# Patient Record
Sex: MALE | Race: WHITE | NOT HISPANIC OR LATINO | Employment: OTHER | URBAN - METROPOLITAN AREA
[De-identification: names, ages, dates, MRNs, and addresses within clinical notes are randomized per-mention and may not be internally consistent; named-entity substitution may affect disease eponyms.]

---

## 2017-01-12 ENCOUNTER — ALLSCRIPTS OFFICE VISIT (OUTPATIENT)
Dept: OTHER | Facility: OTHER | Age: 82
End: 2017-01-12

## 2017-01-12 DIAGNOSIS — R01.1 CARDIAC MURMUR: ICD-10-CM

## 2017-01-12 DIAGNOSIS — N28.1 ACQUIRED CYST OF KIDNEY: ICD-10-CM

## 2017-03-20 ENCOUNTER — ALLSCRIPTS OFFICE VISIT (OUTPATIENT)
Dept: OTHER | Facility: OTHER | Age: 82
End: 2017-03-20

## 2017-03-20 ENCOUNTER — HOSPITAL ENCOUNTER (OUTPATIENT)
Dept: RADIOLOGY | Facility: CLINIC | Age: 82
Discharge: HOME | End: 2017-03-20
Payer: COMMERCIAL

## 2017-03-20 DIAGNOSIS — R22.30 LOCALIZED SWELLING, MASS, OR LUMP OF UPPER EXTREMITY: ICD-10-CM

## 2017-03-20 PROCEDURE — 73140 X-RAY EXAM OF FINGER(S): CPT

## 2017-04-18 ENCOUNTER — GENERIC CONVERSION - ENCOUNTER (OUTPATIENT)
Dept: OTHER | Facility: OTHER | Age: 82
End: 2017-04-18

## 2017-04-18 ENCOUNTER — LAB CONVERSION - ENCOUNTER (OUTPATIENT)
Dept: OTHER | Facility: OTHER | Age: 82
End: 2017-04-18

## 2017-04-18 LAB
A/G RATIO (HISTORICAL): 1.3 (CALC) (ref 1–2.5)
ALBUMIN SERPL BCP-MCNC: 3.9 G/DL (ref 3.6–5.1)
ALP SERPL-CCNC: 70 U/L (ref 40–115)
ALT SERPL W P-5'-P-CCNC: 16 U/L (ref 9–46)
AST SERPL W P-5'-P-CCNC: 17 U/L (ref 10–35)
BASOPHILS # BLD AUTO: 0.8 %
BASOPHILS # BLD AUTO: 61 CELLS/UL (ref 0–200)
BILIRUB SERPL-MCNC: 1 MG/DL (ref 0.2–1.2)
BUN SERPL-MCNC: 25 MG/DL (ref 7–25)
BUN/CREA RATIO (HISTORICAL): 16 (CALC) (ref 6–22)
CALCIUM SERPL-MCNC: 9.4 MG/DL (ref 8.6–10.3)
CHLORIDE SERPL-SCNC: 102 MMOL/L (ref 98–110)
CHOLEST SERPL-MCNC: 197 MG/DL (ref 125–200)
CHOLEST/HDLC SERPL: 6.6 (CALC)
CO2 SERPL-SCNC: 32 MMOL/L (ref 20–31)
CREAT SERPL-MCNC: 1.55 MG/DL (ref 0.7–1.11)
DEPRECATED RDW RBC AUTO: 13.7 % (ref 11–15)
EGFR AFRICAN AMERICAN (HISTORICAL): 48 ML/MIN/1.73M2
EGFR-AMERICAN CALC (HISTORICAL): 41 ML/MIN/1.73M2
EOSINOPHIL # BLD AUTO: 570 CELLS/UL (ref 15–500)
EOSINOPHIL # BLD AUTO: 7.5 %
GAMMA GLOBULIN (HISTORICAL): 3 G/DL (CALC) (ref 1.9–3.7)
GLUCOSE (HISTORICAL): 176 MG/DL (ref 65–99)
HBA1C MFR BLD HPLC: 8.5 % OF TOTAL HGB
HCT VFR BLD AUTO: 43.3 % (ref 38.5–50)
HDLC SERPL-MCNC: 30 MG/DL
HGB BLD-MCNC: 14 G/DL (ref 13.2–17.1)
LDL CHOLESTEROL (HISTORICAL): 122 MG/DL (CALC)
LYMPHOCYTES # BLD AUTO: 1908 CELLS/UL (ref 850–3900)
LYMPHOCYTES # BLD AUTO: 25.1 %
MCH RBC QN AUTO: 31.4 PG (ref 27–33)
MCHC RBC AUTO-ENTMCNC: 32.3 G/DL (ref 32–36)
MCV RBC AUTO: 97.2 FL (ref 80–100)
MONOCYTES # BLD AUTO: 547 CELLS/UL (ref 200–950)
MONOCYTES (HISTORICAL): 7.2 %
NEUTROPHILS # BLD AUTO: 4514 CELLS/UL (ref 1500–7800)
NEUTROPHILS # BLD AUTO: 59.4 %
NON-HDL-CHOL (CHOL-HDL) (HISTORICAL): 167 MG/DL (CALC)
PLATELET # BLD AUTO: 301 THOUSAND/UL (ref 140–400)
PMV BLD AUTO: 8.6 FL (ref 7.5–12.5)
POTASSIUM SERPL-SCNC: 4.2 MMOL/L (ref 3.5–5.3)
PROSTATE SPECIFIC ANTIGEN TOTAL (HISTORICAL): 3.3 NG/ML
RBC # BLD AUTO: 4.45 MILLION/UL (ref 4.2–5.8)
SODIUM SERPL-SCNC: 141 MMOL/L (ref 135–146)
T4 FREE SERPL-MCNC: 1.2 NG/DL (ref 0.8–1.8)
TOTAL PROTEIN (HISTORICAL): 6.9 G/DL (ref 6.1–8.1)
TRIGL SERPL-MCNC: 225 MG/DL
TSH SERPL DL<=0.05 MIU/L-ACNC: 11.75 MIU/L (ref 0.4–4.5)
WBC # BLD AUTO: 7.6 THOUSAND/UL (ref 3.8–10.8)

## 2017-04-26 ENCOUNTER — GENERIC CONVERSION - ENCOUNTER (OUTPATIENT)
Dept: OTHER | Facility: OTHER | Age: 82
End: 2017-04-26

## 2017-05-22 ENCOUNTER — ALLSCRIPTS OFFICE VISIT (OUTPATIENT)
Dept: OTHER | Facility: OTHER | Age: 82
End: 2017-05-22

## 2017-05-22 DIAGNOSIS — R10.12 LEFT UPPER QUADRANT PAIN: ICD-10-CM

## 2017-05-30 ENCOUNTER — GENERIC CONVERSION - ENCOUNTER (OUTPATIENT)
Dept: OTHER | Facility: OTHER | Age: 82
End: 2017-05-30

## 2017-06-06 ENCOUNTER — HOSPITAL ENCOUNTER (OUTPATIENT)
Dept: CT IMAGING | Facility: HOSPITAL | Age: 82
Discharge: HOME | End: 2017-06-06
Attending: INTERNAL MEDICINE
Payer: COMMERCIAL

## 2017-06-06 DIAGNOSIS — R10.12 LEFT UPPER QUADRANT PAIN: ICD-10-CM

## 2017-06-06 PROCEDURE — 74177 CT ABD & PELVIS W/CONTRAST: CPT

## 2017-06-06 RX ADMIN — IODIXANOL 85 ML: 320 INJECTION, SOLUTION INTRAVASCULAR at 14:55

## 2017-06-07 ENCOUNTER — GENERIC CONVERSION - ENCOUNTER (OUTPATIENT)
Dept: OTHER | Facility: OTHER | Age: 82
End: 2017-06-07

## 2017-08-21 ENCOUNTER — ALLSCRIPTS OFFICE VISIT (OUTPATIENT)
Dept: OTHER | Facility: OTHER | Age: 82
End: 2017-08-21

## 2017-09-01 ENCOUNTER — GENERIC CONVERSION - ENCOUNTER (OUTPATIENT)
Dept: OTHER | Facility: OTHER | Age: 82
End: 2017-09-01

## 2017-09-06 ENCOUNTER — LAB CONVERSION - ENCOUNTER (OUTPATIENT)
Dept: OTHER | Facility: OTHER | Age: 82
End: 2017-09-06

## 2017-09-06 LAB
BUN SERPL-MCNC: 28 MG/DL (ref 7–25)
BUN/CREA RATIO (HISTORICAL): 18 (CALC) (ref 6–22)
CALCIUM SERPL-MCNC: 9.4 MG/DL (ref 8.6–10.3)
CHLORIDE SERPL-SCNC: 104 MMOL/L (ref 98–110)
CO2 SERPL-SCNC: 30 MMOL/L (ref 20–31)
CREAT SERPL-MCNC: 1.56 MG/DL (ref 0.7–1.11)
EGFR AFRICAN AMERICAN (HISTORICAL): 47 ML/MIN/1.73M2
EGFR-AMERICAN CALC (HISTORICAL): 41 ML/MIN/1.73M2
GLUCOSE (HISTORICAL): 105 MG/DL (ref 65–99)
HBA1C MFR BLD HPLC: 8 % OF TOTAL HGB
POTASSIUM SERPL-SCNC: 4.2 MMOL/L (ref 3.5–5.3)
SODIUM SERPL-SCNC: 142 MMOL/L (ref 135–146)
T4 FREE SERPL-MCNC: 1.4 NG/DL (ref 0.8–1.8)
TSH SERPL DL<=0.05 MIU/L-ACNC: 6.51 MIU/L (ref 0.4–4.5)

## 2017-09-13 ENCOUNTER — LAB CONVERSION - ENCOUNTER (OUTPATIENT)
Dept: OTHER | Facility: OTHER | Age: 82
End: 2017-09-13

## 2017-10-03 ENCOUNTER — ALLSCRIPTS OFFICE VISIT (OUTPATIENT)
Dept: OTHER | Facility: OTHER | Age: 82
End: 2017-10-03

## 2017-10-03 ENCOUNTER — TRANSCRIBE ORDERS (OUTPATIENT)
Dept: ADMINISTRATIVE | Facility: HOSPITAL | Age: 82
End: 2017-10-03

## 2017-10-03 ENCOUNTER — HOSPITAL ENCOUNTER (OUTPATIENT)
Dept: RADIOLOGY | Facility: HOSPITAL | Age: 82
Discharge: HOME | End: 2017-10-03
Attending: INTERNAL MEDICINE
Payer: COMMERCIAL

## 2017-10-03 DIAGNOSIS — M25.569 PAIN IN KNEE: ICD-10-CM

## 2017-10-03 PROCEDURE — 73562 X-RAY EXAM OF KNEE 3: CPT

## 2017-10-04 NOTE — PROGRESS NOTES
Assessment  1  Knee pain (719 46) (M25 569)   2  Benign essential hypertension (401 1) (I10)    Plan  Knee pain    · Meloxicam 15 MG Oral Tablet; take 1 tablet daily with food   · * XR KNEE 3 VW RIGHT NON INJURY; Status:Active; Requested XEV:70WGL7388;     Discussion/Summary    Wrap knee in ACE bandage d uring the day  sent to pharmacy  knee  The patient was counseled regarding instructions for management  Possible side effects of new medications were reviewed with the patient/guardian today  The treatment plan was reviewed with the patient/guardian  The patient/guardian understands and agrees with the treatment plan      Chief Complaint  Pt is having trouble with his right knee being painful and swollen  Pt has been using ty ware and getting no relief  Medications are current  DD      History of Present Illness  HPI: right knee as per CC, hurts to bear weight and some swelling noted  Review of Systems    Constitutional: feeling poorly  Cardiovascular: no complaints of slow or fast heart rate, no chest pain, no palpitations, no leg claudication or lower extremity edema  Respiratory: no complaints of shortness of breath, no wheezing or cough, no dyspnea on exertion, no orthopnea or PND  Gastrointestinal: no complaints of abdominal pain, no constipation, no nausea or vomiting, no diarrhea or bloody stools  Musculoskeletal: arthralgias,-joint swelling,-myalgias-and-joint stiffness  Integumentary: no complaints of skin rash or lesion, no itching or dry skin, no skin wounds  Active Problems  1  Asthma with status asthmaticus (493 91) (J45 902)   2  Benign essential hypertension (401 1) (I10)   3  Bilateral impacted cerumen (380 4) (H61 23)   4  Bronchitis (490) (J40)   5  Cardiac murmur (785 2) (R01 1)   6  Cervical lymphadenopathy (785 6) (R59 0)   7  Colon cancer screening (V76 51) (Z12 11)   8  Depression screening (V79 0) (Z13 89)   9  Dyslipidemia, goal LDL below 100 (272 4) (E78 5)   10  Encounter for prostate cancer screening (V76 44) (Z12 5)   11  Ganglion cyst (727 43) (M67 40)   12  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)   13  Hypothyroidism (244 9) (E03 9)   14  LUQ discomfort (789 02) (R10 12)   15  Denied: History of Mental health disorder   16  Need for immunization against influenza (V04 81) (Z23)   17  Need for influenza vaccination (V04 81) (Z23)   18  Need for prophylactic vaccination and inoculation against influenza (V04 81) (Z23)   19  No advance directives (V49 89) (Z78 9)   20  History of No advance directives (V49 89) (Z78 9)   21  Nocturia (788 43) (R35 1)   22  Obesity (278 00) (E66 9)   23  Pure hypercholesterolemia (272 0) (E78 00)   24  Renal cyst (753 10) (N28 1)   25  Screening for depression (V79 0) (Z13 89)   26  Screening for genitourinary condition (V81 6) (Z13 89)   27  Screening for neurological condition (V80 09) (Z13 89)   28  Sinusitis (473 9) (J32 9)   29  Special screening for other neurological conditions (V80 09) (Z13 89)   30  Denied: History of Substance abuse   31  Type 2 diabetes mellitus (250 00) (E11 9)    Social History   · Caffeine use (V49 89) (F15 90)   · COFFEE AND DIET COLA AND TEA   · Cultural background   · NON-   ·    · Lives with family   · Living Situation: Supportive and safe   · Never a smoker   · No advance directives (V49 89) (Z78 9)   · History of No advance directives (V49 89) (Z78 9)   · No drug use   · Non drinker / no alcohol use   · Primary spoken language English   · Racial background   · WHITE    Current Meds   1  Allopurinol 300 MG Oral Tablet; TAKE ONE TABLET BY MOUTH ONCE DAILY; Therapy: 41UEA8009 to (RVTUAEMV:83DOV4595)  Requested for: 05AZW6540; Last   Rx:07Jun2017 Ordered   2  Aspirin EC 81 MG Oral Tablet Delayed Release; TAKE 1 TABLET DAILY; Therapy: (Recorded:11Nov2014) to Recorded   3  BD Pen Needle Mini U/F 31G X 5 MM Miscellaneous; USE ONE DAILY AS DIRECTED;    Therapy: 62HTI1442 to (Evaluate:01Apr2018) Requested for: 07UQY6633; Last   Rx:78Rgc4482 Ordered   4  DilTIAZem HCl  MG Oral Capsule Extended Release 24 Hour; TAKE 1 CAPSULE   DAILY  Requested for: 63Vvj5557; Last Rx:21Aug2017 Ordered   5  Gabapentin 300 MG Oral Capsule; TAKE ONE CAPSULE BY MOUTH AT BEDTIME; Therapy: 51CNJ7177 to (Evaluate:94Axz8350)  Requested for: 31Ujc4476; Last   Rx:26Fyx1680 Ordered   6  Lantus SoloStar 100 UNIT/ML Subcutaneous Solution Pen-injector; INJECT 54 UNIT   Bedtime; Therapy: 44MSQ6453 to (Merlauren Noun)  Requested for: 69UTL0913; Last   Rx:88Xng2792 Ordered   7  Levothyroxine Sodium 175 MCG Oral Tablet; TAKE 1 TABLET DAILY; Therapy: 18AKT3771 to (Evaluate:69Afn9636)  Requested for: 01Npo9762; Last   Rx:18Gjt6408 Ordered   8  Losartan Potassium-HCTZ 100-25 MG Oral Tablet; take 1 tablet by mouth every day; Therapy: 41HYQ5845 to (Evaluate:97Bpi4756)  Requested for: 22Vmo2201; Last   Rx:21Aug2017 Ordered   9  MetFORMIN HCl - 500 MG Oral Tablet; TAKE TWO TABLETS BY MOUTH IN THE AM AND 2   IN THE PM;   Therapy: 02PSW9504 to (Evaluate:58Pwk0346)  Requested for: 85UHZ5245; Last   Rx:96Mop9768 Ordered   10  Niacin 500 MG Oral Tablet; TAKE 1 TABLET DAILY AS DIRECTED; Therapy: (Larissaen Age) to Recorded   11  Proventil  (90 Base) MCG/ACT Inhalation Aerosol Solution; INHALE 2 PUFFS 4    times daily PRN; Therapy: (Recorded:59Oab9597) to Recorded    The medication list was reviewed and updated today  Allergies  1  Lipitor TABS   2  Norvasc TABS  3  Grass    Vitals   Recorded: 48DVY1684 11:45AM   Heart Rate 78, L Radial   Pulse Quality Normal, L Radial   Systolic 893, LUE, Sitting   Diastolic 88, LUE, Sitting   Height 5 ft 6 5 in   Weight 220 lb    BMI Calculated 34 98   BSA Calculated 2 09     Physical Exam    Constitutional   General appearance: No acute distress, well appearing and well nourished  Eyes   Conjunctiva and lids: No swelling, erythema, or discharge      Pupils and irises: Equal, round and reactive to light  Ears, Nose, Mouth, and Throat   External inspection of ears and nose: Normal     Otoscopic examination: Tympanic membrance translucent with normal light reflex  Canals patent without erythema  Nasal mucosa, septum, and turbinates: Normal without edema or erythema  Oropharynx: Normal with no erythema, edema, exudate or lesions  Pulmonary   Respiratory effort: No increased work of breathing or signs of respiratory distress  Auscultation of lungs: Clear to auscultation, equal breath sounds bilaterally, no wheezes, no rales, no rhonci  Cardiovascular   Palpation of heart: Normal PMI, no thrills  Auscultation of heart: Normal rate and rhythm, normal S1 and S2, without murmurs  Examination of extremities for edema and/or varicosities: Normal     Carotid pulses: Normal     Abdomen   Abdomen: Non-tender, no masses  Liver and spleen: No hepatomegaly or splenomegaly  Lymphatic   Palpation of lymph nodes in neck: No lymphadenopathy  Musculoskeletal   Gait and station: Normal     Digits and nails: Normal without clubbing or cyanosis  Inspection/palpation of joints, bones, and muscles: Normal     Skin   Skin and subcutaneous tissue: Normal without rashes or lesions  Neurologic   Cranial nerves: Cranial nerves 2-12 intact  Reflexes: 2+ and symmetric  Sensation: No sensory loss  Psychiatric   Orientation to person, place and time: Normal     Mood and affect: Normal          Future Appointments    Date/Time Provider Specialty Site   11/21/2017 10:45 AM Zee Mosquera DO Internal Medicine [facility] INTERNAL MED   10/09/2017 10:45 AM KRISTA Guevara   Urology [address] [place] [address]     Signatures   Electronically signed by : KRISTA Pennington ; Oct  3 2017 12:04PM EST                       (Author)

## 2017-10-08 ENCOUNTER — GENERIC CONVERSION - ENCOUNTER (OUTPATIENT)
Dept: OTHER | Facility: OTHER | Age: 82
End: 2017-10-08

## 2017-10-09 ENCOUNTER — ALLSCRIPTS OFFICE VISIT (OUTPATIENT)
Dept: OTHER | Facility: OTHER | Age: 82
End: 2017-10-09

## 2017-10-28 NOTE — CONSULTS
Assessment  1  Nocturia (788 43) (R35 1)   2  Sexual dysfunction (302 70) (R37)   3  Urge incontinence of urine (788 31) (N39 41)    Plan  Nocturia    · Tamsulosin HCl - 0 4 MG Oral Capsule; TAKE 1 CAPSULE Bedtime   Rx By: Antonia Garcia; Dispense: 30 Days ; #:30 Capsule; Refill: 1;Nocturia; BILLY = N; Verified Transmission to [facility]; Last Updated By: System, [employer]; 10/9/2017 10:59:36 AM    Discussion/Summary  Discussion Summary:   80-year-old male with BPH , urinary urgency, urge incontinence, and nocturia  had a lengthy discussion with the patient regarding BPH and lower urinary tract symptoms  I will trial him on tamsulosin  Side effects of the medicine were discussed  We discussed that this medication does not help we may need to try an anticholinergic  He will follow up in 6 weeks to re-evaluate his symptoms and check a postvoid residual       Chief Complaint  Chief Complaint Free Text Note Form: Nocturia;ED;Urge Incontinence= 3 3 (4/17/2017)      History of Present Illness  HPI: 80-year-old male presents for evaluation of lower urinary tract symptoms  The patient complains of urinary urgency, daytime frequency, urge incontinence, and nocturia  He states that his symptoms have been going on for years but he mentioned him to his primary doctor who referred him to us  He denies any dysuria or gross hematuria  He denies any family history of prostate problems  He has no other complaints  Review of Systems  Complete-Male Urology:  Constitutional: No fever or chills, feels well, no tiredness, no recent weight gain or weight loss  Respiratory: No complaints of shortness of breath, no wheezing, no cough, no SOB on exertion, no orthopnea or PND  Cardiovascular: No complaints of slow heart rate, no fast heart rate, no chest pain, no palpitations, no leg claudication, no lower extremity    Gastrointestinal: No complaints of abdominal pain, no constipation, no nausea or vomiting, no diarrhea or bloody stools  Genitourinary: Empty sensation,-- incontinence-- (urge/no pads),-- feelings of urinary urgency-- and-- stream quality good, but-- no dysuria,-- no urinary hesitancy-- and-- no hematuria--   The patient presents with complaints of nocturia (1/2x)  Musculoskeletal: No complaints of arthralgia, no myalgias, no joint swelling or stiffness, no limb pain or swelling  Integumentary: No complaints of skin rash or skin lesions, no itching, no skin wound, no dry skin  Hematologic/Lymphatic: No complaints of swollen glands, no swollen glands in the neck, does not bleed easily, no easy bruising  Neurological: No compliants of headache, no confusion, no convulsions, no numbness or tingling, no dizziness or fainting, no limb weakness, no difficulty walking  ROS Reviewed:   ROS reviewed  Active Problems  1  Asthma with status asthmaticus (493 91) (J45 902)   2  Benign essential hypertension (401 1) (I10)   3  Bilateral impacted cerumen (380 4) (H61 23)   4  Bronchitis (490) (J40)   5  Cardiac murmur (785 2) (R01 1)   6  Cervical lymphadenopathy (785 6) (R59 0)   7  Colon cancer screening (V76 51) (Z12 11)   8  Depression screening (V79 0) (Z13 89)   9  Dyslipidemia, goal LDL below 100 (272 4) (E78 5)   10  Encounter for prostate cancer screening (V76 44) (Z12 5)   11  Ganglion cyst (727 43) (M67 40)   12  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)   13  Hypothyroidism (244 9) (E03 9)   14  Knee pain (719 46) (M25 569)   15  LUQ discomfort (789 02) (R10 12)   16  Denied: History of Mental health disorder   16  Need for immunization against influenza (V04 81) (Z23)   18  Need for influenza vaccination (V04 81) (Z23)   19  Need for prophylactic vaccination and inoculation against influenza (V04 81) (Z23)   20  No advance directives (V49 89) (Z78 9)   21  History of No advance directives (V49 89) (Z78 9)   22  Nocturia (788 43) (R35 1)   23  Obesity (278 00) (E66 9)   24   Pure hypercholesterolemia (272 0) (E78 00)   25  Renal cyst (753 10) (N28 1)   26  Screening for depression (V79 0) (Z13 89)   27  Screening for genitourinary condition (V81 6) (Z13 89)   28  Screening for neurological condition (V80 09) (Z13 89)   29  Sexual dysfunction (302 70) (R37)   30  Sinusitis (473 9) (J32 9)   31  Special screening for other neurological conditions (V80 09) (Z13 89)   32  Denied: History of Substance abuse   35  Type 2 diabetes mellitus (250 00) (E11 9)   34  Urge incontinence of urine (788 31) (N39 41)    Past Medical History  1  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)   2  History of GERD (gastroesophageal reflux disease) (530 81) (K21 9)   3  History of Glaucoma (365 9) (H40 9)   4  History of constipation (V12 79) (Z87 19)   5  History of Left ankle pain (719 47) (M25 572)   6  Denied: History of Mental health disorder   7  History of Mild aortic stenosis (424 1) (I35 0)   8  History of Numbness (782 0) (R20 0)   9  History of Overweight (278 02) (E66 3)   10  History of Peripheral neuropathy, idiopathic (356 9) (G60 9)   11  History of Prostate cancer (185) (C61)   12  History of Pure hypercholesterolemia (272 0) (E78 00)   13  History of Screening for genitourinary condition (V81 6) (Z13 89)   14  Denied: History of Substance abuse   15  History of Tracheobronchitis (490) (J40)  Active Problems And Past Medical History Reviewed: The active problems and past medical history were reviewed and updated today  Surgical History  1  History of Thyroid Surgery  Surgical History Reviewed: The surgical history was reviewed and updated today  Family History  Mother    1  Family history of diabetes mellitus (V18 0) (Z83 3)   2  No family history of mental disorder   3  Denied: Family history of Substance abuse in family  Father    4  No family history of mental disorder   5  Denied: Family history of Substance abuse in family  Son    6  Family history of colon cancer (V16 0) (Z80 0)  Brother    7   Family history of pancreatic cancer (V16 0) (Z80 0)  Maternal Grandmother    8  Family history of diabetes mellitus (V18 0) (Z83 3)  Family History    9  Family history of Diabetes mellitus  Family History Reviewed: The family history was reviewed and updated today  Social History     · Caffeine use (V49 89) (F15 90)   · Cultural background   ·    · Lives with family   · Living Situation: Supportive and safe   · Never a smoker   · No advance directives (V49 89) (Z78 9)   · History of No advance directives (V49 89) (Z78 9)   · No drug use   · Non drinker / no alcohol use   · Primary spoken language English   · Racial background  Social History Reviewed: The social history was reviewed and updated today  Current Meds   1  Allopurinol 300 MG Oral Tablet; TAKE ONE TABLET BY MOUTH ONCE DAILY; Therapy: 86BLZ0750 to (SRZZLKXH:67DQR3924)  Requested for: 35KRE6658; Last Rx:07Jun2017 Ordered   2  Aspirin EC 81 MG Oral Tablet Delayed Release; TAKE 1 TABLET DAILY; Therapy: (Recorded:11Nov2014) to Recorded   3  BD Pen Needle Mini U/F 31G X 5 MM Miscellaneous; USE ONE DAILY AS DIRECTED; Therapy: 62HRP2574 to (Evaluate:01Apr2018)  Requested for: 45Sur4745; Last Rx:06Apr2017 Ordered   4  DilTIAZem HCl  MG Oral Capsule Extended Release 24 Hour; TAKE 1 CAPSULE DAILY  Requested for: 20Lfw5796; Last Rx:25Qpl9855 Ordered   5  Gabapentin 300 MG Oral Capsule; TAKE ONE CAPSULE BY MOUTH AT BEDTIME; Therapy: 04TPO0029 to (Evaluate:06Mer8506)  Requested for: 21Zwb3998; Last Rx:90Mnb7264 Ordered   6  Lantus SoloStar 100 UNIT/ML Subcutaneous Solution Pen-injector; INJECT 54 UNIT Bedtime; Therapy: 65HEQ2185 to (87 89 79)  Requested for: 34SEM5573; Last Rx:32Syb5767 Ordered   7  Levothyroxine Sodium 175 MCG Oral Tablet; TAKE 1 TABLET DAILY; Therapy: 80KVP7918 to (Evaluate:88Ucq9248)  Requested for: 66Lwi2866; Last Rx:36Rbc1388 Ordered   8   Losartan Potassium-HCTZ 100-25 MG Oral Tablet; take 1 tablet by mouth every day; Therapy: 83ZYG0467 to (Evaluate:64Wlg2426)  Requested for: 28Nas7936; Last Rx:21Aug2017 Ordered   9  Meloxicam 15 MG Oral Tablet; take 1 tablet daily with food; Therapy: 90LLW7116 to (Evaluate:13Oct2017)  Requested for: 24GPY1890; Last Rx:03Oct2017 Ordered   10  MetFORMIN HCl - 500 MG Oral Tablet; TAKE TWO TABLETS BY MOUTH IN THE AM AND 2  IN THE PM;  Therapy: 35YSJ8282 to (Evaluate:98Zki5702)  Requested for: 23VAU8047; Last  Rx:13Mar2017 Ordered   11  Niacin 500 MG Oral Tablet; TAKE 1 TABLET DAILY AS DIRECTED; Therapy: (Christin Mcmahon) to Recorded   12  Proventil  (90 Base) MCG/ACT Inhalation Aerosol Solution; INHALE 2 PUFFS 4  times daily PRN; Therapy: (Recorded:87Dhn6022) to Recorded  Medication List Reviewed: The medication list was reviewed and updated today  Allergies  1  Lipitor TABS   2  Norvasc TABS  3  Grass    Vitals  Vital Signs    Recorded: 84VTN3049 10:44AM   Heart Rate 72   Systolic 067   Diastolic 80   Height 5 ft 6 5 in   Weight 228 lb 2 oz   BMI Calculated 36 27   BSA Calculated 2 13       Physical Exam   Constitutional  General appearance: No acute distress, well appearing and well nourished  Pulmonary  Respiratory effort: No increased work of breathing or signs of respiratory distress  Cardiovascular  Examination of extremities for edema and/or varicosities: Normal    Abdomen  Abdomen: Non-tender, no masses  Liver and spleen: No hepatomegaly or splenomegaly  Genitourinary  Anus and perineum: Normal    Scrotum contents: Normal size, no masses  Epididymis: Normal, no masses  Testes: Normal testes, no masses  Urethral meatus: Normal, no lesions  Penis: Normal, no lesions  Digital rectal exam of prostate: Abnormal  -- 40 g, smooth, no nodules, nontender  Digital rectal exam of seminal vesicles: Normal size, no masses     Anus, perineum, and rectum: Normal        Results/Data  (1) PSA (SCREEN) (Dx V76 44 Screen for Prostate Cancer) [id number] 10:30AM Shelly Cid Test Name Result Flag Reference   PSA, TOTAL 3 3 ng/mL  < OR = 4 0     This test was performed using the Siemens chemiluminescent method  Values obtained from different assay methods cannot be used interchangeably  PSA levels, regardless of value, should not be interpreted as absolute evidence of the presence or absence of disease       Future Appointments    Date/Time Provider Specialty Site   11/21/2017 10:45 AM Darnell Bloch, DO Internal Medicine  [address]       Signatures   Electronically signed by : KRISTA Bose ; Oct  9 2017 11:06AM EST                       (Author)

## 2017-11-09 LAB
T3 UPTAKE RATIO (HISTORICAL): 32 % (ref 22–35)
T4 FREE SERPL-MCNC: 2.6 NG/DL (ref 1.4–3.8)
T4 TOTAL (HISTORICAL): 8.1 MCG/DL (ref 4.5–12)
TSH SERPL DL<=0.05 MIU/L-ACNC: 11.56 MIU/L (ref 0.4–4.5)

## 2017-11-10 ENCOUNTER — APPOINTMENT (OUTPATIENT)
Dept: RADIOLOGY | Facility: CLINIC | Age: 82
End: 2017-11-10
Payer: COMMERCIAL

## 2017-11-10 ENCOUNTER — OFFICE VISIT (OUTPATIENT)
Dept: URGENT CARE | Facility: CLINIC | Age: 82
End: 2017-11-10
Payer: COMMERCIAL

## 2017-11-10 DIAGNOSIS — E03.9 HYPOTHYROIDISM: ICD-10-CM

## 2017-11-10 DIAGNOSIS — E11.9 TYPE 2 DIABETES MELLITUS WITHOUT COMPLICATIONS (HCC): ICD-10-CM

## 2017-11-10 DIAGNOSIS — J40 BRONCHITIS: ICD-10-CM

## 2017-11-10 DIAGNOSIS — R01.1 CARDIAC MURMUR: ICD-10-CM

## 2017-11-10 DIAGNOSIS — I10 ESSENTIAL (PRIMARY) HYPERTENSION: ICD-10-CM

## 2017-11-10 PROCEDURE — 71020 HB CHEST X-RAY 2VW FRONTAL&LATL: CPT

## 2017-11-10 PROCEDURE — 99203 OFFICE O/P NEW LOW 30 MIN: CPT

## 2017-11-14 ENCOUNTER — GENERIC CONVERSION - ENCOUNTER (OUTPATIENT)
Dept: OTHER | Facility: OTHER | Age: 82
End: 2017-11-14

## 2017-12-12 ENCOUNTER — HOSPITAL ENCOUNTER (OUTPATIENT)
Dept: NON INVASIVE DIAGNOSTICS | Facility: CLINIC | Age: 82
Discharge: HOME | End: 2017-12-12
Payer: COMMERCIAL

## 2017-12-12 ENCOUNTER — GENERIC CONVERSION - ENCOUNTER (OUTPATIENT)
Dept: FAMILY MEDICINE CLINIC | Facility: HOSPITAL | Age: 82
End: 2017-12-12

## 2017-12-12 DIAGNOSIS — R01.1 CARDIAC MURMUR: ICD-10-CM

## 2017-12-12 PROCEDURE — 93306 TTE W/DOPPLER COMPLETE: CPT

## 2017-12-13 ENCOUNTER — GENERIC CONVERSION - ENCOUNTER (OUTPATIENT)
Dept: OTHER | Facility: OTHER | Age: 82
End: 2017-12-13

## 2017-12-19 ENCOUNTER — ALLSCRIPTS OFFICE VISIT (OUTPATIENT)
Dept: OTHER | Facility: OTHER | Age: 82
End: 2017-12-19

## 2017-12-20 NOTE — PROGRESS NOTES
Assessment  1  Nocturia (568 43) (R35 1)   2  Urge incontinence of urine (788 31) (N39 41)    Plan  Nocturia    · Tamsulosin HCl - 0 4 MG Oral Capsule; TAKE 1 CAPSULE Bedtime   Rx By: Hallie Nath; Dispense: 90 Days ; #:90 Capsule; Refill: 3;For: Nocturia; BILLY = N; Verified Transmission to [facility]82; Last Updated By: System, [employer]; 12/19/2017 2:26:21 PM    Discussion/Summary  Discussion Summary:   80-year-old male managed by Dr Andre Records  BPH with lower urinary tract symptomsPVR was 68mL  The patient will continue on his tamsulosin as he has noticed some improvement on this  We will also start Myrbetriq 25mg  Discussed the side effect of possibly an elevated blood pressure and stressed to the patient that he needs to check his blood pressure in the next 1-2 weeks and call us with the results  If elevated with discontinue  He will also be seeing his PCP 1/9/2018 and will have his blood pressure checked at this time as well  Again, if elevated will stop the medication  Discussed that we could trial anticholinergics but due to the side effects of possible confusion we will only trial these of Myrbetriq does not work or elevated the blood pressure  Stressed increased hydration as well  We will follow up with the patient in 8 weeks with a PVR at his visit  He understands agrees to this treatment plan  All questions and concerns have been addressed and answered  Chief Complaint  Chief Complaint Free Text Note Form: Nocturia, urge incontinence      History of Present Illness  HPI: Felisha Gonzalez is an 80-year-old male here for FU evaluation of his lower urinary tract symptoms  The patient complains of urinary urgency, daytime frequency, urge incontinence, and nocturia 1-2x/night  He was started on tamsulosin at his last visit and states that he is doing somewhat better  However, he still has complaints as mentioned above  His PVR in the office today was 68 02mL   He has 1 cup coffee in the morning and minimal amounts of water throughout the day  Review of Systems  Complete-Male Urology:  Constitutional: No fever or chills, feels well, no tiredness, no recent weight gain or weight loss  Respiratory: No complaints of shortness of breath, no wheezing, no cough, no SOB on exertion, no orthopnea or PND  Cardiovascular: No complaints of slow heart rate, no fast heart rate, no chest pain, no palpitations, no leg claudication, no lower extremity  Gastrointestinal: No complaints of abdominal pain, no constipation, no nausea or vomiting, no diarrhea or bloody stools  Genitourinary: Empty sensation,-- incontinence-- (urge/ no pads),-- feelings of urinary urgency-- and-- stream quality good, but-- no dysuria,-- no urinary hesitancy-- and-- no hematuria--   The patient presents with complaints of nocturia (1/2)  Musculoskeletal: No complaints of arthralgia, no myalgias, no joint swelling or stiffness, no limb pain or swelling  Integumentary: No complaints of skin rash or skin lesions, no itching, no skin wound, no dry skin  Hematologic/Lymphatic: No complaints of swollen glands, no swollen glands in the neck, does not bleed easily, no easy bruising  Neurological: No compliants of headache, no confusion, no convulsions, no numbness or tingling, no dizziness or fainting, no limb weakness, no difficulty walking  ROS Reviewed:   ROS reviewed  Active Problems  1  Asthma (493 90) (J45 909)   2  Asthma with status asthmaticus (493 91) (J45 902)   3  Benign essential hypertension (401 1) (I10)   4  Cardiac murmur (785 2) (R01 1)   5  Dyslipidemia, goal LDL below 100 (272 4) (E78 5)   6  Encounter for prostate cancer screening (V76 44) (Z12 5)   7  Ganglion cyst (727 43) (M67 40)   8  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)   9  Hypothyroidism (244 9) (E03 9)   10  Knee pain (719 46) (M25 569)   11  Denied: History of Mental health disorder   12  Need for influenza vaccination (V04 81) (Z23)   13   Need for prophylactic vaccination and inoculation against influenza (V04 81) (Z23)   14  No advance directives (V49 89) (Z78 9)   15  History of No advance directives (V49 89) (Z78 9)   16  Nocturia (788 43) (R35 1)   17  Obesity (278 00) (E66 9)   18  Pure hypercholesterolemia (272 0) (E78 00)   19  Renal cyst (753 10) (N28 1)   20  Screening for depression (V79 0) (Z13 89)   21  Screening for genitourinary condition (V81 6) (Z13 89)   22  Sexual dysfunction (302 70) (R37)   23  Denied: History of Substance abuse   24  Tracheobronchitis (490) (J40)   25  Type 2 diabetes mellitus (250 00) (E11 9)   26  Urge incontinence of urine (788 31) (N39 41)    Past Medical History  1  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)   2  History of GERD (gastroesophageal reflux disease) (530 81) (K21 9)   3  History of Glaucoma (365 9) (H40 9)   4  History of constipation (V12 79) (Z87 19)   5  History of Left ankle pain (719 47) (M25 572)   6  Denied: History of Mental health disorder   7  History of Mild aortic stenosis (424 1) (I35 0)   8  History of Numbness (782 0) (R20 0)   9  History of Overweight (278 02) (E66 3)   10  History of Peripheral neuropathy, idiopathic (356 9) (G60 9)   11  History of Prostate cancer (185) (C61)   12  History of Pure hypercholesterolemia (272 0) (E78 00)   13  History of Screening for genitourinary condition (V81 6) (Z13 89)   14  Denied: History of Substance abuse  Active Problems And Past Medical History Reviewed: The active problems and past medical history were reviewed and updated today  Surgical History  1  History of Thyroid Surgery  Surgical History Reviewed: The surgical history was reviewed and updated today  Family History  Mother    1  Family history of diabetes mellitus (V18 0) (Z83 3)   2  No family history of mental disorder   3  Denied: Family history of Substance abuse in family  Father    4  No family history of mental disorder   5   Denied: Family history of Substance abuse in family  Son    6  Family history of colon cancer (V16 0) (Z80 0)  Brother    7  Family history of pancreatic cancer (V16 0) (Z80 0)  Maternal Grandmother    8  Family history of diabetes mellitus (V18 0) (Z83 3)  Family History    9  Family history of Diabetes mellitus  Family History Reviewed: The family history was reviewed and updated today  Social History   · Caffeine use (V49 89) (F15 90)   · Cultural background   ·    · Lives with family   · Living Situation: Supportive and safe   · Never a smoker   · History of No advance directives (V49 89) (Z78 9)   · No advance directives (V49 89) (Z78 9)   · No drug use   · Non drinker / no alcohol use   · Primary spoken language English   · Racial background  Social History Reviewed: The social history was reviewed and updated today  The social history was reviewed and is unchanged  Current Meds   1  Allopurinol 300 MG Oral Tablet; TAKE ONE TABLET BY MOUTH ONCE DAILY; Therapy: 68XMW9155 to (DQYESXVO:18HZX6460)  Requested for: 74GGS2034; Last Rx:07Jun2017 Ordered   2  Aspirin EC 81 MG Oral Tablet Delayed Release; TAKE 1 TABLET DAILY; Therapy: (Recorded:11Nov2014) to Recorded   3  BD Pen Needle Mini U/F 31G X 5 MM Miscellaneous; USE ONE DAILY AS DIRECTED; Therapy: 66SSY5154 to (Evaluate:01Apr2018)  Requested for: 06Apr2017; Last Rx:06Apr2017 Ordered   4  Cefuroxime Axetil 500 MG Oral Tablet; TAKE 1 TABLET EVERY 12 HOURS DAILY; Therapy: 23MGE7424 to (Evaluate:21Nov2017)  Requested for: 94MQP8928; Last Rx:14Nov2017 Ordered   5  DilTIAZem HCl ER Coated Beads 180 MG Oral Capsule Extended Release 24 Hour; TAKE ONE CAPSULE BY MOUTH EVERY DAY; Therapy: 52DQP0237 to ()  Requested for: 83DCK2254; Last Rx:12Oct2017 Ordered   6  Gabapentin 300 MG Oral Capsule; TAKE ONE CAPSULE BY MOUTH AT BEDTIME; Therapy: 69ACA3647 to (Evaluate:76Uau4334)  Requested for: 74Zao2778; Last Rx:90Hjq6991 Ordered   7   Lantus SoloStar 100 UNIT/ML Subcutaneous Solution Pen-injector; INJECT 54 UNIT Bedtime; Therapy: 70HZK8690 to (Elisa Lowe)  Requested for: 36HFO6504; Last Rx:68Jzq2515 Ordered   8  Levothyroxine Sodium 200 MCG Oral Tablet; TAKE 1 TABLET DAILY; Therapy: 17JIK3396 to (Jaleel Mckinnon)  Requested for: 15WMS6407; Last Rx:27Nov2017 Ordered   9  Losartan Potassium-HCTZ 100-25 MG Oral Tablet; take 1 tablet by mouth every day; Therapy: 21MOE4165 to (Evaluate:74Xyb9897)  Requested for: 06Tre2252; Last Rx:14Zip3100 Ordered   10  Meloxicam 15 MG Oral Tablet; take 1 tablet daily with food; Therapy: 77XZJ8739 to (Evaluate:13Oct2017)  Requested for: 14DQE9178; Last  Rx:03Oct2017 Ordered   11  MetFORMIN HCl - 500 MG Oral Tablet; TAKE TWO TABLETS BY MOUTH IN THE AM AND 2  IN THE PM;  Therapy: 77GVF5978 to (Evaluate:53Uhi5538)  Requested for: 67Zuo1221; Last  Rx:68Lxy1983 Ordered   12  Mucinex 600 MG Oral Tablet Extended Release 12 Hour; TAKE 1 TABLET EVERY 12  HOURS AS NEEDED FOR CONGESTION; Therapy: 38GEW5053 to (Evaluate:82Csm3502)  Requested for: 83TDS3090; Last  Rx:10Nov2017 Ordered   13  Proventil  (90 Base) MCG/ACT Inhalation Aerosol Solution; INHALE 2 PUFFS 4  times daily PRN  Requested for: 82EQM8311; Last Rx:14Nov2017 Ordered   14  Symbicort 160-4 5 MCG/ACT Inhalation Aerosol; USE 2 PUFFS BY MOUTH TWICE DAILY; Therapy: 10KOC5809 to (Evaluate:13Jan2018); Last Rx:14Nov2017 Ordered   15  Tamsulosin HCl - 0 4 MG Oral Capsule; TAKE 1 CAPSULE Bedtime; Therapy: 57AYO6063 to (Evaluate:13Djt2687)  Requested for: 30UBV3869; Last  Rx:09Oct2017 Ordered  Medication List Reviewed: The medication list was reviewed and updated today  Allergies  1  Lipitor TABS   2  Norvasc TABS  3   Grass    Vitals  Vital Signs    Recorded: 56GMY6139 02:18PM   Heart Rate 80   Systolic 667   Diastolic 80   Height 5 ft 6 in   Weight 220 lb    BMI Calculated 35 51   BSA Calculated 2 08     Physical Exam   Constitutional  General appearance: No acute distress, well appearing and well nourished  Eyes  Conjunctiva and lids: No erythema, swelling or discharge  Ears, Nose, Mouth, and Throat  Hearing: Normal    Pulmonary  Respiratory effort: No increased work of breathing or signs of respiratory distress  Abdomen  Abdomen: Non-tender, no masses  Musculoskeletal  Gait and station: Normal    Psychiatric  Mood and affect: Normal        Procedure   Procedure: Bladder Ultrasound Post Void Residual   Test indication: Urge Incontinence  The procedure's were discussed with the patient  Equipment And Procedure:  U/S Findings: bladder volume 68ml        Future Appointments    Date/Time Provider Specialty Site   01/09/2018 11:15 AM Sandie Sellers DO Internal Medicine [facility] INTERNAL MED     Signatures   Electronically signed by : Miya Ferreira, ; Dec 19 2017  2:33PM EST                       (Author)    Electronically signed by : KRISTA Raman ; Dec 19 2017 11:50PM EST

## 2018-01-09 ENCOUNTER — ALLSCRIPTS OFFICE VISIT (OUTPATIENT)
Dept: OTHER | Facility: OTHER | Age: 83
End: 2018-01-09

## 2018-01-10 NOTE — PROGRESS NOTES
Assessment   1  Type 2 diabetes mellitus (250 00) (E11 9)   2  History of status asthmaticus (V12 69) (Z87 09)   3  Mild intermittent asthma without complication (767 36) (P64 85)   4  Benign essential hypertension (401 1) (I10)   5  Hypothyroidism (244 9) (E03 9)   6  Pure hypercholesterolemia (272 0) (E78 00)   7  Acute pain of right knee (719 46) (M25 561)   8  Exercise counseling (V65 41) (Z71 82)    Plan   Benign essential hypertension    · (1) CBC/PLT/DIFF; Status:Active; Requested BKW:71GJO1414; Exercise counseling    · There are many exercise options for seniors ; Status:Complete;   Done: 69SIX8031   · (1) COMPREHENSIVE METABOLIC PANEL; Status:Active; Requested LRD:05AWQ2472;   Knee pain    · Diclofenac Sodium 1 % Transdermal Gel (Voltaren); apply to affected area 2 times a day  Mild intermittent asthma without complication    · SPIROMETRY W/O BRONCHO- POC; Status:Active - Perform Order,Retrospective Authorization; Requested LXK:[id number];   Type 2 diabetes mellitus    · Lantus SoloStar 100 UNIT/ML Subcutaneous Solution Pen-injector   · Levemir FlexTouch 100 UNIT/ML Subcutaneous Solution Pen-injector; INJECT 50 UNIT Bedtime   · (1) HEMOGLOBIN A1C; Status:Active; Requested EPX:89IVD0207; Discussion/Summary   Discussion Summary:    Do labs after feb 15      Counseling Documentation With Imm: The patient was counseled regarding diagnostic results,-- prognosis,-- risks and benefits of treatment options  Medication SE Review and Pt Understands Tx: Possible side effects of new medications were reviewed with the patient/guardian today  Self Referrals:    Self Referrals: Yes      Chief Complaint   Chief Complaint Free Text Note Form: Pt here for f/u today  I did and ordered PFT  dk   saw sl neuro since last here---sumi cole        History of Present Illness   HPI: 1  dmtype 2- got notice that [employer] preferred insulin is now levemir not lantus - will print up for levemir at same dose asthma- no recent flare of symptoms knee pain- still some right medial pain- using otc cream-will try voltaren cream- had xray in october with some arthritis but not real severe  Reports he slipped and fell in driveway last month and he had trouble getting up  Eventually with help of son  fell onto back side and had some bruising  Discussed carrying cell phone with him  Review of Systems   Complete-Male:      Constitutional: not feeling poorly-- and-- not feeling tired  Eyes: no eyesight problems-- and-- no purulent discharge from the eyes  ENT: no nasal discharge  Cardiovascular: no chest pain-- and-- no palpitations  Respiratory: no cough-- and-- no shortness of breath during exertion  Gastrointestinal: no nausea-- and-- no diarrhea  Active Problems   1  Benign essential hypertension (401 1) (I10)   2  Cardiac murmur (785 2) (R01 1)   3  Dyslipidemia, goal LDL below 100 (272 4) (E78 5)   4  Encounter for prostate cancer screening (V76 44) (Z12 5)   5  Ganglion cyst (727 43) (M67 40)   6  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)   7  Hypothyroidism (244 9) (E03 9)   8  Knee pain (719 46) (M25 569)   9  Denied: History of Mental health disorder   10  Mild intermittent asthma without complication (387 17) (U53 46)   11  Need for influenza vaccination (V04 81) (Z23)   12  Need for prophylactic vaccination and inoculation against influenza (V04 81) (Z23)   13  No advance directives (V49 89) (Z78 9)   14  History of No advance directives (V49 89) (Z78 9)   15  Nocturia (788 43) (R35 1)   16  Obesity (278 00) (E66 9)   17  Pure hypercholesterolemia (272 0) (E78 00)   18  Renal cyst (753 10) (N28 1)   19  Screening for depression (V79 0) (Z13 89)   20  Screening for genitourinary condition (V81 6) (Z13 89)   21  Sexual dysfunction (302 70) (R37)   22  Denied: History of Substance abuse   23  Type 2 diabetes mellitus (250 00) (E11 9)   24   Urge incontinence of urine (788 31) (N39 41)    Past Medical History   1  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)   2  History of GERD (gastroesophageal reflux disease) (530 81) (K21 9)   3  History of Glaucoma (365 9) (H40 9)   4  History of constipation (V12 79) (Z87 19)   5  History of Left ankle pain (719 47) (M25 572)   6  Denied: History of Mental health disorder   7  History of Mild aortic stenosis (424 1) (I35 0)   8  History of Numbness (782 0) (R20 0)   9  History of Overweight (278 02) (E66 3)   10  History of Peripheral neuropathy, idiopathic (356 9) (G60 9)   11  History of Prostate cancer (185) (C61)   12  History of Pure hypercholesterolemia (272 0) (E78 00)   13  History of Screening for genitourinary condition (V81 6) (Z13 89)   14  Denied: History of Substance abuse    Surgical History   1  History of Thyroid Surgery    Family History   Mother    1  Family history of diabetes mellitus (V18 0) (Z83 3)   2  No family history of mental disorder   3  Denied: Family history of Substance abuse in family  Father    4  No family history of mental disorder   5  Denied: Family history of Substance abuse in family  Son    6  Family history of colon cancer (V16 0) (Z80 0)  Brother    7  Family history of pancreatic cancer (V16 0) (Z80 0)  Maternal Grandmother    8  Family history of diabetes mellitus (V18 0) (Z83 3)  Family History    9  Family history of Diabetes mellitus    Social History    · Caffeine use (V49 89) (F15 90)   · Cultural background   ·    · Lives with family   · Living Situation: Supportive and safe   · Never a smoker   · No advance directives (V49 89) (Z78 9)   · History of No advance directives (V49 89) (Z78 9)   · No drug use   · Non drinker / no alcohol use   · Primary spoken language English   · Racial background  Social History Reviewed: The social history was reviewed and updated today  Current Meds    1  Allopurinol 300 MG Oral Tablet; TAKE ONE TABLET BY MOUTH ONCE DAILY;      Therapy: 92WUA6358 to (Evaluate:02Jun2018) Requested for: 45WHY9945; Last Rx:07Jun2017     Ordered   2  Aspirin EC 81 MG Oral Tablet Delayed Release; TAKE 1 TABLET DAILY; Therapy: (Recorded:11Nov2014) to Recorded   3  BD Pen Needle Mini U/F 31G X 5 MM Miscellaneous; USE ONE DAILY AS DIRECTED; Therapy: 21VFH4858 to (Evaluate:01Apr2018)  Requested for: 26Ktx8206; Last Rx:06Apr2017     Ordered   4  Cefuroxime Axetil 500 MG Oral Tablet; TAKE 1 TABLET EVERY 12 HOURS DAILY; Therapy: 69QQN2976 to (Evaluate:21Nov2017)  Requested for: 99CXP2983; Last Rx:14Nov2017     Ordered   5  DilTIAZem HCl ER Coated Beads 180 MG Oral Capsule Extended Release 24 Hour; TAKE ONE     CAPSULE BY MOUTH EVERY DAY; Therapy: 00KDU7590 to (Lin Skaggs)  Requested for: 09HQI9203; Last Rx:12Oct2017     Ordered   6  Gabapentin 300 MG Oral Capsule; TAKE ONE CAPSULE BY MOUTH AT BEDTIME; Therapy: 98BCJ5287 to (Evaluate:45Hzg0865)  Requested for: 21Aug2017; Last Rx:21Aug2017     Ordered   7  Lantus SoloStar 100 UNIT/ML Subcutaneous Solution Pen-injector; INJECT 54 UNIT Bedtime; Therapy: 30ANU6195 to (Cong Rose)  Requested for: 62LVG9455; Last Rx:13Uaw6718     Ordered   8  Levothyroxine Sodium 200 MCG Oral Tablet; TAKE 1 TABLET DAILY; Therapy: 65KDP2080 to (Vika Cardenas)  Requested for: 03AYY2041; Last Rx:27Nov2017     Ordered   9  Losartan Potassium-HCTZ 100-25 MG Oral Tablet; take 1 tablet by mouth every day; Therapy: 88RGV6722 to (Evaluate:85Oue5168)  Requested for: 04Xyf4862; Last Rx:21Aug2017     Ordered   10  Meloxicam 15 MG Oral Tablet; take 1 tablet daily with food; Therapy: 98SYA1868 to (Evaluate:13Oct2017)  Requested for: 64YMJ6259; Last Rx:03Oct2017      Ordered   11  MetFORMIN HCl - 500 MG Oral Tablet; TAKE TWO TABLETS BY MOUTH IN THE AM AND 2 IN THE PM;      Therapy: 54FKZ3295 to (Evaluate:11Cce3812)  Requested for: 14Xqf1251; Last Rx:34Mxs0374      Ordered   12   Mucinex 600 MG Oral Tablet Extended Release 12 Hour; TAKE 1 TABLET EVERY 12 HOURS AS      NEEDED FOR CONGESTION; Therapy: 58EZD0754 to (Evaluate:02Sri2999)  Requested for: 05NNB3693; Last Rx:10Nov2017      Ordered   13  Proventil  (90 Base) MCG/ACT Inhalation Aerosol Solution; INHALE 2 PUFFS 4 times daily      PRN  Requested for: 67GHW7839; Last Rx:14Nov2017 Ordered   14  Symbicort 160-4 5 MCG/ACT Inhalation Aerosol; USE 2 PUFFS BY MOUTH TWICE DAILY; Therapy: 06WAP7005 to (Evaluate:13Jan2018); Last Rx:14Nov2017 Ordered   15  Tamsulosin HCl - 0 4 MG Oral Capsule; TAKE 1 CAPSULE Bedtime; Therapy: 93WUM1063 to (Evaluate:72Khj8146)  Requested for: 56UNO1394; Last Rx:17Ulw4033      Ordered  Medication List Reviewed: The medication list was reviewed and updated today  Allergies   1  Lipitor TABS   2  Norvasc TABS  3  Grass    Vitals   Vital Signs    Recorded: 71NZO4966 11:12AM   Heart Rate 71   Respiration 16   Systolic 959   Diastolic 72   Height 5 ft 6 in   Weight 228 lb    BMI Calculated 36 8   BSA Calculated 2 11     Physical Exam        Constitutional      General appearance: No acute distress, well appearing and well nourished  Eyes      Pupils and irises: Equal, round and reactive to light  Ears, Nose, Mouth, and Throat      Otoscopic examination: Tympanic membrance translucent with normal light reflex  Canals patent without erythema  Nasal mucosa, septum, and turbinates: Normal without edema or erythema  Pulmonary      Auscultation of lungs: Clear to auscultation, equal breath sounds bilaterally, no wheezes, no rales, no rhonci  Cardiovascular      Auscultation of heart: Normal rate and rhythm, normal S1 and S2, without murmurs  Examination of extremities for edema and/or varicosities: Normal        Abdomen      Abdomen: Non-tender, no masses  Musculoskeletal      Digits and nails: Abnormal  -- some medial tenderness on right  Neurologic      Reflexes: 2+ and symmetric         Sensation: No sensory loss           Health Management   Type 2 diabetes mellitus   (1) HEMOGLOBIN A1C; every 3 months; Last 18BJF9136; Next Due: 64LIE7656; Overdue  (1) MICROALBUMIN CREATININE RATIO, RANDOM URINE; every 1 year; Next Due: 05BVE9537; Overdue  *VB - Eye Exam; every 1 year; Last 98DUV0137; Next Due: 97YDB0996; Active  Health Maintenance   Medicare Annual Wellness Visit; every 1 year; Next Due: 67XMT6789; Overdue    Future Appointments      Date/Time Provider Specialty Site   02/13/2018 09:30 AM KRISTA Davidson   Urology [address]     Signatures    Electronically signed by : Marcello Florentino DO; Jan 9 2018 12:02PM EST                       (Author)

## 2018-01-10 NOTE — PROGRESS NOTES
Assessment    1  Encounter for preventive health examination (V70 0) (Z00 00)   2  Asthma with status asthmaticus (493 91) (J45 902)   3  Type 2 diabetes mellitus (250 00) (E11 9)   4  Hypothyroidism (244 9) (E03 9)   5  Benign essential hypertension (401 1) (I10)    Plan  Benign essential hypertension    · (1) CBC/PLT/DIFF; Status:Hold For - Exact Date; Requested for:After 08Apr2017;    · (1) COMPREHENSIVE METABOLIC PANEL; Status:Hold For - Exact Date; Requested  for:After 01LSW1472;    · (1) TSH WITH FT4 REFLEX; Status:Hold For - Exact Date; Requested for:After  08Apr2017;   Benign essential hypertension, Encounter for prostate cancer screening    · (1) LIPID PANEL FASTING W DIRECT LDL REFLEX; Status:Hold For - Exact Date; Requested for:After 15ZHX7884;    · (1) PSA (SCREEN) (Dx V76 44 Screen for Prostate Cancer); Status:Hold For - Exact  Date; Requested for:After 08Apr2017;   Benign essential hypertension, Type 2 diabetes mellitus    · (1) HEMOGLOBIN A1C; Status:Hold For - Exact Date; Requested for:After 26RDJ0872;   Screening for genitourinary condition    · *VB - Urinary Incontinence Screen (Dx Z13 89 Screen for UI); Status:Complete -  Retrospective By Protocol Authorization;   Done: 86LMM4694 10:26AM    Discussion/Summary    Dr Lucillie Cabot- [facility] specialist  encouraged to do the colonscopy given family history and will get records from fathers death  form for 5 wishes  Impression: Initial Annual Wellness Visit  Cardiovascular screening and counseling: the risks and benefits of screening were discussed, counseling was given on maintaining a healthy diet and counseling was given on maintaining a healthy weight  Diabetes screening and counseling: screening is current  Colorectal cancer screening and counseling: the risks and benefits of screening were discussed and the patient declines screening  Prostate cancer screening and counseling: due for PSA     Advance Directive Planning: paperwork and instructions were given to the patient, he was encouraged to follow-up with me to discuss his questions and/or decisions  The patient has the current Goals: The patent has the current Barriers: Avoid sugars  Patient is able to Self-Care  Possible side effects of new medications were reviewed with the patient/guardian today  The treatment plan was reviewed with the patient/guardian  The patient/guardian understands and agrees with the treatment plan      Chief Complaint  Pt has a lump on right pointer finger he would like Dr to look at DD      History of Present Illness  HPI: Discussed family hx and he reports that his cousin thought his father had cancer( she is a physican in [place])- he had a laproscopic cholecystecomy here at [facility]- and had severe infection and then he    [de-identified] to Richelle Freitas and Wellness Visits: The patient is being seen for the initial annual wellness visit  Medicare Screening and Risk Factors   Hospitalizations: no previous hospitalizations  Once per lifetime medicare screening tests: AAA screening US has not yet been done  Medicare Screening Tests Risk Questions   Drug and Alcohol Use: The patient has never smoked cigarettes  The patient reports never drinking alcohol  He has never used illicit drugs  Diet and Physical Activity: Current diet includes well balanced meals, 1 servings of vegetables per day, servings of meat per day, servings of simple carbohydrates per day, 2 servings of dairy products per day, 2 cups of coffee per day and 1 cans of diet soda per day  He exercises times per week and walked the farm show yesterday-3x week  Exercise: walking, stretching 10 minutes per week  Functional Ability/Level of Safety: Hearing is normal bilaterally, normal in the right ear and normal in the left ear   The patient is currently able to do activities of daily living with limitations, but able to participate in social activities without limitations and able to drive without limitations  Fall risk factors:  polypharmacy  Advance Directives: Advance directives: no living will and no durable power of  for health care directives  end of life decisions were reviewed with the patient and I agree with the patient's decisions  Concerns with the patient's end of life decisions: would allow cpr and intubation but if no hope for recovery osuld not want to be mainatined on support  Co-Managers and Medical Equipment/Suppliers: See Patient Care Team   Additional Information: sees Dr Adan Luz podiatry and Dr Branden Villa for diabetic eye care( every 6 months- glaucoma0  Patient Care Team    Care Team Member Role Specialty Office Number   Abdiaziz Javed DO Attending Internal Medicine [phone]     Review of Systems    Constitutional: negative and no fever  Eyes: glaucoma is under good control  ENT: no sore throat and no nasal discharge  Cardiovascular: no chest pain and the heart is not racing  Respiratory: no shortness of breath and no dry cough  Gastrointestinal: no abdominal pain and no nausea  Genitourinary: nocturia and once at ngiht, but no dysuria  Musculoskeletal: no diffuse joint pain  Active Problems    1  Asthma with status asthmaticus (493 91) (J45 902)   2  Benign essential hypertension (401 1) (I10)   3  Bilateral impacted cerumen (380 4) (H61 23)   4  Bronchitis (490) (J40)   5  Cardiac murmur (785 2) (R01 1)   6  Cervical lymphadenopathy (785 6) (R59 0)   7  Colon cancer screening (V76 51) (Z12 11)   8  Depression screening (V79 0) (Z13 89)   9  Dyslipidemia, goal LDL below 100 (272 4) (E78 5)   10  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)   11  Hypothyroidism (244 9) (E03 9)   12  Denied: History of Mental health disorder   13  Need for immunization against influenza (V04 81) (Z23)   14  Need for prophylactic vaccination and inoculation against influenza (V04 81) (Z23)   15   History of No advance directives (V49 89) (Z78 9)   16  Obesity (278 00) (E66 9) 17  Pure hypercholesterolemia (272 0) (E78 00)   18  Screening for genitourinary condition (V81 6) (Z13 89)   19  Sinusitis (473 9) (J32 9)   20  Special screening for other neurological conditions (V80 09) (Z13 89)   21  Denied: History of Substance abuse   22  Type 2 diabetes mellitus (250 00) (E11 9)    Past Medical History    · 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)   · History of GERD (gastroesophageal reflux disease) (530 81) (K21 9)   · History of Glaucoma (365 9) (H40 9)   · History of constipation (V12 79) (Z87 19)   · History of Left ankle pain (719 47) (M25 572)   · Denied: History of Mental health disorder   · History of Mild aortic stenosis (424 1) (I35 0)   · History of Numbness (782 0) (R20 0)   · History of Overweight (278 02) (E66 3)   · History of Peripheral neuropathy, idiopathic (356 9) (G60 9)   · History of Prostate cancer (185) (C61)   · History of Pure hypercholesterolemia (272 0) (E78 00)   · History of Screening for genitourinary condition (V81 6) (Z13 89)   · Denied: History of Substance abuse   · History of Tracheobronchitis (490) (J40)    Surgical History    · History of Thyroid Surgery    Family History  Mother    · Family history of diabetes mellitus (V18 0) (Z83 3)   · No family history of mental disorder   · Denied: Family history of Substance abuse in family  Father    · No family history of mental disorder   · Denied: Family history of Substance abuse in family  Son    · Family history of colon cancer (V16 0) (Z80 0)  Maternal Grandmother    · Family history of diabetes mellitus (V18 0) (Z83 3)  Family History    · Family history of Diabetes mellitus    The family history was reviewed and updated today         Social History    · Caffeine use (H32 89) (F15 90)   · COFFEE AND DIET COLA AND TEA   · Cultural background   · NON-   ·    · Lives with family   · Living Situation: Supportive and safe   · Never a smoker   · History of No advance directives (V49 89) (Z78 9)   · No drug use   · Non drinker / no alcohol use   · Primary spoken language English   · Racial background   · WHITE  The social history was reviewed and updated today  Current Meds   1  Allopurinol 300 MG Oral Tablet; TAKE ONE TABLET BY MOUTH ONCE DAILY; Therapy: 89HPR6969 to (Evaluate:01Jun2017)  Requested for: 22MAP3150; Last   Rx:06Jun2016 Ordered   2  Aspirin EC 81 MG Oral Tablet Delayed Release; TAKE 1 TABLET DAILY; Therapy: (Recorded:11Nov2014) to Recorded   3  BD Pen Needle Mini U/F 31G X 5 MM Miscellaneous; USE ONE DAILY AS DIRECTED; Therapy: 02SDR8455 to (Evaluate:03Feb2017)  Requested for: 23MGG1023; Last   Rx:09Feb2016 Ordered   4  DiltiaZEM HCl  MG Oral Capsule Extended Release 24 Hour; TAKE 1 CAPSULE   DAILY  Requested for: 54HPY5901; Last Rx:30Sep2015 Ordered   5  DiltiaZEM HCl ER Coated Beads 180 MG Oral Capsule Extended Release 24 Hour;   TAKE ONE CAPSULE BY MOUTH EVERY DAY; Therapy: 91WNC2203 to (Mariann Leslya)  Requested for: 66UZA4335; Last   Rx:08Nov2016 Ordered   6  Gabapentin 300 MG Oral Capsule; TAKE ONE CAPSULE BY MOUTH AT BEDTIME; Therapy: 13VVS5445 to (Evaluate:31Aug2017)  Requested for: 76BWW9812; Last   Rx:87Gfy6495 Ordered   7  Lantus SoloStar 100 UNIT/ML Subcutaneous Solution Pen-injector; INJECT 44 UNITS   SUBCUTANEOUSLY AT BEDTIME** DOSE INCREASE**;   Therapy: 54GXR7510 to (Evaluate:28Jan2017)  Requested for: 57Dmq6424; Last   Rx:01Aug2016 Ordered   8  Levothyroxine Sodium 150 MCG Oral Tablet; TAKE ONE TABLET BY MOUTH ONCE   DAILY; Therapy: 59IHM3190 to (Evaluate:03Jul2017)  Requested for: 93VRM4819; Last   Rx:08Jul2016 Ordered   9  Losartan Potassium-HCTZ 100-25 MG Oral Tablet; take 1 tablet by mouth every day; Therapy: 45PSL2125 to (Mariann Trerence)  Requested for: 04VGS3955; Last   Rx:08Nov2016 Ordered   10  MetFORMIN HCl - 500 MG Oral Tablet; Take 1 tablet twice daily;     Therapy: 85GLJ0510 to (Evaluate:17Oct2016)  Requested for: 44KIE2468; Last    Rx:23Oct2015 Ordered   11  Niacin 500 MG Oral Tablet; Therapy: (Recorded:08Apr2016) to Recorded   12  Proventil  (90 Base) MCG/ACT Inhalation Aerosol Solution; INHALE 2 PUFFS 4    times daily PRN; Therapy: (Recorded:11Nov2014) to Recorded    The medication list was reviewed and updated today  Allergies    1  Lipitor TABS   2  Norvasc TABS    3  Grass    Immunizations   ** Printed in Appendix #1 below  Vitals  Signs    Heart Rate: 68, L PT  Pulse Quality: Normal, L PT  Systolic: 983, LUE, Sitting  Diastolic: 64, LUE, Sitting  Height: 5 ft 6 5 in  Weight: 225 lb   BMI Calculated: 35 77  BSA Calculated: 2 11    Physical Exam    Constitutional   General appearance: No acute distress, well appearing and well nourished  Ears, Nose, Mouth, and Throat   External inspection of ears and nose: Normal     Otoscopic examination: Tympanic membranes translucent with normal light reflex  Canals patent without erythema  Nasal mucosa, septum, and turbinates: Normal without edema or erythema  Lips, teeth, and gums: Normal, good dentition  Neck   Neck: Supple, symmetric, trachea midline, no masses  Pulmonary   Auscultation of lungs: Clear to auscultation  Cardiovascular   Auscultation of heart: Abnormal   systolic murmur  Examination of extremities for edema and/or varicosities: Normal     Abdomen   Abdomen: Non-tender, no masses  overweight  Musculoskeletal   Inspection/palpation of joints, bones, and muscles: Abnormal   left index with medial tendon seath cuyst- freely movable  Results/Data  *VB - Urinary Incontinence Screen (Dx Z13 89 Screen for UI) 58GPT7616 10:26AM Angelo Nageotte     Test Name Result Flag Reference   Urinary Incontinence Assessment 12Jan2017         Health Management  Type 2 diabetes mellitus   (1) HEMOGLOBIN A1C; every 3 months; Last 04NCV3993; Next Due: 73UMM8871; Overdue  (1) MICROALBUMIN CREATININE RATIO, RANDOM URINE; every 1 year;  Next Due:  96EBP2857; Overdue  *VB - Eye Exam; every 1 year; Last 25RFU4252; Next Due: 75EOZ4749; Active  Health Maintenance   Medicare Annual Wellness Visit; every 1 year; Next Due: 34BVR2017;  Overdue    Signatures   Electronically signed by : Mateo Ravi DO; 2017 10:56AM EST                       (Author)    Appendix #1     Patient: Aaron Duvall ; : 1935; MRN: [id number]      1 2 3 4 5    Influenza  Temporarily Deferred: Patient reports item recently done 15-Oct-2013 01-Nov-2014 15-Sep-2015 30-Sep-2015    Pneumo Other          Td/DT          Tdap  Temporarily Deferred: Pt refuses        Tetanus  23-May-2013        Zoster  2010

## 2018-01-11 NOTE — RESULT NOTES
Verified Results  * CT ABDOMEN PELVIS W CONTRAST 83XBL4018 02:13PM Barbie Yeager    Order Number: [id number]    - Patient Instructions: To schedule this appointment, please contact Central Scheduling at [phone]  Test Name Result Flag Reference   CT ABDOMEN PELVIS W CONTRAST (Report)     CT ABDOMEN AND PELVIS WITH IV CONTRAST     INDICATION: Left upper quadrant abdominal pain  Constipation  COMPARISON: None  TECHNIQUE: CT examination of the abdomen and pelvis was performed  In addition to portal venous phase postcontrast scanning through the abdomen and pelvis, delayed phase postcontrast scanning was performed through the upper abdominal viscera  Reformatted images were created in axial, sagittal, and coronal planes  Radiation dose length product (DLP) for this visit: 2874 36 mGy-cm   This examination, like all CT scans performed in the [facility], was performed utilizing techniques to minimize radiation dose exposure, including the use of    iterative reconstruction and automated exposure control  IV Contrast: 85 mL of iodixanol (VISIPAQUE) Because of borderline renal function, standard department hydration protocol was recommended to minimize risk of contrast induced nephropathy  Enteric Contrast: Enteric contrast was administered  FINDINGS:     ABDOMEN     LOWER CHEST: Small hiatal hernia is noted  LIVER/BILIARY TREE: Unremarkable  No intrahepatic biliary dilatation  GALLBLADDER: Calcified stone noted in the gallbladder neck measures up to 12 mm  No gallbladder wall thickening or pericholecystic fluid  SPLEEN: Unremarkable  PANCREAS: Intrinsically unremarkable  There is a borderline periceliac/peripancreatic node measuring 10 mm in greatest short axis  ADRENAL GLANDS: Unremarkable  KIDNEYS/URETERS: There are bilateral renal cysts, largest at the lower pole the right kidney measuring up to 3 3 cm   Additional subcentimeter hypodensities are too small accurately characterize but are statistically most likely to represent    subcentimeter cortical cysts  No suspicious solid renal mass  No urinary tract calculus or obstructive uropathy  STOMACH AND BOWEL: There are few sigmoid colonic diverticula present without evidence of acute diverticulitis  APPENDIX: No findings to suggest appendicitis  ABDOMINOPELVIC CAVITY: No ascites or free intraperitoneal air  No lymphadenopathy  VESSELS: Unremarkable for patient's age  PELVIS     REPRODUCTIVE ORGANS: Mild heterogeneous prominence of the prostate with slight invagination into the urinary bladder base  URINARY BLADDER: Unremarkable  ABDOMINAL WALL/INGUINAL REGIONS: There is a small fat-containing umbilical hernia  OSSEOUS STRUCTURES: No acute fracture or destructive osseous lesion  Lumbar degenerative changes are noted  IMPRESSION:     No acute CT abnormality to definitively P patient's symptoms  Cholelithiasis  Sigmoid diverticulosis without acute diverticulitis  Mild prostatomegaly  Small hiatal hernia         Workstation performed: [id number]     Signed by:   Seymour Baxter MD   6/7/17

## 2018-01-12 VITALS
WEIGHT: 220 LBS | SYSTOLIC BLOOD PRESSURE: 130 MMHG | DIASTOLIC BLOOD PRESSURE: 88 MMHG | HEIGHT: 67 IN | HEART RATE: 78 BPM | BODY MASS INDEX: 34.53 KG/M2

## 2018-01-12 NOTE — RESULT NOTES
Verified Results  (Q) LIPID PANEL WITH REFLEX TO DIRECT LDL 07Apr2016 10:40AM Juliette Cid     Test Name Result Flag Reference   CHOLESTEROL, TOTAL 195 mg/dL  125-200   HDL CHOLESTEROL 27 mg/dL L > OR = 40   TRIGLICERIDES 362 mg/dL H <150   LDL-CHOLESTEROL 137 mg/dL (calc) H <130   Desirable range <100 mg/dL for patients with CHD or  diabetes and <70 mg/dL for diabetic patients with  known heart disease  CHOL/HDLC RATIO 7 2 (calc) H < OR = 5 0   NON HDL CHOLESTEROL 168 mg/dL (calc) H    Target for non-HDL cholesterol is 30 mg/dL higher than   LDL cholesterol target  (1) COMPREHENSIVE METABOLIC PANEL 42QIZ1825 56:21TA [place]     Test Name Result Flag Reference   GLUCOSE 87 mg/dL  65-99   Fasting reference interval   UREA NITROGEN (BUN) 25 mg/dL  7-25   CREATININE 1 40 mg/dL H 0 70-1 11   For patients >52years of age, the reference limit  for Creatinine is approximately 13% higher for people  identified as -American  eGFR NON-AFR  AMERICAN 47 mL/min/1 73m2 L > OR = 60   eGFR AFRICAN AMERICAN 55 mL/min/1 73m2 L > OR = 60   BUN/CREATININE RATIO 18 (calc)  6-22   SODIUM 141 mmol/L  135-146   POTASSIUM 4 4 mmol/L  3 5-5 3   CHLORIDE 105 mmol/L     CARBON DIOXIDE 29 mmol/L  19-30   CALCIUM 9 2 mg/dL  8 6-10 3   PROTEIN, TOTAL 6 5 g/dL  6 1-8 1   ALBUMIN 3 8 g/dL  3 6-5 1   GLOBULIN 2 7 g/dL (calc)  1 9-3 7   ALBUMIN/GLOBULIN RATIO 1 4 (calc)  1 0-2 5   BILIRUBIN, TOTAL 0 8 mg/dL  0 2-1 2   ALKALINE PHOSPHATASE 65 U/L     AST 20 U/L  10-35   ALT 24 U/L  9-46     (1) PSA (SCREEN) (Dx V76 44 Screen for Prostate Cancer) 07Apr2016 10:40AM [place]     Test Name Result Flag Reference   PSA, TOTAL 2 2 ng/mL  < OR = 4 0   This test was performed using the Siemens  chemiluminescent method  Values obtained from  different assay methods cannot be used  interchangeably  PSA levels, regardless of  value, should not be interpreted as absolute  evidence of the presence or absence of disease       (Q) TSH, 3RD GENERATION W/REFLEX TO FT4 07Apr2016 10:40AM Juliette Cid     Test Name Result Flag Reference   TSH W/REFLEX TO FT4 2 40 mIU/L  0 40-4 50     (Q) HEMOGLOBIN A1c 07Apr2016 10:40AM Juliette Cid   REPORT COMMENT:  FASTING:YES     Test Name Result Flag Reference   HEMOGLOBIN A1c 7 2 % of total Hgb H <5 7   According to ADA guidelines, hemoglobin A1c <7 0%  represents optimal control in non-pregnant diabetic  patients  Different metrics may apply to specific  patient populations  Standards of Medical Care in    Diabetes Care  2013;36:s11-s66     For the purpose of screening for the presence of  diabetes  <5 7%       Consistent with the absence of diabetes  5 7-6 4%    Consistent with increased risk for diabetes              (prediabetes)  >or=6 5%    Consistent with diabetes     This assay result is consistent with diabetes  mellitus  Currently, no consensus exists for use of hemoglobin  A1c for diagnosis of diabetes for children

## 2018-01-12 NOTE — RESULT NOTES
Verified Results  (Q) HEMOGLOBIN A1c 15Okd1602 10:58AM Juliette Cid     Test Name Result Flag Reference   HEMOGLOBIN A1c 8 1 % of total Hgb H <5 7   According to ADA guidelines, hemoglobin A1c <7 0%  represents optimal control in non-pregnant diabetic  patients  Different metrics may apply to specific  patient populations  Standards of Medical Care in    Diabetes Care  2013;36:s11-s66     For the purpose of screening for the presence of  diabetes  <5 7%       Consistent with the absence of diabetes  5 7-6 4%    Consistent with increased risk for diabetes              (prediabetes)  >or=6 5%    Consistent with diabetes     This assay result is consistent with diabetes  mellitus  Currently, no consensus exists for use of hemoglobin  A1c for diagnosis of diabetes for children         Signatures   Electronically signed by : Snadra Alarcon DO; Jul 14 2016 12:11PM EST                       (Author)

## 2018-01-13 VITALS
WEIGHT: 225 LBS | BODY MASS INDEX: 35.31 KG/M2 | SYSTOLIC BLOOD PRESSURE: 110 MMHG | DIASTOLIC BLOOD PRESSURE: 64 MMHG | HEART RATE: 68 BPM | HEIGHT: 67 IN

## 2018-01-13 VITALS
HEART RATE: 72 BPM | SYSTOLIC BLOOD PRESSURE: 120 MMHG | DIASTOLIC BLOOD PRESSURE: 70 MMHG | BODY MASS INDEX: 35.31 KG/M2 | HEIGHT: 67 IN | WEIGHT: 225 LBS

## 2018-01-14 VITALS
WEIGHT: 229 LBS | SYSTOLIC BLOOD PRESSURE: 126 MMHG | BODY MASS INDEX: 35.94 KG/M2 | DIASTOLIC BLOOD PRESSURE: 76 MMHG | HEIGHT: 67 IN | HEART RATE: 60 BPM

## 2018-01-14 VITALS
HEART RATE: 72 BPM | SYSTOLIC BLOOD PRESSURE: 142 MMHG | DIASTOLIC BLOOD PRESSURE: 80 MMHG | WEIGHT: 228.13 LBS | HEIGHT: 67 IN | BODY MASS INDEX: 35.81 KG/M2

## 2018-01-14 VITALS
BODY MASS INDEX: 34.53 KG/M2 | HEART RATE: 72 BPM | WEIGHT: 220 LBS | RESPIRATION RATE: 16 BRPM | SYSTOLIC BLOOD PRESSURE: 110 MMHG | DIASTOLIC BLOOD PRESSURE: 68 MMHG | HEIGHT: 67 IN

## 2018-01-17 NOTE — RESULT NOTES
Verified Results  * XR KNEE 3 VW RIGHT NON INJURY [id number] 12:09PM Boris Pozo Order Number: [id number]     Test Name Result Flag Reference   XR KNEE 3 VW RIGHT (Report)     RIGHT KNEE     INDICATION: 26-year-old male, right knee pain   COMPARISON: None     VIEWS: 3     IMAGES: 3     FINDINGS:   Mild medial and patellofemoral degenerative arthritis     There is no acute fracture or dislocation  There is no joint effusion  No lytic or blastic lesions are seen  Scattered vascular calcifications       IMPRESSION:   Mild medial and patellofemoral degenerative arthritis   No acute osseous abnormality         Workstation performed: [id number]     Signed by:   Alex Weir MD   10/7/17

## 2018-01-17 NOTE — RESULT NOTES
Verified Results  (1) COMPREHENSIVE METABOLIC PANEL 16IAY9129 76:81PC Adrián Segura     Test Name Result Flag Reference   GLUCOSE 176 mg/dL H 65-99   Fasting reference interval     For someone without known diabetes, a glucose  value >125 mg/dL indicates that they may have  diabetes and this should be confirmed with a  follow-up test    UREA NITROGEN (BUN) 25 mg/dL  7-25   CREATININE 1 55 mg/dL H 0 70-1 11   For patients >52years of age, the reference limit  for Creatinine is approximately 13% higher for people  identified as -American  eGFR NON-AFR   AMERICAN 41 mL/min/1 73m2 L > OR = 60   eGFR AFRICAN AMERICAN 48 mL/min/1 73m2 L > OR = 60   BUN/CREATININE RATIO 16 (calc)  6-22   SODIUM 141 mmol/L  135-146   POTASSIUM 4 2 mmol/L  3 5-5 3   CHLORIDE 102 mmol/L     CARBON DIOXIDE 32 mmol/L H 20-31   CALCIUM 9 4 mg/dL  8 6-10 3   PROTEIN, TOTAL 6 9 g/dL  6 1-8 1   ALBUMIN 3 9 g/dL  3 6-5 1   GLOBULIN 3 0 g/dL (calc)  1 9-3 7   ALBUMIN/GLOBULIN RATIO 1 3 (calc)  1 0-2 5   BILIRUBIN, TOTAL 1 0 mg/dL  0 2-1 2   ALKALINE PHOSPHATASE 70 U/L     AST 17 U/L  10-35   ALT 16 U/L  9-46     (1) CBC/PLT/DIFF 27Dgw1131 10:30AM Juliette Cid     Test Name Result Flag Reference   WHITE BLOOD CELL COUNT 7 6 Thousand/uL  3 8-10 8   RED BLOOD CELL COUNT 4 45 Million/uL  4 20-5 80   HEMOGLOBIN 14 0 g/dL  13 2-17 1   HEMATOCRIT 43 3 %  38 5-50 0   MCV 97 2 fL  80 0-100 0   MCH 31 4 pg  27 0-33 0   MCHC 32 3 g/dL  32 0-36 0   RDW 13 7 %  11 0-15 0   PLATELET COUNT 132 Thousand/uL  140-400   MPV 8 6 fL  7 5-12 5   ABSOLUTE NEUTROPHILS 4514 cells/uL  3825-6222   ABSOLUTE LYMPHOCYTES 1908 cells/uL  850-3900   ABSOLUTE MONOCYTES 547 cells/uL  200-950   ABSOLUTE EOSINOPHILS 570 cells/uL H    ABSOLUTE BASOPHILS 61 cells/uL  0-200   NEUTROPHILS 59 4 %     LYMPHOCYTES 25 1 %     MONOCYTES 7 2 %     EOSINOPHILS 7 5 %     BASOPHILS 0 8 %       (1) PSA (SCREEN) (Dx V76 44 Screen for Prostate Cancer) [id number] 10:30AM Emma Cid Test Name Result Flag Reference   PSA, TOTAL 3 3 ng/mL  < OR = 4 0   This test was performed using the Siemens  chemiluminescent method  Values obtained from  different assay methods cannot be used  interchangeably  PSA levels, regardless of  value, should not be interpreted as absolute  evidence of the presence or absence of disease  (Q) LIPID PANEL WITH REFLEX TO DIRECT LDL [id number] 10:30AM Juliette Cid     Test Name Result Flag Reference   CHOLESTEROL, TOTAL 197 mg/dL  125-200   HDL CHOLESTEROL 30 mg/dL L > OR = 40   TRIGLICERIDES 084 mg/dL H <150   LDL-CHOLESTEROL 122 mg/dL (calc)  <130   Desirable range <100 mg/dL for patients with CHD or  diabetes and <70 mg/dL for diabetic patients with  known heart disease  CHOL/HDLC RATIO 6 6 (calc) H < OR = 5 0   NON HDL CHOLESTEROL 167 mg/dL (calc) H    Target for non-HDL cholesterol is 30 mg/dL higher than   LDL cholesterol target  (Q) TSH, 3RD GENERATION W/REFLEX TO FT4 [id number] 10:30AM Juliette Cid     Test Name Result Flag Reference   T4, FREE 1 2 ng/dL  0 8-1 8   TSH W/REFLEX TO FT4 11 75 mIU/L H 0 40-4 50     (Q) HEMOGLOBIN A1c 80Blu7259 10:30AM Juliette Cid   REPORT COMMENT:  FASTING:YES  AN UPDATE OR CORRECTION HAS BEEN MADE TO NAME     Test Name Result Flag Reference   HEMOGLOBIN A1c 8 5 % of total Hgb H <5 7   For someone without known diabetes, a hemoglobin A1c  value of 6 5% or greater indicates that they may have   diabetes and this should be confirmed with a follow-up   test      For someone with known diabetes, a value <7% indicates   that their diabetes is well controlled and a value   greater than or equal to 7% indicates suboptimal   control  A1c targets should be individualized based on   duration of diabetes, age, comorbid conditions, and   other considerations  Currently, no consensus exists regarding use of  hemoglobin A1c for diagnosis of diabetes for children

## 2018-01-22 VITALS
WEIGHT: 224 LBS | DIASTOLIC BLOOD PRESSURE: 78 MMHG | TEMPERATURE: 98.3 F | BODY MASS INDEX: 36 KG/M2 | SYSTOLIC BLOOD PRESSURE: 128 MMHG | RESPIRATION RATE: 16 BRPM | HEART RATE: 72 BPM | HEIGHT: 66 IN

## 2018-01-22 VITALS
HEART RATE: 71 BPM | RESPIRATION RATE: 16 BRPM | HEIGHT: 66 IN | SYSTOLIC BLOOD PRESSURE: 110 MMHG | BODY MASS INDEX: 36.64 KG/M2 | WEIGHT: 228 LBS | DIASTOLIC BLOOD PRESSURE: 72 MMHG

## 2018-01-23 VITALS
BODY MASS INDEX: 35.36 KG/M2 | DIASTOLIC BLOOD PRESSURE: 80 MMHG | SYSTOLIC BLOOD PRESSURE: 136 MMHG | HEIGHT: 66 IN | WEIGHT: 220 LBS | HEART RATE: 80 BPM

## 2018-01-23 NOTE — RESULT NOTES
Verified Results  ECHO COMPLETE WITH CONTRAST IF INDICATED [id number] 10:50AM Gisela Fried Order Number: [id number]    - Patient Instructions: To schedule this appointment, please contact Central Scheduling at [phone]  Test Name Result Flag Reference   ECHO COMPLETE WITH CONTRAST IF INDICATED (Report)     [address]   [place]   [facility], [address]   [phone]     Transthoracic Echocardiogram   2D, M-mode, Doppler, and Color Doppler     Study date: 12-Dec-2017     Patient: Michelle Watts   MR number: [id number]   Account number: [de-identified]   : 1935   Age: 80 years   Gender: Male   Status: Outpatient   Location: [address] [place] [address] [facility]   Height: 66 in   Weight: 223 5 lb   BP: 66/ 224 mmHg     Indications: Cardiac murmur  Diagnoses: R01 1 - Cardiac murmur, unspecified     Sonographer: WILI Boone RDCS RVT   Primary Physician: Yang Ramírez DO   Referring Physician: Yang Ramírez DO   [facility]   Interpreting Physician: Evelia Shirley MD     SUMMARY     LEFT VENTRICLE:   Systolic function was normal  Ejection fraction was estimated to be 65 %  There were no regional wall motion abnormalities  Wall thickness was mildly increased  LEFT ATRIUM:   The atrium was mildly dilated  MITRAL VALVE:   There was mild annular calcification  There was trace regurgitation  AORTIC VALVE:   The valve was trileaflet  Leaflets exhibited moderate calcification and moderately reduced cuspal separation  There was moderate stenosis  There was trace regurgitation  Valve mean gradient was 22 5 mmHg  Estimated aortic valve area (by VTI) was 1 42 cm squared  Estimated aortic valve area (by Vmax) was 1 49 cm squared  TRICUSPID VALVE:   There was mild regurgitation  Pulmonary artery systolic pressure was at the upper limits of normal      AORTA:   There was mild dilatation of the ascending aorta  HISTORY: PRIOR HISTORY: Hypertension, diabetes  PROCEDURE: The study was performed in the [facility]  This was a routine study  The transthoracic approach was used  The study included complete 2D imaging, M-mode, complete spectral Doppler, and color Doppler  Images were obtained from the parasternal, apical, subcostal, and suprasternal notch acoustic windows  Echocardiographic views were limited due to decreased penetration and lung interference  This was a technically difficult study  LEFT VENTRICLE: Size was normal  Systolic function was normal  Ejection fraction was estimated to be 65 %  There were no regional wall motion abnormalities  Wall thickness was mildly increased  DOPPLER: Left ventricular diastolic function   parameters were normal for the patient's age  There was no evidence of elevated ventricular filling pressure by Doppler parameters  RIGHT VENTRICLE: The size was normal  Systolic function was normal  Wall thickness was normal      LEFT ATRIUM: The atrium was mildly dilated  RIGHT ATRIUM: Size was normal      MITRAL VALVE: There was mild annular calcification  Valve structure was normal  There was normal leaflet separation  DOPPLER: The transmitral velocity was within the normal range  There was no evidence for stenosis  There was trace   regurgitation  AORTIC VALVE: The valve was trileaflet  Leaflets exhibited moderate calcification and moderately reduced cuspal separation  DOPPLER: Transaortic velocity was increased due to valvular stenosis  There was moderate stenosis  There was trace   regurgitation  TRICUSPID VALVE: The valve structure was normal  There was normal leaflet separation  DOPPLER: The transtricuspid velocity was within the normal range  There was no evidence for stenosis  There was mild regurgitation   Pulmonary artery   systolic pressure was at the upper limits of normal      PULMONIC VALVE: Leaflets exhibited normal thickness, no calcification, and normal cuspal separation  DOPPLER: The transpulmonic velocity was within the normal range  There was no significant regurgitation  PERICARDIUM: There was no pericardial effusion  The pericardium was normal in appearance  AORTA: The root exhibited normal size  There was mild dilatation of the ascending aorta  SYSTEMIC VEINS: IVC: The inferior vena cava was normal in size  PULMONARY VEINS: DOPPLER: Doppler flow pattern was normal in the pulmonary vein(s)       MEASUREMENT TABLES     2D MEASUREMENTS   LVOT  (Reference normals)   Diam  21 mm  (--)     DOPPLER MEASUREMENTS   LVOT  (Reference normals)   Peak juan  130 cm/s  (--)   VTI  28 cm  (--)   Stroke vol  96 98 ml  (--)   Aortic valve  (Reference normals)   Peak juan  300 cm/s  (--)   VTI  68 5 cm  (--)   Mean gradient  22 5 mmHg  (--)   Obstr index, VTI  0 41  (--)   Valve area, VTI  1 42 cm squared  (--)   Obstr index, Vmax  0 43  (--)   Valve area, Vmax  1 49 cm squared  (--)     SYSTEM MEASUREMENT TABLES     2D   %FS: 30 12 %   Ao Diam: 2 93 cm   EF(Teich): 57 3 %   IVSd: 1 18 cm   LA Area: 21 24 cm2   LA Diam: 4 35 cm   LVEDV MOD A4C: 115 27 ml   LVEF MOD A4C: 65 96 %   LVESV MOD A4C: 39 23 ml   LVIDd: 4 81 cm   LVIDs: 3 36 cm   LVLd A4C: 8 3 cm   LVLs A4C: 6 59 cm   LVOT Diam: 2 12 cm   LVPWd: 1 09 cm   RA Area: 12 78 cm2   RVIDd: 3 78 cm   SV MOD A4C: 76 04 ml   SV(Teich): 61 83 ml     CW   AV VTI: 68 02 cm   AV Vmax: 3 01 m/s   AV Vmean: 2 27 m/s   AV maxP 43 mmHg   AV meanP 5 mmHg   TR Vmax: 2 65 m/s   TR maxP 1 mmHg     MM   TAPSE: 1 84 cm     PW   KRIS (VTI): 1 46 cm2   LVOT VTI: 28 03 cm   LVOT Vmax: 1 33 m/s   LVOT Vmean: 0 92 m/s   LVOT maxP 05 mmHg   LVOT meanPG: 3 86 mmHg   LVSV Dopp: 99 36 ml   MV A Juan: 1 12 m/s   MV Dec [place]: 310 26 cm/s2   MV DecT: 0 3 s   MV E Juan: 0 93 m/s   MV E/A Ratio: 0 83   MV PHT: 86 75 ms   MVA By PHT: 2 54 cm2     [employer] Accredited Echocardiography Laboratory     Prepared and electronically signed by     Michelle Rodriguez MD   Signed 12-Dec-2017 15:21:16

## 2018-02-12 NOTE — PROGRESS NOTES
Assessment/Plan:    Benign prostatic hyperplasia with nocturia  The patient is happy with his urination and does not work try any other medications  He is to continue tamsulosin  He will follow up in 1 year for recheck  Diagnoses and all orders for this visit:    Benign prostatic hyperplasia with nocturia  -     tamsulosin (FLOMAX) 0 4 mg; Take 1 capsule (0 4 mg total) by mouth daily with dinner    Other orders  -     allopurinol (ZYLOPRIM) 300 mg tablet; Take 1 tablet by mouth daily  -     aspirin (ECOTRIN LOW STRENGTH) 81 mg EC tablet; Take 1 tablet by mouth daily  -     diclofenac sodium (VOLTAREN) 1 %; Place on the skin  -     diltiazem (CARDIZEM CD) 180 mg 24 hr capsule; Take 1 capsule by mouth daily  -     gabapentin (NEURONTIN) 300 mg capsule; Take 1 capsule by mouth  -     LANTUS SOLOSTAR injection pen 100 units/mL; INJECT 54 UNIT BEDTIME  -     Insulin Pen Needle 31G X 5 MM MISC; by Does not apply route  -     B-D UF III MINI PEN NEEDLES 31G X 5 MM MISC; USE ONE DAILY AS DIRECTED  -     levothyroxine 200 mcg tablet; Take 1 tablet by mouth daily  -     losartan-hydrochlorothiazide (HYZAAR) 100-25 MG per tablet; Take 1 tablet by mouth daily  -     metFORMIN (GLUCOPHAGE) 500 mg tablet; Take by mouth  -     albuterol (PROVENTIL HFA) 90 mcg/act inhaler; Inhale 2 puffs 4 (four) times a day as needed  -     budesonide-formoterol (SYMBICORT) 160-4 5 mcg/act inhaler; Inhale  -     Discontinue: tamsulosin (FLOMAX) 0 4 mg; Take 1 capsule by mouth          Total visit time was 15 minutes of which over 50% was spent on counseling  Subjective:     Patient ID: Anton Palomino  is a 80 y o  male    66-year-old male presents for follow-up of BPH  The patient had been started on tamsulosin and then had Myrbetriq added  The patient discontinued the Myrbetriq as it elevated his blood pressure and was expensive  He states however his urinary symptoms have improved    He is having much less nocturia and decreased urgency  He is happy with his urination and does not want to try any other medications  The following portions of the patient's history were reviewed and updated as appropriate: allergies, current medications, past family history, past medical history, past social history, past surgical history and problem list     Review of Systems   Constitutional: Negative  HENT: Negative  Eyes: Negative  Respiratory: Negative  Cardiovascular: Negative  Gastrointestinal: Negative  Endocrine: Negative  Genitourinary:        As noted per HPI   Musculoskeletal: Negative  Skin: Negative  Allergic/Immunologic: Negative  Neurological: Negative  Hematological: Negative  Psychiatric/Behavioral: Negative  Objective:    Physical Exam   Constitutional: He is oriented to person, place, and time  He appears well-developed and well-nourished  Neck: Normal range of motion  Cardiovascular: Intact distal pulses  Pulmonary/Chest: Effort normal    Abdominal: Soft  Bowel sounds are normal  He exhibits no distension and no mass  There is no tenderness  There is no rebound and no guarding  Musculoskeletal: Normal range of motion  Neurological: He is alert and oriented to person, place, and time  Skin: Skin is warm and dry  Psychiatric: He has a normal mood and affect  Vitals reviewed          Results  No results found for: PSA  Lab Results   Component Value Date    CALCIUM 9 4 09/05/2017     09/05/2017    K 4 2 09/05/2017    CO2 30 09/05/2017     09/05/2017    BUN 28 (H) 09/05/2017    CREATININE 1 56 (H) 09/05/2017     Lab Results   Component Value Date    WBC 7 6 04/17/2017    HGB 14 0 04/17/2017    HCT 43 3 04/17/2017    MCV 97 2 04/17/2017     04/17/2017     In office bladder scan postvoid residual was 26 mL    No results found for this or any previous visit (from the past 1 hour(s)) ]

## 2018-02-13 ENCOUNTER — OFFICE VISIT (OUTPATIENT)
Dept: UROLOGY | Facility: HOSPITAL | Age: 83
End: 2018-02-13
Payer: COMMERCIAL

## 2018-02-13 VITALS
SYSTOLIC BLOOD PRESSURE: 120 MMHG | HEART RATE: 68 BPM | HEIGHT: 66 IN | BODY MASS INDEX: 36.13 KG/M2 | WEIGHT: 224.8 LBS | DIASTOLIC BLOOD PRESSURE: 74 MMHG

## 2018-02-13 DIAGNOSIS — R35.1 BENIGN PROSTATIC HYPERPLASIA WITH NOCTURIA: Primary | ICD-10-CM

## 2018-02-13 DIAGNOSIS — N40.1 BENIGN PROSTATIC HYPERPLASIA WITH NOCTURIA: Primary | ICD-10-CM

## 2018-02-13 PROCEDURE — 99213 OFFICE O/P EST LOW 20 MIN: CPT | Performed by: UROLOGY

## 2018-02-13 PROCEDURE — 51798 US URINE CAPACITY MEASURE: CPT | Performed by: UROLOGY

## 2018-02-13 RX ORDER — TAMSULOSIN HYDROCHLORIDE 0.4 MG/1
1 CAPSULE ORAL
COMMUNITY
Start: 2017-10-09 | End: 2018-02-13 | Stop reason: SDUPTHER

## 2018-02-13 RX ORDER — GABAPENTIN 300 MG/1
1 CAPSULE ORAL DAILY
COMMUNITY
Start: 2015-09-24 | End: 2018-09-06 | Stop reason: SDUPTHER

## 2018-02-13 RX ORDER — DILTIAZEM HYDROCHLORIDE 180 MG/1
1 CAPSULE, COATED, EXTENDED RELEASE ORAL DAILY
COMMUNITY
Start: 2015-02-11 | End: 2018-09-21 | Stop reason: HOSPADM

## 2018-02-13 RX ORDER — ALBUTEROL SULFATE 90 UG/1
2 AEROSOL, METERED RESPIRATORY (INHALATION) 4 TIMES DAILY PRN
COMMUNITY

## 2018-02-13 RX ORDER — INSULIN GLARGINE 100 [IU]/ML
INJECTION, SOLUTION SUBCUTANEOUS
Refills: 10 | COMMUNITY
Start: 2018-02-08 | End: 2018-04-06 | Stop reason: SDUPTHER

## 2018-02-13 RX ORDER — LOSARTAN POTASSIUM AND HYDROCHLOROTHIAZIDE 25; 100 MG/1; MG/1
1 TABLET ORAL DAILY
COMMUNITY
Start: 2016-11-08 | End: 2018-09-21 | Stop reason: HOSPADM

## 2018-02-13 RX ORDER — TAMSULOSIN HYDROCHLORIDE 0.4 MG/1
0.4 CAPSULE ORAL
Qty: 90 CAPSULE | Refills: 3 | Status: SHIPPED | OUTPATIENT
Start: 2018-02-13 | End: 2018-10-09 | Stop reason: SDUPTHER

## 2018-02-13 RX ORDER — BUDESONIDE AND FORMOTEROL FUMARATE DIHYDRATE 160; 4.5 UG/1; UG/1
AEROSOL RESPIRATORY (INHALATION)
COMMUNITY
Start: 2017-11-14 | End: 2018-09-04

## 2018-02-13 RX ORDER — ALLOPURINOL 300 MG/1
1 TABLET ORAL DAILY
COMMUNITY
Start: 2016-06-06 | End: 2018-04-06 | Stop reason: SDUPTHER

## 2018-02-13 RX ORDER — PEN NEEDLE, DIABETIC 31 GX5/16"
NEEDLE, DISPOSABLE MISCELLANEOUS
Refills: 3 | COMMUNITY
Start: 2018-02-02 | End: 2018-05-07 | Stop reason: SDUPTHER

## 2018-02-13 RX ORDER — ASPIRIN 81 MG/1
1 TABLET ORAL DAILY
COMMUNITY
End: 2018-09-21 | Stop reason: HOSPADM

## 2018-02-13 RX ORDER — LEVOTHYROXINE SODIUM 0.2 MG/1
150 TABLET ORAL DAILY
COMMUNITY
Start: 2015-07-07 | End: 2018-09-04

## 2018-02-13 NOTE — LETTER
February 13, 2018     Evan Gomez, [address] [address]    Patient: Anmol Bryant  YOB: 1935   Date of Visit: 2/13/2018       Dear Dr Ren Díaz: Thank you for referring Becca Felix to me for evaluation  Below are the relevant portions of my assessment and plan of care  If you have questions, please do not hesitate to call me  I look forward to following Stephanie Deluca along with you           Sincerely,        Jaswant Martinez MD        CC: No Recipients

## 2018-02-13 NOTE — PATIENT INSTRUCTIONS
Benign Prostatic Hypertrophy   WHAT YOU NEED TO KNOW:   What is benign prostatic hypertrophy? Benign prostatic hypertrophy (BPH) is a condition that causes your prostate gland to grow larger than normal  The prostate gland is the male sex gland that produces a fluid that is part of semen  It is about the size of a walnut and it is located under the bladder  As the prostate grows, it can squeeze the urethra  This can block urine flow and cause urinary problems  What causes BPH? It is not known what causes BPH  It may be just part of getting older  BPH is very common in men over 39years of age, but rarely causes problems before age 61  Some healthcare providers believe it is caused by a change in hormone levels as men get older  What are the signs and symptoms of BPH? · Feeling like you have not emptied your bladder    · Feeling the need to urinate right away     · Urine does not flow right away when you start to urinate or stops and starts again    · Frequent urination, especially at night    · Weak urine stream    · Blood in your urine  How is BPH diagnosed? · Blood tests:  Blood tests such as prostate specific antigen (PSA) measurement may be done  The amount of PSA in your blood may go up if you have BPH  · Cystoscopy: A cystoscopy allows caregivers to look for problems inside your bladder  A cystoscope is put into your bladder through your urethra  The urethra is the tube that urine flows through when you urinate  The cystoscope is a long tube with a lens and a light on the end  The scope may be hooked to a camera or monitor, and pictures may be taken  A tissue sample may also be taken during your cystoscopy  During this test, small tumors may be removed or bleeding may be stopped       · Digital rectal examination:  Your healthcare provider will use his finger to feel if your prostate is larger than normal      · Prostate biopsy:  A small piece of the prostate is removed and sent to a lab for tests      · Transrectal ultrasound:  Sound waves are used to show pictures of your prostate on a monitor  · Urine tests:     ¨ Urine sample: For this test you need to urinate into a small container  You will be given instructions on how to clean your genital area before you urinate  Do not touch the inside of the cup  Follow instructions on where to place the cup of urine when you are done  ¨ Residual urine measurement:  Healthcare providers may use a catheter to measure how much urine is left in your bladder after you urinate  ¨ Flow rate recording: This is a test that measures the speed of your urine stream   How is BPH treated? · Medicines:      ¨ Alpha blockers: This medicine relaxes the muscles in your prostate and bladder  It may help you urinate more easily  ¨ 5 alpha reductase inhibitors: These medicines block the production of a hormone that causes the prostate to get larger  It may help to slow the growth of the prostate or shrink the prostate  · Stent:  A stent is a short, tiny mesh tube that is put into the urethra to hold it open  · Surgery: You may need surgery to remove your prostate  · Other procedures: The prostate may be made smaller by a procedure such as a transurethral needle ablation (TUNA) or microwave heat treatment  You may also have a laser procedure called interstitial laser coagulation (ILC) to remove the prostate  What are the risks of BPH?   · Surgery to remove your prostate may cause you to bleed more than expected or get an infection  You may also have trouble controlling your bladder  This may cause you to leak urine  You may also have sudden strong urges to urinate that make it hard for you to hold your urine  · Without treatment, you may have other health problems  If urine stays in your bladder too long, you may develop a urinary infection or bladder stones   Small blood vessels in the bladder and prostate may start to bleed because you strain more when you urinate  The urethra may suddenly become blocked off, making it even harder to urinate  Over time, your kidneys can be damaged as urine backs up from the bladder and into the kidneys  How can I manage BPH?   · Do not let your bladder get too full before you empty it  Urinate when you feel the urge  · Limit alcohol  Do not drink large amounts of any liquid at one time  · Decrease the amount of salt you eat  Examples of salty foods are chips, cured meats, and canned soups  Do not use table salt  · Healthcare providers may tell you not to eat spicy foods such as chilli peppers  This may help you find out if spicy food makes your BPH symptoms worse  · You may have sex if you feel well  When should I contact my healthcare provider? · There is a large amount of blood in your urine  · Your signs and symptoms get worse  · You have a fever  · You have questions or concerns about your condition or care  When should I seek immediate care? · You are unable to urinate  · Your bladder feels very full and painful  CARE AGREEMENT:   You have the right to help plan your care  Learn about your health condition and how it may be treated  Discuss treatment options with your caregivers to decide what care you want to receive  You always have the right to refuse treatment  The above information is an  only  It is not intended as medical advice for individual conditions or treatments  Talk to your doctor, nurse or pharmacist before following any medical regimen to see if it is safe and effective for you  © 2017 [employer] Information is for End User's use only and may not be sold, redistributed or otherwise used for commercial purposes  All illustrations and images included in CareNotes® are the copyrighted property of A D A M , [employer]  or [employer] Mat[employer]

## 2018-02-13 NOTE — ASSESSMENT & PLAN NOTE
The patient is happy with his urination and does not work try any other medications  He is to continue tamsulosin  He will follow up in 1 year for recheck

## 2018-02-16 LAB
ALBUMIN SERPL-MCNC: 3.8 G/DL (ref 3.6–5.1)
ALBUMIN/GLOB SERPL: 1.4 (CALC) (ref 1–2.5)
ALP SERPL-CCNC: 62 U/L (ref 40–115)
ALT SERPL-CCNC: 11 U/L (ref 9–46)
AST SERPL-CCNC: 14 U/L (ref 10–35)
BASOPHILS # BLD AUTO: 68 CELLS/UL (ref 0–200)
BASOPHILS NFR BLD AUTO: 1 %
BILIRUB SERPL-MCNC: 1.2 MG/DL (ref 0.2–1.2)
BUN SERPL-MCNC: 26 MG/DL (ref 7–25)
BUN/CREAT SERPL: 18 (CALC) (ref 6–22)
CALCIUM SERPL-MCNC: 9.1 MG/DL (ref 8.6–10.3)
CHLORIDE SERPL-SCNC: 103 MMOL/L (ref 98–110)
CO2 SERPL-SCNC: 31 MMOL/L (ref 20–31)
CREAT SERPL-MCNC: 1.46 MG/DL (ref 0.7–1.11)
EOSINOPHIL # BLD AUTO: 401 CELLS/UL (ref 15–500)
EOSINOPHIL NFR BLD AUTO: 5.9 %
ERYTHROCYTE [DISTWIDTH] IN BLOOD BY AUTOMATED COUNT: 12.7 % (ref 11–15)
GLOBULIN SER CALC-MCNC: 2.7 G/DL (CALC) (ref 1.9–3.7)
GLUCOSE SERPL-MCNC: 83 MG/DL (ref 65–99)
HBA1C MFR BLD: 7.5 % OF TOTAL HGB
HCT VFR BLD AUTO: 39.2 % (ref 38.5–50)
HGB BLD-MCNC: 13.2 G/DL (ref 13.2–17.1)
LYMPHOCYTES # BLD AUTO: 2047 CELLS/UL (ref 850–3900)
LYMPHOCYTES NFR BLD AUTO: 30.1 %
MCH RBC QN AUTO: 32 PG (ref 27–33)
MCHC RBC AUTO-ENTMCNC: 33.7 G/DL (ref 32–36)
MCV RBC AUTO: 94.9 FL (ref 80–100)
MONOCYTES # BLD AUTO: 564 CELLS/UL (ref 200–950)
MONOCYTES NFR BLD AUTO: 8.3 %
NEUTROPHILS # BLD AUTO: 3720 CELLS/UL (ref 1500–7800)
NEUTROPHILS NFR BLD AUTO: 54.7 %
PLATELET # BLD AUTO: 283 THOUSAND/UL (ref 140–400)
PMV BLD REES-ECKER: 10.3 FL (ref 7.5–12.5)
POTASSIUM SERPL-SCNC: 4.1 MMOL/L (ref 3.5–5.3)
PROT SERPL-MCNC: 6.5 G/DL (ref 6.1–8.1)
RBC # BLD AUTO: 4.13 MILLION/UL (ref 4.2–5.8)
SL AMB EGFR AFRICAN AMERICAN: 51 ML/MIN/1.73M2
SL AMB EGFR NON AFRICAN AMERICAN: 44 ML/MIN/1.73M2
SODIUM SERPL-SCNC: 140 MMOL/L (ref 135–146)
WBC # BLD AUTO: 6.8 THOUSAND/UL (ref 3.8–10.8)

## 2018-03-08 DIAGNOSIS — E11.9 TYPE 2 DIABETES MELLITUS WITHOUT COMPLICATION, WITHOUT LONG-TERM CURRENT USE OF INSULIN (HCC): Primary | ICD-10-CM

## 2018-03-19 DIAGNOSIS — E11.9 TYPE 2 DIABETES MELLITUS WITHOUT COMPLICATION, WITH LONG-TERM CURRENT USE OF INSULIN (HCC): Primary | ICD-10-CM

## 2018-03-19 DIAGNOSIS — Z79.4 TYPE 2 DIABETES MELLITUS WITHOUT COMPLICATION, WITH LONG-TERM CURRENT USE OF INSULIN (HCC): Primary | ICD-10-CM

## 2018-03-19 RX ORDER — INSULIN GLARGINE 100 [IU]/ML
54 INJECTION, SOLUTION SUBCUTANEOUS
Qty: 10 ML | Refills: 0 | Status: SHIPPED | COMMUNITY
Start: 2018-03-19 | End: 2018-05-15 | Stop reason: ALTCHOICE

## 2018-03-28 ENCOUNTER — DOCUMENTATION (OUTPATIENT)
Dept: FAMILY MEDICINE CLINIC | Facility: HOSPITAL | Age: 83
End: 2018-03-28

## 2018-04-04 DIAGNOSIS — E11.9 TYPE 2 DIABETES MELLITUS WITHOUT COMPLICATION, UNSPECIFIED LONG TERM INSULIN USE STATUS: Primary | ICD-10-CM

## 2018-04-04 RX ORDER — INSULIN GLARGINE 100 [IU]/ML
54 INJECTION, SOLUTION SUBCUTANEOUS
Qty: 10 ML | Refills: 5 | Status: SHIPPED | COMMUNITY
Start: 2018-04-04 | End: 2018-04-06 | Stop reason: SDUPTHER

## 2018-04-06 ENCOUNTER — OFFICE VISIT (OUTPATIENT)
Dept: FAMILY MEDICINE CLINIC | Facility: HOSPITAL | Age: 83
End: 2018-04-06
Payer: COMMERCIAL

## 2018-04-06 VITALS
BODY MASS INDEX: 35.68 KG/M2 | HEART RATE: 70 BPM | OXYGEN SATURATION: 97 % | HEIGHT: 66 IN | WEIGHT: 222 LBS | SYSTOLIC BLOOD PRESSURE: 116 MMHG | DIASTOLIC BLOOD PRESSURE: 62 MMHG

## 2018-04-06 DIAGNOSIS — Z79.4 TYPE 2 DIABETES MELLITUS WITHOUT COMPLICATION, WITH LONG-TERM CURRENT USE OF INSULIN (HCC): ICD-10-CM

## 2018-04-06 DIAGNOSIS — M1A.9XX1 CHRONIC GOUT WITH TOPHUS, UNSPECIFIED CAUSE, UNSPECIFIED SITE: ICD-10-CM

## 2018-04-06 DIAGNOSIS — E11.9 TYPE 2 DIABETES MELLITUS WITHOUT COMPLICATION, WITH LONG-TERM CURRENT USE OF INSULIN (HCC): ICD-10-CM

## 2018-04-06 DIAGNOSIS — M25.561 ACUTE PAIN OF RIGHT KNEE: Primary | ICD-10-CM

## 2018-04-06 DIAGNOSIS — I10 BENIGN ESSENTIAL HYPERTENSION: ICD-10-CM

## 2018-04-06 DIAGNOSIS — R35.1 BENIGN PROSTATIC HYPERPLASIA WITH NOCTURIA: ICD-10-CM

## 2018-04-06 DIAGNOSIS — R07.9 CHEST PAIN, EXERTIONAL: ICD-10-CM

## 2018-04-06 DIAGNOSIS — N40.1 BENIGN PROSTATIC HYPERPLASIA WITH NOCTURIA: ICD-10-CM

## 2018-04-06 DIAGNOSIS — J45.20 MILD INTERMITTENT ASTHMA WITHOUT COMPLICATION: ICD-10-CM

## 2018-04-06 DIAGNOSIS — E03.9 ACQUIRED HYPOTHYROIDISM: ICD-10-CM

## 2018-04-06 DIAGNOSIS — M1A.9XX1 GOUT WITH TOPHUS: ICD-10-CM

## 2018-04-06 PROBLEM — N28.1 RENAL CYST: Status: ACTIVE | Noted: 2017-01-12

## 2018-04-06 PROBLEM — R37 SEXUAL DYSFUNCTION: Status: ACTIVE | Noted: 2017-10-09

## 2018-04-06 PROBLEM — I35.0 AORTIC STENOSIS, MODERATE: Status: ACTIVE | Noted: 2018-04-06

## 2018-04-06 PROCEDURE — 99214 OFFICE O/P EST MOD 30 MIN: CPT | Performed by: INTERNAL MEDICINE

## 2018-04-06 RX ORDER — INSULIN GLARGINE 100 [IU]/ML
INJECTION, SOLUTION SUBCUTANEOUS
Qty: 5 PEN | Refills: 5 | Status: SHIPPED | OUTPATIENT
Start: 2018-04-06 | End: 2018-04-10

## 2018-04-06 RX ORDER — MELOXICAM 15 MG/1
1 TABLET ORAL
COMMUNITY
Start: 2017-10-03 | End: 2018-09-04

## 2018-04-06 RX ORDER — ALLOPURINOL 300 MG/1
300 TABLET ORAL DAILY
Qty: 90 TABLET | Refills: 3 | Status: SHIPPED | OUTPATIENT
Start: 2018-04-06 | End: 2018-05-31 | Stop reason: SDUPTHER

## 2018-04-06 NOTE — ASSESSMENT & PLAN NOTE
Improving readings- hba1c was decreased form 8 5 last year now at 7 5%  Had an episode of low sugar  And fell in bathroom- he cut back on lantus to 42- readings are now stable   fbs today 114

## 2018-04-06 NOTE — ASSESSMENT & PLAN NOTE
Worsening pain of right knee- seen by care now and had xray- no give way weakness  Using topical otc ointment- has some rash now in region     discussed seeing ortho -will give referral if he changes his mind

## 2018-04-06 NOTE — PROGRESS NOTES
Assessment/Plan:             Problem List Items Addressed This Visit        Endocrine    Hypothyroidism    Type 2 diabetes mellitus ([address] )     Improving readings- hba1c was decreased form 8 5 last year now at 7 5%  Had an episode of low sugar  And fell in bathroom- he cut back on lantus to 42- readings are now stable  fbs today 114         Relevant Medications    LANTUS SOLOSTAR injection pen 100 units/mL       Respiratory    Mild intermittent asthma without complication     No recent flare- has symbicort and proventil prn- worse in summer with grass mowing  Cardiovascular and Mediastinum    Benign essential hypertension     Readings are good today and he checks at drug store occasionally            Genitourinary    Benign prostatic hyperplasia with nocturia     Improving - up once at night- on tamsolosin            Other    Acute pain of right knee - Primary     Worsening pain of right knee- seen by care now and had xray- no give way weakness  Using topical otc ointment- has some rash now in region  discussed seeing ortho -will give referral if he changes his mind         Gout with tophus     No recent flare of symptoms           Other Visit Diagnoses     Chronic gout with tophus, unspecified cause, unspecified site        Relevant Medications    allopurinol (ZYLOPRIM) 300 mg tablet            Subjective:      Patient ID: Karen Del Cid  is a 80 y o  male    Some pain lower chest- upper abdomen with exertion- will order stress test- if not revealing will have gi see pt- seen recently by cardiology        The following portions of the patient's history were reviewed and updated as appropriate: allergies, current medications and problem list      Review of Systems   Respiratory: Positive for shortness of breath  Has had some residual cough and sob since episode of bronchitis since this winter   Had cxr  from care now which showed some enlargement- had echo and seen by cardiology  Gastrointestinal: Positive for constipation  All other systems reviewed and are negative  Objective:      Current Outpatient Prescriptions:     albuterol (PROVENTIL HFA) 90 mcg/act inhaler, Inhale 2 puffs 4 (four) times a day as needed, Disp: , Rfl:     allopurinol (ZYLOPRIM) 300 mg tablet, Take 1 tablet (300 mg total) by mouth daily, Disp: 90 tablet, Rfl: 3    aspirin (ECOTRIN LOW STRENGTH) 81 mg EC tablet, Take 1 tablet by mouth daily, Disp: , Rfl:     B-D UF III MINI PEN NEEDLES 31G X 5 MM MISC, USE ONE DAILY AS DIRECTED, Disp: , Rfl: 3    budesonide-formoterol (SYMBICORT) 160-4 5 mcg/act inhaler, Inhale, Disp: , Rfl:     diltiazem (CARDIZEM CD) 180 mg 24 hr capsule, Take 1 capsule by mouth daily, Disp: , Rfl:     gabapentin (NEURONTIN) 300 mg capsule, Take 1 capsule by mouth, Disp: , Rfl:     insulin glargine (LANTUS) 100 units/mL subcutaneous injection, Inject 54 Units under the skin daily at bedtime, Disp: 10 mL, Rfl: 0    Insulin Pen Needle 31G X 5 MM MISC, by Does not apply route, Disp: , Rfl:     levothyroxine 200 mcg tablet, Take 1 tablet by mouth daily, Disp: , Rfl:     losartan-hydrochlorothiazide (HYZAAR) 100-25 MG per tablet, Take 1 tablet by mouth daily, Disp: , Rfl:     meloxicam (MOBIC) 15 mg tablet, Take 1 tablet by mouth, Disp: , Rfl:     metFORMIN (GLUCOPHAGE) 500 mg tablet, TAKE 2 TABS IN AM AND 2 TABS AT PM (Patient taking differently: TAKE 1 TABS IN AM), Disp: 360 tablet, Rfl: 0    tamsulosin (FLOMAX) 0 4 mg, Take 1 capsule (0 4 mg total) by mouth daily with dinner, Disp: 90 capsule, Rfl: 3    LANTUS SOLOSTAR injection pen 100 units/mL, 42 units at hs, Disp: 5 pen, Rfl: 5    /72   Pulse 90   Resp 16   Ht 5' 3" (1 6 m)   Wt 65 3 kg (144 lb)   BMI 25 51 kg/m²          Physical Exam   Constitutional: He is oriented to person, place, and time  He appears well-developed and well-nourished  No distress  HENT:   Head: Normocephalic and atraumatic     Right Ear: External ear normal    Left Ear: External ear normal    Nose: Nose normal    Some scaling in canals   Eyes: Right eye exhibits no discharge  Left eye exhibits no discharge  Cardiovascular: Normal rate and regular rhythm  Murmur heard  Aortic region grad 2/ 6 systolic murmur   Pulmonary/Chest: Effort normal  He has no wheezes  He has no rales  He exhibits no tenderness  Abdominal: Soft  Bowel sounds are normal  He exhibits no distension  There is no tenderness  overweight   Musculoskeletal:   Tenderness medial right knees   Neurological: He is alert and oriented to person, place, and time  Nursing note and vitals reviewed

## 2018-04-09 ENCOUNTER — TELEPHONE (OUTPATIENT)
Dept: FAMILY MEDICINE CLINIC | Facility: HOSPITAL | Age: 83
End: 2018-04-09

## 2018-04-10 DIAGNOSIS — E11.8 TYPE 2 DIABETES MELLITUS WITH COMPLICATION, UNSPECIFIED WHETHER LONG TERM INSULIN USE: Primary | ICD-10-CM

## 2018-04-10 LAB
LEFT EYE DIABETIC RETINOPATHY: NORMAL
RIGHT EYE DIABETIC RETINOPATHY: NORMAL

## 2018-04-10 NOTE — TELEPHONE ENCOUNTER
I spoke to Jay at the pharmacy, he will change to Basaglar 42 units HS , I updated pt med list and sent rx to be signed off by Dr NIEVES

## 2018-04-19 DIAGNOSIS — E11.8 TYPE 2 DIABETES MELLITUS WITH COMPLICATION, UNSPECIFIED WHETHER LONG TERM INSULIN USE: ICD-10-CM

## 2018-04-27 ENCOUNTER — DOCUMENTATION (OUTPATIENT)
Dept: FAMILY MEDICINE CLINIC | Facility: HOSPITAL | Age: 83
End: 2018-04-27

## 2018-05-04 ENCOUNTER — HOSPITAL ENCOUNTER (OUTPATIENT)
Dept: NON INVASIVE DIAGNOSTICS | Facility: CLINIC | Age: 83
Discharge: HOME | End: 2018-05-04
Payer: COMMERCIAL

## 2018-05-04 DIAGNOSIS — Z79.4 TYPE 2 DIABETES MELLITUS WITHOUT COMPLICATION, WITH LONG-TERM CURRENT USE OF INSULIN (HCC): Primary | ICD-10-CM

## 2018-05-04 DIAGNOSIS — R07.9 CHEST PAIN, EXERTIONAL: ICD-10-CM

## 2018-05-04 DIAGNOSIS — E11.9 TYPE 2 DIABETES MELLITUS WITHOUT COMPLICATION, WITH LONG-TERM CURRENT USE OF INSULIN (HCC): Primary | ICD-10-CM

## 2018-05-04 PROCEDURE — 78452 HT MUSCLE IMAGE SPECT MULT: CPT

## 2018-05-04 PROCEDURE — A9502 TC99M TETROFOSMIN: HCPCS

## 2018-05-04 PROCEDURE — 93017 CV STRESS TEST TRACING ONLY: CPT

## 2018-05-04 RX ORDER — INSULIN GLARGINE 100 [IU]/ML
10 INJECTION, SOLUTION SUBCUTANEOUS
Qty: 10 ML | Refills: 0 | Status: SHIPPED | COMMUNITY
Start: 2018-05-04 | End: 2018-05-15 | Stop reason: ALTCHOICE

## 2018-05-04 RX ADMIN — REGADENOSON 0.4 MG: 0.08 INJECTION, SOLUTION INTRAVENOUS at 11:43

## 2018-05-07 DIAGNOSIS — Z79.4 TYPE 2 DIABETES MELLITUS WITHOUT COMPLICATION, WITH LONG-TERM CURRENT USE OF INSULIN (HCC): Primary | ICD-10-CM

## 2018-05-07 DIAGNOSIS — E11.9 TYPE 2 DIABETES MELLITUS WITHOUT COMPLICATION, WITH LONG-TERM CURRENT USE OF INSULIN (HCC): Primary | ICD-10-CM

## 2018-05-07 LAB
CHEST PAIN STATEMENT: NORMAL
MAX DIASTOLIC BP: 65 MMHG
MAX HEART RATE: 87 BPM
MAX PREDICTED HEART RATE: 137 BPM
MAX. SYSTOLIC BP: 132 MMHG
PROTOCOL NAME: NORMAL
REASON FOR TERMINATION: NORMAL
TARGET HR FORMULA: NORMAL
TEST INDICATION: NORMAL
TIME IN EXERCISE PHASE: NORMAL

## 2018-05-07 RX ORDER — PEN NEEDLE, DIABETIC 31 GX5/16"
NEEDLE, DISPOSABLE MISCELLANEOUS
Qty: 100 EACH | Refills: 5 | Status: SHIPPED | OUTPATIENT
Start: 2018-05-07 | End: 2018-11-19

## 2018-05-15 ENCOUNTER — OFFICE VISIT (OUTPATIENT)
Dept: CARDIOLOGY CLINIC | Facility: CLINIC | Age: 83
End: 2018-05-15
Payer: COMMERCIAL

## 2018-05-15 VITALS
SYSTOLIC BLOOD PRESSURE: 120 MMHG | DIASTOLIC BLOOD PRESSURE: 64 MMHG | HEIGHT: 66 IN | BODY MASS INDEX: 36.16 KG/M2 | HEART RATE: 60 BPM | WEIGHT: 225 LBS

## 2018-05-15 DIAGNOSIS — I35.0 AORTIC STENOSIS, MODERATE: ICD-10-CM

## 2018-05-15 DIAGNOSIS — I25.10 CORONARY ARTERY DISEASE INVOLVING NATIVE CORONARY ARTERY OF NATIVE HEART WITHOUT ANGINA PECTORIS: ICD-10-CM

## 2018-05-15 DIAGNOSIS — I10 BENIGN ESSENTIAL HYPERTENSION: ICD-10-CM

## 2018-05-15 DIAGNOSIS — I10 ESSENTIAL HYPERTENSION: Primary | ICD-10-CM

## 2018-05-15 DIAGNOSIS — E78.00 PURE HYPERCHOLESTEROLEMIA: ICD-10-CM

## 2018-05-15 DIAGNOSIS — R94.39 ABNORMAL CARDIOVASCULAR STRESS TEST: ICD-10-CM

## 2018-05-15 PROCEDURE — 93000 ELECTROCARDIOGRAM COMPLETE: CPT | Performed by: INTERNAL MEDICINE

## 2018-05-15 PROCEDURE — 99204 OFFICE O/P NEW MOD 45 MIN: CPT | Performed by: INTERNAL MEDICINE

## 2018-05-15 NOTE — PROGRESS NOTES
Cardiology Consultation    Baldemar Leventhal  1935  [phone]  HEART & VASCULAR  [facility]   [address]  [address] [address] [address]    1  Essential hypertension  POCT ECG   2  Coronary artery disease involving native coronary artery of native heart without angina pectoris     3  Aortic stenosis, moderate     4  Abnormal cardiovascular stress test     5  Benign essential hypertension     6  Pure hypercholesterolemia         HPI:    Parris Fuentesca in for consultation regarding an abnormal stress nuclear study the had performed recently  I met him at the time of his stress testing  This was ordered by his internist, Dr Zac Gonzalez, secondary to abdominal pain and multiple risk factors for CAD  This demonstrated as stated below, a partially reversible defect and ischemia of the anterior to apical wall  His ejection fraction was normal   He also has moderate aortic stenosis by echocardiogram from December  He is treated for hypertension and diabetes mellitus  He was once treated for dyslipidemia but did not tolerate Lipitor  Ellischristy Vega has been having on and off abdominal pain over the last few months  Was peculiar is that it is diffuse across most of his abdomen, but exertional   He tells me that if he exerts himself at a higher level than usual, he will start to sent abdominal pain  This will start his upper abdominal pain but then spread diffusely  He denies any true chest pain  No other radiation of this pain  He has been having some exertional shortness of breath and fatigue  He denies palpitations, lightheadedness or any syncope    No shortness of breath at rest       Patient Active Problem List   Diagnosis    Benign prostatic hyperplasia with nocturia    Acute pain of right knee    Benign essential hypertension    Dyslipidemia, goal LDL below 100    Gout with tophus    Hypothyroidism    Mild intermittent asthma without complication    Obesity    Pure hypercholesterolemia    Renal cyst    Sexual dysfunction    Type 2 diabetes mellitus ([facility] )    Aortic stenosis, moderate    CAD (coronary artery disease)    Abnormal cardiovascular stress test     Past Medical History:   Diagnosis Date    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[facility] )     GERD (gastroesophageal reflux disease)     Glaucoma     Mild aortic stenosis     Overweight     Peripheral neuropathy, idiopathic     Prostate cancer (HCC)     Pure hypercholesterolemia     LA   11/12/14   R   11/12/14      Social History     Social History    Marital status:      Spouse name: N/A    Number of children: N/A    Years of education: N/A     Occupational History    Not on file       Social History Main Topics    Smoking status: Never Smoker    Smokeless tobacco: Never Used    Alcohol use No    Drug use: No    Sexual activity: Not Currently     Other Topics Concern    Not on file     Social History Narrative    Caffeine use / coffee diet cola and tea    Lives with family     Living situation supportive and safe    No advance directives  -denied          Family History   Problem Relation Age of Onset    Diabetes Mother     Pancreatic cancer Brother     Diabetes Maternal Grandmother     Colon cancer Son     Diabetes Family      Past Surgical History:   Procedure Laterality Date    THYROID SURGERY         Current Outpatient Prescriptions:     allopurinol (ZYLOPRIM) 300 mg tablet, Take 1 tablet (300 mg total) by mouth daily, Disp: 90 tablet, Rfl: 3    aspirin (ECOTRIN LOW STRENGTH) 81 mg EC tablet, Take 1 tablet by mouth daily, Disp: , Rfl:     B-D UF III MINI PEN NEEDLES 31G X 5 MM MISC, USE ONE DAILY AS DIRECTED, Disp: 100 each, Rfl: 5    budesonide-formoterol (SYMBICORT) 160-4 5 mcg/act inhaler, Inhale, Disp: , Rfl:     diltiazem (CARDIZEM CD) 180 mg 24 hr capsule, Take 1 capsule by mouth daily, Disp: , Rfl:     gabapentin (NEURONTIN) 300 mg capsule, Take 1 capsule by mouth, Disp: , Rfl:     insulin glargine (LANTUS SOLOSTAR) injection pen 100 units/mL, Inject 0 42 mL (42 Units total) under the skin daily at bedtime, Disp: 2 pen, Rfl: 0    Insulin Pen Needle 31G X 5 MM MISC, by Does not apply route, Disp: , Rfl:     levothyroxine 200 mcg tablet, Take 1 tablet by mouth daily, Disp: , Rfl:     losartan-hydrochlorothiazide (HYZAAR) 100-25 MG per tablet, Take 1 tablet by mouth daily, Disp: , Rfl:     meloxicam (MOBIC) 15 mg tablet, Take 1 tablet by mouth, Disp: , Rfl:     metFORMIN (GLUCOPHAGE) 500 mg tablet, TAKE 2 TABS IN AM AND 2 TABS AT PM (Patient taking differently: TAKE 1 TABS IN AM), Disp: 360 tablet, Rfl: 0    tamsulosin (FLOMAX) 0 4 mg, Take 1 capsule (0 4 mg total) by mouth daily with dinner, Disp: 90 capsule, Rfl: 3    albuterol (PROVENTIL HFA) 90 mcg/act inhaler, Inhale 2 puffs 4 (four) times a day as needed, Disp: , Rfl:   Allergies   Allergen Reactions    Amlodipine Nausea Only    Atorvastatin Myalgia       Labs:  Lab Results   Component Value Date     09/05/2017    K 4 2 09/05/2017     09/05/2017    CO2 30 09/05/2017    BUN 26 (H) 02/15/2018    CREATININE 1 46 (H) 02/15/2018    CREATININE 1 56 (H) 09/05/2017    CALCIUM 9 1 02/15/2018     Lab Results   Component Value Date    WBC 7 6 04/17/2017    HGB 13 2 02/15/2018    HGB 14 0 04/17/2017    HCT 39 2 02/15/2018    HCT 43 3 04/17/2017    MCV 94 9 02/15/2018    MCV 97 2 04/17/2017     02/15/2018     04/17/2017     Lab Results   Component Value Date    CHOL 197 04/17/2017    TRIG 225 (H) 04/17/2017    HDL 30 (L) 04/17/2017     Imaging:   ECG obtained today demonstrates sinus rhythm, left axis deviation and nonspecific IVCD  Nonspecific ST and T-wave changes  STRESS NUCLEAR (5/2017):  SUMMARY:  -  Stress results: There was no chest pain during stress  -  ECG conclusions: The stress ECG was non-diagnostic due to resting ST/T wave abnormality   -  Perfusion imaging:  There was a moderate-sized, severe, partially reversible myocardial perfusion defect of the anterior and apical wall representing mixed scar and ischemia   -  Gated SPECT: The calculated left ventricular ejection fraction was 50 %  Left ventricular ejection fraction was low normal by visual estimate  There was moderately reduced myocardial thickening and motion of the anterior wall and apical  wall of the left ventricle      IMPRESSIONS: Abnormal study after pharmacologic stress  Overall left ventricular systolic function was preserved, but there were regional wall motion abnormalities  ECHO (12/2017):  LEFT VENTRICLE:  Systolic function was normal  Ejection fraction was estimated to be 65 %  There were no regional wall motion abnormalities  Wall thickness was mildly increased      LEFT ATRIUM:  The atrium was mildly dilated      MITRAL VALVE:  There was mild annular calcification  There was trace regurgitation      AORTIC VALVE:  The valve was trileaflet  Leaflets exhibited moderate calcification and moderately reduced cuspal separation  There was moderate stenosis  There was trace regurgitation  Valve mean gradient was 22 5 mmHg  Estimated aortic valve area (by VTI) was 1 42 cm squared  Estimated aortic valve area (by Vmax) was 1 49 cm squared      TRICUSPID VALVE:  There was mild regurgitation  Pulmonary artery systolic pressure was at the upper limits of normal         Review of Systems:  Review of Systems   Constitutional: Positive for fatigue  HENT: Negative  Eyes: Negative  Respiratory: Positive for shortness of breath  Cardiovascular: Negative  Gastrointestinal: Positive for abdominal pain  Musculoskeletal: Negative  Skin: Negative  Allergic/Immunologic: Negative  Neurological: Negative  Hematological: Negative  Psychiatric/Behavioral: Negative  All other systems reviewed and are negative        Physical Exam:  Physical Exam   Constitutional: He is oriented to person, place, and time  He appears well-developed and well-nourished  HENT:   Head: Normocephalic and atraumatic  Eyes: EOM are normal  Pupils are equal, round, and reactive to light  Right eye exhibits no discharge  Left eye exhibits no discharge  No scleral icterus  Neck: Normal range of motion  Neck supple  No JVD present  No thyromegaly present  Cardiovascular: Normal rate, regular rhythm, S1 normal, S2 normal, intact distal pulses and normal pulses  No extrasystoles are present  Exam reveals no gallop and no friction rub  Murmur heard  Systolic murmur is present with a grade of 3/6   Pulmonary/Chest: Effort normal and breath sounds normal  No respiratory distress  He has no wheezes  He has no rales  Abdominal: Soft  Bowel sounds are normal  He exhibits no distension  There is no tenderness  Musculoskeletal: Normal range of motion  He exhibits no edema, tenderness or deformity  Neurological: He is alert and oriented to person, place, and time  No cranial nerve deficit  Skin: Skin is warm and dry  No rash noted  Psychiatric: He has a normal mood and affect  Judgment and thought content normal    Nursing note and vitals reviewed  Discussion/Summary:    1  CAD - Tyrel Thompson had an abnormal stress nuclear study earlier this month showing a reversible defect in the anterior apical wall  He is also having exertional abdominal pain  I recommended a cardiac catheterization  I discussed with him the procedure, including possible outcomes and findings  We will be scheduling him to have this done in the next week  He will remain on the same medical therapy  2   Dyslipidemia -  I did recommend for him to go back on a statin  He was reluctant to do this, and wanted to wait until after the cardiac catheterization  3   HTN -   Blood pressure is under good control  No changes were made today  4   Moderate aortic stenosis -  This was detected by echocardiogram in December    His LV function is normal   We will continue to follow this with periodic echocardiograms  If bypass surgery is needed after his cardiac catheterization, he would likely also have an aortic valve replacement  Counseling / Coordination of Care  Total floor / unit time spent today 40 minutes  Greater than 50% of total time was spent with the patient and / or family counseling and / or coordination of care

## 2018-05-17 DIAGNOSIS — R94.39 ABNORMAL STRESS TEST: Primary | ICD-10-CM

## 2018-05-17 LAB
BUN SERPL-MCNC: 23 MG/DL (ref 7–25)
BUN/CREAT SERPL: 15 (CALC) (ref 6–22)
CALCIUM SERPL-MCNC: 9.2 MG/DL (ref 8.6–10.3)
CHLORIDE SERPL-SCNC: 104 MMOL/L (ref 98–110)
CO2 SERPL-SCNC: 28 MMOL/L (ref 20–31)
CREAT SERPL-MCNC: 1.55 MG/DL (ref 0.7–1.11)
GLUCOSE SERPL-MCNC: 128 MG/DL (ref 65–139)
POTASSIUM SERPL-SCNC: 4.7 MMOL/L (ref 3.5–5.3)
SL AMB EGFR AFRICAN AMERICAN: 47 ML/MIN/1.73M2
SL AMB EGFR NON AFRICAN AMERICAN: 41 ML/MIN/1.73M2
SODIUM SERPL-SCNC: 140 MMOL/L (ref 135–146)

## 2018-05-18 RX ORDER — SODIUM CHLORIDE 9 MG/ML
125 INJECTION, SOLUTION INTRAVENOUS CONTINUOUS
Status: CANCELLED | OUTPATIENT
Start: 2018-05-18

## 2018-05-18 RX ORDER — ASPIRIN 81 MG/1
324 TABLET, CHEWABLE ORAL ONCE
Status: CANCELLED | OUTPATIENT
Start: 2018-05-18 | End: 2018-05-18

## 2018-05-21 ENCOUNTER — HOSPITAL ENCOUNTER (OUTPATIENT)
Dept: NON INVASIVE DIAGNOSTICS | Facility: HOSPITAL | Age: 83
Discharge: HOME | End: 2018-05-21
Attending: INTERNAL MEDICINE | Admitting: INTERNAL MEDICINE
Payer: COMMERCIAL

## 2018-05-21 VITALS
SYSTOLIC BLOOD PRESSURE: 122 MMHG | TEMPERATURE: 97.7 F | WEIGHT: 222 LBS | BODY MASS INDEX: 35.68 KG/M2 | OXYGEN SATURATION: 97 % | RESPIRATION RATE: 16 BRPM | HEIGHT: 66 IN | HEART RATE: 55 BPM | DIASTOLIC BLOOD PRESSURE: 57 MMHG

## 2018-05-21 DIAGNOSIS — I35.0 AORTIC STENOSIS, MODERATE: ICD-10-CM

## 2018-05-21 DIAGNOSIS — R94.39 ABNORMAL CARDIOVASCULAR STRESS TEST: ICD-10-CM

## 2018-05-21 DIAGNOSIS — I25.10 CORONARY ARTERY DISEASE INVOLVING NATIVE CORONARY ARTERY OF NATIVE HEART WITHOUT ANGINA PECTORIS: Primary | ICD-10-CM

## 2018-05-21 LAB
ANION GAP SERPL CALCULATED.3IONS-SCNC: 6 MMOL/L (ref 4–13)
ATRIAL RATE: 61 BPM
BUN SERPL-MCNC: 32 MG/DL (ref 5–25)
CALCIUM SERPL-MCNC: 9 MG/DL (ref 8.3–10.1)
CHLORIDE SERPL-SCNC: 106 MMOL/L (ref 100–108)
CHOLEST SERPL-MCNC: 184 MG/DL (ref 50–200)
CO2 SERPL-SCNC: 27 MMOL/L (ref 21–32)
CREAT SERPL-MCNC: 1.58 MG/DL (ref 0.6–1.3)
ERYTHROCYTE [DISTWIDTH] IN BLOOD BY AUTOMATED COUNT: 13.3 % (ref 11.6–15.1)
GFR SERPL CREATININE-BSD FRML MDRD: 40 ML/MIN/1.73SQ M
GLUCOSE P FAST SERPL-MCNC: 156 MG/DL (ref 65–99)
GLUCOSE SERPL-MCNC: 156 MG/DL (ref 65–140)
GLUCOSE SERPL-MCNC: 162 MG/DL (ref 65–140)
HCT VFR BLD AUTO: 44.7 % (ref 36.5–49.3)
HDLC SERPL-MCNC: 31 MG/DL (ref 40–60)
HGB BLD-MCNC: 14.7 G/DL (ref 12–17)
INR PPP: 0.97 (ref 0.86–1.17)
LDLC SERPL CALC-MCNC: 118 MG/DL (ref 0–100)
MAGNESIUM SERPL-MCNC: 2.5 MG/DL (ref 1.6–2.6)
MCH RBC QN AUTO: 31.4 PG (ref 26.8–34.3)
MCHC RBC AUTO-ENTMCNC: 32.9 G/DL (ref 31.4–37.4)
MCV RBC AUTO: 96 FL (ref 82–98)
NONHDLC SERPL-MCNC: 153 MG/DL
P AXIS: 54 DEGREES
PLATELET # BLD AUTO: 288 THOUSANDS/UL (ref 149–390)
PMV BLD AUTO: 9.5 FL (ref 8.9–12.7)
POTASSIUM SERPL-SCNC: 3.8 MMOL/L (ref 3.5–5.3)
PR INTERVAL: 192 MS
PROTHROMBIN TIME: 13 SECONDS (ref 11.8–14.2)
QRS AXIS: -40 DEGREES
QRSD INTERVAL: 122 MS
QT INTERVAL: 394 MS
QTC INTERVAL: 396 MS
RBC # BLD AUTO: 4.68 MILLION/UL (ref 3.88–5.62)
SODIUM SERPL-SCNC: 139 MMOL/L (ref 136–145)
T WAVE AXIS: 106 DEGREES
TRIGL SERPL-MCNC: 176 MG/DL
VENTRICULAR RATE: 61 BPM
WBC # BLD AUTO: 7.4 THOUSAND/UL (ref 4.31–10.16)

## 2018-05-21 PROCEDURE — 93458 L HRT ARTERY/VENTRICLE ANGIO: CPT | Performed by: INTERNAL MEDICINE

## 2018-05-21 PROCEDURE — 80061 LIPID PANEL: CPT | Performed by: INTERNAL MEDICINE

## 2018-05-21 PROCEDURE — 85027 COMPLETE CBC AUTOMATED: CPT | Performed by: INTERNAL MEDICINE

## 2018-05-21 PROCEDURE — 93010 ELECTROCARDIOGRAM REPORT: CPT | Performed by: INTERNAL MEDICINE

## 2018-05-21 PROCEDURE — 85610 PROTHROMBIN TIME: CPT | Performed by: INTERNAL MEDICINE

## 2018-05-21 PROCEDURE — 99152 MOD SED SAME PHYS/QHP 5/>YRS: CPT | Performed by: INTERNAL MEDICINE

## 2018-05-21 PROCEDURE — 99153 MOD SED SAME PHYS/QHP EA: CPT | Performed by: INTERNAL MEDICINE

## 2018-05-21 PROCEDURE — C1769 GUIDE WIRE: HCPCS | Performed by: INTERNAL MEDICINE

## 2018-05-21 PROCEDURE — 93005 ELECTROCARDIOGRAM TRACING: CPT

## 2018-05-21 PROCEDURE — 83735 ASSAY OF MAGNESIUM: CPT | Performed by: INTERNAL MEDICINE

## 2018-05-21 PROCEDURE — 82948 REAGENT STRIP/BLOOD GLUCOSE: CPT

## 2018-05-21 PROCEDURE — C1894 INTRO/SHEATH, NON-LASER: HCPCS | Performed by: INTERNAL MEDICINE

## 2018-05-21 PROCEDURE — 80048 BASIC METABOLIC PNL TOTAL CA: CPT | Performed by: INTERNAL MEDICINE

## 2018-05-21 PROCEDURE — C1887 CATHETER, GUIDING: HCPCS | Performed by: INTERNAL MEDICINE

## 2018-05-21 RX ORDER — MIDAZOLAM HYDROCHLORIDE 1 MG/ML
INJECTION INTRAMUSCULAR; INTRAVENOUS CODE/TRAUMA/SEDATION MEDICATION
Status: COMPLETED | OUTPATIENT
Start: 2018-05-21 | End: 2018-05-21

## 2018-05-21 RX ORDER — NITROGLYCERIN 0.4 MG/1
0.4 TABLET SUBLINGUAL
Status: DISCONTINUED | OUTPATIENT
Start: 2018-05-21 | End: 2018-05-21 | Stop reason: HOSPADM

## 2018-05-21 RX ORDER — LIDOCAINE HYDROCHLORIDE 10 MG/ML
INJECTION, SOLUTION INFILTRATION; PERINEURAL CODE/TRAUMA/SEDATION MEDICATION
Status: COMPLETED | OUTPATIENT
Start: 2018-05-21 | End: 2018-05-21

## 2018-05-21 RX ORDER — SODIUM CHLORIDE 9 MG/ML
125 INJECTION, SOLUTION INTRAVENOUS CONTINUOUS
Status: DISPENSED | OUTPATIENT
Start: 2018-05-21 | End: 2018-05-21

## 2018-05-21 RX ORDER — NITROGLYCERIN 20 MG/100ML
INJECTION INTRAVENOUS CODE/TRAUMA/SEDATION MEDICATION
Status: COMPLETED | OUTPATIENT
Start: 2018-05-21 | End: 2018-05-21

## 2018-05-21 RX ORDER — FENTANYL CITRATE 50 UG/ML
INJECTION, SOLUTION INTRAMUSCULAR; INTRAVENOUS CODE/TRAUMA/SEDATION MEDICATION
Status: COMPLETED | OUTPATIENT
Start: 2018-05-21 | End: 2018-05-21

## 2018-05-21 RX ORDER — NITROGLYCERIN 0.4 MG/1
0.4 TABLET SUBLINGUAL
Qty: 90 TABLET | Refills: 0 | Status: SHIPPED | OUTPATIENT
Start: 2018-05-21 | End: 2018-09-21 | Stop reason: HOSPADM

## 2018-05-21 RX ORDER — ASPIRIN 81 MG/1
324 TABLET, CHEWABLE ORAL ONCE
Status: COMPLETED | OUTPATIENT
Start: 2018-05-21 | End: 2018-05-21

## 2018-05-21 RX ORDER — VERAPAMIL HYDROCHLORIDE 2.5 MG/ML
INJECTION, SOLUTION INTRAVENOUS CODE/TRAUMA/SEDATION MEDICATION
Status: COMPLETED | OUTPATIENT
Start: 2018-05-21 | End: 2018-05-21

## 2018-05-21 RX ORDER — SODIUM CHLORIDE 9 MG/ML
125 INJECTION, SOLUTION INTRAVENOUS CONTINUOUS
Status: DISCONTINUED | OUTPATIENT
Start: 2018-05-21 | End: 2018-05-21

## 2018-05-21 RX ORDER — HEPARIN SODIUM 1000 [USP'U]/ML
INJECTION, SOLUTION INTRAVENOUS; SUBCUTANEOUS CODE/TRAUMA/SEDATION MEDICATION
Status: COMPLETED | OUTPATIENT
Start: 2018-05-21 | End: 2018-05-21

## 2018-05-21 RX ADMIN — SODIUM CHLORIDE 125 ML/HR: 0.9 INJECTION, SOLUTION INTRAVENOUS at 10:39

## 2018-05-21 RX ADMIN — FENTANYL CITRATE 50 MCG: 50 INJECTION, SOLUTION INTRAMUSCULAR; INTRAVENOUS at 09:26

## 2018-05-21 RX ADMIN — IOHEXOL 100 ML: 350 INJECTION, SOLUTION INTRAVENOUS at 10:23

## 2018-05-21 RX ADMIN — NITROGLYCERIN 200 MCG: 20 INJECTION INTRAVENOUS at 09:52

## 2018-05-21 RX ADMIN — ASPIRIN 81 MG 324 MG: 81 TABLET ORAL at 08:01

## 2018-05-21 RX ADMIN — SODIUM CHLORIDE 125 ML/HR: 0.9 INJECTION, SOLUTION INTRAVENOUS at 08:00

## 2018-05-21 RX ADMIN — VERAPAMIL HYDROCHLORIDE 2.5 MG: 2.5 INJECTION INTRAVENOUS at 09:53

## 2018-05-21 RX ADMIN — LIDOCAINE HYDROCHLORIDE 1 ML: 10 INJECTION, SOLUTION INFILTRATION; PERINEURAL at 09:50

## 2018-05-21 RX ADMIN — HEPARIN SODIUM 4000 UNITS: 1000 INJECTION INTRAVENOUS; SUBCUTANEOUS at 09:52

## 2018-05-21 RX ADMIN — MIDAZOLAM 2 MG: 1 INJECTION INTRAMUSCULAR; INTRAVENOUS at 09:26

## 2018-05-21 NOTE — INTERVAL H&P NOTE
Update: (This section must be completed if the H&P was completed greater than 24 hrs to procedure or admission)    H&P reviewed  After examining the patient, I find no changed to the H&P since it had been written  Patient re-evaluated   Accept as history and physical     Marilyn Krishnan, DO/May 21, 2018/9:52 AM

## 2018-05-21 NOTE — INTERVAL H&P NOTE
Update: (This section must be completed if the H&P was completed greater than 24 hrs to procedure or admission)    H&P reviewed  After examining the patient, I find no changed to the H&P since it had been written  Patient re-evaluated   Accept as history and physical     VALENCIA Suarez/May 21, 2018/8:51 AM

## 2018-05-21 NOTE — H&P (VIEW-ONLY)
Cardiology Consultation    Mone Arias  1935  [id number]  HEART & VASCULAR  [facility] Cirilo Briggs  [address]  [address] [address]    1  Essential hypertension  POCT ECG   2  Coronary artery disease involving native coronary artery of native heart without angina pectoris     3  Aortic stenosis, moderate     4  Abnormal cardiovascular stress test     5  Benign essential hypertension     6  Pure hypercholesterolemia         HPI:    Angelika Cover in for consultation regarding an abnormal stress nuclear study the had performed recently  I met him at the time of his stress testing  This was ordered by his internist, Dr Anais Ogden, secondary to abdominal pain and multiple risk factors for CAD  This demonstrated as stated below, a partially reversible defect and ischemia of the anterior to apical wall  His ejection fraction was normal   He also has moderate aortic stenosis by echocardiogram from December  He is treated for hypertension and diabetes mellitus  He was once treated for dyslipidemia but did not tolerate Lipitor  Maryanne Exon has been having on and off abdominal pain over the last few months  Was peculiar is that it is diffuse across most of his abdomen, but exertional   He tells me that if he exerts himself at a higher level than usual, he will start to sent abdominal pain  This will start his upper abdominal pain but then spread diffusely  He denies any true chest pain  No other radiation of this pain  He has been having some exertional shortness of breath and fatigue  He denies palpitations, lightheadedness or any syncope    No shortness of breath at rest       Patient Active Problem List   Diagnosis    Benign prostatic hyperplasia with nocturia    Acute pain of right knee    Benign essential hypertension    Dyslipidemia, goal LDL below 100    Gout with tophus    Hypothyroidism    Mild intermittent asthma without complication    Obesity    Pure hypercholesterolemia    Renal cyst    Sexual dysfunction    Type 2 diabetes mellitus ([facility] )    Aortic stenosis, moderate    CAD (coronary artery disease)    Abnormal cardiovascular stress test     Past Medical History:   Diagnosis Date    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[facility] )     GERD (gastroesophageal reflux disease)     Glaucoma     Mild aortic stenosis     Overweight     Peripheral neuropathy, idiopathic     Prostate cancer (HCC)     Pure hypercholesterolemia     LA   11/12/14   R   11/12/14      Social History     Social History    Marital status:      Spouse name: N/A    Number of children: N/A    Years of education: N/A     Occupational History    Not on file       Social History Main Topics    Smoking status: Never Smoker    Smokeless tobacco: Never Used    Alcohol use No    Drug use: No    Sexual activity: Not Currently     Other Topics Concern    Not on file     Social History Narrative    Caffeine use / coffee diet cola and tea    Lives with family     Living situation supportive and safe    No advance directives  -denied          Family History   Problem Relation Age of Onset    Diabetes Mother     Pancreatic cancer Brother     Diabetes Maternal Grandmother     Colon cancer Son     Diabetes Family      Past Surgical History:   Procedure Laterality Date    THYROID SURGERY         Current Outpatient Prescriptions:     allopurinol (ZYLOPRIM) 300 mg tablet, Take 1 tablet (300 mg total) by mouth daily, Disp: 90 tablet, Rfl: 3    aspirin (ECOTRIN LOW STRENGTH) 81 mg EC tablet, Take 1 tablet by mouth daily, Disp: , Rfl:     B-D UF III MINI PEN NEEDLES 31G X 5 MM MISC, USE ONE DAILY AS DIRECTED, Disp: 100 each, Rfl: 5    budesonide-formoterol (SYMBICORT) 160-4 5 mcg/act inhaler, Inhale, Disp: , Rfl:     diltiazem (CARDIZEM CD) 180 mg 24 hr capsule, Take 1 capsule by mouth daily, Disp: , Rfl:     gabapentin (NEURONTIN) 300 mg capsule, Take 1 capsule by mouth, Disp: , Rfl:     insulin glargine (LANTUS SOLOSTAR) injection pen 100 units/mL, Inject 0 42 mL (42 Units total) under the skin daily at bedtime, Disp: 2 pen, Rfl: 0    Insulin Pen Needle 31G X 5 MM MISC, by Does not apply route, Disp: , Rfl:     levothyroxine 200 mcg tablet, Take 1 tablet by mouth daily, Disp: , Rfl:     losartan-hydrochlorothiazide (HYZAAR) 100-25 MG per tablet, Take 1 tablet by mouth daily, Disp: , Rfl:     meloxicam (MOBIC) 15 mg tablet, Take 1 tablet by mouth, Disp: , Rfl:     metFORMIN (GLUCOPHAGE) 500 mg tablet, TAKE 2 TABS IN AM AND 2 TABS AT PM (Patient taking differently: TAKE 1 TABS IN AM), Disp: 360 tablet, Rfl: 0    tamsulosin (FLOMAX) 0 4 mg, Take 1 capsule (0 4 mg total) by mouth daily with dinner, Disp: 90 capsule, Rfl: 3    albuterol (PROVENTIL HFA) 90 mcg/act inhaler, Inhale 2 puffs 4 (four) times a day as needed, Disp: , Rfl:   Allergies   Allergen Reactions    Amlodipine Nausea Only    Atorvastatin Myalgia       Labs:  Lab Results   Component Value Date     09/05/2017    K 4 2 09/05/2017     09/05/2017    CO2 30 09/05/2017    BUN 26 (H) 02/15/2018    CREATININE 1 46 (H) 02/15/2018    CREATININE 1 56 (H) 09/05/2017    CALCIUM 9 1 02/15/2018     Lab Results   Component Value Date    WBC 7 6 04/17/2017    HGB 13 2 02/15/2018    HGB 14 0 04/17/2017    HCT 39 2 02/15/2018    HCT 43 3 04/17/2017    MCV 94 9 02/15/2018    MCV 97 2 04/17/2017     02/15/2018     04/17/2017     Lab Results   Component Value Date    CHOL 197 04/17/2017    TRIG 225 (H) 04/17/2017    HDL 30 (L) 04/17/2017     Imaging:   ECG obtained today demonstrates sinus rhythm, left axis deviation and nonspecific IVCD  Nonspecific ST and T-wave changes  STRESS NUCLEAR (5/2017):  SUMMARY:  -  Stress results: There was no chest pain during stress  -  ECG conclusions: The stress ECG was non-diagnostic due to resting ST/T wave abnormality   -  Perfusion imaging:  There was a moderate-sized, severe, partially reversible myocardial perfusion defect of the anterior and apical wall representing mixed scar and ischemia   -  Gated SPECT: The calculated left ventricular ejection fraction was 50 %  Left ventricular ejection fraction was low normal by visual estimate  There was moderately reduced myocardial thickening and motion of the anterior wall and apical  wall of the left ventricle      IMPRESSIONS: Abnormal study after pharmacologic stress  Overall left ventricular systolic function was preserved, but there were regional wall motion abnormalities  ECHO (12/2017):  LEFT VENTRICLE:  Systolic function was normal  Ejection fraction was estimated to be 65 %  There were no regional wall motion abnormalities  Wall thickness was mildly increased      LEFT ATRIUM:  The atrium was mildly dilated      MITRAL VALVE:  There was mild annular calcification  There was trace regurgitation      AORTIC VALVE:  The valve was trileaflet  Leaflets exhibited moderate calcification and moderately reduced cuspal separation  There was moderate stenosis  There was trace regurgitation  Valve mean gradient was 22 5 mmHg  Estimated aortic valve area (by VTI) was 1 42 cm squared  Estimated aortic valve area (by Vmax) was 1 49 cm squared      TRICUSPID VALVE:  There was mild regurgitation  Pulmonary artery systolic pressure was at the upper limits of normal         Review of Systems:  Review of Systems   Constitutional: Positive for fatigue  HENT: Negative  Eyes: Negative  Respiratory: Positive for shortness of breath  Cardiovascular: Negative  Gastrointestinal: Positive for abdominal pain  Musculoskeletal: Negative  Skin: Negative  Allergic/Immunologic: Negative  Neurological: Negative  Hematological: Negative  Psychiatric/Behavioral: Negative  All other systems reviewed and are negative        Physical Exam:  Physical Exam   Constitutional: He is oriented to person, place, and time  He appears well-developed and well-nourished  HENT:   Head: Normocephalic and atraumatic  Eyes: EOM are normal  Pupils are equal, round, and reactive to light  Right eye exhibits no discharge  Left eye exhibits no discharge  No scleral icterus  Neck: Normal range of motion  Neck supple  No JVD present  No thyromegaly present  Cardiovascular: Normal rate, regular rhythm, S1 normal, S2 normal, intact distal pulses and normal pulses  No extrasystoles are present  Exam reveals no gallop and no friction rub  Murmur heard  Systolic murmur is present with a grade of 3/6   Pulmonary/Chest: Effort normal and breath sounds normal  No respiratory distress  He has no wheezes  He has no rales  Abdominal: Soft  Bowel sounds are normal  He exhibits no distension  There is no tenderness  Musculoskeletal: Normal range of motion  He exhibits no edema, tenderness or deformity  Neurological: He is alert and oriented to person, place, and time  No cranial nerve deficit  Skin: Skin is warm and dry  No rash noted  Psychiatric: He has a normal mood and affect  Judgment and thought content normal    Nursing note and vitals reviewed  Discussion/Summary:    1  CAD - Juan Manuel Pfeiffer had an abnormal stress nuclear study earlier this month showing a reversible defect in the anterior apical wall  He is also having exertional abdominal pain  I recommended a cardiac catheterization  I discussed with him the procedure, including possible outcomes and findings  We will be scheduling him to have this done in the next week  He will remain on the same medical therapy  2   Dyslipidemia -  I did recommend for him to go back on a statin  He was reluctant to do this, and wanted to wait until after the cardiac catheterization  3   HTN -   Blood pressure is under good control  No changes were made today  4   Moderate aortic stenosis -  This was detected by echocardiogram in December    His LV function is normal   We will continue to follow this with periodic echocardiograms  If bypass surgery is needed after his cardiac catheterization, he would likely also have an aortic valve replacement  Counseling / Coordination of Care  Total floor / unit time spent today 40 minutes  Greater than 50% of total time was spent with the patient and / or family counseling and / or coordination of care

## 2018-05-21 NOTE — BRIEF OP NOTE (RAD/CATH)
3V CAD,  of LAD collateralized from conus branch RCA,  Moderate AS    Suggest cardiac surgery eval for CABG/AVR candidacy

## 2018-05-21 NOTE — DISCHARGE INSTRUCTIONS
1  Please see the post cardiac catheterization dishcarge instructions  No heavy lifting, greater than 10 lbs  or strenuous  activity for 48 hrs  2 Remove band aid tomorrow  Shower and wash area- wrist and groin gently with soap and water- beginning tomorrow  Rinse and pat dry  Apply new water seal band aid  Repeat this process for 5 days  No powders, creams lotions or antibiotic ointments  for 5 days  No tub baths, hot tubs or swimming for 5 days  3  Please call our office ([phone]) if you have any fever, redness, swelling, discharge from your wrist and groin access site  4 No driving for 1 day         After Angiography   AMBULATORY CARE:   Seek care immediately if:   · The wound starts bleeding and does not stop after you apply firm pressure for 10 minutes  · The bruise where the catheter went in suddenly gets bigger  · You have numbness or tingling in an arm or leg  · You have any of the following signs of an allergic reaction to the contrast liquid:     ¨ Chest pain or trouble breathing    ¨ Dizziness or fainting    ¨ Swelling of your mouth or face    ¨ Nausea or vomiting    ¨ Sudden decrease in urination    ¨ A rash, itching, or swollen skin  Contact your healthcare provider if:   · You have pain or bleeding where the catheter was inserted  · You have signs of an infection, such as redness and swelling at the injection site  · You have questions or concerns about your condition or care  What to expect after angiography: You may have a bruise or swelling where the catheter went into your skin  The area may be sore or tender  These symptoms are normal and should go away in a few days  Wound care:   · Keep the bandage on the catheter site for 1 day  Then you can remove the bandage  If the wound starts bleeding, apply firm pressure for 10 minutes  Use a gauze or a clean towel to apply pressure       · Check the catheter site for signs of infection,  such as redness, swelling, or pus  · Ask when you can bathe after your procedure  Your healthcare provider may tell you to take a shower instead of a bath if the bandage is still in place  Cover the bandage and keep it dry during the shower  Pat your skin dry  Do not rub over the catheter site to dry your skin  · Ice the area  to reduce swelling, tenderness, and pain  Use an ice pack, or put crushed ice in a plastic bag  Wrap a towel around the ice pack or plastic bag  Apply it to the catheter site for 20 minutes every hour, or as directed  Drink liquids as directed:  Liquids will help flush the contrast liquid out of your body  Ask how much liquid to drink after your procedure, and which liquids to drink  Your healthcare provider may tell you to drink extra liquids for 1 or 2 days after your procedure  Activity:  For the first 12 hours, go slowly and be careful  Rest as needed  Do not climb stairs, drive, bend, or lift heavy objects  These activities may put too much pressure on the catheter site and increase your risk for bleeding  Ask your healthcare provider when you can return to your normal activities  Follow up with your healthcare provider as directed:  Write down your questions so you remember to ask them during your visits  © 2017 [facility] Information is for End User's use only and may not be sold, redistributed or otherwise used for commercial purposes  All illustrations and images included in CareNotes® are the copyrighted property of [facility] , [employer]  or [employer] Mat[employer]  The above information is an  only  It is not intended as medical advice for individual conditions or treatments  Talk to your doctor, nurse or pharmacist before following any medical regimen to see if it is safe and effective for you

## 2018-05-23 DIAGNOSIS — E11.8 TYPE 2 DIABETES MELLITUS WITH COMPLICATION, UNSPECIFIED WHETHER LONG TERM INSULIN USE: ICD-10-CM

## 2018-05-23 DIAGNOSIS — Z79.4 TYPE 2 DIABETES MELLITUS WITHOUT COMPLICATION, WITH LONG-TERM CURRENT USE OF INSULIN (HCC): Primary | ICD-10-CM

## 2018-05-23 DIAGNOSIS — E11.9 TYPE 2 DIABETES MELLITUS WITHOUT COMPLICATION, WITH LONG-TERM CURRENT USE OF INSULIN (HCC): Primary | ICD-10-CM

## 2018-05-23 LAB
BUN SERPL-MCNC: 24 MG/DL (ref 7–25)
BUN/CREAT SERPL: 15 (CALC) (ref 6–22)
CALCIUM SERPL-MCNC: 9.3 MG/DL (ref 8.6–10.3)
CHLORIDE SERPL-SCNC: 103 MMOL/L (ref 98–110)
CO2 SERPL-SCNC: 28 MMOL/L (ref 20–31)
CREAT SERPL-MCNC: 1.56 MG/DL (ref 0.7–1.11)
GLUCOSE SERPL-MCNC: 163 MG/DL (ref 65–99)
POTASSIUM SERPL-SCNC: 4.3 MMOL/L (ref 3.5–5.3)
SL AMB EGFR AFRICAN AMERICAN: 47 ML/MIN/1.73M2
SL AMB EGFR NON AFRICAN AMERICAN: 40 ML/MIN/1.73M2
SODIUM SERPL-SCNC: 139 MMOL/L (ref 135–146)

## 2018-05-23 RX ORDER — INSULIN GLARGINE 100 [IU]/ML
10 INJECTION, SOLUTION SUBCUTANEOUS
Qty: 10 ML | Refills: 0 | Status: SHIPPED | COMMUNITY
Start: 2018-05-23 | End: 2018-05-23 | Stop reason: SDUPTHER

## 2018-05-24 ENCOUNTER — OFFICE VISIT (OUTPATIENT)
Dept: CARDIAC SURGERY | Facility: CLINIC | Age: 83
End: 2018-05-24
Payer: COMMERCIAL

## 2018-05-24 VITALS
HEIGHT: 66 IN | OXYGEN SATURATION: 96 % | WEIGHT: 219 LBS | DIASTOLIC BLOOD PRESSURE: 72 MMHG | RESPIRATION RATE: 14 BRPM | TEMPERATURE: 98.4 F | HEART RATE: 70 BPM | SYSTOLIC BLOOD PRESSURE: 142 MMHG | BODY MASS INDEX: 35.2 KG/M2

## 2018-05-24 DIAGNOSIS — I25.10 CORONARY ARTERY DISEASE INVOLVING NATIVE CORONARY ARTERY OF NATIVE HEART WITHOUT ANGINA PECTORIS: Primary | ICD-10-CM

## 2018-05-24 DIAGNOSIS — I35.0 AORTIC STENOSIS, MODERATE: ICD-10-CM

## 2018-05-24 DIAGNOSIS — E11.9 TYPE 2 DIABETES MELLITUS WITHOUT COMPLICATION, WITH LONG-TERM CURRENT USE OF INSULIN (HCC): ICD-10-CM

## 2018-05-24 DIAGNOSIS — Z79.4 TYPE 2 DIABETES MELLITUS WITHOUT COMPLICATION, WITH LONG-TERM CURRENT USE OF INSULIN (HCC): ICD-10-CM

## 2018-05-24 PROCEDURE — 99205 OFFICE O/P NEW HI 60 MIN: CPT | Performed by: THORACIC SURGERY (CARDIOTHORACIC VASCULAR SURGERY)

## 2018-05-24 RX ORDER — CHLORHEXIDINE GLUCONATE 0.12 MG/ML
15 RINSE ORAL ONCE
Status: CANCELLED | OUTPATIENT
Start: 2018-05-24 | End: 2018-05-24

## 2018-05-24 NOTE — LETTER
May 24, 2018     Kanika Cain MD  [address] [facility]  [address]  [address] [address] [address]    Patient: Darby Howe  YOB: 1935   Date of Visit: 5/24/2018       Dear Dr Mel Sr:    Thank you for referring Santi Pineda to me for evaluation  Below are my notes for this consultation  If you have questions, please do not hesitate to call me  I look forward to following your patient along with you  Sincerely,        Zahira Roy MD        CC: DO Nelli Michelle PA-C  5/24/2018  3:27 PM  Attested  Consultation - Cardiothoracic Surgery   Darby Howe  80 y o  male MRN: [id number]    Physician Requesting Consult: No att  providers found    Reason for Consult / Principal Problem: Coronary artery disease    History of Present Illness: Darby Howe  is an 80y o  year old male without a longstanding cardiovascular past medical history  His current clinical course began when he was evaluated by his primary care physician with complaint exertional abdominal pain  On further questioning he was also found to have exertional dyspnea  His primary care physician ordered a nuclear stress test  This study was positive for ischemic changes  He subsequently underwent cardiac catheterization on May 21st   Severe 3 vessel disease was identified  Additionally, during his workup echocardiogram demonstrated aortic stenosis with trace aortic insufficiency  Following catheterization, patient was referred to cardiac surgery or further workup  During interview today the patient denies substernal angina  He also denies lightheadedness, dizziness, or syncope  Past Medical History:  Past Medical History:   Diagnosis Date    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[address] )     GERD (gastroesophageal reflux disease)     Glaucoma     Mild aortic stenosis     Overweight     Peripheral neuropathy, idiopathic     Prostate cancer (HCC)     Pure hypercholesterolemia     LA   11/12/14   R   11/12/14        Past Surgical History:   Past Surgical History:   Procedure Laterality Date    THYROID SURGERY         Family History:  Family History   Problem Relation Age of Onset    Diabetes Mother     Pancreatic cancer Brother     Diabetes Maternal Grandmother     Colon cancer Son     Diabetes Family        Social History:    History   Alcohol Use No     History   Drug Use No     History   Smoking Status    Never Smoker   Smokeless Tobacco    Never Used       Home Medications:   Prior to Admission medications    Medication Sig Start Date End Date Taking? Authorizing Provider   albuterol (PROVENTIL HFA) 90 mcg/act inhaler Inhale 2 puffs 4 (four) times a day as needed   Yes Historical Provider, MD   allopurinol (ZYLOPRIM) 300 mg tablet Take 1 tablet (300 mg total) by mouth daily 4/6/18  Yes Juliette Cid DO   aspirin (ECOTRIN LOW STRENGTH) 81 mg EC tablet Take 1 tablet by mouth daily   Yes Historical Provider, MD   budesonide-formoterol (SYMBICORT) 160-4 5 mcg/act inhaler Inhale 11/14/17  Yes Historical Provider, MD   diltiazem (CARDIZEM CD) 180 mg 24 hr capsule Take 1 capsule by mouth daily 2/11/15  Yes Historical Provider, MD   gabapentin (NEURONTIN) 300 mg capsule Take 1 capsule by mouth daily   9/24/15  Yes Historical Provider, MD   insulin glargine (LANTUS SOLOSTAR) injection pen 100 units/mL Inject 0 42 mL (42 Units total) under the skin daily at bedtime Disregard refill for 10 units   5/23/18  Yes Deandra Valencia PA-C   levothyroxine 200 mcg tablet Take 1 tablet by mouth daily 7/7/15  Yes Historical Provider, MD   losartan-hydrochlorothiazide (HYZAAR) 100-25 MG per tablet Take 1 tablet by mouth daily 11/8/16  Yes Historical Provider, MD   metFORMIN (GLUCOPHAGE) 500 mg tablet TAKE 2 TABS IN AM AND 2 TABS AT PM  Patient taking differently: TAKE 1 TABS IN AM 3/8/18  Yes Julitete Cid DO   nitroglycerin (NITROSTAT) 0 4 mg SL tablet Place 1 tablet (0 4 mg total) under the tongue every 5 (five) minutes as needed for chest pain 5/21/18  Yes Oracio Narvaez MD   tamsulosin [facility]) 0 4 mg Take 1 capsule (0 4 mg total) by mouth daily with dinner 2/13/18  Yes Yaron Taylor MD   B-D UF III MINI PEN NEEDLES 31G X 5 MM MISC USE ONE DAILY AS DIRECTED 5/7/18   Elmira Escobedo,    Insulin Pen Needle 31G X 5 MM MISC by Does not apply route 2/9/16   Historical Provider, MD   meloxicam (MOBIC) 15 mg tablet Take 1 tablet by mouth 10/3/17   Historical Provider, MD       Allergies: Allergies   Allergen Reactions    Amlodipine Nausea Only    Atorvastatin Myalgia       Review of Systems:  Review of Systems - History obtained from the patient  General ROS: positive for  - fatigue  Psychological ROS: negative  Ophthalmic ROS: positive for - dry eyes and uses glasses  ENT ROS: negative  Allergy and Immunology ROS: negative  Hematological and Lymphatic ROS: negative  Endocrine ROS: positive for - DM  Breast ROS: negative for breast lumps  Respiratory ROS: positive for - shortness of breath  Cardiovascular ROS: positive for - dyspnea on exertion  Gastrointestinal ROS: positive for - abdominal pain  Genito-Urinary ROS: no dysuria, trouble voiding, or hematuria  Musculoskeletal ROS: negative  Neurological ROS: no TIA or stroke symptoms  Dermatological ROS: negative    Vital Signs:     Vitals:    05/24/18 1400 05/24/18 1451   BP: 140/72 142/72   BP Location: Left arm Right arm   Cuff Size: Adult    Pulse: 70    Resp: 14    Temp: 98 4 °F (36 9 °C)    TempSrc: Oral    SpO2: 96%    Weight: 99 3 kg (219 lb)    Height: 5' 6" (1 676 m)        Physical Exam:    HEENT/NECK:  PERRLA  No jugular venous distention  Cardiac:Regular rate and rhythm  3/6 systolic murmur  Pulmonary:  Breath sounds clear bilaterally  Abdomen:  Non-tender, Non-distended  Positive bowel sounds  Lower extremities: Extremities warm/dry  Radial/PT/DP pulses 2+ bilaterally  2+ edema B/L  Neuro: Alert and oriented X 3  Sensation is grossly intact  No focal deficits    Skin: Warm/Dry, without rashes or lesions  Lab Results:       Results from last 7 days  Lab Units 05/21/18  0816   WBC Thousand/uL 7 40   HEMOGLOBIN g/dL 14 7   HEMATOCRIT % 44 7   PLATELETS Thousands/uL 288       Results from last 7 days  Lab Units 05/23/18  1050 05/21/18  0816   SODIUM mmol/L  --  139   POTASSIUM mmol/L  --  3 8   CHLORIDE mmol/L  --  106   CO2 mmol/L  --  27   SL AMB UREA NITOGEN mg/dL 24  --    BUN mg/dL  --  32*   CREATININE mg/dL 1 56* 1 58*   GLUCOSE RANDOM mg/dL  --  156*   SL AMB CALCIUM mg/dL 9 3  --    CALCIUM mg/dL  --  9 0       Results from last 7 days  Lab Units 05/21/18  0816   INR  0 97     Lab Results   Component Value Date    HGBA1C 7 5 (H) 02/15/2018     No results found for: CKTOTAL, CKMB, CKMBINDEX, TROPONINI    Imaging Studies:     Cardiac Catheterization:    Proximal LAD: There was a 100 % stenosis  This lesion is a chronic total occlusion  1st obtuse marginal: There was a diffuse 90 % stenosis  2nd obtuse marginal: There was a diffuse 90 % stenosis  3rd obtuse marginal: There was a 80 % stenosis  1st left posterolateral: There was a 90 % stenosis  Mid RCA: There was a 95 % stenosis  Echocardiogram:     LEFT VENTRICLE:  Systolic function was normal  Ejection fraction was estimated to be 65 %  There were no regional wall motion abnormalities  Wall thickness was mildly increased      LEFT ATRIUM:  The atrium was mildly dilated      MITRAL VALVE:  There was mild annular calcification  There was trace regurgitation      AORTIC VALVE:  The valve was trileaflet  Leaflets exhibited moderate calcification and moderately reduced cuspal separation  There was moderate stenosis  There was trace regurgitation  Valve mean gradient was 22 5 mmHg  Estimated aortic valve area (by VTI) was 1 42 cm squared  Estimated aortic valve area (by Vmax) was 1 49 cm squared      TRICUSPID VALVE:  There was mild regurgitation    Pulmonary artery systolic pressure was at the upper limits of normal     I have personally reviewed pertinent reports  and I have personally reviewed pertinent films in PACS    Assessment:  Patient Active Problem List    Diagnosis Date Noted    CAD (coronary artery disease) 05/15/2018    Abnormal cardiovascular stress test 05/15/2018    Aortic stenosis, moderate 04/06/2018    Benign prostatic hyperplasia with nocturia 02/13/2018    Acute pain of right knee 01/09/2018    Mild intermittent asthma without complication 60/94/2020    Sexual dysfunction 10/09/2017    Renal cyst 01/12/2017    Dyslipidemia, goal LDL below 100 06/28/2016    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    Pure hypercholesterolemia 11/12/2014    Benign essential hypertension 11/05/2014    Hypothyroidism 11/05/2014    Obesity 11/05/2014    Type 2 diabetes mellitus ([facility] [address] ) 11/05/2014     Severe coronary artery disease; Ongoing CABG workup  Severe aortic stenosis; Ongoing AVR workup    Plan:  Risks and benefits of aortic valve replacement and coronary artery bypass grafting were discussed in detail today with the patient  They understand and wish to proceed with further workup and ultimately surgical intervention  We have ordered routine preoperative laboratory and vascular studies  Pending the results of these tests, they will be scheduled for surgery with KRISTA Damon Power  was comfortable with our recommendations, and their questions were answered to their satisfaction  Thank you for allowing us to participate in the care of this patient  SIGNATURE: Helen Aguirre PA-C  DATE: May 24, 2018  TIME: 2:57 PM  Attestation signed by Crystal White MD at 5/24/2018  3:47 PM:  Patient seen and evaluated with Haleigh Ferrara PA-C  I agree with the above assessment and plan with the following additions  The patient is a 24-year-old man with symptomatic severe multivessel coronary artery disease, and also moderate aortic stenosis    I explained the diagnosis to the patient and treatment options, I recommend aortic valve replacement and coronary artery bypass surgery  At this point we are going to order preoperative testing, he needs dental evaluation  He will return to my office in a couple of weeks once the preoperative testing is completed, a that point we will review everything again head if he agrees will schedule an elective operation

## 2018-05-24 NOTE — H&P
History and Physical- Cardiothoracic Surgery   Lai Hemphill  80 y o  male MRN: [id number]    Physician Requesting Consult: No att  providers found    Reason for Consult / Principal Problem: Coronary artery disease    History of Present Illness: Lai Hemphill  is an 80y o  year old male without a longstanding cardiovascular past medical history  His current clinical course began when he was evaluated by his primary care physician with complaint exertional abdominal pain  On further questioning he was also found to have exertional dyspnea  His primary care physician ordered a nuclear stress test  This study was positive for ischemic changes  He subsequently underwent cardiac catheterization on May 21st   Severe 3 vessel disease was identified  Additionally, during his workup echocardiogram demonstrated aortic stenosis with trace aortic insufficiency  Following catheterization, patient was referred to cardiac surgery or further workup  During interview today the patient denies substernal angina  He also denies lightheadedness, dizziness, or syncope  Past Medical History:  Past Medical History:   Diagnosis Date    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[facility] [address] )     GERD (gastroesophageal reflux disease)     Glaucoma     Mild aortic stenosis     Overweight     Peripheral neuropathy, idiopathic     Prostate cancer (HCC)     Pure hypercholesterolemia     LA   11/12/14   R   11/12/14        Past Surgical History:   Past Surgical History:   Procedure Laterality Date    THYROID SURGERY         Family History:  Family History   Problem Relation Age of Onset    Diabetes Mother     Pancreatic cancer Brother     Diabetes Maternal Grandmother     Colon cancer Son     Diabetes Family        Social History:    History   Alcohol Use No     History   Drug Use No     History   Smoking Status    Never Smoker   Smokeless Tobacco    Never Used       Home Medications:   Prior to Admission medications    Medication Sig Start Date End Date Taking? Authorizing Provider   albuterol (PROVENTIL HFA) 90 mcg/act inhaler Inhale 2 puffs 4 (four) times a day as needed   Yes Historical Provider, MD   allopurinol (ZYLOPRIM) 300 mg tablet Take 1 tablet (300 mg total) by mouth daily 4/6/18  Yes Juliette Cid, DO   aspirin (ECOTRIN LOW STRENGTH) 81 mg EC tablet Take 1 tablet by mouth daily   Yes Historical Provider, MD   budesonide-formoterol (SYMBICORT) 160-4 5 mcg/act inhaler Inhale 11/14/17  Yes Historical Provider, MD   diltiazem (CARDIZEM CD) 180 mg 24 hr capsule Take 1 capsule by mouth daily 2/11/15  Yes Historical Provider, MD   gabapentin (NEURONTIN) 300 mg capsule Take 1 capsule by mouth daily   9/24/15  Yes Historical Provider, MD   insulin glargine (LANTUS SOLOSTAR) injection pen 100 units/mL Inject 0 42 mL (42 Units total) under the skin daily at bedtime Disregard refill for 10 units  5/23/18  Yes Marzena Almaraz PA-C   levothyroxine 200 mcg tablet Take 1 tablet by mouth daily 7/7/15  Yes Historical Provider, MD   losartan-hydrochlorothiazide (HYZAAR) 100-25 MG per tablet Take 1 tablet by mouth daily 11/8/16  Yes Historical Provider, MD   metFORMIN (GLUCOPHAGE) 500 mg tablet TAKE 2 TABS IN AM AND 2 TABS AT PM  Patient taking differently: TAKE 1 TABS IN AM 3/8/18  Yes Juliette Cid, DO   nitroglycerin (NITROSTAT) 0 4 mg SL tablet Place 1 tablet (0 4 mg total) under the tongue every 5 (five) minutes as needed for chest pain 5/21/18  Yes Caprice Leavitt MD   tamsulosin (FLOMAX) 0 4 mg Take 1 capsule (0 4 mg total) by mouth daily with dinner 2/13/18  Yes Werner Reyna MD   B-D UF III MINI PEN NEEDLES 31G X 5 MM MISC USE ONE DAILY AS DIRECTED 5/7/18   Laila Maya DO   Insulin Pen Needle 31G X 5 MM MISC by Does not apply route 2/9/16   Historical Provider, MD   meloxicam (MOBIC) 15 mg tablet Take 1 tablet by mouth 10/3/17   Historical Provider, MD       Allergies:   Allergies   Allergen Reactions    Amlodipine Nausea Only    Atorvastatin Myalgia Review of Systems:  Review of Systems - History obtained from the patient  General ROS: positive for  - fatigue  Psychological ROS: negative  Ophthalmic ROS: positive for - dry eyes and uses glasses  ENT ROS: negative  Allergy and Immunology ROS: negative  Hematological and Lymphatic ROS: negative  Endocrine ROS: positive for - DM  Breast ROS: negative for breast lumps  Respiratory ROS: positive for - shortness of breath  Cardiovascular ROS: positive for - dyspnea on exertion  Gastrointestinal ROS: positive for - abdominal pain  Genito-Urinary ROS: no dysuria, trouble voiding, or hematuria  Musculoskeletal ROS: negative  Neurological ROS: no TIA or stroke symptoms  Dermatological ROS: negative    Vital Signs:     Vitals:    05/24/18 1400 05/24/18 1451   BP: 140/72 142/72   BP Location: Left arm Right arm   Cuff Size: Adult    Pulse: 70    Resp: 14    Temp: 98 4 °F (36 9 °C)    TempSrc: Oral    SpO2: 96%    Weight: 99 3 kg (219 lb)    Height: 5' 6" (1 676 m)        Physical Exam:    HEENT/NECK:  PERRLA  No jugular venous distention  Cardiac:Regular rate and rhythm  3/6 systolic murmur  Pulmonary:  Breath sounds clear bilaterally  Abdomen:  Non-tender, Non-distended  Positive bowel sounds  Lower extremities: Extremities warm/dry  Radial/PT/DP pulses 2+ bilaterally  2+ edema B/L  Neuro: Alert and oriented X 3  Sensation is grossly intact  No focal deficits  Skin: Warm/Dry, without rashes or lesions      Lab Results:       Results from last 7 days  Lab Units 05/21/18  0816   WBC Thousand/uL 7 40   HEMOGLOBIN g/dL 14 7   HEMATOCRIT % 44 7   PLATELETS Thousands/uL 288       Results from last 7 days  Lab Units 05/23/18  1050 05/21/18  0816   SODIUM mmol/L  --  139   POTASSIUM mmol/L  --  3 8   CHLORIDE mmol/L  --  106   CO2 mmol/L  --  27   SL AMB UREA NITOGEN mg/dL 24  --    BUN mg/dL  --  32*   CREATININE mg/dL 1 56* 1 58*   GLUCOSE RANDOM mg/dL  --  156*   SL AMB CALCIUM mg/dL 9 3  --    CALCIUM mg/dL --  9 0       Results from last 7 days  Lab Units 05/21/18  0816   INR  0 97     Lab Results   Component Value Date    HGBA1C 7 5 (H) 02/15/2018     No results found for: CKTOTAL, CKMB, CKMBINDEX, TROPONINI    Imaging Studies:     Cardiac Catheterization:    Proximal LAD: There was a 100 % stenosis  This lesion is a chronic total occlusion  1st obtuse marginal: There was a diffuse 90 % stenosis  2nd obtuse marginal: There was a diffuse 90 % stenosis  3rd obtuse marginal: There was a 80 % stenosis  1st left posterolateral: There was a 90 % stenosis  Mid RCA: There was a 95 % stenosis  Echocardiogram:     LEFT VENTRICLE:  Systolic function was normal  Ejection fraction was estimated to be 65 %  There were no regional wall motion abnormalities  Wall thickness was mildly increased      LEFT ATRIUM:  The atrium was mildly dilated      MITRAL VALVE:  There was mild annular calcification  There was trace regurgitation      AORTIC VALVE:  The valve was trileaflet  Leaflets exhibited moderate calcification and moderately reduced cuspal separation  There was moderate stenosis  There was trace regurgitation  Valve mean gradient was 22 5 mmHg  Estimated aortic valve area (by VTI) was 1 42 cm squared  Estimated aortic valve area (by Vmax) was 1 49 cm squared      TRICUSPID VALVE:  There was mild regurgitation  Pulmonary artery systolic pressure was at the upper limits of normal     I have personally reviewed pertinent reports     and I have personally reviewed pertinent films in PACS    Assessment:  Patient Active Problem List    Diagnosis Date Noted    CAD (coronary artery disease) 05/15/2018    Abnormal cardiovascular stress test 05/15/2018    Aortic stenosis, moderate 04/06/2018    Benign prostatic hyperplasia with nocturia 02/13/2018    Acute pain of right knee 01/09/2018    Mild intermittent asthma without complication 82/94/7003    Sexual dysfunction 10/09/2017    Renal cyst 01/12/2017    Dyslipidemia, goal LDL below 100 06/28/2016    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    Pure hypercholesterolemia 11/12/2014    Benign essential hypertension 11/05/2014    Hypothyroidism 11/05/2014    Obesity 11/05/2014    Type 2 diabetes mellitus ([facility] [address] ) 11/05/2014     Severe coronary artery disease; Ongoing CABG workup  Severe aortic stenosis; Ongoing AVR workup    Plan:  Risks and benefits of aortic valve replacement and coronary artery bypass grafting were discussed in detail today with the patient  They understand and wish to proceed with further workup and ultimately surgical intervention  We have ordered routine preoperative laboratory and vascular studies  Pending the results of these tests, they will be scheduled for surgery with KRISTA Muñiz  was comfortable with our recommendations, and their questions were answered to their satisfaction  Thank you for allowing us to participate in the care of this patient       SIGNATURE: Connie Rosario PA-C  DATE: May 24, 2018  TIME: 2:57 PM

## 2018-05-24 NOTE — PROGRESS NOTES
Consultation - Cardiothoracic Surgery   Josh Espinosa  80 y o  male MRN: [id number]    Physician Requesting Consult: No att  providers found    Reason for Consult / Principal Problem: Coronary artery disease    History of Present Illness: Josh Espinosa  is an 80y o  year old male without a longstanding cardiovascular past medical history  His current clinical course began when he was evaluated by his primary care physician with complaint exertional abdominal pain  On further questioning he was also found to have exertional dyspnea  His primary care physician ordered a nuclear stress test  This study was positive for ischemic changes  He subsequently underwent cardiac catheterization on May 21st   Severe 3 vessel disease was identified  Additionally, during his workup echocardiogram demonstrated aortic stenosis with trace aortic insufficiency  Following catheterization, patient was referred to cardiac surgery or further workup  During interview today the patient denies substernal angina  He also denies lightheadedness, dizziness, or syncope  Past Medical History:  Past Medical History:   Diagnosis Date    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[facility] [address] )     GERD (gastroesophageal reflux disease)     Glaucoma     Mild aortic stenosis     Overweight     Peripheral neuropathy, idiopathic     Prostate cancer (HCC)     Pure hypercholesterolemia     LA   11/12/14   R   11/12/14        Past Surgical History:   Past Surgical History:   Procedure Laterality Date    THYROID SURGERY         Family History:  Family History   Problem Relation Age of Onset    Diabetes Mother     Pancreatic cancer Brother     Diabetes Maternal Grandmother     Colon cancer Son     Diabetes Family        Social History:    History   Alcohol Use No     History   Drug Use No     History   Smoking Status    Never Smoker   Smokeless Tobacco    Never Used       Home Medications:   Prior to Admission medications    Medication Sig Start Date End Date Taking? Authorizing Provider   albuterol (PROVENTIL HFA) 90 mcg/act inhaler Inhale 2 puffs 4 (four) times a day as needed   Yes Historical Provider, MD   allopurinol (ZYLOPRIM) 300 mg tablet Take 1 tablet (300 mg total) by mouth daily 4/6/18  Yes Juliette Cid, DO   aspirin (ECOTRIN LOW STRENGTH) 81 mg EC tablet Take 1 tablet by mouth daily   Yes Historical Provider, MD   budesonide-formoterol (SYMBICORT) 160-4 5 mcg/act inhaler Inhale 11/14/17  Yes Historical Provider, MD   diltiazem (CARDIZEM CD) 180 mg 24 hr capsule Take 1 capsule by mouth daily 2/11/15  Yes Historical Provider, MD   gabapentin (NEURONTIN) 300 mg capsule Take 1 capsule by mouth daily   9/24/15  Yes Historical Provider, MD   insulin glargine (LANTUS SOLOSTAR) injection pen 100 units/mL Inject 0 42 mL (42 Units total) under the skin daily at bedtime Disregard refill for 10 units  5/23/18  Yes Jessica Goff PA-C   levothyroxine 200 mcg tablet Take 1 tablet by mouth daily 7/7/15  Yes Historical Provider, MD   losartan-hydrochlorothiazide (HYZAAR) 100-25 MG per tablet Take 1 tablet by mouth daily 11/8/16  Yes Historical Provider, MD   metFORMIN (GLUCOPHAGE) 500 mg tablet TAKE 2 TABS IN AM AND 2 TABS AT PM  Patient taking differently: TAKE 1 TABS IN AM 3/8/18  Yes Juliette Cid, DO   nitroglycerin (NITROSTAT) 0 4 mg SL tablet Place 1 tablet (0 4 mg total) under the tongue every 5 (five) minutes as needed for chest pain 5/21/18  Yes Miguel Nieto MD   tamsulosin (FLOMAX) 0 4 mg Take 1 capsule (0 4 mg total) by mouth daily with dinner 2/13/18  Yes Fany Hightower MD   B-D UF III MINI PEN NEEDLES 31G X 5 MM MISC USE ONE DAILY AS DIRECTED 5/7/18   Damaris Thompson, DO   Insulin Pen Needle 31G X 5 MM MISC by Does not apply route 2/9/16   Historical Provider, MD   meloxicam (MOBIC) 15 mg tablet Take 1 tablet by mouth 10/3/17   Historical Provider, MD       Allergies:   Allergies   Allergen Reactions    Amlodipine Nausea Only    Atorvastatin Myalgia       Review of Systems:  Review of Systems - History obtained from the patient  General ROS: positive for  - fatigue  Psychological ROS: negative  Ophthalmic ROS: positive for - dry eyes and uses glasses  ENT ROS: negative  Allergy and Immunology ROS: negative  Hematological and Lymphatic ROS: negative  Endocrine ROS: positive for - DM  Breast ROS: negative for breast lumps  Respiratory ROS: positive for - shortness of breath  Cardiovascular ROS: positive for - dyspnea on exertion  Gastrointestinal ROS: positive for - abdominal pain  Genito-Urinary ROS: no dysuria, trouble voiding, or hematuria  Musculoskeletal ROS: negative  Neurological ROS: no TIA or stroke symptoms  Dermatological ROS: negative    Vital Signs:     Vitals:    05/24/18 1400 05/24/18 1451   BP: 140/72 142/72   BP Location: Left arm Right arm   Cuff Size: Adult    Pulse: 70    Resp: 14    Temp: 98 4 °F (36 9 °C)    TempSrc: Oral    SpO2: 96%    Weight: 99 3 kg (219 lb)    Height: 5' 6" (1 676 m)        Physical Exam:    HEENT/NECK:  PERRLA  No jugular venous distention  Cardiac:Regular rate and rhythm  3/6 systolic murmur  Pulmonary:  Breath sounds clear bilaterally  Abdomen:  Non-tender, Non-distended  Positive bowel sounds  Lower extremities: Extremities warm/dry  Radial/PT/DP pulses 2+ bilaterally  2+ edema B/L  Neuro: Alert and oriented X 3  Sensation is grossly intact  No focal deficits  Skin: Warm/Dry, without rashes or lesions      Lab Results:       Results from last 7 days  Lab Units 05/21/18  0816   WBC Thousand/uL 7 40   HEMOGLOBIN g/dL 14 7   HEMATOCRIT % 44 7   PLATELETS Thousands/uL 288       Results from last 7 days  Lab Units 05/23/18  1050 05/21/18  0816   SODIUM mmol/L  --  139   POTASSIUM mmol/L  --  3 8   CHLORIDE mmol/L  --  106   CO2 mmol/L  --  27   SL AMB UREA NITOGEN mg/dL 24  --    BUN mg/dL  --  32*   CREATININE mg/dL 1 56* 1 58*   GLUCOSE RANDOM mg/dL  --  156*   SL AMB CALCIUM mg/dL 9 3  --    CALCIUM mg/dL  --  9 0 Results from last 7 days  Lab Units 05/21/18  0816   INR  0 97     Lab Results   Component Value Date    HGBA1C 7 5 (H) 02/15/2018     No results found for: CKTOTAL, CKMB, CKMBINDEX, TROPONINI    Imaging Studies:     Cardiac Catheterization:    Proximal LAD: There was a 100 % stenosis  This lesion is a chronic total occlusion  1st obtuse marginal: There was a diffuse 90 % stenosis  2nd obtuse marginal: There was a diffuse 90 % stenosis  3rd obtuse marginal: There was a 80 % stenosis  1st left posterolateral: There was a 90 % stenosis  Mid RCA: There was a 95 % stenosis  Echocardiogram:     LEFT VENTRICLE:  Systolic function was normal  Ejection fraction was estimated to be 65 %  There were no regional wall motion abnormalities  Wall thickness was mildly increased      LEFT ATRIUM:  The atrium was mildly dilated      MITRAL VALVE:  There was mild annular calcification  There was trace regurgitation      AORTIC VALVE:  The valve was trileaflet  Leaflets exhibited moderate calcification and moderately reduced cuspal separation  There was moderate stenosis  There was trace regurgitation  Valve mean gradient was 22 5 mmHg  Estimated aortic valve area (by VTI) was 1 42 cm squared  Estimated aortic valve area (by Vmax) was 1 49 cm squared      TRICUSPID VALVE:  There was mild regurgitation  Pulmonary artery systolic pressure was at the upper limits of normal     I have personally reviewed pertinent reports     and I have personally reviewed pertinent films in PACS    Assessment:  Patient Active Problem List    Diagnosis Date Noted    CAD (coronary artery disease) 05/15/2018    Abnormal cardiovascular stress test 05/15/2018    Aortic stenosis, moderate 04/06/2018    Benign prostatic hyperplasia with nocturia 02/13/2018    Acute pain of right knee 01/09/2018    Mild intermittent asthma without complication 26/52/6381    Sexual dysfunction 10/09/2017    Renal cyst 01/12/2017    Dyslipidemia, goal LDL below 100 06/28/2016    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    Pure hypercholesterolemia 11/12/2014    Benign essential hypertension 11/05/2014    Hypothyroidism 11/05/2014    Obesity 11/05/2014    Type 2 diabetes mellitus ([facility] [address] ) 11/05/2014     Severe coronary artery disease; Ongoing CABG workup  Severe aortic stenosis; Ongoing AVR workup    Plan:  Risks and benefits of aortic valve replacement and coronary artery bypass grafting were discussed in detail today with the patient  They understand and wish to proceed with further workup and ultimately surgical intervention  We have ordered routine preoperative laboratory and vascular studies  Pending the results of these tests, they will be scheduled for surgery with [facility]  was comfortable with our recommendations, and their questions were answered to their satisfaction  Thank you for allowing us to participate in the care of this patient       SIGNATURE: Caesar Wright PA-C  DATE: May 24, 2018  TIME: 2:57 PM

## 2018-05-24 NOTE — PATIENT INSTRUCTIONS
Preoperative instructions:  1  You will receive a phone call from the hospital between 2:00 PM and 8:00 PM the day prior to surgery to confirm arrival time and location  For surgery on Mondays, you will receive a call on Friday  2  Do not drink or eat anything after midnight the night before surgery  That includes no water, candy, gum, lozenges, Lifesavers, etc  We recommend you not eat any "junk" food, consume alcohol or smoke the night before surgery  3  Continue taking aspirin but only 81 mg daily  4  If you take Coumadin and/or Plavix, discontinue it 5 days before surgery  5  If you are diabetic, do not take any of your diabetic pills the morning of surgery  If you take Lantus insulin, you may take it at your regularly scheduled time the day before surgery  Do not take any other insulins the morning of surgery  6  The 2 nights before surgery, take a shower each night using the special antiseptic soap or soap sponges you received from the office or \[facility] [place] [facility]\"" Hatter your hair with regular shampoo and rinse completely before using the antiseptic sponges  Use the sponge to wash from your neck down, with special attention to the armpits and groin area  Do not use any other soap or cleanser on your skin  Do not use lotions, powder, deodorant or perfume of any kind on your skin after you shower  Use clean bed linens and wear clean pajamas after your shower  7  You will be prescribed Mupirocin nasal ointment  Apply to both nostrils twice a day for 5 days prior to surgery  8  Do not take a shower the morning of her surgery; you'll be given a special" bath" at the hospital   9  Notify the CT surgery office if you develop a cold, sore throat, cough, fever or other health issues before your surgery  10  Other medication changes included the following:  Metformin and Hyzaar

## 2018-05-31 DIAGNOSIS — M1A.9XX1 CHRONIC GOUT WITH TOPHUS, UNSPECIFIED CAUSE, UNSPECIFIED SITE: ICD-10-CM

## 2018-05-31 RX ORDER — ALLOPURINOL 300 MG/1
TABLET ORAL
Qty: 90 TABLET | Refills: 3 | Status: SHIPPED | OUTPATIENT
Start: 2018-05-31 | End: 2019-03-27 | Stop reason: SDUPTHER

## 2018-06-01 ENCOUNTER — HOSPITAL ENCOUNTER (OUTPATIENT)
Dept: NON INVASIVE DIAGNOSTICS | Facility: HOSPITAL | Age: 83
Discharge: HOME | End: 2018-06-01
Payer: COMMERCIAL

## 2018-06-01 ENCOUNTER — HOSPITAL ENCOUNTER (OUTPATIENT)
Dept: CT IMAGING | Facility: HOSPITAL | Age: 83
Discharge: HOME | End: 2018-06-01
Payer: COMMERCIAL

## 2018-06-01 DIAGNOSIS — Z79.4 TYPE 2 DIABETES MELLITUS WITHOUT COMPLICATION, WITH LONG-TERM CURRENT USE OF INSULIN (HCC): Primary | ICD-10-CM

## 2018-06-01 DIAGNOSIS — I25.10 CORONARY ARTERY DISEASE INVOLVING NATIVE CORONARY ARTERY OF NATIVE HEART WITHOUT ANGINA PECTORIS: ICD-10-CM

## 2018-06-01 DIAGNOSIS — E11.9 TYPE 2 DIABETES MELLITUS WITHOUT COMPLICATION, WITH LONG-TERM CURRENT USE OF INSULIN (HCC): Primary | ICD-10-CM

## 2018-06-01 PROCEDURE — 93971 EXTREMITY STUDY: CPT

## 2018-06-01 PROCEDURE — 93971 EXTREMITY STUDY: CPT | Performed by: INTERNAL MEDICINE

## 2018-06-01 PROCEDURE — 93880 EXTRACRANIAL BILAT STUDY: CPT | Performed by: INTERNAL MEDICINE

## 2018-06-01 PROCEDURE — 71250 CT THORAX DX C-: CPT

## 2018-06-01 PROCEDURE — 93880 EXTRACRANIAL BILAT STUDY: CPT

## 2018-06-04 RX ORDER — INSULIN GLARGINE 100 [IU]/ML
10 INJECTION, SOLUTION SUBCUTANEOUS
Qty: 10 ML | Refills: 0 | Status: SHIPPED | COMMUNITY
Start: 2018-06-04 | End: 2018-08-10 | Stop reason: ALTCHOICE

## 2018-06-08 DIAGNOSIS — E11.9 TYPE 2 DIABETES MELLITUS WITHOUT COMPLICATION, WITHOUT LONG-TERM CURRENT USE OF INSULIN (HCC): ICD-10-CM

## 2018-08-10 ENCOUNTER — OFFICE VISIT (OUTPATIENT)
Dept: FAMILY MEDICINE CLINIC | Facility: HOSPITAL | Age: 83
End: 2018-08-10
Payer: COMMERCIAL

## 2018-08-10 VITALS
HEART RATE: 68 BPM | HEIGHT: 66 IN | OXYGEN SATURATION: 95 % | SYSTOLIC BLOOD PRESSURE: 110 MMHG | BODY MASS INDEX: 34.79 KG/M2 | DIASTOLIC BLOOD PRESSURE: 64 MMHG | WEIGHT: 216.5 LBS

## 2018-08-10 DIAGNOSIS — R94.39 ABNORMAL CARDIOVASCULAR STRESS TEST: ICD-10-CM

## 2018-08-10 DIAGNOSIS — I10 BENIGN ESSENTIAL HYPERTENSION: ICD-10-CM

## 2018-08-10 DIAGNOSIS — J45.20 MILD INTERMITTENT ASTHMA WITHOUT COMPLICATION: ICD-10-CM

## 2018-08-10 DIAGNOSIS — Z79.4 TYPE 2 DIABETES MELLITUS WITHOUT COMPLICATION, WITH LONG-TERM CURRENT USE OF INSULIN (HCC): ICD-10-CM

## 2018-08-10 DIAGNOSIS — I35.0 AORTIC STENOSIS, MODERATE: Primary | ICD-10-CM

## 2018-08-10 DIAGNOSIS — I25.10 CORONARY ARTERY DISEASE INVOLVING NATIVE CORONARY ARTERY OF NATIVE HEART WITHOUT ANGINA PECTORIS: ICD-10-CM

## 2018-08-10 DIAGNOSIS — E11.9 TYPE 2 DIABETES MELLITUS WITHOUT COMPLICATION, WITH LONG-TERM CURRENT USE OF INSULIN (HCC): ICD-10-CM

## 2018-08-10 DIAGNOSIS — E78.00 PURE HYPERCHOLESTEROLEMIA: ICD-10-CM

## 2018-08-10 DIAGNOSIS — E03.9 ACQUIRED HYPOTHYROIDISM: ICD-10-CM

## 2018-08-10 PROCEDURE — 3725F SCREEN DEPRESSION PERFORMED: CPT | Performed by: INTERNAL MEDICINE

## 2018-08-10 PROCEDURE — 3074F SYST BP LT 130 MM HG: CPT | Performed by: INTERNAL MEDICINE

## 2018-08-10 PROCEDURE — 1101F PT FALLS ASSESS-DOCD LE1/YR: CPT | Performed by: INTERNAL MEDICINE

## 2018-08-10 PROCEDURE — 3078F DIAST BP <80 MM HG: CPT | Performed by: INTERNAL MEDICINE

## 2018-08-10 PROCEDURE — 99213 OFFICE O/P EST LOW 20 MIN: CPT | Performed by: INTERNAL MEDICINE

## 2018-08-10 RX ORDER — INSULIN GLARGINE 100 [IU]/ML
42 INJECTION, SOLUTION SUBCUTANEOUS
Qty: 10 ML | Refills: 0 | Status: SHIPPED | OUTPATIENT
Start: 2018-08-10 | End: 2018-09-04

## 2018-08-10 NOTE — PROGRESS NOTES
Assessment/Plan:             Problem List Items Addressed This Visit        Respiratory    Mild intermittent asthma without complication       Cardiovascular and Mediastinum    Aortic stenosis, moderate - Primary    CAD (coronary artery disease)            Subjective:      Patient ID: Fer Hamm  is a 80 y o  male    1  Cad- no angina recently but had severe  Blockages on 3 vessels- has tiredness and feels winded- will have cabg and also avr    2  Dental extractions- had all top teeth extracted - has several on bottom removed- will get plate in future- eating soft foods now until healed        The following portions of the patient's history were reviewed and updated as appropriate: allergies, current medications and problem list      Review of Systems   Constitutional: Negative for chills and fever  HENT: Negative for congestion  Respiratory: Negative for cough  Cardiovascular: Negative for chest pain and palpitations  Gastrointestinal: Negative for abdominal distention  All other systems reviewed and are negative  Objective:      Current Outpatient Prescriptions:     albuterol (PROVENTIL HFA) 90 mcg/act inhaler, Inhale 2 puffs 4 (four) times a day as needed, Disp: , Rfl:     allopurinol (ZYLOPRIM) 300 mg tablet, TAKE ONE TABLET BY MOUTH ONCE DAILY, Disp: 90 tablet, Rfl: 3    aspirin (ECOTRIN LOW STRENGTH) 81 mg EC tablet, Take 1 tablet by mouth daily, Disp: , Rfl:     B-D UF III MINI PEN NEEDLES 31G X 5 MM MISC, USE ONE DAILY AS DIRECTED, Disp: 100 each, Rfl: 5    budesonide-formoterol (SYMBICORT) 160-4 5 mcg/act inhaler, Inhale, Disp: , Rfl:     diltiazem (CARDIZEM CD) 180 mg 24 hr capsule, Take 1 capsule by mouth daily, Disp: , Rfl:     gabapentin (NEURONTIN) 300 mg capsule, Take 1 capsule by mouth daily  , Disp: , Rfl:     insulin glargine (LANTUS SOLOSTAR) injection pen 100 units/mL, Inject 0 42 mL (42 Units total) under the skin daily at bedtime Disregard refill for 10 units  , Disp: 2 pen, Rfl: 0    insulin glargine (LANTUS) 100 units/mL subcutaneous injection, Inject 10 Units under the skin daily at bedtime, Disp: 10 mL, Rfl: 0    Insulin Pen Needle 31G X 5 MM MISC, by Does not apply route, Disp: , Rfl:     levothyroxine 200 mcg tablet, Take 1 tablet by mouth daily, Disp: , Rfl:     losartan-hydrochlorothiazide (HYZAAR) 100-25 MG per tablet, Take 1 tablet by mouth daily, Disp: , Rfl:     meloxicam (MOBIC) 15 mg tablet, Take 1 tablet by mouth, Disp: , Rfl:     metFORMIN (GLUCOPHAGE) 500 mg tablet, TAKE 2 TABS IN AM AND 2 TABS AT PM, Disp: 360 tablet, Rfl: 0    nitroglycerin (NITROSTAT) 0 4 mg SL tablet, Place 1 tablet (0 4 mg total) under the tongue every 5 (five) minutes as needed for chest pain, Disp: 90 tablet, Rfl: 0    tamsulosin (FLOMAX) 0 4 mg, Take 1 capsule (0 4 mg total) by mouth daily with dinner, Disp: 90 capsule, Rfl: 3    mupirocin (BACTROBAN) 2 % ointment, Apply 1 application topically 2 (two) times a day for 5 days Apply to each nostril twice daily for five days before your operation  , Disp: 22 g, Rfl: 0    Blood pressure 110/64, pulse 68, height 5' 6" (1 676 m), weight 98 2 kg (216 lb 8 oz), SpO2 95 %       Physical Exam

## 2018-08-10 NOTE — ASSESSMENT & PLAN NOTE
Lab Results   Component Value Date    HGBA1C 7 5 (H) 02/15/2018       No results for input(s): POCGLU in the last 72 hours      Blood Sugar Average: Last 72 hrs:  is now on basiglar- for cost issues with galrgine- readings are fairly tight control

## 2018-08-17 ENCOUNTER — DOCUMENTATION (OUTPATIENT)
Dept: FAMILY MEDICINE CLINIC | Facility: HOSPITAL | Age: 83
End: 2018-08-17

## 2018-08-23 ENCOUNTER — OFFICE VISIT (OUTPATIENT)
Dept: CARDIAC SURGERY | Facility: CLINIC | Age: 83
End: 2018-08-23
Payer: COMMERCIAL

## 2018-08-23 VITALS
DIASTOLIC BLOOD PRESSURE: 84 MMHG | WEIGHT: 221 LBS | BODY MASS INDEX: 35.52 KG/M2 | OXYGEN SATURATION: 99 % | HEIGHT: 66 IN | RESPIRATION RATE: 14 BRPM | SYSTOLIC BLOOD PRESSURE: 136 MMHG | HEART RATE: 72 BPM | TEMPERATURE: 97.6 F

## 2018-08-23 DIAGNOSIS — I35.0 AORTIC STENOSIS, MODERATE: ICD-10-CM

## 2018-08-23 DIAGNOSIS — I25.118 CORONARY ARTERY DISEASE INVOLVING NATIVE CORONARY ARTERY OF NATIVE HEART WITH OTHER FORM OF ANGINA PECTORIS (HCC): Primary | ICD-10-CM

## 2018-08-23 PROBLEM — I25.10 DISEASE OF CARDIOVASCULAR SYSTEM: Status: ACTIVE | Noted: 2018-08-23

## 2018-08-23 PROCEDURE — 99215 OFFICE O/P EST HI 40 MIN: CPT | Performed by: NURSE PRACTITIONER

## 2018-08-23 NOTE — PROGRESS NOTES
History & Physical - Cardiovascular Surgery   Maldonado Ferreira  80 y o  male MRN: [id number]    Physician Requesting Consult: Dr Luna Rodriguez    Reason for Consult / Principal Problem: Aortic stenosis, Non-Rheumatic & Coronary Artery Disease    History of Present Illness: Maldonado Ferreira  is a 80y o  year old male who returns to our office today for follow up after pre op testing completed to discuss surgery  His current clinical course began when he was evaluated by his primary care physician with complaints of exertional abdominal pain  On further questioning he was also found to have exertional dyspnea  His primary care physician ordered a nuclear stress test  This study was positive for ischemic changes  He subsequently underwent cardiac catheterization on May 21st   Severe 3 vessel disease was identified  Additionally, during his workup echocardiogram demonstrated aortic stenosis with trace aortic insufficiency  Following catheterization, he was referred to our practice and seen for initial surgical consultation       During interview today Hussein Tapia reports Kirk Shorten if he "walks to much " He reports a decrease in activity tolerance and admits to slowing his pace to avoid the onset of symptoms  He also reports an increase in abdominal girth  He experiences chronic LE edema, R>L  He denies chest pain, lightheadedness, dizziness, syncope, PND or orthopnea  He is a non-smoker and denies alcohol consumption  He has recently been seen and cleared by his dentist         Past Medical History:  Past Medical History:   Diagnosis Date    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[facility] [facility]     GERD (gastroesophageal reflux disease)     Glaucoma     Mild aortic stenosis     Overweight     Peripheral neuropathy, idiopathic     Pro[facility] cancer ([facility]     Pure hypercholesterolemia     LA   11/12/14   R   11/12/14          Past Surgical History:   Past Surgical History:   Procedure Laterality Date    THYROID SURGERY           Family History:  Family History   Problem Relation Age of Onset    Diabetes Mother     Pancreatic cancer Brother     Diabetes Maternal Grandmother     Colon cancer Son     Diabetes Family          Social History:    History   Alcohol Use No     History   Drug Use No     History   Smoking Status    Never Smoker   Smokeless Tobacco    Never Used     Comment: Smoked in the service about 50 years ago       Home Medications:   Prior to Admission medications    Medication Sig Start Date End Date Taking?  Authorizing Provider   albuterol (PROVENTIL HFA) 90 mcg/act inhaler Inhale 2 puffs 4 (four) times a day as needed   Yes Historical Provider, MD   allopurinol (ZYLOPRIM) 300 mg tablet TAKE ONE TABLET BY MOUTH ONCE DAILY 5/31/18  Yes Pearl Mcdonald PA-C   aspirin (ECOTRIN LOW STRENGTH) 81 mg EC tablet Take 1 tablet by mouth daily   Yes Historical Provider, MD CANTU UF III MINI PEN NEEDLES 31G X 5 MM MISC USE ONE DAILY AS DIRECTED 5/7/18  Yes Sandra Wells,    budesonide-formoterol (SYMBICORT) 160-4 5 mcg/act inhaler Inhale 11/14/17  Yes Historical Provider, MD   diltiazem (CARDIZEM CD) 180 mg 24 hr capsule Take 1 capsule by mouth daily 2/11/15  Yes Historical Provider, MD   gabapentin (NEURONTIN) 300 mg capsule Take 1 capsule by mouth daily   9/24/15  Yes Historical Provider, MD   insulin glargine (LANTUS) 100 units/mL subcutaneous injection Inject 42 Units under the skin daily at bedtime 8/10/18  Yes Juliette Cid DO   Insulin Pen Needle 31G X 5 MM MISC by Does not apply route 2/9/16  Yes Historical Provider, MD   levothyroxine 200 mcg tablet Take 1 tablet by mouth daily 7/7/15  Yes Historical Provider, MD   losartan-hydrochlorothiazide (HYZAAR) 100-25 MG per tablet Take 1 tablet by mouth daily 11/8/16  Yes Historical Provider, MD   metFORMIN (GLUCOPHAGE) 500 mg tablet TAKE 2 TABS IN AM AND 2 TABS AT PM  Patient taking differently: TAKE 1 TABLET ONCE DAILY 6/8/18  Yes Juliette Cid,    nitroglycerin (NITROSTAT) 0 4 mg SL tablet Place 1 tablet (0 4 mg total) under the tongue every 5 (five) minutes as needed for chest pain 5/21/18  Yes Orlando Daugherty MD   tamsulosin [facility]) 0 4 mg Take 1 capsule (0 4 mg total) by mouth daily with dinner 2/13/18  Yes Ani Jenkins MD   meloxicam (MOBIC) 15 mg tablet Take 1 tablet by mouth 10/3/17   Historical Provider, MD   mupirocin (BACTROBAN) 2 % ointment Apply 1 application topically 2 (two) times a day for 5 days Apply to each nostril twice daily for five days before your operation  5/24/18 5/29/18  Helen Aguirre PA-C   mupirocin (BACTROBAN) 2 % ointment Apply 1 application topically 2 (two) times a day for 5 days Apply to each nostril twice daily for five days before your operation  8/23/18 8/28/18  VALENCIA Clinton       Allergies:   Allergies   Allergen Reactions    Amlodipine Nausea Only    Atorvastatin Myalgia       Review of Systems:  Review of Systems - History obtained from chart review and the patient  General ROS: positive for  - fatigue  negative for - chills or fever  Psychological ROS: negative  Ophthalmic ROS: negative  Hematological and Lymphatic ROS: positive for - bruising  negative for - bleeding problems or blood clots  Respiratory ROS: no cough, shortness of breath, or wheezing  Cardiovascular ROS: positive for - dyspnea on exertion and murmur  negative for - chest pain, orthopnea or paroxysmal nocturnal dyspnea  Gastrointestinal ROS: positive for - abdominal fullness/bloating  negative for - abdominal pain, blood in stools, change in bowel habits, hematemesis, melena or nausea/vomiting  Genito-Urinary ROS: positive for nocturia but no dysuria, trouble voiding, or hematuria  Musculoskeletal ROS: negative  Neurological ROS: no TIA or stroke symptoms    Vital Signs:     Vitals:    08/23/18 1000 08/23/18 1019   BP: 144/86 136/84   BP Location: Left arm Right arm   Cuff Size: Adult    Pulse: 72    Resp: 14    Temp: 97 6 °F (36 4 °C)    TempSrc: Oral    SpO2: 99%    Weight: 100 kg (221 lb)    Height: 5' 6" (1 676 m)        Physical Exam:    General:  Obese, pale, Appears stated age; No acute distress  HEENT/NECK:  PERRLA  No jugular venous distention  Cardiac:Regular rate and rhythm, III/VI harsh systolic murmur RUSB  Carotid arteries: 1+ pulses, no bruits  Pulmonary:  Breath sounds clear bilaterally  Abdomen:  Non-tender, Non-distended  Positive bowel sounds  Upper extremities: 2+ radial pulses; brisk capillary refill; right hand dominant  Lower extremities: Extremities warm/dry  1+ bilateral femoral pulses; PT/DP pulses 1+ bilaterally  1+ edema B/L  Neuro: Alert and oriented X 3  Sensation is grossly intact  No focal deficits  Skin: Warm/Dry, without rashes or lesions  Lab Results:   Lab Results   Component Value Date    WBC 7 40 05/21/2018    HGB 14 7 05/21/2018    HCT 44 7 05/21/2018    MCV 96 05/21/2018     05/21/2018     Lab Results   Component Value Date    GLUCOSE 156 (H) 05/21/2018    CALCIUM 9 3 05/23/2018     05/21/2018    K 3 8 05/21/2018    CO2 27 05/21/2018     05/21/2018    BUN 24 05/23/2018    CREATININE 1 56 (H) 05/23/2018     Lab Results   Component Value Date    CHOL 197 04/17/2017    CHOL 195 04/07/2016    CHOL 198 03/24/2015     Lab Results   Component Value Date    HDL 31 (L) 05/21/2018    HDL 30 (L) 04/17/2017    HDL 27 (L) 04/07/2016     Lab Results   Component Value Date    LDLCALC 118 (H) 05/21/2018     Lab Results   Component Value Date    TRIG 176 (H) 05/21/2018    TRIG 225 (H) 04/17/2017    TRIG 154 (H) 04/07/2016     Lab Results   Component Value Date    INR 0 97 05/21/2018    PROTIME 13 0 05/21/2018     Lab Results   Component Value Date    HGBA1C 7 5 (H) 02/15/2018     Imaging Studies:     Cardiac Catheterization:   CORONARY CIRCULATION:  Proximal LAD: There was a 100 % stenosis  This lesion is a chronic total occlusion  1st obtuse marginal: There was a diffuse 90 % stenosis    2nd obtuse marginal: There was a diffuse 90 % stenosis  3rd obtuse marginal: There was a 80 % stenosis  1st left posterolateral: There was a 90 % stenosis  Mid RCA: There was a 95 % stenosis      CARDIAC STRUCTURES:  There was moderate aortic stenosis  Echocardiogram:   SUMMARY     LEFT VENTRICLE:  Systolic function was normal  Ejection fraction was estimated to be 65 %  There were no regional wall motion abnormalities  Wall thickness was mildly increased      LEFT ATRIUM:  The atrium was mildly dilated      MITRAL VALVE:  There was mild annular calcification  There was trace regurgitation      AORTIC VALVE:  The valve was trileaflet  Leaflets exhibited moderate calcification and moderately reduced cuspal separation  There was moderate stenosis  There was trace regurgitation  Valve mean gradient was 22 5 mmHg  Estimated aortic valve area (by VTI) was 1 42 cm squared  Estimated aortic valve area (by Vmax) was 1 49 cm squared      TRICUSPID VALVE:  There was mild regurgitation  Pulmonary artery systolic pressure was at the upper limits of normal      AORTA:  There was mild dilatation of the ascending aorta      CT Scan:   IMPRESSION:  1  Coronary atherosclerotic disease noted  2   Right renal cyst   3   No acute findings  Carotid Duplex:   RIGHT:  There is <50% stenosis noted in the internal carotid artery  Plaque is  heterogenous and irregular  Vertebral artery flow is antegrade  There is no significant subclavian artery  disease  LEFT:  There is <50% stenosis noted in the internal carotid artery  Plaque is  heterogenous and irregular  Vertebral artery flow is antegrade  There is no significant subclavian artery  disease  Vein Mapping:   RIGHT LOWER LIMB:  The great saphenous vein is patent  from the groin to the ankle  The vein is of adequate caliber and quality for graft conduit with intraluminal  diameters ranging from 10 0mm to 2 7mm throughout the leg       LEFT LOWER LIMB:  The great saphenous vein is patent  from the groin to the ankle  The vein is of adequate caliber and quality for graft conduit with intraluminal  diameters ranging from 7 1mm to 2 0mm throughout the leg  I have personally reviewed pertinent reports  Assessment:  Patient Active Problem List    Diagnosis Date Noted    CAD (coronary artery disease) 05/15/2018    Abnormal cardiovascular stress test 05/15/2018    Aortic stenosis, moderate 04/06/2018    Benign prostatic hyperplasia with nocturia 02/13/2018    Acute pain of right knee 01/09/2018    Mild intermittent asthma without complication 16/34/6919    Sexual dysfunction 10/09/2017    Renal cyst 01/12/2017    Dyslipidemia, goal LDL below 100 06/28/2016    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    Pure hypercholesterolemia 11/12/2014    Benign essential hypertension 11/05/2014    Hypothyroidism 11/05/2014    Obesity 11/05/2014    Type 2 diabetes mellitus ([facility] [address] ) 11/05/2014     Aortic stenosis & Coronary Artery disease    Plan:  Risks and benefits of aortic valve replacement and coronary artery bypass grafting were discussed in detail today with the patient  We have reviewed results all preoperative radiographic,  laboratory and vascular studies  He understands and wishes to proceed with surgical intervention with KRISTA Garibay  [facility]  was comfortable with our recommendations, and his questions were answered to his satisfaction  Thank you for allowing us to participate in the care of this patient       SIGNATURE: VALENCIA Richard  DATE: August 23, 2018  TIME: 10:50 AM

## 2018-08-23 NOTE — LETTER
August 23, 2018     [facility], [address] [place] [address]  [facility]  [address] [address]    Patient: Maldonado Ferreira  YOB: 1935   Date of Visit: 8/23/2018       Dear Dr Reji Borjas:    Thank you for referring Latricia Alcala to me for evaluation  Below are my notes for this consultation  If you have questions, please do not hesitate to call me  I look forward to following your patient along with you  Sincerely,        Carissa Bojorquez MD        CC: DO Lalo Cervantes, [place]  8/23/2018 11:12 AM  Attested  History & Physical - Cardiovascular Surgery   Maldonado Ferreira  80 y o  male MRN: [id number]    Physician Requesting Consult: Dr Luna Rodriguez    Reason for Consult / Principal Problem: Aortic stenosis, Non-Rheumatic & Coronary Artery Disease    History of Present Illness: Maldonado Ferreira  is a 80y o  year old male who returns to our office today for follow up after pre op testing completed to discuss surgery  His current clinical course began when he was evaluated by his primary care physician with complaints of exertional abdominal pain  On further questioning he was also found to have exertional dyspnea  His primary care physician ordered a nuclear stress test  This study was positive for ischemic changes  He subsequently underwent cardiac catheterization on May 21st   Severe 3 vessel disease was identified  Additionally, during his workup echocardiogram demonstrated aortic stenosis with trace aortic insufficiency  Following catheterization, he was referred to our practice and seen for initial surgical consultation       During interview today Hussein Tapia reports Kirk Shorten if he "walks to much " He reports a decrease in activity tolerance and admits to slowing his pace to avoid the onset of symptoms  He also reports an increase in abdominal girth  He experiences chronic LE edema, R>L  He denies chest pain, lightheadedness, dizziness, syncope, PND or orthopnea       He is a non-smoker and denies alcohol consumption  He has recently been seen and cleared by his dentist         Past Medical History:  Past Medical History:   Diagnosis Date    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[facility] )     GERD (gastroesophageal reflux disease)     Glaucoma     Mild aortic stenosis     Overweight     Peripheral neuropathy, idiopathic     Prostate cancer ([facility] )     Pure hypercholesterolemia     LA   11/12/14   R   11/12/14          Past Surgical History:   Past Surgical History:   Procedure Laterality Date    THYROID SURGERY           Family History:  Family History   Problem Relation Age of Onset    Diabetes Mother     Pancreatic cancer Brother     Diabetes Maternal Grandmother     Colon cancer Son     Diabetes Family          Social History:    History   Alcohol Use No     History   Drug Use No     History   Smoking Status    Never Smoker   Smokeless Tobacco    Never Used     Comment: Smoked in the service about 50 years ago       Home Medications:   Prior to Admission medications    Medication Sig Start Date End Date Taking?  Authorizing Provider   albuterol (PROVENTIL HFA) 90 mcg/act inhaler Inhale 2 puffs 4 (four) times a day as needed   Yes Historical Provider, MD   allopurinol (ZYLOPRIM) 300 mg tablet TAKE ONE TABLET BY MOUTH ONCE DAILY 5/31/18  Yes Hudson Given, PA-JITENDRA   aspirin (ECOTRIN LOW STRENGTH) 81 mg EC tablet Take 1 tablet by mouth daily   Yes Historical Provider, MD CANTU UF III MINI PEN NEEDLES 31G X 5 MM MISC USE ONE DAILY AS DIRECTED 5/7/18  Yes Juliette Fly, DO   budesonide-formoterol (SYMBICORT) 160-4 5 mcg/act inhaler Inhale 11/14/17  Yes Historical Provider, MD   diltiazem (CARDIZEM CD) 180 mg 24 hr capsule Take 1 capsule by mouth daily 2/11/15  Yes Historical Provider, MD   gabapentin (NEURONTIN) 300 mg capsule Take 1 capsule by mouth daily   9/24/15  Yes Historical Provider, MD   insulin glargine (LANTUS) 100 units/mL subcutaneous injection Inject 42 Units under the skin daily at bedtime 8/10/18  Yes Levi Reyna DO   Insulin Pen Needle 31G X 5 MM MISC by Does not apply route 2/9/16  Yes Historical Provider, MD   levothyroxine 200 mcg tablet Take 1 tablet by mouth daily 7/7/15  Yes Historical Provider, MD   losartan-hydrochlorothiazide (HYZAAR) 100-25 MG per tablet Take 1 tablet by mouth daily 11/8/16  Yes Historical Provider, MD   metFORMIN (GLUCOPHAGE) 500 mg tablet TAKE 2 TABS IN AM AND 2 TABS AT PM  Patient taking differently: TAKE 1 TABLET ONCE DAILY 6/8/18  Yes Juliette Fly, DO   nitroglycerin (NITROSTAT) 0 4 mg SL tablet Place 1 tablet (0 4 mg total) under the tongue every 5 (five) minutes as needed for chest pain 5/21/18  Yes Radha Lopez MD   tamsulosin (FLOMAX) 0 4 mg Take 1 capsule (0 4 mg total) by mouth daily with dinner 2/13/18  Yes Phani Fowler MD   meloxicam (MOBIC) 15 mg tablet Take 1 tablet by mouth 10/3/17   Historical Provider, MD   mupirocin (BACTROBAN) 2 % ointment Apply 1 application topically 2 (two) times a day for 5 days Apply to each nostril twice daily for five days before your operation  5/24/18 5/29/18  Nelli Banegas PA-C   mupirocin (BACTROBAN) 2 % ointment Apply 1 application topically 2 (two) times a day for 5 days Apply to each nostril twice daily for five days before your operation  8/23/18 8/28/18  VALENCIA Pisano       Allergies:   Allergies   Allergen Reactions    Amlodipine Nausea Only    Atorvastatin Myalgia       Review of Systems:  Review of Systems - History obtained from chart review and the patient  General ROS: positive for  - fatigue  negative for - chills or fever  Psychological ROS: negative  Ophthalmic ROS: negative  Hematological and Lymphatic ROS: positive for - bruising  negative for - bleeding problems or blood clots  Respiratory ROS: no cough, shortness of breath, or wheezing  Cardiovascular ROS: positive for - dyspnea on exertion and murmur  negative for - chest pain, orthopnea or paroxysmal nocturnal dyspnea  Gastrointestinal ROS: positive for - abdominal fullness/bloating  negative for - abdominal pain, blood in stools, change in bowel habits, hematemesis, melena or nausea/vomiting  Genito-Urinary ROS: positive for nocturia but no dysuria, trouble voiding, or hematuria  Musculoskeletal ROS: negative  Neurological ROS: no TIA or stroke symptoms    Vital Signs:     Vitals:    08/23/18 1000 08/23/18 1019   BP: 144/86 136/84   BP Location: Left arm Right arm   Cuff Size: Adult    Pulse: 72    Resp: 14    Temp: 97 6 °F (36 4 °C)    TempSrc: Oral    SpO2: 99%    Weight: 100 kg (221 lb)    Height: 5' 6" (1 676 m)        Physical Exam:    General:  Obese, pale, Appears stated age; No acute distress  HEENT/NECK:  PERRLA  No jugular venous distention  Cardiac:Regular rate and rhythm, III/VI harsh systolic murmur RUSB  Carotid arteries: 1+ pulses, no bruits  Pulmonary:  Breath sounds clear bilaterally  Abdomen:  Non-tender, Non-distended  Positive bowel sounds  Upper extremities: 2+ radial pulses; brisk capillary refill; right hand dominant  Lower extremities: Extremities warm/dry  1+ bilateral femoral pulses; PT/DP pulses 1+ bilaterally  1+ edema B/L  Neuro: Alert and oriented X 3  Sensation is grossly intact  No focal deficits  Skin: Warm/Dry, without rashes or lesions      Lab Results:   Lab Results   Component Value Date    WBC 7 40 05/21/2018    HGB 14 7 05/21/2018    HCT 44 7 05/21/2018    MCV 96 05/21/2018     05/21/2018     Lab Results   Component Value Date    GLUCOSE 156 (H) 05/21/2018    CALCIUM 9 3 05/23/2018     05/21/2018    K 3 8 05/21/2018    CO2 27 05/21/2018     05/21/2018    BUN 24 05/23/2018    CREATININE 1 56 (H) 05/23/2018     Lab Results   Component Value Date    CHOL 197 04/17/2017    CHOL 195 04/07/2016    CHOL 198 03/24/2015     Lab Results   Component Value Date    HDL 31 (L) 05/21/2018    HDL 30 (L) 04/17/2017    HDL 27 (L) 04/07/2016     Lab Results   Component Value Date    LDLCALC 118 (H) 05/21/2018     Lab Results Component Value Date    TRIG 176 (H) 05/21/2018    TRIG 225 (H) 04/17/2017    TRIG 154 (H) 04/07/2016     Lab Results   Component Value Date    INR 0 97 05/21/2018    PROTIME 13 0 05/21/2018     Lab Results   Component Value Date    HGBA1C 7 5 (H) 02/15/2018     Imaging Studies:     Cardiac Catheterization:   CORONARY CIRCULATION:  Proximal LAD: There was a 100 % stenosis  This lesion is a chronic total occlusion  1st obtuse marginal: There was a diffuse 90 % stenosis  2nd obtuse marginal: There was a diffuse 90 % stenosis  3rd obtuse marginal: There was a 80 % stenosis  1st left posterolateral: There was a 90 % stenosis  Mid RCA: There was a 95 % stenosis      CARDIAC STRUCTURES:  There was moderate aortic stenosis  Echocardiogram:   SUMMARY     LEFT VENTRICLE:  Systolic function was normal  Ejection fraction was estimated to be 65 %  There were no regional wall motion abnormalities  Wall thickness was mildly increased      LEFT ATRIUM:  The atrium was mildly dilated      MITRAL VALVE:  There was mild annular calcification  There was trace regurgitation      AORTIC VALVE:  The valve was trileaflet  Leaflets exhibited moderate calcification and moderately reduced cuspal separation  There was moderate stenosis  There was trace regurgitation  Valve mean gradient was 22 5 mmHg  Estimated aortic valve area (by VTI) was 1 42 cm squared  Estimated aortic valve area (by Vmax) was 1 49 cm squared      TRICUSPID VALVE:  There was mild regurgitation  Pulmonary artery systolic pressure was at the upper limits of normal      AORTA:  There was mild dilatation of the ascending aorta      CT Scan:   IMPRESSION:  1  Coronary atherosclerotic disease noted  2   Right renal cyst   3   No acute findings  Carotid Duplex:    RIGHT:  There is <50% stenosis noted in the internal carotid artery  Plaque is  heterogenous and irregular  Vertebral artery flow is antegrade   There is no significant subclavian artery  disease  LEFT:  There is <50% stenosis noted in the internal carotid artery  Plaque is  heterogenous and irregular  Vertebral artery flow is antegrade  There is no significant subclavian artery  disease  Vein Mapping:   RIGHT LOWER LIMB:  The great saphenous vein is patent  from the groin to the ankle  The vein is of adequate caliber and quality for graft conduit with intraluminal  diameters ranging from 10 0mm to 2 7mm throughout the leg  LEFT LOWER LIMB:  The great saphenous vein is patent  from the groin to the ankle  The vein is of adequate caliber and quality for graft conduit with intraluminal  diameters ranging from 7 1mm to 2 0mm throughout the leg  I have personally reviewed pertinent reports  Assessment:  Patient Active Problem List    Diagnosis Date Noted    CAD (coronary artery disease) 05/15/2018    Abnormal cardiovascular stress test 05/15/2018    Aortic stenosis, moderate 04/06/2018    Benign prostatic hyperplasia with nocturia 02/13/2018    Acute pain of right knee 01/09/2018    Mild intermittent asthma without complication 44/30/1971    Sexual dysfunction 10/09/2017    Renal cyst 01/12/2017    Dyslipidemia, goal LDL below 100 06/28/2016    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    Pure hypercholesterolemia 11/12/2014    Benign essential hypertension 11/05/2014    Hypothyroidism 11/05/2014    Obesity 11/05/2014    Type 2 diabetes mellitus ([facility] [address] ) 11/05/2014     Aortic stenosis & Coronary Artery disease    Plan:  Risks and benefits of aortic valve replacement and coronary artery bypass grafting were discussed in detail today with the patient  We have reviewed results all preoperative radiographic,  laboratory and vascular studies  He understands and wishes to proceed with surgical intervention with KRISTA Venegasing  was comfortable with our recommendations, and his questions were answered to his satisfaction    Thank you for allowing us to participate in the care of this patient  SIGNATURE: VALENCIA Clinton  DATE: August 23, 2018  TIME: 10:50 AM  Attestation signed by Crystal White MD at 8/23/2018 12:44 PM:  Patient seen and evaluated with 10 Coleman Zarate / CHON  I agree with the above assessment and plan with the following additions  The patient is an 80-year-old man with symptomatic severe multivessel coronary artery disease and moderate aortic stenosis, he returns to my office after preop testing  I have reviewed diagnosis once again with him and his son, I explained the recommendation for surgery, the benefits, risk and possible complications  They understand and agree to proceed with surgery  We will schedule him for an elective CABG/AVR

## 2018-08-23 NOTE — H&P
History & Physical - Cardiovascular Surgery   Jojo Agrawal  80 y o  male MRN: [id number]    Physician Requesting Consult: Dr Mary Lopez    Reason for Consult / Principal Problem: Aortic stenosis, Non-Rheumatic & Coronary Artery Disease    History of Present Illness: Jojo Agrawal  is a 80y o  year old male who returns to our office today for follow up after pre op testing completed to discuss surgery  His current clinical course began when he was evaluated by his primary care physician with complaints of exertional abdominal pain  On further questioning he was also found to have exertional dyspnea  His primary care physician ordered a nuclear stress test  This study was positive for ischemic changes  He subsequently underwent cardiac catheterization on May 21st   Severe 3 vessel disease was identified  Additionally, during his workup echocardiogram demonstrated aortic stenosis with trace aortic insufficiency  Following catheterization, he was referred to our practice and seen for initial surgical consultation       During interview today Maya Roy reports Rosemary Embs if he "walks to much " He reports a decrease in activity tolerance and admits to slowing his pace to avoid the onset of symptoms  He also reports an increase in abdominal girth  He experiences chronic LE edema, R>L  He denies chest pain, lightheadedness, dizziness, syncope, PND or orthopnea  He is a non-smoker and denies alcohol consumption  He has recently been seen and cleared by his dentist         Past Medical History:  Past Medical History:   Diagnosis Date    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     GERD (gastroesophageal reflux disease)     Glaucoma     Mild aortic stenosis     Overweight     Peripheral neuropathy, idiopathic     [facility]     Pure hypercholesterolemia     LA   11/12/14   R   11/12/14          Past Surgical History:   Past Surgical History:   Procedure Laterality Date    THYROID SURGERY           Family History:  Family History   Problem Relation Age of Onset    Diabetes Mother     Pancreatic cancer Brother     Diabetes Maternal Grandmother     Colon cancer Son     Diabetes Family          Social History:    History   Alcohol Use No     History   Drug Use No     History   Smoking Status    Never Smoker   Smokeless Tobacco    Never Used     Comment: Smoked in the service about 50 years ago       Home Medications:   Prior to Admission medications    Medication Sig Start Date End Date Taking?  Authorizing Provider   albuterol (PROVENTIL HFA) 90 mcg/act inhaler Inhale 2 puffs 4 (four) times a day as needed   Yes Historical Provider, MD   allopurinol (ZYLOPRIM) 300 mg tablet TAKE ONE TABLET BY MOUTH ONCE DAILY 5/31/18  Yes Jessica Goff PA-C   aspirin (ECOTRIN LOW STRENGTH) 81 mg EC tablet Take 1 tablet by mouth daily   Yes Historical Provider, MD CANTU UF III MINI PEN NEEDLES 31G X 5 MM MISC USE ONE DAILY AS DIRECTED 5/7/18  Yes Damaris Thompson,    budesonide-formoterol (SYMBICORT) 160-4 5 mcg/act inhaler Inhale 11/14/17  Yes Historical Provider, MD   diltiazem (CARDIZEM CD) 180 mg 24 hr capsule Take 1 capsule by mouth daily 2/11/15  Yes Historical Provider, MD   gabapentin (NEURONTIN) 300 mg capsule Take 1 capsule by mouth daily   9/24/15  Yes Historical Provider, MD   insulin glargine (LANTUS) 100 units/mL subcutaneous injection Inject 42 Units under the skin daily at bedtime 8/10/18  Yes Juliette Cid DO   Insulin Pen Needle 31G X 5 MM MISC by Does not apply route 2/9/16  Yes Historical Provider, MD   levothyroxine 200 mcg tablet Take 1 tablet by mouth daily 7/7/15  Yes Historical Provider, MD   losartan-hydrochlorothiazide (HYZAAR) 100-25 MG per tablet Take 1 tablet by mouth daily 11/8/16  Yes Historical Provider, MD   metFORMIN (GLUCOPHAGE) 500 mg tablet TAKE 2 TABS IN AM AND 2 TABS AT PM  Patient taking differently: TAKE 1 TABLET ONCE DAILY 6/8/18  Yes Juliette Cid,    nitroglycerin (NITROSTAT) 0 4 mg SL tablet Place 1 tablet (0 4 mg total) under the tongue every 5 (five) minutes as needed for chest pain 5/21/18  Yes Rachel Maria MD   tamsulosin [facility]) 0 4 mg Take 1 capsule (0 4 mg total) by mouth daily with dinner 2/13/18  Yes Candance Netter, MD   meloxicam (MOBIC) 15 mg tablet Take 1 tablet by mouth 10/3/17   Historical Provider, MD   mupirocin (BACTROBAN) 2 % ointment Apply 1 application topically 2 (two) times a day for 5 days Apply to each nostril twice daily for five days before your operation  5/24/18 5/29/18  Viola Lozano PA-C   mupirocin (BACTROBAN) 2 % ointment Apply 1 application topically 2 (two) times a day for 5 days Apply to each nostril twice daily for five days before your operation  8/23/18 8/28/18  VALENCIA Barboza       Allergies:   Allergies   Allergen Reactions    Amlodipine Nausea Only    Atorvastatin Myalgia       Review of Systems:  Review of Systems - History obtained from chart review and the patient  General ROS: positive for  - fatigue  negative for - chills or fever  Psychological ROS: negative  Ophthalmic ROS: negative  Hematological and Lymphatic ROS: positive for - bruising  negative for - bleeding problems or blood clots  Respiratory ROS: no cough, shortness of breath, or wheezing  Cardiovascular ROS: positive for - dyspnea on exertion and murmur  negative for - chest pain, orthopnea or paroxysmal nocturnal dyspnea  Gastrointestinal ROS: positive for - abdominal fullness/bloating  negative for - abdominal pain, blood in stools, change in bowel habits, hematemesis, melena or nausea/vomiting  Genito-Urinary ROS: positive for nocturia but no dysuria, trouble voiding, or hematuria  Musculoskeletal ROS: negative  Neurological ROS: no TIA or stroke symptoms    Vital Signs:     Vitals:    08/23/18 1000 08/23/18 1019   BP: 144/86 136/84   BP Location: Left arm Right arm   Cuff Size: Adult    Pulse: 72    Resp: 14    Temp: 97 6 °F (36 4 °C)    TempSrc: Oral    SpO2: 99%    Weight: 100 kg (221 lb)    Height: 5' 6" (1 676 m)        Physical Exam:    General:  Obese, pale, Appears stated age; No acute distress  HEENT/NECK:  PERRLA  No jugular venous distention  Cardiac:Regular rate and rhythm, III/VI harsh systolic murmur RUSB  Carotid arteries: 1+ pulses, no bruits  Pulmonary:  Breath sounds clear bilaterally  Abdomen:  Non-tender, Non-distended  Positive bowel sounds  Upper extremities: 2+ radial pulses; brisk capillary refill; right hand dominant  Lower extremities: Extremities warm/dry  1+ bilateral femoral pulses; PT/DP pulses 1+ bilaterally  1+ edema B/L  Neuro: Alert and oriented X 3  Sensation is grossly intact  No focal deficits  Skin: Warm/Dry, without rashes or lesions  Lab Results:   Lab Results   Component Value Date    WBC 7 40 05/21/2018    HGB 14 7 05/21/2018    HCT 44 7 05/21/2018    MCV 96 05/21/2018     05/21/2018     Lab Results   Component Value Date    GLUCOSE 156 (H) 05/21/2018    CALCIUM 9 3 05/23/2018     05/21/2018    K 3 8 05/21/2018    CO2 27 05/21/2018     05/21/2018    BUN 24 05/23/2018    CREATININE 1 56 (H) 05/23/2018     Lab Results   Component Value Date    CHOL 197 04/17/2017    CHOL 195 04/07/2016    CHOL 198 03/24/2015     Lab Results   Component Value Date    HDL 31 (L) 05/21/2018    HDL 30 (L) 04/17/2017    HDL 27 (L) 04/07/2016     Lab Results   Component Value Date    LDLCALC 118 (H) 05/21/2018     Lab Results   Component Value Date    TRIG 176 (H) 05/21/2018    TRIG 225 (H) 04/17/2017    TRIG 154 (H) 04/07/2016     Lab Results   Component Value Date    INR 0 97 05/21/2018    PROTIME 13 0 05/21/2018     Lab Results   Component Value Date    HGBA1C 7 5 (H) 02/15/2018     Imaging Studies:     Cardiac Catheterization:   CORONARY CIRCULATION:  Proximal LAD: There was a 100 % stenosis  This lesion is a chronic total occlusion  1st obtuse marginal: There was a diffuse 90 % stenosis    2nd obtuse marginal: There was a diffuse 90 % stenosis  3rd obtuse marginal: There was a 80 % stenosis  1st left posterolateral: There was a 90 % stenosis  Mid RCA: There was a 95 % stenosis      CARDIAC STRUCTURES:  There was moderate aortic stenosis  Echocardiogram:   SUMMARY     LEFT VENTRICLE:  Systolic function was normal  Ejection fraction was estimated to be 65 %  There were no regional wall motion abnormalities  Wall thickness was mildly increased      LEFT ATRIUM:  The atrium was mildly dilated      MITRAL VALVE:  There was mild annular calcification  There was trace regurgitation      AORTIC VALVE:  The valve was trileaflet  Leaflets exhibited moderate calcification and moderately reduced cuspal separation  There was moderate stenosis  There was trace regurgitation  Valve mean gradient was 22 5 mmHg  Estimated aortic valve area (by VTI) was 1 42 cm squared  Estimated aortic valve area (by Vmax) was 1 49 cm squared      TRICUSPID VALVE:  There was mild regurgitation  Pulmonary artery systolic pressure was at the upper limits of normal      AORTA:  There was mild dilatation of the ascending aorta      CT Scan:   IMPRESSION:  1  Coronary atherosclerotic disease noted  2   Right renal cyst   3   No acute findings  Carotid Duplex:    RIGHT:  There is <50% stenosis noted in the internal carotid artery  Plaque is  heterogenous and irregular  Vertebral artery flow is antegrade  There is no significant subclavian artery  disease  LEFT:  There is <50% stenosis noted in the internal carotid artery  Plaque is  heterogenous and irregular  Vertebral artery flow is antegrade  There is no significant subclavian artery  disease  Vein Mapping:   RIGHT LOWER LIMB:  The great saphenous vein is patent  from the groin to the ankle  The vein is of adequate caliber and quality for graft conduit with intraluminal  diameters ranging from 10 0mm to 2 7mm throughout the leg       LEFT LOWER LIMB:  The great saphenous vein is patent  from the groin to the ankle  The vein is of adequate caliber and quality for graft conduit with intraluminal  diameters ranging from 7 1mm to 2 0mm throughout the leg  I have personally reviewed pertinent reports  Assessment:  Patient Active Problem List    Diagnosis Date Noted    CAD (coronary artery disease) 05/15/2018    Abnormal cardiovascular stress test 05/15/2018    Aortic stenosis, moderate 04/06/2018    Benign prostatic hyperplasia with nocturia 02/13/2018    Acute pain of right knee 01/09/2018    Mild intermittent asthma without complication 09/18/5143    Sexual dysfunction 10/09/2017    Renal cyst 01/12/2017    Dyslipidemia, goal LDL below 100 06/28/2016    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    Pure hypercholesterolemia 11/12/2014    Benign essential hypertension 11/05/2014    Hypothyroidism 11/05/2014    Obesity 11/05/2014    Type 2 diabetes mellitus ([facility] [address] ) 11/05/2014     Aortic stenosis & Coronary Artery disease    Plan:  Risks and benefits of aortic valve replacement and coronary artery bypass grafting were discussed in detail today with the patient  We have reviewed results all preoperative radiographic,  laboratory and vascular studies  He understands and wishes to proceed with surgical intervention with KRISTA Valdes  was comfortable with our recommendations, and his questions were answered to his satisfaction  Thank you for allowing us to participate in the care of this patient       SIGNATURE: VALENCIA Lau  DATE: August 23, 2018  TIME: 10:50 AM

## 2018-09-04 RX ORDER — LEVOTHYROXINE SODIUM 0.15 MG/1
150 TABLET ORAL DAILY
COMMUNITY
End: 2018-10-09 | Stop reason: SDUPTHER

## 2018-09-04 NOTE — PRE-PROCEDURE INSTRUCTIONS
Pre-Surgery Instructions:   Medication Instructions    albuterol (PROVENTIL HFA) 90 mcg/act inhaler Patient was instructed by Physician and understands   allopurinol (ZYLOPRIM) 300 mg tablet Patient was instructed by Physician and understands   aspirin (ECOTRIN LOW STRENGTH) 81 mg EC tablet Patient was instructed by Physician and understands   diltiazem (CARDIZEM CD) 180 mg 24 hr capsule Patient was instructed by Physician and understands   gabapentin (NEURONTIN) 300 mg capsule Patient was instructed by Physician and understands   Insulin Glargine (Felecia Meigs [place]) Patient was instructed by Physician and understands   levothyroxine 150 mcg tablet Patient was instructed by Physician and understands   losartan-hydrochlorothiazide (HYZAAR) 100-25 MG per tablet Patient was instructed by Physician and understands   metFORMIN (GLUCOPHAGE) 500 mg tablet Patient was instructed by Physician and understands   mupirocin (BACTROBAN) 2 % ointment Patient was instructed by Physician and understands   nitroglycerin (NITROSTAT) 0 4 mg SL tablet Patient was instructed by Physician and understands   tamsulosin (FLOMAX) 0 4 mg Patient was instructed by Physician and understands  REVIEWED  PRINTED SURGICAL INSTRUCTIONS WITH PATIENT , PATIENT VERBALIZED UNDERSTANDING   MEDICATIONS REVIEWED  INSTRUCTIONS GIVEN BY SURGEONS OFFICE

## 2018-09-06 DIAGNOSIS — G62.9 NEUROPATHY: Primary | ICD-10-CM

## 2018-09-07 ENCOUNTER — LAB (OUTPATIENT)
Dept: LAB | Facility: HOSPITAL | Age: 83
End: 2018-09-07
Payer: COMMERCIAL

## 2018-09-07 ENCOUNTER — HOSPITAL ENCOUNTER (OUTPATIENT)
Dept: RADIOLOGY | Facility: HOSPITAL | Age: 83
Discharge: HOME | End: 2018-09-07
Payer: COMMERCIAL

## 2018-09-07 ENCOUNTER — TRANSCRIBE ORDERS (OUTPATIENT)
Dept: ADMINISTRATIVE | Facility: HOSPITAL | Age: 83
End: 2018-09-07

## 2018-09-07 ENCOUNTER — HOSPITAL ENCOUNTER (OUTPATIENT)
Dept: NON INVASIVE DIAGNOSTICS | Facility: HOSPITAL | Age: 83
Discharge: HOME | End: 2018-09-07
Payer: COMMERCIAL

## 2018-09-07 DIAGNOSIS — I25.118 CORONARY ARTERY DISEASE INVOLVING NATIVE CORONARY ARTERY OF NATIVE HEART WITH OTHER FORM OF ANGINA PECTORIS (HCC): ICD-10-CM

## 2018-09-07 DIAGNOSIS — Z01.818 ENCOUNTER FOR PREADMISSION TESTING: ICD-10-CM

## 2018-09-07 DIAGNOSIS — Z01.818 ENCOUNTER FOR PREADMISSION TESTING: Primary | ICD-10-CM

## 2018-09-07 DIAGNOSIS — I35.0 AORTIC STENOSIS, MODERATE: ICD-10-CM

## 2018-09-07 DIAGNOSIS — I25.10 CORONARY ARTERY DISEASE INVOLVING NATIVE CORONARY ARTERY OF NATIVE HEART WITHOUT ANGINA PECTORIS: ICD-10-CM

## 2018-09-07 LAB
ABO GROUP BLD: NORMAL
ANION GAP SERPL CALCULATED.3IONS-SCNC: 6 MMOL/L (ref 4–13)
BILIRUB UR QL STRIP: NEGATIVE
BLD GP AB SCN SERPL QL: NEGATIVE
BUN SERPL-MCNC: 26 MG/DL (ref 5–25)
CALCIUM SERPL-MCNC: 9 MG/DL (ref 8.3–10.1)
CHLORIDE SERPL-SCNC: 105 MMOL/L (ref 100–108)
CHOLEST SERPL-MCNC: 200 MG/DL (ref 50–200)
CLARITY UR: CLEAR
CO2 SERPL-SCNC: 30 MMOL/L (ref 21–32)
COLOR UR: YELLOW
CREAT SERPL-MCNC: 1.51 MG/DL (ref 0.6–1.3)
ERYTHROCYTE [DISTWIDTH] IN BLOOD BY AUTOMATED COUNT: 13.3 % (ref 11.6–15.1)
EST. AVERAGE GLUCOSE BLD GHB EST-MCNC: 180 MG/DL
GFR SERPL CREATININE-BSD FRML MDRD: 42 ML/MIN/1.73SQ M
GLUCOSE P FAST SERPL-MCNC: 100 MG/DL (ref 65–99)
GLUCOSE UR STRIP-MCNC: NEGATIVE MG/DL
HBA1C MFR BLD: 7.9 % (ref 4.2–6.3)
HCT VFR BLD AUTO: 42.2 % (ref 36.5–49.3)
HDLC SERPL-MCNC: 33 MG/DL (ref 40–60)
HGB BLD-MCNC: 13.8 G/DL (ref 12–17)
HGB UR QL STRIP.AUTO: NEGATIVE
INR PPP: 1.02 (ref 0.86–1.17)
KETONES UR STRIP-MCNC: NEGATIVE MG/DL
LDLC SERPL CALC-MCNC: 137 MG/DL (ref 0–100)
LEUKOCYTE ESTERASE UR QL STRIP: NEGATIVE
MCH RBC QN AUTO: 31.4 PG (ref 26.8–34.3)
MCHC RBC AUTO-ENTMCNC: 32.7 G/DL (ref 31.4–37.4)
MCV RBC AUTO: 96 FL (ref 82–98)
NITRITE UR QL STRIP: NEGATIVE
NONHDLC SERPL-MCNC: 167 MG/DL
PH UR STRIP.AUTO: 5.5 [PH] (ref 4.5–8)
PLATELET # BLD AUTO: 301 THOUSANDS/UL (ref 149–390)
PMV BLD AUTO: 9.9 FL (ref 8.9–12.7)
POTASSIUM SERPL-SCNC: 3.6 MMOL/L (ref 3.5–5.3)
PROT UR STRIP-MCNC: NEGATIVE MG/DL
PROTHROMBIN TIME: 12.8 SECONDS (ref 11.8–14.2)
RBC # BLD AUTO: 4.39 MILLION/UL (ref 3.88–5.62)
RH BLD: POSITIVE
SODIUM SERPL-SCNC: 141 MMOL/L (ref 136–145)
SP GR UR STRIP.AUTO: >=1.03 (ref 1–1.03)
SPECIMEN EXPIRATION DATE: NORMAL
TRIGL SERPL-MCNC: 148 MG/DL
UROBILINOGEN UR QL STRIP.AUTO: 0.2 E.U./DL
WBC # BLD AUTO: 7.84 THOUSAND/UL (ref 4.31–10.16)

## 2018-09-07 PROCEDURE — 93005 ELECTROCARDIOGRAM TRACING: CPT

## 2018-09-07 PROCEDURE — 87081 CULTURE SCREEN ONLY: CPT

## 2018-09-07 PROCEDURE — 85027 COMPLETE CBC AUTOMATED: CPT

## 2018-09-07 PROCEDURE — 80048 BASIC METABOLIC PNL TOTAL CA: CPT

## 2018-09-07 PROCEDURE — 36415 COLL VENOUS BLD VENIPUNCTURE: CPT

## 2018-09-07 PROCEDURE — 80061 LIPID PANEL: CPT

## 2018-09-07 PROCEDURE — 71046 X-RAY EXAM CHEST 2 VIEWS: CPT

## 2018-09-07 PROCEDURE — 85610 PROTHROMBIN TIME: CPT

## 2018-09-07 PROCEDURE — 83036 HEMOGLOBIN GLYCOSYLATED A1C: CPT

## 2018-09-07 PROCEDURE — 81003 URINALYSIS AUTO W/O SCOPE: CPT | Performed by: PHYSICIAN ASSISTANT

## 2018-09-07 RX ORDER — GABAPENTIN 300 MG/1
CAPSULE ORAL
Qty: 90 CAPSULE | Refills: 3 | Status: SHIPPED | OUTPATIENT
Start: 2018-09-07 | End: 2019-06-18 | Stop reason: SDUPTHER

## 2018-09-08 LAB — MRSA NOSE QL CULT: NORMAL

## 2018-09-10 LAB
ATRIAL RATE: 67 BPM
P AXIS: 62 DEGREES
PR INTERVAL: 182 MS
QRS AXIS: -45 DEGREES
QRSD INTERVAL: 118 MS
QT INTERVAL: 398 MS
QTC INTERVAL: 420 MS
T WAVE AXIS: 102 DEGREES
VENTRICULAR RATE: 67 BPM

## 2018-09-10 PROCEDURE — 93010 ELECTROCARDIOGRAM REPORT: CPT | Performed by: INTERNAL MEDICINE

## 2018-09-16 ENCOUNTER — ANESTHESIA EVENT (INPATIENT)
Dept: PERIOP | Facility: HOSPITAL | Age: 83
DRG: 219 | End: 2018-09-16
Payer: COMMERCIAL

## 2018-09-17 ENCOUNTER — APPOINTMENT (OUTPATIENT)
Dept: NON INVASIVE DIAGNOSTICS | Facility: HOSPITAL | Age: 83
DRG: 219 | End: 2018-09-17
Attending: THORACIC SURGERY (CARDIOTHORACIC VASCULAR SURGERY)
Payer: COMMERCIAL

## 2018-09-17 ENCOUNTER — APPOINTMENT (INPATIENT)
Dept: RADIOLOGY | Facility: HOSPITAL | Age: 83
DRG: 219 | End: 2018-09-17
Payer: COMMERCIAL

## 2018-09-17 ENCOUNTER — ANESTHESIA (INPATIENT)
Dept: PERIOP | Facility: HOSPITAL | Age: 83
DRG: 219 | End: 2018-09-17
Payer: COMMERCIAL

## 2018-09-17 ENCOUNTER — HOSPITAL ENCOUNTER (INPATIENT)
Facility: HOSPITAL | Age: 83
LOS: 4 days | Discharge: NURSING, REHAB OR LONG-TERM CARE | DRG: 219 | End: 2018-09-21
Attending: THORACIC SURGERY (CARDIOTHORACIC VASCULAR SURGERY) | Admitting: THORACIC SURGERY (CARDIOTHORACIC VASCULAR SURGERY)
Payer: COMMERCIAL

## 2018-09-17 DIAGNOSIS — I25.10 CORONARY ARTERY DISEASE INVOLVING NATIVE CORONARY ARTERY OF NATIVE HEART WITHOUT ANGINA PECTORIS: ICD-10-CM

## 2018-09-17 DIAGNOSIS — Z79.4 TYPE 2 DIABETES MELLITUS WITHOUT COMPLICATION, WITH LONG-TERM CURRENT USE OF INSULIN (HCC): Primary | ICD-10-CM

## 2018-09-17 DIAGNOSIS — I35.0 NONRHEUMATIC AORTIC VALVE STENOSIS: ICD-10-CM

## 2018-09-17 DIAGNOSIS — I25.10 DISEASE OF CARDIOVASCULAR SYSTEM: ICD-10-CM

## 2018-09-17 DIAGNOSIS — E11.9 TYPE 2 DIABETES MELLITUS WITHOUT COMPLICATION, WITH LONG-TERM CURRENT USE OF INSULIN (HCC): Primary | ICD-10-CM

## 2018-09-17 DIAGNOSIS — Z95.1 S/P CABG X 4: ICD-10-CM

## 2018-09-17 DIAGNOSIS — Z95.2 S/P AVR (AORTIC VALVE REPLACEMENT): ICD-10-CM

## 2018-09-17 PROBLEM — J95.2 ACUTE POSTOPERATIVE PULMONARY INSUFFICIENCY (HCC): Chronic | Status: ACTIVE | Noted: 2018-09-17

## 2018-09-17 PROBLEM — M25.561 ACUTE PAIN OF RIGHT KNEE: Status: RESOLVED | Noted: 2018-01-09 | Resolved: 2018-09-17

## 2018-09-17 PROBLEM — R94.39 ABNORMAL CARDIOVASCULAR STRESS TEST: Status: RESOLVED | Noted: 2018-05-15 | Resolved: 2018-09-17

## 2018-09-17 PROBLEM — J45.20 MILD INTERMITTENT ASTHMA WITHOUT COMPLICATION: Chronic | Status: ACTIVE | Noted: 2017-11-10

## 2018-09-17 LAB
ANION GAP SERPL CALCULATED.3IONS-SCNC: 10 MMOL/L (ref 4–13)
APTT PPP: 61 SECONDS (ref 24–36)
APTT PPP: 69 SECONDS (ref 24–36)
BASE EXCESS BLDA CALC-SCNC: -2 MMOL/L (ref -2–3)
BASE EXCESS BLDA CALC-SCNC: -3 MMOL/L (ref -2–3)
BASE EXCESS BLDA CALC-SCNC: 0 MMOL/L (ref -2–3)
BASE EXCESS BLDA CALC-SCNC: 1 MMOL/L (ref -2–3)
BASE EXCESS BLDA CALC-SCNC: 2 MMOL/L (ref -2–3)
BASE EXCESS BLDA CALC-SCNC: 3 MMOL/L (ref -2–3)
BASE EXCESS BLDA CALC-SCNC: 4 MMOL/L (ref -2–3)
BASE EXCESS BLDA CALC-SCNC: 4 MMOL/L (ref -2–3)
BASE EXCESS BLDA CALC-SCNC: 5 MMOL/L (ref -2–3)
BUN SERPL-MCNC: 23 MG/DL (ref 5–25)
CA-I BLD-SCNC: 0.93 MMOL/L (ref 1.12–1.32)
CA-I BLD-SCNC: 0.93 MMOL/L (ref 1.12–1.32)
CA-I BLD-SCNC: 0.94 MMOL/L (ref 1.12–1.32)
CA-I BLD-SCNC: 0.95 MMOL/L (ref 1.12–1.32)
CA-I BLD-SCNC: 0.97 MMOL/L (ref 1.12–1.32)
CA-I BLD-SCNC: 1.03 MMOL/L (ref 1.12–1.32)
CA-I BLD-SCNC: 1.15 MMOL/L (ref 1.12–1.32)
CA-I BLD-SCNC: 1.17 MMOL/L (ref 1.12–1.32)
CA-I BLD-SCNC: 1.18 MMOL/L (ref 1.12–1.32)
CA-I BLD-SCNC: 1.29 MMOL/L (ref 1.12–1.32)
CA-I BLD-SCNC: 1.31 MMOL/L (ref 1.12–1.32)
CA-I BLD-SCNC: 1.4 MMOL/L (ref 1.12–1.32)
CA-I BLD-SCNC: 1.53 MMOL/L (ref 1.12–1.32)
CALCIUM SERPL-MCNC: 8.7 MG/DL (ref 8.3–10.1)
CHLORIDE SERPL-SCNC: 113 MMOL/L (ref 100–108)
CO2 SERPL-SCNC: 22 MMOL/L (ref 21–32)
CREAT SERPL-MCNC: 1.34 MG/DL (ref 0.6–1.3)
FIBRINOGEN PPP-MCNC: 160 MG/DL (ref 227–495)
FIBRINOGEN PPP-MCNC: 259 MG/DL (ref 227–495)
FIO2 GAS DIL.REBREATH: 50 L
GFR SERPL CREATININE-BSD FRML MDRD: 49 ML/MIN/1.73SQ M
GLUCOSE SERPL-MCNC: 121 MG/DL (ref 65–140)
GLUCOSE SERPL-MCNC: 122 MG/DL (ref 65–140)
GLUCOSE SERPL-MCNC: 124 MG/DL (ref 65–140)
GLUCOSE SERPL-MCNC: 126 MG/DL (ref 65–140)
GLUCOSE SERPL-MCNC: 132 MG/DL (ref 65–140)
GLUCOSE SERPL-MCNC: 138 MG/DL (ref 65–140)
GLUCOSE SERPL-MCNC: 147 MG/DL (ref 65–140)
GLUCOSE SERPL-MCNC: 153 MG/DL (ref 65–140)
GLUCOSE SERPL-MCNC: 154 MG/DL (ref 65–140)
GLUCOSE SERPL-MCNC: 160 MG/DL (ref 65–140)
GLUCOSE SERPL-MCNC: 160 MG/DL (ref 65–140)
GLUCOSE SERPL-MCNC: 162 MG/DL (ref 65–140)
GLUCOSE SERPL-MCNC: 162 MG/DL (ref 65–140)
GLUCOSE SERPL-MCNC: 163 MG/DL (ref 65–140)
GLUCOSE SERPL-MCNC: 166 MG/DL (ref 65–140)
GLUCOSE SERPL-MCNC: 166 MG/DL (ref 65–140)
GLUCOSE SERPL-MCNC: 168 MG/DL (ref 65–140)
GLUCOSE SERPL-MCNC: 174 MG/DL (ref 65–140)
GLUCOSE SERPL-MCNC: 176 MG/DL (ref 65–140)
GLUCOSE SERPL-MCNC: 177 MG/DL (ref 65–140)
GLUCOSE SERPL-MCNC: 198 MG/DL (ref 65–140)
HCO3 BLDA-SCNC: 22.7 MMOL/L (ref 22–28)
HCO3 BLDA-SCNC: 23.5 MMOL/L (ref 22–28)
HCO3 BLDA-SCNC: 24.8 MMOL/L (ref 22–28)
HCO3 BLDA-SCNC: 25.7 MMOL/L (ref 22–28)
HCO3 BLDA-SCNC: 26.1 MMOL/L (ref 22–28)
HCO3 BLDA-SCNC: 27.1 MMOL/L (ref 22–28)
HCO3 BLDA-SCNC: 27.5 MMOL/L (ref 24–30)
HCO3 BLDA-SCNC: 28.2 MMOL/L (ref 22–28)
HCO3 BLDA-SCNC: 28.5 MMOL/L (ref 22–28)
HCO3 BLDA-SCNC: 29.3 MMOL/L (ref 22–28)
HCO3 BLDA-SCNC: 29.5 MMOL/L (ref 22–28)
HCO3 BLDA-SCNC: 29.5 MMOL/L (ref 22–28)
HCO3 BLDA-SCNC: 29.8 MMOL/L (ref 22–28)
HCO3 BLDA-SCNC: 29.9 MMOL/L (ref 22–28)
HCO3 BLDA-SCNC: 30.4 MMOL/L (ref 22–28)
HCT VFR BLD AUTO: 27 % (ref 36.5–49.3)
HCT VFR BLD AUTO: 27.2 % (ref 36.5–49.3)
HCT VFR BLD AUTO: 31.4 % (ref 36.5–49.3)
HCT VFR BLD CALC: 20 % (ref 36.5–49.3)
HCT VFR BLD CALC: 21 % (ref 36.5–49.3)
HCT VFR BLD CALC: 21 % (ref 36.5–49.3)
HCT VFR BLD CALC: 22 % (ref 36.5–49.3)
HCT VFR BLD CALC: 22 % (ref 36.5–49.3)
HCT VFR BLD CALC: 23 % (ref 36.5–49.3)
HCT VFR BLD CALC: 24 % (ref 36.5–49.3)
HCT VFR BLD CALC: 25 % (ref 36.5–49.3)
HCT VFR BLD CALC: 26 % (ref 36.5–49.3)
HCT VFR BLD CALC: 29 % (ref 36.5–49.3)
HCT VFR BLD CALC: 33 % (ref 36.5–49.3)
HGB BLD-MCNC: 10.3 G/DL (ref 12–17)
HGB BLD-MCNC: 8.7 G/DL (ref 12–17)
HGB BLD-MCNC: 8.8 G/DL (ref 12–17)
HGB BLDA-MCNC: 11.2 G/DL (ref 12–17)
HGB BLDA-MCNC: 6.8 G/DL (ref 12–17)
HGB BLDA-MCNC: 7.1 G/DL (ref 12–17)
HGB BLDA-MCNC: 7.1 G/DL (ref 12–17)
HGB BLDA-MCNC: 7.5 G/DL (ref 12–17)
HGB BLDA-MCNC: 7.5 G/DL (ref 12–17)
HGB BLDA-MCNC: 7.8 G/DL (ref 12–17)
HGB BLDA-MCNC: 8.2 G/DL (ref 12–17)
HGB BLDA-MCNC: 8.5 G/DL (ref 12–17)
HGB BLDA-MCNC: 8.8 G/DL (ref 12–17)
HGB BLDA-MCNC: 9.9 G/DL (ref 12–17)
INR PPP: 1.47 (ref 0.86–1.17)
INR PPP: 1.79 (ref 0.86–1.17)
KCT BLD-ACNC: 126 SEC (ref 89–137)
KCT BLD-ACNC: 126 SEC (ref 89–137)
KCT BLD-ACNC: 138 SEC (ref 89–137)
KCT BLD-ACNC: 520 SEC (ref 89–137)
KCT BLD-ACNC: 525 SEC (ref 89–137)
KCT BLD-ACNC: 526 SEC (ref 89–137)
KCT BLD-ACNC: 526 SEC (ref 89–137)
KCT BLD-ACNC: 532 SEC (ref 89–137)
KCT BLD-ACNC: 548 SEC (ref 89–137)
KCT BLD-ACNC: 565 SEC (ref 89–137)
KCT BLD-ACNC: 588 SEC (ref 89–137)
KCT BLD-ACNC: 605 SEC (ref 89–137)
KCT BLD-ACNC: 610 SEC (ref 89–137)
KCT BLD-ACNC: 693 SEC (ref 89–137)
KCT BLD-ACNC: 754 SEC (ref 89–137)
KCT BLD-ACNC: 99 SEC (ref 89–137)
PCO2 BLD: 24 MMOL/L (ref 21–32)
PCO2 BLD: 25 MMOL/L (ref 21–32)
PCO2 BLD: 26 MMOL/L (ref 21–32)
PCO2 BLD: 27 MMOL/L (ref 21–32)
PCO2 BLD: 27 MMOL/L (ref 21–32)
PCO2 BLD: 29 MMOL/L (ref 21–32)
PCO2 BLD: 29 MMOL/L (ref 21–32)
PCO2 BLD: 30 MMOL/L (ref 21–32)
PCO2 BLD: 30 MMOL/L (ref 21–32)
PCO2 BLD: 31 MMOL/L (ref 21–32)
PCO2 BLD: 32 MMOL/L (ref 21–32)
PCO2 BLD: 40.9 MM HG (ref 36–44)
PCO2 BLD: 42.7 MM HG (ref 36–44)
PCO2 BLD: 42.9 MM HG (ref 36–44)
PCO2 BLD: 43.2 MM HG (ref 36–44)
PCO2 BLD: 43.3 MM HG (ref 36–44)
PCO2 BLD: 43.9 MM HG (ref 36–44)
PCO2 BLD: 44.5 MM HG (ref 36–44)
PCO2 BLD: 44.6 MM HG (ref 36–44)
PCO2 BLD: 45.6 MM HG (ref 36–44)
PCO2 BLD: 45.6 MM HG (ref 36–44)
PCO2 BLD: 45.7 MM HG (ref 36–44)
PCO2 BLD: 45.9 MM HG (ref 36–44)
PCO2 BLD: 45.9 MM HG (ref 42–50)
PCO2 BLD: 48.1 MM HG (ref 36–44)
PCO2 BLD: 49.8 MM HG (ref 36–44)
PH BLD: 7.3 [PH] (ref 7.35–7.45)
PH BLD: 7.34 [PH] (ref 7.35–7.45)
PH BLD: 7.35 [PH] (ref 7.35–7.45)
PH BLD: 7.36 [PH] (ref 7.35–7.45)
PH BLD: 7.37 [PH] (ref 7.35–7.45)
PH BLD: 7.39 [PH] (ref 7.35–7.45)
PH BLD: 7.39 [PH] (ref 7.3–7.4)
PH BLD: 7.4 [PH] (ref 7.35–7.45)
PH BLD: 7.41 [PH] (ref 7.35–7.45)
PH BLD: 7.43 [PH] (ref 7.35–7.45)
PH BLD: 7.44 [PH] (ref 7.35–7.45)
PH BLD: 7.44 [PH] (ref 7.35–7.45)
PH BLD: 7.45 [PH] (ref 7.35–7.45)
PLATELET # BLD AUTO: 102 THOUSANDS/UL (ref 149–390)
PLATELET # BLD AUTO: 134 THOUSANDS/UL (ref 149–390)
PLATELET # BLD AUTO: 146 THOUSANDS/UL (ref 149–390)
PLATELET # BLD AUTO: 97 THOUSANDS/UL (ref 149–390)
PMV BLD AUTO: 9.4 FL (ref 8.9–12.7)
PMV BLD AUTO: 9.5 FL (ref 8.9–12.7)
PMV BLD AUTO: 9.6 FL (ref 8.9–12.7)
PMV BLD AUTO: 9.8 FL (ref 8.9–12.7)
PO2 BLD: 118 MM HG (ref 75–129)
PO2 BLD: 134 MM HG (ref 75–129)
PO2 BLD: 146 MM HG (ref 75–129)
PO2 BLD: 176 MM HG (ref 75–129)
PO2 BLD: 265 MM HG (ref 75–129)
PO2 BLD: 290 MM HG (ref 75–129)
PO2 BLD: 290 MM HG (ref 75–129)
PO2 BLD: 321 MM HG (ref 75–129)
PO2 BLD: 324 MM HG (ref 75–129)
PO2 BLD: 328 MM HG (ref 75–129)
PO2 BLD: 332 MM HG (ref 75–129)
PO2 BLD: 355 MM HG (ref 75–129)
PO2 BLD: 37 MM HG (ref 35–45)
PO2 BLD: 376 MM HG (ref 75–129)
PO2 BLD: 379 MM HG (ref 75–129)
POTASSIUM BLD-SCNC: 3.5 MMOL/L (ref 3.5–5.3)
POTASSIUM BLD-SCNC: 3.8 MMOL/L (ref 3.5–5.3)
POTASSIUM BLD-SCNC: 3.9 MMOL/L (ref 3.5–5.3)
POTASSIUM BLD-SCNC: 4 MMOL/L (ref 3.5–5.3)
POTASSIUM BLD-SCNC: 4 MMOL/L (ref 3.5–5.3)
POTASSIUM BLD-SCNC: 4.2 MMOL/L (ref 3.5–5.3)
POTASSIUM BLD-SCNC: 4.3 MMOL/L (ref 3.5–5.3)
POTASSIUM BLD-SCNC: 4.5 MMOL/L (ref 3.5–5.3)
POTASSIUM SERPL-SCNC: 3.6 MMOL/L (ref 3.5–5.3)
POTASSIUM SERPL-SCNC: 3.6 MMOL/L (ref 3.5–5.3)
POTASSIUM SERPL-SCNC: 4.2 MMOL/L (ref 3.5–5.3)
PROTHROMBIN TIME: 17.9 SECONDS (ref 11.8–14.2)
PROTHROMBIN TIME: 20.9 SECONDS (ref 11.8–14.2)
SAO2 % BLD FROM PO2: 100 % (ref 95–98)
SAO2 % BLD FROM PO2: 70 % (ref 95–98)
SAO2 % BLD FROM PO2: 98 % (ref 95–98)
SAO2 % BLD FROM PO2: 99 % (ref 95–98)
SAO2 % BLD FROM PO2: 99 % (ref 95–98)
SODIUM BLD-SCNC: 141 MMOL/L (ref 136–145)
SODIUM BLD-SCNC: 142 MMOL/L (ref 136–145)
SODIUM BLD-SCNC: 143 MMOL/L (ref 136–145)
SODIUM BLD-SCNC: 144 MMOL/L (ref 136–145)
SODIUM BLD-SCNC: 144 MMOL/L (ref 136–145)
SODIUM SERPL-SCNC: 145 MMOL/L (ref 136–145)
SPECIMEN SOURCE: ABNORMAL
SPECIMEN SOURCE: NORMAL

## 2018-09-17 PROCEDURE — 33533 CABG ARTERIAL SINGLE: CPT | Performed by: THORACIC SURGERY (CARDIOTHORACIC VASCULAR SURGERY)

## 2018-09-17 PROCEDURE — 82803 BLOOD GASES ANY COMBINATION: CPT

## 2018-09-17 PROCEDURE — 85014 HEMATOCRIT: CPT | Performed by: PHYSICIAN ASSISTANT

## 2018-09-17 PROCEDURE — 85018 HEMOGLOBIN: CPT | Performed by: PHYSICIAN ASSISTANT

## 2018-09-17 PROCEDURE — P9016 RBC LEUKOCYTES REDUCED: HCPCS

## 2018-09-17 PROCEDURE — 85384 FIBRINOGEN ACTIVITY: CPT | Performed by: THORACIC SURGERY (CARDIOTHORACIC VASCULAR SURGERY)

## 2018-09-17 PROCEDURE — 85730 THROMBOPLASTIN TIME PARTIAL: CPT | Performed by: THORACIC SURGERY (CARDIOTHORACIC VASCULAR SURGERY)

## 2018-09-17 PROCEDURE — 86850 RBC ANTIBODY SCREEN: CPT

## 2018-09-17 PROCEDURE — 85049 AUTOMATED PLATELET COUNT: CPT | Performed by: PHYSICIAN ASSISTANT

## 2018-09-17 PROCEDURE — P9012 CRYOPRECIPITATE EACH UNIT: HCPCS

## 2018-09-17 PROCEDURE — 30233N1 TRANSFUSION OF NONAUTOLOGOUS RED BLOOD CELLS INTO PERIPHERAL VEIN, PERCUTANEOUS APPROACH: ICD-10-PCS | Performed by: THORACIC SURGERY (CARDIOTHORACIC VASCULAR SURGERY)

## 2018-09-17 PROCEDURE — 33508 ENDOSCOPIC VEIN HARVEST: CPT | Performed by: THORACIC SURGERY (CARDIOTHORACIC VASCULAR SURGERY)

## 2018-09-17 PROCEDURE — P9017 PLASMA 1 DONOR FRZ W/IN 8 HR: HCPCS

## 2018-09-17 PROCEDURE — 82948 REAGENT STRIP/BLOOD GLUCOSE: CPT

## 2018-09-17 PROCEDURE — 93005 ELECTROCARDIOGRAM TRACING: CPT

## 2018-09-17 PROCEDURE — 88305 TISSUE EXAM BY PATHOLOGIST: CPT | Performed by: PATHOLOGY

## 2018-09-17 PROCEDURE — 94002 VENT MGMT INPAT INIT DAY: CPT

## 2018-09-17 PROCEDURE — 85730 THROMBOPLASTIN TIME PARTIAL: CPT | Performed by: NURSE PRACTITIONER

## 2018-09-17 PROCEDURE — P9037 PLATE PHERES LEUKOREDU IRRAD: HCPCS

## 2018-09-17 PROCEDURE — P9035 PLATELET PHERES LEUKOREDUCED: HCPCS

## 2018-09-17 PROCEDURE — 5A1223Z PERFORMANCE OF CARDIAC PACING, CONTINUOUS: ICD-10-PCS | Performed by: THORACIC SURGERY (CARDIOTHORACIC VASCULAR SURGERY)

## 2018-09-17 PROCEDURE — 33519 CABG ARTERY-VEIN THREE: CPT | Performed by: PHYSICIAN ASSISTANT

## 2018-09-17 PROCEDURE — 82330 ASSAY OF CALCIUM: CPT

## 2018-09-17 PROCEDURE — 33508 ENDOSCOPIC VEIN HARVEST: CPT | Performed by: PHYSICIAN ASSISTANT

## 2018-09-17 PROCEDURE — 84295 ASSAY OF SERUM SODIUM: CPT

## 2018-09-17 PROCEDURE — 33533 CABG ARTERIAL SINGLE: CPT | Performed by: PHYSICIAN ASSISTANT

## 2018-09-17 PROCEDURE — 85014 HEMATOCRIT: CPT

## 2018-09-17 PROCEDURE — 86901 BLOOD TYPING SEROLOGIC RH(D): CPT

## 2018-09-17 PROCEDURE — 85610 PROTHROMBIN TIME: CPT | Performed by: THORACIC SURGERY (CARDIOTHORACIC VASCULAR SURGERY)

## 2018-09-17 PROCEDURE — 02HV33Z INSERTION OF INFUSION DEVICE INTO SUPERIOR VENA CAVA, PERCUTANEOUS APPROACH: ICD-10-PCS | Performed by: THORACIC SURGERY (CARDIOTHORACIC VASCULAR SURGERY)

## 2018-09-17 PROCEDURE — 021209W BYPASS CORONARY ARTERY, THREE ARTERIES FROM AORTA WITH AUTOLOGOUS VENOUS TISSUE, OPEN APPROACH: ICD-10-PCS | Performed by: THORACIC SURGERY (CARDIOTHORACIC VASCULAR SURGERY)

## 2018-09-17 PROCEDURE — 99291 CRITICAL CARE FIRST HOUR: CPT | Performed by: ANESTHESIOLOGY

## 2018-09-17 PROCEDURE — 94760 N-INVAS EAR/PLS OXIMETRY 1: CPT

## 2018-09-17 PROCEDURE — 33405 REPLACEMENT AORTIC VALVE OPN: CPT | Performed by: THORACIC SURGERY (CARDIOTHORACIC VASCULAR SURGERY)

## 2018-09-17 PROCEDURE — 85347 COAGULATION TIME ACTIVATED: CPT

## 2018-09-17 PROCEDURE — 93312 ECHO TRANSESOPHAGEAL: CPT

## 2018-09-17 PROCEDURE — 88311 DECALCIFY TISSUE: CPT | Performed by: PATHOLOGY

## 2018-09-17 PROCEDURE — 86900 BLOOD TYPING SEROLOGIC ABO: CPT

## 2018-09-17 PROCEDURE — 85049 AUTOMATED PLATELET COUNT: CPT | Performed by: THORACIC SURGERY (CARDIOTHORACIC VASCULAR SURGERY)

## 2018-09-17 PROCEDURE — 30233K1 TRANSFUSION OF NONAUTOLOGOUS FROZEN PLASMA INTO PERIPHERAL VEIN, PERCUTANEOUS APPROACH: ICD-10-PCS | Performed by: THORACIC SURGERY (CARDIOTHORACIC VASCULAR SURGERY)

## 2018-09-17 PROCEDURE — 80048 BASIC METABOLIC PNL TOTAL CA: CPT | Performed by: PHYSICIAN ASSISTANT

## 2018-09-17 PROCEDURE — 84132 ASSAY OF SERUM POTASSIUM: CPT | Performed by: PHYSICIAN ASSISTANT

## 2018-09-17 PROCEDURE — 76377 3D RENDER W/INTRP POSTPROCES: CPT

## 2018-09-17 PROCEDURE — 85049 AUTOMATED PLATELET COUNT: CPT | Performed by: NURSE PRACTITIONER

## 2018-09-17 PROCEDURE — 71045 X-RAY EXAM CHEST 1 VIEW: CPT

## 2018-09-17 PROCEDURE — 86920 COMPATIBILITY TEST SPIN: CPT

## 2018-09-17 PROCEDURE — 06BQ4ZZ EXCISION OF LEFT SAPHENOUS VEIN, PERCUTANEOUS ENDOSCOPIC APPROACH: ICD-10-PCS | Performed by: THORACIC SURGERY (CARDIOTHORACIC VASCULAR SURGERY)

## 2018-09-17 PROCEDURE — 02100Z9 BYPASS CORONARY ARTERY, ONE ARTERY FROM LEFT INTERNAL MAMMARY, OPEN APPROACH: ICD-10-PCS | Performed by: THORACIC SURGERY (CARDIOTHORACIC VASCULAR SURGERY)

## 2018-09-17 PROCEDURE — 85384 FIBRINOGEN ACTIVITY: CPT | Performed by: NURSE PRACTITIONER

## 2018-09-17 PROCEDURE — 30233M1 TRANSFUSION OF NONAUTOLOGOUS PLASMA CRYOPRECIPITATE INTO PERIPHERAL VEIN, PERCUTANEOUS APPROACH: ICD-10-PCS | Performed by: THORACIC SURGERY (CARDIOTHORACIC VASCULAR SURGERY)

## 2018-09-17 PROCEDURE — 88313 SPECIAL STAINS GROUP 2: CPT | Performed by: PATHOLOGY

## 2018-09-17 PROCEDURE — 85018 HEMOGLOBIN: CPT | Performed by: NURSE PRACTITIONER

## 2018-09-17 PROCEDURE — P9100 PATHOGEN TEST FOR PLATELETS: HCPCS

## 2018-09-17 PROCEDURE — 33519 CABG ARTERY-VEIN THREE: CPT | Performed by: THORACIC SURGERY (CARDIOTHORACIC VASCULAR SURGERY)

## 2018-09-17 PROCEDURE — 33405 REPLACEMENT AORTIC VALVE OPN: CPT | Performed by: PHYSICIAN ASSISTANT

## 2018-09-17 PROCEDURE — 30233N0 TRANSFUSION OF AUTOLOGOUS RED BLOOD CELLS INTO PERIPHERAL VEIN, PERCUTANEOUS APPROACH: ICD-10-PCS | Performed by: THORACIC SURGERY (CARDIOTHORACIC VASCULAR SURGERY)

## 2018-09-17 PROCEDURE — 02RF08Z REPLACEMENT OF AORTIC VALVE WITH ZOOPLASTIC TISSUE, OPEN APPROACH: ICD-10-PCS | Performed by: THORACIC SURGERY (CARDIOTHORACIC VASCULAR SURGERY)

## 2018-09-17 PROCEDURE — 5A1221Z PERFORMANCE OF CARDIAC OUTPUT, CONTINUOUS: ICD-10-PCS | Performed by: THORACIC SURGERY (CARDIOTHORACIC VASCULAR SURGERY)

## 2018-09-17 PROCEDURE — 84132 ASSAY OF SERUM POTASSIUM: CPT

## 2018-09-17 PROCEDURE — 85610 PROTHROMBIN TIME: CPT | Performed by: NURSE PRACTITIONER

## 2018-09-17 PROCEDURE — 82947 ASSAY GLUCOSE BLOOD QUANT: CPT

## 2018-09-17 PROCEDURE — 85014 HEMATOCRIT: CPT | Performed by: NURSE PRACTITIONER

## 2018-09-17 PROCEDURE — 30233R1 TRANSFUSION OF NONAUTOLOGOUS PLATELETS INTO PERIPHERAL VEIN, PERCUTANEOUS APPROACH: ICD-10-PCS | Performed by: THORACIC SURGERY (CARDIOTHORACIC VASCULAR SURGERY)

## 2018-09-17 DEVICE — MARKER CORONARY BYPASS VOSS GRAFT: Type: IMPLANTABLE DEVICE | Site: AORTA | Status: FUNCTIONAL

## 2018-09-17 DEVICE — IMPLANTABLE DEVICE: Type: IMPLANTABLE DEVICE | Site: AORTA | Status: FUNCTIONAL

## 2018-09-17 RX ORDER — OXYCODONE HYDROCHLORIDE AND ACETAMINOPHEN 5; 325 MG/1; MG/1
1 TABLET ORAL EVERY 4 HOURS PRN
Status: DISCONTINUED | OUTPATIENT
Start: 2018-09-17 | End: 2018-09-21 | Stop reason: HOSPADM

## 2018-09-17 RX ORDER — HEPARIN SODIUM 1000 [USP'U]/ML
INJECTION, SOLUTION INTRAVENOUS; SUBCUTANEOUS AS NEEDED
Status: DISCONTINUED | OUTPATIENT
Start: 2018-09-17 | End: 2018-09-17 | Stop reason: SURG

## 2018-09-17 RX ORDER — GABAPENTIN 300 MG/1
300 CAPSULE ORAL
Status: DISCONTINUED | OUTPATIENT
Start: 2018-09-17 | End: 2018-09-21 | Stop reason: HOSPADM

## 2018-09-17 RX ORDER — POLYETHYLENE GLYCOL 3350 17 G/17G
17 POWDER, FOR SOLUTION ORAL DAILY
Status: DISCONTINUED | OUTPATIENT
Start: 2018-09-17 | End: 2018-09-21 | Stop reason: HOSPADM

## 2018-09-17 RX ORDER — CHLORHEXIDINE GLUCONATE 0.12 MG/ML
15 RINSE ORAL ONCE
Status: COMPLETED | OUTPATIENT
Start: 2018-09-17 | End: 2018-09-17

## 2018-09-17 RX ORDER — FONDAPARINUX SODIUM 2.5 MG/.5ML
2.5 INJECTION SUBCUTANEOUS DAILY
Status: DISCONTINUED | OUTPATIENT
Start: 2018-09-18 | End: 2018-09-17

## 2018-09-17 RX ORDER — SODIUM CHLORIDE 9 MG/ML
INJECTION, SOLUTION INTRAVENOUS CONTINUOUS PRN
Status: DISCONTINUED | OUTPATIENT
Start: 2018-09-17 | End: 2018-09-17 | Stop reason: SURG

## 2018-09-17 RX ORDER — MIDAZOLAM HYDROCHLORIDE 1 MG/ML
INJECTION INTRAMUSCULAR; INTRAVENOUS AS NEEDED
Status: DISCONTINUED | OUTPATIENT
Start: 2018-09-17 | End: 2018-09-17 | Stop reason: SURG

## 2018-09-17 RX ORDER — ACETAMINOPHEN 325 MG/1
650 TABLET ORAL EVERY 4 HOURS PRN
Status: DISCONTINUED | OUTPATIENT
Start: 2018-09-17 | End: 2018-09-21 | Stop reason: HOSPADM

## 2018-09-17 RX ORDER — MILRINONE LACTATE 0.2 MG/ML
INJECTION, SOLUTION INTRAVENOUS CONTINUOUS PRN
Status: DISCONTINUED | OUTPATIENT
Start: 2018-09-17 | End: 2018-09-17 | Stop reason: SURG

## 2018-09-17 RX ORDER — CALCIUM CHLORIDE 100 MG/ML
1 INJECTION INTRAVENOUS; INTRAVENTRICULAR ONCE
Status: DISCONTINUED | OUTPATIENT
Start: 2018-09-17 | End: 2018-09-18

## 2018-09-17 RX ORDER — DILTIAZEM HYDROCHLORIDE 180 MG/1
180 CAPSULE, COATED, EXTENDED RELEASE ORAL DAILY
Status: DISCONTINUED | OUTPATIENT
Start: 2018-09-17 | End: 2018-09-17

## 2018-09-17 RX ORDER — SODIUM CHLORIDE, SODIUM LACTATE, POTASSIUM CHLORIDE, CALCIUM CHLORIDE 600; 310; 30; 20 MG/100ML; MG/100ML; MG/100ML; MG/100ML
INJECTION, SOLUTION INTRAVENOUS CONTINUOUS PRN
Status: DISCONTINUED | OUTPATIENT
Start: 2018-09-17 | End: 2018-09-17 | Stop reason: SURG

## 2018-09-17 RX ORDER — FENTANYL CITRATE 50 UG/ML
INJECTION, SOLUTION INTRAMUSCULAR; INTRAVENOUS AS NEEDED
Status: DISCONTINUED | OUTPATIENT
Start: 2018-09-17 | End: 2018-09-17 | Stop reason: SURG

## 2018-09-17 RX ORDER — ALBUMIN, HUMAN INJ 5% 5 %
SOLUTION INTRAVENOUS CONTINUOUS PRN
Status: DISCONTINUED | OUTPATIENT
Start: 2018-09-17 | End: 2018-09-17 | Stop reason: SURG

## 2018-09-17 RX ORDER — ASPIRIN 325 MG
325 TABLET ORAL DAILY
Status: DISCONTINUED | OUTPATIENT
Start: 2018-09-17 | End: 2018-09-21 | Stop reason: HOSPADM

## 2018-09-17 RX ORDER — EPHEDRINE SULFATE 50 MG/ML
INJECTION, SOLUTION INTRAVENOUS AS NEEDED
Status: DISCONTINUED | OUTPATIENT
Start: 2018-09-17 | End: 2018-09-17 | Stop reason: SURG

## 2018-09-17 RX ORDER — LEVOTHYROXINE SODIUM 0.07 MG/1
150 TABLET ORAL
Status: DISCONTINUED | OUTPATIENT
Start: 2018-09-18 | End: 2018-09-21 | Stop reason: HOSPADM

## 2018-09-17 RX ORDER — HEPARIN SODIUM 5000 [USP'U]/ML
5000 INJECTION, SOLUTION INTRAVENOUS; SUBCUTANEOUS EVERY 8 HOURS SCHEDULED
Status: DISCONTINUED | OUTPATIENT
Start: 2018-09-17 | End: 2018-09-21 | Stop reason: HOSPADM

## 2018-09-17 RX ORDER — ROCURONIUM BROMIDE 10 MG/ML
INJECTION, SOLUTION INTRAVENOUS AS NEEDED
Status: DISCONTINUED | OUTPATIENT
Start: 2018-09-17 | End: 2018-09-17 | Stop reason: SURG

## 2018-09-17 RX ORDER — MAGNESIUM SULFATE HEPTAHYDRATE 40 MG/ML
2 INJECTION, SOLUTION INTRAVENOUS ONCE
Status: COMPLETED | OUTPATIENT
Start: 2018-09-17 | End: 2018-09-17

## 2018-09-17 RX ORDER — PROPOFOL 10 MG/ML
INJECTION, EMULSION INTRAVENOUS CONTINUOUS PRN
Status: DISCONTINUED | OUTPATIENT
Start: 2018-09-17 | End: 2018-09-17 | Stop reason: SURG

## 2018-09-17 RX ORDER — ONDANSETRON 2 MG/ML
4 INJECTION INTRAMUSCULAR; INTRAVENOUS EVERY 6 HOURS PRN
Status: DISCONTINUED | OUTPATIENT
Start: 2018-09-17 | End: 2018-09-21 | Stop reason: HOSPADM

## 2018-09-17 RX ORDER — CEFAZOLIN SODIUM 1 G/3ML
INJECTION, POWDER, FOR SOLUTION INTRAMUSCULAR; INTRAVENOUS AS NEEDED
Status: DISCONTINUED | OUTPATIENT
Start: 2018-09-17 | End: 2018-09-17 | Stop reason: SURG

## 2018-09-17 RX ORDER — AMIODARONE HYDROCHLORIDE 200 MG/1
200 TABLET ORAL EVERY 8 HOURS SCHEDULED
Status: DISCONTINUED | OUTPATIENT
Start: 2018-09-17 | End: 2018-09-18

## 2018-09-17 RX ORDER — MAGNESIUM HYDROXIDE 1200 MG/15ML
LIQUID ORAL AS NEEDED
Status: DISCONTINUED | OUTPATIENT
Start: 2018-09-17 | End: 2018-09-17 | Stop reason: HOSPADM

## 2018-09-17 RX ORDER — ONDANSETRON 2 MG/ML
INJECTION INTRAMUSCULAR; INTRAVENOUS AS NEEDED
Status: DISCONTINUED | OUTPATIENT
Start: 2018-09-17 | End: 2018-09-17 | Stop reason: SURG

## 2018-09-17 RX ORDER — MILRINONE LACTATE 0.2 MG/ML
0.13 INJECTION, SOLUTION INTRAVENOUS CONTINUOUS
Status: DISCONTINUED | OUTPATIENT
Start: 2018-09-17 | End: 2018-09-18

## 2018-09-17 RX ORDER — CHLORHEXIDINE GLUCONATE 0.12 MG/ML
15 RINSE ORAL 2 TIMES DAILY
Status: DISCONTINUED | OUTPATIENT
Start: 2018-09-17 | End: 2018-09-18

## 2018-09-17 RX ORDER — BISACODYL 10 MG
10 SUPPOSITORY, RECTAL RECTAL DAILY PRN
Status: DISCONTINUED | OUTPATIENT
Start: 2018-09-17 | End: 2018-09-21 | Stop reason: HOSPADM

## 2018-09-17 RX ORDER — AMINOCAPROIC ACID 250 MG/ML
INJECTION, SOLUTION INTRAVENOUS AS NEEDED
Status: DISCONTINUED | OUTPATIENT
Start: 2018-09-17 | End: 2018-09-17 | Stop reason: SURG

## 2018-09-17 RX ORDER — CALCIUM CHLORIDE 100 MG/ML
INJECTION INTRAVENOUS; INTRAVENTRICULAR AS NEEDED
Status: DISCONTINUED | OUTPATIENT
Start: 2018-09-17 | End: 2018-09-17 | Stop reason: SURG

## 2018-09-17 RX ORDER — ALLOPURINOL 300 MG/1
300 TABLET ORAL DAILY
Status: DISCONTINUED | OUTPATIENT
Start: 2018-09-17 | End: 2018-09-21 | Stop reason: HOSPADM

## 2018-09-17 RX ORDER — PANTOPRAZOLE SODIUM 40 MG/1
40 TABLET, DELAYED RELEASE ORAL DAILY
Status: DISCONTINUED | OUTPATIENT
Start: 2018-09-17 | End: 2018-09-21 | Stop reason: HOSPADM

## 2018-09-17 RX ORDER — TAMSULOSIN HYDROCHLORIDE 0.4 MG/1
0.4 CAPSULE ORAL
Status: DISCONTINUED | OUTPATIENT
Start: 2018-09-17 | End: 2018-09-21 | Stop reason: HOSPADM

## 2018-09-17 RX ORDER — SODIUM CHLORIDE 450 MG/100ML
20 INJECTION, SOLUTION INTRAVENOUS CONTINUOUS
Status: DISCONTINUED | OUTPATIENT
Start: 2018-09-17 | End: 2018-09-20

## 2018-09-17 RX ORDER — GLYCOPYRROLATE 0.2 MG/ML
INJECTION INTRAMUSCULAR; INTRAVENOUS AS NEEDED
Status: DISCONTINUED | OUTPATIENT
Start: 2018-09-17 | End: 2018-09-17 | Stop reason: SURG

## 2018-09-17 RX ORDER — FUROSEMIDE 10 MG/ML
40 INJECTION INTRAMUSCULAR; INTRAVENOUS EVERY 6 HOURS PRN
Status: DISCONTINUED | OUTPATIENT
Start: 2018-09-17 | End: 2018-09-18

## 2018-09-17 RX ORDER — OXYCODONE HYDROCHLORIDE AND ACETAMINOPHEN 5; 325 MG/1; MG/1
2 TABLET ORAL EVERY 6 HOURS PRN
Status: DISCONTINUED | OUTPATIENT
Start: 2018-09-17 | End: 2018-09-20

## 2018-09-17 RX ADMIN — CALCIUM CHLORIDE 0.5 G: 100 INJECTION PARENTERAL at 13:29

## 2018-09-17 RX ADMIN — VASOPRESSIN 2 UNITS: 20 INJECTION, SOLUTION INTRAMUSCULAR; SUBCUTANEOUS at 15:09

## 2018-09-17 RX ADMIN — ALBUMIN (HUMAN): 12.5 SOLUTION INTRAVENOUS at 13:30

## 2018-09-17 RX ADMIN — CHLORHEXIDINE GLUCONATE 15 ML: 1.2 RINSE ORAL at 06:33

## 2018-09-17 RX ADMIN — AMINOCAPROIC ACID 5 G: 250 INJECTION, SOLUTION INTRAVENOUS at 08:30

## 2018-09-17 RX ADMIN — MILRINONE LACTATE IN DEXTROSE 0.13 MCG/KG/MIN: 200 INJECTION, SOLUTION INTRAVENOUS at 15:09

## 2018-09-17 RX ADMIN — SODIUM CHLORIDE: 0.9 INJECTION, SOLUTION INTRAVENOUS at 08:20

## 2018-09-17 RX ADMIN — CEFAZOLIN 2000 MG: 1 INJECTION, POWDER, FOR SOLUTION INTRAVENOUS at 11:55

## 2018-09-17 RX ADMIN — MIDAZOLAM 3 MG: 1 INJECTION INTRAMUSCULAR; INTRAVENOUS at 07:43

## 2018-09-17 RX ADMIN — FENTANYL CITRATE 100 MCG: 50 INJECTION, SOLUTION INTRAMUSCULAR; INTRAVENOUS at 13:10

## 2018-09-17 RX ADMIN — AMINOCAPROIC ACID 1 G/HR: 250 INJECTION, SOLUTION INTRAVENOUS at 17:57

## 2018-09-17 RX ADMIN — Medication 2 UNITS/HR: at 10:08

## 2018-09-17 RX ADMIN — CHLORHEXIDINE GLUCONATE 15 ML: 1.2 RINSE ORAL at 18:30

## 2018-09-17 RX ADMIN — CEFAZOLIN SODIUM 2000 MG: 2 SOLUTION INTRAVENOUS at 08:25

## 2018-09-17 RX ADMIN — POTASSIUM CHLORIDE 20 MEQ: 149 INJECTION, SOLUTION, CONCENTRATE INTRAVENOUS at 19:53

## 2018-09-17 RX ADMIN — MIDAZOLAM 7 MG: 1 INJECTION INTRAMUSCULAR; INTRAVENOUS at 07:48

## 2018-09-17 RX ADMIN — ROCURONIUM BROMIDE 50 MG: 10 INJECTION INTRAVENOUS at 10:17

## 2018-09-17 RX ADMIN — ALBUMIN (HUMAN): 12.5 SOLUTION INTRAVENOUS at 13:42

## 2018-09-17 RX ADMIN — HEPARIN SODIUM 5000 UNITS: 1000 INJECTION INTRAVENOUS; SUBCUTANEOUS at 09:31

## 2018-09-17 RX ADMIN — EPHEDRINE SULFATE 10 MG: 50 INJECTION, SOLUTION INTRAMUSCULAR; INTRAVENOUS; SUBCUTANEOUS at 08:22

## 2018-09-17 RX ADMIN — CALCIUM CHLORIDE 0.5 G: 100 INJECTION PARENTERAL at 14:30

## 2018-09-17 RX ADMIN — PROPOFOL 30 MCG/KG/MIN: 10 INJECTION, EMULSION INTRAVENOUS at 14:50

## 2018-09-17 RX ADMIN — CEFAZOLIN 2000 MG: 1 INJECTION, POWDER, FOR SOLUTION INTRAVENOUS at 14:30

## 2018-09-17 RX ADMIN — MILRINONE LACTATE IN DEXTROSE 0.13 MCG/KG/MIN: 200 INJECTION, SOLUTION INTRAVENOUS at 15:55

## 2018-09-17 RX ADMIN — EPHEDRINE SULFATE 5 MG: 50 INJECTION, SOLUTION INTRAMUSCULAR; INTRAVENOUS; SUBCUTANEOUS at 13:19

## 2018-09-17 RX ADMIN — PHENYLEPHRINE HYDROCHLORIDE 30 MCG/MIN: 10 INJECTION INTRAVENOUS at 09:57

## 2018-09-17 RX ADMIN — SODIUM CHLORIDE, SODIUM LACTATE, POTASSIUM CHLORIDE, AND CALCIUM CHLORIDE: .6; .31; .03; .02 INJECTION, SOLUTION INTRAVENOUS at 14:45

## 2018-09-17 RX ADMIN — EPHEDRINE SULFATE 10 MG: 50 INJECTION, SOLUTION INTRAMUSCULAR; INTRAVENOUS; SUBCUTANEOUS at 14:35

## 2018-09-17 RX ADMIN — ONDANSETRON 4 MG: 2 INJECTION INTRAMUSCULAR; INTRAVENOUS at 14:58

## 2018-09-17 RX ADMIN — CALCIUM CHLORIDE 0.5 G: 100 INJECTION PARENTERAL at 14:07

## 2018-09-17 RX ADMIN — ALBUMIN (HUMAN): 12.5 SOLUTION INTRAVENOUS at 13:52

## 2018-09-17 RX ADMIN — GLYCOPYRROLATE 0.8 MG: 0.2 INJECTION, SOLUTION INTRAMUSCULAR; INTRAVENOUS at 14:58

## 2018-09-17 RX ADMIN — SODIUM CHLORIDE: 0.9 INJECTION, SOLUTION INTRAVENOUS at 07:40

## 2018-09-17 RX ADMIN — MUPIROCIN 1 APPLICATION: 20 OINTMENT TOPICAL at 06:34

## 2018-09-17 RX ADMIN — MUPIROCIN 1 APPLICATION: 20 OINTMENT TOPICAL at 21:45

## 2018-09-17 RX ADMIN — AMINOCAPROIC ACID 2 G/HR: 250 INJECTION, SOLUTION INTRAVENOUS at 09:00

## 2018-09-17 RX ADMIN — EPHEDRINE SULFATE 10 MG: 50 INJECTION, SOLUTION INTRAMUSCULAR; INTRAVENOUS; SUBCUTANEOUS at 08:35

## 2018-09-17 RX ADMIN — FENTANYL CITRATE 150 MCG: 50 INJECTION, SOLUTION INTRAMUSCULAR; INTRAVENOUS at 11:55

## 2018-09-17 RX ADMIN — SODIUM CHLORIDE 20 ML/HR: 0.45 INJECTION, SOLUTION INTRAVENOUS at 15:40

## 2018-09-17 RX ADMIN — AMIODARONE HYDROCHLORIDE 200 MG: 200 TABLET ORAL at 21:45

## 2018-09-17 RX ADMIN — CALCIUM CHLORIDE 0.5 G: 100 INJECTION PARENTERAL at 14:35

## 2018-09-17 RX ADMIN — SODIUM CHLORIDE 4 UNITS/HR: 9 INJECTION, SOLUTION INTRAVENOUS at 15:40

## 2018-09-17 RX ADMIN — EPINEPHRINE 2 MCG/MIN: 1 INJECTION, SOLUTION INTRAMUSCULAR; SUBCUTANEOUS at 13:25

## 2018-09-17 RX ADMIN — VASOPRESSIN 2 UNITS: 20 INJECTION, SOLUTION INTRAMUSCULAR; SUBCUTANEOUS at 15:23

## 2018-09-17 RX ADMIN — NEOSTIGMINE METHYLSULFATE 5 MG: 1 INJECTION, SOLUTION INTRAMUSCULAR; INTRAVENOUS; SUBCUTANEOUS at 14:58

## 2018-09-17 RX ADMIN — EPINEPHRINE 4 MCG/MIN: 1 INJECTION, SOLUTION, CONCENTRATE INTRAVENOUS at 18:35

## 2018-09-17 RX ADMIN — SODIUM CHLORIDE, SODIUM LACTATE, POTASSIUM CHLORIDE, AND CALCIUM CHLORIDE: .6; .31; .03; .02 INJECTION, SOLUTION INTRAVENOUS at 09:56

## 2018-09-17 RX ADMIN — MAGNESIUM SULFATE IN WATER 2 G: 40 INJECTION, SOLUTION INTRAVENOUS at 15:49

## 2018-09-17 RX ADMIN — ROCURONIUM BROMIDE 100 MG: 10 INJECTION INTRAVENOUS at 07:48

## 2018-09-17 RX ADMIN — EPHEDRINE SULFATE 5 MG: 50 INJECTION, SOLUTION INTRAMUSCULAR; INTRAVENOUS; SUBCUTANEOUS at 09:35

## 2018-09-17 RX ADMIN — GABAPENTIN 300 MG: 300 CAPSULE ORAL at 21:45

## 2018-09-17 RX ADMIN — CEFAZOLIN SODIUM 2000 MG: 2 SOLUTION INTRAVENOUS at 21:44

## 2018-09-17 RX ADMIN — HEPARIN SODIUM 35000 UNITS: 1000 INJECTION INTRAVENOUS; SUBCUTANEOUS at 09:54

## 2018-09-17 RX ADMIN — ALBUMIN (HUMAN): 12.5 SOLUTION INTRAVENOUS at 13:34

## 2018-09-17 RX ADMIN — VASOPRESSIN 1 UNITS: 20 INJECTION, SOLUTION INTRAMUSCULAR; SUBCUTANEOUS at 13:43

## 2018-09-17 RX ADMIN — FENTANYL CITRATE 1000 MCG: 50 INJECTION, SOLUTION INTRAMUSCULAR; INTRAVENOUS at 07:48

## 2018-09-17 RX ADMIN — METOPROLOL TARTRATE 12.5 MG: 25 TABLET ORAL at 06:33

## 2018-09-17 RX ADMIN — ROCURONIUM BROMIDE 30 MG: 10 INJECTION INTRAVENOUS at 13:11

## 2018-09-17 RX ADMIN — SODIUM CHLORIDE, SODIUM LACTATE, POTASSIUM CHLORIDE, AND CALCIUM CHLORIDE: .6; .31; .03; .02 INJECTION, SOLUTION INTRAVENOUS at 08:20

## 2018-09-17 RX ADMIN — EPHEDRINE SULFATE 10 MG: 50 INJECTION, SOLUTION INTRAMUSCULAR; INTRAVENOUS; SUBCUTANEOUS at 10:13

## 2018-09-17 RX ADMIN — POTASSIUM CHLORIDE 20 MEQ: 149 INJECTION, SOLUTION, CONCENTRATE INTRAVENOUS at 16:45

## 2018-09-17 RX ADMIN — VASOPRESSIN 1 UNITS: 20 INJECTION, SOLUTION INTRAMUSCULAR; SUBCUTANEOUS at 13:40

## 2018-09-17 NOTE — ANESTHESIA PROCEDURE NOTES
Procedure Performed: VERONICA Anesthesia  Start Time:  9/17/2018 8:15 AM  End Time:   9/17/2018 2:44 PM      Preanesthesia Checklist    Patient identified, IV assessed, risks and benefits discussed, monitors and equipment assessed, procedure being performed at surgeon's request and anesthesia consent obtained  Procedure    Diagnostic Indications for VERONICA:  assessment of ascending aorta, assessment of surgical repair and hemodynamic monitoring  Type of VERONICA: complete VERONICA with interpretation  Images Saved: ultrasound permanent image saved  Physician Requesting Echo: Allyn Quinones  Location performed: OR  Intubated  Bite block not placed  Heart visualized  Insertion of VERONICA Probe:  Atraumatic  Probe Type:  Epiaortic and multiplane  Modalities:  3D, pulse wave Doppler, color flow mapping and continuous wave Doppler  Echocardiographic and Doppler Measurements    PREPROCEDURE    LEFT VENTRICLE:  Systolic Function: normal  Ejection Fraction: 60%  RIGHT VENTRICLE:  Systolic Function: normal   Cavity size normal              AORTIC VALVE:  Leaflets: severely calcified and trileaflet  Leaflet motions restricted  Stenosis: moderate  Mean Gradient: 24 mmHg  Peak Gradient: 35 mmHg  Maximum velocity (cm/s): 3 04 m/sec  Regurgitation: trace  MITRAL VALVE:  Leaflets: calcified and calcified submitral appartus, pap muscle/chord into anteroseptal wall  Leaflet Motions: normal  Regurgitation: trace  Stenosis: none and by cfd      TRICUSPID VALVE:  Leaflets: normal  Leaflet Motions: normal  Regurgitation: trace  PULMONIC VALVE:  Regurgitation: trace  ASCENDING AORTA:  Diameter: 3 9 cm  Dissection not present  AORTIC ARCH:  dissection not present  Grade 3: atheroma protruding < 0 5 cm into lumen  DESCENDING AORTA:  Dissection not present  Grade 3: atheroma protruding < 0 5 cm into lumen  LEFT ATRIAL APPENDAGE:  No spontaneous echo contrast Emptying Velocity: 61 cm/sec      OTHER ATRIAL FINDINGS:  No JEREMIAH clot  ATRIAL SEPTUM:  Intra-atrial septal morphology: no PFO by CFD  EPIAORTIC:  Plaque Thickness: 0-5 mm  POSTPROCEDURE    LEFT VENTRICLE: Unchanged   RIGHT VENTRICLE: Unchanged   Systolic Function: mildly depressed  Cavity Size: mildly dilated  AORTIC VALVE:   Leaflets: 23 INTUITY valve in the aortic position, well-seated, does not rock, difficult to see leaflets due to shdowing and bioprosthetic  Stenosis: none  Mean Gradient: 8 mmHg  Regurgitation: no PVL and none  MITRAL VALVE: Unchanged   TRICUSPID VALVE:   Regurgitation: mild  AORTA: Unchanged   REMOVAL:  Probe Removal: atraumatic

## 2018-09-17 NOTE — CONSULTS
[address] Sully Deutsch  80 y o  male MRN: [id number]  Unit/Bed#: [facility] 230-03 Encounter: [id number]      Physician Requesting Consult: Dre Cedeno MD    Reason for Consult / Principal Problem: Coronary artery disease involving native coronary artery of native heart    HPI: Jeison Jenkins  is a 80year old who went to his PCP for evaluation of exertional dyspnea, abdominal pain, and LE edema  A nuclear stress test was ordered which was significant for ischemic changes  He underwent cardiac cath 5/21 and was found to have severe 3 vessel CAD  During further workup with Echo, he was also found to have moderate AS with trace AI  He was therefore referred to CT surgery for consultation  He presents to the ICU s/p CABG x 4 LIMA to LAD, SVG to PDA, SVG to OM2, and SVG to OM3 and AVR with 23mm tissue valve  Post procedure VERONICA with EF 60%  He received 2 units of PRBC intra-operatively  He arrived to the unit on Epi 4mcg, Armando 40mcg, and Milrinone 0 13mcg  He received 2 units of FFP, 2 units of Cryo, and 1 unit of Platelets on arrival for high mediastinal CT output and coagulopathy  He also received Amicar infusion  Cardiopulmonary bypass time: 183 min  Crossclamp time: 80    PMH: DMII, GERD, glaucoma, AS, neuropathy, prostate CA, HLD, hypothyrodism s/p thyroidectomy, HTN, mild intermittent asthma     History obtained from chart review due to patient being intubated and sedated  PMH:   Past Medical History:   Diagnosis Date    Aortic stenosis     Coronary artery disease     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HCC)     type 2, insulin dependent    GERD (gastroesophageal reflux disease)     Glaucoma     Gout     History of prostate cancer     Hypothyroidism     Overweight     Peripheral neuropathy, idiopathic     Pure hypercholesterolemia     LA   11/12/14   R   11/12/14        PSH:   Past Surgical History:   Procedure Laterality Date    CARDIAC CATHETERIZATION      THYROID SURGERY         Family History:   Family History   Problem Relation Age of Onset    Diabetes Mother     Pancreatic cancer Brother     Diabetes Maternal Grandmother     Colon cancer Son     Diabetes Family        Social History:   History   Smoking Status    Never Smoker   Smokeless Tobacco    Never Used     Comment: Smoked in the service about 50 years ago      History   Alcohol Use No      Marital Status:     ROS: ROS unable to be obtained secondary to intubation and sedation  Allergies: Allergies   Allergen Reactions    Amlodipine Nausea Only    Atorvastatin Myalgia       Home Medications:   Prior to Admission medications    Medication Sig Start Date End Date Taking? Authorizing Provider   allopurinol (ZYLOPRIM) 300 mg tablet TAKE ONE TABLET BY MOUTH ONCE DAILY 5/31/18  Yes Arcadio Polanco PA-C   aspirin (ECOTRIN LOW STRENGTH) 81 mg EC tablet Take 1 tablet by mouth daily   Yes Historical Provider, MD CANTU UF III MINI PEN NEEDLES 31G X 5 MM MISC USE ONE DAILY AS DIRECTED 5/7/18  Yes Juliette Cid DO   diltiazem (CARDIZEM CD) 180 mg 24 hr capsule Take 1 capsule by mouth daily 2/11/15  Yes Historical Provider, MD   Insulin Glargine (BASAGLAR KWIKPEN SC) Inject 42 Units under the skin daily at bedtime   Yes Historical Provider, MD   levothyroxine 150 mcg tablet Take 150 mcg by mouth daily   Yes Historical Provider, MD   losartan-hydrochlorothiazide (HYZAAR) 100-25 MG per tablet Take 1 tablet by mouth daily 11/8/16  Yes Historical Provider, MD   metFORMIN (GLUCOPHAGE) 500 mg tablet TAKE 2 TABS IN AM AND 2 TABS AT PM  Patient taking differently: TAKE 1 TABLET ONCE DAILY 6/8/18  Yes Juliette Cid DO   mupirocin (BACTROBAN) 2 % ointment Apply 1 application topically 2 (two) times a day for 5 days Apply to each nostril twice daily for five days before your operation   8/23/18 9/4/18 Yes VALENCIA Mondragon   tamsulosin (FLOMAX) 0 4 mg Take 1 capsule (0 4 mg total) by mouth daily with dinner 2/13/18  Yes Chaitanya Wheeler MD albuterol (PROVENTIL HFA) 90 mcg/act inhaler Inhale 2 puffs 4 (four) times a day as needed    Historical Provider, MD   gabapentin (NEURONTIN) 300 mg capsule TAKE ONE CAPSULE BY MOUTH ONCE DAILY AT BEDTIME 9/7/18   Pearl Mcdonald PA-C   Insulin Pen Needle 31G X 5 MM MISC by Does not apply route 2/9/16   Historical Provider, MD   mupirocin (BACTROBAN) 2 % ointment Apply 1 application topically 2 (two) times a day for 5 days Apply to each nostril twice daily for five days before your operation   5/24/18 5/29/18  Danica Resendez PA-C   nitroglycerin (NITROSTAT) 0 4 mg SL tablet Place 1 tablet (0 4 mg total) under the tongue every 5 (five) minutes as needed for chest pain 5/21/18   Lauryn Pérez MD       Inpatient Medications:  Scheduled Meds:    Current Facility-Administered Medications:  [facility]    [facility] 650 [facility] Oral [facility]4H [facility]    allopurinol 300 mg Oral Daily Teri Parrish PA-C    aminocaproic acid (AMICAR) IV 1 g/hr Intravenous Continuous VALENCIA Garrett    amiodarone 200 mg Oral [facility] Teri Johnson PA-C    aspirin 325 mg Oral Daily [place] CHON Somers    bisacodyl 10 mg Rectal Daily PRN [place] CHON Somers    calcium chloride 1 g Intravenous Once [facility]    cefazolin 2,000 mg Intravenous [address] [place], [place] [address]CHON    chlorhexidine 15 mL Mouth/Throat BID Anastasiahever Somers PA-C    epinephrine 1-20 mcg/min Intravenous Titrated Lin Montanez PA-C    [START ON 9/18/2018] fondaparinux 2 5 mg Subcutaneous Daily Anastasiahever Somers PA-C    furosemide 40 mg Intravenous Q6H PRN [place] CHON Somers    gabapentin 300 mg Oral HS Teri Parrish PA-C    HYDROmorphone 0 5 mg Intravenous Q1H PRN [place] CHON Somers    HYDROmorphone 1 mg Intravenous Q1H PRN [place] CHON Somers    insulin regular (HumuLIN R,NovoLIN R) infusion 0 3-21 Units/hr Intravenous Titrated [place] She[place], PA-C    [START ON 9/18/2018] levothyroxine 150 mcg Oral Early Morning [facility], PA-C    lidocaine (cardiac) 100 mg Intravenous Q30 Min PRN [facility], PA-C    magnesium sulfate 2 g Intravenous Once [facility], PA-C    milrinone [facility]) infusion 0 13 mcg/kg/min Intravenous Continuous Gemma Pile, PA-C Last Rate: 0 13 mcg/kg/min (09/17/18 1555)   mupirocin 1 application Nasal [address] Teri Parrish, PA-C    niCARdipine 2 5-15 mg/hr Intravenous Titrated Gemma Pile, PA-C    ondansetron 4 mg Intravenous Q6H PRN [facility], PA-C    oxyCODONE-acetaminophen 1 tablet Oral Q4H PRN [facility], PA-C    oxyCODONE-acetaminophen 2 tablet Oral Q6H PRN [facility], PA-C    pantoprazole 40 mg Oral Daily [facility], PA-C    polyethylene glycol 17 g Oral Daily [facility], PA-C    potassium chloride 20 mEq Intravenous Once PRN [facility], PA-C Last Rate: 20 mEq (09/17/18 1645)   potassium chloride 20 mEq Intravenous Q1H PRN [facility], PA-C    potassium chloride 20 mEq Intravenous Q30 Min PRN [facility], PA-C    sodium chloride 20 mL/hr Intravenous Continuous [facility], PA-C    tamsulosin 0 4 mg Oral Daily With [employer], PA-C      Continuous Infusions:    aminocaproic acid (AMICAR) IV 1 g/hr    epinephrine 1-20 mcg/min    insulin regular (HumuLIN R,NovoLIN R) infusion 0 3-21 Units/hr    milrinone (PRIMACOR) infusion 0 13 mcg/kg/min Last Rate: 0 13 mcg/kg/min (09/17/18 1555)   niCARdipine 2 5-15 mg/hr    sodium chloride 20 mL/hr      PRN Meds:    acetaminophen 650 mg Q4H PRN   acetaminophen 650 mg Q4H PRN   bisacodyl 10 mg Daily PRN   furosemide 40 mg Q6H PRN   HYDROmorphone 0 5 mg Q1H PRN   HYDROmorphone 1 mg Q1H PRN   lidocaine (cardiac) 100 mg Q30 Min PRN   ondansetron 4 mg Q6H PRN   oxyCODONE-acetaminophen 1 tablet Q4H PRN   oxyCODONE-acetaminophen 2 tablet Q6H PRN   potassium chloride 20 mEq Once PRN   potassium chloride 20 mEq Q1H PRN   potassium chloride 20 mEq Q30 Min PRN       VTE Pharmacologic Prophylaxis: Fondaparinux (Arixtra)  VTE Mechanical Prophylaxis: sequential compression device    Invasive lines and devices: Invasive Devices     Central Venous Catheter Line            CVC Central Lines 18 Triple less than 1 day    Introducer 18 less than 1 day          Peripheral Intravenous Line            Peripheral IV 18 Right Antecubital less than 1 day          Arterial Line            Arterial Line 18 Right Radial less than 1 day          Line            Pacer Wires less than 1 day    Pacer Wires less than 1 day          Drain            Chest Tube 1 Anterior Mediastinal 32 Fr  less than 1 day    Chest Tube 2 Left Pleural 32 Fr  less than 1 day    Chest Tube 3 Posterior Mediastinal 32 Fr  less than 1 day    Urethral Catheter Latex 12 Fr  less than 1 day          Airway            ETT  Cuffed;Oral 8 5 mm less than 1 day                Vitals:   Vitals:    18 1606 18 1618 18 1625 18 1640   BP: (!) 89/40 (!) 94/46 (!) 91/44 (!) 101/45   Pulse: 69 70 69 70   Resp:    Temp: (!) 97 3 °F (36 3 °C) (!) 97 2 °F (36 2 °C) (!) 97 2 °F (36 2 °C) (!) 97 °F (36 1 °C)   TempSrc:       SpO2:       Weight:       Height:         Arterial Line BP: 94/46  Arterial Line MAP (mmHg): 58 mmHg    Temperature: Temp (24hrs), Av 7 °F (36 5 °C), Min:97 °F (36 1 °C), Max:99 6 °F (37 6 °C)  Current: Temperature: (!) 97 °F (36 1 °C)    Weights: IBW: 68 4 kg  Body mass index is 32 23 kg/m²  Hemodynamic Monitoring:  PAP: PAP: 36/15, CVP: CVP (mean): 10 mmHg, CO: CO (L/min): 3 3 L/min, CI: CI (L/min/m2): 1 6 L/min/m2, SVR: SVR (dyne*sec)/cm5: 1114 (dyne*sec)/cm5    Ventilator Settings:   Vent Mode: AC/VC  Resp Rate (BPM): 18 BPM  Vt (mL): 450 mL  FIO2 (%): 50 %  PEEP (cmH2O): 5 cmH2O  SpO2: 100 %    Physical Exam:    Constitutional: well developed  HENT: normocephalic, atraumatic  Intubated  Neck: supple   +CVC  Cardiovascular: regular rate and rhythm  no murmur heard  Exam reveals no rub  Pulmonary/Chest: Breath sounds clears to auscultation bilaterally  Abdominal: bowel sounds absent  no distension  Musculoskeletal: Trace edema  Neurological: Unable to assess due to sedation  Skin: Skin is warm, dry, intact  Psychiatric: Unable to assess due to sedation  Labs/Imaging:     Results from last 7 days  Lab Units 18  1628 18  1546 18  1454 18  1409  18  1039   HEMOGLOBIN g/dL  --  10 3*  --   --   --   --    I STAT HEMOGLOBIN g/dl 8 2*  --  8 5*  --   < >  --    HEMATOCRIT % 24* 31 4* 25*  --   < >  --    PLATELETS Thousands/uL  --  97*  --  102*  --  146*   < > = values in this interval not displayed  Results from last 7 days  Lab Units 18  1628 18  1546 18  1454 18  1356   SODIUM mmol/L  --  145  --   --    POTASSIUM mmol/L  --  3 6  --   --    CHLORIDE mmol/L  --  113*  --   --    CO2 mmol/L  --  22  --   --    BUN mg/dL  --  23  --   --    CREATININE mg/dL  --  1 34*  --   --    CALCIUM mg/dL  --  8 7  --   --    GLUCOSE, ISTAT mg/dl 126  --  124 160*               Post-op AB 30/45 6/118/22 7/-3/98%  Post-op CXR: lines and tubes stable post-op  No pneumo  Widening of mediastinum  I have personally reviewed pertinent films in PACS  Post-op EKG: NSR  This was personally reviewed by myself  Impression:  Principal Problem:    Coronary artery disease involving native coronary artery of native heart  Active Problems:    S/P CABG x 4    Aortic stenosis, moderate    S/P AVR (aortic valve replacement)    Acute postoperative pulmonary insufficiency (HCC)    Nonrheumatic aortic valve stenosis    Type 2 diabetes mellitus (HCC)    Dyslipidemia, goal LDL below 100    Benign essential hypertension    GERD (gastroesophageal reflux disease)    Hypothyroidism    Mild intermittent asthma without complication    Obesity      Plan:     Neuro: D/C continuous sedation   PRN dilaudid and percocet for pain  Trend neuro exam  Regulate sleep/wake cycle  Delirium precautions  CV: MAP goal >120  SBP goal <65  CI>2 2  Post-op medications: Epinephrine, 4 mcg/min, Primacor, 0 13 mcg/kg/min, Neosynephrine, 20 mcg/min  Wean Epi and Armando as able  Volume resuscitation as needed  Monitor rhythm on telemetry  Epicardial pacing wires in place  Will pace as needed for bradycardia or heart block  Intra-op VERONICA LVEF 60%  RV mildly depressed post-op  Lung: Check STAT post-op ABG and CXR  Wean vent with spontaneous breathing trial with goal to extubate today  GI: GI prophylaxis with PPI  Bowel regimen  Zofran PRN for nausea  FEN: NPO  Replete K >4 0, Mag >2 0 and calcium >7 0  : Check STAT post-op BMP  Gee in place  Monitor UOP with goal >0 5cc/kg/hour  Lasix prn  Volume resuscitate as needed  ID: Prophylactic post-op abx  Maintain normothermia  Tylenol PRN for fevers  Heme: Check STAT post-op H/H and platelets  Monitor incision site, invasive lines, and chest tube outputs for bleeding  Send coag panel if needed  Endo: Insulin gtt for blood sugar <180  Disposition: ICU Care    Counseling / Coordination of Care  Total Critical Care time spent 0 minutes excluding procedures, teaching and family updates        SIGNATURE: VALENCIA Davila  DATE: September 17, 2018  TIME: 4:53 PM

## 2018-09-17 NOTE — CASE MANAGEMENT
Thank you,  [address]  Utilization Review Department  Phone: [phone]; Fax [phone]  ATTENTION: Please call with any questions or concerns to [phone]  and carefully follow the prompts so that you are directed to the right person  Send all requests for admission clinical reviews, approved or denied determinations and any other requests to fax [phone]  All voicemails are confidential      ==============================================================================    Initial Clinical Review    Age/Sex: 80 y o  male    Surgery Date: 09/17/2018     Procedure: 1  Aortic valve replacement with a 23mm Jenkins Intuity pericardial tissue valve  2  Coronary artery bypass grafting x 4 with left internal mammary artery to left anterior descending artery, saphenous vein graft to obtuse marginal 2 and saphenous vein graft - Y -  to obtuse marginal 3 and left posterior descending artery     Anesthesia: General endotracheal anesthesia with transesophageal echocardiogram guidance, Dr Neal Rodríguez     Admission Orders: Date/Time/Statement: 9/17/18 @ 0664 350 [id number]   Orders Placed This Encounter   Procedures    Inpatient Admission     Standing Status:   Standing     Number of Occurrences:   1     Order Specific Question:   Admitting Physician     Answer:   Sabas Corral     Order Specific Question:   Level of Care     Answer:   Critical Care [15]     Order Specific Question:   Estimated length of stay     Answer:   More than 2 Midnights     Order Specific Question:   Certification     Answer:   I certify that inpatient services are medically necessary for this patient for a duration of greater than two midnights  See H&P and MD Progress Notes for additional information about the patient's course of treatment         Vital Signs: /76   Pulse 77   Temp 99 6 °F (37 6 °C)   Resp 16   Ht 5' 8" (1 727 m)   Wt 96 2 kg (212 lb)   SpO2 100%   BMI 32 23 kg/m²     Diet:        Diet Orders Start     Ordered    09/18/18 0000  Diet Cardiac; Cardiac TLC 2 3 GM NA; Clear Liquid, Fluid Restriction 1800 ML, Consistent Carbohydrate Diet Level 2 (5 carb servings/75 grams CHO/meal)  Diet effective 0500     Question Answer Comment   Diet Type Cardiac    Cardiac Cardiac TLC 2 3 GM NA    Other Restriction(s): Clear Liquid    Other Restriction(s): Fluid Restriction 1800 ML    Other Restriction(s): Consistent Carbohydrate Diet Level 2 (5 carb servings/75 grams CHO/meal)    RD to adjust diet per protocol? No        09/17/18 1517    09/17/18 1518  Diet NPO; Sips with meds  Diet effective now     Question Answer Comment   Diet Type NPO    NPO Except: Sips with meds    RD to adjust diet per protocol? No        09/17/18 1517      A  Line right radial / Triple CVC Line / introducer  Chest Tubes #1,2,3 (-20cm)  Intubated / vented / weaning  Epicardial pacing wires A/V    Mobility: Up with assistance / Up as tolerated / POD#1 OOB to chair and for all meals    DVT Prophylaxis: right leg SCD    Pain Control:   Pain Medications             aspirin (ECOTRIN LOW STRENGTH) 81 mg EC tablet Take 1 tablet by mouth daily          Scheduled Meds:  Current Facility-Administered Medications:  acetaminophen 650 mg Rectal Q4H PRN    acetaminophen 650 mg Oral Q4H PRN    allopurinol 300 mg Oral Daily    amiodarone 200 mg Oral Q8[address]    aspirin 325 mg Oral Daily    bisacodyl 10 mg Rectal Daily PRN    calcium chloride 1 g Intravenous Once    cefazolin 2,000 mg Intravenous Q8H    chlorhexidine 15 mL Mouth/Throat BID    diltiazem 180 mg Oral Daily    epinephrine 1-20 mcg/min Intravenous Titrated    [START ON 9/18/2018] fondaparinux 2 5 mg Subcutaneous Daily    furosemide 40 mg Intravenous Q6H PRN    gabapentin 300 mg Oral HS    HYDROmorphone 0 5 mg Intravenous Q1H PRN    HYDROmorphone 1 mg Intravenous Q1H PRN    insulin regular (HumuLIN R,NovoLIN R) infusion 0 3-21 Units/hr Intravenous Titrated    lactated ringers 500 mL Intravenous Q30 Min PRN    [START ON 9/18/2018] levothyroxine 150 mcg Oral Early Morning    lidocaine (cardiac) 100 mg Intravenous Q30 Min PRN    magnesium sulfate 2 g Intravenous Once    milrinone (PRIMACOR) infusion 0 13 mcg/kg/min Intravenous Continuous Last Rate: 0 13 mcg/kg/min (09/17/18 1555)   mupirocin 1 application Nasal [address]    niCARdipine 2 5-15 mg/hr Intravenous Titrated    ondansetron 4 mg Intravenous Q6H PRN    oxyCODONE-acetaminophen 1 tablet Oral Q4H PRN    oxyCODONE-acetaminophen 2 tablet Oral Q6H PRN    pantoprazole 40 mg Oral Daily    polyethylene glycol 17 g Oral Daily    potassium chloride 20 mEq Intravenous Once PRN    potassium chloride 20 mEq Intravenous Q1H PRN    potassium chloride 20 mEq Intravenous Q30 Min PRN    sodium chloride 20 mL/hr Intravenous Continuous    tamsulosin 0 4 mg Oral Daily With Dinner      Continuous Infusions:  epinephrine 1-20 mcg/min    insulin regular (HumuLIN R,NovoLIN R) infusion 0 3-21 Units/hr    milrinone (PRIMACOR) infusion 0 13 mcg/kg/min Last Rate: 0 13 mcg/kg/min (09/17/18 1555)   niCARdipine 2 5-15 mg/hr    sodium chloride 20 mL/hr

## 2018-09-17 NOTE — ANESTHESIA PROCEDURE NOTES
Central Line Insertion  Performed by: Dustin Hollingsworth  Authorized by: Dustin Hollingsworth   Date/Time: 9/17/2018 7:55 AM  Catheter Type:  triple lumen  Consent: Verbal consent obtained  Written consent obtained  Risks and benefits: risks, benefits and alternatives were discussed  Consent given by: patient  Patient understanding: patient states understanding of the procedure being performed  Patient consent: the patient's understanding of the procedure matches consent given  Required items: required blood products, implants, devices, and special equipment available  Patient identity confirmed: verbally with patient and arm band  Time out: Immediately prior to procedure a "time out" was called to verify the correct patient, procedure, equipment, support staff and site/side marked as required  Indications: vascular access and central pressure monitoring  Location details: right internal jugular  Catheter size: 7 Fr  Patient position: Trendelenburg  Anesthesia method: ga    Assessment: blood return through all ports and free fluid flow  Preparation: skin prepped with ChloraPrep  Skin prep agent dried: skin prep agent completely dried prior to procedure  Sterile barriers: all five maximum sterile barriers used - cap, mask, sterile gown, sterile gloves, and large sterile sheet  Hand hygiene: hand hygiene performed prior to central venous catheter insertion  Ultrasound guidance: no  sterile gel and probe cover not used in ultrasound-guided central venous catheter insertionSite selection rationale: cabg/avr  Pre-procedure: landmarks identified  Vessel of Catheter Tip End: central venous  Number of attempts: 1  Successful placement: yes  Post-procedure: chlorhexidine patch applied,  dressing applied and line sutured  Patient tolerance: Patient tolerated the procedure well with no immediate complications

## 2018-09-17 NOTE — H&P (VIEW-ONLY)
History & Physical - Cardiovascular Surgery   Jeison Jenkins  80 y o  male MRN: [id number]    Physician Requesting Consult: Dr Sreedhar Mcelroy    Reason for Consult / Principal Problem: Aortic stenosis, Non-Rheumatic & Coronary Artery Disease    History of Present Illness: Jeison Jenkins  is a 80y o  year old male who returns to our office today for follow up after pre op testing completed to discuss surgery  His current clinical course began when he was evaluated by his primary care physician with complaints of exertional abdominal pain  On further questioning he was also found to have exertional dyspnea  His primary care physician ordered a nuclear stress test  This study was positive for ischemic changes  He subsequently underwent cardiac catheterization on May 21st   Severe 3 vessel disease was identified  Additionally, during his workup echocardiogram demonstrated aortic stenosis with trace aortic insufficiency  Following catheterization, he was referred to our practice and seen for initial surgical consultation       During interview today Sindy Millan reports Willistine Commiskey if he "walks to much " He reports a decrease in activity tolerance and admits to slowing his pace to avoid the onset of symptoms  He also reports an increase in abdominal girth  He experiences chronic LE edema, R>L  He denies chest pain, lightheadedness, dizziness, syncope, PND or orthopnea  He is a non-smoker and denies alcohol consumption  He has recently been seen and cleared by his dentist         Past Medical History:  Past Medical History:   Diagnosis Date    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[facility] [address] )     GERD (gastroesophageal reflux disease)     Glaucoma     Mild aortic stenosis     Overweight     Peripheral neuropathy, idiopathic     Prostate cancer ([facility] [address] )     Pure hypercholesterolemia     LA   11/12/14   R   11/12/14          Past Surgical History:   Past Surgical History:   Procedure Laterality Date    THYROID SURGERY           Family History:  Family History   Problem Relation Age of Onset    Diabetes Mother     Pancreatic cancer Brother     Diabetes Maternal Grandmother     Colon cancer Son     Diabetes Family          Social History:    History   Alcohol Use No     History   Drug Use No     History   Smoking Status    Never Smoker   Smokeless Tobacco    Never Used     Comment: Smoked in the service about 50 years ago       Home Medications:   Prior to Admission medications    Medication Sig Start Date End Date Taking?  Authorizing Provider   albuterol (PROVENTIL HFA) 90 mcg/act inhaler Inhale 2 puffs 4 (four) times a day as needed   Yes Historical Provider, MD   allopurinol (ZYLOPRIM) 300 mg tablet TAKE ONE TABLET BY MOUTH ONCE DAILY 5/31/18  Yes Shweta Montano PA-C   aspirin (ECOTRIN LOW STRENGTH) 81 mg EC tablet Take 1 tablet by mouth daily   Yes Historical Provider, MD CANTU UF III MINI PEN NEEDLES 31G X 5 MM MISC USE ONE DAILY AS DIRECTED 5/7/18  Yes Yuki Ralph DO   budesonide-formoterol (SYMBICORT) 160-4 5 mcg/act inhaler Inhale 11/14/17  Yes Historical Provider, MD   diltiazem (CARDIZEM CD) 180 mg 24 hr capsule Take 1 capsule by mouth daily 2/11/15  Yes Historical Provider, MD   gabapentin (NEURONTIN) 300 mg capsule Take 1 capsule by mouth daily   9/24/15  Yes Historical Provider, MD   insulin glargine (LANTUS) 100 units/mL subcutaneous injection Inject 42 Units under the skin daily at bedtime 8/10/18  Yes Juliette Cid DO   Insulin Pen Needle 31G X 5 MM MISC by Does not apply route 2/9/16  Yes Historical Provider, MD   levothyroxine 200 mcg tablet Take 1 tablet by mouth daily 7/7/15  Yes Historical Provider, MD   losartan-hydrochlorothiazide (HYZAAR) 100-25 MG per tablet Take 1 tablet by mouth daily 11/8/16  Yes Historical Provider, MD   metFORMIN (GLUCOPHAGE) 500 mg tablet TAKE 2 TABS IN AM AND 2 TABS AT PM  Patient taking differently: TAKE 1 TABLET ONCE DAILY 6/8/18  Yes Juliette Cid DO   nitroglycerin (NITROSTAT) 0 4 mg SL tablet Place 1 tablet (0 4 mg total) under the tongue every 5 (five) minutes as needed for chest pain 5/21/18  Yes Kathie Williamson MD   tamsulosin [facility]) 0 4 mg Take 1 capsule (0 4 mg total) by mouth daily with dinner 2/13/18  Yes Carol Reyna MD   meloxicam (MOBIC) 15 mg tablet Take 1 tablet by mouth 10/3/17   Historical Provider, MD   mupirocin (BACTROBAN) 2 % ointment Apply 1 application topically 2 (two) times a day for 5 days Apply to each nostril twice daily for five days before your operation  5/24/18 5/29/18  Marline Najjar, PA-C   mupirocin (BACTROBAN) 2 % ointment Apply 1 application topically 2 (two) times a day for 5 days Apply to each nostril twice daily for five days before your operation  8/23/18 8/28/18  VALENCIA Stanley       Allergies:   Allergies   Allergen Reactions    Amlodipine Nausea Only    Atorvastatin Myalgia       Review of Systems:  Review of Systems - History obtained from chart review and the patient  General ROS: positive for  - fatigue  negative for - chills or fever  Psychological ROS: negative  Ophthalmic ROS: negative  Hematological and Lymphatic ROS: positive for - bruising  negative for - bleeding problems or blood clots  Respiratory ROS: no cough, shortness of breath, or wheezing  Cardiovascular ROS: positive for - dyspnea on exertion and murmur  negative for - chest pain, orthopnea or paroxysmal nocturnal dyspnea  Gastrointestinal ROS: positive for - abdominal fullness/bloating  negative for - abdominal pain, blood in stools, change in bowel habits, hematemesis, melena or nausea/vomiting  Genito-Urinary ROS: positive for nocturia but no dysuria, trouble voiding, or hematuria  Musculoskeletal ROS: negative  Neurological ROS: no TIA or stroke symptoms    Vital Signs:     Vitals:    08/23/18 1000 08/23/18 1019   BP: 144/86 136/84   BP Location: Left arm Right arm   Cuff Size: Adult    Pulse: 72    Resp: 14    Temp: 97 6 °F (36 4 °C)    TempSrc: Oral    SpO2: 99%    Weight: 100 kg (221 lb)    Height: 5' 6" (1 676 m)        Physical Exam:    General:  Obese, pale, Appears stated age; No acute distress  HEENT/NECK:  PERRLA  No jugular venous distention  Cardiac:Regular rate and rhythm, III/VI harsh systolic murmur RUSB  Carotid arteries: 1+ pulses, no bruits  Pulmonary:  Breath sounds clear bilaterally  Abdomen:  Non-tender, Non-distended  Positive bowel sounds  Upper extremities: 2+ radial pulses; brisk capillary refill; right hand dominant  Lower extremities: Extremities warm/dry  1+ bilateral femoral pulses; PT/DP pulses 1+ bilaterally  1+ edema B/L  Neuro: Alert and oriented X 3  Sensation is grossly intact  No focal deficits  Skin: Warm/Dry, without rashes or lesions  Lab Results:   Lab Results   Component Value Date    WBC 7 40 05/21/2018    HGB 14 7 05/21/2018    HCT 44 7 05/21/2018    MCV 96 05/21/2018     05/21/2018     Lab Results   Component Value Date    GLUCOSE 156 (H) 05/21/2018    CALCIUM 9 3 05/23/2018     05/21/2018    K 3 8 05/21/2018    CO2 27 05/21/2018     05/21/2018    BUN 24 05/23/2018    CREATININE 1 56 (H) 05/23/2018     Lab Results   Component Value Date    CHOL 197 04/17/2017    CHOL 195 04/07/2016    CHOL 198 03/24/2015     Lab Results   Component Value Date    HDL 31 (L) 05/21/2018    HDL 30 (L) 04/17/2017    HDL 27 (L) 04/07/2016     Lab Results   Component Value Date    LDLCALC 118 (H) 05/21/2018     Lab Results   Component Value Date    TRIG 176 (H) 05/21/2018    TRIG 225 (H) 04/17/2017    TRIG 154 (H) 04/07/2016     Lab Results   Component Value Date    INR 0 97 05/21/2018    PROTIME 13 0 05/21/2018     Lab Results   Component Value Date    HGBA1C 7 5 (H) 02/15/2018     Imaging Studies:     Cardiac Catheterization:   CORONARY CIRCULATION:  Proximal LAD: There was a 100 % stenosis  This lesion is a chronic total occlusion  1st obtuse marginal: There was a diffuse 90 % stenosis    2nd obtuse marginal: There was a diffuse 90 % stenosis  3rd obtuse marginal: There was a 80 % stenosis  1st left posterolateral: There was a 90 % stenosis  Mid RCA: There was a 95 % stenosis      CARDIAC STRUCTURES:  There was moderate aortic stenosis  Echocardiogram:   SUMMARY     LEFT VENTRICLE:  Systolic function was normal  Ejection fraction was estimated to be 65 %  There were no regional wall motion abnormalities  Wall thickness was mildly increased      LEFT ATRIUM:  The atrium was mildly dilated      MITRAL VALVE:  There was mild annular calcification  There was trace regurgitation      AORTIC VALVE:  The valve was trileaflet  Leaflets exhibited moderate calcification and moderately reduced cuspal separation  There was moderate stenosis  There was trace regurgitation  Valve mean gradient was 22 5 mmHg  Estimated aortic valve area (by VTI) was 1 42 cm squared  Estimated aortic valve area (by Vmax) was 1 49 cm squared      TRICUSPID VALVE:  There was mild regurgitation  Pulmonary artery systolic pressure was at the upper limits of normal      AORTA:  There was mild dilatation of the ascending aorta      CT Scan:   IMPRESSION:  1  Coronary atherosclerotic disease noted  2   Right renal cyst   3   No acute findings  Carotid Duplex:   RIGHT:  There is <50% stenosis noted in the internal carotid artery  Plaque is  heterogenous and irregular  Vertebral artery flow is antegrade  There is no significant subclavian artery  disease  LEFT:  There is <50% stenosis noted in the internal carotid artery  Plaque is  heterogenous and irregular  Vertebral artery flow is antegrade  There is no significant subclavian artery  disease  Vein Mapping:   RIGHT LOWER LIMB:  The great saphenous vein is patent  from the groin to the ankle  The vein is of adequate caliber and quality for graft conduit with intraluminal  diameters ranging from 10 0mm to 2 7mm throughout the leg       LEFT LOWER LIMB:  The great saphenous vein is patent  from the groin to the ankle  The vein is of adequate caliber and quality for graft conduit with intraluminal  diameters ranging from 7 1mm to 2 0mm throughout the leg  I have personally reviewed pertinent reports  Assessment:  Patient Active Problem List    Diagnosis Date Noted    CAD (coronary artery disease) 05/15/2018    Abnormal cardiovascular stress test 05/15/2018    Aortic stenosis, moderate 04/06/2018    Benign prostatic hyperplasia with nocturia 02/13/2018    Acute pain of right knee 01/09/2018    Mild intermittent asthma without complication 57/77/0486    Sexual dysfunction 10/09/2017    Renal cyst 01/12/2017    Dyslipidemia, goal LDL below 100 06/28/2016    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    Pure hypercholesterolemia 11/12/2014    Benign essential hypertension 11/05/2014    Hypothyroidism 11/05/2014    Obesity 11/05/2014    Type 2 diabetes mellitus ([facility] [address] ) 11/05/2014     Aortic stenosis & Coronary Artery disease    Plan:  Risks and benefits of aortic valve replacement and coronary artery bypass grafting were discussed in detail today with the patient  We have reviewed results all preoperative radiographic,  laboratory and vascular studies  He understands and wishes to proceed with surgical intervention with KRISTA Garcia  was comfortable with our recommendations, and his questions were answered to his satisfaction  Thank you for allowing us to participate in the care of this patient       SIGNATURE: VALENCIA Mondragon  DATE: August 23, 2018  TIME: 10:50 AM

## 2018-09-17 NOTE — ANESTHESIA PREPROCEDURE EVALUATION
Review of Systems/Medical History      No history of anesthetic complications     Cardiovascular  Hyperlipidemia, Hypertension , Valvular heart disease , aortic valve stenosis, CAD , CAD status: 3VD,    Pulmonary  Negative pulmonary ROS        GI/Hepatic    GERD well controlled,        Negative  ROS        Endo/Other  Diabetes , History of thyroid disease , hypothyroidism,      GYN  Negative gynecology ROS          Hematology  Negative hematology ROS      Musculoskeletal  Negative musculoskeletal ROS        Neurology  Negative neurology ROS      Psychology   Negative psychology ROS              Physical Exam    Airway    Mallampati score: II  TM Distance: >3 FB  Neck ROM: full     Dental   upper dentures,     Cardiovascular      Pulmonary      Other Findings  Missing many, none loose; poor dentition      Anesthesia Plan  ASA Score- 4     Anesthesia Type- general with ASA Monitors  Additional Monitors: arterial line, central venous line and pulmonary artery catheter  Airway Plan: ETT  Comment:   General anesthesia, endotracheal Intubation  Standard ASA monitors  Large bore peripheral IV  Pre-induction arterial line  CVP (Triple Lumen) and Cordis with PAC  VERONICA for perioperative monitoring and diagnosis  Post-op ICU/vent  Risks and benefits discussed with patient; patient consented and agrees to proceed        Plan Factors-    Induction- intravenous  Postoperative Plan- Plan for postoperative opioid use  Informed Consent- Anesthetic plan and risks discussed with patient

## 2018-09-17 NOTE — OP NOTE
OPERATIVE REPORT  PATIENT NAME: Philip Johnson  :  1935  MRN: [id number]  Pt Location: BE OR ROOM 10    SURGERY DATE: 2018    SURGEON: Jose Luis Ash MD    ASSISTANT: Cesar Sands PA-C    ADDITIONAL ASSISTANT: n/a    PREOPERATIVE DIAGNOSES:   1  Moderate aortic stenosis  2  Multivessel coronary artery disease    POSTOPERATIVE DIAGNOSES:  1  Moderate aortic stenosis  2  Multivessel coronary artery disease    NYHA: 2  CCS: 3    PROCEDURE:  1  Aortic valve replacement with a 23mm Jenkins Intuity pericardial tissue valve  2  Coronary artery bypass grafting x 4 with left internal mammary artery to left anterior descending artery, saphenous vein graft to obtuse marginal 2 and saphenous vein graft - Y -  to obtuse marginal 3 and left posterior descending artery    CARDIOPULMONARY BYPASS TIME 183 minutes  CROSSCLAMP TIME 143 minutes  ANESTHESIA General endotracheal anesthesia with transesophageal echocardiogram guidance, Dr Opal Wright     INDICATIONS:  The patient is a 80y o  year-old male diagnosed with symptomatic multivessel coronary artery disease, preop TTE revealed moderate aortic stenosis  Coronary angiography showed % with collateral flow, OM1 small, OM2 90%, OM3 80%, L PDA 90%  I saw the patient in consultation and recommended the procedure described previously  FINDINGS:  1  Intraoperative transesophageal echocardiogram revealed normal ventricular function, moderate AS, no other significant valvular disease  2  Epiaortic ultrasound showed less than 5 mm non-mobile plaque  3  Aortic valve was trileaflet  4  Coronary anatomy as described in coronary angiography, OM2 was completely intramyocardial  5  Final transesophageal echocardiogram demonstrated prosthetic valve with normal function without evidence of perivalvular leak, no other significant changes  OPERATIVE TECHNIQUE:    The patient was taken to the operating room and placed supine on the operating table  Following the satisfactory induction of general anesthesia and placement of monitoring lines, the patient was prepped and draped in the usual sterile fashion  A time-out procedure was performed  The patient underwent median sternotomy, pericardiotomy, left internal mammary artery harvest with the standard technique and endoscopic left greater saphenous vein harvest, systemic heparinization, and conduit preparation  Epiaortic ultrasound showed less than 5 mm non-mobile plaque  Cannulation for cardiopulmonary bypass was performed with an arterial dispersion cannula, double stage venous cannula and a vented antegrade cardioplegia cannula  Once the ACT was greater than 480 seconds we placed the patient on cardiopulmonary bypass, the ascending aorta was crossclamped and cardiac arrest was obtained without complications with [place] cardioplegia  A left ventricular vent was placed through the right superior pulmonary vein while giving cardioplegia  A CO2 flooded field was used during the entire case  The following grafts were completed, left internal mamamry artery to left anterior descending artery with excellent flow, saphenous vein graft to obtuse marginal 2 with flow of 90 ml/min and saphenous vein graft - Y -  to obtuse marginal 3 and left posterior descending artery with flow of 90 ml/min and 100 ml/min respectively  All the distal anastomoses were performed in end-to-side fashion with 7-0 Prolene  Aortotomy was performed and the aortic valve was exposed  The aortic valve was found to be trileaflet  The leaflets were excised and the annulus was debrided of calcium  The annulus was sized, I choose a 23mm Jenkins Intuity pericardial tissue valve  A single non-pledgeted 2-0 Ethibond suture was placed at the roxana of each cusp around the aortic valve annulus   The prosthetic valve was brought to the field, the sutures were passed through the sewing ring, the prosthetic valve was seated in a supra annular fashion, the valve was expanded with a pressure balloon and the guiding sutures were tied down  Clearance of the coronary ostia was confirmed  Extensive irrigation of the aortic root and LVOT was performed to remove any debris  While de-airing the heart, the aortotomy was closed with 2 layers of running 4-0 Prolene suture  A total of 2 proximal anastomoses were completed on the ascending aorta in an end-to-side fashion using 6-0 Prolene suture  The heart was extensively de-aired and the crossclamp was removed  Atrial and ventricular pacing wires were placed  The SVG to OM3 was anastomosed to the SVG to LPDA in an end-to-side fashion with 7-0 Prolene suture  Following a period of reperfusion, the patient was weaned from cardiopulmonary bypass and decannulated  Protamine was administered with normalization of the ACT  Hemostasis was confirmed in all fields  Thoracostomy tubes were placed  The sternum was closed with stainless steel wires  The fascia, subdermis and skin were closed as multiple layers of running absorbable suture  COMPLICATIONS: none  PACKS/TUBES/DRAINS: Chest tubes x 3  SPECIMENS: Aortic valve  EBL: 200 cc  TRANSFUSION: 2u PRBC  As the attending surgeon, I was present and scrubbed for all critical portions of this procedure  Sponge, needle and instrument counts were reported as correct by the nursing staff  There was no qualified surgical resident available  The assistance of a PA was required to complete this case, specifically for assistance with cannulation, decannulation, construction of the bypass grafts, exposure during aortic valve replacement, and endoscopic saphenous vein harvesting         SIGNATURE: Delmi Rankin MD  DATE: September 17, 2018  TIME: 3:41 PM

## 2018-09-17 NOTE — ANESTHESIA POSTPROCEDURE EVALUATION
Post-Op Assessment Note    Airway: intubated    Post Op Vitals Reviewed: Yes          Staff: Anesthesiologist       Comments: see intra op quick note for details of ICU hand off          BP      Temp      Pulse     Resp      SpO2

## 2018-09-17 NOTE — ANESTHESIA PROCEDURE NOTES
Clint/Amina  Performed by: Deuce Keating  Authorized by: Deuce Keating   Date/Time: 9/17/2018 7:56 AM  Consent: Verbal consent obtained  Written consent obtained  Risks and benefits: risks, benefits and alternatives were discussed  Consent given by: patient  Patient understanding: patient states understanding of the procedure being performed  Patient consent: the patient's understanding of the procedure matches consent given  Required items: required blood products, implants, devices, and special equipment available  Patient identity confirmed: verbally with patient and arm band  Time out: Immediately prior to procedure a "time out" was called to verify the correct patient, procedure, equipment, support staff and site/side marked as required  Indications: vascular access and central pressure monitoring  Location details: right internal jugular  Catheter size: 9 Fr  Patient position: Trendelenburg    Anesthesia:  Local anesthetic: ga    Assessment: blood return through all ports and free fluid flow  Preparation: skin prepped with ChloraPrep  Skin prep agent dried: skin prep agent completely dried prior to procedure  Sterile barriers: all five maximum sterile barriers used - cap, mask, sterile gown, sterile gloves, and large sterile sheet  Hand hygiene: hand hygiene performed prior to central venous catheter insertion  Site selection rationale: cabg/avr  Pre-procedure: landmarks identified  Number of attempts: 1  Successful placement: yes  Post-procedure: line sutured and dressing applied  Patient tolerance: Patient tolerated the procedure well with no immediate complications

## 2018-09-17 NOTE — ANESTHESIA PROCEDURE NOTES
Arterial Line Insertion  Date/Time: 9/17/2018 7:46 AM  Performed by: Massiel Brewer  Authorized by: Massiel Brewer   Consent: Verbal consent obtained  Written consent obtained  Consent given by: patient  Patient understanding: patient states understanding of the procedure being performed  Patient consent: the patient's understanding of the procedure matches consent given  Required items: required blood products, implants, devices, and special equipment available  Patient identity confirmed: verbally with patient and arm band  Time out: Immediately prior to procedure a "time out" was called to verify the correct patient, procedure, equipment, support staff and site/side marked as required  Preparation: Patient was prepped and draped in the usual sterile fashion  Indications: multiple ABGs and hemodynamic monitoring  Orientation:  Right  Location: radial artery  Anesthesia: local infiltration  Local Anesthetic: lidocaine 1% without epinephrine    Sedation:  Patient sedated: yes  Analgesia: see MAR for details  Vitals: Vital signs were monitored during sedation      Procedure Details:  Needle gauge: 20  Seldinger technique: Seldinger technique used  Number of attempts: 1    Post-procedure:  Post-procedure: dressing applied  Waveform: good waveform and waveform confirmed  Post-procedure CNS: normal and unchanged  Patient tolerance: Patient tolerated the procedure well with no immediate complications  Comments: Pre-induction [phone]

## 2018-09-18 ENCOUNTER — APPOINTMENT (INPATIENT)
Dept: RADIOLOGY | Facility: HOSPITAL | Age: 83
DRG: 219 | End: 2018-09-18
Payer: COMMERCIAL

## 2018-09-18 PROBLEM — J95.2 ACUTE POSTOPERATIVE PULMONARY INSUFFICIENCY (HCC): Chronic | Status: RESOLVED | Noted: 2018-09-17 | Resolved: 2018-09-18

## 2018-09-18 LAB
ABO GROUP BLD BPU: NORMAL
ANION GAP SERPL CALCULATED.3IONS-SCNC: 8 MMOL/L (ref 4–13)
ATRIAL RATE: 68 BPM
ATRIAL RATE: 74 BPM
ATRIAL RATE: 87 BPM
BPU ID: NORMAL
BUN SERPL-MCNC: 26 MG/DL (ref 5–25)
CALCIUM SERPL-MCNC: 8.2 MG/DL (ref 8.3–10.1)
CHLORIDE SERPL-SCNC: 111 MMOL/L (ref 100–108)
CO2 SERPL-SCNC: 25 MMOL/L (ref 21–32)
CREAT SERPL-MCNC: 1.4 MG/DL (ref 0.6–1.3)
ERYTHROCYTE [DISTWIDTH] IN BLOOD BY AUTOMATED COUNT: 14.5 % (ref 11.6–15.1)
GFR SERPL CREATININE-BSD FRML MDRD: 46 ML/MIN/1.73SQ M
GLUCOSE SERPL-MCNC: 105 MG/DL (ref 65–140)
GLUCOSE SERPL-MCNC: 115 MG/DL (ref 65–140)
GLUCOSE SERPL-MCNC: 118 MG/DL (ref 65–140)
GLUCOSE SERPL-MCNC: 119 MG/DL (ref 65–140)
GLUCOSE SERPL-MCNC: 119 MG/DL (ref 65–140)
GLUCOSE SERPL-MCNC: 120 MG/DL (ref 65–140)
GLUCOSE SERPL-MCNC: 124 MG/DL (ref 65–140)
GLUCOSE SERPL-MCNC: 126 MG/DL (ref 65–140)
GLUCOSE SERPL-MCNC: 130 MG/DL (ref 65–140)
GLUCOSE SERPL-MCNC: 145 MG/DL (ref 65–140)
GLUCOSE SERPL-MCNC: 165 MG/DL (ref 65–140)
GLUCOSE SERPL-MCNC: 170 MG/DL (ref 65–140)
GLUCOSE SERPL-MCNC: 303 MG/DL (ref 65–140)
HCT VFR BLD AUTO: 23.9 % (ref 36.5–49.3)
HGB BLD-MCNC: 7.9 G/DL (ref 12–17)
MAGNESIUM SERPL-MCNC: 2.9 MG/DL (ref 1.6–2.6)
MCH RBC QN AUTO: 31.5 PG (ref 26.8–34.3)
MCHC RBC AUTO-ENTMCNC: 33.1 G/DL (ref 31.4–37.4)
MCV RBC AUTO: 95 FL (ref 82–98)
P AXIS: 42 DEGREES
P AXIS: 43 DEGREES
PLATELET # BLD AUTO: 117 THOUSANDS/UL (ref 149–390)
PMV BLD AUTO: 10.2 FL (ref 8.9–12.7)
POTASSIUM SERPL-SCNC: 4.6 MMOL/L (ref 3.5–5.3)
PR INTERVAL: 150 MS
PR INTERVAL: 158 MS
QRS AXIS: -11 DEGREES
QRS AXIS: -41 DEGREES
QRS AXIS: -9 DEGREES
QRSD INTERVAL: 113 MS
QRSD INTERVAL: 113 MS
QRSD INTERVAL: 117 MS
QT INTERVAL: 433 MS
QT INTERVAL: 442 MS
QT INTERVAL: 496 MS
QTC INTERVAL: 471 MS
QTC INTERVAL: 481 MS
QTC INTERVAL: 597 MS
RBC # BLD AUTO: 2.51 MILLION/UL (ref 3.88–5.62)
SODIUM SERPL-SCNC: 144 MMOL/L (ref 136–145)
T WAVE AXIS: 26 DEGREES
T WAVE AXIS: 28 DEGREES
T WAVE AXIS: 49 DEGREES
UNIT DISPENSE STATUS: NORMAL
UNIT PRODUCT CODE: NORMAL
UNIT RH: NORMAL
VENTRICULAR RATE: 68 BPM
VENTRICULAR RATE: 74 BPM
VENTRICULAR RATE: 87 BPM
WBC # BLD AUTO: 8.84 THOUSAND/UL (ref 4.31–10.16)

## 2018-09-18 PROCEDURE — G8978 MOBILITY CURRENT STATUS: HCPCS

## 2018-09-18 PROCEDURE — G8979 MOBILITY GOAL STATUS: HCPCS

## 2018-09-18 PROCEDURE — 85027 COMPLETE CBC AUTOMATED: CPT | Performed by: PHYSICIAN ASSISTANT

## 2018-09-18 PROCEDURE — 80048 BASIC METABOLIC PNL TOTAL CA: CPT | Performed by: PHYSICIAN ASSISTANT

## 2018-09-18 PROCEDURE — 93010 ELECTROCARDIOGRAM REPORT: CPT | Performed by: INTERNAL MEDICINE

## 2018-09-18 PROCEDURE — 97163 PT EVAL HIGH COMPLEX 45 MIN: CPT

## 2018-09-18 PROCEDURE — G8987 SELF CARE CURRENT STATUS: HCPCS

## 2018-09-18 PROCEDURE — 94760 N-INVAS EAR/PLS OXIMETRY 1: CPT

## 2018-09-18 PROCEDURE — 97167 OT EVAL HIGH COMPLEX 60 MIN: CPT

## 2018-09-18 PROCEDURE — 82948 REAGENT STRIP/BLOOD GLUCOSE: CPT

## 2018-09-18 PROCEDURE — 83735 ASSAY OF MAGNESIUM: CPT | Performed by: PHYSICIAN ASSISTANT

## 2018-09-18 PROCEDURE — 71045 X-RAY EXAM CHEST 1 VIEW: CPT

## 2018-09-18 PROCEDURE — 99223 1ST HOSP IP/OBS HIGH 75: CPT | Performed by: INTERNAL MEDICINE

## 2018-09-18 PROCEDURE — P9100 PATHOGEN TEST FOR PLATELETS: HCPCS

## 2018-09-18 PROCEDURE — 97535 SELF CARE MNGMENT TRAINING: CPT

## 2018-09-18 PROCEDURE — 99222 1ST HOSP IP/OBS MODERATE 55: CPT | Performed by: INTERNAL MEDICINE

## 2018-09-18 PROCEDURE — P9037 PLATE PHERES LEUKOREDU IRRAD: HCPCS

## 2018-09-18 PROCEDURE — G8988 SELF CARE GOAL STATUS: HCPCS

## 2018-09-18 PROCEDURE — 93005 ELECTROCARDIOGRAM TRACING: CPT

## 2018-09-18 PROCEDURE — 99291 CRITICAL CARE FIRST HOUR: CPT | Performed by: ANESTHESIOLOGY

## 2018-09-18 PROCEDURE — 99024 POSTOP FOLLOW-UP VISIT: CPT | Performed by: THORACIC SURGERY (CARDIOTHORACIC VASCULAR SURGERY)

## 2018-09-18 RX ORDER — TEMAZEPAM 15 MG/1
15 CAPSULE ORAL
Status: DISCONTINUED | OUTPATIENT
Start: 2018-09-18 | End: 2018-09-21 | Stop reason: HOSPADM

## 2018-09-18 RX ORDER — DOCUSATE SODIUM 100 MG/1
100 CAPSULE, LIQUID FILLED ORAL 2 TIMES DAILY
Status: DISCONTINUED | OUTPATIENT
Start: 2018-09-18 | End: 2018-09-19

## 2018-09-18 RX ORDER — ALBUMIN, HUMAN INJ 5% 5 %
SOLUTION INTRAVENOUS
Status: COMPLETED
Start: 2018-09-18 | End: 2018-09-18

## 2018-09-18 RX ORDER — PRAVASTATIN SODIUM 80 MG/1
80 TABLET ORAL
Status: DISCONTINUED | OUTPATIENT
Start: 2018-09-18 | End: 2018-09-21 | Stop reason: HOSPADM

## 2018-09-18 RX ORDER — ALBUMIN, HUMAN INJ 5% 5 %
12.5 SOLUTION INTRAVENOUS ONCE
Status: COMPLETED | OUTPATIENT
Start: 2018-09-18 | End: 2018-09-18

## 2018-09-18 RX ADMIN — CEFAZOLIN SODIUM 2000 MG: 2 SOLUTION INTRAVENOUS at 06:12

## 2018-09-18 RX ADMIN — CEFAZOLIN SODIUM 2000 MG: 2 SOLUTION INTRAVENOUS at 13:41

## 2018-09-18 RX ADMIN — SODIUM CHLORIDE 8 UNITS/HR: 9 INJECTION, SOLUTION INTRAVENOUS at 20:24

## 2018-09-18 RX ADMIN — PANTOPRAZOLE SODIUM 40 MG: 40 TABLET, DELAYED RELEASE ORAL at 06:12

## 2018-09-18 RX ADMIN — OXYCODONE HYDROCHLORIDE AND ACETAMINOPHEN 1 TABLET: 5; 325 TABLET ORAL at 15:46

## 2018-09-18 RX ADMIN — DOCUSATE SODIUM 100 MG: 100 CAPSULE, LIQUID FILLED ORAL at 09:27

## 2018-09-18 RX ADMIN — MUPIROCIN 1 APPLICATION: 20 OINTMENT TOPICAL at 20:19

## 2018-09-18 RX ADMIN — DOCUSATE SODIUM 100 MG: 100 CAPSULE, LIQUID FILLED ORAL at 17:53

## 2018-09-18 RX ADMIN — OXYCODONE HYDROCHLORIDE AND ACETAMINOPHEN 1 TABLET: 5; 325 TABLET ORAL at 20:19

## 2018-09-18 RX ADMIN — HEPARIN SODIUM 5000 UNITS: 5000 INJECTION, SOLUTION INTRAVENOUS; SUBCUTANEOUS at 22:02

## 2018-09-18 RX ADMIN — PRAVASTATIN SODIUM 80 MG: 80 TABLET ORAL at 17:53

## 2018-09-18 RX ADMIN — OXYCODONE HYDROCHLORIDE AND ACETAMINOPHEN 1 TABLET: 5; 325 TABLET ORAL at 09:34

## 2018-09-18 RX ADMIN — AMIODARONE HYDROCHLORIDE 200 MG: 200 TABLET ORAL at 06:12

## 2018-09-18 RX ADMIN — ALLOPURINOL 300 MG: 300 TABLET ORAL at 09:27

## 2018-09-18 RX ADMIN — ASPIRIN 325 MG: 325 TABLET ORAL at 09:27

## 2018-09-18 RX ADMIN — POLYETHYLENE GLYCOL 3350 17 G: 17 POWDER, FOR SOLUTION ORAL at 09:27

## 2018-09-18 RX ADMIN — LEVOTHYROXINE SODIUM 150 MCG: 75 TABLET ORAL at 06:12

## 2018-09-18 RX ADMIN — HEPARIN SODIUM 5000 UNITS: 5000 INJECTION, SOLUTION INTRAVENOUS; SUBCUTANEOUS at 13:40

## 2018-09-18 RX ADMIN — ALBUMIN HUMAN 12.5 G: 0.05 INJECTION, SOLUTION INTRAVENOUS at 00:15

## 2018-09-18 RX ADMIN — GABAPENTIN 300 MG: 300 CAPSULE ORAL at 22:02

## 2018-09-18 RX ADMIN — MUPIROCIN 1 APPLICATION: 20 OINTMENT TOPICAL at 09:27

## 2018-09-18 RX ADMIN — TAMSULOSIN HYDROCHLORIDE 0.4 MG: 0.4 CAPSULE ORAL at 17:53

## 2018-09-18 RX ADMIN — HEPARIN SODIUM 5000 UNITS: 5000 INJECTION, SOLUTION INTRAVENOUS; SUBCUTANEOUS at 06:12

## 2018-09-18 RX ADMIN — METOPROLOL TARTRATE 12.5 MG: 25 TABLET ORAL at 20:18

## 2018-09-18 RX ADMIN — METOPROLOL TARTRATE 12.5 MG: 25 TABLET ORAL at 09:27

## 2018-09-18 RX ADMIN — ALBUMIN, HUMAN INJ 5% 12.5 G: 5 SOLUTION at 00:15

## 2018-09-18 NOTE — RESPIRATORY THERAPY NOTE
RT Ventilator Management Note  Karin Tena  80 y o  male MRN: [id number]  Unit/Bed#: [facility] 414-01 Encounter: [id number]      Daily Screen       9/17/2018 2007             Patient safety screen outcome[de-identified] Passed    Spont breathing trial % for 30 min: Yes    Spont breathing trial outcome[de-identified] Passed    RSBI: 45            Physical Exam:   Assessment Type: (P) Assess only  General Appearance: (P) Awake  Respiratory Pattern: (P) Assisted  Chest Assessment: Chest expansion symmetrical  Bilateral Breath Sounds: (P) Clear  Cough: (P) Strong  Suction: ET Tube      Resp Comments: (P) Pt extubated from mechanical ventilator    Pt met all criteria for extubation   (+) cuff leak test   Pt able to speak his name after ETT removal   Placed on 4 L NC

## 2018-09-18 NOTE — PLAN OF CARE
Problem: OCCUPATIONAL THERAPY ADULT  Goal: Performs self-care activities at highest level of function for planned discharge setting  See evaluation for individualized goals  Treatment Interventions: ADL retraining, Functional transfer training, Endurance training, Equipment evaluation/education, Patient/family training, Compensatory technique education, Energy conservation, Activityengagement, Cardiac education          See flowsheet documentation for full assessment, interventions and recommendations  Limitation: Decreased ADL status, Decreased endurance, Decreased self-care trans, Decreased high-level ADLs  Prognosis: Good  Assessment: Pt is a 80 y o  male who was admitted to [facility] on 9/17/2018 with Coronary artery disease involving native coronary artery of native heart  After further cardiac w/u, pt is now s/p CABG X4 and AVR w/ active cardiac/sternal precautions  Pt's comorbidities include DM2, HTN, obesity, aortic valve stenosis, GERD, and asthma  At baseline pt was I w/ ADLs and IADLs  He uses a [address] for mobility as needed  Pt lives w/ his son in a one level home and son is able to assist  Currently pt requires S for UB ADLs, max A for LB ADLs, and min Ax1 for functional mobility/transfers w/ RW  Pt currently presents with impairments in the following categories -difficulty performing ADLS, difficulty performing IADLS,  activity tolerance, endurance, standing balance/tolerance and sitting balance/tolerance  These impairments, as well as pt's fatigue, pain, cardiac/sternal precautions, decreased caregiver support and risk for falls  limit pt's ability to safely engage in all baseline areas of occupation, including grooming, bathing, dressing, toileting, functional mobility/transfers and leisure activities  From OT standpoint, recommend inpatient rehab vs home pending progress upon D/C  OT will continue to follow to address the below stated goals        OT Discharge Recommendation: Other (Comment) (STR vs home pending)  OT - OK to Discharge: Yes (when medically stable)

## 2018-09-18 NOTE — RESPIRATORY THERAPY NOTE
RT Protocol Note  Jeison Jenkins  80 y o  male MRN: [id number]  Unit/Bed#: [facility] 414-01 Encounter: [id number]    Assessment    Principal Problem:    Coronary artery disease involving native coronary artery of native heart  Active Problems:    Benign essential hypertension    Dyslipidemia, goal LDL below 100    Hypothyroidism    Mild intermittent asthma without complication    Obesity    Type 2 diabetes mellitus (HCC)    Aortic stenosis, moderate    Nonrheumatic aortic valve stenosis    GERD (gastroesophageal reflux disease)    S/P CABG x 4    S/P AVR (aortic valve replacement)    Acute postoperative pulmonary insufficiency (HCC)      Home Pulmonary Medications:         Past Medical History:   Diagnosis Date    Aortic stenosis     CKD (chronic kidney disease)     baseline Cr 1 3-1 5    Coronary artery disease     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HCC)     type 2, insulin dependent    GERD (gastroesophageal reflux disease)     Glaucoma     Gout     History of prostate cancer     Hypothyroidism     Overweight     Peripheral neuropathy, idiopathic     Pure hypercholesterolemia     LA   11/12/14   R   11/12/14      Social History     Social History    Marital status:      Spouse name: N/A    Number of children: N/A    Years of education: N/A     Social History Main Topics    Smoking status: Never Smoker    Smokeless tobacco: Never Used      Comment: Smoked in the service about 50 years ago    Alcohol use No    Drug use: No    Sexual activity: Not Currently     Other Topics Concern    None     Social History Narrative    Caffeine use / coffee diet cola and tea    Lives with family     Living situation supportive and safe    No advance directives  -denied           Subjective         Objective    Physical Exam:   Assessment Type: Pre-treatment  General Appearance: Awake  Respiratory Pattern: Normal  Chest Assessment: Chest expansion symmetrical  Bilateral Breath Sounds: Clear  Cough: Strong, Moist  Suction: ET Tube    Vitals:  Blood pressure 118/51, pulse 74, temperature (!) 96 6 °F (35 9 °C), temperature source Probe, resp  rate 16, height 5' 8" (1 727 m), weight 96 2 kg (212 lb), SpO2 100 %  Imaging and other studies: I have personally reviewed pertinent reports  Plan    Respiratory Plan: No distress/Pulmonary history  Airway Clearance Plan: Incentive Spirometer     Resp Comments: Pt w/no know hx COPD/asthma   neg tob hx  However, pt has been prescribed albuterol MDI prn  Pt states he has never used it     ? indications for prescription  At this time no indications for bronchodilator Rx  Plan continue IS post extubation

## 2018-09-18 NOTE — PLAN OF CARE
Problem: PHYSICAL THERAPY ADULT  Goal: Performs mobility at highest level of function for planned discharge setting  See evaluation for individualized goals  Treatment/Interventions: Functional transfer training, Endurance training, Patient/family training, Bed mobility, Gait training, Spoke to nursing, Spoke to case management, OT  Equipment Recommended: Nathalie Phipps       See flowsheet documentation for full assessment, interventions and recommendations  Prognosis: Fair  Problem List: Decreased mobility, Impaired balance, Decreased endurance, Decreased strength, Impaired judgement, Decreased safety awareness, Pain  Assessment: Pt is 80 y o  male seen for PT evaluation s/p admit to [facility] on 9/17/2018 w/ Coronary artery disease involving native coronary artery of native heart  PT consulted to assess pt's functional mobility and d/c needs  Order placed for PT eval and tx, w/ ambulate patient order  Comorbidities affecting pt's physical performance at time of assessment include: fatigue, chest tube  PTA, pt was ambulates community distances and elevations and has 1 RENE  Personal factors affecting pt at time of IE include: ambulating w/ assistive device, stairs to enter home, inability to ambulate household distances, limited home support, decreased initiation and engagement, limited insight into impairments, inability to perform IADLs and inability to perform ADLs  Please find objective findings from PT assessment regarding body systems outlined above with impairments and limitations including weakness, impaired balance, decreased endurance, impaired coordination, gait deviations, pain, decreased activity tolerance, decreased functional mobility tolerance, decreased safety awareness, fall risk and SOB upon exertion  Pt required single step instruction for sternal precautions and technique during transfers  Ambulated with severely slow shuffling gait   Poor activity tolerance despite encouragement to continue  Anticipate at this time pt may require STR as he has limited support living with a son who works and inability to tolerate household distance at this time  The following objective measures performed on IE also reveal limitations: Barthel Index: 55/100  Pt's clinical presentation is currently unstable/unpredictable seen in pt's presentation of chest tube, pain  Pt to benefit from continued PT tx to address deficits as defined above and maximize level of functional independent mobility and consistency  From PT/mobility standpoint, recommendation at time of d/c would be IP rehab pending progress in order to facilitate return to PLOF  Barriers to Discharge: Decreased caregiver support, Inaccessible home environment     Recommendation:  (rehab)     PT - OK to Discharge:  (to rehab when medically stable )    See flowsheet documentation for full assessment

## 2018-09-18 NOTE — PROGRESS NOTES
Progress Note - Critical Care   Katlyn Escobar  80 y o  male MRN: [id number]  Unit/Bed#: [facility] 414-01 Encounter: [id number]      Attending Physician: Valerie Terrazas MD    24 Hour Events: POD # 1 s/p CABG x 4 LIMA to LAD, SVG to PDA, SVG to OM2, and SVG to OM3 and AVR with 23mm tissue valve  He arrived to the unit on Epi 4mcg, Armando 40mcg, and Milrinone 0 13mcg  He received 2 units of FFP, 2 units of Cryo, and 1 unit of Platelets on arrival for high mediastinal CT output and coagulopathy  He also received Amicar infusion  CT output slowed and coagulopathy reversed  Overnight received 500LR and 250 Albumin for hypotension/low filling pressures  Epi and Armando weaned off by midnight  Milrinone was turned off at 0430  Allergies:    Allergies   Allergen Reactions    Amlodipine Nausea Only    Atorvastatin Myalgia       Medications:   Scheduled Meds:  Current Facility-Administered Medications:  acetaminophen 650 mg Rectal Q4H PRN Marcia Looney PA-C    acetaminophen 650 mg Oral Q4H PRN Marcia Looney PA-C    allopurinol 300 mg Oral Daily Marcia Looney PA-C    amiodarone 200 mg Oral [facility] Marcia Looney PA-C    aspirin 325 mg Oral Daily Marcia Looney PA-C    bisacodyl 10 mg Rectal Daily PRN Marcia Looney PA-C    calcium chloride 1 g Intravenous Once Marcia Looney PA-C    cefazolin 2,000 mg Intravenous [address], PAKURT Last Rate: 2,000 mg (09/17/18 2144)   chlorhexidine 15 mL Mouth/Throat BID Marcia Looney PA-C    epinephrine 1-20 mcg/min Intravenous Titrated Zakiya Graves PA-C Last Rate: Stopped (09/18/18 0030)   furosemide 40 mg Intravenous Q6H PRN Marcia Looney PA-C    gabapentin 300 mg Oral HS Teri Parrish PA-C    heparin (porcine) 5,000 Units Subcutaneous Q8H [facility] Zakiya Graves PA-C    HYDROmorphone 0 5 mg Intravenous Q1H PRN Marcia Looney PA-C    HYDROmorphone 1 mg Intravenous Q1H PRN Teri Parrish PA-C    insulin regular (HumuLIN R,NovoLIN R) infusion 0 3-21 Units/hr Intravenous Titrated Lavaughn Call, PA-JITENDRA Last Rate: 1 5 Units/hr (09/18/18 0400)   levothyroxine 150 mcg Oral Early Morning Lavaughn Call, PA-JITENDRA    lidocaine (cardiac) 100 mg Intravenous Q30 Min PRN Teri Parrish PA-C    milrinone [facility]) infusion 0 13 mcg/kg/min Intravenous Continuous [place] Violeta, PA-JITENDRA Last Rate: Stopped (09/18/18 0424)   mupirocin 1 application Nasal [id number] [facility] Teri Parrish PA-C    niCARdipine 2 5-15 mg/hr Intravenous Titrated [place] GoliadCHON martin Last Rate: Stopped (09/17/18 1928)   ondansetron 4 mg Intravenous Q6H PRN Lavaughn Call, PA-C    oxyCODONE-acetaminophen 1 tablet Oral Q4H PRN Lavaughn Call, PA-C    oxyCODONE-acetaminophen 2 tablet Oral Q6H PRN Lavaughn Call, PA-C    pantoprazole 40 mg Oral Daily Lavaughn Call, PA-C    phenylephine  mcg/min Intravenous Titrated Rufina Johann, PA-JITENDRA Last Rate: Stopped (09/18/18 0030)   polyethylene glycol 17 g Oral Daily Lavaughn Call, PA-C    potassium chloride 20 mEq Intravenous Q1H PRN Lavaughn Call, PA-C    sodium chloride 20 mL/hr Intravenous Continuous Lavaughn Call, PA-C Last Rate: 20 mL/hr (09/18/18 0400)   tamsulosin 0 4 mg Oral Daily With [employer], PA-C        VTE Pharmacologic Prophylaxis: Heparin  VTE Mechanical Prophylaxis: sequential compression device    Continuous Infusions:  epinephrine 1-20 mcg/min Last Rate: Stopped (09/18/18 0030)   insulin regular (HumuLIN R,NovoLIN R) infusion 0 3-21 Units/hr Last Rate: 1 5 Units/hr (09/18/18 0400)   milrinone (PRIMACOR) infusion 0 13 mcg/kg/min Last Rate: Stopped (09/18/18 0424)   niCARdipine 2 5-15 mg/hr Last Rate: Stopped (09/17/18 1928)   phenylephine  mcg/min Last Rate: Stopped (09/18/18 0030)   sodium chloride 20 mL/hr Last Rate: 20 mL/hr (09/18/18 0400)     PRN Meds:    acetaminophen 650 mg Q4H PRN   acetaminophen 650 mg Q4H PRN   bisacodyl 10 mg Daily PRN   furosemide 40 mg Q6H PRN   HYDROmorphone 0 5 mg Q1H PRN   HYDROmorphone 1 mg Q1H PRN   lidocaine (cardiac) 100 mg Q30 Min PRN   ondansetron 4 mg Q6H PRN   oxyCODONE-acetaminophen 1 tablet Q4H PRN   oxyCODONE-acetaminophen 2 tablet Q6H PRN   potassium chloride 20 mEq Q1H PRN       Home Medications:   Prior to Admission medications    Medication Sig Start Date End Date Taking? Authorizing Provider   allopurinol (ZYLOPRIM) 300 mg tablet TAKE ONE TABLET BY MOUTH ONCE DAILY 5/31/18  Yes Pcao Harding PA-C   aspirin (ECOTRIN LOW STRENGTH) 81 mg EC tablet Take 1 tablet by mouth daily   Yes Historical Provider, MD CANTU UF III MINI PEN NEEDLES 31G X 5 MM MISC USE ONE DAILY AS DIRECTED 5/7/18  Yes Juliette Cid DO   diltiazem (CARDIZEM CD) 180 mg 24 hr capsule Take 1 capsule by mouth daily 2/11/15  Yes Historical Provider, MD   Insulin Glargine (BASAGLAR KWIKPEN SC) Inject 42 Units under the skin daily at bedtime   Yes Historical Provider, MD   levothyroxine 150 mcg tablet Take 150 mcg by mouth daily   Yes Historical Provider, MD   losartan-hydrochlorothiazide (HYZAAR) 100-25 MG per tablet Take 1 tablet by mouth daily 11/8/16  Yes Historical Provider, MD   metFORMIN (GLUCOPHAGE) 500 mg tablet TAKE 2 TABS IN AM AND 2 TABS AT PM  Patient taking differently: TAKE 1 TABLET ONCE DAILY 6/8/18  Yes Juliette Cid DO   mupirocin (BACTROBAN) 2 % ointment Apply 1 application topically 2 (two) times a day for 5 days Apply to each nostril twice daily for five days before your operation   8/23/18 9/4/18 Yes VALENCIA Lopez   tamsulosin (FLOMAX) 0 4 mg Take 1 capsule (0 4 mg total) by mouth daily with dinner 2/13/18  Yes Suly Prado MD   albuterol (PROVENTIL HFA) 90 mcg/act inhaler Inhale 2 puffs 4 (four) times a day as needed    Historical Provider, MD   gabapentin (NEURONTIN) 300 mg capsule TAKE ONE CAPSULE BY MOUTH ONCE DAILY AT BEDTIME 9/7/18   Paco Harding PA-C   Insulin Pen Needle 31G X 5 MM MISC by Does not apply route 2/9/16   Historical Provider, MD   mupirocin (BACTROBAN) 2 % ointment Apply 1 application topically 2 (two) times a day for 5 days Apply to each nostril twice daily for five days before your operation  18  Lisa Pringle PA-C   nitroglycerin (NITROSTAT) 0 4 mg SL tablet Place 1 tablet (0 4 mg total) under the tongue every 5 (five) minutes as needed for chest pain 18   Luana Calabrese MD       Vitals:   Vitals:    18 2300 18 0000 18 0200 18 0400   BP:       Pulse: 70 70 70 76   Resp:    Temp: (!) 96 8 °F (36 °C) (!) 97 °F (36 1 °C) 97 5 °F (36 4 °C) 98 1 °F (36 7 °C)   TempSrc: Probe Probe Probe Probe   SpO2: 100% 100% 100% 98%   Weight:       Height:         Arterial Line BP: 120/40  Arterial Line MAP (mmHg): 58 mmHg    Tele Rhythm: NSR This was personally reviewed by myself  Respiratory:  SpO2: SpO2: 98 %, SpO2 Activity: SpO2 Activity: At Rest, SpO2 Device: O2 Device: None (Room air)  O2 Flow Rate (L/min): 1 L/min    Temperature: Temp (24hrs), Av 2 °F (36 2 °C), Min:96 6 °F (35 9 °C), Max:99 6 °F (37 6 °C)  Current: Temperature: 98 1 °F (36 7 °C)    Weights:   Weight (last 2 days)     Date/Time   Weight    18 0650  96 2 (212)            IBW: 68 4 kg  Body mass index is 32 23 kg/m²  Hemodynamic Monitoring:  PAP: PAP: 31/7, CVP: CVP (mean): 4 mmHg, CO: CO (L/min): 6 3 L/min, CI: CI (L/min/m2): 3 L/min/m2, SVR: SVR (dyne*sec)/cm5: 698 (dyne*sec)/cm5  PAP: (26-47)/(7-21) 31/7  CO:  [3 3 L/min-7 2 L/min] 6 3 L/min  CI:  [1 6 L/min/m2-3 4 L/min/m2] 3 L/min/m2    Intake and Outputs:  Intake/Output Summary (Last 24 hours) at 18 0554  Last data filed at 18 0400   Gross per 24 hour   Intake          9478 74 ml   Output             3345 ml   Net          6133 74 ml     I/O last 24 hours: In: 9478 7 [I V :5015 7; Blood:1613; IV Piggyback:1850]  Out: 2450 [Urine:1365; Blood:1000;  Chest Tube:980]    UOP: 50mL/hour     Chest tube Output:  Mediastinal tubes: 50 mL/8 hours  730 mL/24 hours   Pleural tubes: 20 mL/8 hours  250 mL/24 hours     Labs:    Results from last 7 days  Lab Units 09/18/18  0401 09/17/18  2304 09/17/18  1725  09/17/18  1546   WBC Thousand/uL 8 84  --   --   --   --    HEMOGLOBIN g/dL 7 9* 8 8* 8 7*  --  10 3*   I STAT HEMOGLOBIN   --   --   --   < >  --    HEMATOCRIT % 23 9* 27 0* 27 2*  < > 31 4*   PLATELETS Thousands/uL 117*  --  134*  --  97*   < > = values in this interval not displayed  Results from last 7 days  Lab Units 09/18/18  0401 09/17/18  2304 09/17/18  1909 09/17/18  1628 09/17/18  1546  09/17/18  1454 09/17/18  1356   SODIUM mmol/L 144  --   --   --  145  --   --   --    POTASSIUM mmol/L 4 6 4 2 3 6  --  3 6  < >  --   --    CHLORIDE mmol/L 111*  --   --   --  113*  --   --   --    CO2 mmol/L 25  --   --   --  22  --   --   --    BUN mg/dL 26*  --   --   --  23  --   --   --    CREATININE mg/dL 1 40*  --   --   --  1 34*  --   --   --    CALCIUM mg/dL 8 2*  --   --   --  8 7  --   --   --    GLUCOSE, ISTAT mg/dl  --   --   --  126  --   --  124 160*   < > = values in this interval not displayed  Baseline creat 1 5      Results from last 7 days  Lab Units 09/18/18  0401   MAGNESIUM mg/dL 2 9*       Results from last 7 days  Lab Units 09/17/18  1725 09/17/18  1409   INR  1 47* 1 79*   PTT seconds 69* 61*           Imaging:  AM CXR: lines and tubes stable post-op  Mediastinum widened  No pneumo  Left basilar atelectasis  Small left pleural effusion  This was personally reviewed by myself in PACS  AM EKG: NSR  Prolonged QTc 597  This was personally reviewed by myself  Physical Exam:    General Appearance:  Well developed  HENT: normocephalic, atraumatic  Neck: supple  RIJ CVC in place  Eyes: no icterus  Cardiac: regular rate and rhythm, no murmur, no rub  Pulmonary: lungs diminisihed to auscultation bilaterally  Gastrointestinal: bowel sounds hypoactive, no distention, non-tender  : Gee present: yes   Musculoskeletal: Trace edema bilaterally       Neuro: Screening neurologic exam reveals no deficits  follows commands  Psych: Mood and affect normal     Skin: warm, dry, intact  Incisions: Sternum is stable  Incision dressed with Acticoat  No erythema or drainage      Invasive lines and devices:   Invasive Devices     Central Venous Catheter Line            CVC Central Lines 09/17/18 Triple less than 1 day    Introducer 09/17/18 less than 1 day          Peripheral Intravenous Line            Peripheral IV 09/17/18 Right Antecubital less than 1 day          Arterial Line            Arterial Line 09/17/18 Right Radial less than 1 day          Line            Pacer Wires less than 1 day    Pacer Wires less than 1 day          Drain            Chest Tube 1 Anterior Mediastinal 32 Fr  less than 1 day    Chest Tube 2 Left Pleural 32 Fr  less than 1 day    Chest Tube 3 Posterior Mediastinal 32 Fr  less than 1 day    Urethral Catheter Latex 12 Fr  less than 1 day                Assessment:  Principal Problem:    Coronary artery disease involving native coronary artery of native heart  Active Problems:    S/P CABG x 4    Aortic stenosis, moderate    S/P AVR (aortic valve replacement)    Acute postoperative pulmonary insufficiency (HCC)    Nonrheumatic aortic valve stenosis    Type 2 diabetes mellitus (HCC)    Dyslipidemia, goal LDL below 100    Benign essential hypertension    GERD (gastroesophageal reflux disease)    Hypothyroidism    Mild intermittent asthma without complication    Obesity      Plan:    Neuro:   · Pain controlled with: percocet and tylenol PRN  · Regulate sleep/wake cycle  · Restoril QHS PRN  · Trend neuro exam  CV:   · Cardiac infusions: None  · MAP goal > 65, SBP goal < 120  · D/C [place] catheter and cordis  · D/C Arterial line  · Rhythm: NSR  · Follow rhythm on telemetry  · Hold beta blocker today secondary to soft BP  · Maintain epicardial pacing wires for post-op day #1  · Continue PO amio, statin, and ASA therapy  · Consult cardiology for post-op management  · Continue DVT prophylaxis    Lung:   · Good Room air oxygen saturation; Continue incentive spirometry/Coughing/Deep breathing exercises  · Chest tube output remains persistently high; Continue chest tubes to suction today    GI:   · Continue PPI for stress ulcer prophylaxis  · Continue bowel regimen  · zofran PRN for nausea    FEN:   · Diuretic plan: Hold lasix secondary to low filling pressures  · Goal 24 hour fluid balance: even  · Nutrition/diet plan: Initiate TLC 2 3 gm sodium diet with 1800 mL fluid restriction   · Replete electrolytes with goals: K >4 0, Mag >2 0, and Phos >3 0    :   · Indwelling Gee present: yes   · D/C Gee  · Trend UOP and BUN/creat  · Strict I and O    ID:   · Trend temps and WBC count  · Maintain normothermia  · Tylenol PRN for fevers  Heme:   · Trend hgb and plts  · Transfuse as needed for goal hgb >8  · Transfuse 1 unit PRBC today  Endo:   · Glycemic control plan: History of diabetes: Continue insulin infusion today  Consult Endocrinology for management    MSK/Skin:  · Mobility goal: OOB and ambulating as able  · PT consult: yes  · OT consult: yes  · Frequent turning and pressure off-loading  · Local wound care as needed    Disposition:  Transfer from ICU to telemetry today    Collaborative bedside rounds performed with cardiac surgery attending and bedside RN      SIGNATURE: VALENCIA Ballard  DATE: September 18, 2018  TIME: 5:54 AM

## 2018-09-18 NOTE — OCCUPATIONAL THERAPY NOTE
OccupationalTherapy Evaluation & Treatment     Patient Name: Karen Del Cid  Today's Date: 9/18/2018  Problem List  Patient Active Problem List   Diagnosis    Benign prostatic hyperplasia with nocturia    Benign essential hypertension    Dyslipidemia, goal LDL below 100    Gout with tophus    Hypothyroidism    Mild intermittent asthma without complication    Obesity    Pure hypercholesterolemia    Renal cyst    Sexual dysfunction    Type 2 diabetes mellitus ([address] )    Aortic stenosis, moderate    Coronary artery disease involving native coronary artery of native heart    Nonrheumatic aortic valve stenosis    GERD (gastroesophageal reflux disease)    S/P CABG x 4    S/P AVR (aortic valve replacement)     Past Medical History  Past Medical History:   Diagnosis Date    Aortic stenosis     CKD (chronic kidney disease)     baseline Cr 1 3-1 5    Coronary artery disease     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HCC)     type 2, insulin dependent    GERD (gastroesophageal reflux disease)     Glaucoma     Gout     History of prostate cancer     Hypothyroidism     Overweight     Peripheral neuropathy, idiopathic     Pure hypercholesterolemia     LA   11/12/14   R   11/12/14      Past Surgical History  Past Surgical History:   Procedure Laterality Date    CARDIAC CATHETERIZATION      [place] CABG, ARTERY-VEIN, THREE N/A 9/17/2018    Procedure: CORONARY ARTERY BYPASS GRAFT (CABG) x 4 VESSELS with LIMA - LAD, SVG/LEFT LEG EVH - LEFT PDA, OM3, & OM2;  Surgeon: Jocelynn Meyers MD;  [facility];  Service: Cardiac Surgery    [place] ECHO TRANSESOPHAG MONTR CARDIAC PUMP FUNCTJ N/A 9/17/2018    Procedure: TRANSESOPHAGEAL ECHOCARDIOGRAM (VERONICA);   Surgeon: Jocelynn Meyers MD;  [facility];  Service: Cardiac Surgery    [place] RPLCMT AORTIC VALVE OPN W/STENTLESS TISSUE VALVE N/A 9/17/2018    Procedure: REPLACEMENT VALVE AORTIC (AVR)- 23mm tissue Intuity Valve;  Surgeon: Jocelynn Meyers MD;  [facility]; Service: Cardiac Surgery    THYROID SURGERY           09/18/18 1455   Note Type   Note type Eval/Treat   Restrictions/Precautions   Weight Bearing Precautions Per Order No   Other Precautions Cardiac/sternal;Fall Risk;Pain;Telemetry;Multiple lines   Pain Assessment   Pain Assessment 0-10   Pain Score 5   Pain Type Acute pain   Pain Location Chest   Hospital Pain Intervention(s) Ambulation/increased activity   Response to Interventions tolerated   Home Living   Type of [address] One level   Bathroom Shower/Tub Tub/shower unit   Bathroom Toilet Raised   Bathroom Equipment Grab bars in [address]   Prior Function   Level of [place] Independent with ADLs and functional mobility   Lives With Son   Receives Help From Family   ADL Assistance Independent   IADLs Independent   Falls in the last 6 months 0   Vocational Retired   Lifestyle   Autonomy Pt I w/ ADLs, IADLs, and uses a SPC for mobility as needed   Reciprocal Relationships Pt lives w/ son who can assist   Service to Others retired   Intrinsic Gratification Enjoys mowing the lawn   Psychosocial   Psychosocial ([address]) WDL   ADL   Where Assessed Chair   Eating Assistance 5  Supervision/Setup   Eating Deficit Setup   Grooming Assistance 5  [address] [facility] 5  Supervision/Setup   LB [address] 2  Maximal Assistance   [address] 5  Supervision/Setup    [facility] [address] 2  Maximal [address] [facility] 3  Moderate Assistance   Bed Mobility   Additional Comments Pt seated OOB in [address] to Stand 4  Minimal assistance   Additional items Assist x 1   Stand to Sit 4  Minimal assistance   Additional items Assist x 1   Functional Mobility   Additional items Rolling walker   Balance   Static Sitting Fair +   Dynamic Sitting Fair   Static Standing Fair   Dynamic Standing Fair - Activity Tolerance   Activity Tolerance Patient limited by fatigue;Patient limited by pain   Nurse Made Aware okay to see per RNLizette Assessment   RUE Assessment WFL   LUE Assessment   LUE Assessment WFL   Hand Function   Gross Motor Coordination Functional   Fine Motor Coordination Functional   Cognition   Overall Cognitive Status WFL   Arousal/Participation Alert; Responsive; Cooperative   Attention Within functional limits   Orientation Level Oriented X4   Memory Within functional limits   Following Commands Follows one step commands without difficulty   Comments Pt pleasant and cooperative  He is limited by fatigue   Assessment   Limitation Decreased ADL status; Decreased endurance;Decreased self-care trans;Decreased high-level ADLs   Prognosis Good   Assessment Pt is a 80 y o  male who was [facility] on 9/17/2018 with Coronary artery disease involving native coronary artery of native heart  After further cardiac w/u, pt is now s/p CABG X4 and AVR w/ active cardiac/sternal precautions  Pt's comorbidities include DM2, HTN, obesity, aortic valve stenosis, GERD, and asthma  At baseline pt was I w/ ADLs and IADLs  He uses a [facility] for mobility as needed  Pt lives w/ his son in a one level home and son is able to assist  Currently pt requires S for UB ADLs, max A for LB ADLs, and min Ax1 for functional mobility/transfers w/ RW  Pt currently presents with impairments in the following categories -difficulty performing ADLS, difficulty performing IADLS,  activity tolerance, endurance, standing balance/tolerance and sitting balance/tolerance  These impairments, as well as pt's fatigue, pain, cardiac/sternal precautions, decreased caregiver support and risk for falls  limit pt's ability to safely engage in all baseline areas of occupation, including grooming, bathing, dressing, toileting, functional mobility/transfers and leisure activities   From OT standpoint, recommend inpatient rehab vs home pending progress upon D/C  OT will continue to follow to address the below stated goals  Goals   Patient Goals to get better   LTG Time Frame 7-10   Long Term Goal see below goals    Plan   Treatment Interventions ADL retraining;Functional transfer training; Endurance training;Equipment evaluation/education;Patient/family training; Compensatory technique education; Energy conservation; Activityengagement;Cardiac education   Goal Expiration Date 09/28/18   OT Frequency 3-5x/wk   Additional Treatment Session   Start Time 1440   End Time 1455   Treatment Assessment Pt participated in additional OT session focusing on cardiac education  Provided pt with Recovering After Cardiac Surgery packet and educated pt regarding; sternal precautions, cardiac precautions, lifiting restrictions, safe activity engagement, energy conservation, lifestyle modifications, stress management and cardiac rehabilitation programs  Pt's questions were addressed after discussion of the packet  Provided Pt with contact information for OT department if questions arrise  Pt continues to benefit from inpatient skilled OT services  Will continue to follow and address previously stated goals     Additional Treatment Day 1   Recommendation   OT Discharge Recommendation Other (Comment)  (STR vs home pending)   OT - OK to Discharge Yes  (when medically stable)   Barthel Index   Feeding 10   Bathing 0   Grooming Score 5   Dressing Score 5   Bladder Score 10   Bowels Score 10   Toilet Use Score 5   Transfers (Bed/Chair) Score 10   Mobility (Level Surface) Score 0   Stairs Score 0   Barthel Index Score 55   Modified Coyote Scale   Modified Coyote Scale 4     LTG (7-10 DAYS)  Pt will improve activity tolerance to G for min 30 min txment sessions to increase participation in ADLs    Pt will complete ADLs/self care w/ S using adaptive device and DME as needed    Pt will complete toileting w/ S w/ G hygiene/thoroughness using DME as needed    Pt will improve functional transfers on/off all surfaces using DME as needed w/ G balance/safety including w/ S    Pt will improve functional mobility during ADL/IADL/leisure tasks using DME as needed w/ G balance/safety w/ S    Pt will demonstrate G carryover of pt/caregiver education and training as appropriate w/ mod I w/o cues w/ good tolerance    Pt will demonstrate 100% carryover of energy conservation techniques w/ mod I t/o functional I/ADL/leisure tasks w/o cues s/p skilled education    Pt will engage in cardiac education using the Recovering After Cardiac Rehabilitation packet w/ G participation and G carryover      Pt will demonstrate 100% carryover of precautions s/p review w/o cues w/ mod I w/ G tolerance/participation t/o functional ADL/IADL/leisure tasks        Rod Mari, OTR/L

## 2018-09-18 NOTE — CONSULTS
Cardiology   Jeison Jenkins  80 y o  male MRN: [id number]  Unit/Bed#: [facility] 414-01 Encounter: [id number]      Reason for Consult / Principal Problem:   Postoperative management    Physician Requesting Consult: Dre Cedeno MD    Cardiologist: Anurag Cantrell    PCP: Dr Aki Curiel:  Multi-vessel CAD/moderate AS; s/p CABG x4 LIMA to LAD, SVG to PDA, OM2, OM3, and AVR with 23 mm tissue valve  Currently on aspirin 325 mg, metoprolol 12 5 mg q 12 hours, and pravastatin 80 mg daily  Continue to monitor volume status closely; strict I&O; daily standing weights  Continue monitor closely on telemetry  Replete electrolytes as needed, keep K +greater than 4, Mag grade 2, calcium greater than 7 0  Aggressive pulmonary hygiene; use of IS, coughing and deep breathing, out of bed to chair, encourage ambulation  Hypertension  BP well controlled over the past 24 hour; last recorded at 130/48  Currently on metoprolol 12 5 mg q 12 hours  Hypercholesterolemia  Lipid profile from 09/07/2018 (cholesterol 200, triglycerides 148, HDL 33, non , LDL calculated 137)  Patient previously reported myalgias to atorvastatin; will initiate pravastatin 80 mg daily and discharge on Crestor 10 mg  Recommend outpatient lipid profile follow-up  Currently on  Obesity   Encourage weight loss; lifestyle modifications  Hypothyroidism status post thyroidectomy  Currently on levothyroxine 150 mg daily  Type 2 diabetes mellitus  Hemoglobin A1c from 9/0718; 7 9  Currently on IV insulin infusion postoperatively  Endocrine consult ordered/pending further evaluation    The patient will follow with Dr Anurag Cantrell on Discharge      HPI: Jeison Jenkins  80y o  year old male with a past medical history of CAD, moderate aortic stenosis, hypertension, hypercholesterolemia, obesity, hypothyroidism status post thyroidectomy, and type 2 diabetes mellitus    Patient was evaluated by his PCP in April 2018 with complaints of exertional abdominal pain and dyspnea on exertion  His PCP recommended he undergo a nuclear stress test     Patient underwent a nuclear stress test on 05/2017; revealed no chest pain during stress, EKG was nondiagnostic due to resting ST T wave abnormalities, there was a moderate-sized severe, partially reversible myocardial perfusion defect of the anterior and apical wall representing neck scar and ischemia    Patient was referred to [facility]; patient was seen by Dr Chinedu Alegria in the outpatient office on 05/15/2018 for evaluation of his abnormal stress test and treatment plan  He underwent a cardiac catheterization procedure on 05/21/2018 which revealed multivessel 3VCAD  Further workup with a 2D echocardiogram also revealed moderate AS with trace AI  Patient was referred to CT surgery consultation for surgical intervention  Patient presented to the [address] [place] [address] Ave on 09/17/2018 status post CABG x4 LIMA to LAD, SVG to PDA, SVG to OM2, SVG to OM3, and AVR with 23 mm tissue valve  Postprocedure VERONICA revealed LVEF 60%  He arrived to the ICU on epinephrine, Armando-Synephrine, and Milrinone which have been weaned off  He did receive blood products/coag factors for high chest tube outputs and volume resuscitation for hypotension/low filling pressures postop  His hemodynamics improved  He was [place] [facility]telemetry on 9/18/18       Family History:   Family History   Problem Relation Age of Onset    Diabetes Mother     Pancreatic cancer Brother     Diabetes Maternal Grandmother     Colon cancer Son     Diabetes Family      Historical Information   Past Medical History:   Diagnosis Date    Aortic stenosis     CKD (chronic kidney disease)     baseline Cr 1 3-1 5    Coronary artery disease     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HCC)     type 2, insulin dependent    GERD (gastroesophageal reflux disease)     Glaucoma     Gout     History of prostate cancer     Hypothyroidism     Overweight     Peripheral neuropathy, idiopathic     Pure hypercholesterolemia     LA   11/12/14   R   11/12/14      Past Surgical History:   Procedure Laterality Date    CARDIAC CATHETERIZATION      THYROID SURGERY       Social History   History   Alcohol Use No     History   Drug Use No     History   Smoking Status    Never Smoker   Smokeless Tobacco    Never Used     Comment: Smoked in the service about 50 years ago     Family History:   Family History   Problem Relation Age of Onset    Diabetes Mother     Pancreatic cancer Brother     Diabetes Maternal Grandmother     Colon cancer Son     Diabetes Family        Review of Systems:  Review of Systems   Constitutional: Positive for fatigue  Negative for chills and fever  Incisional pain improved with pain medication   Eyes: Negative for visual disturbance  Respiratory: Negative for chest tightness and shortness of breath  Cardiovascular: Negative for chest pain, palpitations and leg swelling  Gastrointestinal: Negative for abdominal pain, diarrhea, nausea and vomiting  Genitourinary: Negative for dysuria  Musculoskeletal: Negative for arthralgias and myalgias  Neurological: Negative for dizziness, light-headedness and headaches  All other systems reviewed and are negative  Except as noted in the HPI and above, a comprehensive 12 point review of systems was negative      Scheduled Meds:  Current Facility-Administered Medications:  acetaminophen 650 mg Oral Q4H PRN Parker Dakins, PA-C    allopurinol 300 mg Oral Daily Parker Dakins, PA-C    aspirin 325 mg Oral Daily Parker Dakins, PA-C    bisacodyl 10 mg Rectal Daily PRN Parker Dakins, PA-C    cefazolin 2,000 mg Intravenous [address] [place], [place] [address]CHON Last Rate: 2,000 mg (09/18/18 0612)   docusate sodium 100 mg Oral BID Corina SpiroVALENCIA Cardoza    gabapentin 300 mg Oral HS Teri Parrish PA-C    heparin (porcine) 5,000 Units Subcutaneous Q8H [facility] Edita Pérez PA-C    insulin regular (HumuLIN R,NovoLIN R) infusion 0 3-21 Units/hr Intravenous Titrated Kenton Keri, PA-JITENDRA Last Rate: 1 5 Units/hr (09/18/18 0400)   levothyroxine 150 mcg Oral Early Morning Teri Parrish, CHON    metoprolol tartrate 12 5 mg Oral Q12H [address] Corina Spironello V, CRNP    mupirocin 1 application Nasal A00T [address] Teri Parrish, CHON    ondansetron 4 mg Intravenous Q6H PRN Kenton Fleeting, PA-JITENDRA    oxyCODONE-acetaminophen 1 tablet Oral Q4H PRN Kenton Fleeting, PA-JITENDRA    oxyCODONE-acetaminophen 2 tablet Oral Q6H PRN Kenton Fleeting, PA-JITENDRA    pantoprazole 40 mg Oral Daily Kenton Fleeting, PA-C    polyethylene glycol 17 g Oral Daily Kenton Fleeting, PA-C    sodium chloride 20 mL/hr Intravenous Continuous Kenton Fleeting, PA-C Last Rate: 20 mL/hr (09/18/18 0400)   tamsulosin 0 4 mg Oral Daily With [facility], CHON    temazepam 15 mg Oral HS PRN Corina Delucao V, CRNP      Continuous Infusions:  insulin regular (HumuLIN R,NovoLIN R) infusion 0 3-21 Units/hr Last Rate: 1 5 Units/hr (09/18/18 0400)   sodium chloride 20 mL/hr Last Rate: 20 mL/hr (09/18/18 0400)     PRN Meds:   acetaminophen    bisacodyl    ondansetron    oxyCODONE-acetaminophen    oxyCODONE-acetaminophen    temazepam  all current active meds have been reviewed, current meds:   Current Facility-Administered Medications   Medication Dose Route Frequency    acetaminophen (TYLENOL) tablet 650 mg  650 mg Oral Q4H PRN    allopurinol (ZYLOPRIM) tablet 300 mg  300 mg Oral Daily    aspirin tablet 325 mg  325 mg Oral Daily    bisacodyl (DULCOLAX) rectal suppository 10 mg  10 mg Rectal Daily PRN    ceFAZolin (ANCEF) IVPB (premix) 2,000 mg  2,000 mg Intravenous Q8H    docusate sodium (COLACE) capsule 100 mg  100 mg Oral BID    gabapentin (NEURONTIN) capsule 300 mg  300 mg Oral HS    heparin (porcine) subcutaneous injection 5,000 Units  5,000 Units Subcutaneous Q8H [address]    insulin regular (HumuLIN R,NovoLIN R) 1 Units/mL in sodium chloride 0 9 % 100 mL infusion  0 3-21 Units/hr Intravenous Titrated    levothyroxine tablet 150 mcg  150 mcg Oral Early Morning    metoprolol tartrate (LOPRESSOR) partial tablet 12 5 mg  12 5 mg Oral Q12H [facility]    mupirocin (BACTROBAN) 2 % nasal ointment 1 application  1 application Nasal V65F [facility]    ondansetron (ZOFRAN) injection 4 mg  4 mg Intravenous Q6H PRN    oxyCODONE-acetaminophen (PERCOCET) 5-325 mg per tablet 1 tablet  1 tablet Oral Q4H PRN    oxyCODONE-acetaminophen (PERCOCET) 5-325 mg per tablet 2 tablet  2 tablet Oral Q6H PRN    pantoprazole (PROTONIX) EC tablet 40 mg  40 mg Oral Daily    polyethylene glycol (MIRALAX) packet 17 g  17 g Oral Daily    sodium chloride infusion 0 45 %  20 mL/hr Intravenous Continuous    tamsulosin (FLOMAX) capsule 0 4 mg  0 4 mg Oral Daily With Dinner    temazepam (RESTORIL) capsule 15 mg  15 mg Oral HS PRN    and PTA meds:   Prior to Admission Medications   Prescriptions Last Dose Informant Patient Reported? Taking?    B-D UF III MINI PEN NEEDLES 31G X 5 MM MISC 9/16/2018 at Unknown time Self No Yes   Sig: USE ONE DAILY AS DIRECTED   Insulin Glargine (BASAGLAR KWIKPEN SC) 9/15/2018  Yes Yes   Sig: Inject 42 Units under the skin daily at bedtime   Insulin Pen Needle 31G X 5 MM MISC  Self Yes No   Sig: by Does not apply route   albuterol (PROVENTIL HFA) 90 mcg/act inhaler More than a month at Unknown time Self Yes No   Sig: Inhale 2 puffs 4 (four) times a day as needed   allopurinol (ZYLOPRIM) 300 mg tablet 9/14/2018 Self No Yes   Sig: TAKE ONE TABLET BY MOUTH ONCE DAILY   aspirin (ECOTRIN LOW STRENGTH) 81 mg EC tablet 9/16/2018 at Unknown time Self Yes Yes   Sig: Take 1 tablet by mouth daily   diltiazem (CARDIZEM CD) 180 mg 24 hr capsule 9/14/2018 Self Yes Yes   Sig: Take 1 capsule by mouth daily   gabapentin (NEURONTIN) 300 mg capsule 9/14/2018  No No   Sig: TAKE ONE CAPSULE BY MOUTH ONCE DAILY AT BEDTIME   levothyroxine 150 mcg tablet 9/16/2018 at Unknown time  Yes Yes   Sig: Take 150 mcg by mouth daily   losartan-hydrochlorothiazide (HYZAAR) 100-25 MG per tablet 9/14/2018 Self Yes Yes   Sig: Take 1 tablet by mouth daily   metFORMIN (GLUCOPHAGE) 500 mg tablet 9/14/2018 Self No Yes   Sig: TAKE 2 TABS IN AM AND 2 TABS AT PM   Patient taking differently: TAKE 1 TABLET ONCE DAILY   mupirocin (BACTROBAN) 2 % ointment   No No   Sig: Apply 1 application topically 2 (two) times a day for 5 days Apply to each nostril twice daily for five days before your operation  mupirocin (BACTROBAN) 2 % ointment   No Yes   Sig: Apply 1 application topically 2 (two) times a day for 5 days Apply to each nostril twice daily for five days before your operation  nitroglycerin (NITROSTAT) 0 4 mg SL tablet Unknown at Unknown time Self No No   Sig: Place 1 tablet (0 4 mg total) under the tongue every 5 (five) minutes as needed for chest pain   tamsulosin (FLOMAX) 0 4 mg Past Week at Unknown time Self No Yes   Sig: Take 1 capsule (0 4 mg total) by mouth daily with dinner      Facility-Administered Medications: None       Allergies   Allergen Reactions    Amlodipine Nausea Only    Atorvastatin Myalgia       Objective   Vitals: Blood pressure (!) 137/47, pulse 78, temperature 99 3 °F (37 4 °C), temperature source Probe, resp  rate (!) 25, height 5' 8" (1 727 m), weight 104 kg (228 lb 9 9 oz), SpO2 98 %  , Body mass index is 34 76 kg/m² , Orthostatic Blood Pressures      Most Recent Value   Blood Pressure   137/47 filed at 09/18/2018 2969            Intake/Output Summary (Last 24 hours) at 09/18/18 0945  Last data filed at 09/18/18 0811   Gross per 24 hour   Intake          8943 26 ml   Output             3790 ml   Net          5153 26 ml       Invasive Devices     Central Venous Catheter Line            CVC Central Lines 09/17/18 Triple 1 day    Introducer 09/17/18 1 day          Peripheral Intravenous Line            Peripheral IV 09/17/18 Right Antecubital 1 day          Arterial Line            Arterial Line 09/17/18 Right Radial 1 day          Line            Pacer Wires less than 1 day    Pacer Wires less than 1 day          Drain            Urethral Catheter Latex 12 Fr  1 day    Chest Tube 1 Anterior Mediastinal 32 Fr  less than 1 day    Chest Tube 2 Left Pleural 32 Fr  less than 1 day    Chest Tube 3 Posterior Mediastinal 32 Fr  less than 1 day                Physical Exam:  Physical Exam   Constitutional: He is oriented to person, place, and time  He appears well-developed  No distress  HENT:   Head: Normocephalic and atraumatic  Mouth/Throat: Oropharynx is clear and moist  No oropharyngeal exudate  Eyes: Conjunctivae are normal  Pupils are equal, round, and reactive to light  No scleral icterus  Neck: Normal range of motion  Neck supple  No JVD present  Right IJ TLC catheter intact/patent covered by CHG dressing   Cardiovascular: Normal rate, regular rhythm and intact distal pulses  Exam reveals friction rub  Exam reveals no gallop  No murmur heard  Chest tubes//epicardial wires intact secured to dressing  Moderate generalized nonpitting edema   Pulmonary/Chest: Effort normal and breath sounds normal    Abdominal: Soft  Bowel sounds are normal    Obese     Genitourinary:   Genitourinary Comments: Gee catheter in place draining clear yellow urine   Musculoskeletal: Normal range of motion  He exhibits edema  Left leg covered by Ace wrap dressing   Lymphadenopathy:     He has no cervical adenopathy  Neurological: He is alert and oriented to person, place, and time  Skin: Skin is warm and dry  He is not diaphoretic  Mid sternal incision covered by Acticoat/Tegaderm dressing  Chest tube sites bilaterally covered by gauze/ABD dressing     Psychiatric: He has a normal mood and affect         Lab Results:   Recent Results (from the past 24 hour(s))   POCT activated clotting time    Collection Time: 09/17/18 10:00 AM   Result Value Ref Range    Activated Clotting Time, i-STAT 525 (H) 89 - 137 sec Specimen Type ARTERIAL    POCT activated clotting time    Collection Time: 09/17/18 10:00 AM   Result Value Ref Range    Activated Clotting Time, i-STAT 526 (H) 89 - 137 sec    Specimen Type ARTERIAL    POCT Blood Gas (CG8+)    Collection Time: 09/17/18 10:01 AM   Result Value Ref Range    pH, Art i-STAT 7 359 7 350 - 7 450    pCO2, Art i-STAT 45 6 (H) 36 0 - 44 0 mm HG    pO2, ART i-STAT 134 0 (H) 75 0 - 129 0 mm HG    BE, i-STAT 0 -2 - 3 mmol/L    HCO3, Art i-STAT 25 7 22 0 - 28 0 mmol/L    CO2, i-STAT 27 21 - 32 mmol/L    O2 Sat, i-STAT 99 (H) 95 - 98 %    SODIUM, I-STAT 142 136 - 145 mmol/l    Potassium, i-STAT 3 9 3 5 - 5 3 mmol/L    Calcium, Ionized i-STAT 1 15 1 12 - 1 32 mmol/L    Hct, i-STAT 29 (L) 36 5 - 49 3 %    Hgb, i-STAT 9 9 (L) 12 0 - 17 0 g/dl    Glucose, i-STAT 177 (H) 65 - 140 mg/dl    Specimen Type ARTERIAL    POCT activated clotting time    Collection Time: 09/17/18 10:26 AM   Result Value Ref Range    Activated Clotting Time, i-STAT 754 (H) 89 - 137 sec    Specimen Type ARTERIAL    POCT Blood Gas (CG8+)    Collection Time: 09/17/18 10:27 AM   Result Value Ref Range    pH, Art i-STAT 7 431 7 350 - 7 450    pCO2, Art i-STAT 42 9 36 0 - 44 0 mm HG    pO2, ART i-STAT 290 0 (H) 75 0 - 129 0 mm HG    BE, i-STAT 4 (H) -2 - 3 mmol/L    HCO3, Art i-STAT 28 5 (H) 22 0 - 28 0 mmol/L    CO2, i-STAT 30 21 - 32 mmol/L    O2 Sat, i-STAT 100 (H) 95 - 98 %    SODIUM, I-STAT 141 136 - 145 mmol/l    Potassium, i-STAT 3 8 3 5 - 5 3 mmol/L    Calcium, Ionized i-STAT 0 95 (L) 1 12 - 1 32 mmol/L    Hct, i-STAT 23 (L) 36 5 - 49 3 %    Hgb, i-STAT 7 8 (L) 12 0 - 17 0 g/dl    Glucose, i-STAT 160 (H) 65 - 140 mg/dl    Specimen Type ARTERIAL    Platelet count    Collection Time: 09/17/18 10:39 AM   Result Value Ref Range    Platelets 833 (L) 243 - 390 Thousands/uL    MPV 9 5 8 9 - 12 7 fL   POCT Blood Gas (CG8+)    Collection Time: 09/17/18 10:40 AM   Result Value Ref Range    ph, Rafat ISTAT 7 386 7 300 - 7 400    pCO2, Rafat i-STAT 45 9 42 0 - 50 0 mm HG    pO2, Rafat i-STAT 37 0 35 0 - 45 0 mm HG    BE, i-STAT 2 -2 - 3 mmol/L    HCO3, Rafat i-STAT 27 5 24 0 - 30 0 mmol/L    CO2, i-STAT 29 21 - 32 mmol/L    O2 Sat, i-STAT 70 (L) 95 - 98 %    SODIUM, I-STAT 144 136 - 145 mmol/l    Potassium, i-STAT 4 2 3 5 - 5 3 mmol/L    Calcium, Ionized i-STAT 1 03 (L) 1 12 - 1 32 mmol/L    Hct, i-STAT 26 (L) 36 5 - 49 3 %    Hgb, i-STAT 8 8 (L) 12 0 - 17 0 g/dl    Glucose, i-STAT 162 (H) 65 - 140 mg/dl    Specimen Type VENOUS    POCT activated clotting time    Collection Time: 09/17/18 10:40 AM   Result Value Ref Range    Activated Clotting Time, i-STAT 693 (H) 89 - 137 sec    Specimen Type VENOUS    POCT activated clotting time    Collection Time: 09/17/18 10:59 AM   Result Value Ref Range    Activated Clotting Time, i-STAT 588 (H) 89 - 137 sec    Specimen Type ARTERIAL    POCT Blood Gas (CG8+)    Collection Time: 09/17/18 11:00 AM   Result Value Ref Range    pH, Art i-STAT 7 433 7 350 - 7 450    pCO2, Art i-STAT 44 6 (H) 36 0 - 44 0 mm HG    pO2, ART i-STAT 355 0 (H) 75 0 - 129 0 mm HG    BE, i-STAT 5 (H) -2 - 3 mmol/L    HCO3, Art i-STAT 29 8 (H) 22 0 - 28 0 mmol/L    CO2, i-STAT 31 21 - 32 mmol/L    O2 Sat, i-STAT 100 (H) 95 - 98 %    SODIUM, I-STAT 142 136 - 145 mmol/l    Potassium, i-STAT 4 3 3 5 - 5 3 mmol/L    Calcium, Ionized i-STAT 0 93 (L) 1 12 - 1 32 mmol/L    Hct, i-STAT 22 (L) 36 5 - 49 3 %    Hgb, i-STAT 7 5 (L) 12 0 - 17 0 g/dl    Glucose, i-STAT 166 (H) 65 - 140 mg/dl    Specimen Type ARTERIAL    POCT activated clotting time    Collection Time: 09/17/18 11:23 AM   Result Value Ref Range    Activated Clotting Time, i-STAT 548 (H) 89 - 137 sec    Specimen Type ARTERIAL    POCT Blood Gas (CG8+)    Collection Time: 09/17/18 11:24 AM   Result Value Ref Range    pH, Art i-STAT 7 436 7 350 - 7 450    pCO2, Art i-STAT 43 9 36 0 - 44 0 mm HG    pO2, ART i-STAT 332 0 (H) 75 0 - 129 0 mm HG    BE, i-STAT 5 (H) -2 - 3 mmol/L    HCO3, Art i-STAT 29 5 (H) 22 0 - 28 0 mmol/L    CO2, i-STAT 31 21 - 32 mmol/L    O2 Sat, i-STAT 100 (H) 95 - 98 %    SODIUM, I-STAT 142 136 - 145 mmol/l    Potassium, i-STAT 4 0 3 5 - 5 3 mmol/L    Calcium, Ionized i-STAT 0 97 (L) 1 12 - 1 32 mmol/L    Hct, i-STAT 24 (L) 36 5 - 49 3 %    Hgb, i-STAT 8 2 (L) 12 0 - 17 0 g/dl    Glucose, i-STAT 162 (H) 65 - 140 mg/dl    Specimen Type ARTERIAL    POCT activated clotting time    Collection Time: 09/17/18 11:44 AM   Result Value Ref Range    Activated Clotting Time, i-STAT 565 (H) 89 - 137 sec    Specimen Type ARTERIAL    POCT Blood Gas (CG8+)    Collection Time: 09/17/18 11:45 AM   Result Value Ref Range    pH, Art i-STAT 7 429 7 350 - 7 450    pCO2, Art i-STAT 45 9 (H) 36 0 - 44 0 mm HG    pO2, ART i-STAT 376 0 (H) 75 0 - 129 0 mm HG    BE, i-STAT 5 (H) -2 - 3 mmol/L    HCO3, Art i-STAT 30 4 (H) 22 0 - 28 0 mmol/L    CO2, i-STAT 32 21 - 32 mmol/L    O2 Sat, i-STAT 100 (H) 95 - 98 %    SODIUM, I-STAT 142 136 - 145 mmol/l    Potassium, i-STAT 4 2 3 5 - 5 3 mmol/L    Calcium, Ionized i-STAT 0 94 (L) 1 12 - 1 32 mmol/L    Hct, i-STAT 24 (L) 36 5 - 49 3 %    Hgb, i-STAT 8 2 (L) 12 0 - 17 0 g/dl    Glucose, i-STAT 153 (H) 65 - 140 mg/dl    Specimen Type ARTERIAL    POCT Blood Gas (CG8+)    Collection Time: 09/17/18 12:04 PM   Result Value Ref Range    pH, Art i-STAT 7 396 7 350 - 7 450    pCO2, Art i-STAT 48 1 (H) 36 0 - 44 0 mm HG    pO2, ART i-STAT 328 0 (H) 75 0 - 129 0 mm HG    BE, i-STAT 4 (H) -2 - 3 mmol/L    HCO3, Art i-STAT 29 5 (H) 22 0 - 28 0 mmol/L    CO2, i-STAT 31 21 - 32 mmol/L    O2 Sat, i-STAT 100 (H) 95 - 98 %    SODIUM, I-STAT 141 136 - 145 mmol/l    Potassium, i-STAT 4 2 3 5 - 5 3 mmol/L    Calcium, Ionized i-STAT 0 93 (L) 1 12 - 1 32 mmol/L    Hct, i-STAT 23 (L) 36 5 - 49 3 %    Hgb, i-STAT 7 8 (L) 12 0 - 17 0 g/dl    Glucose, i-STAT 147 (H) 65 - 140 mg/dl    Specimen Type ARTERIAL    POCT activated clotting time    Collection Time: 09/17/18 12:04 PM   Result Value Ref Range    Activated Clotting Time, i-STAT 532 (H) 89 - 137 sec    Specimen Type ARTERIAL    POCT Blood Gas (CG8+)    Collection Time: 09/17/18 12:20 PM   Result Value Ref Range    pH, Art i-STAT 7 439 7 350 - 7 450    pCO2, Art i-STAT 43 3 36 0 - 44 0 mm HG    pO2, ART i-STAT 324 0 (H) 75 0 - 129 0 mm HG    BE, i-STAT 5 (H) -2 - 3 mmol/L    HCO3, Art i-STAT 29 3 (H) 22 0 - 28 0 mmol/L    CO2, i-STAT 31 21 - 32 mmol/L    O2 Sat, i-STAT 100 (H) 95 - 98 %    SODIUM, I-STAT 141 136 - 145 mmol/l    Potassium, i-STAT 4 3 3 5 - 5 3 mmol/L    Calcium, Ionized i-STAT 0 94 (L) 1 12 - 1 32 mmol/L    Hct, i-STAT 23 (L) 36 5 - 49 3 %    Hgb, i-STAT 7 8 (L) 12 0 - 17 0 g/dl    Glucose, i-STAT 154 (H) 65 - 140 mg/dl    Specimen Type ARTERIAL    POCT activated clotting time    Collection Time: 09/17/18 12:20 PM   Result Value Ref Range    Activated Clotting Time, i-STAT 520 (H) 89 - 137 sec    Specimen Type ARTERIAL    POCT activated clotting time    Collection Time: 09/17/18 12:41 PM   Result Value Ref Range    Activated Clotting Time, i-STAT 526 (H) 89 - 137 sec    Specimen Type ARTERIAL    POCT Blood Gas (CG8+)    Collection Time: 09/17/18 12:42 PM   Result Value Ref Range    pH, Art i-STAT 7 447 7 350 - 7 450    pCO2, Art i-STAT 43 2 36 0 - 44 0 mm HG    pO2, ART i-STAT 290 0 (H) 75 0 - 129 0 mm HG    BE, i-STAT 5 (H) -2 - 3 mmol/L    HCO3, Art i-STAT 29 9 (H) 22 0 - 28 0 mmol/L    CO2, i-STAT 31 21 - 32 mmol/L    O2 Sat, i-STAT 100 (H) 95 - 98 %    SODIUM, I-STAT 141 136 - 145 mmol/l    Potassium, i-STAT 4 3 3 5 - 5 3 mmol/L    Calcium, Ionized i-STAT 0 94 (L) 1 12 - 1 32 mmol/L    Hct, i-STAT 22 (L) 36 5 - 49 3 %    Hgb, i-STAT 7 5 (L) 12 0 - 17 0 g/dl    Glucose, i-STAT 168 (H) 65 - 140 mg/dl    Specimen Type ARTERIAL    POCT activated clotting time    Collection Time: 09/17/18  1:00 PM   Result Value Ref Range    Activated Clotting Time, i-STAT 605 (H) 89 - 137 sec    Specimen Type ARTERIAL    POCT Blood Gas (CG8+) Collection Time: 09/17/18  1:01 PM   Result Value Ref Range    pH, Art i-STAT 7 410 7 350 - 7 450    pCO2, Art i-STAT 44 5 (H) 36 0 - 44 0 mm HG    pO2, ART i-STAT 379 0 (H) 75 0 - 129 0 mm HG    BE, i-STAT 3 -2 - 3 mmol/L    HCO3, Art i-STAT 28 2 (H) 22 0 - 28 0 mmol/L    CO2, i-STAT 30 21 - 32 mmol/L    O2 Sat, i-STAT 100 (H) 95 - 98 %    SODIUM, I-STAT 141 136 - 145 mmol/l    Potassium, i-STAT 4 5 3 5 - 5 3 mmol/L    Calcium, Ionized i-STAT 1 31 1 12 - 1 32 mmol/L    Hct, i-STAT 21 (L) 36 5 - 49 3 %    Hgb, i-STAT 7 1 (L) 12 0 - 17 0 g/dl    Glucose, i-STAT 198 (H) 65 - 140 mg/dl    Specimen Type ARTERIAL    POCT activated clotting time    Collection Time: 09/17/18  1:11 PM   Result Value Ref Range    Activated Clotting Time, i-STAT 610 (H) 89 - 137 sec    Specimen Type ARTERIAL    POCT activated clotting time    Collection Time: 09/17/18  1:34 PM   Result Value Ref Range    Activated Clotting Time, i-STAT 99 89 - 137 sec    Specimen Type ARTERIAL    POCT Blood Gas (CG8+)    Collection Time: 09/17/18  1:35 PM   Result Value Ref Range    pH, Art i-STAT 7 390 7 350 - 7 450    pCO2, Art i-STAT 40 9 36 0 - 44 0 mm HG    pO2, ART i-STAT 265 0 (H) 75 0 - 129 0 mm HG    BE, i-STAT 0 -2 - 3 mmol/L    HCO3, Art i-STAT 24 8 22 0 - 28 0 mmol/L    CO2, i-STAT 26 21 - 32 mmol/L    O2 Sat, i-STAT 100 (H) 95 - 98 %    SODIUM, I-STAT 142 136 - 145 mmol/l    Potassium, i-STAT 4 0 3 5 - 5 3 mmol/L    Calcium, Ionized i-STAT 1 53 (H) 1 12 - 1 32 mmol/L    Hct, i-STAT 20 (L) 36 5 - 49 3 %    Hgb, i-STAT 6 8 (LL) 12 0 - 17 0 g/dl    Glucose, i-STAT 174 (H) 65 - 140 mg/dl    Specimen Type ARTERIAL    POCT Blood Gas (CG8+)    Collection Time: 09/17/18  1:56 PM   Result Value Ref Range    pH, Art i-STAT 7 343 (L) 7 350 - 7 450    pCO2, Art i-STAT 49 8 (H) 36 0 - 44 0 mm HG    pO2, ART i-STAT 321 0 (H) 75 0 - 129 0 mm HG    BE, i-STAT 1 -2 - 3 mmol/L    HCO3, Art i-STAT 27 1 22 0 - 28 0 mmol/L    CO2, i-STAT 29 21 - 32 mmol/L    O2 Sat, i-STAT 100 (H) 95 - 98 %    SODIUM, I-STAT 143 136 - 145 mmol/l    Potassium, i-STAT 3 8 3 5 - 5 3 mmol/L    Calcium, Ionized i-STAT 1 29 1 12 - 1 32 mmol/L    Hct, i-STAT 21 (L) 36 5 - 49 3 %    Hgb, i-STAT 7 1 (L) 12 0 - 17 0 g/dl    Glucose, i-STAT 160 (H) 65 - 140 mg/dl    Specimen Type ARTERIAL    POCT activated clotting time    Collection Time: 09/17/18  1:56 PM   Result Value Ref Range    Activated Clotting Time, i-STAT 126 89 - 137 sec    Specimen Type ARTERIAL    APTT    Collection Time: 09/17/18  2:09 PM   Result Value Ref Range    PTT 61 (H) 24 - 36 seconds   Fibrinogen    Collection Time: 09/17/18  2:09 PM   Result Value Ref Range    Fibrinogen 160 (L) 227 - 495 mg/dL   Platelet count    Collection Time: 09/17/18  2:09 PM   Result Value Ref Range    Platelets 259 (L) 238 - 390 Thousands/uL    MPV 9 8 8 9 - 12 7 fL   Protime-INR    Collection Time: 09/17/18  2:09 PM   Result Value Ref Range    Protime 20 9 (H) 11 8 - 14 2 seconds    INR 1 79 (H) 0 86 - 1 17   POCT Blood Gas (CG8+)    Collection Time: 09/17/18  2:54 PM   Result Value Ref Range    pH, Art i-STAT 7 349 (L) 7 350 - 7 450    pCO2, Art i-STAT 42 7 36 0 - 44 0 mm HG    pO2, ART i-STAT 146 0 (H) 75 0 - 129 0 mm HG    BE, i-STAT -2 -2 - 3 mmol/L    HCO3, Art i-STAT 23 5 22 0 - 28 0 mmol/L    CO2, i-STAT 25 21 - 32 mmol/L    O2 Sat, i-STAT 99 (H) 95 - 98 %    SODIUM, I-STAT 143 136 - 145 mmol/l    Potassium, i-STAT 4 2 3 5 - 5 3 mmol/L    Calcium, Ionized i-STAT 1 40 (H) 1 12 - 1 32 mmol/L    Hct, i-STAT 25 (L) 36 5 - 49 3 %    Hgb, i-STAT 8 5 (L) 12 0 - 17 0 g/dl    Glucose, i-STAT 124 65 - 140 mg/dl    Specimen Type ARTERIAL    POCT activated clotting time    Collection Time: 09/17/18  2:54 PM   Result Value Ref Range    Activated Clotting Time, i-STAT 138 (H) 89 - 137 sec    Specimen Type ARTERIAL    ECG 12 lead    Collection Time: 09/17/18  3:38 PM   Result Value Ref Range    Ventricular Rate 68 BPM    Atrial Rate 68 BPM    FL Interval 158 ms QRSD Interval 117 ms    QT Interval 442 ms    QTC Interval 471 ms    P Axis 42 degrees    QRS Axis -41 degrees    T Wave Axis 26 degrees   Platelets Count - If Chest Tube Losses > 200 mL/hr in First Hour Postop    Collection Time: 09/17/18  3:46 PM   Result Value Ref Range    Platelets 97 (L) 085 - 390 Thousands/uL    MPV 9 6 8 9 - 12 7 fL   Basic metabolic panel    Collection Time: 09/17/18  3:46 PM   Result Value Ref Range    Sodium 145 136 - 145 mmol/L    Potassium 3 6 3 5 - 5 3 mmol/L    Chloride 113 (H) 100 - 108 mmol/L    CO2 22 21 - 32 mmol/L    ANION GAP 10 4 - 13 mmol/L    BUN 23 5 - 25 mg/dL    Creatinine 1 34 (H) 0 60 - 1 30 mg/dL    Glucose 121 65 - 140 mg/dL    Calcium 8 7 8 3 - 10 1 mg/dL    eGFR 49 ml/min/1 73sq m   Hemoglobin and hematocrit, blood    Collection Time: 09/17/18  3:46 PM   Result Value Ref Range    Hemoglobin 10 3 (L) 12 0 - 17 0 g/dL    Hematocrit 31 4 (L) 36 5 - 49 3 %   Fingerstick Glucose (POCT)    Collection Time: 09/17/18  3:46 PM   Result Value Ref Range    POC Glucose 122 65 - 140 mg/dl   POCT Blood Gas (CG8+)    Collection Time: 09/17/18  4:28 PM   Result Value Ref Range    pH, Art i-STAT 7 305 (L) 7 350 - 7 450    pCO2, Art i-STAT 45 6 (H) 36 0 - 44 0 mm HG    pO2, ART i-STAT 118 0 75 0 - 129 0 mm HG    BE, i-STAT -3 (L) -2 - 3 mmol/L    HCO3, Art i-STAT 22 7 22 0 - 28 0 mmol/L    CO2, i-STAT 24 21 - 32 mmol/L    O2 Sat, i-STAT 98 95 - 98 %    SODIUM, I-STAT 144 136 - 145 mmol/l    Potassium, i-STAT 3 5 3 5 - 5 3 mmol/L    Calcium, Ionized i-STAT 1 17 1 12 - 1 32 mmol/L    Hct, i-STAT 24 (L) 36 5 - 49 3 %    Hgb, i-STAT 8 2 (L) 12 0 - 17 0 g/dl    Glucose, i-STAT 126 65 - 140 mg/dl    POC FIO2 50 L    Specimen Type ARTERIAL    Platelet count    Collection Time: 09/17/18  5:25 PM   Result Value Ref Range    Platelets 470 (L) 048 - 390 Thousands/uL    MPV 9 4 8 9 - 12 7 fL   Protime-INR    Collection Time: 09/17/18  5:25 PM   Result Value Ref Range    Protime 17 9 (H) 11 8 - 14 2 seconds    INR 1 47 (H) 0 86 - 1 17   Fibrinogen    Collection Time: 09/17/18  5:25 PM   Result Value Ref Range    Fibrinogen 259 227 - 495 mg/dL   APTT    Collection Time: 09/17/18  5:25 PM   Result Value Ref Range    PTT 69 (H) 24 - 36 seconds   Hemoglobin and hematocrit, blood    Collection Time: 09/17/18  5:25 PM   Result Value Ref Range    Hemoglobin 8 7 (L) 12 0 - 17 0 g/dL    Hematocrit 27 2 (L) 36 5 - 49 3 %   Fingerstick Glucose (POCT)    Collection Time: 09/17/18  6:02 PM   Result Value Ref Range    POC Glucose 132 65 - 140 mg/dl   Potassium    Collection Time: 09/17/18  7:09 PM   Result Value Ref Range    Potassium 3 6 3 5 - 5 3 mmol/L   Fingerstick Glucose (POCT)    Collection Time: 09/17/18  8:07 PM   Result Value Ref Range    POC Glucose 166 (H) 65 - 140 mg/dl   Fingerstick Glucose (POCT)    Collection Time: 09/17/18  9:50 PM   Result Value Ref Range    POC Glucose 176 (H) 65 - 140 mg/dl   Hemoglobin and hematocrit, blood    Collection Time: 09/17/18 11:04 PM   Result Value Ref Range    Hemoglobin 8 8 (L) 12 0 - 17 0 g/dL    Hematocrit 27 0 (L) 36 5 - 49 3 %   Potassium    Collection Time: 09/17/18 11:04 PM   Result Value Ref Range    Potassium 4 2 3 5 - 5 3 mmol/L   Fingerstick Glucose (POCT)    Collection Time: 09/18/18 12:09 AM   Result Value Ref Range    POC Glucose 165 (H) 65 - 140 mg/dl   Fingerstick Glucose (POCT)    Collection Time: 09/18/18  1:56 AM   Result Value Ref Range    POC Glucose 124 65 - 140 mg/dl   ECG 12 lead    Collection Time: 09/18/18  3:56 AM   Result Value Ref Range    Ventricular Rate 74 BPM    Atrial Rate 74 BPM    IN Interval 150 ms    QRSD Interval 113 ms    QT Interval 433 ms    QTC Interval 481 ms    P Axis 43 degrees    QRS Axis -9 degrees    T Wave Axis 28 degrees   Basic Metabolic Panel -AM POD #1    Collection Time: 09/18/18  4:01 AM   Result Value Ref Range    Sodium 144 136 - 145 mmol/L    Potassium 4 6 3 5 - 5 3 mmol/L    Chloride 111 (H) 100 - 108 mmol/L CO2 25 21 - 32 mmol/L    ANION GAP 8 4 - 13 mmol/L    BUN 26 (H) 5 - 25 mg/dL    Creatinine 1 40 (H) 0 60 - 1 30 mg/dL    Glucose 118 65 - 140 mg/dL    Calcium 8 2 (L) 8 3 - 10 1 mg/dL    eGFR 46 ml/min/1 73sq m   Magnesium -AM POD #1    Collection Time: 09/18/18  4:01 AM   Result Value Ref Range    Magnesium 2 9 (H) 1 6 - 2 6 mg/dL   CBC-AM POD #1    Collection Time: 09/18/18  4:01 AM   Result Value Ref Range    WBC 8 84 4 31 - 10 16 Thousand/uL    RBC 2 51 (L) 3 88 - 5 62 Million/uL    Hemoglobin 7 9 (L) 12 0 - 17 0 g/dL    Hematocrit 23 9 (L) 36 5 - 49 3 %    MCV 95 82 - 98 fL    MCH 31 5 26 8 - 34 3 pg    MCHC 33 1 31 4 - 37 4 g/dL    RDW 14 5 11 6 - 15 1 %    Platelets 764 (L) 272 - 390 Thousands/uL    MPV 10 2 8 9 - 12 7 fL   Fingerstick Glucose (POCT)    Collection Time: 09/18/18  4:03 AM   Result Value Ref Range    POC Glucose 119 65 - 140 mg/dl   ECG 12 lead    Collection Time: 09/18/18  5:33 AM   Result Value Ref Range    Ventricular Rate 87 BPM    Atrial Rate 87 BPM    AL Interval  ms    QRSD Interval 113 ms    QT Interval 496 ms    QTC Interval 597 ms    P Axis  degrees    QRS Axis -11 degrees    T Wave Axis 49 degrees   Prepare RBC:Special Requirements: Leukoreduced; Has consent been obtained? Yes; Where is the Surgery Scheduled?  [facility], 3 Units    Collection Time: 09/18/18  5:56 AM   Result Value Ref Range    Unit Product Code A5635T84     Unit Number [id number]     Unit ABO AB     Unit [facility] POS     Crossmatch Compatible     Unit Dispense Status Presumed Trans     Unit Product Code W5956F30     Unit Number [id number]     Unit ABO AB     Unit [facility] POS     Crossmatch Compatible     Unit Dispense Status Crossmatched     Unit Product Code W9302F16     Unit Number [id number]     Unit ABO AB     Unit [facility] POS     Crossmatch Compatible     Unit Dispense Status Presumed Trans     Unit Product Code I7927A20     Unit Number [id number]     Unit ABO AB     Unit [facility] POS     Crossmatch Compatible     Unit Dispense Status Crossmatched    Prepare fresh frozen plasma:Transfusion Indications: Other (specify); Other Indication: or; Has consent been obtained? Yes, 2 Units    Collection Time: 09/18/18  5:56 AM   Result Value Ref Range    Unit Product Code J4327B77     Unit Number [id number]     Unit ABO AB     Unit [facility] NEG     Unit Dispense Status Presumed Trans     Unit Product Code V0132S91     Unit Number [id number]     Unit ABO AB     Unit [facility] POS     Unit Dispense Status Presumed Trans    Prepare cryoprecipitate:Transfusion Indications: Fibrinogen < 100 mg/dL with microvascular bleeding; Has consent been obtained? Yes, 2 Units    Collection Time: 09/18/18  5:56 AM   Result Value Ref Range    Unit Product Code A4441F21     Unit Number [id number]     Unit ABO AB     Unit [facility] POS     Unit Dispense Status Presumed Trans     Unit Product Code W5793A25     Unit Number [id number]     Unit ABO AB     Unit [facility] POS     Unit Dispense Status Presumed Trans    Fingerstick Glucose (POCT)    Collection Time: 09/18/18  6:07 AM   Result Value Ref Range    POC Glucose 115 65 - 140 mg/dl   Prepare platelet pheresis:Transfusion Indications: Other (specify); Transfusion Indications: or; Special Requirements: None; Has consent been obtained?  Yes, 2 Units    Collection Time: 09/18/18  7:43 AM   Result Value Ref Range    Unit Product Code M3725G42     Unit Number [id number]     Unit ABO A     Unit [facility] POS     Unit Dispense Status Presumed Trans     Unit Product Code W5502B90     Unit Number [id number]     Unit ABO O     Unit [facility] POS     Unit Dispense Status Return to Inv    Fingerstick Glucose (POCT)    Collection Time: 09/18/18  8:09 AM   Result Value Ref Range    POC Glucose 120 65 - 140 mg/dl     Imaging: I have personally reviewed pertinent films in PACS  Code Status: LVL 1 Full Code

## 2018-09-18 NOTE — PHYSICAL THERAPY NOTE
Physical Therapy Evaluation     Patient's Name: Neftali Fisher  Admitting Diagnosis  Disease of cardiovascular system [I25 10]  Nonrheumatic aortic valve stenosis [I35 0]    Problem List  Patient Active Problem List   Diagnosis    Benign prostatic hyperplasia with nocturia    Benign essential hypertension    Dyslipidemia, goal LDL below 100    Gout with tophus    Hypothyroidism    Mild intermittent asthma without complication    Obesity    Pure hypercholesterolemia    Renal cyst    Sexual dysfunction    Type 2 diabetes mellitus ([address] )    Aortic stenosis, moderate    Coronary artery disease involving native coronary artery of native heart    Nonrheumatic aortic valve stenosis    GERD (gastroesophageal reflux disease)    S/P CABG x 4    S/P AVR (aortic valve replacement)       Past Medical History  Past Medical History:   Diagnosis Date    Aortic stenosis     CKD (chronic kidney disease)     baseline Cr 1 3-1 5    Coronary artery disease     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HCC)     type 2, insulin dependent    GERD (gastroesophageal reflux disease)     Glaucoma     Gout     History of prostate cancer     Hypothyroidism     Overweight     Peripheral neuropathy, idiopathic     Pure hypercholesterolemia     LA   11/12/14   R   11/12/14        Past Surgical History  Past Surgical History:   Procedure Laterality Date    CARDIAC CATHETERIZATION      MT CABG, ARTERY-VEIN, THREE N/A 9/17/2018    Procedure: CORONARY ARTERY BYPASS GRAFT (CABG) x 4 VESSELS with LIMA - LAD, SVG/LEFT LEG EVH - LEFT PDA, OM3, & OM2;  Surgeon: Martín Muñoz MD;  [facility];  Service: Cardiac Surgery    MT ECHO TRANSESOPHAG MONTR CARDIAC PUMP FUNCTJ N/A 9/17/2018    Procedure: TRANSESOPHAGEAL ECHOCARDIOGRAM (VERONICA);   Surgeon: Martín Muñoz MD;  [facility];  Service: Cardiac Surgery    MT RPLCMT AORTIC VALVE OPN W/STENTLESS TISSUE VALVE N/A 9/17/2018    Procedure: REPLACEMENT VALVE AORTIC (AVR)- 23mm tissue Intuity Valve;  Surgeon: Madonna Beal MD;  [facility];  Service: Cardiac Surgery    THYROID SURGERY        09/18/18 9624   Note Type   Note type Eval/Treat   Pain Assessment   Pain Assessment 0-10   Pain Score 7   Pain Type Acute pain   Pain Location Incision   Pain Orientation Mid   Hospital Pain Intervention(s) Ambulation/increased activity   Response to Interventions tolerated   Home Living   Type of [address] One level   Bathroom Shower/Tub Tub/shower unit   Bathroom Toilet Raised   Home Equipment Nataly Carrillo   Prior Function   Level of [place] Independent with ADLs and functional mobility   Lives With Son   Receives Help From Family   ADL Assistance Independent   IADLs Independent   Falls in the last 6 months 0   Vocational Retired   Restrictions/Precautions   [facility] Bearing Precautions Per Order No   Other Precautions Cardiac/sternal;Multiple lines; Fall Risk;Pain;Telemetry; Fluid restriction   General   Family/Caregiver Present No   Cognition   Orientation Level Oriented X4   RLE Assessment   RLE Assessment WFL   LLE Assessment   LLE Assessment WFL   Coordination   Movements are Fluid and Coordinated 0   Coordination and Movement Description slow, shuffling   Bed Mobility   Additional Comments pt found resting in recliner   Transfers   Sit to Stand 4  Minimal assistance   Additional items Assist x 1; Increased time required   Stand to Sit 4  Minimal assistance   Additional items Assist x 1   Ambulation/Elevation   Gait pattern Shuffling;Decreased foot clearance; Excessively slow; Short stride   Gait Assistance 4  Minimal assist   Additional items Assist x 1   Assistive Device Rolling walker   Distance 30   Balance   Static Sitting Fair +   Dynamic Sitting Fair   Static Standing Fair   Dynamic Standing Fair -   Endurance Deficit   Endurance Deficit Yes   Endurance Deficit Description limited by fatigue   Activity Tolerance   Activity Tolerance Patient limited by fatigue   Nurse Made Aware yes, nsg gave clearance to work with pt   Assessment   Prognosis Fair   Problem List Decreased mobility; Impaired balance;Decreased endurance;Decreased strength; Impaired judgement;Decreased safety awareness;Pain   Assessment Pt is 80 y o  male seen for PT evaluation s/p admit to [facility] on 9/17/2018 w/ Coronary artery disease involving native coronary artery of native heart  PT consulted to assess pt's functional mobility and d/c needs  Order placed for PT eval and tx, w/ ambulate patient order  Comorbidities affecting pt's physical performance at time of assessment include: fatigue, chest tube  PTA, pt was ambulates community distances and elevations and has 1 RENE  Personal factors affecting pt at time of IE include: ambulating w/ assistive device, stairs to enter home, inability to ambulate household distances, limited home support, decreased initiation and engagement, limited insight into impairments, inability to perform IADLs and inability to perform ADLs  Please find objective findings from PT assessment regarding body systems outlined above with impairments and limitations including weakness, impaired balance, decreased endurance, impaired coordination, gait deviations, pain, decreased activity tolerance, decreased functional mobility tolerance, decreased safety awareness, fall risk and SOB upon exertion  Pt required single step instruction for sternal precautions and technique during transfers  Ambulated with severely slow shuffling gait  Poor activity tolerance despite encouragement to continue  Anticipate at this time pt may require STR as he has limited support living with a son who works and inability to tolerate household distance at this time  The following objective measures performed on IE also reveal limitations: Barthel Index: 55/100  Pt's clinical presentation is currently unstable/unpredictable seen in pt's presentation of chest tube, pain   Pt to benefit from continued PT tx to address deficits as defined above and maximize level of functional independent mobility and consistency  From PT/mobility standpoint, recommendation at time of d/c would be IP rehab pending progress in order to facilitate return to PLOF  Barriers to Discharge Decreased caregiver support; Inaccessible home environment   Goals   Patient Goals To nap   STG Expiration Date 09/30/18   Short Term Goal #1 1  Complete bed mobility and transfers I to decrease need for caregiver in home  2  Ambulate 200' I to complete household and community mobility without A  3  Improve dynamic balance to good to decrease need for UE support during ambulation  4  Be educated & demonstate 2 steps to be able to enter home without A  5  I with pacing to ambulate community distance with minimal rest breaks   Plan   Treatment/Interventions Functional transfer training; Endurance training;Patient/family training;Bed mobility;Gait training;Spoke to nursing;Spoke to case management;OT   PT Frequency (4-6x/wk)   Recommendation   Recommendation (rehab)   Equipment Recommended Blanca Isaacs   PT - OK to Discharge (to rehab when medically stable )   Barthel Index   Feeding 10   Bathing 0   Grooming Score 5   Dressing Score 5   Bladder Score 10   Bowels Score 10   Toilet Use Score 5   Transfers (Bed/Chair) Score 10   Mobility (Level Surface) Score 0   Stairs Score 0   Barthel Index Score 55           Judith Tejada, PT

## 2018-09-18 NOTE — CONSULTS
Consultation - Pallavi Pérez  80 y o  male MRN: [id number]    Unit/Bed#: [facility] 421-01 Encounter: [id number]      Assessment/Plan     Assessment: This is a 80y o -year-old male with type 2 diabetes with hyperglycemia on long-term insulin therapy, hypothyroidism, aortic stenosis status post aortic valve replacement and coronary artery bypass grafting  Plan:  Given her poor oral intake will continue IV insulin infusion for another 24 hours  Fingersticks to be monitored q 2 hours on IV insulin infusion  As oral intake improves will switch him to basal bolus insulin therapy  Hypothyroidism-continue levothyroxine-will check thyroid function test    Aortic stenosis status post aortic valve replacement/coronary artery bypass grafting-management as per primary team    CC: Diabetes Consult    History of Present Illness     HPI: Pallavi Pérez  is a 80y o  year old male with type 2 diabetes, aortic stenosis underwent aortic valve replacement and coronary artery bypass grafting-since his surgery he has been on IV insulin infusion and endocrine consult is requested for management of diabetes  Patient states that he has had type 2 diabetes for the past 10 years-he is on basal insulin as well as metformin at home  He states that he checks his blood sugars in the morning and usually fasting blood sugar 100-1 30s  He denies any hypoglycemic episodes  Denies any polyuria, polydipsia, blurry vision  Does complain of some numbness and tingling in his feet  Currently on IV insulin infusion his sugars since morning have ranged anywhere between 118-145  He complains of poor appetite and has not had anything to eat as yet  Consults    Review of Systems   Constitutional: Positive for appetite change and fatigue  Negative for unexpected weight change  Eyes: Negative for visual disturbance  Respiratory: Positive for shortness of breath  Negative for cough  Cardiovascular: Positive for leg swelling   Negative for palpitations  Gastrointestinal: Positive for constipation  Negative for diarrhea, nausea and vomiting  Endocrine: Positive for polyuria  Negative for polydipsia  Musculoskeletal: Negative for gait problem  Skin: Negative for color change, pallor and rash  Psychiatric/Behavioral: Negative for sleep disturbance  All other systems reviewed and are negative  Historical Information   Past Medical History:   Diagnosis Date    Aortic stenosis     CKD (chronic kidney disease)     baseline Cr 1 3-1 5    Coronary artery disease     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HCC)     type 2, insulin dependent    GERD (gastroesophageal reflux disease)     Glaucoma     Gout     History of prostate cancer     Hypothyroidism     Overweight     Peripheral neuropathy, idiopathic     Pure hypercholesterolemia     LA   11/12/14   R   11/12/14      Past Surgical History:   Procedure Laterality Date    CARDIAC CATHETERIZATION      IN CABG, ARTERY-VEIN, THREE N/A 9/17/2018    Procedure: CORONARY ARTERY BYPASS GRAFT (CABG) x 4 VESSELS with LIMA - LAD, SVG/LEFT LEG EVH - LEFT PDA, OM3, & OM2;  Surgeon: Mahamed Urrutia MD;  [facility]: [facility];  Service: Cardiac Surgery    IN ECHO TRANSESOPHAG MONTR CARDIAC PUMP FUNCTJ N/A 9/17/2018    Procedure: TRANSESOPHAGEAL ECHOCARDIOGRAM (VERONICA);   Surgeon: Mahamed Urrutia MD;  [facility];  Service: Cardiac Surgery    IN RPLCMT AORTIC VALVE OPN W/STENTLESS TISSUE VALVE N/A 9/17/2018    Procedure: REPLACEMENT VALVE AORTIC (AVR)- 23mm tissue Intuity Valve;  Surgeon: Mahamed Urrutia MD;  [facility];  Service: Cardiac Surgery    THYROID SURGERY       Social History   History   Alcohol Use No     History   Drug Use No     History   Smoking Status    Never Smoker   Smokeless Tobacco    Never Used     Comment: Smoked in the service about 50 years ago     Family History:   Family History   Problem Relation Age of Onset    Diabetes Mother     Pancreatic cancer Brother    [place] Diabetes Maternal Grandmother     Colon cancer Son     Diabetes Family        Meds/Allergies   Current Facility-Administered Medications   Medication Dose Route Frequency Provider Last Rate Last Dose    acetaminophen (TYLENOL) tablet 650 mg  650 mg Oral Q4H PRN Kenton Saavedra PA-C        allopurinol (ZYLOPRIM) tablet 300 mg  300 mg Oral Daily Kenton Saavedra PA-C   300 mg at 09/18/18 7778    aspirin tablet 325 mg  325 mg Oral Daily Kenton Saavedra, CHON   325 mg at 09/18/18 0540    bisacodyl (DULCOLAX) rectal suppository 10 mg  10 mg Rectal Daily PRN Kenton Saavedra PA-C        docusate sodium (COLACE) capsule 100 mg  100 mg Oral BID Corina Godinez V CRALMAS   100 mg at 09/18/18 5278    gabapentin (NEURONTIN) capsule 300 mg  300 mg Oral HS Kentonsusanna Saavedra PA-C   300 mg at 09/17/18 2145    heparin (porcine) subcutaneous injection 5,000 Units  5,000 Units Subcutaneous [facility] Yanira Zuleta PA-C   5,000 Units at 09/18/18 1340    insulin regular (HumuLIN R,NovoLIN R) 1 Units/mL in sodium chloride 0 9 % 100 mL infusion  0 3-21 Units/hr Intravenous Titrated Kenton Saavedra PA-C 1 5 mL/hr at 09/18/18 1549 1 5 Units/hr at 09/18/18 1549    levothyroxine tablet 150 mcg  150 mcg Oral Early Morning Kenton Saavedra PA-C   150 mcg at 09/18/18 0612    metoprolol tartrate (LOPRESSOR) partial tablet 12 5 mg  12 5 mg Oral Q12H [address] Corina Godinez V, CRNP   12 5 mg at 09/18/18 4218    mupirocin (BACTROBAN) 2 % nasal ointment 1 application  1 application Nasal H11N [address]   1 application at 87/67/96 0927    ondansetron (ZOFRAN) injection 4 mg  4 mg Intravenous Q6H PRN Kenton Saavedra PA-C        oxyCODONE-acetaminophen (PERCOCET) 5-325 mg per tablet 1 tablet  1 tablet Oral Q4H PRN Kenton Saavedra PA-C   1 tablet at 09/18/18 1546    oxyCODONE-acetaminophen (PERCOCET) 5-325 mg per tablet 2 tablet  2 tablet Oral Q6H PRN Kenton Fleeting, PA-C        pantoprazole (PROTONIX) EC tablet 40 mg  40 mg Oral Daily Sarah Robles PA-C   40 mg at 09/18/18 0612    polyethylene glycol (MIRALAX) packet 17 g  17 g Oral Daily Sarah Robles PA-C   17 g at 09/18/18 5753    pravastatin (PRAVACHOL) tablet 80 mg  80 mg Oral Daily With Dinner VALENCIA Wahl        sodium chloride infusion 0 45 %  20 mL/hr Intravenous Continuous Norajay Robles PA-C 20 mL/hr at 09/18/18 0400 20 mL/hr at 09/18/18 0400    tamsulosin (FLOMAX) capsule 0 4 mg  0 4 mg Oral Daily With [employer], PA-C        temazepam (RESTORIL) capsule 15 mg  15 mg Oral HS PRN Corina Godinez VRICHYNP         Allergies   Allergen Reactions    Amlodipine Nausea Only    Atorvastatin Myalgia       Objective   Vitals: Blood pressure 137/70, pulse 71, temperature 98 7 °F (37 1 °C), temperature source Oral, resp  rate 18, height 5' 8" (1 727 m), weight 104 kg (228 lb 9 9 oz), SpO2 96 %  Intake/Output Summary (Last 24 hours) at 09/18/18 1608  Last data filed at 09/18/18 1549   Gross per 24 hour   Intake          3542 68 ml   Output             2135 ml   Net          1407 68 ml     Invasive Devices     Central Venous Catheter Line            CVC Central Lines 09/17/18 Triple 1 day          Peripheral Intravenous Line            Peripheral IV 09/17/18 Right Antecubital 1 day          Line            Pacer Wires 1 day    Pacer Wires 1 day          Drain            Chest Tube 1 Anterior Mediastinal 32 Fr  1 day    Chest Tube 2 Left Pleural 32 Fr  1 day    Chest Tube 3 Posterior Mediastinal 32 Fr  1 day                Physical Exam   Constitutional: He is oriented to person, place, and time  He appears well-developed and well-nourished  No distress  HENT:   Head: Normocephalic and atraumatic  Mouth/Throat: No oropharyngeal exudate  Eyes: Conjunctivae and EOM are normal  No scleral icterus  Neck: Normal range of motion  Neck supple  Cardiovascular: Normal rate, regular rhythm and normal heart sounds      No murmur heard   Pulmonary/Chest: Effort normal and breath sounds normal  No respiratory distress  He has no wheezes  He has no rales  Abdominal: Soft  Bowel sounds are normal  He exhibits no distension  There is no tenderness  There is no rebound  Musculoskeletal: Normal range of motion  He exhibits no edema or deformity  Lymphadenopathy:     He has no cervical adenopathy  Neurological: He is alert and oriented to person, place, and time  Skin: Skin is warm and dry  No rash noted  No erythema  No pallor  Psychiatric: He has a normal mood and affect  His behavior is normal  Thought content normal        The history was obtained from the review of the chart, patient  Lab Results:        September 7, 2018- hemoglobin A1c 7 9    Lab Results   Component Value Date    WBC 8 84 09/18/2018    HGB 7 9 (L) 09/18/2018    HCT 23 9 (L) 09/18/2018    MCV 95 09/18/2018     (L) 09/18/2018     Lab Results   Component Value Date/Time    BUN 26 (H) 09/18/2018 04:01 AM    BUN 24 05/23/2018 10:50 AM     09/18/2018 04:01 AM     09/05/2017 11:34 AM    K 4 6 09/18/2018 04:01 AM    K 4 2 09/05/2017 11:34 AM     (H) 09/18/2018 04:01 AM     05/23/2018 10:50 AM    CO2 25 09/18/2018 04:01 AM    CO2 28 05/23/2018 10:50 AM    CREATININE 1 40 (H) 09/18/2018 04:01 AM    CREATININE 1 56 (H) 09/05/2017 11:34 AM    AST 17 04/17/2017 10:30 AM    ALT 16 04/17/2017 10:30 AM    ALB 3 8 02/15/2018 10:22 AM    ALB 3 9 04/17/2017 10:30 AM    GLOB 2 7 02/15/2018 10:22 AM     No results for input(s): CHOL, HDL, LDL, TRIG, VLDL in the last 72 hours  No results found for: Karen Harrison  POC Glucose (mg/dl)   Date Value   09/18/2018 145 (H)   09/18/2018 119   09/18/2018 130   09/18/2018 120   09/18/2018 115   09/18/2018 119   09/18/2018 124   09/18/2018 165 (H)   09/17/2018 176 (H)   09/17/2018 166 (H)       Imaging Studies: I have personally reviewed pertinent reports      XR chest portable [[id number]] Collected: 09/18/18 0067   Order Status: Completed Updated: 09/18/18 0815   Narrative:     CHEST     INDICATION:   Post Open Heart Surgey  COMPARISON:  9/17/2018  EXAM PERFORMED/VIEWS:  RY CHEST PORTABLE      FINDINGS:  Median sternotomy wires are present   Cardiac valve prosthesis is noted   [place] catheter tip overlies the pulmonary artery   Right jugular central venous catheter tip overlies the cavoatrial junction   Mediastinal drain and chest tubes   present  Stable cardiomegaly is noted  Bibasilar subsegmental atelectasis is present  Osseous structures appear within normal limits for patient age  Impression:       Bibasilar subsegmental atelectasis  Portions of the record may have been created with voice recognition software

## 2018-09-18 NOTE — PROGRESS NOTES
Progress Note - Cardiothoracic Surgery   Janay Smith  80 y o  male MRN: [id number]  Unit/Bed#: [facility] 414-01 Encounter: [id number]      POD # 1 s/p CABG/AVR    Pt seen/examined  Interval history and data reviewed with critical care team   Pt doing well  No specific complaints          Medications:   Scheduled Meds:  Current Facility-Administered Medications:  acetaminophen 650 mg Rectal Q4H PRN EDWIN Phillips-JITENDRA    acetaminophen 650 mg Oral Q4H PRN CHON Phillips    allopurinol 300 mg Oral Daily CHON Phillips    amiodarone 200 mg Oral [facility], PA-C    aspirin 325 mg Oral Daily COHN Phillips    bisacodyl 10 mg Rectal Daily PRN CHON Phillips    calcium chloride 1 g Intravenous Once CHON Phillips    cefazolin 2,000 mg Intravenous 5 [place] Last Rate: 2,000 mg (09/18/18 0612)   chlorhexidine 15 mL Mouth/Throat BID CHON Phillips    epinephrine 1-20 mcg/min Intravenous Titrated Brianda Ortiz PA-C Last Rate: Stopped (09/18/18 0030)   furosemide 40 mg Intravenous Q6H PRN CHON Phillips    gabapentin 300 mg Oral HS Teri Parrish PA-C    heparin (porcine) 5,000 Units Subcutaneous Q8H [address] Brianda Ortiz PA-C    HYDROmorphone 0 5 mg Intravenous Q1H PRN CHON Phillips    HYDROmorphone 1 mg Intravenous Q1H PRN CHON Phillips    insulin regular (HumuLIN R,NovoLIN R) infusion 0 3-21 Units/hr Intravenous Titrated CHON Phillips Last Rate: 1 5 Units/hr (09/18/18 0400)   levothyroxine 150 mcg Oral Early Morning Teri Parrish PA-C    lidocaine (cardiac) 100 mg Intravenous Q30 Min PRN Teri Parrish PA-C    milrinone [facility]) infusion 0 13 mcg/kg/min Intravenous Continuous Brianda Ortiz PA-C Last Rate: Stopped (09/18/18 0424)   mupirocin 1 application Nasal D31X [address] Teri Kerecman, PA-C    niCARdipine 2 5-15 mg/hr Intravenous Titrated Brianda Ortiz PA-C Last Rate: Stopped (09/17/18 1928)   ondansetron 4 mg Intravenous Q6H PRN Candido EDWIN Victoria-JITENDRA    oxyCODONE-acetaminophen 1 tablet Oral Q4H PRN Candidoaudrey Victoria, PA-JITENDRA    oxyCODONE-acetaminophen 2 tablet Oral Q6H PRN Candido Julien, PA-C    pantoprazole 40 mg Oral Daily Jak Palacios    phenylephine  mcg/min Intravenous Titrated Vita Rodriguez PA-C Last Rate: Stopped (09/18/18 0030)   polyethylene glycol 17 g Oral Daily Candido Victoria PA-C    potassium chloride 20 mEq Intravenous Q1H PRN Candido Victoria, PA-C    sodium chloride 20 mL/hr Intravenous Continuous EDWIN Palacios-JITENDRA Last Rate: 20 mL/hr (09/18/18 0400)   tamsulosin 0 4 mg Oral Daily With CHON ESCOBAR      Continuous Infusions:  epinephrine 1-20 mcg/min Last Rate: Stopped (09/18/18 0030)   insulin regular (HumuLIN R,NovoLIN R) infusion 0 3-21 Units/hr Last Rate: 1 5 Units/hr (09/18/18 0400)   milrinone (PRIMACOR) infusion 0 13 mcg/kg/min Last Rate: Stopped (09/18/18 0424)   niCARdipine 2 5-15 mg/hr Last Rate: Stopped (09/17/18 1928)   phenylephine  mcg/min Last Rate: Stopped (09/18/18 0030)   sodium chloride 20 mL/hr Last Rate: 20 mL/hr (09/18/18 0400)     PRN Meds:   acetaminophen    acetaminophen    bisacodyl    furosemide    HYDROmorphone    HYDROmorphone    lidocaine (cardiac)    ondansetron    oxyCODONE-acetaminophen    oxyCODONE-acetaminophen    potassium chloride    Vitals: Blood pressure 118/51, pulse 80, temperature 98 8 °F (37 1 °C), temperature source Probe, resp  rate 20, height 5' 8" (1 727 m), weight 104 kg (228 lb 9 9 oz), SpO2 94 %  ,Body mass index is 34 76 kg/m²  I/O last 24 hours: In: 9543 3 [I V :5080 3; Blood:1613; IV Piggyback:1850]  Out: 4693 [KIAOB:1327; Blood:1000;  Chest Tube:1190]  Invasive Devices     Central Venous Catheter Line            CVC Central Lines 09/17/18 Triple less than 1 day    Introducer 09/17/18 less than 1 day          Peripheral Intravenous Line            Peripheral IV 09/17/18 Right Antecubital 1 day Arterial Line            Arterial Line 09/17/18 Right Radial less than 1 day          Line            Pacer Wires less than 1 day    Pacer Wires less than 1 day          Drain            Chest Tube 1 Anterior Mediastinal 32 Fr  less than 1 day    Chest Tube 2 Left Pleural 32 Fr  less than 1 day    Chest Tube 3 Posterior Mediastinal 32 Fr  less than 1 day    Urethral Catheter Latex 12 Fr  less than 1 day                  Lab, Imaging and other studies:     Results from last 7 days  Lab Units 09/18/18  0401 09/17/18  2304 09/17/18  1725  09/17/18  1546   WBC Thousand/uL 8 84  --   --   --   --    HEMOGLOBIN g/dL 7 9* 8 8* 8 7*  --  10 3*   I STAT HEMOGLOBIN   --   --   --   < >  --    HEMATOCRIT % 23 9* 27 0* 27 2*  < > 31 4*   PLATELETS Thousands/uL 117*  --  134*  --  97*   < > = values in this interval not displayed  Results from last 7 days  Lab Units 09/18/18  0401 09/17/18  2304 09/17/18  1909 09/17/18  1628 09/17/18  1546   SODIUM mmol/L 144  --   --   --  145   POTASSIUM mmol/L 4 6 4 2 3 6  --  3 6   CHLORIDE mmol/L 111*  --   --   --  113*   CO2 mmol/L 25  --   --   --  22   BUN mg/dL 26*  --   --   --  23   CREATININE mg/dL 1 40*  --   --   --  1 34*   GLUCOSE, ISTAT mg/dl  --   --   --  126  --    CALCIUM mg/dL 8 2*  --   --   --  8 7       Results from last 7 days  Lab Units 09/17/18  1725 09/17/18  1409   INR  1 47* 1 79*   PTT seconds 69* 61*     No results for input(s): PHART, LII8MCC, PO2ART, LQH0TUY, J1LQZYWU, BEART in the last 72 hours  Plan:    DC [place]  Continue chest tubes, pacing wires  Transfer to floor  Ambulate  Incentive spirometry  Diuresis  PO ASA/Statin/B blocker          SIGNATURE: Martín Muñoz MD  DATE: September 18, 2018  TIME: 7:41 AM

## 2018-09-18 NOTE — SOCIAL WORK
Met with pt at bedside to explain CM role  Pt resides in a ranch home with 1 RENE with his son  Son works middle shift  Pt states he occasionally uses a cane when his knee bothers him  Pt states he is indep with all ADLs  Pt never had home care but is aware it will be recommended this admission  Offered pt choice and he is agreeable to [facility]  Pt states he has never been to rehab  Pts PCP is Dr George Hernandez and his RX is [facility] in [place]  Pt states he does not have a POA or living will  Pt denies mental illness or substance abuse  Patient/caregiver received discharge checklist  Content reviewed  Patient/caregiver encouraged to participate in discharge plan of care prior to discharge home  CM reviewed d/c planning process including the following: identifying help at home, patient preference for d/c planning needs, Discharge Lounge, [facility] Meds to Bed program, availability of treatment team to discuss questions or concerns patient and/or family may have regarding understanding medications and recognizing signs and symptoms once discharged  CM also encouraged patient to follow up with all recommended appointments after discharge  Patient advised of importance for patient and family to participate in managing patients medical well being  Patient/caregiver received discharge checklist  Content reviewed  Patient/caregiver encouraged to participate in discharge plan of care prior to discharge home

## 2018-09-19 LAB
ANION GAP SERPL CALCULATED.3IONS-SCNC: 7 MMOL/L (ref 4–13)
BUN SERPL-MCNC: 39 MG/DL (ref 5–25)
CALCIUM SERPL-MCNC: 7.6 MG/DL (ref 8.3–10.1)
CHLORIDE SERPL-SCNC: 107 MMOL/L (ref 100–108)
CO2 SERPL-SCNC: 25 MMOL/L (ref 21–32)
CREAT SERPL-MCNC: 1.95 MG/DL (ref 0.6–1.3)
ERYTHROCYTE [DISTWIDTH] IN BLOOD BY AUTOMATED COUNT: 14.7 % (ref 11.6–15.1)
GFR SERPL CREATININE-BSD FRML MDRD: 31 ML/MIN/1.73SQ M
GLUCOSE SERPL-MCNC: 121 MG/DL (ref 65–140)
GLUCOSE SERPL-MCNC: 138 MG/DL (ref 65–140)
GLUCOSE SERPL-MCNC: 158 MG/DL (ref 65–140)
GLUCOSE SERPL-MCNC: 164 MG/DL (ref 65–140)
GLUCOSE SERPL-MCNC: 169 MG/DL (ref 65–140)
GLUCOSE SERPL-MCNC: 170 MG/DL (ref 65–140)
GLUCOSE SERPL-MCNC: 177 MG/DL (ref 65–140)
GLUCOSE SERPL-MCNC: 177 MG/DL (ref 65–140)
GLUCOSE SERPL-MCNC: 180 MG/DL (ref 65–140)
GLUCOSE SERPL-MCNC: 197 MG/DL (ref 65–140)
GLUCOSE SERPL-MCNC: 227 MG/DL (ref 65–140)
GLUCOSE SERPL-MCNC: 244 MG/DL (ref 65–140)
GLUCOSE SERPL-MCNC: 357 MG/DL (ref 65–140)
HCT VFR BLD AUTO: 23.5 % (ref 36.5–49.3)
HGB BLD-MCNC: 7.8 G/DL (ref 12–17)
MAGNESIUM SERPL-MCNC: 2.7 MG/DL (ref 1.6–2.6)
MCH RBC QN AUTO: 32.1 PG (ref 26.8–34.3)
MCHC RBC AUTO-ENTMCNC: 33.2 G/DL (ref 31.4–37.4)
MCV RBC AUTO: 97 FL (ref 82–98)
PLATELET # BLD AUTO: 98 THOUSANDS/UL (ref 149–390)
PMV BLD AUTO: 10.7 FL (ref 8.9–12.7)
POTASSIUM SERPL-SCNC: 4.3 MMOL/L (ref 3.5–5.3)
RBC # BLD AUTO: 2.43 MILLION/UL (ref 3.88–5.62)
SODIUM SERPL-SCNC: 139 MMOL/L (ref 136–145)
T4 FREE SERPL-MCNC: 1.34 NG/DL (ref 0.76–1.46)
TSH SERPL DL<=0.05 MIU/L-ACNC: 0.1 UIU/ML (ref 0.36–3.74)
WBC # BLD AUTO: 12.85 THOUSAND/UL (ref 4.31–10.16)

## 2018-09-19 PROCEDURE — 99024 POSTOP FOLLOW-UP VISIT: CPT | Performed by: THORACIC SURGERY (CARDIOTHORACIC VASCULAR SURGERY)

## 2018-09-19 PROCEDURE — 85027 COMPLETE CBC AUTOMATED: CPT | Performed by: NURSE PRACTITIONER

## 2018-09-19 PROCEDURE — 99232 SBSQ HOSP IP/OBS MODERATE 35: CPT | Performed by: INTERNAL MEDICINE

## 2018-09-19 PROCEDURE — 82948 REAGENT STRIP/BLOOD GLUCOSE: CPT

## 2018-09-19 PROCEDURE — 84439 ASSAY OF FREE THYROXINE: CPT | Performed by: INTERNAL MEDICINE

## 2018-09-19 PROCEDURE — 80048 BASIC METABOLIC PNL TOTAL CA: CPT | Performed by: NURSE PRACTITIONER

## 2018-09-19 PROCEDURE — 83735 ASSAY OF MAGNESIUM: CPT | Performed by: NURSE PRACTITIONER

## 2018-09-19 PROCEDURE — 84443 ASSAY THYROID STIM HORMONE: CPT | Performed by: INTERNAL MEDICINE

## 2018-09-19 RX ORDER — FERROUS SULFATE 325(65) MG
325 TABLET ORAL
Status: DISCONTINUED | OUTPATIENT
Start: 2018-09-20 | End: 2018-09-21 | Stop reason: HOSPADM

## 2018-09-19 RX ORDER — ASCORBIC ACID 500 MG
500 TABLET ORAL DAILY
Status: DISCONTINUED | OUTPATIENT
Start: 2018-09-19 | End: 2018-09-21 | Stop reason: HOSPADM

## 2018-09-19 RX ORDER — DOCUSATE SODIUM 100 MG/1
100 CAPSULE, LIQUID FILLED ORAL DAILY
Status: DISCONTINUED | OUTPATIENT
Start: 2018-09-20 | End: 2018-09-21 | Stop reason: HOSPADM

## 2018-09-19 RX ORDER — FUROSEMIDE 10 MG/ML
40 INJECTION INTRAMUSCULAR; INTRAVENOUS
Status: DISCONTINUED | OUTPATIENT
Start: 2018-09-19 | End: 2018-09-21 | Stop reason: HOSPADM

## 2018-09-19 RX ORDER — AMOXICILLIN 250 MG
1 CAPSULE ORAL
Status: DISCONTINUED | OUTPATIENT
Start: 2018-09-19 | End: 2018-09-21 | Stop reason: HOSPADM

## 2018-09-19 RX ORDER — INSULIN GLARGINE 100 [IU]/ML
25 INJECTION, SOLUTION SUBCUTANEOUS
Status: DISCONTINUED | OUTPATIENT
Start: 2018-09-19 | End: 2018-09-20

## 2018-09-19 RX ORDER — POTASSIUM CHLORIDE 20 MEQ/1
20 TABLET, EXTENDED RELEASE ORAL DAILY
Status: DISCONTINUED | OUTPATIENT
Start: 2018-09-19 | End: 2018-09-21 | Stop reason: HOSPADM

## 2018-09-19 RX ADMIN — FUROSEMIDE 40 MG: 10 INJECTION, SOLUTION INTRAMUSCULAR; INTRAVENOUS at 10:27

## 2018-09-19 RX ADMIN — LEVOTHYROXINE SODIUM 150 MCG: 75 TABLET ORAL at 05:01

## 2018-09-19 RX ADMIN — INSULIN GLARGINE 25 UNITS: 100 INJECTION, SOLUTION SUBCUTANEOUS at 22:52

## 2018-09-19 RX ADMIN — HEPARIN SODIUM 5000 UNITS: 5000 INJECTION, SOLUTION INTRAVENOUS; SUBCUTANEOUS at 15:28

## 2018-09-19 RX ADMIN — FUROSEMIDE 40 MG: 10 INJECTION, SOLUTION INTRAMUSCULAR; INTRAVENOUS at 15:30

## 2018-09-19 RX ADMIN — POTASSIUM CHLORIDE 20 MEQ: 1500 TABLET, EXTENDED RELEASE ORAL at 10:27

## 2018-09-19 RX ADMIN — METOPROLOL TARTRATE 12.5 MG: 25 TABLET ORAL at 22:49

## 2018-09-19 RX ADMIN — PANTOPRAZOLE SODIUM 40 MG: 40 TABLET, DELAYED RELEASE ORAL at 05:01

## 2018-09-19 RX ADMIN — OXYCODONE HYDROCHLORIDE AND ACETAMINOPHEN 1 TABLET: 5; 325 TABLET ORAL at 00:19

## 2018-09-19 RX ADMIN — OXYCODONE HYDROCHLORIDE AND ACETAMINOPHEN 500 MG: 500 TABLET ORAL at 10:27

## 2018-09-19 RX ADMIN — MUPIROCIN 1 APPLICATION: 20 OINTMENT TOPICAL at 22:49

## 2018-09-19 RX ADMIN — POLYETHYLENE GLYCOL 3350 17 G: 17 POWDER, FOR SOLUTION ORAL at 10:23

## 2018-09-19 RX ADMIN — PRAVASTATIN SODIUM 80 MG: 80 TABLET ORAL at 17:29

## 2018-09-19 RX ADMIN — METOPROLOL TARTRATE 12.5 MG: 25 TABLET ORAL at 10:24

## 2018-09-19 RX ADMIN — DOCUSATE SODIUM 100 MG: 100 CAPSULE, LIQUID FILLED ORAL at 10:25

## 2018-09-19 RX ADMIN — Medication 1 TABLET: at 22:49

## 2018-09-19 RX ADMIN — TAMSULOSIN HYDROCHLORIDE 0.4 MG: 0.4 CAPSULE ORAL at 17:29

## 2018-09-19 RX ADMIN — ASPIRIN 325 MG: 325 TABLET ORAL at 10:24

## 2018-09-19 RX ADMIN — GABAPENTIN 300 MG: 300 CAPSULE ORAL at 22:49

## 2018-09-19 RX ADMIN — MUPIROCIN 1 APPLICATION: 20 OINTMENT TOPICAL at 10:24

## 2018-09-19 RX ADMIN — HEPARIN SODIUM 5000 UNITS: 5000 INJECTION, SOLUTION INTRAVENOUS; SUBCUTANEOUS at 22:52

## 2018-09-19 RX ADMIN — ALLOPURINOL 300 MG: 300 TABLET ORAL at 10:24

## 2018-09-19 RX ADMIN — HEPARIN SODIUM 5000 UNITS: 5000 INJECTION, SOLUTION INTRAVENOUS; SUBCUTANEOUS at 05:01

## 2018-09-19 NOTE — PROGRESS NOTES
Progress Note - Duke Hampton  80 y o  male MRN: [id number]    [facility]: [id number]      CC: diabetes f/u    Subjective:   Duke Hampton  is a 80y o  year old male with type 2 diabetes  Feels well  No complaints  No hypoglycemia  Appetite improved somewhat    Objective:     Vitals: Blood pressure 119/56, pulse 80, temperature 98 2 °F (36 8 °C), temperature source Oral, resp  rate 18, height 5' 8" (1 727 m), weight 104 kg (228 lb 9 9 oz), SpO2 97 %  ,Body mass index is 34 76 kg/m²  Intake/Output Summary (Last 24 hours) at 09/19/18 1448  Last data filed at 09/19/18 1419   Gross per 24 hour   Intake          1084 46 ml   Output             1405 ml   Net          -320 54 ml       Physical Exam:  General Appearance: awake, appears stated age and cooperative  Head: Normocephalic, without obvious abnormality, atraumatic  Extremities: moves all extremities  Skin: Skin color and temperature normal    Pulm: no labored breathing    Lab, Imaging and other studies: I have personally reviewed pertinent reports  Results from last 7 days  Lab Units 09/19/18  0453  09/17/18  1628   SODIUM mmol/L 139  < >  --    POTASSIUM mmol/L 4 3  < >  --    CHLORIDE mmol/L 107  < >  --    CO2 mmol/L 25  < >  --    BUN mg/dL 39*  < >  --    CREATININE mg/dL 1 95*  < >  --    GLUCOSE, ISTAT mg/dl  --   --  126   CALCIUM mg/dL 7 6*  < >  --    < > = values in this interval not displayed  POC Glucose (mg/dl)   Date Value   09/19/2018 164 (H)   09/19/2018 197 (H)   09/19/2018 227 (H)   09/19/2018 357 (H)   09/19/2018 177 (H)   09/19/2018 170 (H)   09/19/2018 169 (H)   09/19/2018 138   09/18/2018 105   09/18/2018 303 (H)       Assessment:  Type 2 diabetes with hyperglycemia on long-term insulin therapy  Hypothyroidism  Aortic stenosis status post aortic valve replacement  Coronary artery disease status post CABG    Plan: Will switch to basal bolus insulin therapy starting at bedtime tonight    Lantus 25 units at bedtime and discontinue IV insulin infusion 1 hour later  Start Humalog 10 units before meals tomorrow  Monitor blood sugars before meals and bedtime adjust accordingly    Hypothyroidism-TSH slightly low but free T4 is normal   Continue levothyroxine at current dose and repeat TSH and free T4 in 6 weeks as outpatient  Aortic stenosis-status post aortic valve replacements, coronary artery disease-status post CABG-management as per primary team      Portions of the record may have been created with voice recognition software

## 2018-09-19 NOTE — RESTORATIVE TECHNICIAN NOTE
Restorative Specialist Mobility Note       Activity: Ambulate in rivas, Chair     Assistive Device: Front wheel walker

## 2018-09-19 NOTE — PROGRESS NOTES
Progress Note - Cardiothoracic Surgery   Phillip Kaufman  80 y o  male MRN: [id number]  Unit/Bed#: [facility] 428-01 Encounter: [id number]  Aortic stenosis, Non-Rheumatic, Coronary artery disease  S/P aortic valve replacement and coronary artery bypass grafting; POD # 2    24 Hour Events: No events overnight other than forgetfullness without significant disorientation, easily reorients and moved room closer to nursing station       Medications:   Scheduled Meds:  Current Facility-Administered Medications:  acetaminophen 650 mg Oral Q4H PRN Kenton Fleeting, PA-C    allopurinol 300 mg Oral Daily Kenton Fleeting, PA-C    aspirin 325 mg Oral Daily Kenton Fleeting, PA-C    bisacodyl 10 mg Rectal Daily PRN Kenton Fleeting, PA-C    docusate sodium 100 mg Oral BID Corina Spirogabbio V, RICHYNP    gabapentin 300 mg Oral HS Kenton Fleeting, PA-C    heparin (porcine) 5,000 Units Subcutaneous Q8H [facility] Yanira Zuleta PA-C    insulin regular (HumuLIN R,NovoLIN R) infusion 0 3-21 Units/hr Intravenous Titrated Kenton Fletrang, PA-C Last Rate: 1 Units/hr (09/19/18 4617)   levothyroxine 150 mcg Oral Early Morning Teri Parrish PA-C    metoprolol tartrate 12 5 mg Oral Q12H [facility] RICHY GarrettNP    mupirocin 1 application Nasal W89B [facility] Teri Parrish PA-C    ondansetron 4 mg Intravenous Q6H PRN Kenton Fleeting, PA-JITENDRA    oxyCODONE-acetaminophen 1 tablet Oral Q4H PRN Kenton Fleeting, PA-C    oxyCODONE-acetaminophen 2 tablet Oral Q6H PRN Kenton Fleeting, PA-C    pantoprazole 40 mg Oral Daily Kenton Fleeting, PA-C    polyethylene glycol 17 g Oral Daily Kenton Fleeting, PA-C    pravastatin 80 mg Oral Daily With [employer] VALENCIA Godinez    sodium chloride 20 mL/hr Intravenous Continuous Kenton Fletrang, PA-C Last Rate: 20 mL/hr (09/18/18 0400)   tamsulosin 0 4 mg Oral Daily With [facility], PA-C    temazepam 15 mg Oral HS PRN Corina Godinez V, VALENCIA      Continuous Infusions:  insulin regular (HumuLIN R,NovoLIN R) infusion 0 3-21 Units/hr Last Rate: 1 Units/hr (09/19/18 2275)   sodium chloride 20 mL/hr Last Rate: 20 mL/hr (09/18/18 0400)     PRN Meds:   acetaminophen    bisacodyl    ondansetron    oxyCODONE-acetaminophen    oxyCODONE-acetaminophen    temazepam    Vitals:   Vitals:    09/19/18 0205 09/19/18 0207 09/19/18 0245 09/19/18 0700   BP:   102/54 97/52   BP Location:   Left arm Left arm   Pulse:   79 76   Resp:   18 18   Temp:   98 1 °F (36 7 °C) 98 2 °F (36 8 °C)   TempSrc:   Oral Oral   SpO2: (!) 85% 92% 97% 94%   Weight:       Height:           Telemetry: NSR; Heart Rate: 76    Respiratory:   SpO2: SpO2: 94 %; Room Air    Intake/Output:   I/O       09/17 0701 - 09/18 0700 09/18 0701 - 09/19 0700 09/19 0701 - 09/20 0700    P  O   870     I V  (mL/kg) 5080 3 (48 8) 76 7 (0 7)     Blood 1613      NG/GT 0      IV Piggyback 1850      Cell Saver 1000      Total Intake(mL/kg) 9543 3 (91 8) 946 7 (9 1)     Urine (mL/kg/hr) 1465 (0 6) 770 (0 3)     Emesis/NG output 0      Blood 1000      Chest Tube 1190 640     Total Output 3655 1410      Net +5888 3 -463 3                 Chest tube Output:    Mediastinal tubes: 250 mL/8 hours  370 mL/24 hours   Pleural tubes: 130 mL/8 hours  270 mL/24 hours     Weights:   Weight (last 2 days)     Date/Time   Weight    09/18/18 0600  104 (228 62)    09/17/18 0650  96 2 (212)            Admit weight: 96 2    Results:     Results from last 7 days  Lab Units 09/19/18  0453 09/18/18  0401 09/17/18  2304 09/17/18  1725   WBC Thousand/uL 12 85* 8 84  --   --    HEMOGLOBIN g/dL 7 8* 7 9* 8 8* 8 7*   HEMATOCRIT % 23 5* 23 9* 27 0* 27 2*   PLATELETS Thousands/uL 98* 117*  --  134*       Results from last 7 days  Lab Units 09/19/18  0453 09/18/18  0401 09/17/18  2304  09/17/18  1628 09/17/18  1546   SODIUM mmol/L 139 144  --   --   --  145   POTASSIUM mmol/L 4 3 4 6 4 2  < >  --  3 6   CHLORIDE mmol/L 107 111*  --   --   --  113*   CO2 mmol/L 25 25  --   --   --  22   BUN mg/dL 39* 26*  -- --   --  23   CREATININE mg/dL 1 95* 1 40*  --   --   --  1 34*   GLUCOSE, ISTAT mg/dl  --   --   --   --  126  --    CALCIUM mg/dL 7 6* 8 2*  --   --   --  8 7   < > = values in this interval not displayed  Results from last 7 days  Lab Units 09/17/18  1725 09/17/18  1409   INR  1 47* 1 79*   PTT seconds 69* 61*     Point of care glucose: 105-357    Studies:  No new    Invasive Lines/Tubes:  Invasive Devices     Central Venous Catheter Line            CVC Central Lines 09/17/18 Triple 2 days          Peripheral Intravenous Line            Peripheral IV 09/17/18 Right Antecubital 2 days          Line            Pacer Wires 1 day    Pacer Wires 1 day          Drain            Chest Tube 1 Anterior Mediastinal 32 Fr  1 day    Chest Tube 2 Left Pleural 32 Fr  1 day    Chest Tube 3 Posterior Mediastinal 32 Fr  1 day                Physical Exam:    HEENT/NECK:  PERRLA  No jugular venous distention  Cardiac: Regular rate and rhythm  No rubs/murmurs/gallops  Pulmonary:  Breath sounds slightly diminished at the bases bilaterally  Abdomen:  Non-tender, Non-distended  Positive bowel sounds  Incisions: Sternum is stable  Incision dressed with Acticoat  No erythema or drainage  Saphenectomy incision dressed with Acticoat  No erythema or drainage  Lower extremities: Extremities warm/dry  Radial/PT/DP pulses 2+ bilaterally  2+ edema B/L  Neuro: Alert and oriented X 3  Sensation is grossly intact  No focal deficits  Skin: Warm/Dry, without rashes or lesions      Assessment:  Patient Active Problem List   Diagnosis    Benign prostatic hyperplasia with nocturia    Benign essential hypertension    Dyslipidemia, goal LDL below 100    Gout with tophus    Hypothyroidism    Mild intermittent asthma without complication    Obesity    Pure hypercholesterolemia    Renal cyst    Sexual dysfunction    Type 2 diabetes mellitus ([address] )    Aortic stenosis, moderate    Coronary artery disease involving native coronary artery of native heart    Nonrheumatic aortic valve stenosis    GERD (gastroesophageal reflux disease)    S/P CABG x 4    S/P AVR (aortic valve replacement)       Aortic stenosis, Non-Rheumatic, Coronary artery disease  S/P aortic valve replacement and coronary artery bypass grafting; POD # 2    Plan:    1  Cardiac:   NSR; HR/BP well-controlled  Lopressor 12 5 mg PO BID  Continue ASA and Statin therapy  Epicardial pacing wires no longer required  Remove today  Maintain central IV access today for IV access  Continue DVT prophylaxis    2  Pulmonary:   Good Room air oxygen saturation; Continue incentive spirometry/Coughing/Deep breathing exercises  Chest tube output remains persistently high; Continue chest tubes to suction today    3  Renal:   Intake/Output net: -463 mL/24 hours  Lasix held yesterday due to low filling pressures, resume lasix today  Post op Creatinine stable; Follow up labs prn    4  Neuro:  Neurologically intact; No active issues  Incisional pain well-controlled; Continue prn Percocet    5  GI:  Tolerating TLC 2 3 gm sodium diet  Maintain 1800 mL daily fluid restriction   Continue stool softeners and prn suppository  Continue GI prophylaxis    6  Endo:    History of diabetes; Continue SQ insulin therapy as directed by endocrinology physician  Insulin administration teaching for home therapy ordered    7  Hematology:   Post-operative acute blood loss anemia; Hemoglobin and hematocrit stable; trend prn    8   Disposition:  Awaiting rehab placement    VTE Pharmacologic Prophylaxis: Fondaparinux (Arixtra)  VTE Mechanical Prophylaxis: sequential compression device    Collaborative rounds completed with KRISTA Rodney , and Gloria Butler RN    SIGNATURE: Jak Feliz  DATE: September 19, 2018  TIME: 7:55 AM

## 2018-09-19 NOTE — PROGRESS NOTES
09/19/18    Procedure: Epicardial Pacing Wire removal    Fer Hamm  was returned to bed and informed of mandatory one hour post-procedure bed rest   The assigned nurse was notified  Epicardial pacing wires removed in routine fashion, without incident  The patient tolerated the procedure well  Vital signs ordered  q 15 minutes for one hour, as per protocol      SIGNATURE: Shaw Grissom PA-C  DATE: September 19, 2018  TIME: 10:41 AM

## 2018-09-19 NOTE — PROGRESS NOTES
Patient trying to get oob x 2 now without calling for nurse in past hour  Pt is very uncomfortable in the bed, is forgetful and was only 85% on RA  Patient put on 2L via NC and is now 92%  Patient was an assist x 2 oob and transferred to recliner at this time  Chair alarm on  Will continue to monitor very closely

## 2018-09-19 NOTE — SOCIAL WORK
Pt is recommended for short-term skilled rehab placement for his aftercare  CM met w/ pt to discuss recommendation  Pt is agreeable  CM provided pt w/ freedom of choice via list of local SNF providers that are in network w/ his [employer]  Pt requested referral to [facility] and also reported he would review list w/ his family when they visit to provide additional choices if needed  CM cara Ecin referral to [facility]  CM to follow

## 2018-09-19 NOTE — PROGRESS NOTES
Patient is a high fall risk and is still forgetting to use call bell for all needs  Pt trying multiple times overnight to get up and almost slid out of recliner  Patient room moved from 421 to 428 so he is closer to the nurses station  Chair alarm is on and pt resting comfortably in chair

## 2018-09-20 LAB
ABO GROUP BLD BPU: NORMAL
ANION GAP SERPL CALCULATED.3IONS-SCNC: 9 MMOL/L (ref 4–13)
BPU ID: NORMAL
BUN SERPL-MCNC: 44 MG/DL (ref 5–25)
CALCIUM SERPL-MCNC: 7.9 MG/DL (ref 8.3–10.1)
CHLORIDE SERPL-SCNC: 103 MMOL/L (ref 100–108)
CO2 SERPL-SCNC: 25 MMOL/L (ref 21–32)
CREAT SERPL-MCNC: 1.91 MG/DL (ref 0.6–1.3)
CROSSMATCH: NORMAL
ERYTHROCYTE [DISTWIDTH] IN BLOOD BY AUTOMATED COUNT: 14.1 % (ref 11.6–15.1)
GFR SERPL CREATININE-BSD FRML MDRD: 32 ML/MIN/1.73SQ M
GLUCOSE SERPL-MCNC: 110 MG/DL (ref 65–140)
GLUCOSE SERPL-MCNC: 156 MG/DL (ref 65–140)
GLUCOSE SERPL-MCNC: 166 MG/DL (ref 65–140)
GLUCOSE SERPL-MCNC: 195 MG/DL (ref 65–140)
GLUCOSE SERPL-MCNC: 208 MG/DL (ref 65–140)
GLUCOSE SERPL-MCNC: 353 MG/DL (ref 65–140)
HCT VFR BLD AUTO: 22.6 % (ref 36.5–49.3)
HGB BLD-MCNC: 7.3 G/DL (ref 12–17)
MCH RBC QN AUTO: 31.3 PG (ref 26.8–34.3)
MCHC RBC AUTO-ENTMCNC: 32.3 G/DL (ref 31.4–37.4)
MCV RBC AUTO: 97 FL (ref 82–98)
PLATELET # BLD AUTO: 123 THOUSANDS/UL (ref 149–390)
PMV BLD AUTO: 10.9 FL (ref 8.9–12.7)
POTASSIUM SERPL-SCNC: 3.7 MMOL/L (ref 3.5–5.3)
RBC # BLD AUTO: 2.33 MILLION/UL (ref 3.88–5.62)
SODIUM SERPL-SCNC: 137 MMOL/L (ref 136–145)
UNIT DISPENSE STATUS: NORMAL
UNIT PRODUCT CODE: NORMAL
UNIT RH: NORMAL
WBC # BLD AUTO: 11.27 THOUSAND/UL (ref 4.31–10.16)

## 2018-09-20 PROCEDURE — 82948 REAGENT STRIP/BLOOD GLUCOSE: CPT

## 2018-09-20 PROCEDURE — 85027 COMPLETE CBC AUTOMATED: CPT | Performed by: PHYSICIAN ASSISTANT

## 2018-09-20 PROCEDURE — 97535 SELF CARE MNGMENT TRAINING: CPT

## 2018-09-20 PROCEDURE — 97116 GAIT TRAINING THERAPY: CPT

## 2018-09-20 PROCEDURE — 80048 BASIC METABOLIC PNL TOTAL CA: CPT | Performed by: PHYSICIAN ASSISTANT

## 2018-09-20 PROCEDURE — 97110 THERAPEUTIC EXERCISES: CPT

## 2018-09-20 PROCEDURE — 99232 SBSQ HOSP IP/OBS MODERATE 35: CPT | Performed by: INTERNAL MEDICINE

## 2018-09-20 PROCEDURE — 99024 POSTOP FOLLOW-UP VISIT: CPT | Performed by: THORACIC SURGERY (CARDIOTHORACIC VASCULAR SURGERY)

## 2018-09-20 RX ORDER — POTASSIUM CHLORIDE 20 MEQ/1
20 TABLET, EXTENDED RELEASE ORAL ONCE
Status: COMPLETED | OUTPATIENT
Start: 2018-09-20 | End: 2018-09-20

## 2018-09-20 RX ORDER — INSULIN GLARGINE 100 [IU]/ML
32 INJECTION, SOLUTION SUBCUTANEOUS
Status: DISCONTINUED | OUTPATIENT
Start: 2018-09-20 | End: 2018-09-21 | Stop reason: HOSPADM

## 2018-09-20 RX ADMIN — ALLOPURINOL 300 MG: 300 TABLET ORAL at 09:22

## 2018-09-20 RX ADMIN — INSULIN LISPRO 2 UNITS: 100 INJECTION, SOLUTION INTRAVENOUS; SUBCUTANEOUS at 17:45

## 2018-09-20 RX ADMIN — MUPIROCIN 1 APPLICATION: 20 OINTMENT TOPICAL at 20:20

## 2018-09-20 RX ADMIN — GABAPENTIN 300 MG: 300 CAPSULE ORAL at 22:11

## 2018-09-20 RX ADMIN — POTASSIUM CHLORIDE 20 MEQ: 1500 TABLET, EXTENDED RELEASE ORAL at 11:34

## 2018-09-20 RX ADMIN — OXYCODONE HYDROCHLORIDE AND ACETAMINOPHEN 500 MG: 500 TABLET ORAL at 09:22

## 2018-09-20 RX ADMIN — INSULIN LISPRO 10 UNITS: 100 INJECTION, SOLUTION INTRAVENOUS; SUBCUTANEOUS at 09:23

## 2018-09-20 RX ADMIN — PANTOPRAZOLE SODIUM 40 MG: 40 TABLET, DELAYED RELEASE ORAL at 05:49

## 2018-09-20 RX ADMIN — HEPARIN SODIUM 5000 UNITS: 5000 INJECTION, SOLUTION INTRAVENOUS; SUBCUTANEOUS at 22:12

## 2018-09-20 RX ADMIN — Medication 1 TABLET: at 22:12

## 2018-09-20 RX ADMIN — FUROSEMIDE 40 MG: 10 INJECTION, SOLUTION INTRAMUSCULAR; INTRAVENOUS at 09:21

## 2018-09-20 RX ADMIN — FERROUS SULFATE TAB 325 MG (65 MG ELEMENTAL FE) 325 MG: 325 (65 FE) TAB at 09:22

## 2018-09-20 RX ADMIN — INSULIN GLARGINE 32 UNITS: 100 INJECTION, SOLUTION SUBCUTANEOUS at 22:17

## 2018-09-20 RX ADMIN — INSULIN LISPRO 6 UNITS: 100 INJECTION, SOLUTION INTRAVENOUS; SUBCUTANEOUS at 11:36

## 2018-09-20 RX ADMIN — PRAVASTATIN SODIUM 80 MG: 80 TABLET ORAL at 17:44

## 2018-09-20 RX ADMIN — DOCUSATE SODIUM 100 MG: 100 CAPSULE, LIQUID FILLED ORAL at 09:22

## 2018-09-20 RX ADMIN — MUPIROCIN 1 APPLICATION: 20 OINTMENT TOPICAL at 09:22

## 2018-09-20 RX ADMIN — POLYETHYLENE GLYCOL 3350 17 G: 17 POWDER, FOR SOLUTION ORAL at 09:21

## 2018-09-20 RX ADMIN — METOPROLOL TARTRATE 12.5 MG: 25 TABLET ORAL at 20:20

## 2018-09-20 RX ADMIN — TAMSULOSIN HYDROCHLORIDE 0.4 MG: 0.4 CAPSULE ORAL at 17:44

## 2018-09-20 RX ADMIN — FUROSEMIDE 40 MG: 10 INJECTION, SOLUTION INTRAMUSCULAR; INTRAVENOUS at 17:44

## 2018-09-20 RX ADMIN — HEPARIN SODIUM 5000 UNITS: 5000 INJECTION, SOLUTION INTRAVENOUS; SUBCUTANEOUS at 15:10

## 2018-09-20 RX ADMIN — ASPIRIN 325 MG: 325 TABLET ORAL at 09:22

## 2018-09-20 RX ADMIN — HEPARIN SODIUM 5000 UNITS: 5000 INJECTION, SOLUTION INTRAVENOUS; SUBCUTANEOUS at 05:49

## 2018-09-20 RX ADMIN — POTASSIUM CHLORIDE 20 MEQ: 1500 TABLET, EXTENDED RELEASE ORAL at 09:22

## 2018-09-20 RX ADMIN — INSULIN LISPRO 10 UNITS: 100 INJECTION, SOLUTION INTRAVENOUS; SUBCUTANEOUS at 11:35

## 2018-09-20 RX ADMIN — INSULIN LISPRO 2 UNITS: 100 INJECTION, SOLUTION INTRAVENOUS; SUBCUTANEOUS at 09:23

## 2018-09-20 RX ADMIN — METOPROLOL TARTRATE 12.5 MG: 25 TABLET ORAL at 09:22

## 2018-09-20 RX ADMIN — LEVOTHYROXINE SODIUM 150 MCG: 75 TABLET ORAL at 05:48

## 2018-09-20 RX ADMIN — INSULIN LISPRO 1 UNITS: 100 INJECTION, SOLUTION INTRAVENOUS; SUBCUTANEOUS at 22:12

## 2018-09-20 NOTE — PLAN OF CARE
Problem: PHYSICAL THERAPY ADULT  Goal: Performs mobility at highest level of function for planned discharge setting  See evaluation for individualized goals  Treatment/Interventions: Functional transfer training, Endurance training, Patient/family training, Bed mobility, Gait training, Spoke to nursing, Spoke to case management, OT  Equipment Recommended: Herminia Hu       See flowsheet documentation for full assessment, interventions and recommendations  Outcome: Progressing  Prognosis: Fair  Problem List: Decreased strength, Decreased endurance, Impaired balance, Decreased mobility, Decreased coordination, Decreased cognition, Decreased safety awareness, Impaired hearing, Obesity, Decreased skin integrity, Pain  Assessment: pt able to ambulate 60 feet x2 with use of RW on various surfaces modAx1 and chair follow  Pt fatigues quickly,reports SOB and "being tired" throughout  Pt has dec cognition during tx session, with reports of being at the dentist and unaware of recent surgical procedure,needs constant reminders and cueing  Pt able to perform transfers to and from neutral surface modAx1  Pt able to perform and complete BLE ther ex HEP in sitting position AROM following mobility, rest breaks inbetween each exercise 2* fatigue and weakness  Pt would cont to benefit from skilled inpt PT services to maximize functional independence  Barriers to Discharge: Decreased caregiver support, Inaccessible home environment     Recommendation: Other (Comment) (inpt rhb recommended upon D/C)     PT - OK to Discharge:  (to rehab when medically stable )    See flowsheet documentation for full assessment

## 2018-09-20 NOTE — PROGRESS NOTES
Progress Note - Cardiology   Odilon Gee  80 y o  male MRN: [id number]  Unit/Bed#: [facility] 428-01 Encounter: [id number]        Principal Problem:    Coronary artery disease involving native coronary artery of native heart  Active Problems:    Benign essential hypertension    Dyslipidemia, goal LDL below 100    Hypothyroidism    Mild intermittent asthma without complication    Obesity    Type 2 diabetes mellitus (HCC)    Aortic stenosis, moderate    Nonrheumatic aortic valve stenosis    GERD (gastroesophageal reflux disease)    S/P CABG x 4    S/P AVR (aortic valve replacement)      Assessment/Plan    1  CAD s/p CABG x4/ bioprosthetic AVR  POD 3  IV lasix for volume overload- weight up 12 lbs from preop  I/0-1159 last 24 hours  Creat pre op 1 3  Last 2 days 1 9  Asa, lopressor 12 5mg bid, statin  Post op VERONICA EF normal    2  HTN-stable  Home meds- hyzaar 100/25, cardizem 180 ( on hold)    3  HLD- pravastatin 80    F/U with Dr Jamie Watson post op         Subjective/Objective   Chief Complaint/Subjective  No particular compliants        Vitals: /57 (BP Location: Left arm) Comment: Map 79  Pulse 76   Temp 98 3 °F (36 8 °C) (Oral)   Resp 18   Ht 5' 8" (1 727 m)   Wt 102 kg (224 lb 13 9 oz)   SpO2 94%   BMI 34 19 kg/m²     Vitals:    09/20/18 0433 09/20/18 0600   Weight: 102 kg (224 lb 3 3 oz) 102 kg (224 lb 13 9 oz)     Orthostatic Blood Pressures      Most Recent Value   Blood Pressure  116/57 [Map 79] filed at 09/20/2018 1117   Patient Position - Orthostatic VS  Sitting filed at 09/20/2018 1117            Intake/Output Summary (Last 24 hours) at 09/20/18 1407  Last data filed at 09/20/18 1232   Gross per 24 hour   Intake              870 ml   Output             2321 ml   Net            -1451 ml       Invasive Devices     Central Venous Catheter Line            CVC Central Lines 09/17/18 Triple 3 days          Peripheral Intravenous Line            Peripheral IV 09/17/18 Right Antecubital 3 days Drain            Chest Tube 1 Anterior Mediastinal 32 Fr  2 days    Chest Tube 2 Left Pleural 32 Fr  2 days    Chest Tube 3 Posterior Mediastinal 32 Fr  2 days                Current Facility-Administered Medications   Medication Dose Route Frequency    acetaminophen (TYLENOL) tablet 650 mg  650 mg Oral Q4H PRN    allopurinol (ZYLOPRIM) tablet 300 mg  300 mg Oral Daily    ascorbic acid (VITAMIN C) tablet 500 mg  500 mg Oral Daily    aspirin tablet 325 mg  325 mg Oral Daily    bisacodyl (DULCOLAX) rectal suppository 10 mg  10 mg Rectal Daily PRN    docusate sodium (COLACE) capsule 100 mg  100 mg Oral Daily    ferrous sulfate tablet 325 mg  325 mg Oral Daily With Breakfast    furosemide (LASIX) injection 40 mg  40 mg Intravenous BID (diuretic)    gabapentin (NEURONTIN) capsule 300 mg  300 mg Oral HS    heparin (porcine) subcutaneous injection 5,000 Units  5,000 Units Subcutaneous Q8H [address]    insulin glargine (LANTUS) subcutaneous injection 25 Units 0 25 mL  25 Units Subcutaneous HS    insulin lispro (HumaLOG) 100 units/mL subcutaneous injection 1-5 Units  1-5 Units Subcutaneous HS    insulin lispro (HumaLOG) 100 units/mL subcutaneous injection 1-6 Units  1-6 Units Subcutaneous TID With Meals    insulin lispro (HumaLOG) 100 units/mL subcutaneous injection 10 Units  10 Units Subcutaneous TID With Meals    levothyroxine tablet 150 mcg  150 mcg Oral Early Morning    metoprolol tartrate (LOPRESSOR) partial tablet 12 5 mg  12 5 mg Oral Q12H JAVAD    mupirocin (BACTROBAN) 2 % nasal ointment 1 application  1 application Nasal R76Y [address]    ondansetron (ZOFRAN) injection 4 mg  4 mg Intravenous Q6H PRN    oxyCODONE-acetaminophen (PERCOCET) 5-325 mg per tablet 1 tablet  1 tablet Oral Q4H PRN    pantoprazole (PROTONIX) EC tablet 40 mg  40 mg Oral Daily    polyethylene glycol (MIRALAX) packet 17 g  17 g Oral Daily    potassium chloride (K-DUR,KLOR-CON) CR tablet 20 mEq  20 mEq Oral Daily    pravastatin (PRAVACHOL) tablet 80 mg  80 mg Oral Daily With Dinner    senna-docusate sodium (SENOKOT S) 8 6-50 mg per tablet 1 tablet  1 tablet Oral HS    tamsulosin (FLOMAX) capsule 0 4 mg  0 4 mg Oral Daily With Dinner    temazepam (RESTORIL) capsule 15 mg  15 mg Oral HS PRN         Physical Exam: /57 (BP Location: Left arm) Comment: Map 79  Pulse 76   Temp 98 3 °F (36 8 °C) (Oral)   Resp 18   Ht 5' 8" (1 727 m)   Wt 102 kg (224 lb 13 9 oz)   SpO2 94%   BMI 34 19 kg/m²     General Appearance:    Alert, cooperative, no distress, appears stated age   Head:    Normocephalic, no scleral icterus   Eyes:    PERRL   Nose:   Nares normal, septum midline, no drainage    Throat:   Lips, mucosa, and tongue normal   Neck:   Supple, symmetrical, trachea midline,       no carotid           Lungs:     Decreased to auscultation bilaterally, respirations unlabored   Chest Wall:    Covered with dsd       Heart:    Regular rate and rhythm, S1 and S2 normal, no murmur, rub   or gallop   Abdomen:     Soft, non-tender, bowel sounds active all four quadrants,     no masses, no organomegaly   Extremities:   Extremities normal, atraumatic, no cyanosis , generalized edema   Pulses:   2+ and symmetric all extremities       Neurologic:   Alert and oriented to person place and time                 Lab Results:   Recent Results (from the past 72 hour(s))   APTT    Collection Time: 09/17/18  2:09 PM   Result Value Ref Range    PTT 61 (H) 24 - 36 seconds   Fibrinogen    Collection Time: 09/17/18  2:09 PM   Result Value Ref Range    Fibrinogen 160 (L) 227 - 495 mg/dL   Platelet count    Collection Time: 09/17/18  2:09 PM   Result Value Ref Range    Platelets 125 (L) 303 - 390 Thousands/uL    MPV 9 8 8 9 - 12 7 fL   Protime-INR    Collection Time: 09/17/18  2:09 PM   Result Value Ref Range    Protime 20 9 (H) 11 8 - 14 2 seconds    INR 1 79 (H) 0 86 - 1 17   POCT Blood Gas (CG8+)    Collection Time: 09/17/18  2:54 PM   Result Value Ref Range pH, Art i-STAT 7 349 (L) 7 350 - 7 450    pCO2, Art i-STAT 42 7 36 0 - 44 0 mm HG    pO2, ART i-STAT 146 0 (H) 75 0 - 129 0 mm HG    BE, i-STAT -2 -2 - 3 mmol/L    HCO3, Art i-STAT 23 5 22 0 - 28 0 mmol/L    CO2, i-STAT 25 21 - 32 mmol/L    O2 Sat, i-STAT 99 (H) 95 - 98 %    SODIUM, I-STAT 143 136 - 145 mmol/l    Potassium, i-STAT 4 2 3 5 - 5 3 mmol/L    Calcium, Ionized i-STAT 1 40 (H) 1 12 - 1 32 mmol/L    Hct, i-STAT 25 (L) 36 5 - 49 3 %    Hgb, i-STAT 8 5 (L) 12 0 - 17 0 g/dl    Glucose, i-STAT 124 65 - 140 mg/dl    Specimen Type ARTERIAL    POCT activated clotting time    Collection Time: 09/17/18  2:54 PM   Result Value Ref Range    Activated Clotting Time, i-STAT 138 (H) 89 - 137 sec    Specimen Type ARTERIAL    ECG 12 lead    Collection Time: 09/17/18  3:38 PM   Result Value Ref Range    Ventricular Rate 68 BPM    Atrial Rate 68 BPM    NJ Interval 158 ms    QRSD Interval 117 ms    QT Interval 442 ms    QTC Interval 471 ms    P Axis 42 degrees    QRS Axis -41 degrees    T Wave Axis 26 degrees   Platelets Count - If Chest Tube Losses > 200 mL/hr in First Hour Postop    Collection Time: 09/17/18  3:46 PM   Result Value Ref Range    Platelets 97 (L) 801 - 390 Thousands/uL    MPV 9 6 8 9 - 12 7 fL   Basic metabolic panel    Collection Time: 09/17/18  3:46 PM   Result Value Ref Range    Sodium 145 136 - 145 mmol/L    Potassium 3 6 3 5 - 5 3 mmol/L    Chloride 113 (H) 100 - 108 mmol/L    CO2 22 21 - 32 mmol/L    ANION GAP 10 4 - 13 mmol/L    BUN 23 5 - 25 mg/dL    Creatinine 1 34 (H) 0 60 - 1 30 mg/dL    Glucose 121 65 - 140 mg/dL    Calcium 8 7 8 3 - 10 1 mg/dL    eGFR 49 ml/min/1 73sq m   Hemoglobin and hematocrit, blood    Collection Time: 09/17/18  3:46 PM   Result Value Ref Range    Hemoglobin 10 3 (L) 12 0 - 17 0 g/dL    Hematocrit 31 4 (L) 36 5 - 49 3 %   Fingerstick Glucose (POCT)    Collection Time: 09/17/18  3:46 PM   Result Value Ref Range    POC Glucose 122 65 - 140 mg/dl   POCT Blood Gas (CG8+) Collection Time: 09/17/18  4:28 PM   Result Value Ref Range    pH, Art i-STAT 7 305 (L) 7 350 - 7 450    pCO2, Art i-STAT 45 6 (H) 36 0 - 44 0 mm HG    pO2, ART i-STAT 118 0 75 0 - 129 0 mm HG    BE, i-STAT -3 (L) -2 - 3 mmol/L    HCO3, Art i-STAT 22 7 22 0 - 28 0 mmol/L    CO2, i-STAT 24 21 - 32 mmol/L    O2 Sat, i-STAT 98 95 - 98 %    SODIUM, I-STAT 144 136 - 145 mmol/l    Potassium, i-STAT 3 5 3 5 - 5 3 mmol/L    Calcium, Ionized i-STAT 1 17 1 12 - 1 32 mmol/L    Hct, i-STAT 24 (L) 36 5 - 49 3 %    Hgb, i-STAT 8 2 (L) 12 0 - 17 0 g/dl    Glucose, i-STAT 126 65 - 140 mg/dl    POC FIO2 50 L    Specimen Type ARTERIAL    Platelet count    Collection Time: 09/17/18  5:25 PM   Result Value Ref Range    Platelets 023 (L) 005 - 390 Thousands/uL    MPV 9 4 8 9 - 12 7 fL   Protime-INR    Collection Time: 09/17/18  5:25 PM   Result Value Ref Range    Protime 17 9 (H) 11 8 - 14 2 seconds    INR 1 47 (H) 0 86 - 1 17   Fibrinogen    Collection Time: 09/17/18  5:25 PM   Result Value Ref Range    Fibrinogen 259 227 - 495 mg/dL   APTT    Collection Time: 09/17/18  5:25 PM   Result Value Ref Range    PTT 69 (H) 24 - 36 seconds   Hemoglobin and hematocrit, blood    Collection Time: 09/17/18  5:25 PM   Result Value Ref Range    Hemoglobin 8 7 (L) 12 0 - 17 0 g/dL    Hematocrit 27 2 (L) 36 5 - 49 3 %   Fingerstick Glucose (POCT)    Collection Time: 09/17/18  6:02 PM   Result Value Ref Range    POC Glucose 132 65 - 140 mg/dl   Potassium    Collection Time: 09/17/18  7:09 PM   Result Value Ref Range    Potassium 3 6 3 5 - 5 3 mmol/L   Fingerstick Glucose (POCT)    Collection Time: 09/17/18  8:07 PM   Result Value Ref Range    POC Glucose 166 (H) 65 - 140 mg/dl   Fingerstick Glucose (POCT)    Collection Time: 09/17/18  9:50 PM   Result Value Ref Range    POC Glucose 176 (H) 65 - 140 mg/dl   Hemoglobin and hematocrit, blood    Collection Time: 09/17/18 11:04 PM   Result Value Ref Range    Hemoglobin 8 8 (L) 12 0 - 17 0 g/dL    Hematocrit 27 0 (L) 36 5 - 49 3 %   Potassium    Collection Time: 09/17/18 11:04 PM   Result Value Ref Range    Potassium 4 2 3 5 - 5 3 mmol/L   Fingerstick Glucose (POCT)    Collection Time: 09/18/18 12:09 AM   Result Value Ref Range    POC Glucose 165 (H) 65 - 140 mg/dl   Fingerstick Glucose (POCT)    Collection Time: 09/18/18  1:56 AM   Result Value Ref Range    POC Glucose 124 65 - 140 mg/dl   ECG 12 lead    Collection Time: 09/18/18  3:56 AM   Result Value Ref Range    Ventricular Rate 74 BPM    Atrial Rate 74 BPM    ND Interval 150 ms    QRSD Interval 113 ms    QT Interval 433 ms    QTC Interval 481 ms    P Axis 43 degrees    QRS Axis -9 degrees    T Wave Axis 28 degrees   Basic Metabolic Panel -AM POD #1    Collection Time: 09/18/18  4:01 AM   Result Value Ref Range    Sodium 144 136 - 145 mmol/L    Potassium 4 6 3 5 - 5 3 mmol/L    Chloride 111 (H) 100 - 108 mmol/L    CO2 25 21 - 32 mmol/L    ANION GAP 8 4 - 13 mmol/L    BUN 26 (H) 5 - 25 mg/dL    Creatinine 1 40 (H) 0 60 - 1 30 mg/dL    Glucose 118 65 - 140 mg/dL    Calcium 8 2 (L) 8 3 - 10 1 mg/dL    eGFR 46 ml/min/1 73sq m   Magnesium -AM POD #1    Collection Time: 09/18/18  4:01 AM   Result Value Ref Range    Magnesium 2 9 (H) 1 6 - 2 6 mg/dL   CBC-AM POD #1    Collection Time: 09/18/18  4:01 AM   Result Value Ref Range    WBC 8 84 4 31 - 10 16 Thousand/uL    RBC 2 51 (L) 3 88 - 5 62 Million/uL    Hemoglobin 7 9 (L) 12 0 - 17 0 g/dL    Hematocrit 23 9 (L) 36 5 - 49 3 %    MCV 95 82 - 98 fL    MCH 31 5 26 8 - 34 3 pg    MCHC 33 1 31 4 - 37 4 g/dL    RDW 14 5 11 6 - 15 1 %    Platelets 414 (L) 820 - 390 Thousands/uL    MPV 10 2 8 9 - 12 7 fL   Fingerstick Glucose (POCT)    Collection Time: 09/18/18  4:03 AM   Result Value Ref Range    POC Glucose 119 65 - 140 mg/dl   ECG 12 lead    Collection Time: 09/18/18  5:33 AM   Result Value Ref Range    Ventricular Rate 87 BPM    Atrial Rate 87 BPM    ND Interval  ms    QRSD Interval 113 ms    QT Interval 496 ms    QTC Interval 597 ms    P Axis  degrees    QRS Axis -11 degrees    T Wave Axis 49 degrees   Prepare fresh frozen plasma:Transfusion Indications: Other (specify); Other Indication: or; Has consent been obtained? Yes, 2 Units    Collection Time: 09/18/18  5:56 AM   Result Value Ref Range    Unit Product Code O5969F75     Unit Number [id number]     Unit ABO AB     Unit [facility] NEG     Unit Dispense Status Presumed Trans     Unit Product Code R5637X40     Unit Number [id number]     Unit ABO AB     Unit [facility] POS     Unit Dispense Status Presumed Trans    Prepare cryoprecipitate:Transfusion Indications: Fibrinogen < 100 mg/dL with microvascular bleeding; Has consent been obtained? Yes, 2 Units    Collection Time: 09/18/18  5:56 AM   Result Value Ref Range    Unit Product Code J9759O64     Unit Number [id number]     Unit ABO AB     Unit [facility] POS     Unit Dispense Status Presumed Trans     Unit Product Code I1302B53     Unit Number [id number]     Unit ABO AB     Unit [facility] POS     Unit Dispense Status Presumed Trans    Fingerstick Glucose (POCT)    Collection Time: 09/18/18  6:07 AM   Result Value Ref Range    POC Glucose 115 65 - 140 mg/dl   Prepare platelet pheresis:Transfusion Indications: Other (specify); Transfusion Indications: or; Special Requirements: None; Has consent been obtained?  Yes, 2 Units    Collection Time: 09/18/18  7:43 AM   Result Value Ref Range    Unit Product Code G7554H52     Unit Number [id number]     Unit ABO A     Unit [facility] POS     Unit Dispense Status Presumed Trans     Unit Product Code C2878E91     Unit Number [id number]     Unit ABO O     Unit [facility] POS     Unit Dispense Status Return to Inv    Fingerstick Glucose (POCT)    Collection Time: 09/18/18  8:09 AM   Result Value Ref Range    POC Glucose 120 65 - 140 mg/dl   Fingerstick Glucose (POCT)    Collection Time: 09/18/18  9:55 AM   Result Value Ref Range    POC Glucose 130 65 - 140 mg/dl   Fingerstick Glucose (POCT)    Collection Time: 09/18/18 11:55 AM   Result Value Ref Range    POC Glucose 119 65 - 140 mg/dl   Fingerstick Glucose (POCT)    Collection Time: 09/18/18  2:08 PM   Result Value Ref Range    POC Glucose 145 (H) 65 - 140 mg/dl   Fingerstick Glucose (POCT)    Collection Time: 09/18/18  3:46 PM   Result Value Ref Range    POC Glucose 126 65 - 140 mg/dl   Fingerstick Glucose (POCT)    Collection Time: 09/18/18  6:53 PM   Result Value Ref Range    POC Glucose 170 (H) 65 - 140 mg/dl   Fingerstick Glucose (POCT)    Collection Time: 09/18/18  8:04 PM   Result Value Ref Range    POC Glucose 303 (H) 65 - 140 mg/dl   Fingerstick Glucose (POCT)    Collection Time: 09/18/18 10:09 PM   Result Value Ref Range    POC Glucose 105 65 - 140 mg/dl   Fingerstick Glucose (POCT)    Collection Time: 09/19/18 12:04 AM   Result Value Ref Range    POC Glucose 138 65 - 140 mg/dl   Fingerstick Glucose (POCT)    Collection Time: 09/19/18  1:54 AM   Result Value Ref Range    POC Glucose 169 (H) 65 - 140 mg/dl   Fingerstick Glucose (POCT)    Collection Time: 09/19/18  4:14 AM   Result Value Ref Range    POC Glucose 170 (H) 65 - 140 mg/dl   Basic metabolic panel    Collection Time: 09/19/18  4:53 AM   Result Value Ref Range    Sodium 139 136 - 145 mmol/L    Potassium 4 3 3 5 - 5 3 mmol/L    Chloride 107 100 - 108 mmol/L    CO2 25 21 - 32 mmol/L    ANION GAP 7 4 - 13 mmol/L    BUN 39 (H) 5 - 25 mg/dL    Creatinine 1 95 (H) 0 60 - 1 30 mg/dL    Glucose 158 (H) 65 - 140 mg/dL    Calcium 7 6 (L) 8 3 - 10 1 mg/dL    eGFR 31 ml/min/1 73sq m   Magnesium    Collection Time: 09/19/18  4:53 AM   Result Value Ref Range    Magnesium 2 7 (H) 1 6 - 2 6 mg/dL   CBC (With Platelets)    Collection Time: 09/19/18  4:53 AM   Result Value Ref Range    WBC 12 85 (H) 4 31 - 10 16 Thousand/uL    RBC 2 43 (L) 3 88 - 5 62 Million/uL    Hemoglobin 7 8 (L) 12 0 - 17 0 g/dL    Hematocrit 23 5 (L) 36 5 - 49 3 %    MCV 97 82 - 98 fL    MCH 32 1 26 8 - 34 3 pg    MCHC 33 2 31 4 - 37 4 g/dL RDW 14 7 11 6 - 15 1 %    Platelets 98 (L) 592 - 390 Thousands/uL    MPV 10 7 8 9 - 12 7 fL   TSH, 3rd generation    Collection Time: 09/19/18  4:53 AM   Result Value Ref Range    TSH 3RD GENERATON 0 100 (L) 0 358 - 3 740 uIU/mL   T4, free    Collection Time: 09/19/18  4:53 AM   Result Value Ref Range    Free T4 1 34 0 76 - 1 46 ng/dL   Fingerstick Glucose (POCT)    Collection Time: 09/19/18  6:13 AM   Result Value Ref Range    POC Glucose 177 (H) 65 - 140 mg/dl   Fingerstick Glucose (POCT)    Collection Time: 09/19/18  7:54 AM   Result Value Ref Range    POC Glucose 357 (H) 65 - 140 mg/dl   Fingerstick Glucose (POCT)    Collection Time: 09/19/18 10:00 AM   Result Value Ref Range    POC Glucose 227 (H) 65 - 140 mg/dl   Fingerstick Glucose (POCT)    Collection Time: 09/19/18 12:07 PM   Result Value Ref Range    POC Glucose 197 (H) 65 - 140 mg/dl   Fingerstick Glucose (POCT)    Collection Time: 09/19/18  2:04 PM   Result Value Ref Range    POC Glucose 164 (H) 65 - 140 mg/dl   Fingerstick Glucose (POCT)    Collection Time: 09/19/18  3:58 PM   Result Value Ref Range    POC Glucose 180 (H) 65 - 140 mg/dl   Fingerstick Glucose (POCT)    Collection Time: 09/19/18  6:04 PM   Result Value Ref Range    POC Glucose 177 (H) 65 - 140 mg/dl   Fingerstick Glucose (POCT)    Collection Time: 09/19/18  7:59 PM   Result Value Ref Range    POC Glucose 244 (H) 65 - 140 mg/dl   Fingerstick Glucose (POCT)    Collection Time: 09/19/18 10:40 PM   Result Value Ref Range    POC Glucose 121 65 - 140 mg/dl   Fingerstick Glucose (POCT)    Collection Time: 09/20/18 12:05 AM   Result Value Ref Range    POC Glucose 110 65 - 140 mg/dl   Basic metabolic panel    Collection Time: 09/20/18  4:49 AM   Result Value Ref Range    Sodium 137 136 - 145 mmol/L    Potassium 3 7 3 5 - 5 3 mmol/L    Chloride 103 100 - 108 mmol/L    CO2 25 21 - 32 mmol/L    ANION GAP 9 4 - 13 mmol/L    BUN 44 (H) 5 - 25 mg/dL    Creatinine 1 91 (H) 0 60 - 1 30 mg/dL    Glucose 156 (H) 65 - 140 mg/dL    Calcium 7 9 (L) 8 3 - 10 1 mg/dL    eGFR 32 ml/min/1 73sq m   CBC and Platelet    Collection Time: 09/20/18  4:49 AM   Result Value Ref Range    WBC 11 27 (H) 4 31 - 10 16 Thousand/uL    RBC 2 33 (L) 3 88 - 5 62 Million/uL    Hemoglobin 7 3 (L) 12 0 - 17 0 g/dL    Hematocrit 22 6 (L) 36 5 - 49 3 %    MCV 97 82 - 98 fL    MCH 31 3 26 8 - 34 3 pg    MCHC 32 3 31 4 - 37 4 g/dL    RDW 14 1 11 6 - 15 1 %    Platelets 335 (L) 596 - 390 Thousands/uL    MPV 10 9 8 9 - 12 7 fL   Fingerstick Glucose (POCT)    Collection Time: 09/20/18  6:42 AM   Result Value Ref Range    POC Glucose 208 (H) 65 - 140 mg/dl   Prepare RBC:Special Requirements: Leukoreduced; Has consent been obtained? Yes; Where is the Surgery Scheduled? [place], 3 Units    Collection Time: 09/20/18  6:53 AM   Result Value Ref Range    Unit Product Code Y1063E73     Unit Number [id number]     Unit ABO AB     Unit [facility] POS     Crossmatch Compatible     Unit Dispense Status Presumed Trans     Unit Product Code G4468J69     Unit Number [id number]     Unit ABO AB     Unit [facility] POS     Crossmatch Compatible     Unit Dispense Status Return to [facility]     Unit Product Code G5639B32     Unit Number [id number]     Unit ABO AB     Unit [facility] POS     Crossmatch Compatible     Unit Dispense Status Presumed Trans     Unit Product Code C2997A25     Unit Number [id number]     Unit ABO AB     Unit RH POS     Crossmatch Compatible     [facility] Dispense [facility]    Fingerstick Glucose (POCT)    Collection Time: 09/20/18 11:10 AM   Result Value Ref Range    POC Glucose 353 (H) 65 - 140 mg/dl     Imaging: I have personally reviewed pertinent reports  Tele- NSR    Counseling / Coordination of Care  Total time spent today 30 minutes  Greater than 50% of total time was spent with the patient and / or family counseling and / or coordination of care

## 2018-09-20 NOTE — PROGRESS NOTES
Progress Note - Trish Perez  80 y o  male MRN: [id number]    Unit/Bed#: [facility] 428-01 Encounter: [id number]      CC: diabetes f/u    Subjective:   Trish Perez  is a 80y o  year old male with type 2  diabetes  Feels well  No complaints  No hypoglycemia  Good appetite, no nausea or vomiting    Objective:     Vitals: Blood pressure 116/57, pulse 76, temperature 98 3 °F (36 8 °C), temperature source Oral, resp  rate 18, height 5' 8" (1 727 m), weight 102 kg (224 lb 13 9 oz), SpO2 94 %  ,Body mass index is 34 19 kg/m²  Intake/Output Summary (Last 24 hours) at 09/20/18 1442  Last data filed at 09/20/18 1232   Gross per 24 hour   Intake              620 ml   Output             2211 ml   Net            -1591 ml       Physical Exam:  General Appearance: awake, appears stated age and cooperative  Head: Normocephalic, without obvious abnormality, atraumatic  Extremities: moves all extremities  Skin: Skin color and temperature normal    Pulm: no labored breathing    Lab, Imaging and other studies: I have personally reviewed pertinent reports  Results from last 7 days  Lab Units 09/20/18  0449  09/17/18  1628   SODIUM mmol/L 137  < >  --    POTASSIUM mmol/L 3 7  < >  --    CHLORIDE mmol/L 103  < >  --    CO2 mmol/L 25  < >  --    BUN mg/dL 44*  < >  --    CREATININE mg/dL 1 91*  < >  --    GLUCOSE, ISTAT mg/dl  --   --  126   CALCIUM mg/dL 7 9*  < >  --    < > = values in this interval not displayed  POC Glucose (mg/dl)   Date Value   09/20/2018 353 (H)   09/20/2018 208 (H)   09/20/2018 110   09/19/2018 121   09/19/2018 244 (H)   09/19/2018 177 (H)   09/19/2018 180 (H)   09/19/2018 164 (H)   09/19/2018 197 (H)   09/19/2018 227 (H)       Assessment:  Type 2 diabetes with hyperglycemia on long-term insulin therapy  Hypothyroidism  Aortic stenosis status post aortic valve replacement  Coronary artery disease status post CABG    Plan:  Sugars above goal-for now increase Lantus to 32 units at bedtime  Increase Humalog to 14 units before meals  Monitor blood sugars before meals and bedtime adjust accordingly  Change diet to consistent carb level 1    Hypothyroidism-continue levothyroxine at current dose         Portions of the record may have been created with voice recognition software

## 2018-09-20 NOTE — PLAN OF CARE
Problem: OCCUPATIONAL THERAPY ADULT  Goal: Performs self-care activities at highest level of function for planned discharge setting  See evaluation for individualized goals  Treatment Interventions: ADL retraining, Functional transfer training, Endurance training, Equipment evaluation/education, Patient/family training, Compensatory technique education, Energy conservation, Activityengagement, Cardiac education          See flowsheet documentation for full assessment, interventions and recommendations  Outcome: Progressing  Limitation: Decreased ADL status, Decreased endurance, Decreased self-care trans, Decreased high-level ADLs  Prognosis: Good  Assessment: Patient participated in Skilled OT session this date with interventions consisting of ADL re training with the use of correct body mechanics, energy conservation techniques, and review of cardiac precautions  Patient agreeable to OT treatment session, upon arrival patient was found seated OOB to Recliner  In comparison to previous session, patient with improvements in activity tolerance and transfer status  Educated pt on sternal precautions during activity and tranfers 2* decreased recall  Also educated pt on energy conservation strategies and pt verbalized understanding  Pt continues to be limited by fatigue and is below baseline for ability to complete ADLs and transfers  Patient continues to be functioning below baseline level, occupational performance remains limited secondary to factors listed above and increased risk for falls and injury  From OT standpoint, recommendation at time of d/c would be Short Term Rehab  Patient to benefit from continued Occupational Therapy treatment while in the hospital to address deficits as defined above and maximize level of functional independence with ADLs and functional mobility        OT Discharge Recommendation: Short Term Rehab  OT - OK to Discharge: Yes (when medically stable)

## 2018-09-20 NOTE — PROGRESS NOTES
Progress Note - Cardiothoracic Surgery   Roxy Munson  80 y o  male MRN: [id number]  Unit/Bed#: [facility] 428-01 Encounter: [id number]  Aortic stenosis, Non-Rheumatic, Coronary artery disease  S/P aortic valve replacement and coronary artery bypass grafting; POD # 3    24 Hour Events: No complaints  No events overnight  Pain incisional this morning  Appetite is good  Ambulating with assistance       Medications:   Scheduled Meds:  Current Facility-Administered Medications:  acetaminophen 650 mg Oral Q4H PRN Cee Zaragoza PA-C    allopurinol 300 mg Oral Daily Cee Zaragoza PA-C    ascorbic acid 500 mg Oral Daily Ed Chinchilla PA-C    aspirin 325 mg Oral Daily Cee Zaragoza PA-C    bisacodyl 10 mg Rectal Daily PRN Cee Zaragoza PA-C    docusate sodium 100 mg Oral Daily Ed Chinchilla PA-C    ferrous sulfate 325 mg Oral Daily With Breakfast Nisa Mcrae PA-C    furosemide 40 mg Intravenous BID (diuretic) Ed Main PA-C    gabapentin 300 mg Oral HS Cee Zaragoza PA-C    heparin (porcine) 5,000 Units Subcutaneous Q8H [facility] Anabella Deal PA-C    insulin glargine 25 Units Subcutaneous HS Tran Mcgraw MD    insulin lispro 1-5 Units Subcutaneous HS Tran Mcgraw MD    insulin lispro 1-6 Units Subcutaneous TID With Meals Tran Mcgraw MD    insulin lispro 10 Units Subcutaneous TID With Meals Tran Mcgraw MD    levothyroxine 150 mcg Oral Early Morning Cee Zaragoza PA-C    metoprolol tartrate 12 5 mg Oral Q12H [facility] VALENCIA Garrett    mupirocin 1 application Nasal Q96G [facility] Teri Parrish PA-C    ondansetron 4 mg Intravenous Q6H PRN Cee Zaragoza PA-C    oxyCODONE-acetaminophen 1 tablet Oral Q4H PRN Cee Zaragoza PA-C    oxyCODONE-acetaminophen 2 tablet Oral Q6H PRN Cee Zaragoza PA-C    pantoprazole 40 mg Oral Daily Cee Zaragoza PA-C    polyethylene glycol 17 g Oral Daily Cee Zaragoza PA-C    potassium chloride 20 mEq Oral Daily Ed Ybarra CHON Chinchilla    pravastatin 80 mg Oral Daily With [facility] [employer]    senna-docusate sodium 1 tablet Oral HS Keely Cathleenliliana CHON Chinchilla    sodium chloride 20 mL/hr Intravenous Continuous Jocelyn De Jesus PA-C Last Rate: 20 mL/hr (09/18/18 0400)   tamsulosin 0 4 mg Oral Daily With [employer]    temazepam 15 mg Oral HS PRN VALENCIA Garrett      Continuous Infusions:  sodium chloride 20 mL/hr Last Rate: 20 mL/hr (09/18/18 0400)     PRN Meds:   acetaminophen    bisacodyl    ondansetron    oxyCODONE-acetaminophen    oxyCODONE-acetaminophen    temazepam    Vitals:   Vitals:    09/20/18 0303 09/20/18 0433 09/20/18 0600 09/20/18 0724   BP: 112/56   114/56   BP Location: Right arm   Right arm   Pulse: 83   84   Resp: 18   18   Temp: 100 1 °F (37 8 °C)   98 7 °F (37 1 °C)   TempSrc: Oral   Oral   SpO2: 91%   95%   Weight:  102 kg (224 lb 3 3 oz) 102 kg (224 lb 13 9 oz)    Height:           Telemetry: NSR; Heart Rate: 80    Respiratory:   SpO2: SpO2: 95 %; Room Air    Intake/Output:   I/O       09/18 0701 - 09/19 0700 09/19 0701 - 09/20 0700 09/20 0701 - 09/21 0700    P  O  870 950     I V  (mL/kg) 76 7 (0 7)      Total Intake(mL/kg) 946 7 (9 1) 950 (9 3)     Urine (mL/kg/hr) 770 (0 3) 1829 (0 7)     Chest Tube 640 280     Total Output 1410 2109      Net -463 3 -1159                   Chest tube Output:    Mediastinal tubes: 80 mL/8 hours  140 mL/24 hours   Pleural tubes: 90 mL/8 hours  140 mL/24 hours     Weights:   Weight (last 2 days)     Date/Time   Weight    09/20/18 0600  102 (224 87)    09/20/18 0433  102 (224 21)    09/18/18 0600  104 (228 62)            Admit weight: 96 2    Results:     Results from last 7 days  Lab Units 09/20/18  0449 09/19/18  0453 09/18/18  0401   WBC Thousand/uL 11 27* 12 85* 8 84   HEMOGLOBIN g/dL 7 3* 7 8* 7 9*   HEMATOCRIT % 22 6* 23 5* 23 9*   PLATELETS Thousands/uL 123* 98* 117*       Results from last 7 days  Lab Units 09/20/18  0449 09/19/18  0453 09/18/18  0401  09/17/18  1628   SODIUM mmol/L 137 139 144  --   --    POTASSIUM mmol/L 3 7 4 3 4 6  < >  --    CHLORIDE mmol/L 103 107 111*  --   --    CO2 mmol/L 25 25 25  --   --    BUN mg/dL 44* 39* 26*  --   --    CREATININE mg/dL 1 91* 1 95* 1 40*  --   --    GLUCOSE, ISTAT mg/dl  --   --   --   --  126   CALCIUM mg/dL 7 9* 7 6* 8 2*  --   --    < > = values in this interval not displayed  Results from last 7 days  Lab Units 09/17/18  1725 09/17/18  1409   INR  1 47* 1 79*   PTT seconds 69* 61*     Point of care glucose: 110-244    Studies:  No new studies     Invasive Lines/Tubes:  Invasive Devices     Central Venous Catheter Line            CVC Central Lines 09/17/18 Triple 3 days          Peripheral Intravenous Line            Peripheral IV 09/17/18 Right Antecubital 3 days          Drain            Chest Tube 1 Anterior Mediastinal 32 Fr  2 days    Chest Tube 2 Left Pleural 32 Fr  2 days    Chest Tube 3 Posterior Mediastinal 32 Fr  2 days                Physical Exam:    HEENT/NECK:  PERRLA  No jugular venous distention  Cardiac: Regular rate and rhythm  No rubs/murmurs/gallops  Pulmonary:  Breath sounds slightly diminished at the bases bilaterally  Abdomen:  Non-tender, Non-distended  Positive bowel sounds  Incisions: Sternum is stable  Incision dressed with Acticoat  No erythema or drainage  Saphenectomy incision dressed with Acticoat  No erythema or drainage  Lower extremities: Extremities warm/dry  Radial/PT/DP pulses 2+ bilaterally  1+ edema B/L  Neuro: Alert and oriented X 3  Sensation is grossly intact  No focal deficits  Skin: Warm/Dry, without rashes or lesions      Assessment:  Patient Active Problem List   Diagnosis    Benign prostatic hyperplasia with nocturia    Benign essential hypertension    Dyslipidemia, goal LDL below 100    Gout with tophus    Hypothyroidism    Mild intermittent asthma without complication    Obesity    Pure hypercholesterolemia  Renal cyst    Sexual dysfunction    Type 2 diabetes mellitus ([facility] [address] )    Aortic stenosis, moderate    Coronary artery disease involving native coronary artery of native heart    Nonrheumatic aortic valve stenosis    GERD (gastroesophageal reflux disease)    S/P CABG x 4    S/P AVR (aortic valve replacement)       Aortic stenosis, Non-Rheumatic, Coronary artery disease  S/P aortic valve replacement and coronary artery bypass grafting; POD # 3    Plan:    1  Cardiac:   NSR; HR/BP well-controlled  Lopressor 12 5 mg PO BID  Continue ASA and Statin therapy  Epicardial pacing wires out  Maintain central IV access today for IV meds  Continue DVT prophylaxis    2  Pulmonary:   Good Room air oxygen saturation; Continue incentive spirometry/Coughing/Deep breathing exercises  Chest tube drainage diminished; D/C today    3  Renal:   Intake/Output net: -1159 mL/24 hours  Continue diuresis   Lasix 40 mg IV BID  Potassium Chloride 20 mEq PO QD  Post op Creatinine stable; Follow up labs prn    4  Neuro:  Neurologically intact; No active issues  Incisional pain well-controlled; Continue prn Percocet    5  GI:  Tolerating TLC 2 3 gm sodium diet  Maintain 1800 mL daily fluid restriction   Continue stool softeners and prn suppository  Continue GI prophylaxis    6  Endo:    History of diabetes; Continue SQ insulin therapy as directed by endocrinology physician  Insulin administration teaching for home therapy ordered    7  Hematology:   Post-operative acute blood loss anemia; Hemoglobin and hematocrit stable; trend prn    8   Disposition:  Awaiting rehab placement    VTE Pharmacologic Prophylaxis: Heparin  VTE Mechanical Prophylaxis: sequential compression device    Collaborative rounds completed with KRISTA Romero , and Sj Alexander RN    SIGNATURE: Jak Peters  DATE: September 20, 2018  TIME: 7:59 AM

## 2018-09-20 NOTE — PHYSICAL THERAPY NOTE
Physical Therapy Tx Session:       09/20/18 4018   Pain Assessment   Pain Assessment 0-10   Pain Score 6   Pain Type Acute pain;Surgical pain   Pain Location Chest;Incision   Pain Orientation Shanna Alcantar Pain Intervention(s) Repositioned; Ambulation/increased activity; Elevated; Emotional support; Environmental changes; Rest   Restrictions/Precautions   Other Precautions Cardiac/sternal;Fall Risk;Pain;Multiple lines;Telemetry; Chair Alarm;Cognitive   General   Chart Reviewed Yes   Family/Caregiver Present No   Cognition   Overall Cognitive Status Impaired   Arousal/Participation Responsive   Attention Difficulty attending to directions   Orientation Level Oriented to person;Disoriented to situation;Disoriented to time;Disoriented to place  (pt reports being at the dentist,unaware of surgical procedur)   Following Commands Follows one step commands with increased time or repetition  (2* dec cognition,slow mobility and inc pain)   Subjective   Subjective pt sitting upright in chair resting comfortably;pt willing and agreeable to work with PT and to participate in therapy intervention with inc motivation and encouragement  "Where am I? What can I do?"   Bed Mobility   Supine to Sit Unable to assess  (pt sitting in chair pre and post mobility)   Transfers   Sit to Stand 3  Moderate assistance   Additional items Assist x 1; Armrests; Increased time required;Verbal cues   Stand to Sit 3  Moderate assistance   Additional items Assist x 1; Armrests; Increased time required;Verbal cues  (for safety,education and control descent)   Ambulation/Elevation   Gait pattern Poor UE support; Antalgic; Forward Flexion; Wide ZULEIMA;Shuffling; Inconsistent david; Foward flexed; Short stride; Ataxia; Excessively slow  (chair follow)   Gait Assistance 3  Moderate assist   Additional items Assist x 1;Verbal cues; Tactile cues   Assistive Device Rolling walker  (could benefit from wide edilson RW as able)   Distance 60 feet x2 with use of RW on tile and hardwood meir;pt needed one seated rest break during upright mobility 2* weakness,fatigue and reports of "being tired";shuffling gait pattern   Balance   Static Sitting Good  (in chair postmobility with chair alarm intact)   Dynamic Sitting Poor   Static Standing Poor +   Dynamic Standing Poor   Ambulatory Poor   Endurance Deficit   Endurance Deficit Yes   Endurance Deficit Description inc fatigue and "being tired",weakness,SOB throughout activity   Activity Tolerance   Activity Tolerance Patient limited by fatigue;Patient limited by pain  (dec cognition;fair)   Nurse Made Aware yes   Exercises   Hip Flexion Sitting;15 reps;AROM; Bilateral   Hip Abduction Sitting;15 reps;AROM; Bilateral   Hip Adduction Sitting;15 reps;AROM; Bilateral   Knee AROM Long Arc Quad Sitting;15 reps;AROM; Bilateral   Ankle Pumps Sitting;20 reps;AROM; Bilateral   Assessment   Prognosis Fair   Problem List Decreased strength;Decreased endurance; Impaired balance;Decreased mobility; Decreased coordination;Decreased cognition;Decreased safety awareness; Impaired hearing;Obesity; Decreased skin integrity;Pain   Assessment pt able to ambulate 60 feet x2 with use of RW on various surfaces modAx1 and chair follow  Pt fatigues quickly,reports SOB and "being tired" throughout  Pt has dec cognition during tx session, with reports of being at the dentist and unaware of recent surgical procedure,needs constant reminders and cueing  Pt able to perform transfers to and from neutral surface modAx1  Pt able to perform and complete BLE ther ex HEP in sitting position AROM following mobility, rest breaks inbetween each exercise 2* fatigue and weakness  Pt would cont to benefit from skilled inpt PT services to maximize functional independence  Goals   Patient Goals to feel better and to get better   STG Expiration Date 09/30/18   Treatment Day 1   Plan   Treatment/Interventions Functional transfer training;LE strengthening/ROM; Therapeutic exercise; Endurance training;Patient/family training;Bed mobility;Gait training;Equipment eval/education;Spoke to nursing;Spoke to case management   Progress Slow progress, decreased activity tolerance   PT Frequency Other (Comment)  (4-6x/week)   Recommendation   Recommendation Other (Comment)  (inpt rhb recommended upon D/C)   Equipment Recommended Walker  (RW for mobility,trial edilson wide RW for future tx sessions)   Skilled PT recommended while in hospital and upon DC to progress pt toward treatment goals

## 2018-09-20 NOTE — DISCHARGE INSTRUCTIONS
Sternal Precautions   WHAT YOU NEED TO KNOW:   What are sternal precautions? Sternal precautions are used to help protect your sternum (breastbone) after open chest surgery  Wires are placed during surgery to hold the sternum together as it heals  Sternal precautions help prevent the wires from cutting through the sternum  The precautions also help prevent the sternum from coming apart from an injury, and prevent pain and bleeding  You may need to use the precautions for up to 12 weeks after surgery  Your surgeon will give you specific instructions based on the type of surgery you had  It is important to follow the instructions carefully  An injury to the healing sternum can be life-threatening  What are some general sternal precautions? Start slowly and do more as you get stronger  Pain medicine might make it harder for you to know when to slow down or be careful  Stop immediately if you hear a crunch or pop in your sternum  · Protect your sternum  Hug a pillow to your chest or cross your arms over your chest when you laugh, sneeze, or cough  · Be careful when you get into or out of a chair or bed  Hug a pillow or cross your arms when you stand or sit  Do not twist as you move  Use only your legs to sit and stand  You may need to use a raised toilet seat if you have trouble standing up without using your arms  Your healthcare provider may teach you to use your elbow for support as you move from lying to sitting  · Ask when you may take a bath or shower  You may need to use a bath chair if you have trouble getting into or out of the tub  Do not use a grab bar  · Do not lift or carry anything heavier than 5 pounds  For example, a gallon of milk weighs 8 pounds  · Keep your arms down as much as possible  Do not put your arms out to the side, behind you, or over your head  Do not let anyone pull your arms to help you move or dress  Do not reach for items  · Do not push or pull anything  Examples include a car door or a vacuum   · Do not drive while you are healing  Your surgeon will tell you when it is safe for you to start driving again  How do I care for my surgery wound? Always wash your hands before you care for your wound  Wash your wound as directed  Do not rub the wound as you wash or dry the area  Pat the area dry with a clean towel  Change the bandages as directed and when they get wet or dirty  Do not smoke:  Nicotine can damage blood vessels and make it more difficult to heal  Do not use e-cigarettes or smokeless tobacco in place of cigarettes or to help you quit  They still contain nicotine  Ask your healthcare provider for information if you currently smoke and need help quitting  Call 911 for any of the following:   · You have sudden pain in your sternum and hear a crunch or pop  · You have bleeding that does not stop even after you apply pressure for 5 minutes  When should I seek immediate care? · You hear crunching or grinding in your sternum  · You have signs of an infection, such as a fever, red or warm skin, or pus in the surgery wound  When should I contact my healthcare provider? · You continue to feel pain, even after you take your pain medicine  · You have new or worsening pain, or any pain with movement  · You have questions or concerns about your condition or care  CARE AGREEMENT:   You have the right to help plan your care  Learn about your health condition and how it may be treated  Discuss treatment options with your caregivers to decide what care you want to receive  You always have the right to refuse treatment  The above information is an  only  It is not intended as medical advice for individual conditions or treatments  Talk to your doctor, nurse or pharmacist before following any medical regimen to see if it is safe and effective for you    © 2017 [employer] Information is for End User's use only and may not be sold, redistributed or otherwise used for commercial purposes  All illustrations and images included in CareNotes® are the copyrighted property of [employer]  or [employer] Mat[employer]  After Coronary Artery Bypass Graft   AMBULATORY CARE:   After coronary artery bypass graft (CABG) surgery,  you may not be able to do your normal activities for a few months  Your sternum (breastbone) will need time to heal  For 6 to 8 weeks after surgery, you will need to go slowly and follow your healthcare provider's activity instructions  Call 911 for any of the following:   · You have any of the following signs of a heart attack:      ¨ Squeezing, pressure, or pain in your chest that lasts longer than 5 minutes or returns    ¨ Discomfort or pain in your back, neck, jaw, stomach, or arm     ¨ Trouble breathing    ¨ Nausea or vomiting    ¨ Lightheadedness or a sudden cold sweat, especially with chest pain or trouble breathing    · You feel lightheaded, short of breath, and have chest pain  · You cough up blood  · You have a fast heartbeat that flutters  · You feel like you are going to faint  Seek care immediately if:   · Your arm or leg feels warm, tender, and painful  It may look swollen and red  · You have numbness or tingling in your arms or legs  · You have a severe headache  Contact your healthcare provider if:   · You have a fever higher than 101°F (38 4°C)  · You have gained 2 pounds in 24 hours  · Your wound is red, swollen, or draining pus  · Your signs and symptoms that you had before surgery return  · You feel depressed  · You have questions or concerns about your condition or care  Medicines: You may need any of the following:  · Prescription pain medicine  may be given  Ask how to take this medicine safely  · Antiplatelets , such as aspirin, help prevent blood clots  Take your antiplatelet medicine exactly as directed   These medicines make it more likely for you to bleed or bruise  If you are told to take aspirin, do not take acetaminophen or ibuprofen instead  · Cholesterol medicine  helps decrease the amount of cholesterol in your blood  Cholesterol can cause plaque buildup that blocks your arteries  · Antibiotics  help prevent a bacterial infection  · Heart medicine  helps strengthen and regulate your heartbeat  · Blood pressure medicine  helps lower or control your blood pressure  · Take your medicine as directed  Contact your healthcare provider if you think your medicine is not helping or if you have side effects  Tell him of her if you are allergic to any medicine  Keep a list of the medicines, vitamins, and herbs you take  Include the amounts, and when and why you take them  Bring the list or the pill bottles to follow-up visits  Carry your medicine list with you in case of an emergency  Activity:  Your healthcare provider will give you specific activity instructions  The following are general guidelines to follow for up to 8 weeks after surgery:  · Protect your sternum  Hug a pillow to your chest or cross your arms over your chest when you laugh, sneeze, or cough  · Be careful when you get into or out of a chair or bed  Hug a pillow or cross your arms when you stand or sit  Do not twist as you move  Use only your legs to sit and stand  You may need to use a raised toilet seat if you have trouble standing up without using your arms  Your healthcare provider may teach you to use your elbow for support as you move from lying to sitting  · Do not lift anything heavier than 5 pounds  until your healthcare provider says it is okay  For example, a gallon of milk weighs 8 pounds  · Do not play sports that use your shoulder  Examples include tennis and golf  · Do not drive  until your healthcare provider says it is okay  · Keep your arms down as much as possible    Do not put your arms out to the side, behind you, or over your head  Do not let anyone pull your arms to help you move or dress  Do not reach for items  · Do not push or pull anything  Examples include a car door or a vacuum   Care for your wound as directed:  Carefully wash around the wound with soap and water  Let the soap and water run over your wound  Do not scrub the wound  If you do not have a bandage, gently pat the incision dry with a clean towel  If you have a bandage, dry the area and put on a new, clean bandage  Change your bandage if it gets wet or dirty  Go to cardiac rehabilitation (rehab) as directed:  Cardiac rehab is a program run by specialists who help you safely strengthen your heart and prevent more heart disease  The plan includes exercise, relaxation, stress management, and heart-healthy nutrition  Healthcare providers will also check to make sure any medicines you take are working  The plan may also include instructions for when you can drive, return to work, and do other normal daily activities  Prevent another blocked artery:   · Do not smoke  Nicotine and other chemicals in cigarettes and cigars can cause heart and lung damage  Ask your healthcare provider for information if you currently smoke and need help to quit  E-cigarettes or smokeless tobacco still contain nicotine  Talk to your healthcare provider before you use these products  · Limit or do not drink alcohol as directed  Alcohol can raise your blood pressure and make your heart work harder  Alcohol can also increase your risk for heart disease or a stroke  Ask your healthcare provider if alcohol is safe for you  · Eat heart healthy foods  You may need to eat foods that are low in salt, fat, or cholesterol  Healthy foods include fruits, vegetables, whole-grain breads, low-fat dairy products, beans, lean meats, and fish  Ask your healthcare provider for more information about a heart healthy diet  · Maintain a healthy weight    Ask your healthcare provider how much you should weigh  Extra weight can increase the stress on your heart  Ask him to help you create a weight loss plan if you are overweight  Get a flu shot: The flu can be dangerous for a person who has heart disease  To prevent influenza (flu), get the influenza vaccine every year as soon as it becomes available  Follow up with your healthcare provider as directed:  Write down your questions so you remember to ask them during your visits  © 2017 [address] [employer] Information is for End User's use only and may not be sold, redistributed or otherwise used for commercial purposes  All illustrations and images included in CareNotes® are the copyrighted property of [employer] M [employer] Inc  or [employer]  The above information is an  only  It is not intended as medical advice for individual conditions or treatments  Talk to your doctor, nurse or pharmacist before following any medical regimen to see if it is safe and effective for you  Aortic Valve Replacement   WHAT YOU NEED TO KNOW:   Aortic valve replacement is surgery to put a new aortic valve in your heart  Your aortic valve separates the lower section of your heart from your aorta  The aorta is the large blood vessel that carries blood from your heart to your body  Your aortic valve opens and closes to let blood flow from your heart  When your aortic valve does not open or close as it should, the amount of blood that your heart can pump to your body decreases  DISCHARGE INSTRUCTIONS:   Medicines:   · Antiplatelets  help prevent blood clots  This medicine makes it more likely for you to bleed or bruise  · Blood thinners    help prevent blood clots  Examples of blood thinners include heparin and warfarin  Clots can cause strokes, heart attacks, and death  The following are general safety guidelines to follow while you are taking a blood thinner:    ¨ Watch for bleeding and bruising while you take blood thinners   Watch for bleeding from your gums or nose  Watch for blood in your urine and bowel movements  Use a soft washcloth on your skin, and a soft toothbrush to brush your teeth  This can keep your skin and gums from bleeding  If you shave, use an electric shaver  Do not play contact sports  ¨ Tell your dentist and other healthcare providers that you take anticoagulants  Wear a bracelet or necklace that says you take this medicine  ¨ Do not start or stop any medicines unless your healthcare provider tells you to  Many medicines cannot be used with blood thinners  ¨ Tell your healthcare provider right away if you forget to take the medicine, or if you take too much  ¨ Warfarin  is a blood thinner that you may need to take  The following are things you should be aware of if you take warfarin  § Foods and medicines can affect the amount of warfarin in your blood  Do not make major changes to your diet while you take warfarin  Warfarin works best when you eat about the same amount of vitamin K every day  Vitamin K is found in green leafy vegetables and certain other foods  Ask for more information about what to eat when you are taking warfarin  § You will need to see your healthcare provider for follow-up visits when you are on warfarin  You will need regular blood tests  These tests are used to decide how much medicine you need  · Heart medicine: This medicine is given to strengthen or regulate your heartbeat  It also may help your heart in other ways  Talk with your caregiver to find out what your heart medicine is and why you are taking it  · Blood pressure medicine: This is given to lower your blood pressure  A controlled blood pressure helps protect your organs, such as your heart, lungs, brain, and kidneys  Take your blood pressure medicine exactly as directed  · Antibiotics: This medicine will help kill germs that may get into your blood and cause an infection in your heart   Healthcare providers may tell you to take antibiotics before and after dental work, surgery, and some procedures  This is important after heart valve disease  · Take your medicine as directed  Contact your healthcare provider if you think your medicine is not helping or if you have side effects  Tell him or her if you are allergic to any medicine  Keep a list of the medicines, vitamins, and herbs you take  Include the amounts, and when and why you take them  Bring the list or the pill bottles to follow-up visits  Carry your medicine list with you in case of an emergency  Follow up with your healthcare provider as directed:  Write down your questions so you remember to ask them during your visits  Care for your wound:  Ask healthcare providers how to take care of your incision wound  Check your wound, clean it, and change the bandages each day  If the bandage gets dirty or falls off, put on a new one  Mouth care: Take care of your teeth and gums  Brush and floss your teeth, and see your dentist regularly  You may help prevent an infection in your heart if you do this  Tell your dentist that you have had heart valve surgery  Cardiac rehabilitation:  Your cardiologist or heart surgeon may recommend that you attend cardiac rehabilitation (rehab)  This is a program run by specialists who will help you safely strengthen your heart and prevent more heart disease  The plan includes exercise, relaxation, stress management, and heart-healthy nutrition  Healthcare providers will also check to make sure any medicines you are taking are working  The plan may also include instructions for when you can drive, return to work, and do other normal daily activities  Good nutrition for your heart:  Get enough calories, protein, vitamins, and minerals to help prevent poor nutrition and muscle wasting  You may be told to eat foods low in cholesterol or sodium (salt)  You also may be told to limit saturated and trans fats   Do eat foods that contain healthy fats, such as walnuts, salmon, and canola and soybean oils  Eat foods that help protect the heart, including plenty of fruits and vegetables, nuts, and sources of fiber  Ask what a healthy weight is for you  Set goals to reach and stay at that weight  Contact your healthcare provider if:   · You have a fever  · Your wound area is painful, red, or oozing fluid  · You feel too tired for normal activities weeks after your surgery  · Your hands, ankles, or feet are swollen  · You urinate less, or not at all  · You feel tired or weak and are short of breath  · Your arm or leg feels warm, tender, and painful  It may look swollen and red  · You have questions or concerns about your condition or care  Seek care immediately or call 911 if:   · You have blood in your bowel movements or urine  You are bleeding from your nose, mouth, or incision wound  · You have a severe headache  This may feel like the worst headache of your life  · You have weakness or numbness in your arm, leg, or face  This may happen on only 1 side of your body  · You feel lightheaded, dizzy, or sick to your stomach  You may have cold sweats and bluish skin, lips, or nail beds  · You have trouble breathing or are coughing up blood  You may have more pain when you take deep breaths or cough  · You have chest pain, or discomfort that spreads to your arms, jaw, or back, or sudden back pain  · You are confused or have problems speaking or understanding speech  You have vision changes or loss of vision  © 2017 [address] [facility] Information is for End User's use only and may not be sold, redistributed or otherwise used for commercial purposes  All illustrations and images included in CareNotes® are the copyrighted property of [employer]  or Chalo Matos  The above information is an  only   It is not intended as medical advice for individual conditions or treatments  Talk to your doctor, nurse or pharmacist before following any medical regimen to see if it is safe and effective for you

## 2018-09-20 NOTE — PROGRESS NOTES
09/20/18    Procedure: Chest tube removal    Chest tubes removed in routine fashion without incident  Insertion site dressed with Acticoat  Phillip Mandeep  tolerated the procedure well  Nurse notified      SIGNATURE: Jak Riley  DATE: September 20, 2018  TIME: 10:44 AM

## 2018-09-20 NOTE — OCCUPATIONAL THERAPY NOTE
OccupationalTherapy Progress Note     Patient Name: Ted Nunez  Today's Date: 9/20/2018  Problem List  Patient Active Problem List   Diagnosis    Benign prostatic hyperplasia with nocturia    Benign essential hypertension    Dyslipidemia, goal LDL below 100    Gout with tophus    Hypothyroidism    Mild intermittent asthma without complication    Obesity    Pure hypercholesterolemia    Renal cyst    Sexual dysfunction    Type 2 diabetes mellitus ([address] )    Aortic stenosis, moderate    Coronary artery disease involving native coronary artery of native heart    Nonrheumatic aortic valve stenosis    GERD (gastroesophageal reflux disease)    S/P CABG x 4    S/P AVR (aortic valve replacement)           09/20/18 1417   Restrictions/Precautions   Weight Bearing Precautions Per Order No   Other Precautions Cardiac/sternal;Fall Risk;Pain   Lifestyle   Autonomy Pt I w/ ADLs, IADLs, and uses a SPC for mobility as needed   Reciprocal Relationships Pt lives w/ son who can assist   Pain Assessment   Pain Assessment 0-10   Pain Location Arm   Pain Orientation Right   ADL   Where Assessed Chair   LB Dressing Assistance 2  Maximal Assistance   LB Dressing Deficit Don/doff R sock; Don/doff L sock; Thread RLE into pants; Thread LLE into pants   LB Dressing Comments Pt able to partially cross foot to the opposite knee to doff/don socks, however required assist for support of LE  Pt able to thread R & L LEs into pants w/ assist to thread over socks  Pt stood to pull up pants w/ CGA for steadying   Toileting Assistance  5  Supervision/Setup   Toileting Deficit Setup;Use of bedpan/urinal setup   Toileting Comments Pt used the urinal while seated in recliner chair      Functional Standing Tolerance   Time <1 minute   Activity LB dressing   Comments CGA   Bed Mobility   Additional Comments Pt seated OOB in recliner   Transfers   Sit to Stand 4  Minimal assistance   Additional items Assist x 1   Stand to Sit 4  Minimal assistance   Additional items Assist x 1   Toilet transfer 4  Minimal assistance   Additional items Assist x 1   Toilet Transfers   Toilet Transfer From Rolling walker   Toilet Transfer Type To and from   Toilet Transfer to Standard bedside commode   Toilet Transfer Technique Ambulating   Toilet Transfers Minimal assistance   Toilet Transfers Comments Pt performed SPT from recliner to [facility] w/ min A  Cognition   Arousal/Participation Responsive; Cooperative   Attention Within functional limits   Orientation Level Oriented to person   Memory Decreased recall of precautions   Following Commands Follows one step commands with increased time or repetition   Comments Pt pleasant and agreeable to session  He requires vc to maintain sternal precautions w/ transfers   Activity Tolerance   Activity Tolerance Patient limited by fatigue   Medical Staff Made Aware okay to see per Myra VALENZUELA   Assessment   Assessment Patient participated in Skilled OT session this date with interventions consisting of ADL re training with the use of correct body mechanics, energy conservation techniques, and review of cardiac precautions  Patient agreeable to OT treatment session, upon arrival patient was found seated OOB to Recliner  In comparison to previous session, patient with improvements in activity tolerance and transfer status  Educated pt on sternal precautions during activity and tranfers 2* decreased recall  Also educated pt on energy conservation strategies and pt verbalized understanding  Pt continues to be limited by fatigue and is below baseline for ability to complete ADLs and transfers  Patient continues to be functioning below baseline level, occupational performance remains limited secondary to factors listed above and increased risk for falls and injury  From OT standpoint, recommendation at time of d/c would be Short Term Rehab    Patient to benefit from continued Occupational Therapy treatment while in the hospital to address deficits as defined above and maximize level of functional independence with ADLs and functional mobility  Plan   Treatment Interventions ADL retraining;Functional transfer training; Endurance training;Patient/family training;Equipment evaluation/education; Compensatory technique education; Energy conservation; Activityengagement;Cardiac education   Goal Expiration Date 09/28/18   Treatment Day 2   OT Frequency 3-5x/wk   Recommendation   OT Discharge Recommendation Short Term Rehab   OT - OK to Discharge Yes  (when medically stable)   Barthel Index   Feeding 10   Bathing 0   Grooming Score 5   Dressing Score 5   Bladder Score 10   Bowels Score 10   Toilet Use Score 5   Transfers (Bed/Chair) Score 10   Mobility (Level Surface) Score 0   Stairs Score 0   Barthel Index Score 55   Modified [place] Scale   Modified Kenneth Scale 4       Phyllis Coyle, OTR/L

## 2018-09-21 VITALS
WEIGHT: 225.53 LBS | BODY MASS INDEX: 34.18 KG/M2 | DIASTOLIC BLOOD PRESSURE: 62 MMHG | OXYGEN SATURATION: 95 % | HEART RATE: 94 BPM | RESPIRATION RATE: 18 BRPM | SYSTOLIC BLOOD PRESSURE: 107 MMHG | HEIGHT: 68 IN | TEMPERATURE: 98 F

## 2018-09-21 LAB
ANION GAP SERPL CALCULATED.3IONS-SCNC: 7 MMOL/L (ref 4–13)
BUN SERPL-MCNC: 52 MG/DL (ref 5–25)
CALCIUM SERPL-MCNC: 7.8 MG/DL (ref 8.3–10.1)
CHLORIDE SERPL-SCNC: 104 MMOL/L (ref 100–108)
CO2 SERPL-SCNC: 28 MMOL/L (ref 21–32)
CREAT SERPL-MCNC: 1.84 MG/DL (ref 0.6–1.3)
GFR SERPL CREATININE-BSD FRML MDRD: 33 ML/MIN/1.73SQ M
GLUCOSE SERPL-MCNC: 104 MG/DL (ref 65–140)
GLUCOSE SERPL-MCNC: 285 MG/DL (ref 65–140)
GLUCOSE SERPL-MCNC: 97 MG/DL (ref 65–140)
POTASSIUM SERPL-SCNC: 3.1 MMOL/L (ref 3.5–5.3)
SODIUM SERPL-SCNC: 139 MMOL/L (ref 136–145)

## 2018-09-21 PROCEDURE — 82948 REAGENT STRIP/BLOOD GLUCOSE: CPT

## 2018-09-21 PROCEDURE — 99024 POSTOP FOLLOW-UP VISIT: CPT | Performed by: THORACIC SURGERY (CARDIOTHORACIC VASCULAR SURGERY)

## 2018-09-21 PROCEDURE — 80048 BASIC METABOLIC PNL TOTAL CA: CPT | Performed by: PHYSICIAN ASSISTANT

## 2018-09-21 RX ORDER — POTASSIUM CHLORIDE 14.9 MG/ML
60 INJECTION INTRAVENOUS ONCE
Status: COMPLETED | OUTPATIENT
Start: 2018-09-21 | End: 2018-09-21

## 2018-09-21 RX ORDER — OXYCODONE HYDROCHLORIDE AND ACETAMINOPHEN 5; 325 MG/1; MG/1
1 TABLET ORAL EVERY 6 HOURS PRN
Qty: 40 TABLET | Refills: 0 | Status: SHIPPED | OUTPATIENT
Start: 2018-09-21 | End: 2018-10-01

## 2018-09-21 RX ORDER — ASCORBIC ACID 500 MG
500 TABLET ORAL DAILY
Refills: 0
Start: 2018-09-22 | End: 2019-01-22 | Stop reason: ALTCHOICE

## 2018-09-21 RX ORDER — DOCUSATE SODIUM 100 MG/1
100 CAPSULE, LIQUID FILLED ORAL DAILY
Qty: 10 CAPSULE | Refills: 0
Start: 2018-09-21 | End: 2019-01-18 | Stop reason: ALTCHOICE

## 2018-09-21 RX ORDER — TORSEMIDE 20 MG/1
20 TABLET ORAL DAILY
Qty: 30 TABLET | Refills: 0
Start: 2018-09-21 | End: 2018-10-09 | Stop reason: SDUPTHER

## 2018-09-21 RX ORDER — ASPIRIN 325 MG
325 TABLET ORAL DAILY
Refills: 0
Start: 2018-09-22 | End: 2019-11-08 | Stop reason: SDUPTHER

## 2018-09-21 RX ORDER — ROSUVASTATIN CALCIUM 40 MG/1
40 TABLET, COATED ORAL DAILY
Refills: 0
Start: 2018-09-21 | End: 2018-10-09 | Stop reason: SDUPTHER

## 2018-09-21 RX ORDER — INSULIN GLARGINE 100 [IU]/ML
30 INJECTION, SOLUTION SUBCUTANEOUS
Qty: 10 ML | Refills: 0
Start: 2018-09-21 | End: 2018-10-04 | Stop reason: ALTCHOICE

## 2018-09-21 RX ORDER — POLYETHYLENE GLYCOL 3350 17 G/17G
17 POWDER, FOR SOLUTION ORAL DAILY
Qty: 14 EACH | Refills: 0
Start: 2018-09-22 | End: 2019-08-07

## 2018-09-21 RX ORDER — INSULIN GLARGINE 100 [IU]/ML
32 INJECTION, SOLUTION SUBCUTANEOUS
Qty: 10 ML | Refills: 0
Start: 2018-09-21 | End: 2018-09-21

## 2018-09-21 RX ORDER — PANTOPRAZOLE SODIUM 40 MG/1
40 TABLET, DELAYED RELEASE ORAL DAILY
Refills: 0
Start: 2018-09-22 | End: 2018-10-09 | Stop reason: SDUPTHER

## 2018-09-21 RX ORDER — AMOXICILLIN 250 MG
1 CAPSULE ORAL
Refills: 0
Start: 2018-09-21 | End: 2019-01-18 | Stop reason: ALTCHOICE

## 2018-09-21 RX ORDER — ACETAMINOPHEN 325 MG/1
TABLET ORAL
Qty: 30 TABLET | Refills: 0
Start: 2018-09-21

## 2018-09-21 RX ORDER — BISACODYL 10 MG
10 SUPPOSITORY, RECTAL RECTAL ONCE
Status: COMPLETED | OUTPATIENT
Start: 2018-09-21 | End: 2018-09-21

## 2018-09-21 RX ORDER — PRAVASTATIN SODIUM 80 MG/1
80 TABLET ORAL
Refills: 0
Start: 2018-09-21 | End: 2018-09-21 | Stop reason: HOSPADM

## 2018-09-21 RX ORDER — POTASSIUM CHLORIDE 20 MEQ/1
20 TABLET, EXTENDED RELEASE ORAL DAILY
Refills: 0
Start: 2018-09-22 | End: 2018-10-09 | Stop reason: SDUPTHER

## 2018-09-21 RX ORDER — FERROUS SULFATE 325(65) MG
325 TABLET ORAL
Refills: 0
Start: 2018-09-22 | End: 2019-08-07 | Stop reason: ALTCHOICE

## 2018-09-21 RX ADMIN — BISACODYL 10 MG: 10 SUPPOSITORY RECTAL at 11:34

## 2018-09-21 RX ADMIN — POTASSIUM CHLORIDE 60 MEQ: 200 INJECTION, SOLUTION INTRAVENOUS at 11:32

## 2018-09-21 RX ADMIN — OXYCODONE HYDROCHLORIDE AND ACETAMINOPHEN 1 TABLET: 5; 325 TABLET ORAL at 00:20

## 2018-09-21 RX ADMIN — HEPARIN SODIUM 5000 UNITS: 5000 INJECTION, SOLUTION INTRAVENOUS; SUBCUTANEOUS at 05:23

## 2018-09-21 RX ADMIN — BISACODYL 10 MG: 5 TABLET, COATED ORAL at 11:34

## 2018-09-21 RX ADMIN — PANTOPRAZOLE SODIUM 40 MG: 40 TABLET, DELAYED RELEASE ORAL at 05:23

## 2018-09-21 RX ADMIN — INSULIN LISPRO 4 UNITS: 100 INJECTION, SOLUTION INTRAVENOUS; SUBCUTANEOUS at 12:25

## 2018-09-21 RX ADMIN — FERROUS SULFATE TAB 325 MG (65 MG ELEMENTAL FE) 325 MG: 325 (65 FE) TAB at 09:27

## 2018-09-21 RX ADMIN — ASPIRIN 325 MG: 325 TABLET ORAL at 09:27

## 2018-09-21 RX ADMIN — DOCUSATE SODIUM 100 MG: 100 CAPSULE, LIQUID FILLED ORAL at 09:28

## 2018-09-21 RX ADMIN — POTASSIUM CHLORIDE 20 MEQ: 1500 TABLET, EXTENDED RELEASE ORAL at 09:27

## 2018-09-21 RX ADMIN — ALLOPURINOL 300 MG: 300 TABLET ORAL at 09:27

## 2018-09-21 RX ADMIN — MUPIROCIN 1 APPLICATION: 20 OINTMENT TOPICAL at 09:31

## 2018-09-21 RX ADMIN — POLYETHYLENE GLYCOL 3350 17 G: 17 POWDER, FOR SOLUTION ORAL at 09:40

## 2018-09-21 RX ADMIN — LEVOTHYROXINE SODIUM 150 MCG: 75 TABLET ORAL at 05:23

## 2018-09-21 RX ADMIN — OXYCODONE HYDROCHLORIDE AND ACETAMINOPHEN 500 MG: 500 TABLET ORAL at 09:28

## 2018-09-21 NOTE — PROGRESS NOTES
Progress Note - Cardiology   Roxy Munson  80 y o  male MRN: [id number]  Unit/Bed#: [facility] 428-01 Encounter: [id number]        Principal Problem:    Coronary artery disease involving native coronary artery of native heart  Active Problems:    Benign essential hypertension    Dyslipidemia, goal LDL below 100    Hypothyroidism    Mild intermittent asthma without complication    Obesity    Type 2 diabetes mellitus (HCC)    Aortic stenosis, moderate    Nonrheumatic aortic valve stenosis    GERD (gastroesophageal reflux disease)    S/P CABG x 4    S/P AVR (aortic valve replacement)         Assessment/Plan     1  CAD s/p CABG x4/ bioprosthetic AVR  LIMA to the LAD, SVG to OM 2, SVG-Y-to OM3 and left PDA  POD 4  IV lasix for volume overload 40mg BID- weight up 12 lbs from preop  I/0-1375 last 24 hours  Creat pre op 1 3  Last 2 days 1 8-1 9  Hypokalemic- repleted  Asa, lopressor 12 5mg bid, statin  Post op VERONICA EF normal  CT surgery sending to rehab on demadex 20mg for 10 days   Has appt with Isidoro Wheeler in 1 month  Will arrange for him to see CRNP in meantime for re eval of volume status to assure discontinuation of diuretics is reasonable       2  HTN-stable  Home meds- hyzaar 100/25, cardizem 180 ( on hold)  On BB     3  HLD- pravastatin 80     F/U with Dr Isidoro Wheeler post op  Subjective/Objective   Chief Complaint/Subjective  No complaints   Going to rehab today           Vitals: /62 (BP Location: Right arm)   Pulse 94   Temp 98 °F (36 7 °C) (Oral)   Resp 18   Ht 5' 8" (1 727 m)   Wt 102 kg (225 lb 8 5 oz)   SpO2 95%   BMI 34 29 kg/m²     Vitals:    09/20/18 0600 09/21/18 0600   Weight: 102 kg (224 lb 13 9 oz) 102 kg (225 lb 8 5 oz)     Orthostatic Blood Pressures      Most Recent Value   Blood Pressure  107/62 filed at 09/21/2018 1122   Patient Position - Orthostatic VS  Sitting filed at 09/21/2018 1122            Intake/Output Summary (Last 24 hours) at 09/21/18 1135  Last data filed at 09/21/18 0017 Gross per 24 hour   Intake              800 ml   Output             1813 ml   Net            -1013 ml       Invasive Devices     Central Venous Catheter Line            CVC Central Lines 09/17/18 Triple 4 days                Current Facility-Administered Medications   Medication Dose Route Frequency    acetaminophen (TYLENOL) tablet 650 mg  650 mg Oral Q4H PRN    allopurinol (ZYLOPRIM) tablet 300 mg  300 mg Oral Daily    ascorbic acid (VITAMIN C) tablet 500 mg  500 mg Oral Daily    aspirin tablet 325 mg  325 mg Oral Daily    bisacodyl (DULCOLAX) rectal suppository 10 mg  10 mg Rectal Daily PRN    docusate sodium (COLACE) capsule 100 mg  100 mg Oral Daily    ferrous sulfate tablet 325 mg  325 mg Oral Daily With Breakfast    furosemide (LASIX) injection 40 mg  40 mg Intravenous BID (diuretic)    gabapentin (NEURONTIN) capsule 300 mg  300 mg Oral HS    heparin (porcine) subcutaneous injection 5,000 Units  5,000 Units Subcutaneous Q8H [facility]    insulin glargine (LANTUS) subcutaneous injection 32 Units 0 32 mL  32 Units Subcutaneous HS    insulin lispro (HumaLOG) 100 units/mL subcutaneous injection 1-5 Units  1-5 Units Subcutaneous HS    insulin lispro (HumaLOG) 100 units/mL subcutaneous injection 1-6 Units  1-6 Units Subcutaneous TID With Meals    insulin lispro (HumaLOG) 100 units/mL subcutaneous injection 14 Units  14 Units Subcutaneous TID With Meals    levothyroxine tablet 150 mcg  150 mcg Oral Early Morning    metoprolol tartrate (LOPRESSOR) partial tablet 12 5 mg  12 5 mg Oral Q12H JAVAD    mupirocin (BACTROBAN) 2 % nasal ointment 1 application  1 application Nasal X06A [facility]    ondansetron (ZOFRAN) injection 4 mg  4 mg Intravenous Q6H PRN    oxyCODONE-acetaminophen (PERCOCET) 5-325 mg per tablet 1 tablet  1 tablet Oral Q4H PRN    pantoprazole (PROTONIX) EC tablet 40 mg  40 mg Oral Daily    polyethylene glycol (MIRALAX) packet 17 g  17 g Oral Daily    potassium chloride (K-DUR,KLOR-CON) CR tablet 20 mEq  20 mEq Oral Daily    potassium chloride 20 mEq IVPB (premix)  60 mEq Intravenous Once    pravastatin (PRAVACHOL) tablet 80 mg  80 mg Oral Daily With Dinner    senna-docusate sodium (SENOKOT S) 8 6-50 mg per tablet 1 tablet  1 tablet Oral HS    tamsulosin (FLOMAX) capsule 0 4 mg  0 4 mg Oral Daily With Dinner    temazepam (RESTORIL) capsule 15 mg  15 mg Oral HS PRN         Physical Exam: /62 (BP Location: Right arm)   Pulse 94   Temp 98 °F (36 7 °C) (Oral)   Resp 18   Ht 5' 8" (1 727 m)   Wt 102 kg (225 lb 8 5 oz)   SpO2 95%   BMI 34 29 kg/m²     General Appearance:    Alert, cooperative, no distress, appears stated age   Head:    Normocephalic, no scleral icterus   Eyes:    PERRL   Nose:   Nares normal, septum midline, no drainage    Throat:   Lips, mucosa, and tongue normal   Neck:   Supple, symmetrical, trachea midline,              Lungs:     Clear / decreased bases to auscultation bilaterally, respirations unlabored   Chest Wall:    Covered with dsd    Heart:    Regular rate and rhythm, S1 and S2 normal, no murmur       Extremities:   Extremities normal, atraumatic, no cyanosis, generalized  edema       Skin:   Skin warm   Neurologic:   Alert and oriented to person place and time, no focal deficits                 Lab Results:   Recent Results (from the past 72 hour(s))   Fingerstick Glucose (POCT)    Collection Time: 09/18/18 11:55 AM   Result Value Ref Range    POC Glucose 119 65 - 140 mg/dl   Fingerstick Glucose (POCT)    Collection Time: 09/18/18  2:08 PM   Result Value Ref Range    POC Glucose 145 (H) 65 - 140 mg/dl   Fingerstick Glucose (POCT)    Collection Time: 09/18/18  3:46 PM   Result Value Ref Range    POC Glucose 126 65 - 140 mg/dl   Fingerstick Glucose (POCT)    Collection Time: 09/18/18  6:53 PM   Result Value Ref Range    POC Glucose 170 (H) 65 - 140 mg/dl   Fingerstick Glucose (POCT)    Collection Time: 09/18/18  8:04 PM   Result Value Ref Range    POC Glucose 303 (H) 65 - 140 mg/dl   Fingerstick Glucose (POCT)    Collection Time: 09/18/18 10:09 PM   Result Value Ref Range    POC Glucose 105 65 - 140 mg/dl   Fingerstick Glucose (POCT)    Collection Time: 09/19/18 12:04 AM   Result Value Ref Range    POC Glucose 138 65 - 140 mg/dl   Fingerstick Glucose (POCT)    Collection Time: 09/19/18  1:54 AM   Result Value Ref Range    POC Glucose 169 (H) 65 - 140 mg/dl   Fingerstick Glucose (POCT)    Collection Time: 09/19/18  4:14 AM   Result Value Ref Range    POC Glucose 170 (H) 65 - 140 mg/dl   Basic metabolic panel    Collection Time: 09/19/18  4:53 AM   Result Value Ref Range    Sodium 139 136 - 145 mmol/L    Potassium 4 3 3 5 - 5 3 mmol/L    Chloride 107 100 - 108 mmol/L    CO2 25 21 - 32 mmol/L    ANION GAP 7 4 - 13 mmol/L    BUN 39 (H) 5 - 25 mg/dL    Creatinine 1 95 (H) 0 60 - 1 30 mg/dL    Glucose 158 (H) 65 - 140 mg/dL    Calcium 7 6 (L) 8 3 - 10 1 mg/dL    eGFR 31 ml/min/1 73sq m   Magnesium    Collection Time: 09/19/18  4:53 AM   Result Value Ref Range    Magnesium 2 7 (H) 1 6 - 2 6 mg/dL   CBC (With Platelets)    Collection Time: 09/19/18  4:53 AM   Result Value Ref Range    WBC 12 85 (H) 4 31 - 10 16 Thousand/uL    RBC 2 43 (L) 3 88 - 5 62 Million/uL    Hemoglobin 7 8 (L) 12 0 - 17 0 g/dL    Hematocrit 23 5 (L) 36 5 - 49 3 %    MCV 97 82 - 98 fL    MCH 32 1 26 8 - 34 3 pg    MCHC 33 2 31 4 - 37 4 g/dL    RDW 14 7 11 6 - 15 1 %    Platelets 98 (L) 219 - 390 Thousands/uL    MPV 10 7 8 9 - 12 7 fL   TSH, 3rd generation    Collection Time: 09/19/18  4:53 AM   Result Value Ref Range    TSH 3RD GENERATON 0 100 (L) 0 358 - 3 740 uIU/mL   T4, free    Collection Time: 09/19/18  4:53 AM   Result Value Ref Range    Free T4 1 34 0 76 - 1 46 ng/dL   Fingerstick Glucose (POCT)    Collection Time: 09/19/18  6:13 AM   Result Value Ref Range    POC Glucose 177 (H) 65 - 140 mg/dl   Fingerstick Glucose (POCT)    Collection Time: 09/19/18  7:54 AM   Result Value Ref Range    POC Glucose 357 (H) 65 - 140 mg/dl   Fingerstick Glucose (POCT)    Collection Time: 09/19/18 10:00 AM   Result Value Ref Range    POC Glucose 227 (H) 65 - 140 mg/dl   Fingerstick Glucose (POCT)    Collection Time: 09/19/18 12:07 PM   Result Value Ref Range    POC Glucose 197 (H) 65 - 140 mg/dl   Fingerstick Glucose (POCT)    Collection Time: 09/19/18  2:04 PM   Result Value Ref Range    POC Glucose 164 (H) 65 - 140 mg/dl   Fingerstick Glucose (POCT)    Collection Time: 09/19/18  3:58 PM   Result Value Ref Range    POC Glucose 180 (H) 65 - 140 mg/dl   Fingerstick Glucose (POCT)    Collection Time: 09/19/18  6:04 PM   Result Value Ref Range    POC Glucose 177 (H) 65 - 140 mg/dl   Fingerstick Glucose (POCT)    Collection Time: 09/19/18  7:59 PM   Result Value Ref Range    POC Glucose 244 (H) 65 - 140 mg/dl   Fingerstick Glucose (POCT)    Collection Time: 09/19/18 10:40 PM   Result Value Ref Range    POC Glucose 121 65 - 140 mg/dl   Fingerstick Glucose (POCT)    Collection Time: 09/20/18 12:05 AM   Result Value Ref Range    POC Glucose 110 65 - 140 mg/dl   Basic metabolic panel    Collection Time: 09/20/18  4:49 AM   Result Value Ref Range    Sodium 137 136 - 145 mmol/L    Potassium 3 7 3 5 - 5 3 mmol/L    Chloride 103 100 - 108 mmol/L    CO2 25 21 - 32 mmol/L    ANION GAP 9 4 - 13 mmol/L    BUN 44 (H) 5 - 25 mg/dL    Creatinine 1 91 (H) 0 60 - 1 30 mg/dL    Glucose 156 (H) 65 - 140 mg/dL    Calcium 7 9 (L) 8 3 - 10 1 mg/dL    eGFR 32 ml/min/1 73sq m   CBC and Platelet    Collection Time: 09/20/18  4:49 AM   Result Value Ref Range    WBC 11 27 (H) 4 31 - 10 16 Thousand/uL    RBC 2 33 (L) 3 88 - 5 62 Million/uL    Hemoglobin 7 3 (L) 12 0 - 17 0 g/dL    Hematocrit 22 6 (L) 36 5 - 49 3 %    MCV 97 82 - 98 fL    MCH 31 3 26 8 - 34 3 pg    MCHC 32 3 31 4 - 37 4 g/dL    RDW 14 1 11 6 - 15 1 %    Platelets 610 (L) 687 - 390 Thousands/uL    MPV 10 9 8 9 - 12 7 fL   Fingerstick Glucose (POCT)    Collection Time: 09/20/18  6:42 AM   Result Value Ref Range    POC Glucose 208 (H) 65 - 140 mg/dl   Prepare RBC:Special Requirements: Leukoreduced; Has consent been obtained? Yes; Where is the Surgery Scheduled?  [facility] [place]    Collection Time: 09/20/18  6:53 AM   Result Value Ref Range    Unit Product Code W7423R53     Unit Number [id number]     Unit ABO AB     Unit [facility] POS     Crossmatch Compatible     Unit Dispense Status Presumed Trans     Unit Product Code O9082G94     Unit Number [id number]     Unit ABO AB     Unit RH POS     Crossmatch Compatible     Unit Dispense Status Return to [facility]     Unit Product Code L7442C39     Unit Number [id number]     Unit ABO AB     Unit [facility] POS     Crossmatch Compatible     Unit Dispense Status Presumed Trans     Unit Product Code K0753W57     Unit Number [id number]     Unit ABO AB     Unit RH POS     Crossmatch Compatible     Unit Dispense Status Return to [facility]    Fingerstick Glucose (POCT)    Collection Time: 09/20/18 11:10 AM   Result Value Ref Range    POC Glucose 353 (H) 65 - 140 mg/dl   Fingerstick Glucose (POCT)    Collection Time: 09/20/18  4:12 PM   Result Value Ref Range    POC Glucose 195 (H) 65 - 140 mg/dl   Fingerstick Glucose (POCT)    Collection Time: 09/20/18  9:13 PM   Result Value Ref Range    POC Glucose 166 (H) 65 - 140 mg/dl   Basic metabolic panel    Collection Time: 09/21/18  5:22 AM   Result Value Ref Range    Sodium 139 136 - 145 mmol/L    Potassium 3 1 (L) 3 5 - 5 3 mmol/L    Chloride 104 100 - 108 mmol/L    CO2 28 21 - 32 mmol/L    ANION GAP 7 4 - 13 mmol/L    BUN 52 (H) 5 - 25 mg/dL    Creatinine 1 84 (H) 0 60 - 1 30 mg/dL    Glucose 97 65 - 140 mg/dL    Calcium 7 8 (L) 8 3 - 10 1 mg/dL    eGFR 33 ml/min/1 73sq m   Fingerstick Glucose (POCT)    Collection Time: 09/21/18  5:41 AM   Result Value Ref Range    POC Glucose 104 65 - 140 mg/dl   Fingerstick Glucose (POCT)    Collection Time: 09/21/18 10:43 AM   Result Value Ref Range    POC Glucose 285 (H) 65 - 140 mg/dl Imaging: I have personally reviewed pertinent reports  Tele- nsr    Counseling / Coordination of Care  Total time spent today 20 minutes  Greater than 50% of total time was spent with the patient and / or family counseling and / or coordination of care

## 2018-09-21 NOTE — SOCIAL WORK
Pt is cleared for d/c by Cardiothoracic Surgery  RN Sebastian Piper was notified of d/c  Pt is accepted for short-term skilled rehab placement at [facility] located in [facility]  CM obtained auth# [id number] for SNF placement from pt's [facility]  CM arranged BLS ambulance via [facility] for 1:00PM pickup this day to transport pt to SNF  No auth required for transport from [facility]  The pt and his son Alexandra Burrell were both informed of d/c  IMM signed  Chart copy requested for SNF  CM to follow

## 2018-09-21 NOTE — CASE MANAGEMENT
Thank you,  [address]  Utilization Review Department  Phone: [phone]; Fax [phone]  ATTENTION: Please call with any questions or concerns to [phone]  and carefully follow the prompts so that you are directed to the right person  Send all requests for admission clinical reviews, approved or denied determinations and any other requests to fax [phone]   All voicemails are confidential      ===============================================================    Continued Stay Review    Date: 09/21/2018  Age/Sex: 80 y o  male     Assessment/Plan:     Discharge Plan:   09/21/18 1132  Discharge patient Once     [facility]    Expected Discharge Date: 09/21/18              Vital Signs: /62 (BP Location: Right arm)   Pulse 94   Temp 98 °F (36 7 °C) (Oral)   Resp 18   Ht 5' 8" (1 727 m)   Wt 102 kg (225 lb 8 5 oz)   SpO2 95%   BMI 34 29 kg/m²     Medications:   Scheduled Meds:   Current Facility-Administered Medications:  acetaminophen 650 mg Oral Q4H PRN Madelaine Ma PA-C    allopurinol 300 mg Oral Daily Teri Johnson PA-C    ascorbic acid 500 mg Oral Daily Marylene Lobe Lugiano, PA-C    aspirin 325 mg Oral Daily Madelaine Ma PA-C    bisacodyl 10 mg Rectal Daily PRN Madelaine Ma PA-C    docusate sodium 100 mg Oral Daily Marylene Lobe Lugiano, PA-C    ferrous sulfate 325 mg Oral Daily With Breakfast Nisa Perry PA-C    furosemide 40 mg Intravenous BID (diuretic) Wilian Lopez PA-C    gabapentin 300 mg Oral HS Maedlaine Ma PA-C    heparin (porcine) 5,000 Units Subcutaneous Q8H [address] Sean Chew PA-C    insulin glargine 32 Units Subcutaneous HS Tita Mayer MD    insulin lispro 1-5 Units Subcutaneous HS Tita Mayer MD    insulin lispro 1-6 Units Subcutaneous TID With Meals Tita Mayer MD    insulin lispro 14 Units Subcutaneous TID With Meals Tita Mayer MD    levothyroxine 150 mcg Oral Early Morning Marylen Gentile, PA-C    metoprolol tartrate 12 5 mg Oral Q12H [address] VALENCIA Garrett    mupirocin 1 application Nasal E56Z [address] Teri Parrish PA-C    ondansetron 4 mg Intravenous Q6H PRN Marylen Gentile, PA-C    oxyCODONE-acetaminophen 1 tablet Oral Q4H PRN Marylen Gentile, PA-C    pantoprazole 40 mg Oral Daily Marylen Gentile, PA-C    polyethylene glycol 17 g Oral Daily Marylen Gentile, PA-C    potassium chloride 20 mEq Oral Daily Cat Amanda Chinchilla PA-C    potassium chloride 60 mEq Intravenous Once Cat Numbers CHON Sevilla Last Rate: 60 mEq (09/21/18 1132)   pravastatin 80 mg Oral Daily With Dinner VALENCIA Johnson    senna-docusate sodium 1 tablet Oral HS Viral Arnett PA-C    tamsulosin 0 4 mg Oral Daily With [employer], CHON    temazepam 15 mg Oral HS PRN VALENCIA Borges      Abnormal Labs/Diagnostic Results:

## 2018-09-21 NOTE — DISCHARGE SUMMARY
Discharge Summary - Cardiothoracic Surgery   Jovi Wolff  80 y o  male MRN: [id number]  [facility] Encounter: [id number]    Admission Date: 9/17/2018     Discharge Date: 09/21/18    Admitting Diagnosis: Disease of cardiovascular system [I25 10]  Nonrheumatic aortic valve stenosis [I35 0]    Primary Discharge Diagnosis:   1  Moderate aortic stenosis  2  Multivessel coronary artery disease S/P 1  Aortic valve replacement with a 23mm Jenkins Intuity pericardial tissue valve  2  Coronary artery bypass grafting x 4 with left internal mammary artery to left anterior descending artery, saphenous vein graft to obtuse marginal 2 and saphenous vein graft - Y -  to obtuse marginal 3 and left posterior descending artery    Secondary Discharge Diagnosis:   IDDM2, GERD, glaucoma, AS, overweight, peripheral neuropathy, h/o prostate ca, HL, right renal cyst, hypothyroidism, CKD (baseline Cr 1 3-1 5)    Attending: KRISTA Ayers  Consulting Physician(s):   Cardiology  Medical/Critical Care  Endocrinology    Procedures Performed:   9/17/2018: 1  Aortic valve replacement with a 23mm Jenkins Intuity pericardial tissue valve  2  Coronary artery bypass grafting x 4 with left internal mammary artery to left anterior descending artery, saphenous vein graft to obtuse marginal 2 and saphenous vein graft - Y -  to obtuse marginal 3 and left posterior descending artery    Hospital Course:   9/17: AVR (#23mm Jenkins Intuity), CABGx4  Patient tolerated procedure well & was transferred to the ICU hemodynamically stable on milrinone 0 13 & epi 4  A wire malfunctioning  Amicar ordered, coags sent, given 2 FFP/2 cryo/1 plt in ICU w/ improvement coagulopathy  Wean  to exaubte  Started on anna for hypotension, now at 60  Last CI 2 5   9/18: Extubated to [facility] 57  Weaned off epi, milrinone & anna  Given 500LR & 150cc albumin for low filling pressures/hypotension  Discontinue swan, cordis, a line this AM  Start Lopressor 12 5mg BID  Hb 7 9 from 8 8, continue to monitor  Cr 1 40 from 1 34  Start Lasix 40mg IV BID, discontinue aj  QTc 597, hold amio  Maintain insulin gtt, consult endocrinology  Transfer to telem  Started on Pravastatin transition to Crestor at discharge (Lipitor allergy)  9/19: Forgetful overnight but easily reorients  Keep CT, D/C EPW  Cr 1 95 - start Lasix bid  Hgb 7 9 stable - start Fe/Vit C  Remains on Insulin drip  Weaned to RA in NSR    9/20: D/C CT  Keep bid Lasix  On bolus dosing insulin  Cr 1 91, Hgb stable 7 3  Rehab tomorrow  9/21: No events overnight  No complaints  Stable for discharge to rehab  Cr 1 8  Fe/VitC x 90 days  Condition at Discharge:   good     Discharge Physical Exam:  HEENT/NECK:  PERRLA  No jugular venous distention  Cardiac: Regular rate and rhythm  No rubs/murmurs/gallops  Pulmonary:  Breath sounds slightly diminished at the bases bilaterally  Abdomen:  Non-tender, Non-distended  Positive bowel sounds  Incisions: Sternum is stable  Incision is clean, dry, and intact     Saphenectomy incision is clean, dry, and intact  Lower extremities: Extremities warm/dry  Radial/PT/DP pulses 2+ bilaterally  Trace edema B/L  Neuro: Alert and oriented X 3  Sensation is grossly intact  No focal deficits  Skin: Warm/Dry, without rashes or lesions      Discharge Data:    Results from last 7 days  Lab Units 09/20/18  0449 09/19/18  0453 09/18/18  0401   WBC Thousand/uL 11 27* 12 85* 8 84   HEMOGLOBIN g/dL 7 3* 7 8* 7 9*   HEMATOCRIT % 22 6* 23 5* 23 9*   PLATELETS Thousands/uL 123* 98* 117*       Results from last 7 days  Lab Units 09/21/18  0522 09/20/18  0449 09/19/18  0453  09/17/18  1628   SODIUM mmol/L 139 137 139  < >  --    POTASSIUM mmol/L 3 1* 3 7 4 3  < >  --    CHLORIDE mmol/L 104 103 107  < >  --    CO2 mmol/L 28 25 25  < >  --    BUN mg/dL 52* 44* 39*  < >  --    CREATININE mg/dL 1 84* 1 91* 1 95*  < >  --    GLUCOSE, ISTAT mg/dl  --   --   --   --  126   CALCIUM mg/dL 7 8* 7 9* 7 6*  < >  --    < > = values in this interval not displayed  Results from last 7 days  Lab Units 09/17/18  1725 09/17/18  1409   INR  1 47* 1 79*   PTT seconds 69* 61*       Discharge instructions/Information to patient and family:   See after visit summary for information provided to patient and family  Katlyn Escobar  was educated on restrictions regarding driving and lifting, and techniques of proper incisional care  They were specifically counselled on signs and symptoms of a sternal wound infection, and advised to contact our service immediately should they develop fevers, sweats, chill, redness or drainage at the site of any incisions  Provisions for Follow-Up Care:  See after visit summary for information related to follow-up care and any pertinent home health orders  Disposition:  Home    Planned Readmission:   No    Discharge Medications:  See after visit summary for reconciled discharge medications provided to patient and family  Katlyn Escobar  was provided contact information and scheduled a follow up appointment with KRISTA Rodney  Additionally, they were advised to schedule follow up appointments to be seen by their primary care physician within 7-10 days, and their cardiologist within 2-3 weeks  Contact information was provided  The patient was discharged on ongoing diuretic therapy with Torsemide 20 mg, PO QD and Potassium Chloride 20 mEq, PO QD  They were advised to continue these medications for 10 days, unless otherwise directed  Narcotic pain medication was prescribed in the form of percocet  Prior to prescribing, their prescription profile was reviewed on the [facility] of health prescription drug monitoring program     The patient was informed that following their postoperative surgical evaluation, they will be referred to outpatient cardiac rehabilitation    They were counseled that this program is run by specialists who will help them safely strengthen their heart and prevent more heart disease  Cardiac rehabilitation will include exercise, relaxation, stress management, and heart-healthy nutrition  Caregivers will also check to make sure their medication regimen is working  I spent 30 minutes discharging the patient  This time was spent on the day of discharge  I had direct contact with the patient on the day of discharge  Additional documentation is required if more than 30 minutes were spent on discharge       SIGNATURE: Jak Lee  DATE: September 21, 2018  TIME: 11:58 AM

## 2018-09-21 NOTE — PROGRESS NOTES
Progress Note - Cardiothoracic Surgery   Lenice Power  80 y o  male MRN: [id number]  Unit/Bed#: [facility] 428-01 Encounter: [id number]  Aortic stenosis, Non-Rheumatic, Coronary artery disease  S/P aortic valve replacement and coronary artery bypass grafting; POD # 4    24 Hour Events: No events overnight  No complaints  Pain controlled  Tolerating diet  Ambulating with assistance      Medications:   Scheduled Meds:  Current Facility-Administered Medications:  acetaminophen 650 mg Oral Q4H PRN Lino Cassidy PA-C   allopurinol 300 mg Oral Daily Lino Cassidy PA-C   ascorbic acid 500 mg Oral Daily Oneta Bernardino Chinchilla PA-C   aspirin 325 mg Oral Daily Lino Cassidy PA-C   bisacodyl 10 mg Rectal Daily PRN Lino Cassidy PA-C   docusate sodium 100 mg Oral Daily [facility] Bernardino Chinchilla PA-C   ferrous sulfate 325 mg Oral Daily With Breakfast Nisa Goldberg PA-C   furosemide 40 mg Intravenous BID (diuretic) [facility] Bernardino AkCHON baltazar   gabapentin 300 mg Oral HS Lino Cassidy PA-C   heparin (porcine) 5,000 Units Subcutaneous Q8H [address] Ryan Gonzalez PA-C   insulin glargine 32 Units Subcutaneous HS Nelly White MD   insulin lispro 1-5 Units Subcutaneous HS Nelly White MD   insulin lispro 1-6 Units Subcutaneous TID With Meals Nelly White MD   insulin lispro 14 Units Subcutaneous TID With Meals Nelly White MD   levothyroxine 150 mcg Oral Early Morning Lino Cassidy PA-C   metoprolol tartrate 12 5 mg Oral Q12H [address] VALENCIA Garrett   mupirocin 1 application Nasal F41F [address] Teri Parrish PA-C   ondansetron 4 mg Intravenous Q6H PRN Lino Cassidy PA-C   oxyCODONE-acetaminophen 1 tablet Oral Q4H PRN Lino Cassidy PA-C   pantoprazole 40 mg Oral Daily Lino Cassidy PA-C   polyethylene glycol 17 g Oral Daily Lino Cassidy PA-C   potassium chloride 20 mEq Oral Daily Oneta Bernardino Chinchilla PA-C   pravastatin 80 mg Oral Daily With Dinner VALENCIA Sykes   senna-docusate sodium 1 tablet Oral HS Monica Estrada PA-C   tamsulosin 0 4 mg Oral Daily With CHON ESCOBAR   temazepam 15 mg Oral HS PRN VALENCIA Bateman     Continuous Infusions:   PRN Meds:   acetaminophen    bisacodyl    ondansetron    oxyCODONE-acetaminophen    temazepam    Vitals:   Vitals:    09/20/18 2335 09/21/18 0322 09/21/18 0600 09/21/18 0808   BP: 110/55 93/55  105/65   BP Location: Right arm Right arm  Right arm   Pulse: 82 83  84   Resp: 18 18 18   Temp: 98 7 °F (37 1 °C) 98 5 °F (36 9 °C)  98 °F (36 7 °C)   TempSrc: Oral Oral  Oral   SpO2: 92% 96%  94%   Weight:   102 kg (225 lb 8 5 oz)    Height:           Telemetry: NSR; Heart Rate: 82    Respiratory:   SpO2: SpO2: 94 %; Room Air    Intake/Output:   I/O       09/19 0701 - 09/20 0700 09/20 0701 - 09/21 0700 09/21 0701 - 09/22 0700    P  O  950 900     Total Intake(mL/kg) 950 (9 3) 900 (8 8)     Urine (mL/kg/hr) 1829 (0 7) 2275 (0 9)     Chest Tube 280      Total Output 2109 2275      Net -1159 -1375                 Weights:   Weight (last 2 days)     Date/Time   Weight    09/21/18 0600  102 (225 53)    09/20/18 0600  102 (224 87)    09/20/18 0433  102 (224 21)            Admit weight: 96 2    Results:     Results from last 7 days  Lab Units 09/20/18  0449 09/19/18  0453 09/18/18  0401   WBC Thousand/uL 11 27* 12 85* 8 84   HEMOGLOBIN g/dL 7 3* 7 8* 7 9*   HEMATOCRIT % 22 6* 23 5* 23 9*   PLATELETS Thousands/uL 123* 98* 117*       Results from last 7 days  Lab Units 09/21/18  0522 09/20/18  0449 09/19/18  0453  09/17/18  1628   SODIUM mmol/L 139 137 139  < >  --    POTASSIUM mmol/L 3 1* 3 7 4 3  < >  --    CHLORIDE mmol/L 104 103 107  < >  --    CO2 mmol/L 28 25 25  < >  --    BUN mg/dL 52* 44* 39*  < >  --    CREATININE mg/dL 1 84* 1 91* 1 95*  < >  --    GLUCOSE, ISTAT mg/dl  --   --   --   --  126   CALCIUM mg/dL 7 8* 7 9* 7 6*  < >  --    < > = values in this interval not displayed      Results from last 7 days  Lab Units 09/17/18  1725 09/17/18  1409   INR  1 47* 1 79*   PTT seconds 69* 61*     Point of care glucose: 110-353    Studies:  No new    Invasive Lines/Tubes:  Invasive Devices     Central Venous Catheter Line            CVC Central Lines 09/17/18 Triple 4 days                Physical Exam:    HEENT/NECK:  PERRLA  No jugular venous distention  Cardiac: Regular rate and rhythm  No rubs/murmurs/gallops  Pulmonary:  Breath sounds slightly diminished at the bases bilaterally  Abdomen:  Non-tender, Non-distended  Positive bowel sounds  Incisions: Sternum is stable  Incision is clean, dry, and intact  Saphenectomy incision is clean, dry, and intact  Lower extremities: Extremities warm/dry  Radial/PT/DP pulses 2+ bilaterally  Trace edema B/L  Neuro: Alert and oriented X 3  Sensation is grossly intact  No focal deficits  Skin: Warm/Dry, without rashes or lesions  Assessment:  Patient Active Problem List   Diagnosis    Benign prostatic hyperplasia with nocturia    Benign essential hypertension    Dyslipidemia, goal LDL below 100    Gout with tophus    Hypothyroidism    Mild intermittent asthma without complication    Obesity    Pure hypercholesterolemia    Renal cyst    Sexual dysfunction    Type 2 diabetes mellitus ([facility] [address] )    Aortic stenosis, moderate    Coronary artery disease involving native coronary artery of native heart    Nonrheumatic aortic valve stenosis    GERD (gastroesophageal reflux disease)    S/P CABG x 4    S/P AVR (aortic valve replacement)       Aortic stenosis, Non-Rheumatic, Coronary artery disease  S/P aortic valve replacement and coronary artery bypass grafting; POD # 4    Plan:    1  Cardiac:   NSR; HR/BP well-controlled  Lopressor 12 5 mg PO BID  Continue ASA and Statin therapy  Epicardial pacing wires out  Central IV access no longer required; Remove central venous catheter today  Continue DVT prophylaxis    2   Pulmonary:   Good Room air oxygen saturation; Continue incentive spirometry/Coughing/Deep breathing exercises  Chest tubes have been discontinued    3  Renal:   Intake/Output net: -1375 mL/24 hours  Continue diuresis   Lasix 40 mg IV BID  Potassium Chloride 20 mEq PO BID  Post op Creatinine stable; Follow up labs prn    4  Neuro:  Neurologically intact; No active issues  Incisional pain well-controlled; Continue prn Percocet    5  GI:  Tolerating TLC 2 3 gm sodium diet  Maintain 1800 mL daily fluid restriction   Continue stool softeners and prn suppository  Continue GI prophylaxis    6  Endo:    History of diabetes; Continue SQ insulin therapy as directed by endocrinology physician  Insulin administration teaching for home therapy ordered    7  Hematology:   Post-operative acute blood loss anemia; Hemoglobin and hematocrit stable; trend prn    8   Disposition:  Awaiting rehab placement    VTE Pharmacologic Prophylaxis: Heparin  VTE Mechanical Prophylaxis: sequential compression device    Collaborative rounds completed with KRISTA Ngo , and Zunilda Isabel RN    SIGNATURE: Jak Carranza  DATE: September 21, 2018  TIME: 8:10 AM

## 2018-09-26 RX ORDER — INSULIN GLARGINE 100 [IU]/ML
INJECTION, SOLUTION SUBCUTANEOUS
Refills: 5 | COMMUNITY
Start: 2018-09-09 | End: 2018-10-05 | Stop reason: SDUPTHER

## 2018-10-04 ENCOUNTER — TELEPHONE (OUTPATIENT)
Dept: FAMILY MEDICINE CLINIC | Facility: HOSPITAL | Age: 83
End: 2018-10-04

## 2018-10-05 ENCOUNTER — OFFICE VISIT (OUTPATIENT)
Dept: FAMILY MEDICINE CLINIC | Facility: HOSPITAL | Age: 83
End: 2018-10-05
Payer: COMMERCIAL

## 2018-10-05 ENCOUNTER — TELEPHONE (OUTPATIENT)
Dept: FAMILY MEDICINE CLINIC | Facility: HOSPITAL | Age: 83
End: 2018-10-05

## 2018-10-05 ENCOUNTER — TRANSITIONAL CARE MANAGEMENT (OUTPATIENT)
Dept: FAMILY MEDICINE CLINIC | Facility: HOSPITAL | Age: 83
End: 2018-10-05

## 2018-10-05 VITALS
DIASTOLIC BLOOD PRESSURE: 90 MMHG | OXYGEN SATURATION: 96 % | WEIGHT: 223.5 LBS | TEMPERATURE: 98.9 F | HEART RATE: 88 BPM | SYSTOLIC BLOOD PRESSURE: 106 MMHG | BODY MASS INDEX: 33.98 KG/M2

## 2018-10-05 DIAGNOSIS — L03.116 CELLULITIS OF LEFT LOWER EXTREMITY: Primary | ICD-10-CM

## 2018-10-05 DIAGNOSIS — Z95.1 S/P CABG X 4: ICD-10-CM

## 2018-10-05 DIAGNOSIS — I35.0 NONRHEUMATIC AORTIC VALVE STENOSIS: ICD-10-CM

## 2018-10-05 DIAGNOSIS — Z95.2 S/P AVR (AORTIC VALVE REPLACEMENT): ICD-10-CM

## 2018-10-05 DIAGNOSIS — I25.118 CORONARY ARTERY DISEASE INVOLVING NATIVE CORONARY ARTERY OF NATIVE HEART WITH OTHER FORM OF ANGINA PECTORIS (HCC): Chronic | ICD-10-CM

## 2018-10-05 PROCEDURE — 99214 OFFICE O/P EST MOD 30 MIN: CPT | Performed by: INTERNAL MEDICINE

## 2018-10-05 RX ORDER — INSULIN GLARGINE 100 [IU]/ML
30 INJECTION, SOLUTION SUBCUTANEOUS
Qty: 5 PEN | Refills: 0 | Status: ON HOLD | OUTPATIENT
Start: 2018-10-05 | End: 2018-12-04

## 2018-10-05 RX ORDER — CEPHALEXIN 500 MG/1
500 CAPSULE ORAL 4 TIMES DAILY
Qty: 28 CAPSULE | Refills: 0 | Status: SHIPPED | OUTPATIENT
Start: 2018-10-05 | End: 2018-10-12

## 2018-10-05 NOTE — ASSESSMENT & PLAN NOTE
Lab Results   Component Value Date    HGBA1C 7 9 (H) 09/07/2018       No results for input(s): POCGLU in the last 72 hours  Blood Sugar Average: Last 72 hrs:   Had elevated wom593 trhis am took 16 units before breakfast today- did not take his pm lantus last night

## 2018-10-05 NOTE — PROGRESS NOTES
Assessment/Plan:             Problem List Items Addressed This Visit        Cardiovascular and Mediastinum    Coronary artery disease involving native coronary artery of native heart (Chronic)    Nonrheumatic aortic valve stenosis       Other    S/P CABG x 4    S/P AVR (aortic valve replacement)      Other Visit Diagnoses     Cellulitis of left lower extremity    -  Primary            Subjective:      Patient ID: Saad Clement  is a 80 y o  male    1  Had avr and  CABG then at [place] for rehab- had vn seeing him today and she was concerned about left ankle redness and swelling  Pt reports he noted tenderness at phoebe in area of vein harvest about 2 days ago  Had fever with VN- they do not know how high        The following portions of the patient's history were reviewed and updated as appropriate: allergies, current medications and problem list      Review of Systems   Respiratory: Positive for cough           Dry cough no sputum production         Objective:      Current Outpatient Prescriptions:     acetaminophen (TYLENOL) 325 mg tablet, 650 mg every 4 to 6 hours as needed for pain (do not take with percocet), Disp: 30 tablet, Rfl: 0    albuterol (PROVENTIL HFA) 90 mcg/act inhaler, Inhale 2 puffs 4 (four) times a day as needed, Disp: , Rfl:     allopurinol (ZYLOPRIM) 300 mg tablet, TAKE ONE TABLET BY MOUTH ONCE DAILY, Disp: 90 tablet, Rfl: 3    ascorbic acid (VITAMIN C) 500 MG tablet, Take 1 tablet (500 mg total) by mouth daily for 90 days, Disp: , Rfl: 0    aspirin 325 mg tablet, Take 1 tablet (325 mg total) by mouth daily, Disp: , Rfl: 0    B-D UF III MINI PEN NEEDLES 31G X 5 MM MISC, USE ONE DAILY AS DIRECTED, Disp: 100 each, Rfl: 5    BASAGLAR KWIKPEN 100 units/mL injection pen, 42 UNITS AT BEDTIME, Disp: , Rfl: 5    docusate sodium (COLACE) 100 mg capsule, Take 1 capsule (100 mg total) by mouth daily, Disp: 10 capsule, Rfl: 0    ferrous sulfate 325 (65 Fe) mg tablet, Take 1 tablet (325 mg total) by mouth daily with breakfast for 90 days, Disp: , Rfl: 0    gabapentin (NEURONTIN) 300 mg capsule, TAKE ONE CAPSULE BY MOUTH ONCE DAILY AT BEDTIME, Disp: 90 capsule, Rfl: 3    insulin aspart (NovoLOG) 100 units/mL injection, Inject under the skin 3 (three) times a day before meals, Disp: , Rfl: 0    Insulin Pen Needle 31G X 5 MM MISC, by Does not apply route, Disp: , Rfl:     levothyroxine 150 mcg tablet, Take 150 mcg by mouth daily, Disp: , Rfl:     pantoprazole (PROTONIX) 40 mg tablet, Take 1 tablet (40 mg total) by mouth daily, Disp: , Rfl: 0    polyethylene glycol (MIRALAX) 17 g packet, Take 17 g by mouth daily, Disp: 14 each, Rfl: 0    rosuvastatin (CRESTOR) 40 MG tablet, Take 1 tablet (40 mg total) by mouth daily, Disp: , Rfl: 0    senna-docusate sodium (SENOKOT S) 8 6-50 mg per tablet, Take 1 tablet by mouth daily at bedtime as needed for constipation, Disp: , Rfl: 0    tamsulosin (FLOMAX) 0 4 mg, Take 1 capsule (0 4 mg total) by mouth daily with dinner, Disp: 90 capsule, Rfl: 3    torsemide (DEMADEX) 20 mg tablet, Take 1 tablet (20 mg total) by mouth daily for 10 days, Disp: 30 tablet, Rfl: 0    metoprolol tartrate (LOPRESSOR) 25 mg tablet, Take 0 5 tablets (12 5 mg total) by mouth every 12 (twelve) hours, Disp: , Rfl: 0    potassium chloride (K-DUR,KLOR-CON) 20 mEq tablet, Take 1 tablet (20 mEq total) by mouth daily for 10 days, Disp: , Rfl: 0    Blood pressure 106/90, pulse 88, temperature 98 9 °F (37 2 °C), temperature source Tympanic, weight 101 kg (223 lb 8 oz), SpO2 96 %  Physical Exam   Constitutional: He appears well-developed  fatigued   HENT:   Head: Normocephalic  Mouth/Throat: Oropharynx is clear and moist    Raspy voice   Eyes: Pupils are equal, round, and reactive to light  EOM are normal  Right eye exhibits no discharge  Left eye exhibits no discharge  Neck: No JVD present  Cardiovascular: Normal rate and regular rhythm  No murmur heard    Crisp valve sound-incision is clean and dry- no purulent drainage   Pulmonary/Chest: Effort normal and breath sounds normal  No respiratory distress  Abdominal: Soft  Bowel sounds are normal    Skin: There is erythema  Left lower leg redness with trace edema- no open draiange- site for vein harvest in middle of region 10 cm x 6 cm   Nursing note and vitals reviewed

## 2018-10-07 DIAGNOSIS — I10 ESSENTIAL HYPERTENSION: Primary | ICD-10-CM

## 2018-10-08 DIAGNOSIS — E11.8 TYPE 2 DIABETES MELLITUS WITH COMPLICATION, WITH LONG-TERM CURRENT USE OF INSULIN (HCC): Primary | ICD-10-CM

## 2018-10-08 DIAGNOSIS — Z79.4 TYPE 2 DIABETES MELLITUS WITH COMPLICATION, WITH LONG-TERM CURRENT USE OF INSULIN (HCC): Primary | ICD-10-CM

## 2018-10-08 RX ORDER — DILTIAZEM HYDROCHLORIDE 180 MG/1
CAPSULE, COATED, EXTENDED RELEASE ORAL
Qty: 90 CAPSULE | Refills: 3 | Status: SHIPPED | OUTPATIENT
Start: 2018-10-08 | End: 2018-10-19

## 2018-10-09 DIAGNOSIS — Z95.1 S/P CABG X 4: ICD-10-CM

## 2018-10-09 DIAGNOSIS — R35.1 BENIGN PROSTATIC HYPERPLASIA WITH NOCTURIA: ICD-10-CM

## 2018-10-09 DIAGNOSIS — Z95.2 S/P AVR (AORTIC VALVE REPLACEMENT): ICD-10-CM

## 2018-10-09 DIAGNOSIS — F41.9 ANXIETY: ICD-10-CM

## 2018-10-09 DIAGNOSIS — E03.9 HYPOTHYROIDISM, UNSPECIFIED TYPE: Primary | ICD-10-CM

## 2018-10-09 DIAGNOSIS — N40.1 BENIGN PROSTATIC HYPERPLASIA WITH NOCTURIA: ICD-10-CM

## 2018-10-09 RX ORDER — TORSEMIDE 20 MG/1
20 TABLET ORAL DAILY
Qty: 30 TABLET | Refills: 6 | Status: SHIPPED | OUTPATIENT
Start: 2018-10-09 | End: 2018-10-19

## 2018-10-09 RX ORDER — POTASSIUM CHLORIDE 20 MEQ/1
TABLET, EXTENDED RELEASE ORAL
Qty: 30 TABLET | Refills: 6 | Status: SHIPPED | OUTPATIENT
Start: 2018-10-09 | End: 2018-10-19

## 2018-10-09 RX ORDER — TAMSULOSIN HYDROCHLORIDE 0.4 MG/1
0.4 CAPSULE ORAL
Qty: 30 CAPSULE | Refills: 6 | Status: SHIPPED | OUTPATIENT
Start: 2018-10-09 | End: 2019-05-21 | Stop reason: SDUPTHER

## 2018-10-09 RX ORDER — ROSUVASTATIN CALCIUM 40 MG/1
40 TABLET, COATED ORAL DAILY
Qty: 30 TABLET | Refills: 6 | Status: SHIPPED | OUTPATIENT
Start: 2018-10-09 | End: 2019-05-21 | Stop reason: SDUPTHER

## 2018-10-09 RX ORDER — LEVOTHYROXINE SODIUM 0.15 MG/1
150 TABLET ORAL DAILY
Qty: 30 TABLET | Refills: 6 | Status: SHIPPED | OUTPATIENT
Start: 2018-10-09 | End: 2019-06-18 | Stop reason: SDUPTHER

## 2018-10-09 RX ORDER — LORAZEPAM 0.5 MG/1
0.5 TABLET ORAL EVERY 8 HOURS PRN
Qty: 30 TABLET | Refills: 0 | Status: SHIPPED | OUTPATIENT
Start: 2018-10-09 | End: 2019-04-22 | Stop reason: SDUPTHER

## 2018-10-09 RX ORDER — PANTOPRAZOLE SODIUM 40 MG/1
40 TABLET, DELAYED RELEASE ORAL DAILY
Qty: 30 TABLET | Refills: 6 | Status: SHIPPED | OUTPATIENT
Start: 2018-10-09 | End: 2019-04-23 | Stop reason: SDUPTHER

## 2018-10-10 ENCOUNTER — TELEPHONE (OUTPATIENT)
Dept: FAMILY MEDICINE CLINIC | Facility: HOSPITAL | Age: 83
End: 2018-10-10

## 2018-10-18 ENCOUNTER — TRANSCRIBE ORDERS (OUTPATIENT)
Dept: RADIOLOGY | Facility: HOSPITAL | Age: 83
End: 2018-10-18

## 2018-10-18 ENCOUNTER — HOSPITAL ENCOUNTER (OUTPATIENT)
Dept: RADIOLOGY | Facility: HOSPITAL | Age: 83
Discharge: HOME | End: 2018-10-18
Payer: COMMERCIAL

## 2018-10-18 ENCOUNTER — OFFICE VISIT (OUTPATIENT)
Dept: CARDIAC SURGERY | Facility: CLINIC | Age: 83
End: 2018-10-18

## 2018-10-18 VITALS
TEMPERATURE: 98 F | HEIGHT: 68 IN | OXYGEN SATURATION: 96 % | BODY MASS INDEX: 32.13 KG/M2 | DIASTOLIC BLOOD PRESSURE: 62 MMHG | RESPIRATION RATE: 12 BRPM | WEIGHT: 212 LBS | SYSTOLIC BLOOD PRESSURE: 116 MMHG | HEART RATE: 88 BPM

## 2018-10-18 DIAGNOSIS — R05.9 COUGH: ICD-10-CM

## 2018-10-18 DIAGNOSIS — R06.89 DECREASED BREATH SOUNDS IN LEFT MID-LUNG FIELD: ICD-10-CM

## 2018-10-18 DIAGNOSIS — Z95.2 S/P AVR (AORTIC VALVE REPLACEMENT): ICD-10-CM

## 2018-10-18 DIAGNOSIS — Z48.89 ENCOUNTER FOR POSTOPERATIVE CARE: ICD-10-CM

## 2018-10-18 DIAGNOSIS — I25.118 CORONARY ARTERY DISEASE INVOLVING NATIVE CORONARY ARTERY OF NATIVE HEART WITH OTHER FORM OF ANGINA PECTORIS (HCC): Chronic | ICD-10-CM

## 2018-10-18 DIAGNOSIS — I35.0 NONRHEUMATIC AORTIC VALVE STENOSIS: Primary | ICD-10-CM

## 2018-10-18 DIAGNOSIS — Z95.1 S/P CABG X 4: ICD-10-CM

## 2018-10-18 PROCEDURE — 71046 X-RAY EXAM CHEST 2 VIEWS: CPT

## 2018-10-18 PROCEDURE — 99024 POSTOP FOLLOW-UP VISIT: CPT | Performed by: NURSE PRACTITIONER

## 2018-10-18 NOTE — PROGRESS NOTES
POST OP FOLLOW UP VISIT    Procedure: 9/17/18  1  Aortic valve replacement with a 23mm Jenkins Intuity pericardial tissue valve  2  Coronary artery bypass grafting x 4 with left internal mammary artery to left anterior descending artery, saphenous vein graft to obtuse marginal 2 and saphenous vein graft - Y -  to obtuse marginal 3 and left posterior descending artery       History: Daphne Mcbride  is a 80y o  year old male who presents to our office today for routine follow up care from aortic valve replacement and coronary artery bypass grafting  His procedure and post op course was uneventful  He was discharged to [facility] facility for acute rehab  He spent 10 days there and was discharged home  He states he is regaining his strength  He was seen y his PCP who treated him with Cephalexin for left lower extremity cellulitis  He completed a 7 days course of antibiotics yesterday  He reports continued, mild LE edema and a persistent dry cough  He denies PND or orthopnea  He has not been weighing himself daily  He denies SOB, angina or lightheadedness  He denies fever or incisional issues  Vital Signs:   Vitals:    10/18/18 1500 10/18/18 1514   BP: 112/64 116/62   BP Location: Left arm Right arm   Cuff Size: Adult    Pulse: 88    Resp: 12    Temp: 98 °F (36 7 °C)    TempSrc: Oral    SpO2: 96%    Weight: 96 2 kg (212 lb)    Height: 5' 8" (1 727 m)        Home Medications:   Prior to Admission medications    Medication Sig Start Date End Date Taking?  Authorizing Provider   acetaminophen (TYLENOL) 325 mg tablet 650 mg every 4 to 6 hours as needed for pain (do not take with percocet) 9/21/18   Alyce Bentley PA-C   albuterol (PROVENTIL HFA) 90 mcg/act inhaler Inhale 2 puffs 4 (four) times a day as needed    Historical Provider, MD   allopurinol (ZYLOPRIM) 300 mg tablet TAKE ONE TABLET BY MOUTH ONCE DAILY 5/31/18   Darcy Shelton PA-C   ascorbic acid (VITAMIN C) 500 MG tablet Take 1 tablet (500 mg total) by mouth daily for 90 days 9/22/18 12/21/18  Alecia Patel PA-C   aspirin 325 mg tablet Take 1 tablet (325 mg total) by mouth daily 9/22/18   Alecia Patel PA-C   B-D UF III MINI PEN NEEDLES 31G X 5 MM MISC USE ONE DAILY AS DIRECTED 5/7/18   Williams Thurman,    BASAGLAR KWIKPEN 100 units/mL injection pen Inject 30 Units under the skin daily at bedtime 10/5/18   Juliette Fly, DO   diltiazem (CARDIZEM CD) 180 mg 24 hr capsule TAKE ONE CAPSULE BY MOUTH EVERY DAY 10/8/18   Williams Thurman DO   docusate sodium (COLACE) 100 mg capsule Take 1 capsule (100 mg total) by mouth daily 9/21/18   Alecia Patel PA-C   ferrous sulfate 325 (65 Fe) mg tablet Take 1 tablet (325 mg total) by mouth daily with breakfast for 90 days 9/22/18 12/21/18  Alecia Patel PA-C   gabapentin (NEURONTIN) 300 mg capsule TAKE ONE CAPSULE BY MOUTH ONCE DAILY AT BEDTIME 9/7/18   Lalitha Sheffield PA-C   insulin aspart (NovoLOG) 100 units/mL injection Inject under the skin 3 (three) times a day before meals 10/4/18   Lalitha Sheffield PA-C   insulin lispro (HUMALOG KWIKPEN) 100 units/mL injection pen Inject 16 units at breakfast 10/8/18   Williams Thurman,    Insulin Pen Needle 31G X 5 MM MISC by Does not apply route 2/9/16   Historical Provider, MD   levothyroxine 150 mcg tablet Take 1 tablet (150 mcg total) by mouth daily 10/9/18   Williams Thurman,    LORazepam (ATIVAN) 0 5 mg tablet Take 1 tablet (0 5 mg total) by mouth every 8 (eight) hours as needed for anxiety 10/9/18   Williams Thurman, DO   metoprolol tartrate (LOPRESSOR) 25 mg tablet Take 0 5 tablets (12 5 mg total) by mouth every 12 (twelve) hours 10/9/18   Williams Thurman, DO   pantoprazole (PROTONIX) 40 mg tablet Take 1 tablet (40 mg total) by mouth daily 10/9/18   Williams Thurman, DO   polyethylene glycol (MIRALAX) 17 g packet Take 17 g by mouth daily 9/22/18   Alecia Patel PA-C   potassium chloride (K-DUR,KLOR-CON) 20 mEq tablet Take 1 tab daily 10/9/18   Juliette Cid DO   rosuvastatin (CRESTOR) 40 MG tablet Take 1 tablet (40 mg total) by mouth daily 10/9/18   Jack Escobar DO   senna-docusate sodium (SENOKOT S) 8 6-50 mg per tablet Take 1 tablet by mouth daily at bedtime as needed for constipation 9/21/18   Shanda Garay PA-C   tamsulosin [facility]) 0 4 mg Take 1 capsule (0 4 mg total) by mouth daily with dinner 10/9/18   Jack Escobar DO   torsemide (DEMADEX) 20 mg tablet Take 1 tablet (20 mg total) by mouth daily for 10 days 10/9/18 10/19/18  Jack Escobar DO       Physical Exam:  General: well developed, no acute distress   HEENT/NECK:  PERRLA  No jugular venous distention  Cardiac:Regular rate and rhythm, No murmurs, rubs or gallops  Pulmonary:Clear breath sounds on the left, decreased breath sounds on right from mid lung to base  Abdomen:  Non-tender, Non-distended  Positive bowel sounds  Upper extremities: 2+ radial pulses; brisk capillary refill  Lower extremities: Extremities warm/dry  PT/DP pules 2+ bilaterally  1+ edema B/L  Incisions: Sternum is stable  Incision is clean, dry, and intact  Saphenectomy incision is clean, dry, and intact  Neuro: Alert and oriented X 3  Sensation is grossly intact  No focal deficits  Skin: Warm/Dry, without rashes or lesions  Lab Results:       Lab Results   Component Value Date    HGBA1C 7 9 (H) 09/07/2018     Assessment: Aortic stenosis, Non-Rheumatic, Coronary artery disease  S/P aortic valve replacement and coronary artery bypass grafting;    Plan:     Caitlin Schulte  continues to make good progress in his recovery following aortic valve replacement and coronary artery bypass grafting  He is now 4 weeks post op  Incisions are well-healed and his sternum is stable  VS are stable  Breath sounds are decreased on the left and with the report of persistent cdry cough, we will have him get a PA/LAT CXR to evaluate for pleural effusion  If he does have a significant collection, we will refer him for IR Thoracentesis     I reviewed his medications and made no changes  I discussed the benefits of participating in cardiac rehab and have cleared him to begin the outpatient  program  He may resume driving and I reviewed the lifting restriction of no more than 25 lbs for an additional 2 months  Swapnil Jalloh  has already been evaluated by his primary care physician and has an appointment tomorrow with his cardiologist for ongoing medical care  At this point I have not scheduled him for routine followup care with our office  I have advised him to call with any new concerns that may arise  Swapnil Jalloh  was comfortable with our recommendations and his questions were answered to his satisfaction      VALENCIA Adams  10/18/18

## 2018-10-19 ENCOUNTER — OFFICE VISIT (OUTPATIENT)
Dept: CARDIOLOGY CLINIC | Facility: CLINIC | Age: 83
End: 2018-10-19
Payer: COMMERCIAL

## 2018-10-19 VITALS
HEIGHT: 68 IN | SYSTOLIC BLOOD PRESSURE: 90 MMHG | DIASTOLIC BLOOD PRESSURE: 50 MMHG | BODY MASS INDEX: 32.13 KG/M2 | WEIGHT: 212 LBS | HEART RATE: 82 BPM

## 2018-10-19 DIAGNOSIS — J90 PLEURAL EFFUSION, LEFT: Primary | ICD-10-CM

## 2018-10-19 DIAGNOSIS — I25.10 CORONARY ARTERY DISEASE INVOLVING NATIVE CORONARY ARTERY OF NATIVE HEART WITHOUT ANGINA PECTORIS: Primary | ICD-10-CM

## 2018-10-19 DIAGNOSIS — Z95.1 S/P CABG X 4: ICD-10-CM

## 2018-10-19 DIAGNOSIS — I35.0 NONRHEUMATIC AORTIC VALVE STENOSIS: ICD-10-CM

## 2018-10-19 DIAGNOSIS — Z95.2 S/P AVR (AORTIC VALVE REPLACEMENT): ICD-10-CM

## 2018-10-19 DIAGNOSIS — E78.00 PURE HYPERCHOLESTEROLEMIA: ICD-10-CM

## 2018-10-19 DIAGNOSIS — J90 PLEURAL EFFUSION, LEFT: ICD-10-CM

## 2018-10-19 PROCEDURE — 93000 ELECTROCARDIOGRAM COMPLETE: CPT | Performed by: INTERNAL MEDICINE

## 2018-10-19 PROCEDURE — 99214 OFFICE O/P EST MOD 30 MIN: CPT | Performed by: INTERNAL MEDICINE

## 2018-10-19 NOTE — PROGRESS NOTES
Cardiology Follow-up    Yon Argueta  1935  [id number]  HEART & VASCULAR  [facility] [place]  [address]  [address] [address] [address]    1  Coronary artery disease involving native coronary artery of native heart without angina pectoris  POCT ECG    Comprehensive metabolic panel    CBC and differential    Lipid Panel with Direct LDL reflex    Ambulatory  Referral to Cardiac Rehabilitation   2  Nonrheumatic aortic valve stenosis     3  S/P AVR (aortic valve replacement)  Ambulatory  Referral to Cardiac Rehabilitation   4  S/P CABG x 4  Ambulatory  Referral to Cardiac Rehabilitation   5  Pure hypercholesterolemia         HPI:    Jean Carlos Stevens comes in for follow-up given his history multivessel coronary artery disease and aortic stenosis  I met him at the time of a stress nuclear study back in May of this year  This was ordered by his internist, Dr Kristina Rodriguez, secondary to abdominal pain, exertional at times, and multiple risk factors for CAD  This demonstrated a reversible defect and ischemia of the anterior to apical wall  His ejection fraction was normal   He also has moderate aortic stenosis by echocardiogram from December  He also has hypertension, dyslipidemia and diabetes mellitus  Cardiac catheterization showed multivessel CAD  At that point he went and met cardiothoracic surgery, who recommended both core artery bypass grafting and a bioprosthetic aortic valve replacement  He went through the preoperative testing  And followed back up in the office in August   He had electively had this surgery performed about a month ago  It went well without complications  He had CABG x4, with a LIMA to the LAD, SVG to an OM 2 and SVG Y graft"to an OM 3 and left posterior descending artery  He also had a bioprosthetic aortic valve replacement      In follow-up he did see his PCP due to some lower extremity cellulitis, which he completed a course of antibiotics  He did go home on diuretic therapy but due to some edema associated with cellulitis torsemide was restarted he tells me by his PCP  His blood pressure has been running low, and he complains of fatigue generalized malaise  Denies lightheadedness or any syncope  His activity has been limited, but he did see the surgeon yesterday who cleared him for increased activity in cardiac rehab  He was complaining of a cough, therefore did go for a chest x-ray that showed a moderate left-sided pleural effusion  However, he denies any shortness of breath  He denies any worsening signs of CHF  No chest pain or any other symptoms of angina  He has minimal lower extremity edema  Patient Active Problem List   Diagnosis    Benign prostatic hyperplasia with nocturia    Benign essential hypertension    Dyslipidemia, goal LDL below 100    Gout with tophus    Hypothyroidism    Mild intermittent asthma without complication    Obesity    Pure hypercholesterolemia    Renal cyst    Sexual dysfunction    Type 2 diabetes mellitus ([address] )    Aortic stenosis, moderate    Coronary artery disease involving native coronary artery of native heart    Nonrheumatic aortic valve stenosis    GERD (gastroesophageal reflux disease)    S/P CABG x 4    S/P AVR (aortic valve replacement)    Encounter for postoperative care    Cough    Decreased breath sounds in left mid-lung field    Pleural effusion, left     Past Medical History:   Diagnosis Date    Aortic stenosis     CKD (chronic kidney disease)     baseline Cr 1 3-1 5    Coronary artery disease     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HCC)     type 2, insulin dependent    GERD (gastroesophageal reflux disease)     Glaucoma     Gout     History of prostate cancer     Hypothyroidism     Overweight     Peripheral neuropathy, idiopathic     Pure hypercholesterolemia     LA   11/12/14   R   11/12/14      Social History     Social History    Marital status:  Spouse name: N/A    Number of children: N/A    Years of education: N/A     Occupational History    Not on file  Social History Main Topics    Smoking status: Never Smoker    Smokeless tobacco: Never Used      Comment: Smoked in the service about 50 years ago    Alcohol use No    Drug use: No    Sexual activity: Not Currently     Other Topics Concern    Not on file     Social History Narrative    Caffeine use / coffee diet cola and tea    Lives with family     Living situation supportive and safe    No advance directives  -denied          Family History   Problem Relation Age of Onset    Diabetes Mother     Pancreatic cancer Brother     Diabetes Maternal Grandmother     Colon cancer Son     Diabetes Family      Past Surgical History:   Procedure Laterality Date    CARDIAC CATHETERIZATION      [place] CABG, ARTERY-VEIN, THREE N/A 9/17/2018    Procedure: CORONARY ARTERY BYPASS GRAFT (CABG) x 4 VESSELS with LIMA - LAD, SVG/LEFT LEG EVH - LEFT PDA, OM3, & OM2;  Surgeon: Nataly Graves MD;  [facility];  Service: Cardiac Surgery    [place] ECHO TRANSESOPHAG [address] N/A 9/17/2018    Procedure: TRANSESOPHAGEAL ECHOCARDIOGRAM (VERONICA);   Surgeon: Nataly Graves MD;  [facility];  Service: Cardiac Surgery    [place] RPLCMT AORTIC VALVE OPN W/STENTLESS TISSUE VALVE N/A 9/17/2018    Procedure: REPLACEMENT VALVE AORTIC (AVR)- 23mm tissue Intuity Valve;  Surgeon: Nataly Graves MD;  [facility];  Service: Cardiac Surgery    THYROID SURGERY         Current Outpatient Prescriptions:     acetaminophen (TYLENOL) 325 mg tablet, 650 mg every 4 to 6 hours as needed for pain (do not take with percocet), Disp: 30 tablet, Rfl: 0    albuterol (PROVENTIL HFA) 90 mcg/act inhaler, Inhale 2 puffs 4 (four) times a day as needed, Disp: , Rfl:     allopurinol (ZYLOPRIM) 300 mg tablet, TAKE ONE TABLET BY MOUTH ONCE DAILY, Disp: 90 tablet, Rfl: 3    ascorbic acid (VITAMIN C) 500 MG tablet, Take 1 tablet (500 mg total) by mouth daily for 90 days, Disp: , Rfl: 0    aspirin 325 mg tablet, Take 1 tablet (325 mg total) by mouth daily, Disp: , Rfl: 0    B-D UF III MINI PEN NEEDLES 31G X 5 MM MISC, USE ONE DAILY AS DIRECTED, Disp: 100 each, Rfl: 5    BASAGLAR KWIKPEN 100 units/mL injection pen, Inject 30 Units under the skin daily at bedtime, Disp: 5 pen, Rfl: 0    docusate sodium (COLACE) 100 mg capsule, Take 1 capsule (100 mg total) by mouth daily, Disp: 10 capsule, Rfl: 0    ferrous sulfate 325 (65 Fe) mg tablet, Take 1 tablet (325 mg total) by mouth daily with breakfast for 90 days, Disp: , Rfl: 0    gabapentin (NEURONTIN) 300 mg capsule, TAKE ONE CAPSULE BY MOUTH ONCE DAILY AT BEDTIME, Disp: 90 capsule, Rfl: 3    insulin lispro (HUMALOG KWIKPEN) 100 units/mL injection pen, Inject 16 units at breakfast, Disp: 5 pen, Rfl: 1    Insulin Pen Needle 31G X 5 MM MISC, by Does not apply route, Disp: , Rfl:     levothyroxine 150 mcg tablet, Take 1 tablet (150 mcg total) by mouth daily, Disp: 30 tablet, Rfl: 6    LORazepam (ATIVAN) 0 5 mg tablet, Take 1 tablet (0 5 mg total) by mouth every 8 (eight) hours as needed for anxiety, Disp: 30 tablet, Rfl: 0    metoprolol tartrate (LOPRESSOR) 25 mg tablet, Take 0 5 tablets (12 5 mg total) by mouth every 12 (twelve) hours, Disp: 30 tablet, Rfl: 6    pantoprazole (PROTONIX) 40 mg tablet, Take 1 tablet (40 mg total) by mouth daily, Disp: 30 tablet, Rfl: 6    polyethylene glycol (MIRALAX) 17 g packet, Take 17 g by mouth daily, Disp: 14 each, Rfl: 0    rosuvastatin (CRESTOR) 40 MG tablet, Take 1 tablet (40 mg total) by mouth daily, Disp: 30 tablet, Rfl: 6    senna-docusate sodium (SENOKOT S) 8 6-50 mg per tablet, Take 1 tablet by mouth daily at bedtime as needed for constipation, Disp: , Rfl: 0    tamsulosin (FLOMAX) 0 4 mg, Take 1 capsule (0 4 mg total) by mouth daily with dinner, Disp: 30 capsule, Rfl: 6    insulin aspart (NovoLOG) 100 units/mL injection, Inject under the skin 3 (three) times a day before meals, Disp: , Rfl: 0  Allergies   Allergen Reactions    Amlodipine Nausea Only    Atorvastatin Myalgia       Labs:  Lab Results   Component Value Date     09/21/2018     09/05/2017    K 3 1 (L) 09/21/2018    K 4 3 05/23/2018     09/21/2018     05/23/2018    CO2 28 09/21/2018    CO2 28 05/23/2018    BUN 52 (H) 09/21/2018    BUN 24 05/23/2018    CREATININE 1 84 (H) 09/21/2018    CREATININE 1 56 (H) 09/05/2017    GLUCOSE 126 09/17/2018    CALCIUM 7 8 (L) 09/21/2018    CALCIUM 9 3 05/23/2018     Lab Results   Component Value Date    WBC 11 27 (H) 09/20/2018    WBC 7 6 04/17/2017    HGB 7 3 (L) 09/20/2018    HGB 14 0 04/17/2017    HCT 22 6 (L) 09/20/2018    HCT 43 3 04/17/2017    MCV 97 09/20/2018    MCV 97 2 04/17/2017     (L) 09/20/2018     04/17/2017     Lab Results   Component Value Date    CHOL 197 04/17/2017    TRIG 148 09/07/2018    TRIG 225 (H) 04/17/2017    HDL 33 (L) 09/07/2018    HDL 30 (L) 04/17/2017     Imaging:   ECG obtained today demonstrates sinus rhythm, left axis deviation and nonspecific IVCD  Nonspecific ST and T-wave changes  CARDIAC CATH(5/2018):  CORONARY CIRCULATION:  Proximal LAD: There was a 100 % stenosis  This lesion is a chronic total occlusion  1st obtuse marginal: There was a diffuse 90 % stenosis  2nd obtuse marginal: There was a diffuse 90 % stenosis  3rd obtuse marginal: There was a 80 % stenosis  1st left posterolateral: There was a 90 % stenosis  Mid RCA: There was a 95 % stenosis      CARDIAC STRUCTURES:  There was moderate aortic stenosis  ECHO (12/2017):  LEFT VENTRICLE:  Systolic function was normal  Ejection fraction was estimated to be 65 %  There were no regional wall motion abnormalities  Wall thickness was mildly increased      LEFT ATRIUM:  The atrium was mildly dilated      MITRAL VALVE:  There was mild annular calcification    There was trace regurgitation      AORTIC VALVE:  The valve was trileaflet  Leaflets exhibited moderate calcification and moderately reduced cuspal separation  There was moderate stenosis  There was trace regurgitation  Valve mean gradient was 22 5 mmHg  Estimated aortic valve area (by VTI) was 1 42 cm squared  Estimated aortic valve area (by Vmax) was 1 49 cm squared      TRICUSPID VALVE:  There was mild regurgitation  Pulmonary artery systolic pressure was at the upper limits of normal         Review of Systems:  Review of Systems   Constitutional: Positive for fatigue  HENT: Negative  Eyes: Negative  Respiratory: Positive for shortness of breath  Cardiovascular: Negative  Gastrointestinal: Positive for abdominal pain  Musculoskeletal: Negative  Skin: Negative  Allergic/Immunologic: Negative  Neurological: Negative  Hematological: Negative  Psychiatric/Behavioral: Negative  All other systems reviewed and are negative  Physical Exam:  Physical Exam   Constitutional: He is oriented to person, place, and time  He appears well-developed and well-nourished  HENT:   Head: Normocephalic and atraumatic  Eyes: Pupils are equal, round, and reactive to light  EOM are normal  Right eye exhibits no discharge  Left eye exhibits no discharge  No scleral icterus  Neck: Normal range of motion  Neck supple  No JVD present  No thyromegaly present  Cardiovascular: Normal rate, regular rhythm, S1 normal, S2 normal, intact distal pulses and normal pulses  No extrasystoles are present  Exam reveals no gallop and no friction rub  Murmur heard  Systolic murmur is present with a grade of 3/6   Pulmonary/Chest: Effort normal and breath sounds normal  No respiratory distress  He has no wheezes  He has no rales  Abdominal: Soft  Bowel sounds are normal  He exhibits no distension  There is no tenderness  Musculoskeletal: Normal range of motion  He exhibits no edema, tenderness or deformity  Neurological: He is alert and oriented to person, place, and time  No cranial nerve deficit  Skin: Skin is warm and dry  No rash noted  Psychiatric: He has a normal mood and affect  Judgment and thought content normal    Nursing note and vitals reviewed  Discussion/Summary:    1  CAD - Spencer Dai is status post CABG x4  He tolerated the surgery well  He is having postoperative fatigue and has a moderate left pleural effusion which we will continue to follow  He denies any symptoms of angina  We referred him to cardiac rehabilitation  I have instructed him to stop diuretic therapy as he has been hypotensive  The only medication lb on from a blood pressure standpoint is low-dose metoprolol  We ordered updated blood work we will see him back in 3 months  2   Aortic stenosis - He is also status post bioprosthetic aortic valve replacement at the time of his CABG  As stated we referred him to cardiac rehabilitation and he is going to stop diuretic therapy  We ordered updated blood work  He should take any antibiotic prophylaxis for dental procedures  We will see him back in 3 months  At that time we will do a baseline echocardiogram to evaluate his aortic valve replacement  3  Left-sided pleural effusion - This is moderate sized, larger than at his discharge  He is without shortness of breath  At this time we will continue to follow this with periodic chest x-rays  If he became symptomatic or this were to worsen, we will set him up for thoracentesis  4  Hyperlipidemia - He has been on Crestor  We ordered updated blood work today  Counseling / Coordination of Care  Total floor / unit time spent today 30 minutes  Greater than 50% of total time was spent with the patient and / or family counseling and / or coordination of care

## 2018-10-23 ENCOUNTER — HOSPITAL ENCOUNTER (OUTPATIENT)
Dept: RADIOLOGY | Facility: HOSPITAL | Age: 83
Discharge: HOME | End: 2018-10-23
Admitting: RADIOLOGY
Payer: COMMERCIAL

## 2018-10-23 VITALS
OXYGEN SATURATION: 96 % | HEART RATE: 74 BPM | DIASTOLIC BLOOD PRESSURE: 51 MMHG | RESPIRATION RATE: 18 BRPM | SYSTOLIC BLOOD PRESSURE: 85 MMHG

## 2018-10-23 DIAGNOSIS — J90 PLEURAL EFFUSION, LEFT: ICD-10-CM

## 2018-10-23 PROCEDURE — 32555 ASPIRATE PLEURA W/ IMAGING: CPT

## 2018-10-23 PROCEDURE — 32555 ASPIRATE PLEURA W/ IMAGING: CPT | Performed by: RADIOLOGY

## 2018-10-23 NOTE — BRIEF OP NOTE (RAD/CATH)
IR THORACENTESIS  Procedure Note    PATIENT NAME: Daphne Mcbride  : 1935  MRN: [id number]     Pre-op Diagnosis:   1  Pleural effusion, left      Post-op Diagnosis:   1   Pleural effusion, left        Surgeon:   Pacheco Tatum MD  Assistants:     No qualified resident was available, Resident is only observing    Estimated Blood Loss: none  Findings: 1350 ml serosanguinous fluid aspirated from the left chest    Specimens: none    Complications:  none    Anesthesia: Local    Pacheco Tatum MD     Date: 10/23/2018  Time: 3:21 PM

## 2018-10-23 NOTE — DISCHARGE INSTRUCTIONS
Thoracentesis   WHAT YOU NEED TO KNOW:   A thoracentesis is a procedure to remove extra fluid or air from between your lungs and your inner chest wall  Air or fluid buildup may make it hard for you to breathe  A thoracentesis allows your lungs to expand fully so you can breathe more easily  DISCHARGE INSTRUCTIONS:   Medicines:   · Pain medicine: You may be given a prescription medicine to decrease pain  Do not wait until the pain is severe before you take your medicine  · Antibiotics: This medicine helps fight or prevent an infection  · Take your medicine as directed  Call your healthcare provider if you think your medicine is not helping or if you have side effects  Tell him if you are allergic to any medicine  Keep a list of the medicines, vitamins, and herbs you take  Include the amounts, and when and why you take them  Bring the list or the pill bottles to follow-up visits  Carry your medicine list with you in case of an emergency  Follow up with your healthcare provider as directed:  Write down your questions so you remember to ask them during your visits  Rest:  Rest when you feel it is needed  Slowly start to do more each day  Return to your daily activities as directed  Do not smoke: If you smoke, it is never too late to quit  Ask for information about how to stop smoking if you need help  Contact your healthcare provider if:   · You have a fever  · Your puncture site is red, warm, swollen, or draining pus  · You have questions or concerns about your procedure, medicine, or care  · If you have any questions regarding, call the IR department @ [phone]  Seek care immediately or call 911 if:   · Blood soaks through your bandage  · There is blood in your spit

## 2018-10-28 DIAGNOSIS — I10 ESSENTIAL HYPERTENSION: Primary | ICD-10-CM

## 2018-10-28 RX ORDER — LOSARTAN POTASSIUM AND HYDROCHLOROTHIAZIDE 25; 100 MG/1; MG/1
TABLET ORAL
Qty: 90 TABLET | Refills: 3 | Status: SHIPPED | OUTPATIENT
Start: 2018-10-28 | End: 2018-12-04 | Stop reason: HOSPADM

## 2018-10-30 ENCOUNTER — HOSPITAL ENCOUNTER (OUTPATIENT)
Dept: RADIOLOGY | Facility: HOSPITAL | Age: 83
Discharge: HOME | End: 2018-10-30
Payer: COMMERCIAL

## 2018-10-30 ENCOUNTER — TELEPHONE (OUTPATIENT)
Dept: CARDIAC SURGERY | Facility: CLINIC | Age: 83
End: 2018-10-30

## 2018-10-30 ENCOUNTER — TELEPHONE (OUTPATIENT)
Dept: CARDIOLOGY CLINIC | Facility: CLINIC | Age: 83
End: 2018-10-30

## 2018-10-30 DIAGNOSIS — R05.9 COUGH: ICD-10-CM

## 2018-10-30 DIAGNOSIS — J90 PLEURAL EFFUSION, LEFT: Primary | ICD-10-CM

## 2018-10-30 DIAGNOSIS — R06.89 DECREASED BREATH SOUNDS IN LEFT MID-LUNG FIELD: ICD-10-CM

## 2018-10-30 DIAGNOSIS — J90 PLEURAL EFFUSION, LEFT: ICD-10-CM

## 2018-10-30 PROCEDURE — 71046 X-RAY EXAM CHEST 2 VIEWS: CPT

## 2018-10-30 NOTE — TELEPHONE ENCOUNTER
[facility] call from [facility] and make sure they know to remove the potassium supplement from his blister-packed medication delivery as it appears this medication was stopped  Verified on last OV 10/19/2018 potassium was d/c'ed  [address] [facility] and verified the same

## 2018-10-30 NOTE — TELEPHONE ENCOUNTER
Patient underwent left thoracentesis last week for moderately large left pleural effusion  1350 cc serosanguinous fluid successfully aspirated  Attempted to call patient to follow up and to instruct him to have a repeat CXR done 1 week after procedure  Voicemails left, no return calls  [facility] called today to report patient has become symptomatic again with cough and SOB  Lung exam reveals diminished breath sounds on the left  Requested patient report to Radiology dept at [address] today or tomorrow for CXR  Will contact patient and [facility] with results and pending these results he may need to be scheduled for another thoracentesis   Instructed to use IS frequently

## 2018-10-31 ENCOUNTER — TELEPHONE (OUTPATIENT)
Dept: RADIOLOGY | Facility: HOSPITAL | Age: 83
End: 2018-10-31

## 2018-10-31 DIAGNOSIS — J90 PLEURAL EFFUSION, LEFT: Primary | ICD-10-CM

## 2018-11-02 ENCOUNTER — HOSPITAL ENCOUNTER (OUTPATIENT)
Dept: RADIOLOGY | Facility: HOSPITAL | Age: 83
Discharge: HOME | End: 2018-11-02
Admitting: RADIOLOGY
Payer: COMMERCIAL

## 2018-11-02 ENCOUNTER — HOSPITAL ENCOUNTER (OUTPATIENT)
Dept: RADIOLOGY | Facility: HOSPITAL | Age: 83
Discharge: HOME | End: 2018-11-02
Payer: COMMERCIAL

## 2018-11-02 ENCOUNTER — TELEPHONE (OUTPATIENT)
Dept: CARDIAC SURGERY | Facility: CLINIC | Age: 83
End: 2018-11-02

## 2018-11-02 VITALS
DIASTOLIC BLOOD PRESSURE: 60 MMHG | SYSTOLIC BLOOD PRESSURE: 111 MMHG | OXYGEN SATURATION: 97 % | HEART RATE: 74 BPM | RESPIRATION RATE: 18 BRPM

## 2018-11-02 DIAGNOSIS — F41.9 ANXIETY: ICD-10-CM

## 2018-11-02 DIAGNOSIS — J90 PLEURAL EFFUSION, LEFT: ICD-10-CM

## 2018-11-02 PROCEDURE — 32555 ASPIRATE PLEURA W/ IMAGING: CPT | Performed by: RADIOLOGY

## 2018-11-02 PROCEDURE — 32555 ASPIRATE PLEURA W/ IMAGING: CPT

## 2018-11-02 PROCEDURE — 71046 X-RAY EXAM CHEST 2 VIEWS: CPT

## 2018-11-02 RX ORDER — LORAZEPAM 0.5 MG/1
TABLET ORAL
Qty: 30 TABLET | OUTPATIENT
Start: 2018-11-02

## 2018-11-02 NOTE — TELEPHONE ENCOUNTER
Patient had successful repeat Left Thoracentesis this AM for 1100 yvonne pleural fluid  Spoke with [facility], patient needs to continue to use IS frequently and have repeat CXR done in 1 week, order in EPIC

## 2018-11-02 NOTE — SEDATION DOCUMENTATION
Left thoracentesis completed for 1100ml clear yvonne fluid in IR by Dr Janette Lindsey without complication  Tolerated well  Patient to go directly to radiology for post  CXR

## 2018-11-06 ENCOUNTER — CLINICAL SUPPORT (OUTPATIENT)
Dept: CARDIAC REHAB | Facility: HOSPITAL | Age: 83
End: 2018-11-06
Payer: COMMERCIAL

## 2018-11-06 VITALS — HEIGHT: 68 IN | BODY MASS INDEX: 31.07 KG/M2 | WEIGHT: 205 LBS

## 2018-11-06 DIAGNOSIS — Z95.2 S/P AVR: ICD-10-CM

## 2018-11-06 DIAGNOSIS — Z95.1 HX OF CABG: ICD-10-CM

## 2018-11-06 PROCEDURE — 93797 PHYS/QHP OP CAR RHAB WO ECG: CPT

## 2018-11-06 NOTE — PROGRESS NOTES
OUTCOMES- PRE    Name: Joycelyn Garnica : 1935 DX: CABG/AVR     Risk:      LOW/ MOD/ HIGH MOD       Pre Post % change  Goal   Date: 2018      Physical           Sub Max ETT (mets) n/a  #VALUE! 10% increase   6MWT (feet) 912  -100 0% 10% increase   UCSD Dyspnea Score n/a  #VALUE! 5 pt decrease   Supplemental O2 use (L) n/a      [facility] AI (est  peak O2) 15 45  -100 0%    COPD assessment Test (CAT) n/a   2 pt decrease   Peak exercise CR/ MA (mets)   #DIV/0! 40% increase   Emotional           PHQ 9  (> 10 refer to MD) 6  -100 0% 4 pt decrease   [facility] lower score = improvement     Total  22  -100 0% < 27   Feelings 2  -100 0% < 3   Physical fitness 3  -100 0% < 3   Social Support 2  0 0% < 3   Daily activities 2  -100 0% < 3   Social Activities 2  -100 0% < 3   Pain 3  -100 0% < 3   Overall Health 3  -100 0% < 3   Quality of Life 2  -100 0% < 3   Change in health 3  -100 0% < 3   Dietary           Rate your plate n/a  #VALUE! > 58   Measurements           Weight 205  -100 0% 2 5-5%    BMI 31 2  -100 0% 19-25   Waist Circ  41  -100 0% < 40 M / < 35 F   % Body fat 36 4  -100 0% < 25 M / < 33 F    BP left arm     (systolic) 720  -806 4% < 756                              (diastolic) 72  -260 2% < 90   Smoking #/day (if applicable) n/a  #VALUE! 0   Lipids/ Glucose (Date) 2018         Total cholesterol 197  -100 0%    Triglycerides 148  -100 0% < 150   HDL 33  -100 0% 40-60     -100 0% < 100   A1C % n/a  #VALUE! 4 0 - 5 6%   Fasting BG 97  -100 0%

## 2018-11-06 NOTE — PROGRESS NOTES
CARDIAC REHAB ASSESSMENT    Today's date: 2018  Patient name: Luz Dominguez  : 1935       MRN: [id number]  PCP: Kacey Dhillon DO  Referring Physician: Cecilia Durand MD  Cardiologist: Dr Caitlyn Cross  Surgeon: Dr Hermila Roberson  Dx: CABG/AVR    Date of onset: 2018  Cultural needs: n/a    Weight:    Wt Readings from Last 1 Encounters:   10/19/18 96 2 kg (212 lb)      Height:   Ht Readings from Last 1 Encounters:   10/19/18 5' 8" (1 727 m)     Medical History:   Past Medical History:   Diagnosis Date    Aortic stenosis     CKD (chronic kidney disease)     baseline Cr 1 3-1 5    Coronary artery disease     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[facility] [address] )     type 2, insulin dependent    GERD (gastroesophageal reflux disease)     Glaucoma     Gout     History of prostate cancer     Hypothyroidism     Overweight     Peripheral neuropathy, idiopathic     Pure hypercholesterolemia     LA   14   R   14          Physical Limitations: R knee pain-arthritis    Risk Factors   Cholesterol: Yes  Smoking: Former user  HTN: Yes  DM: No  Obesity: Yes   Inactivity: Yes  Family History:  Family History   Problem Relation Age of Onset    Diabetes Mother     Pancreatic cancer Brother     Diabetes Maternal Grandmother     Colon cancer Son     Diabetes Family        Allergies: Amlodipine and Atorvastatin  ETOH:   History   Alcohol Use No         Current Medications:   Current Outpatient Prescriptions   Medication Sig Dispense Refill    acetaminophen (TYLENOL) 325 mg tablet 650 mg every 4 to 6 hours as needed for pain (do not take with percocet) 30 tablet 0    albuterol (PROVENTIL HFA) 90 mcg/act inhaler Inhale 2 puffs 4 (four) times a day as needed      allopurinol (ZYLOPRIM) 300 mg tablet TAKE ONE TABLET BY MOUTH ONCE DAILY 90 tablet 3    ascorbic acid (VITAMIN C) 500 MG tablet Take 1 tablet (500 mg total) by mouth daily for 90 days  0    aspirin 325 mg tablet Take 1 tablet (325 mg total) by mouth daily  0  B-D UF III MINI PEN NEEDLES 31G X 5 MM MISC USE ONE DAILY AS DIRECTED 100 each 5    BASAGLAR KWIKPEN 100 units/mL injection pen Inject 30 Units under the skin daily at bedtime 5 pen 0    docusate sodium (COLACE) 100 mg capsule Take 1 capsule (100 mg total) by mouth daily 10 capsule 0    ferrous sulfate 325 (65 Fe) mg tablet Take 1 tablet (325 mg total) by mouth daily with breakfast for 90 days  0    gabapentin (NEURONTIN) 300 mg capsule TAKE ONE CAPSULE BY MOUTH ONCE DAILY AT BEDTIME 90 capsule 3    insulin aspart (NovoLOG) 100 units/mL injection Inject under the skin 3 (three) times a day before meals  0    insulin lispro (HUMALOG KWIKPEN) 100 units/mL injection pen Inject 16 units at breakfast 5 pen 1    Insulin Pen Needle 31G X 5 MM MISC by Does not apply route      levothyroxine 150 mcg tablet Take 1 tablet (150 mcg total) by mouth daily 30 tablet 6    LORazepam (ATIVAN) 0 5 mg tablet Take 1 tablet (0 5 mg total) by mouth every 8 (eight) hours as needed for anxiety 30 tablet 0    losartan-hydrochlorothiazide (HYZAAR) 100-25 MG per tablet TAKE 1 TABLET BY MOUTH EVERY DAY 90 tablet 3    metoprolol tartrate (LOPRESSOR) 25 mg tablet Take 0 5 tablets (12 5 mg total) by mouth every 12 (twelve) hours 30 tablet 6    pantoprazole (PROTONIX) 40 mg tablet Take 1 tablet (40 mg total) by mouth daily 30 tablet 6    polyethylene glycol (MIRALAX) 17 g packet Take 17 g by mouth daily 14 each 0    rosuvastatin (CRESTOR) 40 MG tablet Take 1 tablet (40 mg total) by mouth daily 30 tablet 6    senna-docusate sodium (SENOKOT S) 8 6-50 mg per tablet Take 1 tablet by mouth daily at bedtime as needed for constipation  0    tamsulosin (FLOMAX) 0 4 mg Take 1 capsule (0 4 mg total) by mouth daily with dinner 30 capsule 6     No current facility-administered medications for this visit              Current Functional Status    Occupation: retired  Recreation: likes watching baseball and collecting baseball cards  ADLs: Capable of performing light ADLs only lives alone  Brooke: Capable of performing light ADLs only limited by Weakness  Exercise: no regular exercise  Other: n/a      Short Term Program Goals: dietary modifications increased strength improved energy/stamina with ADLs exercise 120-150 mins/wk    Long Term Goals: increased maximal walking duration  increased intial training workload  Improved Duke Activity Status score  Improved functional capacity  Reduced waist circumference    Ability to reach goals/rehabilitation potential:  Good    Projected return to function: 8-12 weeks- due to high copay  Objective tests: 6 MWT      Nutritional   Reviewed details of Rate your Plate  Discussed key elements of heart healthy eating  Reviewed patient goals for dietary modifications and their clinical implications  Reviewed most recent lipid profile  Goals for dietary modification: choose lean cuts of meat  poultry without the skin  low fat ground meat and poultry  eliminate processed meats  increase fish intake  more meatless meals  low fat dairy   reduced fat cheese  increase whole grains  increase fruits and vegetables  eliminate butter  low sodium      Emotional/Social  Patient does not have a lot of support   His son lives with him, but reports is not around much and does not help around the house    [address]    Marital status:     Rate 1-5:    Marriage: n/a   Family: 4   Financial: 4   Relationships: 4   Spirituality: 5   Intellectual: 4    Perceived Stress: 3/10   Stressors:minimal stress   Goals for Stress Management:n/a    Domestic Violence Screening: No    Comments: n/a

## 2018-11-06 NOTE — PROGRESS NOTES
Exercise Prescription       Exercise Modality  Initial Workload MET Level    Nu-Step (NU) Level: 2 Steps/Minute: 60 1 5 METs         Airdyne Bike (AD-Bike) Level: 0 5 1 5 METs   Arm Ergometer (AE) Level: 2 0 RPM: 40-50 1 8 METs   Treadmill 1 0 mph 0 % grade 1 8 METs   Recumbent Bike (RB) Level: 1 RPM: 50-6- 3 2 METs   Resistance (RES) 5 lbs Weights 30 lbs Pulleys Level red Resistance Bands        Comments: Patient completed 6 min walk test today  He walked a total of 912 feet at 2 3 METs  Will start exercise at 1 5-2 0 METS and increase time and intensity as tolerated  Will plan to start weights at 3-4 weeks of rehab

## 2018-11-06 NOTE — PROGRESS NOTES
Cardiac Rehabilitation Plan of Care   Care Plan       Today's date: 2018   Visits: 1-intake  Patient name: Lois Valle  : 1935  Age: 80 y o  MRN: [id number]  Referring Physician: Aileen Rausch MD  Provider: Mansi Reyes  Clinician: Dariela Hubbard MS, CEP      Dx: CABG/AVR  Date of onset: 2018      SUMMARY OF PROGRESS:  Mr Yo Welch is ready to start his cardiac rehab program  He will be attending 2x/week for 8-10 weeks due to high copay  His main goal is to increase energy and decrease tiredness he has had since surgery  He reports he has trouble walking long distances and hopes to increase leg strength to help with this  Medication compliance: Yes   Comments: Reports taking medications as prescribed    Fall Risk: Moderate   Comments: walking with cane for balance    EKG changes: none      EXERCISE ASSESSMENT and PLAN    Current Exercise Program in Rehab:       Frequency: 2-3x/week        Minutes: 20         METS: 1 5-2 0            HR:    RPE: 4-6         Modalities: room walking, airdyne bike, UBE, NuStep      Exercise Progression 30 Day Goals :    Frequency: 3-5/week   Minutes: 20-30   METS: 2 0-3 0   HR:     RPE: 4-6   Modalities: Airdyne bike, UBE, NuStep and Room walking    Strength trainin-3 days / week, 12-15 repitations , 1-2 sets per modality  and Will be added following 2-3 weeks of monitored exercise sessions   Modalities: Lateral Raise, Arm Extension, Arm Curl and Sit to Stands    Progressing: In Progress    Home Exercise: no home exercise currently   Encouraged walking at store 1-2x/week initially    Goals: 10% improvement in functional capacity, Increase in peak CR METs by 40%, Improved 6MWT distance by 10%, Resume ADLs with increased strength and Exercise 5 days/wk, >150mins/wk  Education: Benefit of exercise for CAD risk factors, home exercise guidelines, signs and sxs and RPE scale   Plan:education on home exercise guidelines and home exercise 30+ mins 2 days opposite CR  Readiness to change: Preparation      NUTRITION ASSESSMENT AND PLAN    Weight control:    Starting weight: 205 lb   Current weight:     Waist circumference:    Startin"   Current:    Diabetes: N/A  Lipid management: Discussed diet and lipid management and Last lipid profile 2017  Chol 197    HDL 33    Goals:LDL <100, HDL >40, TRG <150, CHOL <200, decreased body fat % <33% and reduced waist circumference <40 inches  Education: heart healthy eating  low sodium diet  diet and lipid management  Progressing: In Progress  Plan: Increase PUFA and MUFA, Decrease SFA, Increase whole grains and increase fruits/vegs  Readiness to change: Preparation      PSYCHOSOCIAL ASSESSMENT AND PLAN    Emotional:              1-4 = Minimal Depression  Self-reported stress level: 3   Social support: Good   Goals:  increased appetite and increased energy  Education: signs/sxs of depression  Progressing: In Progress  Plan: Practice relaxation techniques  Readiness to change: Preparation      OTHER CORE COMPONENTS     Tobacco:   History   Smoking Status    Never Smoker   Smokeless Tobacco    Never Used     Comment: Smoked in the service about 50 years ago       Tobacco Use Intervention: Referral to tobacco expert:   N/A    Blood pressure:    Restin/72   Exercise: 138/78      Goals: consistent BP < 130/80, reduced dietary sodium <2300mg and consistent exercise >150 mins/wk  Education:  understanding HTN and CAD and low sodium diet and HTN  Progressing: In Progress  Plan: Follow low fat, heart healthy diet, DASH diet, increase exercise time to help manage BP    Readiness to change: Preparation

## 2018-11-07 ENCOUNTER — HOSPITAL ENCOUNTER (OUTPATIENT)
Dept: RADIOLOGY | Facility: HOSPITAL | Age: 83
Discharge: HOME | End: 2018-11-07
Payer: COMMERCIAL

## 2018-11-07 DIAGNOSIS — J90 PLEURAL EFFUSION, LEFT: ICD-10-CM

## 2018-11-07 PROCEDURE — 71046 X-RAY EXAM CHEST 2 VIEWS: CPT

## 2018-11-08 ENCOUNTER — TELEPHONE (OUTPATIENT)
Dept: CARDIAC SURGERY | Facility: CLINIC | Age: 83
End: 2018-11-08

## 2018-11-08 DIAGNOSIS — J90 PLEURAL EFFUSION, LEFT: Primary | ICD-10-CM

## 2018-11-08 NOTE — TELEPHONE ENCOUNTER
Called patient to review chest xray results showing moderate recurrent left sided pleural effusion  Stable cardiac & respiratory symptoms  For thoracentesis 11/9/18 at 0900  Will refer to thoracic surgery as well

## 2018-11-09 ENCOUNTER — CLINICAL SUPPORT (OUTPATIENT)
Dept: CARDIAC REHAB | Facility: HOSPITAL | Age: 83
End: 2018-11-09
Payer: COMMERCIAL

## 2018-11-09 ENCOUNTER — HOSPITAL ENCOUNTER (OUTPATIENT)
Dept: RADIOLOGY | Facility: HOSPITAL | Age: 83
Discharge: HOME | End: 2018-11-09
Admitting: RADIOLOGY
Payer: COMMERCIAL

## 2018-11-09 VITALS
RESPIRATION RATE: 18 BRPM | SYSTOLIC BLOOD PRESSURE: 112 MMHG | HEART RATE: 76 BPM | OXYGEN SATURATION: 98 % | DIASTOLIC BLOOD PRESSURE: 59 MMHG

## 2018-11-09 DIAGNOSIS — Z95.2 S/P AVR (AORTIC VALVE REPLACEMENT): ICD-10-CM

## 2018-11-09 DIAGNOSIS — Z95.1 S/P CABG X 4: ICD-10-CM

## 2018-11-09 DIAGNOSIS — J90 PLEURAL EFFUSION, LEFT: ICD-10-CM

## 2018-11-09 PROCEDURE — 93798 PHYS/QHP OP CAR RHAB W/ECG: CPT

## 2018-11-09 PROCEDURE — 32555 ASPIRATE PLEURA W/ IMAGING: CPT | Performed by: RADIOLOGY

## 2018-11-09 PROCEDURE — 32555 ASPIRATE PLEURA W/ IMAGING: CPT

## 2018-11-12 ENCOUNTER — TELEPHONE (OUTPATIENT)
Dept: CARDIAC SURGERY | Facility: CLINIC | Age: 83
End: 2018-11-12

## 2018-11-12 ENCOUNTER — HOSPITAL ENCOUNTER (OUTPATIENT)
Dept: RADIOLOGY | Facility: HOSPITAL | Age: 83
Discharge: HOME | End: 2018-11-12
Payer: COMMERCIAL

## 2018-11-12 ENCOUNTER — CLINICAL SUPPORT (OUTPATIENT)
Dept: CARDIAC REHAB | Facility: HOSPITAL | Age: 83
End: 2018-11-12
Payer: COMMERCIAL

## 2018-11-12 DIAGNOSIS — J90 PLEURAL EFFUSION: Primary | ICD-10-CM

## 2018-11-12 DIAGNOSIS — Z95.2 S/P AVR (AORTIC VALVE REPLACEMENT): ICD-10-CM

## 2018-11-12 DIAGNOSIS — J90 PLEURAL EFFUSION, LEFT: ICD-10-CM

## 2018-11-12 DIAGNOSIS — Z95.1 S/P CABG X 4: ICD-10-CM

## 2018-11-12 PROCEDURE — 71046 X-RAY EXAM CHEST 2 VIEWS: CPT

## 2018-11-12 PROCEDURE — 93798 PHYS/QHP OP CAR RHAB W/ECG: CPT

## 2018-11-12 NOTE — TELEPHONE ENCOUNTER
I called pt to see if he was on his way or would like to reschedule  Per the son, Pt is at rehab right now and wont be making this appt  Son would give message to Pt when pt gets home to call us back to reschedule

## 2018-11-13 ENCOUNTER — TELEPHONE (OUTPATIENT)
Dept: FAMILY MEDICINE CLINIC | Facility: HOSPITAL | Age: 83
End: 2018-11-13

## 2018-11-13 NOTE — TELEPHONE ENCOUNTER
Reena Loomis [facility] v[facility] calling with blood sugar readings  58-11/5  89-11/6  63-11/7  73-11/8  65-11/9  117-11/10  164-11/11  137-11/12  84-11/13  Reena Loomis said pt is not eating much- has cough from plueral effusion  They keep having to tap him every week he keeps filling up with fluid  Pt is very weak and unmotivated and tired    Reena Loomis can reached at [phone]

## 2018-11-14 ENCOUNTER — HOSPITAL ENCOUNTER (OUTPATIENT)
Dept: CT IMAGING | Facility: HOSPITAL | Age: 83
Discharge: HOME | End: 2018-11-14
Attending: THORACIC SURGERY (CARDIOTHORACIC VASCULAR SURGERY)
Payer: COMMERCIAL

## 2018-11-14 DIAGNOSIS — J90 PLEURAL EFFUSION: ICD-10-CM

## 2018-11-14 PROCEDURE — 71250 CT THORAX DX C-: CPT

## 2018-11-15 ENCOUNTER — HOSPITAL ENCOUNTER (OUTPATIENT)
Facility: HOSPITAL | Age: 83
Setting detail: SURGERY ADMIT
End: 2018-11-15
Attending: THORACIC SURGERY (CARDIOTHORACIC VASCULAR SURGERY) | Admitting: THORACIC SURGERY (CARDIOTHORACIC VASCULAR SURGERY)
Payer: COMMERCIAL

## 2018-11-15 ENCOUNTER — OFFICE VISIT (OUTPATIENT)
Dept: CARDIAC SURGERY | Facility: CLINIC | Age: 83
End: 2018-11-15
Payer: COMMERCIAL

## 2018-11-15 VITALS
OXYGEN SATURATION: 94 % | HEIGHT: 68 IN | DIASTOLIC BLOOD PRESSURE: 54 MMHG | HEART RATE: 82 BPM | WEIGHT: 203.6 LBS | TEMPERATURE: 97.9 F | BODY MASS INDEX: 30.86 KG/M2 | SYSTOLIC BLOOD PRESSURE: 97 MMHG

## 2018-11-15 DIAGNOSIS — J90 RECURRENT LEFT PLEURAL EFFUSION: Primary | ICD-10-CM

## 2018-11-15 PROCEDURE — 99205 OFFICE O/P NEW HI 60 MIN: CPT | Performed by: THORACIC SURGERY (CARDIOTHORACIC VASCULAR SURGERY)

## 2018-11-15 NOTE — ASSESSMENT & PLAN NOTE
Mr Radha March is an 81yo male who presents to my office for evaluation of his recurrent left sided pleural effusion  He is a good candidate for L VATS mechanical pleurodesis  I explained that he could continue with thoracentesis and that this would likely stop eventually, but that an operation would be the fastest way to prevent recurrence in the future  I also explained the difference between a Pleur-X catheter and a pleurodesis and he elected to proceed with the pleurodesis, which I agree, is the better option  The risks and benefits were explained and we will proceed on 11/23/2018      Trang Fuentes MD  Thoracic Surgery  (Available on [facility] Text)

## 2018-11-15 NOTE — LETTER
November 15, 2018     [facility]  [address] [address]    Patient: Keny Castillo  YOB: 1935   Date of Visit: 11/15/2018     Dear Dr Tunde Lamas      Thank you for referring Suzette Razo to me for evaluation  Below are the relevant portions of my assessment and plan of care  Mr Gavin Goldberg is an 79yo male who presents to my office for evaluation of his recurrent left sided pleural effusion  He is a good candidate for L VATS mechanical pleurodesis  I explained that he could continue with thoracentesis and that this would likely stop eventually, but that an operation would be the fastest way to prevent recurrence in the future  I also explained the difference between a Pleur-X catheter and a pleurodesis and he elected to proceed with the pleurodesis, which I agree, is the better option  The risks and benefits were explained and we will proceed on 11/23/2018  Terrence Conklin MD  Thoracic Surgery  (Available on [facility] Text)        If you have questions, please do not hesitate to call me  I look forward to following Annetta Gonzalez along with you           Sincerely,        Miguel Alicea MD        CC: No Recipients    Progress Notes:

## 2018-11-15 NOTE — PROGRESS NOTES
Thoracic Consult  Assessment/Plan:    Recurrent left pleural effusion  Mr Yaakov Trevino is an 79yo male who presents to my office for evaluation of his recurrent left sided pleural effusion  He is a good candidate for L VATS mechanical pleurodesis  I explained that he could continue with thoracentesis and that this would likely stop eventually, but that an operation would be the fastest way to prevent recurrence in the future  I also explained the difference between a Pleur-X catheter and a pleurodesis and he elected to proceed with the pleurodesis, which I agree, is the better option  The risks and benefits were explained and we will proceed on 11/23/2018  Cecile Toney MD  Thoracic Surgery  (Available on [facility] Text)         Diagnoses and all orders for this visit:    Recurrent left pleural effusion  -     Type and screen; Future  -     Basic metabolic panel; Future  -     APTT; Future  -     CBC and Platelet; Future  -     Protime-INR; Future  -     EKG 12 lead; Future  -     Case request operating room: THORACOSCOPY VIDEO ASSISTED SURGERY (VATS), mechanical pleurodesis; Standing  -     Case request operating room: THORACOSCOPY VIDEO ASSISTED SURGERY (VATS), mechanical pleurodesis    Other orders  -     Diet NPO; Sips with meds; Standing  -     Void on call to OR; Standing  -     Insert peripheral IV; Standing  -     Place sequential compression device; Standing  -     ceFAZolin (ANCEF) IVPB (premix) 2,000 mg; Infuse 2,000 mg into a venous catheter once           Thoracic History   Diagnosis:    Procedures/Surgeries:    Pathology:    Adjuvant Therapy:       Subjective:    Patient ID: Gamaliel Betancourt  is a 80 y o  male  HPI  Mr Yaakov Trevino is an 79yo male who presents to my office for evaluation of his recurrent left sided pleural effusion  He is s/p CABG/AVR on 9/17  He is recovering well from his surgery but continues to present with a recurrent left sided effusion   He has required thoracentesis x3 since his surgery  I sent him for a CT scan after his last thoracentesis and the fluid has already started to re-accumulate  He reports that he is not SOB but he has an overwhelming feeling of fatigue  He wants to nap all of the time  He also states that he has lost his appetite ever since his surgery  He reports a dry cough that is his tell for when the fluid is building back up  The dry cough becomes constant and unbearable  He denies any associated hemoptysis or sputum  He denies any chest pain  The following portions of the patient's history were reviewed and updated as appropriate: allergies, current medications, past family history, past medical history, past social history, past surgical history and problem list     Past Medical History:   Diagnosis Date    Aortic stenosis     CKD (chronic kidney disease)     baseline Cr 1 3-1 5    Coronary artery disease     Cough     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[facility] [address] )     type 2, insulin dependent    GERD (gastroesophageal reflux disease)     Glaucoma     Gout     History of prostate cancer     Hypothyroidism     Overweight     Peripheral neuropathy, idiopathic     Pleural effusion, left     Pure hypercholesterolemia     LA   11/12/14   R   11/12/14       Past Surgical History:   Procedure Laterality Date    CARDIAC CATHETERIZATION      IR THORACENTESIS  10/23/2018    IR THORACENTESIS  11/2/2018    IR THORACENTESIS  11/9/2018    TN CABG, ARTERY-VEIN, THREE N/A 9/17/2018    Procedure: CORONARY ARTERY BYPASS GRAFT (CABG) x 4 VESSELS with LIMA - LAD, SVG/LEFT LEG EVH - LEFT PDA, OM3, & OM2;  Surgeon: Leodan Lofton MD;  [facility];  Service: Cardiac Surgery    TN ECHO TRANSESOPHAG MONTR CARDIAC PUMP FUNCTJ N/A 9/17/2018    Procedure: TRANSESOPHAGEAL ECHOCARDIOGRAM (VERONICA);   Surgeon: Leodan Lofton MD;  [facility];  Service: Cardiac Surgery    TN RPLCMT AORTIC VALVE OPN W/STENTLESS TISSUE VALVE N/A 9/17/2018    Procedure: REPLACEMENT VALVE AORTIC (AVR)- 23mm tissue Intuity Valve;  Surgeon: Tejas King MD;  [facility];  Service: Cardiac Surgery    THYROID SURGERY        Family History   Problem Relation Age of Onset    Diabetes Mother     Pancreatic cancer Brother     Diabetes Maternal Grandmother     Colon cancer Son     Diabetes Family       Social History     Social History    Marital status:      Spouse name: N/A    Number of children: N/A    Years of education: N/A     Occupational History    Not on file  Social History Main Topics    Smoking status: Former Smoker     Packs/day: 0 25     Years: 1 00     Types: Cigarettes     Quit date: 1970    Smokeless tobacco: Never Used      Comment: Only in the service     Alcohol use Yes      Comment: Occasionally     Drug use: No    Sexual activity: Not Currently     Other Topics Concern    Not on file     Social History Narrative    Caffeine use / coffee diet cola and tea    Lives with family     Living situation supportive and safe    No advance directives  -denied          Review of Systems   Constitutional: Positive for fatigue  Negative for chills, fever and unexpected weight change  HENT: Negative  Eyes: Negative  Respiratory: Positive for cough  Negative for chest tightness, shortness of breath, wheezing and stridor  Cardiovascular: Negative for chest pain and leg swelling  Gastrointestinal: Negative  Endocrine: Negative  Genitourinary: Negative  Musculoskeletal: Negative  Skin: Negative  Allergic/Immunologic: Negative  Neurological: Negative for speech difficulty, weakness, light-headedness and headaches  Hematological: Negative for adenopathy  Psychiatric/Behavioral: Negative  Objective:   Physical Exam   Constitutional: He is oriented to person, place, and time  He appears well-developed and well-nourished  HENT:   Head: Normocephalic and atraumatic  Neck: Normal range of motion  No tracheal deviation present  Cardiovascular: Normal rate, regular rhythm and normal heart sounds  No murmur heard  Pulmonary/Chest: Effort normal  No stridor  No respiratory distress  He has no wheezes  He has no rales  He exhibits no tenderness  Decreased breath sounds on the left  Abdominal: Soft  Bowel sounds are normal  He exhibits no distension  There is no tenderness  Musculoskeletal: Normal range of motion  He exhibits no edema  Lymphadenopathy:     He has no cervical adenopathy  Neurological: He is alert and oriented to person, place, and time  Skin: Skin is warm and dry  No rash noted  He is not diaphoretic  No erythema  No pallor  Psychiatric: He has a normal mood and affect  His behavior is normal  Judgment and thought content normal    Nursing note and vitals reviewed  BP 97/54 (BP Location: Left arm, Patient Position: Sitting, Cuff Size: Adult)   Pulse 82   Temp 97 9 °F (36 6 °C)   Ht 5' 8" (1 727 m)   Wt 92 4 kg (203 lb 9 6 oz)   SpO2 94%   BMI 30 96 kg/m²     Xr Chest Pa & Lateral    Result Date: 11/15/2018  Impression Stable left pleural effusion  Increased small right pleural effusion  Workstation performed: [id number]     Xr Chest Pa & Lateral    Result Date: 11/8/2018  Impression Reaccumulation of moderate size left pleural effusion with left basilar compressive atelectasis  The study was marked in [facility] for immediate notification  Workstation performed: [id number]     Xr Chest Pa & Lateral    Result Date: 11/2/2018  Impression Near complete evacuation of left pleural effusion status post thoracentesis  No pneumothorax  Subsegmental atelectasis left lung base   Workstation performed: [id number]     Xr Chest Pa & Lateral    Result Date: 10/31/2018  Impression Moderate sized residual left pleural effusion, possibly slightly diminished No evidence of pneumothorax post thoracentesis Mild cardiomegaly status post CABG and TAVR Workstation performed: [id number]     Xr Chest Pa & Lateral    Result Date: 10/19/2018  Impression Moderate size left pleural effusion with adjacent compressive atelectasis or infection  Workstation performed: [id number]     Xr Chest Pa & Lateral    Result Date: 9/8/2018  Impression No acute cardiopulmonary disease  Workstation performed: [id number]      Ct Chest Wo Contrast    Result Date: 11/15/2018  Narrative CT CHEST WITHOUT IV CONTRAST INDICATION:   J90: Pleural effusion, not elsewhere classified  COMPARISON:  6/1/2018  TECHNIQUE: CT examination of the chest was performed without intravenous contrast   Axial, sagittal, and coronal 2D reformatted images were created from the source data and submitted for interpretation  Radiation dose length product (DLP) for this visit:  698 mGy-cm   This examination, like all CT scans performed in the [facility], was performed utilizing techniques to minimize radiation dose exposure, including the use of iterative reconstruction and automated exposure control  FINDINGS: LUNGS:  There is left basilar compressive atelectasis  There is no tracheal or endobronchial lesion  PLEURA:  There is a small to moderate free flowing left pleural effusion, and a minimal right pleural effusion  HEART/GREAT VESSELS:  Normal heart size  Status post aortic valve replacement and CABG  No pericardial effusion  Normal caliber thoracic aorta with mild atherosclerotic calcifications  MEDIASTINUM AND ANDREINA:  Unremarkable  CHEST WALL AND LOWER NECK:   Status post median sternotomy  VISUALIZED STRUCTURES IN THE UPPER ABDOMEN:  Right renal simple cyst again identified  OSSEOUS STRUCTURES:  No acute fracture or destructive osseous lesion  Impression Small to moderate left pleural effusion, with associated left basilar compressive atelectasis  Minimal right pleural effusion  Workstation performed: [id number]     Ct Chest Wo Contrast    Result Date: 6/5/2018  Narrative CT CHEST WITHOUT IV CONTRAST INDICATION:   I25 10:  Atherosclerotic heart disease of native coronary artery without angina pectoris  COMPARISON: None  TECHNIQUE: CT examination of the chest was performed without intravenous contrast   Axial, sagittal, and coronal 2D reformatted images were created from the source data and submitted for interpretation  Radiation dose length product (DLP) for this visit:  715 58 mGy-cm   This examination, like all CT scans performed in the [facility], was performed utilizing techniques to minimize radiation dose exposure, including the use of iterative  reconstruction and automated exposure control  FINDINGS: LUNGS:  Lungs are clear  There is no tracheal or endobronchial lesion  PLEURA:  Unremarkable  HEART/GREAT VESSELS:  Unremarkable for patient's age  Coronary atherosclerotic change noted diffusely  MEDIASTINUM AND ANDREINA:  Unremarkable  CHEST WALL AND LOWER NECK:   Unremarkable  VISUALIZED STRUCTURES IN THE UPPER ABDOMEN:  Right renal cyst noted  OSSEOUS STRUCTURES:  No acute fracture or destructive osseous lesion  Impression 1  Coronary atherosclerotic disease noted  2   Right renal cyst  3   No acute findings  Workstation performed: [id number]     No CT Chest,Abdomen,Pelvis results available for this patient  No NM PET CT results available for this patient  No Barium Swallow results available for this patient

## 2018-11-16 ENCOUNTER — OFFICE VISIT (OUTPATIENT)
Dept: FAMILY MEDICINE CLINIC | Facility: HOSPITAL | Age: 83
End: 2018-11-16
Payer: COMMERCIAL

## 2018-11-16 VITALS
HEIGHT: 68 IN | HEART RATE: 84 BPM | OXYGEN SATURATION: 98 % | WEIGHT: 203.5 LBS | BODY MASS INDEX: 30.84 KG/M2 | SYSTOLIC BLOOD PRESSURE: 100 MMHG | DIASTOLIC BLOOD PRESSURE: 64 MMHG

## 2018-11-16 DIAGNOSIS — I50.9 CONGESTIVE HEART FAILURE, UNSPECIFIED HF CHRONICITY, UNSPECIFIED HEART FAILURE TYPE (HCC): Primary | ICD-10-CM

## 2018-11-16 DIAGNOSIS — Z23 NEED FOR INFLUENZA VACCINATION: Primary | ICD-10-CM

## 2018-11-16 DIAGNOSIS — Z79.4 TYPE 2 DIABETES MELLITUS WITHOUT COMPLICATION, WITH LONG-TERM CURRENT USE OF INSULIN (HCC): ICD-10-CM

## 2018-11-16 DIAGNOSIS — I10 BENIGN ESSENTIAL HYPERTENSION: ICD-10-CM

## 2018-11-16 DIAGNOSIS — Z23 NEED FOR SHINGLES VACCINE: ICD-10-CM

## 2018-11-16 DIAGNOSIS — E78.5 DYSLIPIDEMIA, GOAL LDL BELOW 100: ICD-10-CM

## 2018-11-16 DIAGNOSIS — K21.9 GASTROESOPHAGEAL REFLUX DISEASE WITHOUT ESOPHAGITIS: ICD-10-CM

## 2018-11-16 DIAGNOSIS — E11.9 TYPE 2 DIABETES MELLITUS WITHOUT COMPLICATION, WITH LONG-TERM CURRENT USE OF INSULIN (HCC): ICD-10-CM

## 2018-11-16 DIAGNOSIS — J90 PLEURAL EFFUSION, LEFT: ICD-10-CM

## 2018-11-16 DIAGNOSIS — I25.10 CORONARY ARTERY DISEASE INVOLVING NATIVE CORONARY ARTERY OF NATIVE HEART WITHOUT ANGINA PECTORIS: Chronic | ICD-10-CM

## 2018-11-16 DIAGNOSIS — J90 RECURRENT LEFT PLEURAL EFFUSION: ICD-10-CM

## 2018-11-16 DIAGNOSIS — J45.20 MILD INTERMITTENT ASTHMA WITHOUT COMPLICATION: Chronic | ICD-10-CM

## 2018-11-16 PROCEDURE — 99214 OFFICE O/P EST MOD 30 MIN: CPT | Performed by: INTERNAL MEDICINE

## 2018-11-16 PROCEDURE — 3074F SYST BP LT 130 MM HG: CPT | Performed by: INTERNAL MEDICINE

## 2018-11-16 PROCEDURE — 3078F DIAST BP <80 MM HG: CPT | Performed by: INTERNAL MEDICINE

## 2018-11-16 PROCEDURE — 90662 IIV NO PRSV INCREASED AG IM: CPT

## 2018-11-16 PROCEDURE — G0008 ADMIN INFLUENZA VIRUS VAC: HCPCS

## 2018-11-16 RX ORDER — BUDESONIDE AND FORMOTEROL FUMARATE DIHYDRATE 160; 4.5 UG/1; UG/1
2 AEROSOL RESPIRATORY (INHALATION) 2 TIMES DAILY
Qty: 1 INHALER | Refills: 3 | Status: SHIPPED | OUTPATIENT
Start: 2018-11-16 | End: 2019-01-08 | Stop reason: ALTCHOICE

## 2018-11-16 NOTE — PROGRESS NOTES
Assessment/Plan:             Problem List Items Addressed This Visit        Digestive    GERD (gastroesophageal reflux disease)       Respiratory    Mild intermittent asthma without complication (Chronic)     Has not been using inhalers- has ongoing cough her at appt-          Recurrent left pleural effusion     Had last thoracentesis at [place] on 11/9  Due to have surgery on 11/23 for pleuridesis- likely will have 3 day stay in hospital as per patient  Cardiovascular and Mediastinum    Coronary artery disease involving native coronary artery of native heart - Primary (Chronic)    Benign essential hypertension       Other    Dyslipidemia, goal LDL below 100      Other Visit Diagnoses     Pleural effusion, left                Subjective:      Patient ID: Isreal Gómez  is a 80 y o  male    1 cough- likely due to effusion but also could be in part aggravated by asthma with the cold weather- will have him restart symbicort  2  Dm- had low readings- now on decreased basaglar at 20 - on humalog 16 with am meal- fbs was 134        The following portions of the patient's history were reviewed and updated as appropriate: allergies, current medications and problem list      Review of Systems   Constitutional: Positive for appetite change and fatigue  Decreased appetite since surgery   HENT: Negative for congestion  Respiratory: Positive for cough and wheezing  Negative for stridor  All other systems reviewed and are negative          Objective:      Current Outpatient Prescriptions:     acetaminophen (TYLENOL) 325 mg tablet, 650 mg every 4 to 6 hours as needed for pain (do not take with percocet), Disp: 30 tablet, Rfl: 0    albuterol (PROVENTIL HFA) 90 mcg/act inhaler, Inhale 2 puffs 4 (four) times a day as needed, Disp: , Rfl:     allopurinol (ZYLOPRIM) 300 mg tablet, TAKE ONE TABLET BY MOUTH ONCE DAILY, Disp: 90 tablet, Rfl: 3    ascorbic acid (VITAMIN C) 500 MG tablet, Take 1 tablet (500 mg total) by mouth daily for 90 days, Disp: , Rfl: 0    aspirin 325 mg tablet, Take 1 tablet (325 mg total) by mouth daily, Disp: , Rfl: 0    B-D UF III MINI PEN NEEDLES 31G X 5 MM MISC, USE ONE DAILY AS DIRECTED, Disp: 100 each, Rfl: 5    BASAGLAR KWIKPEN 100 units/mL injection pen, Inject 30 Units under the skin daily at bedtime (Patient taking differently: Inject 20 Units under the skin daily at bedtime  ), Disp: 5 pen, Rfl: 0    docusate sodium (COLACE) 100 mg capsule, Take 1 capsule (100 mg total) by mouth daily, Disp: 10 capsule, Rfl: 0    ferrous sulfate 325 (65 Fe) mg tablet, Take 1 tablet (325 mg total) by mouth daily with breakfast for 90 days, Disp: , Rfl: 0    gabapentin (NEURONTIN) 300 mg capsule, TAKE ONE CAPSULE BY MOUTH ONCE DAILY AT BEDTIME, Disp: 90 capsule, Rfl: 3    insulin aspart (NovoLOG) 100 units/mL injection, Inject under the skin 3 (three) times a day before meals, Disp: , Rfl: 0    insulin lispro (HUMALOG KWIKPEN) 100 units/mL injection pen, Inject 16 units at breakfast, Disp: 5 pen, Rfl: 1    Insulin Pen Needle 31G X 5 MM MISC, by Does not apply route, Disp: , Rfl:     levothyroxine 150 mcg tablet, Take 1 tablet (150 mcg total) by mouth daily, Disp: 30 tablet, Rfl: 6    LORazepam (ATIVAN) 0 5 mg tablet, Take 1 tablet (0 5 mg total) by mouth every 8 (eight) hours as needed for anxiety, Disp: 30 tablet, Rfl: 0    losartan-hydrochlorothiazide (HYZAAR) 100-25 MG per tablet, TAKE 1 TABLET BY MOUTH EVERY DAY, Disp: 90 tablet, Rfl: 3    metoprolol tartrate (LOPRESSOR) 25 mg tablet, Take 0 5 tablets (12 5 mg total) by mouth every 12 (twelve) hours, Disp: 30 tablet, Rfl: 6    polyethylene glycol (MIRALAX) 17 g packet, Take 17 g by mouth daily, Disp: 14 each, Rfl: 0    rosuvastatin (CRESTOR) 40 MG tablet, Take 1 tablet (40 mg total) by mouth daily, Disp: 30 tablet, Rfl: 6    senna-docusate sodium (SENOKOT S) 8 6-50 mg per tablet, Take 1 tablet by mouth daily at bedtime as needed for constipation, Disp: , Rfl: 0    tamsulosin (FLOMAX) 0 4 mg, Take 1 capsule (0 4 mg total) by mouth daily with dinner, Disp: 30 capsule, Rfl: 6    pantoprazole (PROTONIX) 40 mg tablet, Take 1 tablet (40 mg total) by mouth daily, Disp: 30 tablet, Rfl: 6    Blood pressure 100/64, pulse 84, height 5' 8" (1 727 m), weight 92 3 kg (203 lb 8 oz), SpO2 98 %  Physical Exam   Constitutional: He appears well-developed  fatigued   HENT:   Head: Normocephalic  Mouth/Throat: Oropharynx is clear and moist    Raspy voice   Eyes: Pupils are equal, round, and reactive to light  EOM are normal  Right eye exhibits no discharge  Left eye exhibits no discharge  Neck: No JVD present  Cardiovascular: Normal rate and regular rhythm  No murmur heard  Jackson Salines valve sound-incision is clean and dry- no purulent drainage   Pulmonary/Chest: Effort normal and breath sounds normal  No respiratory distress  Abdominal: Soft  Bowel sounds are normal    Skin: There is erythema  Healing incision on left inner lower leg  Less leg edema   Nursing note and vitals reviewed

## 2018-11-16 NOTE — ASSESSMENT & PLAN NOTE
Had last thoracentesis at [place] on 11/9  Due to have surgery on 11/23 for pleuridesis- likely will have 3 day stay in hospital as per patient

## 2018-11-21 ENCOUNTER — ANESTHESIA EVENT (INPATIENT)
Dept: PERIOP | Facility: HOSPITAL | Age: 83
DRG: 186 | End: 2018-11-21
Payer: COMMERCIAL

## 2018-11-21 ENCOUNTER — TELEPHONE (OUTPATIENT)
Dept: CARDIAC SURGERY | Facility: CLINIC | Age: 83
End: 2018-11-21

## 2018-11-21 DIAGNOSIS — J90 PLEURAL EFFUSION: Primary | ICD-10-CM

## 2018-11-21 LAB
ALBUMIN SERPL-MCNC: 2.9 G/DL (ref 3.6–5.1)
ALBUMIN/GLOB SERPL: 0.8 (CALC) (ref 1–2.5)
ALP SERPL-CCNC: 108 U/L (ref 40–115)
ALT SERPL-CCNC: 20 U/L (ref 9–46)
AST SERPL-CCNC: 31 U/L (ref 10–35)
BASOPHILS # BLD AUTO: 93 CELLS/UL (ref 0–200)
BASOPHILS NFR BLD AUTO: 0.9 %
BILIRUB SERPL-MCNC: 0.4 MG/DL (ref 0.2–1.2)
BUN SERPL-MCNC: 30 MG/DL (ref 7–25)
BUN/CREAT SERPL: 10 (CALC) (ref 6–22)
CALCIUM SERPL-MCNC: 8.8 MG/DL (ref 8.6–10.3)
CHLORIDE SERPL-SCNC: 102 MMOL/L (ref 98–110)
CO2 SERPL-SCNC: 26 MMOL/L (ref 20–32)
CREAT SERPL-MCNC: 3.14 MG/DL (ref 0.7–1.11)
EOSINOPHIL # BLD AUTO: 350 CELLS/UL (ref 15–500)
EOSINOPHIL NFR BLD AUTO: 3.4 %
ERYTHROCYTE [DISTWIDTH] IN BLOOD BY AUTOMATED COUNT: 14.6 % (ref 11–15)
GLOBULIN SER CALC-MCNC: 3.8 G/DL (CALC) (ref 1.9–3.7)
GLUCOSE SERPL-MCNC: 120 MG/DL (ref 65–99)
HBA1C MFR BLD: 6.5 % OF TOTAL HGB
HCT VFR BLD AUTO: 35.3 % (ref 38.5–50)
HGB BLD-MCNC: 10.9 G/DL (ref 13.2–17.1)
LYMPHOCYTES # BLD AUTO: 1669 CELLS/UL (ref 850–3900)
LYMPHOCYTES NFR BLD AUTO: 16.2 %
MCH RBC QN AUTO: 26.8 PG (ref 27–33)
MCHC RBC AUTO-ENTMCNC: 30.9 G/DL (ref 32–36)
MCV RBC AUTO: 86.7 FL (ref 80–100)
MONOCYTES # BLD AUTO: 979 CELLS/UL (ref 200–950)
MONOCYTES NFR BLD AUTO: 9.5 %
NEUTROPHILS # BLD AUTO: 7210 CELLS/UL (ref 1500–7800)
NEUTROPHILS NFR BLD AUTO: 70 %
PLATELET # BLD AUTO: 537 THOUSAND/UL (ref 140–400)
PMV BLD REES-ECKER: 9.7 FL (ref 7.5–12.5)
POTASSIUM SERPL-SCNC: 3.7 MMOL/L (ref 3.5–5.3)
PROT SERPL-MCNC: 6.7 G/DL (ref 6.1–8.1)
RBC # BLD AUTO: 4.07 MILLION/UL (ref 4.2–5.8)
SL AMB EGFR AFRICAN AMERICAN: 20 ML/MIN/1.73M2
SL AMB EGFR NON AFRICAN AMERICAN: 17 ML/MIN/1.73M2
SODIUM SERPL-SCNC: 140 MMOL/L (ref 135–146)
WBC # BLD AUTO: 10.3 THOUSAND/UL (ref 3.8–10.8)

## 2018-11-21 NOTE — TELEPHONE ENCOUNTER
Per anesthesia patient needs to see nephrology prior to surgery  Left message to cancel surgery and to find out if patient has ever seen a nephrologist  Will need to schedule patient for thoracentesis ASAP until we can reschedule surgery

## 2018-11-22 DIAGNOSIS — I50.9 CONGESTIVE HEART FAILURE, UNSPECIFIED HF CHRONICITY, UNSPECIFIED HEART FAILURE TYPE (HCC): ICD-10-CM

## 2018-11-23 ENCOUNTER — APPOINTMENT (EMERGENCY)
Dept: RADIOLOGY | Facility: HOSPITAL | Age: 83
End: 2018-11-23
Payer: COMMERCIAL

## 2018-11-23 ENCOUNTER — ANESTHESIA (INPATIENT)
Dept: PERIOP | Facility: HOSPITAL | Age: 83
DRG: 186 | End: 2018-11-23
Payer: COMMERCIAL

## 2018-11-23 ENCOUNTER — TELEPHONE (OUTPATIENT)
Dept: RADIOLOGY | Facility: HOSPITAL | Age: 83
End: 2018-11-23

## 2018-11-23 ENCOUNTER — TELEPHONE (OUTPATIENT)
Dept: OTHER | Facility: OTHER | Age: 83
End: 2018-11-23

## 2018-11-23 ENCOUNTER — HOSPITAL ENCOUNTER (EMERGENCY)
Facility: HOSPITAL | Age: 83
End: 2018-11-24
Attending: EMERGENCY MEDICINE
Payer: COMMERCIAL

## 2018-11-23 ENCOUNTER — HOSPITAL ENCOUNTER (OUTPATIENT)
Dept: RADIOLOGY | Facility: HOSPITAL | Age: 83
Discharge: HOME | End: 2018-11-23
Attending: THORACIC SURGERY (CARDIOTHORACIC VASCULAR SURGERY) | Admitting: RADIOLOGY
Payer: COMMERCIAL

## 2018-11-23 ENCOUNTER — TELEPHONE (OUTPATIENT)
Dept: FAMILY MEDICINE CLINIC | Facility: HOSPITAL | Age: 83
End: 2018-11-23

## 2018-11-23 ENCOUNTER — APPOINTMENT (OUTPATIENT)
Dept: LAB | Facility: HOSPITAL | Age: 83
End: 2018-11-23
Attending: INTERNAL MEDICINE
Payer: COMMERCIAL

## 2018-11-23 VITALS
OXYGEN SATURATION: 98 % | HEART RATE: 85 BPM | DIASTOLIC BLOOD PRESSURE: 82 MMHG | RESPIRATION RATE: 15 BRPM | SYSTOLIC BLOOD PRESSURE: 112 MMHG

## 2018-11-23 DIAGNOSIS — I21.4 NSTEMI (NON-ST ELEVATED MYOCARDIAL INFARCTION) (HCC): Primary | ICD-10-CM

## 2018-11-23 DIAGNOSIS — R79.89 ELEVATED SERUM CREATININE: Primary | ICD-10-CM

## 2018-11-23 DIAGNOSIS — J90 PLEURAL EFFUSION: ICD-10-CM

## 2018-11-23 DIAGNOSIS — R79.89 ELEVATED SERUM CREATININE: ICD-10-CM

## 2018-11-23 LAB
ALBUMIN SERPL BCP-MCNC: 2.3 G/DL (ref 3.5–5)
ALP SERPL-CCNC: 123 U/L (ref 46–116)
ALT SERPL W P-5'-P-CCNC: 44 U/L (ref 12–78)
ANION GAP SERPL CALCULATED.3IONS-SCNC: 7 MMOL/L (ref 4–13)
ANION GAP SERPL CALCULATED.3IONS-SCNC: 9 MMOL/L (ref 4–13)
APTT PPP: 37 SECONDS (ref 26–38)
AST SERPL W P-5'-P-CCNC: 60 U/L (ref 5–45)
BASOPHILS # BLD AUTO: 0.06 THOUSANDS/ΜL (ref 0–0.1)
BASOPHILS NFR BLD AUTO: 1 % (ref 0–1)
BILIRUB SERPL-MCNC: 0.5 MG/DL (ref 0.2–1)
BUN SERPL-MCNC: 34 MG/DL (ref 5–25)
BUN SERPL-MCNC: 36 MG/DL (ref 5–25)
CALCIUM SERPL-MCNC: 9.1 MG/DL (ref 8.3–10.1)
CALCIUM SERPL-MCNC: 9.1 MG/DL (ref 8.3–10.1)
CHLORIDE SERPL-SCNC: 102 MMOL/L (ref 100–108)
CHLORIDE SERPL-SCNC: 104 MMOL/L (ref 100–108)
CO2 SERPL-SCNC: 27 MMOL/L (ref 21–32)
CO2 SERPL-SCNC: 30 MMOL/L (ref 21–32)
CREAT SERPL-MCNC: 3.52 MG/DL (ref 0.6–1.3)
CREAT SERPL-MCNC: 3.57 MG/DL (ref 0.6–1.3)
EOSINOPHIL # BLD AUTO: 0.22 THOUSAND/ΜL (ref 0–0.61)
EOSINOPHIL NFR BLD AUTO: 2 % (ref 0–6)
ERYTHROCYTE [DISTWIDTH] IN BLOOD BY AUTOMATED COUNT: 15.9 % (ref 11.6–15.1)
GFR SERPL CREATININE-BSD FRML MDRD: 15 ML/MIN/1.73SQ M
GFR SERPL CREATININE-BSD FRML MDRD: 15 ML/MIN/1.73SQ M
GLUCOSE SERPL-MCNC: 119 MG/DL (ref 65–140)
GLUCOSE SERPL-MCNC: 131 MG/DL (ref 65–140)
GLUCOSE SERPL-MCNC: 29 MG/DL (ref 65–140)
HCT VFR BLD AUTO: 32.2 % (ref 36.5–49.3)
HGB BLD-MCNC: 9.7 G/DL (ref 12–17)
IMM GRANULOCYTES # BLD AUTO: 0.07 THOUSAND/UL (ref 0–0.2)
IMM GRANULOCYTES NFR BLD AUTO: 1 % (ref 0–2)
INR PPP: 1.31 (ref 0.86–1.17)
LIPASE SERPL-CCNC: 74 U/L (ref 73–393)
LYMPHOCYTES # BLD AUTO: 1.21 THOUSANDS/ΜL (ref 0.6–4.47)
LYMPHOCYTES NFR BLD AUTO: 13 % (ref 14–44)
MAGNESIUM SERPL-MCNC: 2.5 MG/DL (ref 1.6–2.6)
MCH RBC QN AUTO: 26.9 PG (ref 26.8–34.3)
MCHC RBC AUTO-ENTMCNC: 30.1 G/DL (ref 31.4–37.4)
MCV RBC AUTO: 89 FL (ref 82–98)
MONOCYTES # BLD AUTO: 0.62 THOUSAND/ΜL (ref 0.17–1.22)
MONOCYTES NFR BLD AUTO: 7 % (ref 4–12)
NEUTROPHILS # BLD AUTO: 7.24 THOUSANDS/ΜL (ref 1.85–7.62)
NEUTS SEG NFR BLD AUTO: 76 % (ref 43–75)
NRBC BLD AUTO-RTO: 0 /100 WBCS
PLATELET # BLD AUTO: 275 THOUSANDS/UL (ref 149–390)
PMV BLD AUTO: 10.7 FL (ref 8.9–12.7)
POTASSIUM SERPL-SCNC: 3.7 MMOL/L (ref 3.5–5.3)
POTASSIUM SERPL-SCNC: 4.3 MMOL/L (ref 3.5–5.3)
PROT SERPL-MCNC: 7.8 G/DL (ref 6.4–8.2)
PROTHROMBIN TIME: 15.5 SECONDS (ref 11.8–14.2)
RBC # BLD AUTO: 3.6 MILLION/UL (ref 3.88–5.62)
SODIUM SERPL-SCNC: 138 MMOL/L (ref 136–145)
SODIUM SERPL-SCNC: 141 MMOL/L (ref 136–145)
TROPONIN I SERPL-MCNC: 0.26 NG/ML
WBC # BLD AUTO: 9.42 THOUSAND/UL (ref 4.31–10.16)

## 2018-11-23 PROCEDURE — 82948 REAGENT STRIP/BLOOD GLUCOSE: CPT

## 2018-11-23 PROCEDURE — 85025 COMPLETE CBC W/AUTO DIFF WBC: CPT | Performed by: EMERGENCY MEDICINE

## 2018-11-23 PROCEDURE — 85730 THROMBOPLASTIN TIME PARTIAL: CPT | Performed by: EMERGENCY MEDICINE

## 2018-11-23 PROCEDURE — 71046 X-RAY EXAM CHEST 2 VIEWS: CPT

## 2018-11-23 PROCEDURE — 80053 COMPREHEN METABOLIC PANEL: CPT | Performed by: EMERGENCY MEDICINE

## 2018-11-23 PROCEDURE — 96375 TX/PRO/DX INJ NEW DRUG ADDON: CPT

## 2018-11-23 PROCEDURE — 83690 ASSAY OF LIPASE: CPT | Performed by: EMERGENCY MEDICINE

## 2018-11-23 PROCEDURE — 83735 ASSAY OF MAGNESIUM: CPT | Performed by: EMERGENCY MEDICINE

## 2018-11-23 PROCEDURE — 36415 COLL VENOUS BLD VENIPUNCTURE: CPT

## 2018-11-23 PROCEDURE — 85610 PROTHROMBIN TIME: CPT | Performed by: EMERGENCY MEDICINE

## 2018-11-23 PROCEDURE — 84484 ASSAY OF TROPONIN QUANT: CPT | Performed by: EMERGENCY MEDICINE

## 2018-11-23 PROCEDURE — 96365 THER/PROPH/DIAG IV INF INIT: CPT

## 2018-11-23 PROCEDURE — 93005 ELECTROCARDIOGRAM TRACING: CPT

## 2018-11-23 PROCEDURE — 99284 EMERGENCY DEPT VISIT MOD MDM: CPT

## 2018-11-23 PROCEDURE — 32555 ASPIRATE PLEURA W/ IMAGING: CPT | Performed by: RADIOLOGY

## 2018-11-23 PROCEDURE — 32555 ASPIRATE PLEURA W/ IMAGING: CPT

## 2018-11-23 PROCEDURE — 80048 BASIC METABOLIC PNL TOTAL CA: CPT

## 2018-11-23 RX ORDER — HEPARIN SODIUM 1000 [USP'U]/ML
4000 INJECTION, SOLUTION INTRAVENOUS; SUBCUTANEOUS AS NEEDED
Status: DISCONTINUED | OUTPATIENT
Start: 2018-11-23 | End: 2018-11-24 | Stop reason: HOSPADM

## 2018-11-23 RX ORDER — HEPARIN SODIUM 1000 [USP'U]/ML
3300 INJECTION, SOLUTION INTRAVENOUS; SUBCUTANEOUS ONCE
Status: DISCONTINUED | OUTPATIENT
Start: 2018-11-23 | End: 2018-11-24 | Stop reason: HOSPADM

## 2018-11-23 RX ORDER — HEPARIN SODIUM 1000 [USP'U]/ML
2000 INJECTION, SOLUTION INTRAVENOUS; SUBCUTANEOUS AS NEEDED
Status: DISCONTINUED | OUTPATIENT
Start: 2018-11-23 | End: 2018-11-24 | Stop reason: HOSPADM

## 2018-11-23 RX ORDER — ASPIRIN 81 MG/1
324 TABLET, CHEWABLE ORAL ONCE
Status: COMPLETED | OUTPATIENT
Start: 2018-11-23 | End: 2018-11-23

## 2018-11-23 RX ORDER — HEPARIN SODIUM 10000 [USP'U]/100ML
3-20 INJECTION, SOLUTION INTRAVENOUS
Status: DISCONTINUED | OUTPATIENT
Start: 2018-11-23 | End: 2018-11-23

## 2018-11-23 RX ORDER — HEPARIN SODIUM 1000 [USP'U]/ML
4000 INJECTION, SOLUTION INTRAVENOUS; SUBCUTANEOUS ONCE
Status: COMPLETED | OUTPATIENT
Start: 2018-11-23 | End: 2018-11-23

## 2018-11-23 RX ORDER — HEPARIN SODIUM 1000 [USP'U]/ML
3300 INJECTION, SOLUTION INTRAVENOUS; SUBCUTANEOUS AS NEEDED
Status: DISCONTINUED | OUTPATIENT
Start: 2018-11-23 | End: 2018-11-23

## 2018-11-23 RX ORDER — HEPARIN SODIUM 10000 [USP'U]/100ML
3-20 INJECTION, SOLUTION INTRAVENOUS
Status: DISCONTINUED | OUTPATIENT
Start: 2018-11-23 | End: 2018-11-24 | Stop reason: HOSPADM

## 2018-11-23 RX ORDER — HEPARIN SODIUM 1000 [USP'U]/ML
1650 INJECTION, SOLUTION INTRAVENOUS; SUBCUTANEOUS AS NEEDED
Status: DISCONTINUED | OUTPATIENT
Start: 2018-11-23 | End: 2018-11-23

## 2018-11-23 RX ADMIN — ASPIRIN 81 MG 324 MG: 81 TABLET ORAL at 21:45

## 2018-11-23 RX ADMIN — HEPARIN SODIUM AND DEXTROSE 11.1 UNITS/KG/HR: 10000; 5 INJECTION INTRAVENOUS at 23:06

## 2018-11-23 RX ADMIN — HEPARIN SODIUM 4000 UNITS: 1000 INJECTION, SOLUTION INTRAVENOUS; SUBCUTANEOUS at 23:04

## 2018-11-23 NOTE — TELEPHONE ENCOUNTER
I HAD TALKED TO PT THIS MORNING AND GAVE PT DR KOHLI MESSAGE ABOUT POSSIBLY GOING TO HOSP FOR IV HYDRATION  SEE OTHER MESSAGE IN CHART UNDER RECENT LABS  PT WAS ON HIS WAY OUT THE DOOR TO GO TO [facility] FOR LUNG TAP AT NOON  I TOLD HIM TO TELL  REGINALDO ABOUT THE MESSAGE  PT CALLED BACK AROUND 345 AND SAID THAT HE WENT TO [facility] FOR LUNG TAP AT NOON TODAY AND MENTIONED THE NOTE THAT DR CLEMENTS HAD IN PTS RECENT LABS ---THAT HE MIGHT HAVE TO GO TO HOSP FOR IV HYDRATION, BUT THEY TOLD PT THAT THEY DONT DO THAT THERE  PT WANTED TO KNOW WHAT TO DO     I TALKED TO DR CLEMENTS AND SHE SAID HAVE THE PT GET A STAT BMP TODAY AND SHE WILL TAKE IT FROM THERE  I GOT ORDER READY AND MARYCARMEN S HERE DID REF AS PT USUALLY HAS TO GO TO [facility] WITH HIS INS  ORDER IN SYSTEM, I CALLED PT AND TOLD HIM TO GET IT DONE AND HE SAID OK    DK

## 2018-11-24 ENCOUNTER — HOSPITAL ENCOUNTER (INPATIENT)
Facility: HOSPITAL | Age: 83
LOS: 10 days | Discharge: HOME HEALTH | DRG: 186 | End: 2018-12-04
Attending: INTERNAL MEDICINE | Admitting: HOSPITALIST
Payer: COMMERCIAL

## 2018-11-24 VITALS
SYSTOLIC BLOOD PRESSURE: 101 MMHG | RESPIRATION RATE: 17 BRPM | BODY MASS INDEX: 33.34 KG/M2 | TEMPERATURE: 97.9 F | OXYGEN SATURATION: 100 % | HEART RATE: 75 BPM | DIASTOLIC BLOOD PRESSURE: 59 MMHG | WEIGHT: 220 LBS | HEIGHT: 68 IN

## 2018-11-24 DIAGNOSIS — I21.4 NSTEMI (NON-ST ELEVATED MYOCARDIAL INFARCTION) (HCC): Primary | ICD-10-CM

## 2018-11-24 DIAGNOSIS — R33.8 ACUTE URINARY RETENTION: ICD-10-CM

## 2018-11-24 DIAGNOSIS — N17.9 AKI (ACUTE KIDNEY INJURY) (HCC): ICD-10-CM

## 2018-11-24 DIAGNOSIS — J90 RECURRENT LEFT PLEURAL EFFUSION: ICD-10-CM

## 2018-11-24 DIAGNOSIS — L03.116 CELLULITIS OF LEFT LOWER EXTREMITY: ICD-10-CM

## 2018-11-24 DIAGNOSIS — J93.9 PNEUMOTHORAX: ICD-10-CM

## 2018-11-24 PROBLEM — N18.9 ACUTE-ON-CHRONIC KIDNEY INJURY (HCC): Status: ACTIVE | Noted: 2018-11-24

## 2018-11-24 PROBLEM — I25.10 CAD (CORONARY ARTERY DISEASE): Status: ACTIVE | Noted: 2018-11-24

## 2018-11-24 PROBLEM — N18.30 ACUTE RENAL FAILURE SUPERIMPOSED ON STAGE 3 CHRONIC KIDNEY DISEASE (HCC): Status: ACTIVE | Noted: 2018-11-24

## 2018-11-24 PROBLEM — I35.0 AORTIC STENOSIS: Status: ACTIVE | Noted: 2018-11-24

## 2018-11-24 LAB
AMORPH URATE CRY URNS QL MICRO: ABNORMAL /HPF
ANION GAP SERPL CALCULATED.3IONS-SCNC: 7 MMOL/L (ref 4–13)
APTT PPP: 36 SECONDS (ref 26–38)
APTT PPP: 96 SECONDS (ref 26–38)
ATRIAL RATE: 63 BPM
BACTERIA UR QL AUTO: ABNORMAL /HPF
BILIRUB UR QL STRIP: NEGATIVE
BUN SERPL-MCNC: 32 MG/DL (ref 5–25)
CALCIUM SERPL-MCNC: 7.9 MG/DL (ref 8.3–10.1)
CHLORIDE SERPL-SCNC: 108 MMOL/L (ref 100–108)
CHOLEST SERPL-MCNC: 61 MG/DL (ref 50–200)
CLARITY UR: ABNORMAL
CO2 SERPL-SCNC: 23 MMOL/L (ref 21–32)
COLOR UR: YELLOW
CREAT SERPL-MCNC: 3.15 MG/DL (ref 0.6–1.3)
ERYTHROCYTE [DISTWIDTH] IN BLOOD BY AUTOMATED COUNT: 15.9 % (ref 11.6–15.1)
GFR SERPL CREATININE-BSD FRML MDRD: 17 ML/MIN/1.73SQ M
GLUCOSE SERPL-MCNC: 182 MG/DL (ref 65–140)
GLUCOSE SERPL-MCNC: 225 MG/DL (ref 65–140)
GLUCOSE SERPL-MCNC: 282 MG/DL (ref 65–140)
GLUCOSE SERPL-MCNC: 79 MG/DL (ref 65–140)
GLUCOSE SERPL-MCNC: 83 MG/DL (ref 65–140)
GLUCOSE SERPL-MCNC: 92 MG/DL (ref 65–140)
GLUCOSE UR STRIP-MCNC: ABNORMAL MG/DL
HCT VFR BLD AUTO: 30.7 % (ref 36.5–49.3)
HDLC SERPL-MCNC: 25 MG/DL (ref 40–60)
HGB BLD-MCNC: 9.3 G/DL (ref 12–17)
HGB UR QL STRIP.AUTO: ABNORMAL
INR PPP: 1.36 (ref 0.86–1.17)
KETONES UR STRIP-MCNC: NEGATIVE MG/DL
LDLC SERPL CALC-MCNC: 18 MG/DL (ref 0–100)
LEUKOCYTE ESTERASE UR QL STRIP: NEGATIVE
MCH RBC QN AUTO: 26.6 PG (ref 26.8–34.3)
MCHC RBC AUTO-ENTMCNC: 30.3 G/DL (ref 31.4–37.4)
MCV RBC AUTO: 88 FL (ref 82–98)
NITRITE UR QL STRIP: NEGATIVE
NON-SQ EPI CELLS URNS QL MICRO: ABNORMAL /HPF
P AXIS: 53 DEGREES
PH UR STRIP.AUTO: 5 [PH] (ref 4.5–8)
PLATELET # BLD AUTO: 390 THOUSANDS/UL (ref 149–390)
PMV BLD AUTO: 9.2 FL (ref 8.9–12.7)
POTASSIUM SERPL-SCNC: 3.8 MMOL/L (ref 3.5–5.3)
PR INTERVAL: 178 MS
PROT UR STRIP-MCNC: ABNORMAL MG/DL
PROTHROMBIN TIME: 16.9 SECONDS (ref 11.8–14.2)
QRS AXIS: -13 DEGREES
QRSD INTERVAL: 120 MS
QT INTERVAL: 424 MS
QTC INTERVAL: 433 MS
RBC # BLD AUTO: 3.49 MILLION/UL (ref 3.88–5.62)
RBC #/AREA URNS AUTO: ABNORMAL /HPF
SODIUM SERPL-SCNC: 138 MMOL/L (ref 136–145)
SP GR UR STRIP.AUTO: 1.02 (ref 1–1.03)
T WAVE AXIS: 181 DEGREES
TRIGL SERPL-MCNC: 92 MG/DL
TROPONIN I SERPL-MCNC: 0.21 NG/ML
TROPONIN I SERPL-MCNC: 0.24 NG/ML
UROBILINOGEN UR QL STRIP.AUTO: 0.2 E.U./DL
VENTRICULAR RATE: 63 BPM
WBC # BLD AUTO: 9.92 THOUSAND/UL (ref 4.31–10.16)
WBC #/AREA URNS AUTO: ABNORMAL /HPF

## 2018-11-24 PROCEDURE — 93005 ELECTROCARDIOGRAM TRACING: CPT

## 2018-11-24 PROCEDURE — 82948 REAGENT STRIP/BLOOD GLUCOSE: CPT

## 2018-11-24 PROCEDURE — 99223 1ST HOSP IP/OBS HIGH 75: CPT | Performed by: INTERNAL MEDICINE

## 2018-11-24 PROCEDURE — 85730 THROMBOPLASTIN TIME PARTIAL: CPT | Performed by: INTERNAL MEDICINE

## 2018-11-24 PROCEDURE — 99221 1ST HOSP IP/OBS SF/LOW 40: CPT | Performed by: INTERNAL MEDICINE

## 2018-11-24 PROCEDURE — 93010 ELECTROCARDIOGRAM REPORT: CPT | Performed by: INTERNAL MEDICINE

## 2018-11-24 PROCEDURE — 81001 URINALYSIS AUTO W/SCOPE: CPT | Performed by: INTERNAL MEDICINE

## 2018-11-24 PROCEDURE — 85610 PROTHROMBIN TIME: CPT | Performed by: INTERNAL MEDICINE

## 2018-11-24 PROCEDURE — 99222 1ST HOSP IP/OBS MODERATE 55: CPT | Performed by: THORACIC SURGERY (CARDIOTHORACIC VASCULAR SURGERY)

## 2018-11-24 PROCEDURE — 80061 LIPID PANEL: CPT | Performed by: INTERNAL MEDICINE

## 2018-11-24 PROCEDURE — 84484 ASSAY OF TROPONIN QUANT: CPT | Performed by: INTERNAL MEDICINE

## 2018-11-24 PROCEDURE — 99222 1ST HOSP IP/OBS MODERATE 55: CPT | Performed by: INTERNAL MEDICINE

## 2018-11-24 PROCEDURE — 85027 COMPLETE CBC AUTOMATED: CPT | Performed by: INTERNAL MEDICINE

## 2018-11-24 PROCEDURE — 99232 SBSQ HOSP IP/OBS MODERATE 35: CPT | Performed by: HOSPITALIST

## 2018-11-24 PROCEDURE — 80048 BASIC METABOLIC PNL TOTAL CA: CPT | Performed by: INTERNAL MEDICINE

## 2018-11-24 RX ORDER — ALBUTEROL SULFATE 90 UG/1
2 AEROSOL, METERED RESPIRATORY (INHALATION) 4 TIMES DAILY PRN
Status: DISCONTINUED | OUTPATIENT
Start: 2018-11-24 | End: 2018-12-04 | Stop reason: HOSPADM

## 2018-11-24 RX ORDER — HEPARIN SODIUM 10000 [USP'U]/100ML
3-20 INJECTION, SOLUTION INTRAVENOUS
Status: DISCONTINUED | OUTPATIENT
Start: 2018-11-24 | End: 2018-11-24

## 2018-11-24 RX ORDER — ROSUVASTATIN CALCIUM 10 MG/1
40 TABLET, COATED ORAL DAILY
Status: DISCONTINUED | OUTPATIENT
Start: 2018-11-24 | End: 2018-11-26

## 2018-11-24 RX ORDER — BUDESONIDE AND FORMOTEROL FUMARATE DIHYDRATE 160; 4.5 UG/1; UG/1
2 AEROSOL RESPIRATORY (INHALATION) 2 TIMES DAILY
Status: DISCONTINUED | OUTPATIENT
Start: 2018-11-24 | End: 2018-12-04 | Stop reason: HOSPADM

## 2018-11-24 RX ORDER — LEVOTHYROXINE SODIUM 0.07 MG/1
150 TABLET ORAL
Status: DISCONTINUED | OUTPATIENT
Start: 2018-11-24 | End: 2018-12-04 | Stop reason: HOSPADM

## 2018-11-24 RX ORDER — PANTOPRAZOLE SODIUM 40 MG/1
40 TABLET, DELAYED RELEASE ORAL
Status: DISCONTINUED | OUTPATIENT
Start: 2018-11-24 | End: 2018-12-04 | Stop reason: HOSPADM

## 2018-11-24 RX ORDER — HEPARIN SODIUM 5000 [USP'U]/ML
5000 INJECTION, SOLUTION INTRAVENOUS; SUBCUTANEOUS EVERY 8 HOURS SCHEDULED
Status: DISCONTINUED | OUTPATIENT
Start: 2018-11-24 | End: 2018-12-04 | Stop reason: HOSPADM

## 2018-11-24 RX ORDER — ASPIRIN 81 MG/1
325 TABLET, CHEWABLE ORAL DAILY
Status: DISCONTINUED | OUTPATIENT
Start: 2018-11-25 | End: 2018-12-04 | Stop reason: HOSPADM

## 2018-11-24 RX ORDER — LORAZEPAM 0.5 MG/1
0.5 TABLET ORAL EVERY 8 HOURS PRN
Status: DISCONTINUED | OUTPATIENT
Start: 2018-11-24 | End: 2018-12-04 | Stop reason: HOSPADM

## 2018-11-24 RX ORDER — ACETAMINOPHEN 325 MG/1
650 TABLET ORAL EVERY 6 HOURS PRN
Status: DISCONTINUED | OUTPATIENT
Start: 2018-11-24 | End: 2018-12-04 | Stop reason: HOSPADM

## 2018-11-24 RX ORDER — HEPARIN SODIUM 1000 [USP'U]/ML
4000 INJECTION, SOLUTION INTRAVENOUS; SUBCUTANEOUS AS NEEDED
Status: DISCONTINUED | OUTPATIENT
Start: 2018-11-24 | End: 2018-11-24

## 2018-11-24 RX ORDER — TAMSULOSIN HYDROCHLORIDE 0.4 MG/1
0.4 CAPSULE ORAL
Status: DISCONTINUED | OUTPATIENT
Start: 2018-11-24 | End: 2018-12-04 | Stop reason: HOSPADM

## 2018-11-24 RX ORDER — HEPARIN SODIUM 1000 [USP'U]/ML
2000 INJECTION, SOLUTION INTRAVENOUS; SUBCUTANEOUS AS NEEDED
Status: DISCONTINUED | OUTPATIENT
Start: 2018-11-24 | End: 2018-11-24

## 2018-11-24 RX ORDER — ASPIRIN 325 MG
325 TABLET ORAL DAILY
Status: DISCONTINUED | OUTPATIENT
Start: 2018-11-24 | End: 2018-11-24 | Stop reason: SDUPTHER

## 2018-11-24 RX ORDER — NITROGLYCERIN 0.4 MG/1
0.4 TABLET SUBLINGUAL
Status: DISCONTINUED | OUTPATIENT
Start: 2018-11-24 | End: 2018-12-04 | Stop reason: HOSPADM

## 2018-11-24 RX ADMIN — METOPROLOL TARTRATE 12.5 MG: 25 TABLET ORAL at 21:45

## 2018-11-24 RX ADMIN — INSULIN LISPRO 2 UNITS: 100 INJECTION, SOLUTION INTRAVENOUS; SUBCUTANEOUS at 21:55

## 2018-11-24 RX ADMIN — INSULIN LISPRO 1 UNITS: 100 INJECTION, SOLUTION INTRAVENOUS; SUBCUTANEOUS at 12:01

## 2018-11-24 RX ADMIN — HEPARIN SODIUM AND DEXTROSE 11.1 UNITS/KG/HR: 10000; 5 INJECTION INTRAVENOUS at 02:04

## 2018-11-24 RX ADMIN — HEPARIN SODIUM 5000 UNITS: 5000 INJECTION INTRAVENOUS; SUBCUTANEOUS at 21:45

## 2018-11-24 RX ADMIN — BUDESONIDE AND FORMOTEROL FUMARATE DIHYDRATE 2 PUFF: 160; 4.5 AEROSOL RESPIRATORY (INHALATION) at 08:23

## 2018-11-24 RX ADMIN — BUDESONIDE AND FORMOTEROL FUMARATE DIHYDRATE 2 PUFF: 160; 4.5 AEROSOL RESPIRATORY (INHALATION) at 21:46

## 2018-11-24 RX ADMIN — HEPARIN SODIUM 4000 UNITS: 1000 INJECTION INTRAVENOUS; SUBCUTANEOUS at 12:00

## 2018-11-24 RX ADMIN — PANTOPRAZOLE SODIUM 40 MG: 40 TABLET, DELAYED RELEASE ORAL at 05:16

## 2018-11-24 RX ADMIN — LEVOTHYROXINE SODIUM 150 MCG: 75 TABLET ORAL at 05:16

## 2018-11-24 RX ADMIN — TAMSULOSIN HYDROCHLORIDE 0.4 MG: 0.4 CAPSULE ORAL at 16:18

## 2018-11-24 RX ADMIN — METOPROLOL TARTRATE 12.5 MG: 25 TABLET ORAL at 02:03

## 2018-11-24 RX ADMIN — METOPROLOL TARTRATE 12.5 MG: 25 TABLET ORAL at 08:23

## 2018-11-24 RX ADMIN — INSULIN LISPRO 4 UNITS: 100 INJECTION, SOLUTION INTRAVENOUS; SUBCUTANEOUS at 16:18

## 2018-11-24 NOTE — EMTALA/ACUTE CARE TRANSFER
[address] [facility] [id number]  Dept: [address] [place] [address] CONSENT    NAME Katrina Escobar   1935                              MRN [id number]    I have been informed of my rights regarding examination, treatment, and transfer   by Dr Coleen Beach MD    Benefits: Specialized equipment and/or services available at the receiving facility (Include comment)________________________    Risks: Potential for delay in receiving treatment, Potential deterioration of medical condition, Loss of IV, Increased discomfort during transfer, Possible worsening of condition or death during transfer      Consent for Transfer:  I acknowledge that my medical condition has been evaluated and explained to me by the emergency department physician or other qualified medical person and/or my attending physician, who has recommended that I be transferred to the service of  Accepting Physician: Dr Chad Quinn at [address] Name, [address] : [email or web address]  The above potential benefits of such transfer, the potential risks associated with such transfer, and the probable risks of not being transferred have been explained to me, and I fully understand them  The doctor has explained that, in my case, the benefits of transfer outweigh the risks  I agree to be transferred  I authorize the performance of emergency medical procedures and treatments upon me in both transit and upon arrival at the receiving facility  Additionally, I authorize the release of any and all medical records to the receiving facility and request they be transported with me, if possible  I understand that the safest mode of transportation during a medical emergency is an ambulance and that the Hospital advocates the use of this mode of transport   Risks of traveling to the receiving facility by car, including absence of medical control, life sustaining equipment, such as oxygen, and medical personnel has been explained to me and I fully understand them  (MARY CORRECT BOX BELOW)  [  ]  I consent to the stated transfer and to be transported by ambulance/helicopter  [  ]  I consent to the stated transfer, but refuse transportation by ambulance and accept full responsibility for my transportation by car  I understand the risks of non-ambulance transfers and I exonerate the Hospital and its staff from any deterioration in my condition that results from this refusal     X___________________________________________    DATE  18  TIME________  Signature of patient or legally responsible individual signing on patient behalf           RELATIONSHIP TO PATIENT_________________________          Provider Certification    NAME Caitlin Schulte  [facility] 1935                              MRN [id number]    A medical screening exam was performed on the above named patient  Based on the examination:    Condition Necessitating Transfer The encounter diagnosis was NSTEMI (non-ST elevated myocardial infarction) ([facility] [address] )      Patient Condition: The patient has been stabilized such that within reasonable medical probability, no material deterioration of the patient condition or the condition of the unborn child(modesta) is likely to result from the transfer    Reason for Transfer: Level of Care needed not available at this facility (catherization lab)    Transfer Requirements: Facility [place]   · Space available and qualified personnel available for treatment as acknowledged by    · Agreed to accept transfer and to provide appropriate medical treatment as acknowledged by       Dr Lory Britton  · Appropriate medical records of the examination and treatment of the patient are provided at the time of transfer   [address] _______  · Transfer will be performed by qualified personnel from    and appropriate transfer equipment as required, including the use of necessary and appropriate life support measures  Provider Certification: I have examined the patient and explained the following risks and benefits of being transferred/refusing transfer to the patient/family:  General risk, such as traffic hazards, adverse weather conditions, rough terrain or turbulence, possible failure of equipment (including vehicle or aircraft), or consequences of actions of persons outside the control of the transport personnel      Based on these reasonable risks and benefits to the patient and/or the unborn child(modesta), and based upon the information available at the time of the patients examination, I certify that the medical benefits reasonably to be expected from the provision of appropriate medical treatments at another medical facility outweigh the increasing risks, if any, to the individuals medical condition, and in the case of labor to the unborn child, from effecting the transfer      X____________________________________________ DATE 11/23/18        TIME_______      ORIGINAL - SEND TO MEDICAL RECORDS   COPY - SEND WITH PATIENT DURING TRANSFER

## 2018-11-24 NOTE — ASSESSMENT & PLAN NOTE
· With Lipitor allergy, continue home Crestor which is 40 mg daily - family may bring in from home as this is non-formulary

## 2018-11-24 NOTE — ED NOTES
PACS:    SLETS  12am  SLB P5 room 519  Accepting: Dr Alvena Meckel  [address] [facility], [address] [place] [address]  11/23/18 2571

## 2018-11-24 NOTE — ED PROVIDER NOTES
History  Chief Complaint   Patient presents with    Hypoglycemia - no symptoms     pt presents to ER stating he was at his doctors office earlier today, was told his sugar was at 5pm 29, pt states at the office he was light headed and dizzy  pt took two glucose tablets, and went up to 110  pt is currently asymptomatic        History provided by:  Patient   used: No    Hypoglycemia - no symptoms   Associated symptoms: weakness    Associated symptoms: no dizziness, no shortness of breath, no sweats and no vomiting      Pt is a 81 y/o male presnign to ED after outpatient labs shows hypoglycemia and elevated creatining  On arrival to ER pt complains of weakness today  Had intermittent left sided chest pain that has resolved  Denies f/c  Denies sob  Had thoracentesis done earlier today on the left posteriorly for recurrent pleural effusions  Did not take aspirin today  Recent bypass surgery 2 months ago     mdm cardiac eval, cxr to evaluate for pneumothorax    Small pneumothorax, too small for chest tube placement, will place pt on oxygen    Elevated trop, given aspirin and heparing gtt started,descussed with cardiology, Dr Aries Gilmore, to transfer pt to [place]      Prior to Admission Medications   Prescriptions Last Dose Informant Patient Reported? Taking?    BASAGLAR KWIKPEN 100 units/mL injection pen  Self No No   Sig: Inject 30 Units under the skin daily at bedtime   Patient taking differently: Inject 20 Units under the skin daily at bedtime     LORazepam (ATIVAN) 0 5 mg tablet  Self No No   Sig: Take 1 tablet (0 5 mg total) by mouth every 8 (eight) hours as needed for anxiety   acetaminophen (TYLENOL) 325 mg tablet  Self No No   Si mg every 4 to 6 hours as needed for pain (do not take with percocet)   albuterol (PROVENTIL HFA) 90 mcg/act inhaler  Self Yes No   Sig: Inhale 2 puffs 4 (four) times a day as needed   allopurinol (ZYLOPRIM) 300 mg tablet  Self No No   Sig: TAKE ONE TABLET BY MOUTH ONCE DAILY   ascorbic acid (VITAMIN C) 500 MG tablet  Self No No   Sig: Take 1 tablet (500 mg total) by mouth daily for 90 days   aspirin 325 mg tablet  Self No No   Sig: Take 1 tablet (325 mg total) by mouth daily   budesonide-formoterol (SYMBICORT) 160-4 5 mcg/act inhaler   No No   Sig: Inhale 2 puffs 2 (two) times a day Rinse mouth after use     docusate sodium (COLACE) 100 mg capsule  Self No No   Sig: Take 1 capsule (100 mg total) by mouth daily   Patient taking differently: Take 100 mg by mouth as needed     ferrous sulfate 325 (65 Fe) mg tablet  Self No No   Sig: Take 1 tablet (325 mg total) by mouth daily with breakfast for 90 days   gabapentin (NEURONTIN) 300 mg capsule  Self No No   Sig: TAKE ONE CAPSULE BY MOUTH ONCE DAILY AT BEDTIME   insulin aspart (NovoLOG) 100 units/mL injection  Self Yes No   Sig: Inject under the skin 3 (three) times a day before meals   insulin lispro (HUMALOG KWIKPEN) 100 units/mL injection pen  Self No No   Sig: Inject 16 units at breakfast   levothyroxine 150 mcg tablet  Self No No   Sig: Take 1 tablet (150 mcg total) by mouth daily   losartan-hydrochlorothiazide (HYZAAR) 100-25 MG per tablet   No No   Sig: TAKE 1 TABLET BY MOUTH EVERY DAY   metoprolol tartrate (LOPRESSOR) 25 mg tablet  Self No No   Sig: Take 0 5 tablets (12 5 mg total) by mouth every 12 (twelve) hours   pantoprazole (PROTONIX) 40 mg tablet  Self No No   Sig: Take 1 tablet (40 mg total) by mouth daily   polyethylene glycol (MIRALAX) 17 g packet  Self No No   Sig: Take 17 g by mouth daily   Patient taking differently: Take 17 g by mouth as needed     rosuvastatin (CRESTOR) 40 MG tablet  Self No No   Sig: Take 1 tablet (40 mg total) by mouth daily   senna-docusate sodium (SENOKOT S) 8 6-50 mg per tablet  Self No No   Sig: Take 1 tablet by mouth daily at bedtime as needed for constipation   tamsulosin (FLOMAX) 0 4 mg  Self No No   Sig: Take 1 capsule (0 4 mg total) by mouth daily with dinner Facility-Administered Medications: None       Past Medical History:   Diagnosis Date    Aortic stenosis     CKD (chronic kidney disease)     baseline Cr 1 3-1 5    Coronary artery disease     Cough     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HCC)     type 2, insulin dependent    GERD (gastroesophageal reflux disease)     Glaucoma     Gout     History of prostate cancer     Hypertension     Hypothyroidism     Overweight     Peripheral neuropathy, idiopathic     Pleural effusion, left     Pure hypercholesterolemia     LA   11/12/14   R   11/12/14        Past Surgical History:   Procedure Laterality Date    CARDIAC CATHETERIZATION      IR THORACENTESIS  10/23/2018    IR THORACENTESIS  11/2/2018    IR THORACENTESIS  11/9/2018    IR THORACENTESIS  11/23/2018    OR CABG, ARTERY-VEIN, THREE N/A 9/17/2018    Procedure: CORONARY ARTERY BYPASS GRAFT (CABG) x 4 VESSELS with LIMA - LAD, SVG/LEFT LEG EVH - LEFT PDA, OM3, & OM2;  Surgeon: Mango Richmond MD;  [facility];  Service: Cardiac Surgery    OR ECHO TRANSESOPHAG MONTR CARDIAC PUMP FUNCTJ N/A 9/17/2018    Procedure: TRANSESOPHAGEAL ECHOCARDIOGRAM (VERONICA); Surgeon: Mango Richmond MD;  [facility];  Service: Cardiac Surgery    OR RPLCMT AORTIC VALVE OPN W/STENTLESS TISSUE VALVE N/A 9/17/2018    Procedure: REPLACEMENT VALVE AORTIC (AVR)- 23mm tissue Intuity Valve;  Surgeon: Mango Richmond MD;  [facility];  Service: Cardiac Surgery    THYROID SURGERY         Family History   Problem Relation Age of Onset    Diabetes Mother     Pancreatic cancer Brother     Diabetes Maternal Grandmother     Colon cancer Son     Diabetes Family      I have reviewed and agree with the history as documented      Social History   Substance Use Topics    Smoking status: Former Smoker     Packs/day: 0 25     Years: 1 00     Types: Cigarettes     Quit date: 1970    Smokeless tobacco: Never Used      Comment: Only in the service     Alcohol use No Review of Systems   Constitutional: Negative for chills, diaphoresis and fever  HENT: Negative for congestion and sore throat  Respiratory: Negative for cough, shortness of breath, wheezing and stridor  Cardiovascular: Positive for chest pain  Negative for palpitations and leg swelling  Gastrointestinal: Negative for abdominal pain, blood in stool, diarrhea, nausea and vomiting  Genitourinary: Negative for dysuria, frequency and urgency  Musculoskeletal: Negative for neck pain and neck stiffness  Skin: Negative for pallor and rash  Neurological: Positive for weakness  Negative for dizziness, syncope, light-headedness and headaches  All other systems reviewed and are negative  Physical Exam   Physical Exam   Constitutional: He is oriented to person, place, and time  He appears well-developed and well-nourished  HENT:   Head: Normocephalic and atraumatic  Eyes: Pupils are equal, round, and reactive to light  Neck: Neck supple  Cardiovascular: Normal rate, regular rhythm, normal heart sounds and intact distal pulses  Pulmonary/Chest: Effort normal and breath sounds normal  No respiratory distress  Abdominal: Soft  Bowel sounds are normal  There is no tenderness  Musculoskeletal: Normal range of motion  He exhibits no edema, tenderness or deformity  Neurological: He is alert and oriented to person, place, and time  Skin: Skin is warm and dry  Capillary refill takes less than 2 seconds  No rash noted  No erythema  No pallor  Vitals reviewed        Vital Signs  ED Triage Vitals   Temperature Pulse Respirations Blood Pressure SpO2   11/23/18 2019 11/23/18 2019 11/23/18 2019 11/23/18 2019 11/23/18 2019   97 9 °F (36 6 °C) 88 18 143/82 98 %      Temp Source Heart Rate Source Patient Position - Orthostatic VS BP Location FiO2 (%)   11/23/18 2019 11/23/18 2019 11/23/18 2019 11/23/18 2019 --   Temporal Monitor Lying Right arm       Pain Score       11/23/18 2000       5 Vitals:    11/23/18 2200 11/23/18 2315 11/23/18 2330 11/23/18 2345   BP: 120/70 97/54 101/56 101/59   Pulse: 81 75 75    Patient Position - Orthostatic VS:           Visual Acuity  Visual Acuity      Most Recent Value   L Pupil Size (mm)  3   R Pupil Size (mm)  3          ED Medications  Medications   aspirin chewable tablet 324 mg (324 mg Oral Given 11/23/18 2145)   heparin (porcine) injection 4,000 Units (4,000 Units Intravenous Given 11/23/18 2304)       Diagnostic Studies  Results Reviewed     Procedure Component Value Units Date/Time    Magnesium [[id number]]  (Normal) Collected:  11/23/18 2051    Lab Status:  Final result Specimen:  Blood from Line, Venous Updated:  11/23/18 2330     Magnesium 2 5 mg/dL     Protime-INR [[id number]]  (Abnormal) Collected:  11/23/18 1030    Lab Status:  Final result Specimen:  Blood from Arm, Left Updated:  11/23/18 2259     Protime 15 5 (H) seconds      INR 1 31 (H)    APTT [[id number]]  (Normal) Collected:  11/23/18 1030    Lab Status:  Final result Specimen:  Blood from Arm, Left Updated:  11/23/18 2259     PTT 37 seconds     Troponin I [[id number]]  (Abnormal) Collected:  11/23/18 2051    Lab Status:  Final result Specimen:  Blood from Line, Venous Updated:  11/23/18 2125     Troponin I 0 26 (H) ng/mL     Comprehensive metabolic panel [[id number]]  (Abnormal) Collected:  11/23/18 2051    Lab Status:  Final result Specimen:  Blood from Line, Venous Updated:  11/23/18 2123     Sodium 138 mmol/L      Potassium 4 3 mmol/L      Chloride 102 mmol/L      CO2 27 mmol/L      ANION GAP 9 mmol/L      BUN 36 (H) mg/dL      Creatinine 3 52 (H) mg/dL      Glucose 131 mg/dL      Calcium 9 1 mg/dL      AST 60 (H) U/L      ALT 44 U/L      Alkaline Phosphatase 123 (H) U/L      Total Protein 7 8 g/dL      Albumin 2 3 (L) g/dL      Total Bilirubin 0 50 mg/dL      eGFR 15 ml/min/1 73sq m     Narrative:         National Kidney Disease Education Program recommendations are as follows:  GFR calculation is accurate only with a steady state creatinine  Chronic Kidney disease less than 60 ml/min/1 73 sq  meters  Kidney failure less than 15 ml/min/1 73 sq  meters  Lipase [[id number]]  (Normal) Collected:  11/23/18 2051    Lab Status:  Final result Specimen:  Blood from Line, Venous Updated:  11/23/18 2123     Lipase 74 u/L     CBC and differential [[id number]]  (Abnormal) Collected:  11/23/18 2051    Lab Status:  Final result Specimen:  Blood from Line, Venous Updated:  11/23/18 2104     WBC 9 42 Thousand/uL      RBC 3 60 (L) Million/uL      Hemoglobin 9 7 (L) g/dL      Hematocrit 32 2 (L) %      MCV 89 fL      MCH 26 9 pg      MCHC 30 1 (L) g/dL      RDW 15 9 (H) %      MPV 10 7 fL      Platelets 796 Thousands/uL      nRBC 0 /100 WBCs      Neutrophils Relative 76 (H) %      Immat GRANS % 1 %      Lymphocytes Relative 13 (L) %      Monocytes Relative 7 %      Eosinophils Relative 2 %      Basophils Relative 1 %      Neutrophils Absolute 7 24 Thousands/µL      Immature Grans Absolute 0 07 Thousand/uL      Lymphocytes Absolute 1 21 Thousands/µL      Monocytes Absolute 0 62 Thousand/µL      Eosinophils Absolute 0 22 Thousand/µL      Basophils Absolute 0 06 Thousands/µL     Fingerstick Glucose (POCT) [[id number]]  (Normal) Collected:  11/23/18 2018    Lab Status:  Final result Updated:  11/23/18 2019     POC Glucose 119 mg/dl                  XR chest 2 views   Final Result by Singh Noguera DO (11/23 2133)      Small left-sided pleural effusion with trace left-sided pneumothorax  I personally discussed this study with NANDO MUNOZ on 11/23/2018 at 9:26 PM                      Workstation performed: [id number]                    Procedures  Procedures       Phone Contacts  ED Phone Contact    ED Course  ED Course as of Nov 24 0312 Fri Nov 23, 2018   2136 Cardiology paged    2142 Patient without pain at this time    2318 Patient with an episode of nonsustained V-tach  2327 ECG at 9:08 p m   Shows rate of 81, left bundle branch block, nonspecific ST and T-waves, no scar was criteria, had white QRS on EKG in September of this year, nonspecific EKG, independently interpreted by me                          Initial Sepsis Screening     Row Name 11/24/18 0129                Is the patient's history suggestive of a new or worsening infection? No  -CE        Suspected source of infection  --        Are two or more of the following signs & symptoms of infection both present and new to the patient? No  -CE        Indicate SIRS criteria  --        If the answer is yes to both questions, suspicion of sepsis is present  --        If severe sepsis is present AND tissue hypoperfusion perists in the hour after fluid resuscitation or lactate > 4, the patient meets criteria for SEPTIC SHOCK  --        Are any of the following organ dysfunction criteria present within 6 hours of suspected infection and SIRS criteria that are NOT considered to be chronic conditions?   --        Organ dysfunction  --        Date of presentation of severe sepsis  --        Time of presentation of severe sepsis  --        Tissue hypoperfusion persists in the hour after crystalloid fluid administration, evidenced, by either:  --        Was hypotension present within one hour of the conclusion of crystalloid fluid administration?  --        Date of presentation of septic shock  --        Time of presentation of septic shock  --          User Key  (r) = Recorded By, (t) = Taken By, (c) = Cosigned By    [address] Name Provider Type    Jackie Lopez MD Physician                  MDM  CritCare Time    50 min off cc time excluding procedures    Disposition  Final diagnoses:   NSTEMI (non-ST elevated myocardial infarction) [facility])     Time reflects when diagnosis was documented in both MDM as applicable and the Disposition within this note     Time User Action Codes Description Comment    11/23/2018 10:43 PM Shelly Grimm Add [I21 4] NSTEMI (non-ST elevated myocardial infarction) [facility])       ED Disposition     ED Disposition Condition Comment    Transfer to Another [address] [place] [address] Street  should be transferred out to Brando DAVIS Documentation      Most Recent Value   Patient Condition  The patient has been stabilized such that within reasonable medical probability, no material deterioration of the patient condition or the condition of the unborn child(modesta) is likely to result from the transfer   Reason for Transfer  Level of Care needed not available at this facility [catherization lab]   Benefits of Transfer  Specialized equipment and/or services available at the receiving facility (Include comment)________________________   Risks of Transfer  Potential for delay in receiving treatment, Potential deterioration of medical condition, Loss of IV, Increased discomfort during transfer, Possible worsening of condition or death during transfer   Accepting Physician  [address] Name, Daija Majano MD  [facility]   Provider Certification  General risk, such as traffic hazards, adverse weather conditions, rough terrain or turbulence, possible failure of equipment (including vehicle or aircraft), or consequences of actions of persons outside the control of the transport personnel      RN Documentation      Most [address] Name, [address] [facility] Assignment  P5   Report Given to  Sin Melissa 69      Follow-up Information    None         Discharge Medication List as of 11/24/2018 12:14 AM      CONTINUE these medications which have NOT CHANGED    Details   acetaminophen (TYLENOL) 325 mg tablet 650 mg every 4 to 6 hours as needed for pain (do not take with percocet), No Print      albuterol (PROVENTIL HFA) 90 mcg/act inhaler Inhale 2 puffs 4 (four) times a day as needed, Historical Med      allopurinol (ZYLOPRIM) 300 mg tablet TAKE ONE TABLET BY MOUTH ONCE DAILY, Normal      ascorbic acid (VITAMIN C) 500 MG tablet Take 1 tablet (500 mg total) by mouth daily for 90 days, Starting Sat 9/22/2018, Until Fri 12/21/2018, No Print      aspirin 325 mg tablet Take 1 tablet (325 mg total) by mouth daily, Starting Sat 9/22/2018, No Print      BASAGLAR KWIKPEN 100 units/mL injection pen Inject 30 Units under the skin daily at bedtime, Starting Fri 10/5/2018, Normal      budesonide-formoterol (SYMBICORT) 160-4 5 mcg/act inhaler Inhale 2 puffs 2 (two) times a day Rinse mouth after use , Starting Fri 11/16/2018, Print      docusate sodium (COLACE) 100 mg capsule Take 1 capsule (100 mg total) by mouth daily, Starting Fri 9/21/2018, No Print      ferrous sulfate 325 (65 Fe) mg tablet Take 1 tablet (325 mg total) by mouth daily with breakfast for 90 days, Starting Sat 9/22/2018, Until Fri 12/21/2018, No Print      gabapentin (NEURONTIN) 300 mg capsule TAKE ONE CAPSULE BY MOUTH ONCE DAILY AT BEDTIME, Normal      insulin aspart (NovoLOG) 100 units/mL injection Inject under the skin 3 (three) times a day before meals, Starting Thu 10/4/2018, Historical Med      insulin lispro (HUMALOG KWIKPEN) 100 units/mL injection pen Inject 16 units at breakfast, No Print      levothyroxine 150 mcg tablet Take 1 tablet (150 mcg total) by mouth daily, Starting Tue 10/9/2018, Normal      LORazepam (ATIVAN) 0 5 mg tablet Take 1 tablet (0 5 mg total) by mouth every 8 (eight) hours as needed for anxiety, Starting Tue 10/9/2018, Normal      losartan-hydrochlorothiazide (HYZAAR) 100-25 MG per tablet TAKE 1 TABLET BY MOUTH EVERY DAY, Normal      metoprolol tartrate (LOPRESSOR) 25 mg tablet Take 0 5 tablets (12 5 mg total) by mouth every 12 (twelve) hours, Starting Tue 10/9/2018, Normal      pantoprazole (PROTONIX) 40 mg tablet Take 1 tablet (40 mg total) by mouth daily, Starting Tue 10/9/2018, Normal      polyethylene glycol (MIRALAX) 17 g packet Take 17 g by mouth daily, Starting Sat 9/22/2018, No Print      rosuvastatin (CRESTOR) 40 MG tablet Take 1 tablet (40 mg total) by mouth daily, Starting Tue 10/9/2018, Normal      senna-docusate sodium (SENOKOT S) 8 6-50 mg per tablet Take 1 tablet by mouth daily at bedtime as needed for constipation, Starting Fri 9/21/2018, No Print      tamsulosin (FLOMAX) 0 4 mg Take 1 capsule (0 4 mg total) by mouth daily with dinner, Starting Tue 10/9/2018, Normal           No discharge procedures on file      ED Provider  Electronically Signed by           Terence Shepherd MD  11/24/18 9116

## 2018-11-24 NOTE — PROGRESS NOTES
POST-ADMIT CHECK    Progress Note - Zia Sanchez 1935, 80 y o  male MRN: [id number]    Unit/Bed#: [facility] 860-86 Encounter: [id number]    Primary Care Provider: Marcello Florentino DO   Date and time admitted to hospital: 11/24/2018 12:39 AM    * NSTEMI (non-ST elevated myocardial infarction) [facility])   Assessment & Plan    · Cardiology following  I discussed with them today and appreciate their recommendations  · Per Cards this represents a Type 2 NSTEMI in the setting of ADAM and hypoglycemia  · Heparin GTT was discontinued per Cards recs  · Troponins were trended as follows: 0 26, 0 24, 0 21         Acute renal failure superimposed on stage 3 chronic kidney disease (HCC)   Assessment & Plan    · Baseline creatinine 1 3-1 5  · Creatinine on admission 3 5, improved to 3 1 today  · Will appreciate Nephrology consultation  · Losartan-HCTZ on hold     Type 2 diabetes mellitus with hypoglycemia without coma, with long-term current use of insulin ([facility] [address] )   Assessment & Plan    · With recurrent hypotension as outpatient for which is home insulin regimen was being decreased  · Patient with reported poor PO intake but admits to continuing to take his home insulin regimen  · Glucose noted to be 29 on yesterday's BMP  · Patient is no longer NPO - placed on cardiac and carb-controlled diet  Continue weight-based SSI coverage with meals and lower dose at bedtime for now  · Hypoglycemia protocol ordered  · Continue to monitor sugars and adjust insulin as needed       Recurrent left pleural effusion   Assessment & Plan    · This has been since his recent CABG/AVR  · He follows with Dr Akshat Michael of Thoracic Surgery as outpatient  · He has had multiple thoracentesis   Per Thoracic surgery notes, patient had elected to proceed with pleurodesis (scheduled for yesterday, 11/23/18) over Pleur-X cath, however this was not performed - rather, the patient had an IR thoracentesis with 1,300mL out  · Appreciate Thoracic Surgery recommendations Pneumothorax   Assessment & Plan    · CXR s/p yesterday thoracentesis showed trace left-sided pneumothorax which was too small for chest tube placement  · Setting well on 2L NC O2  · Appreciate Thoracic Surgery consult     CAD (coronary artery disease)   Assessment & Plan    · S/p CABG x4 in September of this year  · On daily aspirin, statin, and BB  · Patient on Crestor at home (due to reported Lipitor allergy)  Family may bring in from home as this is non-formulary here     Aortic stenosis   Assessment & Plan    · S/p AVR ([facility]) in September of this year     Hypothyroidism   Assessment & Plan    · TSH was low in September with normal fT4  · Continue levothyroxine  · Check TFT     Benign prostatic hyperplasia with nocturia   Assessment & Plan    · Continue Flomax     Pure hypercholesterolemia   Assessment & Plan    · With Lipitor allergy, continue home Crestor which is 40 mg daily - family may bring in from home as this is non-formulary     Benign essential hypertension   Assessment & Plan    · Losartan-HCTZ on hold given ADAM  · Continue BB       VTE Pharmacologic Prophylaxis:   Pharmacologic: Heparin - heparin GTT was discontinued per Cardiologist's recommendations  SC heparin ordered to begin in 8 hours   Mechanical VTE Prophylaxis in Place: Yes    Patient Centered Rounds: I have performed bedside rounds with nursing staff today  Sue    Discussions with Specialists or Other Care Team Provider: Dr Jordy Ricci, Cardiology CRNP    Education and Discussions with Family / Patient: Patient    Time Spent for Care: 30 minutes  More than 50% of total time spent on counseling and coordination of care as described above  Current Length of Stay: 0 day(s)    Current Patient Status: Inpatient   Certification Statement: The patient will continue to require additional inpatient hospital stay due to plan as per above    Discharge Plan: not medically stable for d/c at this time   Anticipate will need a few more days     Code Status: Level 1 - Full Code      Subjective:   Mr Juan Carlos Mojica reports some discomfort around his thoracentesis site but otherwise denies complaint    Objective:     Vitals:   Temp (24hrs), Av 2 °F (36 8 °C), Min:97 9 °F (36 6 °C), Max:98 5 °F (36 9 °C)    Temp:  [97 9 °F (36 6 °C)-98 5 °F (36 9 °C)] 98 5 °F (36 9 °C)  HR:  [65-88] 65  Resp:  [16-20] 16  BP: ()/(54-82) 142/72  SpO2:  [97 %-100 %] 97 %  There is no height or weight on file to calculate BMI  Input and Output Summary (last 24 hours): Intake/Output Summary (Last 24 hours) at 18 1347  Last data filed at 18 1204   Gross per 24 hour   Intake            86 02 ml   Output              450 ml   Net          -363 98 ml       Physical Exam:     Physical Exam   Constitutional: He is oriented to person, place, and time  No distress  Patient seen sitting upright in bed with his nurse and Dr Mariama Duran  He is pleasant and cooperative   Eyes: Pupils are equal, round, and reactive to light  EOM are normal    Cardiovascular: Normal rate and regular rhythm  Pulmonary/Chest: Effort normal  No respiratory distress  He has no wheezes  He exhibits no tenderness  Decreased lung sounds at left base  satting well on 2L NC O2   Abdominal: Soft  Bowel sounds are normal  There is no tenderness  Musculoskeletal: He exhibits no edema  Neurological: He is alert and oriented to person, place, and time  Without his upper teeth plate in, patient talks out of the right side of his mouth more than the left, however when compared to the patient's picture in his chart this is his baseline resting face  Tongue is midline  His eyebrows raise equally b/l and his speech is clear  His speech is at his baseline per both the patient and his son whom I spoke with over the phone  He reports equal and intact sensation to light touch on face x3, UE and LE b/l   Shoulder shrug,  strength elbow flexion and extension 5/5 bl  B/l hip flexion 4+/5 b/l  Foot dorsiflexion and extension 5/5 b/l  Skin: Skin is warm  Psychiatric: He has a normal mood and affect  His behavior is normal    Vitals reviewed  Additional Data:     Labs:      Results from last 7 days  Lab Units 11/24/18  0159 11/23/18  2051   WBC Thousand/uL 9 92 9 42   HEMOGLOBIN g/dL 9 3* 9 7*   HEMATOCRIT % 30 7* 32 2*   PLATELETS Thousands/uL 390 275   NEUTROS PCT %  --  76*   LYMPHS PCT %  --  13*   MONOS PCT %  --  7   EOS PCT %  --  2       Results from last 7 days  Lab Units 11/24/18  0505 11/23/18 2051   SODIUM mmol/L 138 138   POTASSIUM mmol/L 3 8 4 3   CHLORIDE mmol/L 108 102   CO2 mmol/L 23 27   BUN mg/dL 32* 36*   CREATININE mg/dL 3 15* 3 52*   ANION GAP mmol/L 7 9   CALCIUM mg/dL 7 9* 9 1   ALBUMIN g/dL  --  2 3*   TOTAL BILIRUBIN mg/dL  --  0 50   ALK PHOS U/L  --  123*   ALT U/L  --  44   AST U/L  --  60*   GLUCOSE RANDOM mg/dL 79 131       Results from last 7 days  Lab Units 11/24/18  0159   INR  1 36*       Results from last 7 days  Lab Units 11/24/18  1140 11/24/18  0656 11/24/18  0154 11/23/18  2018   POC GLUCOSE mg/dl 182* 92 83 119       Results from last 7 days  Lab Units 11/20/18  0959   HEMOGLOBIN A1C % of total Hgb 6 5*               * I Have Reviewed All Lab Data Listed Above  * Additional Pertinent Lab Tests Reviewed:  All Labs Within Last 24 Hours Reviewed    Imaging:    Imaging Reports Reviewed Today Include: CXR  Imaging Personally Reviewed by Myself Includes:  None    Recent Cultures (last 7 days):           Last 24 Hours Medication List:     Current Facility-Administered Medications:  acetaminophen 650 mg Oral Q6H PRN J Carlos Ware MD   albuterol 2 puff Inhalation 4x Daily PRN J Carlos Ware MD   [START ON 11/25/2018] aspirin 325 mg Oral Daily J Carlos Ware MD   budesonide-formoterol 2 puff Inhalation BID J Carlos Ware MD   insulin lispro 1-5 Units Subcutaneous HS Ricky Munroe PA-C   insulin lispro 1-6 Units Subcutaneous TID AC EDWIN Reynolds-C levothyroxine 150 mcg Oral Early Morning Aye Tijerina MD   LORazepam 0 5 mg Oral Q8H PRN Aye Tijerina MD   metoprolol tartrate 12 5 mg Oral Q12H Bam Smallwood MD   morphine injection 2 mg Intravenous Q3H PRN Aye Tijerina MD   nitroglycerin 0 4 mg Sublingual Q5 Min PRN Aye Tijerina MD   pantoprazole 40 mg Oral Early Morning Aye Tijerina MD   rosuvastatin 40 mg Oral Daily Aye Tijerina MD   tamsulosin 0 4 mg Oral Daily With Margie Andersen MD        Today, Patient Was Seen By: Sravani Edouard PA-C    ** Please Note: Dictation voice to text software may have been used in the creation of this document   **

## 2018-11-24 NOTE — ASSESSMENT & PLAN NOTE
· With recurrent hypotension as outpatient for which is home insulin regimen was being decreased  · Patient with reported poor PO intake but admits to continuing to take his home insulin regimen  · Glucose noted to be 29 on yesterday's BMP  · Patient is no longer NPO - placed on cardiac and carb-controlled diet   Continue weight-based SSI coverage with meals and lower dose at bedtime for now  · Hypoglycemia protocol ordered  · Continue to monitor sugars and adjust insulin as needed

## 2018-11-24 NOTE — UTILIZATION REVIEW
Initial Clinical Review    TRANSFER  FROM [facility]    Admission: Date/Time/Statement: 11/24/18 @ 0039     Orders Placed This Encounter   Procedures    Inpatient Admission     Standing Status:   Standing     Number of Occurrences:   1     Order Specific Question:   Admitting Physician     Answer:   Alanis Santos [[id number]]     Order Specific Question:   Level of Care     Answer:   Med Surg [[id number]]     Order Specific Question:   Estimated length of stay     Answer:   More than 2 Midnights     Order Specific Question:   Certification     Answer:   I certify that inpatient services are medically necessary for this patient for a duration of greater than two midnights  See H&P and MD Progress Notes for additional information about the patient's course of treatment  Chief Complaint: No chief complaint on file  History of Illness: Julio Linton Jr  is a 80 y  o  male who presents with a complaint of symptomatic hypoglycemia   Patient has had poor p o   Intake for the past week or so, but continues to take his home insulin at its normal regimen to dose   Today, he suffered an episode of symptomatic hypoglycemia with lightheadedness without loss of consciousness which was treated in the emergency department   Patient no longer has symptoms of hypoglycemia feels well and his BGM responded appropriately to treatment   However, during the workup the patient complaint of chest pain in the left chest wall   EKG was unchanged, however cardiac enzymes came back positive and the patient will be admitted for non ST elevation MI   Patient has a history of coronary artery bypass grafting, as well as recurrent pleural effusion   Patient has been tapped 4 times for this pleural effusion he reports that needs to be once weekly, last tap was today   His chest pain was in the left chest wall without radiation without associated symptoms of nausea vomiting diaphoresis or dyspnea   Patient currently denies chest pain shortness of breath   Also denies fevers chills, however when the pleural effusion gather she does developed a nonproductive cough that he is currently not complaining of       ED Vital Signs:   ED Triage Vitals [11/24/18 0048]   Temperature Pulse Respirations Blood Pressure SpO2   98 3 °F (36 8 °C) 77 18 135/75 97 %      Temp Source Heart Rate Source Patient Position - Orthostatic VS BP Location FiO2 (%)   Oral -- Lying Right arm --      Pain Score       No Pain        Wt Readings from Last 1 Encounters:   11/23/18 99 8 kg (220 lb)       Vital Signs (abnormal):    above    Abnormal Labs/Diagnostic Test Results:   H/H   9 3/30 7  PT    16 9      INR   1 36  BUN/Creat    36/3 52  Albumin  2 3  Troponin   0 26    0 24      Past Medical/Surgical History:    Active Ambulatory Problems     Diagnosis Date Noted    Benign prostatic hyperplasia with nocturia 02/13/2018    Benign essential hypertension 11/05/2014    Dyslipidemia, goal LDL below 100 06/28/2016    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    Hypothyroidism 11/05/2014    Mild intermittent asthma without complication 69/50/2313    Obesity 11/05/2014    Pure hypercholesterolemia 11/12/2014    Renal cyst 01/12/2017    Sexual dysfunction 10/09/2017    Type 2 diabetes mellitus with hypoglycemia without coma, with long-term current use of insulin ([address] ) 11/05/2014    Aortic stenosis, moderate 04/06/2018    Coronary artery disease involving native coronary artery of native heart 05/15/2018    Nonrheumatic aortic valve stenosis 08/23/2018    GERD (gastroesophageal reflux disease)     S/P CABG x 4 09/17/2018    S/P AVR (aortic valve replacement) 09/17/2018    Encounter for postoperative care 10/18/2018    Cough 10/18/2018    Recurrent left pleural effusion 11/15/2018     Resolved Ambulatory Problems     Diagnosis Date Noted    Acute pain of right knee 01/09/2018    Abnormal cardiovascular stress test 05/15/2018    Acute postoperative pulmonary insufficiency ([address] ) 09/17/2018     Past Medical History:   Diagnosis Date    Aortic stenosis     CKD (chronic kidney disease)     Coronary artery disease     Cough     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HCC)     GERD (gastroesophageal reflux disease)     Glaucoma     Gout     History of prostate cancer     Hypertension     Hypothyroidism     Overweight     Peripheral neuropathy, idiopathic     Pleural effusion, left     Pure hypercholesterolemia        Admitting Diagnosis: NSTEMI (non-ST elevated myocardial infarction) ([place] [address] ) [I21 4]    Age/Sex: 80 y o  male    Assessment/Plan:   NSTEMI (non-ST elevated myocardial infarction) ([place] [address] )   Assessment & Plan     · NPO  · Cycle cardiac enzymes  · Telemetry monitoring  · Cardiology consult  · Heparin drip  · Patient already received 325 aspirin prior to arrival  · Nitroglycerin and morphine p r n  Chest pain     Recurrent left pleural effusion   Assessment & Plan     · Patient was tapped today with a thoracentesis that was therapeutic  · Monitor O2 sat O2 by nasal cannula p r n  To maintain sat 92% or greater by pulse oximetry  · Patient reports he needs to be tapped Q weekly         Type 2 diabetes mellitus ([place] [address] )   Assessment & Plan     · With an episode of hypoglycemia in the setting of continuing home insulin dosing despite poor p o  Intake  · Hold home insulin dosing  · Sliding scale coverage q 6 hours BGM while NPO            Pure hypercholesterolemia   Assessment & Plan     · With Lipitor allergy, continue home Crestor which is 40 mg daily  · Check fasting lipid profile in morning     Hypothyroidism   Assessment & Plan     · Clinically euthyroid  · Continue Synthroid at current home dose      Benign prostatic hyperplasia with nocturia   Assessment & Plan     · Continue Flomax  · Monitor I/O  · Bladder scan p r n  For possible retention        Anticipated Length of Stay: Lisa Cohn will be admitted on an Inpatient basis with an anticipated length of stay of  greater than 2 midnights    Justification for Hospital Stay:  Non ST elevation MI      Admission Orders:   IP  11/24  @   0136  Scheduled Meds:   Current Facility-Administered Medications:  acetaminophen 650 mg Oral Q6H PRN Bryan Gamez, MD   albuterol 2 puff Inhalation 4x Daily PRN Bryan aGmez MD   [START ON 11/25/2018] aspirin 325 mg Oral Daily Bryan Gamez, MD   budesonide-formoterol 2 puff Inhalation BID Bryan Gamez MD   heparin (porcine) 5,000 Units Subcutaneous [facility] Danika Ponce PA-C   insulin lispro 1-5 Units Subcutaneous HS Danika Ponce PA-C   insulin lispro 1-6 Units Subcutaneous TID AC Danika Ponce PA-C   levothyroxine 150 mcg Oral Early Morning Bryan Gamez, MD   LORazepam 0 5 mg Oral Q8H PRN Bryan Gamez, MD   metoprolol tartrate 12 5 mg Oral Q12H Juan Francisco Marie MD   morphine injection 2 mg Intravenous Q3H PRN Bryan Gamez, MD   nitroglycerin 0 4 mg Sublingual Q5 Min PRN Bryan Gamez, MD   pantoprazole 40 mg Oral Early Morning Bryan Gamez, MD   rosuvastatin 40 mg Oral Daily Bryansummer Gamez MD   tamsulosin 0 4 mg Oral Daily With Prema Eden MD     Continuous Infusions:    PRN Meds:   acetaminophen    albuterol    LORazepam    morphine injection    nitroglycerin     Tele  Cons cardiology  Cons  C/T  Surgery  Cons nephrology  CCD diet    Per  Cardiology  Consult:  NSTEMI likely type II in the setting of ADAM and hypoglycemia   Troponin 0 23/0 24/0 21  Reviewed ECG   Currently on heparin gtt  Will likely discontinue  Will d/w attending       2  CAD s/p CABG x 4 LIMA to LAD, SVG to OM2, SVG to OM 3 and LPDA 9/17/18  Continue aspirin, statin and BB      3  ADAM- creatinine 3 15 today  3 5 on admission   D/w primary team  Nephrology consult pending   Baseline appears 1 4-1  5     4  Recurrent L pleural effusion-   Followed by thoracic surgery as outpatient   Consult pending   S/p thoracentesis yesterday removing 1300 ml  Chest xray reviewed from 11/23/18 showing small L sided pleural effusion with trace L sided pneumothorax    [address] Utilization Review Department  Phone: [phone]; Fax [phone]  [email or web address]  ATTENTION: Please call with any questions or concerns to [phone]  and carefully listen to the prompts so that you are directed to the right person  Send all requests for admission clinical reviews, approved or denied determinations and any other requests to fax [phone]   All voicemails are confidential

## 2018-11-24 NOTE — ASSESSMENT & PLAN NOTE
· This has been since his recent CABG/AVR  · He follows with Dr Valerie Acevedo of Thoracic Surgery as outpatient  · He has had multiple thoracentesis   Per Thoracic surgery notes, patient had elected to proceed with pleurodesis (scheduled for yesterday, 11/23/18) over Pleur-X cath, however this was not performed - rather, the patient had an IR thoracentesis with 1,300mL out  · Appreciate Thoracic Surgery recommendations

## 2018-11-24 NOTE — ASSESSMENT & PLAN NOTE
· Baseline creatinine 1 3-1 5  · Creatinine on admission 3 5, improved to 3 1 today  · Will appreciate Nephrology consultation  · Losartan-HCTZ on hold

## 2018-11-24 NOTE — ASSESSMENT & PLAN NOTE
· S/p CABG x4 in September of this year  · On daily aspirin, statin, and BB  · Patient on Crestor at home (due to reported Lipitor allergy)   Family may bring in from home as this is non-formulary here

## 2018-11-24 NOTE — CONSULTS
Consultation - Nephrology   Teodoro Merino  80 y o  male MRN: [id number]  Unit/Bed#: [facility] 978-64 Encounter: [id number]      Assessment/Plan:  1  Acute kidney injury, suspecting some degree of intravascular volume depletion, would hold diuretics  Also could be component of impaired renal autoregulation given angiotensin receptor blocker  2  Chronic kidney disease, baseline creatinine 1 3-1 6 although last discharge creatinine was noted be 1 8  3  Recurrent left effusion, status post thoracentesis, anticipated pleurodesis  4  Aortic stenosis with recent AVR  5  Hypoglycemia, improved  6  Multivessel coronary disease, status post CABG    Plan:  · Would continue to hold angiotensin receptor blocker as well as hydrochlorothiazide  · Volume status appears stable  · Renal function seems to be improving  · Check urinalysis with microscopy  · Bladder scan    History of Present Illness   Physician Requesting Consult: Villa Whitehead MD  Reason for Consult / Principal Problem:  Acute kidney injury    HPI: Teodoro Merino  is a 80y o  year old male who presents with hypoglycemia  Patient is an 79-year-old male who presents to [facility] with complaints of symptomatic hypoglycemia  According to the patient over last week or so his appetite has been poor  Some episodes of nausea although no vomiting or diarrhea  Has been taking all of his medications as prescribed  Recently had a left thoracentesis and was to be scheduled for possible pleurodesis  Currently patient feels fairly comfortable, denies any current chest pain or shortness of breath  Denies any abdominal pain  No current nausea vomiting diarrhea  States that his appetite is improving  Denies any significant or worsening lower extremity swelling  He does have occasional dizziness and lightheadedness    History obtained from chart review and the patient    Review of Systems    Review of Systems: pertinent findings as noted above otherwise negative    Historical Information   Patient Active Problem List   Diagnosis    Benign prostatic hyperplasia with nocturia    Benign essential hypertension    Dyslipidemia, goal LDL below 100    Gout with tophus    Hypothyroidism    Mild intermittent asthma without complication    Obesity    Pure hypercholesterolemia    Renal cyst    Sexual dysfunction    Type 2 diabetes mellitus with hypoglycemia without coma, with long-term current use of insulin (HCC)    Aortic stenosis, moderate    Coronary artery disease involving native coronary artery of native heart    Nonrheumatic aortic valve stenosis    GERD (gastroesophageal reflux disease)    S/P CABG x 4    S/P AVR (aortic valve replacement)    Encounter for postoperative care    Cough    Recurrent left pleural effusion    NSTEMI (non-ST elevated myocardial infarction) ([facility] [address] )    CAD (coronary artery disease)    Aortic stenosis    Acute renal failure superimposed on stage 3 chronic kidney disease (HCC)    Pneumothorax     Past Medical History:   Diagnosis Date    Aortic stenosis     CKD (chronic kidney disease)     baseline Cr 1 3-1 5    Coronary artery disease     Cough     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HCC)     type 2, insulin dependent    GERD (gastroesophageal reflux disease)     Glaucoma     Gout     History of prostate cancer     Hypertension     Hypothyroidism     Overweight     Peripheral neuropathy, idiopathic     Pleural effusion, left     Pure hypercholesterolemia     LA   11/12/14   R   11/12/14      Past Surgical History:   Procedure Laterality Date    CARDIAC CATHETERIZATION      IR THORACENTESIS  10/23/2018    IR THORACENTESIS  11/2/2018    IR THORACENTESIS  11/9/2018    IR THORACENTESIS  11/23/2018    SC CABG, ARTERY-VEIN, THREE N/A 9/17/2018    Procedure: CORONARY ARTERY BYPASS GRAFT (CABG) x 4 VESSELS with LIMA - LAD, SVG/LEFT LEG EVH - LEFT PDA, OM3, & OM2;  Surgeon: Denis Vences MD;  [facility]: [facility];  Service: Cardiac Surgery    SD ECHO TRANSESOPHAG MONTR CARDIAC PUMP FUNCTJ N/A 9/17/2018    Procedure: TRANSESOPHAGEAL ECHOCARDIOGRAM (VERONICA);   Surgeon: Beni Eng MD;  [facility]: [facility];  Service: Cardiac Surgery    SD RPLCMT AORTIC VALVE OPN W/STENTLESS TISSUE VALVE N/A 9/17/2018    Procedure: REPLACEMENT VALVE AORTIC (AVR)- 23mm tissue Intuity Valve;  Surgeon: Beni Eng MD;  [facility];  Service: Cardiac Surgery    THYROID SURGERY       Social History   History   Alcohol Use No     History   Drug Use No     History   Smoking Status    Former Smoker    Packs/day: 0 25    Years: 1 00    Types: Cigarettes    Quit date: 1970   Smokeless Tobacco    Never Used     Comment: Only in the service      Family History   Problem Relation Age of Onset    Diabetes Mother     Pancreatic cancer Brother     Diabetes Maternal Grandmother     Colon cancer Son     Diabetes Family        Meds/Allergies   current meds:   Current Facility-Administered Medications   Medication Dose Route Frequency    acetaminophen (TYLENOL) tablet 650 mg  650 mg Oral Q6H PRN    albuterol (PROVENTIL HFA,VENTOLIN HFA) inhaler 2 puff  2 puff Inhalation 4x Daily PRN    [START ON 11/25/2018] aspirin chewable tablet 325 mg  325 mg Oral Daily    budesonide-formoterol (SYMBICORT) 160-4 5 mcg/act inhaler 2 puff  2 puff Inhalation BID    heparin (porcine) subcutaneous injection 5,000 Units  5,000 Units Subcutaneous Q8H [facility]    insulin lispro (HumaLOG) 100 units/mL subcutaneous injection 1-5 Units  1-5 Units Subcutaneous HS    insulin lispro (HumaLOG) 100 units/mL subcutaneous injection 1-6 Units  1-6 Units Subcutaneous TID AC    levothyroxine tablet 150 mcg  150 mcg Oral Early Morning    LORazepam (ATIVAN) tablet 0 5 mg  0 5 mg Oral Q8H PRN    metoprolol tartrate (LOPRESSOR) partial tablet 12 5 mg  12 5 mg Oral Q12H [facility]    morphine injection 2 mg  2 mg Intravenous Q3H PRN    nitroglycerin (NITROSTAT) SL tablet 0 4 mg  0 4 mg Sublingual Q5 Min PRN    pantoprazole (PROTONIX) EC tablet 40 mg  40 mg Oral Early Morning    rosuvastatin (CRESTOR) tablet 40 mg  40 mg Oral Daily    tamsulosin (FLOMAX) capsule 0 4 mg  0 4 mg Oral Daily With Dinner       Allergies   Allergen Reactions    Amlodipine Nausea Only    Atorvastatin Myalgia         Objective   /72   Pulse 65   Temp 98 5 °F (36 9 °C)   Resp 16   SpO2 97%     Intake/Output Summary (Last 24 hours) at 11/24/18 1501  Last data filed at 11/24/18 1414   Gross per 24 hour   Intake           104 31 ml   Output              650 ml   Net          -545 69 ml       Current Weight:      Physical Exam   Constitutional: No distress  HENT:   Head: Normocephalic  Eyes: No scleral icterus  Neck: Neck supple  Cardiovascular: Normal rate and regular rhythm  Pulmonary/Chest: Effort normal and breath sounds normal    Abdominal: Soft  He exhibits no distension  Musculoskeletal: He exhibits no edema  Neurological: He is alert  Skin: Skin is warm and dry  Psychiatric: He has a normal mood and affect           Lab Results:      Results from last 7 days  Lab Units 11/24/18  0159   WBC Thousand/uL 9 92   HEMOGLOBIN g/dL 9 3*   HEMATOCRIT % 30 7*   PLATELETS Thousands/uL 390       Results from last 7 days  Lab Units 11/24/18  0505   POTASSIUM mmol/L 3 8   CHLORIDE mmol/L 108   CO2 mmol/L 23   BUN mg/dL 32*   CREATININE mg/dL 3 15*   CALCIUM mg/dL 7 9*

## 2018-11-24 NOTE — CONSULTS
Consultation - Cardiology Team One  Keny Castillo  80 y o  male MRN: [id number]  Unit/Bed#: [facility] 806-03 Encounter: [id number]    Inpatient consult to Cardiology  Consult performed by: Cholo Hercules ordered by: Alanis Santos      Physician Requesting Consult: Aurora Hein MD  Reason for Consult / Principal Problem: NSTEMI     Assessment/ Plan    1  NSTEMI likely type II in the setting of ADAM and hypoglycemia   Troponin 0 23/0 24/0 21  Reviewed ECG   Currently on heparin gtt  Will likely discontinue  Will d/w attending      2  CAD s/p CABG x 4 LIMA to LAD, SVG to OM2, SVG to OM 3 and LPDA 9/17/18  Continue aspirin, statin and BB     3  ADAM- creatinine 3 15 today  3 5 on admission   D/w primary team  Nephrology consult pending   Baseline appears 1 4-1 5    4  Recurrent L pleural effusion-   Followed by thoracic surgery as outpatient  Consult pending   S/p thoracentesis yesterday removing 1300 ml  Chest xray reviewed from 11/23/18 showing small L sided pleural effusion with trace L sided pneumothorax  5  Hypoglycemia- blood glucose 29 yesterday  Followed by primary team      6  Aortic stenosis s/p AVR 9/17/18    7  Hypertension- BP currently stable: 117/60  Patient on losartan/HCTz at home  Currently on hold due to ADAM  Continue metoprolol     History of Present Illness   HPI: Keny Castillo  is a 80y o  year old male who has a history of multivessel CAD status post CABG x4 lima to LAD, SVG to OM2, SVG to OM 3 and LPDA, aortic stenosis status post bioprosthetic aortic valve, type II diabetes and recurrent L pleural effusion s/p multiple thoracentesis followed by thoracic surgery  He  follows with cardiologist Dr Leonid Bartholomew  He presented to [facility] for hypoglycermia and ADAM  He was at a doctor's appointment today for follow up and had follow up blood work  Blood glucose was 29 and creatinine 3 57  He was told to come into ER   Patient also had thoracentesis yesterday for recurrent pleural effusions with removal of 1 3 L  He was scheduled to have pleurodesis but cancelled due to ADAM  Cardiology consulted for elevated troponin 0 23/0 24/0  21  ECG showed NSR with incomplete LBBB and non specific ST and T wave abnormality  Patient is s/p CABG and AVR 9/17/18  He had no complications and discharged home 9/21/18  He has been following up in cardiac rehab  He reports feeling very tired and poor appetite for the past week  He denies chest pain at rest or with exertion  He does report L sided pleuritic pain at site of thoracentesis  VERONICA 9/17/18 showed EF 60% and severely calcified aortic valve with moderate AS  EKG reviewed personally:  Normal sinus rhythm with incomplete LBBB and non specific ST and T wave abnormality  Ventricular rate 63 bpm  AR interval 178 ms  QRSD interval 120 ms  QT interval 424 ms  QTc interval 433 ms     Telemetry reviewed personally:   Normal sinus rhythm HR 60  No events on current telemetry     Review of Systems   Constitution: Positive for decreased appetite, weakness and malaise/fatigue  Negative for chills and fever  Cardiovascular: Positive for leg swelling  Negative for chest pain, dyspnea on exertion and orthopnea  Respiratory: Negative for shortness of breath  Gastrointestinal: Negative for nausea and vomiting  Neurological: Negative for dizziness  Psychiatric/Behavioral: Negative for altered mental status  Historical Information   Past Medical History:   Diagnosis Date    Aortic stenosis     CKD (chronic kidney disease)     baseline Cr 1 3-1 5    Coronary artery disease     Cough     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HCC)     type 2, insulin dependent    GERD (gastroesophageal reflux disease)     Glaucoma     Gout     History of prostate cancer     Hypertension     Hypothyroidism     Overweight     Peripheral neuropathy, idiopathic     Pleural effusion, left     Pure hypercholesterolemia     LA   11/12/14   R   11/12/14 Past Surgical History:   Procedure Laterality Date    CARDIAC CATHETERIZATION      IR THORACENTESIS  10/23/2018    IR THORACENTESIS  11/2/2018    IR THORACENTESIS  11/9/2018    IR THORACENTESIS  11/23/2018    HI CABG, ARTERY-VEIN, THREE N/A 9/17/2018    Procedure: CORONARY ARTERY BYPASS GRAFT (CABG) x 4 VESSELS with LIMA - LAD, SVG/LEFT LEG EVH - LEFT PDA, OM3, & OM2;  Surgeon: Kalli Strickland MD;  [facility];  Service: Cardiac Surgery    HI ECHO TRANSESOPHAG MONTR CARDIAC PUMP FUNCTJ N/A 9/17/2018    Procedure: TRANSESOPHAGEAL ECHOCARDIOGRAM (VERONICA);   Surgeon: Kalli Strickland MD;  [facility];  Service: Cardiac Surgery    HI RPLCMT AORTIC VALVE OPN W/STENTLESS TISSUE VALVE N/A 9/17/2018    Procedure: REPLACEMENT VALVE AORTIC (AVR)- 23mm tissue Intuity Valve;  Surgeon: Kalli Strickland MD;  [facility];  Service: Cardiac Surgery    THYROID SURGERY       History   Alcohol Use No     History   Drug Use No     History   Smoking Status    Former Smoker    Packs/day: 0 25    Years: 1 00    Types: Cigarettes    Quit date: 1970   Smokeless Tobacco    Never Used     Comment: Only in the service      Family History:   Family History   Problem Relation Age of Onset    Diabetes Mother     Pancreatic cancer Brother     Diabetes Maternal Grandmother     Colon cancer Son     Diabetes Family        Meds/Allergies   all current active meds have been reviewed and current meds:   Current Facility-Administered Medications   Medication Dose Route Frequency    acetaminophen (TYLENOL) tablet 650 mg  650 mg Oral Q6H PRN    albuterol (PROVENTIL HFA,VENTOLIN HFA) inhaler 2 puff  2 puff Inhalation 4x Daily PRN    [START ON 11/25/2018] aspirin chewable tablet 325 mg  325 mg Oral Daily    budesonide-formoterol (SYMBICORT) 160-4 5 mcg/act inhaler 2 puff  2 puff Inhalation BID    heparin (porcine) 25,000 units in 250 mL infusion (premix)  3-20 Units/kg/hr (Order-Specific) Intravenous Titrated  heparin (porcine) injection 2,000 Units  2,000 Units Intravenous PRN    heparin (porcine) injection 4,000 Units  4,000 Units Intravenous PRN    insulin lispro (HumaLOG) 100 units/mL subcutaneous injection 1-6 Units  1-6 Units Subcutaneous Q6H [facility]    levothyroxine tablet 150 mcg  150 mcg Oral Early Morning    LORazepam (ATIVAN) tablet 0 5 mg  0 5 mg Oral Q8H PRN    metoprolol tartrate (LOPRESSOR) partial tablet 12 5 mg  12 5 mg Oral Q12H [facility]    morphine injection 2 mg  2 mg Intravenous Q3H PRN    nitroglycerin (NITROSTAT) SL tablet 0 4 mg  0 4 mg Sublingual Q5 Min PRN    pantoprazole (PROTONIX) EC tablet 40 mg  40 mg Oral Early Morning    rosuvastatin (CRESTOR) tablet 40 mg  40 mg Oral Daily    tamsulosin (FLOMAX) capsule 0 4 mg  0 4 mg Oral Daily With Dinner       heparin (porcine) 3-20 Units/kg/hr (Order-Specific) Last Rate: 9 1 Units/kg/hr (11/24/18 0402)       Allergies   Allergen Reactions    Amlodipine Nausea Only    Atorvastatin Myalgia       Objective   Vitals: Blood pressure 117/60, pulse 67, temperature 98 °F (36 7 °C), resp  rate 18, SpO2 98 %  ,     There is no height or weight on file to calculate BMI ,     Systolic (87OSJ), URC:304 , Min:97 , XQT:472     Diastolic (07ONR), OMS:78, Min:0, Max:82      Intake/Output Summary (Last 24 hours) at 11/24/18 1036  Last data filed at 11/24/18 0827   Gross per 24 hour   Intake            55 88 ml   Output              350 ml   Net          -294 12 ml     Weight (last 2 days)     None        Invasive Devices     Peripheral Intravenous Line            Peripheral IV 11/23/18 Left Antecubital 1 day              Physical Exam   Constitutional: He is oriented to person, place, and time  No distress  Neck: Neck supple  Cardiovascular: Normal rate, regular rhythm, normal heart sounds and intact distal pulses  Pulmonary/Chest: Effort normal  No respiratory distress  He has no wheezes  Decreased   Poor effort  RA    Abdominal: Soft   Bowel sounds are normal    Obese    Musculoskeletal: He exhibits edema  + 1 edema bilateral LE    Neurological: He is alert and oriented to person, place, and time  Skin: Skin is warm and dry  He is not diaphoretic  Psychiatric: He has a normal mood and affect  His behavior is normal      LABORATORY RESULTS:    Results from last 7 days  Lab Units 11/24/18  0505 11/24/18 0159 11/23/18 2051   TROPONIN I ng/mL 0 21* 0 24* 0 26*     CBC with diff:   Results from last 7 days  Lab Units 11/24/18  0159 11/23/18 2051 11/20/18  0959   WBC Thousand/uL 9 92 9 42  --    WHITE BLOOD CELL COUNT  Thousand/uL  --   --  10 3   HEMOGLOBIN g/dL 9 3* 9 7*  --    HEMOGLOBIN  g/dL  --   --  10 9*   HEMATOCRIT % 30 7* 32 2*  --    HEMATOCRIT  %  --   --  35 3*   MCV fL 88 89  --    MCV  fL  --   --  86 7   PLATELETS Thousands/uL 390 275  --    PLATELETS   Thousand/uL  --   --  537*   MCH pg 26 6* 26 9  --    MCH  pg  --   --  26 8*   MCHC g/dL 30 3* 30 1*  --    MCHC  g/dL  --   --  30 9*   RDW % 15 9* 15 9*  --    RDW  %  --   --  14 6   MPV fL 9 2 10 7  --    NRBC AUTO /100 WBCs  --  0  --        CMP:  Results from last 7 days  Lab Units 11/24/18  0505 11/23/18 2051 11/23/18  1619 11/20/18  0959   POTASSIUM mmol/L 3 8 4 3 3 7 3 7   CHLORIDE mmol/L 108 102 104 102   CO2 mmol/L 23 27 30 26   BUN mg/dL 32* 36* 34* 30*   CREATININE mg/dL 3 15* 3 52* 3 57*  --    SL AMB CALCIUM mg/dL  --   --   --  8 8   CALCIUM mg/dL 7 9* 9 1 9 1  --    AST U/L  --  60*  --   --    ALT U/L  --  44  --   --    ALK PHOS U/L  --  123*  --  108   EGFR ml/min/1 73sq m 17 15 15  --        BMP:  Results from last 7 days  Lab Units 11/24/18  0505 11/23/18 2051 11/23/18  1619 11/20/18  0959   POTASSIUM mmol/L 3 8 4 3 3 7 3 7   CHLORIDE mmol/L 108 102 104 102   CO2 mmol/L 23 27 30 26   BUN mg/dL 32* 36* 34* 30*   CREATININE mg/dL 3 15* 3 52* 3 57*  --    SL AMB CALCIUM mg/dL  --   --   --  8 8   CALCIUM mg/dL 7 9* 9 1 9 1  --           No results found for: NTBNP Results from last 7 days  Lab Units 18  2051   MAGNESIUM mg/dL 2 5          Results from last 7 days  Lab Units 18  0959   HEMOGLOBIN A1C % of total Hgb 6 5*                Results from last 7 days  Lab Units 18  0159 18  1030   INR  1 36* 1 31*     Lipid Profile:   Lab Results   Component Value Date    CHOL 197 2017    CHOL 195 2016    CHOL 198 2015     Lab Results   Component Value Date    HDL 25 (L) 2018    HDL 33 (L) 2018    HDL 31 (L) 2018     Lab Results   Component Value Date    LDLCALC 18 2018    LDLCALC 137 (H) 2018    LDLCALC 118 (H) 2018     Lab Results   Component Value Date    TRIG 92 2018    TRIG 148 2018    TRIG 176 (H) 2018         Cardiac testing:   Results for orders placed in visit on 17   Echo complete with contrast if indicated    Narrative [facility], [address] [facility]   [phone]     Transthoracic Echocardiogram   2D, M-mode, Doppler, and Color Doppler     Study date:  12-Dec-2017     Patient: Glenis Simeon   MR number: [id number]   Account number: [de-identified]   : 1935   Age: 80 years   Gender: Male   Status: Outpatient   Location: [address] [place] [address]   Height: 66 in   Weight: 223 5 lb   BP: 66/ 224 mmHg     Indications: Cardiac murmur  Diagnoses: R01 1 - Cardiac murmur, unspecified     Sonographer:  Shellye Sicard, BS RDCS RVT   Primary Physician:  DO Dina   Referring Physician:  DO Dina   Group:  [id number]   Interpreting Physician:  Grey Du MD     SUMMARY     LEFT VENTRICLE:   Systolic function was normal  Ejection fraction was estimated to be 65 %  There were no regional wall motion abnormalities  Wall thickness was mildly increased  LEFT ATRIUM:   The atrium was mildly dilated  MITRAL VALVE:   There was mild annular calcification     There was trace regurgitation  AORTIC VALVE:   The valve was trileaflet  Leaflets exhibited moderate calcification and moderately reduced cuspal separation  There was moderate stenosis  There was trace regurgitation  Valve mean gradient was 22 5 mmHg  Estimated aortic valve area (by VTI) was 1 42 cm squared  Estimated aortic valve area (by Vmax) was 1 49 cm squared  TRICUSPID VALVE:   There was mild regurgitation  Pulmonary artery systolic pressure was at the upper limits of normal      AORTA:   There was mild dilatation of the ascending aorta  HISTORY: PRIOR HISTORY: Hypertension, diabetes  PROCEDURE: The study was performed in the [facility]  This was a routine study  The transthoracic approach was used  The study included complete 2D imaging, M-mode, complete spectral Doppler, and color Doppler  Images were obtained from the parasternal, apical, subcostal, and suprasternal notch acoustic windows  Echocardiographic views were limited due to decreased penetration and lung interference  This was a technically difficult study  LEFT VENTRICLE: Size was normal  Systolic function was normal  Ejection fraction was estimated to be 65 %  There were no regional wall motion abnormalities  Wall thickness was mildly increased  DOPPLER: Left ventricular diastolic function   parameters were normal for the patient's age  There was no evidence of elevated ventricular filling pressure by Doppler parameters  RIGHT VENTRICLE: The size was normal  Systolic function was normal  Wall thickness was normal      LEFT ATRIUM: The atrium was mildly dilated  RIGHT ATRIUM: Size was normal      MITRAL VALVE: There was mild annular calcification  Valve structure was normal  There was normal leaflet separation  DOPPLER: The transmitral velocity was within the normal range  There was no evidence for stenosis  There was trace   regurgitation  AORTIC VALVE: The valve was trileaflet   Leaflets exhibited moderate calcification and moderately reduced cuspal separation  DOPPLER: Transaortic velocity was increased due to valvular stenosis  There was moderate stenosis  There was trace   regurgitation  TRICUSPID VALVE: The valve structure was normal  There was normal leaflet separation  DOPPLER: The transtricuspid velocity was within the normal range  There was no evidence for stenosis  There was mild regurgitation  Pulmonary artery   systolic pressure was at the upper limits of normal      PULMONIC VALVE: Leaflets exhibited normal thickness, no calcification, and normal cuspal separation  DOPPLER: The transpulmonic velocity was within the normal range  There was no significant regurgitation  PERICARDIUM: There was no pericardial effusion  The pericardium was normal in appearance  AORTA: The root exhibited normal size  There was mild dilatation of the ascending aorta  SYSTEMIC VEINS: IVC: The inferior vena cava was normal in size  PULMONARY VEINS: DOPPLER: Doppler flow pattern was normal in the pulmonary vein(s)       MEASUREMENT TABLES     2D MEASUREMENTS   LVOT   (Reference normals)   Diam   21 mm   (--)     DOPPLER MEASUREMENTS   LVOT   (Reference normals)   Peak valerio   130 cm/s   (--)   VTI   28 cm   (--)   Stroke vol   96 98 ml   (--)   Aortic valve   (Reference normals)   Peak valerio   300 cm/s   (--)   VTI   68 5 cm   (--)   Mean gradient   22 5 mmHg   (--)   Obstr index, VTI   0 41    (--)   Valve area, VTI   1 42 cm squared   (--)   Obstr index, Vmax   0 43    (--)   Valve area, Vmax   1 49 cm squared   (--)     SYSTEM MEASUREMENT TABLES     2D   %FS: 30 12 %   Ao Diam: 2 93 cm   EF(Teich): 57 3 %   IVSd: 1 18 cm   LA Area: 21 24 cm2   LA Diam: 4 35 cm   LVEDV MOD A4C: 115 27 ml   LVEF MOD A4C: 65 96 %   LVESV MOD A4C: 39 23 ml   LVIDd: 4 81 cm   LVIDs: 3 36 cm   LVLd A4C: 8 3 cm   LVLs A4C: 6 59 cm   LVOT Diam: 2 12 cm   LVPWd: 1 09 cm   RA Area: 12 78 cm2   RVIDd: 3 78 cm   SV MOD A4C: 76 04 ml   SV(Teich): 61 83 ml     CW   AV VTI: 68 02 cm   AV Vmax: 3 01 m/s   AV Vmean: 2 27 m/s   AV maxP 43 mmHg   AV meanP 5 mmHg   TR Vmax: 2 65 m/s   TR maxP 1 mmHg     MM   TAPSE: 1 84 cm     PW   KRIS (VTI): 1 46 cm2   LVOT VTI: 28 03 cm   LVOT Vmax: 1 33 m/s   LVOT Vmean: 0 92 m/s   LVOT maxP 05 mmHg   LVOT meanPG: 3 86 mmHg   LVSV Dopp: 99 36 ml   MV A Juan: 1 12 m/s   MV Dec Dent: 310 26 cm/s2   MV DecT: 0 3 s   MV E Juan: 0 93 m/s   MV E/A Ratio: 0 83   MV PHT: 86 75 ms   MVA By PHT: 2 54 cm2     [employer] Accredited Echocardiography Laboratory     Prepared and electronically signed by     Grey Du MD   Signed 12-Dec-2017 15:21:16     No results found for this or any previous visit  No procedure found  Results for orders placed during the hospital encounter of 18   NM myocardial perfusion spect (stress and/or rest)    Narrative [facility], [address]  [phone]    Rest/Stress Gated SPECT Myocardial Perfusion Imaging After Regadenoson    Patient: Glenis Simeon  MR number: [id number]  Account number: [de-identified]  : 1935  Age: 80 years  Gender: Male  Status: Outpatient  Location: [address] [place] [address] [facility]  Height: 63 in  Weight: 144 lb  BP: 114/ 66 mmHg    Allergies: AMLODIPINE, ATORVASTATIN    Diagnosis: R07 9 - Chest pain, unspecified    Primary Physician:  Guzman Jeff DO  RN:  Abdiaziz Park RN  Referring Physician:  Guzman Jeff DO  Technician:  Zhanna Gauthier:  [facility]  Interpreting Physician:  Grey Du MD    INDICATIONS: Coronary artery disease  HISTORY: The patient is a 80year old  male  Chest pain status: recent onset chest pain  Coronary artery disease risk factors: dyslipidemia, hypertension, and diabetes mellitus  Cardiovascular history: none significant      PHYSICAL EXAM: Baseline physical exam screening: no wheezes audible  REST ECG: Normal sinus rhythm  There was evidence of an old, anteroseptal myocardial infarction  Nonspecific ST and T wave abnormalities were present  PROCEDURE: The study was performed at the [facility]  A regadenoson infusion pharmacologic stress test was performed  Gated SPECT myocardial perfusion imaging was performed after stress and at rest  Systolic blood  pressure was 114 mmHg, at the start of the study  Diastolic blood pressure was 66 mmHg, at the start of the study  The heart rate was 60 bpm, at the start of the study  IV double checked  Regadenoson protocol:  HR bpm SBP mmHg DBP mmHg Symptoms  Baseline 60 114 66 none  1 min 80 -- -- none  2 min 85 82 51 none  3 min 83 110 53 none  4 min 77 157 77 none  5 min 64 132 65 none  6 min 62 128 66 none  No medications or fluids given  The patient also performed low level exercise  STRESS SUMMARY: Duration of pharmacologic stress was 3 min and 0 sec  Maximal heart rate during stress was 87 bpm  There was normal resting blood pressure with an appropriate response to stress  There was no chest pain during stress  The  stress test was terminated due to protocol completion  Pre oxygen saturation: 97 %  Peak oxygen saturation: 97 %  Arrhythmia during stress: isolated premature ventricular beats  The stress ECG was non-diagnostic due to resting ST/T wave  abnormality  ISOTOPE ADMINISTRATION:  Resting isotope administration Stress isotope administration  Agent Tetrofosmin Tetrofosmin  Dose 10 55 mCi 32 2 mCi  Date 05/04/2018 05/04/2018  Injection-image interval 59 min 68 min    The radiopharmaceutical was injected at the peak effect of pharmacologic stress  MYOCARDIAL PERFUSION IMAGING:  The image quality was good  PERFUSION DEFECTS:  -  There was a moderate-sized, severe, partially reversible myocardial perfusion defect of the anterior and apical wall representing mixed scar and ischemia      PERFUSION QUANTITATION:  The summed perfusion score measured 20 during stress, 9 at rest, with a difference of 10  GATED SPECT:  The calculated left ventricular ejection fraction was 50 %  Left ventricular ejection fraction was low normal by visual estimate  There was moderately reduced myocardial thickening and motion of the anterior wall and apical wall of the  left ventricle  SUMMARY:  -  Stress results: There was no chest pain during stress  -  ECG conclusions: The stress ECG was non-diagnostic due to resting ST/T wave abnormality   -  Perfusion imaging: There was a moderate-sized, severe, partially reversible myocardial perfusion defect of the anterior and apical wall representing mixed scar and ischemia   -  Gated SPECT: The calculated left ventricular ejection fraction was 50 %  Left ventricular ejection fraction was low normal by visual estimate  There was moderately reduced myocardial thickening and motion of the anterior wall and apical  wall of the left ventricle  IMPRESSIONS: Abnormal study after pharmacologic stress  Overall left ventricular systolic function was preserved, but there were regional wall motion abnormalities  Prepared and signed by    Lexus Ayon MD  Signed 05/04/2018 15:03:28           Imaging: I have personally reviewed pertinent reports  Xr Chest 2 Views    Result Date: 11/23/2018  Narrative: CHEST INDICATION:   cp post thoracentesis  COMPARISON:  November 14, 2018 EXAM PERFORMED/VIEWS:  XR CHEST PA & LATERAL Images: 2 FINDINGS:  There are median sternotomy wires indicating prior cardiac surgery  The cardiac mediastinal silhouette is stable  Uncoiled calcified thoracic aorta is seen  There is a small left-sided pleural effusion  There is a trace left-sided pneumothorax  Osseous structures appear within normal limits for patient age  Impression: Small left-sided pleural effusion with trace left-sided pneumothorax    I personally discussed this study with NANDO MUNOZ on 11/23/2018 at 9:26 PM  Workstation performed: [id number]     Ct Chest Wo Contrast    Result Date: 11/15/2018  Narrative: CT CHEST WITHOUT IV CONTRAST INDICATION:   J90: Pleural effusion, not elsewhere classified  COMPARISON:  6/1/2018  TECHNIQUE: CT examination of the chest was performed without intravenous contrast   Axial, sagittal, and coronal 2D reformatted images were created from the source data and submitted for interpretation  Radiation dose length product (DLP) for this visit:  698 mGy-cm   This examination, like all CT scans performed in the [facility], was performed utilizing techniques to minimize radiation dose exposure, including the use of iterative reconstruction and automated exposure control  FINDINGS: LUNGS:  There is left basilar compressive atelectasis  There is no tracheal or endobronchial lesion  PLEURA:  There is a small to moderate free flowing left pleural effusion, and a minimal right pleural effusion  HEART/GREAT VESSELS:  Normal heart size  Status post aortic valve replacement and CABG  No pericardial effusion  Normal caliber thoracic aorta with mild atherosclerotic calcifications  MEDIASTINUM AND ANDREINA:  Unremarkable  CHEST WALL AND LOWER NECK:   Status post median sternotomy  VISUALIZED STRUCTURES IN THE UPPER ABDOMEN:  Right renal simple cyst again identified  OSSEOUS STRUCTURES:  No acute fracture or destructive osseous lesion  Impression: Small to moderate left pleural effusion, with associated left basilar compressive atelectasis  Minimal right pleural effusion  Workstation performed: [id number]     Ir Thoracentesis    Result Date: 11/23/2018  Narrative: Ultrasound-guided thoracentesis Clinical History: Recurrent left pleural effusion   Technique: The patient was brought to the interventional radiology area and placed in the sitting position on the side of a stretcher  The left chest was then examined with ultrasound and the skin was marked   The skin was then prepped, and draped in usual sterile fashion  Lidocaine was administered to the skin and a small skin incision was made  Under direct ultrasound guidance, a 5 [employer] Rhina Yueh multisidehole catheter was advanced into the pleural space  1300 mL yellow pleural fluid was aspirated  The patient tolerated the procedure well and suffered no complications  Impression: Impression: 1  Successful ultrasound-guided thoracentesis yielding 1300 mL yellow pleural fluid  Workstation performed: [id number]     Ir Thoracentesis    Result Date: 11/9/2018  Narrative: Therapeutic/Diagnostic Ultrasound-Guided Thoracentesis Clinical History: Left pleural effusion  Procedure: After explaining the risks and benefits of the procedure to the patient, informed consent was obtained  Ultrasound was used to localize the left pleural effusion  A static sonographic image was saved  A timeout was verbalized  The overlying skin was prepped and draped in usual sterile fashion and local anesthesia was obtained with the 1% lidocaine solution  A 5-Luxembourgish Yueh needle was advanced until fluid was aspirated  Fluid was drained by vacuum bottle  Following drainage a static sonographic image was saved demonstrating lung expansion  The Carvel Holcomb was removed, the puncture site was cleansed, and a dressing was applied  The patient tolerated the procedure well and left the department in stable condition  Findings: Focused ultrasound of the left thoracic back confirms the presence of pleural fluid  Approximately 1000 cc of clear, yellow fluid was aspirated  Impression: Impression: Successful therapeutic/diagnostic ultrasound-guided thoracentesis  Workstation performed: [id number]     Ir Thoracentesis    Result Date: 11/2/2018  Narrative: THORACENTESIS Indication: Recurrent left symptomatic pleural effusion  Post open heart surgery  Procedure: Following a detailed explanation of the risks and benefits of the procedure, informed consent was obtained  A suitable location for puncture was identified using ultrasound guidance over the left low posterior chest and the site was cleansed and draped in sterile sterile fashion  1% lidocaine was used local anesthetic  A 5 Swedish multisidehole catheter was advanced into the pleural space with return of clear pleural fluid  A static sonographic image was saved  Fluid was drained by vacuum bottle  Following drainage, the puncture site was cleansed and a dressing was applied  The patient tolerated this procedure well without immediate complication  Findings: Focused ultrasound of the chest confirms to presence of a pleural effusion  1100 mL of fluid was removed  Impression: Impression: Successful ultrasound-guided left thoracentesis  Workstation performed: [id number]     Thank you for allowing us to participate in this patient's care  This pt will follow up with Dr Leonid Bartholomew once discharged  Counseling / Coordination of Care  Total floor / unit time spent today 45 minutes  Greater than 50% of total time was spent with the patient and / or family counseling and / or coordination of care  A description of the counseling / coordination of care: Review of history, current assessment, development of a plan  Code Status: Level 1 - Full Code    ** Please Note: Dragon 360 Dictation voice to text software may have been used in the creation of this document   **

## 2018-11-24 NOTE — ED NOTES
[facility] NICOLAS  11/23/18 8264

## 2018-11-24 NOTE — PROGRESS NOTES
Progress Note - Anita Patiño 1935, 80 y o  male MRN: [id number]    Unit/Bed#: [facility] 331-12 Encounter: [id number]    Primary Care Provider: Guzman Jeff DO   Date and time admitted to hospital: 11/24/2018 12:39 AM        * NSTEMI (non-ST elevated myocardial infarction) [facility])   Assessment & Plan    · NPO  · Cycle cardiac enzymes  · Telemetry monitoring  · Cardiology consult  · Heparin drip  · Patient already received 325 aspirin prior to arrival  · Nitroglycerin and morphine p r n  Chest pain         Recurrent left pleural effusion   Assessment & Plan    · Patient was tapped today with a thoracentesis that was therapeutic  · Monitor O2 sat O2 by nasal cannula p r n  To maintain sat 92% or greater by pulse oximetry  · Patient reports he needs to be tapped Q weekly       Type 2 diabetes mellitus ([facility] [address] )   Assessment & Plan    · With an episode of hypoglycemia in the setting of continuing home insulin dosing despite poor p o  Intake  · Hold home insulin dosing  · Sliding scale coverage q 6 hours BGM while NPO         Pure hypercholesterolemia   Assessment & Plan    · With Lipitor allergy, continue home Crestor which is 40 mg daily  · Check fasting lipid profile in morning     Hypothyroidism   Assessment & Plan    · Clinically euthyroid  · Continue Synthroid at current home dose     Benign prostatic hyperplasia with nocturia   Assessment & Plan    · Continue Flomax  · Monitor I/O  · Bladder scan p r n  For possible retention       VTE Prophylaxis: Heparin Drip  / sequential compression device   Code Status:  Full code  POLST: There is no POLST form on file for this patient (pre-hospital)  Discussion with family:  Declined    Anticipated Length of Stay:  Patient will be admitted on an Inpatient basis with an anticipated length of stay of  greater than 2 midnights  Justification for Hospital Stay:  Non ST elevation MI    Total Time for Visit, including Counseling / Coordination of Care: 45 minutes    Greater than 50% of this total time spent on direct patient counseling and coordination of care  Chief Complaint:   Symptomatic hypoglycemia, chest pain    History of Present Illness:    Vasquez Otoole  is a 80 y o  male who presents with a complaint of symptomatic hypoglycemia  Patient has had poor p o  Intake for the past week or so, but continues to take his home insulin at its normal regimen to dose  Today, he suffered an episode of symptomatic hypoglycemia with lightheadedness without loss of consciousness which was treated in the emergency department  Patient no longer has symptoms of hypoglycemia feels well and his BGM responded appropriately to treatment  However, during the workup the patient complaint of chest pain in the left chest wall  EKG was unchanged, however cardiac enzymes came back positive and the patient will be admitted for non ST elevation MI  Patient has a history of coronary artery bypass grafting, as well as recurrent pleural effusion  Patient has been tapped 4 times for this pleural effusion he reports that needs to be once weekly, last tap was today  His chest pain was in the left chest wall without radiation without associated symptoms of nausea vomiting diaphoresis or dyspnea  Patient currently denies chest pain shortness of breath  Also denies fevers chills, however when the pleural effusion gather she does developed a nonproductive cough that he is currently not complaining of       Review of Systems:    Review of Systems   Constitutional: Positive for appetite change  Negative for activity change, chills, diaphoresis, fatigue, fever and unexpected weight change  HENT: Negative  Negative for congestion, dental problem, facial swelling, hearing loss, rhinorrhea, sinus pain, sinus pressure, sore throat and tinnitus  Eyes: Negative  Negative for photophobia, pain, discharge and visual disturbance     Respiratory: Positive for cough (Nonproductive, resolved after thoracentesis today )  Negative for chest tightness, shortness of breath, wheezing and stridor  Cardiovascular: Positive for chest pain and leg swelling  Negative for palpitations  Gastrointestinal: Negative  Negative for abdominal distention, abdominal pain, blood in stool, constipation, diarrhea, nausea and vomiting  Endocrine: Negative  Negative for cold intolerance, heat intolerance and polyuria  Genitourinary: Negative  Negative for difficulty urinating, dysuria, flank pain, frequency, hematuria and urgency  Musculoskeletal: Negative  Negative for arthralgias, gait problem, joint swelling, myalgias and neck stiffness  Skin: Negative  Negative for color change, pallor, rash and wound  Allergic/Immunologic: Negative  Negative for immunocompromised state  Neurological: Negative  Negative for dizziness, seizures, syncope, weakness, light-headedness, numbness and headaches  Hematological: Negative  Negative for adenopathy  Does not bruise/bleed easily  Psychiatric/Behavioral: Negative  Negative for confusion and hallucinations  The patient is not nervous/anxious  Past Medical and Surgical History:     Past Medical History:   Diagnosis Date    Aortic stenosis     CKD (chronic kidney disease)     baseline Cr 1 3-1 5    Coronary artery disease     Cough     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HCC)     type 2, insulin dependent    GERD (gastroesophageal reflux disease)     Glaucoma     Gout     History of prostate cancer     Hypertension     Hypothyroidism     Overweight     Peripheral neuropathy, idiopathic     Pleural effusion, left     Pure hypercholesterolemia     LA   11/12/14   R   11/12/14        Past Surgical History:   Procedure Laterality Date    CARDIAC CATHETERIZATION      IR THORACENTESIS  10/23/2018    IR THORACENTESIS  11/2/2018    IR THORACENTESIS  11/9/2018    IR THORACENTESIS  11/23/2018    ME CABG, ARTERY-VEIN, THREE N/A 9/17/2018    Procedure: CORONARY ARTERY BYPASS GRAFT (CABG) x 4 VESSELS with LIMA - LAD, SVG/LEFT LEG EVH - LEFT PDA, OM3, & OM2;  Surgeon: Jon Hein MD;  [facility];  Service: Cardiac Surgery    ME ECHO TRANSESOPHAG MONTR CARDIAC PUMP FUNCTJ N/A 9/17/2018    Procedure: TRANSESOPHAGEAL ECHOCARDIOGRAM (VERONICA); Surgeon: Jon Hein MD;  [facility];  Service: Cardiac Surgery    ME RPLCMT AORTIC VALVE OPN W/STENTLESS TISSUE VALVE N/A 9/17/2018    Procedure: REPLACEMENT VALVE AORTIC (AVR)- 23mm tissue Intuity Valve;  Surgeon: Jon Hein MD;  [facility];  Service: Cardiac Surgery    THYROID SURGERY         Meds/Allergies:    Prior to Admission medications    Medication Sig Start Date End Date Taking? Authorizing Provider   acetaminophen (TYLENOL) 325 mg tablet 650 mg every 4 to 6 hours as needed for pain (do not take with percocet) 9/21/18   Alyce Bentley PA-C   albuterol (PROVENTIL HFA) 90 mcg/act inhaler Inhale 2 puffs 4 (four) times a day as needed    Historical Provider, MD   allopurinol (ZYLOPRIM) 300 mg tablet TAKE ONE TABLET BY MOUTH ONCE DAILY 5/31/18   Darcy Shelton PA-C   ascorbic acid (VITAMIN C) 500 MG tablet Take 1 tablet (500 mg total) by mouth daily for 90 days 9/22/18 12/21/18  Alyce Bentley PA-C   aspirin 325 mg tablet Take 1 tablet (325 mg total) by mouth daily 9/22/18   [facility] KWIKPEN 100 units/mL injection pen Inject 30 Units under the skin daily at bedtime  Patient taking differently: Inject 20 Units under the skin daily at bedtime   10/5/18   Juliette Cid,    budesonide-formoterol (SYMBICORT) 160-4 5 mcg/act inhaler Inhale 2 puffs 2 (two) times a day Rinse mouth after use   11/16/18   Moiz Gutierrez DO   docusate sodium (COLACE) 100 mg capsule Take 1 capsule (100 mg total) by mouth daily  Patient taking differently: Take 100 mg by mouth as needed   9/21/18   Alyce Bentley PA-C   ferrous sulfate 325 (65 Fe) mg tablet Take 1 tablet (325 mg total) by mouth daily with breakfast for 90 days 9/22/18 12/21/18  Prieto Lopez PA-C   gabapentin (NEURONTIN) 300 mg capsule TAKE ONE CAPSULE BY MOUTH ONCE DAILY AT BEDTIME 9/7/18   Susanne Godoy PA-C   insulin aspart (NovoLOG) 100 units/mL injection Inject under the skin 3 (three) times a day before meals 10/4/18   Susanne Godoy PA-C   insulin lispro (HUMALOG KWIKPEN) 100 units/mL injection pen Inject 16 units at breakfast 10/8/18   [facility], DO   levothyroxine 150 mcg tablet Take 1 tablet (150 mcg total) by mouth daily 10/9/18   [facility], DO   LORazepam (ATIVAN) 0 5 mg tablet Take 1 tablet (0 5 mg total) by mouth every 8 (eight) hours as needed for anxiety 10/9/18   [facility], DO   losartan-hydrochlorothiazide (HYZAAR) 100-25 MG per tablet TAKE 1 TABLET BY MOUTH EVERY DAY 10/28/18   Juliette Cid, DO   metoprolol tartrate (LOPRESSOR) 25 mg tablet Take 0 5 tablets (12 5 mg total) by mouth every 12 (twelve) hours 10/9/18   [facility], DO   pantoprazole (PROTONIX) 40 mg tablet Take 1 tablet (40 mg total) by mouth daily 10/9/18   [facility], DO   polyethylene glycol (MIRALAX) 17 g packet Take 17 g by mouth daily  Patient taking differently: Take 17 g by mouth as needed   9/22/18   Prieto Lopez PA-C   rosuvastatin (CRESTOR) 40 MG tablet Take 1 tablet (40 mg total) by mouth daily 10/9/18   [facility], DO   senna-docusate sodium (SENOKOT S) 8 6-50 mg per tablet Take 1 tablet by mouth daily at bedtime as needed for constipation 9/21/18   Prieto Lopez PA-C   tamsulosin [facility]) 0 4 mg Take 1 capsule (0 4 mg total) by mouth daily with dinner 10/9/18   [facility], DO     I have reviewed home medications using allscripts  Allergies:    Allergies   Allergen Reactions    Amlodipine Nausea Only    Atorvastatin Myalgia       Social History:     Marital Status:    Occupation:  Retired  Patient Pre-hospital Living Situation:  Independent with his adult son  Patient Pre-hospital Level of Mobility:  Ambulatory with a cane  Patient Pre-hospital Diet Restrictions:    Substance Use History:   History   Alcohol Use No     History   Smoking Status    Former Smoker    Packs/day: 0 25    Years: 1 00    Types: Cigarettes    Quit date: 1970   Smokeless Tobacco    Never Used     Comment: Only in the service      History   Drug Use No       Family History:    non-contributory    Physical Exam:     Vitals:   Blood Pressure: 135/75 (11/24/18 0048)  Pulse: 77 (11/24/18 0048)  Temperature: 98 3 °F (36 8 °C) (11/24/18 0048)  Temp Source: Oral (11/24/18 0048)  Respirations: 18 (11/24/18 0048)  SpO2: 97 % (11/24/18 0048)    Physical Exam   Constitutional: He is oriented to person, place, and time  He appears well-developed and well-nourished  No distress  HENT:   Head: Normocephalic and atraumatic  Right Ear: External ear normal    Left Ear: External ear normal    Nose: Nose normal    Mouth/Throat: Oropharynx is clear and moist  No oropharyngeal exudate  Eyes: Pupils are equal, round, and reactive to light  Conjunctivae and EOM are normal  Right eye exhibits no discharge  Left eye exhibits no discharge  No scleral icterus  Neck: Normal range of motion  Neck supple  No JVD present  No tracheal deviation present  No thyromegaly present  Cardiovascular: Normal rate, regular rhythm and intact distal pulses  Exam reveals no gallop and no friction rub  Murmur (gilmer 3/6) heard  Pulmonary/Chest: Breath sounds normal  No stridor  No respiratory distress  He has no wheezes  He has no rales  He exhibits no tenderness  Abdominal: Soft  Bowel sounds are normal  He exhibits no distension  There is no tenderness  There is no rebound and no guarding  Musculoskeletal: Normal range of motion  He exhibits no edema, tenderness or deformity  Lymphadenopathy:     He has no cervical adenopathy  Neurological: He is alert and oriented to person, place, and time  He has normal reflexes  No cranial nerve deficit  He exhibits normal muscle tone   Coordination normal    Skin: Skin is warm and dry  No rash noted  He is not diaphoretic  No erythema  No pallor  Psychiatric: He has a normal mood and affect  His behavior is normal  Judgment and thought content normal    Nursing note and vitals reviewed  Additional Data:     Lab Results: I have personally reviewed pertinent reports  Results from last 7 days  Lab Units 11/23/18 2051   WBC Thousand/uL 9 42   HEMOGLOBIN g/dL 9 7*   HEMATOCRIT % 32 2*   PLATELETS Thousands/uL 275   NEUTROS PCT % 76*   LYMPHS PCT % 13*   MONOS PCT % 7   EOS PCT % 2       Results from last 7 days  Lab Units 11/23/18 2051   SODIUM mmol/L 138   POTASSIUM mmol/L 4 3   CHLORIDE mmol/L 102   CO2 mmol/L 27   BUN mg/dL 36*   CREATININE mg/dL 3 52*   ANION GAP mmol/L 9   CALCIUM mg/dL 9 1   ALBUMIN g/dL 2 3*   TOTAL BILIRUBIN mg/dL 0 50   ALK PHOS U/L 123*   ALT U/L 44   AST U/L 60*   GLUCOSE RANDOM mg/dL 131       Results from last 7 days  Lab Units 11/23/18  1030   INR  1 31*       Results from last 7 days  Lab Units 11/23/18  2018   POC GLUCOSE mg/dl 119       Results from last 7 days  Lab Units 11/20/18  0959   HEMOGLOBIN A1C % of total Hgb 6 5*           Imaging: I have personally reviewed pertinent reports  No orders to display       EKG, Pathology, and Other Studies Reviewed on Admission:   · EKG:  Sinus bradycardia    Allscripts / Epic Records Reviewed: Yes     ** Please Note: This note has been constructed using a voice recognition system   **

## 2018-11-24 NOTE — PROGRESS NOTES
Telephone Call Documentation    Shannan Angulo        1935            Insurance:    Pharmacy on File:    Reason for Call:  Labs showing increasing creatinine and low blood sugar at 29    Symptoms:  Patient reports not being symptomatic    Onset:      ED:  Patient referred to the ER but 1st was told to eat some protein such as peanut butter and he had just completed eating some Jell-O  Will have him drink fluids and recheck his blood sugar he is to call if this is not over 70  His son will be returning home from work shortly and I have recommend to go to the ER  He will need to have IV fluids and admitted because of worsening kidney function  Of note he did have thoracentesis done at [facility] earlier today   When:    Med Change/ Regimen Change:   We will decrease his basal glargine insulin which is currently at 20 units at [facility]

## 2018-11-24 NOTE — ASSESSMENT & PLAN NOTE
· CXR s/p yesterday thoracentesis showed trace left-sided pneumothorax which was too small for chest tube placement  · Setting well on 2L NC O2  · Appreciate Thoracic Surgery consult

## 2018-11-24 NOTE — H&P
Lois Valle  1935, 80 y o  male MRN: [id number]     Unit/Bed#: [facility] 336-57 Encounter: [id number]     Primary Care Provider: Sharon Ram DO   Date and time admitted to hospital: 11/24/2018 12:39 AM               * NSTEMI (non-ST elevated myocardial infarction) [facility])   Assessment & Plan     · NPO  · Cycle cardiac enzymes  · Telemetry monitoring  · Cardiology consult  · Heparin drip  · Patient already received 325 aspirin prior to arrival  · Nitroglycerin and morphine p r n  Chest pain            Recurrent left pleural effusion   Assessment & Plan     · Patient was tapped today with a thoracentesis that was therapeutic  · Monitor O2 sat O2 by nasal cannula p r n  To maintain sat 92% or greater by pulse oximetry  · Patient reports he needs to be tapped Q weekly         Type 2 diabetes mellitus ([facility] [address] )   Assessment & Plan     · With an episode of hypoglycemia in the setting of continuing home insulin dosing despite poor p o  Intake  · Hold home insulin dosing  · Sliding scale coverage q 6 hours BGM while NPO            Pure hypercholesterolemia   Assessment & Plan     · With Lipitor allergy, continue home Crestor which is 40 mg daily  · Check fasting lipid profile in morning      Hypothyroidism   Assessment & Plan     · Clinically euthyroid  · Continue Synthroid at current home dose      Benign prostatic hyperplasia with nocturia   Assessment & Plan     · Continue Flomax  · Monitor I/O  · Bladder scan p r n  For possible retention         VTE Prophylaxis: Heparin Drip  / sequential compression device   Code Status:  Full code  POLST: There is no POLST form on file for this patient (pre-hospital)  Discussion with family:  Declined     Anticipated Length of Stay:  Patient will be admitted on an Inpatient basis with an anticipated length of stay of  greater than 2 midnights  Justification for Hospital Stay:  Non ST elevation MI     Total Time for Visit, including Counseling / Coordination of Care: 45 minutes  Greater than 50% of this total time spent on direct patient counseling and coordination of care      Chief Complaint:   Symptomatic hypoglycemia, chest pain     History of Present Illness:     Saad Clement  is a 80 y o  male who presents with a complaint of symptomatic hypoglycemia  Patient has had poor p o  Intake for the past week or so, but continues to take his home insulin at its normal regimen to dose  Today, he suffered an episode of symptomatic hypoglycemia with lightheadedness without loss of consciousness which was treated in the emergency department  Patient no longer has symptoms of hypoglycemia feels well and his BGM responded appropriately to treatment  However, during the workup the patient complaint of chest pain in the left chest wall  EKG was unchanged, however cardiac enzymes came back positive and the patient will be admitted for non ST elevation MI  Patient has a history of coronary artery bypass grafting, as well as recurrent pleural effusion  Patient has been tapped 4 times for this pleural effusion he reports that needs to be once weekly, last tap was today  His chest pain was in the left chest wall without radiation without associated symptoms of nausea vomiting diaphoresis or dyspnea  Patient currently denies chest pain shortness of breath  Also denies fevers chills, however when the pleural effusion gather she does developed a nonproductive cough that he is currently not complaining of        Review of Systems:     Review of Systems   Constitutional: Positive for appetite change  Negative for activity change, chills, diaphoresis, fatigue, fever and unexpected weight change  HENT: Negative  Negative for congestion, dental problem, facial swelling, hearing loss, rhinorrhea, sinus pain, sinus pressure, sore throat and tinnitus  Eyes: Negative  Negative for photophobia, pain, discharge and visual disturbance     Respiratory: Positive for cough (Nonproductive, resolved after thoracentesis today  )  Negative for chest tightness, shortness of breath, wheezing and stridor  Cardiovascular: Positive for chest pain and leg swelling  Negative for palpitations  Gastrointestinal: Negative  Negative for abdominal distention, abdominal pain, blood in stool, constipation, diarrhea, nausea and vomiting  Endocrine: Negative  Negative for cold intolerance, heat intolerance and polyuria  Genitourinary: Negative  Negative for difficulty urinating, dysuria, flank pain, frequency, hematuria and urgency  Musculoskeletal: Negative  Negative for arthralgias, gait problem, joint swelling, myalgias and neck stiffness  Skin: Negative  Negative for color change, pallor, rash and wound  Allergic/Immunologic: Negative  Negative for immunocompromised state  Neurological: Negative  Negative for dizziness, seizures, syncope, weakness, light-headedness, numbness and headaches  Hematological: Negative  Negative for adenopathy  Does not bruise/bleed easily  Psychiatric/Behavioral: Negative  Negative for confusion and hallucinations  The patient is not nervous/anxious           Past Medical and Surgical History:      Medical History        Past Medical History:   Diagnosis Date    Aortic stenosis      CKD (chronic kidney disease)       baseline Cr 1 3-1 5    Coronary artery disease      Cough      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HCC)       type 2, insulin dependent    GERD (gastroesophageal reflux disease)      Glaucoma      Gout      History of prostate cancer      Hypertension      Hypothyroidism      Overweight      Peripheral neuropathy, idiopathic      Pleural effusion, left      Pure hypercholesterolemia       LA   11/12/14   R   11/12/14             Surgical History         Past Surgical History:   Procedure Laterality Date    CARDIAC CATHETERIZATION        IR THORACENTESIS   10/23/2018    IR THORACENTESIS   11/2/2018    IR THORACENTESIS   11/9/2018    IR THORACENTESIS   11/23/2018    DC CABG, ARTERY-VEIN, THREE N/A 9/17/2018     Procedure: CORONARY ARTERY BYPASS GRAFT (CABG) x 4 VESSELS with LIMA - LAD, SVG/LEFT LEG EVH - LEFT PDA, OM3, & OM2;  Surgeon: Samanta Ching MD;  [facility];  Service: Cardiac Surgery    DC ECHO TRANSESOPHAG MONTR CARDIAC PUMP FUNCTJ N/A 9/17/2018     Procedure: TRANSESOPHAGEAL ECHOCARDIOGRAM (VERONICA); Surgeon: Samanta Ching MD;  [facility];  Service: Cardiac Surgery    DC RPLCMT AORTIC VALVE OPN W/STENTLESS TISSUE VALVE N/A 9/17/2018     Procedure: REPLACEMENT VALVE AORTIC (AVR)- 23mm tissue Intuity Valve;  Surgeon: Samanta Ching MD;  [facility];  Service: Cardiac Surgery    THYROID SURGERY                Meds/Allergies:             Prior to Admission medications    Medication Sig Start Date End Date Taking? Authorizing Provider   acetaminophen (TYLENOL) 325 mg tablet 650 mg every 4 to 6 hours as needed for pain (do not take with percocet) 9/21/18     Jerrica Rogers PA-C   albuterol (PROVENTIL HFA) 90 mcg/act inhaler Inhale 2 puffs 4 (four) times a day as needed       Historical Provider, MD   allopurinol (ZYLOPRIM) 300 mg tablet TAKE ONE TABLET BY MOUTH ONCE DAILY 5/31/18     Karely Gomez PA-C   ascorbic acid (VITAMIN C) 500 MG tablet Take 1 tablet (500 mg total) by mouth daily for 90 days 9/22/18 12/21/18   Jerrica Rogers PA-C   aspirin 325 mg tablet Take 1 tablet (325 mg total) by mouth daily 9/22/18     Darryn Quarles [facility] KWIKPEN 100 units/mL injection pen Inject 30 Units under the skin daily at bedtime  Patient taking differently: Inject 20 Units under the skin daily at bedtime   10/5/18     Juliette Fly, DO   budesonide-formoterol (SYMBICORT) 160-4 5 mcg/act inhaler Inhale 2 puffs 2 (two) times a day Rinse mouth after use   11/16/18     Juliette Fly, DO   docusate sodium (COLACE) 100 mg capsule Take 1 capsule (100 mg total) by mouth daily  Patient taking differently: Take 100 mg by mouth as needed   9/21/18     Shanda Garay PA-C   ferrous sulfate 325 (65 Fe) mg tablet Take 1 tablet (325 mg total) by mouth daily with breakfast for 90 days 9/22/18 12/21/18   Shanda Garay PA-C   gabapentin (NEURONTIN) 300 mg capsule TAKE ONE CAPSULE BY MOUTH ONCE DAILY AT BEDTIME 9/7/18     Yanely Alexander PA-C   insulin aspart (NovoLOG) 100 units/mL injection Inject under the skin 3 (three) times a day before meals 10/4/18     Yanely Alexander PA-C   insulin lispro (HUMALOG KWIKPEN) 100 units/mL injection pen Inject 16 units at breakfast 10/8/18     Juliette Fly, DO   levothyroxine 150 mcg tablet Take 1 tablet (150 mcg total) by mouth daily 10/9/18     Juliette Fly, DO   LORazepam (ATIVAN) 0 5 mg tablet Take 1 tablet (0 5 mg total) by mouth every 8 (eight) hours as needed for anxiety 10/9/18     Juliette Fly, DO   losartan-hydrochlorothiazide (HYZAAR) 100-25 MG per tablet TAKE 1 TABLET BY MOUTH EVERY DAY 10/28/18     Juliette Fly, DO   metoprolol tartrate (LOPRESSOR) 25 mg tablet Take 0 5 tablets (12 5 mg total) by mouth every 12 (twelve) hours 10/9/18     Juliette Fly, DO   pantoprazole (PROTONIX) 40 mg tablet Take 1 tablet (40 mg total) by mouth daily 10/9/18     Julietet Fly, DO   polyethylene glycol (MIRALAX) 17 g packet Take 17 g by mouth daily  Patient taking differently: Take 17 g by mouth as needed   9/22/18     Shanda Garay PA-C   rosuvastatin (CRESTOR) 40 MG tablet Take 1 tablet (40 mg total) by mouth daily 10/9/18     Juliette Fly, DO   senna-docusate sodium (SENOKOT S) 8 6-50 mg per tablet Take 1 tablet by mouth daily at bedtime as needed for constipation 9/21/18     Shanda Garay PA-C   tamsulosin [facility]) 0 4 mg Take 1 capsule (0 4 mg total) by mouth daily with dinner 10/9/18     Jack Escobar, DO      I have reviewed home medications using allscripts      Allergies:         Allergies   Allergen Reactions    Amlodipine Nausea Only    Atorvastatin Myalgia         Social History:     Marital Status:  Occupation:  Retired  Patient Pre-hospital Living Situation:  Independent with his adult son  Patient Pre-hospital Level of Mobility:  Ambulatory with a cane  Patient Pre-hospital Diet Restrictions:    Substance Use History:       History   Alcohol Use No            History   Smoking Status    Former Smoker    Packs/day: 0 25    Years: 1 00    Types: Cigarettes    Quit date: 1970   Smokeless Tobacco    Never Used       Comment: Only in the service           History   Drug Use No         Family History:     non-contributory     Physical Exam:      Vitals:   Blood Pressure: 135/75 (11/24/18 0048)  Pulse: 77 (11/24/18 0048)  Temperature: 98 3 °F (36 8 °C) (11/24/18 0048)  Temp Source: Oral (11/24/18 0048)  Respirations: 18 (11/24/18 0048)  SpO2: 97 % (11/24/18 0048)     Physical Exam   Constitutional: He is oriented to person, place, and time  He appears well-developed and well-nourished  No distress  HENT:   Head: Normocephalic and atraumatic  Right Ear: External ear normal    Left Ear: External ear normal    Nose: Nose normal    Mouth/Throat: Oropharynx is clear and moist  No oropharyngeal exudate  Eyes: Pupils are equal, round, and reactive to light  Conjunctivae and EOM are normal  Right eye exhibits no discharge  Left eye exhibits no discharge  No scleral icterus  Neck: Normal range of motion  Neck supple  No JVD present  No tracheal deviation present  No thyromegaly present  Cardiovascular: Normal rate, regular rhythm and intact distal pulses  Exam reveals no gallop and no friction rub  Murmur (gilmer 3/6) heard  Pulmonary/Chest: Breath sounds normal  No stridor  No respiratory distress  He has no wheezes  He has no rales  He exhibits no tenderness  Abdominal: Soft  Bowel sounds are normal  He exhibits no distension  There is no tenderness  There is no rebound and no guarding  Musculoskeletal: Normal range of motion  He exhibits no edema, tenderness or deformity     Lymphadenopathy:     He has no cervical adenopathy  Neurological: He is alert and oriented to person, place, and time  He has normal reflexes  No cranial nerve deficit  He exhibits normal muscle tone  Coordination normal    Skin: Skin is warm and dry  No rash noted  He is not diaphoretic  No erythema  No pallor  Psychiatric: He has a normal mood and affect  His behavior is normal  Judgment and thought content normal    Nursing note and vitals reviewed               Additional Data:      Lab Results: I have personally reviewed pertinent reports           Results from last 7 days  Lab Units 11/23/18  2051   WBC Thousand/uL 9 42   HEMOGLOBIN g/dL 9 7*   HEMATOCRIT % 32 2*   PLATELETS Thousands/uL 275   NEUTROS PCT % 76*   LYMPHS PCT % 13*   MONOS PCT % 7   EOS PCT % 2         Results from last 7 days  Lab Units 11/23/18 2051   SODIUM mmol/L 138   POTASSIUM mmol/L 4 3   CHLORIDE mmol/L 102   CO2 mmol/L 27   BUN mg/dL 36*   CREATININE mg/dL 3 52*   ANION GAP mmol/L 9   CALCIUM mg/dL 9 1   ALBUMIN g/dL 2 3*   TOTAL BILIRUBIN mg/dL 0 50   ALK PHOS U/L 123*   ALT U/L 44   AST U/L 60*   GLUCOSE RANDOM mg/dL 131         Results from last 7 days  Lab Units 11/23/18  1030   INR   1 31*         Results from last 7 days  Lab Units 11/23/18  2018   POC GLUCOSE mg/dl 119         Results from last 7 days  Lab Units 11/20/18  0959   HEMOGLOBIN A1C % of total Hgb 6 5*             Imaging: I have personally reviewed pertinent reports        No orders to display         EKG, Pathology, and Other Studies Reviewed on Admission:   EKG:  Sinus rhythm, nonspecific interventricular conduction block and nonspecific ST and T-wave changes   Allscripts / Epic Records Reviewed: Yes      ** Please Note: This note has been constructed using a voice recognition system   **

## 2018-11-24 NOTE — ASSESSMENT & PLAN NOTE
· Cardiology following   I discussed with them today and appreciate their recommendations  · Per Cards this represents a Type 2 NSTEMI in the setting of ADAM and hypoglycemia  · Heparin GTT was discontinued per Cards recs  · Troponins were trended as follows: 0 26, 0 24, 0 21

## 2018-11-24 NOTE — CONSULTS
Consultation - Thoracic Surgery   Leigh Avelar  80 y o  male MRN: [id number]  Unit/Bed#: [facility] 292-63 Encounter: [id number]      Assessment/Plan     Assessment:   59-year-old male with recurrent left pleural effusion, previously scheduled for left VATS, talc pleurodesis who presents with a KI of unclear etiology  Patient is status post left thoracentesis yesterday, with symptomatic improvement and improvement on chest x-ray  At this time, he appears relatively stable and has been evaluated by renal and cardiology  Pending appropriate clearances, we will tentatively plan to perform his procedure this week, possibly 11/27/2018  Plan:  Cont pulm toilet  Appreciate further renal recs  Cardiac clearance per cardiology  Monitor creatinine  Repeat CXR tomorrow     Will tentatively plan for L VATS, talc pleurodesis on 11/27/18    History of Present Illness   Reason for Consult / Principal Problem: Left pleural effusion    HPI: Leigh Avelar  is a 80y o  year old male who presents with ADAM (Cr 3 5 on admission) on preoperative work up for left VATS, talc pleurodesis last week  Patient w/ recurrent L pleural effusion following CABG/AVR  He has undergone multiple thoracenteses  He most recently underwent left thoracentesis yesterday with 1300cc of drainage  He does not complain of any shortness of breath at this time  Additionally, on admission he was found to have mild troponin elevation and hypoglycemia  He was seen by cardiology who recommended no further work up  He is undergoing continued evaluation by TONG for his hypoglycemia       Consults    Review of Systems   A complete 12 point review of systems was performed, all findings are negative unless otherwise noted by the HPI    Historical Information   Past Medical History:   Diagnosis Date    Aortic stenosis     CKD (chronic kidney disease)     baseline Cr 1 3-1 5    Coronary artery disease     Cough     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HCC)     type 2, insulin dependent    GERD (gastroesophageal reflux disease)     Glaucoma     Gout     History of prostate cancer     Hypertension     Hypothyroidism     Overweight     Peripheral neuropathy, idiopathic     Pleural effusion, left     Pure hypercholesterolemia     LA   11/12/14   R   11/12/14      Past Surgical History:   Procedure Laterality Date    CARDIAC CATHETERIZATION      IR THORACENTESIS  10/23/2018    IR THORACENTESIS  11/2/2018    IR THORACENTESIS  11/9/2018    IR THORACENTESIS  11/23/2018    MS CABG, ARTERY-VEIN, THREE N/A 9/17/2018    Procedure: CORONARY ARTERY BYPASS GRAFT (CABG) x 4 VESSELS with LIMA - LAD, SVG/LEFT LEG EVH - LEFT PDA, OM3, & OM2;  Surgeon: Leodan Lofton MD;  [facility];  Service: Cardiac Surgery    MS ECHO TRANSESOPHAG MONTR CARDIAC PUMP FUNCTJ N/A 9/17/2018    Procedure: TRANSESOPHAGEAL ECHOCARDIOGRAM (VERONICA);   Surgeon: Leodan Lofton MD;  [facility];  Service: Cardiac Surgery    MS RPLCMT AORTIC VALVE OPN W/STENTLESS TISSUE VALVE N/A 9/17/2018    Procedure: REPLACEMENT VALVE AORTIC (AVR)- 23mm tissue Intuity Valve;  Surgeon: Leodan Lofton MD;  [facility];  Service: Cardiac Surgery    THYROID SURGERY       History   Alcohol Use No     History   Drug Use No     History   Smoking Status    Former Smoker    Packs/day: 0 25    Years: 1 00    Types: Cigarettes    Quit date: 1970   Smokeless Tobacco    Never Used     Comment: Only in the service      Family History: non-contributory    Meds/Allergies   all current active meds have been reviewed, current meds:   Current Facility-Administered Medications   Medication Dose Route Frequency    acetaminophen (TYLENOL) tablet 650 mg  650 mg Oral Q6H PRN    albuterol (PROVENTIL HFA,VENTOLIN HFA) inhaler 2 puff  2 puff Inhalation 4x Daily PRN    [START ON 11/25/2018] aspirin chewable tablet 325 mg  325 mg Oral Daily    budesonide-formoterol (SYMBICORT) 160-4 5 mcg/act inhaler 2 puff  2 puff Inhalation BID    heparin (porcine) subcutaneous injection 5,000 Units  5,000 Units Subcutaneous Q8H [address]    insulin lispro (HumaLOG) 100 units/mL subcutaneous injection 1-5 Units  1-5 Units Subcutaneous HS    insulin lispro (HumaLOG) 100 units/mL subcutaneous injection 1-6 Units  1-6 Units Subcutaneous TID AC    levothyroxine tablet 150 mcg  150 mcg Oral Early Morning    LORazepam (ATIVAN) tablet 0 5 mg  0 5 mg Oral Q8H PRN    metoprolol tartrate (LOPRESSOR) partial tablet 12 5 mg  12 5 mg Oral Q12H JAVAD    morphine injection 2 mg  2 mg Intravenous Q3H PRN    nitroglycerin (NITROSTAT) SL tablet 0 4 mg  0 4 mg Sublingual Q5 Min PRN    pantoprazole (PROTONIX) EC tablet 40 mg  40 mg Oral Early Morning    rosuvastatin (CRESTOR) tablet 40 mg  40 mg Oral Daily    tamsulosin (FLOMAX) capsule 0 4 mg  0 4 mg Oral Daily With Dinner    and PTA meds:   Prior to Admission Medications   Prescriptions Last Dose Informant Patient Reported? Taking?    BASAGLBRYN CHILDSPEN 100 units/mL injection pen  Self No No   Sig: Inject 30 Units under the skin daily at bedtime   Patient taking differently: Inject 20 Units under the skin daily at bedtime     LORazepam (ATIVAN) 0 5 mg tablet  Self No No   Sig: Take 1 tablet (0 5 mg total) by mouth every 8 (eight) hours as needed for anxiety   acetaminophen (TYLENOL) 325 mg tablet More than a month at Unknown time Self No No   Si mg every 4 to 6 hours as needed for pain (do not take with percocet)   albuterol (PROVENTIL HFA) 90 mcg/act inhaler Unknown at Unknown time Self Yes No   Sig: Inhale 2 puffs 4 (four) times a day as needed   allopurinol (ZYLOPRIM) 300 mg tablet  Self No No   Sig: TAKE ONE TABLET BY MOUTH ONCE DAILY   ascorbic acid (VITAMIN C) 500 MG tablet  Self No No   Sig: Take 1 tablet (500 mg total) by mouth daily for 90 days   aspirin 325 mg tablet  Self No No   Sig: Take 1 tablet (325 mg total) by mouth daily   budesonide-formoterol (SYMBICORT) 160-4 5 mcg/act inhaler More than a month at Unknown time  No No   Sig: Inhale 2 puffs 2 (two) times a day Rinse mouth after use  docusate sodium (COLACE) 100 mg capsule  Self No No   Sig: Take 1 capsule (100 mg total) by mouth daily   Patient taking differently: Take 100 mg by mouth as needed     ferrous sulfate 325 (65 Fe) mg tablet  Self No No   Sig: Take 1 tablet (325 mg total) by mouth daily with breakfast for 90 days   gabapentin (NEURONTIN) 300 mg capsule  Self No No   Sig: TAKE ONE CAPSULE BY MOUTH ONCE DAILY AT BEDTIME   insulin aspart (NovoLOG) 100 units/mL injection  Self Yes No   Sig: Inject under the skin 3 (three) times a day before meals   insulin lispro (HUMALOG KWIKPEN) 100 units/mL injection pen  Self No No   Sig: Inject 16 units at breakfast   levothyroxine 150 mcg tablet  Self No No   Sig: Take 1 tablet (150 mcg total) by mouth daily   losartan-hydrochlorothiazide (HYZAAR) 100-25 MG per tablet Unknown at Unknown time  No No   Sig: TAKE 1 TABLET BY MOUTH EVERY DAY   metoprolol tartrate (LOPRESSOR) 25 mg tablet  Self No No   Sig: Take 0 5 tablets (12 5 mg total) by mouth every 12 (twelve) hours   pantoprazole (PROTONIX) 40 mg tablet  Self No No   Sig: Take 1 tablet (40 mg total) by mouth daily   polyethylene glycol (MIRALAX) 17 g packet  Self No No   Sig: Take 17 g by mouth daily   Patient taking differently: Take 17 g by mouth as needed     rosuvastatin (CRESTOR) 40 MG tablet  Self No No   Sig: Take 1 tablet (40 mg total) by mouth daily   senna-docusate sodium (SENOKOT S) 8 6-50 mg per tablet  Self No No   Sig: Take 1 tablet by mouth daily at bedtime as needed for constipation   tamsulosin (FLOMAX) 0 4 mg  Self No No   Sig: Take 1 capsule (0 4 mg total) by mouth daily with dinner      Facility-Administered Medications: None     Allergies   Allergen Reactions    Amlodipine Nausea Only    Atorvastatin Myalgia       Objective   Vitals: Blood pressure 111/56, pulse 70, temperature 98 1 °F (36 7 °C), resp   rate 16, SpO2 97 %     Invasive Devices     Peripheral Intravenous Line            Peripheral IV 11/24/18 Left Forearm less than 1 day                Physical Exam   Constitutional: He is oriented to person, place, and time  He appears well-developed  HENT:   Head: Normocephalic and atraumatic  Eyes: Pupils are equal, round, and reactive to light  Neck: Normal range of motion  Cardiovascular: Normal rate and regular rhythm  Pulmonary/Chest: Effort normal and breath sounds normal  No respiratory distress  Abdominal: Soft  He exhibits no distension  There is no tenderness  There is no rebound  Musculoskeletal: Normal range of motion  Neurological: He is alert and oriented to person, place, and time  Skin: Skin is warm and dry  Lab Results:   I have personally reviewed pertinent reports  , CBC:   Lab Results   Component Value Date    WBC 9 92 11/24/2018    HGB 9 3 (L) 11/24/2018    HCT 30 7 (L) 11/24/2018    MCV 88 11/24/2018     11/24/2018    MCH 26 6 (L) 11/24/2018    MCHC 30 3 (L) 11/24/2018    RDW 15 9 (H) 11/24/2018    MPV 9 2 11/24/2018    NRBC 0 11/23/2018   , CMP:   Lab Results   Component Value Date    SODIUM 138 11/24/2018     11/24/2018    CO2 23 11/24/2018    BUN 32 (H) 11/24/2018    CREATININE 3 15 (H) 11/24/2018    CALCIUM 7 9 (L) 11/24/2018    AST 60 (H) 11/23/2018    ALT 44 11/23/2018    ALKPHOS 123 (H) 11/23/2018    EGFR 17 11/24/2018   , Magnesium: No components found for: MAG  Imaging Studies: I have personally reviewed pertinent reports  and I have personally reviewed pertinent films in PACS  EKG, Pathology, and Other Studies: I have personally reviewed pertinent reports      VTE Prophylaxis: Heparin    Code Status: Level 1 - Full Code  Advance Directive and Living Will:      Power of :    POLST:

## 2018-11-25 LAB
ANION GAP SERPL CALCULATED.3IONS-SCNC: 6 MMOL/L (ref 4–13)
BUN SERPL-MCNC: 34 MG/DL (ref 5–25)
CALCIUM SERPL-MCNC: 8.9 MG/DL (ref 8.3–10.1)
CHLORIDE SERPL-SCNC: 104 MMOL/L (ref 100–108)
CO2 SERPL-SCNC: 25 MMOL/L (ref 21–32)
CREAT SERPL-MCNC: 3.06 MG/DL (ref 0.6–1.3)
GFR SERPL CREATININE-BSD FRML MDRD: 18 ML/MIN/1.73SQ M
GLUCOSE SERPL-MCNC: 129 MG/DL (ref 65–140)
GLUCOSE SERPL-MCNC: 153 MG/DL (ref 65–140)
GLUCOSE SERPL-MCNC: 202 MG/DL (ref 65–140)
GLUCOSE SERPL-MCNC: 202 MG/DL (ref 65–140)
POTASSIUM SERPL-SCNC: 3.6 MMOL/L (ref 3.5–5.3)
SODIUM SERPL-SCNC: 135 MMOL/L (ref 136–145)
T4 FREE SERPL-MCNC: 1.32 NG/DL (ref 0.76–1.46)
TSH SERPL DL<=0.05 MIU/L-ACNC: 0.4 UIU/ML (ref 0.36–3.74)

## 2018-11-25 PROCEDURE — 99232 SBSQ HOSP IP/OBS MODERATE 35: CPT | Performed by: HOSPITALIST

## 2018-11-25 PROCEDURE — 82948 REAGENT STRIP/BLOOD GLUCOSE: CPT

## 2018-11-25 PROCEDURE — 84439 ASSAY OF FREE THYROXINE: CPT | Performed by: PHYSICIAN ASSISTANT

## 2018-11-25 PROCEDURE — 84443 ASSAY THYROID STIM HORMONE: CPT | Performed by: PHYSICIAN ASSISTANT

## 2018-11-25 PROCEDURE — 99232 SBSQ HOSP IP/OBS MODERATE 35: CPT | Performed by: INTERNAL MEDICINE

## 2018-11-25 PROCEDURE — 80048 BASIC METABOLIC PNL TOTAL CA: CPT | Performed by: INTERNAL MEDICINE

## 2018-11-25 RX ORDER — POLYETHYLENE GLYCOL 3350 17 G/17G
17 POWDER, FOR SOLUTION ORAL DAILY PRN
Status: DISCONTINUED | OUTPATIENT
Start: 2018-11-25 | End: 2018-12-04 | Stop reason: HOSPADM

## 2018-11-25 RX ADMIN — POLYETHYLENE GLYCOL 3350 17 G: 17 POWDER, FOR SOLUTION ORAL at 09:09

## 2018-11-25 RX ADMIN — PANTOPRAZOLE SODIUM 40 MG: 40 TABLET, DELAYED RELEASE ORAL at 05:07

## 2018-11-25 RX ADMIN — BUDESONIDE AND FORMOTEROL FUMARATE DIHYDRATE 2 PUFF: 160; 4.5 AEROSOL RESPIRATORY (INHALATION) at 21:07

## 2018-11-25 RX ADMIN — INSULIN LISPRO 1 UNITS: 100 INJECTION, SOLUTION INTRAVENOUS; SUBCUTANEOUS at 08:04

## 2018-11-25 RX ADMIN — ASPIRIN 81 MG 325 MG: 81 TABLET ORAL at 08:04

## 2018-11-25 RX ADMIN — INSULIN LISPRO 1 UNITS: 100 INJECTION, SOLUTION INTRAVENOUS; SUBCUTANEOUS at 21:08

## 2018-11-25 RX ADMIN — METOPROLOL TARTRATE 12.5 MG: 25 TABLET ORAL at 08:04

## 2018-11-25 RX ADMIN — BUDESONIDE AND FORMOTEROL FUMARATE DIHYDRATE 2 PUFF: 160; 4.5 AEROSOL RESPIRATORY (INHALATION) at 08:04

## 2018-11-25 RX ADMIN — HEPARIN SODIUM 5000 UNITS: 5000 INJECTION INTRAVENOUS; SUBCUTANEOUS at 05:07

## 2018-11-25 RX ADMIN — LEVOTHYROXINE SODIUM 150 MCG: 75 TABLET ORAL at 05:07

## 2018-11-25 RX ADMIN — INSULIN LISPRO 2 UNITS: 100 INJECTION, SOLUTION INTRAVENOUS; SUBCUTANEOUS at 17:33

## 2018-11-25 RX ADMIN — HEPARIN SODIUM 5000 UNITS: 5000 INJECTION INTRAVENOUS; SUBCUTANEOUS at 21:07

## 2018-11-25 RX ADMIN — HEPARIN SODIUM 5000 UNITS: 5000 INJECTION INTRAVENOUS; SUBCUTANEOUS at 13:36

## 2018-11-25 RX ADMIN — INSULIN LISPRO 2 UNITS: 100 INJECTION, SOLUTION INTRAVENOUS; SUBCUTANEOUS at 11:40

## 2018-11-25 RX ADMIN — METOPROLOL TARTRATE 12.5 MG: 25 TABLET ORAL at 21:07

## 2018-11-25 RX ADMIN — TAMSULOSIN HYDROCHLORIDE 0.4 MG: 0.4 CAPSULE ORAL at 15:27

## 2018-11-25 NOTE — ASSESSMENT & PLAN NOTE
· Cardiology following, appreciate their recommendations   · Per Cards this represents a Type 2 NSTEMI in the setting of ADAM and hypoglycemia  · Heparin GTT was discontinued per Cards recs  · Troponins were trended as follows: 0 26, 0 24, 0 21

## 2018-11-25 NOTE — ASSESSMENT & PLAN NOTE
· Baseline creatinine 1 3-1 5  · Creatinine on admission 3 5, continued to improve to 3 0 today  · Appreciate Nephrology consultation - I discussed with Dr Elana Ferguson today  · Losartan-HCTZ on hold - see further plan regarding this below

## 2018-11-25 NOTE — ASSESSMENT & PLAN NOTE
· Losartan-HCTZ on hold given ADAM  · Per Nephrology, ARB can likely be held upon discharge as BP has been stable, and consider switching thiazide to a loop diuretic in 24-48hrs  · Continue BB

## 2018-11-25 NOTE — ASSESSMENT & PLAN NOTE
· This has been since his recent CABG/AVR  · He follows with Dr Donell Osuna of Thoracic Surgery as outpatient, appreciate their recommendations now as well  · He has had multiple thoracentesis  Per Thoracic surgery notes, patient had elected to proceed with pleurodesis (was initially scheduled for 11/23/18) over Pleur-X cath, however this was not performed - rather, the patient had an IR thoracentesis with 1,300mL out  · Vascular planning for likely L VATS talc pleurodesis Tuesday 11/27/18   Appreciate Nephrology's ongoing recommendations regarding renal function optimization pre-procedurally as well as Cardiology recs given NSTEMI

## 2018-11-25 NOTE — ASSESSMENT & PLAN NOTE
· With recurrent hypoglycemia as outpatient for which is home insulin regimen was being decreased  · Patient with reported poor PO intake but admits to continuing to take his home insulin regimen  · Glucose noted to be 29 on day-of-admission BMP  · Continue cardiac and carb-controlled diet   Continue weight-based SSI coverage with meals and lower dose at bedtime for now - AM sugar was controlled at 129  · Hypoglycemia protocol on board  · Continue to monitor sugars and adjust insulin as needed    Average sugar:  (P) 918 [id number]

## 2018-11-25 NOTE — ASSESSMENT & PLAN NOTE
· CXR s/p yesterday thoracentesis showed trace left-sided pneumothorax which was too small for chest tube placement  · Setting well on room air today  · Appreciate Thoracic Surgery recs

## 2018-11-25 NOTE — PROGRESS NOTES
NEPHROLOGY PROGRESS NOTE   Edgar Foley  80 y o  male MRN: [id number]  Unit/Bed#: [facility] 151-87 Encounter: [id number]  Reason for Consult: ADAM    Assessment/Plan:  1  Acute kidney injury, suspecting some degree of intravascular volume depletion, would hold thiazide  Also could be component of impaired renal autoregulation given angiotensin receptor blocker  2  Chronic kidney disease, baseline creatinine 1 3-1 6 although last discharge creatinine was noted be 1 8  3  Recurrent left effusion, status post thoracentesis, anticipated pleurodesis  4  Aortic stenosis with recent AVR  5  Hypoglycemia, improved  6  Multivessel coronary disease, status post CABG     PLAN:  · Overall renal function continues to improve  · Continue to hold angiotensin receptor blocker, blood pressure appears stable would likely not resume as an outpatient  · Given patient's volume status, continue to hold hydrochlorothiazide although I suspect he would be better served with the loop diuretic in next 24-48 hours  · Eventual pleurodesis as per thoracic surgery next week    SUBJECTIVE:  Seen examined  Patient awake alert  Offers no new complaints  Denies any chest pain shortness of breath  Appetite seems to be stable  Review of Systems    OBJECTIVE:  Current Weight:    Vitals:    11/24/18 2326 11/25/18 0238 11/25/18 0705 11/25/18 1016   BP: 120/63 121/64 125/70 93/52   BP Location: Left arm Left arm     Pulse: 80 80 78 66   Resp: 20 20 18 20   Temp: 98 °F (36 7 °C) 98 1 °F (36 7 °C) 98 °F (36 7 °C) 98 2 °F (36 8 °C)   TempSrc: Oral Oral     SpO2: 95% 96% 98% 93%       Intake/Output Summary (Last 24 hours) at 11/25/18 1051  Last data filed at 11/25/18 0901   Gross per 24 hour   Intake           368 43 ml   Output             1411 ml   Net         -1042 57 ml       Physical Exam   Constitutional: No distress  HENT:   Head: Normocephalic  Eyes: No scleral icterus  Neck: Neck supple  Cardiovascular: Normal rate and regular rhythm  Pulmonary/Chest: Effort normal  He has rales (Few rales at the bases bilaterally)  Abdominal: Soft  He exhibits no distension  Musculoskeletal: He exhibits edema (Trace edema)  Neurological: He is alert  Skin: Skin is warm and dry         Medications:    Current Facility-Administered Medications:     acetaminophen (TYLENOL) tablet 650 mg, 650 mg, Oral, Q6H PRN, Wan Arredondo MD    albuterol (PROVENTIL HFA,VENTOLIN HFA) inhaler 2 puff, 2 puff, Inhalation, 4x Daily PRN, Wan Arredondo MD    aspirin chewable tablet 325 mg, 325 mg, Oral, Daily, Wan Arredondo MD, 325 mg at 11/25/18 0804    budesonide-formoterol (SYMBICORT) 160-4 5 mcg/act inhaler 2 puff, 2 puff, Inhalation, BID, Wan Arredondo MD, 2 puff at 11/25/18 0804    heparin (porcine) subcutaneous injection 5,000 Units, 5,000 Units, Subcutaneous, Q8H [facility], Ezekiel Houston PA-C, 5,000 Units at 11/25/18 0507    insulin lispro (HumaLOG) 100 units/mL subcutaneous injection 1-5 Units, 1-5 Units, Subcutaneous, HS, Ezekiel Houston PA-C, 2 Units at 11/24/18 2155    insulin lispro (HumaLOG) 100 units/mL subcutaneous injection 1-6 Units, 1-6 Units, Subcutaneous, TID AC, 1 Units at 11/25/18 0804 **AND** Fingerstick Glucose (POCT), , , TID AC, Ezekiel Houston PA-C    levothyroxine tablet 150 mcg, 150 mcg, Oral, Early Morning, Wan Arredondo MD, 150 mcg at 11/25/18 0507    LORazepam (ATIVAN) tablet 0 5 mg, 0 5 mg, Oral, Q8H PRN, Wan Arredondo MD    metoprolol tartrate (LOPRESSOR) partial tablet 12 5 mg, 12 5 mg, Oral, Q12H [facility], Wan Arredondo MD, 12 5 mg at 11/25/18 0804    morphine injection 2 mg, 2 mg, Intravenous, Q3H PRN, Wan Arredondo MD    nitroglycerin (NITROSTAT) SL tablet 0 4 mg, 0 4 mg, Sublingual, Q5 Min PRN, Wan Arredondo MD    pantoprazole (PROTONIX) EC tablet 40 mg, 40 mg, Oral, Early Morning, Wan Arredondo MD, 40 mg at 11/25/18 0507    polyethylene glycol (MIRALAX) packet 17 g, 17 g, Oral, Daily PRN, Fadumo Deshawn Ortiz MD, 17 g at 11/25/18 0909    rosuvastatin (CRESTOR) tablet 40 mg, 40 mg, Oral, Daily, Deysi aGrcia MD, Stopped at 11/24/18 1056    tamsulosin (FLOMAX) capsule 0 4 mg, 0 4 mg, Oral, Daily With Daljit Slater MD, 0 4 mg at 11/24/18 1618    Laboratory Results:    Results from last 7 days  Lab Units 11/25/18  0444 11/24/18  0505 11/24/18  0159 11/23/18 2051 11/23/18  1619 11/20/18  0959   WBC Thousand/uL  --   --  9 92 9 42  --   --    WHITE BLOOD CELL COUNT  Thousand/uL  --   --   --   --   --  10 3   HEMOGLOBIN g/dL  --   --  9 3* 9 7*  --   --    HEMOGLOBIN  g/dL  --   --   --   --   --  10 9*   HEMATOCRIT %  --   --  30 7* 32 2*  --   --    HEMATOCRIT  %  --   --   --   --   --  35 3*   PLATELETS Thousands/uL  --   --  390 275  --   --    PLATELETS   Thousand/uL  --   --   --   --   --  537*   POTASSIUM mmol/L 3 6 3 8  --  4 3 3 7 3 7   CHLORIDE mmol/L 104 108  --  102 104 102   CO2 mmol/L 25 23  --  27 30 26   BUN mg/dL 34* 32*  --  36* 34* 30*   CREATININE mg/dL 3 06* 3 15*  --  3 52* 3 57*  --    SL AMB CALCIUM mg/dL  --   --   --   --   --  8 8   CALCIUM mg/dL 8 9 7 9*  --  9 1 9 1  --    MAGNESIUM mg/dL  --   --   --  2 5  --   --

## 2018-11-25 NOTE — PROGRESS NOTES
Progress Note - Darcus Reason 1935, 80 y o  male MRN: [id number]    Unit/Bed#: [facility] 126-33 Encounter: [id number]    Primary Care Provider: Zaira Scott DO   Date and time admitted to hospital: 11/24/2018 12:39 AM        * NSTEMI (non-ST elevated myocardial infarction) [facility])   Assessment & Plan    · Cardiology following, appreciate their recommendations   · Per Cards this represents a Type 2 NSTEMI in the setting of ADAM and hypoglycemia  · Heparin GTT was discontinued per Cards recs  · Troponins were trended as follows: 0 26, 0 24, 0 21         Acute renal failure superimposed on stage 3 chronic kidney disease (HCC)   Assessment & Plan    · Baseline creatinine 1 3-1 5  · Creatinine on admission 3 5, continued to improve to 3 0 today  · Appreciate Nephrology consultation - I discussed with Dr Lilliam Mackenzie today  · Losartan-HCTZ on hold - see further plan regarding this below     Type 2 diabetes mellitus with hypoglycemia without coma, with long-term [facility]   Assessment & Plan    · With recurrent hypoglycemia as outpatient for which is home insulin regimen was being decreased  · Patient with reported poor PO intake but admits to continuing to take his home insulin regimen  · Glucose noted to be 29 on day-of-admission BMP  · Continue cardiac and carb-controlled diet  Continue weight-based SSI coverage with meals and lower dose at bedtime for now - AM sugar was controlled at 129  · Hypoglycemia protocol on board  · Continue to monitor sugars and adjust insulin as needed    Average sugar:  (P) 174 [id number]     Recurrent left pleural effusion   Assessment & Plan    · This has been since his recent CABG/AVR  · He follows with Dr Pritesh Smith of Thoracic Surgery as outpatient, appreciate their recommendations now as well  · He has had multiple thoracentesis   Per Thoracic surgery notes, patient had elected to proceed with pleurodesis (was initially scheduled for 11/23/18) over Pleur-X cath, however this was not performed - rather, the patient had an IR thoracentesis with 1,300mL out  · Vascular planning for likely L VATS talc pleurodesis Tuesday 11/27/18  Appreciate Nephrology's ongoing recommendations regarding renal function optimization pre-procedurally as well as Cardiology recs given NSTEMI     Pneumothorax   Assessment & Plan    · CXR s/p yesterday thoracentesis showed trace left-sided pneumothorax which was too small for chest tube placement  · Setting well on room air today  · Appreciate Thoracic Surgery recs     CAD (coronary artery disease)   Assessment & Plan    · S/p CABG x4 in September of this year  · On daily aspirin, statin, and BB  · Patient on Crestor at home (due to reported Lipitor allergy)  Family may bring in from home as this is non-formulary here     Aortic stenosis   Assessment & Plan    · S/p AVR (Mariella [facility]) in September of this year     Hypothyroidism   Assessment & Plan    · TSH and fT4 WNL  · Continue levothyroxine     Benign prostatic hyperplasia with nocturia   Assessment & Plan    · Continue Flomax     Pure hypercholesterolemia   Assessment & Plan    · With Lipitor allergy, continue home Crestor which is 40 mg daily - family may bring in from home as this is non-formulary     Benign essential hypertension   Assessment & Plan    · Losartan-HCTZ on hold given ADAM  · Per Nephrology, ARB can likely be held upon discharge as BP has been stable, and consider switching thiazide to a loop diuretic in 24-48hrs  · Continue BB       VTE Pharmacologic Prophylaxis:   Pharmacologic: Heparin  Mechanical VTE Prophylaxis in Place: Yes    Patient Centered Rounds: I have performed bedside rounds with nursing staff today  Sue     Discussions with Specialists or Other Care Team Provider: Dr Ree Ganser    Education and Discussions with Family / Patient: Patient; also discussed with his son, Ki Carter, over the phone     Time Spent for Care: 30 minutes    More than 50% of total time spent on counseling and coordination of care as described above  Current Length of Stay: 1 day(s)    Current Patient Status: Inpatient   Certification Statement: The patient will continue to require additional inpatient hospital stay due to ADAM, pleural effusion, NSAID    Discharge Plan: not medically stable for d/c    Code Status: Level 1 - Full Code      Subjective:   Mr Thais Salgado reports that he is feeling well today  Had a BM this AM  Denies SOB or CP  He denies numbness, tingling, weakness, changes in his speech, or difficulty swallowing  Objective:     Vitals:   Temp (24hrs), Av 1 °F (36 7 °C), Min:98 °F (36 7 °C), Max:98 2 °F (36 8 °C)    Temp:  [98 °F (36 7 °C)-98 2 °F (36 8 °C)] 98 2 °F (36 8 °C)  HR:  [66-80] 66  Resp:  [16-20] 20  BP: ()/(52-70) 93/52  SpO2:  [93 %-98 %] 93 %  There is no height or weight on file to calculate BMI  Input and Output Summary (last 24 hours): Intake/Output Summary (Last 24 hours) at 18 1316  Last data filed at 18 1148   Gross per 24 hour   Intake           578 29 ml   Output             1311 ml   Net          -732 71 ml       Physical Exam:     Physical Exam   Constitutional: He is oriented to person, place, and time  No distress  Patient seen sitting upright in bedside chair  Pleasant and cooperative   Cardiovascular: Normal rate and regular rhythm  Pulmonary/Chest: Effort normal    Rales left base  On room air   Abdominal: Soft  Bowel sounds are normal    Musculoskeletal: He exhibits no edema  Neurological: He is alert and oriented to person, place, and time  Patient's speech is clear, unchanged per patient  When patient opens his mouth, right-sided open lower the left, however this is stable and unchanged from yesterday as well as stable when compared to patient's photographs in the chart  He reports equal and intact sensation to light touch on face x3, upper and lower extremities bilaterally    Shoulder shrug,  strength, elbow flexion and extension, hip flexion, knee extension 5/5 bilaterally  No pronator drift bilateral   Skin: Skin is warm  Psychiatric: He has a normal mood and affect  Vitals reviewed  Additional Data:     Labs:      Results from last 7 days  Lab Units 11/24/18  0159 11/23/18 2051   WBC Thousand/uL 9 92 9 42   HEMOGLOBIN g/dL 9 3* 9 7*   HEMATOCRIT % 30 7* 32 2*   PLATELETS Thousands/uL 390 275   NEUTROS PCT %  --  76*   LYMPHS PCT %  --  13*   MONOS PCT %  --  7   EOS PCT %  --  2       Results from last 7 days  Lab Units 11/25/18  0444  11/23/18 2051   SODIUM mmol/L 135*  < > 138   POTASSIUM mmol/L 3 6  < > 4 3   CHLORIDE mmol/L 104  < > 102   CO2 mmol/L 25  < > 27   BUN mg/dL 34*  < > 36*   CREATININE mg/dL 3 06*  < > 3 52*   ANION GAP mmol/L 6  < > 9   CALCIUM mg/dL 8 9  < > 9 1   ALBUMIN g/dL  --   --  2 3*   TOTAL BILIRUBIN mg/dL  --   --  0 50   ALK PHOS U/L  --   --  123*   ALT U/L  --   --  44   AST U/L  --   --  60*   GLUCOSE RANDOM mg/dL 129  < > 131   < > = values in this interval not displayed  Results from last 7 days  Lab Units 11/24/18  0159   INR  1 36*       Results from last 7 days  Lab Units 11/25/18  1038 11/25/18  0704 11/24/18  2023 11/24/18  1615 11/24/18  1140 11/24/18  0656 11/24/18  0154 11/23/18  2018   POC GLUCOSE mg/dl 202* 153* 225* 282* 182* 92 83 119       Results from last 7 days  Lab Units 11/20/18  0959   HEMOGLOBIN A1C % of total Hgb 6 5*               * I Have Reviewed All Lab Data Listed Above  * Additional Pertinent Lab Tests Reviewed:  All Labs Within Last 24 Hours Reviewed    Imaging:    Imaging Reports Reviewed Today Include: None new  Imaging Personally Reviewed by Myself Includes:  None    Recent Cultures (last 7 days):           Last 24 Hours Medication List:     Current Facility-Administered Medications:  acetaminophen 650 mg Oral Q6H PRN Monica Navarro MD   albuterol 2 puff Inhalation 4x Daily PRN Monica Navarro MD   aspirin 325 mg Oral Daily Larry Wells Sonny Gillis MD   budesonide-formoterol 2 puff Inhalation BID Alissa Roy MD   heparin (porcine) 5,000 Units Subcutaneous [facility] Kavitha Tate PA-C   insulin lispro 1-5 Units Subcutaneous HS Kavitha Tate PA-C   insulin lispro 1-6 Units Subcutaneous TID AC Kavitha Tate PA-C   levothyroxine 150 mcg Oral Early Morning Alissa Roy MD   LORazepam 0 5 mg Oral Q8H PRN Alissa Roy MD   metoprolol tartrate 12 5 mg Oral Q12H Maxi Moise MD   morphine injection 2 mg Intravenous Q3H PRN Alissa Roy MD   nitroglycerin 0 4 mg Sublingual Q5 Min PRN Alissa Roy MD   pantoprazole 40 mg Oral Early Morning Alissa Roy MD   polyethylene glycol 17 g Oral Daily PRN Madhav Hart MD   rosuvastatin 40 mg Oral Daily Alissa Roy MD   tamsulosin 0 4 mg Oral Daily With Kristi Polo MD        Today, Patient Was Seen By: Kavitha Tate PA-C    ** Please Note: Dictation voice to text software may have been used in the creation of this document   **

## 2018-11-25 NOTE — SOCIAL WORK
CM met with patient at bedside to explain CM role and discuss d/c plan  Pt resides with his son Jessy Sharma [phone] in a 1-story home with 1 RENE  Pt is independent with ADLs  Pt uses a cane to ambulate  Pt is currently open with [facility] for skilled nursing  No longer receives PT  Pt agreeable to a referral to [facility] for resumption of care  Pt drives himself to appointments or his son will transport for greater distances  Pt's son will provide transport at d/c  Preferred Rx:  Avenstella Myers  PCP:  Dr Anderson Middleton  No POA  Previous STR with [facility] in September 2018  No history of MH or D/A treatment reported  CM reviewed d/c planning process including the following: identifying help at home, patient preference for d/c planning needs, Discharge Lounge, [facility] Meds to Bed program, availability of treatment team to discuss questions or concerns patient and/or family may have regarding understanding medications and recognizing signs and symptoms once discharged  CM also encouraged patient to follow up with all recommended appointments after discharge  Patient advised of importance for patient and family to participate in managing patients medical well being

## 2018-11-26 ENCOUNTER — APPOINTMENT (OUTPATIENT)
Dept: CARDIAC REHAB | Facility: HOSPITAL | Age: 83
End: 2018-11-26
Payer: COMMERCIAL

## 2018-11-26 LAB
ABO GROUP BLD: NORMAL
ANION GAP SERPL CALCULATED.3IONS-SCNC: 7 MMOL/L (ref 4–13)
ATRIAL RATE: 81 BPM
BLD GP AB SCN SERPL QL: NEGATIVE
BUN SERPL-MCNC: 30 MG/DL (ref 5–25)
CALCIUM SERPL-MCNC: 8.1 MG/DL (ref 8.3–10.1)
CHLORIDE SERPL-SCNC: 106 MMOL/L (ref 100–108)
CO2 SERPL-SCNC: 26 MMOL/L (ref 21–32)
CREAT SERPL-MCNC: 2.59 MG/DL (ref 0.6–1.3)
ERYTHROCYTE [DISTWIDTH] IN BLOOD BY AUTOMATED COUNT: 15.9 % (ref 11.6–15.1)
GFR SERPL CREATININE-BSD FRML MDRD: 22 ML/MIN/1.73SQ M
GLUCOSE SERPL-MCNC: 137 MG/DL (ref 65–140)
GLUCOSE SERPL-MCNC: 156 MG/DL (ref 65–140)
GLUCOSE SERPL-MCNC: 191 MG/DL (ref 65–140)
GLUCOSE SERPL-MCNC: 208 MG/DL (ref 65–140)
GLUCOSE SERPL-MCNC: 211 MG/DL (ref 65–140)
GLUCOSE SERPL-MCNC: 240 MG/DL (ref 65–140)
HCT VFR BLD AUTO: 32.6 % (ref 36.5–49.3)
HGB BLD-MCNC: 9.9 G/DL (ref 12–17)
MCH RBC QN AUTO: 26.5 PG (ref 26.8–34.3)
MCHC RBC AUTO-ENTMCNC: 30.4 G/DL (ref 31.4–37.4)
MCV RBC AUTO: 87 FL (ref 82–98)
P AXIS: 59 DEGREES
PLATELET # BLD AUTO: 392 THOUSANDS/UL (ref 149–390)
PMV BLD AUTO: 10.1 FL (ref 8.9–12.7)
POTASSIUM SERPL-SCNC: 3.5 MMOL/L (ref 3.5–5.3)
PR INTERVAL: 172 MS
QRS AXIS: 7 DEGREES
QRSD INTERVAL: 138 MS
QT INTERVAL: 406 MS
QTC INTERVAL: 471 MS
RBC # BLD AUTO: 3.74 MILLION/UL (ref 3.88–5.62)
RH BLD: POSITIVE
SODIUM SERPL-SCNC: 139 MMOL/L (ref 136–145)
SPECIMEN EXPIRATION DATE: NORMAL
T WAVE AXIS: 153 DEGREES
VENTRICULAR RATE: 81 BPM
WBC # BLD AUTO: 8.56 THOUSAND/UL (ref 4.31–10.16)

## 2018-11-26 PROCEDURE — 99232 SBSQ HOSP IP/OBS MODERATE 35: CPT | Performed by: PHYSICIAN ASSISTANT

## 2018-11-26 PROCEDURE — 93010 ELECTROCARDIOGRAM REPORT: CPT | Performed by: INTERNAL MEDICINE

## 2018-11-26 PROCEDURE — 85027 COMPLETE CBC AUTOMATED: CPT | Performed by: PHYSICIAN ASSISTANT

## 2018-11-26 PROCEDURE — 99232 SBSQ HOSP IP/OBS MODERATE 35: CPT | Performed by: INTERNAL MEDICINE

## 2018-11-26 PROCEDURE — 86901 BLOOD TYPING SEROLOGIC RH(D): CPT | Performed by: SURGERY

## 2018-11-26 PROCEDURE — 82948 REAGENT STRIP/BLOOD GLUCOSE: CPT

## 2018-11-26 PROCEDURE — 80048 BASIC METABOLIC PNL TOTAL CA: CPT | Performed by: PHYSICIAN ASSISTANT

## 2018-11-26 PROCEDURE — 86850 RBC ANTIBODY SCREEN: CPT | Performed by: SURGERY

## 2018-11-26 PROCEDURE — 86900 BLOOD TYPING SEROLOGIC ABO: CPT | Performed by: SURGERY

## 2018-11-26 RX ORDER — ONDANSETRON 2 MG/ML
4 INJECTION INTRAMUSCULAR; INTRAVENOUS EVERY 4 HOURS PRN
Status: DISCONTINUED | OUTPATIENT
Start: 2018-11-26 | End: 2018-11-27 | Stop reason: SDUPTHER

## 2018-11-26 RX ORDER — SODIUM CHLORIDE 9 MG/ML
100 INJECTION, SOLUTION INTRAVENOUS CONTINUOUS
Status: DISCONTINUED | OUTPATIENT
Start: 2018-11-27 | End: 2018-11-27

## 2018-11-26 RX ORDER — ROSUVASTATIN CALCIUM 10 MG/1
40 TABLET, COATED ORAL
Status: DISCONTINUED | OUTPATIENT
Start: 2018-11-26 | End: 2018-11-30

## 2018-11-26 RX ORDER — INSULIN GLARGINE 100 [IU]/ML
5 INJECTION, SOLUTION SUBCUTANEOUS
Status: DISCONTINUED | OUTPATIENT
Start: 2018-11-26 | End: 2018-11-28

## 2018-11-26 RX ADMIN — METOPROLOL TARTRATE 12.5 MG: 25 TABLET ORAL at 21:11

## 2018-11-26 RX ADMIN — INSULIN LISPRO 1 UNITS: 100 INJECTION, SOLUTION INTRAVENOUS; SUBCUTANEOUS at 21:11

## 2018-11-26 RX ADMIN — TAMSULOSIN HYDROCHLORIDE 0.4 MG: 0.4 CAPSULE ORAL at 17:13

## 2018-11-26 RX ADMIN — INSULIN LISPRO 1 UNITS: 100 INJECTION, SOLUTION INTRAVENOUS; SUBCUTANEOUS at 08:20

## 2018-11-26 RX ADMIN — BUDESONIDE AND FORMOTEROL FUMARATE DIHYDRATE 2 PUFF: 160; 4.5 AEROSOL RESPIRATORY (INHALATION) at 08:20

## 2018-11-26 RX ADMIN — HEPARIN SODIUM 5000 UNITS: 5000 INJECTION INTRAVENOUS; SUBCUTANEOUS at 21:10

## 2018-11-26 RX ADMIN — BUDESONIDE AND FORMOTEROL FUMARATE DIHYDRATE 2 PUFF: 160; 4.5 AEROSOL RESPIRATORY (INHALATION) at 21:12

## 2018-11-26 RX ADMIN — HEPARIN SODIUM 5000 UNITS: 5000 INJECTION INTRAVENOUS; SUBCUTANEOUS at 14:55

## 2018-11-26 RX ADMIN — LEVOTHYROXINE SODIUM 150 MCG: 75 TABLET ORAL at 05:08

## 2018-11-26 RX ADMIN — PANTOPRAZOLE SODIUM 40 MG: 40 TABLET, DELAYED RELEASE ORAL at 05:08

## 2018-11-26 RX ADMIN — ASPIRIN 81 MG 325 MG: 81 TABLET ORAL at 08:21

## 2018-11-26 RX ADMIN — INSULIN LISPRO 2 UNITS: 100 INJECTION, SOLUTION INTRAVENOUS; SUBCUTANEOUS at 12:33

## 2018-11-26 RX ADMIN — INSULIN GLARGINE 5 UNITS: 100 INJECTION, SOLUTION SUBCUTANEOUS at 21:10

## 2018-11-26 RX ADMIN — ROSUVASTATIN CALCIUM 40 MG: 10 TABLET, COATED ORAL at 17:13

## 2018-11-26 RX ADMIN — HEPARIN SODIUM 5000 UNITS: 5000 INJECTION INTRAVENOUS; SUBCUTANEOUS at 05:09

## 2018-11-26 RX ADMIN — INSULIN LISPRO 2 UNITS: 100 INJECTION, SOLUTION INTRAVENOUS; SUBCUTANEOUS at 17:14

## 2018-11-26 NOTE — ASSESSMENT & PLAN NOTE
· S/p CABG x4 in September of this year  · On daily aspirin, statin, and BB  · Patient on Crestor at home (due to reported Lipitor allergy)   Son brought in [facility] from home yesterday - patient may take this while in the hospital  I spoke with inpatient Pharmacy who is looking into this and will call me back (I provided my direct extension)

## 2018-11-26 NOTE — PROGRESS NOTES
Progress Note - Cardiology   Aris Marie  80 y o  male MRN: [id number]  Unit/Bed#: [facility] 519-01 Encounter: [id number]        Principal Problem:    NSTEMI (non-ST elevated myocardial infarction) [facility])  Active Problems:    Benign prostatic hyperplasia with nocturia    Benign essential hypertension    Hypothyroidism    Pure hypercholesterolemia    Type 2 diabetes mellitus with hypoglycemia without coma, with long-term current use of insulin (HCC)    Recurrent left pleural effusion    CAD (coronary artery disease)    Aortic stenosis    Acute renal failure superimposed on stage 3 chronic kidney disease (HCC)    Pneumothorax          Assessment/ Plan     1  NSTEMI  type II in the setting of ADAM and hypoglycemia   Troponin 0 23/0 24/0 21  EKG without ischemic changes       2  CAD s/p CABG x 4 LIMA to LAD, SVG to OM2, SVG to OM 3 and LPDA 9/17/18  Continue aspirin, statin and BB      3  ADAM- creatinine continues to improve  Intravascular volume depletion, ARB  Baseline appears 1 4-1 5  Diuretics on hold     4  Recurrent L pleural effusion  S/p Thoracentesis X4  L VATS talc pleurodesis 11/27 previously planned , Thoracic surgery would like to proceed as long as ADAM continues to improve     5  Hypoglycemia- blood glucose 29 yesterday  Followed by primary team       6  Aortic stenosis s/p AVR 9/17/18     7  Hypertension- BP currently stable  Patient on losartan/HCTZ at home  Currently on hold due to ADAM  I see no cardiac contraindications to planned surgery  Pt recently fully revascularized  No necessary cardiac testing needed  Subjective/Objective   Chief Complaint/Subjective  Feels tired   No CP, no sob        Vitals: /81   Pulse 83   Temp 97 8 °F (36 6 °C)   Resp 18   Ht 5' 8" (1 727 m)   Wt 87 9 kg (193 lb 12 6 oz)   SpO2 96%   BMI 29 46 kg/m²     Vitals:    11/26/18 0516   Weight: 87 9 kg (193 lb 12 6 oz)     Orthostatic Blood Pressures      Most Recent Value   Blood Pressure  138/81 filed at 11/26/2018 1044   Patient Position - Orthostatic VS  Sitting filed at 11/26/2018 0235            Intake/Output Summary (Last 24 hours) at 11/26/18 1212  Last data filed at 11/26/18 1004   Gross per 24 hour   Intake              540 ml   Output             1525 ml   Net             -985 ml       Invasive Devices     Peripheral Intravenous Line            Peripheral IV 11/24/18 Left Forearm 2 days                Current Facility-Administered Medications   Medication Dose Route Frequency    acetaminophen (TYLENOL) tablet 650 mg  650 mg Oral Q6H PRN    albuterol (PROVENTIL HFA,VENTOLIN HFA) inhaler 2 puff  2 puff Inhalation 4x Daily PRN    aspirin chewable tablet 325 mg  325 mg Oral Daily    budesonide-formoterol (SYMBICORT) 160-4 5 mcg/act inhaler 2 puff  2 puff Inhalation BID    heparin (porcine) subcutaneous injection 5,000 Units  5,000 Units Subcutaneous Q8H [facility]    insulin lispro (HumaLOG) 100 units/mL subcutaneous injection 1-5 Units  1-5 Units Subcutaneous HS    insulin lispro (HumaLOG) 100 units/mL subcutaneous injection 1-6 Units  1-6 Units Subcutaneous TID AC    levothyroxine tablet 150 mcg  150 mcg Oral Early Morning    LORazepam (ATIVAN) tablet 0 5 mg  0 5 mg Oral Q8H PRN    metoprolol tartrate (LOPRESSOR) partial tablet 12 5 mg  12 5 mg Oral Q12H [facility]    morphine injection 2 mg  2 mg Intravenous Q3H PRN    nitroglycerin (NITROSTAT) SL tablet 0 4 mg  0 4 mg Sublingual Q5 Min PRN    ondansetron (ZOFRAN) injection 4 mg  4 mg Intravenous Q4H PRN    pantoprazole (PROTONIX) EC tablet 40 mg  40 mg Oral Early Morning    polyethylene glycol (MIRALAX) packet 17 g  17 g Oral Daily PRN    rosuvastatin (CRESTOR) tablet 40 mg  40 mg Oral Daily    [START ON 11/27/2018] sodium chloride 0 9 % infusion  100 mL/hr Intravenous Continuous    tamsulosin (FLOMAX) capsule 0 4 mg  0 4 mg Oral Daily With Dinner         Physical Exam: /81   Pulse 83   Temp 97 8 °F (36 6 °C)   Resp 18   Ht 5' 8" (1 727 m) Wt 87 9 kg (193 lb 12 6 oz)   SpO2 96%   BMI 29 46 kg/m²     General Appearance:    Alert, cooperative, no distress, appears stated age   Head:    Normocephalic, no scleral icterus   Eyes:    PERRL   Nose:   Nares normal, septum midline, no drainage    Throat:   Lips, mucosa, and tongue normal   Neck:   Supple, symmetrical, trachea midline,          Back:     Symmetric, no CVA tenderness   Lungs:     Decreased bases auscultation bilaterally, respirations unlabored   Chest Wall:    No tenderness or deformity    Heart:    Regular rate and rhythm, S1 and S2 normal, no murmur, rub   or gallop   Abdomen:     Soft, non-tender, bowel sounds active all four quadrants,     no masses, no organomegaly   Extremities:   Extremities normal, atraumatic, no cyanosis , 1+edema   Pulses:   2+ and symmetric all extremities   Skin:   Skin color, texture, turgor normal, no rashes or lesions   Neurologic:   Alert and oriented to person place and time, no focal deficits                 Lab Results:   Recent Results (from the past 72 hour(s))   Basic metabolic panel    Collection Time: 11/23/18  4:19 PM   Result Value Ref Range    Sodium 141 136 - 145 mmol/L    Potassium 3 7 3 5 - 5 3 mmol/L    Chloride 104 100 - 108 mmol/L    CO2 30 21 - 32 mmol/L    ANION GAP 7 4 - 13 mmol/L    BUN 34 (H) 5 - 25 mg/dL    Creatinine 3 57 (H) 0 60 - 1 30 mg/dL    Glucose 29 (LL) 65 - 140 mg/dL    Calcium 9 1 8 3 - 10 1 mg/dL    eGFR 15 ml/min/1 73sq m   Fingerstick Glucose (POCT)    Collection Time: 11/23/18  8:18 PM   Result Value Ref Range    POC Glucose 119 65 - 140 mg/dl   CBC and differential    Collection Time: 11/23/18  8:51 PM   Result Value Ref Range    WBC 9 42 4 31 - 10 16 Thousand/uL    RBC 3 60 (L) 3 88 - 5 62 Million/uL    Hemoglobin 9 7 (L) 12 0 - 17 0 g/dL    Hematocrit 32 2 (L) 36 5 - 49 3 %    MCV 89 82 - 98 fL    MCH 26 9 26 8 - 34 3 pg    MCHC 30 1 (L) 31 4 - 37 4 g/dL    RDW 15 9 (H) 11 6 - 15 1 %    MPV 10 7 8 9 - 12 7 fL Platelets 221 497 - 115 Thousands/uL    nRBC 0 /100 WBCs    Neutrophils Relative 76 (H) 43 - 75 %    Immat GRANS % 1 0 - 2 %    Lymphocytes Relative 13 (L) 14 - 44 %    Monocytes Relative 7 4 - 12 %    Eosinophils Relative 2 0 - 6 %    Basophils Relative 1 0 - 1 %    Neutrophils Absolute 7 24 1 85 - 7 62 Thousands/µL    Immature Grans Absolute 0 07 0 00 - 0 20 Thousand/uL    Lymphocytes Absolute 1 21 0 60 - 4 47 Thousands/µL    Monocytes Absolute 0 62 0 17 - 1 22 Thousand/µL    Eosinophils Absolute 0 22 0 00 - 0 61 Thousand/µL    Basophils Absolute 0 06 0 00 - 0 10 Thousands/µL   Comprehensive metabolic panel    Collection Time: 11/23/18  8:51 PM   Result Value Ref Range    Sodium 138 136 - 145 mmol/L    Potassium 4 3 3 5 - 5 3 mmol/L    Chloride 102 100 - 108 mmol/L    CO2 27 21 - 32 mmol/L    ANION GAP 9 4 - 13 mmol/L    BUN 36 (H) 5 - 25 mg/dL    Creatinine 3 52 (H) 0 60 - 1 30 mg/dL    Glucose 131 65 - 140 mg/dL    Calcium 9 1 8 3 - 10 1 mg/dL    AST 60 (H) 5 - 45 U/L    ALT 44 12 - 78 U/L    Alkaline Phosphatase 123 (H) 46 - 116 U/L    Total Protein 7 8 6 4 - 8 2 g/dL    Albumin 2 3 (L) 3 5 - 5 0 g/dL    Total Bilirubin 0 50 0 20 - 1 00 mg/dL    eGFR 15 ml/min/1 73sq m   Troponin I    Collection Time: 11/23/18  8:51 PM   Result Value Ref Range    Troponin I 0 26 (H) <=0 04 ng/mL   Lipase    Collection Time: 11/23/18  8:51 PM   Result Value Ref Range    Lipase 74 73 - 393 u/L   Magnesium    Collection Time: 11/23/18  8:51 PM   Result Value Ref Range    Magnesium 2 5 1 6 - 2 6 mg/dL   Fingerstick Glucose (POCT)    Collection Time: 11/24/18  1:54 AM   Result Value Ref Range    POC Glucose 83 65 - 140 mg/dl   Troponin I    Collection Time: 11/24/18  1:59 AM   Result Value Ref Range    Troponin I 0 24 (H) <=0 04 ng/mL   APTT six (6) hours after Heparin bolus/drip initiation or dosing change    Collection Time: 11/24/18  1:59 AM   Result Value Ref Range    PTT 96 (H) 26 - 38 seconds   CBC    Collection Time: 11/24/18 1:59 AM   Result Value Ref Range    WBC 9 92 4 31 - 10 16 Thousand/uL    RBC 3 49 (L) 3 88 - 5 62 Million/uL    Hemoglobin 9 3 (L) 12 0 - 17 0 g/dL    Hematocrit 30 7 (L) 36 5 - 49 3 %    MCV 88 82 - 98 fL    MCH 26 6 (L) 26 8 - 34 3 pg    MCHC 30 3 (L) 31 4 - 37 4 g/dL    RDW 15 9 (H) 11 6 - 15 1 %    Platelets 674 940 - 180 Thousands/uL    MPV 9 2 8 9 - 12 7 fL   Protime-INR    Collection Time: 11/24/18  1:59 AM   Result Value Ref Range    Protime 16 9 (H) 11 8 - 14 2 seconds    INR 1 36 (H) 0 86 - 1 17   Troponin I    Collection Time: 11/24/18  5:05 AM   Result Value Ref Range    Troponin I 0 21 (H) <=0 04 ng/mL   Basic metabolic panel    Collection Time: 11/24/18  5:05 AM   Result Value Ref Range    Sodium 138 136 - 145 mmol/L    Potassium 3 8 3 5 - 5 3 mmol/L    Chloride 108 100 - 108 mmol/L    CO2 23 21 - 32 mmol/L    ANION GAP 7 4 - 13 mmol/L    BUN 32 (H) 5 - 25 mg/dL    Creatinine 3 15 (H) 0 60 - 1 30 mg/dL    Glucose 79 65 - 140 mg/dL    Calcium 7 9 (L) 8 3 - 10 1 mg/dL    eGFR 17 ml/min/1 73sq m   ECG 12 lead    Collection Time: 11/24/18  5:27 AM   Result Value Ref Range    Ventricular Rate 63 BPM    Atrial Rate 63 BPM    DC Interval 178 ms    QRSD Interval 120 ms    QT Interval 424 ms    QTC Interval 433 ms    P Axis 53 degrees    QRS Axis -13 degrees    T Wave Axis 181 degrees   Fingerstick Glucose (POCT)    Collection Time: 11/24/18  6:56 AM   Result Value Ref Range    POC Glucose 92 65 - 140 mg/dl   Lipid Panel with Direct LDL reflex    Collection Time: 11/24/18  6:57 AM   Result Value Ref Range    Cholesterol 61 50 - 200 mg/dL    Triglycerides 92 <=150 mg/dL    HDL, Direct 25 (L) 40 - 60 mg/dL    LDL Calculated 18 0 - 100 mg/dL   APTT    Collection Time: 11/24/18 10:12 AM   Result Value Ref Range    PTT 36 26 - 38 seconds   Fingerstick Glucose (POCT)    Collection Time: 11/24/18 11:40 AM   Result Value Ref Range    POC Glucose 182 (H) 65 - 140 mg/dl   Fingerstick Glucose (POCT)    Collection Time: 11/24/18  4:15 PM   Result Value Ref Range    POC Glucose 282 (H) 65 - 140 mg/dl   Urinalysis with microscopic    Collection Time: 11/24/18  4:17 PM   Result Value Ref Range    Clarity, UA Cloudy     Color, UA Yellow     Specific [place], UA 1 018 1 003 - 1 030    pH, UA 5 0 4 5 - 8 0    Glucose,  (1/4%) (A) Negative mg/dl    Ketones, UA Negative Negative mg/dl    Blood, UA Moderate (A) Negative    Protein,  (2+) (A) Negative mg/dl    Nitrite, UA Negative Negative    Bilirubin, UA Negative Negative    Urobilinogen, UA 0 2 0 2, 1 0 E U /dl E U /dl    Leukocytes, UA Negative Negative    WBC, UA None Seen None Seen, 0-5, 5-55, 5-65 /hpf    RBC, UA None Seen None Seen, 0-5 /hpf    Bacteria, UA None Seen None Seen, Occasional /hpf    AMORPH URATES Occasional /hpf    Epithelial Cells None Seen None Seen, Occasional /hpf   Fingerstick Glucose (POCT)    Collection Time: 11/24/18  8:23 PM   Result Value Ref Range    POC Glucose 225 (H) 65 - 140 mg/dl   TSH, 3rd generation    Collection Time: 11/25/18  4:44 AM   Result Value Ref Range    TSH 3RD GENERATON 0 404 0 358 - 3 740 uIU/mL   T4, free    Collection Time: 11/25/18  4:44 AM   Result Value Ref Range    Free T4 1 32 0 76 - 1 46 ng/dL   Basic metabolic panel    Collection Time: 11/25/18  4:44 AM   Result Value Ref Range    Sodium 135 (L) 136 - 145 mmol/L    Potassium 3 6 3 5 - 5 3 mmol/L    Chloride 104 100 - 108 mmol/L    CO2 25 21 - 32 mmol/L    ANION GAP 6 4 - 13 mmol/L    BUN 34 (H) 5 - 25 mg/dL    Creatinine 3 06 (H) 0 60 - 1 30 mg/dL    Glucose 129 65 - 140 mg/dL    Calcium 8 9 8 3 - 10 1 mg/dL    eGFR 18 ml/min/1 73sq m   Fingerstick Glucose (POCT)    Collection Time: 11/25/18  7:04 AM   Result Value Ref Range    POC Glucose 153 (H) 65 - 140 mg/dl   Fingerstick Glucose (POCT)    Collection Time: 11/25/18 10:38 AM   Result Value Ref Range    POC Glucose 202 (H) 65 - 140 mg/dl   Fingerstick Glucose (POCT)    Collection Time: 11/25/18  4:45 PM   Result Value Ref Range    POC Glucose 240 (H) 65 - 140 mg/dl   Fingerstick Glucose (POCT)    Collection Time: 11/25/18  8:49 PM   Result Value Ref Range    POC Glucose 202 (H) 65 - 140 mg/dl   CBC    Collection Time: 11/26/18  4:33 AM   Result Value Ref Range    WBC 8 56 4 31 - 10 16 Thousand/uL    RBC 3 74 (L) 3 88 - 5 62 Million/uL    Hemoglobin 9 9 (L) 12 0 - 17 0 g/dL    Hematocrit 32 6 (L) 36 5 - 49 3 %    MCV 87 82 - 98 fL    MCH 26 5 (L) 26 8 - 34 3 pg    MCHC 30 4 (L) 31 4 - 37 4 g/dL    RDW 15 9 (H) 11 6 - 15 1 %    Platelets 613 (H) 036 - 390 Thousands/uL    MPV 10 1 8 9 - 12 7 fL   Basic metabolic panel    Collection Time: 11/26/18  4:33 AM   Result Value Ref Range    Sodium 139 136 - 145 mmol/L    Potassium 3 5 3 5 - 5 3 mmol/L    Chloride 106 100 - 108 mmol/L    CO2 26 21 - 32 mmol/L    ANION GAP 7 4 - 13 mmol/L    BUN 30 (H) 5 - 25 mg/dL    Creatinine 2 59 (H) 0 60 - 1 30 mg/dL    Glucose 137 65 - 140 mg/dL    Calcium 8 1 (L) 8 3 - 10 1 mg/dL    eGFR 22 ml/min/1 73sq m   Type and screen    Collection Time: 11/26/18  4:57 AM   Result Value Ref Range    ABO Grouping AB     Rh Factor Positive     Antibody Screen Negative     Specimen Expiration Date [id number]    Fingerstick Glucose (POCT)    Collection Time: 11/26/18  7:07 AM   Result Value Ref Range    POC Glucose 156 (H) 65 - 140 mg/dl   Fingerstick Glucose (POCT)    Collection Time: 11/26/18 10:49 AM   Result Value Ref Range    POC Glucose 208 (H) 65 - 140 mg/dl     Imaging: I have personally reviewed pertinent reports  Tele- NSR    Counseling / Coordination of Care  Total time spent today 25 minutes  Greater than 50% of total time was spent with the patient and / or family counseling and / or coordination of care

## 2018-11-26 NOTE — OCCUPATIONAL THERAPY NOTE
OT CANCELLATION NOTE    OT orders received  Chart reviewed  Pt w/ planned L VATS, talc pleurodesis on 11/27/18  Will initiate OT eval post procedure             Justin Maier OTR/KOBE

## 2018-11-26 NOTE — PLAN OF CARE
CARDIOVASCULAR - ADULT     Maintains optimal cardiac output and hemodynamic stability Completed        INFECTION - ADULT     Absence of fever/infection during neutropenic period Completed          CARDIOVASCULAR - ADULT     Absence of cardiac dysrhythmias or at baseline rhythm Progressing        DISCHARGE PLANNING     Discharge to home or other facility with appropriate resources Progressing        DISCHARGE PLANNING - CARE MANAGEMENT     Discharge to post-acute care or home with appropriate resources Progressing        INFECTION - ADULT     Absence or prevention of progression during hospitalization Progressing        Knowledge Deficit     Patient/family/caregiver demonstrates understanding of disease process, treatment plan, medications, and discharge instructions Progressing        Nutrition/Hydration-ADULT     Nutrient/Hydration intake appropriate for improving, restoring or maintaining nutritional needs Progressing        PAIN - ADULT     Verbalizes/displays adequate comfort level or baseline comfort level Progressing        Potential for Falls     Patient will remain free of falls Progressing        Prexisting or High Potential for Compromised Skin Integrity     Skin integrity is maintained or improved Progressing        SAFETY ADULT     Maintain or return to baseline ADL function Progressing     Maintain or return mobility status to optimal level Progressing     Patient will remain free of falls Progressing

## 2018-11-26 NOTE — ASSESSMENT & PLAN NOTE
· Losartan-HCTZ on hold given ADAM  · Per Nephrology, ARB can likely be held upon discharge as BP has been stable, and consider switching thiazide to a loop diuretic - defer to Nephrology  · Continue BB

## 2018-11-26 NOTE — PHYSICAL THERAPY NOTE
PT Cancellation    PT orders received and chart reviewed  Pt with planned L VATS, talc pleurodesis on 11/27/2018  Will evaluate post-operatively      Jayden Crenshaw PT, DPT

## 2018-11-26 NOTE — ASSESSMENT & PLAN NOTE
· With Lipitor allergy, continue home Crestor which is 40 mg daily - family has brought this medication from home which was in a blister pack  I called down to the pharmacy myself regarding this medication and RN did the same  This was brought in by family yesterday but was not yet profiled?

## 2018-11-26 NOTE — ASSESSMENT & PLAN NOTE
· This has been since his recent CABG/AVR  · He follows with Dr Jennifer Brian of Thoracic Surgery as outpatient, appreciate their recommendations   · He has had multiple thoracentesis  Per Thoracic surgery notes, patient had elected to proceed with pleurodesis (was initially scheduled for 11/23/18) over Pleur-X cath, however this was not performed - rather, the patient had an IR thoracentesis with 1,300mL out  · Thoracic planning for likely L VATS talc pleurodesis Tuesday 11/27/18   Appreciate Nephrology's ongoing recommendations regarding renal function optimization pre-procedurally as well as Cardiology recs given NSTEMI

## 2018-11-26 NOTE — PROGRESS NOTES
Progress Note - Thoracic  Thermon Nery  80 y o  male MRN: [id number]  Unit/Bed#: [facility] 948-16 Encounter: [id number]    Assessment:   15-year-old male with recurrent left pleural effusion, previously scheduled for left VATS, talc pleurodesis who presents with a KI of unclear etiology  Patient is status post left thoracentesis yesterday, with symptomatic improvement and improvement on chest x-ray  At this time, he appears relatively stable and has been evaluated by renal and cardiology  Pending appropriate clearances, we will tentatively plan to perform his procedure this week, possibly 11/27/2018      Plan:  Cont pulm toilet  Creatinine improving but still elevated, Nephrology following   Monitor creatinine   Will tentatively plan for L VATS, talc pleurodesis on 11/27/18  DVT ppx    Subjective/Objective     Subjective: No acute events overnight  No new complaints  Objective:     Vitals: Blood pressure 128/71, pulse 86, temperature 98 4 °F (36 9 °C), temperature source Oral, resp  rate 20, height 5' 8" (1 727 m), weight 87 9 kg (193 lb 12 6 oz), SpO2 98 %  ,Body mass index is 29 46 kg/m²  I/O       11/24 0701 - 11/25 0700 11/25 0701 - 11/26 0700    P  O   800    I V  (mL/kg) 104 3     Total Intake(mL/kg) 104 3 800 (9 1)    Urine (mL/kg/hr) 1236 1575 (0 7)    Total Output 1236 1575    Net -1131 7 -775          Unmeasured Urine Occurrence  1 x    Unmeasured Stool Occurrence  2 x          Physical Exam:  GEN: NAD  HEENT: MMM  CV: RRR  Lung: Normal effort  Ab: Soft, NT/ND  Extrem: No CCE  Neuro:  A+Ox3    Lab, Imaging and other studies:   CBC with diff:   Lab Results   Component Value Date    WBC 8 56 11/26/2018    HGB 9 9 (L) 11/26/2018    HCT 32 6 (L) 11/26/2018    MCV 87 11/26/2018     (H) 11/26/2018    MCH 26 5 (L) 11/26/2018    MCHC 30 4 (L) 11/26/2018    RDW 15 9 (H) 11/26/2018    MPV 10 1 11/26/2018   , BMP/CMP:   Lab Results   Component Value Date    SODIUM 139 11/26/2018    K 3 5 11/26/2018    CL 106 11/26/2018    CO2 26 11/26/2018    BUN 30 (H) 11/26/2018    CREATININE 2 59 (H) 11/26/2018    CALCIUM 8 1 (L) 11/26/2018    EGFR 22 11/26/2018     VTE Pharmacologic Prophylaxis: Heparin  VTE Mechanical Prophylaxis: sequential compression device

## 2018-11-26 NOTE — MALNUTRITION/BMI
This medical record reflects one or more clinical indicators suggestive of malnutrition and/or morbid obesity  Malnutrition Findings:   Malnutrition type: Chronic illness (malnutrition related to disease/condition as evidenced by mild depletion of orbital body fat and 12% weight loss in 3 months to be treated with oral nutrition supplements )  Degree of Malnutrition: Malnutrition of moderate degree  Malnutrition Characteristics: Weight loss, Fat loss        See Nutrition note dated 11/26/18 for additional details  Completed nutrition assessment is viewable in the nutrition documentation

## 2018-11-26 NOTE — PROGRESS NOTES
NEPHROLOGY PROGRESS NOTE   Aris Marie  80 y o  male MRN: [id number]  Unit/Bed#: [facility] 712-69 Encounter: [id number]  Reason for Consult:  ADAM/CKD    ASSESSMENT/PLAN:  1  Acute kidney injury:  Likely prerenal with intravascular volume depletion with diuretic use along with a component of impaired renal autoregulation with ARB  - Hyzaar ( losartan- HCTZ) now on hold- continue to hold  - creatinine improving and now 2 59 ( 3 06, 3 15, 3 52, 3 57)  - UA with micro:  2+ protein  With occasional Amorph Urates  - patient examining fairly euvolemic  - avoid NSAIDs/ nephrotoxins  - avoid hypotension to prevent decreased renal perfusion ( Increased hold parameters on Lopressor to SBP less than 120)  -  Will  Discuss IV fluids with Dr Fer Merlos  - continue to follow I/ O, lab values in volume status    2  Chronic kidney disease III:  Likely secondary to hypertension,  Diabetes, and Age component  - previous baseline creatinine 1 3-1 6,  However recently discharged with a creatinine 1 8  - patient does not follow with Nephrology as outpatient    3  Recurrent left effusion:  Thoracic following  - status post thoracentesis now anticipating VATS procedure with pleurodesis  - surgery tentative tomorrow    4  Aortic stenosis and CAD with MVD:  Status post recent AVR and CABG    5  Volume: Examines fairly euvolemic     6  Hypertension:  Losartan/ HCTZ on hold  - continues on metoprolol now with increased hold parameters to SBP less than 120 to prevent decreased renal perfusion  - consider adding a loop diuretic when diuretics needed    7  Anemia:  Likely of CD  - will check iron stores  - hemoglobin stable and continue to trend    SUBJECTIVE:   patient seen and examined  Denies chest pain or shortness of Breath    Overall feeling better    OBJECTIVE:  Current Weight: Weight - Scale: 87 9 kg (193 lb 12 6 oz)  Vitals:    11/26/18 0516 11/26/18 0704 11/26/18 0743 11/26/18 1044   BP:  134/63 100/62 138/81   BP Location: Pulse:  78 61 83   Resp:  18 18 18   Temp:  98 5 °F (36 9 °C) 97 6 °F (36 4 °C) 97 8 °F (36 6 °C)   TempSrc:       SpO2:  95% 91% 96%   Weight: 87 9 kg (193 lb 12 6 oz)      Height:           Intake/Output Summary (Last 24 hours) at 11/26/18 1311  Last data filed at 11/26/18 1239   Gross per 24 hour   Intake              840 ml   Output             1745 ml   Net             -905 ml     General:  No acute distress,  Cooperative,  Resting in bed  Skin:  Warm and dry without rash  HEENT:  Mucous membranes moist,  Sclera anicteric  Neck:  Supple without JVD  Chest:  Essentially clear on auscultation,  No noted crackles or wheezes,  Decreased bases  Heart:  Regular rate and rhythm without rub  Abdomen:  Soft, nontender, no distension, positive bowel sounds  Extremities:  Trace lower extremity edema  Neuro:  Alert awake and oriented  Psych:  Appropriate affect    Medications:    Current Facility-Administered Medications:     acetaminophen (TYLENOL) tablet 650 mg, 650 mg, Oral, Q6H PRN, Holli Quezada MD    albuterol (PROVENTIL HFA,VENTOLIN HFA) inhaler 2 puff, 2 puff, Inhalation, 4x Daily PRN, Holli Quezada MD    aspirin chewable tablet 325 mg, 325 mg, Oral, Daily, Holli Quezada MD, 325 mg at 11/26/18 4306    budesonide-formoterol (SYMBICORT) 160-4 5 mcg/act inhaler 2 puff, 2 puff, Inhalation, BID, Holli Quezada MD, 2 puff at 11/26/18 0820    heparin (porcine) subcutaneous injection 5,000 Units, 5,000 Units, Subcutaneous, Q8H [address], Nba Nichole PA-C, 5,000 Units at 11/26/18 0509    insulin glargine (LANTUS) subcutaneous injection 5 Units 0 05 mL, 5 Units, Subcutaneous, HSNba PA-C    insulin lispro (HumaLOG) 100 units/mL subcutaneous injection 1-5 Units, 1-5 Units, Subcutaneous, HS, Nba Nichole PA-C, 1 Units at 11/25/18 2108    insulin lispro (HumaLOG) 100 units/mL subcutaneous injection 1-6 Units, 1-6 Units, Subcutaneous, TID AC, 2 Units at 11/26/18 1233 **AND** Fingerstick Glucose (POCT), , , TID AC, Angely Agustin PA-C    levothyroxine tablet 150 mcg, 150 mcg, Oral, Early Morning, Kassidy Jsaon MD, 150 mcg at 11/26/18 0508    LORazepam (ATIVAN) tablet 0 5 mg, 0 5 mg, Oral, Q8H PRN, Kassidy Jason MD    metoprolol tartrate (LOPRESSOR) partial tablet 12 5 mg, 12 5 mg, Oral, Q12H [address], Kassidy Jason MD, 12 5 mg at 11/25/18 2107    morphine injection 2 mg, 2 mg, Intravenous, Q3H PRN, Kassidy Jason MD    nitroglycerin (NITROSTAT) SL tablet 0 4 mg, 0 4 mg, Sublingual, Q5 Min PRN, Kassidy Jason MD    ondansetron [facility]) injection 4 mg, 4 mg, Intravenous, Q4H PRN, Angely Agustin PA-C    pantoprazole (PROTONIX) EC tablet 40 mg, 40 mg, Oral, Early Morning, Kassidy Jason MD, 40 mg at 11/26/18 0508    polyethylene glycol (MIRALAX) packet 17 g, 17 g, Oral, Daily PRN, Edwige Lunsford MD, 17 g at 11/25/18 0909    rosuvastatin (CRESTOR) tablet 40 mg, 40 mg, Oral, After Dinner, Kassidy Jason MD    [START ON 11/27/2018] sodium chloride 0 9 % infusion, 100 mL/hr, Intravenous, Continuous, Lora Mo MD    [facility] capsule 0 4 mg, 0 4 mg, Oral, Daily With Dinner, Kassidy Jason MD, 0 4 mg at 11/25/18 1527    Laboratory Results:    Results from last 7 days  Lab Units 11/26/18  0433 11/25/18  0444 11/24/18  0505 11/24/18  0159 11/23/18  2051 11/23/18  1619 11/20/18  0959   WBC Thousand/uL 8 56  --   --  9 92 9 42  --   --    WHITE BLOOD CELL COUNT  Thousand/uL  --   --   --   --   --   --  10 3   HEMOGLOBIN g/dL 9 9*  --   --  9 3* 9 7*  --   --    HEMOGLOBIN  g/dL  --   --   --   --   --   --  10 9*   HEMATOCRIT % 32 6*  --   --  30 7* 32 2*  --   --    HEMATOCRIT  %  --   --   --   --   --   --  35 3*   PLATELETS Thousands/uL 392*  --   --  390 275  --   --    PLATELETS   Thousand/uL  --   --   --   --   --   --  537*   POTASSIUM mmol/L 3 5 3 6 3 8  --  4 3 3 7 3 7   CHLORIDE mmol/L 106 104 108  --  102 104 102   CO2 mmol/L 26 25 23  --  27 30 26   BUN mg/dL 30* 34* 32*  --  36* 34* 30*   CREATININE mg/dL 2 59* 3 06* 3 15*  --  3 52* 3 57*  --    SL AMB CALCIUM mg/dL  --   --   --   --   --   --  8 8   CALCIUM mg/dL 8 1* 8 9 7 9*  --  9 1 9 1  --    MAGNESIUM mg/dL  --   --   --   --  2 5  --   --

## 2018-11-26 NOTE — ASSESSMENT & PLAN NOTE
· CXR s/p thoracentesis showed trace left-sided pneumothorax which was too small for chest tube placement  · Setting well on room air today  · Thoracic Surgery following

## 2018-11-26 NOTE — PROGRESS NOTES
Progress Note - José Luis Mancini 1935, 80 y o  male MRN: [id number]    [facility] Encounter: [id number]    Primary Care Provider: Elina Borjas DO   Date and time admitted to hospital: 11/24/2018 12:39 AM    ADDENDUM: Will add low-dose Lantus, 5 units, at bedtime  Patient had hypoglycemia both at home and the day of admission (as low as 29 on outpatient labs) but had been taking his usual home-dose insulin (20 units of Lantus and 16 units and HumaLog per PCP records) despite lesser PO intake - sugars are now elevated in the low 200s during the daytime  Continue SSI coverage  Hypoglycemia protocol on board  Check overnight sugar with re-initiation of Lantus  * NSTEMI (non-ST elevated myocardial infarction) [facility])   Assessment & Plan    · Cardiology following, appreciate their recommendations   · Per Cards this represents a Type 2 NSTEMI in the setting of ADAM and hypoglycemia  · Heparin GTT was discontinued per Cards recs  · Troponins were trended as follows: 0 26, 0 24, 0 21         Acute renal failure superimposed on stage 3 chronic kidney disease (HCC)   Assessment & Plan    · Baseline creatinine 1 3-1 5  · Creatinine on admission 3 5, continued to improve to 2 59 today  · Appreciate Nephrology's ongoing recommendations  · Losartan-HCTZ on hold - see further plan regarding this below     Type 2 diabetes mellitus with hypoglycemia without coma, with long-term current use of insulin ([facility] [address] )   Assessment & Plan    · With recurrent hypoglycemia as outpatient for which is home insulin regimen was being decreased  · Patient with reported poor PO intake but admits to continuing to take his home insulin regimen  He still has poor PO intake today with breakfast he admits  · Glucose noted to be 29 on day-of-admission BMP  · Continue cardiac and carb-controlled diet   Continue weight-based SSI coverage with meals and lower dose at bedtime for now - AM sugar controlled at 137 on BMP  · Hypoglycemia protocol on board  · Continue to monitor sugars and adjust insulin as needed    Average sugar:  (P) 175 [id number]     Recurrent left pleural effusion   Assessment & Plan    · This has been since his recent CABG/AVR  · He follows with Dr Jyoti Jay of Thoracic Surgery as outpatient, appreciate their recommendations   · He has had multiple thoracentesis  Per Thoracic surgery notes, patient had elected to proceed with pleurodesis (was initially scheduled for 11/23/18) over Pleur-X cath, however this was not performed - rather, the patient had an IR thoracentesis with 1,300mL out  · Thoracic planning for likely L VATS talc pleurodesis Tuesday 11/27/18  Appreciate Nephrology's ongoing recommendations regarding renal function optimization pre-procedurally as well as Cardiology recs given NSTEMI     Pneumothorax   Assessment & Plan    · CXR s/p thoracentesis showed trace left-sided pneumothorax which was too small for chest tube placement  · Setting well on room air today  · Thoracic Surgery following     CAD (coronary artery disease)   Assessment & Plan    · S/p CABG x4 in September of this year  · On daily aspirin, statin, and BB  · Patient on Crestor at home (due to reported Lipitor allergy)  Son brought in Crestor from home yesterday - patient may take this while in the hospital  I spoke with inpatient Pharmacy who is looking into this and will call me back (I provided my direct extension)     Aortic stenosis   Assessment & Plan    · S/p AVR ([facility]) in September of this year     Hypothyroidism   Assessment & Plan    · TSH and fT4 WNL  · Continue levothyroxine     Benign prostatic hyperplasia with nocturia   Assessment & Plan    · Continue Flomax     Pure hypercholesterolemia   Assessment & Plan    · With Lipitor allergy, continue home Crestor which is 40 mg daily - family has brought this medication from home which was in a blister pack   I called down to the pharmacy myself regarding this medication and RN did the same  This was brought in by family yesterday but was not yet profiled? Benign essential hypertension   Assessment & Plan    · Losartan-HCTZ on hold given ADAM  · Per Nephrology, ARB can likely be held upon discharge as BP has been stable, and consider switching thiazide to a loop diuretic - defer to Nephrology  · Continue BB       ADDENDUM: I received call back from Pharmacy who made the Crestor available - nurse made aware  I also spoke with Cardiology and requested that they see the patient today pre-procedurally  Discussed with Nephrology CRNP as well who agreed with continuing to hold ARB/HCTZ  VTE Pharmacologic Prophylaxis:   Pharmacologic: Heparin  Mechanical VTE Prophylaxis in Place: Yes    Patient Centered Rounds: I have performed bedside rounds with nursing staff today  Patricia     Discussions with Specialists or Other Care Team Provider: Nursing, CM    Education and Discussions with Family / Patient: Patient; when asked if he would like me to call his son today with an update the patient kindly declined    Time Spent for Care: 30 minutes  More than 50% of total time spent on counseling and coordination of care as described above  Current Length of Stay: 2 day(s)    Current Patient Status: Inpatient   Certification Statement: The patient will continue to require additional inpatient hospital stay due to ADAM, possibly pleurodesis    Discharge Plan: Not medically stable for d/c at this time     Code Status: Level 1 - Full Code      Subjective:   Mr Cecelia Blizzard reports that he feels nauseous but has not thrown up  Poor appetite with breakfast  Did not sleep well last night  Denies CP, SOB, dizziness or lightheadedness, pain whatsoever, abdominal pain, weakness, speech changes   He reports 1 non-bloody BM this AM       Objective:     Vitals:   Temp (24hrs), Av °F (36 7 °C), Min:97 6 °F (36 4 °C), Max:98 5 °F (36 9 °C)    Temp:  [97 6 °F (36 4 °C)-98 5 °F (36 9 °C)] 97 8 °F (36 6 °C)  HR:  [61-86] 83  Resp: [18-20] 18  BP: (100-138)/(62-81) 138/81  SpO2:  [91 %-98 %] 96 %  Body mass index is 29 46 kg/m²  Input and Output Summary (last 24 hours): Intake/Output Summary (Last 24 hours) at 11/26/18 1115  Last data filed at 11/26/18 1004   Gross per 24 hour   Intake              780 ml   Output             1525 ml   Net             -745 ml       Physical Exam:     Physical Exam   Constitutional: He is oriented to person, place, and time  Patient seen sitting upright in bedside chair  Non-toxic appearing  Pleasant and cooperative   Cardiovascular: Normal rate and regular rhythm  Pulmonary/Chest: Effort normal    On room air  Decreased at b/l bases   Abdominal: Soft  Bowel sounds are normal  There is no tenderness  Musculoskeletal: He exhibits edema (trace, non-pitting b/l LE)  Neurological: He is alert and oriented to person, place, and time  Resting face with flat mouth line  Patient talks out of the right side of his mouth more than left which is stable and unchanged for the last days I have been seeing him and is stable when compared to the picture in his chart  His speech is clear  Shoulder shrug,  strength, elbow flexion and extension, hip flexion, knee extension 5/5 bilaterally  Patient reports equal and intact sensation to light touch   Skin: Skin is warm  Psychiatric: He has a normal mood and affect  His behavior is normal    Vitals reviewed  Additional Data:     Labs:      Results from last 7 days  Lab Units 11/26/18 0433 11/23/18 2051   WBC Thousand/uL 8 56  < > 9 42   HEMOGLOBIN g/dL 9 9*  < > 9 7*   HEMATOCRIT % 32 6*  < > 32 2*   PLATELETS Thousands/uL 392*  < > 275   NEUTROS PCT %  --   --  76*   LYMPHS PCT %  --   --  13*   MONOS PCT %  --   --  7   EOS PCT %  --   --  2   < > = values in this interval not displayed      Results from last 7 days  Lab Units 11/26/18 0433 11/23/18 2051   SODIUM mmol/L 139  < > 138   POTASSIUM mmol/L 3 5  < > 4 3   CHLORIDE mmol/L 106  < > 102   CO2 mmol/L 26  < > 27   BUN mg/dL 30*  < > 36*   CREATININE mg/dL 2 59*  < > 3 52*   ANION GAP mmol/L 7  < > 9   CALCIUM mg/dL 8 1*  < > 9 1   ALBUMIN g/dL  --   --  2 3*   TOTAL BILIRUBIN mg/dL  --   --  0 50   ALK PHOS U/L  --   --  123*   ALT U/L  --   --  44   AST U/L  --   --  60*   GLUCOSE RANDOM mg/dL 137  < > 131   < > = values in this interval not displayed  Results from last 7 days  Lab Units 11/24/18  0159   INR  1 36*       Results from last 7 days  Lab Units 11/26/18  0707 11/25/18  2049 11/25/18  1645 11/25/18  1038 11/25/18  0704 11/24/18  2023 11/24/18  1615 11/24/18  1140 11/24/18  0656 11/24/18  0154 11/23/18 2018   POC GLUCOSE mg/dl 156* 202* 240* 202* 153* 225* 282* 182* 92 83 119       Results from last 7 days  Lab Units 11/20/18  0959   HEMOGLOBIN A1C % of total Hgb 6 5*               * I Have Reviewed All Lab Data Listed Above  * Additional Pertinent Lab Tests Reviewed:  All Labs Within Last 24 Hours Reviewed    Imaging:    Imaging Reports Reviewed Today Include: None new  Imaging Personally Reviewed by Myself Includes:  None    Recent Cultures (last 7 days):           Last 24 Hours Medication List:     Current Facility-Administered Medications:  acetaminophen 650 mg Oral Q6H PRN George Neely MD   albuterol 2 puff Inhalation 4x Daily PRN George Neely MD   aspirin 325 mg Oral Daily George Neely MD   budesonide-formoterol 2 puff Inhalation BID George Neely MD   heparin (porcine) 5,000 Units Subcutaneous [facility] Otis Young PA-C   insulin lispro 1-5 Units Subcutaneous HS Otis Young PA-C   insulin lispro 1-6 Units Subcutaneous TID AC Otis Young PA-C   levothyroxine 150 mcg Oral Early Morning George Neely MD   LORazepam 0 5 mg Oral Q8H PRN George Neely MD   metoprolol tartrate 12 5 mg Oral Q12H Ashley Ellis MD   morphine injection 2 mg Intravenous Q3H PRN George Neely MD   nitroglycerin 0 4 mg Sublingual Q5 Min PRN George Neely MD ondansetron 4 mg Intravenous Q4H PRN Leila Ramirez PA-C   pantoprazole 40 mg Oral Early Morning Vangie Gordon MD   polyethylene glycol 17 g Oral Daily PRN Lizeth Alfredo MD   rosuvastatin 40 mg Oral Daily Vangie Gordon MD   [START ON 11/27/2018] sodium chloride 100 mL/hr Intravenous Continuous Dior Moe MD   tamsulosin 0 4 mg Oral Daily With Aubrie Pierson MD        Today, Patient Was Seen By: Leila Ramirez PA-C    ** Please Note: Dictation voice to text software may have been used in the creation of this document   **

## 2018-11-26 NOTE — ASSESSMENT & PLAN NOTE
· With recurrent hypoglycemia as outpatient for which is home insulin regimen was being decreased  · Patient with reported poor PO intake but admits to continuing to take his home insulin regimen  He still has poor PO intake today with breakfast he admits  · Glucose noted to be 29 on day-of-admission BMP  · Continue cardiac and carb-controlled diet   Continue weight-based SSI coverage with meals and lower dose at bedtime for now - AM sugar controlled at 137 on BMP  · Hypoglycemia protocol on board  · Continue to monitor sugars and adjust insulin as needed    Average sugar:  (P) 175 [id number]

## 2018-11-26 NOTE — ASSESSMENT & PLAN NOTE
· Baseline creatinine 1 3-1 5  · Creatinine on admission 3 5, continued to improve to 2 59 today  · Appreciate Nephrology's ongoing recommendations  · Losartan-HCTZ on hold - see further plan regarding this below

## 2018-11-26 NOTE — RESTORATIVE TECHNICIAN NOTE
Restorative Specialist Mobility Note         Pt refused ambulation/ OOB at this time  Pt stated he is not feeling well  Will continue to follow up daily  RN danny Lobo Restorative Technician, BS

## 2018-11-26 NOTE — MEDICAL STUDENT
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, s/p 11/23 IR thoracentesis, who presents with hypoglycemia, recurrent left sided pleural effusion, NSTEMI, and ADAM  Plan:  - 11/27 L VATS talc pleurodesis  - Cardiac diet switched to NPO at midnight  - IVF   9%  mL/hr  - Monitor Cr for improvement of renal function  - Repeat CXR  - DVT ppx- SQ heparin, SCD  - Weight-based sliding scale insulin  Monitor glucose  - Holding ARB and HCTZ per nephrology    Subjective/Objective     Subjective:  No acute overnight events  This morning patient denies pain, SOB, increased WOB, or chest pain  Patient has no difficulty urinating and denies lightheadedness  Patient has been tolerating regular diet and ambulating to bathroom  Last BM yesterday  Objective:    Blood pressure 128/71, pulse 86, temperature 98 4 °F (36 9 °C), temperature source Oral, resp  rate 20, height 5' 8" (1 727 m), weight 87 9 kg (193 lb 12 6 oz), SpO2 98 %  ,Body mass index is 29 46 kg/m²  Intake/Output Summary (Last 24 hours) at 11/26/18 0551  Last data filed at 11/26/18 3316   Gross per 24 hour   Intake              800 ml   Output             1575 ml   Net             -775 ml       Invasive Devices     Peripheral Intravenous Line            Peripheral IV 11/24/18 Left Forearm 1 day                Physical Exam:  General: no acute distress  HEENT: MMM, normocephalic  Cardiovascular: Normal rate, rhythm  Chest: No wheezes, rales  No increased WOB, no SOB  Extremities: Mild bilateral LE edema  Neurologic: alert and oriented to person, place, time  Abdomen: Soft, nontender  Bowel sounds present      Lab, Imaging and other studies:CBC: No results found for: WBC, HGB, HCT, MCV, PLT, ADJUSTEDWBC, MCH, MCHC, RDW, MPV, NRBC, CMP: No results found for: SODIUM, K, CL, CO2, ANIONGAP, BUN, CREATININE, GLUCOSE, CALCIUM, AST, ALT, ALKPHOS, PROT, BILITOT, EGFR  VTE Pharmacologic Prophylaxis: Heparin  VTE Mechanical Prophylaxis: sequential compression device

## 2018-11-27 PROBLEM — D63.1 ANEMIA DUE TO STAGE 3 CHRONIC KIDNEY DISEASE (HCC): Chronic | Status: ACTIVE | Noted: 2018-11-27

## 2018-11-27 PROBLEM — N18.30 STAGE 3 CHRONIC KIDNEY DISEASE (HCC): Chronic | Status: ACTIVE | Noted: 2018-11-27

## 2018-11-27 LAB
ANION GAP SERPL CALCULATED.3IONS-SCNC: 4 MMOL/L (ref 4–13)
BUN SERPL-MCNC: 25 MG/DL (ref 5–25)
CALCIUM SERPL-MCNC: 8.2 MG/DL (ref 8.3–10.1)
CHLORIDE SERPL-SCNC: 106 MMOL/L (ref 100–108)
CO2 SERPL-SCNC: 28 MMOL/L (ref 21–32)
CREAT SERPL-MCNC: 2.17 MG/DL (ref 0.6–1.3)
ERYTHROCYTE [DISTWIDTH] IN BLOOD BY AUTOMATED COUNT: 16.1 % (ref 11.6–15.1)
GFR SERPL CREATININE-BSD FRML MDRD: 27 ML/MIN/1.73SQ M
GLUCOSE SERPL-MCNC: 144 MG/DL (ref 65–140)
GLUCOSE SERPL-MCNC: 150 MG/DL (ref 65–140)
GLUCOSE SERPL-MCNC: 167 MG/DL (ref 65–140)
GLUCOSE SERPL-MCNC: 175 MG/DL (ref 65–140)
GLUCOSE SERPL-MCNC: 186 MG/DL (ref 65–140)
GLUCOSE SERPL-MCNC: 247 MG/DL (ref 65–140)
HCT VFR BLD AUTO: 33.9 % (ref 36.5–49.3)
HGB BLD-MCNC: 10.3 G/DL (ref 12–17)
MCH RBC QN AUTO: 26.2 PG (ref 26.8–34.3)
MCHC RBC AUTO-ENTMCNC: 30.4 G/DL (ref 31.4–37.4)
MCV RBC AUTO: 86 FL (ref 82–98)
PLATELET # BLD AUTO: 414 THOUSANDS/UL (ref 149–390)
PMV BLD AUTO: 9.9 FL (ref 8.9–12.7)
POTASSIUM SERPL-SCNC: 3.5 MMOL/L (ref 3.5–5.3)
RBC # BLD AUTO: 3.93 MILLION/UL (ref 3.88–5.62)
SODIUM SERPL-SCNC: 138 MMOL/L (ref 136–145)
WBC # BLD AUTO: 8.17 THOUSAND/UL (ref 4.31–10.16)

## 2018-11-27 PROCEDURE — 32650 THORACOSCOPY W/PLEURODESIS: CPT | Performed by: THORACIC SURGERY (CARDIOTHORACIC VASCULAR SURGERY)

## 2018-11-27 PROCEDURE — 80048 BASIC METABOLIC PNL TOTAL CA: CPT | Performed by: HOSPITALIST

## 2018-11-27 PROCEDURE — 99232 SBSQ HOSP IP/OBS MODERATE 35: CPT | Performed by: NURSE PRACTITIONER

## 2018-11-27 PROCEDURE — 3E0L4GC INTRODUCTION OF OTHER THERAPEUTIC SUBSTANCE INTO PLEURAL CAVITY, PERCUTANEOUS ENDOSCOPIC APPROACH: ICD-10-PCS | Performed by: THORACIC SURGERY (CARDIOTHORACIC VASCULAR SURGERY)

## 2018-11-27 PROCEDURE — C9290 INJ, BUPIVACAINE LIPOSOME: HCPCS | Performed by: THORACIC SURGERY (CARDIOTHORACIC VASCULAR SURGERY)

## 2018-11-27 PROCEDURE — 99232 SBSQ HOSP IP/OBS MODERATE 35: CPT | Performed by: INTERNAL MEDICINE

## 2018-11-27 PROCEDURE — 85027 COMPLETE CBC AUTOMATED: CPT | Performed by: HOSPITALIST

## 2018-11-27 PROCEDURE — 82948 REAGENT STRIP/BLOOD GLUCOSE: CPT

## 2018-11-27 RX ORDER — GLYCOPYRROLATE 0.2 MG/ML
INJECTION INTRAMUSCULAR; INTRAVENOUS AS NEEDED
Status: DISCONTINUED | OUTPATIENT
Start: 2018-11-27 | End: 2018-11-27 | Stop reason: HOSPADM

## 2018-11-27 RX ORDER — CEFAZOLIN SODIUM 2 G/50ML
2000 SOLUTION INTRAVENOUS ONCE
Status: DISCONTINUED | OUTPATIENT
Start: 2018-11-27 | End: 2018-12-04 | Stop reason: HOSPADM

## 2018-11-27 RX ORDER — ALBUMIN, HUMAN INJ 5% 5 %
SOLUTION INTRAVENOUS CONTINUOUS PRN
Status: DISCONTINUED | OUTPATIENT
Start: 2018-11-27 | End: 2018-11-27 | Stop reason: HOSPADM

## 2018-11-27 RX ORDER — METOCLOPRAMIDE HYDROCHLORIDE 5 MG/ML
10 INJECTION INTRAMUSCULAR; INTRAVENOUS ONCE AS NEEDED
Status: DISCONTINUED | OUTPATIENT
Start: 2018-11-27 | End: 2018-11-27 | Stop reason: HOSPADM

## 2018-11-27 RX ORDER — ROCURONIUM BROMIDE 10 MG/ML
INJECTION, SOLUTION INTRAVENOUS AS NEEDED
Status: DISCONTINUED | OUTPATIENT
Start: 2018-11-27 | End: 2018-11-27 | Stop reason: HOSPADM

## 2018-11-27 RX ORDER — FENTANYL CITRATE/PF 50 MCG/ML
25 SYRINGE (ML) INJECTION
Status: DISCONTINUED | OUTPATIENT
Start: 2018-11-27 | End: 2018-11-27 | Stop reason: HOSPADM

## 2018-11-27 RX ORDER — EPHEDRINE SULFATE 50 MG/ML
INJECTION, SOLUTION INTRAVENOUS AS NEEDED
Status: DISCONTINUED | OUTPATIENT
Start: 2018-11-27 | End: 2018-11-27 | Stop reason: HOSPADM

## 2018-11-27 RX ORDER — ONDANSETRON 2 MG/ML
4 INJECTION INTRAMUSCULAR; INTRAVENOUS EVERY 4 HOURS PRN
Status: DISCONTINUED | OUTPATIENT
Start: 2018-11-27 | End: 2018-12-04 | Stop reason: HOSPADM

## 2018-11-27 RX ORDER — CEFAZOLIN SODIUM 1 G/3ML
INJECTION, POWDER, FOR SOLUTION INTRAMUSCULAR; INTRAVENOUS AS NEEDED
Status: DISCONTINUED | OUTPATIENT
Start: 2018-11-27 | End: 2018-11-27 | Stop reason: HOSPADM

## 2018-11-27 RX ORDER — 0.9 % SODIUM CHLORIDE 0.9 %
VIAL (ML) INJECTION AS NEEDED
Status: DISCONTINUED | OUTPATIENT
Start: 2018-11-27 | End: 2018-11-27 | Stop reason: HOSPADM

## 2018-11-27 RX ORDER — FENTANYL CITRATE 50 UG/ML
INJECTION, SOLUTION INTRAMUSCULAR; INTRAVENOUS AS NEEDED
Status: DISCONTINUED | OUTPATIENT
Start: 2018-11-27 | End: 2018-11-27 | Stop reason: HOSPADM

## 2018-11-27 RX ORDER — MEPERIDINE HYDROCHLORIDE 25 MG/ML
12.5 INJECTION INTRAMUSCULAR; INTRAVENOUS; SUBCUTANEOUS ONCE AS NEEDED
Status: DISCONTINUED | OUTPATIENT
Start: 2018-11-27 | End: 2018-11-27 | Stop reason: HOSPADM

## 2018-11-27 RX ORDER — OXYCODONE HYDROCHLORIDE 10 MG/1
10 TABLET ORAL EVERY 4 HOURS PRN
Status: DISCONTINUED | OUTPATIENT
Start: 2018-11-27 | End: 2018-12-04 | Stop reason: HOSPADM

## 2018-11-27 RX ORDER — SODIUM CHLORIDE 9 MG/ML
50 INJECTION, SOLUTION INTRAVENOUS CONTINUOUS
Status: DISCONTINUED | OUTPATIENT
Start: 2018-11-27 | End: 2018-11-29

## 2018-11-27 RX ORDER — SODIUM CHLORIDE, SODIUM LACTATE, POTASSIUM CHLORIDE, CALCIUM CHLORIDE 600; 310; 30; 20 MG/100ML; MG/100ML; MG/100ML; MG/100ML
50 INJECTION, SOLUTION INTRAVENOUS CONTINUOUS
Status: DISCONTINUED | OUTPATIENT
Start: 2018-11-27 | End: 2018-11-27

## 2018-11-27 RX ORDER — OXYCODONE HYDROCHLORIDE 5 MG/1
5 TABLET ORAL EVERY 4 HOURS PRN
Status: DISCONTINUED | OUTPATIENT
Start: 2018-11-27 | End: 2018-12-04 | Stop reason: HOSPADM

## 2018-11-27 RX ORDER — SODIUM CHLORIDE, SODIUM LACTATE, POTASSIUM CHLORIDE, CALCIUM CHLORIDE 600; 310; 30; 20 MG/100ML; MG/100ML; MG/100ML; MG/100ML
INJECTION, SOLUTION INTRAVENOUS CONTINUOUS PRN
Status: DISCONTINUED | OUTPATIENT
Start: 2018-11-27 | End: 2018-11-27 | Stop reason: HOSPADM

## 2018-11-27 RX ORDER — PROPOFOL 10 MG/ML
INJECTION, EMULSION INTRAVENOUS AS NEEDED
Status: DISCONTINUED | OUTPATIENT
Start: 2018-11-27 | End: 2018-11-27 | Stop reason: HOSPADM

## 2018-11-27 RX ORDER — SODIUM CHLORIDE 9 MG/ML
INJECTION, SOLUTION INTRAVENOUS CONTINUOUS PRN
Status: DISCONTINUED | OUTPATIENT
Start: 2018-11-27 | End: 2018-11-27 | Stop reason: HOSPADM

## 2018-11-27 RX ORDER — ONDANSETRON 2 MG/ML
4 INJECTION INTRAMUSCULAR; INTRAVENOUS ONCE AS NEEDED
Status: DISCONTINUED | OUTPATIENT
Start: 2018-11-27 | End: 2018-11-27 | Stop reason: HOSPADM

## 2018-11-27 RX ORDER — ONDANSETRON 2 MG/ML
INJECTION INTRAMUSCULAR; INTRAVENOUS AS NEEDED
Status: DISCONTINUED | OUTPATIENT
Start: 2018-11-27 | End: 2018-11-27 | Stop reason: HOSPADM

## 2018-11-27 RX ORDER — ACETAMINOPHEN 325 MG/1
650 TABLET ORAL EVERY 6 HOURS SCHEDULED
Status: DISCONTINUED | OUTPATIENT
Start: 2018-11-28 | End: 2018-12-04 | Stop reason: HOSPADM

## 2018-11-27 RX ORDER — BUPIVACAINE HYDROCHLORIDE 2.5 MG/ML
INJECTION, SOLUTION INFILTRATION; PERINEURAL AS NEEDED
Status: DISCONTINUED | OUTPATIENT
Start: 2018-11-27 | End: 2018-11-27 | Stop reason: HOSPADM

## 2018-11-27 RX ORDER — SUCCINYLCHOLINE/SOD CL,ISO/PF 100 MG/5ML
SYRINGE (ML) INTRAVENOUS AS NEEDED
Status: DISCONTINUED | OUTPATIENT
Start: 2018-11-27 | End: 2018-11-27 | Stop reason: HOSPADM

## 2018-11-27 RX ADMIN — GLYCOPYRROLATE 0.6 MG: 0.2 INJECTION, SOLUTION INTRAMUSCULAR; INTRAVENOUS at 15:35

## 2018-11-27 RX ADMIN — METOPROLOL TARTRATE 12.5 MG: 25 TABLET ORAL at 21:01

## 2018-11-27 RX ADMIN — METOPROLOL TARTRATE 12.5 MG: 25 TABLET ORAL at 08:09

## 2018-11-27 RX ADMIN — SODIUM CHLORIDE 250 ML: 0.9 INJECTION, SOLUTION INTRAVENOUS at 21:40

## 2018-11-27 RX ADMIN — PROPOFOL 200 MG: 10 INJECTION, EMULSION INTRAVENOUS at 14:22

## 2018-11-27 RX ADMIN — EPHEDRINE SULFATE 10 MG: 50 INJECTION, SOLUTION INTRAMUSCULAR; INTRAVENOUS; SUBCUTANEOUS at 14:46

## 2018-11-27 RX ADMIN — BUDESONIDE AND FORMOTEROL FUMARATE DIHYDRATE 2 PUFF: 160; 4.5 AEROSOL RESPIRATORY (INHALATION) at 08:10

## 2018-11-27 RX ADMIN — SODIUM CHLORIDE 100 ML/HR: 0.9 INJECTION, SOLUTION INTRAVENOUS at 00:19

## 2018-11-27 RX ADMIN — NEOSTIGMINE METHYLSULFATE 3 MG: 1 INJECTION, SOLUTION INTRAMUSCULAR; INTRAVENOUS; SUBCUTANEOUS at 15:35

## 2018-11-27 RX ADMIN — PHENYLEPHRINE HYDROCHLORIDE 50 MCG/MIN: 10 INJECTION INTRAVENOUS at 14:50

## 2018-11-27 RX ADMIN — INSULIN GLARGINE 5 UNITS: 100 INJECTION, SOLUTION SUBCUTANEOUS at 21:01

## 2018-11-27 RX ADMIN — INSULIN LISPRO 2 UNITS: 100 INJECTION, SOLUTION INTRAVENOUS; SUBCUTANEOUS at 21:02

## 2018-11-27 RX ADMIN — CEFAZOLIN 2000 MG: 1 INJECTION, POWDER, FOR SOLUTION INTRAVENOUS at 14:40

## 2018-11-27 RX ADMIN — BUDESONIDE AND FORMOTEROL FUMARATE DIHYDRATE 2 PUFF: 160; 4.5 AEROSOL RESPIRATORY (INHALATION) at 21:01

## 2018-11-27 RX ADMIN — SODIUM CHLORIDE, SODIUM LACTATE, POTASSIUM CHLORIDE, AND CALCIUM CHLORIDE: .6; .31; .03; .02 INJECTION, SOLUTION INTRAVENOUS at 14:10

## 2018-11-27 RX ADMIN — FENTANYL CITRATE 50 MCG: 50 INJECTION, SOLUTION INTRAMUSCULAR; INTRAVENOUS at 14:35

## 2018-11-27 RX ADMIN — ACETAMINOPHEN 650 MG: 325 TABLET, FILM COATED ORAL at 21:00

## 2018-11-27 RX ADMIN — SODIUM CHLORIDE 50 ML/HR: 0.9 INJECTION, SOLUTION INTRAVENOUS at 16:24

## 2018-11-27 RX ADMIN — Medication 100 MG: at 14:22

## 2018-11-27 RX ADMIN — SODIUM CHLORIDE 100 ML/HR: 0.9 INJECTION, SOLUTION INTRAVENOUS at 12:55

## 2018-11-27 RX ADMIN — ALBUMIN (HUMAN): 12.5 SOLUTION INTRAVENOUS at 14:50

## 2018-11-27 RX ADMIN — SODIUM CHLORIDE: 9 INJECTION, SOLUTION INTRAVENOUS at 14:32

## 2018-11-27 RX ADMIN — LEVOTHYROXINE SODIUM 150 MCG: 75 TABLET ORAL at 05:19

## 2018-11-27 RX ADMIN — EPHEDRINE SULFATE 5 MG: 50 INJECTION, SOLUTION INTRAMUSCULAR; INTRAVENOUS; SUBCUTANEOUS at 14:26

## 2018-11-27 RX ADMIN — TAMSULOSIN HYDROCHLORIDE 0.4 MG: 0.4 CAPSULE ORAL at 17:47

## 2018-11-27 RX ADMIN — FENTANYL CITRATE 25 MCG: 50 INJECTION, SOLUTION INTRAMUSCULAR; INTRAVENOUS at 14:22

## 2018-11-27 RX ADMIN — ALBUMIN (HUMAN): 12.5 SOLUTION INTRAVENOUS at 15:10

## 2018-11-27 RX ADMIN — BUPIVACAINE 20 ML: 13.3 INJECTION, SUSPENSION, LIPOSOMAL INFILTRATION at 17:16

## 2018-11-27 RX ADMIN — ROSUVASTATIN CALCIUM 40 MG: 10 TABLET, COATED ORAL at 17:47

## 2018-11-27 RX ADMIN — PANTOPRAZOLE SODIUM 40 MG: 40 TABLET, DELAYED RELEASE ORAL at 05:19

## 2018-11-27 RX ADMIN — EPHEDRINE SULFATE 10 MG: 50 INJECTION, SOLUTION INTRAMUSCULAR; INTRAVENOUS; SUBCUTANEOUS at 14:44

## 2018-11-27 RX ADMIN — ROCURONIUM BROMIDE 10 MG: 10 INJECTION INTRAVENOUS at 15:00

## 2018-11-27 RX ADMIN — DEXAMETHASONE SODIUM PHOSPHATE 8 MG: 10 INJECTION INTRAMUSCULAR; INTRAVENOUS at 14:35

## 2018-11-27 RX ADMIN — FENTANYL CITRATE 25 MCG: 50 INJECTION, SOLUTION INTRAMUSCULAR; INTRAVENOUS at 15:34

## 2018-11-27 RX ADMIN — HEPARIN SODIUM 5000 UNITS: 5000 INJECTION INTRAVENOUS; SUBCUTANEOUS at 05:19

## 2018-11-27 RX ADMIN — EPHEDRINE SULFATE 10 MG: 50 INJECTION, SOLUTION INTRAMUSCULAR; INTRAVENOUS; SUBCUTANEOUS at 14:49

## 2018-11-27 RX ADMIN — INSULIN LISPRO 1 UNITS: 100 INJECTION, SOLUTION INTRAVENOUS; SUBCUTANEOUS at 17:48

## 2018-11-27 RX ADMIN — HEPARIN SODIUM 5000 UNITS: 5000 INJECTION INTRAVENOUS; SUBCUTANEOUS at 21:01

## 2018-11-27 RX ADMIN — ROCURONIUM BROMIDE 20 MG: 10 INJECTION INTRAVENOUS at 14:35

## 2018-11-27 RX ADMIN — ONDANSETRON 4 MG: 2 INJECTION INTRAMUSCULAR; INTRAVENOUS at 15:28

## 2018-11-27 NOTE — PLAN OF CARE
CARDIOVASCULAR - ADULT     Absence of cardiac dysrhythmias or at baseline rhythm Progressing        DISCHARGE PLANNING     Discharge to home or other facility with appropriate resources Progressing        DISCHARGE PLANNING - CARE MANAGEMENT     Discharge to post-acute care or home with appropriate resources Progressing        INFECTION - ADULT     Absence or prevention of progression during hospitalization Progressing        Knowledge Deficit     Patient/family/caregiver demonstrates understanding of disease process, treatment plan, medications, and discharge instructions Progressing        Nutrition/Hydration-ADULT     Nutrient/Hydration intake appropriate for improving, restoring or maintaining nutritional needs Progressing        PAIN - ADULT     Verbalizes/displays adequate comfort level or baseline comfort level Progressing        Potential for Falls     Patient will remain free of falls Progressing        Prexisting or High Potential for Compromised Skin Integrity     Skin integrity is maintained or improved Progressing        SAFETY ADULT     Maintain or return to baseline ADL function Progressing     Maintain or return mobility status to optimal level Progressing     Patient will remain free of falls Progressing

## 2018-11-27 NOTE — PROGRESS NOTES
NEPHROLOGY PROGRESS NOTE   Eugenia Sy  80 y o  male MRN: [id number]  Unit/Bed#: [facility] 900-87 Encounter: [id number]  Reason for Consult: ADAM/CKD    ASSESSMENT/PLAN:  1  Acute kidney injury:  Likely prerenal with intravascular volume depletion with diuretic use along with a component of impaired renal autoregulation with ARB  - Hyzaar ( losartan- HCTZ) now on hold- please continue to hold  - creatinine continues to improve and now 2 17 (2 59, 3 06, 3 15, 3 52, 3 57)  - UA with micro:  2+ protein  With occasional Amorph Urates  - patient continues to examine fairly euvolemic  - avoid NSAIDs/ nephrotoxins  - avoid hypotension to prevent decreased renal perfusion ( Increased hold parameters on Lopressor to SBP less than 120)  - continue to follow I/ O, lab values in volume status  -with improving creatinine okay for patient to proceed with surgery today with thoracic surgery for renal standpoint     2  Chronic kidney disease III:  Likely secondary to hypertension,  Diabetes, and age component  - previous baseline creatinine 1 3-1 6,  However recently discharged with a creatinine 1 8  - patient does not follow with Nephrology as outpatient  -recommend follow-up on discharge     3  Recurrent left effusion:  Thoracic following  - status post thoracentesis now anticipating VATS procedure with pleurodesis today  -okay for surgery today     4  Aortic stenosis and CAD with MVD:  Status post recent AVR and CABG     5  Volume: Examines fairly euvolemic      6  Hypertension:  Losartan/ HCTZ on hold  - continues on metoprolol now with increased hold parameters to SBP less than 120 to prevent decreased renal perfusion  - consider adding a loop diuretic when diuretics needed     7  Anemia:  Likely of CD  -consider checking  - hemoglobin stable and continue to trend      SUBJECTIVE:  Patient seen and examined  Denies chest pain shortness of breath  Frustrated for waiting for his surgery and just wants to get it done  Appears inpatient    OBJECTIVE:  Current Weight: Weight - Scale: 87 7 kg (193 lb 5 5 oz)  Vitals:    11/27/18 0300 11/27/18 0527 11/27/18 0725 11/27/18 1033   BP: 152/80  122/76 165/80   BP Location: Left arm  Left arm Right arm   Pulse: 77  87 80   Resp: 16  16 16   Temp: 97 9 °F (36 6 °C)  97 9 °F (36 6 °C) 98 °F (36 7 °C)   TempSrc: Oral  Oral Oral   SpO2: 93%  93% 95%   Weight:  87 7 kg (193 lb 5 5 oz)     Height:           Intake/Output Summary (Last 24 hours) at 11/27/18 1128  Last data filed at 11/27/18 1007   Gross per 24 hour   Intake              685 ml   Output             1220 ml   Net             -535 ml     General:  No acute distress, out of bed  Skin:  Warm and dry  HEENT:  Mucous membranes moist  Neck:  Supple without JVD noted  Chest:  Essentially clear without crackles or wheezes, slightly decreased in the bases  Heart:  Regular rate and rhythm without rub noted  Abdomen:  Soft, nontender, no distention, positive bowel sounds  Extremities:  Trace lower extremity edema noted bilaterally  Neuro:  Alert and awake  Psych:  Appropriate affect    Medications:    Current Facility-Administered Medications:     acetaminophen (TYLENOL) tablet 650 mg, 650 mg, Oral, Q6H PRN, Alvy Peabody, MD    albuterol (PROVENTIL HFA,VENTOLIN HFA) inhaler 2 puff, 2 puff, Inhalation, 4x Daily PRN, Alvy Peabody, MD    aspirin chewable tablet 325 mg, 325 mg, Oral, Daily, Alvy Peabody, MD, 325 mg at 11/26/18 1534    budesonide-formoterol (SYMBICORT) 160-4 5 mcg/act inhaler 2 puff, 2 puff, Inhalation, BID, Alvy Peabody, MD, 2 puff at 11/27/18 0810    bupivacaine liposomal (EXPAREL) 1 3 % injection 20 mL, 20 mL, Infiltration, Once, Maria Luisa Forerst MD    ceFAZolin (ANCEF) IVPB (premix) 2,000 mg, 2,000 mg, Intravenous, Once, Maria Luisa Forrest MD    heparin (porcine) subcutaneous injection 5,000 Units, 5,000 Units, Subcutaneous, Q8H [facility], Akash Johns PA-C, 5,000 Units at 11/27/18 0519    insulin glargine (LANTUS) subcutaneous injection 5 Units 0 05 mL, 5 Units, Subcutaneous, HS, Raul Carbajal PA-C, 5 Units at 11/26/18 2110    insulin lispro (HumaLOG) 100 units/mL subcutaneous injection 1-5 Units, 1-5 Units, Subcutaneous, HS, Raul Carbajal PA-C, 1 Units at 11/26/18 2111    insulin lispro (HumaLOG) 100 units/mL subcutaneous injection 1-6 Units, 1-6 Units, Subcutaneous, TID AC, 2 Units at 11/26/18 1714 **AND** Fingerstick Glucose (POCT), , , TID AC, Raul Carbajal PA-C    levothyroxine tablet 150 mcg, 150 mcg, Oral, Early Morning, Macy Benites MD, 150 mcg at 11/27/18 0519    LORazepam (ATIVAN) tablet 0 5 mg, 0 5 mg, Oral, Q8H PRN, Macy Benites MD    metoprolol tartrate (LOPRESSOR) partial tablet 12 5 mg, 12 5 mg, Oral, Q12H [address], VALENCIA [place], 12 5 mg at 11/27/18 0809    morphine injection 2 mg, 2 mg, Intravenous, Q3H PRN, Macy Benites MD    nitroglycerin (NITROSTAT) SL tablet 0 4 mg, 0 4 mg, Sublingual, Q5 Min PRN, Macy Benites MD    ondansetron [facility]) injection 4 mg, 4 mg, Intravenous, Q4H PRN, Raul Carbajal PA-C    pantoprazole (PROTONIX) EC tablet 40 mg, 40 mg, Oral, Early Morning, Macy Benites MD, 40 mg at 11/27/18 0519    polyethylene glycol (MIRALAX) packet 17 g, 17 g, Oral, Daily PRN, Jong Doe MD, 17 g at 11/25/18 0909    rosuvastatin (CRESTOR) tablet 40 mg, 40 mg, Oral, After Pat Bullard MD, 40 mg at 11/26/18 1713    sodium chloride 0 9 % infusion, 100 mL/hr, Intravenous, Continuous, Harish Gutierrez MD, Last Rate: 100 mL/hr at 11/27/18 0019, 100 mL/hr at 11/27/18 0019    tamsulosin (FLOMAX) capsule 0 4 mg, 0 4 mg, Oral, Daily With Dinner, Macy Benites MD, 0 4 mg at 11/26/18 5555    Laboratory Results:    Results from last 7 days  Lab Units 11/27/18  0423 11/27/18 11/26/18  0433 11/25/18  0444 11/24/18  0505 11/24/18  0159 11/23/18  2051 11/23/18  1619   WBC Thousand/uL 8 17  --  8 56  --   --  9 92 9 42  --    HEMOGLOBIN g/dL 10 3*  --  9 9*  --   --  9 3* 9 7*  --    HEMATOCRIT % 33 9*  --  32 6*  --   --  30 7* 32 2*  --    PLATELETS Thousands/uL 414*  --  392*  --   --  390 275  --    POTASSIUM mmol/L  --  3 5 3 5 3 6 3 8  --  4 3 3 7   CHLORIDE mmol/L  --  106 106 104 108  --  102 104   CO2 mmol/L  --  28 26 25 23  --  27 30   BUN mg/dL  --  25 30* 34* 32*  --  36* 34*   CREATININE mg/dL  --  2 17* 2 59* 3 06* 3 15*  --  3 52* 3 57*   CALCIUM mg/dL  --  8 2* 8 1* 8 9 7 9*  --  9 1 9 1   MAGNESIUM mg/dL  --   --   --   --   --   --  2 5  --

## 2018-11-27 NOTE — ADDENDUM NOTE
Addendum  created 11/27/18 9733 by Chapo Thorne MD    Anesthesia Staff edited, Order list changed, Order sets accessed

## 2018-11-27 NOTE — ANESTHESIA PREPROCEDURE EVALUATION
Review of Systems/Medical History  Patient summary reviewed  Chart reviewed  No history of anesthetic complications     Cardiovascular  Hyperlipidemia, Hypertension , No valvular heart disease , Valve replacement (9/17/2018) aortic valve  replacement, Past MI (NSTEMI) , CAD , History of CABG (x 4, 9/17/2018),   Comment: 11/23/20[employer]  LBBB, NS ST,T's,  Pulmonary  Smoker ex-smoker  , Asthma (Mild intermittent) ,   Comment: Recurrent left pleural effusion--s/p multiple thoracentesis     GI/Hepatic    GERD ,        Chronic kidney disease stage 3,   Comment: Cr 2 17, GFR 27     Endo/Other  Diabetes (with neuropathy) Insulin, History of thyroid disease , hypothyroidism,      GYN  Negative gynecology ROS          Hematology  Anemia anemia of chronic disease,    Comment: H/H 10/33 Musculoskeletal  Gout,        Neurology  Negative neurology ROS      Psychology           Physical Exam    Airway  Comment: Increased soft tissue neck  Mallampati score: II  TM Distance: >3 FB       Dental   Comment: No teeth upper, few remaining teeth lower,     Cardiovascular  Rhythm: regular,     Pulmonary  Breath sounds clear to auscultation,     Other Findings        Anesthesia Plan  ASA Score- 3     Anesthesia Type- general with ASA Monitors  Additional Monitors:   Airway Plan: ETT  Plan Factors-    Induction- intravenous  Postoperative Plan- Plan for postoperative opioid use  Planned trial extubation    Informed Consent- Anesthetic plan and risks discussed with patient  I personally reviewed this patient with the CRNA  Discussed and agreed on the Anesthesia Plan with the ANGELITA Capellan

## 2018-11-27 NOTE — ANESTHESIA POSTPROCEDURE EVALUATION
Post-Op Assessment Note      CV Status:  Stable    Mental Status:  Alert and awake    Hydration Status:  Euvolemic    PONV Controlled:  Controlled    Airway Patency:  Patent    Post Op Vitals Reviewed: Yes          Staff: CRNA           /75 (11/27/18 1546)    Temp (!) 97 3 °F (36 3 °C) (11/27/18 1546)    Pulse 91 (11/27/18 1546)   Resp      SpO2 97 % (11/27/18 1546)

## 2018-11-27 NOTE — OP NOTE
OPERATIVE REPORT  PATIENT NAME: Pranav Le  :  1935  MRN: [id number]  Pt Location:  OR ROOM 07    SURGERY DATE: 2018    Surgeon(s) and Role:     * Ally Meadows MD - Primary     * Garth Adam MD - Assisting    Preop Diagnosis:  Recurrent left pleural effusion [J90]    Post-Op Diagnosis Codes:     * Recurrent left pleural effusion [J90]    Procedure(s) (LRB):  THORACOSCOPY VIDEO ASSISTED SURGERY (VATS), talc pleurodesis, (Left)      Specimen(s):  * No specimens in log *    Estimated Blood Loss:   Minimal    Drains:  Chest Tube 1 Left  28 Fr  (Active)   Number of days: 0       Anesthesia Type:   General    Operative Indications:  Recurrent left pleural effusion [J90]    Operative Findings:  Straw colored effusion  Minimal adhesions  Easily fully inflatable lung  No decortication required  Complications:   None    Procedure and Technique:  OPERATION IN DETAIL:   The patient was correctly identified by name and medical record number in the holding area and brought to the operative suite, where he was placed supine on the operative table  After satisfactory induction of general endotracheal anesthesia, a timeout was performed confirming procedure and patient  The patient was positioned in the right lateral decubitus position  A 1 5cm skin incision was then made in the 7th intercostal space in the posterior axillary line  An additional port was placed posteriorly in the same rib space  Dissection was carried down through the subcutaneous tissue and the thoracic cavity was entered  A port was placed and the thoracoscope was introduced  Upon initial evaluation, there were minimal adhesions and straw colored fluid  The dissection of the lung was started bluntly to release the lung from the chest wall  The fluid was then suctioned out  The lung was test-reinflated and it demonstrated full re-expansion   At this time, an intercostal nerve block of Exparel was performed in rib spaces 3-10  Attention was then turned to the pleurodesis  Talc was sprayed into the lung, specifically at the diaphragmatic sulcus laterally  A 28F chest tube was then introduced and advanced to the apex  The lung was then re-expanded with positive pressure and under direct visualization  The tube was secured with #0 Prolene  The other incision was closed with serial vicryl suture and subcuticular #4-0 Monocryl  The wound was dressed with Dermabond  The instrument and sponge count was correct throughout the case  The patient was delivered to PACU without complication       I was present for the entire procedure    Patient Disposition:  PACU     SIGNATURE: Josué Chew MD  DATE: November 27, 2018  TIME: 3:45 PM

## 2018-11-27 NOTE — ADDENDUM NOTE
Addendum  created 11/27/18 1556 by Zulma Juarez, ANGELITA    Anesthesia Event edited, Anesthesia Intra Meds edited, Anesthesia Staff edited, Orders acknowledged in Narrator, Sign clinical note

## 2018-11-27 NOTE — PROGRESS NOTES
Pt  More confused at this time  Attempted to get oob without calling for nurse , states he has to get to  home  Also talked about finding his cat  assesment this morning, he was alert and oriented, appropriate, using good judgement    Slim notified of change in status

## 2018-11-27 NOTE — ADDENDUM NOTE
Addendum  created 11/27/18 4490 by Cyndra Phalen, CRNA    Anesthesia Attestations filed, Anesthesia Event edited, Anesthesia Intra Flowsheets edited, Anesthesia Intra LDAs edited, Anesthesia Intra Meds edited, LDA properties accepted, Order list changed, Orders acknowledged in Narrator, Patient device added, Patient device removed

## 2018-11-27 NOTE — ADDENDUM NOTE
Addendum  created 11/27/18 1344 by Mercedez Shaffer MD    Anesthesia Review and Sign - Ready for Procedure, Sign clinical note

## 2018-11-27 NOTE — PROGRESS NOTES
Progress Note - Thoracic Surgery   Saad Clement  80 y o  male MRN: [id number]  Unit/Bed#: [facility] 911-34 Encounter: [id number]    Assessment:  40-year-old male with recurrent left pleural effusion, previously scheduled for left VATS, talc pleurodesis who presents with a KI of unclear etiology   Patient is status post left thoracentesis yesterday, with symptomatic improvement and improvement on chest x-ray   At this time, he appears relatively stable and has been evaluated by renal and cardiology   Pending appropriate clearances, we will tentatively plan to perform his procedure this week, possibly 11/27/2018  Plan:  Continue NPO to Surgery  VATS talc pleurodesis 11/27  Continue monitor creatinine   - Post operative follow up  DVT ppx    Subjective/Objective   Chief Complaint:     Subjective: No acute events  Patient resting comfortably in bed with no issues  He denies any nausea or vomiting, shortness of breath or chest pain  Objective:     Blood pressure 152/80, pulse 77, temperature 97 9 °F (36 6 °C), temperature source Oral, resp  rate 16, height 5' 8" (1 727 m), weight 87 7 kg (193 lb 5 5 oz), SpO2 93 %  ,Body mass index is 29 4 kg/m²  Intake/Output Summary (Last 24 hours) at 11/27/18 0558  Last data filed at 11/27/18 6361   Gross per 24 hour   Intake              985 ml   Output             1320 ml   Net             -335 ml       Invasive Devices     Peripheral Intravenous Line            Peripheral IV 11/27/18 Right Wrist less than 1 day                Physical Exam: General: AAOx3  Respiratory: BS b/l, breathing comfortably on room air  Abdomen: Soft, NT, no rebound/guarding  Heart: RRR, S1s2  Ext: Warm no cyanosis       Lab, Imaging and other studies:  I have personally reviewed pertinent lab results    , CBC:   Lab Results   Component Value Date    WBC 8 17 11/27/2018    HGB 10 3 (L) 11/27/2018    HCT 33 9 (L) 11/27/2018    MCV 86 11/27/2018     (H) 11/27/2018    MCH 26 2 (L) 11/27/2018 MCHC 30 4 (L) 11/27/2018    RDW 16 1 (H) 11/27/2018    MPV 9 9 11/27/2018   , CMP:   Lab Results   Component Value Date    SODIUM 138 11/27/2018    K 3 5 11/27/2018     11/27/2018    CO2 28 11/27/2018    BUN 25 11/27/2018    CREATININE 2 17 (H) 11/27/2018    CALCIUM 8 2 (L) 11/27/2018    EGFR 27 11/27/2018     VTE Pharmacologic Prophylaxis: Heparin  VTE Mechanical Prophylaxis: sequential compression device

## 2018-11-27 NOTE — MEDICAL STUDENT
Progress Note - General Surgery   Daphne Mcbride  80 y o  male MRN: [id number]  Unit/Bed#: [facility] 519-01 Encounter: [id number]    Assessment:  79 y/o M w/hx T2DM, HTN, BPH, hypothyroidism, s/p CABG 9/2018, s/p 11/23 IR thoracentesis, p/w hypoglycemia, recurrent left sided pleural effusion, NSTEMI, ADAM  Plan:  - Plan for L VATS talc pleurodesis  Nephrology cleared for surgery in setting of decreasing Cr  Cardiology cleared for surgery   - NPO since midnight  -  mL/hr   9% NS  - DVT ppx- heparin, SCD  - Weight-based sliding scale insulin  - Hold ARB, HCTZ per nephrology      Subjective/Objective   Subjective:  No acute overnight events  This morning patient reports feeling like his heart is "jittery", but patient denies chest pain  Patient reports no SOB or increased WOB  Patient's last BM yesterday  Denies N/V/D  Objective:    Blood pressure 152/80, pulse 77, temperature 97 9 °F (36 6 °C), temperature source Oral, resp  rate 16, height 5' 8" (1 727 m), weight 87 7 kg (193 lb 5 5 oz), SpO2 93 %  ,Body mass index is 29 4 kg/m²  Intake/Output Summary (Last 24 hours) at 11/27/18 0545  Last data filed at 11/27/18 0412   Gross per 24 hour   Intake              985 ml   Output             1320 ml   Net             -335 ml       Invasive Devices     Peripheral Intravenous Line            Peripheral IV 11/27/18 Right Wrist less than 1 day                Physical Exam:  General- No acute distress  HEENT- MMM, normocephalic  Chest- No increased WOB, no SOB  Cardiovascular- Soft heart sounds  Regular rate, rhythm  Extremities- non-edematous  Neurologic- alert and oriented to person, place, time        Lab, Imaging and other studies:  CBC:   Lab Results   Component Value Date    WBC 8 17 11/27/2018    HGB 10 3 (L) 11/27/2018    HCT 33 9 (L) 11/27/2018    MCV 86 11/27/2018     (H) 11/27/2018    MCH 26 2 (L) 11/27/2018    MCHC 30 4 (L) 11/27/2018    RDW 16 1 (H) 11/27/2018    MPV 9 9 11/27/2018   , CMP:   Lab Results   Component Value Date    SODIUM 138 11/27/2018    K 3 5 11/27/2018     11/27/2018    CO2 28 11/27/2018    BUN 25 11/27/2018    CREATININE 2 17 (H) 11/27/2018    CALCIUM 8 2 (L) 11/27/2018    EGFR 27 11/27/2018     VTE Pharmacologic Prophylaxis: Heparin  VTE Mechanical Prophylaxis: sequential compression device

## 2018-11-28 ENCOUNTER — APPOINTMENT (INPATIENT)
Dept: RADIOLOGY | Facility: HOSPITAL | Age: 83
DRG: 186 | End: 2018-11-28
Payer: COMMERCIAL

## 2018-11-28 ENCOUNTER — DOCUMENTATION (OUTPATIENT)
Dept: FAMILY MEDICINE CLINIC | Facility: HOSPITAL | Age: 83
End: 2018-11-28

## 2018-11-28 PROBLEM — R41.0 DELIRIUM: Status: ACTIVE | Noted: 2018-11-28

## 2018-11-28 PROBLEM — D72.829 LEUKOCYTOSIS: Status: ACTIVE | Noted: 2018-11-28

## 2018-11-28 LAB
ANION GAP SERPL CALCULATED.3IONS-SCNC: 7 MMOL/L (ref 4–13)
BUN SERPL-MCNC: 26 MG/DL (ref 5–25)
CALCIUM SERPL-MCNC: 8.9 MG/DL (ref 8.3–10.1)
CHLORIDE SERPL-SCNC: 105 MMOL/L (ref 100–108)
CO2 SERPL-SCNC: 24 MMOL/L (ref 21–32)
CREAT SERPL-MCNC: 1.99 MG/DL (ref 0.6–1.3)
ERYTHROCYTE [DISTWIDTH] IN BLOOD BY AUTOMATED COUNT: 16.4 % (ref 11.6–15.1)
GFR SERPL CREATININE-BSD FRML MDRD: 30 ML/MIN/1.73SQ M
GLUCOSE SERPL-MCNC: 162 MG/DL (ref 65–140)
GLUCOSE SERPL-MCNC: 183 MG/DL (ref 65–140)
GLUCOSE SERPL-MCNC: 185 MG/DL (ref 65–140)
GLUCOSE SERPL-MCNC: 192 MG/DL (ref 65–140)
GLUCOSE SERPL-MCNC: 197 MG/DL (ref 65–140)
HCT VFR BLD AUTO: 30.5 % (ref 36.5–49.3)
HGB BLD-MCNC: 9.3 G/DL (ref 12–17)
MCH RBC QN AUTO: 26.4 PG (ref 26.8–34.3)
MCHC RBC AUTO-ENTMCNC: 30.5 G/DL (ref 31.4–37.4)
MCV RBC AUTO: 87 FL (ref 82–98)
PLATELET # BLD AUTO: 342 THOUSANDS/UL (ref 149–390)
PMV BLD AUTO: 9.4 FL (ref 8.9–12.7)
POTASSIUM SERPL-SCNC: 3.9 MMOL/L (ref 3.5–5.3)
RBC # BLD AUTO: 3.52 MILLION/UL (ref 3.88–5.62)
SODIUM SERPL-SCNC: 136 MMOL/L (ref 136–145)
WBC # BLD AUTO: 12.41 THOUSAND/UL (ref 4.31–10.16)

## 2018-11-28 PROCEDURE — 85027 COMPLETE CBC AUTOMATED: CPT | Performed by: SURGERY

## 2018-11-28 PROCEDURE — 99232 SBSQ HOSP IP/OBS MODERATE 35: CPT | Performed by: INTERNAL MEDICINE

## 2018-11-28 PROCEDURE — 99024 POSTOP FOLLOW-UP VISIT: CPT | Performed by: THORACIC SURGERY (CARDIOTHORACIC VASCULAR SURGERY)

## 2018-11-28 PROCEDURE — 82948 REAGENT STRIP/BLOOD GLUCOSE: CPT

## 2018-11-28 PROCEDURE — 0T9B70Z DRAINAGE OF BLADDER WITH DRAINAGE DEVICE, VIA NATURAL OR ARTIFICIAL OPENING: ICD-10-PCS | Performed by: UROLOGY

## 2018-11-28 PROCEDURE — 71046 X-RAY EXAM CHEST 2 VIEWS: CPT

## 2018-11-28 PROCEDURE — 80048 BASIC METABOLIC PNL TOTAL CA: CPT | Performed by: SURGERY

## 2018-11-28 RX ORDER — INSULIN GLARGINE 100 [IU]/ML
8 INJECTION, SOLUTION SUBCUTANEOUS
Status: DISCONTINUED | OUTPATIENT
Start: 2018-11-28 | End: 2018-11-30

## 2018-11-28 RX ADMIN — LEVOTHYROXINE SODIUM 150 MCG: 75 TABLET ORAL at 05:25

## 2018-11-28 RX ADMIN — HEPARIN SODIUM 5000 UNITS: 5000 INJECTION INTRAVENOUS; SUBCUTANEOUS at 05:25

## 2018-11-28 RX ADMIN — INSULIN GLARGINE 8 UNITS: 100 INJECTION, SOLUTION SUBCUTANEOUS at 23:33

## 2018-11-28 RX ADMIN — OXYCODONE HYDROCHLORIDE 10 MG: 10 TABLET ORAL at 03:17

## 2018-11-28 RX ADMIN — TAMSULOSIN HYDROCHLORIDE 0.4 MG: 0.4 CAPSULE ORAL at 16:05

## 2018-11-28 RX ADMIN — ACETAMINOPHEN 650 MG: 325 TABLET, FILM COATED ORAL at 05:25

## 2018-11-28 RX ADMIN — HEPARIN SODIUM 5000 UNITS: 5000 INJECTION INTRAVENOUS; SUBCUTANEOUS at 14:23

## 2018-11-28 RX ADMIN — INSULIN LISPRO 1 UNITS: 100 INJECTION, SOLUTION INTRAVENOUS; SUBCUTANEOUS at 11:41

## 2018-11-28 RX ADMIN — INSULIN LISPRO 1 UNITS: 100 INJECTION, SOLUTION INTRAVENOUS; SUBCUTANEOUS at 07:45

## 2018-11-28 RX ADMIN — PANTOPRAZOLE SODIUM 40 MG: 40 TABLET, DELAYED RELEASE ORAL at 05:25

## 2018-11-28 RX ADMIN — ASPIRIN 81 MG 325 MG: 81 TABLET ORAL at 10:17

## 2018-11-28 RX ADMIN — ACETAMINOPHEN 650 MG: 325 TABLET, FILM COATED ORAL at 17:03

## 2018-11-28 RX ADMIN — INSULIN LISPRO 1 UNITS: 100 INJECTION, SOLUTION INTRAVENOUS; SUBCUTANEOUS at 17:00

## 2018-11-28 RX ADMIN — METOPROLOL TARTRATE 12.5 MG: 25 TABLET ORAL at 10:18

## 2018-11-28 RX ADMIN — ROSUVASTATIN CALCIUM 40 MG: 10 TABLET, COATED ORAL at 17:04

## 2018-11-28 RX ADMIN — BUDESONIDE AND FORMOTEROL FUMARATE DIHYDRATE 2 PUFF: 160; 4.5 AEROSOL RESPIRATORY (INHALATION) at 10:18

## 2018-11-28 NOTE — ASSESSMENT & PLAN NOTE
· Baseline creatinine 1 3-1 5  · Improving,  · Appreciate Nephrology's ongoing recommendations  · Losartan-HCTZ on hold - see further plan regarding this below

## 2018-11-28 NOTE — PROGRESS NOTES
Spoke to Radha PINEDA regarding patient's urine output  Bladder scanned for 264, no urine documented since prior to surgery at 10AM  Bolus ordered, and urinary retention protocol  Will administer and rescan patient if unable to void by midnight

## 2018-11-28 NOTE — PROGRESS NOTES
We were unable to strait cath d/t anatomy of pt  Delmy Rivera made aware and will place urology consult

## 2018-11-28 NOTE — RESTORATIVE TECHNICIAN NOTE
Restorative Specialist Mobility Note       Pt refused ambulation at this time  Will continue to follow daily  RN danny Chambers Restorative Technician, BS

## 2018-11-28 NOTE — PROGRESS NOTES
Spoke to surgical resident Korey regarding patient's bed status after VATS procedure, as they are typically Level 1 SD  Patient has been completely stable, and is ok to remain med surg telemetry

## 2018-11-28 NOTE — ASSESSMENT & PLAN NOTE
· Type 2 NSTEMI in the setting of ADAM and hypoglycemia  · Off heparin drip  · Appreciate cardiology evaluation

## 2018-11-28 NOTE — ASSESSMENT & PLAN NOTE
· This has been since his recent CABG/AVR  · He follows with Dr Valerie Acevedo of Thoracic Surgery as outpatient, appreciate their recommendations   · He has had multiple thoracentesis   Per Thoracic surgery notes, patient had elected to proceed with pleurodesis (was initially scheduled for 11/23/18) over Pleur-X cath, however this was not performed - rather, the patient had an IR thoracentesis with 1,300mL out  · Scheduled for L VATS and pleurodesis today,

## 2018-11-28 NOTE — PROGRESS NOTES
Rounding done w/ Dr Candido Pitts  Aware of low urine outputs, high PVR's     Will attempt to straight cath pt, per urine retention protocol  Discussed pt's continued confusion and paranoia and elevated WBC's  Pt is confused and not always agreeable to plan of care d/t paranoia however is cooperative at this time and pleasant enough to persuade for some nursing interventions

## 2018-11-28 NOTE — PROGRESS NOTES
S/W Urology PA, Diya Leigh, earlier  Instructed to call Surgical PA to see pt re: difficult insertion of aj catheter  EDWIN Cagle, here to see pt and inserted aj catheter  Yellow cloudy urine returned

## 2018-11-28 NOTE — MEDICAL STUDENT
Progress Note - General Surgery   Irseal Gómez  80 y o  male MRN: [id number]  Unit/Bed#: [facility] 530-01 Encounter: [id number]    Assessment:  81 y/o M w/hx T2DM, HTN, CABG (9/2018), who is s/p 11/23 IR thoracentesis and POD1 from left VATS talc pleurodesis  Plan:  - Continue CT to suction  - Diet as tolerated  - IVF 50 mL/hr   9% NS  Increase if persistent urinary output difficulty  - CXR this morning  - F/U am labs  Monitor electrolytes and Cr   - Encourage incentive spirometry  - DVT ppx- SQ heparin, SCD  - Weight-based sliding scale insulin    Subjective/Objective   Subjective:  No acute overnight events  Patient denies pain, denies SOB or increased WOB  Denies N/V/D  No BM or flatus since procedure  Patient reports he ate some food last night without issue  L CT to suction draining serosanguinous fluid with no air leak  Objective:    Blood pressure 129/73, pulse 85, temperature (!) 97 3 °F (36 3 °C), temperature source Oral, resp  rate 18, height 5' 8" (1 727 m), weight 87 7 kg (193 lb 5 5 oz), SpO2 93 %  ,Body mass index is 29 4 kg/m²  Intake/Output Summary (Last 24 hours) at 11/28/18 0602  Last data filed at 11/28/18 0536   Gross per 24 hour   Intake          1823 83 ml   Output              810 ml   Net          1013 83 ml       Invasive Devices     Peripheral Intravenous Line            Peripheral IV 11/27/18 Right Wrist 1 day          Drain            Chest Tube 1 Left  28 Fr  1 day                Physical Exam:  General- No acute distress  HEENT- MMM, normocephalic  CV- Normal rate, rhythm  Soft heart sounds  Lung- Clear to auscultation bilateral  L chest tube in place  Slight serosanguinous drainage on CT wound dressings  Abdomen- Slightly firm and distended  Non-tender  Extremities- Non-edematous  Neurologic- alert and oriented to person, place, time      Lab, Imaging and other studies:  CBC:   Lab Results   Component Value Date    WBC 12 41 (H) 11/28/2018    HGB 9 3 (L) 11/28/2018    HCT 30 5 (L) 11/28/2018    MCV 87 11/28/2018     11/28/2018    MCH 26 4 (L) 11/28/2018    MCHC 30 5 (L) 11/28/2018    RDW 16 4 (H) 11/28/2018    MPV 9 4 11/28/2018   , CMP:   Lab Results   Component Value Date    SODIUM 136 11/28/2018    K 3 9 11/28/2018     11/28/2018    CO2 24 11/28/2018    BUN 26 (H) 11/28/2018    CREATININE 1 99 (H) 11/28/2018    CALCIUM 8 9 11/28/2018    EGFR 30 11/28/2018     VTE Pharmacologic Prophylaxis: Heparin  VTE Mechanical Prophylaxis: sequential compression device

## 2018-11-28 NOTE — ASSESSMENT & PLAN NOTE
· This has been since his recent CABG/AVR  · He follows with Dr Reji Aldrich of Thoracic Surgery as outpatient, appreciate their recommendations   · He has had multiple thoracentesis  Per Thoracic surgery notes, patient had elected to proceed with pleurodesis (was initially scheduled for 11/23/18) over Pleur-X cath, however this was not performed - rather, the patient had an IR thoracentesis with 1,300mL out  · s/p L VATS & pleurodesis on 11/27 ans s/p CT placement

## 2018-11-28 NOTE — PROGRESS NOTES
Patient voided about 150 ml in urinal  Bladder scanned for 187 ml  Will continue to monitor patient's urine output

## 2018-11-28 NOTE — PROGRESS NOTES
Called by RN who was unable to insert Gee 2/2 inability to visualize meatus 2/2 phimosis  After sterile prep, phimotic foreskin was manipulated until meatus was visible  Then, difficulty was encountered 2/2 meatal constriction  Eventually, a 16Fr Coude catheter was passed with some difficulty  Clear yellow urine return  Patient tolerated procedure well

## 2018-11-28 NOTE — PROGRESS NOTES
Progress Note - Hitesh Osborne 1935, 80 y o  male MRN: [id number]    Unit/Bed#: [facility] 530-01 Encounter: [id number]    Primary Care Provider: Williams Thurman DO   Date and time admitted to hospital: 11/24/2018 12:39 AM        Recurrent left pleural effusion   Assessment & Plan    · This has been since his recent CABG/AVR  · He follows with Dr Johnson of Thoracic Surgery as outpatient, appreciate their recommendations   · He has had multiple thoracentesis  Per Thoracic surgery notes, patient had elected to proceed with pleurodesis (was initially scheduled for 11/23/18) over Pleur-X cath, however this was not performed - rather, the patient had an IR thoracentesis with 1,300mL out  · s/p L VATS & pleurodesis on 11/27 ans s/p CT placement  Delirium   Assessment & Plan    On and off intermittent episodes of confusion without agitation  Delirium precautions  Leukocytosis   Assessment & Plan    No active signs of infection  Likely stress related  Trend CBC for now  Anemia due to stage 3 chronic kidney disease ([address] )   Assessment & Plan    Stable for now  Continue to trend,     Pneumothorax   Assessment & Plan    · CXR s/p thoracentesis showed trace left-sided pneumothorax which was too small for chest tube placement  · Setting well on room air today  · Thoracic Surgery following     Acute renal failure superimposed on stage 3 chronic kidney disease ([address] )   Assessment & Plan    · Baseline creatinine 1 3-1 5  · Improving,  · Appreciate Nephrology's ongoing recommendations  · Urology consult for acute urinary retention and inability to straight cath per nursing staff    · Losartan-HCTZ on hold - see further plan regarding this below     Aortic stenosis   Assessment & Plan    · S/p AVR ([facility]) in September of this year     CAD (coronary artery disease)   Assessment & Plan    · S/p CABG x4 in September of this year  · On daily aspirin, statin, BB and crestor,     Type 2 diabetes mellitus with hypoglycemia without coma, with long-term current use of insulin (HCC)   Assessment & Plan     ISS and accucheks for mealtime coverage,  Increased lantus to 8 units HS given uncontrolled hyperglycemia  Pure hypercholesterolemia   Assessment & Plan    · With Lipitor allergy, continue home Crestor which is 40 mg daily     Hypothyroidism   Assessment & Plan    · TSH and fT4 WNL  · Continue levothyroxine     Benign essential hypertension   Assessment & Plan    · Losartan-HCTZ on hold given ADAM  · Continue BB     Benign prostatic hyperplasia with nocturia   Assessment & Plan    · Continue Flomax     * NSTEMI (non-ST elevated myocardial infarction) ([facility] [address] )   Assessment & Plan    · Type 2 NSTEMI in the setting of ADAM and hypoglycemia  · Appreciate cardiology evaluation  VTE Pharmacologic Prophylaxis:   Pharmacologic: Enoxaparin (Lovenox)  Mechanical VTE Prophylaxis in Place: Yes    Patient Centered Rounds: I have performed bedside rounds with nursing staff today  Discussions with Specialists or Other Care Team Provider: ERASTO    Education and Discussions with Family / Patient: plan of care, patient    Time Spent for Care: 30 minutes  More than 50% of total time spent on counseling and coordination of care as described above  Current Length of Stay: 4 day(s)    Current Patient Status: Inpatient   Certification Statement: The patient will continue to require additional inpatient hospital stay due to not medically stable    Discharge Plan: when medically stable    Code Status: Level 1 - Full Code      Subjective:   Denies any active complaints or discomfort  Per RN, has intermittent episodes of confusion without agitation      Objective:     Vitals:   Temp (24hrs), Av 5 °F (36 4 °C), Min:97 2 °F (36 2 °C), Max:98 1 °F (36 7 °C)    Temp:  [97 2 °F (36 2 °C)-98 1 °F (36 7 °C)] 97 6 °F (36 4 °C)  HR:  [81-91] 86  Resp:  [16-24] 18  BP: (112-137)/(65-77) 125/65  SpO2:  [93 %-97 %] 95 %  Body mass index is 29 4 kg/m²  Input and Output Summary (last 24 hours): Intake/Output Summary (Last 24 hours) at 11/28/18 1425  Last data filed at 11/28/18 1401   Gross per 24 hour   Intake          2123 83 ml   Output             1652 ml   Net           471 83 ml       Physical Exam:     Physical Exam   Constitutional: He is oriented to person, place, and time  No distress  Eyes: Pupils are equal, round, and reactive to light  Neck: No JVD present  Cardiovascular: Normal rate, regular rhythm, normal heart sounds and intact distal pulses  No murmur heard  Pulmonary/Chest: Breath sounds normal  No respiratory distress  He has no wheezes  He has no rales  B/l breath sounds decreased,  Left sided chest tube noted with dressing, no tenderness around dressing site or no active discharge noted   Abdominal: Soft  Bowel sounds are normal  He exhibits no distension  There is no tenderness  Musculoskeletal: He exhibits no edema  Neurological: He is alert and oriented to person, place, and time  No focal motor deficits   Skin: Skin is warm  Additional Data:     Labs:      Results from last 7 days  Lab Units 11/28/18  0513 11/23/18 2051   WBC Thousand/uL 12 41*  < > 9 42   HEMOGLOBIN g/dL 9 3*  < > 9 7*   HEMATOCRIT % 30 5*  < > 32 2*   PLATELETS Thousands/uL 342  < > 275   NEUTROS PCT %  --   --  76*   LYMPHS PCT %  --   --  13*   MONOS PCT %  --   --  7   EOS PCT %  --   --  2   < > = values in this interval not displayed      Results from last 7 days  Lab Units 11/28/18  0513  11/23/18 2051   SODIUM mmol/L 136  < > 138   POTASSIUM mmol/L 3 9  < > 4 3   CHLORIDE mmol/L 105  < > 102   CO2 mmol/L 24  < > 27   BUN mg/dL 26*  < > 36*   CREATININE mg/dL 1 99*  < > 3 52*   ANION GAP mmol/L 7  < > 9   CALCIUM mg/dL 8 9  < > 9 1   ALBUMIN g/dL  --   --  2 3*   TOTAL BILIRUBIN mg/dL  --   --  0 50   ALK PHOS U/L  --   --  123*   ALT U/L  --   --  44   AST U/L  --   --  60*   GLUCOSE RANDOM mg/dL 192*  < > 131   < > = values in this interval not displayed  Results from last 7 days  Lab Units 11/24/18  0159   INR  1 36*       Results from last 7 days  Lab Units 11/28/18  1101 11/28/18  0638 11/27/18  2042 11/27/18  1720 11/27/18  1610 11/27/18  1037 11/27/18  0639 11/26/18  2055 11/26/18  1635 11/26/18  1049 11/26/18  0707 11/25/18  2049   POC GLUCOSE mg/dl 185* 183* 247* 175* 167* 186* 150* 211* 191* 208* 156* 202*                   * I Have Reviewed All Lab Data Listed Above  * Additional Pertinent Lab Tests Reviewed:  All Labs Within Last 24 Hours Reviewed    Imaging:    XR chest pa & lateral    (Results Pending)       Recent Cultures (last 7 days):           Last 24 Hours Medication List:     Current Facility-Administered Medications:  acetaminophen 650 mg Oral Q6H PRN Wan Arredondo MD    acetaminophen 650 mg Oral Q6H [address] Ford Lynn MD    albuterol 2 puff Inhalation 4x Daily PRN Wan Arredondo MD    aspirin 325 mg Oral Daily Wan Arredondo MD    budesonide-formoterol 2 puff Inhalation BID Wan Arredondo MD    cefazolin 2,000 mg Intravenous Once Josué Chew MD    heparin (porcine) 5,000 Units Subcutaneous [facility] Ezekile Houston PA-C    insulin glargine 8 Units Subcutaneous HS Devonte Salamanca MD    insulin lispro 1-5 Units Subcutaneous HS Ezekiel Houston PA-C    insulin lispro 1-6 Units Subcutaneous TID AC Ezekiel Houston PA-C    levothyroxine 150 mcg Oral Early Morning Wan Arredondo MD    LORazepam 0 5 mg Oral Q8H PRN Wan Arredondo MD    metoprolol tartrate 12 5 mg Oral Q12H [address] VALENCIA Brantley    morphine injection 2 mg Intravenous Q3H PRN Wan Arredondo MD    nitroglycerin 0 4 mg Sublingual Q5 Min PRN Wan Arredondo MD    ondansetron 4 mg Intravenous Q4H PRN Ford Lynn MD    oxyCODONE 10 mg Oral Q4H PRN Ford Lynn MD    oxyCODONE 5 mg Oral Q4H PRN Ford Lynn MD    pantoprazole 40 mg Oral Early Morning Wan Arredondo MD    polyethylene glycol 17 g Oral Daily PRN Fadumo Ghislaine Sullivan MD    rosuvastatin 40 mg Oral After Georgette Nguyen MD    sodium chloride 50 mL/hr Intravenous Continuous Randal Jones MD Last Rate: 50 mL/hr (11/27/18 1624)   tamsulosin 0 4 mg Oral Daily With Georgette Nguyen MD         Today, Patient Was Seen By: Kota March MD    ** Please Note: Dictation voice to text software may have been used in the creation of this document   **

## 2018-11-28 NOTE — ASSESSMENT & PLAN NOTE
ISS and accucheks for mealtime coverage,  Increased lantus to 8 units HS given uncontrolled hyperglycemia

## 2018-11-28 NOTE — ASSESSMENT & PLAN NOTE
· Baseline creatinine 1 3-1 5  · Improving,  · Appreciate Nephrology's ongoing recommendations  · Urology consult for acute urinary retention and inability to straight cath per nursing staff    · Losartan-HCTZ on hold - see further plan regarding this below

## 2018-11-28 NOTE — PROGRESS NOTES
Progress Note - Kirsten Story 1935, 80 y o  male MRN: [id number]    Unit/Bed#: [facility] 530-01 Encounter: [id number]    Primary Care Provider: Mei Donovan DO   Date and time admitted to hospital: 11/24/2018 12:39 AM        Recurrent left pleural effusion   Assessment & Plan    · This has been since his recent CABG/AVR  · He follows with Dr Blue Mcintosh of Thoracic Surgery as outpatient, appreciate their recommendations   · He has had multiple thoracentesis   Per Thoracic surgery notes, patient had elected to proceed with pleurodesis (was initially scheduled for 11/23/18) over Pleur-X cath, however this was not performed - rather, the patient had an IR thoracentesis with 1,300mL out  · Scheduled for L VATS and pleurodesis today,     Pneumothorax   Assessment & Plan    · CXR s/p thoracentesis showed trace left-sided pneumothorax which was too small for chest tube placement  · Setting well on room air today  · Thoracic Surgery following     Acute renal failure superimposed on stage 3 chronic kidney disease ([facility] [address] )   Assessment & Plan    · Baseline creatinine 1 3-1 5  · Improving,  · Appreciate Nephrology's ongoing recommendations  · Losartan-HCTZ on hold - see further plan regarding this below     Aortic stenosis   Assessment & Plan    · S/p AVR ([place]) in September of this year     CAD (coronary artery disease)   Assessment & Plan    · S/p CABG x4 in September of this year  · On daily aspirin, statin, BB and crestor,     Type 2 diabetes mellitus with hypoglycemia without coma, with long-term current use of insulin (HCC)   Assessment & Plan    Continue lantus 5 nunits at bedtime with ISS and accucheks for mealtime coverage,     Pure hypercholesterolemia   Assessment & Plan    · With Lipitor allergy, continue home Crestor which is 40 mg daily     Hypothyroidism   Assessment & Plan    · TSH and fT4 WNL  · Continue levothyroxine     Benign essential hypertension   Assessment & Plan    · Losartan-HCTZ on hold given ADAM  · Per Nephrology, ARB can likely be held upon discharge as BP has been stable, and consider switching thiazide to a loop diuretic - defer to Nephrology  · Continue BB     Benign prostatic hyperplasia with nocturia   Assessment & Plan    · Continue Flomax     * NSTEMI (non-ST elevated myocardial infarction) ([facility] [address] )   Assessment & Plan    · Type 2 NSTEMI in the setting of ADAM and hypoglycemia  · Off heparin drip  · Appreciate cardiology evaluation  VTE Pharmacologic Prophylaxis:   Pharmacologic: Enoxaparin (Lovenox)  Mechanical VTE Prophylaxis in Place: Yes    Patient Centered Rounds: I have performed bedside rounds with nursing staff today  Discussions with Specialists or Other Care Team Provider: ERASTO    Education and Discussions with Family / Patient: plan of care, patient    Time Spent for Care: 30 minutes  More than 50% of total time spent on counseling and coordination of care as described above  Current Length of Stay: 3 day(s)    Current Patient Status: Inpatient   Certification Statement: The patient will continue to require additional inpatient hospital stay due to for surgery today    Discharge Plan: not medically stable    Code Status: Level 1 - Full Code      Subjective:   Pt reports feeling tired  He tells me "I just want to have this surgery, go home and sleep"  Denies any acute discomort  Objective:     Vitals:   Temp (24hrs), Av 8 °F (36 6 °C), Min:97 2 °F (36 2 °C), Max:98 2 °F (36 8 °C)    Temp:  [97 2 °F (36 2 °C)-98 2 °F (36 8 °C)] 98 1 °F (36 7 °C)  HR:  [77-91] 90  Resp:  [16-24] 18  BP: (112-165)/(68-83) 121/76  SpO2:  [93 %-98 %] 96 %  Body mass index is 29 4 kg/m²  Input and Output Summary (last 24 hours):        Intake/Output Summary (Last 24 hours) at 18 1944  Last data filed at 18 1900   Gross per 24 hour   Intake             1025 ml   Output              700 ml   Net              325 ml       Physical Exam:     Physical Exam   Constitutional: He is oriented to person, place, and time  No distress  Eyes: Pupils are equal, round, and reactive to light  Cardiovascular: Normal rate, regular rhythm, normal heart sounds and intact distal pulses  No murmur heard  Pulmonary/Chest: Breath sounds normal  No respiratory distress  He has no wheezes  He has no rales  Abdominal: Soft  Bowel sounds are normal  He exhibits no distension  There is no tenderness  Musculoskeletal: He exhibits no edema  Neurological: He is alert and oriented to person, place, and time  No focal motor deficits   Skin: Skin is warm  Additional Data:     Labs:      Results from last 7 days  Lab Units 11/27/18  0423  11/23/18 2051   WBC Thousand/uL 8 17  < > 9 42   HEMOGLOBIN g/dL 10 3*  < > 9 7*   HEMATOCRIT % 33 9*  < > 32 2*   PLATELETS Thousands/uL 414*  < > 275   NEUTROS PCT %  --   --  76*   LYMPHS PCT %  --   --  13*   MONOS PCT %  --   --  7   EOS PCT %  --   --  2   < > = values in this interval not displayed  Results from last 7 days  Lab Units 11/27/18 11/23/18 2051   SODIUM mmol/L 138  < > 138   POTASSIUM mmol/L 3 5  < > 4 3   CHLORIDE mmol/L 106  < > 102   CO2 mmol/L 28  < > 27   BUN mg/dL 25  < > 36*   CREATININE mg/dL 2 17*  < > 3 52*   ANION GAP mmol/L 4  < > 9   CALCIUM mg/dL 8 2*  < > 9 1   ALBUMIN g/dL  --   --  2 3*   TOTAL BILIRUBIN mg/dL  --   --  0 50   ALK PHOS U/L  --   --  123*   ALT U/L  --   --  44   AST U/L  --   --  60*   GLUCOSE RANDOM mg/dL 144*  < > 131   < > = values in this interval not displayed  Results from last 7 days  Lab Units 11/24/18  0159   INR  1 36*       Results from last 7 days  Lab Units 11/27/18  1720 11/27/18  1610 11/27/18  1037 11/27/18  0639 11/26/18  2055 11/26/18  1635 11/26/18  1049 11/26/18  0707 11/25/18  2049 11/25/18  1645 11/25/18  1038 11/25/18  0704   POC GLUCOSE mg/dl 175* 167* 186* 150* 211* 191* 208* 156* 202* 240* 202* 153*                   * I Have Reviewed All Lab Data Listed Above    * Additional Pertinent Lab Tests Reviewed: All Labs Within Last 24 Hours Reviewed    Imaging:    XR chest pa & lateral    (Results Pending)       Recent Cultures (last 7 days):           Last 24 Hours Medication List:     Current Facility-Administered Medications:  acetaminophen 650 mg Oral Q6H PRN Michael Zimmer MD    albuterol 2 puff Inhalation 4x Daily PRN Michael Zimmer MD    aspirin 325 mg Oral Daily Michael Zimmer MD    budesonide-formoterol 2 puff Inhalation BID Michael Zimmer MD    cefazolin 2,000 mg Intravenous Once Nicolas Perrin MD    heparin (porcine) 5,000 Units Subcutaneous [facility] Puneet Coppertam, PA-C    insulin glargine 5 Units Subcutaneous HS Puneet Coppertam, PA-C    insulin lispro 1-5 Units Subcutaneous HS Puneet Coppertam, PA-C    insulin lispro 1-6 Units Subcutaneous TID AC EDWIN Perez-C    levothyroxine 150 mcg Oral Early Morning Michael Zimmer MD    LORazepam 0 5 mg Oral Q8H PRN Michael Zimmer MD    metoprolol tartrate 12 5 mg Oral Q12H [address] VALENCIA Paula    morphine injection 2 mg Intravenous Q3H PRN Michael Zimmer MD    nitroglycerin 0 4 mg Sublingual Q5 Min PRN Michael Zimmer MD    ondansetron 4 mg Intravenous Q4H PRN Puneet Lawton PA-C    pantoprazole 40 mg Oral Early Morning Michael Zimmer MD    polyethylene glycol 17 g Oral Daily PRN Madonna Walsh MD    rosuvastatin 40 mg Oral After Desi Kolb MD    sodium chloride 50 mL/hr Intravenous Continuous Lisa Hong MD Last Rate: 50 mL/hr (11/27/18 1624)   tamsulosin 0 4 mg Oral Daily With Desi Kolb MD         Today, Patient Was Seen By: Beck Barraza MD    ** Please Note: Dictation voice to text software may have been used in the creation of this document   **

## 2018-11-28 NOTE — PROGRESS NOTES
NEPHROLOGY PROGRESS NOTE   Leigha Juárez  80 y o  male MRN: [id number]  Unit/Bed#: [facility] 530-01 Encounter: [id number]      ASSESSMENT:    59-year-old with a history of aortic stenosis with coronary artery disease status post VATS with pleurodesis    1  Acute kidney injury secondary to pre renal azotemia and volume depletion likely contributing to ATN  2  Stage 3 chronic kidney disease  3  Recurrent left pleural effusion  4  Aortic stenosis with coronary artery disease  5   Hypertension    PLAN:    -his acute kidney injury is improving although now has had some urinary retention and has been unable to be straight cathed urology consult  -baseline creatinine 1 3-1 6  -blood pressures are currently acceptable continue to hold blood pressure medications   -continue ongoing postoperative care    SUBJECTIVE:    Patient seen today denies any chest pain or shortness of Breath no fevers or chills    OBJECTIVE:  Current Weight: Weight - Scale: 87 7 kg (193 lb 5 5 oz)  Vitals:    11/28/18 1039   BP: 125/65   Pulse: 86   Resp: 18   Temp: 97 6 °F (36 4 °C)   SpO2: 95%       Intake/Output Summary (Last 24 hours) at 11/28/18 1353  Last data filed at 11/28/18 1101   Gross per 24 hour   Intake          2123 83 ml   Output             1602 ml   Net           521 83 ml       General: conscious, cooperative, in not acute distress  Eyes: conjunctivae pink, anicteric sclerae  ENT: lips and mucous membranes moist  Neck: supple, no JVD  Chest: clear breath sounds bilateral, no crackles, ronchus or wheezings  CVS: distinct S1 & S2, normal rate, regular rhythm  Abdomen: soft, non-tender, non-distended, normoactive bowel sounds  Extremities: no edema of both legs  Skin: no rash  Neuro: awake, alert, oriented    Medications:    Current Facility-Administered Medications:     acetaminophen (TYLENOL) tablet 650 mg, 650 mg, Oral, Q6H PRN, Ibrahima Esquivel MD, 650 mg at 11/27/18 2100    acetaminophen (TYLENOL) tablet 650 mg, 650 mg, Oral, Q6H [address], Adonay Nichols MD, 650 mg at 11/28/18 0525    albuterol (PROVENTIL HFA,VENTOLIN HFA) inhaler 2 puff, 2 puff, Inhalation, 4x Daily PRN, George Neely MD    aspirin chewable tablet 325 mg, 325 mg, Oral, Daily, George Neely MD, 325 mg at 11/28/18 1017    budesonide-formoterol (SYMBICORT) 160-4 5 mcg/act inhaler 2 puff, 2 puff, Inhalation, BID, George Neely MD, 2 puff at 11/28/18 1018    ceFAZolin (ANCEF) IVPB (premix) 2,000 mg, 2,000 mg, Intravenous, Once, Marko Homans, MD    heparin (porcine) subcutaneous injection 5,000 Units, 5,000 Units, Subcutaneous, Q8H [address], Otis Young PA-C, 5,000 Units at 11/28/18 0525    insulin glargine (LANTUS) subcutaneous injection 8 Units 0 08 mL, 8 Units, Subcutaneous, HS, Akash Bauman MD    insulin lispro (HumaLOG) 100 units/mL subcutaneous injection 1-5 Units, 1-5 Units, Subcutaneous, HS, Otis Young PA-C, 2 Units at 11/27/18 2102    insulin lispro (HumaLOG) 100 units/mL subcutaneous injection 1-6 Units, 1-6 Units, Subcutaneous, TID AC, 1 Units at 11/28/18 1141 **AND** Fingerstick Glucose (POCT), , , TID AC, Otis Young PA-C    levothyroxine tablet 150 mcg, 150 mcg, Oral, Early Morning, George Neely MD, 150 mcg at 11/28/18 0525    LORazepam (ATIVAN) tablet 0 5 mg, 0 5 mg, Oral, Q8H PRN, George Neely MD    metoprolol tartrate (LOPRESSOR) partial tablet 12 5 mg, 12 5 mg, Oral, Q12H [address], Jesse Foley, VALENCIA, 12 5 mg at 11/28/18 1018    morphine injection 2 mg, 2 mg, Intravenous, Q3H PRN, George Neely MD    nitroglycerin (NITROSTAT) SL tablet 0 4 mg, 0 4 mg, Sublingual, Q5 Min PRN, George Neely MD    ondansetron [facility]) injection 4 mg, 4 mg, Intravenous, Q4H PRN, Adonay Nichols MD    oxyCODONE (ROXICODONE) immediate release tablet 10 mg, 10 mg, Oral, Q4H PRN, Adonay Nichols MD, 10 mg at 11/28/18 0317    oxyCODONE (ROXICODONE) IR tablet 5 mg, 5 mg, Oral, Q4H PRN, Adonay Nichols MD    pantoprazole (PROTONIX) EC tablet 40 mg, 40 mg, Oral, Early Morning, Melody Shoemaker MD, 40 mg at 11/28/18 0525    polyethylene glycol (MIRALAX) packet 17 g, 17 g, Oral, Daily PRN, Aayush Givens MD, 17 g at 11/25/18 0909    rosuvastatin (CRESTOR) tablet 40 mg, 40 mg, Oral, After Suma Cardenas MD, 40 mg at 11/27/18 1747    sodium chloride 0 9 % infusion, 50 mL/hr, Intravenous, Continuous, Giuliana Munoz MD, Last Rate: 50 mL/hr at 11/27/18 1624, 50 mL/hr at 11/27/18 1624    tamsulosin (FLOMAX) capsule 0 4 mg, 0 4 mg, Oral, Daily With Suma Cardenas MD, 0 4 mg at 11/27/18 1747     Lab Results:     Results from last 7 days  Lab Units 11/28/18  0513 11/27/18  0423 11/27/18 11/26/18  0433 11/25/18  0444 11/24/18  1617 11/24/18  0505 11/24/18  0159 11/23/18  2051   WBC Thousand/uL 12 41* 8 17  --  8 56  --   --   --  9 92 9 42   HEMOGLOBIN g/dL 9 3* 10 3*  --  9 9*  --   --   --  9 3* 9 7*   HEMATOCRIT % 30 5* 33 9*  --  32 6*  --   --   --  30 7* 32 2*   PLATELETS Thousands/uL 342 414*  --  392*  --   --   --  390 275   POTASSIUM mmol/L 3 9  --  3 5 3 5 3 6  --  3 8  --  4 3   CHLORIDE mmol/L 105  --  106 106 104  --  108  --  102   CO2 mmol/L 24  --  28 26 25  --  23  --  27   BUN mg/dL 26*  --  25 30* 34*  --  32*  --  36*   CREATININE mg/dL 1 99*  --  2 17* 2 59* 3 06*  --  3 15*  --  3 52*   CALCIUM mg/dL 8 9  --  8 2* 8 1* 8 9  --  7 9*  --  9 1   MAGNESIUM mg/dL  --   --   --   --   --   --   --   --  2 5   ALK PHOS U/L  --   --   --   --   --   --   --   --  123*   ALT U/L  --   --   --   --   --   --   --   --  44   AST U/L  --   --   --   --   --   --   --   --  60*   LEUKOCYTES UA   --   --   --   --   --  Negative  --   --   --    BLOOD UA   --   --   --   --   --  Moderate*  --   --   --        Previous work up:  Please see previous notes

## 2018-11-28 NOTE — PROGRESS NOTES
Postop check    Procedure:  Left VATS with talc pleurodesis    Subjective:  No acute distress  Resting comfortably in bed  Denies pain    Objective  Vitals:    11/27/18 1600 11/27/18 1615 11/27/18 1630 11/27/18 1645   BP: 112/74 118/69 118/69 116/68   BP Location:       Pulse: 90 84 82 82   Resp: (!) 23 22 16 21   Temp:   (!) 97 2 °F (36 2 °C)    TempSrc:       SpO2: 95% 97% 97% 96%   Weight:       Height:             No acute distress  Regular rate and rhythm  Nonlabored respirations on 2 L nasal cannula  Left chest tube to wall suction without air leak  Serous sanguinous drainage  Chest tube dressing with scant serosanguineous soilage    Assessment and plan:  Status post left VATS with talc pleurodesis    Diet as tolerated  Aggressive pulmonary toilet/Incentive spirometry  Encourage ambulation  Keep chest tube to wall suction and follow output  Check a m   Chest x-ray  DVT prophylaxis  Analgesia  Monitor and replete electrolytes  Care per primary service    Frank Caba MD

## 2018-11-28 NOTE — PROGRESS NOTES
Progress Note - Thoracic Surgery   Herminio Decent  80 y o  male MRN: [id number]  Unit/Bed#: [facility] 530-01 Encounter: [id number]    Assessment:  79 y/o M w recurrent left pleural effusion now s/p inna pleurodesis 11/27    - , no AL  - Cr 1 99 this AM (from 2 17)    Plan:  Continue CT to suction   F/U AM CXR  PRN pain control  OOB, IS, pulm toilet  WIll continue NS @ 50 for now pending nephrology input- Cr decreased but did have lower UOP / past 24 hrs  Continue to hold diuretics, ACE    Subjective/Objective   Chief Complaint:     Subjective: No complaints this AM- states he has a bit of left sided chest soreness but overall pain is quite well controlled  Currently on RA  CT w/ serosang drainage, no AL    Objective:     Blood pressure 129/73, pulse 85, temperature (!) 97 3 °F (36 3 °C), temperature source Oral, resp  rate 18, height 5' 8" (1 727 m), weight 87 7 kg (193 lb 5 5 oz), SpO2 93 %  ,Body mass index is 29 4 kg/m²  Intake/Output Summary (Last 24 hours) at 11/28/18 0617  Last data filed at 11/28/18 0536   Gross per 24 hour   Intake          1823 83 ml   Output              810 ml   Net          1013 83 ml       Invasive Devices     Peripheral Intravenous Line            Peripheral IV 11/27/18 Right Wrist 1 day          Drain            Chest Tube 1 Left  28 Fr  1 day                Physical Exam:   Gen: A&O, NAD  Cardio: RRR  Lungs: CTAB  Chest: L CT x 1, no AL, serosang   Dressing w/ some serosang saturation  Abd: Soft, non distended, non tender      Lab, Imaging and other studies:  CBC:   Lab Results   Component Value Date    WBC 12 41 (H) 11/28/2018    HGB 9 3 (L) 11/28/2018    HCT 30 5 (L) 11/28/2018    MCV 87 11/28/2018     11/28/2018    MCH 26 4 (L) 11/28/2018    MCHC 30 5 (L) 11/28/2018    RDW 16 4 (H) 11/28/2018    MPV 9 4 11/28/2018   , CMP:   Lab Results   Component Value Date    SODIUM 136 11/28/2018    K 3 9 11/28/2018     11/28/2018    CO2 24 11/28/2018    BUN 26 (H) 11/28/2018 CREATININE 1 99 (H) 11/28/2018    CALCIUM 8 9 11/28/2018    EGFR 30 11/28/2018   , Coagulation: No results found for: PT, INR, APTT  VTE Pharmacologic Prophylaxis: Heparin  VTE Mechanical Prophylaxis: sequential compression device

## 2018-11-29 LAB
ANION GAP SERPL CALCULATED.3IONS-SCNC: 6 MMOL/L (ref 4–13)
BASOPHILS # BLD AUTO: 0.05 THOUSANDS/ΜL (ref 0–0.1)
BASOPHILS NFR BLD AUTO: 0 % (ref 0–1)
BUN SERPL-MCNC: 25 MG/DL (ref 5–25)
CALCIUM SERPL-MCNC: 9.2 MG/DL (ref 8.3–10.1)
CHLORIDE SERPL-SCNC: 103 MMOL/L (ref 100–108)
CO2 SERPL-SCNC: 24 MMOL/L (ref 21–32)
CREAT SERPL-MCNC: 1.72 MG/DL (ref 0.6–1.3)
EOSINOPHIL # BLD AUTO: 0.07 THOUSAND/ΜL (ref 0–0.61)
EOSINOPHIL NFR BLD AUTO: 1 % (ref 0–6)
ERYTHROCYTE [DISTWIDTH] IN BLOOD BY AUTOMATED COUNT: 16.6 % (ref 11.6–15.1)
GFR SERPL CREATININE-BSD FRML MDRD: 36 ML/MIN/1.73SQ M
GLUCOSE SERPL-MCNC: 147 MG/DL (ref 65–140)
GLUCOSE SERPL-MCNC: 148 MG/DL (ref 65–140)
GLUCOSE SERPL-MCNC: 172 MG/DL (ref 65–140)
GLUCOSE SERPL-MCNC: 186 MG/DL (ref 65–140)
GLUCOSE SERPL-MCNC: 221 MG/DL (ref 65–140)
HCT VFR BLD AUTO: 34.4 % (ref 36.5–49.3)
HGB BLD-MCNC: 10.7 G/DL (ref 12–17)
IMM GRANULOCYTES # BLD AUTO: 0.08 THOUSAND/UL (ref 0–0.2)
IMM GRANULOCYTES NFR BLD AUTO: 1 % (ref 0–2)
LYMPHOCYTES # BLD AUTO: 1.46 THOUSANDS/ΜL (ref 0.6–4.47)
LYMPHOCYTES NFR BLD AUTO: 12 % (ref 14–44)
MAGNESIUM SERPL-MCNC: 2.1 MG/DL (ref 1.6–2.6)
MCH RBC QN AUTO: 27 PG (ref 26.8–34.3)
MCHC RBC AUTO-ENTMCNC: 31.1 G/DL (ref 31.4–37.4)
MCV RBC AUTO: 87 FL (ref 82–98)
MONOCYTES # BLD AUTO: 1.17 THOUSAND/ΜL (ref 0.17–1.22)
MONOCYTES NFR BLD AUTO: 10 % (ref 4–12)
NEUTROPHILS # BLD AUTO: 9.21 THOUSANDS/ΜL (ref 1.85–7.62)
NEUTS SEG NFR BLD AUTO: 76 % (ref 43–75)
NRBC BLD AUTO-RTO: 0 /100 WBCS
PLATELET # BLD AUTO: 361 THOUSANDS/UL (ref 149–390)
PMV BLD AUTO: 10 FL (ref 8.9–12.7)
POTASSIUM SERPL-SCNC: 3.9 MMOL/L (ref 3.5–5.3)
RBC # BLD AUTO: 3.96 MILLION/UL (ref 3.88–5.62)
SODIUM SERPL-SCNC: 133 MMOL/L (ref 136–145)
WBC # BLD AUTO: 12.04 THOUSAND/UL (ref 4.31–10.16)

## 2018-11-29 PROCEDURE — 97163 PT EVAL HIGH COMPLEX 45 MIN: CPT

## 2018-11-29 PROCEDURE — 99222 1ST HOSP IP/OBS MODERATE 55: CPT | Performed by: PHYSICIAN ASSISTANT

## 2018-11-29 PROCEDURE — 99232 SBSQ HOSP IP/OBS MODERATE 35: CPT | Performed by: INTERNAL MEDICINE

## 2018-11-29 PROCEDURE — 99024 POSTOP FOLLOW-UP VISIT: CPT | Performed by: THORACIC SURGERY (CARDIOTHORACIC VASCULAR SURGERY)

## 2018-11-29 PROCEDURE — 83735 ASSAY OF MAGNESIUM: CPT | Performed by: INTERNAL MEDICINE

## 2018-11-29 PROCEDURE — 85025 COMPLETE CBC W/AUTO DIFF WBC: CPT | Performed by: INTERNAL MEDICINE

## 2018-11-29 PROCEDURE — 82948 REAGENT STRIP/BLOOD GLUCOSE: CPT

## 2018-11-29 PROCEDURE — G8978 MOBILITY CURRENT STATUS: HCPCS

## 2018-11-29 PROCEDURE — 80048 BASIC METABOLIC PNL TOTAL CA: CPT | Performed by: INTERNAL MEDICINE

## 2018-11-29 RX ADMIN — ACETAMINOPHEN 650 MG: 325 TABLET, FILM COATED ORAL at 05:40

## 2018-11-29 RX ADMIN — ACETAMINOPHEN 650 MG: 325 TABLET, FILM COATED ORAL at 12:31

## 2018-11-29 RX ADMIN — ACETAMINOPHEN 650 MG: 325 TABLET, FILM COATED ORAL at 17:18

## 2018-11-29 RX ADMIN — ASPIRIN 81 MG 325 MG: 81 TABLET ORAL at 10:06

## 2018-11-29 RX ADMIN — INSULIN GLARGINE 8 UNITS: 100 INJECTION, SOLUTION SUBCUTANEOUS at 22:20

## 2018-11-29 RX ADMIN — HEPARIN SODIUM 5000 UNITS: 5000 INJECTION INTRAVENOUS; SUBCUTANEOUS at 05:39

## 2018-11-29 RX ADMIN — METOPROLOL TARTRATE 12.5 MG: 25 TABLET ORAL at 22:20

## 2018-11-29 RX ADMIN — METOPROLOL TARTRATE 12.5 MG: 25 TABLET ORAL at 10:07

## 2018-11-29 RX ADMIN — LEVOTHYROXINE SODIUM 150 MCG: 75 TABLET ORAL at 05:39

## 2018-11-29 RX ADMIN — OXYCODONE HYDROCHLORIDE 5 MG: 5 TABLET ORAL at 17:18

## 2018-11-29 RX ADMIN — PANTOPRAZOLE SODIUM 40 MG: 40 TABLET, DELAYED RELEASE ORAL at 05:39

## 2018-11-29 RX ADMIN — OXYCODONE HYDROCHLORIDE 5 MG: 5 TABLET ORAL at 12:42

## 2018-11-29 RX ADMIN — INSULIN LISPRO 1 UNITS: 100 INJECTION, SOLUTION INTRAVENOUS; SUBCUTANEOUS at 17:14

## 2018-11-29 RX ADMIN — TAMSULOSIN HYDROCHLORIDE 0.4 MG: 0.4 CAPSULE ORAL at 16:10

## 2018-11-29 RX ADMIN — HEPARIN SODIUM 5000 UNITS: 5000 INJECTION INTRAVENOUS; SUBCUTANEOUS at 13:38

## 2018-11-29 RX ADMIN — HEPARIN SODIUM 5000 UNITS: 5000 INJECTION INTRAVENOUS; SUBCUTANEOUS at 22:23

## 2018-11-29 RX ADMIN — BUDESONIDE AND FORMOTEROL FUMARATE DIHYDRATE 2 PUFF: 160; 4.5 AEROSOL RESPIRATORY (INHALATION) at 10:07

## 2018-11-29 NOTE — ASSESSMENT & PLAN NOTE
· This has been since his recent CABG/AVR  · He follows with Dr Joseph Lee of Thoracic Surgery as outpatient, appreciate their recommendations   · He has had multiple thoracentesis  Per Thoracic surgery notes, patient had elected to proceed with pleurodesis (was initially scheduled for 11/23/18) over Pleur-X cath, however this was not performed - rather, the patient had an IR thoracentesis with 1,300mL out  · s/p L VATS & pleurodesis on 11/27 ans s/p CT placement  CT is still to suction draining bloody output

## 2018-11-29 NOTE — RESTORATIVE TECHNICIAN NOTE
Restorative Specialist Mobility Note       Activity: Bedrest, Dangle, Stand at bedside, Turn, Chair     Assistive Device: Other (Comment) (A )     Ambulation Response:  Tolerated fairly well  Repositioned: Sitting, Up in chair, Other (Comment) (Chair alarm on)

## 2018-11-29 NOTE — CONSULTS
[address] NOTE   Admission Date: 11/24/2018    Patient Identifiers: Mark Raya (MRN: [id number])  Service Requesting Consultation: Braxton Mckee  Service Providing Consultation:  Urology, Fran Dandy, PA-C  Consults  Date of Service: 11/29/2018    Reason for Consultation: Urology evaluation    History of Present Illness:     Mark Raya is a 80 y o  Male with a history of multivessel coronary artery disease and coronary bypass surgery with aortic valve surgery and diabetes  He had recurrent left pleural effusion with multiple thoracentesis  He presented to [facility] with hypo glycine me a and acute kidney injury  He had another thoracentesis for 1 3 L of fluid  He had been scheduled for a talc pleurodesis but was canceled due to his acute kidney injury  He reported being very tired with poor appetite and when admitted was found to have a non ST elevation MI  Once he was cleared thoracic surgery proceeded with the talc pleurodesis and he had postoperative urinary retention  A Gee catheter was placed by the surgical PA last evening  He is uncircumcised and had a fairly tight phimosis  Urine is clear  He is unable to provide any accurate urologic history  Past Medical, Past Surgical History:     Past Medical History:   Diagnosis Date    Aortic stenosis     CKD (chronic kidney disease)     baseline Cr 1 3-1 5    Coronary artery disease     Cough     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HCC)     type 2, insulin dependent    GERD (gastroesophageal reflux disease)     Glaucoma     Gout     History of prostate cancer     Hypertension     Hypothyroidism     Overweight     Peripheral neuropathy, idiopathic     Pleural effusion, left     Pure hypercholesterolemia     LA   11/12/14   R   11/12/14    :    Past Surgical History:   Procedure Laterality Date    CARDIAC CATHETERIZATION      IR THORACENTESIS  10/23/2018    IR THORACENTESIS  11/2/2018    IR THORACENTESIS  11/9/2018    IR THORACENTESIS  11/23/2018    DC CABG, ARTERY-VEIN, THREE N/A 9/17/2018    Procedure: CORONARY ARTERY BYPASS GRAFT (CABG) x 4 VESSELS with LIMA - LAD, SVG/LEFT LEG EVH - LEFT PDA, OM3, & OM2;  Surgeon: Tom Real MD;  [facility];  Service: Cardiac Surgery    DC ECHO TRANSESOPHAG MONTR CARDIAC PUMP FUNCTJ N/A 9/17/2018    Procedure: TRANSESOPHAGEAL ECHOCARDIOGRAM (VERONICA);   Surgeon: Tom Real MD;  [facility];  Service: Cardiac Surgery    DC RPLCMT AORTIC VALVE OPN W/STENTLESS TISSUE VALVE N/A 9/17/2018    Procedure: REPLACEMENT VALVE AORTIC (AVR)- 23mm tissue Intuity Valve;  Surgeon: Tom Real MD;  [facility];  Service: Cardiac Surgery    THORACOSCOPY VIDEO ASSISTED SURGERY (VATS) Left 11/27/2018    Procedure: THORACOSCOPY VIDEO ASSISTED SURGERY (VATS), talc pleurodesis,;  Surgeon: Genesis Ortiz MD;  [facility];  Service: Thoracic    THYROID SURGERY     :    Medications, Allergies:     Current Facility-Administered Medications:     acetaminophen (TYLENOL) tablet 650 mg, 650 mg, Oral, Q6H PRN, Karyle Ogren, MD, 650 mg at 11/27/18 2100    acetaminophen (TYLENOL) tablet 650 mg, 650 mg, Oral, Q6H [address], Dilan Gilbert MD, 650 mg at 11/29/18 0540    albuterol (PROVENTIL HFA,VENTOLIN HFA) inhaler 2 puff, 2 puff, Inhalation, 4x Daily PRN, Karyle Ogren, MD    aspirin chewable tablet 325 mg, 325 mg, Oral, Daily, Karyle Ogren, MD, 325 mg at 11/28/18 1017    budesonide-formoterol (SYMBICORT) 160-4 5 mcg/act inhaler 2 puff, 2 puff, Inhalation, BID, Karyle Ogren, MD, 2 puff at 11/28/18 1018    ceFAZolin (ANCEF) IVPB (premix) 2,000 mg, 2,000 mg, Intravenous, Once, Genesis Ortiz MD    heparin (porcine) subcutaneous injection 5,000 Units, 5,000 Units, Subcutaneous, Q8H [address],  RolesCHON, 5,000 Units at 11/29/18 0539    insulin glargine (LANTUS) subcutaneous injection 8 Units 0 08 mL, 8 Units, Subcutaneous, HS, Patric Wright MD, 8 Units at 11/28/18 2333    insulin lispro (HumaLOG) 100 units/mL subcutaneous injection 1-6 Units, 1-6 Units, Subcutaneous, TID AC, 1 Units at 11/28/18 1700 **AND** Fingerstick Glucose (POCT), , , TID AC, Marianne Qureshi, CHON    levothyroxine tablet 150 mcg, 150 mcg, Oral, Early Morning, Audrey Mas MD, 150 mcg at 11/29/18 0539    LORazepam (ATIVAN) tablet 0 5 mg, 0 5 mg, Oral, Q8H PRN, Audrey Mas MD    metoprolol tartrate (LOPRESSOR) partial tablet 12 5 mg, 12 5 mg, Oral, Q12H [facility], Roselee Goodell, VALENCIA, 12 5 mg at 11/28/18 1018    morphine injection 2 mg, 2 mg, Intravenous, Q3H PRN, Audrey Mas MD    nitroglycerin (NITROSTAT) SL tablet 0 4 mg, 0 4 mg, Sublingual, Q5 Min PRN, Audrey Mas MD    ondansetron [facility]) injection 4 mg, 4 mg, Intravenous, Q4H PRN, Debara Apley, MD    oxyCODONE (ROXICODONE) immediate release tablet 10 mg, 10 mg, Oral, Q4H PRN, Debara Apley, MD, 10 mg at 11/28/18 0317    oxyCODONE (ROXICODONE) IR tablet 5 mg, 5 mg, Oral, Q4H PRN, Debara Apley, MD    pantoprazole (PROTONIX) EC tablet 40 mg, 40 mg, Oral, Early Morning, Audrey Mas MD, 40 mg at 11/29/18 0539    polyethylene glycol (MIRALAX) packet 17 g, 17 g, Oral, Daily PRN, Sami Suárez MD, 17 g at 11/25/18 0909    rosuvastatin (CRESTOR) tablet 40 mg, 40 mg, Oral, After Miky Avitia MD, 40 mg at 11/28/18 1704    sodium chloride 0 9 % infusion, 50 mL/hr, Intravenous, Continuous, Debara Apley, MD, Last Rate: 50 mL/hr at 11/27/18 1624, 50 mL/hr at 11/27/18 1624    tamsulosin (FLOMAX) capsule 0 4 mg, 0 4 mg, Oral, Daily With Miky Avitia MD, 0 4 mg at 11/28/18 1605    Allergies:   Allergies   Allergen Reactions    Amlodipine Nausea Only    Atorvastatin Myalgia   :    Social and Family History:   Social History:   Social History   Substance Use Topics    Smoking status: Former Smoker     Packs/day: 0 25 Years: 1 00     Types: Cigarettes     Quit date: 1970    Smokeless tobacco: Never Used      Comment: Only in the service     Alcohol use No        History   Smoking Status    Former Smoker    Packs/day: 0 25    Years: 1 00    Types: Cigarettes    Quit date: 1970   Smokeless Tobacco    Never Used     Comment: Only in the service        Family History:  Family History   Problem Relation Age of Onset    Diabetes Mother     Pancreatic cancer Brother     Diabetes Maternal Grandmother     Colon cancer Son     Diabetes Family    :     Review of Systems:     General: Fever, chills, or night sweats: negative  Cardiac: Negative for chest pain  Pulmonary: Negative for shortness of breath  Gastrointestinal: Abdominal pain negative  Nausea, vomiting, or diarrhea negative,  Genitourinary: See HPI above  Patient does not have hematuria  All other systems queried were negative  Physical Exam:   General: Patient is pleasant and in NAD  Awake and alert  /75   Pulse 95   Temp 98 5 °F (36 9 °C)   Resp 17   Ht 5' 8" (1 727 m)   Wt 87 7 kg (193 lb 5 5 oz)   SpO2 96%   BMI 29 40 kg/m²   Cardiac: Peripheral edema: negative  Pulmonary: Non-labored breathing  Abdomen: Soft, non-tender, non-distended  No surgical scars  No masses, tenderness, hernias noted  Genitourinary: Negative CVA tenderness, negative suprapubic tenderness  (Male): Penis uncircumcised,   The foreskin is fairly tight but I can see the meatus easily  Testicles descended into scrotum bilaterally without masses nor tenderness        RAMON: in place draining clear yellow urine  no clots and       Labs:     Lab Results   Component Value Date    HGB 10 7 (L) 11/29/2018    HCT 34 4 (L) 11/29/2018    WBC 12 04 (H) 11/29/2018     11/29/2018   ]    Lab Results   Component Value Date     09/05/2017    K 3 9 11/29/2018     11/29/2018    CO2 24 11/29/2018    BUN 25 11/29/2018    CREATININE 1 72 (H) 11/29/2018    CALCIUM 9 2 11/29/2018    GLUCOSE 126 09/17/2018   ]    Imaging:   I personally reviewed the images and report of the following studies, and reviewed them with the patient:    None      ASSESSMENT:      1  Postop  Urinary retention   2  Phimosis   3  Diabetes   4  Acute kidney injury   5  Postop talc pleurodesis    PLAN:      maintain Gee catheter until more ambulatory   continue tamsulosin   consider dorsal slit in the outpatient setting   no other intervention anticipated    Thank you for allowing me to participate in this patients care  Please do not hesitate to call with any additional questions    Kapil Latif PA-C

## 2018-11-29 NOTE — PLAN OF CARE
Problem: PHYSICAL THERAPY ADULT  Goal: Performs mobility at highest level of function for planned discharge setting  See evaluation for individualized goals  Treatment/Interventions: Functional transfer training, LE strengthening/ROM, Patient/family training, Equipment eval/education, Bed mobility, Gait training, Spoke to nursing  Equipment Recommended: Jt       See flowsheet documentation for full assessment, interventions and recommendations  Prognosis: Good  Problem List: Decreased strength, Decreased endurance, Impaired balance, Decreased mobility, Decreased cognition, Impaired judgement, Decreased safety awareness, Decreased skin integrity, Pain  Assessment: Pt is 80 y o  male seen for PT evaluation s/p admit to [facility] on 11/24/2018 w/ NSTEMI (non-ST elevated myocardial infarction) ([facility] [address] )  Pt has had L pleural effusion with multiple thoracentesis procedures ( 1/wk) PTA and VAT's procedure done 2 days ago  PT consulted to assess pt's functional mobility and d/c needs  Order placed for PT eval and tx, w/ bedrest orders discontinued  RN gave verbal ok to see pt for mobility activities  Comorbidities affecting pt's physical performance at time of assessment include: BPH, CAD with CABGx4 and AVR in Sept 2018, hypothyroidism, renal cyst, ARF, DM-2, moderate aortic stenosis, CKD, GERD  PTA, pt was independent w/ all functional mobility w/ SPC, ambulates household distances, has 1 RENE, lives w/ his son in 1 level home, retired and is home alone from 4-10 pm nights when his son is working outside the home    Personal factors affecting pt at time of IE include: ambulating w/ assistive device, stairs to enter home, inability to navigate community distances, limited home support, limited insight into impairments, inability to perform IADLs, inability to perform ADLs, inability to live alone and is functioning at a below baseline level of mobility at this time     Please find objective findings from PT assessment regarding body systems outlined above with impairments and limitations including weakness, impaired balance, decreased endurance, gait deviations, pain, decreased activity tolerance, decreased functional mobility tolerance, decreased safety awareness, impaired judgement, fall risk, SOB upon exertion, decreased skin integrity and decreased cognition  The following objective measures performed on IE also reveal limitations: Barthel Index: 45/100  Pt's clinical presentation is currently unstable/unpredictable seen in pt's presentation of pt having chest tube, pt with abnormal lab values, pt with slight confusion, pt with recent VAT's procedure for PE, pt home alone nights while his son works outside the home  Pt to benefit from continued PT tx to address deficits as defined above and maximize level of functional independent mobility and consistency  From PT/mobility standpoint, recommendation at time of d/c would be IP rehab pending progress in order to facilitate return to PLOF  Barriers to Discharge: Decreased caregiver support     Recommendation: Other (Comment) (inpatient rehab  )     PT - OK to Discharge: No    See flowsheet documentation for full assessment

## 2018-11-29 NOTE — PROGRESS NOTES
Progress Note - Bernard Osuna 1935, 80 y o  male MRN: [id number]    Unit/Bed#: [facility] 530-01 Encounter: [id number]    Primary Care Provider: Sharon Ram DO   Date and time admitted to hospital: 11/24/2018 12:39 AM        Recurrent left pleural effusion   Assessment & Plan    · This has been since his recent CABG/AVR  · He follows with Dr Nano Napoles of Thoracic Surgery as outpatient, appreciate their recommendations   · He has had multiple thoracentesis  Per Thoracic surgery notes, patient had elected to proceed with pleurodesis (was initially scheduled for 11/23/18) over Pleur-X cath, however this was not performed - rather, the patient had an IR thoracentesis with 1,300mL out  · s/p L VATS & pleurodesis on 11/27 ans s/p CT placement  CT is still to suction draining bloody output  Delirium   Assessment & Plan    On and off intermittent episodes of confusion without agitation  Delirium precautions  Leukocytosis   Assessment & Plan    Improving  No active signs of infection  Likely stress related  Trend CBC for now  Anemia due to stage 3 chronic kidney disease ([address] )   Assessment & Plan    Stable for now    Continue to trend,     Pneumothorax   Assessment & Plan    · CXR s/p thoracentesis showed trace left-sided pneumothorax which was too small for chest tube placement  · Thoracic Surgery following     Acute renal failure superimposed on stage 3 chronic kidney disease (HCC)   Assessment & Plan    · Baseline creatinine 1 3-1 5  · Improving,  · Appreciate Nephrology's ongoing recommendations  · Maintain Gee for acute urinary retention  · Strict I&Os  · Losartan-HCTZ on hold - see further plan regarding this below     Aortic stenosis   Assessment & Plan    · S/p AVR (Erlin Mort) in September of this year     CAD (coronary artery disease)   Assessment & Plan    · S/p CABG x4 in September of this year  · On daily aspirin, statin, BB and crestor,     Type 2 diabetes mellitus with hypoglycemia without coma, with long-term current use of insulin ([facility] [address] )   Assessment & Plan     ISS and accucheks for mealtime coverage, increased ISS intensity to moderate intensity given uncontrolled hyperglycemia  Continue lantus 8 units HS given uncontrolled hyperglycemia  Pure hypercholesterolemia   Assessment & Plan    · With Lipitor allergy, continue home Crestor which is 40 mg daily     Hypothyroidism   Assessment & Plan    · TSH and fT4 WNL  · Continue levothyroxine     Benign essential hypertension   Assessment & Plan    · Losartan-HCTZ on hold given ADAM  · Continue BB     Benign prostatic hyperplasia with nocturia   Assessment & Plan    · Continue Flomax     * NSTEMI (non-ST elevated myocardial infarction) ([facility] [address] )   Assessment & Plan    · Type 2 NSTEMI in the setting of ADAM and hypoglycemia  · Appreciate cardiology evaluation  VTE Pharmacologic Prophylaxis:   Pharmacologic: Enoxaparin (Lovenox)  Mechanical VTE Prophylaxis in Place: Yes    Patient Centered Rounds: I have performed bedside rounds with nursing staff today  Discussions with Specialists or Other Care Team Provider: CM    Education and Discussions with Family / Patient: plan of care, patient    Time Spent for Care: 30 minutes  More than 50% of total time spent on counseling and coordination of care as described above  Current Length of Stay: 5 day(s)    Current Patient Status: Inpatient   Certification Statement: The patient will continue to require additional inpatient hospital stay due to not medically stable    Discharge Plan: not ready, rehab on  discharge    Code Status: Level 1 - Full Code      Subjective:   Pt reports mild pain around chest tube site on coughing    Is tolerating well  Nursing staff noted some episodes of confusion overnight  He is AO * 3 for me  No fever, chills, dyspnea, sore throat, palpitations      Objective:     Vitals:   Temp (24hrs), Av 1 °F (36 7 °C), Min:97 5 °F (36 4 °C), Max:98 5 °F (36 9 °C)    Temp:  [97 5 °F (36 4 °C)-98 5 °F (36 9 °C)] 98 5 °F (36 9 °C)  HR:  [87-99] 92  Resp:  [17-20] 20  BP: (122-162)/(66-79) 138/79  SpO2:  [93 %-98 %] 98 %  Body mass index is 29 4 kg/m²  Input and Output Summary (last 24 hours): Intake/Output Summary (Last 24 hours) at 11/29/18 1209  Last data filed at 11/29/18 1000   Gross per 24 hour   Intake              540 ml   Output             1080 ml   Net             -540 ml       Physical Exam:     Physical Exam   Constitutional: He is oriented to person, place, and time  No distress  Eyes: Pupils are equal, round, and reactive to light  Cardiovascular: Normal rate, regular rhythm, normal heart sounds and intact distal pulses  No murmur heard  Pulmonary/Chest: Breath sounds normal  No respiratory distress  He has no wheezes  He has no rales  Left sided chest tube to suction noted   Abdominal: Soft  Bowel sounds are normal  He exhibits no distension  There is no tenderness  Musculoskeletal: He exhibits no edema  Neurological: He is alert and oriented to person, place, and time  No focal mtoor deficits   Skin: Skin is warm  Additional Data:     Labs:      Results from last 7 days  Lab Units 11/29/18  0544   WBC Thousand/uL 12 04*   HEMOGLOBIN g/dL 10 7*   HEMATOCRIT % 34 4*   PLATELETS Thousands/uL 361   NEUTROS PCT % 76*   LYMPHS PCT % 12*   MONOS PCT % 10   EOS PCT % 1       Results from last 7 days  Lab Units 11/29/18  0544  11/23/18  2051   SODIUM mmol/L 133*  < > 138   POTASSIUM mmol/L 3 9  < > 4 3   CHLORIDE mmol/L 103  < > 102   CO2 mmol/L 24  < > 27   BUN mg/dL 25  < > 36*   CREATININE mg/dL 1 72*  < > 3 52*   ANION GAP mmol/L 6  < > 9   CALCIUM mg/dL 9 2  < > 9 1   ALBUMIN g/dL  --   --  2 3*   TOTAL BILIRUBIN mg/dL  --   --  0 50   ALK PHOS U/L  --   --  123*   ALT U/L  --   --  44   AST U/L  --   --  60*   GLUCOSE RANDOM mg/dL 147*  < > 131   < > = values in this interval not displayed      Results from last 7 days  Lab Units 11/24/18  0159   INR  1 36*       Results from last 7 days  Lab Units 11/29/18  1009 11/29/18  0656 11/28/18  2115 11/28/18  1643 11/28/18  1101 11/28/18  0638 11/27/18  2042 11/27/18  1720 11/27/18  1610 11/27/18  1037 11/27/18  0639 11/26/18  2055   POC GLUCOSE mg/dl 148* 186* 197* 162* 185* 183* 247* 175* 167* 186* 150* 211*                   * I Have Reviewed All Lab Data Listed Above  * Additional Pertinent Lab Tests Reviewed: All Labs Within Last 24 Hours Reviewed    Imaging:    XR chest pa & lateral   Final Result by Orion Gallardo MD (11/28 1443)      Minimal left pleural effusion and atelectasis  No pneumothorax  Stable cardiomegaly           Workstation performed: [id number]           Recent Cultures (last 7 days):           Last 24 Hours Medication List:     Current Facility-Administered Medications:  acetaminophen 650 mg Oral Q6H PRN Audrey Mas MD   acetaminophen 650 mg Oral Q6H [facility] Debara Apley, MD   albuterol 2 puff Inhalation 4x Daily PRN Audrey Mas MD   aspirin 325 mg Oral Daily Audrey Mas MD   budesonide-formoterol 2 puff Inhalation BID Audrey Mas MD   cefazolin 2,000 mg Intravenous Once Miko Tinoco MD   heparin (porcine) 5,000 Units Subcutaneous [facility], PA-JITENDRA   insulin glargine 8 Units Subcutaneous HS Patric Wright MD   insulin lispro 1-6 Units Subcutaneous TID [facility] PA-C   levothyroxine 150 mcg Oral Early Morning Audrey Mas MD   LORazepam 0 5 mg Oral Q8H PRN Audrey Mas MD   metoprolol tartrate 12 5 mg Oral Q12H [facility] Roselee Goodell, CRNP   morphine injection 2 mg Intravenous Q3H PRN Audrey Mas MD   nitroglycerin 0 4 mg Sublingual Q5 Min PRN Audrey Mas MD   ondansetron 4 mg Intravenous Q4H PRN Debara Apley, MD   oxyCODONE 10 mg Oral Q4H PRN Debara Apley, MD   oxyCODONE 5 mg Oral Q4H PRN Debara Apley, MD   pantoprazole 40 mg Oral Early Morning Audrey Mas MD   polyethylene glycol 17 g Oral Daily PRN Hang Alfred MD   rosuvastatin 40 mg Oral After Georgette Nguyen MD   tamsulosin 0 4 mg Oral Daily With Georgette Nguyen MD        Today, Patient Was Seen By: Kota March MD    ** Please Note: Dictation voice to text software may have been used in the creation of this document   **

## 2018-11-29 NOTE — ASSESSMENT & PLAN NOTE
ISS and accucheks for mealtime coverage, increased ISS intensity to moderate intensity given uncontrolled hyperglycemia  Continue lantus 8 units HS given uncontrolled hyperglycemia

## 2018-11-29 NOTE — PROGRESS NOTES
Progress Note - Thoracic Surgery   Anita Blades  80 y o  male MRN: [id number]  Unit/Bed#: [facility] 530-01 Encounter: [id number]    Assessment:  79yo male with recurrent L pleural effusion POD#2 s/p L VATS talc pleurodesis     Plan:  Diet as tolerated  CT to suction  Pulm toilet/IS  Analgesia  Gee per nephrology  Care per primary team and other consultants    Subjective/Objective     Chief Complaint:     Subjective: No events overnight  Gee placed yesterday for urinary retention      Objective:     Vitals: Blood pressure 135/75, pulse 95, temperature 98 5 °F (36 9 °C), resp  rate 17, height 5' 8" (1 727 m), weight 87 7 kg (193 lb 5 5 oz), SpO2 96 %  ,Body mass index is 29 4 kg/m²  I/O       11/27 0701 - 11/28 0700 11/28 0701 - 11/29 0700 11/29 0701 - 11/30 0700    P  O  168 540     I V  (mL/kg) 1155 8 (13 2)      IV Piggyback 500      Total Intake(mL/kg) 1823 8 (20 8) 540 (6 2)     Urine (mL/kg/hr) 635 (0 3) 1947 (0 9)     Chest Tube 175 75     Total Output 810 2022      Net +1013 8 -1482             Unmeasured Urine Occurrence 1 x            Physical Exam:   NAD  RRR  nonlabored respirations on RA  L CT to suction with no airleak  Serous drainage    Lab, Imaging and other studies:   CBC with diff:   Lab Results   Component Value Date    WBC 12 04 (H) 11/29/2018    HGB 10 7 (L) 11/29/2018    HCT 34 4 (L) 11/29/2018    MCV 87 11/29/2018     11/29/2018    MCH 27 0 11/29/2018    MCHC 31 1 (L) 11/29/2018    RDW 16 6 (H) 11/29/2018    MPV 10 0 11/29/2018    NRBC 0 11/29/2018   , BMP/CMP:   Lab Results   Component Value Date    SODIUM 133 (L) 11/29/2018    K 3 9 11/29/2018     11/29/2018    CO2 24 11/29/2018    BUN 25 11/29/2018    CREATININE 1 72 (H) 11/29/2018    CALCIUM 9 2 11/29/2018    EGFR 36 11/29/2018   , Magnesium: No components found for: MAG, Coags: No results found for: PT, PTT, INR, Blood Culture: No results found for: BLOODCX, Urine Culture: No results found for: URINECX, Wound Culure:  No results found for: WOUNDCULT  VTE Pharmacologic Prophylaxis: Heparin  VTE Mechanical Prophylaxis: sequential compression device

## 2018-11-29 NOTE — PROGRESS NOTES
NEPHROLOGY PROGRESS NOTE   Sandie Byrd  80 y o  male MRN: [id number]  Unit/Bed#: [facility] 530-01 Encounter: [id number]      ASSESSMENT:    27-year-old with a history of aortic stenosis with coronary artery disease status post VATS with pleurodesis    1  Acute kidney injury secondary to pre renal azotemia and volume depletion likely contributing to ATN  2  Stage 3 chronic kidney disease  3  Recurrent left pleural effusion  4  Aortic stenosis with coronary artery disease  5   Hypertension    PLAN:    -his creatinine is almost at his baseline  -now with urinary retention and Urology follow-up void trial once a ambulating more  -blood pressures are currently acceptable continue to hold blood pressure medications-would hold Ace inhibitors and arbs until patient discharged and restart as outpatient if bp remains stable  -continue ongoing postoperative care  -no active issues being managed by Nephrology at this time-creatinine continues to improve and blood pressure is acceptable-had urinary retention now with a Gee catheter-  -mildly hyponatremic but blood glucose in the 200 can monitor  -will set up with outpatient follow-up  -will see as needed please call with any questions or concerns    SUBJECTIVE:    Patient seen today denies any chest pain or shortness of Breath no fevers or chills    OBJECTIVE:  Current Weight: Weight - Scale: 87 7 kg (193 lb 5 5 oz)  Vitals:    11/29/18 1033   BP: 138/79   Pulse: 92   Resp: 20   Temp: 98 5 °F (36 9 °C)   SpO2: 98%       Intake/Output Summary (Last 24 hours) at 11/29/18 1351  Last data filed at 11/29/18 1341   Gross per 24 hour   Intake              780 ml   Output             1700 ml   Net             -920 ml       General: conscious, cooperative, in not acute distress  Eyes: conjunctivae pink, anicteric sclerae  ENT: lips and mucous membranes moist  Neck: supple, no JVD  Chest: clear breath sounds bilateral, no crackles, ronchus or wheezings  CVS: distinct S1 & S2, normal rate, regular rhythm  Abdomen: soft, non-tender, non-distended, normoactive bowel sounds  Extremities: no edema of both legs  Skin: no rash  Neuro: awake, alert, oriented  Positive Gee catheter    Medications:    Current Facility-Administered Medications:     acetaminophen (TYLENOL) tablet 650 mg, 650 mg, Oral, Q6H PRN, Henry Daniels MD, 650 mg at 11/27/18 2100    acetaminophen (TYLENOL) tablet 650 mg, 650 mg, Oral, Q6H [facility], Len Rose MD, 650 mg at 11/29/18 1231    albuterol (PROVENTIL HFA,VENTOLIN HFA) inhaler 2 puff, 2 puff, Inhalation, 4x Daily PRN, Henry Daniels MD    aspirin chewable tablet 325 mg, 325 mg, Oral, Daily, Henry Daniels MD, 325 mg at 11/29/18 1006    budesonide-formoterol (SYMBICORT) 160-4 5 mcg/act inhaler 2 puff, 2 puff, Inhalation, BID, Henry Daniels MD, 2 puff at 11/29/18 1007    ceFAZolin (ANCEF) IVPB (premix) 2,000 mg, 2,000 mg, Intravenous, Once, Christie Messer MD    heparin (porcine) subcutaneous injection 5,000 Units, 5,000 Units, Subcutaneous, Q8H [facility], Jeannette Jules PA-C, 5,000 Units at 11/29/18 1338    insulin glargine (LANTUS) subcutaneous injection 8 Units 0 08 mL, 8 Units, Subcutaneous, HS, Ava Castañeda MD, 8 Units at 11/28/18 2333    insulin lispro (HumaLOG) 100 units/mL subcutaneous injection 1-6 Units, 1-6 Units, Subcutaneous, TID AC, 1 Units at 11/28/18 1700 **AND** Fingerstick Glucose (POCT), , , TID AC, Jeannette Jules PA-C    levothyroxine tablet 150 mcg, 150 mcg, Oral, Early Morning, Henry Daniels MD, 150 mcg at 11/29/18 0539    LORazepam (ATIVAN) tablet 0 5 mg, 0 5 mg, Oral, Q8H PRN, Henry Daniels MD    metoprolol tartrate (LOPRESSOR) partial tablet 12 5 mg, 12 5 mg, Oral, Q12H [facility], VALENCIA Hines, 12 5 mg at 11/29/18 1007    morphine injection 2 mg, 2 mg, Intravenous, Q3H PRN, Henry Daniels MD    nitroglycerin (NITROSTAT) SL tablet 0 4 mg, 0 4 mg, Sublingual, Q5 Min PRN, Henry Daniels MD    ondansetron [facility] injection 4 mg, 4 mg, Intravenous, Q4H PRN, Shania Hill MD    oxyCODONE (ROXICODONE) immediate release tablet 10 mg, 10 mg, Oral, Q4H PRN, Shania Hill MD, 10 mg at 11/28/18 0317    oxyCODONE (ROXICODONE) IR tablet 5 mg, 5 mg, Oral, Q4H PRN, Shania Hill MD, 5 mg at 11/29/18 1242    pantoprazole (PROTONIX) EC tablet 40 mg, 40 mg, Oral, Early Morning, Bryan Gamez MD, 40 mg at 11/29/18 0539    polyethylene glycol (MIRALAX) packet 17 g, 17 g, Oral, Daily PRN, Josiane Garza MD, 17 g at 11/25/18 0909    rosuvastatin (CRESTOR) tablet 40 mg, 40 mg, Oral, After Taylor Patel MD, 40 mg at 11/28/18 1704    tamsulosin [facility]) capsule 0 4 mg, 0 4 mg, Oral, Daily With Taylor Patel MD, 0 4 mg at 11/28/18 1605     Lab Results:     Results from last 7 days  Lab Units 11/29/18  0544 11/28/18  0513 11/27/18  0423 11/27/18 11/26/18  0433 11/25/18  0444 11/24/18  1617  11/24/18  0159 11/23/18  2051   WBC Thousand/uL 12 04* 12 41* 8 17  --  8 56  --   --   --  9 92 9 42   HEMOGLOBIN g/dL 10 7* 9 3* 10 3*  --  9 9*  --   --   --  9 3* 9 7*   HEMATOCRIT % 34 4* 30 5* 33 9*  --  32 6*  --   --   --  30 7* 32 2*   PLATELETS Thousands/uL 361 342 414*  --  392*  --   --   --  390 275   POTASSIUM mmol/L 3 9 3 9  --  3 5 3 5 3 6  --   < >  --  4 3   CHLORIDE mmol/L 103 105  --  106 106 104  --   < >  --  102   CO2 mmol/L 24 24  --  28 26 25  --   < >  --  27   BUN mg/dL 25 26*  --  25 30* 34*  --   < >  --  36*   CREATININE mg/dL 1 72* 1 99*  --  2 17* 2 59* 3 06*  --   < >  --  3 52*   CALCIUM mg/dL 9 2 8 9  --  8 2* 8 1* 8 9  --   < >  --  9 1   MAGNESIUM mg/dL 2 1  --   --   --   --   --   --   --   --  2 5   ALK PHOS U/L  --   --   --   --   --   --   --   --   --  123*   ALT U/L  --   --   --   --   --   --   --   --   --  44   AST U/L  --   --   --   --   --   --   --   --   --  60*   LEUKOCYTES UA   --   --   --   --   --   --  Negative  --   --   --    BLOOD UA   --   --   --   --   -- --  Moderate*  --   --   --    < > = values in this interval not displayed      Previous work up:  Please see previous notes

## 2018-11-29 NOTE — ASSESSMENT & PLAN NOTE
· Baseline creatinine 1 3-1 5  · Improving,  · Appreciate Nephrology's ongoing recommendations  · Maintain Gee for acute urinary retention  · Strict I&Os  · Losartan-HCTZ on hold - see further plan regarding this below

## 2018-11-29 NOTE — PHYSICAL THERAPY NOTE
Physical Therapy Initial Evaluation  Elina Fuentes, PT   11/29/18 1111   Note Type   Note type Eval only   Pain Assessment   Pain Assessment 0-10   Pain Score 6   Pain Type Acute pain   Pain Location (L flank)   Hospital Pain Intervention(s) Repositioned; Ambulation/increased activity; Emotional support   Response to Interventions tolerated  Home Living   Type of [address] One level;Performs ADLs on one level; Able to live on main level with bedroom/bathroom;Stairs to enter without rails  (1 RENE)   Home Equipment Cane   Additional Comments Used SPC PTA  Prior Function   Level of [place] Independent with ADLs and functional mobility; Needs assistance with IADLs   Lives With Son  (Son works from 4pm-10 pm daily   Pt home alone  )   Receives Help From Family   ADL Assistance Independent   IADLs Needs assistance  (Pt stated he sometimes assists with chores  )   Vocational Retired   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  Restrictions/Precautions   Weight Bearing Precautions Per Order No   Braces or Orthoses Other (Comment)  (none)   Other Precautions Chair Alarm; Bed Alarm;Cognitive; Fall Risk;Pain;Multiple lines   General   Additional Pertinent History dx: adm with NSTEMUI recurrent L pleural effusuion, ARF, anemia and delerium  Pt had VAT's procedure 2 days ago and multiple thoracentesis procedures weekly for about 1 month PTA  PMH also includes BPH, HTN, hypothyroidism, renal cyst, DM-2, moderate aortic stenosis, GERD, CABGx4  9/'18, AVR 9/'18, CAD, CKD,       Family/Caregiver Present No   Cognition   Overall Cognitive Status Impaired  (Slowed responses to all questions asked/requests made   )   Arousal/Participation Alert   Orientation Level Oriented to person;Oriented to place;Oriented to situation   Memory Decreased recall of precautions;Decreased recall of recent events   Following Commands Follows one step commands with increased time or repetition   Comments Cooperative, slightly flat affect  RLE Assessment   RLE Assessment WFL  (4-/5)   LLE Assessment   LLE Assessment WFL  (4-/5 )   Coordination   Movements are Fluid and Coordinated 1   Sensation WFL   Bed Mobility   Rolling R (Pt was seated in recliner upon arrival of PT in pt's room   )   Additional Comments Pt returned to sitting in recliner upon completion of PT eval   Chair alarm engaged  Pt aware of how to use call bell for nursing assist for all mobility  Transfers   Sit to Stand 4  Minimal assistance   Additional items Assist x 2  (Min assist of 1 on 2nd trial  )   Stand to Sit 4  Minimal assistance   Additional items Assist x 1; Increased time required;Verbal cues  (VC's for correct techique with re: to RW use for all mobilit)   Additional Comments RW used  Ambulation/Elevation   Gait pattern Excessively slow; Short stride; Forward Flexion   Gait Assistance 4  Minimal assist   Additional items Assist x 1;Verbal cues  (2nd person followed closely with chair for pt safety  )   Assistive Device Rolling walker   Distance 50'x2  (SOB noted  Seated rest break between walks  )   Stair Management Assistance Not tested  (Insufficient endurance  )   Balance   Static Sitting Fair +   Dynamic Sitting Fair -   Static Standing Fair -   Dynamic Standing Poor +   Ambulatory Poor +   Endurance Deficit   Endurance Deficit Yes   Activity Tolerance   Activity Tolerance Patient limited by fatigue;Patient tolerated treatment well;Patient limited by pain   Medical Staff Made Aware RN consented to allow pt to participate in PT eval      Nurse Made Aware yes   Assessment   Prognosis Good   Problem List Decreased strength;Decreased endurance; Impaired balance;Decreased mobility; Decreased cognition; Impaired judgement;Decreased safety awareness;Decreased skin integrity;Pain   Assessment Pt is 80 y o  male seen for PT evaluation s/p [facility] on [facility]/24/[facility] w/ NSTEMI (non-ST elevated myocardial infarction) ([facility] [address] )    Pt has had L pleural effusion with multiple thoracentesis procedures ( 1/wk) PTA and VAT's procedure done 2 days ago  PT consulted to assess pt's functional mobility and d/c needs  Order placed for PT eval and tx, w/ bedrest orders discontinued  RN gave verbal ok to see pt for mobility activities  Comorbidities affecting pt's physical performance at time of assessment include: BPH, CAD with CABGx4 and AVR in Sept 2018, hypothyroidism, renal cyst, ARF, DM-2, moderate aortic stenosis, CKD, GERD  PTA, pt was independent w/ all functional mobility w/ SPC, ambulates household distances, has 1 RENE, lives w/ his son in 1 level home, retired and is home alone from 4-10 pm nights when his son is working outside the home    Personal factors affecting pt at time of IE include: ambulating w/ assistive device, stairs to enter home, inability to navigate community distances, limited home support, limited insight into impairments, inability to perform IADLs, inability to perform ADLs, inability to live alone and is functioning at a below baseline level of mobility at this time    Please find objective findings from PT assessment regarding body systems outlined above with impairments and limitations including weakness, impaired balance, decreased endurance, gait deviations, pain, decreased activity tolerance, decreased functional mobility tolerance, decreased safety awareness, impaired judgement, fall risk, SOB upon exertion, decreased skin integrity and decreased cognition  The following objective measures performed on IE also reveal limitations: Barthel Index: 45/100  Pt's clinical presentation is currently unstable/unpredictable seen in pt's presentation of pt having chest tube, pt with abnormal lab values, pt with slight confusion, pt with recent VAT's procedure for PE, pt home alone nights while his son works outside the home   Pt to benefit from continued PT tx to address deficits as defined above and maximize level of functional independent mobility and consistency  From PT/mobility standpoint, recommendation at time of d/c would be IP rehab pending progress in order to facilitate return to PLOF  Barriers to Discharge Decreased caregiver support   Goals   Patient Goals Pt with flat affect and what seemed to be slight cog deficit  Pt did not express goals for PT rx's  STG Expiration Date 12/09/18   Short Term Goal #1 Pt completes bed mobility activities with CG assist of 1  Pt completes transfers with RW or SPC, good safety and CG assist   Pt ambulates 200' with CG assist, RW, and good safety   Pt ambulates up/down 1 steps with RW or SPC with CG assist and good safety  Treatment Day 0   Plan   Treatment/Interventions Functional transfer training;LE strengthening/ROM; Patient/family training;Equipment eval/education; Bed mobility;Gait training;Spoke to nursing   PT Frequency Other (Comment)  (3-5x/wk)   Recommendation   Recommendation Other (Comment)  (inpatient rehab  )   Equipment Recommended Destiny Suárez   PT - OK to Discharge No   Barthel Index   Feeding 10   Bathing 0   Grooming Score 5   Dressing Score 5   Bladder Score 0   Bowels Score 10   Toilet Use Score 5   Transfers (Bed/Chair) Score 10   Mobility (Level Surface) Score 0   Stairs Score 0   Barthel Index Score 45

## 2018-11-29 NOTE — UTILIZATION REVIEW
[address]  Utilization Review Department  Phone: [phone]; Fax [phone]  [email or web address]  ATTENTION: Please call with any questions or concerns to [phone]  and carefully listen to the prompts so that you are directed to the right person  Send all requests for admission clinical reviews, approved or denied determinations and any other requests to fax [phone]  All voicemails are confidential       Continued Stay Review    Date:   11/29/18 Thursday ACUTE MED SURG LEVEL OF CARE      Vital Signs: /79   Pulse 92   Temp 98 5 °F (36 9 °C)   Resp 20   Ht 5' 8" (1 727 m)   Wt 87 7 kg (193 lb 5 5 oz)   SpO2 98%   BMI 29 40 kg/m²      11/28 0701 11/29 0700 11/29 0701 11/29 1414  Most Recent    Temperature (°F) 97 5-98 2 98 5  98 5 (36 9)    Pulse 81-99 92-95  92    Respirations 18-20 17-20  20    Blood Pressure 120//79 135//79  138/79    SpO2 (%) 93-96 96-98  98       11/27 0701  11/28 0700 11/28 0701  11/29 0700 11/29 0701  11/30 0700   P  O  168 540 540   I V  (mL/kg) 1155 8 (13 2)     IV Piggyback 500     Total Intake(mL/kg) 1823 8 (20 8) 540 (6 2) 540 (6 2)   Urine (mL/kg/hr) 635 (0 3) 1947 (0 9) 550 (0 9)   Chest Tube 175 75 70   Total Output 810 2022 620   Net +1013 8 -1482 -80         Unmeasured Urine Occurrence 1 x             Diet Cardiac    Dietary nutrition supplements Glucerna with Dinner      IV ACCESS      Medications:   Scheduled Meds:   Current Facility-Administered Medications:  acetaminophen 650 mg Oral Q6H PRN    acetaminophen 650 mg Oral Q6H JAVAD    albuterol 2 puff Inhalation 4x Daily PRN    aspirin 325 mg Oral Daily    budesonide-formoterol 2 puff Inhalation BID    cefazolin 2,000 mg Intravenous Once    heparin (porcine) 5,000 Units Subcutaneous Q8H [address]    insulin glargine 8 Units Subcutaneous HS    insulin lispro 1-6 Units Subcutaneous TID AC    levothyroxine 150 mcg Oral Early Morning    LORazepam 0 5 mg Oral Q8H PRN    metoprolol tartrate 12 5 mg Oral Q12H [facility]    morphine injection 2 mg Intravenous Q3H PRN    nitroglycerin 0 4 mg Sublingual Q5 Min PRN    ondansetron 4 mg Intravenous Q4H PRN    oxyCODONE 10 mg Oral Q4H PRN    oxyCODONE 5 mg Oral Q4H PRN    pantoprazole 40 mg Oral Early Morning    polyethylene glycol 17 g Oral Daily PRN    rosuvastatin 40 mg Oral After Dinner    tamsulosin 0 4 mg Oral Daily With Dinner        PRN Meds:     acetaminophen    albuterol    LORazepam    morphine injection    nitroglycerin    ondansetron    oxyCODONE 5 mg q4hrs prn given x 1/ 24 hrs    oxyCODONE 10 mg q4hrs prn given x 1/24hrs    polyethylene glycol      LABS/Diagnostic Results:   Results from last 7 days  Lab Units 11/29/18  0544   WBC Thousand/uL 12 04*   HEMOGLOBIN g/dL 10 7*   HEMATOCRIT % 34 4*   PLATELETS Thousands/uL 361   NEUTROS PCT % 76*   LYMPHS PCT % 12*   MONOS PCT % 10   EOS PCT % 1      Results from last 7 days  Lab Units 11/29/18  0544   11/23/18  2051   SODIUM mmol/L 133*  < > 138   POTASSIUM mmol/L 3 9  < > 4 3   CHLORIDE mmol/L 103  < > 102   CO2 mmol/L 24  < > 27   BUN mg/dL 25  < > 36*   CREATININE mg/dL 1 72*  < > 3 52*   ANION GAP mmol/L 6  < > 9   CALCIUM mg/dL 9 2  < > 9 1   ALBUMIN g/dL  --   --  2 3*   TOTAL BILIRUBIN mg/dL  --   --  0 50   ALK PHOS U/L  --   --  123*   ALT U/L  --   --  44   AST U/L  --   --  60*   GLUCOSE RANDOM mg/dL 147*  < > 131      Results from last 7 days  Lab Units 11/24/18  0159   INR   1 36*       Results from last 7 days  Lab Units 11/29/18  1009 11/29/18  0656 11/28/18  2115 11/28/18  1643 11/28/18  1101 11/28/18  0638 11/27/18  2042 11/27/18  1720 11/27/18  1610 11/27/18  1037 11/27/18  0639 11/26/18  2055   POC GLUCOSE mg/dl 148* 186* 197* 162* 185* 183* 247* 175* 167* 186* 150* 211*       Age/Sex: 80 y o  male       Assessment/Plan:   Recurrent left pleural effusion   Assessment & Plan     · This has been since his recent CABG/AVR  · He follows with   Katie Carrera of Thoracic Surgery as outpatient, appreciate their recommendations   · He has had multiple thoracentesis  Per Thoracic surgery notes, patient had elected to proceed with pleurodesis (was initially scheduled for 11/23/18) over Pleur-X cath, however this was not performed - rather, the patient had an IR thoracentesis with 1,300mL out  · s/p L VATS & pleurodesis on 11/27 ans s/p CT placement  CT is still to suction draining bloody output       Delirium   Assessment & Plan     On and off intermittent episodes of confusion without agitation  Delirium precautions        Leukocytosis   Assessment & Plan     Improving  No active signs of infection  Likely stress related  Trend CBC for now       Anemia due to stage 3 chronic kidney disease ([facility] [address] )   Assessment & Plan     Stable for now  Continue to trend,      Pneumothorax   Assessment & Plan     · CXR s/p thoracentesis showed trace left-sided pneumothorax which was too small for chest tube placement  · Thoracic Surgery following      Acute renal failure superimposed on stage 3 chronic kidney disease (HCC)   Assessment & Plan     · Baseline creatinine 1 3-1 5  · Improving,  · Appreciate Nephrology's ongoing recommendations  · Maintain Gee for acute urinary retention  · Strict I&Os  · Losartan-HCTZ on hold - see further plan regarding this below      Aortic stenosis   Assessment & Plan     · S/p AVR (Maryann Saini) in September of this year      CAD (coronary artery disease)   Assessment & Plan     · S/p CABG x4 in September of this year  · On daily aspirin, statin, BB and crestor,      Type 2 diabetes mellitus with hypoglycemia without coma, with long-term current use of insulin (HCC)   Assessment & Plan      ISS and accucheks for mealtime coverage, increased ISS intensity to moderate intensity given uncontrolled hyperglycemia    Continue lantus 8 units HS given uncontrolled hyperglycemia       Pure hypercholesterolemia   Assessment & Plan     · With Lipitor allergy, continue home Crestor which is 40 mg daily      Hypothyroidism   Assessment & Plan     · TSH and fT4 WNL  · Continue levothyroxine      Benign essential hypertension   Assessment & Plan     · Losartan-HCTZ on hold given ADAM  · Continue BB      Benign prostatic hyperplasia with nocturia   Assessment & Plan     · Continue Flomax      * NSTEMI (non-ST elevated myocardial infarction) ([facility] [address] )   Assessment & Plan     · Type 2 NSTEMI in the setting of ADAM and hypoglycemia  · Appreciate cardiology evaluation                 VTE Pharmacologic Prophylaxis:   Pharmacologic: Enoxaparin (Lovenox)  Mechanical VTE Prophylaxis in Place: Yes     Current Length of Stay: 5 day(s)     Current Patient Status: Inpatient     Certification Statement: The patient will continue to require additional inpatient hospital stay due to not medically stable          Discharge Plan:   [place] 11/29/18  Plan   Treatment/Interventions Functional transfer training;LE strengthening/ROM; Patient/family training;Equipment eval/education; Bed mobility;Gait training;Spoke to nursing   PT Frequency Other (Comment)  (3-5x/wk)   Recommendation   Recommendation Other (Comment)  (inpatient rehab  )     CASE MANAGEMENT FOLLOWING CLOSELY FOR ALL DISCHARGE NEEDS

## 2018-11-29 NOTE — PLAN OF CARE
Problem: Nutrition/Hydration-ADULT  Goal: Nutrient/Hydration intake appropriate for improving, restoring or maintaining nutritional needs  Monitor and assess patient's nutrition/hydration status for malnutrition (ex- brittle hair, bruises, dry skin, pale skin and conjunctiva, muscle wasting, smooth red tongue, and disorientation)  Collaborate with interdisciplinary team and initiate plan and interventions as ordered  Monitor patient's weight and dietary intake as ordered or per policy  Utilize nutrition screening tool and intervene per policy  Determine patient's food preferences and provide high-protein, high-caloric foods as appropriate       INTERVENTIONS:  - Monitor oral intake, urinary output, labs, and treatment plans  - Assess nutrition and hydration status and recommend course of action  - Evaluate amount of meals eaten  - Assist patient with eating if necessary   - Allow adequate time for meals  - Recommend/ encourage appropriate diets, oral nutritional supplements, and vitamin/mineral supplements  - Order, calculate, and assess calorie counts as needed  - Recommend, monitor, and adjust tube feedings and TPN/PPN based on assessed needs  - Assess need for intravenous fluids  - Provide specific nutrition/hydration education as appropriate  - Include patient/family/caregiver in decisions related to nutrition   Outcome: Progressing      Problem: Prexisting or High Potential for Compromised Skin Integrity  Goal: Skin integrity is maintained or improved  INTERVENTIONS:  - Identify patients at risk for skin breakdown  - Assess and monitor skin integrity  - Assess and monitor nutrition and hydration status  - Monitor labs (i e  albumin)  - Assess for incontinence   - Turn and reposition patient  - Assist with mobility/ambulation  - Relieve pressure over bony prominences  - Avoid friction and shearing  - Provide appropriate hygiene as needed including keeping skin clean and dry  - Evaluate need for skin moisturizer/barrier cream  - Collaborate with interdisciplinary team (i e  Nutrition, Rehabilitation, etc )   - Patient/family teaching   Outcome: Progressing      Problem: Potential for Falls  Goal: Patient will remain free of falls  INTERVENTIONS:  - Assess patient frequently for physical needs  -  Identify cognitive and physical deficits and behaviors that affect risk of falls    -  [place] fall precautions as indicated by assessment   - Educate patient/family on patient safety including physical limitations  - Instruct patient to call for assistance with activity based on assessment  - Modify environment to reduce risk of injury  - Consider OT/PT consult to assist with strengthening/mobility    Outcome: Progressing      Problem: PAIN - ADULT  Goal: Verbalizes/displays adequate comfort level or baseline comfort level  Interventions:  - Encourage patient to monitor pain and request assistance  - Assess pain using appropriate pain scale  - Administer analgesics based on type and severity of pain and evaluate response  - Implement non-pharmacological measures as appropriate and evaluate response  - Consider cultural and social influences on pain and pain management  - Notify physician/advanced practitioner if interventions unsuccessful or patient reports new pain   Outcome: Progressing      Problem: INFECTION - ADULT  Goal: Absence or prevention of progression during hospitalization  INTERVENTIONS:  - Assess and monitor for signs and symptoms of infection  - Monitor lab/diagnostic results  - Monitor all insertion sites, i e  indwelling lines, tubes, and drains  - Monitor endotracheal (as able) and nasal secretions for changes in amount and color  - [place] appropriate cooling/warming therapies per order  - Administer medications as ordered  - Instruct and encourage patient and family to use good hand hygiene technique  - Identify and instruct in appropriate isolation precautions for identified infection/condition Outcome: Progressing      Problem: SAFETY ADULT  Goal: Maintain or return to baseline ADL function  INTERVENTIONS:  -  Assess patient's ability to carry out ADLs; assess patient's baseline for ADL function and identify physical deficits which impact ability to perform ADLs (bathing, care of mouth/teeth, toileting, grooming, dressing, etc )  - Assess/evaluate cause of self-care deficits   - Assess range of motion  - Assess patient's mobility; develop plan if impaired  - Assess patient's need for assistive devices and provide as appropriate  - Encourage maximum independence but intervene and supervise when necessary  ¯ Involve family in performance of ADLs  ¯ Assess for home care needs following discharge   ¯ Request OT consult to assist with ADL evaluation and planning for discharge  ¯ Provide patient education as appropriate   Outcome: Progressing    Goal: Maintain or return mobility status to optimal level  INTERVENTIONS:  - Assess patient's baseline mobility status (ambulation, transfers, stairs, etc )    - Identify cognitive and physical deficits and behaviors that affect mobility  - Identify mobility aids required to assist with transfers and/or ambulation (gait belt, sit-to-stand, lift, walker, cane, etc )  - [place] fall precautions as indicated by assessment  - Record patient progress and toleration of activity level on Mobility SBAR; progress patient to next Phase/Stage  - Instruct patient to call for assistance with activity based on assessment  - Request Rehabilitation consult to assist with strengthening/weightbearing, etc    Outcome: Progressing    Goal: Patient will remain free of falls  INTERVENTIONS:  - Assess patient frequently for physical needs  -  Identify cognitive and physical deficits and behaviors that affect risk of falls    -  [place] fall precautions as indicated by assessment   - Educate patient/family on patient safety including physical limitations  - Instruct patient to call for assistance with activity based on assessment  - Modify environment to reduce risk of injury  - Consider OT/PT consult to assist with strengthening/mobility    Outcome: Progressing      Problem: DISCHARGE PLANNING  Goal: Discharge to home or other facility with appropriate resources  INTERVENTIONS:  - Identify barriers to discharge w/patient and caregiver  - Arrange for needed discharge resources and transportation as appropriate  - Identify discharge learning needs (meds, wound care, etc )  - Arrange for interpretive services to assist at discharge as needed  - Refer to Case Management Department for coordinating discharge planning if the patient needs post-hospital services based on physician/advanced practitioner order or complex needs related to functional status, cognitive ability, or social support system   Outcome: Progressing      Problem: Knowledge Deficit  Goal: Patient/family/caregiver demonstrates understanding of disease process, treatment plan, medications, and discharge instructions  Complete learning assessment and assess knowledge base    Interventions:  - Provide teaching at level of understanding  - Provide teaching via preferred learning methods   Outcome: Progressing      Problem: CARDIOVASCULAR - ADULT  Goal: Absence of cardiac dysrhythmias or at baseline rhythm  INTERVENTIONS:  - Continuous cardiac monitoring, monitor vital signs, obtain 12 lead EKG if indicated  - Administer antiarrhythmic and heart rate control medications as ordered  - Monitor electrolytes and administer replacement therapy as ordered   Outcome: Progressing      Problem: DISCHARGE PLANNING - CARE MANAGEMENT  Goal: Discharge to post-acute care or home with appropriate resources  INTERVENTIONS:  - Conduct assessment to determine patient/family and health care team treatment goals, and need for post-acute services based on payer coverage, community resources, and patient preferences, and barriers to discharge  - Address psychosocial, clinical, and financial barriers to discharge as identified in assessment in conjunction with the patient/family and health care team  - Arrange appropriate level of post-acute services according to patient's   needs and preference and payer coverage in collaboration with the physician and health care team  - Communicate with and update the patient/family, physician, and health care team regarding progress on the discharge plan  - Arrange appropriate transportation to post-acute venues   Outcome: Progressing

## 2018-11-29 NOTE — ASSESSMENT & PLAN NOTE
· CXR s/p thoracentesis showed trace left-sided pneumothorax which was too small for chest tube placement  · Thoracic Surgery following

## 2018-11-30 ENCOUNTER — APPOINTMENT (INPATIENT)
Dept: RADIOLOGY | Facility: HOSPITAL | Age: 83
DRG: 186 | End: 2018-11-30
Payer: COMMERCIAL

## 2018-11-30 ENCOUNTER — APPOINTMENT (OUTPATIENT)
Dept: CARDIAC REHAB | Facility: HOSPITAL | Age: 83
End: 2018-11-30
Payer: COMMERCIAL

## 2018-11-30 LAB
GLUCOSE SERPL-MCNC: 164 MG/DL (ref 65–140)
GLUCOSE SERPL-MCNC: 179 MG/DL (ref 65–140)
GLUCOSE SERPL-MCNC: 185 MG/DL (ref 65–140)
GLUCOSE SERPL-MCNC: 199 MG/DL (ref 65–140)

## 2018-11-30 PROCEDURE — 97110 THERAPEUTIC EXERCISES: CPT

## 2018-11-30 PROCEDURE — G8987 SELF CARE CURRENT STATUS: HCPCS

## 2018-11-30 PROCEDURE — 0WP8X0Z REMOVAL OF DRAINAGE DEVICE FROM CHEST WALL, EXTERNAL APPROACH: ICD-10-PCS | Performed by: THORACIC SURGERY (CARDIOTHORACIC VASCULAR SURGERY)

## 2018-11-30 PROCEDURE — 99232 SBSQ HOSP IP/OBS MODERATE 35: CPT | Performed by: INTERNAL MEDICINE

## 2018-11-30 PROCEDURE — G8988 SELF CARE GOAL STATUS: HCPCS

## 2018-11-30 PROCEDURE — 71046 X-RAY EXAM CHEST 2 VIEWS: CPT

## 2018-11-30 PROCEDURE — 97116 GAIT TRAINING THERAPY: CPT

## 2018-11-30 PROCEDURE — 82948 REAGENT STRIP/BLOOD GLUCOSE: CPT

## 2018-11-30 PROCEDURE — 99024 POSTOP FOLLOW-UP VISIT: CPT | Performed by: THORACIC SURGERY (CARDIOTHORACIC VASCULAR SURGERY)

## 2018-11-30 PROCEDURE — 97167 OT EVAL HIGH COMPLEX 60 MIN: CPT

## 2018-11-30 RX ORDER — SENNOSIDES 8.6 MG
2 TABLET ORAL DAILY
Status: DISCONTINUED | OUTPATIENT
Start: 2018-11-30 | End: 2018-12-04 | Stop reason: HOSPADM

## 2018-11-30 RX ORDER — INSULIN GLARGINE 100 [IU]/ML
15 INJECTION, SOLUTION SUBCUTANEOUS
Status: DISCONTINUED | OUTPATIENT
Start: 2018-11-30 | End: 2018-12-04 | Stop reason: HOSPADM

## 2018-11-30 RX ORDER — DOCUSATE SODIUM 100 MG/1
100 CAPSULE, LIQUID FILLED ORAL DAILY
Status: DISCONTINUED | OUTPATIENT
Start: 2018-11-30 | End: 2018-12-04 | Stop reason: HOSPADM

## 2018-11-30 RX ORDER — ROSUVASTATIN CALCIUM 40 MG/1
40 TABLET, COATED ORAL
Status: DISCONTINUED | OUTPATIENT
Start: 2018-11-30 | End: 2018-12-04 | Stop reason: HOSPADM

## 2018-11-30 RX ADMIN — SENNOSIDES 17.2 MG: 8.6 TABLET, FILM COATED ORAL at 12:01

## 2018-11-30 RX ADMIN — DOCUSATE SODIUM 100 MG: 100 CAPSULE, LIQUID FILLED ORAL at 12:01

## 2018-11-30 RX ADMIN — ACETAMINOPHEN 650 MG: 325 TABLET, FILM COATED ORAL at 16:57

## 2018-11-30 RX ADMIN — INSULIN LISPRO 1 UNITS: 100 INJECTION, SOLUTION INTRAVENOUS; SUBCUTANEOUS at 12:04

## 2018-11-30 RX ADMIN — INSULIN LISPRO 2 UNITS: 100 INJECTION, SOLUTION INTRAVENOUS; SUBCUTANEOUS at 16:49

## 2018-11-30 RX ADMIN — INSULIN LISPRO 1 UNITS: 100 INJECTION, SOLUTION INTRAVENOUS; SUBCUTANEOUS at 08:27

## 2018-11-30 RX ADMIN — ACETAMINOPHEN 650 MG: 325 TABLET, FILM COATED ORAL at 23:06

## 2018-11-30 RX ADMIN — METOPROLOL TARTRATE 12.5 MG: 25 TABLET ORAL at 08:22

## 2018-11-30 RX ADMIN — ACETAMINOPHEN 650 MG: 325 TABLET, FILM COATED ORAL at 12:03

## 2018-11-30 RX ADMIN — LEVOTHYROXINE SODIUM 150 MCG: 75 TABLET ORAL at 05:28

## 2018-11-30 RX ADMIN — ACETAMINOPHEN 650 MG: 325 TABLET, FILM COATED ORAL at 05:28

## 2018-11-30 RX ADMIN — METOPROLOL TARTRATE 12.5 MG: 25 TABLET ORAL at 21:51

## 2018-11-30 RX ADMIN — INSULIN GLARGINE 15 UNITS: 100 INJECTION, SOLUTION SUBCUTANEOUS at 21:50

## 2018-11-30 RX ADMIN — HEPARIN SODIUM 5000 UNITS: 5000 INJECTION INTRAVENOUS; SUBCUTANEOUS at 05:28

## 2018-11-30 RX ADMIN — PANTOPRAZOLE SODIUM 40 MG: 40 TABLET, DELAYED RELEASE ORAL at 05:28

## 2018-11-30 RX ADMIN — HEPARIN SODIUM 5000 UNITS: 5000 INJECTION INTRAVENOUS; SUBCUTANEOUS at 21:50

## 2018-11-30 RX ADMIN — BUDESONIDE AND FORMOTEROL FUMARATE DIHYDRATE 2 PUFF: 160; 4.5 AEROSOL RESPIRATORY (INHALATION) at 21:50

## 2018-11-30 RX ADMIN — ASPIRIN 81 MG 325 MG: 81 TABLET ORAL at 08:22

## 2018-11-30 RX ADMIN — TAMSULOSIN HYDROCHLORIDE 0.4 MG: 0.4 CAPSULE ORAL at 16:49

## 2018-11-30 RX ADMIN — BUDESONIDE AND FORMOTEROL FUMARATE DIHYDRATE 2 PUFF: 160; 4.5 AEROSOL RESPIRATORY (INHALATION) at 08:28

## 2018-11-30 RX ADMIN — ROSUVASTATIN CALCIUM 40 MG: 40 TABLET, COATED ORAL at 16:56

## 2018-11-30 RX ADMIN — HEPARIN SODIUM 5000 UNITS: 5000 INJECTION INTRAVENOUS; SUBCUTANEOUS at 13:03

## 2018-11-30 NOTE — SOCIAL WORK
Spoke to therapy who recommends snf rehab at discharge  Call placed to son, Andrés Sauer [phone] and left message for him to call CM

## 2018-11-30 NOTE — PHYSICAL THERAPY NOTE
Physical Therapy Progress Note        11/30/18 1241   Pain Assessment   Pain Assessment No/denies pain   Pain Score No Pain   Pain Type Acute pain   Pain Location Abdomen   Pain Orientation Left   Hospital Pain Intervention(s) Repositioned; Ambulation/increased activity; Distraction   Response to Interventions Tolerated   Restrictions/Precautions   Other Precautions Chair Alarm;Cognitive; Fall Risk;Pain  (catheter)   General   Chart Reviewed Yes   Family/Caregiver Present No   Cognition   Overall Cognitive Status Impaired  (slow responses and requires multiple repetitions)   Comments Patient is cooperative to participate   Transfers   Sit to Stand 4  Minimal assistance   Additional items Assist x 1; Armrests; Increased time required;Verbal cues   Stand to Sit 4  Minimal assistance   Additional items Assist x 1; Armrests; Increased time required;Verbal cues   Ambulation/Elevation   Gait pattern Excessively slow; Step to;Short stride; Forward Flexion   Gait Assistance 4  Minimal assist   Additional items Assist x 1;Verbal cues   Assistive Device Rolling walker   Distance 70 feet x 2   Stair Management Assistance Not tested  (insufficient endurance)   Balance   Static Sitting Fair +   Dynamic Sitting Fair -   Static Standing Fair -   Dynamic Standing Poor +   Ambulatory Poor +   Endurance Deficit   Endurance Deficit Yes   Activity Tolerance   Activity Tolerance Patient tolerated treatment well;Patient limited by fatigue   Nurse Made Aware Appropriate to see per RN   Exercises   Hip Flexion Sitting;10 reps;AROM; Bilateral  (x 2 sets)   Hip Abduction Sitting;10 reps;AROM; Bilateral  (x 2 sets)   Knee AROM Long Arc Quad Sitting;10 reps;AROM; Bilateral  (x 2sets)   Ankle Pumps Sitting;25 reps;AROM; Bilateral   Marching Standing;10 reps;AROM; Bilateral   Assessment   Prognosis Good   Problem List Decreased strength;Decreased endurance; Impaired balance;Decreased mobility; Decreased coordination;Decreased cognition;Decreased safety awareness;Pain   Assessment Patient sitting in bed side recliner, agreeable to participate in therapy  Patient requires redirection to task and frequent cues for what is expected  He was able to sit EOC with min UE support, with noted endurance deficits  Trialed standing with SPC, patient advanced LEs 2 feet, however noticeably unstable requiring rolling walker for improved stability and mobility  Patient ambulated 70 feet x 2 with min A and use of RW  He was able to perform TE as noted  Educated patient on safety awareness to prevent falls  Chair alarm activated post session  He would continue to benefit from skilled PT to maximize functional independence  Barriers to Discharge Decreased caregiver support   Goals   Patient Goals To rest   STG Expiration Date 12/09/18   Treatment Day 1   Plan   Treatment/Interventions Functional transfer training;LE strengthening/ROM; Therapeutic exercise; Endurance training;Gait training;Spoke to nursing   PT Frequency (3-5x a week)   Recommendation   Recommendation (inpatient rehab)   Equipment Recommended Walker  (RW)   PT - OK to Discharge Yes  (to rehab when medically stable)     Yulisa Kamara, PTA

## 2018-11-30 NOTE — PROGRESS NOTES
Progress Note - Mone Macie 1935, 80 y o  male MRN: [id number]    Unit/Bed#: [facility] 530-01 Encounter: [id number]    Primary Care Provider: Rufus Feldman DO   Date and time admitted to hospital: 11/24/2018 12:39 AM        Recurrent left pleural effusion   Assessment & Plan    · This has been since his recent CABG/AVR  · He follows with Dr Lisa Allen of Thoracic Surgery as outpatient, appreciate their recommendations   · He has had multiple thoracentesis  Per Thoracic surgery notes, patient had elected to proceed with pleurodesis (was initially scheduled for 11/23/18) over Pleur-X cath, however this was not performed - rather, the patient had an IR thoracentesis with 1,300mL out  · s/p L VATS & pleurodesis on 11/27 ans s/p CT placement  Chest tube removed by thoracic surgery this morning  · Follow-up x-ray shows no evidence of pneumothorax  Delirium   Assessment & Plan    On and off intermittent episodes of confusion without agitation  Delirium precautions  Leukocytosis   Assessment & Plan    Improving  No active signs of infection  Likely stress related  Trend CBC for now  Acute renal failure superimposed on stage 3 chronic kidney disease (HCC)   Assessment & Plan    · Baseline creatinine 1 3-1 5  · Improving,  · Appreciate Nephrology's ongoing recommendations  · Maintain Gee for acute urinary retention  · Strict I&Os  · Losartan-HCTZ on hold - see further plan regarding this below     Aortic stenosis   Assessment & Plan    · S/p AVR ([facility] [place]) in September of this year     CAD (coronary artery disease)   Assessment & Plan    · S/p CABG x4 in September of this year  · On daily aspirin, statin, BB and crestor,     Type 2 diabetes mellitus with hypoglycemia without coma, with long-term current use of insulin (HCC)   Assessment & Plan     ISS and accucheks for mealtime coverage, increased ISS intensity to moderate intensity given uncontrolled hyperglycemia    Increase Lantus to 15 units at bedtime  Patient takes 20 units at home  Pure hypercholesterolemia   Assessment & Plan    · With Lipitor allergy, continue home Crestor which is 40 mg daily     Benign essential hypertension   Assessment & Plan    · Losartan-HCTZ on hold given ADAM  · Continue BB     Benign prostatic hyperplasia with nocturia   Assessment & Plan    · Continue Flomax  · Now with acute urinary retention status post Gee placement  · Continue with Gee for now  * NSTEMI (non-ST elevated myocardial infarction) [facility])   Assessment & Plan    · Type 2 NSTEMI in the setting of ADAM and hypoglycemia  · Appreciate cardiology evaluation  VTE Pharmacologic Prophylaxis:   Pharmacologic: Enoxaparin (Lovenox)  Mechanical VTE Prophylaxis in Place: Yes    Patient Centered Rounds: I have performed bedside rounds with nursing staff today  Discussions with Specialists or Other Care Team Provider:      Education and Discussions with Family / Patient:  Discussed plan of care with the patient and he did not want me to call his son    Time Spent for Care: 30 minutes  More than 50% of total time spent on counseling and coordination of care as described above  Current Length of Stay: 6 day(s)    Current Patient Status: Inpatient   Certification Statement: The patient will continue to require additional inpatient hospital stay due to Not medically stable today    Discharge Plan:  Likely rehab when medically stable    Code Status: Level 1 - Full Code      Subjective:   Patient reports some pain in his buttock area but denies any other acute discomfort  He says that he did not have bowel movement in last 2-3 days  Objective:     Vitals:   Temp (24hrs), Av 3 °F (36 8 °C), Min:97 9 °F (36 6 °C), Max:98 7 °F (37 1 °C)    Temp:  [97 9 °F (36 6 °C)-98 7 °F (37 1 °C)] 98 4 °F (36 9 °C)  HR:  [69-92] 79  Resp:  [18-20] 18  BP: (119-154)/(69-78) 121/69  SpO2:  [95 %-99 %] 95 %  Body mass index is 29 4 kg/m²  Input and Output Summary (last 24 hours): Intake/Output Summary (Last 24 hours) at 11/30/18 1526  Last data filed at 11/30/18 1206   Gross per 24 hour   Intake             1260 ml   Output             1739 ml   Net             -479 ml       Physical Exam:     Physical Exam  Constitutional: He is oriented to person, place, and time  No distress  Eyes: Pupils are equal, round, and reactive to light  Cardiovascular: Normal rate, regular rhythm, normal heart sounds and intact distal pulses  No murmur heard  Pulmonary/Chest: Breath sounds normal  No respiratory distress  He has no wheezes  He has no rales  Abdominal: Soft  Bowel sounds are normal  He exhibits no distension  There is no tenderness  Musculoskeletal: He exhibits no edema  Neurological: He is alert and oriented to person, place, and time  No focal mtoor deficits   Skin: Skin is warm  Additional Data:     Labs:      Results from last 7 days  Lab Units 11/29/18  0544   WBC Thousand/uL 12 04*   HEMOGLOBIN g/dL 10 7*   HEMATOCRIT % 34 4*   PLATELETS Thousands/uL 361   NEUTROS PCT % 76*   LYMPHS PCT % 12*   MONOS PCT % 10   EOS PCT % 1       Results from last 7 days  Lab Units 11/29/18  0544  11/23/18  2051   SODIUM mmol/L 133*  < > 138   POTASSIUM mmol/L 3 9  < > 4 3   CHLORIDE mmol/L 103  < > 102   CO2 mmol/L 24  < > 27   BUN mg/dL 25  < > 36*   CREATININE mg/dL 1 72*  < > 3 52*   ANION GAP mmol/L 6  < > 9   CALCIUM mg/dL 9 2  < > 9 1   ALBUMIN g/dL  --   --  2 3*   TOTAL BILIRUBIN mg/dL  --   --  0 50   ALK PHOS U/L  --   --  123*   ALT U/L  --   --  44   AST U/L  --   --  60*   GLUCOSE RANDOM mg/dL 147*  < > 131   < > = values in this interval not displayed      Results from last 7 days  Lab Units 11/24/18  0159   INR  1 36*       Results from last 7 days  Lab Units 11/30/18  1139 11/30/18  0655 11/29/18  2036 11/29/18  1657 11/29/18  1009 11/29/18  7965 11/28/18  2115 11/28/18  1643 11/28/18  1101 11/28/18  7394 11/27/18 2042 11/27/18  1720   POC GLUCOSE mg/dl 185* 164* 221* 172* 148* 186* 197* 162* 185* 183* 247* 175*                   * I Have Reviewed All Lab Data Listed Above  * Additional Pertinent Lab Tests Reviewed: All Labs Within Last 24 Hours Reviewed    Imaging:    XR chest pa & lateral   Final Result by Anton Gupta DO (11/30 3524)   1  No evidence of pneumothorax, status post left-sided chest tube removal    2   Stable pleural-parenchymal disease left lower lobe  Workstation performed: [id number]         XR chest pa & lateral   Final Result by Kalli Strickland MD (11/28 1443)      Minimal left pleural effusion and atelectasis  No pneumothorax  Stable cardiomegaly           Workstation performed: [id number]             Recent Cultures (last 7 days):           Last 24 Hours Medication List:     Current Facility-Administered Medications:  acetaminophen 650 mg Oral Q6H PRN Maxi Juarez MD   acetaminophen 650 mg Oral Q6H [address] So Chandler MD   albuterol 2 puff Inhalation 4x Daily PRN Maxi Juarez MD   aspirin 325 mg Oral Daily Maxi Juarez MD   budesonide-formoterol 2 puff Inhalation BID Maxi Juarez MD   cefazolin 2,000 mg Intravenous Once Isai Flores MD   docusate sodium 100 mg Oral Daily Juanita Luna MD   heparin (porcine) 5,000 Units Subcutaneous [facility] Marilou Cheatham PA-C   insulin glargine 8 Units Subcutaneous HS Juanita Luna MD   insulin lispro 1-6 Units Subcutaneous TID AC Marilou Cheatham PA-C   levothyroxine 150 mcg Oral Early Morning Maxi Juarez MD   LORazepam 0 5 mg Oral Q8H PRN Maxi Juarez MD   metoprolol tartrate 12 5 mg Oral Q12H [address] Isidore VALENCIA Isabel   morphine injection 2 mg Intravenous Q3H PRN Maxi Juarez MD   nitroglycerin 0 4 mg Sublingual Q5 Min PRN Maxi Juarez MD   ondansetron 4 mg Intravenous Q4H PRN So Chandler MD   oxyCODONE 10 mg Oral Q4H PRN So Chandler MD   oxyCODONE 5 mg Oral Q4H PRN So Chandler MD   pantoprazole 40 mg Oral Early Morning Van Rebolledo MD   polyethylene glycol 17 g Oral Daily PRN Yudelka Quinn MD   rosuvastatin 40 mg Oral After Paige Raza MD   senna 2 tablet Oral Daily Versa Crigler, MD   tamsulosin 0 4 mg Oral Daily With Paige Raza MD        Today, Patient Was Seen By: Versa Crigler, MD    ** Please Note: Dictation voice to text software may have been used in the creation of this document   **

## 2018-11-30 NOTE — ASSESSMENT & PLAN NOTE
· Continue Flomax  · Now with acute urinary retention status post Gee placement  · Continue with Gee for now
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
## 2018-11-30 NOTE — SOCIAL WORK
Cm met with pt to discuss therapy recommendation of rehab  Pt is refusing rehab states he is unable to afford it  Cm advised pt we would submit to [facility] for approval for him to go to a SNF  Pt states he received a bill from his last rehab stay because [employer] stopped paying when the doctors were still recommending he needed to stay  Pt would prefer to have home PT/OT and SN  Pt is open to [facility] update sent via [address] [place] [address] Drive

## 2018-11-30 NOTE — OCCUPATIONAL THERAPY NOTE
OT Cancel Note  Orders received, pt's chart reviewed  Attempted to see pt 10:45AM, however pt is off the floor  Will re-attempt as time permits    Laura Franz, KIM, OTR/L

## 2018-11-30 NOTE — PLAN OF CARE
Problem: PHYSICAL THERAPY ADULT  Goal: Performs mobility at highest level of function for planned discharge setting  See evaluation for individualized goals  Treatment/Interventions: Functional transfer training, LE strengthening/ROM, Patient/family training, Equipment eval/education, Bed mobility, Gait training, Spoke to nursing  Equipment Recommended: Moise Leon       See flowsheet documentation for full assessment, interventions and recommendations  Outcome: Progressing  Prognosis: Good  Problem List: Decreased strength, Decreased endurance, Impaired balance, Decreased mobility, Decreased coordination, Decreased cognition, Decreased safety awareness, Pain  Assessment: Patient sitting in bed side recliner, agreeable to participate in therapy  Patient requires redirection to task and frequent cues for what is expected  He was able to sit EOC with min UE support, with noted endurance deficits  Trialed standing with SPC, patient advanced LEs 2 feet, however noticeably unstable requiring rolling walker for improved stability and mobility  Patient ambulated 70 feet x 2 with min A and use of RW  He was able to perform TE as noted  Educated patient on safety awareness to prevent falls  Chair alarm activated post session  He would continue to benefit from skilled PT to maximize functional independence  Barriers to Discharge: Decreased caregiver support     Recommendation:  (inpatient rehab)     PT - OK to Discharge: Yes (to rehab when medically stable)    See flowsheet documentation for full assessment

## 2018-11-30 NOTE — PROGRESS NOTES
Progress Note - Thoracic Surgery   Caitlin Schulte  80 y o  male MRN: [id number]  Unit/Bed#: [facility] 530-01 Encounter: [id number]    Assessment:  81yo male with recurrent L pleural effusion POD#3 s/p L VATS talc pleurodesis     Plan:  Diet as tolerated  Discontinue chest tube today  Will Check post pull chest x-ray  Pulm toilet/IS  Analgesia  Gee per nephrology  Rest of care per primary team and other consultants    Subjective/Objective     Chief Complaint:     Subjective: No events overnight  Pain controlled    Objective:     Vitals: Blood pressure 141/76, pulse 79, temperature 97 9 °F (36 6 °C), temperature source Oral, resp  rate 18, height 5' 8" (1 727 m), weight 87 7 kg (193 lb 5 5 oz), SpO2 97 %  ,Body mass index is 29 4 kg/m²  I/O       11/27 0701 - 11/28 0700 11/28 0701 - 11/29 0700 11/29 0701 - 11/30 0700    P  O  168 540     I V  (mL/kg) 1155 8 (13 2)      IV Piggyback 500      Total Intake(mL/kg) 1823 8 (20 8) 540 (6 2)     Urine (mL/kg/hr) 635 (0 3) 1947 (0 9)     Chest Tube 175 75     Total Output 810 2022      Net +1013 8 -1482             Unmeasured Urine Occurrence 1 x            Physical Exam:   NAD  Regular rate and rhythm  nonlabored respirations on RA  L CT to suction without airleak   Serous drainage    Lab, Imaging and other studies:   CBC with diff:   No results found for: WBC, HGB, HCT, MCV, PLT, ADJUSTEDWBC, MCH, MCHC, RDW, MPV, NRBC, BMP/CMP:   No results found for: SODIUM, K, CL, CO2, ANIONGAP, BUN, CREATININE, GLUCOSE, CALCIUM, AST, ALT, ALKPHOS, PROT, BILITOT, EGFR, Magnesium: No components found for: MAG, Coags: No results found for: PT, PTT, INR, Blood Culture: No results found for: BLOODCX, Urine Culture: No results found for: URINECX, Wound Culure: No results found for: WOUNDCULT  VTE Pharmacologic Prophylaxis: Heparin  VTE Mechanical Prophylaxis: sequential compression device

## 2018-11-30 NOTE — PROGRESS NOTES
11/30/18    Procedure: Chest tube removal    Left chest tube removed in routine fashion without incident  The patient tolerated the procedure well  A dry, sterile dressing was placed  Will check a pa/lat chest x-ray       Kj Hall PA-C

## 2018-11-30 NOTE — ASSESSMENT & PLAN NOTE
ISS and accucheks for mealtime coverage, increased ISS intensity to moderate intensity given uncontrolled hyperglycemia  Increase Lantus to 15 units at bedtime  Patient takes 20 units at home

## 2018-11-30 NOTE — PLAN OF CARE
Problem: OCCUPATIONAL THERAPY ADULT  Goal: Performs self-care activities at highest level of function for planned discharge setting  See evaluation for individualized goals  Treatment Interventions: ADL retraining, Functional transfer training, UE strengthening/ROM, Endurance training, Cognitive reorientation, Patient/family training, Equipment evaluation/education, Compensatory technique education, Continued evaluation, Energy conservation, Activityengagement  Equipment Recommended: Tub seat with back       See flowsheet documentation for full assessment, interventions and recommendations  Limitation: Decreased ADL status, Decreased Safe judgement during ADL, Decreased endurance, Decreased cognition, Decreased self-care trans, Decreased high-level ADLs  Prognosis: Fair  Assessment: Pt is an 80year old male seen for OT eval s/p admission to [facility]"" with complain of symptomatic hypoglycemia with an episode of lightheadedness without loss of consciousness, as well as complaining of chest pain in the left chest wall  Pt dx'd with NSTEMI  Comorbidities include a h/o aortic stenosis, CKD, DM, GERD, glaucoma, Gout, prostate cancer, HTN, hypothyroidism, peripheral neuropathy, pleural effusion and pure hypercholesterolemia  Pt [facility] his adult son [facility] a [facility]  Pt's son works in the evenings and pt is alone for periods of time  Pt was I with ADLs and functional mobility with a cane/rw PTA  Pt required assistance with IADLs PTA  Pt is currently demonstrating the following occupational deficits: eating with setup, grooming with setup, UB bathing with Jazmin, LB bathing with modA, UB dressing with Jazmin, LB dressing with maxA, toileting with Jazmin and functional transfers with maxA  Impairments that are contributing in patient's decline in functional performance include: cognitive deficits, endurance, activity tolerance, unsupportive home environment/supervision and functional mobility    Based on pt's current cognitive deficits and functional performance, it is not safe for him to be discharged home to an environment where he will be left alone for periods of time  Recommend STR upon d/c  Pt to continue to benefit from skilled occupational therapy while in the hospital to maximize functioning and independence in daily tasks  Overall, pt scored 40/100 on the Barthel Index  See below for OT goals       OT Discharge Recommendation: Short Term Rehab  OT - OK to Discharge:  (when medically stable)      Comments: KIM Lal, OTR/L

## 2018-11-30 NOTE — ASSESSMENT & PLAN NOTE
· This has been since his recent CABG/AVR  · He follows with Dr Harshal Lee of Thoracic Surgery as outpatient, appreciate their recommendations   · He has had multiple thoracentesis  Per Thoracic surgery notes, patient had elected to proceed with pleurodesis (was initially scheduled for 11/23/18) over Pleur-X cath, however this was not performed - rather, the patient had an IR thoracentesis with 1,300mL out  · s/p L VATS & pleurodesis on 11/27 ans s/p CT placement  Chest tube removed by thoracic surgery this morning  · Follow-up x-ray shows no evidence of pneumothorax

## 2018-12-01 PROBLEM — R26.2 AMBULATORY DYSFUNCTION: Status: ACTIVE | Noted: 2018-12-01

## 2018-12-01 LAB
ANION GAP SERPL CALCULATED.3IONS-SCNC: 6 MMOL/L (ref 4–13)
BASOPHILS # BLD AUTO: 0.07 THOUSANDS/ΜL (ref 0–0.1)
BASOPHILS NFR BLD AUTO: 1 % (ref 0–1)
BUN SERPL-MCNC: 26 MG/DL (ref 5–25)
CALCIUM SERPL-MCNC: 7.8 MG/DL (ref 8.3–10.1)
CHLORIDE SERPL-SCNC: 105 MMOL/L (ref 100–108)
CO2 SERPL-SCNC: 26 MMOL/L (ref 21–32)
CREAT SERPL-MCNC: 1.55 MG/DL (ref 0.6–1.3)
EOSINOPHIL # BLD AUTO: 0.34 THOUSAND/ΜL (ref 0–0.61)
EOSINOPHIL NFR BLD AUTO: 4 % (ref 0–6)
ERYTHROCYTE [DISTWIDTH] IN BLOOD BY AUTOMATED COUNT: 17.1 % (ref 11.6–15.1)
GFR SERPL CREATININE-BSD FRML MDRD: 41 ML/MIN/1.73SQ M
GLUCOSE SERPL-MCNC: 117 MG/DL (ref 65–140)
GLUCOSE SERPL-MCNC: 118 MG/DL (ref 65–140)
GLUCOSE SERPL-MCNC: 126 MG/DL (ref 65–140)
GLUCOSE SERPL-MCNC: 190 MG/DL (ref 65–140)
GLUCOSE SERPL-MCNC: 251 MG/DL (ref 65–140)
HCT VFR BLD AUTO: 29.4 % (ref 36.5–49.3)
HGB BLD-MCNC: 9 G/DL (ref 12–17)
IMM GRANULOCYTES # BLD AUTO: 0.03 THOUSAND/UL (ref 0–0.2)
IMM GRANULOCYTES NFR BLD AUTO: 0 % (ref 0–2)
LYMPHOCYTES # BLD AUTO: 1.49 THOUSANDS/ΜL (ref 0.6–4.47)
LYMPHOCYTES NFR BLD AUTO: 17 % (ref 14–44)
MCH RBC QN AUTO: 26.5 PG (ref 26.8–34.3)
MCHC RBC AUTO-ENTMCNC: 30.6 G/DL (ref 31.4–37.4)
MCV RBC AUTO: 87 FL (ref 82–98)
MONOCYTES # BLD AUTO: 0.67 THOUSAND/ΜL (ref 0.17–1.22)
MONOCYTES NFR BLD AUTO: 8 % (ref 4–12)
NEUTROPHILS # BLD AUTO: 6.31 THOUSANDS/ΜL (ref 1.85–7.62)
NEUTS SEG NFR BLD AUTO: 70 % (ref 43–75)
NRBC BLD AUTO-RTO: 0 /100 WBCS
PLATELET # BLD AUTO: 307 THOUSANDS/UL (ref 149–390)
PMV BLD AUTO: 10.3 FL (ref 8.9–12.7)
POTASSIUM SERPL-SCNC: 4.1 MMOL/L (ref 3.5–5.3)
RBC # BLD AUTO: 3.4 MILLION/UL (ref 3.88–5.62)
SODIUM SERPL-SCNC: 137 MMOL/L (ref 136–145)
WBC # BLD AUTO: 8.91 THOUSAND/UL (ref 4.31–10.16)

## 2018-12-01 PROCEDURE — 85025 COMPLETE CBC W/AUTO DIFF WBC: CPT | Performed by: INTERNAL MEDICINE

## 2018-12-01 PROCEDURE — 82948 REAGENT STRIP/BLOOD GLUCOSE: CPT

## 2018-12-01 PROCEDURE — 99232 SBSQ HOSP IP/OBS MODERATE 35: CPT | Performed by: INTERNAL MEDICINE

## 2018-12-01 PROCEDURE — 80048 BASIC METABOLIC PNL TOTAL CA: CPT | Performed by: INTERNAL MEDICINE

## 2018-12-01 RX ORDER — POLYETHYLENE GLYCOL 3350 17 G/17G
17 POWDER, FOR SOLUTION ORAL ONCE
Status: COMPLETED | OUTPATIENT
Start: 2018-12-01 | End: 2018-12-01

## 2018-12-01 RX ADMIN — BUDESONIDE AND FORMOTEROL FUMARATE DIHYDRATE 2 PUFF: 160; 4.5 AEROSOL RESPIRATORY (INHALATION) at 08:45

## 2018-12-01 RX ADMIN — HEPARIN SODIUM 5000 UNITS: 5000 INJECTION INTRAVENOUS; SUBCUTANEOUS at 13:39

## 2018-12-01 RX ADMIN — INSULIN GLARGINE 15 UNITS: 100 INJECTION, SOLUTION SUBCUTANEOUS at 22:00

## 2018-12-01 RX ADMIN — PANTOPRAZOLE SODIUM 40 MG: 40 TABLET, DELAYED RELEASE ORAL at 05:16

## 2018-12-01 RX ADMIN — LORAZEPAM 0.5 MG: 0.5 TABLET ORAL at 00:31

## 2018-12-01 RX ADMIN — ASPIRIN 81 MG 325 MG: 81 TABLET ORAL at 08:48

## 2018-12-01 RX ADMIN — SENNOSIDES 17.2 MG: 8.6 TABLET, FILM COATED ORAL at 08:48

## 2018-12-01 RX ADMIN — METOPROLOL TARTRATE 12.5 MG: 25 TABLET ORAL at 22:00

## 2018-12-01 RX ADMIN — LEVOTHYROXINE SODIUM 150 MCG: 75 TABLET ORAL at 05:16

## 2018-12-01 RX ADMIN — BUDESONIDE AND FORMOTEROL FUMARATE DIHYDRATE 2 PUFF: 160; 4.5 AEROSOL RESPIRATORY (INHALATION) at 22:00

## 2018-12-01 RX ADMIN — INSULIN LISPRO 2 UNITS: 100 INJECTION, SOLUTION INTRAVENOUS; SUBCUTANEOUS at 11:38

## 2018-12-01 RX ADMIN — ROSUVASTATIN CALCIUM 40 MG: 40 TABLET, COATED ORAL at 17:01

## 2018-12-01 RX ADMIN — DOCUSATE SODIUM 100 MG: 100 CAPSULE, LIQUID FILLED ORAL at 08:49

## 2018-12-01 RX ADMIN — ACETAMINOPHEN 650 MG: 325 TABLET, FILM COATED ORAL at 11:37

## 2018-12-01 RX ADMIN — TAMSULOSIN HYDROCHLORIDE 0.4 MG: 0.4 CAPSULE ORAL at 16:56

## 2018-12-01 RX ADMIN — ACETAMINOPHEN 650 MG: 325 TABLET, FILM COATED ORAL at 17:00

## 2018-12-01 RX ADMIN — HEPARIN SODIUM 5000 UNITS: 5000 INJECTION INTRAVENOUS; SUBCUTANEOUS at 22:00

## 2018-12-01 RX ADMIN — HEPARIN SODIUM 5000 UNITS: 5000 INJECTION INTRAVENOUS; SUBCUTANEOUS at 05:18

## 2018-12-01 RX ADMIN — POLYETHYLENE GLYCOL 3350 17 G: 17 POWDER, FOR SOLUTION ORAL at 16:55

## 2018-12-01 RX ADMIN — ACETAMINOPHEN 650 MG: 325 TABLET, FILM COATED ORAL at 05:16

## 2018-12-01 NOTE — ASSESSMENT & PLAN NOTE
Physical therapy recommended SNF level of rehab which patient refused  Patient would like to go home with home physical therapy which is arranged by

## 2018-12-01 NOTE — PROGRESS NOTES
Progress Note - Zia Sanchez 1935, 80 y o  male MRN: [id number]    Unit/Bed#: [facility] 530-01 Encounter: [id number]    Primary Care Provider: Marcello Florentino DO   Date and time admitted to hospital: 11/24/2018 12:39 AM        Recurrent left pleural effusion   Assessment & Plan    · This has been since his recent CABG/AVR  · He follows with Dr Akshat Michael of Thoracic Surgery as outpatient, appreciate their recommendations   · He has had multiple thoracentesis  Per Thoracic surgery notes, patient had elected to proceed with pleurodesis (was initially scheduled for 11/23/18) over Pleur-X cath, however this was not performed - rather, the patient had an IR thoracentesis with 1,300mL out  · s/p L VATS & pleurodesis on 11/27 ans s/p CT placement  Chest tube removed by thoracic surgery on 11/30  · Follow-up x-ray shows no evidence of pneumothorax  Ambulatory dysfunction   Assessment & Plan    Physical therapy recommended SNF level of rehab which patient refused  Patient would like to go home with home physical therapy which is arranged by   Delirium   Assessment & Plan    Resolved  On and off intermittent episodes of confusion without agitation  Delirium precautions  Leukocytosis   Assessment & Plan    Resolved  No active signs of infection  Likely stress related  Trend CBC for now  Acute renal failure superimposed on stage 3 chronic kidney disease (HCC)   Assessment & Plan    · Baseline creatinine 1 3-1 5  · Close to baseline now,  · Appreciate Nephrology's recommendations  · Will give him trial of void    · Strict I&Os  · Losartan-HCTZ on hold - see further plan regarding this below     Aortic stenosis   Assessment & Plan    · S/p AVR (Edda V[facility]quez) in September of this year     CAD (coronary artery disease)   Assessment & Plan    · S/p CABG x4 in September of this year  · On daily aspirin, statin, BB and crestor,     Type 2 diabetes mellitus with hypoglycemia without coma, with long-term current use of insulin (HCC)   Assessment & Plan     ISS and accucheks for mealtime coverage, increased ISS intensity to moderate intensity given uncontrolled hyperglycemia  continue Lantus to 15 units at bedtime  Patient takes 20 units at home  Pure hypercholesterolemia   Assessment & Plan    · With Lipitor allergy, continue home Crestor which is 40 mg daily     Benign essential hypertension   Assessment & Plan    · Losartan-HCTZ on hold given ADAM  · Continue BB     Benign prostatic hyperplasia with nocturia   Assessment & Plan    · Continue Flomax  · Now with acute urinary retention status post Gee placement  · Will give him trial of void  * NSTEMI (non-ST elevated myocardial infarction) [facility])   Assessment & Plan    · Type 2 NSTEMI in the setting of ADAM and hypoglycemia  · Appreciate cardiology evaluation  VTE Pharmacologic Prophylaxis:   Pharmacologic: Enoxaparin (Lovenox)  Mechanical VTE Prophylaxis in Place: Yes    Patient Centered Rounds: I have performed bedside rounds with nursing staff today  Discussions with Specialists or Other Care Team Provider:      Education and Discussions with Family / Patient:  Discussed plan of care with the patient at bedside    Time Spent for Care: 30 minutes  More than 50% of total time spent on counseling and coordination of care as described above  Current Length of Stay: 7 day(s)    Current Patient Status: Inpatient   Certification Statement: The patient will continue to require additional inpatient hospital stay due to Most likely discharge home tomorrow    Discharge Plan:  Most likely discharge home tomorrow    Code Status: Level 1 - Full Code      Subjective:   Patient denies any acute discomfort  Looks comfortable  He is tolerating diet well  Last bowel movement was 2 days before      Objective:     Vitals:   Temp (24hrs), Av 3 °F (36 8 °C), Min:98 °F (36 7 °C), Max:98 5 °F (36 9 °C)    Temp:  [98 °F (36 7 °C)-98 5 °F (36 9 °C)] 98 5 °F (36 9 °C)  HR:  [69-84] 75  Resp:  [16-19] 19  BP: ()/(52-74) 110/64  SpO2:  [95 %-97 %] 95 %  Body mass index is 29 4 kg/m²  Input and Output Summary (last 24 hours): Intake/Output Summary (Last 24 hours) at 12/01/18 1540  Last data filed at 12/01/18 1433   Gross per 24 hour   Intake              180 ml   Output             1325 ml   Net            -1145 ml       Physical Exam:     Physical Exam  Constitutional: He is oriented to person, place, and time  No distress  Eyes: Pupils are equal, round, and reactive to light  Cardiovascular: Normal rate, regular rhythm, normal heart sounds and intact distal pulses     No murmur heard  Pulmonary/Chest: Breath sounds normal  No respiratory distress  He has no wheezes  He has no rales  Abdominal: Soft  Bowel sounds are normal  He exhibits no distension  There is no tenderness  Musculoskeletal: He exhibits no edema  Neurological: He is alert and oriented to person, place, and time  No focal mtoor deficits   Skin: Skin is warm  Additional Data:     Labs:      Results from last 7 days  Lab Units 12/01/18  0515   WBC Thousand/uL 8 91   HEMOGLOBIN g/dL 9 0*   HEMATOCRIT % 29 4*   PLATELETS Thousands/uL 307   NEUTROS PCT % 70   LYMPHS PCT % 17   MONOS PCT % 8   EOS PCT % 4       Results from last 7 days  Lab Units 12/01/18  0515   SODIUM mmol/L 137   POTASSIUM mmol/L 4 1   CHLORIDE mmol/L 105   CO2 mmol/L 26   BUN mg/dL 26*   CREATININE mg/dL 1 55*   ANION GAP mmol/L 6   CALCIUM mg/dL 7 8*   GLUCOSE RANDOM mg/dL 117           Results from last 7 days  Lab Units 12/01/18  1127 12/01/18  0740 11/30/18  2044 11/30/18  1609 11/30/18  1139 11/30/18  0655 11/29/18  2036 11/29/18  1657 11/29/18  1009 11/29/18  0656 11/28/18  2115 11/28/18  1643   POC GLUCOSE mg/dl 190* 118 179* 199* 185* 164* 221* 172* 148* 186* 197* 162*                   * I Have Reviewed All Lab Data Listed Above    * Additional Pertinent Lab Tests Reviewed: All Labs Within Last 24 Hours Reviewed    Imaging:    XR chest pa & lateral   Final Result by Chely Navarro DO (11/30 1139)   1  No evidence of pneumothorax, status post left-sided chest tube removal    2   Stable pleural-parenchymal disease left lower lobe  Workstation performed: [id number]         XR chest pa & lateral   Final Result by William Belle MD (11/28 4503)      Minimal left pleural effusion and atelectasis  No pneumothorax  Stable cardiomegaly           Workstation performed: [id number]             Recent Cultures (last 7 days):           Last 24 Hours Medication List:     Current Facility-Administered Medications:  acetaminophen 650 mg Oral Q6H PRN Alissa Roy MD   acetaminophen 650 mg Oral Q6H [address] Francisco Banuelos MD   albuterol 2 puff Inhalation 4x Daily PRN Alissa Roy MD   aspirin 325 mg Oral Daily Alissa Roy MD   budesonide-formoterol 2 puff Inhalation BID Alissa Roy MD   cefazolin 2,000 mg Intravenous Once Stacie Cartagena MD   docusate sodium 100 mg Oral Daily Tiffany Nelson MD   heparin (porcine) 5,000 Units Subcutaneous [facility] Kavitha Tate PA-C   insulin glargine 15 Units Subcutaneous HS Tiffany Nelson MD   insulin lispro 1-6 Units Subcutaneous TID AC Kavitha Tate PA-C   levothyroxine 150 mcg Oral Early Morning Alissa Roy MD   LORazepam 0 5 mg Oral Q8H PRN Alissa Roy MD   metoprolol tartrate 12 5 mg Oral Q12H [address] VALENCIA Knapp   morphine injection 2 mg Intravenous Q3H PRN Alissa Roy MD   nitroglycerin 0 4 mg Sublingual Q5 Min PRN Alissa Roy MD   ondansetron 4 mg Intravenous Q4H PRN Francisco Banuelos MD   oxyCODONE 10 mg Oral Q4H PRN Francisco Banuelos MD   oxyCODONE 5 mg Oral Q4H PRN Francisco Banuelos MD   pantoprazole 40 mg Oral Early Morning Alissa Roy MD   polyethylene glycol 17 g Oral Daily PRN Madhav Hart MD   rosuvastatin 40 mg Oral After Kristi Polo MD   senna 2 tablet Oral Daily Lulu Rivera MD   tamsulosin 0 4 mg Oral Daily With Sanjeev Hickman MD        Today, Patient Was Seen By: Lulu Rivera MD    ** Please Note: Dictation voice to text software may have been used in the creation of this document   **

## 2018-12-01 NOTE — ASSESSMENT & PLAN NOTE
ISS and accucheks for mealtime coverage, increased ISS intensity to moderate intensity given uncontrolled hyperglycemia  continue Lantus to 15 units at bedtime  Patient takes 20 units at home

## 2018-12-01 NOTE — PLAN OF CARE
Problem: Nutrition/Hydration-ADULT  Goal: Nutrient/Hydration intake appropriate for improving, restoring or maintaining nutritional needs  Monitor and assess patient's nutrition/hydration status for malnutrition (ex- brittle hair, bruises, dry skin, pale skin and conjunctiva, muscle wasting, smooth red tongue, and disorientation)  Collaborate with interdisciplinary team and initiate plan and interventions as ordered  Monitor patient's weight and dietary intake as ordered or per policy  Utilize nutrition screening tool and intervene per policy  Determine patient's food preferences and provide high-protein, high-caloric foods as appropriate       INTERVENTIONS:  - Monitor oral intake, urinary output, labs, and treatment plans  - Assess nutrition and hydration status and recommend course of action  - Evaluate amount of meals eaten  - Assist patient with eating if necessary   - Allow adequate time for meals  - Recommend/ encourage appropriate diets, oral nutritional supplements, and vitamin/mineral supplements  - Order, calculate, and assess calorie counts as needed  - Recommend, monitor, and adjust tube feedings and TPN/PPN based on assessed needs  - Assess need for intravenous fluids  - Provide specific nutrition/hydration education as appropriate  - Include patient/family/caregiver in decisions related to nutrition   Outcome: Progressing      Problem: Prexisting or High Potential for Compromised Skin Integrity  Goal: Skin integrity is maintained or improved  INTERVENTIONS:  - Identify patients at risk for skin breakdown  - Assess and monitor skin integrity  - Assess and monitor nutrition and hydration status  - Monitor labs (i e  albumin)  - Assess for incontinence   - Turn and reposition patient  - Assist with mobility/ambulation  - Relieve pressure over bony prominences  - Avoid friction and shearing  - Provide appropriate hygiene as needed including keeping skin clean and dry  - Evaluate need for skin moisturizer/barrier cream  - Collaborate with interdisciplinary team (i e  Nutrition, Rehabilitation, etc )   - Patient/family teaching   Outcome: Progressing      Problem: Potential for Falls  Goal: Patient will remain free of falls  INTERVENTIONS:  - Assess patient frequently for physical needs  -  Identify cognitive and physical deficits and behaviors that affect risk of falls    -  [place] fall precautions as indicated by assessment   - Educate patient/family on patient safety including physical limitations  - Instruct patient to call for assistance with activity based on assessment  - Modify environment to reduce risk of injury  - Consider OT/PT consult to assist with strengthening/mobility    Outcome: Progressing      Problem: PAIN - ADULT  Goal: Verbalizes/displays adequate comfort level or baseline comfort level  Interventions:  - Encourage patient to monitor pain and request assistance  - Assess pain using appropriate pain scale  - Administer analgesics based on type and severity of pain and evaluate response  - Implement non-pharmacological measures as appropriate and evaluate response  - Consider cultural and social influences on pain and pain management  - Notify physician/advanced practitioner if interventions unsuccessful or patient reports new pain   Outcome: Progressing      Problem: INFECTION - ADULT  Goal: Absence or prevention of progression during hospitalization  INTERVENTIONS:  - Assess and monitor for signs and symptoms of infection  - Monitor lab/diagnostic results  - Monitor all insertion sites, i e  indwelling lines, tubes, and drains  - Monitor endotracheal (as able) and nasal secretions for changes in amount and color  - [place] appropriate cooling/warming therapies per order  - Administer medications as ordered  - Instruct and encourage patient and family to use good hand hygiene technique  - Identify and instruct in appropriate isolation precautions for identified infection/condition Outcome: Progressing      Problem: SAFETY ADULT  Goal: Maintain or return to baseline ADL function  INTERVENTIONS:  -  Assess patient's ability to carry out ADLs; assess patient's baseline for ADL function and identify physical deficits which impact ability to perform ADLs (bathing, care of mouth/teeth, toileting, grooming, dressing, etc )  - Assess/evaluate cause of self-care deficits   - Assess range of motion  - Assess patient's mobility; develop plan if impaired  - Assess patient's need for assistive devices and provide as appropriate  - Encourage maximum independence but intervene and supervise when necessary  ¯ Involve family in performance of ADLs  ¯ Assess for home care needs following discharge   ¯ Request OT consult to assist with ADL evaluation and planning for discharge  ¯ Provide patient education as appropriate   Outcome: Progressing    Goal: Maintain or return mobility status to optimal level  INTERVENTIONS:  - Assess patient's baseline mobility status (ambulation, transfers, stairs, etc )    - Identify cognitive and physical deficits and behaviors that affect mobility  - Identify mobility aids required to assist with transfers and/or ambulation (gait belt, sit-to-stand, lift, walker, cane, etc )  - [place] fall precautions as indicated by assessment  - Record patient progress and toleration of activity level on Mobility SBAR; progress patient to next Phase/Stage  - Instruct patient to call for assistance with activity based on assessment  - Request Rehabilitation consult to assist with strengthening/weightbearing, etc    Outcome: Progressing    Goal: Patient will remain free of falls  INTERVENTIONS:  - Assess patient frequently for physical needs  -  Identify cognitive and physical deficits and behaviors that affect risk of falls    -  [place] fall precautions as indicated by assessment   - Educate patient/family on patient safety including physical limitations  - Instruct patient to call for assistance with activity based on assessment  - Modify environment to reduce risk of injury  - Consider OT/PT consult to assist with strengthening/mobility    Outcome: Progressing      Problem: DISCHARGE PLANNING  Goal: Discharge to home or other facility with appropriate resources  INTERVENTIONS:  - Identify barriers to discharge w/patient and caregiver  - Arrange for needed discharge resources and transportation as appropriate  - Identify discharge learning needs (meds, wound care, etc )  - Arrange for interpretive services to assist at discharge as needed  - Refer to Case Management Department for coordinating discharge planning if the patient needs post-hospital services based on physician/advanced practitioner order or complex needs related to functional status, cognitive ability, or social support system   Outcome: Progressing      Problem: Knowledge Deficit  Goal: Patient/family/caregiver demonstrates understanding of disease process, treatment plan, medications, and discharge instructions  Complete learning assessment and assess knowledge base    Interventions:  - Provide teaching at level of understanding  - Provide teaching via preferred learning methods   Outcome: Progressing      Problem: CARDIOVASCULAR - ADULT  Goal: Absence of cardiac dysrhythmias or at baseline rhythm  INTERVENTIONS:  - Continuous cardiac monitoring, monitor vital signs, obtain 12 lead EKG if indicated  - Administer antiarrhythmic and heart rate control medications as ordered  - Monitor electrolytes and administer replacement therapy as ordered   Outcome: Progressing      Problem: DISCHARGE PLANNING - CARE MANAGEMENT  Goal: Discharge to post-acute care or home with appropriate resources  INTERVENTIONS:  - Conduct assessment to determine patient/family and health care team treatment goals, and need for post-acute services based on payer coverage, community resources, and patient preferences, and barriers to discharge  - Address psychosocial, clinical, and financial barriers to discharge as identified in assessment in conjunction with the patient/family and health care team  - Arrange appropriate level of post-acute services according to patient's   needs and preference and payer coverage in collaboration with the physician and health care team  - Communicate with and update the patient/family, physician, and health care team regarding progress on the discharge plan  - Arrange appropriate transportation to post-acute venues   Outcome: Progressing

## 2018-12-01 NOTE — ASSESSMENT & PLAN NOTE
· Baseline creatinine 1 3-1 5  · Close to baseline now,  · Appreciate Nephrology's recommendations  · Will give him trial of void    · Strict I&Os  · Losartan-HCTZ on hold - see further plan regarding this below

## 2018-12-01 NOTE — ASSESSMENT & PLAN NOTE
· This has been since his recent CABG/AVR  · He follows with Dr Zain Traore of Thoracic Surgery as outpatient, appreciate their recommendations   · He has had multiple thoracentesis  Per Thoracic surgery notes, patient had elected to proceed with pleurodesis (was initially scheduled for 11/23/18) over Pleur-X cath, however this was not performed - rather, the patient had an IR thoracentesis with 1,300mL out  · s/p L VATS & pleurodesis on 11/27 ans s/p CT placement  Chest tube removed by thoracic surgery on 11/30  · Follow-up x-ray shows no evidence of pneumothorax

## 2018-12-01 NOTE — NURSING NOTE
As per Dr Salas Horse with [facility] verbal order to discontinue aj catheter for voiding trail  Patient due to void at 0000hrs

## 2018-12-01 NOTE — ASSESSMENT & PLAN NOTE
· Continue Flomax  · Now with acute urinary retention status post Gee placement  · Will give him trial of void

## 2018-12-01 NOTE — SOCIAL WORK
ERASTO was in contact with Pt's son Carolina Dudley [phone] to discuss the SNF recommendation for Pt  Carolina Dudley reported he will let the Pt make the decision regarding his d/c plan  Canton Luis Enrique reported the Pt will be reluctant to go into SNF due to a bill he acquired during his last stay at [facility]  CM met with Pt to reiterate the SNF recommendation which he adamantly reported his refusal, stating he will go home with therapy  ERASTO made Pt's RN and Gilberto Garcia aware of the above  Dr Merry Prieto reported the Pt can possibly be ready for d/c tomorrow  ERASTO called and left Carolina Dudley a message requesting a call back regarding d/c planning and the need for transport upon d/c

## 2018-12-01 NOTE — SOCIAL WORK
ERASTO received a call from Pt's son Maegan Limon reporting he spoke to the Pt and he is now agreeable to the recommendation of SNF  Maegan Limon requested a SNF list be left in the Pt's room for review of choices when he visits him tomorrow  ERASTO left the requested SNF list and made Pt's RN and Dr Leona Miranda aware of the above

## 2018-12-02 LAB
GLUCOSE SERPL-MCNC: 125 MG/DL (ref 65–140)
GLUCOSE SERPL-MCNC: 146 MG/DL (ref 65–140)
GLUCOSE SERPL-MCNC: 216 MG/DL (ref 65–140)
GLUCOSE SERPL-MCNC: 267 MG/DL (ref 65–140)

## 2018-12-02 PROCEDURE — 82948 REAGENT STRIP/BLOOD GLUCOSE: CPT

## 2018-12-02 PROCEDURE — 99232 SBSQ HOSP IP/OBS MODERATE 35: CPT | Performed by: INTERNAL MEDICINE

## 2018-12-02 RX ADMIN — BUDESONIDE AND FORMOTEROL FUMARATE DIHYDRATE 2 PUFF: 160; 4.5 AEROSOL RESPIRATORY (INHALATION) at 21:14

## 2018-12-02 RX ADMIN — LORAZEPAM 0.5 MG: 0.5 TABLET ORAL at 21:14

## 2018-12-02 RX ADMIN — HEPARIN SODIUM 5000 UNITS: 5000 INJECTION INTRAVENOUS; SUBCUTANEOUS at 06:18

## 2018-12-02 RX ADMIN — TAMSULOSIN HYDROCHLORIDE 0.4 MG: 0.4 CAPSULE ORAL at 17:05

## 2018-12-02 RX ADMIN — ACETAMINOPHEN 650 MG: 325 TABLET, FILM COATED ORAL at 17:05

## 2018-12-02 RX ADMIN — ACETAMINOPHEN 650 MG: 325 TABLET, FILM COATED ORAL at 11:24

## 2018-12-02 RX ADMIN — HEPARIN SODIUM 5000 UNITS: 5000 INJECTION INTRAVENOUS; SUBCUTANEOUS at 21:14

## 2018-12-02 RX ADMIN — ACETAMINOPHEN 650 MG: 325 TABLET, FILM COATED ORAL at 06:11

## 2018-12-02 RX ADMIN — ROSUVASTATIN CALCIUM 40 MG: 40 TABLET, COATED ORAL at 17:05

## 2018-12-02 RX ADMIN — PANTOPRAZOLE SODIUM 40 MG: 40 TABLET, DELAYED RELEASE ORAL at 06:11

## 2018-12-02 RX ADMIN — LEVOTHYROXINE SODIUM 150 MCG: 75 TABLET ORAL at 06:11

## 2018-12-02 RX ADMIN — HEPARIN SODIUM 5000 UNITS: 5000 INJECTION INTRAVENOUS; SUBCUTANEOUS at 14:20

## 2018-12-02 RX ADMIN — ASPIRIN 81 MG 325 MG: 81 TABLET ORAL at 09:15

## 2018-12-02 RX ADMIN — OXYCODONE HYDROCHLORIDE 5 MG: 5 TABLET ORAL at 21:14

## 2018-12-02 RX ADMIN — INSULIN LISPRO 3 UNITS: 100 INJECTION, SOLUTION INTRAVENOUS; SUBCUTANEOUS at 11:23

## 2018-12-02 RX ADMIN — BUDESONIDE AND FORMOTEROL FUMARATE DIHYDRATE 2 PUFF: 160; 4.5 AEROSOL RESPIRATORY (INHALATION) at 09:17

## 2018-12-02 RX ADMIN — DOCUSATE SODIUM 100 MG: 100 CAPSULE, LIQUID FILLED ORAL at 09:16

## 2018-12-02 RX ADMIN — METOPROLOL TARTRATE 12.5 MG: 25 TABLET ORAL at 21:15

## 2018-12-02 RX ADMIN — METOPROLOL TARTRATE 12.5 MG: 25 TABLET ORAL at 09:15

## 2018-12-02 RX ADMIN — SENNOSIDES 17.2 MG: 8.6 TABLET, FILM COATED ORAL at 09:16

## 2018-12-02 RX ADMIN — INSULIN GLARGINE 15 UNITS: 100 INJECTION, SOLUTION SUBCUTANEOUS at 21:14

## 2018-12-02 RX ADMIN — POLYETHYLENE GLYCOL 3350 17 G: 17 POWDER, FOR SOLUTION ORAL at 09:17

## 2018-12-02 NOTE — ASSESSMENT & PLAN NOTE
ISS and accucheks for mealtime coverage, increased ISS intensity to moderate intensity given uncontrolled hyperglycemia  Increase Lantus to 20 units at bedtime which is patient's home dose

## 2018-12-02 NOTE — ASSESSMENT & PLAN NOTE
Physical therapy recommended SNF level of rehab which patient refused  Patient would like to go home with home physical therapy, but as per son Belen Gilbert, after his discussion with the patient, patient agreed with going to rehab which he refused on 12/02/2018 again  I spoke with patient's son Belen Gilbert will mentions that he will come to hospital and will have discussion with patient and us at bedside  Discharge planning pending meeting regarding rehab versus home physical therapy

## 2018-12-02 NOTE — PROGRESS NOTES
Progress Note - Kemar Ek 1935, 80 y o  male MRN: [id number]    Unit/Bed#: [facility] 530-01 Encounter: [id number]    Primary Care Provider: Joslyn Kemp DO   Date and time admitted to hospital: 11/24/2018 12:39 AM        Recurrent left pleural effusion   Assessment & Plan    · This has been since his recent CABG/AVR  · He follows with Dr Aletha Xiao of Thoracic Surgery as outpatient, appreciate their recommendations   · He has had multiple thoracentesis  Per Thoracic surgery notes, patient had elected to proceed with pleurodesis (was initially scheduled for 11/23/18) over Pleur-X cath, however this was not performed - rather, the patient had an IR thoracentesis with 1,300mL out  · s/p L VATS & pleurodesis on 11/27 ans s/p CT placement  Chest tube removed by thoracic surgery on 11/30  · Follow-up x-ray shows no evidence of pneumothorax  · Outpatient thoracic surgery follow-up  Ambulatory dysfunction   Assessment & Plan    Physical therapy recommended SNF level of rehab which patient refused  Patient would like to go home with home physical therapy, but as per son Shannan Capone, after his discussion with the patient, patient agreed with going to rehab which he refused on 12/02/2018 again  I spoke with patient's son Shannan Capone will mentions that he will come to hospital and will have discussion with patient and us at bedside  Discharge planning pending meeting regarding rehab versus home physical therapy  Delirium   Assessment & Plan    Resolved  On and off intermittent episodes of confusion without agitation  Delirium precautions  Anemia due to stage 3 chronic kidney disease ([address] )   Assessment & Plan    Stable for now  Continue to trend,     Acute renal failure superimposed on stage 3 chronic kidney disease (HCC)   Assessment & Plan    · Baseline creatinine 1 3-1 5  · Close to baseline now,  · Appreciate Nephrology's recommendations  · Will give him trial of void    · Strict I&Os  · Losartan-HCTZ on hold - continue to hold on discharge as well  Restart on outpatient basis per Nephrology recommendations  Aortic stenosis   Assessment & Plan    · S/p AVR (Asa Burger) in September of this year     CAD (coronary artery disease)   Assessment & Plan    · S/p CABG x4 in September of this year  · On daily aspirin, statin, BB and crestor,     Type 2 diabetes mellitus with hypoglycemia without coma, with long-term current use of insulin (HCC)   Assessment & Plan     ISS and accucheks for mealtime coverage, increased ISS intensity to moderate intensity given uncontrolled hyperglycemia  Increase Lantus to 20 units at bedtime which is patient's home dose  Pure hypercholesterolemia   Assessment & Plan    · With Lipitor allergy, continue home Crestor which is 40 mg daily     Hypothyroidism   Assessment & Plan    · TSH and fT4 WNL  · Continue levothyroxine     Benign essential hypertension   Assessment & Plan    · Losartan-HCTZ on hold given ADAM  · Continue BB     Benign prostatic hyperplasia with nocturia   Assessment & Plan    · Continue Flomax  · Had acute urinary retention status post Gee placement  · Status post successful trial of void last night  * NSTEMI (non-ST elevated myocardial infarction) [facility])   Assessment & Plan    · Type 2 NSTEMI in the setting of ADAM and hypoglycemia  · Appreciate cardiology evaluation  VTE Pharmacologic Prophylaxis:   Pharmacologic:  Subcu heparin  Mechanical VTE Prophylaxis in Place: Yes    Patient Centered Rounds: I have performed bedside rounds with nursing staff today  Discussions with Specialists or Other Care Team Provider:      Education and Discussions with Family / Patient:  Discussed plan of care the patient at bedside and also called patient's son Richard Richardson and talked to him  Time Spent for Care: 30 minutes  More than 50% of total time spent on counseling and coordination of care as described above      Current Length of Stay: 8 day(s)    Current Patient Status: Inpatient   Certification Statement: The patient will continue to require additional inpatient hospital stay due to Pending discussion regarding rehab versus home physical therapy    Discharge Plan:  Physical therapy recommending rehab but patient wants to go to home with home physical therapy but son wants to discuss further with patient and also at bedside (awaiting son july to come to the hospital today )    Code Status: Level 1 - Full Code      Subjective:   Patient denies any acute symptoms  He had a bowel movement yesterday and also voided without any difficulties after Gee removal   Patient again refuses to go to rehab and wants to continue with home physical therapy  S spoke with patient's son Sharlene Motley about this and he mentions that he would come to the hospital on talk with patient regarding going to rehab as patient agreed to him on phone  Objective:     Vitals:   Temp (24hrs), Av 3 °F (36 8 °C), Min:98 °F (36 7 °C), Max:98 9 °F (37 2 °C)    Temp:  [98 °F (36 7 °C)-98 9 °F (37 2 °C)] 98 1 °F (36 7 °C)  HR:  [80-86] 81  Resp:  [18] 18  BP: (129-134)/(69-76) 131/70  SpO2:  [95 %-99 %] 99 %  Body mass index is 29 4 kg/m²  Input and Output Summary (last 24 hours): Intake/Output Summary (Last 24 hours) at 18 1615  Last data filed at 18 1459   Gross per 24 hour   Intake              800 ml   Output             1275 ml   Net             -475 ml       Physical Exam:     Physical Exam  Constitutional: He is oriented to person, place, and time  No distress  Eyes: Pupils are equal, round, and reactive to light  Cardiovascular: Normal rate, regular rhythm, normal heart sounds and intact distal pulses     No murmur heard  Pulmonary/Chest: Breath sounds normal  No respiratory distress  He has no wheezes  He has no rales  Abdominal: Soft  Bowel sounds are normal  He exhibits no distension  There is no tenderness     Musculoskeletal: He exhibits no edema  Neurological: He is alert and oriented to person, place, and time  No focal mtoor deficits   Skin: Skin is warm  Additional Data:     Labs:      Results from last 7 days  Lab Units 12/01/18  0515   WBC Thousand/uL 8 91   HEMOGLOBIN g/dL 9 0*   HEMATOCRIT % 29 4*   PLATELETS Thousands/uL 307   NEUTROS PCT % 70   LYMPHS PCT % 17   MONOS PCT % 8   EOS PCT % 4       Results from last 7 days  Lab Units 12/01/18  0515   SODIUM mmol/L 137   POTASSIUM mmol/L 4 1   CHLORIDE mmol/L 105   CO2 mmol/L 26   BUN mg/dL 26*   CREATININE mg/dL 1 55*   ANION GAP mmol/L 6   CALCIUM mg/dL 7 8*   GLUCOSE RANDOM mg/dL 117           Results from last 7 days  Lab Units 12/02/18  1613 12/02/18  1109 12/02/18  0651 12/01/18  2115 12/01/18  1613 12/01/18  1127 12/01/18  0740 11/30/18  2044 11/30/18  1609 11/30/18  1139 11/30/18  0655 11/29/18  2036   POC GLUCOSE mg/dl 146* 267* 125 251* 126 190* 118 179* 199* 185* 164* 221*                   * I Have Reviewed All Lab Data Listed Above  * Additional Pertinent Lab Tests Reviewed: All Labs Within Last 24 Hours Reviewed    Imaging:    XR chest pa & lateral   Final Result by Stephanie Schilling DO (11/30 1139)   1  No evidence of pneumothorax, status post left-sided chest tube removal    2   Stable pleural-parenchymal disease left lower lobe  Workstation performed: [id number]         XR chest pa & lateral   Final Result by Keon Mckeon MD (11/28 0317)      Minimal left pleural effusion and atelectasis  No pneumothorax  Stable cardiomegaly           Workstation performed: [id number]             Recent Cultures (last 7 days):           Last 24 Hours Medication List:     Current Facility-Administered Medications:  acetaminophen 650 mg Oral Q6H PRN Verónica Espitia MD   acetaminophen 650 mg Oral Q6H [facility] Garth Adam MD   albuterol 2 puff Inhalation 4x Daily PRN Verónica Espitia MD   aspirin 325 mg Oral Daily Verónica Espitia MD   budesonide-formoterol 2 puff Inhalation BID Van Rebolledo MD   cefazolin 2,000 mg Intravenous Once Josue Goel MD   docusate sodium 100 mg Oral Daily Versa Crigler, MD   heparin (porcine) 5,000 Units Subcutaneous [facility] Kerney Opitz, PA-C   insulin glargine 15 Units Subcutaneous HS Versa Crigler, MD   insulin lispro 1-6 Units Subcutaneous TID AC Kerney Opitz, PA-C   levothyroxine 150 mcg Oral Early Morning Van Rebolledo MD   LORazepam 0 5 mg Oral Q8H PRN Van Rebolledo MD   metoprolol tartrate 12 5 mg Oral Q12H [address] VALENCIA Hernández   morphine injection 2 mg Intravenous Q3H PRN Van Rebolledo MD   nitroglycerin 0 4 mg Sublingual Q5 Min PRN Van Rebolledo MD   ondansetron 4 mg Intravenous Q4H PRN Alen Lorenzana MD   oxyCODONE 10 mg Oral Q4H PRN Alen Lorenzana MD   oxyCODONE 5 mg Oral Q4H PRN Alen Lorenzana MD   pantoprazole 40 mg Oral Early Morning Van Rebolledo MD   polyethylene glycol 17 g Oral Daily PRN Yudelka Quinn MD   rosuvastatin 40 mg Oral After Paige Raza MD   senna 2 tablet Oral Daily Versa Crigler, MD   tamsulosin 0 4 mg Oral Daily With Paige Raza MD        Today, Patient Was Seen By: Versa Crigler, MD    ** Please Note: Dictation voice to text software may have been used in the creation of this document   **

## 2018-12-02 NOTE — ASSESSMENT & PLAN NOTE
· Baseline creatinine 1 3-1 5  · Close to baseline now,  · Appreciate Nephrology's recommendations  · Will give him trial of void  · Strict I&Os  · Losartan-HCTZ on hold - continue to hold on discharge as well  Restart on outpatient basis per Nephrology recommendations

## 2018-12-02 NOTE — SOCIAL WORK
Pt's son Ginette Burton, came to visit the Pt and further discuss SNF recommendations  Ginette Burton requested CM made SNF referrals to [facility] [address] and then [facility]  CM made the requested referrals and will continue to follow

## 2018-12-02 NOTE — ASSESSMENT & PLAN NOTE
· Continue Flomax  · Had acute urinary retention status post Gee placement  · Status post successful trial of void last night

## 2018-12-02 NOTE — PROGRESS NOTES
CM notified the patient's son was in to visit today and they both decided on three choices for rehab

## 2018-12-02 NOTE — ASSESSMENT & PLAN NOTE
· This has been since his recent CABG/AVR  · He follows with Dr Marlin Gentile of Thoracic Surgery as outpatient, appreciate their recommendations   · He has had multiple thoracentesis  Per Thoracic surgery notes, patient had elected to proceed with pleurodesis (was initially scheduled for 11/23/18) over Pleur-X cath, however this was not performed - rather, the patient had an IR thoracentesis with 1,300mL out  · s/p L VATS & pleurodesis on 11/27 ans s/p CT placement  Chest tube removed by thoracic surgery on 11/30  · Follow-up x-ray shows no evidence of pneumothorax  · Outpatient thoracic surgery follow-up

## 2018-12-03 ENCOUNTER — PATIENT OUTREACH (OUTPATIENT)
Dept: FAMILY MEDICINE CLINIC | Facility: HOSPITAL | Age: 83
End: 2018-12-03

## 2018-12-03 PROBLEM — D72.829 LEUKOCYTOSIS: Status: RESOLVED | Noted: 2018-11-28 | Resolved: 2018-12-03

## 2018-12-03 PROBLEM — R41.0 DELIRIUM: Status: RESOLVED | Noted: 2018-11-28 | Resolved: 2018-12-03

## 2018-12-03 LAB
GLUCOSE SERPL-MCNC: 120 MG/DL (ref 65–140)
GLUCOSE SERPL-MCNC: 159 MG/DL (ref 65–140)
GLUCOSE SERPL-MCNC: 176 MG/DL (ref 65–140)
GLUCOSE SERPL-MCNC: 224 MG/DL (ref 65–140)

## 2018-12-03 PROCEDURE — 97535 SELF CARE MNGMENT TRAINING: CPT

## 2018-12-03 PROCEDURE — 97110 THERAPEUTIC EXERCISES: CPT

## 2018-12-03 PROCEDURE — 82948 REAGENT STRIP/BLOOD GLUCOSE: CPT

## 2018-12-03 PROCEDURE — 97116 GAIT TRAINING THERAPY: CPT

## 2018-12-03 PROCEDURE — 99232 SBSQ HOSP IP/OBS MODERATE 35: CPT | Performed by: HOSPITALIST

## 2018-12-03 RX ADMIN — BUDESONIDE AND FORMOTEROL FUMARATE DIHYDRATE 2 PUFF: 160; 4.5 AEROSOL RESPIRATORY (INHALATION) at 09:36

## 2018-12-03 RX ADMIN — ASPIRIN 81 MG 325 MG: 81 TABLET ORAL at 09:35

## 2018-12-03 RX ADMIN — ACETAMINOPHEN 650 MG: 325 TABLET, FILM COATED ORAL at 12:34

## 2018-12-03 RX ADMIN — ACETAMINOPHEN 650 MG: 325 TABLET, FILM COATED ORAL at 23:20

## 2018-12-03 RX ADMIN — INSULIN LISPRO 2 UNITS: 100 INJECTION, SOLUTION INTRAVENOUS; SUBCUTANEOUS at 12:34

## 2018-12-03 RX ADMIN — METOPROLOL TARTRATE 12.5 MG: 25 TABLET ORAL at 21:37

## 2018-12-03 RX ADMIN — INSULIN GLARGINE 15 UNITS: 100 INJECTION, SOLUTION SUBCUTANEOUS at 21:37

## 2018-12-03 RX ADMIN — DOCUSATE SODIUM 100 MG: 100 CAPSULE, LIQUID FILLED ORAL at 09:35

## 2018-12-03 RX ADMIN — METOPROLOL TARTRATE 12.5 MG: 25 TABLET ORAL at 09:35

## 2018-12-03 RX ADMIN — TAMSULOSIN HYDROCHLORIDE 0.4 MG: 0.4 CAPSULE ORAL at 15:45

## 2018-12-03 RX ADMIN — SENNOSIDES 17.2 MG: 8.6 TABLET, FILM COATED ORAL at 09:36

## 2018-12-03 RX ADMIN — ROSUVASTATIN CALCIUM 40 MG: 40 TABLET, COATED ORAL at 17:18

## 2018-12-03 RX ADMIN — HEPARIN SODIUM 5000 UNITS: 5000 INJECTION INTRAVENOUS; SUBCUTANEOUS at 15:45

## 2018-12-03 RX ADMIN — HEPARIN SODIUM 5000 UNITS: 5000 INJECTION INTRAVENOUS; SUBCUTANEOUS at 21:37

## 2018-12-03 RX ADMIN — ACETAMINOPHEN 650 MG: 325 TABLET, FILM COATED ORAL at 17:18

## 2018-12-03 RX ADMIN — BUDESONIDE AND FORMOTEROL FUMARATE DIHYDRATE 2 PUFF: 160; 4.5 AEROSOL RESPIRATORY (INHALATION) at 21:37

## 2018-12-03 RX ADMIN — INSULIN LISPRO 1 UNITS: 100 INJECTION, SOLUTION INTRAVENOUS; SUBCUTANEOUS at 17:18

## 2018-12-03 NOTE — PHYSICAL THERAPY NOTE
PT Treatment       12/03/18 1025   Pain Assessment   Pain Assessment 0-10   Pain Score ("Some discomfort")   Pain Type Acute pain   Pain Location Abdomen   Pain Orientation Left   Restrictions/Precautions   Weight Bearing Precautions Per Order No   Other Precautions Cognitive; Chair Alarm; Bed Alarm; Fall Risk;Pain   General   Chart Reviewed Yes   Family/Caregiver Present No   Cognition   Overall Cognitive Status Impaired   Arousal/Participation Alert; Cooperative   Attention Difficulty attending to directions   Orientation Level Oriented to person;Oriented to place;Oriented to time;Disoriented to situation   Memory Decreased long term memory;Decreased recall of biographical information;Decreased recall of recent events;Decreased short term memory;Decreased recall of precautions   Following Commands Follows one step commands with increased time or repetition   Bed Mobility   Supine to Sit 5  Supervision   Additional items HOB elevated; Increased time required   Transfers   Sit to Stand (CGA)   Additional items Increased time required;Verbal cues   Stand to Sit (CGA)   Additional items Increased time required;Verbal cues   Ambulation/Elevation   Gait pattern Forward Flexion; Short stride; Excessively slow   Gait Assistance 4  Minimal assist   Additional items Assist x 1;Verbal cues; Tactile cues   Assistive Device Rolling walker   Distance 130'x2  (Standing rest break, SOB noted at 70')   Balance   Static Sitting Fair +   Dynamic Sitting Fair +   Static Standing Fair   Dynamic Standing Poor +   Ambulatory Poor +   Endurance Deficit   Endurance Deficit Yes   Endurance Deficit Description SOB, fatigue, weakness   Activity Tolerance   Activity Tolerance Patient tolerated treatment well   Medical Staff Made Aware Yes, spoke with CM regarding d/c planning   Nurse Made Aware Yes, RN cleared pt for PT evaluation   Exercises   Hip Abduction 5 reps;Bilateral;Standing  (RW)   Knee AROM Long Arc Quad 10 reps;Bilateral;Sitting   Ankle Pumps Bilateral;Sitting;Standing;10 reps  (Standing heel raises, seated AROM DF/PF)   Marching 10 reps;Bilateral;Sitting   Assessment   Prognosis Good   Problem List Decreased strength;Decreased endurance; Impaired balance;Decreased mobility; Decreased coordination;Decreased cognition;Decreased safety awareness;Pain   Assessment Pt was agreeable to PT treatment session  He demonstrates improved endurance during ambulation, but continues to require min A for instability with need for increased external support (I with SPC prior)  Pt demonstrated difficulty attending to cues and was unable to follow therex routine with verbal and tactile cueing  He demonstrated BUE muscle fatigue and weakness with visible trembling during standing therex  Pt with facial droop, but denies hx of stroke  Medical staff aware  Pt would continue to benefit from skilled PT to improve strength, endurance, balance, and mobility in order to return to OF  Barriers to Discharge Decreased caregiver support   Goals   Patient Goals To go to [facility] for rehab   STG Expiration Date 12/09/18   Treatment Day 2   Plan   Treatment/Interventions Functional transfer training;LE strengthening/ROM; Therapeutic exercise; Endurance training;Patient/family training;Equipment eval/education; Bed mobility;Gait training;Spoke to nursing;Spoke to case management   Progress Progressing toward goals   PT Frequency (3-5x/wk)   Recommendation   Recommendation (Rehab)   Equipment Recommended Joyce Richardson   PT - OK to Discharge Yes   Additional Comments When medically stable     Osei Kong PT, DPT

## 2018-12-03 NOTE — UTILIZATION REVIEW
[address] Utilization Review Department  Phone: [phone]; Fax [phone]  [email or web address]  ATTENTION: Please call with any questions or concerns to [phone]  and carefully listen to the prompts so that you are directed to the right person  Send all requests for admission clinical reviews, approved or denied determinations and any other requests to fax [phone]  All voicemails are confidential         Continued Stay Review    Date: 12/3/18 Monday ACUTE MED SURG LEVEL OF CARE    Vital Signs: /72   Pulse 85   Temp 98 1 °F (36 7 °C) (Oral)   Resp 16   Ht 5' 8" (1 727 m)   Wt 87 7 kg (193 lb 5 5 oz)   SpO2 97%   BMI 29 40 kg/m²       12/02 0701 12/03 0700 12/03 0701  12/03 1118  Most Recent    Temperature (°F) 98 1-98 4 98 1  98 1 (36 7)    Pulse 80-81 85  85    Respirations 18-20 16  16    Blood Pressure 131//73 144/72  144/72    SpO2 (%) 98-99 97  97         12/01 0701  12/02 0700 12/02 0701  12/03 0700 12/03 0701  12/04 0700   P  O  620 480    Total Intake(mL/kg) 620 (7 1) 480 (5 5)    Urine (mL/kg/hr) 1225 (0 6) 975 (0 5) 325 (0 9)   Total Output 1225 975 325   Net -605 -495 -325         Unmeasured Urine Occurrence  1 x        Diet Cardiac     Dietary nutrition supplements Glucerna with Dinner      IV ACCESS    Medications:   Scheduled Meds:   Current Facility-Administered Medications:  acetaminophen 650 mg Oral Q6H PRN Maxi Juarez MD   acetaminophen 650 mg Oral Q6H Marie Lin MD   albuterol 2 puff Inhalation 4x Daily PRN Maxi Juarez MD   aspirin 325 mg Oral Daily Maxi Juarez MD   budesonide-formoterol 2 puff Inhalation BID Maxi Juarez MD   cefazolin 2,000 mg Intravenous Once Mayra Price MD   docusate sodium 100 mg Oral Daily Juanita Luna MD   heparin (porcine) 5,000 Units Subcutaneous [facility] Marilou Cheatham PA-C   insulin glargine 15 Units Subcutaneous HS Juanita Luna MD   insulin lispro 1-6 Units Subcutaneous TID AC Lori Weiner PA-C   levothyroxine 150 mcg Oral Early Morning Doc Subramanian MD   LORazepam 0 5 mg Oral Q8H PRN Doc Subramanian, MD   metoprolol tartrate 12 5 mg Oral Q12H [address] Birdie Dasen, CRNP   morphine injection 2 mg Intravenous Q3H PRN Doc Subramanian, MD   nitroglycerin 0 4 mg Sublingual Q5 Min PRN Doc Subramanian, MD   ondansetron 4 mg Intravenous Q4H PRN Klarissa Traylor MD   oxyCODONE 10 mg Oral Q4H PRN Klarissa Traylor MD   oxyCODONE 5 mg Oral Q4H PRN Klarissa Traylor MD   pantoprazole 40 mg Oral Early Morning Doc Subramanian, MD   polyethylene glycol 17 g Oral Daily PRN Aurora Hein MD   rosuvastatin 40 mg Oral After Eda Chavez MD   senna 2 tablet Oral Daily Jt Huynh MD   tamsulosin 0 4 mg Oral Daily With Eda Chavez MD       PRN Meds:    acetaminophen    albuterol    LORazepam 0 5 mg po q8hrs prn given x 1/ 24 hrs    morphine injection    nitroglycerin    ondansetron    oxyCODONE 5 mg q4hrs prn given x 1/ 24 hrs    polyethylene glycol      LABS/Diagnostic Results:   Results from last 7 days  Lab Units 12/01/18  0515   WBC Thousand/uL 8 91   HEMOGLOBIN g/dL 9 0*   HEMATOCRIT % 29 4*   PLATELETS Thousands/uL 307   NEUTROS PCT % 70   LYMPHS PCT % 17   MONOS PCT % 8   EOS PCT % 4       Results from last 7 days  Lab Units 12/01/18  0515   SODIUM mmol/L 137   POTASSIUM mmol/L 4 1   CHLORIDE mmol/L 105   CO2 mmol/L 26   BUN mg/dL 26*   CREATININE mg/dL 1 55*   ANION GAP mmol/L 6   CALCIUM mg/dL 7 8*   GLUCOSE RANDOM mg/dL 117       Results from last 7 days  Lab Units 12/02/18  1613 12/02/18  1109 12/02/18  0651 12/01/18  2115 12/01/18  1613 12/01/18  1127 12/01/18  0740 11/30/18  2044 11/30/18  1609 11/30/18  1139 11/30/18  0655 11/29/18 2036   POC GLUCOSE mg/dl 146* 267* 125 251* 126 190* 118 179* 199* 185* 164* 221*       Age/Sex: 80 y o  male       Assessment/Plan:   Recurrent left pleural effusion   Assessment & Plan     · This has been since his recent CABG/AVR  · He follows with Dr Blue Mcintosh of Thoracic Surgery as outpatient, appreciate their recommendations   · He has had multiple thoracentesis  Per Thoracic surgery notes, patient had elected to proceed with pleurodesis (was initially scheduled for 11/23/18) over Pleur-X cath, however this was not performed - rather, the patient had an IR thoracentesis with 1,300mL out  · s/p L VATS & pleurodesis on 11/27 ans s/p CT placement  Chest tube removed by thoracic surgery on 11/30  · Follow-up x-ray shows no evidence of pneumothorax  · Outpatient thoracic surgery follow-up       Ambulatory dysfunction   Assessment & Plan     Physical therapy recommended SNF level of rehab which patient refused  Patient would like to go home with home physical therapy, but as per son Jamie Tomlin, after his discussion with the patient, patient agreed with going to rehab which he refused on 12/02/2018 again  I spoke with patient's son Jamie Tomlin will mentions that he will come to hospital and will have discussion with patient and us at bedside  Discharge planning pending meeting regarding rehab versus home physical therapy       Delirium   Assessment & Plan     Resolved  On and off intermittent episodes of confusion without agitation  Delirium precautions          Anemia due to stage 3 chronic kidney disease ([facility] [address] )   Assessment & Plan     Stable for now  Continue to trend,      Acute renal failure superimposed on stage 3 chronic kidney disease (HCC)   Assessment & Plan     · Baseline creatinine 1 3-1 5  · Close to baseline now,  · Appreciate Nephrology's recommendations  · Will give him trial of void  · Strict I&Os  · Losartan-HCTZ on hold - continue to hold on discharge as well    Restart on outpatient basis per Nephrology recommendations       Aortic stenosis   Assessment & Plan     · S/p AVR (Marilu Motley) in September of this year      CAD (coronary artery disease)   Assessment & Plan     · S/p CABG x4 in September of this year  · On daily aspirin, statin, BB and crestor,      Type 2 diabetes mellitus with hypoglycemia without coma, with long-term current use of insulin (HCC)   Assessment & Plan      ISS and accucheks for mealtime coverage, increased ISS intensity to moderate intensity given uncontrolled hyperglycemia  Increase Lantus to 20 units at bedtime which is patient's home dose       Pure hypercholesterolemia   Assessment & Plan     · With Lipitor allergy, continue home Crestor which is 40 mg daily      Hypothyroidism   Assessment & Plan     · TSH and fT4 WNL  · Continue levothyroxine      Benign essential hypertension   Assessment & Plan     · Losartan-HCTZ on hold given ADAM  · Continue BB      Benign prostatic hyperplasia with nocturia   Assessment & Plan     · Continue Flomax  · Had acute urinary retention status post Gee placement  · Status post successful trial of void last night       * NSTEMI (non-ST elevated myocardial infarction) [facility])   Assessment & Plan     · Type 2 NSTEMI in the setting of ADAM and hypoglycemia  · Appreciate cardiology evaluation            VTE Pharmacologic Prophylaxis:   Pharmacologic:  Subcu heparin  Mechanical VTE Prophylaxis in Place: Yes     Current Patient Status: Inpatient   Certification Statement: The patient will continue to require additional inpatient hospital stay           Discharge Plan:   [address] 12/3/18  Plan   Treatment/Interventions Functional transfer training;LE strengthening/ROM; Therapeutic exercise; Endurance training;Patient/family training;Equipment eval/education; Bed mobility;Gait training;Spoke to nursing;Spoke to case management   Progress Progressing toward goals   PT Frequency (3-5x/wk)   Recommendation   Recommendation (Rehab)     CASE MANAGEMENT FOLLOWING CLOSELY FOR ALL DISCHARGE NEEDS

## 2018-12-03 NOTE — PLAN OF CARE
Problem: OCCUPATIONAL THERAPY ADULT  Goal: Performs self-care activities at highest level of function for planned discharge setting  See evaluation for individualized goals  Treatment Interventions: Cognitive reorientation, Activityengagement, Continued evaluation  Equipment Recommended: Tub seat with back       See flowsheet documentation for full assessment, interventions and recommendations  Outcome: Not Progressing  Limitation: Decreased ADL status, Decreased Safe judgement during ADL, Decreased endurance, Decreased cognition, Decreased self-care trans, Decreased high-level ADLs  Prognosis: Fair  Assessment: Pt is seen for skilled occupational therapy session 12/3/18 including intervention to further assess cognition  Pt completed the [facility] [place] [place] and scored 12/30 overall, implying moderate cognitive deficit  See below for further details       OT Discharge Recommendation: Short Term Rehab  OT - OK to Discharge: Yes      Comments: KIM Lal, OTR/L

## 2018-12-03 NOTE — PLAN OF CARE
Problem: PHYSICAL THERAPY ADULT  Goal: Performs mobility at highest level of function for planned discharge setting  See evaluation for individualized goals  Treatment/Interventions: Functional transfer training, LE strengthening/ROM, Patient/family training, Equipment eval/education, Bed mobility, Gait training, Spoke to nursing  Equipment Recommended: Anthony Morin       See flowsheet documentation for full assessment, interventions and recommendations  Prognosis: Good  Problem List: Decreased strength, Decreased endurance, Impaired balance, Decreased mobility, Decreased coordination, Decreased cognition, Decreased safety awareness, Pain  Assessment: Pt was agreeable to PT treatment session  He demonstrates improved endurance during ambulation, but continues to require min A for instability with need for increased external support (I with SPC prior)  Pt demonstrated difficulty attending to cues and was unable to follow therex routine with verbal and tactile cueing  He demonstrated BUE muscle fatigue and weakness with visible trembling during standing therex  Pt with facial droop, but denies hx of stroke  Medical staff aware  Pt would continue to benefit from skilled PT to improve strength, endurance, balance, and mobility in order to return to PLOF  Barriers to Discharge: Decreased caregiver support     Recommendation:  (Rehab)     PT - OK to Discharge: Yes    See flowsheet documentation for full assessment

## 2018-12-03 NOTE — SOCIAL WORK
Cm met with pt advised [facility] has a bed available  Pt willing to accept bed  Cm contacted [facility] [phone], spoke with rep Julianna Macias who provided ref # [id number] for pending case and cm awaiting [employer] nurse to call for request for clinicals
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
## 2018-12-03 NOTE — RESTORATIVE TECHNICIAN NOTE
Restorative Specialist Mobility Note       Activity: Chair, Dangle, Ambulate in room, Ambulate in rivas     Assistive Device: Front wheel walker     Ambulation Response: Tolerated fairly well  Repositioned: Up in chair              Anti-Embolism Device On:  Bilateral, Sequential compression devices, below knee

## 2018-12-04 VITALS
BODY MASS INDEX: 29.4 KG/M2 | DIASTOLIC BLOOD PRESSURE: 65 MMHG | HEART RATE: 82 BPM | TEMPERATURE: 98.5 F | SYSTOLIC BLOOD PRESSURE: 125 MMHG | WEIGHT: 194 LBS | HEIGHT: 68 IN | OXYGEN SATURATION: 95 % | RESPIRATION RATE: 18 BRPM

## 2018-12-04 PROBLEM — E44.0 MODERATE PROTEIN-CALORIE MALNUTRITION (HCC): Status: ACTIVE | Noted: 2018-12-04

## 2018-12-04 LAB
GLUCOSE SERPL-MCNC: 158 MG/DL (ref 65–140)
GLUCOSE SERPL-MCNC: 84 MG/DL (ref 65–140)

## 2018-12-04 PROCEDURE — 97110 THERAPEUTIC EXERCISES: CPT

## 2018-12-04 PROCEDURE — 82948 REAGENT STRIP/BLOOD GLUCOSE: CPT

## 2018-12-04 PROCEDURE — 99239 HOSP IP/OBS DSCHRG MGMT >30: CPT | Performed by: HOSPITALIST

## 2018-12-04 PROCEDURE — 97116 GAIT TRAINING THERAPY: CPT

## 2018-12-04 RX ORDER — INSULIN GLARGINE 100 [IU]/ML
15 INJECTION, SOLUTION SUBCUTANEOUS
Qty: 2 PEN | Refills: 0 | Status: SHIPPED | OUTPATIENT
Start: 2018-12-04 | End: 2019-01-22 | Stop reason: SDUPTHER

## 2018-12-04 RX ADMIN — ASPIRIN 81 MG 325 MG: 81 TABLET ORAL at 08:42

## 2018-12-04 RX ADMIN — HEPARIN SODIUM 5000 UNITS: 5000 INJECTION INTRAVENOUS; SUBCUTANEOUS at 13:20

## 2018-12-04 RX ADMIN — ACETAMINOPHEN 650 MG: 325 TABLET, FILM COATED ORAL at 12:13

## 2018-12-04 RX ADMIN — LEVOTHYROXINE SODIUM 150 MCG: 75 TABLET ORAL at 05:48

## 2018-12-04 RX ADMIN — PANTOPRAZOLE SODIUM 40 MG: 40 TABLET, DELAYED RELEASE ORAL at 05:48

## 2018-12-04 RX ADMIN — SENNOSIDES 17.2 MG: 8.6 TABLET, FILM COATED ORAL at 08:42

## 2018-12-04 RX ADMIN — METOPROLOL TARTRATE 12.5 MG: 25 TABLET ORAL at 08:43

## 2018-12-04 RX ADMIN — ACETAMINOPHEN 650 MG: 325 TABLET, FILM COATED ORAL at 05:47

## 2018-12-04 RX ADMIN — INSULIN LISPRO 1 UNITS: 100 INJECTION, SOLUTION INTRAVENOUS; SUBCUTANEOUS at 12:13

## 2018-12-04 RX ADMIN — BUDESONIDE AND FORMOTEROL FUMARATE DIHYDRATE 2 PUFF: 160; 4.5 AEROSOL RESPIRATORY (INHALATION) at 08:43

## 2018-12-04 RX ADMIN — HEPARIN SODIUM 5000 UNITS: 5000 INJECTION INTRAVENOUS; SUBCUTANEOUS at 05:48

## 2018-12-04 RX ADMIN — DOCUSATE SODIUM 100 MG: 100 CAPSULE, LIQUID FILLED ORAL at 08:43

## 2018-12-04 NOTE — ASSESSMENT & PLAN NOTE
· Baseline creatinine 1 3-1 5  · Close to baseline now,  · Appreciate Nephrology's recommendations  · Strict I&Os  · Losartan-HCTZ on hold - continue to hold on discharge as well  Restart on outpatient basis per Nephrology recommendations

## 2018-12-04 NOTE — PHYSICAL THERAPY NOTE
Physical Therapy Progress Note     12/04/18 0929   Pain Assessment   Pain Assessment No/denies pain   Restrictions/Precautions   Weight Bearing Precautions Per Order No   Other Precautions Chair Alarm;Cognitive;Telemetry; Fall Risk   General   Chart Reviewed Yes   Family/Caregiver Present No   Cognition   Overall Cognitive Status WFL   Subjective   Subjective Agreeable to PT  In chair seated upon entry  Transfers   Sit to Stand 5  Supervision   Additional items Assist x 1; Armrests   Stand to Sit 5  Supervision   Additional items Assist x 1; Armrests   Stand pivot 5  Supervision   Additional items Assist x 1   Ambulation/Elevation   Gait pattern Decreased foot clearance; Excessively slow  (LE instability)   Gait Assistance 5  Supervision   Additional items Assist x 1;Verbal cues   Assistive Device Rolling walker;SPC   Distance 200ft x 2 with RW abnd 250ft with [facility]   Stair Management Assistance 5  Supervision   Additional items Assist x 1   Stair Management Technique One rail R;With cane; Foreward; Step to pattern;Nonreciprocal   Number of Stairs 2   Endurance Deficit   Endurance Deficit No   Activity Tolerance   Activity Tolerance Patient tolerated treatment well   Nurse Made Aware Yes   Exercises   Knee AROM Long Arc Quad Sitting;20 reps;AROM; Bilateral   Marching Standing;Bilateral;AROM  (x 30)   Assessment   Prognosis Good   Problem List Decreased strength; Impaired balance;Decreased mobility   Assessment Pt  very pleasant, alert and oriented  Pt  repsonsive appropriately to all tasks/ conversation  Pt  reported he was going for PT at [facility] until about 2 weeks ago  Pt  ambulated with RW and SPC and S provided for both  No Overt LOB noted however improved stability noted with ambulation with RW  Suggested patient use RW for logn distance ambulation  Lateral sways and mild instability noted during ambualtion with SPC  Pt  prefers SPC for ambualtion   Performed standing alt marches with 1 US and BUE support and CGA provided for the same due to impaired balance  Pt  fatigued at the end of session with TE's  Pt  seated on chair with LEs elevated with chair alarm engaged  Pt  will benefit from continued skilled PT to improved impaired balance, endurance and overall mobility  Barriers to Discharge Decreased caregiver support   Goals   Patient Goals None reported   STG Expiration Date 12/09/18   Treatment Day 3   Plan   Treatment/Interventions Functional transfer training;LE strengthening/ROM; Elevations; Therapeutic exercise;Patient/family training;Gait training;Equipment eval/education;Spoke to nursing   Progress Progressing toward goals   PT Frequency Other (Comment)  (3-5x/week)   Recommendation   Recommendation Other (Comment)  (Rehab)   Equipment Recommended Walker;Cane   PT - OK to Discharge Yes     Discussed d/c recommendations with Ananya Zamora PT and agreeable to changing recommendation to Home PT with home with family support due to patient's improved functional mobility in PT session  CM was informed of the same      Santos Hinson PTA

## 2018-12-04 NOTE — SOCIAL WORK
Cm met with pt who is now requesting d/c to home with VNA, does not wish to go to a rehab facility  Pt is stating that he did really well in therapy today and believes he should e able to manage at home  Pt requested cm contact son Mello Chapa to see if he will transport home  Cm contacted son Mello Chapa who would prefer if pt goes to rehab if that is the recommendation  Cm contacted therapy assistant Galen Garrett who worked with pt and she will speak with therapist that say pt yesterday to see if she agrees with recommendation for home therapy  Therapist Ventura Mcdonough and PTA Galen Garrett agrees pt would be able to d/c home with home PT and will change recommendation in notes  Son Mello Chapa made aware and also understands pt has cognitive deficits per Mello Chapa he believes pt is showing signs of dementia  [facility]  Cm cancelled w/c Fidelina Osorio transport with cetronia and updated [facility] regarding change

## 2018-12-04 NOTE — SOCIAL WORK
CM received call from [facility]  Their medical Director Dr Leydi Ashby has denied the request for SNF auth stating pt has no skilled needs  Cm advised Dalia Mayer pt is being discharged to home with VNA services today therefore and that was his preference  Per Dalia Mayer she will close case out

## 2018-12-04 NOTE — ASSESSMENT & PLAN NOTE
· Continue Flomax  · Had acute urinary retention status post Gee placement    · Status post successful voiding trial

## 2018-12-04 NOTE — ASSESSMENT & PLAN NOTE
· This has been since his recent CABG/AVR  · He follows with Dr Jyoti Jay of Thoracic Surgery as outpatient, appreciate their recommendations   · He has had multiple thoracentesis  Per Thoracic surgery notes, patient had elected to proceed with pleurodesis (was initially scheduled for 11/23/18) over Pleur-X cath, however this was not performed - rather, the patient had an IR thoracentesis with 1,300mL out  · s/p L VATS & pleurodesis on 11/27 ans s/p CT placement  Chest tube removed by thoracic surgery on 11/30  · Follow-up x-ray shows no evidence of pneumothorax  · Outpatient thoracic surgery follow-up

## 2018-12-04 NOTE — PROGRESS NOTES
Progress Note - Kemar Ek 1935, 80 y o  male MRN: [id number]    Unit/Bed#: [facility] 530-01 Encounter: [id number]    Primary Care Provider: Joslyn Kemp DO   Date and time admitted to hospital: 11/24/2018 12:39 AM        Ambulatory dysfunction   Assessment & Plan    Physical therapy recommended SNF rehab     Anemia due to stage 3 chronic kidney disease ([address] )   Assessment & Plan    Stable for now  Continue to trend,     Pneumothorax   Assessment & Plan    · CXR s/p thoracentesis showed trace left-sided pneumothorax which was too small for chest tube placement  · Thoracic Surgery following     Acute renal failure superimposed on stage 3 chronic kidney disease (HCC)   Assessment & Plan    · Baseline creatinine 1 3-1 5  · Close to baseline now,  · Appreciate Nephrology's recommendations  · Strict I&Os  · Losartan-HCTZ on hold - continue to hold on discharge as well  Restart on outpatient basis per Nephrology recommendations  Aortic stenosis   Assessment & Plan    · S/p AVR ([facility]) in September of this year     CAD (coronary artery disease)   Assessment & Plan    · S/p CABG x4 in September of this year  · On daily aspirin, statin, BB and crestor,     Recurrent left pleural effusion   Assessment & Plan    · This has been since his recent CABG/AVR  · He follows with Dr Aletha Xiao of Thoracic Surgery as outpatient, appreciate their recommendations   · He has had multiple thoracentesis  Per Thoracic surgery notes, patient had elected to proceed with pleurodesis (was initially scheduled for 11/23/18) over Pleur-X cath, however this was not performed - rather, the patient had an IR thoracentesis with 1,300mL out  · s/p L VATS & pleurodesis on 11/27 ans s/p CT placement  Chest tube removed by thoracic surgery on 11/30  · Follow-up x-ray shows no evidence of pneumothorax  · Outpatient thoracic surgery follow-up       Type 2 diabetes mellitus with hypoglycemia without coma, with long-term current use of insulin ([address] )   Assessment & Plan     ISS and accucheks for mealtime coverage, increased ISS intensity to moderate intensity given uncontrolled hyperglycemia  c/w Lantus to 15 units at bedtime      Pure hypercholesterolemia   Assessment & Plan    · With Lipitor allergy, continue home Crestor which is 40 mg daily     Hypothyroidism   Assessment & Plan    · TSH and fT4 WNL  · Continue levothyroxine     Benign essential hypertension   Assessment & Plan    · Losartan-HCTZ on hold given ADAM  · Continue BB  · BP controlled 115/68     Benign prostatic hyperplasia with nocturia   Assessment & Plan    · Continue Flomax  · Had acute urinary retention status post Gee placement  · Status post successful voiding trial     * NSTEMI (non-ST elevated myocardial infarction) [facility])   Assessment & Plan    · Type 2 NSTEMI in the setting of ADAM and hypoglycemia  · Appreciate cardiology evaluation  VTE Pharmacologic Prophylaxis:   Pharmacologic: Heparin  Mechanical VTE Prophylaxis in Place: Yes    Patient Centered Rounds: I have performed bedside rounds with nursing staff today  Discussions with Specialists or Other Care Team Provider: yes    Education and Discussions with Family / Patient: yes    Time Spent for Care: 45 minutes  More than 50% of total time spent on counseling and coordination of care as described above  Current Length of Stay: 9 day(s)    Current Patient Status: Inpatient   Certification Statement: The patient will continue to require additional inpatient hospital stay due to awaiting placemnt rehab    Discharge Plan: LTCF rehab    Code Status: Level 1 - Full Code      Subjective:   Patient was seen and examined denied any chest pain, palpitation, changes in bowel urinary habits    Review of systems negative otherwise    Objective:     Vitals:   Temp (24hrs), Av 3 °F (36 8 °C), Min:98 1 °F (36 7 °C), Max:98 5 °F (36 9 °C)    Temp:  [98 1 °F (36 7 °C)-98 5 °F (36 9 °C)] 98 5 °F (36 9 °C)  HR: [80-85] 82  Resp:  [16-20] 18  BP: (115-146)/(68-73) 115/68  SpO2:  [97 %-98 %] 97 %  Body mass index is 29 4 kg/m²  Input and Output Summary (last 24 hours): Intake/Output Summary (Last 24 hours) at 12/03/18 1958  Last data filed at 12/03/18 1838   Gross per 24 hour   Intake              360 ml   Output              925 ml   Net             -565 ml       Physical Exam:     Physical Exam   Constitutional: He is oriented to person, place, and time  No distress  HENT:   Head: Normocephalic and atraumatic  Mouth/Throat: Oropharynx is clear and moist    Eyes: Pupils are equal, round, and reactive to light  EOM are normal    Conjunctiva pallor   Neck: Normal range of motion  Neck supple  No JVD present  Cardiovascular: Normal rate  Exam reveals no friction rub  No murmur heard  Pulmonary/Chest: Breath sounds normal  No respiratory distress  He has no wheezes  He has no rales  He exhibits no tenderness  Abdominal: Soft  Bowel sounds are normal  He exhibits no distension  There is no tenderness  There is no rebound and no guarding  Musculoskeletal: Normal range of motion  He exhibits no edema  Neurological: He is alert and oriented to person, place, and time  No cranial nerve deficit  Skin: Skin is warm  No erythema  Psychiatric: He has a normal mood and affect           Additional Data:     Labs:      Results from last 7 days  Lab Units 12/01/18  0515   WBC Thousand/uL 8 91   HEMOGLOBIN g/dL 9 0*   HEMATOCRIT % 29 4*   PLATELETS Thousands/uL 307   NEUTROS PCT % 70   LYMPHS PCT % 17   MONOS PCT % 8   EOS PCT % 4       Results from last 7 days  Lab Units 12/01/18  0515   SODIUM mmol/L 137   POTASSIUM mmol/L 4 1   CHLORIDE mmol/L 105   CO2 mmol/L 26   BUN mg/dL 26*   CREATININE mg/dL 1 55*   ANION GAP mmol/L 6   CALCIUM mg/dL 7 8*   GLUCOSE RANDOM mg/dL 117           Results from last 7 days  Lab Units 12/03/18  1610 12/03/18  1132 12/03/18  0610 12/02/18  2058 12/02/18  1613 12/02/18  1109 12/02/18  0651 12/01/18  2115 12/01/18  1613 12/01/18  1127 12/01/18  0740 11/30/18  2044   POC GLUCOSE mg/dl 159* 224* 120 216* 146* 267* 125 251* 126 190* 118 179*                   * I Have Reviewed All Lab Data Listed Above  * Additional Pertinent Lab Tests Reviewed:  [address] Admission Reviewed    Imaging:    Imaging Reports Reviewed Today     Recent Cultures (last 7 days):           Last 24 Hours Medication List:     Current Facility-Administered Medications:  acetaminophen 650 mg Oral Q6H PRN Ibrahima Esquivel MD   acetaminophen 650 mg Oral Q6H [address] [place] MD   albuterol 2 puff Inhalation 4x Daily PRN Ibrahima Esquivel MD   aspirin 325 mg Oral Daily Ibrahima Esquivel MD   budesonide-formoterol 2 puff Inhalation BID Ibrahima Esquivel MD   cefazolin 2,000 mg Intravenous Once Shreyas Duke MD   docusate sodium 100 mg Oral Daily Jaime Adams MD   heparin (porcine) 5,000 Units Subcutaneous [facility] Radha Peguero PA-C   insulin glargine 15 Units Subcutaneous HS Jaime Adams MD   insulin lispro 1-6 Units Subcutaneous TID AC Radha Peguero PA-C   levothyroxine 150 mcg Oral Early Morning Ibrahima Esquivel MD   LORazepam 0 5 mg Oral Q8H PRN Ibrahima Esquivel MD   metoprolol tartrate 12 5 mg Oral Q12H [address] VALENCIA Granger   morphine injection 2 mg Intravenous Q3H PRN Ibrahima Esquivel MD   nitroglycerin 0 4 mg Sublingual Q5 Min PRN Ibrahima Esquivel MD   ondansetron 4 mg Intravenous Q4H PRN [place] MD   oxyCODONE 10 mg Oral Q4H PRN [place] MD   oxyCODONE 5 mg Oral Q4H PRN [place]a [place] MD   pantoprazole 40 mg Oral Early Morning Ibrahima Esquivel MD   polyethylene glycol 17 g Oral Daily PRN Mounika Gibbons MD   rosuvastatin 40 mg Oral After Augusto Renteria MD   senna 2 tablet Oral Daily Jaime Adams MD   tamsulosin 0 4 mg Oral Daily With Augusto Renteria MD        Today, Patient Was Seen By: Mounika Gibbons MD    ** Please Note: Dictation voice to text software may have been used in the creation of this document   **

## 2018-12-04 NOTE — PLAN OF CARE
Problem: PHYSICAL THERAPY ADULT  Goal: Performs mobility at highest level of function for planned discharge setting  See evaluation for individualized goals  Treatment/Interventions: Functional transfer training, LE strengthening/ROM, Patient/family training, Equipment eval/education, Bed mobility, Gait training, Spoke to nursing  Equipment Recommended: Moise Leon       See flowsheet documentation for full assessment, interventions and recommendations  Outcome: Progressing  Prognosis: Good  Problem List: Decreased strength, Impaired balance, Decreased mobility  Assessment: Pt  very pleasant, alert and oriented  Pt  repsonsive appropriately to all tasks/ conversation  Pt  reported he was going for PT at [facility] until about 2 weeks ago  Pt  ambulated with RW and SPC and S provided for both  No Overt LOB noted however improved stability noted with ambulation with RW  Suggested patient use RW for logn distance ambulation  Lateral sways and mild instability noted during ambualtion with SPC  Pt  prefers SPC for ambualtion  Performed standing alt marches with 1 US and BUE support and CGA provided for the same due to impaired balance  Pt  fatigued at the end of session with TE's  Pt  seated on chair with LEs elevated with chair alarm engaged  Pt  will benefit from continued skilled PT to improved impaired balance, endurance and overall mobility  Barriers to Discharge: Decreased caregiver support     Recommendation: Other (Comment) (Rehab)     PT - OK to Discharge: Yes    See flowsheet documentation for full assessment

## 2018-12-04 NOTE — NURSING NOTE
Discharge instructions reviewed and sent with patient and his son along with med prescription  Meds brought from home delivered from pharmacy  Patient discharged home  Taken down in wheelchair by aid

## 2018-12-04 NOTE — DISCHARGE SUMMARY
Discharging Physician / Practitioner: Madhav Hart MD  PCP: Prema Baker DO  Admission Date:   Admission Orders     Ordered        11/24/18 0136  Inpatient Admission  Once             Discharge Date: 12/04/18    Resolved Problems  Date Reviewed: 12/3/2018          Resolved    Leukocytosis 12/3/2018     Resolved by  Madhav Hart MD    Delirium 12/3/2018     Resolved by  Madhav Hart MD          Consultations During Hospital Stay:  · Thoracic surgery  · Neurology  · Nephrology  · Cardiology    Procedures Performed:   VATS and pleurodesis  Chest tube placement      Significant Findings / Test Results:     · Patient was brought in to [facility] due to thoracic surgery service available  He has a history of a CABG/AVR, follows thoracic surgery outpatient Dr Valerie Acevedo, and has had multiple thoracocentesis procedures prior to admission  Patient status post video-assisted thoracic surgery and pleurodesis on 11/27/2018 with CT placement  Chest tube was removed by thoracic surgery on 52/70/7390 with no complications postoperatively  · He was found to have an NSTEMI type 2 secondary to acute kidney injury and hypoglycemia which was better controlled during hospitalization  · Type 2 diabetes was managed with insulin therapy with Lantus insulin adjusted to 15 units q h s  Along with carb controlled diet and sliding scale  · His coronary artery disease remained stable and recommended to resume aspirin statin beta-blocker and Crestor  · His creatinine slowly returned to baseline, and losartan hydrochlorothiazide continued to be held upon discharge due to chronic kidney disease  Reason for Admission :  Transferred from [facility] for thoracic surgery evaluation    Hospital Course:     Kayla Randolph  is a 80 y o  male patient who originally presented to the hospital on 11/24/2018 due to recurrent pleural effusion management where he received VATS and pleurodesis by thoracic surgery  He tolerated procedure well without complications and was recommended to follow up thoracic surgery 2-3 weeks post discharge  Patient refused short-term rehab and preferred to go home with VNA support  Please see above list of diagnoses and related plan for additional information  Condition at Discharge: good     Discharge Day Visit / Exam:     Subjective:  No complaints today  Review of systems negative otherwise   Vitals: Blood Pressure: 125/65 (12/04/18 0716)  Pulse: 82 (12/04/18 0716)  Temperature: 98 5 °F (36 9 °C) (12/04/18 0716)  Temp Source: Oral (12/03/18 2341)  Respirations: 18 (12/04/18 0716)  Height: 5' 8" (172 7 cm) (11/25/18 1900)  Weight - Scale: 88 kg (194 lb) (12/04/18 1213)  SpO2: 95 % (12/04/18 0716)  Exam:   Physical Exam   Constitutional: He is oriented to person, place, and time  No distress  HENT:   Head: Normocephalic and atraumatic  Mouth/Throat: Oropharynx is clear and moist    Eyes: Pupils are equal, round, and reactive to light  Conjunctivae and EOM are normal    Neck: Normal range of motion  Neck supple  No JVD present  Cardiovascular: Normal rate and normal heart sounds  Exam reveals no gallop and no friction rub  No murmur heard  Pulmonary/Chest: Effort normal and breath sounds normal  No respiratory distress  He has no wheezes  Abdominal: Soft  Bowel sounds are normal  He exhibits no distension  There is no tenderness  Musculoskeletal: Normal range of motion  He exhibits no edema  Neurological: He is alert and oriented to person, place, and time  No cranial nerve deficit  Skin: Skin is warm  Psychiatric: He has a normal mood and affect  Discussion with Family: yes    Discharge instructions/Information to patient and family:   See after visit summary for information provided to patient and family  Provisions for Follow-Up Care:  See after visit summary for information related to follow-up care and any pertinent home health orders  Disposition:     Home with VNA Services (Reminder: Complete face to face encounter)    For Discharges to [facility]:   · Not Applicable to this Patient - Not Applicable to this Patient    Planned Readmission: none     Discharge Statement:  I spent 35minutes discharging the patient  This time was spent on the day of discharge  I had direct contact with the patient on the day of discharge  Greater than 50% of the total time was spent examining patient, answering all patient questions, arranging and discussing plan of care with patient as well as directly providing post-discharge instructions  Additional time then spent on discharge activities  Discharge Medications:  See after visit summary for reconciled discharge medications provided to patient and family        ** Please Note: This note has been constructed using a voice recognition system **

## 2018-12-04 NOTE — SOCIAL WORK
Cm spoke with pt's son Shannan Capone advised we are still awaiting auth from Shelley Guzman for pt to go to [phone] would like cm to schedule transport for pt to go to [facility] tent scheduled w/c Salena Bryan transport with [facility] for 18:30

## 2018-12-04 NOTE — ASSESSMENT & PLAN NOTE
ISS and accucheks for mealtime coverage, increased ISS intensity to moderate intensity given uncontrolled hyperglycemia    c/w Lantus to 15 units at bedtime

## 2018-12-04 NOTE — DISCHARGE INSTRUCTIONS
Gently wash your incisions daily with soap and water, do not soak in a tub  Do not apply any lotions, creams, or ointments to incisions  No lifting over 10 lbs or strenuous exercise  No driving until seen at your post operative visit  The blue stitch will be removed at your post operative visit  Please obtain a pa/lat chest xray at a [facility] within 3 days of your follow up visit

## 2018-12-05 ENCOUNTER — TRANSITIONAL CARE MANAGEMENT (OUTPATIENT)
Dept: FAMILY MEDICINE CLINIC | Facility: HOSPITAL | Age: 83
End: 2018-12-05

## 2018-12-06 ENCOUNTER — TELEPHONE (OUTPATIENT)
Dept: FAMILY MEDICINE CLINIC | Facility: HOSPITAL | Age: 83
End: 2018-12-06

## 2018-12-06 DIAGNOSIS — B36.9 FUNGAL DERMATITIS: Primary | ICD-10-CM

## 2018-12-06 NOTE — TELEPHONE ENCOUNTER
JAMES for ирина bustillos and I told ирина that I would call the pt  I verbally called [facility] pharmacist and called in the rx to him  Sent to ef then for sign off  After rechecking pts med list in chart--- I saw that the pt takes his novolog 3 units tid ac meals    Pt has appt with ef tomorrow for hosp f/u   dk

## 2018-12-07 ENCOUNTER — OFFICE VISIT (OUTPATIENT)
Dept: FAMILY MEDICINE CLINIC | Facility: HOSPITAL | Age: 83
End: 2018-12-07
Payer: COMMERCIAL

## 2018-12-07 VITALS
OXYGEN SATURATION: 98 % | DIASTOLIC BLOOD PRESSURE: 70 MMHG | BODY MASS INDEX: 29.7 KG/M2 | HEIGHT: 68 IN | SYSTOLIC BLOOD PRESSURE: 120 MMHG | HEART RATE: 79 BPM | WEIGHT: 196 LBS

## 2018-12-07 DIAGNOSIS — E78.00 PURE HYPERCHOLESTEROLEMIA: ICD-10-CM

## 2018-12-07 DIAGNOSIS — I35.0 NONRHEUMATIC AORTIC VALVE STENOSIS: ICD-10-CM

## 2018-12-07 DIAGNOSIS — J90 RECURRENT LEFT PLEURAL EFFUSION: ICD-10-CM

## 2018-12-07 DIAGNOSIS — E11.649 TYPE 2 DIABETES MELLITUS WITH HYPOGLYCEMIA WITHOUT COMA, WITH LONG-TERM CURRENT USE OF INSULIN (HCC): ICD-10-CM

## 2018-12-07 DIAGNOSIS — N17.0 ACUTE RENAL FAILURE WITH ACUTE TUBULAR NECROSIS SUPERIMPOSED ON STAGE 3 CHRONIC KIDNEY DISEASE (HCC): ICD-10-CM

## 2018-12-07 DIAGNOSIS — Z76.89 ENCOUNTER FOR SUPPORT AND COORDINATION OF TRANSITION OF CARE: ICD-10-CM

## 2018-12-07 DIAGNOSIS — N18.30 ACUTE RENAL FAILURE WITH ACUTE TUBULAR NECROSIS SUPERIMPOSED ON STAGE 3 CHRONIC KIDNEY DISEASE (HCC): ICD-10-CM

## 2018-12-07 DIAGNOSIS — Z79.4 TYPE 2 DIABETES MELLITUS WITH HYPOGLYCEMIA WITHOUT COMA, WITH LONG-TERM CURRENT USE OF INSULIN (HCC): ICD-10-CM

## 2018-12-07 DIAGNOSIS — I25.10 CORONARY ARTERY DISEASE INVOLVING NATIVE CORONARY ARTERY OF NATIVE HEART WITHOUT ANGINA PECTORIS: Primary | Chronic | ICD-10-CM

## 2018-12-07 DIAGNOSIS — Z95.2 S/P AVR (AORTIC VALVE REPLACEMENT): ICD-10-CM

## 2018-12-07 DIAGNOSIS — Z95.1 S/P CABG X 4: ICD-10-CM

## 2018-12-07 DIAGNOSIS — K59.04 CHRONIC IDIOPATHIC CONSTIPATION: ICD-10-CM

## 2018-12-07 PROBLEM — J93.9 PNEUMOTHORAX: Status: RESOLVED | Noted: 2018-11-24 | Resolved: 2018-12-07

## 2018-12-07 PROBLEM — R05.9 COUGH: Status: RESOLVED | Noted: 2018-10-18 | Resolved: 2018-12-07

## 2018-12-07 PROBLEM — J45.20 MILD INTERMITTENT ASTHMA WITHOUT COMPLICATION: Chronic | Status: RESOLVED | Noted: 2017-11-10 | Resolved: 2018-12-07

## 2018-12-07 PROBLEM — I21.4 NSTEMI (NON-ST ELEVATED MYOCARDIAL INFARCTION) (HCC): Status: RESOLVED | Noted: 2018-11-24 | Resolved: 2018-12-07

## 2018-12-07 PROBLEM — Z48.89 ENCOUNTER FOR POSTOPERATIVE CARE: Status: RESOLVED | Noted: 2018-10-18 | Resolved: 2018-12-07

## 2018-12-07 PROCEDURE — 99495 TRANSJ CARE MGMT MOD F2F 14D: CPT | Performed by: INTERNAL MEDICINE

## 2018-12-07 PROCEDURE — 1111F DSCHRG MED/CURRENT MED MERGE: CPT | Performed by: INTERNAL MEDICINE

## 2018-12-07 PROCEDURE — 1160F RVW MEDS BY RX/DR IN RCRD: CPT | Performed by: INTERNAL MEDICINE

## 2018-12-07 NOTE — ASSESSMENT & PLAN NOTE
Lab Results   Component Value Date    HGBA1C 6 5 (H) 11/20/2018       No results for input(s): POCGLU in the last 72 hours  Blood Sugar Average: Last 72 hrs:  had lower glucose readings on admit 11/24/18- insulin was decreased  To 12 and novolg 3 units  3x day with meals  Has decreased appetitie since Cabg surgery   Fasting glucose 142

## 2018-12-07 NOTE — PROGRESS NOTES
Assessment/Plan:     Problem List Items Addressed This Visit        Endocrine    Type 2 diabetes mellitus with hypoglycemia without coma, with long-term current use of insulin ([facility] [address] )     Lab Results   Component Value Date    HGBA1C 6 5 (H) 11/20/2018       No results for input(s): POCGLU in the last 72 hours  Blood Sugar Average: Last 72 hrs:  had lower glucose readings on admit 11/24/18- insulin was decreased  To 12 and novolg 3 units  3x day with meals  Has decreased appetitie since Cabg surgery  Fasting glucose 142            Respiratory    RESOLVED: Recurrent left pleural effusion       Cardiovascular and Mediastinum    Coronary artery disease involving native coronary artery of native heart - Primary (Chronic)    Nonrheumatic aortic valve stenosis       Genitourinary    Acute renal failure superimposed on stage 3 chronic kidney disease (HCC)     Losartan hctz is on hold since recent hosptila stay for pleural effusion 11/24 to 12/4            Other    Pure hypercholesterolemia     Now on crestor 40 mg daily         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, Dr Cleaning Ba         S/P AVR (aortic valve replacement)      Other Visit Diagnoses     Chronic idiopathic constipation        Encounter for support and coordination of transition of care               Subjective:     Patient ID: Luz Dominguez  is a 80 y o  male    1  jose ramon- had l weight loss of 30 lbs since cabg/ avr in September - then had recent readmit 11/24/18 to 12/4/18- had VATS and ctube with peluridesis- eventually discharged home with vna are now following--  To have repeat cxr on 12/14 then appt with dr Guille Milton on 12/17  2  avr- to see Dr Caitlyn Cross this month        Review of Systems   Constitutional: Positive for fatigue  Gastrointestinal: Positive for constipation  Negative for blood in stool  Has some nystatin cream to use ininguinal region   All other systems reviewed and are negative          Current Outpatient Prescriptions:     acetaminophen (TYLENOL) 325 mg tablet, 650 mg every 4 to 6 hours as needed for pain (do not take with percocet), Disp: 30 tablet, Rfl: 0    albuterol (PROVENTIL HFA) 90 mcg/act inhaler, Inhale 2 puffs 4 (four) times a day as needed, Disp: , Rfl:     allopurinol (ZYLOPRIM) 300 mg tablet, TAKE ONE TABLET BY MOUTH ONCE DAILY, Disp: 90 tablet, Rfl: 3    ascorbic acid (VITAMIN C) 500 MG tablet, Take 1 tablet (500 mg total) by mouth daily for 90 days, Disp: , Rfl: 0    aspirin 325 mg tablet, Take 1 tablet (325 mg total) by mouth daily, Disp: , Rfl: 0    BASAGLAR KWIKPEN 100 units/mL injection pen, Inject 15 Units under the skin daily at bedtime for 30 days (Patient taking differently: Inject 12 Units under the skin daily at bedtime  ), Disp: 2 pen, Rfl: 0    budesonide-formoterol (SYMBICORT) 160-4 5 mcg/act inhaler, Inhale 2 puffs 2 (two) times a day Rinse mouth after use , Disp: 1 Inhaler, Rfl: 3    docusate sodium (COLACE) 100 mg capsule, Take 1 capsule (100 mg total) by mouth daily (Patient taking differently: Take 100 mg by mouth as needed  ), Disp: 10 capsule, Rfl: 0    ferrous sulfate 325 (65 Fe) mg tablet, Take 1 tablet (325 mg total) by mouth daily with breakfast for 90 days, Disp: , Rfl: 0    gabapentin (NEURONTIN) 300 mg capsule, TAKE ONE CAPSULE BY MOUTH ONCE DAILY AT BEDTIME, Disp: 90 capsule, Rfl: 3    insulin aspart (NovoLOG) 100 units/mL injection, Inject 3 Units under the skin 3 (three) times a day before meals  , Disp: , Rfl: 0    levothyroxine 150 mcg tablet, Take 1 tablet (150 mcg total) by mouth daily, Disp: 30 tablet, Rfl: 6    LORazepam (ATIVAN) 0 5 mg tablet, Take 1 tablet (0 5 mg total) by mouth every 8 (eight) hours as needed for anxiety, Disp: 30 tablet, Rfl: 0    metoprolol tartrate (LOPRESSOR) 25 mg tablet, Take 0 5 tablets (12 5 mg total) by mouth every 12 (twelve) hours, Disp: 30 tablet, Rfl: 6    pantoprazole (PROTONIX) 40 mg tablet, Take 1 tablet (40 mg total) by mouth daily, Disp: 30 tablet, Rfl: 6    polyethylene glycol (MIRALAX) 17 g packet, Take 17 g by mouth daily (Patient taking differently: Take 17 g by mouth as needed  ), Disp: 14 each, Rfl: 0    rosuvastatin (CRESTOR) 40 MG tablet, Take 1 tablet (40 mg total) by mouth daily, Disp: 30 tablet, Rfl: 6    senna-docusate sodium (SENOKOT S) 8 6-50 mg per tablet, Take 1 tablet by mouth daily at bedtime as needed for constipation, Disp: , Rfl: 0    tamsulosin (FLOMAX) 0 4 mg, Take 1 capsule (0 4 mg total) by mouth daily with dinner, Disp: 30 capsule, Rfl: 6    Objective:    Physical Exam   Constitutional: He is oriented to person, place, and time  He appears well-developed  Generally weak   HENT:   Head: Normocephalic  Right Ear: External ear normal    Left Ear: External ear normal    Mouth/Throat: Oropharynx is clear and moist    Eyes: Right eye exhibits no discharge  Left eye exhibits no discharge  Neck: No JVD present  No tracheal deviation present  Cardiovascular: Normal rate and regular rhythm  Exam reveals no friction rub  No murmur heard  Pulmonary/Chest: Effort normal and breath sounds normal  No respiratory distress  He has no wheezes  Abdominal: Soft  He exhibits no distension  There is no tenderness  There is no guarding  Musculoskeletal: He exhibits no edema or deformity  Neurological: He is alert and oriented to person, place, and time  No cranial nerve deficit  Coordination normal    Skin: Skin is warm  No erythema  Psychiatric: He has a normal mood and affect  His behavior is normal  Thought content normal    Nursing note and vitals reviewed        Vitals:    12/07/18 1337   BP: 120/70   Pulse: 79   SpO2: 98%       Transitional Care Management Review:  During the TCM phone call patient stated:         TCM Call (since 11/6/2018)     Date and time call was made  12/5/2018  2:57 PM    Hospital care reviewed  Records reviewed    Patient was hospitialized at  [facility]    Date of Admission  11/24/18 Date of discharge  12/04/18    Diagnosis  NSTEMI    Disposition  Home    Were the patients medications reviewed and updated  No (PT said that they changed medication and would like to review it at office visit with Dr Avery Rosario )    Current Symptoms  None      TCM Call (since 11/6/2018)     Should patient be enrolled in anticoag monitoring? No    Scheduled for follow up? Yes    Patients specialists  Other (comment)    Other specialists names  Nery Davis MD - Thoracic Surgeon     Did you obtain your prescribed medications  Yes    Do you need help managing your prescriptions or medications  No    Is transportation to your appointment needed  No    I have advised the patient to call PCP with any new or worsening symptoms  TREMAYNE Monroy, [address] [facility]  Friends; Family; Children    Are you recieving home care services  Yes    Types of home care services  Nurse visit; Home PT    Comments  PT will be bringing his medication to OV and reviewing it at that time                   Gonzalo Johnson, DO

## 2018-12-11 ENCOUNTER — TELEPHONE (OUTPATIENT)
Dept: CARDIOLOGY CLINIC | Facility: CLINIC | Age: 83
End: 2018-12-11

## 2018-12-11 RX ORDER — NYSTATIN 100000 U/G
CREAM TOPICAL 2 TIMES DAILY
Qty: 30 G | Refills: 1 | OUTPATIENT
Start: 2018-12-11 | End: 2019-01-18 | Stop reason: ALTCHOICE

## 2018-12-11 NOTE — TELEPHONE ENCOUNTER
Received call from [place], [place]  FYI:  Pt's weight at visit today is up 3 lbs, from 194-197  He does show some edema +1 bordering on +2, R> L  R ankle +2,  midway up leg becoming +1  L ankle +1  No SOB, lungs clear  O2 98% on RA  /72  She will see pt again on Thursday

## 2018-12-12 DIAGNOSIS — J90 PLEURAL EFFUSION: Primary | ICD-10-CM

## 2018-12-12 NOTE — TELEPHONE ENCOUNTER
What is his current dose of diuretic? He was just recently in the hospital for recurrent pleural effusions requiring VATS procedure and pleurodesis  I am curious what they sent him home on for diuretic therapy  Thank you

## 2018-12-12 NOTE — TELEPHONE ENCOUNTER
Confirmed with Azam Fuentes - His diuretic was held during admission due to elevated creat  They did not d/c him on any diuretic

## 2018-12-12 NOTE — TELEPHONE ENCOUNTER
Let's have him recheck a BMP, and if it is stable I would suggest going back on a diuretic likely torsemide 20 mg daily  Thank you

## 2018-12-13 ENCOUNTER — PATIENT OUTREACH (OUTPATIENT)
Dept: FAMILY MEDICINE CLINIC | Facility: HOSPITAL | Age: 83
End: 2018-12-13

## 2018-12-13 ENCOUNTER — HOSPITAL ENCOUNTER (OUTPATIENT)
Dept: RADIOLOGY | Facility: HOSPITAL | Age: 83
Discharge: HOME | End: 2018-12-13
Attending: THORACIC SURGERY (CARDIOTHORACIC VASCULAR SURGERY)
Payer: COMMERCIAL

## 2018-12-13 DIAGNOSIS — J90 PLEURAL EFFUSION: ICD-10-CM

## 2018-12-13 PROCEDURE — 71046 X-RAY EXAM CHEST 2 VIEWS: CPT

## 2018-12-13 NOTE — TELEPHONE ENCOUNTER
Called to Beka Alva - message relayed as given  Verbal given for lab work so that she can draw today when she is out for his visit  Will follow up after result

## 2018-12-13 NOTE — TELEPHONE ENCOUNTER
Received call from Reena Loomis -   Pt's weight is up another 3 6 lb from last visit (197-200 6)  Edema is +2 ankles and +1 shins B/L  She is hearing rales in LL lobe (site of previous VATS)  Pt had CXR today, pending reading  Pt doesn't have a cough, is not having SOB, and can sleep without being elevated on pillows  BMP has been drawn and sent  Paged to Dr Ginger Lynch  Per MD - please restart Torsemide 20mg daily  He will review CXR today  Called to Reena Loomis, relayed message as given  Called pt and asked him to restart Torsemide per MD instructions

## 2018-12-13 NOTE — PROGRESS NOTES
Reached out to patient to introduce myself and the outpatient care management program  JARONA nurse present in home performing assessment  Told patient I would let him go so he could focus on his visit with the home health RN and that I would call him back to discuss the program  He agreed with the plan

## 2018-12-14 NOTE — TELEPHONE ENCOUNTER
BMP looks good, creatinine stable to even improving  Chest x-ray shows no significant change and overall looks fine  Continue torsemide as ordered and continue to follow weights and for signs of volume overload  Thank you

## 2018-12-14 NOTE — TELEPHONE ENCOUNTER
Received call from Estela Thomas  Update:  Pt's BP is normally in the 130/60-70 range  Per PT today his resting BP was 94/56, walking BP was 110/60  Torsemide 20mg daily was restarted yesterday afternoon  BMP result is available  Rosmery Dowell would like to know if you looked at his CXR yesterday and if you will evaluate his Torsemide dose

## 2018-12-17 ENCOUNTER — OFFICE VISIT (OUTPATIENT)
Dept: CARDIAC SURGERY | Facility: CLINIC | Age: 83
End: 2018-12-17

## 2018-12-17 VITALS
TEMPERATURE: 98.2 F | DIASTOLIC BLOOD PRESSURE: 63 MMHG | BODY MASS INDEX: 29.83 KG/M2 | SYSTOLIC BLOOD PRESSURE: 137 MMHG | HEART RATE: 73 BPM | HEIGHT: 68 IN | WEIGHT: 196.8 LBS | OXYGEN SATURATION: 97 %

## 2018-12-17 DIAGNOSIS — J90 RECURRENT LEFT PLEURAL EFFUSION: Primary | ICD-10-CM

## 2018-12-17 PROCEDURE — 4040F PNEUMOC VAC/ADMIN/RCVD: CPT | Performed by: THORACIC SURGERY (CARDIOTHORACIC VASCULAR SURGERY)

## 2018-12-17 PROCEDURE — 99024 POSTOP FOLLOW-UP VISIT: CPT | Performed by: THORACIC SURGERY (CARDIOTHORACIC VASCULAR SURGERY)

## 2018-12-17 NOTE — PROGRESS NOTES
Thoracic Follow-Up  Assessment/Plan:    Recurrent left pleural effusion  Mr Cordell Carlton is well known to me after his VATS talc pleurodesis on 11/27/2018  He presents today for a post-op visit  I am overall happy with his post-operative course  I explained that the talc pleurodesis should prevent the fluid from building back up in the future  However, if it ever does recur, he should call my office  At this time, he can follow-up with me on an as needed basis  Diagnoses and all orders for this visit:    Recurrent left pleural effusion          Thoracic History          Subjective:    Patient ID: Swapnil Jalloh  is a 80 y o  male  HPI  Mr Cordell Carlton is well known to me after his VATS talc pleurodesis on 11/27/2018  He presents today for a post-op visit  He is doing well and recovering slowly  His pain is very well controlled  He denies SOB or CP  He denies a cough  I personally reviewed his CXR and there is minimal fluid remaining in the L chest     The following portions of the patient's history were reviewed and updated as appropriate: allergies, current medications, past family history, past medical history, past social history, past surgical history and problem list     Review of Systems   Constitutional: Negative  Negative for activity change, chills, fatigue, fever and unexpected weight change  HENT: Negative  Eyes: Negative  Negative for visual disturbance  Respiratory: Negative for cough, chest tightness, shortness of breath, wheezing and stridor  Cardiovascular: Negative for chest pain and leg swelling  Gastrointestinal: Negative  Endocrine: Negative  Genitourinary: Negative  Musculoskeletal: Negative  Skin: Negative  Allergic/Immunologic: Negative  Neurological: Negative for speech difficulty, weakness, light-headedness and headaches  Hematological: Negative for adenopathy  Psychiatric/Behavioral: Negative            Objective:   Physical Exam   Constitutional: He is oriented to person, place, and time  He appears well-developed and well-nourished  HENT:   Head: Normocephalic and atraumatic  Neck: Normal range of motion  No tracheal deviation present  Cardiovascular: Normal rate, regular rhythm and normal heart sounds  No murmur heard  Pulmonary/Chest: Effort normal and breath sounds normal  No stridor  No respiratory distress  He has no wheezes  He has no rales  He exhibits no tenderness  Incisions CDI  Stitch removed  Abdominal: Soft  Bowel sounds are normal  He exhibits no distension  There is no tenderness  Musculoskeletal: Normal range of motion  He exhibits no edema  Lymphadenopathy:     He has no cervical adenopathy  Neurological: He is alert and oriented to person, place, and time  Skin: Skin is warm and dry  No rash noted  He is not diaphoretic  No erythema  No pallor  Psychiatric: He has a normal mood and affect  His behavior is normal  Judgment and thought content normal    Nursing note and vitals reviewed  /63 (BP Location: Left arm, Patient Position: Sitting, Cuff Size: Adult)   Pulse 73   Temp 98 2 °F (36 8 °C)   Ht 5' 8" (1 727 m)   Wt 89 3 kg (196 lb 12 8 oz)   SpO2 97%   BMI 29 92 kg/m²     Xr Chest Pa & Lateral    Result Date: 12/15/2018  Impression Unchanged pleural pleural and parenchymal density in the lateral left lower chest   No pneumothorax  Cardiomegaly  Workstation performed: [id number]     Xr Chest Pa & Lateral    Result Date: 11/30/2018  Impression 1  No evidence of pneumothorax, status post left-sided chest tube removal  2   Stable pleural-parenchymal disease left lower lobe  Workstation performed: [id number]     Xr Chest Pa & Lateral    Result Date: 11/28/2018  Impression Minimal left pleural effusion and atelectasis  No pneumothorax  Stable cardiomegaly   Workstation performed: [id number]     Xr Chest 2 Views    Result Date: 11/23/2018  Impression Small left-sided pleural effusion with trace left-sided pneumothorax  I personally discussed this study with NANDO MUNOZ on 11/23/2018 at 9:26 PM  Workstation performed: [id number]     Xr Chest Pa & Lateral    Result Date: 11/15/2018  Impression Stable left pleural effusion  Increased small right pleural effusion  Workstation performed: [id number]     Xr Chest Pa & Lateral    Result Date: 11/8/2018  Impression Reaccumulation of moderate size left pleural effusion with left basilar compressive atelectasis  The study was marked in [facility] for immediate notification  Workstation performed: [id number]     Xr Chest Pa & Lateral    Result Date: 11/2/2018  Impression Near complete evacuation of left pleural effusion status post thoracentesis  No pneumothorax  Subsegmental atelectasis left lung base  Workstation performed: [id number]     Xr Chest Pa & Lateral    Result Date: 10/31/2018  Impression Moderate sized residual left pleural effusion, possibly slightly diminished No evidence of pneumothorax post thoracentesis Mild cardiomegaly status post CABG and TAVR Workstation performed: [id number]     Xr Chest Pa & Lateral    Result Date: 10/19/2018  Impression Moderate size left pleural effusion with adjacent compressive atelectasis or infection  Workstation performed: [id number]     Xr Chest Pa & Lateral    Result Date: 9/8/2018  Impression No acute cardiopulmonary disease  Workstation performed: [id number]      Ct Chest Wo Contrast    Result Date: 11/15/2018  Narrative CT CHEST WITHOUT IV CONTRAST INDICATION:   J90: Pleural effusion, not elsewhere classified  COMPARISON:  6/1/2018  TECHNIQUE: CT examination of the chest was performed without intravenous contrast   Axial, sagittal, and coronal 2D reformatted images were created from the source data and submitted for interpretation  Radiation dose length product (DLP) for this visit:  698 mGy-cm     This examination, like all CT scans performed in the [facility], was performed utilizing techniques to minimize radiation dose exposure, including the use of iterative reconstruction and automated exposure control  FINDINGS: LUNGS:  There is left basilar compressive atelectasis  There is no tracheal or endobronchial lesion  PLEURA:  There is a small to moderate free flowing left pleural effusion, and a minimal right pleural effusion  HEART/GREAT VESSELS:  Normal heart size  Status post aortic valve replacement and CABG  No pericardial effusion  Normal caliber thoracic aorta with mild atherosclerotic calcifications  MEDIASTINUM AND ANDREINA:  Unremarkable  CHEST WALL AND LOWER NECK:   Status post median sternotomy  VISUALIZED STRUCTURES IN THE UPPER ABDOMEN:  Right renal simple cyst again identified  OSSEOUS STRUCTURES:  No acute fracture or destructive osseous lesion  Impression Small to moderate left pleural effusion, with associated left basilar compressive atelectasis  Minimal right pleural effusion  Workstation performed: [id number]     Ct Chest Wo Contrast    Result Date: 6/5/2018  Narrative CT CHEST WITHOUT IV CONTRAST INDICATION:   I25 10: Atherosclerotic heart disease of native coronary artery without angina pectoris  COMPARISON: None  TECHNIQUE: CT examination of the chest was performed without intravenous contrast   Axial, sagittal, and coronal 2D reformatted images were created from the source data and submitted for interpretation  Radiation dose length product (DLP) for this visit:  715 58 mGy-cm   This examination, like all CT scans performed in the [facility], was performed utilizing techniques to minimize radiation dose exposure, including the use of iterative  reconstruction and automated exposure control  FINDINGS: LUNGS:  Lungs are clear  There is no tracheal or endobronchial lesion  PLEURA:  Unremarkable  HEART/GREAT VESSELS:  Unremarkable for patient's age  Coronary atherosclerotic change noted diffusely  MEDIASTINUM AND ANDREINA:  Unremarkable   CHEST WALL AND LOWER NECK: Unremarkable  VISUALIZED STRUCTURES IN THE UPPER ABDOMEN:  Right renal cyst noted  OSSEOUS STRUCTURES:  No acute fracture or destructive osseous lesion  Impression 1  Coronary atherosclerotic disease noted  2   Right renal cyst  3   No acute findings  Workstation performed: [id number]     No CT Chest,Abdomen,Pelvis results available for this patient  No NM PET CT results available for this patient  No Barium Swallow results available for this patient

## 2018-12-17 NOTE — LETTER
December 17, 2018     [facility], [address] [place] [address] [address]  [address] [place] [address]    Patient: Lori Trujillo  YOB: 1935   Date of Visit: 12/17/2018     Dear Dr Anderson Middleton      Thank you for referring Winston Humphrey to me for evaluation  Below are the relevant portions of my assessment and plan of care  Mr Ludivina Jenkins is well known to me after his VATS talc pleurodesis on 11/27/2018  He presents today for a post-op visit  I am overall happy with his post-operative course  I explained that the talc pleurodesis should prevent the fluid from building back up in the future  However, if it ever does recur, he should call my office  At this time, he can follow-up with me on an as needed basis  If you have questions, please do not hesitate to call me  I look forward to following Susy Stephens along with you           Sincerely,        Genesis Ortiz MD        CC: No Recipients    Progress Notes:

## 2018-12-17 NOTE — ASSESSMENT & PLAN NOTE
Mr Radha March is well known to me after his VATS talc pleurodesis on 11/27/2018  He presents today for a post-op visit  I am overall happy with his post-operative course  I explained that the talc pleurodesis should prevent the fluid from building back up in the future  However, if it ever does recur, he should call my office  At this time, he can follow-up with me on an as needed basis

## 2018-12-18 ENCOUNTER — TELEPHONE (OUTPATIENT)
Dept: CARDIOLOGY CLINIC | Facility: CLINIC | Age: 83
End: 2018-12-18

## 2018-12-18 NOTE — TELEPHONE ENCOUNTER
I would advise that he stays on torsemide 10 milligrams daily until I see him  Also let's check a BMP just prior to our visit  Thank you

## 2018-12-18 NOTE — TELEPHONE ENCOUNTER
Received call from Pavel Quintanilla with an update  Pt's weight is down to 194 lb today  The edema in his lower extremities is no longer pitting and is back to what she would consider more his baseline  Pt is still taking Torsemide 20mg daily  [facility] will see him again Friday  Pt currently has 10 tablets left of Torsemide at home and gets his medications in a blister pack from the px  If he is to continue on his current dose, he will need rx so they can add it into his packs  Please advise, thank you

## 2018-12-19 DIAGNOSIS — R60.9 EDEMA, UNSPECIFIED TYPE: Primary | ICD-10-CM

## 2018-12-19 RX ORDER — TORSEMIDE 10 MG/1
10 TABLET ORAL DAILY
Qty: 30 TABLET | Refills: 3 | Status: SHIPPED | OUTPATIENT
Start: 2018-12-19 | End: 2019-04-23 | Stop reason: SDUPTHER

## 2018-12-19 NOTE — TELEPHONE ENCOUNTER
Per Dr Alysha Patel, torsemide 10mg daily until next OV, rx forwarded to MD for approval   Rx sent to [facility]

## 2018-12-20 DIAGNOSIS — N18.30 CKD (CHRONIC KIDNEY DISEASE), STAGE III (HCC): Primary | ICD-10-CM

## 2018-12-20 NOTE — TELEPHONE ENCOUNTER
BMP ordered for prior to OV 01/18/2019  Spoke to Kevin Phillip, message relayed as given  [facility] will draw labs before January visit  No further questions or concerns at this time

## 2018-12-26 DIAGNOSIS — F41.9 ANXIETY: ICD-10-CM

## 2018-12-26 RX ORDER — LORAZEPAM 0.5 MG/1
TABLET ORAL
Qty: 30 TABLET | Refills: 3 | OUTPATIENT
Start: 2018-12-26

## 2018-12-26 NOTE — PROGRESS NOTES
Was able to connect with patient  Explained to him the outpatient care management program and the reason for my call  He reports that he is doing well and that he does not have any concerns at this time  He is still receiving [facility] services and all needs are being met  Instructed Mr Anh Williamson to reach out to me in the future if he feels I could be of assistance  He agreed he would  Will close for now

## 2018-12-31 ENCOUNTER — TELEPHONE (OUTPATIENT)
Dept: CARDIOLOGY CLINIC | Facility: CLINIC | Age: 83
End: 2018-12-31

## 2018-12-31 NOTE — TELEPHONE ENCOUNTER
Carla Nazario [facility], called stating pt's weight was at 194lbs 12/18/18  Pt's Torsemide was decreased from 20 mg to 10mg daily  Pt's weight had increased to 197lbs which, Carla Nazario stated she was not overly concerned about  Today pt's weight is 201 4lbs  Pt has +2 edema in ankles and +1 edema in feet and legs  Pt's lungs are clear and pt feels good  Pt stated he weighed himself this morning at 199lbs  As per telephone call on 12/18/18 pt's Torsemide was decreased  Pt is supplied a weekly "blister pack" of medication by [facility] therefore pt only has the 10 mg tablets daily available       Please advise

## 2019-01-03 ENCOUNTER — TELEPHONE (OUTPATIENT)
Dept: CARDIOLOGY CLINIC | Facility: CLINIC | Age: 84
End: 2019-01-03

## 2019-01-03 NOTE — TELEPHONE ENCOUNTER
Ruben SALMERONA called stating she went to care for pt and weight was 199lbs in pm  Yesterday weight in am was 194lbs  Pt's BP - 120/60  Edema in ankle +2, and +1 in legs and feet  Dr Robert Alfaro stated verbally he would call on 12/31/18 VERONICA Thomas was calling for these instructions  Pt stated he was still taking the 10 mg Torsemide daily       Please Advise

## 2019-01-04 ENCOUNTER — APPOINTMENT (OUTPATIENT)
Dept: CARDIAC REHAB | Facility: HOSPITAL | Age: 84
End: 2019-01-04
Payer: COMMERCIAL

## 2019-01-04 NOTE — TELEPHONE ENCOUNTER
As long as he clinically is not looking any different, or feeling any different let's leave him on 10 mg of torsemide daily continue to follow his weight closely  He should follow a low-sodium diet as well  Close outpatient follow-up with teodoro BLANCO prior to appointment    Thank you

## 2019-01-04 NOTE — TELEPHONE ENCOUNTER
Left  for Leif Davis, [id number] [facility]  Relaying information: As long as pt clinically is not looking any different, or feeling any different leave him on Torsemide 10 mg daily continue to follow weight closely, follow a low-sodium diet  Pt has upcoming appt in [address] [place] [address] office on 1/18/19   Order for BMP in pt's chart from 12/20/18  Dr Elsie Goodell would like this completed prior to visit

## 2019-01-07 ENCOUNTER — APPOINTMENT (OUTPATIENT)
Dept: CARDIAC REHAB | Facility: HOSPITAL | Age: 84
End: 2019-01-07
Payer: COMMERCIAL

## 2019-01-08 ENCOUNTER — TELEPHONE (OUTPATIENT)
Dept: FAMILY MEDICINE CLINIC | Facility: HOSPITAL | Age: 84
End: 2019-01-08

## 2019-01-08 NOTE — TELEPHONE ENCOUNTER
Pt aware  He missed his appt for today  I will ask the front staff to call him back to reschedule   CR

## 2019-01-10 ENCOUNTER — TELEPHONE (OUTPATIENT)
Dept: OTHER | Facility: OTHER | Age: 84
End: 2019-01-10

## 2019-01-10 NOTE — TELEPHONE ENCOUNTER
Thelma Tadeo from [employer] [facility] is calling to have 2 more visits for physical theraphy for next week

## 2019-01-11 ENCOUNTER — APPOINTMENT (OUTPATIENT)
Dept: CARDIAC REHAB | Facility: HOSPITAL | Age: 84
End: 2019-01-11
Payer: COMMERCIAL

## 2019-01-14 ENCOUNTER — APPOINTMENT (OUTPATIENT)
Dept: CARDIAC REHAB | Facility: HOSPITAL | Age: 84
End: 2019-01-14
Payer: COMMERCIAL

## 2019-01-18 ENCOUNTER — OFFICE VISIT (OUTPATIENT)
Dept: CARDIOLOGY CLINIC | Facility: CLINIC | Age: 84
End: 2019-01-18
Payer: COMMERCIAL

## 2019-01-18 VITALS
HEART RATE: 62 BPM | DIASTOLIC BLOOD PRESSURE: 64 MMHG | BODY MASS INDEX: 30.49 KG/M2 | WEIGHT: 201.2 LBS | SYSTOLIC BLOOD PRESSURE: 126 MMHG | HEIGHT: 68 IN

## 2019-01-18 DIAGNOSIS — I35.0 NONRHEUMATIC AORTIC VALVE STENOSIS: ICD-10-CM

## 2019-01-18 DIAGNOSIS — Z95.1 S/P CABG X 4: ICD-10-CM

## 2019-01-18 DIAGNOSIS — I25.10 CORONARY ARTERY DISEASE INVOLVING NATIVE CORONARY ARTERY OF NATIVE HEART WITHOUT ANGINA PECTORIS: Primary | Chronic | ICD-10-CM

## 2019-01-18 DIAGNOSIS — Z95.2 S/P AVR (AORTIC VALVE REPLACEMENT): ICD-10-CM

## 2019-01-18 DIAGNOSIS — E78.00 PURE HYPERCHOLESTEROLEMIA: ICD-10-CM

## 2019-01-18 DIAGNOSIS — I10 BENIGN ESSENTIAL HYPERTENSION: ICD-10-CM

## 2019-01-18 PROCEDURE — 99214 OFFICE O/P EST MOD 30 MIN: CPT | Performed by: INTERNAL MEDICINE

## 2019-01-18 NOTE — PROGRESS NOTES
Cardiology Follow-up    Amari Nelson  1935  [id number]  HEART & VASCULAR  [facility] [place]es  [address]  [address] [address] [address]    1  Coronary artery disease involving native coronary artery of native heart without angina pectoris     2  Nonrheumatic aortic valve stenosis     3  Benign essential hypertension     4  S/P CABG x 4     5  S/P AVR (aortic valve replacement)     6  Pure hypercholesterolemia         HPI:    Rosalba Jack comes in for follow-up given his history multivessel coronary artery disease and aortic stenosis  I met him at the time of a stress nuclear study back in May of 2018  This was ordered by his internist, Dr Addy Perera, secondary to abdominal pain, exertional at times, and multiple risk factors for CAD  This demonstrated a reversible defect and ischemia of the anterior to apical wall  His ejection fraction was normal   He also has moderate aortic stenosis by echocardiogram from December  He also has hypertension, dyslipidemia and diabetes mellitus  Cardiac catheterization showed multivessel CAD  At that point he went and met cardiothoracic surgery, who recommended both core artery bypass grafting and a bioprosthetic aortic valve replacement  He went through the preoperative testing  And followed back up in the office in August   He had electively had this surgery performed about a month ago  It went well without complications  He had CABG x4, with a LIMA to the LAD, SVG to an OM 2 and SVG Y graft"to an OM 3 and left posterior descending artery  He also had a bioprosthetic aortic valve replacement  In follow-up he did see his PCP due to some lower extremity cellulitis, which he completed a course of antibiotics  He did go home on diuretic therapy but due to some edema associated with cellulitis torsemide was restarted he tells me by his PCP  He then developed recurrent pleural effusions    He was in the hospital since I last saw him, and we saw him there, in which he was requiring thoracentesis  He ended up having surgery and pleurodesis  He has recovered well from this  He did not go home on diuretic therapy due to some acute kidney injury  He started to show increasing signs of volume overload, weight gain and lower extremity edema  Torsemide was restarted at 20 mg daily, and we decrease this to 10 mg daily  This has name status controlled  Lico Flurry is feeling well  He denies any chest pain or shortness of breath  He denies lightheadedness, palpitations or syncope  He still has lower extremity swelling, but it is improved  He denies orthopnea or PND        Patient Active Problem List   Diagnosis    Benign prostatic hyperplasia with nocturia    Benign essential hypertension    Gout with tophus    Hypothyroidism    Obesity    Pure hypercholesterolemia    Renal cyst    Sexual dysfunction    Type 2 diabetes mellitus with hypoglycemia without coma, with long-term current use of insulin (HCC)    Coronary artery disease involving native coronary artery of native heart    Nonrheumatic aortic valve stenosis    GERD (gastroesophageal reflux disease)    S/P CABG x 4    S/P AVR (aortic valve replacement)    CAD (coronary artery disease)    Aortic stenosis    Acute renal failure superimposed on stage 3 chronic kidney disease (HCC)    Anemia due to stage 3 chronic kidney disease ([facility] [address] )    Ambulatory dysfunction    Moderate protein-calorie malnutrition (HCC)     Past Medical History:   Diagnosis Date    Aortic stenosis     CKD (chronic kidney disease)     baseline Cr 1 3-1 5    Coronary artery disease     Cough     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HCC)     type 2, insulin dependent    GERD (gastroesophageal reflux disease)     Glaucoma     Gout     History of prostate cancer     Hypertension     Hypothyroidism     Overweight     Peripheral neuropathy, idiopathic     Pleural effusion, left     Pure hypercholesterolemia     LA   11/12/14   R   11/12/14     Weight gain      Social History     Social History    Marital status:      Spouse name: N/A    Number of children: N/A    Years of education: N/A     Occupational History    Not on file  Social History Main Topics    Smoking status: Former Smoker     Packs/day: 0 25     Years: 1 00     Types: Cigarettes     Quit date: 1970    Smokeless tobacco: Never Used      Comment: Only in the service     Alcohol use No    Drug use: No    Sexual activity: Not Currently     Other Topics Concern    Not on file     Social History Narrative    Caffeine use / coffee diet cola and tea    Lives with family     Living situation supportive and safe    No advance directives  -denied          Family History   Problem Relation Age of Onset    Diabetes Mother     Pancreatic cancer Brother     Diabetes Maternal Grandmother     Colon cancer Son     Diabetes Family      Past Surgical History:   Procedure Laterality Date    CARDIAC CATHETERIZATION      IR THORACENTESIS  10/23/2018    IR THORACENTESIS  11/2/2018    IR THORACENTESIS  11/9/2018    IR THORACENTESIS  11/23/2018    FL CABG, ARTERY-VEIN, THREE N/A 9/17/2018    Procedure: CORONARY ARTERY BYPASS GRAFT (CABG) x 4 VESSELS with LIMA - LAD, SVG/LEFT LEG EVH - LEFT PDA, OM3, & OM2;  Surgeon: Radha Quintana MD;  [facility];  Service: Cardiac Surgery    FL ECHO TRANSESOPHAG [address] N/A 9/17/2018    Procedure: TRANSESOPHAGEAL ECHOCARDIOGRAM (VERONICA);   Surgeon: Radha Quintana MD;  [facility];  Service: Cardiac Surgery    FL RPLCMT AORTIC VALVE OPN W/STENTLESS TISSUE VALVE N/A 9/17/2018    Procedure: REPLACEMENT VALVE AORTIC (AVR)- 23mm tissue Intuity Valve;  Surgeon: Radha Quintana MD;  [facility];  Service: Cardiac Surgery    THORACOSCOPY VIDEO ASSISTED SURGERY (VATS) Left 11/27/2018    Procedure: THORACOSCOPY VIDEO ASSISTED SURGERY (VATS), talc pleurodesis,;  Surgeon: Flynn Gaines MD;  [facility];  Service: Thoracic    THYROID SURGERY         Current Outpatient Prescriptions:     acetaminophen (TYLENOL) 325 mg tablet, 650 mg every 4 to 6 hours as needed for pain (do not take with percocet), Disp: 30 tablet, Rfl: 0    allopurinol (ZYLOPRIM) 300 mg tablet, TAKE ONE TABLET BY MOUTH ONCE DAILY, Disp: 90 tablet, Rfl: 3    ascorbic acid (VITAMIN C) 500 MG tablet, Take 1 tablet (500 mg total) by mouth daily for 90 days, Disp: , Rfl: 0    aspirin 325 mg tablet, Take 1 tablet (325 mg total) by mouth daily, Disp: , Rfl: 0    BASAGLAR KWIKPEN 100 units/mL injection pen, Inject 15 Units under the skin daily at bedtime for 30 days (Patient taking differently: Inject 12 Units under the skin daily at bedtime  ), Disp: 2 pen, Rfl: 0    ferrous sulfate 325 (65 Fe) mg tablet, Take 1 tablet (325 mg total) by mouth daily with breakfast for 90 days, Disp: , Rfl: 0    gabapentin (NEURONTIN) 300 mg capsule, TAKE ONE CAPSULE BY MOUTH ONCE DAILY AT BEDTIME, Disp: 90 capsule, Rfl: 3    insulin aspart (NovoLOG) 100 units/mL injection, Inject 3 Units under the skin 3 (three) times a day before meals  , Disp: , Rfl: 0    levothyroxine 150 mcg tablet, Take 1 tablet (150 mcg total) by mouth daily, Disp: 30 tablet, Rfl: 6    LORazepam (ATIVAN) 0 5 mg tablet, Take 1 tablet (0 5 mg total) by mouth every 8 (eight) hours as needed for anxiety, Disp: 30 tablet, Rfl: 0    metoprolol tartrate (LOPRESSOR) 25 mg tablet, Take 0 5 tablets (12 5 mg total) by mouth every 12 (twelve) hours, Disp: 30 tablet, Rfl: 6    pantoprazole (PROTONIX) 40 mg tablet, Take 1 tablet (40 mg total) by mouth daily, Disp: 30 tablet, Rfl: 6    polyethylene glycol (MIRALAX) 17 g packet, Take 17 g by mouth daily (Patient taking differently: Take 17 g by mouth as needed  ), Disp: 14 each, Rfl: 0    rosuvastatin (CRESTOR) 40 MG tablet, Take 1 tablet (40 mg total) by mouth daily, Disp: 30 tablet, Rfl: 6    tamsulosin (FLOMAX) 0 4 mg, Take 1 capsule (0 4 mg total) by mouth daily with dinner, Disp: 30 capsule, Rfl: 6    torsemide (DEMADEX) 10 mg tablet, Take 1 tablet (10 mg total) by mouth daily, Disp: 30 tablet, Rfl: 3    albuterol (PROVENTIL HFA) 90 mcg/act inhaler, Inhale 2 puffs 4 (four) times a day as needed, Disp: , Rfl:   Allergies   Allergen Reactions    Amlodipine Nausea Only    Atorvastatin Myalgia       Labs:  Lab Results   Component Value Date     09/05/2017    K 4 1 12/01/2018    K 3 7 11/20/2018     12/01/2018     11/20/2018    CO2 26 12/01/2018    CO2 26 11/20/2018    BUN 26 (H) 12/01/2018    BUN 30 (H) 11/20/2018    CREATININE 1 55 (H) 12/01/2018    CREATININE 1 56 (H) 09/05/2017    GLUCOSE 126 09/17/2018    CALCIUM 7 8 (L) 12/01/2018    CALCIUM 8 8 11/20/2018     Lab Results   Component Value Date    WBC 8 91 12/01/2018    WBC 7 6 04/17/2017    HGB 9 0 (L) 12/01/2018    HGB 14 0 04/17/2017    HCT 29 4 (L) 12/01/2018    HCT 43 3 04/17/2017    MCV 87 12/01/2018    MCV 97 2 04/17/2017     12/01/2018     04/17/2017     Lab Results   Component Value Date    CHOL 197 04/17/2017    TRIG 92 11/24/2018    TRIG 225 (H) 04/17/2017    HDL 25 (L) 11/24/2018    HDL 30 (L) 04/17/2017     Imaging:      CARDIAC CATH(5/2018):  CORONARY CIRCULATION:  Proximal LAD: There was a 100 % stenosis  This lesion is a chronic total occlusion  1st obtuse marginal: There was a diffuse 90 % stenosis  2nd obtuse marginal: There was a diffuse 90 % stenosis  3rd obtuse marginal: There was a 80 % stenosis  1st left posterolateral: There was a 90 % stenosis  Mid RCA: There was a 95 % stenosis      CARDIAC STRUCTURES:  There was moderate aortic stenosis  Review of Systems:  Review of Systems   Constitutional: Positive for fatigue  HENT: Negative  Eyes: Negative  Respiratory: Positive for shortness of breath  Cardiovascular: Negative      Gastrointestinal: Positive for abdominal pain  Musculoskeletal: Negative  Skin: Negative  Allergic/Immunologic: Negative  Neurological: Negative  Hematological: Negative  Psychiatric/Behavioral: Negative  All other systems reviewed and are negative  Physical Exam:  Physical Exam   Constitutional: He is oriented to person, place, and time  He appears well-developed and well-nourished  HENT:   Head: Normocephalic and atraumatic  Eyes: Pupils are equal, round, and reactive to light  EOM are normal  Right eye exhibits no discharge  Left eye exhibits no discharge  No scleral icterus  Neck: Normal range of motion  Neck supple  No JVD present  No thyromegaly present  Cardiovascular: Normal rate, regular rhythm, S1 normal, S2 normal, intact distal pulses and normal pulses  No extrasystoles are present  Exam reveals no gallop and no friction rub  Murmur heard  Systolic murmur is present with a grade of 3/6   Pulmonary/Chest: Effort normal and breath sounds normal  No respiratory distress  He has no wheezes  He has no rales  Abdominal: Soft  Bowel sounds are normal  He exhibits no distension  There is no tenderness  Musculoskeletal: Normal range of motion  He exhibits no edema, tenderness or deformity  Neurological: He is alert and oriented to person, place, and time  No cranial nerve deficit  Skin: Skin is warm and dry  No rash noted  Psychiatric: He has a normal mood and affect  Judgment and thought content normal    Nursing note and vitals reviewed  Discussion/Summary:    1  CAD - Antonietta Rutledge is status post CABG x4  He tolerated the surgery well  We referred him to cardiac rehabilitation, since he has recovered from his pleurodesis  He continued to show signs of volume overload, therefore we have him on a maintenance dose of torsemide  We ordered updated blood work  He will remain on the same medical therapy  We will have back for 3 months in follow-up      2   Aortic stenosis - He is also status post bioprosthetic aortic valve replacement at the time of his CABG  As stated he is about to enter cardiac rehabilitation  At our next visit in 3 months we will get a baseline echocardiogram   He should take antibiotic prophylaxis for any dental procedures  3  Left-sided pleural effusion - This was recurrent, requiring thoracentesis  He is now status post pleurodesis procedure  He is now stable from this standpoint  4  Hyperlipidemia - He has been on Crestor  We will continue to follow blood work closely along with his internist       Counseling / Coordination of Care  Total floor / unit time spent today 25 minutes  Greater than 50% of total time was spent with the patient and / or family counseling and / or coordination of care

## 2019-01-18 NOTE — PATIENT INSTRUCTIONS
Low-Sodium Diet   AMBULATORY CARE:   A low-sodium diet  limits foods that are high in sodium (salt)  You will need to follow a low-sodium diet if you have high blood pressure, kidney disease, or heart failure  You may also need to follow this diet if you have a condition that is causing your body to retain (hold) extra fluid  You may need to limit the amount of sodium you eat to 1,500 mg  Ask your healthcare provider how much sodium you can have each day  How to use food labels to choose foods that are low in sodium:  Read food labels to find the amount of sodium they contain  The amount of sodium is listed in milligrams (mg)  The % Daily Value (DV) column tells you how much of your daily needs are met by 1 serving of the food for each nutrient listed  Choose foods that have less than 5% of the DV of sodium  These foods are considered low in sodium  Foods that have 20% or more of the DV of sodium are considered high in sodium  Some food labels may also list any of the following terms that tell you about the sodium content in the food:  · Sodium-free:  Less than 5 mg in each serving    · Very low sodium:  35 mg of sodium or less in each serving    · Low sodium:  140 mg of sodium or less in each serving    · Reduced sodium: At least 25% less sodium in each serving than the regular type    · Light in sodium:  50% less sodium in each serving    · Unsalted or no added salt:  No extra salt is added during processing (the food may still contain sodium)  Foods to avoid:  Salty foods are high in sodium   You should avoid the following:  · Processed foods:      ¨ Mixes for cornbread, biscuits, cake, and pudding     ¨ Instant foods, such as potatoes, cereals, noodles, and rice     ¨ Packaged foods, such as bread stuffing, rice and pasta mixes, snack dip mixes, and macaroni and cheese     ¨ Canned foods, such as canned vegetables, soups, broths, sauces, and vegetable or tomato juice    ¨ Snack foods, such as salted chips, popcorn, pretzels, pork rinds, salted crackers, and salted nuts    ¨ Frozen foods, such as dinners, entrees, vegetables with sauces, and breaded meats    ¨ Sauerkraut, pickled vegetables, and other foods prepared in brine    · Meats and cheeses:      ¨ Smoked or cured meat, such as corned beef, pugh, ham, hot dogs, and sausage    ¨ Canned meats or spreads, such as potted meats, sardines, anchovies, and imitation seafood    ¨ Deli or lunch meats, such as bologna, ham, turkey, and roast beef    ¨ Processed cheese, such as American cheese and cheese spreads    · Condiments, sauces, and seasonings:      ¨ Salt (¼ teaspoon of salt contains 575 mg of sodium)    ¨ Seasonings made with salt, such as garlic salt, celery salt, onion salt, and seasoned salt    ¨ Regular soy sauce, barbecue sauce, teriyaki sauce, steak sauce, Worcestershire sauce, and most flavored vinegars    ¨ Canned gravy and mixes     ¨ Regular condiments, such as mustard, ketchup, and salad dressings    ¨ Pickles and olives    ¨ Meat tenderizers and monosodium glutamate (MSG)  Foods to include:  Read the food label to find the exact amount of sodium in each serving  · Bread and cereal:  Try to choose breads with less than 80 mg of sodium per serving  ¨ Bread, roll, virginie, tortilla, or unsalted crackers  ¨ Ready-to-eat cereals with less than 5% DV of sodium (examples include shredded wheat and puffed rice)    ¨ Pasta    · Vegetables and fruits:      ¨ Unsalted fresh, frozen, or canned vegetables    ¨ Fresh, frozen, or canned fruits    ¨ Fruit juice    · Dairy:  One serving has about 150 mg of sodium  ¨ Milk, all types    ¨ Yogurt    ¨ Hard cheese, such as cheddar, Swiss, [employer], or mozzarella    · Meat and other protein foods:  Some raw meats may have added sodium       ¨ Plain meats, fish, and poultry     ¨ Eggs    · Other foods:      ¨ Homemade pudding    ¨ Unsalted nuts, popcorn, or pretzels    ¨ Unsalted butter or margarine  Ways to decrease sodium:   · Add spices and herbs to foods instead of salt during cooking  Use salt-free seasonings to add flavor to foods  Examples include onion powder, garlic powder, basil, madrigal powder, paprika, and parsley  Try lemon or lime juice or vinegar to give foods a tart flavor  Use hot peppers, pepper, or cayenne pepper to add a spicy flavor to foods  · Do not keep a salt shaker at your kitchen table  This may help keep you from adding salt to food at the table  It may take time to get used to enjoying the natural flavor of food instead of adding salt  Talk to your healthcare provider before you use salt substitutes  Some salt substitutes have a high amount of potassium and need to be avoided if you have kidney disease  · Choose low-sodium foods at restaurants  Meals from restaurants are often high in sodium  Some restaurants have nutrition information on the menu that tells you the amount of sodium in their foods  If possible, ask for your food to be prepared with less, or no salt  · Shop for unsalted or low-sodium foods and snacks at the grocery store  Examples include unsalted or low-sodium broths, soups, and canned vegetables  Choose fresh or frozen vegetables instead  Choose unsalted nuts or seeds or fresh fruits or vegetables as snacks  Read food labels and choose salt-free, very low-sodium, or low-sodium foods  © 2017 [address] [employer] Information is for End User's use only and may not be sold, redistributed or otherwise used for commercial purposes  All illustrations and images included in CareNotes® are the copyrighted property of [employer]  or [employer] Mat[employer]  The above information is an  only  It is not intended as medical advice for individual conditions or treatments  Talk to your doctor, nurse or pharmacist before following any medical regimen to see if it is safe and effective for you

## 2019-01-22 ENCOUNTER — OFFICE VISIT (OUTPATIENT)
Dept: FAMILY MEDICINE CLINIC | Facility: HOSPITAL | Age: 84
End: 2019-01-22
Payer: COMMERCIAL

## 2019-01-22 VITALS
DIASTOLIC BLOOD PRESSURE: 56 MMHG | WEIGHT: 199 LBS | SYSTOLIC BLOOD PRESSURE: 90 MMHG | BODY MASS INDEX: 30.16 KG/M2 | HEART RATE: 64 BPM | HEIGHT: 68 IN

## 2019-01-22 DIAGNOSIS — L03.116 CELLULITIS OF LEFT LOWER EXTREMITY: ICD-10-CM

## 2019-01-22 DIAGNOSIS — N18.30 ANEMIA DUE TO STAGE 3 CHRONIC KIDNEY DISEASE (HCC): Chronic | ICD-10-CM

## 2019-01-22 DIAGNOSIS — Z79.4 TYPE 2 DIABETES MELLITUS WITH HYPOGLYCEMIA WITHOUT COMA, WITH LONG-TERM CURRENT USE OF INSULIN (HCC): ICD-10-CM

## 2019-01-22 DIAGNOSIS — N18.30 ACUTE RENAL FAILURE WITH ACUTE TUBULAR NECROSIS SUPERIMPOSED ON STAGE 3 CHRONIC KIDNEY DISEASE (HCC): ICD-10-CM

## 2019-01-22 DIAGNOSIS — E11.649 TYPE 2 DIABETES MELLITUS WITH HYPOGLYCEMIA WITHOUT COMA, WITH LONG-TERM CURRENT USE OF INSULIN (HCC): ICD-10-CM

## 2019-01-22 DIAGNOSIS — Z95.1 S/P CABG X 4: ICD-10-CM

## 2019-01-22 DIAGNOSIS — D63.1 ANEMIA DUE TO STAGE 3 CHRONIC KIDNEY DISEASE (HCC): Chronic | ICD-10-CM

## 2019-01-22 DIAGNOSIS — I25.10 CORONARY ARTERY DISEASE INVOLVING NATIVE CORONARY ARTERY OF NATIVE HEART WITHOUT ANGINA PECTORIS: ICD-10-CM

## 2019-01-22 DIAGNOSIS — Z95.2 S/P AVR (AORTIC VALVE REPLACEMENT): ICD-10-CM

## 2019-01-22 DIAGNOSIS — I10 BENIGN ESSENTIAL HYPERTENSION: Primary | ICD-10-CM

## 2019-01-22 DIAGNOSIS — R26.2 AMBULATORY DYSFUNCTION: ICD-10-CM

## 2019-01-22 DIAGNOSIS — N17.0 ACUTE RENAL FAILURE WITH ACUTE TUBULAR NECROSIS SUPERIMPOSED ON STAGE 3 CHRONIC KIDNEY DISEASE (HCC): ICD-10-CM

## 2019-01-22 PROBLEM — E11.29 TYPE 2 DIABETES MELLITUS WITH KIDNEY COMPLICATION, WITHOUT LONG-TERM CURRENT USE OF INSULIN (HCC): Status: ACTIVE | Noted: 2019-01-22

## 2019-01-22 PROCEDURE — 3078F DIAST BP <80 MM HG: CPT | Performed by: INTERNAL MEDICINE

## 2019-01-22 PROCEDURE — 3074F SYST BP LT 130 MM HG: CPT | Performed by: INTERNAL MEDICINE

## 2019-01-22 PROCEDURE — 1160F RVW MEDS BY RX/DR IN RCRD: CPT | Performed by: INTERNAL MEDICINE

## 2019-01-22 PROCEDURE — 99214 OFFICE O/P EST MOD 30 MIN: CPT | Performed by: INTERNAL MEDICINE

## 2019-01-22 RX ORDER — INSULIN GLARGINE 100 [IU]/ML
12 INJECTION, SOLUTION SUBCUTANEOUS
Start: 2019-01-22 | End: 2019-02-11 | Stop reason: SDUPTHER

## 2019-01-22 NOTE — PROGRESS NOTES
Assessment/Plan:    Ambulatory dysfunction  Using cane for ambulation    Anemia due to stage 3 chronic kidney disease ([facility] )  Will see labs done at [address] [place] today    Benign essential hypertension  Well controlled    Type 2 diabetes mellitus with kidney complication, without long-term current use of insulin ([facility] )  Lab Results   Component Value Date    HGBA1C 6 5 (H) 11/20/2018       No results for input(s): POCGLU in the last 72 hours  Blood Sugar Average: Last 72 hrs:  on basaglar    CAD (coronary artery disease)  To begin with  Cardiac rehab in near future         Problem List Items Addressed This Visit        Cardiovascular and Mediastinum    Benign essential hypertension - Primary     Well controlled         CAD (coronary artery disease)     To begin with  Cardiac rehab in near future         Relevant Orders    Comprehensive metabolic panel       Genitourinary    Anemia due to stage 3 chronic kidney disease (HCC) (Chronic)     Will see labs done at [facility] today         Relevant Orders    CBC and differential    Acute renal failure superimposed on stage 3 chronic kidney disease (HCC)       Other    S/P CABG x 4    S/P AVR (aortic valve replacement)    Ambulatory dysfunction     Using cane for ambulation           Other Visit Diagnoses     Type 2 diabetes mellitus with hypoglycemia without coma, with long-term current use of insulin ([facility])        Relevant Medications    BASAGLAR KWIKPEN 100 units/mL injection pen    insulin aspart (NovoLOG) 100 units/mL injection    Other Relevant Orders    Hemoglobin A1C    Comprehensive metabolic panel    Cellulitis of left lower extremity        Relevant Medications    BASAGLAR KWIKPEN 100 units/mL injection pen            Subjective:      Patient ID: Swapnil Jalloh  is a 80 y o  male  1  Post op cabg and avr- [facility] will be starting rehab  He is hoping to go to [place] to visit with granddaughter in June    No chest pain or shob- no recent leg edema - on the demadex 10 mg daily- had labs done in [facility] today  Patient is seen for diabetic followup exam today  Recent hba1c was done   No complaints of episodes of hypoglycemia, excess thirst or increased urinary frequency    Home readings were reviewed with fasting glucose readings generally   The following portions of the patient's history were reviewed and updated as appropriate: allergies, current medications, past medical history, past social history and problem list   Had recent foot exam with dr Luis Restrepo     Review of Systems    Current Outpatient Prescriptions on File Prior to Visit   Medication Sig Dispense Refill    acetaminophen (TYLENOL) 325 mg tablet 650 mg every 4 to 6 hours as needed for pain (do not take with percocet) 30 tablet 0    albuterol (PROVENTIL HFA) 90 mcg/act inhaler Inhale 2 puffs 4 (four) times a day as needed      allopurinol (ZYLOPRIM) 300 mg tablet TAKE ONE TABLET BY MOUTH ONCE DAILY 90 tablet 3    aspirin 325 mg tablet Take 1 tablet (325 mg total) by mouth daily  0    ferrous sulfate 325 (65 Fe) mg tablet Take 1 tablet (325 mg total) by mouth daily with breakfast for 90 days  0    gabapentin (NEURONTIN) 300 mg capsule TAKE ONE CAPSULE BY MOUTH ONCE DAILY AT BEDTIME 90 capsule 3    levothyroxine 150 mcg tablet Take 1 tablet (150 mcg total) by mouth daily 30 tablet 6    LORazepam (ATIVAN) 0 5 mg tablet Take 1 tablet (0 5 mg total) by mouth every 8 (eight) hours as needed for anxiety 30 tablet 0    metoprolol tartrate (LOPRESSOR) 25 mg tablet Take 0 5 tablets (12 5 mg total) by mouth every 12 (twelve) hours 30 tablet 6    pantoprazole (PROTONIX) 40 mg tablet Take 1 tablet (40 mg total) by mouth daily 30 tablet 6    polyethylene glycol (MIRALAX) 17 g packet Take 17 g by mouth daily (Patient taking differently: Take 17 g by mouth as needed  ) 14 each 0    rosuvastatin (CRESTOR) 40 MG tablet Take 1 tablet (40 mg total) by mouth daily 30 tablet 6    tamsulosin (FLOMAX) 0 4 mg Take 1 capsule (0 4 mg total) by mouth daily with dinner 30 capsule 6    torsemide (DEMADEX) 10 mg tablet Take 1 tablet (10 mg total) by mouth daily 30 tablet 3    [DISCONTINUED] BASAGLAR KWIKPEN 100 units/mL injection pen Inject 15 Units under the skin daily at bedtime for 30 days (Patient taking differently: Inject 12 Units under the skin daily at bedtime  ) 2 pen 0    [DISCONTINUED] insulin aspart (NovoLOG) 100 units/mL injection Inject 3 Units under the skin 3 (three) times a day before meals    0    [DISCONTINUED] ascorbic acid (VITAMIN C) 500 MG tablet Take 1 tablet (500 mg total) by mouth daily for 90 days  0     No current facility-administered medications on file prior to visit  Objective:    Diabetic Foot Exam    Diabetic Foot Examdone at Dr Vishal Simon- will obtain paperwork     Physical Exam   Constitutional: He is oriented to person, place, and time  He appears well-developed  Generally weak   HENT:   Head: Normocephalic  Right Ear: External ear normal    Left Ear: External ear normal    Mouth/Throat: Oropharynx is clear and moist    Eyes: Right eye exhibits no discharge  Left eye exhibits no discharge  Neck: No JVD present  No tracheal deviation present  Cardiovascular: Normal rate and regular rhythm  Exam reveals no friction rub  No murmur heard  Pulmonary/Chest: Effort normal and breath sounds normal  No respiratory distress  He has no wheezes  Abdominal: Soft  He exhibits no distension  There is no tenderness  There is no guarding  Musculoskeletal: He exhibits no edema or deformity  No edema   Neurological: He is alert and oriented to person, place, and time  No cranial nerve deficit  Coordination normal    Skin: Skin is warm  No erythema  Psychiatric: He has a normal mood and affect  His behavior is normal  Thought content normal    Nursing note and vitals reviewed

## 2019-01-22 NOTE — ASSESSMENT & PLAN NOTE
Lab Results   Component Value Date    HGBA1C 6 5 (H) 11/20/2018       No results for input(s): POCGLU in the last 72 hours      Blood Sugar Average: Last 72 hrs:  on basaglar

## 2019-01-23 LAB
BUN SERPL-MCNC: 32 MG/DL (ref 7–25)
BUN/CREAT SERPL: 16 (CALC) (ref 6–22)
CALCIUM SERPL-MCNC: 9.2 MG/DL (ref 8.6–10.3)
CHLORIDE SERPL-SCNC: 102 MMOL/L (ref 98–110)
CO2 SERPL-SCNC: 29 MMOL/L (ref 20–32)
CREAT SERPL-MCNC: 1.99 MG/DL (ref 0.7–1.11)
GLUCOSE SERPL-MCNC: 151 MG/DL (ref 65–99)
POTASSIUM SERPL-SCNC: 4 MMOL/L (ref 3.5–5.3)
SL AMB EGFR AFRICAN AMERICAN: 35 ML/MIN/1.73M2
SL AMB EGFR NON AFRICAN AMERICAN: 30 ML/MIN/1.73M2
SODIUM SERPL-SCNC: 140 MMOL/L (ref 135–146)

## 2019-01-24 ENCOUNTER — CLINICAL SUPPORT (OUTPATIENT)
Dept: CARDIAC REHAB | Facility: HOSPITAL | Age: 84
End: 2019-01-24
Attending: INTERNAL MEDICINE
Payer: COMMERCIAL

## 2019-01-24 ENCOUNTER — APPOINTMENT (OUTPATIENT)
Dept: CARDIAC REHAB | Facility: HOSPITAL | Age: 84
End: 2019-01-24
Payer: COMMERCIAL

## 2019-01-24 VITALS — WEIGHT: 198 LBS | BODY MASS INDEX: 30.01 KG/M2 | HEIGHT: 68 IN

## 2019-01-24 DIAGNOSIS — Z95.1 S/P CABG X 4: ICD-10-CM

## 2019-01-24 DIAGNOSIS — I21.4 NSTEMI (NON-ST ELEVATED MYOCARDIAL INFARCTION) (HCC): ICD-10-CM

## 2019-01-24 DIAGNOSIS — Z95.2 S/P AVR (AORTIC VALVE REPLACEMENT): ICD-10-CM

## 2019-01-24 PROCEDURE — 93798 PHYS/QHP OP CAR RHAB W/ECG: CPT

## 2019-01-24 NOTE — PROGRESS NOTES
Name: Trevor Stoner : 1935 DX: CABG, AVR     Risk:      LOW/ MOD/ HIGH MOD       Pre Post % change  Goal   Date: 2019      Physical           Sub Max ETT (mets) 2 2  -100 0% 10% increase   6MWT (feet) n/a  #VALUE! 10% increase   UCSD Dyspnea Score n/a  #VALUE! 5 pt decrease   Supplemental O2 use (L) n/a      [facility] AI (est  peak O2) 15 45  -100 0%    COPD assessment Test (CAT) n/a   2 pt decrease   Peak exercise CR/ GA (mets)   #DIV/0! 40% increase   Emotional           PHQ 9  (> 10 refer to MD) 6  -100 0% 4 pt decrease   [facility] lower score = improvement     Total  22  -100 0% < 27   Feelings 2  -100 0% < 3   Physical fitness 3  -100 0% < 3   Social Support 2  0 0% < 3   Daily activities 2  -100 0% < 3   Social Activities 2  -100 0% < 3   Pain 3  -100 0% < 3   Overall Health 3  -100 0% < 3   Quality of Life 2  -100 0% < 3   Change in health 3  -100 0% < 3   Dietary           Rate your plate n/a  #VALUE! > 58   Measurements           Weight 198  -100 0% 2 5-5%    BMI 30 1  -100 0% 19-25   Waist Circ  42  -100 0% < 40 M / < 35 F   % Body fat 36 8  -100 0% < 25 M / < 33 F    BP left arm     (systolic) 484  -498 8% < 759                              (diastolic) 61  -474 1% < 90   Smoking #/day (if applicable) n/a  #VALUE! 0   Lipids/ Glucose (Date) 2018         Total cholesterol 61  -100 0%    Triglycerides 92  -100 0% < 150   HDL 25  -100 0% 40-60   LDL 18  -100 0% < 100   A1C % 6 5  -100 0% 4 0 - 5 6%   Fasting BG 79  -100 0%

## 2019-01-24 NOTE — PROGRESS NOTES
Cardiac Rehabilitation Plan of Care   Care Plan       Today's date: 2019   Visits: 1-intake  Patient name: Sandie Byrd  : 1935  Age: 80 y o  MRN: [id number]  Referring Physician: Jerod Gramajo MD  Provider: Beata Wright  Clinician: Kelton Tobin MS, CEP      Dx: NSTEMI    Date of onset: 2018      SUMMARY OF PROGRESS:  Mr Pema Mccain is ready to resume cardiac rehab after recent surgery  He is planning to attend 2x/week due to high copay  He reports feeling much better since surgery and is not as fatigues and is getting around much better  He is looking forward to getting back into rehab  Will focus on increased strength and stamina and educate on dietary modifications  Medication compliance: Yes   Comments: Reports taking medications as prescribed    Fall Risk: Moderate   Comments: uses cane while ambulating for balance  Reprots no recent falls    EKG changes: none      EXERCISE ASSESSMENT and PLAN    Current Exercise Program in Rehab:       Frequency: 2-3days/week        Minutes: 20-30         METS: 1 5-2 5            HR:    RPE: 4-6         Modalities: Treadmill, UBE, NuStep and Recumbent bike      Exercise Progression 30 Day Goals :    Frequency: 3-4days/week   Minutes: 30   METS: 2 5-3 0   HR:     RPE: 4-6   Modalities: Treadmill, Airdyne bike, NuStep and Recumbent bike    Strength trainin-3 days / week, 12-15 repitations , 1-2 sets per modality  and Will be added following at least 8 weeks post surgery and 8-10 monitored sessions   Modalities: Pull Downs, Lateral Raise, Arm Extension, Arm Curl and Sit to Stands    Progressing: In Progress    Home Exercise: no regular home exercise at this time  Will encourage walking at home 1-2x/week since he is attending rehab 2x/week   Recommend 10-15 min to start with    Goals: 10% improvement in functional capacity, Increase in peak CR METs by 40%, Resume ADLs with increased strength and Exercise 5 days/wk, >150mins/wk  Education: Benefit of exercise for CAD risk factors, home exercise guidelines, signs and sxs and RPE scale   Plan:education on home exercise guidelines and home exercise 30+ mins 2 days opposite CR  Readiness to change: Preparation      NUTRITION ASSESSMENT AND PLAN    Weight control:    Starting weight: 198 lb   Current weight:     Waist circumference:    Startin"   Current:    Diabetes: N/A  Lipid management: Discussed diet and lipid management and Last lipid profile 2018  Chol 200    HDL 33    Goals:HDL >40, CHOL <200, decreased body fat % <33%, reduced waist circumference <40 inches, Improved Rate Your Plate score  >76 and Wt  loss 1-2 ppw goal of 185 lb lbs  Education: heart healthy eating  low sodium diet  diet and lipid management  wt  loss  Progressing: In Progress  Plan: Increase PUFA and MUFA, Decrease SFA, Increase whole grains, increase fruits/vegs, eliminate processed meats, swtich to low fat dairy and Reduce added sugars <25g/day  Readiness to change: Preparation      PSYCHOSOCIAL ASSESSMENT AND PLAN    Emotional:              1-4 = Minimal Depression  Self-reported stress level: 2   Social support: Very Good  Goals:  Physical Fitness in [facility] Score < 3, Daily Activity in [facility] Score < 3, Pain in [facility] Score < 3, Overall Health in [facility] Score < 3, Quality of Life in [facility] Score < 3 , Change in Health in [facility] Score < 3 , Increased interest in doing things and increased energy  Education: signs/sxs of depression, benefits of positive support system and coping mechanisms  Progressing: In Progress  Plan: Practice relaxation techniques  Readiness to change: Action      OTHER CORE COMPONENTS     Tobacco:   History   Smoking Status    Never Smoker   Smokeless Tobacco    Never Used     Comment: Only in the service        Tobacco Use Intervention: Referral to tobacco expert:   N/A    Blood pressure:    Restin/61   Exercise: 138/70    Goals: consistent BP < 130/80  Education:  understanding HTN and CAD and low sodium diet and HTN  Progressing: In Progress  Plan: Follow low fat, heart healthy diet, DASH diet, increase exercise time to help manage BP    Readiness to change: Action- reports BP has been normal range 112-102/60-70

## 2019-01-24 NOTE — PROGRESS NOTES
Exercise Prescription       Exercise Modality  Initial Workload MET Level    Nu-Step (NU) Level: 4 Steps/Minute: 60 2 0 METs         Airdyne Bike (AD-Bike) Level: 0 4 1 8 METs   Arm Ergometer (AE) RPM: 40-50 Level 2 0 2 0 METs   Treadmill 1 0 0 1 8   Recumbent Bike (RB) Level: 1 RPM: 50-60 3 2 METs   Resistance (RES) 3-5 lbs Weights 20-30 lbs Pulleys Level red Resistance Bands        Comments: Patient completed TM Submax ETT test at Stage I ( 1:58 min @ 2 2 METS)  Tillman suggests 2 3 METs  Will start exercise at 1 5-2 0 METS and increase intensity as tolerated by patient  Will add strength training at 4 weeks

## 2019-01-24 NOTE — PROGRESS NOTES
CARDIAC REHAB ASSESSMENT    Today's date: 2019  Patient name: Lois Valle  : 1935       MRN: [id number]  PCP: Sharon Ram DO  Referring Physician: Shane Garcia MD  Cardiologist: Dr Li Renteria  Surgeon: Dr Nano Napoles    Dx: NSTEMI      Date of onset: 2018  Cultural needs: n/a    Weight:  198 lb     Height:   Ht Readings from Last 1 Encounters:   19 5' 8" (1 727 m)     Medical History:   Past Medical History:   Diagnosis Date    Aortic stenosis     CKD (chronic kidney disease)     baseline Cr 1 3-1 5    Coronary artery disease     Cough     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, insulin dependent    GERD (gastroesophageal reflux disease)     Glaucoma     Gout     History of prostate cancer     Hypertension     Hypothyroidism     Overweight     Peripheral neuropathy, idiopathic     Pleural effusion, left     Pure hypercholesterolemia     LA   14   R   14     Weight gain          Physical Limitations: B/L knee pain    Risk Factors   Cholesterol: Yes  Smoking: Former user  HTN: Yes  DM: No  Obesity: Yes   Inactivity: Yes  Family History:  Family History   Problem Relation Age of Onset    Diabetes Mother     Pancreatic cancer Brother     Diabetes Maternal Grandmother     Colon cancer Son     Diabetes Family        Allergies: Amlodipine and Atorvastatin  ETOH:   History   Alcohol Use No         Current Medications:   Current Outpatient Prescriptions   Medication Sig Dispense Refill    acetaminophen (TYLENOL) 325 mg tablet 650 mg every 4 to 6 hours as needed for pain (do not take with percocet) 30 tablet 0    albuterol (PROVENTIL HFA) 90 mcg/act inhaler Inhale 2 puffs 4 (four) times a day as needed      allopurinol (ZYLOPRIM) 300 mg tablet TAKE ONE TABLET BY MOUTH ONCE DAILY 90 tablet 3    aspirin 325 mg tablet Take 1 tablet (325 mg total) by mouth daily  0    BASAGLAR KWIKPEN 100 units/mL injection pen Inject 12 Units under the skin daily at bedtime for 30 days      ferrous sulfate 325 (65 Fe) mg tablet Take 1 tablet (325 mg total) by mouth daily with breakfast for 90 days  0    gabapentin (NEURONTIN) 300 mg capsule TAKE ONE CAPSULE BY MOUTH ONCE DAILY AT BEDTIME 90 capsule 3    insulin aspart (NovoLOG) 100 units/mL injection Inject 4 Units under the skin 3 (three) times a day before meals  0    levothyroxine 150 mcg tablet Take 1 tablet (150 mcg total) by mouth daily 30 tablet 6    LORazepam (ATIVAN) 0 5 mg tablet Take 1 tablet (0 5 mg total) by mouth every 8 (eight) hours as needed for anxiety 30 tablet 0    metoprolol tartrate (LOPRESSOR) 25 mg tablet Take 0 5 tablets (12 5 mg total) by mouth every 12 (twelve) hours 30 tablet 6    pantoprazole (PROTONIX) 40 mg tablet Take 1 tablet (40 mg total) by mouth daily 30 tablet 6    polyethylene glycol (MIRALAX) 17 g packet Take 17 g by mouth daily (Patient taking differently: Take 17 g by mouth as needed  ) 14 each 0    rosuvastatin (CRESTOR) 40 MG tablet Take 1 tablet (40 mg total) by mouth daily 30 tablet 6    tamsulosin (FLOMAX) 0 4 mg Take 1 capsule (0 4 mg total) by mouth daily with dinner 30 capsule 6    torsemide (DEMADEX) 10 mg tablet Take 1 tablet (10 mg total) by mouth daily 30 tablet 3     No current facility-administered medications for this visit              Functional Status Prior to Diagnosis for Treatment   Occupation: retired  Recreation: none  ADLs: Capable of performing light to moderate ADLs able to perform self-care lives alone has meals on wheels for meals  [place]: resumed all ADLs  Exercise: no regular exercise  Other: n/a    Current Functional Status  Occupation: retired  Recreation: none  ADLs:Capable of performing light to moderate ADLs able to perform self-care lives alone has meals on wheels for meals  [place]: Capable of performing light to moderate ADLs able to perform self-care  Exercise: no regular exercise  Other: n/a    Short Term Program Goals: dietary modifications increased strength improved energy/stamina with ADLs exercise 120-150 mins/wk wt loss 1-2 ppw    Long Term Goals: increased maximal walking duration  increased intial training workload  Improved [facility] Activity Status score  Improved functional capacity  Improved Quality of Life - [facility] score reduced  Reduced waist circumference  weight loss goal of 185 lb    Ability to reach goals/rehabilitation potential:  Very Good     Projected return to function: 12 weeks  Objective tests: sub-max TM ETT      Nutritional   Reviewed details of Rate your Plate  Discussed key elements of heart healthy eating  Reviewed patient goals for dietary modifications and their clinical implications  Reviewed most recent lipid profile       Goals for dietary modification: choose lean cuts of meat  poultry without the skin  eliminate processed meats  increase fish intake  more meatless meals  low fat dairy   increase whole grains  increase fruits and vegetables  eliminate butter  reduce sweets/frozen desserts      Emotional/Social  Patient reports he/she is coping well with good social support and denies depression or anxiety    SOCIAL SUPPORT NETWORK    Marital status:     Rate 1-5:    Marriage: n/a   Family: 5   Financial: 5   Relationships: 5   Spirituality: 5   Intellectual: 5    Perceived Stress: 2/10   Stressors:reports minimal stress   Goals for Stress Management:relaxation    Domestic Violence Screening: No    Comments: n/a

## 2019-01-28 ENCOUNTER — CLINICAL SUPPORT (OUTPATIENT)
Dept: CARDIAC REHAB | Facility: HOSPITAL | Age: 84
End: 2019-01-28
Payer: COMMERCIAL

## 2019-01-28 DIAGNOSIS — I21.4 NSTEMI (NON-ST ELEVATED MYOCARDIAL INFARCTION) (HCC): ICD-10-CM

## 2019-01-28 PROCEDURE — 93798 PHYS/QHP OP CAR RHAB W/ECG: CPT

## 2019-02-01 ENCOUNTER — CLINICAL SUPPORT (OUTPATIENT)
Dept: CARDIAC REHAB | Facility: HOSPITAL | Age: 84
End: 2019-02-01
Payer: COMMERCIAL

## 2019-02-01 VITALS — WEIGHT: 199 LBS | BODY MASS INDEX: 30.26 KG/M2

## 2019-02-01 DIAGNOSIS — I21.4 NSTEMI (NON-ST ELEVATED MYOCARDIAL INFARCTION) (HCC): ICD-10-CM

## 2019-02-01 PROCEDURE — 93798 PHYS/QHP OP CAR RHAB W/ECG: CPT

## 2019-02-04 ENCOUNTER — CLINICAL SUPPORT (OUTPATIENT)
Dept: CARDIAC REHAB | Facility: HOSPITAL | Age: 84
End: 2019-02-04
Payer: COMMERCIAL

## 2019-02-04 DIAGNOSIS — I21.4 NSTEMI (NON-ST ELEVATED MYOCARDIAL INFARCTION) (HCC): ICD-10-CM

## 2019-02-04 PROCEDURE — 93798 PHYS/QHP OP CAR RHAB W/ECG: CPT

## 2019-02-08 ENCOUNTER — CLINICAL SUPPORT (OUTPATIENT)
Dept: CARDIAC REHAB | Facility: HOSPITAL | Age: 84
End: 2019-02-08
Payer: COMMERCIAL

## 2019-02-08 VITALS — WEIGHT: 199.5 LBS | BODY MASS INDEX: 30.33 KG/M2

## 2019-02-08 DIAGNOSIS — I21.4 NSTEMI (NON-ST ELEVATION MYOCARDIAL INFARCTION) (HCC): ICD-10-CM

## 2019-02-08 PROCEDURE — 93798 PHYS/QHP OP CAR RHAB W/ECG: CPT

## 2019-02-10 DIAGNOSIS — L03.116 CELLULITIS OF LEFT LOWER EXTREMITY: ICD-10-CM

## 2019-02-11 ENCOUNTER — CLINICAL SUPPORT (OUTPATIENT)
Dept: CARDIAC REHAB | Facility: HOSPITAL | Age: 84
End: 2019-02-11
Payer: COMMERCIAL

## 2019-02-11 DIAGNOSIS — I21.4 NSTEMI (NON-ST ELEVATED MYOCARDIAL INFARCTION) (HCC): ICD-10-CM

## 2019-02-11 PROCEDURE — 93798 PHYS/QHP OP CAR RHAB W/ECG: CPT

## 2019-02-11 RX ORDER — INSULIN GLARGINE 100 [IU]/ML
INJECTION, SOLUTION SUBCUTANEOUS
Qty: 15 ML | Refills: 3 | Status: SHIPPED | OUTPATIENT
Start: 2019-02-11 | End: 2019-04-03

## 2019-02-15 ENCOUNTER — CLINICAL SUPPORT (OUTPATIENT)
Dept: CARDIAC REHAB | Facility: HOSPITAL | Age: 84
End: 2019-02-15
Payer: COMMERCIAL

## 2019-02-15 VITALS — BODY MASS INDEX: 30.41 KG/M2 | WEIGHT: 200 LBS

## 2019-02-15 DIAGNOSIS — I21.4 NSTEMI (NON-ST ELEVATED MYOCARDIAL INFARCTION) (HCC): ICD-10-CM

## 2019-02-15 PROCEDURE — 93798 PHYS/QHP OP CAR RHAB W/ECG: CPT

## 2019-02-18 ENCOUNTER — CLINICAL SUPPORT (OUTPATIENT)
Dept: CARDIAC REHAB | Facility: HOSPITAL | Age: 84
End: 2019-02-18
Payer: COMMERCIAL

## 2019-02-18 DIAGNOSIS — I21.4 NSTEMI (NON-ST ELEVATED MYOCARDIAL INFARCTION) (HCC): ICD-10-CM

## 2019-02-18 PROCEDURE — 93798 PHYS/QHP OP CAR RHAB W/ECG: CPT

## 2019-02-18 NOTE — PROGRESS NOTES
2/19/2019      No chief complaint on file  Assessment and Plan    80 y o  male managed by Dr Mushtaq Ramon    1  BPH  - continue tamsulosin   - patient has bothersome frequency so discussed behavioral modification because the patient control, did discuss that this worsened the becomes more bothersome to the patient we can consider finasteride versus overactive bladder medications  - follow up 1 year      History of Present Illness  Angela Addison  is a 80 y o  male here for follow up evaluation of BPH  The patient presents today with lower urinary tract symptoms as below  He does have some complaints of urinary frequency  He continues on tamsulosin nightly  Otherwise, he is doing well  Does admit to constipation  Did recently undergo a CABG/AVR and VATs in December  Review of Systems   Constitutional: Negative for activity change, chills and fever  Gastrointestinal: Negative for abdominal distention and abdominal pain  Musculoskeletal: Negative for back pain and gait problem  Psychiatric/Behavioral: Negative for behavioral problems and confusion  Urinary Incontinence Screening      Most Recent Value   Urinary Incontinence   Urinary Incontinence?  -- [1-2 depends a day]   Incomplete emptying? No   Urinary frequency? No   Urinary urgency? Yes   Urinary hesitancy? No   Dysuria (painful difficult urination)? No   Nocturia (waking up to use the bathroom)? Yes [2-3x]   Straining (having to push to go)? No   Weak stream?  No   Intermittent stream?  No   Post void dribbling? No          AUA SYMPTOM SCORE      Most Recent Value   AUA SYMPTOM SCORE   How often have you had a sensation of not emptying your bladder completely after you finished urinating? 1   How often have you had to urinate again less than two hours after you finished urinating? 5   How often have you found you stopped and started again several times when you urinate?   3   How often have you found it difficult to postpone urination? 2   How often have you had a weak urinary stream?  1   How often have you had to push or strain to begin urination? 0   How many times did you most typically get up to urinate from the time you went to bed at night until the time you got up in the morning? 3   Quality of Life: If you were to spend the rest of your life with your urinary condition just the way it is now, how would you feel about that?  4   AUA SYMPTOM SCORE  15          Past Medical History  Past Medical History:   Diagnosis Date    Aortic stenosis     CKD (chronic kidney disease)     baseline Cr 1 3-1 5    Coronary artery disease     Cough     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[facility] [address] )     type 2, insulin dependent    GERD (gastroesophageal reflux disease)     Glaucoma     Gout     History of prostate cancer     Hypertension     Hypothyroidism     Overweight     Peripheral neuropathy, idiopathic     Pleural effusion, left     Pure hypercholesterolemia     LA   11/12/14   R   11/12/14     Weight gain        Past Social History  Past Surgical History:   Procedure Laterality Date    CARDIAC CATHETERIZATION      IR THORACENTESIS  10/23/2018    IR THORACENTESIS  11/2/2018    IR THORACENTESIS  11/9/2018    IR THORACENTESIS  11/23/2018    MO CABG, ARTERY-VEIN, THREE N/A 9/17/2018    Procedure: CORONARY ARTERY BYPASS GRAFT (CABG) x 4 VESSELS with LIMA - LAD, SVG/LEFT LEG EVH - LEFT PDA, OM3, & OM2;  Surgeon: Sincere Cruz MD;  [facility];  Service: Cardiac Surgery    MO ECHO TRANSESOPHAG MONTR CARDIAC PUMP FUNCTJ N/A 9/17/2018    Procedure: TRANSESOPHAGEAL ECHOCARDIOGRAM (VERONICA);   Surgeon: Sincere Cruz MD;  [facility];  Service: Cardiac Surgery    MO RPLCMT AORTIC VALVE OPN W/STENTLESS TISSUE VALVE N/A 9/17/2018    Procedure: REPLACEMENT VALVE AORTIC (AVR)- 23mm tissue Intuity Valve;  Surgeon: Sincere Cruz MD;  [facility];  Service: Cardiac Surgery    THORACOSCOPY VIDEO ASSISTED SURGERY (VATS) Left 11/27/2018    Procedure: THORACOSCOPY VIDEO ASSISTED SURGERY (VATS), talc pleurodesis,;  Surgeon: Odilon Rankin MD;  [facility];  Service: Thoracic    THYROID SURGERY       Social History     Tobacco Use   Smoking Status Never Smoker   Smokeless Tobacco Never Used   Tobacco Comment    Only in the service        Past Family History  Family History   Problem Relation Age of Onset    Diabetes Mother     Pancreatic cancer Brother     Diabetes Maternal Grandmother     Colon cancer Son     Diabetes Family        Past Social history  Social History     Socioeconomic History    Marital status:      Spouse name: Not on file    Number of children: Not on file    Years of education: Not on file    Highest education level: Not on file   Occupational History    Not on file   Social Needs    Financial resource strain: Not on file    Food insecurity:     Worry: Not on file     Inability: Not on file    Transportation needs:     Medical: Not on file     Non-medical: Not on file   Tobacco Use    Smoking status: Never Smoker    Smokeless tobacco: Never Used    Tobacco comment: Only in the service    Substance and Sexual Activity    Alcohol use: No    Drug use: No    Sexual activity: Not Currently   Lifestyle    Physical activity:     Days per week: Not on file     Minutes per session: Not on file    Stress: Not on file   Relationships    Social connections:     Talks on phone: Not on file     Gets together: Not on file     Attends [employer] service: Not on file     Active member of club or organization: Not on file     Attends meetings of clubs or organizations: Not on file     Relationship status: Not on file    Intimate partner violence:     Fear of current or ex partner: Not on file     Emotionally abused: Not on file     Physically abused: Not on file     Forced sexual activity: Not on file   Other Topics Concern    Not on file   Social History Narrative    Caffeine use / coffee diet cola and tea    Lives with family     Living situation supportive and safe    No advance directives  -denied       Current Medications  Current Outpatient Medications   Medication Sig Dispense Refill    acetaminophen (TYLENOL) 325 mg tablet 650 mg every 4 to 6 hours as needed for pain (do not take with percocet) 30 tablet 0    albuterol (PROVENTIL HFA) 90 mcg/act inhaler Inhale 2 puffs 4 (four) times a day as needed      allopurinol (ZYLOPRIM) 300 mg tablet TAKE ONE TABLET BY MOUTH ONCE DAILY 90 tablet 3    aspirin 325 mg tablet Take 1 tablet (325 mg total) by mouth daily  0    BASAGLAR KWIKPEN 100 units/mL injection pen INJECT 30 UNITS every night at bedtime 15 mL 3    ferrous sulfate 325 (65 Fe) mg tablet Take 1 tablet (325 mg total) by mouth daily with breakfast for 90 days  0    gabapentin (NEURONTIN) 300 mg capsule TAKE ONE CAPSULE BY MOUTH ONCE DAILY AT BEDTIME 90 capsule 3    insulin aspart (NovoLOG) 100 units/mL injection Inject 4 Units under the skin 3 (three) times a day before meals  0    levothyroxine 150 mcg tablet Take 1 tablet (150 mcg total) by mouth daily 30 tablet 6    LORazepam (ATIVAN) 0 5 mg tablet Take 1 tablet (0 5 mg total) by mouth every 8 (eight) hours as needed for anxiety 30 tablet 0    metoprolol tartrate (LOPRESSOR) 25 mg tablet Take 0 5 tablets (12 5 mg total) by mouth every 12 (twelve) hours 30 tablet 6    pantoprazole (PROTONIX) 40 mg tablet Take 1 tablet (40 mg total) by mouth daily 30 tablet 6    polyethylene glycol (MIRALAX) 17 g packet Take 17 g by mouth daily (Patient taking differently: Take 17 g by mouth as needed  ) 14 each 0    rosuvastatin (CRESTOR) 40 MG tablet Take 1 tablet (40 mg total) by mouth daily 30 tablet 6    tamsulosin (FLOMAX) 0 4 mg Take 1 capsule (0 4 mg total) by mouth daily with dinner 30 capsule 6    torsemide (DEMADEX) 10 mg tablet Take 1 tablet (10 mg total) by mouth daily 30 tablet 3     No current facility-administered medications for this visit  Allergies  Allergies   Allergen Reactions    Amlodipine Nausea Only    Atorvastatin Myalgia         The following portions of the patient's history were reviewed and updated as appropriate: allergies, current medications, past medical history, past social history, past surgical history and problem list       Vitals  There were no vitals filed for this visit  Physical Exam  Constitutional   General appearance: Patient is seated and in no acute distress, well appearing and well nourished  Head and Face   Head and face: Normal     Eyes   Conjunctiva and lids: No erythema, swelling or discharge  Ears, Nose, Mouth, and Throat   Hearing: Normal     Pulmonary   Respiratory effort: No increased work of breathing or signs of respiratory distress  Cardiovascular   Examination of extremities for edema and/or varicosities: Normal     Abdomen   Abdomen: Non-tender, no masses  Musculoskeletal   Gait and station: Normal     Skin   Skin and subcutaneous tissue: Warm, dry, and intact  No visible lesions or rashes  Psychiatric   Judgment and insight: Normal  Recent and remote memory:  Normal  Mood and affect: Normal      Results  No results found for this or any previous visit (from the past 1 hour(s))  ]  No results found for: PSA  Lab Results   Component Value Date    GLUCOSE 126 09/17/2018    CALCIUM 9 2 01/22/2019     09/05/2017    K 4 0 01/22/2019    CO2 29 01/22/2019     01/22/2019    BUN 32 (H) 01/22/2019    CREATININE 1 55 (H) 12/01/2018     Lab Results   Component Value Date    WBC 8 91 12/01/2018    HGB 9 0 (L) 12/01/2018    HCT 29 4 (L) 12/01/2018    MCV 87 12/01/2018     12/01/2018           Orders  No orders of the defined types were placed in this encounter

## 2019-02-19 ENCOUNTER — OFFICE VISIT (OUTPATIENT)
Dept: UROLOGY | Facility: HOSPITAL | Age: 84
End: 2019-02-19
Payer: COMMERCIAL

## 2019-02-19 VITALS
WEIGHT: 200.8 LBS | SYSTOLIC BLOOD PRESSURE: 110 MMHG | HEART RATE: 68 BPM | BODY MASS INDEX: 33.45 KG/M2 | DIASTOLIC BLOOD PRESSURE: 64 MMHG | HEIGHT: 65 IN

## 2019-02-19 DIAGNOSIS — N40.1 BENIGN PROSTATIC HYPERPLASIA WITH NOCTURIA: Primary | ICD-10-CM

## 2019-02-19 DIAGNOSIS — R35.1 BENIGN PROSTATIC HYPERPLASIA WITH NOCTURIA: Primary | ICD-10-CM

## 2019-02-19 PROCEDURE — 99213 OFFICE O/P EST LOW 20 MIN: CPT | Performed by: PHYSICIAN ASSISTANT

## 2019-02-21 DIAGNOSIS — E11.21 TYPE 2 DIABETES MELLITUS WITH DIABETIC NEPHROPATHY, WITHOUT LONG-TERM CURRENT USE OF INSULIN (HCC): Primary | ICD-10-CM

## 2019-02-22 ENCOUNTER — CLINICAL SUPPORT (OUTPATIENT)
Dept: CARDIAC REHAB | Facility: HOSPITAL | Age: 84
End: 2019-02-22
Payer: COMMERCIAL

## 2019-02-22 VITALS — WEIGHT: 200 LBS | BODY MASS INDEX: 33.28 KG/M2

## 2019-02-22 DIAGNOSIS — I21.4 NSTEMI (NON-ST ELEVATED MYOCARDIAL INFARCTION) (HCC): ICD-10-CM

## 2019-02-22 PROCEDURE — 93798 PHYS/QHP OP CAR RHAB W/ECG: CPT

## 2019-02-22 RX ORDER — LANCETS 33 GAUGE
EACH MISCELLANEOUS
Qty: 100 EACH | Refills: 3 | Status: SHIPPED | OUTPATIENT
Start: 2019-02-22 | End: 2020-02-24

## 2019-02-22 NOTE — PROGRESS NOTES
Cardiac Rehabilitation Plan of Care   Care Plan       Today's date: 2019   Visits: 9  Patient name: Vasquez Otoole  : 1935  Age: 80 y o  MRN: [id number]  Referring Physician: Gisell Munoz MD  Provider: Sophia Calderón  Clinician: Alexandria Eden MS, CEP      Dx: CABG/AVR  Date of onset: 2018      SUMMARY OF PROGRESS:  Mr Vitaly Ingram has started his cardiac rehab program  He is attending regularly 2x/week for 8-10 weeks due to high copay  His main goal is to increase energy and decrease tiredness he has had since surgery  He reports he has trouble walking long distances and hopes to increase leg strength to help with this  Overall, he is doing well in his rehab program  He has more energy on a daily basis and reports feeling his legs are getting stronger  He is increasing times and intensity of exercise and tolerating well with stable hemodynamics  Will continue to progress with increased times and work levels as tolerated  Medication compliance: Yes   Comments: Reports taking medications as prescribed    Fall Risk: Moderate   Comments: walking with cane for balance    EKG changes: none      EXERCISE ASSESSMENT and PLAN    Current Exercise Program in Rehab:       Frequency: 2-3x/week        Minutes: 30         METS: 2 1           HR:    RPE: 4-6         Modalities: room walking, airdyne bike, UBE, NuStep      Exercise Progression 30 Day Goals :    Frequency: 3-5/week   Minutes: 30-40   METS: 2 0-3 0   HR:     RPE: 4-6   Modalities: Airdyne bike, UBE, NuStep and Room walking    Strength trainin-3 days / week, 12-15 repitations , 1-2 sets per modality  and Will be added following 2-3 weeks of monitored exercise sessions   Modalities: Lateral Raise, Arm Extension, Arm Curl and Sit to Stands    Progressing: In Progress    Home Exercise: no home exercise currently   Encouraged walking at store 1-2x/week initially    Goals: 10% improvement in functional capacity, Increase in peak CR METs by 40%, Improved 6MWT distance by 10%, Resume ADLs with increased strength and Exercise 5 days/wk, >150mins/wk  Education: Benefit of exercise for CAD risk factors, home exercise guidelines, signs and sxs and RPE scale   Plan:education on home exercise guidelines and home exercise 30+ mins 2 days opposite CR  Readiness to change: Preparation- has not started home exercise  Limited walking due to cold weather  Encouraged to walk at store 1-2x/week for 10-min      NUTRITION ASSESSMENT AND PLAN    Weight control:    Starting weight: 205 lb   Current weight: Weight - Scale: 90 7 kg (200 lb)   Waist circumference:    Startin"   Current:    Diabetes: N/A  Lipid management: Discussed diet and lipid management and Last lipid profile 2017  Chol 197    HDL 33    Goals:LDL <100, HDL >40, TRG <150, CHOL <200, decreased body fat % <33% and reduced waist circumference <40 inches  Education: heart healthy eating  low sodium diet  diet and lipid management  Progressing: In Progress  Plan: Increase PUFA and MUFA, Decrease SFA, Increase whole grains and increase fruits/vegs  Readiness to change: Preparation- reports making some healthier choices when eating-depends on meals on wheels and not always able to control what he has to eat      PSYCHOSOCIAL ASSESSMENT AND PLAN    Emotional:              1-4 = Minimal Depression  Self-reported stress level: 3   Social support: Good   Goals:  increased appetite and increased energy  Education: signs/sxs of depression  Progressing: In Progress  Plan: Practice relaxation techniques  Readiness to change: Action-doing well with stress management      OTHER CORE COMPONENTS     Tobacco:   Social History     Tobacco Use   Smoking Status Never Smoker   Smokeless Tobacco Never Used   Tobacco Comment    Only in the service        Tobacco Use Intervention: Referral to tobacco expert:   N/A    Blood pressure:    Restin/73   Exercise: 148/78      Goals: consistent BP < 130/80, reduced dietary sodium <2300mg and consistent exercise >150 mins/wk  Education:  understanding HTN and CAD and low sodium diet and HTN  Progressing: In Progress  Plan: Follow low fat, heart healthy diet, DASH diet, increase exercise time to help manage BP    Readiness to change:Action-BP controlled

## 2019-02-25 ENCOUNTER — CLINICAL SUPPORT (OUTPATIENT)
Dept: CARDIAC REHAB | Facility: HOSPITAL | Age: 84
End: 2019-02-25
Payer: COMMERCIAL

## 2019-02-25 DIAGNOSIS — I21.4 NON-ST ELEVATION MYOCARDIAL INFARCTION (NSTEMI) (HCC): ICD-10-CM

## 2019-02-25 PROCEDURE — 93798 PHYS/QHP OP CAR RHAB W/ECG: CPT

## 2019-03-01 ENCOUNTER — CLINICAL SUPPORT (OUTPATIENT)
Dept: CARDIAC REHAB | Facility: HOSPITAL | Age: 84
End: 2019-03-01
Payer: COMMERCIAL

## 2019-03-01 VITALS — BODY MASS INDEX: 33.28 KG/M2 | WEIGHT: 200 LBS

## 2019-03-01 DIAGNOSIS — I21.4 NSTEMI (NON-ST ELEVATION MYOCARDIAL INFARCTION) (HCC): ICD-10-CM

## 2019-03-01 PROCEDURE — 93798 PHYS/QHP OP CAR RHAB W/ECG: CPT

## 2019-03-04 ENCOUNTER — CLINICAL SUPPORT (OUTPATIENT)
Dept: CARDIAC REHAB | Facility: HOSPITAL | Age: 84
End: 2019-03-04
Payer: COMMERCIAL

## 2019-03-04 DIAGNOSIS — I21.4 NON-ST ELEVATION MYOCARDIAL INFARCTION (NSTEMI) (HCC): ICD-10-CM

## 2019-03-04 PROCEDURE — 93798 PHYS/QHP OP CAR RHAB W/ECG: CPT

## 2019-03-08 ENCOUNTER — CLINICAL SUPPORT (OUTPATIENT)
Dept: CARDIAC REHAB | Facility: HOSPITAL | Age: 84
End: 2019-03-08
Payer: COMMERCIAL

## 2019-03-08 DIAGNOSIS — I21.4 NSTEMI (NON-ST ELEVATED MYOCARDIAL INFARCTION) (HCC): ICD-10-CM

## 2019-03-08 PROCEDURE — 93798 PHYS/QHP OP CAR RHAB W/ECG: CPT

## 2019-03-11 ENCOUNTER — CLINICAL SUPPORT (OUTPATIENT)
Dept: CARDIAC REHAB | Facility: HOSPITAL | Age: 84
End: 2019-03-11
Payer: COMMERCIAL

## 2019-03-11 DIAGNOSIS — I21.4 NSTEMI (NON-ST ELEVATED MYOCARDIAL INFARCTION) (HCC): ICD-10-CM

## 2019-03-11 PROCEDURE — 93798 PHYS/QHP OP CAR RHAB W/ECG: CPT

## 2019-03-15 ENCOUNTER — CLINICAL SUPPORT (OUTPATIENT)
Dept: CARDIAC REHAB | Facility: HOSPITAL | Age: 84
End: 2019-03-15
Payer: COMMERCIAL

## 2019-03-15 VITALS — BODY MASS INDEX: 33.28 KG/M2 | WEIGHT: 200 LBS

## 2019-03-15 DIAGNOSIS — I21.4 NSTEMI (NON-ST ELEVATED MYOCARDIAL INFARCTION) (HCC): ICD-10-CM

## 2019-03-15 PROCEDURE — 93798 PHYS/QHP OP CAR RHAB W/ECG: CPT

## 2019-03-16 LAB
ALBUMIN SERPL-MCNC: 3.8 G/DL (ref 3.6–5.1)
ALBUMIN/GLOB SERPL: 1.5 (CALC) (ref 1–2.5)
ALP SERPL-CCNC: 81 U/L (ref 40–115)
ALT SERPL-CCNC: 16 U/L (ref 9–46)
AST SERPL-CCNC: 22 U/L (ref 10–35)
BASOPHILS # BLD AUTO: 131 CELLS/UL (ref 0–200)
BASOPHILS NFR BLD AUTO: 1.9 %
BILIRUB SERPL-MCNC: 0.7 MG/DL (ref 0.2–1.2)
BUN SERPL-MCNC: 35 MG/DL (ref 7–25)
BUN/CREAT SERPL: 18 (CALC) (ref 6–22)
CALCIUM SERPL-MCNC: 9 MG/DL (ref 8.6–10.3)
CHLORIDE SERPL-SCNC: 105 MMOL/L (ref 98–110)
CO2 SERPL-SCNC: 30 MMOL/L (ref 20–32)
CREAT SERPL-MCNC: 1.94 MG/DL (ref 0.7–1.11)
EOSINOPHIL # BLD AUTO: 1083 CELLS/UL (ref 15–500)
EOSINOPHIL NFR BLD AUTO: 15.7 %
ERYTHROCYTE [DISTWIDTH] IN BLOOD BY AUTOMATED COUNT: 15.5 % (ref 11–15)
GLOBULIN SER CALC-MCNC: 2.5 G/DL (CALC) (ref 1.9–3.7)
GLUCOSE SERPL-MCNC: 136 MG/DL (ref 65–99)
HBA1C MFR BLD: 7.9 % OF TOTAL HGB
HCT VFR BLD AUTO: 36.6 % (ref 38.5–50)
HGB BLD-MCNC: 11.7 G/DL (ref 13.2–17.1)
LYMPHOCYTES # BLD AUTO: 2063 CELLS/UL (ref 850–3900)
LYMPHOCYTES NFR BLD AUTO: 29.9 %
MCH RBC QN AUTO: 29.3 PG (ref 27–33)
MCHC RBC AUTO-ENTMCNC: 32 G/DL (ref 32–36)
MCV RBC AUTO: 91.7 FL (ref 80–100)
MONOCYTES # BLD AUTO: 469 CELLS/UL (ref 200–950)
MONOCYTES NFR BLD AUTO: 6.8 %
NEUTROPHILS # BLD AUTO: 3153 CELLS/UL (ref 1500–7800)
NEUTROPHILS NFR BLD AUTO: 45.7 %
PLATELET # BLD AUTO: 208 THOUSAND/UL (ref 140–400)
PMV BLD REES-ECKER: 11.1 FL (ref 7.5–12.5)
POTASSIUM SERPL-SCNC: 4.1 MMOL/L (ref 3.5–5.3)
PROT SERPL-MCNC: 6.3 G/DL (ref 6.1–8.1)
RBC # BLD AUTO: 3.99 MILLION/UL (ref 4.2–5.8)
SL AMB EGFR AFRICAN AMERICAN: 36 ML/MIN/1.73M2
SL AMB EGFR NON AFRICAN AMERICAN: 31 ML/MIN/1.73M2
SODIUM SERPL-SCNC: 141 MMOL/L (ref 135–146)
WBC # BLD AUTO: 6.9 THOUSAND/UL (ref 3.8–10.8)

## 2019-03-18 ENCOUNTER — CLINICAL SUPPORT (OUTPATIENT)
Dept: CARDIAC REHAB | Facility: HOSPITAL | Age: 84
End: 2019-03-18
Payer: COMMERCIAL

## 2019-03-18 DIAGNOSIS — I21.4 NSTEMI (NON-ST ELEVATED MYOCARDIAL INFARCTION) (HCC): ICD-10-CM

## 2019-03-18 PROCEDURE — 93798 PHYS/QHP OP CAR RHAB W/ECG: CPT

## 2019-03-22 ENCOUNTER — CLINICAL SUPPORT (OUTPATIENT)
Dept: CARDIAC REHAB | Facility: HOSPITAL | Age: 84
End: 2019-03-22
Payer: COMMERCIAL

## 2019-03-22 VITALS — WEIGHT: 186.5 LBS | BODY MASS INDEX: 31.04 KG/M2

## 2019-03-22 DIAGNOSIS — I21.4 NSTEMI (NON-ST ELEVATED MYOCARDIAL INFARCTION) (HCC): ICD-10-CM

## 2019-03-22 PROCEDURE — 93798 PHYS/QHP OP CAR RHAB W/ECG: CPT

## 2019-03-22 NOTE — PROGRESS NOTES
Cardiac Rehabilitation Plan of Care   60 Day Report      Today's date: 3/22/2019   Visits: 17  Patient name: Kayla Randolph  : 1935  Age: 80 y o  MRN: [id number]  Referring Physician: Nicolle Rocha MD  Provider: Cristian Britton  Clinician: Raine Mccormick MS, CEP      Dx: CABG/AVR  Date of onset: 2018      SUMMARY OF PROGRESS:  Mr Ginette Gilbert has started his cardiac rehab program  His main goal is to increase energy and decrease tiredness he has had since surgery  He reports he has trouble walking long distances and hopes to increase leg strength to help with this  Overall, he is doing well in his rehab program  He has more energy on a daily basis and reports feeling his legs are getting stronger  He is increasing times and intensity of exercise and tolerating well with stable hemodynamics  Will continue to progress with increased times and work levels as tolerated  Encouraging walking at home and a plan for continuation of exercise after rehab ends  Medication compliance: Yes   Comments: Reports taking medications as prescribed    Fall Risk: Moderate   Comments: walking with cane for balance    EKG changes: none      EXERCISE ASSESSMENT and PLAN    Current Exercise Program in Rehab:       Frequency: 2-3x/week        Minutes: 30         METS: 2 3           HR:    RPE: 4-6         Modalities: room walking, airdyne bike, UBE, NuStep      Exercise Progression 30 Day Goals :    Frequency: 3-4/week   Minutes: 30-40   METS: 2 0-3 0   HR:     RPE: 4-6   Modalities: Airdyne bike, UBE, NuStep and Room walking    Strength trainin-3 days / week, 12-15 repitations , 1-2 sets per modality  and Will be added following 2-3 weeks of monitored exercise sessions   Modalities: Lateral Raise, Arm Extension, Arm Curl and Sit to Stands    Progressing: In Progress    Home Exercise: no home exercise currently  Encouraged walking at store 1-2x/week, which he has done a few times   Continue to encourage walking 1-2x/week on non-rehab days  Also patient to consider a plan for continued exercise after rehab  Goals: 10% improvement in functional capacity, Increase in peak CR METs by 40%, Improved 6MWT distance by 10%, Resume ADLs with increased strength and Exercise 5 days/wk, >150mins/wk  Education: Benefit of exercise for CAD risk factors, home exercise guidelines, signs and sxs and RPE scale   Plan:education on home exercise guidelines and home exercise 30+ mins 2 days opposite CR  Readiness to change: Preparation- has not started home exercise  Limited walking due to cold weather  Encouraged to walk at store 1-2x/week for 10-min      NUTRITION ASSESSMENT AND PLAN    Weight control:    Starting weight: 205 lb   Current weight: Weight - Scale: 84 6 kg (186 lb 8 oz)   Waist circumference:    Startin"   Current:    Diabetes: N/A  Lipid management: Discussed diet and lipid management and Last lipid profile 2017  Chol 197    HDL 33    Goals:LDL <100, HDL >40, TRG <150, CHOL <200, decreased body fat % <33% and reduced waist circumference <40 inches  Education: heart healthy eating  low sodium diet  diet and lipid management  Progressing: In Progress  Plan: Increase PUFA and MUFA, Decrease SFA, Increase whole grains and increase fruits/vegs  Readiness to change: Action- reports making some healthier choices when eating-depends on meals on wheels and not always able to control what he has to eat  Feels he is doing better with this  PSYCHOSOCIAL ASSESSMENT AND PLAN    Emotional:              1-4 = Minimal Depression  Self-reported stress level: 3   Social support: Good   Goals:  increased appetite and increased energy  Education: signs/sxs of depression  Progressing: In Progress  Plan: Practice relaxation techniques  Readiness to change: Action-doing well with stress management  Using relaxation techniques        OTHER CORE COMPONENTS     Tobacco:   Social History     Tobacco Use   Smoking Status Never Smoker   Smokeless Tobacco Never Used   Tobacco Comment    Only in the service        Tobacco Use Intervention: Referral to tobacco expert:   N/A    Blood pressure:    Restin/72   Exercise: 158/70      Goals: consistent BP < 130/80, reduced dietary sodium <2300mg and consistent exercise >150 mins/wk  Education:  understanding HTN and CAD and low sodium diet and HTN  Progressing: In Progress  Plan: Follow low fat, heart healthy diet, DASH diet, increase exercise time to help manage BP    Readiness to change:Action-BP controlled

## 2019-03-23 ENCOUNTER — TELEPHONE (OUTPATIENT)
Dept: FAMILY MEDICINE CLINIC | Facility: HOSPITAL | Age: 84
End: 2019-03-23

## 2019-03-25 ENCOUNTER — CLINICAL SUPPORT (OUTPATIENT)
Dept: CARDIAC REHAB | Facility: HOSPITAL | Age: 84
End: 2019-03-25
Payer: COMMERCIAL

## 2019-03-25 DIAGNOSIS — I21.4 NSTEMI (NON-ST ELEVATED MYOCARDIAL INFARCTION) (HCC): ICD-10-CM

## 2019-03-25 PROCEDURE — 93798 PHYS/QHP OP CAR RHAB W/ECG: CPT

## 2019-03-27 DIAGNOSIS — M1A.9XX1 CHRONIC GOUT WITH TOPHUS, UNSPECIFIED CAUSE, UNSPECIFIED SITE: ICD-10-CM

## 2019-03-27 RX ORDER — ALLOPURINOL 300 MG/1
TABLET ORAL
Qty: 90 TABLET | Refills: 3 | Status: SHIPPED | OUTPATIENT
Start: 2019-03-27 | End: 2020-03-02

## 2019-03-28 DIAGNOSIS — E11.21 TYPE 2 DIABETES MELLITUS WITH DIABETIC NEPHROPATHY, WITHOUT LONG-TERM CURRENT USE OF INSULIN (HCC): Primary | ICD-10-CM

## 2019-03-28 RX ORDER — INSULIN ASPART 100 [IU]/ML
INJECTION, SOLUTION INTRAVENOUS; SUBCUTANEOUS
Qty: 15 ML | Refills: 3 | Status: SHIPPED | OUTPATIENT
Start: 2019-03-28 | End: 2019-04-03 | Stop reason: DRUGHIGH

## 2019-03-29 ENCOUNTER — CLINICAL SUPPORT (OUTPATIENT)
Dept: CARDIAC REHAB | Facility: HOSPITAL | Age: 84
End: 2019-03-29
Payer: COMMERCIAL

## 2019-03-29 DIAGNOSIS — I21.4 NSTEMI (NON-ST ELEVATED MYOCARDIAL INFARCTION) (HCC): ICD-10-CM

## 2019-03-29 PROCEDURE — 93798 PHYS/QHP OP CAR RHAB W/ECG: CPT

## 2019-04-01 ENCOUNTER — CLINICAL SUPPORT (OUTPATIENT)
Dept: CARDIAC REHAB | Facility: HOSPITAL | Age: 84
End: 2019-04-01
Payer: COMMERCIAL

## 2019-04-01 DIAGNOSIS — I21.4 NON-ST ELEVATION MYOCARDIAL INFARCTION (NSTEMI) (HCC): ICD-10-CM

## 2019-04-01 PROCEDURE — 93798 PHYS/QHP OP CAR RHAB W/ECG: CPT

## 2019-04-03 ENCOUNTER — OFFICE VISIT (OUTPATIENT)
Dept: FAMILY MEDICINE CLINIC | Facility: HOSPITAL | Age: 84
End: 2019-04-03
Payer: COMMERCIAL

## 2019-04-03 VITALS
HEIGHT: 68 IN | BODY MASS INDEX: 30.46 KG/M2 | DIASTOLIC BLOOD PRESSURE: 58 MMHG | SYSTOLIC BLOOD PRESSURE: 102 MMHG | HEART RATE: 61 BPM | WEIGHT: 201 LBS

## 2019-04-03 DIAGNOSIS — Z95.1 S/P CABG X 4: ICD-10-CM

## 2019-04-03 DIAGNOSIS — Z00.00 MEDICARE ANNUAL WELLNESS VISIT, SUBSEQUENT: Primary | ICD-10-CM

## 2019-04-03 DIAGNOSIS — E11.22 TYPE 2 DIABETES MELLITUS WITH STAGE 3 CHRONIC KIDNEY DISEASE, WITHOUT LONG-TERM CURRENT USE OF INSULIN (HCC): ICD-10-CM

## 2019-04-03 DIAGNOSIS — N18.30 ANEMIA DUE TO STAGE 3 CHRONIC KIDNEY DISEASE (HCC): Chronic | ICD-10-CM

## 2019-04-03 DIAGNOSIS — N18.30 ACUTE RENAL FAILURE WITH ACUTE TUBULAR NECROSIS SUPERIMPOSED ON STAGE 3 CHRONIC KIDNEY DISEASE (HCC): ICD-10-CM

## 2019-04-03 DIAGNOSIS — E03.9 ACQUIRED HYPOTHYROIDISM: Chronic | ICD-10-CM

## 2019-04-03 DIAGNOSIS — N17.0 ACUTE RENAL FAILURE WITH ACUTE TUBULAR NECROSIS SUPERIMPOSED ON STAGE 3 CHRONIC KIDNEY DISEASE (HCC): ICD-10-CM

## 2019-04-03 DIAGNOSIS — N18.30 TYPE 2 DIABETES MELLITUS WITH STAGE 3 CHRONIC KIDNEY DISEASE, WITHOUT LONG-TERM CURRENT USE OF INSULIN (HCC): ICD-10-CM

## 2019-04-03 DIAGNOSIS — I10 BENIGN ESSENTIAL HYPERTENSION: ICD-10-CM

## 2019-04-03 DIAGNOSIS — Z95.2 S/P AVR (AORTIC VALVE REPLACEMENT): ICD-10-CM

## 2019-04-03 DIAGNOSIS — L03.116 CELLULITIS OF LEFT LOWER EXTREMITY: ICD-10-CM

## 2019-04-03 DIAGNOSIS — D63.1 ANEMIA DUE TO STAGE 3 CHRONIC KIDNEY DISEASE (HCC): Chronic | ICD-10-CM

## 2019-04-03 DIAGNOSIS — I25.10 CORONARY ARTERY DISEASE INVOLVING NATIVE CORONARY ARTERY OF NATIVE HEART WITHOUT ANGINA PECTORIS: ICD-10-CM

## 2019-04-03 PROBLEM — E44.0 MODERATE PROTEIN-CALORIE MALNUTRITION (HCC): Status: RESOLVED | Noted: 2018-12-04 | Resolved: 2019-04-03

## 2019-04-03 PROCEDURE — 1160F RVW MEDS BY RX/DR IN RCRD: CPT | Performed by: INTERNAL MEDICINE

## 2019-04-03 PROCEDURE — 1036F TOBACCO NON-USER: CPT | Performed by: INTERNAL MEDICINE

## 2019-04-03 PROCEDURE — G0439 PPPS, SUBSEQ VISIT: HCPCS | Performed by: INTERNAL MEDICINE

## 2019-04-03 PROCEDURE — 1170F FXNL STATUS ASSESSED: CPT | Performed by: INTERNAL MEDICINE

## 2019-04-03 PROCEDURE — 3078F DIAST BP <80 MM HG: CPT | Performed by: INTERNAL MEDICINE

## 2019-04-03 PROCEDURE — 1125F AMNT PAIN NOTED PAIN PRSNT: CPT | Performed by: INTERNAL MEDICINE

## 2019-04-03 PROCEDURE — 3074F SYST BP LT 130 MM HG: CPT | Performed by: INTERNAL MEDICINE

## 2019-04-03 RX ORDER — INSULIN GLARGINE 100 [IU]/ML
15 INJECTION, SOLUTION SUBCUTANEOUS
Qty: 15 ML | Refills: 3 | Status: SHIPPED | OUTPATIENT
Start: 2019-04-03 | End: 2019-08-07

## 2019-04-05 ENCOUNTER — CLINICAL SUPPORT (OUTPATIENT)
Dept: CARDIAC REHAB | Facility: HOSPITAL | Age: 84
End: 2019-04-05
Payer: COMMERCIAL

## 2019-04-05 VITALS — WEIGHT: 202 LBS | BODY MASS INDEX: 30.71 KG/M2

## 2019-04-05 DIAGNOSIS — I21.4 NSTEMI (NON-ST ELEVATED MYOCARDIAL INFARCTION) (HCC): ICD-10-CM

## 2019-04-05 PROCEDURE — 93798 PHYS/QHP OP CAR RHAB W/ECG: CPT

## 2019-04-08 ENCOUNTER — CLINICAL SUPPORT (OUTPATIENT)
Dept: CARDIAC REHAB | Facility: HOSPITAL | Age: 84
End: 2019-04-08
Payer: COMMERCIAL

## 2019-04-08 DIAGNOSIS — I21.4 NSTEMI (NON-ST ELEVATION MYOCARDIAL INFARCTION) (HCC): ICD-10-CM

## 2019-04-08 PROCEDURE — 93798 PHYS/QHP OP CAR RHAB W/ECG: CPT

## 2019-04-12 ENCOUNTER — CLINICAL SUPPORT (OUTPATIENT)
Dept: CARDIAC REHAB | Facility: HOSPITAL | Age: 84
End: 2019-04-12
Payer: COMMERCIAL

## 2019-04-12 VITALS — WEIGHT: 202 LBS | BODY MASS INDEX: 30.71 KG/M2

## 2019-04-12 DIAGNOSIS — I21.4 NSTEMI (NON-ST ELEVATED MYOCARDIAL INFARCTION) (HCC): ICD-10-CM

## 2019-04-12 PROCEDURE — 93798 PHYS/QHP OP CAR RHAB W/ECG: CPT

## 2019-04-15 ENCOUNTER — CLINICAL SUPPORT (OUTPATIENT)
Dept: CARDIAC REHAB | Facility: HOSPITAL | Age: 84
End: 2019-04-15
Payer: COMMERCIAL

## 2019-04-15 DIAGNOSIS — I21.4 NSTEMI (NON-ST ELEVATED MYOCARDIAL INFARCTION) (HCC): ICD-10-CM

## 2019-04-15 PROCEDURE — 93798 PHYS/QHP OP CAR RHAB W/ECG: CPT

## 2019-04-19 ENCOUNTER — CLINICAL SUPPORT (OUTPATIENT)
Dept: CARDIAC REHAB | Facility: HOSPITAL | Age: 84
End: 2019-04-19
Payer: COMMERCIAL

## 2019-04-19 VITALS — WEIGHT: 203 LBS | BODY MASS INDEX: 30.87 KG/M2

## 2019-04-19 DIAGNOSIS — I21.4 NSTEMI (NON-ST ELEVATED MYOCARDIAL INFARCTION) (HCC): ICD-10-CM

## 2019-04-19 PROCEDURE — 93798 PHYS/QHP OP CAR RHAB W/ECG: CPT

## 2019-04-22 ENCOUNTER — CLINICAL SUPPORT (OUTPATIENT)
Dept: CARDIAC REHAB | Facility: HOSPITAL | Age: 84
End: 2019-04-22
Payer: COMMERCIAL

## 2019-04-22 DIAGNOSIS — I20.8 STABLE ANGINA (HCC): ICD-10-CM

## 2019-04-22 DIAGNOSIS — I21.4 NON-ST ELEVATION MYOCARDIAL INFARCTION (NSTEMI) (HCC): ICD-10-CM

## 2019-04-22 DIAGNOSIS — F41.9 ANXIETY: ICD-10-CM

## 2019-04-22 PROCEDURE — 93798 PHYS/QHP OP CAR RHAB W/ECG: CPT

## 2019-04-23 DIAGNOSIS — R60.9 EDEMA, UNSPECIFIED TYPE: ICD-10-CM

## 2019-04-23 DIAGNOSIS — Z95.1 S/P CABG X 4: ICD-10-CM

## 2019-04-23 RX ORDER — PANTOPRAZOLE SODIUM 40 MG/1
TABLET, DELAYED RELEASE ORAL
Qty: 30 TABLET | Refills: 6 | Status: SHIPPED | OUTPATIENT
Start: 2019-04-23 | End: 2019-08-07 | Stop reason: SDUPTHER

## 2019-04-23 RX ORDER — LORAZEPAM 0.5 MG/1
TABLET ORAL
Qty: 30 TABLET | Refills: 2 | Status: SHIPPED | OUTPATIENT
Start: 2019-04-23 | End: 2019-07-17 | Stop reason: SDUPTHER

## 2019-04-23 RX ORDER — TORSEMIDE 10 MG/1
10 TABLET ORAL DAILY
Qty: 30 TABLET | Refills: 5 | Status: SHIPPED | OUTPATIENT
Start: 2019-04-23 | End: 2019-10-07 | Stop reason: SDUPTHER

## 2019-04-25 ENCOUNTER — OFFICE VISIT (OUTPATIENT)
Dept: CARDIOLOGY CLINIC | Facility: CLINIC | Age: 84
End: 2019-04-25
Payer: COMMERCIAL

## 2019-04-25 VITALS
SYSTOLIC BLOOD PRESSURE: 130 MMHG | WEIGHT: 204.2 LBS | BODY MASS INDEX: 30.95 KG/M2 | DIASTOLIC BLOOD PRESSURE: 68 MMHG | HEIGHT: 68 IN | HEART RATE: 52 BPM

## 2019-04-25 DIAGNOSIS — E78.00 PURE HYPERCHOLESTEROLEMIA: ICD-10-CM

## 2019-04-25 DIAGNOSIS — I25.10 CORONARY ARTERY DISEASE INVOLVING NATIVE CORONARY ARTERY OF NATIVE HEART WITHOUT ANGINA PECTORIS: Primary | ICD-10-CM

## 2019-04-25 DIAGNOSIS — I10 BENIGN ESSENTIAL HYPERTENSION: ICD-10-CM

## 2019-04-25 DIAGNOSIS — I35.0 NONRHEUMATIC AORTIC VALVE STENOSIS: ICD-10-CM

## 2019-04-25 DIAGNOSIS — Z95.2 S/P AVR (AORTIC VALVE REPLACEMENT): ICD-10-CM

## 2019-04-25 DIAGNOSIS — Z95.1 S/P CABG X 4: ICD-10-CM

## 2019-04-25 PROCEDURE — 99214 OFFICE O/P EST MOD 30 MIN: CPT | Performed by: INTERNAL MEDICINE

## 2019-04-25 RX ORDER — LATANOPROST 50 UG/ML
1 SOLUTION/ DROPS OPHTHALMIC
Refills: 11 | COMMUNITY
Start: 2019-03-06 | End: 2020-05-18 | Stop reason: ALTCHOICE

## 2019-04-25 RX ORDER — TIMOLOL MALEATE 5 MG/ML
SOLUTION/ DROPS OPHTHALMIC
Refills: 11 | COMMUNITY
Start: 2019-03-06 | End: 2020-05-18 | Stop reason: ALTCHOICE

## 2019-04-26 ENCOUNTER — CLINICAL SUPPORT (OUTPATIENT)
Dept: CARDIAC REHAB | Facility: HOSPITAL | Age: 84
End: 2019-04-26
Payer: COMMERCIAL

## 2019-04-26 DIAGNOSIS — I21.4 NSTEMI (NON-ST ELEVATED MYOCARDIAL INFARCTION) (HCC): ICD-10-CM

## 2019-04-26 PROCEDURE — 93798 PHYS/QHP OP CAR RHAB W/ECG: CPT

## 2019-04-29 ENCOUNTER — CLINICAL SUPPORT (OUTPATIENT)
Dept: CARDIAC REHAB | Facility: HOSPITAL | Age: 84
End: 2019-04-29
Payer: COMMERCIAL

## 2019-04-29 DIAGNOSIS — I21.4 NSTEMI (NON-ST ELEVATED MYOCARDIAL INFARCTION) (HCC): ICD-10-CM

## 2019-04-29 PROCEDURE — 93798 PHYS/QHP OP CAR RHAB W/ECG: CPT

## 2019-05-03 ENCOUNTER — CLINICAL SUPPORT (OUTPATIENT)
Dept: CARDIAC REHAB | Facility: HOSPITAL | Age: 84
End: 2019-05-03
Payer: COMMERCIAL

## 2019-05-03 VITALS — WEIGHT: 202 LBS | BODY MASS INDEX: 30.71 KG/M2

## 2019-05-03 DIAGNOSIS — I21.4 NSTEMI (NON-ST ELEVATED MYOCARDIAL INFARCTION) (HCC): ICD-10-CM

## 2019-05-03 PROCEDURE — 93798 PHYS/QHP OP CAR RHAB W/ECG: CPT

## 2019-05-06 ENCOUNTER — CLINICAL SUPPORT (OUTPATIENT)
Dept: CARDIAC REHAB | Facility: HOSPITAL | Age: 84
End: 2019-05-06
Payer: COMMERCIAL

## 2019-05-06 DIAGNOSIS — I21.4 NSTEMI (NON-ST ELEVATED MYOCARDIAL INFARCTION) (HCC): ICD-10-CM

## 2019-05-06 PROCEDURE — 93798 PHYS/QHP OP CAR RHAB W/ECG: CPT

## 2019-05-10 ENCOUNTER — CLINICAL SUPPORT (OUTPATIENT)
Dept: CARDIAC REHAB | Facility: HOSPITAL | Age: 84
End: 2019-05-10
Payer: COMMERCIAL

## 2019-05-10 VITALS — WEIGHT: 202 LBS | BODY MASS INDEX: 30.71 KG/M2

## 2019-05-10 DIAGNOSIS — I21.4 NSTEMI (NON-ST ELEVATED MYOCARDIAL INFARCTION) (HCC): ICD-10-CM

## 2019-05-10 PROCEDURE — 93798 PHYS/QHP OP CAR RHAB W/ECG: CPT

## 2019-05-13 ENCOUNTER — CLINICAL SUPPORT (OUTPATIENT)
Dept: CARDIAC REHAB | Facility: HOSPITAL | Age: 84
End: 2019-05-13
Payer: COMMERCIAL

## 2019-05-13 DIAGNOSIS — I21.4 NSTEMI (NON-ST ELEVATED MYOCARDIAL INFARCTION) (HCC): ICD-10-CM

## 2019-05-13 PROCEDURE — 93798 PHYS/QHP OP CAR RHAB W/ECG: CPT

## 2019-05-17 ENCOUNTER — CLINICAL SUPPORT (OUTPATIENT)
Dept: CARDIAC REHAB | Facility: HOSPITAL | Age: 84
End: 2019-05-17
Payer: COMMERCIAL

## 2019-05-17 DIAGNOSIS — I21.4 NSTEMI (NON-ST ELEVATED MYOCARDIAL INFARCTION) (HCC): ICD-10-CM

## 2019-05-17 PROCEDURE — 93798 PHYS/QHP OP CAR RHAB W/ECG: CPT

## 2019-05-20 ENCOUNTER — CLINICAL SUPPORT (OUTPATIENT)
Dept: CARDIAC REHAB | Facility: HOSPITAL | Age: 84
End: 2019-05-20
Payer: COMMERCIAL

## 2019-05-20 DIAGNOSIS — I21.4 NSTEMI (NON-ST ELEVATED MYOCARDIAL INFARCTION) (HCC): ICD-10-CM

## 2019-05-20 PROCEDURE — 93798 PHYS/QHP OP CAR RHAB W/ECG: CPT

## 2019-05-21 DIAGNOSIS — Z95.2 S/P AVR (AORTIC VALVE REPLACEMENT): ICD-10-CM

## 2019-05-21 DIAGNOSIS — R35.1 BENIGN PROSTATIC HYPERPLASIA WITH NOCTURIA: ICD-10-CM

## 2019-05-21 DIAGNOSIS — N40.1 BENIGN PROSTATIC HYPERPLASIA WITH NOCTURIA: ICD-10-CM

## 2019-05-21 DIAGNOSIS — Z95.1 S/P CABG X 4: ICD-10-CM

## 2019-05-21 RX ORDER — ROSUVASTATIN CALCIUM 40 MG/1
TABLET, COATED ORAL
Qty: 30 TABLET | Refills: 6 | Status: SHIPPED | OUTPATIENT
Start: 2019-05-21 | End: 2019-06-14 | Stop reason: SDUPTHER

## 2019-05-22 RX ORDER — TAMSULOSIN HYDROCHLORIDE 0.4 MG/1
CAPSULE ORAL
Qty: 30 CAPSULE | Refills: 6 | Status: SHIPPED | OUTPATIENT
Start: 2019-05-22 | End: 2019-12-03 | Stop reason: SDUPTHER

## 2019-05-24 ENCOUNTER — CLINICAL SUPPORT (OUTPATIENT)
Dept: CARDIAC REHAB | Facility: HOSPITAL | Age: 84
End: 2019-05-24
Payer: COMMERCIAL

## 2019-05-24 DIAGNOSIS — I21.4 NSTEMI (NON-ST ELEVATED MYOCARDIAL INFARCTION) (HCC): ICD-10-CM

## 2019-05-24 PROCEDURE — 93798 PHYS/QHP OP CAR RHAB W/ECG: CPT

## 2019-05-28 ENCOUNTER — CLINICAL SUPPORT (OUTPATIENT)
Dept: CARDIAC REHAB | Facility: HOSPITAL | Age: 84
End: 2019-05-28
Payer: COMMERCIAL

## 2019-05-28 ENCOUNTER — APPOINTMENT (OUTPATIENT)
Dept: CARDIAC REHAB | Facility: HOSPITAL | Age: 84
End: 2019-05-28
Payer: COMMERCIAL

## 2019-05-28 DIAGNOSIS — I21.4 NSTEMI (NON-ST ELEVATED MYOCARDIAL INFARCTION) (HCC): ICD-10-CM

## 2019-05-28 PROCEDURE — 93798 PHYS/QHP OP CAR RHAB W/ECG: CPT

## 2019-05-29 ENCOUNTER — HOSPITAL ENCOUNTER (OUTPATIENT)
Dept: NON INVASIVE DIAGNOSTICS | Facility: CLINIC | Age: 84
Discharge: HOME | End: 2019-05-29
Payer: COMMERCIAL

## 2019-05-29 DIAGNOSIS — Z95.2 S/P AVR (AORTIC VALVE REPLACEMENT): ICD-10-CM

## 2019-05-29 PROCEDURE — 93306 TTE W/DOPPLER COMPLETE: CPT | Performed by: INTERNAL MEDICINE

## 2019-05-29 PROCEDURE — 93306 TTE W/DOPPLER COMPLETE: CPT

## 2019-05-31 ENCOUNTER — TELEPHONE (OUTPATIENT)
Dept: CARDIOLOGY CLINIC | Facility: CLINIC | Age: 84
End: 2019-05-31

## 2019-05-31 ENCOUNTER — APPOINTMENT (OUTPATIENT)
Dept: CARDIAC REHAB | Facility: HOSPITAL | Age: 84
End: 2019-05-31
Payer: COMMERCIAL

## 2019-06-04 VITALS — WEIGHT: 205 LBS | HEIGHT: 68 IN | BODY MASS INDEX: 31.07 KG/M2

## 2019-06-14 DIAGNOSIS — Z95.1 S/P CABG X 4: ICD-10-CM

## 2019-06-14 DIAGNOSIS — Z95.2 S/P AVR (AORTIC VALVE REPLACEMENT): ICD-10-CM

## 2019-06-14 RX ORDER — ROSUVASTATIN CALCIUM 40 MG/1
40 TABLET, COATED ORAL DAILY
Qty: 90 TABLET | Refills: 3 | Status: SHIPPED | OUTPATIENT
Start: 2019-06-14 | End: 2019-07-11 | Stop reason: SDUPTHER

## 2019-06-18 DIAGNOSIS — G62.9 NEUROPATHY: ICD-10-CM

## 2019-06-18 DIAGNOSIS — E03.9 HYPOTHYROIDISM, UNSPECIFIED TYPE: ICD-10-CM

## 2019-06-18 RX ORDER — GABAPENTIN 300 MG/1
CAPSULE ORAL
Qty: 90 CAPSULE | Refills: 3 | Status: SHIPPED | OUTPATIENT
Start: 2019-06-18 | End: 2020-05-19 | Stop reason: SDUPTHER

## 2019-06-18 RX ORDER — LEVOTHYROXINE SODIUM 0.15 MG/1
TABLET ORAL
Qty: 30 TABLET | Refills: 6 | Status: SHIPPED | OUTPATIENT
Start: 2019-06-18 | End: 2019-12-31

## 2019-07-10 ENCOUNTER — OFFICE VISIT (OUTPATIENT)
Dept: FAMILY MEDICINE CLINIC | Facility: HOSPITAL | Age: 84
End: 2019-07-10
Payer: COMMERCIAL

## 2019-07-10 ENCOUNTER — TRANSCRIBE ORDERS (OUTPATIENT)
Dept: ADMINISTRATIVE | Facility: HOSPITAL | Age: 84
End: 2019-07-10

## 2019-07-10 ENCOUNTER — HOSPITAL ENCOUNTER (OUTPATIENT)
Dept: NON INVASIVE DIAGNOSTICS | Facility: HOSPITAL | Age: 84
Discharge: HOME | End: 2019-07-10
Attending: INTERNAL MEDICINE
Payer: COMMERCIAL

## 2019-07-10 VITALS
SYSTOLIC BLOOD PRESSURE: 124 MMHG | DIASTOLIC BLOOD PRESSURE: 62 MMHG | HEART RATE: 61 BPM | BODY MASS INDEX: 31.22 KG/M2 | OXYGEN SATURATION: 97 % | HEIGHT: 68 IN | WEIGHT: 206 LBS

## 2019-07-10 DIAGNOSIS — M79.605 LEG PAIN, ANTERIOR, LEFT: Primary | ICD-10-CM

## 2019-07-10 DIAGNOSIS — M79.605 LEFT LEG PAIN: Primary | ICD-10-CM

## 2019-07-10 DIAGNOSIS — M79.605 LEG PAIN, ANTERIOR, LEFT: ICD-10-CM

## 2019-07-10 DIAGNOSIS — R59.1 LYMPHADENOPATHY OF HEAD AND NECK: ICD-10-CM

## 2019-07-10 PROCEDURE — 99213 OFFICE O/P EST LOW 20 MIN: CPT | Performed by: INTERNAL MEDICINE

## 2019-07-10 PROCEDURE — 1160F RVW MEDS BY RX/DR IN RCRD: CPT | Performed by: INTERNAL MEDICINE

## 2019-07-10 PROCEDURE — 93970 EXTREMITY STUDY: CPT

## 2019-07-10 PROCEDURE — 93970 EXTREMITY STUDY: CPT | Performed by: INTERNAL MEDICINE

## 2019-07-10 RX ORDER — TRAMADOL HYDROCHLORIDE 50 MG/1
50 TABLET ORAL 2 TIMES DAILY PRN
Qty: 30 TABLET | Refills: 0 | Status: SHIPPED | OUTPATIENT
Start: 2019-07-10 | End: 2019-07-25

## 2019-07-10 NOTE — PROGRESS NOTES
Assessment/Plan:             Problem List Items Addressed This Visit     None      Visit Diagnoses     Leg pain, anterior, left    -  Primary            Subjective:      Patient ID: Dionne Dhillon  is a 80 y o  male    1  Bilateral leg pain left greater than right- had returned from visiting family in [place] last on 6/26/19  Has noted increased left leg stiffness in AM       The following portions of the patient's history were reviewed and updated as appropriate: allergies, current medications and problem list      Review of Systems   Constitutional: Negative  Musculoskeletal: Positive for joint swelling and myalgias  All other systems reviewed and are negative          Objective:      Current Outpatient Medications:     acetaminophen (TYLENOL) 325 mg tablet, 650 mg every 4 to 6 hours as needed for pain (do not take with percocet), Disp: 30 tablet, Rfl: 0    aspirin 325 mg tablet, Take 1 tablet (325 mg total) by mouth daily, Disp: , Rfl: 0    BASAGLAR KWIKPEN 100 units/mL injection pen, Inject 15 Units under the skin daily at bedtime, Disp: 15 mL, Rfl: 3    COMFORT EZ PEN NEEDLES 32G X 4 MM MISC, AS DIRECTED FOUR TIMES DAILY, Disp: 100 each, Rfl: 3    ferrous sulfate 325 (65 Fe) mg tablet, Take 1 tablet (325 mg total) by mouth daily with breakfast for 90 days, Disp: , Rfl: 0    gabapentin (NEURONTIN) 300 mg capsule, TAKE ONE CAPSULE BY MOUTH every night at bedtime, Disp: 90 capsule, Rfl: 3    insulin aspart (NovoLOG) 100 units/mL injection, Inject 4 Units under the skin 3 (three) times a day before meals, Disp: , Rfl: 0    latanoprost (XALATAN) 0 005 % ophthalmic solution, Administer 1 drop to both eyes daily at bedtime, Disp: , Rfl: 11    levothyroxine 150 mcg tablet, TAKE ONE TABLET BY MOUTH DAILY, Disp: 30 tablet, Rfl: 6    LORazepam (ATIVAN) 0 5 mg tablet, TAKE ONE TABLET BY MOUTH EVERY 8 HOURS AS NEEDED FOR ANXIETY, Disp: 30 tablet, Rfl: 2    metoprolol tartrate (LOPRESSOR) 25 mg tablet, TAKE ONE-HALF TABLET BY MOUTH EVERY TWELVE HOURS, Disp: 30 tablet, Rfl: 6    pantoprazole (PROTONIX) 40 mg tablet, TAKE ONE TABLET BY MOUTH DAILY, Disp: 30 tablet, Rfl: 6    polyethylene glycol (MIRALAX) 17 g packet, Take 17 g by mouth daily (Patient taking differently: Take 17 g by mouth as needed  ), Disp: 14 each, Rfl: 0    rosuvastatin (CRESTOR) 40 MG tablet, Take 1 tablet (40 mg total) by mouth daily, Disp: 90 tablet, Rfl: 3    tamsulosin (FLOMAX) 0 4 mg, TAKE ONE CAPSULE BY MOUTH DAILY WITH dinner, Disp: 30 capsule, Rfl: 6    timolol (TIMOPTIC) 0 5 % ophthalmic solution, Instill 1 drop into both eyes every morning, Disp: , Rfl: 11    torsemide (DEMADEX) 10 mg tablet, Take 1 tablet (10 mg total) by mouth daily, Disp: 30 tablet, Rfl: 5    albuterol (PROVENTIL HFA) 90 mcg/act inhaler, Inhale 2 puffs 4 (four) times a day as needed, Disp: , Rfl:     allopurinol (ZYLOPRIM) 300 mg tablet, TAKE ONE TABLET BY MOUTH ONCE DAILY (Patient not taking: Reported on 7/10/2019), Disp: 90 tablet, Rfl: 3    Blood pressure 124/62, pulse 61, height 5' 8" (1 727 m), weight 93 4 kg (206 lb), SpO2 97 %  Physical Exam   Constitutional: No distress  Cardiovascular: Normal rate and regular rhythm  Exam reveals no friction rub  No murmur heard  Pulmonary/Chest: Effort normal and breath sounds normal  No stridor  No respiratory distress  Abdominal: Soft  Bowel sounds are normal  He exhibits no distension  There is no tenderness  Musculoskeletal: Normal range of motion  He exhibits tenderness  Neurological: No sensory deficit  Skin: He is not diaphoretic  Nursing note and vitals reviewed

## 2019-07-11 ENCOUNTER — TELEPHONE (OUTPATIENT)
Dept: FAMILY MEDICINE CLINIC | Facility: HOSPITAL | Age: 84
End: 2019-07-11

## 2019-07-11 DIAGNOSIS — Z95.2 S/P AVR (AORTIC VALVE REPLACEMENT): ICD-10-CM

## 2019-07-11 DIAGNOSIS — Z95.1 S/P CABG X 4: ICD-10-CM

## 2019-07-11 RX ORDER — ROSUVASTATIN CALCIUM 40 MG/1
40 TABLET, COATED ORAL DAILY
Qty: 90 TABLET | Refills: 3 | Status: SHIPPED | OUTPATIENT
Start: 2019-07-11 | End: 2019-11-04 | Stop reason: SDUPTHER

## 2019-07-11 NOTE — TELEPHONE ENCOUNTER
We got a call and a # was left and a case # when the person who answered it picked up  #[phone]  Case # [id number]  I called this # and it said [employer] and it kept saying to enter a birthdate, for a [employer]  It eventually hung up  I am thinking this is a supply company, so I called the pt and I had to leave a message for him to call me back  I want to make sure that he contacted this company before I give any info out    dk  I

## 2019-07-15 NOTE — TELEPHONE ENCOUNTER
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

## 2019-07-17 ENCOUNTER — HOSPITAL ENCOUNTER (OUTPATIENT)
Dept: CT IMAGING | Facility: HOSPITAL | Age: 84
Discharge: HOME | End: 2019-07-17
Attending: INTERNAL MEDICINE
Payer: COMMERCIAL

## 2019-07-17 DIAGNOSIS — F41.9 ANXIETY: ICD-10-CM

## 2019-07-17 DIAGNOSIS — R59.1 LYMPHADENOPATHY OF HEAD AND NECK: ICD-10-CM

## 2019-07-17 LAB
LEFT EYE DIABETIC RETINOPATHY: NORMAL
RIGHT EYE DIABETIC RETINOPATHY: NORMAL

## 2019-07-17 PROCEDURE — 70490 CT SOFT TISSUE NECK W/O DYE: CPT

## 2019-07-17 RX ORDER — LORAZEPAM 0.5 MG/1
TABLET ORAL
Qty: 30 TABLET | Refills: 0 | Status: SHIPPED | OUTPATIENT
Start: 2019-07-17 | End: 2019-08-16 | Stop reason: SDUPTHER

## 2019-07-23 ENCOUNTER — TELEPHONE (OUTPATIENT)
Dept: FAMILY MEDICINE CLINIC | Facility: HOSPITAL | Age: 84
End: 2019-07-23

## 2019-07-23 ENCOUNTER — TELEPHONE (OUTPATIENT)
Dept: OTHER | Facility: HOSPITAL | Age: 84
End: 2019-07-23

## 2019-07-23 DIAGNOSIS — K11.20 SALIVARY GLAND INFLAMMATION: Primary | ICD-10-CM

## 2019-07-23 RX ORDER — AMOXICILLIN AND CLAVULANATE POTASSIUM 875; 125 MG/1; MG/1
1 TABLET, FILM COATED ORAL 2 TIMES DAILY
Qty: 20 TABLET | Refills: 0 | Status: SHIPPED | OUTPATIENT
Start: 2019-07-23 | End: 2019-08-07 | Stop reason: ALTCHOICE

## 2019-07-23 NOTE — TELEPHONE ENCOUNTER
Ct of neck- no abnormal lymph nodes- has some arhtritic changes   Has bilateral prominenet salivary glands- may be chronic inflammation- I have ordered a 10 day course of antibiotics- if not improving willhave him seen by ent

## 2019-07-29 NOTE — TELEPHONE ENCOUNTER
Mess to pt  He will call [facility] pharm to make sure they still have it ready for him   dk

## 2019-07-29 NOTE — TELEPHONE ENCOUNTER
I CALLED PT AGAIN, SINCE HE DIDN'T CALL BACK AND SON ANSWERED AGAIN  HE THOUGHT PT CALLED ME BACK  I TOLD HIM NO   HE SAID PT IS NOT HOME  I ASKED SON AGAIN TO HAVE PT CALL ME   SON IS NOT ON HIPPA    DK

## 2019-08-05 DIAGNOSIS — I10 ESSENTIAL HYPERTENSION: ICD-10-CM

## 2019-08-05 DIAGNOSIS — E11.22 TYPE 2 DIABETES MELLITUS WITH STAGE 3 CHRONIC KIDNEY DISEASE, WITHOUT LONG-TERM CURRENT USE OF INSULIN (HCC): Primary | ICD-10-CM

## 2019-08-05 DIAGNOSIS — N18.30 TYPE 2 DIABETES MELLITUS WITH STAGE 3 CHRONIC KIDNEY DISEASE, WITHOUT LONG-TERM CURRENT USE OF INSULIN (HCC): Primary | ICD-10-CM

## 2019-08-05 DIAGNOSIS — E11.9 TYPE 2 DIABETES MELLITUS WITHOUT COMPLICATION, UNSPECIFIED WHETHER LONG TERM INSULIN USE (HCC): ICD-10-CM

## 2019-08-05 DIAGNOSIS — E78.5 HYPERLIPIDEMIA, UNSPECIFIED HYPERLIPIDEMIA TYPE: ICD-10-CM

## 2019-08-06 LAB
ALBUMIN/CREAT UR: 88 MCG/MG CREAT
BUN SERPL-MCNC: 33 MG/DL (ref 7–25)
BUN/CREAT SERPL: 17 (CALC) (ref 6–22)
CALCIUM SERPL-MCNC: 9.2 MG/DL (ref 8.6–10.3)
CHLORIDE SERPL-SCNC: 104 MMOL/L (ref 98–110)
CO2 SERPL-SCNC: 29 MMOL/L (ref 20–32)
CREAT SERPL-MCNC: 1.93 MG/DL (ref 0.7–1.11)
CREAT UR-MCNC: 133 MG/DL (ref 20–320)
EST. AVERAGE GLUCOSE BLD GHB EST-MCNC: 194 (CALC)
EST. AVERAGE GLUCOSE BLD GHB EST-SCNC: 10.8 (CALC)
GLUCOSE SERPL-MCNC: 120 MG/DL (ref 65–99)
HBA1C MFR BLD: 8.4 % OF TOTAL HGB
MICROALBUMIN UR-MCNC: 11.7 MG/DL
POTASSIUM SERPL-SCNC: 4.2 MMOL/L (ref 3.5–5.3)
SL AMB EGFR AFRICAN AMERICAN: 36 ML/MIN/1.73M2
SL AMB EGFR NON AFRICAN AMERICAN: 31 ML/MIN/1.73M2
SODIUM SERPL-SCNC: 141 MMOL/L (ref 135–146)

## 2019-08-07 ENCOUNTER — OFFICE VISIT (OUTPATIENT)
Dept: FAMILY MEDICINE CLINIC | Facility: HOSPITAL | Age: 84
End: 2019-08-07
Payer: COMMERCIAL

## 2019-08-07 VITALS
DIASTOLIC BLOOD PRESSURE: 62 MMHG | BODY MASS INDEX: 31.37 KG/M2 | SYSTOLIC BLOOD PRESSURE: 120 MMHG | HEART RATE: 51 BPM | RESPIRATION RATE: 16 BRPM | WEIGHT: 207 LBS | HEIGHT: 68 IN

## 2019-08-07 DIAGNOSIS — N18.30 ACUTE RENAL FAILURE WITH ACUTE TUBULAR NECROSIS SUPERIMPOSED ON STAGE 3 CHRONIC KIDNEY DISEASE (HCC): ICD-10-CM

## 2019-08-07 DIAGNOSIS — Z95.1 S/P CABG X 4: ICD-10-CM

## 2019-08-07 DIAGNOSIS — Z95.2 S/P AVR (AORTIC VALVE REPLACEMENT): ICD-10-CM

## 2019-08-07 DIAGNOSIS — Z79.4 TYPE 2 DIABETES MELLITUS WITH HYPOGLYCEMIA WITHOUT COMA, WITH LONG-TERM CURRENT USE OF INSULIN (HCC): ICD-10-CM

## 2019-08-07 DIAGNOSIS — E78.00 PURE HYPERCHOLESTEROLEMIA: ICD-10-CM

## 2019-08-07 DIAGNOSIS — I25.10 CORONARY ARTERY DISEASE INVOLVING NATIVE CORONARY ARTERY OF NATIVE HEART WITHOUT ANGINA PECTORIS: ICD-10-CM

## 2019-08-07 DIAGNOSIS — D63.1 ANEMIA DUE TO STAGE 3 CHRONIC KIDNEY DISEASE (HCC): Chronic | ICD-10-CM

## 2019-08-07 DIAGNOSIS — N18.30 TYPE 2 DIABETES MELLITUS WITH STAGE 3 CHRONIC KIDNEY DISEASE, WITHOUT LONG-TERM CURRENT USE OF INSULIN (HCC): Primary | ICD-10-CM

## 2019-08-07 DIAGNOSIS — N18.30 ANEMIA DUE TO STAGE 3 CHRONIC KIDNEY DISEASE (HCC): Chronic | ICD-10-CM

## 2019-08-07 DIAGNOSIS — N17.0 ACUTE RENAL FAILURE WITH ACUTE TUBULAR NECROSIS SUPERIMPOSED ON STAGE 3 CHRONIC KIDNEY DISEASE (HCC): ICD-10-CM

## 2019-08-07 DIAGNOSIS — I10 BENIGN ESSENTIAL HYPERTENSION: ICD-10-CM

## 2019-08-07 DIAGNOSIS — K21.9 GASTROESOPHAGEAL REFLUX DISEASE WITHOUT ESOPHAGITIS: ICD-10-CM

## 2019-08-07 DIAGNOSIS — L03.116 CELLULITIS OF LEFT LOWER EXTREMITY: ICD-10-CM

## 2019-08-07 DIAGNOSIS — E11.649 TYPE 2 DIABETES MELLITUS WITH HYPOGLYCEMIA WITHOUT COMA, WITH LONG-TERM CURRENT USE OF INSULIN (HCC): ICD-10-CM

## 2019-08-07 DIAGNOSIS — E11.22 TYPE 2 DIABETES MELLITUS WITH STAGE 3 CHRONIC KIDNEY DISEASE, WITHOUT LONG-TERM CURRENT USE OF INSULIN (HCC): Primary | ICD-10-CM

## 2019-08-07 PROCEDURE — 99214 OFFICE O/P EST MOD 30 MIN: CPT | Performed by: INTERNAL MEDICINE

## 2019-08-07 PROCEDURE — 1036F TOBACCO NON-USER: CPT | Performed by: INTERNAL MEDICINE

## 2019-08-07 PROCEDURE — 3078F DIAST BP <80 MM HG: CPT | Performed by: INTERNAL MEDICINE

## 2019-08-07 PROCEDURE — 3074F SYST BP LT 130 MM HG: CPT | Performed by: INTERNAL MEDICINE

## 2019-08-07 RX ORDER — INSULIN GLARGINE 100 [IU]/ML
20 INJECTION, SOLUTION SUBCUTANEOUS
Qty: 15 ML | Refills: 3
Start: 2019-08-07 | End: 2020-05-27 | Stop reason: SDUPTHER

## 2019-08-07 RX ORDER — PANTOPRAZOLE SODIUM 20 MG/1
40 TABLET, DELAYED RELEASE ORAL DAILY
Qty: 30 TABLET | Refills: 3 | Status: SHIPPED | OUTPATIENT
Start: 2019-08-07 | End: 2019-11-04 | Stop reason: SDUPTHER

## 2019-08-07 RX ORDER — POLYETHYLENE GLYCOL 3350 17 G/17G
17 POWDER, FOR SOLUTION ORAL DAILY
Start: 2019-08-07 | End: 2022-02-28 | Stop reason: ALTCHOICE

## 2019-08-07 NOTE — PROGRESS NOTES
Assessment/Plan:             Problem List Items Addressed This Visit        Digestive    GERD (gastroesophageal reflux disease)     No heart burn recently         Relevant Medications    pantoprazole (PROTONIX) 20 mg tablet    Other Relevant Orders    CBC and differential    Comprehensive metabolic panel    Lipid Panel with Direct LDL reflex       Endocrine    Type 2 diabetes mellitus with kidney complication, without long-term current use of insulin ([facility]) - Primary     Lab Results   Component Value Date    HGBA1C 8 4 (H) 08/05/2019       No results for input(s): POCGLU in the last 72 hours      Blood Sugar Average: Last 72 hrs:  creatinine is stabel at 1 93   increaseing hba1c- will increase the insulin to try to improve control         Relevant Medications    insulin aspart (NovoLOG) 100 units/mL injection    BASAGLAR KWIKPEN 100 units/mL injection pen    Other Relevant Orders    Lipid Panel with Direct LDL reflex       Cardiovascular and Mediastinum    CAD (coronary artery disease)    Relevant Orders    Lipid Panel with Direct LDL reflex       Genitourinary    Anemia due to stage 3 chronic kidney disease (HCC) (Chronic)       Other    Pure hypercholesterolemia    S/P CABG x 4    Relevant Medications    polyethylene glycol (MIRALAX) 17 g packet    pantoprazole (PROTONIX) 20 mg tablet    S/P AVR (aortic valve replacement)      Other Visit Diagnoses     Type 2 diabetes mellitus with hypoglycemia without coma, with long-term current use of insulin ([facility])        Relevant Medications    insulin aspart (NovoLOG) 100 units/mL injection    BASAGLAR KWIKPEN 100 units/mL injection pen    Other Relevant Orders    Hemoglobin A1C    Lipid Panel with Direct LDL reflex    Cellulitis of left lower extremity        Relevant Medications    BASAGLAR KWIKPEN 100 units/mL injection pen            Subjective:      Patient ID: Rach Miner  is a 80 y o  male    1 labs- reviewed increasing  hba1c      The following portions of the patient's history were reviewed and updated as appropriate: allergies, current medications and problem list      Review of Systems   HENT: Negative for congestion  Respiratory: Positive for shortness of breath  Negative for apnea  Cardiovascular: Negative for chest pain and palpitations  All other systems reviewed and are negative          Objective:      Current Outpatient Medications:     acetaminophen (TYLENOL) 325 mg tablet, 650 mg every 4 to 6 hours as needed for pain (do not take with percocet), Disp: 30 tablet, Rfl: 0    albuterol (PROVENTIL HFA) 90 mcg/act inhaler, Inhale 2 puffs 4 (four) times a day as needed, Disp: , Rfl:     allopurinol (ZYLOPRIM) 300 mg tablet, TAKE ONE TABLET BY MOUTH ONCE DAILY, Disp: 90 tablet, Rfl: 3    aspirin 325 mg tablet, Take 1 tablet (325 mg total) by mouth daily, Disp: , Rfl: 0    BASAGLAR KWIKPEN 100 units/mL injection pen, Inject 20 Units under the skin daily at bedtime, Disp: 15 mL, Rfl: 3    COMFORT EZ PEN NEEDLES 32G X 4 MM MISC, AS DIRECTED FOUR TIMES DAILY, Disp: 100 each, Rfl: 3    gabapentin (NEURONTIN) 300 mg capsule, TAKE ONE CAPSULE BY MOUTH every night at bedtime, Disp: 90 capsule, Rfl: 3    insulin aspart (NovoLOG) 100 units/mL injection, Inject 8 Units under the skin 3 (three) times a day before meals, Disp: , Rfl: 0    latanoprost (XALATAN) 0 005 % ophthalmic solution, Administer 1 drop to both eyes daily at bedtime, Disp: , Rfl: 11    levothyroxine 150 mcg tablet, TAKE ONE TABLET BY MOUTH DAILY, Disp: 30 tablet, Rfl: 6    LORazepam (ATIVAN) 0 5 mg tablet, TAKE ONE TABLET BY MOUTH EVERY 8 HOURS AS NEEDED FOR ANXIETY, Disp: 30 tablet, Rfl: 0    metoprolol tartrate (LOPRESSOR) 25 mg tablet, TAKE ONE-HALF TABLET BY MOUTH EVERY TWELVE HOURS, Disp: 30 tablet, Rfl: 6    pantoprazole (PROTONIX) 20 mg tablet, Take 2 tablets (40 mg total) by mouth daily Decreased dose on 8/7/19, Disp: 30 tablet, Rfl: 3    polyethylene glycol (MIRALAX) 17 g packet, Take 17 g by mouth daily, Disp: , Rfl:     rosuvastatin (CRESTOR) 40 MG tablet, Take 1 tablet (40 mg total) by mouth daily, Disp: 90 tablet, Rfl: 3    tamsulosin (FLOMAX) 0 4 mg, TAKE ONE CAPSULE BY MOUTH DAILY WITH dinner, Disp: 30 capsule, Rfl: 6    timolol (TIMOPTIC) 0 5 % ophthalmic solution, Instill 1 drop into both eyes every morning, Disp: , Rfl: 11    torsemide (DEMADEX) 10 mg tablet, Take 1 tablet (10 mg total) by mouth daily, Disp: 30 tablet, Rfl: 5    Blood pressure 120/62, pulse (!) 51, resp  rate 16, height 5' 8" (1 727 m), weight 93 9 kg (207 lb)  Physical Exam   Constitutional: No distress  HENT:   Head: Normocephalic  Right Ear: External ear normal    Left Ear: External ear normal    Eyes: Right eye exhibits no discharge  Left eye exhibits no discharge  Neck: No thyromegaly present  Cardiovascular: Normal rate and regular rhythm  Exam reveals no friction rub  No murmur heard  Pulmonary/Chest: Effort normal and breath sounds normal  No stridor  No respiratory distress  Abdominal: Soft  Bowel sounds are normal  He exhibits no distension  There is no tenderness  Musculoskeletal: Normal range of motion  He exhibits no tenderness  Lymphadenopathy:     He has no cervical adenopathy  Neurological: No sensory deficit  Skin: Skin is warm and dry  No rash noted  He is not diaphoretic  No erythema  Psychiatric: He has a normal mood and affect  His behavior is normal    Nursing note and vitals reviewed  BMI Counseling: Body mass index is 31 47 kg/m²  Discussed the patient's BMI with him  The BMI is above average  BMI counseling and education was provided to the patient  Nutrition recommendations include 3-5 servings of fruits/vegetables daily

## 2019-08-07 NOTE — PATIENT INSTRUCTIONS
Call in 2 weeks with sugar readings   do labs in3 months before next appt  Obesity   AMBULATORY CARE:   Obesity  is when your body mass index (BMI) is greater than 30  Your healthcare provider will use your height and weight to measure your BMI  The risks of obesity include  many health problems, such as injuries or physical disability  You may need tests to check for the following:  · Diabetes     · High blood pressure or high cholesterol     · Heart disease     · Gallbladder or liver disease     · Cancer of the colon, breast, prostate, liver, or kidney     · Sleep apnea     · Arthritis or gout  Seek care immediately if:   · You have a severe headache, confusion, or difficulty speaking  · You have weakness on one side of your body  · You have chest pain, sweating, or shortness of breath  Contact your healthcare provider if:   · You have symptoms of gallbladder or liver disease, such as pain in your upper abdomen  · You have knee or hip pain and discomfort while walking  · You have symptoms of diabetes, such as intense hunger and thirst, and frequent urination  · You have symptoms of sleep apnea, such as snoring or daytime sleepiness  · You have questions or concerns about your condition or care  Treatment for obesity  focuses on helping you lose weight to improve your health  Even a small decrease in BMI can reduce the risk for many health problems  Your healthcare provider will help you set a weight-loss goal   · Lifestyle changes  are the first step in treating obesity  These include making healthy food choices and getting regular physical activity  Your healthcare provider may suggest a weight-loss program that involves coaching, education, and therapy  · Medicine  may help you lose weight when it is used with a healthy diet and physical activity  · Surgery  can help you lose weight if you are very obese and have other health problems   There are several types of weight-loss surgery  Ask your healthcare provider for more information  Be successful losing weight:   · Set small, realistic goals  An example of a small goal is to walk for 20 minutes 5 days a week  Anther goal is to lose 5% of your body weight  · Tell friends, family members, and coworkers about your goals  and ask for their support  Ask a friend to lose weight with you, or join a weight-loss support group  · Identify foods or triggers that may cause you to overeat , and find ways to avoid them  Remove tempting high-calorie foods from your home and workplace  Place a bowl of fresh fruit on your kitchen counter  If stress causes you to eat, then find other ways to cope with stress  · Keep a diary to track what you eat and drink  Also write down how many minutes of physical activity you do each day  Weigh yourself once a week and record it in your diary  Eating changes: You will need to eat 500 to 1,000 fewer calories each day than you currently eat to lose 1 to 2 pounds a week  The following changes will help you cut calories:  · Eat smaller portions  Use small plates, no larger than 9 inches in diameter  Fill your plate half full of fruits and vegetables  Measure your food using measuring cups until you know what a serving size looks like  · Eat 3 meals and 1 or 2 snacks each day  Plan your meals in advance  [place] [employer] and eat at home most of the time  Eat slowly  · Eat fruits and vegetables at every meal   They are low in calories and high in fiber, which makes you feel full  Do not add butter, margarine, or cream sauce to vegetables  Use herbs to season steamed vegetables  · Eat less fat and fewer fried foods  Eat more baked or grilled chicken and fish  These protein sources are lower in calories and fat than red meat  Limit fast food  Dress your salads with olive oil and vinegar instead of bottled dressing  · Limit the amount of sugar you eat  Do not drink sugary beverages   Limit alcohol  Activity changes:  Physical activity is good for your body in many ways  It helps you burn calories and build strong muscles  It decreases stress and depression, and improves your mood  It can also help you sleep better  Talk to your healthcare provider before you begin an exercise program   · Exercise for at least 30 minutes 5 days a week  Start slowly  Set aside time each day for physical activity that you enjoy and that is convenient for you  It is best to do both weight training and an activity that increases your heart rate, such as walking, bicycling, or swimming  · Find ways to be more active  Do yard work and housecleaning  Walk up the stairs instead of using elevators  Spend your leisure time going to events that require walking, such as outdoor festivals or fairs  This extra physical activity can help you lose weight and keep it off  Follow up with your healthcare provider as directed: You may need to meet with a dietitian  Write down your questions so you remember to ask them during your visits  © 2017 [address] Information is for End User's use only and may not be sold, redistributed or otherwise used for commercial purposes  All illustrations and images included in CareNotes® are the copyrighted property of [employer]  or [employer]  The above information is an  only  It is not intended as medical advice for individual conditions or treatments  Talk to your doctor, nurse or pharmacist before following any medical regimen to see if it is safe and effective for you

## 2019-08-07 NOTE — ASSESSMENT & PLAN NOTE
Lab Results   Component Value Date    HGBA1C 8 4 (H) 08/05/2019       No results for input(s): POCGLU in the last 72 hours      Blood Sugar Average: Last 72 hrs:  creatinine is stabel at 1 93   increaseing hba1c- will increase the insulin to try to improve control

## 2019-08-16 DIAGNOSIS — F41.9 ANXIETY: ICD-10-CM

## 2019-08-16 RX ORDER — LORAZEPAM 0.5 MG/1
TABLET ORAL
Qty: 30 TABLET | Refills: 3 | Status: SHIPPED | OUTPATIENT
Start: 2019-08-16 | End: 2019-11-04 | Stop reason: SDUPTHER

## 2019-08-20 ENCOUNTER — TELEPHONE (OUTPATIENT)
Dept: FAMILY MEDICINE CLINIC | Facility: HOSPITAL | Age: 84
End: 2019-08-20

## 2019-08-20 NOTE — TELEPHONE ENCOUNTER
Pt called with his fasting blood sugars, as Dr Hodgson So requested,  for the last two weeks beginning with 8/9/19:    260  156  168  131  272  109  146   165  165  150  139  125 - today

## 2019-10-07 DIAGNOSIS — R60.9 EDEMA, UNSPECIFIED TYPE: ICD-10-CM

## 2019-10-07 RX ORDER — TORSEMIDE 10 MG/1
10 TABLET ORAL DAILY
Qty: 30 TABLET | Refills: 5 | Status: SHIPPED | OUTPATIENT
Start: 2019-10-07 | End: 2020-02-27

## 2019-10-14 ENCOUNTER — OFFICE VISIT (OUTPATIENT)
Dept: CARDIOLOGY CLINIC | Facility: CLINIC | Age: 84
End: 2019-10-14
Payer: COMMERCIAL

## 2019-10-14 VITALS
HEART RATE: 58 BPM | HEIGHT: 68 IN | DIASTOLIC BLOOD PRESSURE: 72 MMHG | SYSTOLIC BLOOD PRESSURE: 122 MMHG | BODY MASS INDEX: 31.52 KG/M2 | WEIGHT: 208 LBS

## 2019-10-14 DIAGNOSIS — I35.0 NONRHEUMATIC AORTIC VALVE STENOSIS: ICD-10-CM

## 2019-10-14 DIAGNOSIS — I10 BENIGN ESSENTIAL HYPERTENSION: ICD-10-CM

## 2019-10-14 DIAGNOSIS — N18.30 TYPE 2 DIABETES MELLITUS WITH STAGE 3 CHRONIC KIDNEY DISEASE, WITHOUT LONG-TERM CURRENT USE OF INSULIN (HCC): ICD-10-CM

## 2019-10-14 DIAGNOSIS — E11.22 TYPE 2 DIABETES MELLITUS WITH STAGE 3 CHRONIC KIDNEY DISEASE, WITHOUT LONG-TERM CURRENT USE OF INSULIN (HCC): ICD-10-CM

## 2019-10-14 DIAGNOSIS — E78.00 PURE HYPERCHOLESTEROLEMIA: ICD-10-CM

## 2019-10-14 DIAGNOSIS — Z95.2 S/P AVR (AORTIC VALVE REPLACEMENT): ICD-10-CM

## 2019-10-14 DIAGNOSIS — Z95.1 S/P CABG X 4: ICD-10-CM

## 2019-10-14 DIAGNOSIS — I25.10 CORONARY ARTERY DISEASE INVOLVING NATIVE CORONARY ARTERY OF NATIVE HEART WITHOUT ANGINA PECTORIS: Primary | ICD-10-CM

## 2019-10-14 PROCEDURE — 99214 OFFICE O/P EST MOD 30 MIN: CPT | Performed by: INTERNAL MEDICINE

## 2019-10-14 PROCEDURE — 3078F DIAST BP <80 MM HG: CPT | Performed by: INTERNAL MEDICINE

## 2019-10-14 PROCEDURE — 3074F SYST BP LT 130 MM HG: CPT | Performed by: INTERNAL MEDICINE

## 2019-10-14 NOTE — PROGRESS NOTES
Cardiology Follow-up    Tracy Loving  1935  [id number]  HEART & VASCULAR  [facility] Peterson Perez  [address]  [address] [address] [address]    1  Coronary artery disease involving native coronary artery of native heart without angina pectoris     2  Nonrheumatic aortic valve stenosis     3  S/P CABG x 4     4  S/P AVR (aortic valve replacement)     5  Pure hypercholesterolemia     6  Benign essential hypertension     7  Type 2 diabetes mellitus with stage 3 chronic kidney disease, without long-term current use of insulin ([facility])         HPI:    Selina Mauro comes in for follow-up given his history multivessel coronary artery disease and aortic stenosis  I met him at the time of a stress nuclear study back in May of 2018  This was ordered by his internist, Dr Meka Hill, secondary to abdominal pain, exertional at times, and multiple risk factors for CAD  This demonstrated a reversible defect and ischemia of the anterior to apical wall  His ejection fraction was normal   He also has moderate aortic stenosis by echocardiogram from December  He also has hypertension, dyslipidemia and diabetes mellitus  Cardiac catheterization showed multivessel CAD  At that point he went and met cardiothoracic surgery, who recommended both core artery bypass grafting and a bioprosthetic aortic valve replacement  He had this performed in 9/2018, and it went well without complications  He had CABG x4, with a LIMA to the LAD, SVG to an OM 2 and SVG Y graft" to an OM 3 and left posterior descending artery  He also had a bioprosthetic aortic valve replacement  He did go home on diuretic therapy but due to some edema associated with cellulitis torsemide was restarted  He then developed recurrent pleural effusions  He was in the hospital in which he was requiring thoracentesis  He ended up having surgery and pleurodesis  He has recovered well from this    He did not go home on diuretic therapy due to some acute kidney injury  He started to show increasing signs of volume overload, weight gain and lower extremity edema  Torsemide was restarted at 20 mg daily, and we decrease this to 10 mg daily  He has been stable on this dose  Harpal Tomas is feeling well  He denies any chest pain or shortness of breath  He denies lightheadedness, palpitations or syncope  He still has lower extremity swelling, but it is improved  He denies any other signs/symptoms of CHF  After our last visit we did get an updated echocardiogram which showed normal LV systolic function and normally functioning aortic valve replacement  Patient Active Problem List   Diagnosis    Benign prostatic hyperplasia with nocturia    Benign essential hypertension    Gout with tophus    Hypothyroidism    Obesity    Pure hypercholesterolemia    Renal cyst    Sexual dysfunction    Nonrheumatic aortic valve stenosis    GERD (gastroesophageal reflux disease)    S/P CABG x 4    S/P AVR (aortic valve replacement)    CAD (coronary artery disease)    Aortic stenosis    Acute renal failure superimposed on stage 3 chronic kidney disease (HCC)    Anemia due to stage 3 chronic kidney disease (HCC)    Ambulatory dysfunction    Type 2 diabetes mellitus with kidney complication, without long-term current use of insulin ([facility])     Past Medical History:   Diagnosis Date    Aortic stenosis     CKD (chronic kidney disease)     baseline Cr 1 3-1 5    Coronary artery disease     Cough     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HCC)     type 2, insulin dependent    GERD (gastroesophageal reflux disease)     Glaucoma     Gout     History of prostate cancer     Hypertension     Hypothyroidism     Overweight     Peripheral neuropathy, idiopathic     Pleural effusion, left     Pure hypercholesterolemia     LA   11/12/14   R   11/12/14     Weight gain      Social History     Socioeconomic History    Marital status:  Spouse name: Not on file    Number of children: Not on file    Years of education: Not on file    Highest education level: Not on file   Occupational History    Not on file   Social Needs    Financial resource strain: Not on file    Food insecurity:     Worry: Not on file     Inability: Not on file    Transportation needs:     Medical: Not on file     Non-medical: Not on file   Tobacco Use    Smoking status: Never Smoker    Smokeless tobacco: Never Used    Tobacco comment: Only in the service    Substance and Sexual Activity    Alcohol use: No    Drug use: No    Sexual activity: Not Currently   Lifestyle    Physical activity:     Days per week: Not on file     Minutes per session: Not on file    Stress: Not on file   Relationships    Social connections:     Talks on phone: Not on file     Gets together: Not on file     Attends [employer] service: Not on file     Active member of club or organization: Not on file     Attends meetings of clubs or organizations: Not on file     Relationship status: Not on file    Intimate partner violence:     Fear of current or ex partner: Not on file     Emotionally abused: Not on file     Physically abused: Not on file     Forced sexual activity: Not on file   Other Topics Concern    Not on file   Social History Narrative    Caffeine use / coffee diet cola and tea    Lives with family     Living situation supportive and safe    No advance directives  -denied      Family History   Problem Relation Age of Onset    Diabetes Mother     Pancreatic cancer Brother     Diabetes Maternal Grandmother     Colon cancer Son     Diabetes Family     Substance Abuse Neg Hx     Mental illness Neg Hx      Past Surgical History:   Procedure Laterality Date    CARDIAC CATHETERIZATION      IR THORACENTESIS  10/23/2018    IR THORACENTESIS  11/2/2018    IR THORACENTESIS  11/9/2018    IR THORACENTESIS  11/23/2018    [place] CABG, ARTERY-VEIN, THREE N/A 9/17/2018    Procedure: CORONARY ARTERY BYPASS GRAFT (CABG) x 4 VESSELS with LIMA - LAD, SVG/LEFT LEG EVH - LEFT PDA, OM3, & OM2;  Surgeon: Timothy Mcdonough MD;  [facility];  Service: Cardiac Surgery    VA ECHO TRANSESOPHAG MONTR CARDIAC PUMP FUNCTJ N/A 9/17/2018    Procedure: TRANSESOPHAGEAL ECHOCARDIOGRAM (VERONICA);   Surgeon: Timothy Mcdonough MD;  [facility];  Service: Cardiac Surgery    VA RPLCMT AORTIC VALVE OPN W/STENTLESS TISSUE VALVE N/A 9/17/2018    Procedure: REPLACEMENT VALVE AORTIC (AVR)- 23mm tissue Intuity Valve;  Surgeon: Timothy Mcdonough MD;  [facility];  Service: Cardiac Surgery    THORACOSCOPY VIDEO ASSISTED SURGERY (VATS) Left 11/27/2018    Procedure: THORACOSCOPY VIDEO ASSISTED SURGERY (VATS), talc pleurodesis,;  Surgeon: Geena Madera MD;  [facility];  Service: Thoracic    THYROID SURGERY         Current Outpatient Medications:     allopurinol (ZYLOPRIM) 300 mg tablet, TAKE ONE TABLET BY MOUTH ONCE DAILY, Disp: 90 tablet, Rfl: 3    aspirin 325 mg tablet, Take 1 tablet (325 mg total) by mouth daily, Disp: , Rfl: 0    BASAGLAR KWIKPEN 100 units/mL injection pen, Inject 20 Units under the skin daily at bedtime, Disp: 15 mL, Rfl: 3    COMFORT EZ PEN NEEDLES 32G X 4 MM MISC, AS DIRECTED FOUR TIMES DAILY, Disp: 100 each, Rfl: 3    gabapentin (NEURONTIN) 300 mg capsule, TAKE ONE CAPSULE BY MOUTH every night at bedtime, Disp: 90 capsule, Rfl: 3    insulin aspart (NovoLOG) 100 units/mL injection, Inject 8 Units under the skin 3 (three) times a day before meals, Disp: , Rfl: 0    latanoprost (XALATAN) 0 005 % ophthalmic solution, Administer 1 drop to both eyes daily at bedtime, Disp: , Rfl: 11    levothyroxine 150 mcg tablet, TAKE ONE TABLET BY MOUTH DAILY, Disp: 30 tablet, Rfl: 6    metoprolol tartrate (LOPRESSOR) 25 mg tablet, TAKE ONE-HALF TABLET BY MOUTH EVERY TWELVE HOURS, Disp: 30 tablet, Rfl: 6    pantoprazole (PROTONIX) 20 mg tablet, Take 2 tablets (40 mg total) by mouth daily Decreased dose on 8/7/19, Disp: 30 tablet, Rfl: 3    polyethylene glycol (MIRALAX) 17 g packet, Take 17 g by mouth daily, Disp: , Rfl:     rosuvastatin (CRESTOR) 40 MG tablet, Take 1 tablet (40 mg total) by mouth daily, Disp: 90 tablet, Rfl: 3    tamsulosin (FLOMAX) 0 4 mg, TAKE ONE CAPSULE BY MOUTH DAILY WITH dinner, Disp: 30 capsule, Rfl: 6    timolol (TIMOPTIC) 0 5 % ophthalmic solution, Instill 1 drop into both eyes every morning, Disp: , Rfl: 11    torsemide (DEMADEX) 10 mg tablet, Take 1 tablet (10 mg total) by mouth daily, Disp: 30 tablet, Rfl: 5    acetaminophen (TYLENOL) 325 mg tablet, 650 mg every 4 to 6 hours as needed for pain (do not take with percocet) (Patient not taking: Reported on 10/14/2019), Disp: 30 tablet, Rfl: 0    albuterol (PROVENTIL HFA) 90 mcg/act inhaler, Inhale 2 puffs 4 (four) times a day as needed, Disp: , Rfl:     LORazepam (ATIVAN) 0 5 mg tablet, TAKE ONE TABLET BY MOUTH EVERY 8 HOURS AS NEEDED FOR ANXIETY (Patient not taking: Reported on 10/14/2019), Disp: 30 tablet, Rfl: 3  Allergies   Allergen Reactions    Amlodipine Nausea Only    Atorvastatin Myalgia       Labs:  Lab Results   Component Value Date     09/05/2017    K 4 2 08/05/2019     08/05/2019    CO2 29 08/05/2019    BUN 33 (H) 08/05/2019    CREATININE 1 93 (H) 08/05/2019    CREATININE 1 55 (H) 12/01/2018    CREATININE 1 56 (H) 09/05/2017    GLUCOSE 126 09/17/2018    CALCIUM 9 2 08/05/2019     Lab Results   Component Value Date    WBC 6 9 03/15/2019    WBC 8 91 12/01/2018    WBC 7 6 04/17/2017    HGB 11 7 (L) 03/15/2019    HGB 9 0 (L) 12/01/2018    HGB 14 0 04/17/2017    HCT 36 6 (L) 03/15/2019    HCT 29 4 (L) 12/01/2018    HCT 43 3 04/17/2017    MCV 91 7 03/15/2019    MCV 87 12/01/2018    MCV 97 2 04/17/2017     03/15/2019     12/01/2018     04/17/2017     Lab Results   Component Value Date    CHOL 197 04/17/2017    TRIG 92 11/24/2018    TRIG 225 (H) 04/17/2017    HDL 25 (L) 11/24/2018    HDL 30 (L) 04/17/2017     Imaging:      ECHO(5/2019):  LEFT VENTRICLE:  Systolic function was at the lower limits of normal by visual assessment  Ejection fraction was estimated in the range of 50 % to 55 %  Although no diagnostic regional wall motion abnormality was identified, this possibility cannot be completely excluded on the basis of this study  Features were consistent with a pseudonormal left ventricular filling pattern, with concomitant abnormal relaxation and increased filling pressure (grade 2 diastolic dysfunction)      LEFT ATRIUM:  The atrium was mildly dilated      MITRAL VALVE:  There was mild annular calcification  There was trace regurgitation      AORTIC VALVE:  A bioprosthesis was present  It exhibited normal function      TRICUSPID VALVE:  There was trace regurgitation      PULMONIC VALVE:  There was mild to moderate regurgitation  CARDIAC CATH(5/2018):  CORONARY CIRCULATION:  Proximal LAD: There was a 100 % stenosis  This lesion is a chronic total occlusion  1st obtuse marginal: There was a diffuse 90 % stenosis  2nd obtuse marginal: There was a diffuse 90 % stenosis  3rd obtuse marginal: There was a 80 % stenosis  1st left posterolateral: There was a 90 % stenosis  Mid RCA: There was a 95 % stenosis      CARDIAC STRUCTURES:  There was moderate aortic stenosis  Review of Systems:  Review of Systems   Constitutional: Positive for fatigue  HENT: Negative  Eyes: Negative  Respiratory: Negative  Cardiovascular: Negative  Gastrointestinal: Positive for constipation  Musculoskeletal: Negative  Skin: Negative  Allergic/Immunologic: Negative  Neurological: Negative  Hematological: Negative  Psychiatric/Behavioral: Negative  All other systems reviewed and are negative  Vitals:    10/14/19 1333   BP: 122/72   Pulse: 58     Physical Exam:  Physical Exam   Constitutional: He is oriented to person, place, and time   He appears well-developed and well-nourished  HENT:   Head: Normocephalic and atraumatic  Eyes: Pupils are equal, round, and reactive to light  EOM are normal  Right eye exhibits no discharge  Left eye exhibits no discharge  No scleral icterus  Neck: Normal range of motion  Neck supple  No JVD present  No thyromegaly present  Cardiovascular: Normal rate, regular rhythm, S1 normal, S2 normal, intact distal pulses and normal pulses  No extrasystoles are present  Exam reveals no gallop and no friction rub  Murmur heard  Systolic murmur is present with a grade of 3/6  Pulmonary/Chest: Effort normal and breath sounds normal  No respiratory distress  He has no wheezes  He has no rales  Abdominal: Soft  Bowel sounds are normal  He exhibits no distension  There is no tenderness  Musculoskeletal: Normal range of motion  He exhibits no edema, tenderness or deformity  Neurological: He is alert and oriented to person, place, and time  No cranial nerve deficit  Skin: Skin is warm and dry  No rash noted  Psychiatric: He has a normal mood and affect  Judgment and thought content normal    Nursing note and vitals reviewed  Discussion/Summary:    1  CAD - Sasha Ford is status post CABG x4  He tolerated the surgery well  He continued to show signs of volume overload, therefore we have him on a maintenance dose of torsemide  His blood work has remained stable  He will remain on the same medical therapy  He has completed cardiac rehabilitation and feels well  We will see him back in 1 year  2   Aortic stenosis - He is also status post bioprosthetic aortic valve replacement at the time of his CABG  Echocardiogram in May showed a normally functioning AVR  He should take antibiotic prophylaxis for any dental procedures  No testing is needed at this time  3   Hypertension - His blood pressure is well controlled  No changes were made  He should periodically check his blood pressure at home      4  Hyperlipidemia - He has been on Crestor  We will continue to follow blood work closely along with his internist       Counseling / Coordination of Care  Total floor / unit time spent today 25 minutes  Greater than 50% of total time was spent with the patient and / or family counseling and / or coordination of care

## 2019-10-30 DIAGNOSIS — D64.9 ANEMIA, UNSPECIFIED TYPE: Primary | ICD-10-CM

## 2019-10-30 DIAGNOSIS — I25.10 CORONARY ARTERY DISEASE INVOLVING NATIVE CORONARY ARTERY OF NATIVE HEART WITHOUT ANGINA PECTORIS: ICD-10-CM

## 2019-10-30 DIAGNOSIS — Z00.00 HEALTHCARE MAINTENANCE: ICD-10-CM

## 2019-10-30 RX ORDER — ASCORBIC ACID 500 MG
TABLET ORAL
Qty: 28 TABLET | Refills: 3 | Status: SHIPPED | OUTPATIENT
Start: 2019-10-30 | End: 2020-03-02

## 2019-10-30 RX ORDER — FERROUS SULFATE 325(65) MG
TABLET ORAL
Qty: 28 TABLET | Refills: 3 | Status: SHIPPED | OUTPATIENT
Start: 2019-10-30 | End: 2020-02-24

## 2019-10-30 RX ORDER — ASPIRIN 325 MG
TABLET, DELAYED RELEASE (ENTERIC COATED) ORAL
Qty: 28 TABLET | Refills: 3 | Status: SHIPPED | OUTPATIENT
Start: 2019-10-30 | End: 2020-02-24

## 2019-11-01 LAB
ALBUMIN SERPL-MCNC: 3.8 G/DL (ref 3.6–5.1)
ALBUMIN/GLOB SERPL: 1.5 (CALC) (ref 1–2.5)
ALP SERPL-CCNC: 68 U/L (ref 40–115)
ALT SERPL-CCNC: 17 U/L (ref 9–46)
AST SERPL-CCNC: 20 U/L (ref 10–35)
BASOPHILS # BLD AUTO: 92 CELLS/UL (ref 0–200)
BASOPHILS NFR BLD AUTO: 1.4 %
BILIRUB SERPL-MCNC: 0.9 MG/DL (ref 0.2–1.2)
BUN SERPL-MCNC: 36 MG/DL (ref 7–25)
BUN/CREAT SERPL: 17 (CALC) (ref 6–22)
CALCIUM SERPL-MCNC: 9.1 MG/DL (ref 8.6–10.3)
CHLORIDE SERPL-SCNC: 106 MMOL/L (ref 98–110)
CHOLEST SERPL-MCNC: 120 MG/DL
CHOLEST/HDLC SERPL: 4.1 (CALC)
CO2 SERPL-SCNC: 31 MMOL/L (ref 20–32)
CREAT SERPL-MCNC: 2.06 MG/DL (ref 0.7–1.11)
EOSINOPHIL # BLD AUTO: 389 CELLS/UL (ref 15–500)
EOSINOPHIL NFR BLD AUTO: 5.9 %
ERYTHROCYTE [DISTWIDTH] IN BLOOD BY AUTOMATED COUNT: 13.2 % (ref 11–15)
GLOBULIN SER CALC-MCNC: 2.5 G/DL (CALC) (ref 1.9–3.7)
GLUCOSE SERPL-MCNC: 64 MG/DL (ref 65–99)
HBA1C MFR BLD: 8.2 % OF TOTAL HGB
HCT VFR BLD AUTO: 40.7 % (ref 38.5–50)
HDLC SERPL-MCNC: 29 MG/DL
HGB BLD-MCNC: 13.1 G/DL (ref 13.2–17.1)
LDLC SERPL CALC-MCNC: 68 MG/DL (CALC)
LYMPHOCYTES # BLD AUTO: 1888 CELLS/UL (ref 850–3900)
LYMPHOCYTES NFR BLD AUTO: 28.6 %
MCH RBC QN AUTO: 31.6 PG (ref 27–33)
MCHC RBC AUTO-ENTMCNC: 32.2 G/DL (ref 32–36)
MCV RBC AUTO: 98.3 FL (ref 80–100)
MONOCYTES # BLD AUTO: 548 CELLS/UL (ref 200–950)
MONOCYTES NFR BLD AUTO: 8.3 %
NEUTROPHILS # BLD AUTO: 3683 CELLS/UL (ref 1500–7800)
NEUTROPHILS NFR BLD AUTO: 55.8 %
NONHDLC SERPL-MCNC: 91 MG/DL (CALC)
PLATELET # BLD AUTO: 189 THOUSAND/UL (ref 140–400)
PMV BLD REES-ECKER: 10.6 FL (ref 7.5–12.5)
POTASSIUM SERPL-SCNC: 3.7 MMOL/L (ref 3.5–5.3)
PROT SERPL-MCNC: 6.3 G/DL (ref 6.1–8.1)
RBC # BLD AUTO: 4.14 MILLION/UL (ref 4.2–5.8)
SL AMB EGFR AFRICAN AMERICAN: 33 ML/MIN/1.73M2
SL AMB EGFR NON AFRICAN AMERICAN: 29 ML/MIN/1.73M2
SODIUM SERPL-SCNC: 143 MMOL/L (ref 135–146)
TRIGL SERPL-MCNC: 146 MG/DL
WBC # BLD AUTO: 6.6 THOUSAND/UL (ref 3.8–10.8)

## 2019-11-04 DIAGNOSIS — Z95.1 S/P CABG X 4: ICD-10-CM

## 2019-11-04 DIAGNOSIS — F41.9 ANXIETY: ICD-10-CM

## 2019-11-04 DIAGNOSIS — Z95.2 S/P AVR (AORTIC VALVE REPLACEMENT): ICD-10-CM

## 2019-11-04 RX ORDER — LORAZEPAM 0.5 MG/1
TABLET ORAL
Qty: 30 TABLET | Refills: 3 | Status: SHIPPED | OUTPATIENT
Start: 2019-11-04 | End: 2020-03-23

## 2019-11-04 RX ORDER — PANTOPRAZOLE SODIUM 20 MG/1
TABLET, DELAYED RELEASE ORAL
Qty: 30 TABLET | Refills: 3 | Status: SHIPPED | OUTPATIENT
Start: 2019-11-04 | End: 2020-02-28

## 2019-11-04 RX ORDER — ROSUVASTATIN CALCIUM 40 MG/1
TABLET, COATED ORAL
Qty: 30 TABLET | Refills: 6 | Status: SHIPPED | OUTPATIENT
Start: 2019-11-04 | End: 2020-05-19

## 2019-11-08 ENCOUNTER — OFFICE VISIT (OUTPATIENT)
Dept: FAMILY MEDICINE CLINIC | Facility: HOSPITAL | Age: 84
End: 2019-11-08
Payer: COMMERCIAL

## 2019-11-08 VITALS
WEIGHT: 212 LBS | DIASTOLIC BLOOD PRESSURE: 68 MMHG | BODY MASS INDEX: 34.07 KG/M2 | HEIGHT: 66 IN | SYSTOLIC BLOOD PRESSURE: 118 MMHG | HEART RATE: 54 BPM

## 2019-11-08 DIAGNOSIS — E11.22 TYPE 2 DIABETES MELLITUS WITH STAGE 3 CHRONIC KIDNEY DISEASE, WITHOUT LONG-TERM CURRENT USE OF INSULIN (HCC): ICD-10-CM

## 2019-11-08 DIAGNOSIS — I35.0 NONRHEUMATIC AORTIC VALVE STENOSIS: ICD-10-CM

## 2019-11-08 DIAGNOSIS — Z95.1 S/P CABG X 4: ICD-10-CM

## 2019-11-08 DIAGNOSIS — N18.30 ACUTE RENAL FAILURE WITH ACUTE TUBULAR NECROSIS SUPERIMPOSED ON STAGE 3 CHRONIC KIDNEY DISEASE (HCC): ICD-10-CM

## 2019-11-08 DIAGNOSIS — Z95.2 S/P AVR (AORTIC VALVE REPLACEMENT): ICD-10-CM

## 2019-11-08 DIAGNOSIS — I70.0 ATHEROSCLEROSIS OF AORTA (HCC): ICD-10-CM

## 2019-11-08 DIAGNOSIS — H25.093 AGE-RELATED INCIPIENT CATARACT OF BOTH EYES: ICD-10-CM

## 2019-11-08 DIAGNOSIS — N18.30 TYPE 2 DIABETES MELLITUS WITH STAGE 3 CHRONIC KIDNEY DISEASE, WITHOUT LONG-TERM CURRENT USE OF INSULIN (HCC): ICD-10-CM

## 2019-11-08 DIAGNOSIS — N18.30 ANEMIA DUE TO STAGE 3 CHRONIC KIDNEY DISEASE (HCC): Chronic | ICD-10-CM

## 2019-11-08 DIAGNOSIS — Z23 NEED FOR INFLUENZA VACCINATION: ICD-10-CM

## 2019-11-08 DIAGNOSIS — E78.00 PURE HYPERCHOLESTEROLEMIA: ICD-10-CM

## 2019-11-08 DIAGNOSIS — I25.10 CORONARY ARTERY DISEASE INVOLVING NATIVE CORONARY ARTERY OF NATIVE HEART WITHOUT ANGINA PECTORIS: Primary | ICD-10-CM

## 2019-11-08 DIAGNOSIS — H40.1130 PRIMARY OPEN ANGLE GLAUCOMA OF BOTH EYES, UNSPECIFIED GLAUCOMA STAGE: ICD-10-CM

## 2019-11-08 DIAGNOSIS — D63.1 ANEMIA DUE TO STAGE 3 CHRONIC KIDNEY DISEASE (HCC): Chronic | ICD-10-CM

## 2019-11-08 DIAGNOSIS — N17.0 ACUTE RENAL FAILURE WITH ACUTE TUBULAR NECROSIS SUPERIMPOSED ON STAGE 3 CHRONIC KIDNEY DISEASE (HCC): ICD-10-CM

## 2019-11-08 DIAGNOSIS — E03.9 ACQUIRED HYPOTHYROIDISM: Chronic | ICD-10-CM

## 2019-11-08 DIAGNOSIS — I10 BENIGN ESSENTIAL HYPERTENSION: ICD-10-CM

## 2019-11-08 PROBLEM — H25.9 AGE-RELATED CATARACT OF BOTH EYES: Status: ACTIVE | Noted: 2019-11-08

## 2019-11-08 PROBLEM — H40.1190 PRIMARY OPEN ANGLE GLAUCOMA (POAG): Status: ACTIVE | Noted: 2019-11-08

## 2019-11-08 PROCEDURE — 1036F TOBACCO NON-USER: CPT | Performed by: INTERNAL MEDICINE

## 2019-11-08 PROCEDURE — 99214 OFFICE O/P EST MOD 30 MIN: CPT | Performed by: INTERNAL MEDICINE

## 2019-11-08 PROCEDURE — 90662 IIV NO PRSV INCREASED AG IM: CPT

## 2019-11-08 PROCEDURE — 1160F RVW MEDS BY RX/DR IN RCRD: CPT

## 2019-11-08 PROCEDURE — G0008 ADMIN INFLUENZA VIRUS VAC: HCPCS

## 2019-11-08 RX ORDER — LISINOPRIL 5 MG/1
5 TABLET ORAL DAILY
Qty: 30 TABLET | Refills: 5 | Status: SHIPPED | OUTPATIENT
Start: 2019-11-08 | End: 2020-04-21 | Stop reason: SDUPTHER

## 2019-11-08 NOTE — PROGRESS NOTES
Assessment/Plan:             Problem List Items Addressed This Visit        Endocrine    Hypothyroidism (Chronic)    Relevant Medications    metoprolol tartrate (LOPRESSOR) 25 mg tablet    Other Relevant Orders    TSH, 3rd generation    Type 2 diabetes mellitus with kidney complication, without long-term current use of insulin ([facility])       Lab Results   Component Value Date    HGBA1C 8 2 (H) 10/31/2019   had low am reading on recent labs- will continue with peanut butter snack at night   will start low dose ACE for renal protection         Relevant Medications    lisinopril (ZESTRIL) 5 mg tablet    Other Relevant Orders    CBC and differential    Comprehensive metabolic panel    Hemoglobin A1C    Microalbumin / creatinine urine ratio       Cardiovascular and Mediastinum    CAD (coronary artery disease) - Primary    Relevant Medications    metoprolol tartrate (LOPRESSOR) 25 mg tablet    lisinopril (ZESTRIL) 5 mg tablet    Other Relevant Orders    Comprehensive metabolic panel    Atherosclerosis of aorta (HCC)       Genitourinary    Anemia due to stage 3 chronic kidney disease (HCC) (Chronic)    Acute renal failure superimposed on stage 3 chronic kidney disease (HCC)       Other    S/P CABG x 4    Relevant Medications    metoprolol tartrate (LOPRESSOR) 25 mg tablet    S/P AVR (aortic valve replacement)    Relevant Orders    CBC and differential    Comprehensive metabolic panel    Primary open angle glaucoma (POAG)     Now on drops for pressure control         Age-related cataract of both eyes     To have surgery  infuture- Dr Omar Marlow in [place]           Other Visit Diagnoses     Need for influenza vaccination        Relevant Orders    influenza vaccine, 5325-0375, high-dose, PF 0 5 mL (FLUZONE HIGH-DOSE)            Subjective:      Patient ID: Jake Baker  is a 80 y o  male    1   Diabetes mellitus type 2- high hba1c 8 2- using 10 units before meals and 20 untis of long acting at night- home readings today was 140 - was 168- jose l lso have him tst before pm meal and call result in 2 weeks      The following portions of the patient's history were reviewed and updated as appropriate: allergies, current medications and problem list      Review of Systems   Constitutional: Negative for fatigue  Respiratory: Negative for shortness of breath  Cardiovascular: Negative for chest pain and palpitations  Gastrointestinal:        Getting meals on wheels  " eating a lot of broccoli"   Genitourinary: Negative for difficulty urinating and dysuria  Goes frequently 2x at night   All other systems reviewed and are negative          Objective:      Current Outpatient Medications:     acetaminophen (TYLENOL) 325 mg tablet, 650 mg every 4 to 6 hours as needed for pain (do not take with percocet), Disp: 30 tablet, Rfl: 0    albuterol (PROVENTIL HFA) 90 mcg/act inhaler, Inhale 2 puffs 4 (four) times a day as needed, Disp: , Rfl:     allopurinol (ZYLOPRIM) 300 mg tablet, TAKE ONE TABLET BY MOUTH ONCE DAILY, Disp: 90 tablet, Rfl: 3    aspirin (ECOTRIN) 325 mg EC tablet, TAKE ONE TABLET BY MOUTH DAILY, Disp: 28 tablet, Rfl: 3    BASAGLAR KWIKPEN 100 units/mL injection pen, Inject 20 Units under the skin daily at bedtime, Disp: 15 mL, Rfl: 3    COMFORT EZ PEN NEEDLES 32G X 4 MM MISC, AS DIRECTED FOUR TIMES DAILY, Disp: 100 each, Rfl: 3    FEROSUL 325 (65 Fe) MG tablet, TAKE ONE TABLET BY MOUTH DAILY, Disp: 28 tablet, Rfl: 3    gabapentin (NEURONTIN) 300 mg capsule, TAKE ONE CAPSULE BY MOUTH every night at bedtime, Disp: 90 capsule, Rfl: 3    insulin aspart (NovoLOG) 100 units/mL injection, Inject 8 Units under the skin 3 (three) times a day before meals (Patient taking differently: Inject 10 Units under the skin 3 (three) times a day before meals ), Disp: , Rfl: 0    latanoprost (XALATAN) 0 005 % ophthalmic solution, Administer 1 drop to both eyes daily at bedtime, Disp: , Rfl: 11    levothyroxine 150 mcg tablet, TAKE ONE TABLET BY MOUTH DAILY, Disp: 30 tablet, Rfl: 6    LORazepam (ATIVAN) 0 5 mg tablet, TAKE ONE TABLET BY MOUTH EVERY EIGHT HOURS AS NEEDED FOR ANXIETY, Disp: 30 tablet, Rfl: 3    metoprolol tartrate (LOPRESSOR) 25 mg tablet, Take 0 5 tablets (12 5 mg total) by mouth daily, Disp: 30 tablet, Rfl: 6    pantoprazole (PROTONIX) 20 mg tablet, TAKE ONE TABLET BY MOUTH EVERY DAY Decreased DOSE ON 8/7/19], Disp: 30 tablet, Rfl: 3    polyethylene glycol (MIRALAX) 17 g packet, Take 17 g by mouth daily, Disp: , Rfl:     rosuvastatin (CRESTOR) 40 MG tablet, TAKE ONE TABLET BY MOUTH DAILY, Disp: 30 tablet, Rfl: 6    tamsulosin (FLOMAX) 0 4 mg, TAKE ONE CAPSULE BY MOUTH DAILY WITH dinner, Disp: 30 capsule, Rfl: 6    timolol (TIMOPTIC) 0 5 % ophthalmic solution, Instill 1 drop into both eyes every morning, Disp: , Rfl: 11    torsemide (DEMADEX) 10 mg tablet, Take 1 tablet (10 mg total) by mouth daily, Disp: 30 tablet, Rfl: 5    ascorbic acid (VITAMIN C) 500 mg tablet, TAKE ONE TABLET BY MOUTH DAILY, Disp: 28 tablet, Rfl: 3    lisinopril (ZESTRIL) 5 mg tablet, Take 1 tablet (5 mg total) by mouth daily, Disp: 30 tablet, Rfl: 5    Blood pressure 118/68, pulse (!) 54, height 5' 5 5" (1 664 m), weight 96 2 kg (212 lb)  Physical Exam   Constitutional: He appears well-developed and well-nourished  No distress  HENT:   Right Ear: External ear normal    Left Ear: External ear normal    Minimal cerumen   Eyes: Right eye exhibits no discharge  Left eye exhibits no discharge  Neck: No JVD present  No thyromegaly present  Cardiovascular: Normal rate and regular rhythm  Exam reveals no gallop and no friction rub  No murmur heard  Pulmonary/Chest: Effort normal and breath sounds normal  No respiratory distress  He has no wheezes  Abdominal: Soft  Bowel sounds are normal  He exhibits no distension  There is no tenderness  Nursing note and vitals reviewed

## 2019-11-08 NOTE — PATIENT INSTRUCTIONS
Test  Before pm meal 2x a week and fasting daily- call in readings in 2 weeks   do labs in early  Feb- before next appt

## 2019-11-08 NOTE — ASSESSMENT & PLAN NOTE
Lab Results   Component Value Date    HGBA1C 8 2 (H) 10/31/2019   had low am reading on recent labs- will continue with peanut butter snack at night   will start low dose ACE for renal protection

## 2019-11-19 DIAGNOSIS — N18.30 TYPE 2 DIABETES MELLITUS WITH STAGE 3 CHRONIC KIDNEY DISEASE, WITHOUT LONG-TERM CURRENT USE OF INSULIN (HCC): Primary | ICD-10-CM

## 2019-11-19 DIAGNOSIS — E11.22 TYPE 2 DIABETES MELLITUS WITH STAGE 3 CHRONIC KIDNEY DISEASE, WITHOUT LONG-TERM CURRENT USE OF INSULIN (HCC): Primary | ICD-10-CM

## 2019-11-20 RX ORDER — INSULIN ASPART 100 [IU]/ML
INJECTION, SOLUTION INTRAVENOUS; SUBCUTANEOUS
Qty: 15 ML | Refills: 3 | Status: SHIPPED | OUTPATIENT
Start: 2019-11-20 | End: 2019-12-30 | Stop reason: SDUPTHER

## 2019-12-03 DIAGNOSIS — N40.1 BENIGN PROSTATIC HYPERPLASIA WITH NOCTURIA: ICD-10-CM

## 2019-12-03 DIAGNOSIS — R35.1 BENIGN PROSTATIC HYPERPLASIA WITH NOCTURIA: ICD-10-CM

## 2019-12-03 RX ORDER — TAMSULOSIN HYDROCHLORIDE 0.4 MG/1
CAPSULE ORAL
Qty: 30 CAPSULE | Refills: 6 | Status: SHIPPED | OUTPATIENT
Start: 2019-12-03 | End: 2020-06-15

## 2019-12-30 ENCOUNTER — OFFICE VISIT (OUTPATIENT)
Dept: FAMILY MEDICINE CLINIC | Facility: HOSPITAL | Age: 84
End: 2019-12-30
Payer: COMMERCIAL

## 2019-12-30 VITALS
DIASTOLIC BLOOD PRESSURE: 76 MMHG | RESPIRATION RATE: 13 BRPM | SYSTOLIC BLOOD PRESSURE: 124 MMHG | BODY MASS INDEX: 34.99 KG/M2 | WEIGHT: 210 LBS | HEART RATE: 62 BPM | HEIGHT: 65 IN

## 2019-12-30 DIAGNOSIS — D63.1 ANEMIA DUE TO STAGE 3 CHRONIC KIDNEY DISEASE (HCC): Chronic | ICD-10-CM

## 2019-12-30 DIAGNOSIS — E11.22 TYPE 2 DIABETES MELLITUS WITH STAGE 3 CHRONIC KIDNEY DISEASE, WITHOUT LONG-TERM CURRENT USE OF INSULIN (HCC): Primary | ICD-10-CM

## 2019-12-30 DIAGNOSIS — N18.30 ANEMIA DUE TO STAGE 3 CHRONIC KIDNEY DISEASE (HCC): Chronic | ICD-10-CM

## 2019-12-30 DIAGNOSIS — N18.30 TYPE 2 DIABETES MELLITUS WITH STAGE 3 CHRONIC KIDNEY DISEASE, WITHOUT LONG-TERM CURRENT USE OF INSULIN (HCC): Primary | ICD-10-CM

## 2019-12-30 PROCEDURE — 99214 OFFICE O/P EST MOD 30 MIN: CPT | Performed by: INTERNAL MEDICINE

## 2019-12-30 RX ORDER — TRIPROLIDINE/PSEUDOEPHEDRINE 2.5MG-60MG
TABLET ORAL
Refills: 1 | COMMUNITY
Start: 2019-12-05 | End: 2020-05-18 | Stop reason: ALTCHOICE

## 2019-12-30 RX ORDER — OFLOXACIN 3 MG/ML
SOLUTION/ DROPS OPHTHALMIC
Refills: 1 | COMMUNITY
Start: 2019-12-05 | End: 2020-05-18 | Stop reason: ALTCHOICE

## 2019-12-30 RX ORDER — NEPAFENAC 0.3 %
SUSPENSION, DROPS(FINAL DOSAGE FORM)(ML) OPHTHALMIC (EYE)
Refills: 1 | COMMUNITY
Start: 2019-12-05 | End: 2020-05-18 | Stop reason: ALTCHOICE

## 2019-12-30 NOTE — PROGRESS NOTES
Subjective:     Narendra Qureshi  is a 80 y o  male who presents to the office today for a preoperative consultation at the request of surgeon Dr Frankie Siddiqui who plans on performing left cataract and stent for glaucoma on January 8  Prior anesthesia adverse reactions - None    Exercise capacity - Able to walk 4 blocks or climb 2 flights of stairs without symptoms - Yes    Easy bleeding/bruising - No    Chest pain - No    Dyspnea, wheezing, cough - No    Sleep apnea - No    HPI    Past Medical History:   Diagnosis Date    Aortic stenosis     CKD (chronic kidney disease)     baseline Cr 1 3-1 5    Coronary artery disease     Cough     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HCC)     type 2, insulin dependent    GERD (gastroesophageal reflux disease)     Glaucoma     Gout     History of prostate cancer     Hypertension     Hypothyroidism     Overweight     Peripheral neuropathy, idiopathic     Pleural effusion, left     Pure hypercholesterolemia     LA   11/12/14   R   11/12/14     Visual impairment     cataract left eye    Weight gain        Past Surgical History:   Procedure Laterality Date    CARDIAC CATHETERIZATION      IR THORACENTESIS  10/23/2018    IR THORACENTESIS  11/2/2018    IR THORACENTESIS  11/9/2018    IR THORACENTESIS  11/23/2018    [place] CABG, ARTERY-VEIN, THREE N/A 9/17/2018    Procedure: CORONARY ARTERY BYPASS GRAFT (CABG) x 4 VESSELS with LIMA - LAD, SVG/LEFT LEG EVH - LEFT PDA, OM3, & OM2;  Surgeon: Lana Reveles MD;  [facility];  Service: Cardiac Surgery    [place] ECHO TRANSESOPHAG MONTR CARDIAC PUMP FUNCTJ N/A 9/17/2018    Procedure: TRANSESOPHAGEAL ECHOCARDIOGRAM (VERONICA);   Surgeon: Lana Reveles MD;  [facility];  Service: Cardiac Surgery    [place] RPLCMT AORTIC VALVE OPN W/STENTLESS TISSUE VALVE N/A 9/17/2018    Procedure: REPLACEMENT VALVE AORTIC (AVR)- 23mm tissue Intuity Valve;  Surgeon: Lana Reveles MD;  [facility];  Service: Cardiac Surgery    THORACOSCOPY VIDEO ASSISTED SURGERY (VATS) Left 11/27/2018    Procedure: THORACOSCOPY VIDEO ASSISTED SURGERY (VATS), talc pleurodesis,;  Surgeon: Delilah Valdez MD;  [facility];  Service: Thoracic    THYROID SURGERY         Family History   Problem Relation Age of Onset    Diabetes Mother     Pancreatic cancer Brother     Diabetes Maternal Grandmother     Colon cancer Son     Diabetes Family     Substance Abuse Neg Hx     Mental illness Neg Hx        Social History     Tobacco Use    Smoking status: Never Smoker    Smokeless tobacco: Never Used    Tobacco comment: Only in the service    Substance Use Topics    Alcohol use: No    Drug use: No       Current Outpatient Medications   Medication Sig Dispense Refill    acetaminophen (TYLENOL) 325 mg tablet 650 mg every 4 to 6 hours as needed for pain (do not take with percocet) 30 tablet 0    allopurinol (ZYLOPRIM) 300 mg tablet TAKE ONE TABLET BY MOUTH ONCE DAILY 90 tablet 3    ascorbic acid (VITAMIN C) 500 mg tablet TAKE ONE TABLET BY MOUTH DAILY 28 tablet 3    aspirin (ECOTRIN) 325 mg EC tablet TAKE ONE TABLET BY MOUTH DAILY 28 tablet 3    BASAGLAR KWIKPEN 100 units/mL injection pen Inject 20 Units under the skin daily at bedtime 15 mL 3    COMFORT EZ PEN NEEDLES 32G X 4 MM MISC AS DIRECTED FOUR TIMES DAILY 100 each 3    DUREZOL 0 05 % EMUL instill ONE DROP IN THE LEFT EYE FOUR TIMES DAILY  1    FEROSUL 325 (65 Fe) MG tablet TAKE ONE TABLET BY MOUTH DAILY 28 tablet 3    gabapentin (NEURONTIN) 300 mg capsule TAKE ONE CAPSULE BY MOUTH every night at bedtime 90 capsule 3    ILEVRO 0 3 % SUSP instill ONE DROP IN THE LEFT EYE EVERY MORNING  1    insulin aspart (NovoLOG) 100 units/mL injection Inject 8 Units under the skin 3 (three) times a day before meals (Patient taking differently: Inject 10 Units under the skin 3 (three) times a day before meals )  0    latanoprost (XALATAN) 0 005 % ophthalmic solution Administer 1 drop to both eyes daily at bedtime  11    levothyroxine 150 mcg tablet TAKE ONE TABLET BY MOUTH DAILY 30 tablet 6    lisinopril (ZESTRIL) 5 mg tablet Take 1 tablet (5 mg total) by mouth daily 30 tablet 5    LORazepam (ATIVAN) 0 5 mg tablet TAKE ONE TABLET BY MOUTH EVERY EIGHT HOURS AS NEEDED FOR ANXIETY 30 tablet 3    metoprolol tartrate (LOPRESSOR) 25 mg tablet Take 0 5 tablets (12 5 mg total) by mouth daily 30 tablet 6    ofloxacin (OCUFLOX) 0 3 % ophthalmic solution instill ONE DROP IN THE LEFT EYE FOUR TIMES DAILY  1    pantoprazole (PROTONIX) 20 mg tablet TAKE ONE TABLET BY MOUTH EVERY DAY Decreased DOSE ON 8/7/19] 30 tablet 3    polyethylene glycol (MIRALAX) 17 g packet Take 17 g by mouth daily      rosuvastatin (CRESTOR) 40 MG tablet TAKE ONE TABLET BY MOUTH DAILY 30 tablet 6    tamsulosin (FLOMAX) 0 4 mg TAKE ONE CAPSULE BY MOUTH DAILY WITH dinner 30 capsule 6    timolol (TIMOPTIC) 0 5 % ophthalmic solution Instill 1 drop into both eyes every morning  11    torsemide (DEMADEX) 10 mg tablet Take 1 tablet (10 mg total) by mouth daily 30 tablet 5    albuterol (PROVENTIL HFA) 90 mcg/act inhaler Inhale 2 puffs 4 (four) times a day as needed       No current facility-administered medications for this visit          Amlodipine and Atorvastatin        The following portions of the patient's history were reviewed and updated as appropriate: allergies, current medications, past family history, past medical history, past social history, past surgical history and problem list       Review of Systems      Objective      /76   Pulse 62   Resp 13   Ht 5' 5" (1 651 m)   Wt 95 3 kg (210 lb)   BMI 34 95 kg/m²     Physical Exam      Cardiographics         Lab Review    I have ordered cbc and bmp to be done this week           Plan:         Surgical Clearance - Cleared    Risk - mild increased risk given age and - has hx of cardiac disease but has been stable since CABG in sept 2018 / Dr Mike Robins is his cardiologist  Not prohibitive to undergo surgery  Discussion/Summary: To take long acting insulin the night before surgery and take short acting with him to take after surgery           Deya Gould,

## 2019-12-30 NOTE — PATIENT INSTRUCTIONS
Get info from foot doctor on [facility]- seen 2 weeks ago( Dr Ewa Howe old office) and diabetic eye exam in past few weeks from Dr Carey Hodge   do labs this week

## 2019-12-31 ENCOUNTER — VBI (OUTPATIENT)
Dept: FAMILY MEDICINE CLINIC | Facility: HOSPITAL | Age: 84
End: 2019-12-31

## 2019-12-31 DIAGNOSIS — E03.9 HYPOTHYROIDISM, UNSPECIFIED TYPE: ICD-10-CM

## 2019-12-31 LAB
BASOPHILS # BLD AUTO: 103 CELLS/UL (ref 0–200)
BASOPHILS NFR BLD AUTO: 1.3 %
BUN SERPL-MCNC: 34 MG/DL (ref 7–25)
BUN/CREAT SERPL: 17 (CALC) (ref 6–22)
CALCIUM SERPL-MCNC: 9.3 MG/DL (ref 8.6–10.3)
CHLORIDE SERPL-SCNC: 105 MMOL/L (ref 98–110)
CO2 SERPL-SCNC: 27 MMOL/L (ref 20–32)
CREAT SERPL-MCNC: 1.99 MG/DL (ref 0.7–1.11)
EOSINOPHIL # BLD AUTO: 482 CELLS/UL (ref 15–500)
EOSINOPHIL NFR BLD AUTO: 6.1 %
ERYTHROCYTE [DISTWIDTH] IN BLOOD BY AUTOMATED COUNT: 13.1 % (ref 11–15)
GLUCOSE SERPL-MCNC: 130 MG/DL (ref 65–99)
HCT VFR BLD AUTO: 41.5 % (ref 38.5–50)
HGB BLD-MCNC: 13.8 G/DL (ref 13.2–17.1)
LYMPHOCYTES # BLD AUTO: 1912 CELLS/UL (ref 850–3900)
LYMPHOCYTES NFR BLD AUTO: 24.2 %
MCH RBC QN AUTO: 31.9 PG (ref 27–33)
MCHC RBC AUTO-ENTMCNC: 33.3 G/DL (ref 32–36)
MCV RBC AUTO: 96.1 FL (ref 80–100)
MONOCYTES # BLD AUTO: 687 CELLS/UL (ref 200–950)
MONOCYTES NFR BLD AUTO: 8.7 %
NEUTROPHILS # BLD AUTO: 4716 CELLS/UL (ref 1500–7800)
NEUTROPHILS NFR BLD AUTO: 59.7 %
PLATELET # BLD AUTO: 201 THOUSAND/UL (ref 140–400)
PMV BLD REES-ECKER: 10.7 FL (ref 7.5–12.5)
POTASSIUM SERPL-SCNC: 4.7 MMOL/L (ref 3.5–5.3)
RBC # BLD AUTO: 4.32 MILLION/UL (ref 4.2–5.8)
SL AMB EGFR AFRICAN AMERICAN: 35 ML/MIN/1.73M2
SL AMB EGFR NON AFRICAN AMERICAN: 30 ML/MIN/1.73M2
SODIUM SERPL-SCNC: 141 MMOL/L (ref 135–146)
WBC # BLD AUTO: 7.9 THOUSAND/UL (ref 3.8–10.8)

## 2019-12-31 RX ORDER — LEVOTHYROXINE SODIUM 0.15 MG/1
TABLET ORAL
Qty: 30 TABLET | Refills: 0 | Status: SHIPPED | OUTPATIENT
Start: 2019-12-31 | End: 2020-03-02

## 2019-12-31 NOTE — TELEPHONE ENCOUNTER
Suzanna White, [address]   Phone Number: [phone]             12/30/19 2:13 PM     Hello, our patient Chiara Carr  has had Diabetic Eye Exam completed/performed  Please assist in updating the patient chart by pulling a previous Electronic Medical Record (EMR) document  The previous EMR is not found in chart, done by Dr Leah Porter [facility], [phone]       Thank you,   Suzanna White MA   [facility]      Faxed to [address] [place] [address] 12/31/19 8:28 AM     Received Diabetic Eye Exam DOS 7/17/2019, resulted Lianet Leyva MA 01/03/20 3:40 PM

## 2019-12-31 NOTE — TELEPHONE ENCOUNTER
Talha Arnold, [facility]   Phone Number: [phone]             19 2:09 PM     Hello, our patient Silva Mojica  has had Diabetic Foot Exam completed/performed  Please assist in updating the patient chart by pulling a previous Electronic Medical Record (EMR) document  The previous EMR is not found in chart  Pt states exam is done q 3 months by Dr Marion Yu  Thank you,   CAMERON Sanz [facility]      Faxed to Dr Saab Daily [phone] YHMUBORW UBQVF, [address] [place] 19 8:25 AM            2020 11:25 AM Phone ([facility]) Kaz Stubbs at Dr Saab Daily (Provider) [phone] Remove   Call Complete - States that there is no record for this patient with this  being seen at this office         By [facility] MA

## 2019-12-31 NOTE — LETTER
Diabetic Eye Exam Form    Date Requested: 19  Patient: Smooth Jack  Patient : 1935   Referring Provider: Lazarus Eth, DO    Dilated Retinal Exam        Yes         No     Date of Diabetic Eye Exam ______________________________  Left Eye      Exam did show retinopathy    Exam did not show retinopathy         Mild       Moderate       None       Proliferative       Severe     Right Eye     Exam did show retinopathy    Exam did not show retinopathy         Mild       Moderate       None       Proliferative       Severe     Comments __________________________________________________________    Practice Providing Exam ______________________________________________    Exam Performed By (print name) _______________________________________      Provider Signature ___________________________________________________      These reports are needed for  compliance    Please fax this completed form and a copy of the Diabetic Eye Exam report to our office as soon as possible to [phone] attention Rach: Phone [phone]    We thank you for your assistance in treating our mutual patient     (sent to [facility] )

## 2019-12-31 NOTE — LETTER
Diabetic Foot Exam Form    Date Requested: 19  Patient: Billie English  Patient : 1935   Referring Provider: Abbi Osorio DO    Diabetic Foot Exam Performed        Yes         No     Date of Diabetic Foot Exam ______________________________  Left Foot       Visual Inspection         Monofilament Testing Sensory Exam        Pedal Pulses         Additional Comments         Right Foot      Visual Inspection         Monofilament Testing Sensory Exam       Pedal Pulses         Additional Comments         Comments __________________________________________________________    Practice Providing Exam ______________________________________________    Exam Performed By (print name) _______________________________________      Provider Signature ___________________________________________________      These reports are needed for  compliance    Please fax this completed form and a copy of the Diabetic Foot Exam report to our office as soon as possible to [phone] attention Rach: Phone [phone]    We thank you for your assistance in treating our mutual patient     (sent to Dr Brittany Coffey)

## 2020-01-24 ENCOUNTER — HOSPITAL ENCOUNTER (OUTPATIENT)
Dept: RADIOLOGY | Facility: HOSPITAL | Age: 85
Discharge: HOME | End: 2020-01-24
Attending: INTERNAL MEDICINE
Payer: COMMERCIAL

## 2020-01-24 ENCOUNTER — OFFICE VISIT (OUTPATIENT)
Dept: FAMILY MEDICINE CLINIC | Facility: HOSPITAL | Age: 85
End: 2020-01-24
Payer: COMMERCIAL

## 2020-01-24 VITALS
TEMPERATURE: 98 F | HEART RATE: 63 BPM | HEIGHT: 65 IN | SYSTOLIC BLOOD PRESSURE: 108 MMHG | DIASTOLIC BLOOD PRESSURE: 68 MMHG | BODY MASS INDEX: 34.92 KG/M2 | WEIGHT: 209.6 LBS

## 2020-01-24 DIAGNOSIS — Z95.2 S/P AVR (AORTIC VALVE REPLACEMENT): ICD-10-CM

## 2020-01-24 DIAGNOSIS — I20.8 STABLE ANGINA (HCC): ICD-10-CM

## 2020-01-24 DIAGNOSIS — I50.9 CONGESTIVE HEART FAILURE, UNSPECIFIED HF CHRONICITY, UNSPECIFIED HEART FAILURE TYPE (HCC): ICD-10-CM

## 2020-01-24 DIAGNOSIS — I10 BENIGN ESSENTIAL HYPERTENSION: ICD-10-CM

## 2020-01-24 DIAGNOSIS — N18.30 TYPE 2 DIABETES MELLITUS WITH STAGE 3 CHRONIC KIDNEY DISEASE, WITHOUT LONG-TERM CURRENT USE OF INSULIN (HCC): ICD-10-CM

## 2020-01-24 DIAGNOSIS — J40 TRACHEOBRONCHITIS: ICD-10-CM

## 2020-01-24 DIAGNOSIS — I25.10 CORONARY ARTERY DISEASE INVOLVING NATIVE CORONARY ARTERY OF NATIVE HEART WITHOUT ANGINA PECTORIS: ICD-10-CM

## 2020-01-24 DIAGNOSIS — I70.0 ATHEROSCLEROSIS OF AORTA (HCC): ICD-10-CM

## 2020-01-24 DIAGNOSIS — E11.22 TYPE 2 DIABETES MELLITUS WITH STAGE 3 CHRONIC KIDNEY DISEASE, WITHOUT LONG-TERM CURRENT USE OF INSULIN (HCC): ICD-10-CM

## 2020-01-24 DIAGNOSIS — J40 TRACHEOBRONCHITIS: Primary | ICD-10-CM

## 2020-01-24 PROBLEM — I20.89 STABLE ANGINA: Status: ACTIVE | Noted: 2020-01-24

## 2020-01-24 PROCEDURE — 71046 X-RAY EXAM CHEST 2 VIEWS: CPT

## 2020-01-24 PROCEDURE — 3078F DIAST BP <80 MM HG: CPT | Performed by: INTERNAL MEDICINE

## 2020-01-24 PROCEDURE — 99213 OFFICE O/P EST LOW 20 MIN: CPT | Performed by: INTERNAL MEDICINE

## 2020-01-24 PROCEDURE — 3074F SYST BP LT 130 MM HG: CPT | Performed by: INTERNAL MEDICINE

## 2020-01-24 PROCEDURE — 1160F RVW MEDS BY RX/DR IN RCRD: CPT | Performed by: INTERNAL MEDICINE

## 2020-01-24 RX ORDER — CEFUROXIME AXETIL 250 MG/1
250 TABLET ORAL EVERY 12 HOURS SCHEDULED
Qty: 10 TABLET | Refills: 0 | Status: SHIPPED | OUTPATIENT
Start: 2020-01-24 | End: 2020-01-29

## 2020-01-24 NOTE — PATIENT INSTRUCTIONS
Take antibiotic- ceftin 1 twice daily for 5 days   use mucinex otc  Twice daily to help thin mucous or robitussin sugar free if needed

## 2020-01-24 NOTE — PROGRESS NOTES
Assessment/Plan:             Problem List Items Addressed This Visit        Endocrine    Type 2 diabetes mellitus with kidney complication, without long-term current use of insulin (HCC)       Cardiovascular and Mediastinum    Benign essential hypertension    CAD (coronary artery disease)    Atherosclerosis of aorta (HCC)    Congestive heart failure (HCC)    Stable angina (HCC)       Other    S/P AVR (aortic valve replacement)      Other Visit Diagnoses     Tracheobronchitis    -  Primary    Relevant Medications    cefuroxime (CEFTIN) 250 mg tablet    Other Relevant Orders    XR chest pa & lateral            Subjective:      Patient ID: Smooth Jack  is a 80 y o  male    Cough x 1 week- some tiredness and congestion with decreased appetite  No ear pain or sinus pressure  The following portions of the patient's history were reviewed and updated as appropriate: allergies, current medications and problem list      Review of Systems   Constitutional: Positive for appetite change and fatigue  Decreased appetitie   HENT: Negative for congestion, postnasal drip and sinus pressure  Respiratory: Positive for choking  Gastrointestinal: Negative for abdominal distention and constipation  All other systems reviewed and are negative          Objective:      Current Outpatient Medications:     acetaminophen (TYLENOL) 325 mg tablet, 650 mg every 4 to 6 hours as needed for pain (do not take with percocet), Disp: 30 tablet, Rfl: 0    albuterol (PROVENTIL HFA) 90 mcg/act inhaler, Inhale 2 puffs 4 (four) times a day as needed, Disp: , Rfl:     allopurinol (ZYLOPRIM) 300 mg tablet, TAKE ONE TABLET BY MOUTH ONCE DAILY, Disp: 90 tablet, Rfl: 3    ascorbic acid (VITAMIN C) 500 mg tablet, TAKE ONE TABLET BY MOUTH DAILY, Disp: 28 tablet, Rfl: 3    aspirin (ECOTRIN) 325 mg EC tablet, TAKE ONE TABLET BY MOUTH DAILY, Disp: 28 tablet, Rfl: 3    BASAGLAR KWIKPEN 100 units/mL injection pen, Inject 20 Units under the skin daily at bedtime, Disp: 15 mL, Rfl: 3    COMFORT EZ PEN NEEDLES 32G X 4 MM MISC, AS DIRECTED FOUR TIMES DAILY, Disp: 100 each, Rfl: 3    DUREZOL 0 05 % EMUL, instill ONE DROP IN THE LEFT EYE FOUR TIMES DAILY, Disp: , Rfl: 1    FEROSUL 325 (65 Fe) MG tablet, TAKE ONE TABLET BY MOUTH DAILY, Disp: 28 tablet, Rfl: 3    gabapentin (NEURONTIN) 300 mg capsule, TAKE ONE CAPSULE BY MOUTH every night at bedtime, Disp: 90 capsule, Rfl: 3    ILEVRO 0 3 % SUSP, instill ONE DROP IN THE LEFT EYE EVERY MORNING, Disp: , Rfl: 1    insulin aspart (NovoLOG) 100 units/mL injection, Inject 8 Units under the skin 3 (three) times a day before meals (Patient taking differently: Inject 10 Units under the skin 3 (three) times a day before meals ), Disp: , Rfl: 0    latanoprost (XALATAN) 0 005 % ophthalmic solution, Administer 1 drop to both eyes daily at bedtime, Disp: , Rfl: 11    levothyroxine 150 mcg tablet, TAKE ONE TABLET BY MOUTH DAILY, Disp: 30 tablet, Rfl: 0    lisinopril (ZESTRIL) 5 mg tablet, Take 1 tablet (5 mg total) by mouth daily, Disp: 30 tablet, Rfl: 5    LORazepam (ATIVAN) 0 5 mg tablet, TAKE ONE TABLET BY MOUTH EVERY EIGHT HOURS AS NEEDED FOR ANXIETY, Disp: 30 tablet, Rfl: 3    metoprolol tartrate (LOPRESSOR) 25 mg tablet, Take 0 5 tablets (12 5 mg total) by mouth daily, Disp: 30 tablet, Rfl: 6    ofloxacin (OCUFLOX) 0 3 % ophthalmic solution, instill ONE DROP IN THE LEFT EYE FOUR TIMES DAILY, Disp: , Rfl: 1    pantoprazole (PROTONIX) 20 mg tablet, TAKE ONE TABLET BY MOUTH EVERY DAY Decreased DOSE ON 8/7/19], Disp: 30 tablet, Rfl: 3    polyethylene glycol (MIRALAX) 17 g packet, Take 17 g by mouth daily, Disp: , Rfl:     rosuvastatin (CRESTOR) 40 MG tablet, TAKE ONE TABLET BY MOUTH DAILY, Disp: 30 tablet, Rfl: 6    tamsulosin (FLOMAX) 0 4 mg, TAKE ONE CAPSULE BY MOUTH DAILY WITH dinner, Disp: 30 capsule, Rfl: 6    timolol (TIMOPTIC) 0 5 % ophthalmic solution, Instill 1 drop into both eyes every morning, Disp: , Rfl: 11    torsemide (DEMADEX) 10 mg tablet, Take 1 tablet (10 mg total) by mouth daily, Disp: 30 tablet, Rfl: 5    cefuroxime (CEFTIN) 250 mg tablet, Take 1 tablet (250 mg total) by mouth every 12 (twelve) hours for 5 days, Disp: 10 tablet, Rfl: 0    Blood pressure 108/68, pulse 63, temperature 98 °F (36 7 °C), temperature source Tympanic, height 5' 5" (1 651 m), weight 95 1 kg (209 lb 9 6 oz)  Physical Exam   Constitutional: He appears well-developed  HENT:   Head: Normocephalic  Right Ear: External ear normal    Left Ear: External ear normal    Clear post nasal drip   Eyes: Right eye exhibits no discharge  No scleral icterus  Neck: No thyromegaly present  Cardiovascular: Normal rate and regular rhythm  Crisp valve  Sounds - no diastolic murumr in aortic region   Pulmonary/Chest: No stridor  Crackles in left base upper airway rhonchi   Abdominal: Soft  Bowel sounds are normal    Musculoskeletal: He exhibits no edema  Lymphadenopathy:     He has no cervical adenopathy  Nursing note and vitals reviewed

## 2020-02-06 ENCOUNTER — OFFICE VISIT (OUTPATIENT)
Dept: FAMILY MEDICINE CLINIC | Facility: HOSPITAL | Age: 85
End: 2020-02-06
Payer: COMMERCIAL

## 2020-02-06 VITALS
WEIGHT: 206 LBS | DIASTOLIC BLOOD PRESSURE: 68 MMHG | HEIGHT: 65 IN | SYSTOLIC BLOOD PRESSURE: 138 MMHG | HEART RATE: 70 BPM | RESPIRATION RATE: 16 BRPM | BODY MASS INDEX: 34.32 KG/M2

## 2020-02-06 DIAGNOSIS — M79.605 LEFT LEG PAIN: Primary | ICD-10-CM

## 2020-02-06 PROCEDURE — 3066F NEPHROPATHY DOC TX: CPT | Performed by: INTERNAL MEDICINE

## 2020-02-06 PROCEDURE — 2022F DILAT RTA XM EVC RTNOPTHY: CPT | Performed by: INTERNAL MEDICINE

## 2020-02-06 PROCEDURE — 3078F DIAST BP <80 MM HG: CPT | Performed by: INTERNAL MEDICINE

## 2020-02-06 PROCEDURE — 99213 OFFICE O/P EST LOW 20 MIN: CPT | Performed by: INTERNAL MEDICINE

## 2020-02-06 PROCEDURE — 3075F SYST BP GE 130 - 139MM HG: CPT | Performed by: INTERNAL MEDICINE

## 2020-02-06 PROCEDURE — 1036F TOBACCO NON-USER: CPT | Performed by: INTERNAL MEDICINE

## 2020-02-06 PROCEDURE — 3008F BODY MASS INDEX DOCD: CPT | Performed by: INTERNAL MEDICINE

## 2020-02-06 PROCEDURE — 4040F PNEUMOC VAC/ADMIN/RCVD: CPT | Performed by: INTERNAL MEDICINE

## 2020-02-06 PROCEDURE — 1160F RVW MEDS BY RX/DR IN RCRD: CPT | Performed by: INTERNAL MEDICINE

## 2020-02-06 NOTE — PATIENT INSTRUCTIONS
Take tylenol 650mg  three times a day as needed for left leg pain! CALL ME IF SWELLING OF YOUR LEFT LEG DEVELOPS OR RASH DEVELOPS!

## 2020-02-06 NOTE — PROGRESS NOTES
Assessment/Plan:    No problem-specific Assessment & Plan notes found for this encounter  Diagnoses and all orders for this visit:    Left leg pain        Will try prn tylenol! Subjective:      Patient ID: King Mcnair  is a 80 y o  male  Pt developed a toothache like of pain of the left anterior shin that is present at night, is not worse when weight bearing, not associated with rash, edema of the leg, calf pain, chest pain, dyspnea or pleurisy        The following portions of the patient's history were reviewed and updated as appropriate: current medications, past family history, past medical history, past social history, past surgical history and problem list     Review of Systems   Constitutional: Negative for chills and fever  Respiratory: Negative for cough and shortness of breath  Cardiovascular: Negative for chest pain  Gastrointestinal: Negative for diarrhea  Musculoskeletal:        Denies knee pain or ankle pain   Skin: Negative for color change and rash  Objective:    /68   Pulse 70   Resp 16   Ht 5' 5" (1 651 m)   Wt 93 4 kg (206 lb)   BMI 34 28 kg/m²      Physical Exam   Constitutional: He appears well-developed  HENT:   Head: Normocephalic  Cardiovascular: Normal rate and regular rhythm  No murmur heard  Pulmonary/Chest: Effort normal and breath sounds normal  He has no wheezes  He has no rales  Musculoskeletal: He exhibits tenderness (superficial sensitivity of the skin of the left anterior shjin but no knee or ankle pain or effusion)  Neurological: He is alert  Skin: Skin is warm and dry  No rash noted  No erythema  No pallor     I saw no rash suggestive of shingle, pt had no LLE edema suggestive of dvt           Silvestre Diaz MD

## 2020-02-18 ENCOUNTER — OFFICE VISIT (OUTPATIENT)
Dept: FAMILY MEDICINE CLINIC | Facility: HOSPITAL | Age: 85
End: 2020-02-18
Payer: COMMERCIAL

## 2020-02-18 ENCOUNTER — HOSPITAL ENCOUNTER (OUTPATIENT)
Dept: RADIOLOGY | Facility: HOSPITAL | Age: 85
Discharge: HOME | End: 2020-02-18
Attending: INTERNAL MEDICINE
Payer: COMMERCIAL

## 2020-02-18 VITALS
DIASTOLIC BLOOD PRESSURE: 62 MMHG | SYSTOLIC BLOOD PRESSURE: 110 MMHG | HEIGHT: 65 IN | WEIGHT: 209 LBS | OXYGEN SATURATION: 97 % | BODY MASS INDEX: 34.82 KG/M2 | HEART RATE: 60 BPM

## 2020-02-18 DIAGNOSIS — E11.22 TYPE 2 DIABETES MELLITUS WITH STAGE 3 CHRONIC KIDNEY DISEASE, WITHOUT LONG-TERM CURRENT USE OF INSULIN (HCC): ICD-10-CM

## 2020-02-18 DIAGNOSIS — E03.9 ACQUIRED HYPOTHYROIDISM: Chronic | ICD-10-CM

## 2020-02-18 DIAGNOSIS — N18.30 CKD STAGE 3 DUE TO TYPE 2 DIABETES MELLITUS (HCC): ICD-10-CM

## 2020-02-18 DIAGNOSIS — I10 BENIGN ESSENTIAL HYPERTENSION: Primary | ICD-10-CM

## 2020-02-18 DIAGNOSIS — I25.10 CORONARY ARTERY DISEASE INVOLVING NATIVE CORONARY ARTERY OF NATIVE HEART WITHOUT ANGINA PECTORIS: ICD-10-CM

## 2020-02-18 DIAGNOSIS — E11.22 CKD STAGE 3 DUE TO TYPE 2 DIABETES MELLITUS (HCC): ICD-10-CM

## 2020-02-18 DIAGNOSIS — N18.30 TYPE 2 DIABETES MELLITUS WITH STAGE 3 CHRONIC KIDNEY DISEASE, WITHOUT LONG-TERM CURRENT USE OF INSULIN (HCC): ICD-10-CM

## 2020-02-18 DIAGNOSIS — M79.605 LEFT LEG PAIN: ICD-10-CM

## 2020-02-18 DIAGNOSIS — H40.1130 PRIMARY OPEN ANGLE GLAUCOMA OF BOTH EYES, UNSPECIFIED GLAUCOMA STAGE: ICD-10-CM

## 2020-02-18 PROBLEM — N17.9 ACUTE RENAL FAILURE SUPERIMPOSED ON STAGE 3 CHRONIC KIDNEY DISEASE (HCC): Status: RESOLVED | Noted: 2018-11-24 | Resolved: 2020-02-18

## 2020-02-18 LAB
ALBUMIN SERPL-MCNC: 3.8 G/DL (ref 3.6–5.1)
ALBUMIN/GLOB SERPL: 1.6 (CALC) (ref 1–2.5)
ALP SERPL-CCNC: 68 U/L (ref 35–144)
ALT SERPL-CCNC: 17 U/L (ref 9–46)
AST SERPL-CCNC: 20 U/L (ref 10–35)
BASOPHILS # BLD AUTO: 102 CELLS/UL (ref 0–200)
BASOPHILS NFR BLD AUTO: 1.7 %
BILIRUB SERPL-MCNC: 1 MG/DL (ref 0.2–1.2)
BUN SERPL-MCNC: 38 MG/DL (ref 7–25)
BUN/CREAT SERPL: 17 (CALC) (ref 6–22)
CALCIUM SERPL-MCNC: 9.5 MG/DL (ref 8.6–10.3)
CHLORIDE SERPL-SCNC: 104 MMOL/L (ref 98–110)
CO2 SERPL-SCNC: 28 MMOL/L (ref 20–32)
CREAT SERPL-MCNC: 2.3 MG/DL (ref 0.7–1.11)
EOSINOPHIL # BLD AUTO: 378 CELLS/UL (ref 15–500)
EOSINOPHIL NFR BLD AUTO: 6.3 %
ERYTHROCYTE [DISTWIDTH] IN BLOOD BY AUTOMATED COUNT: 13.4 % (ref 11–15)
GLOBULIN SER CALC-MCNC: 2.4 G/DL (CALC) (ref 1.9–3.7)
GLUCOSE SERPL-MCNC: 145 MG/DL (ref 65–99)
HBA1C MFR BLD: 8.1 % OF TOTAL HGB
HCT VFR BLD AUTO: 40.4 % (ref 38.5–50)
HGB BLD-MCNC: 13.2 G/DL (ref 13.2–17.1)
LYMPHOCYTES # BLD AUTO: 1884 CELLS/UL (ref 850–3900)
LYMPHOCYTES NFR BLD AUTO: 31.4 %
MCH RBC QN AUTO: 32 PG (ref 27–33)
MCHC RBC AUTO-ENTMCNC: 32.7 G/DL (ref 32–36)
MCV RBC AUTO: 98.1 FL (ref 80–100)
MONOCYTES # BLD AUTO: 516 CELLS/UL (ref 200–950)
MONOCYTES NFR BLD AUTO: 8.6 %
NEUTROPHILS # BLD AUTO: 3120 CELLS/UL (ref 1500–7800)
NEUTROPHILS NFR BLD AUTO: 52 %
PLATELET # BLD AUTO: 188 THOUSAND/UL (ref 140–400)
PMV BLD REES-ECKER: 10.7 FL (ref 7.5–12.5)
POTASSIUM SERPL-SCNC: 4.3 MMOL/L (ref 3.5–5.3)
PROT SERPL-MCNC: 6.2 G/DL (ref 6.1–8.1)
RBC # BLD AUTO: 4.12 MILLION/UL (ref 4.2–5.8)
SL AMB EGFR AFRICAN AMERICAN: 29 ML/MIN/1.73M2
SL AMB EGFR NON AFRICAN AMERICAN: 25 ML/MIN/1.73M2
SODIUM SERPL-SCNC: 140 MMOL/L (ref 135–146)
TSH SERPL-ACNC: 0.8 MIU/L (ref 0.4–4.5)
WBC # BLD AUTO: 6 THOUSAND/UL (ref 3.8–10.8)

## 2020-02-18 PROCEDURE — 3008F BODY MASS INDEX DOCD: CPT | Performed by: INTERNAL MEDICINE

## 2020-02-18 PROCEDURE — 1160F RVW MEDS BY RX/DR IN RCRD: CPT | Performed by: INTERNAL MEDICINE

## 2020-02-18 PROCEDURE — 3078F DIAST BP <80 MM HG: CPT | Performed by: INTERNAL MEDICINE

## 2020-02-18 PROCEDURE — 2022F DILAT RTA XM EVC RTNOPTHY: CPT | Performed by: INTERNAL MEDICINE

## 2020-02-18 PROCEDURE — 3074F SYST BP LT 130 MM HG: CPT | Performed by: INTERNAL MEDICINE

## 2020-02-18 PROCEDURE — 1036F TOBACCO NON-USER: CPT | Performed by: INTERNAL MEDICINE

## 2020-02-18 PROCEDURE — 3288F FALL RISK ASSESSMENT DOCD: CPT | Performed by: INTERNAL MEDICINE

## 2020-02-18 PROCEDURE — 4040F PNEUMOC VAC/ADMIN/RCVD: CPT | Performed by: INTERNAL MEDICINE

## 2020-02-18 PROCEDURE — 3066F NEPHROPATHY DOC TX: CPT | Performed by: INTERNAL MEDICINE

## 2020-02-18 PROCEDURE — 73562 X-RAY EXAM OF KNEE 3: CPT

## 2020-02-18 PROCEDURE — 99214 OFFICE O/P EST MOD 30 MIN: CPT | Performed by: INTERNAL MEDICINE

## 2020-02-18 PROCEDURE — 1101F PT FALLS ASSESS-DOCD LE1/YR: CPT | Performed by: INTERNAL MEDICINE

## 2020-02-18 NOTE — ASSESSMENT & PLAN NOTE
Lab Results   Component Value Date    HGBA1C 8 1 (H) 02/17/2020   crt increased to 2 30 from 1 99 in October

## 2020-02-18 NOTE — PATIENT INSTRUCTIONS
Get copy of diabetic foot exam Dr Conrado Vaughan- not in our chart seen 2 weeks ago-s till  Showing up on the care gap  Cataract in march   do blood work in 3  Months before appt

## 2020-02-18 NOTE — PROGRESS NOTES
Assessment/Plan:             Problem List Items Addressed This Visit        Endocrine    Hypothyroidism (Chronic)    Type 2 diabetes mellitus with kidney complication, without long-term current use of insulin [facility]    Relevant Orders    Ambulatory referral to Nephrology    CBC and differential    Hemoglobin A1C    CKD stage 3 due to type 2 diabetes mellitus ([facility] )       Lab Results   Component Value Date    HGBA1C 8 1 (H) 02/17/2020   crt increased to 2 30 from 1 99 in October            Cardiovascular and Mediastinum    Benign essential hypertension - Primary    CAD (coronary artery disease)       Other    Primary open angle glaucoma (POAG)      Other Visit Diagnoses     Left leg pain        Relevant Orders    XR knee 3 vw left non injury            Subjective:      Patient ID: Lisa Hernandez  is a 80 y o  male    Reviewed recent labs- crt increased to 2 30   hbac decreased 8 1 from  8 2 - discussed       The following portions of the patient's history were reviewed and updated as appropriate: allergies, current medications and problem list      Review of Systems   HENT: Negative for congestion  Musculoskeletal: Positive for gait problem  Lower left leg - fading rash    All other systems reviewed and are negative          Objective:      Current Outpatient Medications:     acetaminophen (TYLENOL) 325 mg tablet, 650 mg every 4 to 6 hours as needed for pain (do not take with percocet), Disp: 30 tablet, Rfl: 0    albuterol (PROVENTIL HFA) 90 mcg/act inhaler, Inhale 2 puffs 4 (four) times a day as needed, Disp: , Rfl:     allopurinol (ZYLOPRIM) 300 mg tablet, TAKE ONE TABLET BY MOUTH ONCE DAILY, Disp: 90 tablet, Rfl: 3    ascorbic acid (VITAMIN C) 500 mg tablet, TAKE ONE TABLET BY MOUTH DAILY, Disp: 28 tablet, Rfl: 3    aspirin (ECOTRIN) 325 mg EC tablet, TAKE ONE TABLET BY MOUTH DAILY, Disp: 28 tablet, Rfl: 3    BASAGLAR KWIKPEN 100 units/mL injection pen, Inject 20 Units under the skin daily at bedtime, Disp: 15 mL, Rfl: 3    COMFORT EZ PEN NEEDLES 32G X 4 MM MISC, AS DIRECTED FOUR TIMES DAILY, Disp: 100 each, Rfl: 3    DUREZOL 0 05 % EMUL, instill ONE DROP IN THE LEFT EYE FOUR TIMES DAILY, Disp: , Rfl: 1    FEROSUL 325 (65 Fe) MG tablet, TAKE ONE TABLET BY MOUTH DAILY, Disp: 28 tablet, Rfl: 3    gabapentin (NEURONTIN) 300 mg capsule, TAKE ONE CAPSULE BY MOUTH every night at bedtime, Disp: 90 capsule, Rfl: 3    ILEVRO 0 3 % SUSP, instill ONE DROP IN THE LEFT EYE EVERY MORNING, Disp: , Rfl: 1    insulin aspart (NovoLOG) 100 units/mL injection, Inject 8 Units under the skin 3 (three) times a day before meals (Patient taking differently: Inject 10 Units under the skin 3 (three) times a day before meals ), Disp: , Rfl: 0    latanoprost (XALATAN) 0 005 % ophthalmic solution, Administer 1 drop to both eyes daily at bedtime, Disp: , Rfl: 11    levothyroxine 150 mcg tablet, TAKE ONE TABLET BY MOUTH DAILY, Disp: 30 tablet, Rfl: 0    lisinopril (ZESTRIL) 5 mg tablet, Take 1 tablet (5 mg total) by mouth daily, Disp: 30 tablet, Rfl: 5    LORazepam (ATIVAN) 0 5 mg tablet, TAKE ONE TABLET BY MOUTH EVERY EIGHT HOURS AS NEEDED FOR ANXIETY, Disp: 30 tablet, Rfl: 3    metoprolol tartrate (LOPRESSOR) 25 mg tablet, Take 0 5 tablets (12 5 mg total) by mouth daily, Disp: 30 tablet, Rfl: 6    ofloxacin (OCUFLOX) 0 3 % ophthalmic solution, instill ONE DROP IN THE LEFT EYE FOUR TIMES DAILY, Disp: , Rfl: 1    pantoprazole (PROTONIX) 20 mg tablet, TAKE ONE TABLET BY MOUTH EVERY DAY Decreased DOSE ON 8/7/19], Disp: 30 tablet, Rfl: 3    polyethylene glycol (MIRALAX) 17 g packet, Take 17 g by mouth daily, Disp: , Rfl:     rosuvastatin (CRESTOR) 40 MG tablet, TAKE ONE TABLET BY MOUTH DAILY, Disp: 30 tablet, Rfl: 6    tamsulosin (FLOMAX) 0 4 mg, TAKE ONE CAPSULE BY MOUTH DAILY WITH dinner, Disp: 30 capsule, Rfl: 6    timolol (TIMOPTIC) 0 5 % ophthalmic solution, Instill 1 drop into both eyes every morning, Disp: , Rfl: 11    torsemide (DEMADEX) 10 mg tablet, Take 1 tablet (10 mg total) by mouth daily, Disp: 30 tablet, Rfl: 5    Blood pressure 110/62, pulse 60, height 5' 5" (1 651 m), weight 94 8 kg (209 lb), SpO2 97 %  Physical Exam   Constitutional: He appears well-developed  HENT:   Head: Normocephalic  Right Ear: External ear normal    Left Ear: External ear normal    Clear post nasal drip   Eyes: Right eye exhibits no discharge  No scleral icterus  Neck: No thyromegaly present  Cardiovascular: Normal rate and regular rhythm  Crisp valve  Sounds - no diastolic murumr in aortic region   Pulmonary/Chest: Effort normal and breath sounds normal  No stridor  Crackles in left base upper airway rhonchi   Abdominal: Soft  Bowel sounds are normal  He exhibits no distension  Musculoskeletal: He exhibits tenderness  He exhibits no edema  Left knee medial plateau   Lymphadenopathy:     He has no cervical adenopathy  Skin: Rash noted  Fading rash on left lower leg   Psychiatric: He has a normal mood and affect  His behavior is normal  Judgment and thought content normal    Nursing note and vitals reviewed

## 2020-02-19 ENCOUNTER — OFFICE VISIT (OUTPATIENT)
Dept: UROLOGY | Facility: HOSPITAL | Age: 85
End: 2020-02-19
Payer: COMMERCIAL

## 2020-02-19 VITALS
DIASTOLIC BLOOD PRESSURE: 62 MMHG | HEIGHT: 65 IN | SYSTOLIC BLOOD PRESSURE: 110 MMHG | BODY MASS INDEX: 34.82 KG/M2 | HEART RATE: 82 BPM | WEIGHT: 209 LBS

## 2020-02-19 DIAGNOSIS — N32.81 OAB (OVERACTIVE BLADDER): ICD-10-CM

## 2020-02-19 DIAGNOSIS — N40.1 BENIGN PROSTATIC HYPERPLASIA WITH NOCTURIA: Primary | ICD-10-CM

## 2020-02-19 DIAGNOSIS — R35.1 BENIGN PROSTATIC HYPERPLASIA WITH NOCTURIA: Primary | ICD-10-CM

## 2020-02-19 PROCEDURE — 3066F NEPHROPATHY DOC TX: CPT | Performed by: NURSE PRACTITIONER

## 2020-02-19 PROCEDURE — 3078F DIAST BP <80 MM HG: CPT | Performed by: NURSE PRACTITIONER

## 2020-02-19 PROCEDURE — 4040F PNEUMOC VAC/ADMIN/RCVD: CPT | Performed by: NURSE PRACTITIONER

## 2020-02-19 PROCEDURE — 3008F BODY MASS INDEX DOCD: CPT | Performed by: NURSE PRACTITIONER

## 2020-02-19 PROCEDURE — 1036F TOBACCO NON-USER: CPT | Performed by: NURSE PRACTITIONER

## 2020-02-19 PROCEDURE — 2022F DILAT RTA XM EVC RTNOPTHY: CPT | Performed by: NURSE PRACTITIONER

## 2020-02-19 PROCEDURE — 3074F SYST BP LT 130 MM HG: CPT | Performed by: NURSE PRACTITIONER

## 2020-02-19 PROCEDURE — 1160F RVW MEDS BY RX/DR IN RCRD: CPT | Performed by: NURSE PRACTITIONER

## 2020-02-19 PROCEDURE — 99213 OFFICE O/P EST LOW 20 MIN: CPT | Performed by: NURSE PRACTITIONER

## 2020-02-19 NOTE — PATIENT INSTRUCTIONS
BLADDER HEALTH    WHAT IS CONSIDERED NORMAL?  The average bladder can hold about 2 cups of urine before it needs to be emptied   The normal range of voiding urine is 6 to 8 times during a 24 hour period  As we get older, our bladder capacity can get smaller and we may need to pass urine more frequently but usually not more than every 2 hours   Urine should flow easily without discomfort in a good, steady stream until the bladder is empty  No pushing or straining is necessary to empty the bladder   An urge is a signal that you feel as the bladder stretches to fill with urine  Urges can be felt even if the bladder is not full  Urges are not commands to go to the toilet, merely a signal and can be controlled  WHAT ARE GOOD BLADDER HABITS?  Take your time when emptying your bladder  Dont strain or push to empty your bladder  Make sure you empty your bladder completely each time you pass urine  Do not rush the process   Consistently ignoring the urge to go (waiting more than 4 hours between toileting) or urinating too infrequently may be convenient but not healthy for your bladder   Avoid going to the toilet just in case or more often than every 2 hours  It is usually not necessary to go when you feel the first urge  Try to go only when your bladder is full  Urgency and frequency of urination can be improved by retraining the bladder and spacing your fluid intake throughout the day  Practice good toilet habits  Dont let your bladder control your life  TIPS TO MAINTAIN GOOD BLADDER HABITS   Maintain a good fluid intake  Depending on your body size and environment, drink 6 -8 cups (8 oz each) of fluid per day unless otherwise advised by your doctor  Not enough fluid creates a foul odor and dark color of the urine     Limit the amount of caffeine (coffee, cola, chocolate or tea) and citrus foods that you consume as these foods can be associated with increased sensation of urinary urgency and frequency   Limit the amount of alcohol you drink  Alcohol increases urine production and makes it difficult for the brain to coordinate bladder control   Avoid constipation by maintaining a balanced diet of dietary fiber   Cigarette smoking is also irritating to the bladder surface and is associated with bladder cancer  In addition, the coughing associated with smoking may lead to increased incontinent episodes because of the increased pressure  HOW DIET CAN AFFECT YOUR BLADDER  Although there is no particular "diet" that can cure bladder control, there are certain dietary suggestions you can use to help control the problem  There are 2 points to consider when evaluating how your habits and diet may affect your bladder:    1  Foods and fluids can irritate the bladder  Some foods and beverages are thought to contribute to bladder leakage and irritability  However their effect on the bladder is not completely understood and you may want to see if eliminating one or all of these items improves your bladder control  If you are unable to give them up completely, it is recommended that you use the following items in moderation:   Acidic beverages and foods (orange juice, grapefruit juice, lemonade etc)   Alcoholic beverages   Vinegar   Coffee (regular and decaf)   Tea (regular and decaf)   Caffeinated beverages   Carbonated beverages          2  Drinking enough and the right kinds of fluids  Many people with bladder control issues decrease their intake of liquids in hope that they will need to urinate less frequently or have less urinary leakage   You should not restrict fluids to control your bladder  While a decrease in liquid intake does result in a decrease in the volume of urine, the smaller amount of urine may be more highly concentrated         Highly concentrated, dark yellow urine is irritating to the bladder surface and may actually cause you to go to the bathroom more frequently   It also encourages the growth of bacteria, which may lead to infections resulting in incontinence   Substitutions for Bladder Irritants: water is always the best beverage choice    Grape and apple juice are thirst quenchers are good selections and are not as irritating to the bladder   o Low acid fruits:  Pears, apricots, papaya, watermelon  o For coffee drinkers: KAVA®, Postum®, Nakia®, Kaffree Caity®  o For tea drinkers:  non-citrus or herbal and sun brewed tea

## 2020-02-24 DIAGNOSIS — I25.10 CORONARY ARTERY DISEASE INVOLVING NATIVE CORONARY ARTERY OF NATIVE HEART WITHOUT ANGINA PECTORIS: ICD-10-CM

## 2020-02-24 DIAGNOSIS — D64.9 ANEMIA, UNSPECIFIED TYPE: ICD-10-CM

## 2020-02-24 DIAGNOSIS — E11.21 TYPE 2 DIABETES MELLITUS WITH DIABETIC NEPHROPATHY, WITHOUT LONG-TERM CURRENT USE OF INSULIN (HCC): ICD-10-CM

## 2020-02-24 RX ORDER — ASPIRIN 325 MG
TABLET, DELAYED RELEASE (ENTERIC COATED) ORAL
Qty: 28 TABLET | Refills: 3 | Status: SHIPPED | OUTPATIENT
Start: 2020-02-24 | End: 2020-06-12

## 2020-02-24 RX ORDER — FERROUS SULFATE 325(65) MG
TABLET ORAL
Qty: 28 TABLET | Refills: 3 | Status: SHIPPED | OUTPATIENT
Start: 2020-02-24 | End: 2020-06-12

## 2020-02-24 RX ORDER — LANCETS 33 GAUGE
EACH MISCELLANEOUS
Qty: 100 EACH | Refills: 3 | Status: SHIPPED | OUTPATIENT
Start: 2020-02-24 | End: 2021-01-27 | Stop reason: CLARIF

## 2020-02-25 ENCOUNTER — OFFICE VISIT (OUTPATIENT)
Dept: NEPHROLOGY | Facility: HOSPITAL | Age: 85
End: 2020-02-25
Payer: COMMERCIAL

## 2020-02-25 VITALS
RESPIRATION RATE: 16 BRPM | SYSTOLIC BLOOD PRESSURE: 81 MMHG | HEIGHT: 65 IN | BODY MASS INDEX: 34.32 KG/M2 | HEART RATE: 61 BPM | WEIGHT: 206 LBS | DIASTOLIC BLOOD PRESSURE: 42 MMHG

## 2020-02-25 DIAGNOSIS — N18.30 TYPE 2 DIABETES MELLITUS WITH STAGE 3 CHRONIC KIDNEY DISEASE, WITHOUT LONG-TERM CURRENT USE OF INSULIN (HCC): ICD-10-CM

## 2020-02-25 DIAGNOSIS — E11.22 TYPE 2 DIABETES MELLITUS WITH STAGE 3 CHRONIC KIDNEY DISEASE, WITHOUT LONG-TERM CURRENT USE OF INSULIN (HCC): ICD-10-CM

## 2020-02-25 DIAGNOSIS — N17.9 ACUTE KIDNEY INJURY (HCC): ICD-10-CM

## 2020-02-25 DIAGNOSIS — N18.30 ANEMIA DUE TO STAGE 3 CHRONIC KIDNEY DISEASE (HCC): Chronic | ICD-10-CM

## 2020-02-25 DIAGNOSIS — E11.22 CKD STAGE 3 DUE TO TYPE 2 DIABETES MELLITUS (HCC): Primary | ICD-10-CM

## 2020-02-25 DIAGNOSIS — I10 BENIGN ESSENTIAL HYPERTENSION: ICD-10-CM

## 2020-02-25 DIAGNOSIS — D63.1 ANEMIA DUE TO STAGE 3 CHRONIC KIDNEY DISEASE (HCC): Chronic | ICD-10-CM

## 2020-02-25 DIAGNOSIS — N18.30 CKD STAGE 3 DUE TO TYPE 2 DIABETES MELLITUS (HCC): Primary | ICD-10-CM

## 2020-02-25 DIAGNOSIS — N28.1 RENAL CYST: ICD-10-CM

## 2020-02-25 PROCEDURE — 2022F DILAT RTA XM EVC RTNOPTHY: CPT | Performed by: INTERNAL MEDICINE

## 2020-02-25 PROCEDURE — 3008F BODY MASS INDEX DOCD: CPT | Performed by: INTERNAL MEDICINE

## 2020-02-25 PROCEDURE — 99214 OFFICE O/P EST MOD 30 MIN: CPT | Performed by: INTERNAL MEDICINE

## 2020-02-25 PROCEDURE — 3066F NEPHROPATHY DOC TX: CPT | Performed by: INTERNAL MEDICINE

## 2020-02-25 PROCEDURE — 3074F SYST BP LT 130 MM HG: CPT | Performed by: INTERNAL MEDICINE

## 2020-02-25 PROCEDURE — 4040F PNEUMOC VAC/ADMIN/RCVD: CPT | Performed by: INTERNAL MEDICINE

## 2020-02-25 PROCEDURE — 3078F DIAST BP <80 MM HG: CPT | Performed by: INTERNAL MEDICINE

## 2020-02-25 PROCEDURE — 1160F RVW MEDS BY RX/DR IN RCRD: CPT | Performed by: INTERNAL MEDICINE

## 2020-02-25 PROCEDURE — 1036F TOBACCO NON-USER: CPT | Performed by: INTERNAL MEDICINE

## 2020-02-25 NOTE — PROGRESS NOTES
OFFICE FOLLOW UP - Nephrology   Doris Conti  80 y o  male MRN: [id number]    Encounter: [id number]        ASSESSMENT & PLAN    1  Stage IIIB to stage 4 chronic kidney disease moderately increased proteinuria  -urinalysis:  Will repeat urinalysis elevated microalbumin to creatinine ratio  -imaging:  CT scan of the pelvis shows bilateral renal cysts largest at the lower pole of the right kidney measuring up to 3 3 cm will consider repeat renal ultrasound since this was done in 2017  -etiology:  Patient has a longstanding history of hypertension and type 2 diabetes mellitus this is likely progression of his CKD  -plan:  His blood pressure was low today on repeat it was better with a manual cough he is asymptomatic he is currently on lisinopril 5 mg daily, metoprolol 12 5 mg twice daily, torsemide 10 mg daily will continue this regimen and medication even though his creatinine slightly increased will plan on repeating lab work and his creatinine is worsening will have to discontinue lisinopril and potentially down titrate his torsemide   -his estimated GFR is likely 25-30 mL per minute  -he states at this point if needed he would want renal replacement therapy    2  Electrolytes:  -currently acceptable    3  Acid/Base  -no anion gap acid-base status acceptable    4  BP/HR  -History of hypertension-will not change medications based on 1 low reading, repeat manual blood pressure on Skin with shirt removed was 110 over 50 remains asymptomatic  -low threshold to down titrate medications if creatinine rising on repeat blood    5  Volume Status-euvolemic    6  Anemia of chronic kidney disease  -hemoglobin slightly low at 11 7 but overall stable MCV 91 7    7  MBD  -will check PTH and phosphorus with next set of blood work    8   Health Maintanance/Risk Reduction  -uncontrolled diabetes with elevated hemoglobin A1cs on insulin  -allopurinol for gout  -coronary artery disease and status post aortic valve replacement on diuretics ACE-inhibitor beta-blocker, statin    Will recheck blood work now make adjustments and follow-up in 4 months    HPI: Camron Zamora  is a 80 y o  male who is here for follow-up    He was last seen in 2018 when he was hospitalized with pre renal AKA I am ATN his creatinine has been stable around 1 9-2 0, most recently his creatinine did go up to 2 3, he currently is asymptomatic his blood pressures have been on the lower side, he currently denies any chest pain or shortness of breath no fevers or chills no nausea vomiting diarrhea constipation foamy or bloody urine    Denies any other new complaints urinating okay without any difficulty      ROS:   All systems reviewed and negative besides what was mentioned in the history of present illness    Allergies: Amlodipine and Atorvastatin    Medications:   Current Outpatient Medications:     acetaminophen (TYLENOL) 325 mg tablet, 650 mg every 4 to 6 hours as needed for pain (do not take with percocet), Disp: 30 tablet, Rfl: 0    albuterol (PROVENTIL HFA) 90 mcg/act inhaler, Inhale 2 puffs 4 (four) times a day as needed, Disp: , Rfl:     allopurinol (ZYLOPRIM) 300 mg tablet, TAKE ONE TABLET BY MOUTH ONCE DAILY, Disp: 90 tablet, Rfl: 3    ascorbic acid (VITAMIN C) 500 mg tablet, TAKE ONE TABLET BY MOUTH DAILY, Disp: 28 tablet, Rfl: 3    aspirin (ECOTRIN) 325 mg EC tablet, TAKE ONE TABLET BY MOUTH DAILY, Disp: 28 tablet, Rfl: 3    BASAGLAR KWIKPEN 100 units/mL injection pen, Inject 20 Units under the skin daily at bedtime, Disp: 15 mL, Rfl: 3    COMFORT EZ PEN NEEDLES 32G X 4 MM MISC, AS DIRECTED FOUR TIMES DAILY, Disp: 100 each, Rfl: 3    ferrous sulfate 325 (65 Fe) mg tablet, TAKE ONE TABLET BY MOUTH DAILY, Disp: 28 tablet, Rfl: 3    gabapentin (NEURONTIN) 300 mg capsule, TAKE ONE CAPSULE BY MOUTH every night at bedtime, Disp: 90 capsule, Rfl: 3    insulin aspart (NovoLOG) 100 units/mL injection, Inject 8 Units under the skin 3 (three) times a day before meals (Patient taking differently: Inject 10 Units under the skin 3 (three) times a day before meals ), Disp: , Rfl: 0    levothyroxine 150 mcg tablet, TAKE ONE TABLET BY MOUTH DAILY, Disp: 30 tablet, Rfl: 0    lisinopril (ZESTRIL) 5 mg tablet, Take 1 tablet (5 mg total) by mouth daily, Disp: 30 tablet, Rfl: 5    LORazepam (ATIVAN) 0 5 mg tablet, TAKE ONE TABLET BY MOUTH EVERY EIGHT HOURS AS NEEDED FOR ANXIETY, Disp: 30 tablet, Rfl: 3    metoprolol tartrate (LOPRESSOR) 25 mg tablet, Take 0 5 tablets (12 5 mg total) by mouth daily, Disp: 30 tablet, Rfl: 6    pantoprazole (PROTONIX) 20 mg tablet, TAKE ONE TABLET BY MOUTH EVERY DAY Decreased DOSE ON 8/7/19], Disp: 30 tablet, Rfl: 3    polyethylene glycol (MIRALAX) 17 g packet, Take 17 g by mouth daily, Disp: , Rfl:     rosuvastatin (CRESTOR) 40 MG tablet, TAKE ONE TABLET BY MOUTH DAILY, Disp: 30 tablet, Rfl: 6    tamsulosin (FLOMAX) 0 4 mg, TAKE ONE CAPSULE BY MOUTH DAILY WITH dinner, Disp: 30 capsule, Rfl: 6    torsemide (DEMADEX) 10 mg tablet, Take 1 tablet (10 mg total) by mouth daily, Disp: 30 tablet, Rfl: 5    DUREZOL 0 05 % EMUL, instill ONE DROP IN THE LEFT EYE FOUR TIMES DAILY, Disp: , Rfl: 1    ILEVRO 0 3 % SUSP, instill ONE DROP IN THE LEFT EYE EVERY MORNING, Disp: , Rfl: 1    latanoprost (XALATAN) 0 005 % ophthalmic solution, Administer 1 drop to both eyes daily at bedtime, Disp: , Rfl: 11    ofloxacin (OCUFLOX) 0 3 % ophthalmic solution, instill ONE DROP IN THE LEFT EYE FOUR TIMES DAILY, Disp: , Rfl: 1    timolol (TIMOPTIC) 0 5 % ophthalmic solution, Instill 1 drop into both eyes every morning, Disp: , Rfl: 11    Past Medical History:   Diagnosis Date    Aortic stenosis     CKD (chronic kidney disease)     baseline Cr 1 3-1 5    Coronary artery disease     Cough     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HCC)     type 2, insulin dependent    GERD (gastroesophageal reflux disease)     Glaucoma     Gout     History of prostate cancer     Hypertension     Hypothyroidism     Overweight     Peripheral neuropathy, idiopathic     Pleural effusion, left     Pure hypercholesterolemia     [place]   11/12/14   R   11/12/14     Visual impairment     cataract left eye    Weight gain      Past Surgical History:   Procedure Laterality Date    CARDIAC CATHETERIZATION      EYE SURGERY      IR THORACENTESIS  10/23/2018    IR THORACENTESIS  11/2/2018    IR THORACENTESIS  11/9/2018    IR THORACENTESIS  11/23/2018    OH CABG, ARTERY-VEIN, THREE N/A 9/17/2018    Procedure: CORONARY ARTERY BYPASS GRAFT (CABG) x 4 VESSELS with LIMA - LAD, SVG/LEFT LEG EVH - LEFT PDA, OM3, & OM2;  Surgeon: Lana Reveles MD;  [facility];  Service: Cardiac Surgery    OH ECHO TRANSESOPHAG MONTR CARDIAC PUMP FUNCTJ N/A 9/17/2018    Procedure: TRANSESOPHAGEAL ECHOCARDIOGRAM (VERONICA); Surgeon: Lana Reveles MD;  [facility];  Service: Cardiac Surgery    OH RPLCMT AORTIC VALVE OPN W/STENTLESS TISSUE VALVE N/A 9/17/2018    Procedure: REPLACEMENT VALVE AORTIC (AVR)- 23mm tissue Intuity Valve;  Surgeon: Lana Reveles MD;  [facility];  Service: Cardiac Surgery    THORACOSCOPY VIDEO ASSISTED SURGERY (VATS) Left 11/27/2018    Procedure: THORACOSCOPY VIDEO ASSISTED SURGERY (VATS), talc pleurodesis,;  Surgeon: Uma Faith MD;  [facility];  Service: Thoracic    THYROID SURGERY       Family History   Problem Relation Age of Onset    Diabetes Mother     Pancreatic cancer Brother     Diabetes Maternal Grandmother     Colon cancer Son     Diabetes Family     Substance Abuse Neg Hx     Mental illness Neg Hx       reports that he has never smoked  He has never used smokeless tobacco  He reports that he does not drink alcohol or use drugs        Physical Exam:   Vitals:    02/25/20 1128   BP: (!) 81/42   BP Location: Left arm   Patient Position: Sitting   Cuff Size: Standard   Pulse: 61   Resp: 16   Weight: 93 4 kg (206 lb)   Height: 5' 5" (1 651 m)     Body mass index is 34 28 kg/m²      General: conscious, cooperative, in not acute distress  Eyes: conjunctivae pink, anicteric sclerae  ENT: lips and mucous membranes moist  Neck: supple, no JVD  Chest: clear breath sounds bilateral, no crackles, ronchus or wheezings  CVS: distinct S1 & S2, normal rate, regular rhythm  Abdomen: soft, non-tender, non-distended, normoactive bowel sounds  Extremities:  Trace edema  Skin: no rash  Neuro: awake, alert, oriented      Lab Results:    Results for orders placed or performed in visit on 02/17/20   Comprehensive metabolic panel   Result Value Ref Range    Glucose, Random 145 (H) 65 - 99 mg/dL    BUN 38 (H) 7 - 25 mg/dL    Creatinine 2 30 (H) 0 70 - 1 11 mg/dL    eGFR Non  25 (L) > OR = 60 mL/min/1 73m2    eGFR  29 (L) > OR = 60 mL/min/1 73m2    SL AMB BUN/CREATININE RATIO 17 6 - 22 (calc)    Sodium 140 135 - 146 mmol/L    Potassium 4 3 3 5 - 5 3 mmol/L    Chloride 104 98 - 110 mmol/L    CO2 28 20 - 32 mmol/L    SL AMB CALCIUM 9 5 8 6 - 10 3 mg/dL    Protein, Total 6 2 6 1 - 8 1 g/dL    Albumin 3 8 3 6 - 5 1 g/dL    Globulin 2 4 1 9 - 3 7 g/dL (calc)    Albumin/Globulin Ratio 1 6 1 0 - 2 5 (calc)    TOTAL BILIRUBIN 1 0 0 2 - 1 2 mg/dL    Alkaline Phosphatase 68 35 - 144 U/L    AST 20 10 - 35 U/L    ALT 17 9 - 46 U/L   CBC and differential   Result Value Ref Range    White Blood Cell Count 6 0 3 8 - 10 8 Thousand/uL    Red Blood Cell Count 4 12 (L) 4 20 - 5 80 Million/uL    Hemoglobin 13 2 13 2 - 17 1 g/dL    HCT 40 4 38 5 - 50 0 %    MCV 98 1 80 0 - 100 0 fL    MCH 32 0 27 0 - 33 0 pg    MCHC 32 7 32 0 - 36 0 g/dL    RDW 13 4 11 0 - 15 0 %    Platelet Count 088 772 - 400 Thousand/uL    SL AMB MPV 10 7 7 5 - 12 5 fL    Neutrophils (Absolute) 3,120 1,500 - 7,800 cells/uL    Lymphocytes (Absolute) 1,884 850 - 3,900 cells/uL    Monocytes (Absolute) 516 200 - 950 cells/uL    Eosinophils (Absolute) 378 15 - 500 cells/uL    Basophils ABS 102 0 - 200 cells/uL    Neutrophils 52 %    Lymphocytes 31 4 %    Monocytes 8 6 %    Eosinophils 6 3 %    Basophils PCT 1 7 %   TSH, 3rd generation   Result Value Ref Range    TSH 0 80 0 40 - 4 50 mIU/L   Hemoglobin A1c (w/out EAG)   Result Value Ref Range    Hemoglobin A1C 8 1 (H) <5 7 % of total Hgb       Portions of the record may have been created with voice recognition software  Occasional wrong word or "sound a like" substitutions may have occurred due to the inherent limitations of voice recognition software  Read the chart carefully and recognize, using context, where substitutions have occurred  If you have any questions, please contact the dictating provider

## 2020-02-25 NOTE — PATIENT INSTRUCTIONS
Chronic Kidney Disease   WHAT YOU NEED TO KNOW:   Chronic kidney disease (CKD) is the gradual and permanent loss of kidney function  It is also called chronic kidney failure, or chronic renal insufficiency  Normally, the kidneys remove fluid, chemicals, and waste from your blood  These wastes are turned into urine by your kidneys  CKD may worsen over time and lead to kidney failure  DISCHARGE INSTRUCTIONS:   Return to the emergency department if:   · You are confused and very drowsy  · You have a seizure  · You have shortness of breath  Contact your healthcare provider if:   · You suddenly gain or lose more weight than your healthcare provider has told you is okay  · You have itchy skin or a rash  · You urinate more or less than you normally do  · You have blood in your urine  · You have nausea and repeated vomiting  · You have fatigue or muscle weakness  · You have hiccups that will not stop  · You have questions or concerns about your condition or care  Medicines:   · Medicines  may be given to decrease blood pressure and get rid of extra fluid  You may also receive medicine to manage health conditions that may occur with CKD, such as anemia, diabetes, and heart disease  · Take your medicine as directed  Contact your healthcare provider if you think your medicine is not helping or if you have side effects  Tell him or her if you are allergic to any medicine  Keep a list of the medicines, vitamins, and herbs you take  Include the amounts, and when and why you take them  Bring the list or the pill bottles to follow-up visits  Carry your medicine list with you in case of an emergency  Follow up with your healthcare provider as directed: You will need to return for tests to monitor your kidney function  You may also be referred to a kidney specialist  Write down your questions so you remember to ask them during your visits  Manage other health conditions:   Follow your healthcare provider's directions on how to manage diabetes, high blood pressure, and heart disease  These conditions can make CKD worse  Talk to your healthcare provider before you take over-the-counter medicine  Medicines such as NSAIDs, stomach medicine, or laxatives may harm your kidneys  Weigh yourself daily:  Ask your healthcare provider what your weight should be  Ask how much liquid you should drink each day  CKD may cause you to gain or lose weight rapidly  Weigh yourself every day  Write down your weight, how much liquid you drink or eat, and how much you urinate each day  Contact your healthcare provider if your weight is higher or lower than it should be  Manage CKD:   · Maintain a healthy weight  Ask your healthcare provider how much you should weigh  Ask him to help you create a weight loss plan if you are overweight  · Exercise 30 to 60 minutes a day, 4 to 7 times a week, or as directed  Ask about the best exercise plan for you  Regular exercise can help you manage CKD, high blood pressure, and diabetes  · Follow your healthcare provider's advice about what to eat and drink  He may tell you to eat food low in sodium (salt), potassium, phosphorus, or protein  You may need to see a dietitian if you need help planning meals  Ask how much liquid to drink each day and which liquids are best for you  · Limit alcohol  Ask how much alcohol is safe for you to drink  A drink of alcohol is 12 ounces of beer, 5 ounces of wine, or 1½ ounces of liquor  · Do not smoke  Nicotine and other chemicals in cigarettes and cigars can cause lung and kidney damage  Ask your healthcare provider for information if you currently smoke and need help to quit  E-cigarettes or smokeless tobacco still contain nicotine  Talk to your healthcare provider before you use these products  · Ask your healthcare provider if you need vaccines    Infections such as pneumonia, influenza, and hepatitis can be more harmful or more likely to occur in a person who has CKD  Vaccines reduce your risk of infection with these viruses  © 2017 [facility] Information is for End User's use only and may not be sold, redistributed or otherwise used for commercial purposes  All illustrations and images included in CareNotes® are the copyrighted property of A D [employer] M [employer]  or [employer] Mat[employer]  The above information is an  only  It is not intended as medical advice for individual conditions or treatments  Talk to your doctor, nurse or pharmacist before following any medical regimen to see if it is safe and effective for you

## 2020-02-27 DIAGNOSIS — Z95.1 S/P CABG X 4: ICD-10-CM

## 2020-02-27 DIAGNOSIS — R60.9 EDEMA, UNSPECIFIED TYPE: ICD-10-CM

## 2020-02-27 RX ORDER — TORSEMIDE 10 MG/1
TABLET ORAL
Qty: 30 TABLET | Refills: 5 | Status: SHIPPED | OUTPATIENT
Start: 2020-02-27 | End: 2020-08-15

## 2020-02-28 DIAGNOSIS — M1A.9XX1 CHRONIC GOUT WITH TOPHUS, UNSPECIFIED CAUSE, UNSPECIFIED SITE: ICD-10-CM

## 2020-02-28 DIAGNOSIS — E03.9 HYPOTHYROIDISM, UNSPECIFIED TYPE: ICD-10-CM

## 2020-02-28 DIAGNOSIS — Z00.00 HEALTHCARE MAINTENANCE: ICD-10-CM

## 2020-02-28 RX ORDER — PANTOPRAZOLE SODIUM 20 MG/1
TABLET, DELAYED RELEASE ORAL
Qty: 30 TABLET | Refills: 3 | Status: SHIPPED | OUTPATIENT
Start: 2020-02-28 | End: 2020-06-15

## 2020-03-02 RX ORDER — LEVOTHYROXINE SODIUM 0.15 MG/1
TABLET ORAL
Qty: 30 TABLET | Refills: 0 | Status: SHIPPED | OUTPATIENT
Start: 2020-03-02 | End: 2020-03-24

## 2020-03-02 RX ORDER — ASCORBIC ACID 500 MG
TABLET ORAL
Qty: 28 TABLET | Refills: 3 | Status: SHIPPED | OUTPATIENT
Start: 2020-03-02 | End: 2020-06-12

## 2020-03-02 RX ORDER — ALLOPURINOL 300 MG/1
TABLET ORAL
Qty: 90 TABLET | Refills: 3 | Status: SHIPPED | OUTPATIENT
Start: 2020-03-02 | End: 2021-03-16

## 2020-03-06 LAB
ALBUMIN SERPL-MCNC: 3.9 G/DL (ref 3.6–5.1)
AMORPH SED URNS QL MICRO: ABNORMAL /HPF
APPEARANCE UR: ABNORMAL
BACTERIA UR QL AUTO: ABNORMAL /HPF
BASOPHILS # BLD AUTO: 60 CELLS/UL (ref 0–200)
BASOPHILS NFR BLD AUTO: 0.8 %
BILIRUB UR QL STRIP: NEGATIVE
BUN SERPL-MCNC: 34 MG/DL (ref 7–25)
BUN/CREAT SERPL: 16 (CALC) (ref 6–22)
CALCIUM SERPL-MCNC: 9.1 MG/DL (ref 8.6–10.3)
CALCIUM SERPL-MCNC: 9.1 MG/DL (ref 8.6–10.3)
CHLORIDE SERPL-SCNC: 106 MMOL/L (ref 98–110)
CO2 SERPL-SCNC: 29 MMOL/L (ref 20–32)
COLOR UR: YELLOW
CREAT SERPL-MCNC: 2.16 MG/DL (ref 0.7–1.11)
CREAT UR-MCNC: 68 MG/DL (ref 20–320)
EOSINOPHIL # BLD AUTO: 600 CELLS/UL (ref 15–500)
EOSINOPHIL NFR BLD AUTO: 8 %
ERYTHROCYTE [DISTWIDTH] IN BLOOD BY AUTOMATED COUNT: 13.4 % (ref 11–15)
GLUCOSE SERPL-MCNC: 94 MG/DL (ref 65–99)
GLUCOSE UR QL STRIP: NEGATIVE
GRAN CASTS #/AREA URNS LPF: ABNORMAL /LPF
HCT VFR BLD AUTO: 40.2 % (ref 38.5–50)
HGB BLD-MCNC: 13.4 G/DL (ref 13.2–17.1)
HGB UR QL STRIP: ABNORMAL
HYALINE CASTS #/AREA URNS LPF: ABNORMAL /LPF
KETONES UR QL STRIP: NEGATIVE
LEUKOCYTE ESTERASE UR QL STRIP: NEGATIVE
LYMPHOCYTES # BLD AUTO: 1695 CELLS/UL (ref 850–3900)
LYMPHOCYTES NFR BLD AUTO: 22.6 %
MAGNESIUM SERPL-MCNC: 2.3 MG/DL (ref 1.5–2.5)
MCH RBC QN AUTO: 32.5 PG (ref 27–33)
MCHC RBC AUTO-ENTMCNC: 33.3 G/DL (ref 32–36)
MCV RBC AUTO: 97.6 FL (ref 80–100)
MONOCYTES # BLD AUTO: 383 CELLS/UL (ref 200–950)
MONOCYTES NFR BLD AUTO: 5.1 %
NEUTROPHILS # BLD AUTO: 4763 CELLS/UL (ref 1500–7800)
NEUTROPHILS NFR BLD AUTO: 63.5 %
NITRITE UR QL STRIP: NEGATIVE
PH UR STRIP: ABNORMAL [PH] (ref 5–8)
PHOSPHATE SERPL-MCNC: 3.3 MG/DL (ref 2.1–4.3)
PLATELET # BLD AUTO: 224 THOUSAND/UL (ref 140–400)
PMV BLD REES-ECKER: 10.7 FL (ref 7.5–12.5)
POTASSIUM SERPL-SCNC: 4.2 MMOL/L (ref 3.5–5.3)
PROT UR QL STRIP: ABNORMAL
PROT UR-MCNC: 81 MG/DL (ref 5–25)
PROT/CREAT UR: 1.19 MG/MG CREAT (ref 0.02–0.13)
PROT/CREAT UR: 1191 MG/G CREAT (ref 22–128)
PTH-INTACT SERPL-MCNC: 79 PG/ML (ref 14–64)
RBC # BLD AUTO: 4.12 MILLION/UL (ref 4.2–5.8)
RBC #/AREA URNS HPF: ABNORMAL /HPF
SL AMB EGFR AFRICAN AMERICAN: 31 ML/MIN/1.73M2
SL AMB EGFR NON AFRICAN AMERICAN: 27 ML/MIN/1.73M2
SODIUM SERPL-SCNC: 142 MMOL/L (ref 135–146)
SP GR UR STRIP: 1.01 (ref 1–1.03)
SQUAMOUS #/AREA URNS HPF: ABNORMAL /HPF
URATE SERPL-MCNC: 3.5 MG/DL (ref 4–8)
WBC # BLD AUTO: 7.5 THOUSAND/UL (ref 3.8–10.8)
WBC #/AREA URNS HPF: ABNORMAL /HPF

## 2020-03-10 ENCOUNTER — TELEPHONE (OUTPATIENT)
Dept: NEPHROLOGY | Facility: CLINIC | Age: 85
End: 2020-03-10

## 2020-03-10 NOTE — TELEPHONE ENCOUNTER
----- Message from Knox Dakin, DO sent at 3/10/2020 12:44 PM EDT -----  His labs, renal function are stable  Can you let him know we can continue current medications  No changes  Repeat bmp, renal function panel  UPCR and UA will also have him get a c3, c4, UPEP, SPEP, clover, and anca for completeness  Thanks

## 2020-03-10 NOTE — TELEPHONE ENCOUNTER
----- Message from Tala Polanco DO sent at 3/10/2020 12:44 PM EDT -----  His labs, renal function are stable  Can you let him know we can continue current medications  No changes  Repeat bmp, renal function panel  UPCR and UA will also have him get a c3, c4, UPEP, SPEP, clover, and anca for completeness  Thanks

## 2020-03-23 DIAGNOSIS — F41.9 ANXIETY: ICD-10-CM

## 2020-03-23 RX ORDER — LORAZEPAM 0.5 MG/1
TABLET ORAL
Qty: 30 TABLET | Refills: 3 | Status: SHIPPED | OUTPATIENT
Start: 2020-03-23 | End: 2020-06-18

## 2020-03-24 DIAGNOSIS — E03.9 HYPOTHYROIDISM, UNSPECIFIED TYPE: ICD-10-CM

## 2020-03-24 RX ORDER — LEVOTHYROXINE SODIUM 0.15 MG/1
TABLET ORAL
Qty: 30 TABLET | Refills: 0 | Status: SHIPPED | OUTPATIENT
Start: 2020-03-24 | End: 2020-04-21

## 2020-04-20 DIAGNOSIS — E03.9 HYPOTHYROIDISM, UNSPECIFIED TYPE: ICD-10-CM

## 2020-04-21 DIAGNOSIS — E11.22 TYPE 2 DIABETES MELLITUS WITH STAGE 3 CHRONIC KIDNEY DISEASE, WITHOUT LONG-TERM CURRENT USE OF INSULIN (HCC): ICD-10-CM

## 2020-04-21 DIAGNOSIS — I25.10 CORONARY ARTERY DISEASE INVOLVING NATIVE CORONARY ARTERY OF NATIVE HEART WITHOUT ANGINA PECTORIS: ICD-10-CM

## 2020-04-21 DIAGNOSIS — N18.30 TYPE 2 DIABETES MELLITUS WITH STAGE 3 CHRONIC KIDNEY DISEASE, WITHOUT LONG-TERM CURRENT USE OF INSULIN (HCC): ICD-10-CM

## 2020-04-21 RX ORDER — LEVOTHYROXINE SODIUM 0.15 MG/1
TABLET ORAL
Qty: 30 TABLET | Refills: 0 | Status: SHIPPED | OUTPATIENT
Start: 2020-04-21 | End: 2020-05-19

## 2020-04-21 RX ORDER — LISINOPRIL 5 MG/1
5 TABLET ORAL DAILY
Qty: 90 TABLET | Refills: 3 | Status: SHIPPED | OUTPATIENT
Start: 2020-04-21 | End: 2020-05-18 | Stop reason: SDUPTHER

## 2020-04-22 RX ORDER — LISINOPRIL 5 MG/1
5 TABLET ORAL DAILY
Qty: 30 TABLET | Refills: 5 | Status: SHIPPED | OUTPATIENT
Start: 2020-04-22 | End: 2020-05-18

## 2020-05-13 PROBLEM — I50.32 CHRONIC DIASTOLIC CONGESTIVE HEART FAILURE (HCC): Status: ACTIVE | Noted: 2020-01-24

## 2020-05-15 RX ORDER — INSULIN ASPART 100 [IU]/ML
INJECTION, SOLUTION INTRAVENOUS; SUBCUTANEOUS
COMMUNITY
Start: 2020-04-15 | End: 2020-05-15 | Stop reason: DRUGHIGH

## 2020-05-18 ENCOUNTER — OFFICE VISIT (OUTPATIENT)
Dept: FAMILY MEDICINE CLINIC | Facility: HOSPITAL | Age: 85
End: 2020-05-18
Payer: COMMERCIAL

## 2020-05-18 VITALS
BODY MASS INDEX: 35.35 KG/M2 | WEIGHT: 212.2 LBS | OXYGEN SATURATION: 98 % | DIASTOLIC BLOOD PRESSURE: 56 MMHG | HEIGHT: 65 IN | HEART RATE: 55 BPM | SYSTOLIC BLOOD PRESSURE: 88 MMHG

## 2020-05-18 DIAGNOSIS — N18.30 ANEMIA DUE TO STAGE 3 CHRONIC KIDNEY DISEASE (HCC): Chronic | ICD-10-CM

## 2020-05-18 DIAGNOSIS — E03.9 HYPOTHYROIDISM, UNSPECIFIED TYPE: ICD-10-CM

## 2020-05-18 DIAGNOSIS — E03.9 ACQUIRED HYPOTHYROIDISM: Chronic | ICD-10-CM

## 2020-05-18 DIAGNOSIS — Z95.1 S/P CABG X 4: ICD-10-CM

## 2020-05-18 DIAGNOSIS — I25.10 CORONARY ARTERY DISEASE INVOLVING NATIVE CORONARY ARTERY OF NATIVE HEART WITHOUT ANGINA PECTORIS: ICD-10-CM

## 2020-05-18 DIAGNOSIS — I50.32 CHRONIC DIASTOLIC CONGESTIVE HEART FAILURE (HCC): Primary | ICD-10-CM

## 2020-05-18 DIAGNOSIS — E11.22 CKD STAGE 3 DUE TO TYPE 2 DIABETES MELLITUS (HCC): ICD-10-CM

## 2020-05-18 DIAGNOSIS — D63.1 ANEMIA DUE TO STAGE 3 CHRONIC KIDNEY DISEASE (HCC): Chronic | ICD-10-CM

## 2020-05-18 DIAGNOSIS — N18.30 CKD STAGE 3 DUE TO TYPE 2 DIABETES MELLITUS (HCC): ICD-10-CM

## 2020-05-18 DIAGNOSIS — E11.22 TYPE 2 DIABETES MELLITUS WITH STAGE 3 CHRONIC KIDNEY DISEASE, WITHOUT LONG-TERM CURRENT USE OF INSULIN (HCC): ICD-10-CM

## 2020-05-18 DIAGNOSIS — N18.30 TYPE 2 DIABETES MELLITUS WITH STAGE 3 CHRONIC KIDNEY DISEASE, WITHOUT LONG-TERM CURRENT USE OF INSULIN (HCC): ICD-10-CM

## 2020-05-18 DIAGNOSIS — Z95.2 S/P AVR (AORTIC VALVE REPLACEMENT): ICD-10-CM

## 2020-05-18 PROCEDURE — 1036F TOBACCO NON-USER: CPT | Performed by: INTERNAL MEDICINE

## 2020-05-18 PROCEDURE — 2022F DILAT RTA XM EVC RTNOPTHY: CPT | Performed by: INTERNAL MEDICINE

## 2020-05-18 PROCEDURE — 3074F SYST BP LT 130 MM HG: CPT | Performed by: INTERNAL MEDICINE

## 2020-05-18 PROCEDURE — 3066F NEPHROPATHY DOC TX: CPT | Performed by: INTERNAL MEDICINE

## 2020-05-18 PROCEDURE — 3008F BODY MASS INDEX DOCD: CPT | Performed by: INTERNAL MEDICINE

## 2020-05-18 PROCEDURE — 4040F PNEUMOC VAC/ADMIN/RCVD: CPT | Performed by: INTERNAL MEDICINE

## 2020-05-18 PROCEDURE — 4010F ACE/ARB THERAPY RXD/TAKEN: CPT | Performed by: INTERNAL MEDICINE

## 2020-05-18 PROCEDURE — 99214 OFFICE O/P EST MOD 30 MIN: CPT | Performed by: INTERNAL MEDICINE

## 2020-05-18 PROCEDURE — 3078F DIAST BP <80 MM HG: CPT | Performed by: INTERNAL MEDICINE

## 2020-05-18 PROCEDURE — 1160F RVW MEDS BY RX/DR IN RCRD: CPT | Performed by: INTERNAL MEDICINE

## 2020-05-18 RX ORDER — LISINOPRIL 5 MG/1
2.5 TABLET ORAL DAILY
Qty: 30 TABLET | Refills: 5 | Status: SHIPPED | OUTPATIENT
Start: 2020-05-18 | End: 2021-03-05

## 2020-05-19 DIAGNOSIS — G62.9 NEUROPATHY: ICD-10-CM

## 2020-05-19 RX ORDER — LEVOTHYROXINE SODIUM 0.15 MG/1
TABLET ORAL
Qty: 30 TABLET | Refills: 0 | Status: SHIPPED | OUTPATIENT
Start: 2020-05-19 | End: 2020-06-12

## 2020-05-19 RX ORDER — ROSUVASTATIN CALCIUM 40 MG/1
TABLET, COATED ORAL
Qty: 30 TABLET | Refills: 6 | Status: SHIPPED | OUTPATIENT
Start: 2020-05-19 | End: 2020-12-30

## 2020-05-19 RX ORDER — GABAPENTIN 300 MG/1
300 CAPSULE ORAL
Qty: 90 CAPSULE | Refills: 3 | Status: SHIPPED | OUTPATIENT
Start: 2020-05-19 | End: 2021-04-26 | Stop reason: SDUPTHER

## 2020-05-27 DIAGNOSIS — L03.116 CELLULITIS OF LEFT LOWER EXTREMITY: ICD-10-CM

## 2020-05-27 RX ORDER — INSULIN GLARGINE 100 [IU]/ML
20 INJECTION, SOLUTION SUBCUTANEOUS
Qty: 15 ML | Refills: 3 | Status: SHIPPED | OUTPATIENT
Start: 2020-05-27 | End: 2021-01-25 | Stop reason: SDUPTHER

## 2020-06-11 DIAGNOSIS — I25.10 CORONARY ARTERY DISEASE INVOLVING NATIVE CORONARY ARTERY OF NATIVE HEART WITHOUT ANGINA PECTORIS: ICD-10-CM

## 2020-06-11 DIAGNOSIS — D64.9 ANEMIA, UNSPECIFIED TYPE: ICD-10-CM

## 2020-06-11 DIAGNOSIS — E03.9 HYPOTHYROIDISM, UNSPECIFIED TYPE: ICD-10-CM

## 2020-06-11 DIAGNOSIS — Z00.00 HEALTHCARE MAINTENANCE: ICD-10-CM

## 2020-06-12 RX ORDER — ASPIRIN 325 MG
TABLET, DELAYED RELEASE (ENTERIC COATED) ORAL
Qty: 28 TABLET | Refills: 3 | Status: SHIPPED | OUTPATIENT
Start: 2020-06-12 | End: 2020-11-02

## 2020-06-12 RX ORDER — ASCORBIC ACID 500 MG
TABLET ORAL
Qty: 28 TABLET | Refills: 3 | Status: SHIPPED | OUTPATIENT
Start: 2020-06-12 | End: 2020-11-02

## 2020-06-12 RX ORDER — FERROUS SULFATE 325(65) MG
TABLET ORAL
Qty: 28 TABLET | Refills: 3 | Status: SHIPPED | OUTPATIENT
Start: 2020-06-12 | End: 2020-11-02

## 2020-06-12 RX ORDER — LEVOTHYROXINE SODIUM 0.15 MG/1
TABLET ORAL
Qty: 30 TABLET | Refills: 0 | Status: SHIPPED | OUTPATIENT
Start: 2020-06-12 | End: 2020-07-14

## 2020-06-15 DIAGNOSIS — N40.1 BENIGN PROSTATIC HYPERPLASIA WITH NOCTURIA: ICD-10-CM

## 2020-06-15 DIAGNOSIS — Z95.1 S/P CABG X 4: ICD-10-CM

## 2020-06-15 DIAGNOSIS — R35.1 BENIGN PROSTATIC HYPERPLASIA WITH NOCTURIA: ICD-10-CM

## 2020-06-15 RX ORDER — PANTOPRAZOLE SODIUM 20 MG/1
TABLET, DELAYED RELEASE ORAL
Qty: 30 TABLET | Refills: 3 | Status: SHIPPED | OUTPATIENT
Start: 2020-06-15 | End: 2020-11-02

## 2020-06-15 RX ORDER — TAMSULOSIN HYDROCHLORIDE 0.4 MG/1
CAPSULE ORAL
Qty: 30 CAPSULE | Refills: 6 | Status: SHIPPED | OUTPATIENT
Start: 2020-06-15 | End: 2021-01-25

## 2020-06-18 DIAGNOSIS — F41.9 ANXIETY: ICD-10-CM

## 2020-06-18 RX ORDER — LORAZEPAM 0.5 MG/1
TABLET ORAL
Qty: 30 TABLET | Refills: 3 | Status: SHIPPED | OUTPATIENT
Start: 2020-06-18 | End: 2020-11-02

## 2020-06-19 ENCOUNTER — TELEPHONE (OUTPATIENT)
Dept: NEPHROLOGY | Facility: CLINIC | Age: 85
End: 2020-06-19

## 2020-06-22 ENCOUNTER — TELEPHONE (OUTPATIENT)
Dept: NEPHROLOGY | Facility: CLINIC | Age: 85
End: 2020-06-22

## 2020-06-23 ENCOUNTER — TELEPHONE (OUTPATIENT)
Dept: NEPHROLOGY | Facility: CLINIC | Age: 85
End: 2020-06-23

## 2020-06-25 ENCOUNTER — TELEPHONE (OUTPATIENT)
Dept: NEPHROLOGY | Facility: CLINIC | Age: 85
End: 2020-06-25

## 2020-06-27 LAB
ALBUMIN SERPL-MCNC: 3.7 G/DL (ref 3.6–5.1)
ALBUMIN/GLOB SERPL: 1.6 (CALC) (ref 1–2.5)
ALP SERPL-CCNC: 74 U/L (ref 35–144)
ALT SERPL-CCNC: 30 U/L (ref 9–46)
AST SERPL-CCNC: 27 U/L (ref 10–35)
BASOPHILS # BLD AUTO: 80 CELLS/UL (ref 0–200)
BASOPHILS NFR BLD AUTO: 1.2 %
BILIRUB SERPL-MCNC: 0.9 MG/DL (ref 0.2–1.2)
BUN SERPL-MCNC: 35 MG/DL (ref 7–25)
BUN/CREAT SERPL: 16 (CALC) (ref 6–22)
CALCIUM SERPL-MCNC: 8.9 MG/DL (ref 8.6–10.3)
CHLORIDE SERPL-SCNC: 108 MMOL/L (ref 98–110)
CO2 SERPL-SCNC: 25 MMOL/L (ref 20–32)
CREAT SERPL-MCNC: 2.15 MG/DL (ref 0.7–1.11)
EOSINOPHIL # BLD AUTO: 610 CELLS/UL (ref 15–500)
EOSINOPHIL NFR BLD AUTO: 9.1 %
ERYTHROCYTE [DISTWIDTH] IN BLOOD BY AUTOMATED COUNT: 12.9 % (ref 11–15)
GLOBULIN SER CALC-MCNC: 2.3 G/DL (CALC) (ref 1.9–3.7)
GLUCOSE SERPL-MCNC: 103 MG/DL (ref 65–99)
HBA1C MFR BLD: 7.1 % OF TOTAL HGB
HCT VFR BLD AUTO: 36.9 % (ref 38.5–50)
HGB BLD-MCNC: 12.3 G/DL (ref 13.2–17.1)
LYMPHOCYTES # BLD AUTO: 1648 CELLS/UL (ref 850–3900)
LYMPHOCYTES NFR BLD AUTO: 24.6 %
MCH RBC QN AUTO: 32.9 PG (ref 27–33)
MCHC RBC AUTO-ENTMCNC: 33.3 G/DL (ref 32–36)
MCV RBC AUTO: 98.7 FL (ref 80–100)
MONOCYTES # BLD AUTO: 509 CELLS/UL (ref 200–950)
MONOCYTES NFR BLD AUTO: 7.6 %
NEUTROPHILS # BLD AUTO: 3853 CELLS/UL (ref 1500–7800)
NEUTROPHILS NFR BLD AUTO: 57.5 %
PLATELET # BLD AUTO: 179 THOUSAND/UL (ref 140–400)
PMV BLD REES-ECKER: 10.9 FL (ref 7.5–12.5)
POTASSIUM SERPL-SCNC: 4.3 MMOL/L (ref 3.5–5.3)
PROT SERPL-MCNC: 6 G/DL (ref 6.1–8.1)
RBC # BLD AUTO: 3.74 MILLION/UL (ref 4.2–5.8)
SL AMB EGFR AFRICAN AMERICAN: 31 ML/MIN/1.73M2
SL AMB EGFR NON AFRICAN AMERICAN: 27 ML/MIN/1.73M2
SODIUM SERPL-SCNC: 143 MMOL/L (ref 135–146)
WBC # BLD AUTO: 6.7 THOUSAND/UL (ref 3.8–10.8)

## 2020-06-29 LAB
ALBUMIN SERPL-MCNC: 3.7 G/DL (ref 3.6–5.1)
APPEARANCE UR: ABNORMAL
BACTERIA UR QL AUTO: ABNORMAL /HPF
BILIRUB UR QL STRIP: NEGATIVE
BUN SERPL-MCNC: 35 MG/DL (ref 7–25)
BUN/CREAT SERPL: 16 (CALC) (ref 6–22)
CALCIUM SERPL-MCNC: 8.9 MG/DL (ref 8.6–10.3)
CALCIUM SERPL-MCNC: 8.9 MG/DL (ref 8.6–10.3)
CHLORIDE SERPL-SCNC: 108 MMOL/L (ref 98–110)
CO2 SERPL-SCNC: 27 MMOL/L (ref 20–32)
COLOR UR: YELLOW
CREAT SERPL-MCNC: 2.15 MG/DL (ref 0.7–1.11)
CREAT UR-MCNC: 102 MG/DL (ref 20–320)
GLUCOSE SERPL-MCNC: 104 MG/DL (ref 65–99)
GLUCOSE UR QL STRIP: NEGATIVE
HGB UR QL STRIP: NEGATIVE
HYALINE CASTS #/AREA URNS LPF: ABNORMAL /LPF
KETONES UR QL STRIP: NEGATIVE
LEUKOCYTE ESTERASE UR QL STRIP: NEGATIVE
MAGNESIUM SERPL-MCNC: 2.3 MG/DL (ref 1.5–2.5)
NITRITE UR QL STRIP: NEGATIVE
PH UR STRIP: 5.5 [PH] (ref 5–8)
PHOSPHATE SERPL-MCNC: 3.8 MG/DL (ref 2.1–4.3)
POTASSIUM SERPL-SCNC: 4.2 MMOL/L (ref 3.5–5.3)
PROT UR QL STRIP: ABNORMAL
PROT UR-MCNC: 121 MG/DL (ref 5–25)
PROT/CREAT UR: 1.19 MG/MG CREAT (ref 0.02–0.13)
PROT/CREAT UR: 1186 MG/G CREAT (ref 22–128)
PTH-INTACT SERPL-MCNC: 66 PG/ML (ref 14–64)
RBC #/AREA URNS HPF: ABNORMAL /HPF
SL AMB EGFR AFRICAN AMERICAN: 31 ML/MIN/1.73M2
SL AMB EGFR NON AFRICAN AMERICAN: 27 ML/MIN/1.73M2
SODIUM SERPL-SCNC: 142 MMOL/L (ref 135–146)
SP GR UR STRIP: 1.02 (ref 1–1.03)
SQUAMOUS #/AREA URNS HPF: ABNORMAL /HPF
URATE SERPL-MCNC: 3.8 MG/DL (ref 4–8)
WBC #/AREA URNS HPF: ABNORMAL /HPF

## 2020-06-30 ENCOUNTER — TELEPHONE (OUTPATIENT)
Dept: FAMILY MEDICINE CLINIC | Facility: HOSPITAL | Age: 85
End: 2020-06-30

## 2020-06-30 DIAGNOSIS — E11.22 TYPE 2 DIABETES MELLITUS WITH STAGE 3 CHRONIC KIDNEY DISEASE, WITHOUT LONG-TERM CURRENT USE OF INSULIN (HCC): Primary | ICD-10-CM

## 2020-06-30 DIAGNOSIS — N18.30 TYPE 2 DIABETES MELLITUS WITH STAGE 3 CHRONIC KIDNEY DISEASE, WITHOUT LONG-TERM CURRENT USE OF INSULIN (HCC): Primary | ICD-10-CM

## 2020-07-01 ENCOUNTER — OFFICE VISIT (OUTPATIENT)
Dept: NEPHROLOGY | Facility: HOSPITAL | Age: 85
End: 2020-07-01
Payer: COMMERCIAL

## 2020-07-01 VITALS
TEMPERATURE: 97.9 F | BODY MASS INDEX: 35.36 KG/M2 | RESPIRATION RATE: 18 BRPM | SYSTOLIC BLOOD PRESSURE: 122 MMHG | HEART RATE: 58 BPM | HEIGHT: 65 IN | WEIGHT: 212.25 LBS | DIASTOLIC BLOOD PRESSURE: 62 MMHG

## 2020-07-01 DIAGNOSIS — R35.1 BENIGN PROSTATIC HYPERPLASIA WITH NOCTURIA: ICD-10-CM

## 2020-07-01 DIAGNOSIS — I10 BENIGN ESSENTIAL HYPERTENSION: ICD-10-CM

## 2020-07-01 DIAGNOSIS — E66.9 CLASS 2 OBESITY WITH BODY MASS INDEX (BMI) OF 35.0 TO 35.9 IN ADULT, UNSPECIFIED OBESITY TYPE, UNSPECIFIED WHETHER SERIOUS COMORBIDITY PRESENT: ICD-10-CM

## 2020-07-01 DIAGNOSIS — N18.30 ANEMIA DUE TO STAGE 3 CHRONIC KIDNEY DISEASE (HCC): Chronic | ICD-10-CM

## 2020-07-01 DIAGNOSIS — E03.9 ACQUIRED HYPOTHYROIDISM: Chronic | ICD-10-CM

## 2020-07-01 DIAGNOSIS — N18.30 TYPE 2 DIABETES MELLITUS WITH STAGE 3 CHRONIC KIDNEY DISEASE, WITHOUT LONG-TERM CURRENT USE OF INSULIN (HCC): ICD-10-CM

## 2020-07-01 DIAGNOSIS — E11.22 TYPE 2 DIABETES MELLITUS WITH STAGE 3 CHRONIC KIDNEY DISEASE, WITHOUT LONG-TERM CURRENT USE OF INSULIN (HCC): ICD-10-CM

## 2020-07-01 DIAGNOSIS — M1A.9XX1 GOUT WITH TOPHUS: ICD-10-CM

## 2020-07-01 DIAGNOSIS — N18.30 CKD STAGE 3 DUE TO TYPE 2 DIABETES MELLITUS (HCC): Primary | ICD-10-CM

## 2020-07-01 DIAGNOSIS — N40.1 BENIGN PROSTATIC HYPERPLASIA WITH NOCTURIA: ICD-10-CM

## 2020-07-01 DIAGNOSIS — R80.1 PERSISTENT PROTEINURIA: ICD-10-CM

## 2020-07-01 DIAGNOSIS — N28.1 RENAL CYST: ICD-10-CM

## 2020-07-01 DIAGNOSIS — I50.32 CHRONIC DIASTOLIC CONGESTIVE HEART FAILURE (HCC): ICD-10-CM

## 2020-07-01 DIAGNOSIS — E11.22 CKD STAGE 3 DUE TO TYPE 2 DIABETES MELLITUS (HCC): Primary | ICD-10-CM

## 2020-07-01 DIAGNOSIS — I25.10 CORONARY ARTERY DISEASE INVOLVING NATIVE CORONARY ARTERY OF NATIVE HEART WITHOUT ANGINA PECTORIS: ICD-10-CM

## 2020-07-01 DIAGNOSIS — D63.1 ANEMIA DUE TO STAGE 3 CHRONIC KIDNEY DISEASE (HCC): Chronic | ICD-10-CM

## 2020-07-01 PROCEDURE — 99214 OFFICE O/P EST MOD 30 MIN: CPT | Performed by: NURSE PRACTITIONER

## 2020-07-01 PROCEDURE — 1036F TOBACCO NON-USER: CPT | Performed by: NURSE PRACTITIONER

## 2020-07-01 PROCEDURE — 3078F DIAST BP <80 MM HG: CPT | Performed by: NURSE PRACTITIONER

## 2020-07-01 PROCEDURE — 3066F NEPHROPATHY DOC TX: CPT | Performed by: NURSE PRACTITIONER

## 2020-07-01 PROCEDURE — 2022F DILAT RTA XM EVC RTNOPTHY: CPT | Performed by: NURSE PRACTITIONER

## 2020-07-01 PROCEDURE — 4040F PNEUMOC VAC/ADMIN/RCVD: CPT | Performed by: NURSE PRACTITIONER

## 2020-07-01 PROCEDURE — 3074F SYST BP LT 130 MM HG: CPT | Performed by: NURSE PRACTITIONER

## 2020-07-01 PROCEDURE — 1160F RVW MEDS BY RX/DR IN RCRD: CPT | Performed by: NURSE PRACTITIONER

## 2020-07-01 PROCEDURE — 3008F BODY MASS INDEX DOCD: CPT | Performed by: NURSE PRACTITIONER

## 2020-07-01 NOTE — PROGRESS NOTES
OFFICE FOLLOW UP - Nephrology   Herminio Gaspar  80 y o  male MRN: [id number]       ASSESSMENT and PLAN:  Diagnoses and all orders for this visit:    CKD stage 3 due to type 2 diabetes mellitus ([address] )    Acquired hypothyroidism    Type 2 diabetes mellitus with stage 3 chronic kidney disease, without long-term current use of insulin (HCC)    Benign essential hypertension    Coronary artery disease involving native coronary artery of native heart without angina pectoris    Chronic diastolic congestive heart failure (HCC)    Anemia due to stage 3 chronic kidney disease (HCC)    Renal cyst    Benign prostatic hyperplasia with nocturia    Gout with tophus    Class 2 obesity with body mass index (BMI) of 35 0 to 35 9 in adult, unspecified obesity type, unspecified whether serious comorbidity present        CKD stage IIIB/IV:  Likely secondary to hypertensive nephrosclerosis, diabetic nephropathy, age-related nephron loss  Follows with Hussein Chopra  Baseline creatinine previously 1 9-2 0  More recently steady around 2 1  Patient has known proteinuria  Most recent UPCR 1 1  Most recent creatinine 2 15  Most recent urinalysis showed 1+ protein, few bacteria, and 1-3 hyaline cast   The patient's most recent CT scan showed bilateral renal cysts, largest at the right lower pole  -continue to avoid nephrotoxins  -advised to increase water intake   -will follow up in 4 months with routine blood work, UA, and UCPR  Blood pressure: The patient's lisinopril was decreased to 2 5 mg daily since 05/18 due to low blood pressure readings  C/O occasionally feeling dizzy with position changes  His blood pressure is acceptable today    -continue lisinopril 2 5 mg po daily, metoprolol 12 5 mg po daily, and torsemide 10 mg po daily    -consider stopping lisinopril and down titration torsemide for increasing creatinine or increased symptoms    -avoid high salt diet   -advised to check blood pressure at home   He does not know how to use his cuff  He was instructed to bring his BP cuff to any  Of his upcoming doctors appointments for instruction on use  Diabetes:  Last hemoglobin A1c was 7 1  Follow with family doctor Dr Sabra Jeronimo   -continue to recommend diabetic diet   -continue insulin as prescribed  Proteinuria: suspected due to diabetes  Last A1C 7 1  Improving but still elevated  instructed to follow diabetic diet    -continues on low dose lisinopril 2 5 mg po daily  -will check UPCR prior to next appointment  CHF:  With EF of 50-55% and grade 2 diastolic dysfunction  Most recent chest x-ray was negative  Bilateral lower extremity duplex negative for DVT  Reports weights around 210 lbs  Open heart surgery in 2019    -check weight daily  Call office for 2-3 lb weight gain in 1 day or 5 lb in 1 week or increased LE edema  BPH:  Denies current urinary symptoms  Follows with urology    -continues on Flomax 0 4 mg po daily  Gout:  Most recent uric acid low at 3 8  Currently not in gout flare-up  -continue to monitor uric acid and signs and symptoms of gout flare-up  -continues on allopurinol 300 mg po daily  Patient will follow up in 4 months  with Dr Jemal Coello  Age related screening: N/A  Your primary caregiver may do yearly screening for colorectal cancer  It is recommended in all men and women over 48years old  You may have screening earlier if you have colon disease or a family history of colorectal cancer  HPI: Swapnil Jalloh  is a 80 y o  male with past medical history of CKD stage IIIB/4, proteinuria, hypothyroidism, open heart in 2019, and diabetes, who is here for routine follow-up for CKD  The patient follows with Mikhail Hall  His baseline creatinine previously 1 9-2 0, more recently around 2 1  He has been maintained on lisinopril for his proteinuria  This was recently decreased on 05/18 to 2 5 mg daily due to low blood pressure    The patient's most recent chest x-ray was negative for volume overload  His last echocardiogram showed ejection fraction of 50-55% with grade 2 diastolic dysfunction  He had a bilateral lower extremity duplex which was negative for DVT  His most recent UA showed 1+ protein, few bacteria, and 1-3 hyaline cast   Most recent blood work shows stable creatinine at 2 1, PTH 66, UPC are 1 1, uric acid low at 3 8, and hemoglobin A1c 8 1  The patient is doing well since his last visit with Jet Christiansen  He denies any recent hospitalizations  He denies any recent illness  He did have cataract surgery with stent placement early this year  He denies use of NSAIDs  He denies chest pain or shortness of breath  He admits to occasionally feeling lightheaded with position changes  He denies any issues with urination  He is taking all of his medications as prescribed  He does have some trace lower extremity edema which he states is worse towards the end of the day  He is following a low-salt diet  ROS:   A complete review of systems was done  Pertinent positives and negatives as noted in the HPI, otherwise the review of systems is negative      Allergies: Amlodipine and Atorvastatin    Medications:   Current Outpatient Medications:     acetaminophen (TYLENOL) 325 mg tablet, 650 mg every 4 to 6 hours as needed for pain (do not take with percocet), Disp: 30 tablet, Rfl: 0    albuterol (PROVENTIL HFA) 90 mcg/act inhaler, Inhale 2 puffs 4 (four) times a day as needed, Disp: , Rfl:     allopurinol (ZYLOPRIM) 300 mg tablet, TAKE ONE TABLET BY MOUTH ONCE DAILY, Disp: 90 tablet, Rfl: 3    ascorbic acid (VITAMIN C) 500 mg tablet, TAKE ONE TABLET BY MOUTH DAILY, Disp: 28 tablet, Rfl: 3    aspirin (ECOTRIN) 325 mg EC tablet, TAKE ONE TABLET BY MOUTH DAILY, Disp: 28 tablet, Rfl: 3    BASAGLAR KWIKPEN 100 units/mL injection pen, Inject 20 Units under the skin daily at bedtime, Disp: 15 mL, Rfl: 3    COMFORT EZ PEN NEEDLES 32G X 4 MM MISC, AS DIRECTED FOUR TIMES DAILY, Disp: 100 each, Rfl: 3    ferrous sulfate 325 (65 Fe) mg tablet, TAKE ONE TABLET BY MOUTH DAILY, Disp: 28 tablet, Rfl: 3    gabapentin (NEURONTIN) 300 mg capsule, Take 1 capsule (300 mg total) by mouth daily at bedtime, Disp: 90 capsule, Rfl: 3    insulin aspart (NovoLOG) 100 units/mL injection, Inject 8 Units under the skin 3 (three) times a day before meals (Patient taking differently: Inject 10 Units under the skin 3 (three) times a day before meals ), Disp: , Rfl: 0    levothyroxine 150 mcg tablet, TAKE ONE TABLET BY MOUTH DAILY, Disp: 30 tablet, Rfl: 0    lisinopril (ZESTRIL) 5 mg tablet, Take 0 5 tablets (2 5 mg total) by mouth daily Decreased dose 5/18/20 due to hypotension, Disp: 30 tablet, Rfl: 5    LORazepam (ATIVAN) 0 5 mg tablet, TAKE ONE TABLET BY MOUTH EVERY EIGHT HOURS AS NEEDED FOR ANXIETY, Disp: 30 tablet, Rfl: 3    metoprolol tartrate (LOPRESSOR) 25 mg tablet, Take 0 5 tablets (12 5 mg total) by mouth daily, Disp: 30 tablet, Rfl: 6    pantoprazole (PROTONIX) 20 mg tablet, TAKE ONE TABLET BY MOUTH DAILY, Disp: 30 tablet, Rfl: 3    polyethylene glycol (MIRALAX) 17 g packet, Take 17 g by mouth daily, Disp: , Rfl:     rosuvastatin (CRESTOR) 40 MG tablet, TAKE ONE TABLET BY MOUTH DAILY, Disp: 30 tablet, Rfl: 6    tamsulosin (FLOMAX) 0 4 mg, TAKE ONE CAPSULE BY MOUTH DAILY WITH dinner, Disp: 30 capsule, Rfl: 6    torsemide (DEMADEX) 10 mg tablet, TAKE ONE TABLET BY MOUTH EVERY DAY, Disp: 30 tablet, Rfl: 5    Past Medical History:   Diagnosis Date    Aortic stenosis     CKD (chronic kidney disease)     baseline Cr 1 3-1 5    Coronary artery disease     Cough     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HCC)     type 2, insulin dependent    GERD (gastroesophageal reflux disease)     Glaucoma     Gout     History of prostate cancer     Hypertension     Hypothyroidism     Overweight     Peripheral neuropathy, idiopathic     Pleural effusion, left     Pure hypercholesterolemia     [place]   11/12/14   R   11/12/14  Visual impairment     cataract left eye    Weight gain      Past Surgical History:   Procedure Laterality Date    CARDIAC CATHETERIZATION      EYE SURGERY      IR THORACENTESIS  10/23/2018    IR THORACENTESIS  11/2/2018    IR THORACENTESIS  11/9/2018    IR THORACENTESIS  11/23/2018    OR CABG, ARTERY-VEIN, THREE N/A 9/17/2018    Procedure: CORONARY ARTERY BYPASS GRAFT (CABG) x 4 VESSELS with LIMA - LAD, SVG/LEFT LEG EVH - LEFT PDA, OM3, & OM2;  Surgeon: Ilya House MD;  [facility]: [facility];  Service: Cardiac Surgery    OR ECHO TRANSESOPHAG MONTR CARDIAC PUMP FUNCTJ N/A 9/17/2018    Procedure: TRANSESOPHAGEAL ECHOCARDIOGRAM (VERONICA); Surgeon: Ilya House MD;  [facility];  Service: Cardiac Surgery    OR RPLCMT AORTIC VALVE OPN W/STENTLESS TISSUE VALVE N/A 9/17/2018    Procedure: REPLACEMENT VALVE AORTIC (AVR)- 23mm tissue Intuity Valve;  Surgeon: Ilya House MD;  [facility];  Service: Cardiac Surgery    THORACOSCOPY VIDEO ASSISTED SURGERY (VATS) Left 11/27/2018    Procedure: THORACOSCOPY VIDEO ASSISTED SURGERY (VATS), talc pleurodesis,;  Surgeon: Nesha Torrez MD;  [facility];  Service: Thoracic    THYROID SURGERY       Family History   Problem Relation Age of Onset    Diabetes Mother     Pancreatic cancer Brother     Diabetes Maternal Grandmother     Colon cancer Son     Diabetes Family     Substance Abuse Neg Hx     Mental illness Neg Hx       reports that he has never smoked  He has never used smokeless tobacco  He reports that he does not drink alcohol or use drugs  Physical Exam:   There were no vitals filed for this visit  There is no height or weight on file to calculate BMI      General: no acute distress   Eyes: conjunctivae pink, anicteric sclerae  ENT: mucous membranes moist  Neck: supple, no JVD  Chest: clear to auscultation bilaterally with no wheezes, rale or rhochi  CVS: regular rate and rhythm   Abdomen: soft, non-tender, non-distended  Extremities:  Trace lower extremity edema   Skin: no rash  Neuro: awake and alert       Lab Results:  Results for orders placed or performed in visit on 06/26/20   PTH, Intact and Calcium   Result Value Ref Range    PTH, Intact 66 (H) 14 - 64 pg/mL    Calcium 8 9 8 6 - 10 3 mg/dL   Magnesium   Result Value Ref Range    Magnesium, Serum 2 3 1 5 - 2 5 mg/dL   Uric acid   Result Value Ref Range    Uric Acid 3 8 (L) 4 0 - 8 0 mg/dL   Renal function panel   Result Value Ref Range    Glucose, Random 104 (H) 65 - 99 mg/dL    BUN 35 (H) 7 - 25 mg/dL    Creatinine 2 15 (H) 0 70 - 1 11 mg/dL    eGFR Non  27 (L) > OR = 60 mL/min/1 73m2    eGFR  31 (L) > OR = 60 mL/min/1 73m2    SL AMB BUN/CREATININE RATIO 16 6 - 22 (calc)    Sodium 142 135 - 146 mmol/L    Potassium 4 2 3 5 - 5 3 mmol/L    Chloride 108 98 - 110 mmol/L    CO2 27 20 - 32 mmol/L    Calcium 8 9 8 6 - 10 3 mg/dL    Phosphorus, Serum 3 8 2 1 - 4 3 mg/dL    Albumin 3 7 3 6 - 5 1 g/dL   Urinalysis with microscopic   Result Value Ref Range    Color UA YELLOW YELLOW    Urine Appearance TURBID (A) CLEAR    Specific Gravity 1 016 1 001 - 1 035    Ph 5 5 5 0 - 8 0    Glucose, Urine NEGATIVE NEGATIVE    Bilirubin, Urine NEGATIVE NEGATIVE    Ketone, Urine NEGATIVE NEGATIVE    Blood, Urine NEGATIVE NEGATIVE    Protein, Urine 1+ (A) NEGATIVE    SL AMB NITRITES URINE, QUAL   NEGATIVE NEGATIVE    Leukocyte Esterase NEGATIVE NEGATIVE    SL AMB WBC, URINE 0-5 < OR = 5 /HPF    RBC, Urine 0-2 < OR = 2 /HPF    Squamous Epithelial Cells 0-5 < OR = 5 /HPF    Bacteria, UA FEW (A) NONE SEEN /HPF    Hyaline Casts 1-3 (A) NONE SEEN /LPF   Protein, Total w/Creat, Random Urine   Result Value Ref Range    Creatinine, Urine 102 20 - 320 mg/dL    Protein/Creat Ratio 1,186 (H) 22 - 128 mg/g creat    Protein/Creat Ratio 1 186 (H) 0 022 - 0 128 mg/mg creat    Total Protein, Urine 121 (H) 5 - 25 mg/dL       Results from last 7 days   Lab Units 06/26/20  0938 06/26/20  0936   WHITE BLOOD CELL COUNT  Thousand/uL  --  6 7   HEMOGLOBIN  g/dL  --  12 3*   HEMATOCRIT  %  --  36 9*   PLATELETS  Thousand/uL  --  179   POTASSIUM mmol/L 4 2 4 3   CHLORIDE mmol/L 108 108   CO2 mmol/L 27 25   BUN mg/dL 35* 35*   CREATININE mg/dL 2 15* 2 15*   CALCIUM mg/dL 8 9  8 9 8 9   MAGNESIUM mg/dL 2 3  --          Portions of the record may have been created with voice recognition software  Occasional wrong word or "sound a like" substitutions may have occurred due to the inherent limitations of voice recognition software  Read the chart carefully and recognize, using context, where substitutions have occurred  If you have any questions, please contact the dictating provider

## 2020-07-01 NOTE — PATIENT INSTRUCTIONS
We are following you here in the office for your chronic kidney disease  Your most recent creatinine was 2 1  This has been very stable  Please continue to avoid any NSAIDs which include Motrin and ibuprofen for pain  It is safe to take Tylenol for pain  We discussed increasing water intake today  We will see you in the office in 4 months with routine blood work and urinalysis 1 week prior to appointment  Your blood pressure is acceptable today in the office  Please bring her blood pressure cuff with you to your next doctor appointment  This way, you can be instructed on how to use this  Please call the office if your experiencing increased dizziness with position changes  Continue to take all of your antihypertensives as prescribed  Please continue to follow a low-salt diet  Continue to follow with Dr Cid for your diabetes  Continue your insulins as ordered  Continue to follow a diabetic diet  Please continue to follow with Dr Marcelino Michelle for your Heart  Please continue to check your weight a couple times per week  Please call the office if you experience 2-3 lb weight gain in 1 day or 5 lb in 1 week  Please call the office for increased lower extremity edema  Thank you       Chronic Kidney Disease, Ambulatory Care   GENERAL INFORMATION:   Chronic kidney disease is the gradual and permanent loss of kidney function  Normally, the kidneys turn fluid, chemicals, and waste from your blood into urine  When you have chronic kidney disease (CKD), your kidneys do not function properly  CKD may worsen over time and lead to kidney failure    Common symptoms include the following:  · Changes in how often you need to urinate    · Swelling in your arms, legs, or feet    · Shortness of breath    · Fatigue or weakness    · Bad or bitter taste in your mouth    · Nausea, vomiting, or loss of appetite  Seek immediate care for the following symptoms:  · Heart is beating faster than normal for you    · Confused and drowsiness    · Seizure    · Sudden chest pain or shortness of breath  Treatment for chronic kidney disease: Medicines may be given to decrease blood pressure and get rid of extra fluid  You may also receive medicine to manage health conditions that may occur with CKD  Dialysis is a treatment to remove chemicals and waste from your blood when your kidneys can no longer do this  Surgery may be needed to create an arteriovenous fistula (AVF) in your arm or insert a catheter into your abdomen so that you can receive dialysis  A kidney transplant may be done if your CKD becomes severe  Manage chronic kidney disease:  · Maintain a healthy weight  Ask your healthcare provider how much you should weigh  Ask him to help you create a weight loss plan if you are overweight  · Exercise 30 to 60 minutes a day, 4 to 7 times a week, or as directed  Ask about the best exercise plan for you  Regular exercise can help you manage CKD, high blood pressure, and diabetes  · Follow your healthcare provider's advice about what to eat and drink  He may tell you to eat food low in sodium (salt), potassium, phosphorus, or protein  You may need to see a dietitian if you need help planning meals  · Limit alcohol  Ask how much alcohol is safe for you to drink  A drink of alcohol is 12 ounces of beer, 5 ounces of wine, or 1½ ounces of liquor  · Do not smoke  Smoking harms your kidneys  If you smoke, it is never too late to quit  Ask for information if you need help quitting  · Ask your healthcare provider if you need vaccines  Infections such as pneumonia, influenza, and hepatitis can be more harmful or more likely to occur when you have CKD  Vaccines reduce your risk of infection with these viruses  Follow up with your healthcare provider as directed: Write down your questions so you remember to ask them during your visits  CARE AGREEMENT:   You have the right to help plan your care   Learn about your health condition and how it may be treated  Discuss treatment options with your caregivers to decide what care you want to receive  You always have the right to refuse treatment  The above information is an  only  It is not intended as medical advice for individual conditions or treatments  Talk to your doctor, nurse or pharmacist before following any medical regimen to see if it is safe and effective for you  © 2014 [address] is for End User's use only and may not be sold, redistributed or otherwise used for commercial purposes  All illustrations and images included in CareNotes® are the copyrighted property of [employer]  or [employer] Mat[employer]

## 2020-07-02 ENCOUNTER — TELEPHONE (OUTPATIENT)
Dept: NEPHROLOGY | Facility: CLINIC | Age: 85
End: 2020-07-02

## 2020-07-02 NOTE — TELEPHONE ENCOUNTER
----- Message from Ann Sanchez DO sent at 7/1/2020  4:22 PM EDT -----  Renal function is relatively stable will continue to monitor no changes to his medication disease he have follow-up scheduled thank you

## 2020-07-06 ENCOUNTER — TELEPHONE (OUTPATIENT)
Dept: NEPHROLOGY | Facility: CLINIC | Age: 85
End: 2020-07-06

## 2020-07-06 NOTE — TELEPHONE ENCOUNTER
----- Message from Narcisa Stout, [address] sent at 7/2/2020 11:04 AM EDT -----      ----- Message -----  From: Melanie Shafer DO  Sent: 7/1/2020   4:22 PM EDT  To: Nephrology [facility]    Renal function is relatively stable will continue to monitor no changes to his medication disease he have follow-up scheduled thank you

## 2020-07-07 NOTE — TELEPHONE ENCOUNTER
Patient called the office I made him aware of Dr Matthew Can note  He had no further questions or concerns at this time

## 2020-07-14 DIAGNOSIS — E03.9 HYPOTHYROIDISM, UNSPECIFIED TYPE: ICD-10-CM

## 2020-07-14 RX ORDER — LEVOTHYROXINE SODIUM 0.15 MG/1
TABLET ORAL
Qty: 30 TABLET | Refills: 0 | Status: SHIPPED | OUTPATIENT
Start: 2020-07-14 | End: 2020-08-11 | Stop reason: SDUPTHER

## 2020-07-31 NOTE — PROGRESS NOTES
Assessment    1  Cough (786 2) (R05)    Plan  Asthma    · Mucinex 600 MG Oral Tablet Extended Release 12 Hour; TAKE 1 TABLET EVERY 12HOURS AS NEEDED FOR CONGESTION  Bronchitis    · * XR CHEST PA & LATERAL; Status:Resulted - Requires Verification,Retrospective ByProtocol Authorization;   Done: 97ESP8685 12:32PM    Discussion/Summary  Discussion Summary:   Xray results reviewed with pt  Teaching done regarding use of mucinex and albuterol inhaler as directed  Medication Side Effects Reviewed: Possible side effects of new medications were reviewed with the patient/guardian today  Understands and agrees with treatment plan: The treatment plan was reviewed with the patient/guardian  The patient/guardian understands and agrees with the treatment plan   Counseling Documentation With Imm: The patient was counseled regarding diagnostic results,-- instructions for management,-- risk factor reductions,-- prognosis,-- patient and family education,-- risks and benefits of treatment options,-- importance of compliance with treatment  total time of encounter was 15 minutes-- and-- 5 minutes was spent counseling  Follow Up Instructions: Follow Up with your Primary Care Provider in 7 days  If your symptoms worsen, go to the nearest [place] [address] Emergency Department  Chief Complaint    1  Cold Symptoms  Chief Complaint Free Text Note Form: Pt states he has a non productive cough for a week  Does feel good or right      History of Present Illness  HPI: 1 week hx of dry cough without sputum   states that he has chest tightness when he takes a deep breath but does not feel sob  denies + history asthma  denies sore throat, headache, fever, chills, ear pain  takes tylenol daily for right knee pain normally  denies tobacco use, has cats in the home denies seasonal allergies  Hospital Based Practices Required Assessment:  Pain Assessment  the patient states they do not have pain   (on a scale of 0 to 10, the patient rates the pain at 0 )  Abuse And Domestic Violence Screen   Yes, the patient is safe at home  -- The patient states no one is hurting them  Depression And Suicide Screen  No, the patient has not had thoughts of hurting themself  No, the patient has not felt depressed in the past 7 days  Prefered Language is  [place]  Primary Language is  English  Readiness To Learn: Receptive  Barriers To Learning: none  Preferred Learning: written-- and-- verbal  Education Completed: disease/condition,-- medications-- and-- treatment/procedure  Teaching Method: written  Person Taught: patient  Evaluation Of Learning: verbalized/demonstrated understanding   Cold Symptoms: Jennifer Romero presents with complaints of no cold symptoms  Associated symptoms include dry cough, but-- no sneezing,-- no nasal congestion,-- no runny nose,-- no post nasal drainage,-- no scratchy throat,-- no sore throat,-- no hoarseness,-- no productive cough,-- no facial pressure,-- no facial pain,-- no headache,-- no plugged ear(s),-- no ear pain,-- no swollen lymph nodes,-- no wheezing,-- no shortness of breath,-- no fatigue,-- no weakness,-- no nausea,-- no vomiting,-- no diarrhea,-- no fever-- and-- no chills  Review of Systems  Focused-Male:  Constitutional: as noted in HPI   ENT: as noted in HPI  Cardiovascular: as noted in HPI  Respiratory: cough, but-- as noted in HPI,-- no shortness of breath,-- no orthopnea,-- no wheezing,-- no shortness of breath during exertion-- and-- no PND  Gastrointestinal: as noted in HPI  Genitourinary: as noted in HPI  Musculoskeletal: as noted in HPI  Integumentary: as noted in HPI  Neurological: as noted in HPI  ROS Reviewed:   ROS reviewed  Active Problems  1  Asthma with status asthmaticus (493 91) (J45 902)   2  Benign essential hypertension (401 1) (I10)   3  Bilateral impacted cerumen (380 4) (H61 23)   4  Bronchitis (490) (J40)   5  Cardiac murmur (785 2) (R01 1)   6   Cervical lymphadenopathy (785 6) (R59 0)   7  Colon cancer screening (V76 51) (Z12 11)   8  Depression screening (V79 0) (Z13 89)   9  Dyslipidemia, goal LDL below 100 (272 4) (E78 5)   10  Encounter for prostate cancer screening (V76 44) (Z12 5)   11  Ganglion cyst (727 43) (M67 40)   12  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)   13  Hypothyroidism (244 9) (E03 9)   14  Knee pain (719 46) (M25 569)   15  LUQ discomfort (789 02) (R10 12)   16  Denied: History of Mental health disorder   16  Need for immunization against influenza (V04 81) (Z23)   18  Need for influenza vaccination (V04 81) (Z23)   19  Need for prophylactic vaccination and inoculation against influenza (V04 81) (Z23)   20  No advance directives (V49 89) (Z78 9)   21  History of No advance directives (V49 89) (Z78 9)   22  Nocturia (788 43) (R35 1)   23  Obesity (278 00) (E66 9)   24  Pure hypercholesterolemia (272 0) (E78 00)   25  Renal cyst (753 10) (N28 1)   26  Screening for depression (V79 0) (Z13 89)   27  Screening for genitourinary condition (V81 6) (Z13 89)   28  Screening for neurological condition (V80 09) (Z13 89)   29  Sexual dysfunction (302 70) (R37)   30  Sinusitis (473 9) (J32 9)   31  Special screening for other neurological conditions (V80 09) (Z13 89)   32  Denied: History of Substance abuse   35  Type 2 diabetes mellitus (250 00) (E11 9)   34  Urge incontinence of urine (788 31) (N39 41)    Past Medical History  1  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)   2  History of GERD (gastroesophageal reflux disease) (530 81) (K21 9)   3  History of Glaucoma (365 9) (H40 9)   4  History of constipation (V12 79) (Z87 19)   5  History of Left ankle pain (719 47) (M25 572)   6  Denied: History of Mental health disorder   7  History of Mild aortic stenosis (424 1) (I35 0)   8  History of Numbness (782 0) (R20 0)   9  History of Overweight (278 02) (E66 3)   10  History of Peripheral neuropathy, idiopathic (356 9) (G60 9)   11  History of Prostate cancer (185) (C61)   12  History of Pure hypercholesterolemia (272 0) (E78 00)   13  History of Screening for genitourinary condition (V81 6) (Z13 89)   14  Denied: History of Substance abuse   15  History of Tracheobronchitis (490) (J40)  Active Problems And Past Medical History Reviewed: The active problems and past medical history were reviewed and updated today  Family History  Mother    1  Family history of diabetes mellitus (V18 0) (Z83 3)   2  No family history of mental disorder   3  Denied: Family history of Substance abuse in family  Father    4  No family history of mental disorder   5  Denied: Family history of Substance abuse in family  Son    6  Family history of colon cancer (V16 0) (Z80 0)  Brother    7  Family history of pancreatic cancer (V16 0) (Z80 0)  Maternal Grandmother    8  Family history of diabetes mellitus (V18 0) (Z83 3)  Family History    9  Family history of Diabetes mellitus  Family History Reviewed: The family history was reviewed and updated today  Social History   · Caffeine use (V49 89) (F15 90)   · Cultural background   ·    · Lives with family   · Living Situation: Supportive and safe   · Never a smoker   · No advance directives (V49 89) (Z78 9)   · History of No advance directives (V49 89) (Z78 9)   · No drug use   · Non drinker / no alcohol use   · Primary spoken language English   · Racial background  Social History Reviewed: The social history was reviewed and updated today  The social history was reviewed and is unchanged  Surgical History    1  History of Thyroid Surgery  Surgical History Reviewed: The surgical history was reviewed and updated today  Current Meds   1  Allopurinol 300 MG Oral Tablet; TAKE ONE TABLET BY MOUTH ONCE DAILY; Therapy: 51WNS3859 to ([id number])  Requested for: 82NCV5248; Last Rx:2017 Ordered   2  Aspirin EC 81 MG Oral Tablet Delayed Release; TAKE 1 TABLET DAILY; Therapy: (Recorded:2014) to Recorded   3   BD Pen Needle Mini U/F 31G X 5 MM Miscellaneous; USE ONE DAILY AS DIRECTED; Therapy: 89BJX3649 to (Evaluate:01Apr2018)  Requested for: 89Ozc2551; Last Rx:06Apr2017 Ordered   4  DilTIAZem HCl  MG Oral Capsule Extended Release 24 Hour; TAKE 1 CAPSULE DAILY  Requested for: 99Cmf3699; Last Rx:21Aug2017 Ordered   5  DilTIAZem HCl ER Coated Beads 180 MG Oral Capsule Extended Release 24 Hour; TAKE ONE CAPSULE BY MOUTH EVERY DAY; Therapy: 87BXN4017 to (Gail Carmichael)  Requested for: 79MJY0720; Last Rx:12Oct2017 Ordered   6  Gabapentin 300 MG Oral Capsule; TAKE ONE CAPSULE BY MOUTH AT BEDTIME; Therapy: 99RPI9051 to (Evaluate:30Gys9529)  Requested for: 22Qpr8652; Last Rx:21Aug2017 Ordered   7  Lantus SoloStar 100 UNIT/ML Subcutaneous Solution Pen-injector; INJECT 54 UNIT Bedtime; Therapy: 19LTV6394 to (Willodean Speaker)  Requested for: 98PWC1151; Last Rx:15Far0447 Ordered   8  Levothyroxine Sodium 175 MCG Oral Tablet; TAKE 1 TABLET DAILY; Therapy: 35TLO5587 to (Evaluate:58Bbk0631)  Requested for: 57Ile4889; Last Rx:13Sep2017 Ordered   9  Losartan Potassium-HCTZ 100-25 MG Oral Tablet; take 1 tablet by mouth every day; Therapy: 14GUQ0634 to (Evaluate:59Iqg4171)  Requested for: 99Pjj6429; Last Rx:21Aug2017 Ordered   10  Meloxicam 15 MG Oral Tablet; take 1 tablet daily with food; Therapy: 37FBK8545 to (Evaluate:13Oct2017)  Requested for: 75STU5812; Last  Rx:03Oct2017 Ordered   11  MetFORMIN HCl - 500 MG Oral Tablet; TAKE TWO TABLETS BY MOUTH IN THE AM AND 2  IN THE PM;  Therapy: 20FLA3399 to (Evaluate:99Hxw4201)  Requested for: 44ETY6363; Last  Rx:13Mar2017 Ordered   12  Niacin 500 MG Oral Tablet; TAKE 1 TABLET DAILY AS DIRECTED; Therapy: (Mini Carrillo) to Recorded   13  Proventil  (90 Base) MCG/ACT Inhalation Aerosol Solution; INHALE 2 PUFFS 4  times daily PRN; Therapy: (Mini Carrillo) to Recorded   14  Tamsulosin HCl - 0 4 MG Oral Capsule; TAKE 1 CAPSULE Bedtime;   Therapy: 16GLS0786 to (Evaluate:03Mpu8295) Requested for: 22LSA5774; Last  Rx:05Bhd0154 Ordered  Medication List Reviewed: The medication list was reviewed and updated today  Allergies  1  Lipitor TABS   2  Norvasc TABS    3  Grass    Vitals  Signs   Recorded: 91OVC6065 12:08PM   Temperature: 97 6 F  Heart Rate: 80  Respiration: 16  Systolic: 935  Diastolic: 76  Height: 5 ft 6 in  Weight: 228 lb   BMI Calculated: 36 8  BSA Calculated: 2 11  O2 Saturation: 96  Pain Scale: 0    Physical Exam   Constitutional  General appearance: No acute distress, well appearing and well nourished  Eyes  Conjunctiva and lids: No swelling, erythema, or discharge  Pupils and irises: Equal, round and reactive to light  Ears, Nose, Mouth, and Throat  External inspection of ears and nose: Normal    Otoscopic examination: Tympanic membrance translucent with normal light reflex  Canals patent without erythema  Nasal mucosa, septum, and turbinates: Normal without edema or erythema  Oropharynx: Normal with no erythema, edema, exudate or lesions  Pulmonary  Respiratory effort: No increased work of breathing or signs of respiratory distress  Auscultation of lungs: Clear to auscultation  Cardiovascular  Auscultation of heart: Normal rate and rhythm, normal S1 and S2, without murmurs  Examination of extremities for edema and/or varicosities: Abnormal   bilateral ankle 1+ pitting edema  Lymphatic  Palpation of lymph nodes in neck: No lymphadenopathy  Musculoskeletal  Gait and station: Normal    Digits and nails: Normal without clubbing or cyanosis  Skin  Skin and subcutaneous tissue: Normal without rashes or lesions  Neurologic  Cranial nerves: Cranial nerves 2-12 intact     Psychiatric  Orientation to person, place and time: Normal    Mood and affect: Normal        Results/Data  * XR CHEST PA & LATERAL 27BHH2646 12:32PM [facility] [id number]     Test Name Result Flag Reference   XR CHEST PA & LATERAL (Report)       CHEST 2 View INDICATION: Cough and congestion for a week   COMPARISON: 2018   VIEWS: PA and lateral projections; 2 images   FINDINGS:      Heart shadow is enlarged but stable from prior exam    The lungs are clear  No pneumothorax or pleural effusion  Visualized osseous structures appear within normal limits for the patient's age  IMPRESSION:   Stable mild cardiomegaly  No active pulmonary disease  Workstation performed: [id number]   Signed by:   Vanessa Evans MD  11/10/17                               Future Appointments    Date/Time Provider Specialty Site   2017 10:45 AM Josephine Queen DO Internal Medicine [facility] INTERNAL MED   2017 02:00 PM [facility] Urology [address]       Signatures   Electronically signed by : Costa Denise; Nov 10 2017  1:36PM EST                       (Author)    Electronically signed by : Mamta Villalobos DO; Nov 10 2017  3:45PM EST                       (Co-author) No

## 2020-08-10 DIAGNOSIS — R60.9 EDEMA, UNSPECIFIED TYPE: ICD-10-CM

## 2020-08-10 DIAGNOSIS — E03.9 HYPOTHYROIDISM, UNSPECIFIED TYPE: ICD-10-CM

## 2020-08-10 RX ORDER — LEVOTHYROXINE SODIUM 0.15 MG/1
TABLET ORAL
Qty: 30 TABLET | Refills: 0 | OUTPATIENT
Start: 2020-08-10

## 2020-08-11 RX ORDER — LEVOTHYROXINE SODIUM 0.15 MG/1
150 TABLET ORAL DAILY
Qty: 30 TABLET | Refills: 5 | Status: SHIPPED | OUTPATIENT
Start: 2020-08-11 | End: 2021-01-26 | Stop reason: SDUPTHER

## 2020-08-15 RX ORDER — TORSEMIDE 10 MG/1
TABLET ORAL
Qty: 30 TABLET | Refills: 5 | Status: SHIPPED | OUTPATIENT
Start: 2020-08-15 | End: 2021-01-25

## 2020-09-03 ENCOUNTER — TELEPHONE (OUTPATIENT)
Dept: NEPHROLOGY | Facility: CLINIC | Age: 85
End: 2020-09-03

## 2020-09-03 DIAGNOSIS — E11.22 CKD STAGE 3 DUE TO TYPE 2 DIABETES MELLITUS (HCC): Primary | ICD-10-CM

## 2020-09-03 DIAGNOSIS — I10 BENIGN ESSENTIAL HYPERTENSION: ICD-10-CM

## 2020-09-03 DIAGNOSIS — N18.30 CKD STAGE 3 DUE TO TYPE 2 DIABETES MELLITUS (HCC): Primary | ICD-10-CM

## 2020-09-03 NOTE — TELEPHONE ENCOUNTER
----- Message from Michele Vega DO sent at 9/2/2020  2:40 PM EDT -----  Patient stated he unaware of tele health visit today  He feels well otherwise  Does not have a smart phone or computer, spoke to him today and he would rather do an in-person visit  Please have him obtain a CBC, renal function panel, magnesium, PTH, urinalysis, urine protein to creatinine ratio from [facility] and please schedule him to see me in [facility] office later this month  The telehealth visit was cancelled   Thank you

## 2020-09-03 NOTE — TELEPHONE ENCOUNTER
Patient was rescheduled for his follow up visit with Dr Deny Garcia  I have mailed the patient his lab slips

## 2020-09-25 RX ORDER — TIMOLOL MALEATE 5 MG/ML
1 SOLUTION/ DROPS OPHTHALMIC 2 TIMES DAILY
COMMUNITY
Start: 2020-08-18 | End: 2022-02-28

## 2020-09-26 LAB
BUN SERPL-MCNC: 35 MG/DL (ref 7–25)
BUN/CREAT SERPL: 16 (CALC) (ref 6–22)
CALCIUM SERPL-MCNC: 9.4 MG/DL (ref 8.6–10.3)
CHLORIDE SERPL-SCNC: 106 MMOL/L (ref 98–110)
CO2 SERPL-SCNC: 29 MMOL/L (ref 20–32)
CREAT SERPL-MCNC: 2.23 MG/DL (ref 0.7–1.11)
EST. AVERAGE GLUCOSE BLD GHB EST-MCNC: 166 (CALC)
EST. AVERAGE GLUCOSE BLD GHB EST-SCNC: 9.2 (CALC)
GLUCOSE SERPL-MCNC: 136 MG/DL (ref 65–99)
HBA1C MFR BLD: 7.4 % OF TOTAL HGB
POTASSIUM SERPL-SCNC: 4.6 MMOL/L (ref 3.5–5.3)
SL AMB EGFR AFRICAN AMERICAN: 30 ML/MIN/1.73M2
SL AMB EGFR NON AFRICAN AMERICAN: 26 ML/MIN/1.73M2
SODIUM SERPL-SCNC: 141 MMOL/L (ref 135–146)

## 2020-09-28 ENCOUNTER — TELEPHONE (OUTPATIENT)
Dept: NEPHROLOGY | Facility: CLINIC | Age: 85
End: 2020-09-28

## 2020-09-28 LAB
ALBUMIN SERPL-MCNC: 3.8 G/DL (ref 3.6–5.1)
APPEARANCE UR: ABNORMAL
BACTERIA UR QL AUTO: ABNORMAL /HPF
BILIRUB UR QL STRIP: NEGATIVE
BUN SERPL-MCNC: 34 MG/DL (ref 7–25)
BUN/CREAT SERPL: 15 (CALC) (ref 6–22)
CALCIUM SERPL-MCNC: 9.2 MG/DL (ref 8.6–10.3)
CALCIUM SERPL-MCNC: 9.2 MG/DL (ref 8.6–10.3)
CHLORIDE SERPL-SCNC: 106 MMOL/L (ref 98–110)
CO2 SERPL-SCNC: 29 MMOL/L (ref 20–32)
COLOR UR: YELLOW
CREAT SERPL-MCNC: 2.2 MG/DL (ref 0.7–1.11)
CREAT UR-MCNC: 127 MG/DL (ref 20–320)
GLUCOSE SERPL-MCNC: 134 MG/DL (ref 65–99)
GLUCOSE UR QL STRIP: NEGATIVE
GRAN CASTS #/AREA URNS LPF: ABNORMAL /LPF
HGB UR QL STRIP: ABNORMAL
HYALINE CASTS #/AREA URNS LPF: ABNORMAL /LPF
KETONES UR QL STRIP: NEGATIVE
LEUKOCYTE ESTERASE UR QL STRIP: NEGATIVE
MAGNESIUM SERPL-MCNC: 2.5 MG/DL (ref 1.5–2.5)
NITRITE UR QL STRIP: NEGATIVE
PH UR STRIP: 5.5 [PH] (ref 5–8)
PHOSPHATE SERPL-MCNC: 3.6 MG/DL (ref 2.1–4.3)
POTASSIUM SERPL-SCNC: 4.6 MMOL/L (ref 3.5–5.3)
PROT UR QL STRIP: ABNORMAL
PROT UR-MCNC: 191 MG/DL (ref 5–25)
PROT/CREAT UR: 1.5 MG/MG CREAT (ref 0.02–0.13)
PROT/CREAT UR: 1504 MG/G CREAT (ref 22–128)
PTH-INTACT SERPL-MCNC: 54 PG/ML (ref 14–64)
RBC #/AREA URNS HPF: ABNORMAL /HPF
SL AMB EGFR AFRICAN AMERICAN: 31 ML/MIN/1.73M2
SL AMB EGFR NON AFRICAN AMERICAN: 26 ML/MIN/1.73M2
SODIUM SERPL-SCNC: 141 MMOL/L (ref 135–146)
SP GR UR STRIP: 1.02 (ref 1–1.03)
SQUAMOUS #/AREA URNS HPF: ABNORMAL /HPF
WBC #/AREA URNS HPF: ABNORMAL /HPF

## 2020-09-28 NOTE — TELEPHONE ENCOUNTER
----- Message from Juan Pablo Pride sent at 9/28/2020  1:24 PM EDT -----  Please call patient and let him know that his creatinine remains stable  We will see him in October with his follow-up appointment with Miranda Tobias   Thanks  ----- Message -----  From: Dariusz Sloan Lab Results In  Sent: 9/26/2020   1:01 AM EDT  To: Juan Pablo Pride

## 2020-09-28 NOTE — TELEPHONE ENCOUNTER
I left a message for the patient to call back in regards to his recent lab test results          Vickie Thompson MA

## 2020-09-29 ENCOUNTER — OFFICE VISIT (OUTPATIENT)
Dept: FAMILY MEDICINE CLINIC | Facility: HOSPITAL | Age: 85
End: 2020-09-29
Payer: COMMERCIAL

## 2020-09-29 VITALS
SYSTOLIC BLOOD PRESSURE: 116 MMHG | HEIGHT: 65 IN | HEART RATE: 55 BPM | DIASTOLIC BLOOD PRESSURE: 68 MMHG | OXYGEN SATURATION: 97 % | WEIGHT: 215.8 LBS | TEMPERATURE: 97.4 F | BODY MASS INDEX: 35.96 KG/M2

## 2020-09-29 DIAGNOSIS — E11.22 TYPE 2 DIABETES MELLITUS WITH STAGE 3 CHRONIC KIDNEY DISEASE, WITHOUT LONG-TERM CURRENT USE OF INSULIN (HCC): ICD-10-CM

## 2020-09-29 DIAGNOSIS — Z95.2 S/P AVR (AORTIC VALVE REPLACEMENT): ICD-10-CM

## 2020-09-29 DIAGNOSIS — Z23 NEED FOR INFLUENZA VACCINATION: ICD-10-CM

## 2020-09-29 DIAGNOSIS — Z00.00 MEDICARE ANNUAL WELLNESS VISIT, SUBSEQUENT: Primary | ICD-10-CM

## 2020-09-29 DIAGNOSIS — N18.30 TYPE 2 DIABETES MELLITUS WITH STAGE 3 CHRONIC KIDNEY DISEASE, WITHOUT LONG-TERM CURRENT USE OF INSULIN (HCC): ICD-10-CM

## 2020-09-29 DIAGNOSIS — I10 BENIGN ESSENTIAL HYPERTENSION: ICD-10-CM

## 2020-09-29 PROCEDURE — 90662 IIV NO PRSV INCREASED AG IM: CPT

## 2020-09-29 PROCEDURE — 3078F DIAST BP <80 MM HG: CPT | Performed by: INTERNAL MEDICINE

## 2020-09-29 PROCEDURE — 1170F FXNL STATUS ASSESSED: CPT | Performed by: INTERNAL MEDICINE

## 2020-09-29 PROCEDURE — 1036F TOBACCO NON-USER: CPT | Performed by: INTERNAL MEDICINE

## 2020-09-29 PROCEDURE — 1125F AMNT PAIN NOTED PAIN PRSNT: CPT | Performed by: INTERNAL MEDICINE

## 2020-09-29 PROCEDURE — 1160F RVW MEDS BY RX/DR IN RCRD: CPT | Performed by: INTERNAL MEDICINE

## 2020-09-29 PROCEDURE — G0008 ADMIN INFLUENZA VIRUS VAC: HCPCS

## 2020-09-29 PROCEDURE — G0439 PPPS, SUBSEQ VISIT: HCPCS | Performed by: INTERNAL MEDICINE

## 2020-09-29 PROCEDURE — 3725F SCREEN DEPRESSION PERFORMED: CPT | Performed by: INTERNAL MEDICINE

## 2020-09-29 NOTE — PROGRESS NOTES
Assessment and Plan:     Problem List Items Addressed This Visit        Endocrine    Type 2 diabetes mellitus with kidney complication, without long-term current use of insulin ([facility] [address] )    Relevant Orders    Hemoglobin A1C       Cardiovascular and Mediastinum    Benign essential hypertension    Relevant Orders    CBC and differential    Comprehensive metabolic panel    Lipid Panel with Direct LDL reflex       Other    S/P AVR (aortic valve replacement)      Other Visit Diagnoses     Medicare annual wellness visit, subsequent    -  Primary    Need for influenza vaccination        Relevant Orders    influenza vaccine, high-dose, PF 0 7 mL (FLUZONE HIGH-DOSE) (Completed)        BMI Counseling: Body mass index is 35 91 kg/m²  The BMI is above normal  Nutrition recommendations include encouraging healthy choices of fruits and vegetables and moderation in carbohydrate intake  Exercise recommendations include exercising 3-5 times per week  No pharmacotherapy was ordered  Walking by [employer] health issues were discussed with patient, and age appropriate screening tests were ordered as noted in patient's After Visit Summary  Personalized health advice and appropriate referrals for health education or preventive services given if needed, as noted in patient's After Visit Summary  History of Present Illness:     Patient presents for Welcome to Medicare visit  Patient Care Team:  Lalo Ponce DO as PCP - General  Seymour Mishra MD     Review of Systems:     Review of Systems   Constitutional: Negative for activity change  HENT: Negative for congestion  Respiratory: Negative for cough and shortness of breath  Cardiovascular:        Chronic murumr   Gastrointestinal: Negative for abdominal distention and abdominal pain  Musculoskeletal: Negative for back pain  All other systems reviewed and are negative       Problem List:     Patient Active Problem List   Diagnosis    Benign prostatic hyperplasia with nocturia    Benign essential hypertension    Gout with tophus    Hypothyroidism    Obesity    Pure hypercholesterolemia    Renal cyst    Sexual dysfunction    Nonrheumatic aortic valve stenosis    GERD (gastroesophageal reflux disease)    S/P CABG x 4    S/P AVR (aortic valve replacement)    CAD (coronary artery disease)    Aortic stenosis    Anemia due to stage 3 chronic kidney disease (HCC)    Ambulatory dysfunction    Type 2 diabetes mellitus with kidney complication, without long-term current use of insulin ([facility] [address] )    Primary open angle glaucoma (POAG)    Age-related cataract of both eyes    Atherosclerosis of aorta (HCC)    Chronic diastolic congestive heart failure (HCC)    Stable angina ([facility] [address] )    CKD stage 3 due to type 2 diabetes mellitus ([facility] [address] )      Past Medical and Surgical History:     Past Medical History:   Diagnosis Date    Aortic stenosis     CKD (chronic kidney disease)     baseline Cr 1 3-1 5    Coronary artery disease     Cough     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HCC)     type 2, insulin dependent    GERD (gastroesophageal reflux disease)     Glaucoma     Gout     History of prostate cancer     Hypertension     Hypothyroidism     Overweight     Peripheral neuropathy, idiopathic     Pleural effusion, left     Pure hypercholesterolemia     LA   11/12/14   R   11/12/14     Visual impairment     cataract left eye    Weight gain      Past Surgical History:   Procedure Laterality Date    CARDIAC CATHETERIZATION      EYE SURGERY      IR THORACENTESIS  10/23/2018    IR THORACENTESIS  11/2/2018    IR THORACENTESIS  11/9/2018    IR THORACENTESIS  11/23/2018    OR CABG, ARTERY-VEIN, THREE N/A 9/17/2018    Procedure: CORONARY ARTERY BYPASS GRAFT (CABG) x 4 VESSELS with LIMA - LAD, SVG/LEFT LEG EVH - LEFT PDA, OM3, & OM2;  Surgeon: Radha Quintana MD;  [facility];  Service: Cardiac Surgery    OR ECHO TRANSESOPHAG MONTR CARDIAC PUMP FUNCTJ N/A 9/17/2018 Procedure: TRANSESOPHAGEAL ECHOCARDIOGRAM (VERONICA); Surgeon: Ameena Yates MD;  [facility];  Service: Cardiac Surgery    W[place] RPLCMT AORTIC VALVE OPN W/STENTLESS TISSUE VALVE N/A 2018    Procedure: REPLACEMENT VALVE AORTIC (AVR)- 23mm tissue Intuity Valve;  Surgeon: Ameena Yates MD;  [facility];  Service: Cardiac Surgery    THORACOSCOPY VIDEO ASSISTED SURGERY (VATS) Left 2018    Procedure: THORACOSCOPY VIDEO ASSISTED SURGERY (VATS), talc pleurodesis,;  Surgeon: Miesha Martinez MD;  [facility];  Service: Thoracic    THYROID SURGERY        Family History:     Family History   Problem Relation Age of Onset    Diabetes Mother     Pancreatic cancer Brother     Diabetes Maternal Grandmother     Colon cancer Son     Diabetes Family     Substance Abuse Neg Hx     Mental illness Neg Hx       Social History:     E-Cigarette/Vaping    E-Cigarette Use Never User      E-Cigarette/Vaping Substances    Nicotine No     THC No     CBD No     Flavoring No     Other No     Unknown No      Social History     Socioeconomic History    Marital status:      Spouse name: None    Number of children: None    Years of education: None    Highest education level: None   Occupational History    Occupation: retired    Social Needs    Financial resource strain: None    Food insecurity     Worry: None     Inability: None    Transportation needs     Medical: No     Non-medical: No   Tobacco Use    Smoking status: Former Smoker     Packs/day: 0 25     Years: 1 00     Pack years: 0 25     Types: Cigarettes     Last attempt to quit: 1970     Years since quittin 7    Smokeless tobacco: Never Used    Tobacco comment: Only in the service    Substance and Sexual Activity    Alcohol use: No    Drug use: No    Sexual activity: Not Currently   Lifestyle    Physical activity     Days per week: 3 days     Minutes per session: 20 min    Stress:  To some extent Relationships    Social connections     Talks on phone: None     Gets together: None     Attends [employer] service: None     Active member of club or organization: None     Attends meetings of clubs or organizations: None     Relationship status: None    Intimate partner violence     Fear of current or ex partner: None     Emotionally abused: None     Physically abused: None     Forced sexual activity: None   Other Topics Concern    None   Social History Narrative    Caffeine use / coffee diet cola and tea    Lives with family     Living situation supportive and safe    No advance directives  -denied      Medications and Allergies:     Current Outpatient Medications   Medication Sig Dispense Refill    acetaminophen (TYLENOL) 325 mg tablet 650 mg every 4 to 6 hours as needed for pain (do not take with percocet) 30 tablet 0    albuterol (PROVENTIL HFA) 90 mcg/act inhaler Inhale 2 puffs 4 (four) times a day as needed      allopurinol (ZYLOPRIM) 300 mg tablet TAKE ONE TABLET BY MOUTH ONCE DAILY 90 tablet 3    ascorbic acid (VITAMIN C) 500 mg tablet TAKE ONE TABLET BY MOUTH DAILY 28 tablet 3    aspirin (ECOTRIN) 325 mg EC tablet TAKE ONE TABLET BY MOUTH DAILY 28 tablet 3    BASAGLAR KWIKPEN 100 units/mL injection pen Inject 20 Units under the skin daily at bedtime 15 mL 3    COMFORT EZ PEN NEEDLES 32G X 4 MM MISC AS DIRECTED FOUR TIMES DAILY 100 each 3    ferrous sulfate 325 (65 Fe) mg tablet TAKE ONE TABLET BY MOUTH DAILY 28 tablet 3    gabapentin (NEURONTIN) 300 mg capsule Take 1 capsule (300 mg total) by mouth daily at bedtime 90 capsule 3    insulin aspart (NovoLOG) 100 units/mL injection Inject 8 Units under the skin 3 (three) times a day before meals (Patient taking differently: Inject 10 Units under the skin 3 (three) times a day before meals )  0    levothyroxine 150 mcg tablet Take 1 tablet (150 mcg total) by mouth daily 30 tablet 5    lisinopril (ZESTRIL) 5 mg tablet Take 0 5 tablets (2 5 mg total) by mouth daily Decreased dose 5/18/20 due to hypotension 30 tablet 5    LORazepam (ATIVAN) 0 5 mg tablet TAKE ONE TABLET BY MOUTH EVERY EIGHT HOURS AS NEEDED FOR ANXIETY 30 tablet 3    metoprolol tartrate (LOPRESSOR) 25 mg tablet Take 0 5 tablets (12 5 mg total) by mouth daily 30 tablet 6    pantoprazole (PROTONIX) 20 mg tablet TAKE ONE TABLET BY MOUTH DAILY 30 tablet 3    polyethylene glycol (MIRALAX) 17 g packet Take 17 g by mouth daily      rosuvastatin (CRESTOR) 40 MG tablet TAKE ONE TABLET BY MOUTH DAILY 30 tablet 6    tamsulosin (FLOMAX) 0 4 mg TAKE ONE CAPSULE BY MOUTH DAILY WITH dinner 30 capsule 6    timolol (TIMOPTIC) 0 5 % ophthalmic solution Instill 1 drop into both eyes every morning      torsemide (DEMADEX) 10 mg tablet TAKE ONE TABLET BY MOUTH EVERY DAY 30 tablet 5     No current facility-administered medications for this visit  Allergies   Allergen Reactions    Amlodipine Nausea Only    Atorvastatin Myalgia      Immunizations:     Immunization History   Administered Date(s) Administered    INFLUENZA 11/12/2014, 09/15/2015, 09/30/2015, 09/21/2016, 11/16/2018    Influenza Split High Dose Preservative Free IM 09/15/2015, 09/30/2015, 09/21/2016, 08/21/2017    Influenza TIV (IM) 10/15/2013, 11/01/2014    Influenza, high dose seasonal 0 7 mL 11/16/2018, 11/08/2019, 09/29/2020    Pneumococcal Conjugate 13-Valent 01/09/2018    Pneumococcal Polysaccharide PPV23 01/01/2007    TD (adult) Preservative Free 05/23/2013    Td (adult), adsorbed 01/01/2000    Zoster 11/01/2010      Health Maintenance: There are no preventive care reminders to display for this patient  Topic Date Due    DTaP,Tdap,and Td Vaccines (1 - Tdap) 04/11/1956    Influenza Vaccine  07/01/2020      Medicare Screening Tests and Risk Assessments:     Sandra Cabrera is here for his Subsequent Wellness visit  Health Risk Assessment:   Patient rates overall health as fair   Patient feels that their physical health rating is same  Eyesight was rated as same  Hearing was rated as same  Patient feels that their emotional and mental health rating is same  Pain experienced in the last 7 days has been some  Patient's pain rating has been 5/10  Patient states that he has experienced no weight loss or gain in last 6 months  Depression Screening:   PHQ-2 Score: 0      Fall Risk Screening: In the past year, patient has experienced: no history of falling in past year      Home Safety:  Patient does not have trouble with stairs inside or outside of their home  Patient has working smoke alarms and has no working carbon monoxide detector  Home safety hazards include: none  Nutrition:   Current diet is Diabetic, Regular and Limited junk food  Medications:   Patient is currently taking over-the-counter supplements  OTC medications include: see medication list  Patient is able to manage medications  Activities of Daily Living (ADLs)/Instrumental Activities of Daily Living (IADLs):   Walk and transfer into and out of bed and chair?: Yes  Dress and groom yourself?: Yes    Bathe or shower yourself?: Yes    Feed yourself? Yes  Do your laundry/housekeeping?: Yes  Manage your money, pay your bills and track your expenses?: Yes  Make your own meals?: Yes    Do your own shopping?: Yes    ADL comments: Gets Meals on Wheels    Previous Hospitalizations:   Any hospitalizations or ED visits within the last 12 months?: No      Advance Care Planning:   Living will: Yes    Durable POA for healthcare:  Yes    Advanced directive: Yes    Advanced directive counseling given: Yes      Comments: Johnny Ortiz would be medical poa   full code for now but if no hope for recoverywould not want long term support    Cognitive Screening:   Provider or family/friend/caregiver concerned regarding cognition?: No    PREVENTIVE SCREENINGS      Cardiovascular Screening:    General: Screening Not Indicated and History Lipid Disorder      Diabetes Screening: General: Screening Not Indicated and History Diabetes      Colorectal Cancer Screening:     General: Screening Not Indicated      Prostate Cancer Screening:    General: History Prostate Cancer and Screening Not Indicated      Abdominal Aortic Aneurysm (AAA) Screening:    Risk factors include: tobacco use        Lung Cancer Screening:     General: Screening Not Indicated    No exam data present     Physical Exam:     /68   Pulse 55   Temp (!) 97 4 °F (36 3 °C) (Tympanic)   Ht 5' 5" (1 651 m)   Wt 97 9 kg (215 lb 12 8 oz)   SpO2 97%   BMI 35 91 kg/m²     Physical Exam  Vitals signs and nursing note reviewed  HENT:      Head: Normocephalic and atraumatic  Nose: No congestion  Cardiovascular:      Rate and Rhythm: Normal rate and regular rhythm  Heart sounds: Murmur present  Comments: Systolic murmur  Pulmonary:      Effort: Pulmonary effort is normal  No respiratory distress  Breath sounds: Normal breath sounds  No wheezing  Abdominal:      General: Abdomen is flat  There is no distension  Tenderness: There is no abdominal tenderness  Musculoskeletal:      Comments: Some right knee pain- mows own grass and does have some tenderness   Neurological:      Mental Status: He is alert  Psychiatric:         Mood and Affect: Mood normal          Behavior: Behavior normal          Thought Content:  Thought content normal          Judgment: Judgment normal           Jose Briceño DO

## 2020-09-29 NOTE — TELEPHONE ENCOUNTER
I left a detailed message for the patient letting him know his lab test results and to call back if he has any questions or concerns          Eleni Wells MA

## 2020-09-29 NOTE — PATIENT INSTRUCTIONS

## 2020-09-30 ENCOUNTER — TELEPHONE (OUTPATIENT)
Dept: ADMINISTRATIVE | Facility: OTHER | Age: 85
End: 2020-09-30

## 2020-10-02 DIAGNOSIS — N18.30 TYPE 2 DIABETES MELLITUS WITH STAGE 3 CHRONIC KIDNEY DISEASE, WITHOUT LONG-TERM CURRENT USE OF INSULIN, UNSPECIFIED WHETHER STAGE 3A OR 3B CKD (HCC): Primary | ICD-10-CM

## 2020-10-02 DIAGNOSIS — E11.22 TYPE 2 DIABETES MELLITUS WITH STAGE 3 CHRONIC KIDNEY DISEASE, WITHOUT LONG-TERM CURRENT USE OF INSULIN, UNSPECIFIED WHETHER STAGE 3A OR 3B CKD (HCC): Primary | ICD-10-CM

## 2020-10-05 RX ORDER — INSULIN ASPART 100 [IU]/ML
INJECTION, SOLUTION INTRAVENOUS; SUBCUTANEOUS
Qty: 15 ML | Refills: 1 | Status: SHIPPED | OUTPATIENT
Start: 2020-10-05 | End: 2021-03-10

## 2020-10-27 ENCOUNTER — TELEPHONE (OUTPATIENT)
Dept: NEPHROLOGY | Facility: CLINIC | Age: 85
End: 2020-10-27

## 2020-11-02 DIAGNOSIS — I25.10 CORONARY ARTERY DISEASE INVOLVING NATIVE CORONARY ARTERY OF NATIVE HEART WITHOUT ANGINA PECTORIS: ICD-10-CM

## 2020-11-02 DIAGNOSIS — Z00.00 HEALTHCARE MAINTENANCE: ICD-10-CM

## 2020-11-02 DIAGNOSIS — F41.9 ANXIETY: ICD-10-CM

## 2020-11-02 DIAGNOSIS — Z95.1 S/P CABG X 4: ICD-10-CM

## 2020-11-02 DIAGNOSIS — D64.9 ANEMIA, UNSPECIFIED TYPE: ICD-10-CM

## 2020-11-02 RX ORDER — FERROUS SULFATE 325(65) MG
TABLET ORAL
Qty: 28 TABLET | Refills: 3 | Status: SHIPPED | OUTPATIENT
Start: 2020-11-02 | End: 2021-02-18

## 2020-11-02 RX ORDER — LORAZEPAM 0.5 MG/1
TABLET ORAL
Qty: 30 TABLET | Refills: 3 | Status: SHIPPED | OUTPATIENT
Start: 2020-11-02 | End: 2021-01-25

## 2020-11-02 RX ORDER — ASCORBIC ACID 500 MG
TABLET ORAL
Qty: 28 TABLET | Refills: 3 | Status: SHIPPED | OUTPATIENT
Start: 2020-11-02 | End: 2021-02-18

## 2020-11-02 RX ORDER — PANTOPRAZOLE SODIUM 20 MG/1
TABLET, DELAYED RELEASE ORAL
Qty: 30 TABLET | Refills: 3 | Status: SHIPPED | OUTPATIENT
Start: 2020-11-02 | End: 2021-02-18

## 2020-11-02 RX ORDER — ASPIRIN 325 MG
TABLET, DELAYED RELEASE (ENTERIC COATED) ORAL
Qty: 28 TABLET | Refills: 3 | Status: SHIPPED | OUTPATIENT
Start: 2020-11-02 | End: 2021-02-18

## 2020-11-09 DIAGNOSIS — Z95.1 S/P CABG X 4: ICD-10-CM

## 2020-11-18 LAB
ALBUMIN SERPL-MCNC: 3.6 G/DL (ref 3.6–5.1)
ALBUMIN/GLOB SERPL: 1.3 (CALC) (ref 1–2.5)
ALP SERPL-CCNC: 74 U/L (ref 35–144)
ALT SERPL-CCNC: 27 U/L (ref 9–46)
AST SERPL-CCNC: 24 U/L (ref 10–35)
BASOPHILS # BLD AUTO: 87 CELLS/UL (ref 0–200)
BASOPHILS NFR BLD AUTO: 1.3 %
BILIRUB SERPL-MCNC: 0.9 MG/DL (ref 0.2–1.2)
BUN SERPL-MCNC: 39 MG/DL (ref 7–25)
BUN/CREAT SERPL: 17 (CALC) (ref 6–22)
CALCIUM SERPL-MCNC: 9.1 MG/DL (ref 8.6–10.3)
CHLORIDE SERPL-SCNC: 104 MMOL/L (ref 98–110)
CHOLEST SERPL-MCNC: 116 MG/DL
CHOLEST/HDLC SERPL: 3.9 (CALC)
CO2 SERPL-SCNC: 28 MMOL/L (ref 20–32)
CREAT SERPL-MCNC: 2.33 MG/DL (ref 0.7–1.11)
EOSINOPHIL # BLD AUTO: 429 CELLS/UL (ref 15–500)
EOSINOPHIL NFR BLD AUTO: 6.4 %
ERYTHROCYTE [DISTWIDTH] IN BLOOD BY AUTOMATED COUNT: 13 % (ref 11–15)
GLOBULIN SER CALC-MCNC: 2.7 G/DL (CALC) (ref 1.9–3.7)
GLUCOSE SERPL-MCNC: 156 MG/DL (ref 65–99)
HBA1C MFR BLD: 7.2 % OF TOTAL HGB
HCT VFR BLD AUTO: 38.8 % (ref 38.5–50)
HDLC SERPL-MCNC: 30 MG/DL
HGB BLD-MCNC: 12.3 G/DL (ref 13.2–17.1)
LDLC SERPL CALC-MCNC: 64 MG/DL (CALC)
LYMPHOCYTES # BLD AUTO: 1709 CELLS/UL (ref 850–3900)
LYMPHOCYTES NFR BLD AUTO: 25.5 %
MCH RBC QN AUTO: 31.7 PG (ref 27–33)
MCHC RBC AUTO-ENTMCNC: 31.7 G/DL (ref 32–36)
MCV RBC AUTO: 100 FL (ref 80–100)
MONOCYTES # BLD AUTO: 503 CELLS/UL (ref 200–950)
MONOCYTES NFR BLD AUTO: 7.5 %
NEUTROPHILS # BLD AUTO: 3973 CELLS/UL (ref 1500–7800)
NEUTROPHILS NFR BLD AUTO: 59.3 %
NONHDLC SERPL-MCNC: 86 MG/DL (CALC)
PLATELET # BLD AUTO: 206 THOUSAND/UL (ref 140–400)
PMV BLD REES-ECKER: 10.6 FL (ref 7.5–12.5)
POTASSIUM SERPL-SCNC: 4.4 MMOL/L (ref 3.5–5.3)
PROT SERPL-MCNC: 6.3 G/DL (ref 6.1–8.1)
RBC # BLD AUTO: 3.88 MILLION/UL (ref 4.2–5.8)
SL AMB EGFR AFRICAN AMERICAN: 28 ML/MIN/1.73M2
SL AMB EGFR NON AFRICAN AMERICAN: 25 ML/MIN/1.73M2
SODIUM SERPL-SCNC: 140 MMOL/L (ref 135–146)
TRIGL SERPL-MCNC: 138 MG/DL
WBC # BLD AUTO: 6.7 THOUSAND/UL (ref 3.8–10.8)

## 2020-11-19 ENCOUNTER — TELEPHONE (OUTPATIENT)
Dept: NEPHROLOGY | Facility: CLINIC | Age: 85
End: 2020-11-19

## 2020-12-09 LAB
ALBUMIN SERPL-MCNC: 3.6 G/DL (ref 3.6–5.1)
AMORPH SED URNS QL MICRO: ABNORMAL /HPF
APPEARANCE UR: ABNORMAL
BACTERIA UR QL AUTO: ABNORMAL /HPF
BILIRUB UR QL STRIP: NEGATIVE
BUN SERPL-MCNC: 36 MG/DL (ref 7–25)
BUN/CREAT SERPL: 15 (CALC) (ref 6–22)
CALCIUM SERPL-MCNC: 9.2 MG/DL (ref 8.6–10.3)
CHLORIDE SERPL-SCNC: 107 MMOL/L (ref 98–110)
CO2 SERPL-SCNC: 26 MMOL/L (ref 20–32)
COLOR UR: YELLOW
CREAT SERPL-MCNC: 2.36 MG/DL (ref 0.7–1.11)
CREAT UR-MCNC: 105 MG/DL (ref 20–320)
GLUCOSE SERPL-MCNC: 136 MG/DL (ref 65–99)
GLUCOSE UR QL STRIP: NEGATIVE
GRAN CASTS #/AREA URNS LPF: ABNORMAL /LPF
HGB UR QL STRIP: ABNORMAL
HYALINE CASTS #/AREA URNS LPF: ABNORMAL /LPF
KETONES UR QL STRIP: NEGATIVE
LEUKOCYTE ESTERASE UR QL STRIP: NEGATIVE
NITRITE UR QL STRIP: NEGATIVE
PH UR STRIP: ABNORMAL [PH] (ref 5–8)
PHOSPHATE SERPL-MCNC: 3.8 MG/DL (ref 2.1–4.3)
POTASSIUM SERPL-SCNC: 3.9 MMOL/L (ref 3.5–5.3)
PROT UR QL STRIP: ABNORMAL
PROT UR-MCNC: 142 MG/DL (ref 5–25)
PROT/CREAT UR: 1.35 MG/MG CREAT (ref 0.02–0.13)
PROT/CREAT UR: 1352 MG/G CREAT (ref 22–128)
RBC #/AREA URNS HPF: ABNORMAL /HPF
SL AMB EGFR AFRICAN AMERICAN: 28 ML/MIN/1.73M2
SL AMB EGFR NON AFRICAN AMERICAN: 24 ML/MIN/1.73M2
SODIUM SERPL-SCNC: 143 MMOL/L (ref 135–146)
SP GR UR STRIP: 1.02 (ref 1–1.03)
SQUAMOUS #/AREA URNS HPF: ABNORMAL /HPF
WBC #/AREA URNS HPF: ABNORMAL /HPF

## 2020-12-11 ENCOUNTER — OFFICE VISIT (OUTPATIENT)
Dept: NEPHROLOGY | Facility: HOSPITAL | Age: 85
End: 2020-12-11
Payer: COMMERCIAL

## 2020-12-11 VITALS
DIASTOLIC BLOOD PRESSURE: 64 MMHG | BODY MASS INDEX: 35.65 KG/M2 | WEIGHT: 214 LBS | RESPIRATION RATE: 16 BRPM | SYSTOLIC BLOOD PRESSURE: 112 MMHG | HEART RATE: 53 BPM | HEIGHT: 65 IN

## 2020-12-11 DIAGNOSIS — E11.22 CKD STAGE 3 DUE TO TYPE 2 DIABETES MELLITUS (HCC): Primary | ICD-10-CM

## 2020-12-11 DIAGNOSIS — N18.30 ANEMIA DUE TO STAGE 3 CHRONIC KIDNEY DISEASE (HCC): Chronic | ICD-10-CM

## 2020-12-11 DIAGNOSIS — I35.0 NONRHEUMATIC AORTIC VALVE STENOSIS: ICD-10-CM

## 2020-12-11 DIAGNOSIS — N18.4 CHRONIC KIDNEY DISEASE (CKD), STAGE IV (SEVERE) (HCC): ICD-10-CM

## 2020-12-11 DIAGNOSIS — N40.1 BENIGN PROSTATIC HYPERPLASIA WITH NOCTURIA: ICD-10-CM

## 2020-12-11 DIAGNOSIS — M1A.9XX1 GOUT WITH TOPHUS: ICD-10-CM

## 2020-12-11 DIAGNOSIS — I10 BENIGN ESSENTIAL HYPERTENSION: ICD-10-CM

## 2020-12-11 DIAGNOSIS — R35.1 BENIGN PROSTATIC HYPERPLASIA WITH NOCTURIA: ICD-10-CM

## 2020-12-11 DIAGNOSIS — D63.1 ANEMIA DUE TO STAGE 3 CHRONIC KIDNEY DISEASE (HCC): Chronic | ICD-10-CM

## 2020-12-11 DIAGNOSIS — N18.30 CKD STAGE 3 DUE TO TYPE 2 DIABETES MELLITUS (HCC): Primary | ICD-10-CM

## 2020-12-11 PROCEDURE — 1160F RVW MEDS BY RX/DR IN RCRD: CPT | Performed by: INTERNAL MEDICINE

## 2020-12-11 PROCEDURE — 1036F TOBACCO NON-USER: CPT | Performed by: INTERNAL MEDICINE

## 2020-12-11 PROCEDURE — 3074F SYST BP LT 130 MM HG: CPT | Performed by: INTERNAL MEDICINE

## 2020-12-11 PROCEDURE — 99214 OFFICE O/P EST MOD 30 MIN: CPT | Performed by: INTERNAL MEDICINE

## 2020-12-11 PROCEDURE — 3078F DIAST BP <80 MM HG: CPT | Performed by: INTERNAL MEDICINE

## 2020-12-30 DIAGNOSIS — Z95.2 S/P AVR (AORTIC VALVE REPLACEMENT): ICD-10-CM

## 2020-12-30 DIAGNOSIS — Z95.1 S/P CABG X 4: ICD-10-CM

## 2020-12-30 RX ORDER — ROSUVASTATIN CALCIUM 40 MG/1
TABLET, COATED ORAL
Qty: 30 TABLET | Refills: 0 | Status: SHIPPED | OUTPATIENT
Start: 2020-12-30 | End: 2021-01-25

## 2021-01-25 DIAGNOSIS — L03.116 CELLULITIS OF LEFT LOWER EXTREMITY: ICD-10-CM

## 2021-01-25 DIAGNOSIS — R35.1 BENIGN PROSTATIC HYPERPLASIA WITH NOCTURIA: ICD-10-CM

## 2021-01-25 DIAGNOSIS — F41.9 ANXIETY: ICD-10-CM

## 2021-01-25 DIAGNOSIS — Z95.1 S/P CABG X 4: ICD-10-CM

## 2021-01-25 DIAGNOSIS — R60.9 EDEMA, UNSPECIFIED TYPE: ICD-10-CM

## 2021-01-25 DIAGNOSIS — N40.1 BENIGN PROSTATIC HYPERPLASIA WITH NOCTURIA: ICD-10-CM

## 2021-01-25 DIAGNOSIS — Z95.2 S/P AVR (AORTIC VALVE REPLACEMENT): ICD-10-CM

## 2021-01-25 RX ORDER — ROSUVASTATIN CALCIUM 40 MG/1
TABLET, COATED ORAL
Qty: 30 TABLET | Refills: 0 | Status: SHIPPED | OUTPATIENT
Start: 2021-01-25 | End: 2021-02-18

## 2021-01-25 RX ORDER — TAMSULOSIN HYDROCHLORIDE 0.4 MG/1
CAPSULE ORAL
Qty: 30 CAPSULE | Refills: 6 | Status: SHIPPED | OUTPATIENT
Start: 2021-01-25 | End: 2021-07-19

## 2021-01-25 RX ORDER — TORSEMIDE 10 MG/1
TABLET ORAL
Qty: 30 TABLET | Refills: 5 | Status: SHIPPED | OUTPATIENT
Start: 2021-01-25 | End: 2021-07-19

## 2021-01-25 RX ORDER — INSULIN GLARGINE 100 [IU]/ML
20 INJECTION, SOLUTION SUBCUTANEOUS
Qty: 15 ML | Refills: 3 | Status: SHIPPED | OUTPATIENT
Start: 2021-01-25 | End: 2022-02-09 | Stop reason: SDUPTHER

## 2021-01-25 RX ORDER — LORAZEPAM 0.5 MG/1
TABLET ORAL
Qty: 30 TABLET | Refills: 3 | Status: SHIPPED | OUTPATIENT
Start: 2021-01-25 | End: 2021-05-20

## 2021-01-26 DIAGNOSIS — E03.9 HYPOTHYROIDISM, UNSPECIFIED TYPE: ICD-10-CM

## 2021-01-26 RX ORDER — LEVOTHYROXINE SODIUM 0.15 MG/1
150 TABLET ORAL DAILY
Qty: 30 TABLET | Refills: 5 | Status: SHIPPED | OUTPATIENT
Start: 2021-01-26 | End: 2021-07-13 | Stop reason: SDUPTHER

## 2021-01-27 ENCOUNTER — OFFICE VISIT (OUTPATIENT)
Dept: FAMILY MEDICINE CLINIC | Facility: HOSPITAL | Age: 86
End: 2021-01-27
Payer: COMMERCIAL

## 2021-01-27 VITALS
HEIGHT: 65 IN | SYSTOLIC BLOOD PRESSURE: 110 MMHG | DIASTOLIC BLOOD PRESSURE: 62 MMHG | WEIGHT: 215.8 LBS | HEART RATE: 57 BPM | OXYGEN SATURATION: 97 % | BODY MASS INDEX: 35.96 KG/M2

## 2021-01-27 DIAGNOSIS — Z95.2 S/P AVR (AORTIC VALVE REPLACEMENT): ICD-10-CM

## 2021-01-27 DIAGNOSIS — E66.01 OBESITY, MORBID (HCC): ICD-10-CM

## 2021-01-27 DIAGNOSIS — D63.1 ANEMIA DUE TO STAGE 3B CHRONIC KIDNEY DISEASE (HCC): Chronic | ICD-10-CM

## 2021-01-27 DIAGNOSIS — N18.32 ANEMIA DUE TO STAGE 3B CHRONIC KIDNEY DISEASE (HCC): Chronic | ICD-10-CM

## 2021-01-27 DIAGNOSIS — E11.22 CKD STAGE 3 DUE TO TYPE 2 DIABETES MELLITUS (HCC): ICD-10-CM

## 2021-01-27 DIAGNOSIS — E11.22 TYPE 2 DIABETES MELLITUS WITH STAGE 3B CHRONIC KIDNEY DISEASE, WITHOUT LONG-TERM CURRENT USE OF INSULIN (HCC): Primary | ICD-10-CM

## 2021-01-27 DIAGNOSIS — I70.0 ATHEROSCLEROSIS OF AORTA (HCC): ICD-10-CM

## 2021-01-27 DIAGNOSIS — I50.32 CHRONIC DIASTOLIC CONGESTIVE HEART FAILURE (HCC): ICD-10-CM

## 2021-01-27 DIAGNOSIS — N18.4 CHRONIC KIDNEY DISEASE (CKD), STAGE IV (SEVERE) (HCC): ICD-10-CM

## 2021-01-27 DIAGNOSIS — E03.9 ACQUIRED HYPOTHYROIDISM: Chronic | ICD-10-CM

## 2021-01-27 DIAGNOSIS — Z95.1 S/P CABG X 4: ICD-10-CM

## 2021-01-27 DIAGNOSIS — N18.32 TYPE 2 DIABETES MELLITUS WITH STAGE 3B CHRONIC KIDNEY DISEASE, WITHOUT LONG-TERM CURRENT USE OF INSULIN (HCC): Primary | ICD-10-CM

## 2021-01-27 DIAGNOSIS — J95.821 ACUTE POSTPROCEDURAL RESPIRATORY FAILURE (HCC): ICD-10-CM

## 2021-01-27 DIAGNOSIS — I20.8 STABLE ANGINA (HCC): ICD-10-CM

## 2021-01-27 DIAGNOSIS — I10 BENIGN ESSENTIAL HYPERTENSION: ICD-10-CM

## 2021-01-27 DIAGNOSIS — N18.30 CKD STAGE 3 DUE TO TYPE 2 DIABETES MELLITUS (HCC): ICD-10-CM

## 2021-01-27 PROCEDURE — 99214 OFFICE O/P EST MOD 30 MIN: CPT | Performed by: INTERNAL MEDICINE

## 2021-01-27 PROCEDURE — 1160F RVW MEDS BY RX/DR IN RCRD: CPT | Performed by: INTERNAL MEDICINE

## 2021-01-27 PROCEDURE — 1101F PT FALLS ASSESS-DOCD LE1/YR: CPT | Performed by: INTERNAL MEDICINE

## 2021-01-27 PROCEDURE — 3725F SCREEN DEPRESSION PERFORMED: CPT | Performed by: INTERNAL MEDICINE

## 2021-01-27 PROCEDURE — 3074F SYST BP LT 130 MM HG: CPT | Performed by: INTERNAL MEDICINE

## 2021-01-27 PROCEDURE — 3288F FALL RISK ASSESSMENT DOCD: CPT | Performed by: INTERNAL MEDICINE

## 2021-01-27 PROCEDURE — 1036F TOBACCO NON-USER: CPT | Performed by: INTERNAL MEDICINE

## 2021-01-27 PROCEDURE — 3078F DIAST BP <80 MM HG: CPT | Performed by: INTERNAL MEDICINE

## 2021-01-27 RX ORDER — PEN NEEDLE, DIABETIC 30 GX3/16"
NEEDLE, DISPOSABLE MISCELLANEOUS
Qty: 100 EACH | Refills: 11 | Status: SHIPPED | OUTPATIENT
Start: 2021-01-27

## 2021-01-27 NOTE — PROGRESS NOTES
Assessment/Plan:             Problem List Items Addressed This Visit        Endocrine    Hypothyroidism (Chronic)    Type 2 diabetes mellitus with kidney complication, without long-term current use of insulin ([facility]) - Primary    Relevant Medications    Insulin Pen Needle (Pen Needles) 32G X 4 MM MISC    Other Relevant Orders    Hemoglobin A1C    RESOLVED: CKD stage 3 due to type 2 diabetes mellitus ([facility] [address] )       Respiratory    Acute postprocedural respiratory failure ([facility] [address] )       Cardiovascular and Mediastinum    Benign essential hypertension     Well controlled         Atherosclerosis of aorta ([facility])    Chronic diastolic congestive heart failure ([facility])    Relevant Orders    CBC and differential    Comprehensive metabolic panel    Stable angina (HCC)       Genitourinary    Anemia due to stage 3 chronic kidney disease (Chronic)     Lab Results   Component Value Date    EGFR 41 12/01/2018    EGFR 36 11/29/2018    EGFR 30 11/28/2018    CREATININE 2 36 (H) 12/07/2020    CREATININE 2 33 (H) 11/17/2020    CREATININE 2 20 (H) 09/25/2020   following with Dr Rico Martinez with nephrology every 3 months  on lisinopril 5 mg daily    no significant edema         Chronic kidney disease (CKD), stage IV (severe) ([facility])       Other    Obesity, morbid (HCC)    S/P CABG x 4    S/P AVR (aortic valve replacement)            Subjective:      Patient ID: Brandy Sherman  is a 80 y o  male    Reviewed labs from   [facility]   still active  doing grocery shopping etc      The following portions of the patient's history were reviewed and updated as appropriate: allergies, current medications and problem list      Review of Systems   Constitutional: Negative for chills and fever  Respiratory: Negative for shortness of breath  Cardiovascular: Negative for chest pain, palpitations and leg swelling  Genitourinary:        Doing better with meds from Dr Mariama Shirley   Musculoskeletal: Negative for back pain     All other systems reviewed and are negative          Objective:      Current Outpatient Medications:     acetaminophen (TYLENOL) 325 mg tablet, 650 mg every 4 to 6 hours as needed for pain (do not take with percocet), Disp: 30 tablet, Rfl: 0    albuterol (PROVENTIL HFA) 90 mcg/act inhaler, Inhale 2 puffs 4 (four) times a day as needed, Disp: , Rfl:     allopurinol (ZYLOPRIM) 300 mg tablet, TAKE ONE TABLET BY MOUTH ONCE DAILY, Disp: 90 tablet, Rfl: 3    ascorbic acid (VITAMIN C) 500 mg tablet, TAKE ONE TABLET BY MOUTH DAILY, Disp: 28 tablet, Rfl: 3    aspirin (ECOTRIN) 325 mg EC tablet, TAKE ONE TABLET BY MOUTH DAILY, Disp: 28 tablet, Rfl: 3    Basaglar KwikPen 100 units/mL injection pen, Inject 20 Units under the skin daily at bedtime, Disp: 15 mL, Rfl: 3    ferrous sulfate 325 (65 Fe) mg tablet, TAKE ONE TABLET BY MOUTH DAILY, Disp: 28 tablet, Rfl: 3    gabapentin (NEURONTIN) 300 mg capsule, Take 1 capsule (300 mg total) by mouth daily at bedtime, Disp: 90 capsule, Rfl: 3    levothyroxine 150 mcg tablet, Take 1 tablet (150 mcg total) by mouth daily, Disp: 30 tablet, Rfl: 5    lisinopril (ZESTRIL) 5 mg tablet, Take 0 5 tablets (2 5 mg total) by mouth daily Decreased dose 5/18/20 due to hypotension, Disp: 30 tablet, Rfl: 5    LORazepam (ATIVAN) 0 5 mg tablet, TAKE ONE TABLET BY MOUTH EVERY EIGHT HOURS AS NEEDED FOR ANXIETY, Disp: 30 tablet, Rfl: 3    metoprolol tartrate (LOPRESSOR) 25 mg tablet, TAKE 12 tablet BY MOUTH DAILY, Disp: 30 tablet, Rfl: 6    NovoLOG FlexPen 100 units/mL injection pen, INJECT TEN UNITS SUBCUTANEOUSLY THREE TIMES DAILY, Disp: 15 mL, Rfl: 1    pantoprazole (PROTONIX) 20 mg tablet, TAKE ONE TABLET BY MOUTH DAILY, Disp: 30 tablet, Rfl: 3    polyethylene glycol (MIRALAX) 17 g packet, Take 17 g by mouth daily, Disp: , Rfl:     rosuvastatin (CRESTOR) 40 MG tablet, TAKE ONE TABLET BY MOUTH DAILY, Disp: 30 tablet, Rfl: 0    tamsulosin (FLOMAX) 0 4 mg, TAKE ONE CAPSULE BY MOUTH DAILY WITH dinner, Disp: 30 capsule, Rfl: 6    timolol (TIMOPTIC) 0 5 % ophthalmic solution, Instill 1 drop into both eyes every morning, Disp: , Rfl:     torsemide (DEMADEX) 10 mg tablet, TAKE ONE TABLET BY MOUTH EVERY DAY, Disp: 30 tablet, Rfl: 5    Insulin Pen Needle (Pen Needles) 32G X 4 MM MISC, Use 4 (four) times a day (before meals and at bedtime), Disp: 100 each, Rfl: 11    Blood pressure 110/62, pulse 57, height 5' 5" (1 651 m), weight 97 9 kg (215 lb 12 8 oz), SpO2 97 %  Physical Exam  Vitals signs and nursing note reviewed  Constitutional:       Appearance: Normal appearance  HENT:      Right Ear: Tympanic membrane normal       Left Ear: Tympanic membrane normal       Ears:      Comments: Minimal cerumen in left  Eyes:      General:         Right eye: No discharge  Left eye: No discharge  Cardiovascular:      Rate and Rhythm: Normal rate and regular rhythm  Heart sounds: No murmur  Comments: Crisp valve sounds  Pulmonary:      Breath sounds: No wheezing or rhonchi  Abdominal:      General: Abdomen is flat  There is no distension  Palpations: Abdomen is soft  Tenderness: There is no abdominal tenderness  Musculoskeletal:         General: No swelling  Comments: Trace anj kle edema   Neurological:      Mental Status: He is alert

## 2021-01-27 NOTE — ASSESSMENT & PLAN NOTE
Lab Results   Component Value Date    EGFR 41 12/01/2018    EGFR 36 11/29/2018    EGFR 30 11/28/2018    CREATININE 2 36 (H) 12/07/2020    CREATININE 2 33 (H) 11/17/2020    CREATININE 2 20 (H) 09/25/2020   following with Dr Mane Clemente with nephrology every 3 months      on lisinopril 5 mg daily    no significant edema

## 2021-01-27 NOTE — PATIENT INSTRUCTIONS
Do lab end of February  See in 4 months   call if recurrent left abdominal pain- will order CT to assess for diverticulitis

## 2021-02-17 ENCOUNTER — OFFICE VISIT (OUTPATIENT)
Dept: NEPHROLOGY | Facility: HOSPITAL | Age: 86
End: 2021-02-17
Payer: COMMERCIAL

## 2021-02-17 DIAGNOSIS — N18.4 CHRONIC KIDNEY DISEASE (CKD), STAGE IV (SEVERE) (HCC): Primary | ICD-10-CM

## 2021-02-17 PROCEDURE — 99497 ADVNCD CARE PLAN 30 MIN: CPT | Performed by: NURSE PRACTITIONER

## 2021-02-17 NOTE — ACP (ADVANCE CARE PLANNING)
[address]  Miles Tsai  80 y o  male MRN: [id number]  2021        ASSESSMENT and PLAN:  I had the pleasure of seeing Miles Tsai  today in the renal clinic for discussion of the patient's goals of care  Our discussion today will focus on helping Mr Riki Baker achieve their desired goals, long term goals of care and how best to achieve those goals  The participants during this visit include provider and patient  Number of Hospitalizations in last 12 months: 0  Kidney Smart Class: Referred to Kidney Smart Class  Would Pursue Dialysis if Needed: Yes  Dialysis Access: No Access   Advance Directive: Not Completed  Five Wishes Packet: Not Completed  Code Status: Level 1: Full Code  POA: Dana Sharma, son  Do they need to be referred to Palliative Care: No  Do they need to be referred to Hospice: No  Length/Time of Meetin Minutes  Outpatient Nephrologist: Andrews Rg MD    Discussion and Plan:  Mr Riki Baker is a very pleasant gentleman who presented today for advanced goals of care meeting  Patient typically follows with Dr Zac Zapata for his chronic kidney disease  In the past, conversation was had regarding dialysis  Patient has thought about PD, however at this time may consider HD if warranted  Patient did bring his 5 wishes packet with him, however did not yet complete it  The packet was reviewed with the patient in detail  Time was allowed for patient to ask questions and also express any concerns or comments that he may have had  Per lab results in 2020, creatinine was stable and electrolytes were stable as well  There is no acute indication to initiate renal replacement therapy emergently  Patient will complete his packet at home with his son, Viky Ceballos  He has not yet spoken to Viky Ceballos about his wishes as he is unsure about them at this time  Patient will bring his packet back with him to the next appointment       Patient stated that he was concerned about getting his vaccination for COVID and will delve further into his wishes once his appointment is set  PCP has been made aware as well  REVIEW OF SYSTEMS:    Review of Systems   Constitutional: Negative for activity change and appetite change  Respiratory: Negative for shortness of breath  Cardiovascular: Negative for chest pain and leg swelling  Gastrointestinal: Negative for diarrhea, nausea and vomiting  Genitourinary: Negative for difficulty urinating  Neurological: Negative for dizziness, speech difficulty and light-headedness  Psychiatric/Behavioral: Negative for confusion           Medications:    Current Outpatient Medications:     acetaminophen (TYLENOL) 325 mg tablet, 650 mg every 4 to 6 hours as needed for pain (do not take with percocet), Disp: 30 tablet, Rfl: 0    albuterol (PROVENTIL HFA) 90 mcg/act inhaler, Inhale 2 puffs 4 (four) times a day as needed, Disp: , Rfl:     allopurinol (ZYLOPRIM) 300 mg tablet, TAKE ONE TABLET BY MOUTH ONCE DAILY, Disp: 90 tablet, Rfl: 3    ascorbic acid (VITAMIN C) 500 mg tablet, TAKE ONE TABLET BY MOUTH DAILY, Disp: 28 tablet, Rfl: 3    aspirin (ECOTRIN) 325 mg EC tablet, TAKE ONE TABLET BY MOUTH DAILY, Disp: 28 tablet, Rfl: 3    Basaglar KwikPen 100 units/mL injection pen, Inject 20 Units under the skin daily at bedtime, Disp: 15 mL, Rfl: 3    ferrous sulfate 325 (65 Fe) mg tablet, TAKE ONE TABLET BY MOUTH DAILY, Disp: 28 tablet, Rfl: 3    gabapentin (NEURONTIN) 300 mg capsule, Take 1 capsule (300 mg total) by mouth daily at bedtime, Disp: 90 capsule, Rfl: 3    Insulin Pen Needle (Pen Needles) 32G X 4 MM MISC, Use 4 (four) times a day (before meals and at bedtime), Disp: 100 each, Rfl: 11    levothyroxine 150 mcg tablet, Take 1 tablet (150 mcg total) by mouth daily, Disp: 30 tablet, Rfl: 5    lisinopril (ZESTRIL) 5 mg tablet, Take 0 5 tablets (2 5 mg total) by mouth daily Decreased dose 5/18/20 due to hypotension, Disp: 30 tablet, Rfl: 5   LORazepam (ATIVAN) 0 5 mg tablet, TAKE ONE TABLET BY MOUTH EVERY EIGHT HOURS AS NEEDED FOR ANXIETY, Disp: 30 tablet, Rfl: 3    metoprolol tartrate (LOPRESSOR) 25 mg tablet, TAKE 12 tablet BY MOUTH DAILY, Disp: 30 tablet, Rfl: 6    NovoLOG FlexPen 100 units/mL injection pen, INJECT TEN UNITS SUBCUTANEOUSLY THREE TIMES DAILY, Disp: 15 mL, Rfl: 1    pantoprazole (PROTONIX) 20 mg tablet, TAKE ONE TABLET BY MOUTH DAILY, Disp: 30 tablet, Rfl: 3    polyethylene glycol (MIRALAX) 17 g packet, Take 17 g by mouth daily, Disp: , Rfl:     rosuvastatin (CRESTOR) 40 MG tablet, TAKE ONE TABLET BY MOUTH DAILY, Disp: 30 tablet, Rfl: 0    tamsulosin (FLOMAX) 0 4 mg, TAKE ONE CAPSULE BY MOUTH DAILY WITH dinner, Disp: 30 capsule, Rfl: 6    timolol (TIMOPTIC) 0 5 % ophthalmic solution, Instill 1 drop into both eyes every morning, Disp: , Rfl:     torsemide (DEMADEX) 10 mg tablet, TAKE ONE TABLET BY MOUTH EVERY DAY, Disp: 30 tablet, Rfl: 5    Serious Illness Conversation    1  What is your understanding now of where you are with your illness? Prognostic Understanding: appropriate understanding of prognosis  Patient states he understands his kidney disease  Dr Dieudonne Justice has been explaining his CKD to the patient and dialysis options  2  How much information about what is likely to be ahead with your illness would you like to have? Information: patient wants to be fully informed     3  What did you (clinician) communicate to the patient? Prognostic communication: As above, reviewed underlying kidney disease  We had discussed the possibility of dialysis in the future and also in the circumstance of an acute illness  4  If your health situation worsens, what are your most important goals? At this time, patient is unsure of his goals  He will think about these and review them at the next appointment  5  What are the biggest fears and worries about the future and your health?   As above, patient is unsure of his fears or worries at this time  6  What abilities are so critical to your life that you cannot imagine living without them? 7  What gives you strength as you think about the future with your illness? 8  If you become sicker, how much are you willing to go through for the possibility of gaining more time? 9  How much does your proxy and family know about your priorities and wishes? Patient does not discuss these topics much with his son that he lives with due to the son having underlying illnesses as well  How does this plan sound to you? I will do everything I can to help you through this  Patient wants to finish this discussion at a later time     I have spent 40 minutes speaking with my patient on advanced care planning today or during this visit     Advanced directives  Five Wishes: Patient has Five Wishes, not in chart, will bring with to next apt

## 2021-02-18 DIAGNOSIS — D64.9 ANEMIA, UNSPECIFIED TYPE: ICD-10-CM

## 2021-02-18 DIAGNOSIS — I25.10 CORONARY ARTERY DISEASE INVOLVING NATIVE CORONARY ARTERY OF NATIVE HEART WITHOUT ANGINA PECTORIS: ICD-10-CM

## 2021-02-18 DIAGNOSIS — Z95.1 S/P CABG X 4: ICD-10-CM

## 2021-02-18 DIAGNOSIS — Z95.2 S/P AVR (AORTIC VALVE REPLACEMENT): ICD-10-CM

## 2021-02-18 DIAGNOSIS — Z00.00 HEALTHCARE MAINTENANCE: ICD-10-CM

## 2021-02-18 RX ORDER — ASPIRIN 325 MG
TABLET, DELAYED RELEASE (ENTERIC COATED) ORAL
Qty: 28 TABLET | Refills: 3 | Status: SHIPPED | OUTPATIENT
Start: 2021-02-18 | End: 2021-05-20

## 2021-02-18 RX ORDER — FERROUS SULFATE 325(65) MG
TABLET ORAL
Qty: 28 TABLET | Refills: 3 | Status: SHIPPED | OUTPATIENT
Start: 2021-02-18 | End: 2021-05-20

## 2021-02-18 RX ORDER — ASCORBIC ACID 500 MG
TABLET ORAL
Qty: 28 TABLET | Refills: 3 | Status: SHIPPED | OUTPATIENT
Start: 2021-02-18 | End: 2021-05-20

## 2021-02-18 RX ORDER — PANTOPRAZOLE SODIUM 20 MG/1
TABLET, DELAYED RELEASE ORAL
Qty: 30 TABLET | Refills: 3 | Status: SHIPPED | OUTPATIENT
Start: 2021-02-18 | End: 2021-05-20

## 2021-02-18 RX ORDER — ROSUVASTATIN CALCIUM 40 MG/1
TABLET, COATED ORAL
Qty: 30 TABLET | Refills: 0 | Status: SHIPPED | OUTPATIENT
Start: 2021-02-18 | End: 2021-03-16

## 2021-02-25 LAB
ALBUMIN SERPL-MCNC: 3.7 G/DL (ref 3.6–5.1)
BASOPHILS # BLD AUTO: 83 CELLS/UL (ref 0–200)
BASOPHILS NFR BLD AUTO: 1.2 %
BUN SERPL-MCNC: 38 MG/DL (ref 7–25)
BUN/CREAT SERPL: 15 (CALC) (ref 6–22)
CALCIUM SERPL-MCNC: 8.9 MG/DL (ref 8.6–10.3)
CALCIUM SERPL-MCNC: 8.9 MG/DL (ref 8.6–10.3)
CHLORIDE SERPL-SCNC: 108 MMOL/L (ref 98–110)
CO2 SERPL-SCNC: 25 MMOL/L (ref 20–32)
CREAT SERPL-MCNC: 2.62 MG/DL (ref 0.7–1.11)
EOSINOPHIL # BLD AUTO: 428 CELLS/UL (ref 15–500)
EOSINOPHIL NFR BLD AUTO: 6.2 %
ERYTHROCYTE [DISTWIDTH] IN BLOOD BY AUTOMATED COUNT: 13.4 % (ref 11–15)
GLUCOSE SERPL-MCNC: 102 MG/DL (ref 65–99)
HCT VFR BLD AUTO: 37.5 % (ref 38.5–50)
HGB BLD-MCNC: 12.1 G/DL (ref 13.2–17.1)
LYMPHOCYTES # BLD AUTO: 2056 CELLS/UL (ref 850–3900)
LYMPHOCYTES NFR BLD AUTO: 29.8 %
MAGNESIUM SERPL-MCNC: 2.4 MG/DL (ref 1.5–2.5)
MCH RBC QN AUTO: 32.1 PG (ref 27–33)
MCHC RBC AUTO-ENTMCNC: 32.3 G/DL (ref 32–36)
MCV RBC AUTO: 99.5 FL (ref 80–100)
MONOCYTES # BLD AUTO: 628 CELLS/UL (ref 200–950)
MONOCYTES NFR BLD AUTO: 9.1 %
NEUTROPHILS # BLD AUTO: 3705 CELLS/UL (ref 1500–7800)
NEUTROPHILS NFR BLD AUTO: 53.7 %
PHOSPHATE SERPL-MCNC: 3.7 MG/DL (ref 2.1–4.3)
PLATELET # BLD AUTO: 181 THOUSAND/UL (ref 140–400)
PMV BLD REES-ECKER: 11 FL (ref 7.5–12.5)
POTASSIUM SERPL-SCNC: 3.9 MMOL/L (ref 3.5–5.3)
PTH-INTACT SERPL-MCNC: 69 PG/ML (ref 14–64)
RBC # BLD AUTO: 3.77 MILLION/UL (ref 4.2–5.8)
SL AMB EGFR AFRICAN AMERICAN: 25 ML/MIN/1.73M2
SL AMB EGFR NON AFRICAN AMERICAN: 21 ML/MIN/1.73M2
SODIUM SERPL-SCNC: 142 MMOL/L (ref 135–146)
URATE SERPL-MCNC: 3.7 MG/DL (ref 4–8)
WBC # BLD AUTO: 6.9 THOUSAND/UL (ref 3.8–10.8)

## 2021-02-27 ENCOUNTER — IMMUNIZATIONS (OUTPATIENT)
Dept: FAMILY MEDICINE CLINIC | Facility: HOSPITAL | Age: 86
End: 2021-02-27

## 2021-02-27 DIAGNOSIS — Z23 ENCOUNTER FOR IMMUNIZATION: Primary | ICD-10-CM

## 2021-02-27 PROCEDURE — 91300 SARS-COV-2 / COVID-19 MRNA VACCINE (PFIZER-BIONTECH) 30 MCG: CPT

## 2021-02-27 PROCEDURE — 0001A SARS-COV-2 / COVID-19 MRNA VACCINE (PFIZER-BIONTECH) 30 MCG: CPT

## 2021-03-05 ENCOUNTER — OFFICE VISIT (OUTPATIENT)
Dept: NEPHROLOGY | Facility: HOSPITAL | Age: 86
End: 2021-03-05
Payer: COMMERCIAL

## 2021-03-05 VITALS
RESPIRATION RATE: 16 BRPM | SYSTOLIC BLOOD PRESSURE: 104 MMHG | DIASTOLIC BLOOD PRESSURE: 58 MMHG | BODY MASS INDEX: 35.49 KG/M2 | HEIGHT: 65 IN | WEIGHT: 213 LBS | HEART RATE: 53 BPM

## 2021-03-05 DIAGNOSIS — N18.4 CHRONIC KIDNEY DISEASE (CKD), STAGE IV (SEVERE) (HCC): ICD-10-CM

## 2021-03-05 DIAGNOSIS — N18.32 ANEMIA DUE TO STAGE 3B CHRONIC KIDNEY DISEASE (HCC): Chronic | ICD-10-CM

## 2021-03-05 DIAGNOSIS — I50.32 CHRONIC DIASTOLIC CONGESTIVE HEART FAILURE (HCC): ICD-10-CM

## 2021-03-05 DIAGNOSIS — D63.1 ANEMIA DUE TO STAGE 3B CHRONIC KIDNEY DISEASE (HCC): Chronic | ICD-10-CM

## 2021-03-05 DIAGNOSIS — I10 BENIGN ESSENTIAL HYPERTENSION: Primary | ICD-10-CM

## 2021-03-05 DIAGNOSIS — I35.0 NONRHEUMATIC AORTIC VALVE STENOSIS: ICD-10-CM

## 2021-03-05 PROCEDURE — 99214 OFFICE O/P EST MOD 30 MIN: CPT | Performed by: INTERNAL MEDICINE

## 2021-03-05 NOTE — PROGRESS NOTES
OFFICE FOLLOW UP - Nephrology   Sandhya Palm  80 y o  male MRN: [id number]    Encounter: [id number]        ASSESSMENT & PLAN    1  Stage IIIB to stage 4 chronic kidney disease moderately increased proteinuria  -urinalysis:   Now with about 1 2 g of protein in his urine  -imaging:  CT scan of the pelvis shows bilateral renal cysts largest at the lower pole of the right kidney measuring up to 3 3 cm will consider repeat renal ultrasound since this was done in 2017  -etiology:  Patient has a longstanding history of hypertension and type 2 diabetes mellitus this is likely progression of his CKD  -plan:    His creatinine did increase slightly now up to 2 6  - will have him hold his lisinopril  - repeat BMP in 1 month  -  Completed CKD education and advance care meeting  -  Given his low blood pressures today, he is still undecided did on what he would want to do if dialysis were needed at this point will continue maximal conservative management if there is progressive chronic kidney disease will have him evaluated for potential fistula versus a home modality    2  Electrolytes:  -currently acceptable    3  Acid/Base  -no anion gap acid-base status acceptable    4  BP/HR  -History of hypertension- blood pressure is currently stable    5  Volume Status-euvolemic    6  Anemia of chronic kidney disease  - stable    7  MBD  -will check PTH and phosphorus with next set of blood work    8   Health Maintanance/Risk Reduction  -uncontrolled diabetes with elevated hemoglobin A1cs on insulin  -allopurinol for gout  -coronary artery disease and status post aortic valve replacement on diuretics ACE-inhibitor beta-blocker, statin     repeat blood work in 1 month, if stable will follow-up in 2-3 months    HPI: Sandhya Palm  is a 80 y o  male who is here for follow-up    overall Alea Ren doing relatively well he feels okay he denies any acute complaints no chest pain or shortness of breath fevers or chills no nausea vomiting diarrhea constipation no foamy or bloody urine     tolerating his medications without any difficulties    good urine output      ROS:   All systems reviewed and negative besides what was mentioned in the history of present illness    Allergies: Amlodipine and Atorvastatin    Medications:   Current Outpatient Medications:     acetaminophen (TYLENOL) 325 mg tablet, 650 mg every 4 to 6 hours as needed for pain (do not take with percocet), Disp: 30 tablet, Rfl: 0    albuterol (PROVENTIL HFA) 90 mcg/act inhaler, Inhale 2 puffs 4 (four) times a day as needed, Disp: , Rfl:     allopurinol (ZYLOPRIM) 300 mg tablet, TAKE ONE TABLET BY MOUTH ONCE DAILY, Disp: 90 tablet, Rfl: 3    ascorbic acid (VITAMIN C) 500 MG tablet, TAKE ONE TABLET BY MOUTH DAILY, Disp: 28 tablet, Rfl: 3    aspirin (ECOTRIN) 325 mg EC tablet, TAKE ONE TABLET BY MOUTH DAILY (Patient taking differently: Take 81 mg by mouth daily ), Disp: 28 tablet, Rfl: 3    Basaglar KwikPen 100 units/mL injection pen, Inject 20 Units under the skin daily at bedtime, Disp: 15 mL, Rfl: 3    ferrous sulfate 325 (65 Fe) mg tablet, TAKE ONE TABLET BY MOUTH DAILY, Disp: 28 tablet, Rfl: 3    gabapentin (NEURONTIN) 300 mg capsule, Take 1 capsule (300 mg total) by mouth daily at bedtime, Disp: 90 capsule, Rfl: 3    Insulin Pen Needle (Pen Needles) 32G X 4 MM MISC, Use 4 (four) times a day (before meals and at bedtime), Disp: 100 each, Rfl: 11    levothyroxine 150 mcg tablet, Take 1 tablet (150 mcg total) by mouth daily, Disp: 30 tablet, Rfl: 5    LORazepam (ATIVAN) 0 5 mg tablet, TAKE ONE TABLET BY MOUTH EVERY EIGHT HOURS AS NEEDED FOR ANXIETY, Disp: 30 tablet, Rfl: 3    metoprolol tartrate (LOPRESSOR) 25 mg tablet, TAKE 12 tablet BY MOUTH DAILY, Disp: 30 tablet, Rfl: 6    NovoLOG FlexPen 100 units/mL injection pen, INJECT TEN UNITS SUBCUTANEOUSLY THREE TIMES DAILY, Disp: 15 mL, Rfl: 1    pantoprazole (PROTONIX) 20 mg tablet, TAKE ONE TABLET BY MOUTH DAILY, Disp: 30 tablet, Rfl: 3    polyethylene glycol (MIRALAX) 17 g packet, Take 17 g by mouth daily, Disp: , Rfl:     rosuvastatin (CRESTOR) 40 MG tablet, TAKE ONE TABLET BY MOUTH DAILY, Disp: 30 tablet, Rfl: 0    tamsulosin (FLOMAX) 0 4 mg, TAKE ONE CAPSULE BY MOUTH DAILY WITH dinner, Disp: 30 capsule, Rfl: 6    timolol (TIMOPTIC) 0 5 % ophthalmic solution, Administer 1 drop to both eyes 2 (two) times a day , Disp: , Rfl:     torsemide (DEMADEX) 10 mg tablet, TAKE ONE TABLET BY MOUTH EVERY DAY, Disp: 30 tablet, Rfl: 5    Past Medical History:   Diagnosis Date    Aortic stenosis     CKD (chronic kidney disease)     baseline Cr 1 3-1 5    Coronary artery disease     Cough     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HCC)     type 2, insulin dependent    GERD (gastroesophageal reflux disease)     Glaucoma     Gout     History of prostate cancer     Hypertension     Hypothyroidism     Overweight     Peripheral neuropathy, idiopathic     Pleural effusion, left     Pure hypercholesterolemia     [place]   11/12/14   [place]   11/12/14     Visual impairment     cataract left eye    Weight gain      Past Surgical History:   Procedure Laterality Date    CARDIAC CATHETERIZATION      EYE SURGERY      IR THORACENTESIS  10/23/2018    IR THORACENTESIS  11/2/2018    IR THORACENTESIS  11/9/2018    IR THORACENTESIS  11/23/2018    MD CABG, ARTERY-VEIN, THREE N/A 9/17/2018    Procedure: CORONARY ARTERY BYPASS GRAFT (CABG) x 4 VESSELS with LIMA - LAD, SVG/LEFT LEG EVH - LEFT PDA, OM3, & OM2;  Surgeon: Arabella Manning MD;  Location: [facility];  Service: Cardiac Surgery    MD ECHO TRANSESOPHAG MONTR CARDIAC PUMP FUNCTJ N/A 9/17/2018    Procedure: TRANSESOPHAGEAL ECHOCARDIOGRAM (VERONICA);   Surgeon: Arabella Manning MD;  Location: [facility];  Service: Cardiac Surgery    MD RPLCMT AORTIC VALVE OPN W/STENTLESS TISSUE VALVE N/A 9/17/2018    Procedure: REPLACEMENT VALVE AORTIC (AVR)- 23mm tissue Intuity Valve;  Surgeon: Arabella Manning MD;  Location: [facility]; Service: Cardiac Surgery    THORACOSCOPY VIDEO ASSISTED SURGERY (VATS) Left 11/27/2018    Procedure: THORACOSCOPY VIDEO ASSISTED SURGERY (VATS), talc pleurodesis,;  Surgeon: Radha Tian MD;  [facility]: [facility];  Service: Thoracic    THYROID SURGERY       Family History   Problem Relation Age of Onset    Diabetes Mother     Pancreatic cancer Brother     Diabetes Maternal Grandmother     Colon cancer Son     Diabetes Family     Substance Abuse Neg Hx     Mental illness Neg Hx       reports that he quit smoking about 51 years ago  His smoking use included cigarettes  He has a 0 25 pack-year smoking history  He has never used smokeless tobacco  He reports that he does not drink alcohol or use drugs  Physical Exam:   Vitals:    03/05/21 1524   BP: 104/58   BP Location: Left arm   Patient Position: Sitting   Cuff Size: Large   Pulse: (!) 53   Resp: 16   Weight: 96 6 kg (213 lb)   Height: 5' 5" (1 651 m)     Body mass index is 35 45 kg/m²      General: conscious, cooperative, in no acute distress  Eyes: conjunctivae pink, anicteric sclerae  ENT: lips and mucous membranes moist  Neck: supple, no JVD  Chest: clear breath sounds bilateral, no crackles, ronchus or wheezings  CVS: normal rate, regular rhythm  Abdomen: soft, non-tender, non-distended, normoactive bowel sounds  Extremities: no edema of both legs  Skin: no rash  Neuro: awake, alert, oriented  Psych:  Pleasant affect    Lab Results:    Results for orders placed or performed in visit on 02/24/21   PTH, Intact and Calcium   Result Value Ref Range    PTH, Intact 69 (H) 14 - 64 pg/mL    Calcium 8 9 8 6 - 10 3 mg/dL   Magnesium   Result Value Ref Range    Magnesium, Serum 2 4 1 5 - 2 5 mg/dL   Uric acid   Result Value Ref Range    Uric Acid 3 7 (L) 4 0 - 8 0 mg/dL   Renal function panel   Result Value Ref Range    Glucose, Random 102 (H) 65 - 99 mg/dL    BUN 38 (H) 7 - 25 mg/dL    Creatinine 2 62 (H) 0 70 - 1 11 mg/dL    eGFR Non  21 (L) > OR = 60 mL/min/1 73m2    eGFR  25 (L) > OR = 60 mL/min/1 73m2    SL AMB BUN/CREATININE RATIO 15 6 - 22 (calc)    Sodium 142 135 - 146 mmol/L    Potassium 3 9 3 5 - 5 3 mmol/L    Chloride 108 98 - 110 mmol/L    CO2 25 20 - 32 mmol/L    Calcium 8 9 8 6 - 10 3 mg/dL    Phosphorus, Serum 3 7 2 1 - 4 3 mg/dL    Albumin 3 7 3 6 - 5 1 g/dL   CBC and differential   Result Value Ref Range    White Blood Cell Count 6 9 3 8 - 10 8 Thousand/uL    Red Blood Cell Count 3 77 (L) 4 20 - 5 80 Million/uL    Hemoglobin 12 1 (L) 13 2 - 17 1 g/dL    HCT 37 5 (L) 38 5 - 50 0 %    MCV 99 5 80 0 - 100 0 fL    MCH 32 1 27 0 - 33 0 pg    MCHC 32 3 32 0 - 36 0 g/dL    RDW 13 4 11 0 - 15 0 %    Platelet Count 821 516 - 400 Thousand/uL    SL AMB MPV 11 0 7 5 - 12 5 fL    Neutrophils (Absolute) 3,705 1,500 - 7,800 cells/uL    Lymphocytes (Absolute) 2,056 850 - 3,900 cells/uL    Monocytes (Absolute) 628 200 - 950 cells/uL    Eosinophils (Absolute) 428 15 - 500 cells/uL    Basophils ABS 83 0 - 200 cells/uL    Neutrophils 53 7 %    Lymphocytes 29 8 %    Monocytes 9 1 %    Eosinophils 6 2 %    Basophils PCT 1 2 %       Portions of the record may have been created with voice recognition software  Occasional wrong word or "sound a like" substitutions may have occurred due to the inherent limitations of voice recognition software  Read the chart carefully and recognize, using context, where substitutions have occurred  If you have any questions, please contact the dictating provider

## 2021-03-09 DIAGNOSIS — N18.30 TYPE 2 DIABETES MELLITUS WITH STAGE 3 CHRONIC KIDNEY DISEASE, WITHOUT LONG-TERM CURRENT USE OF INSULIN, UNSPECIFIED WHETHER STAGE 3A OR 3B CKD (HCC): ICD-10-CM

## 2021-03-09 DIAGNOSIS — E11.22 TYPE 2 DIABETES MELLITUS WITH STAGE 3 CHRONIC KIDNEY DISEASE, WITHOUT LONG-TERM CURRENT USE OF INSULIN, UNSPECIFIED WHETHER STAGE 3A OR 3B CKD (HCC): ICD-10-CM

## 2021-03-10 RX ORDER — INSULIN ASPART 100 [IU]/ML
INJECTION, SOLUTION INTRAVENOUS; SUBCUTANEOUS
Qty: 15 ML | Refills: 1 | Status: SHIPPED | OUTPATIENT
Start: 2021-03-10 | End: 2022-02-09 | Stop reason: SDUPTHER

## 2021-03-15 ENCOUNTER — OFFICE VISIT (OUTPATIENT)
Dept: CARDIOLOGY CLINIC | Facility: CLINIC | Age: 86
End: 2021-03-15
Payer: COMMERCIAL

## 2021-03-15 VITALS
BODY MASS INDEX: 35.82 KG/M2 | SYSTOLIC BLOOD PRESSURE: 124 MMHG | DIASTOLIC BLOOD PRESSURE: 62 MMHG | HEIGHT: 65 IN | WEIGHT: 215 LBS | HEART RATE: 45 BPM

## 2021-03-15 DIAGNOSIS — I35.0 NONRHEUMATIC AORTIC VALVE STENOSIS: ICD-10-CM

## 2021-03-15 DIAGNOSIS — Z95.2 S/P AVR (AORTIC VALVE REPLACEMENT): ICD-10-CM

## 2021-03-15 DIAGNOSIS — E78.00 PURE HYPERCHOLESTEROLEMIA: ICD-10-CM

## 2021-03-15 DIAGNOSIS — Z95.1 S/P CABG X 4: ICD-10-CM

## 2021-03-15 DIAGNOSIS — I25.10 CORONARY ARTERY DISEASE INVOLVING NATIVE CORONARY ARTERY OF NATIVE HEART WITHOUT ANGINA PECTORIS: Primary | ICD-10-CM

## 2021-03-15 DIAGNOSIS — I10 BENIGN ESSENTIAL HYPERTENSION: ICD-10-CM

## 2021-03-15 PROCEDURE — 93000 ELECTROCARDIOGRAM COMPLETE: CPT | Performed by: INTERNAL MEDICINE

## 2021-03-15 PROCEDURE — 1160F RVW MEDS BY RX/DR IN RCRD: CPT | Performed by: INTERNAL MEDICINE

## 2021-03-15 PROCEDURE — 3074F SYST BP LT 130 MM HG: CPT | Performed by: INTERNAL MEDICINE

## 2021-03-15 PROCEDURE — 3078F DIAST BP <80 MM HG: CPT | Performed by: INTERNAL MEDICINE

## 2021-03-15 PROCEDURE — 99214 OFFICE O/P EST MOD 30 MIN: CPT | Performed by: INTERNAL MEDICINE

## 2021-03-15 PROCEDURE — 1036F TOBACCO NON-USER: CPT | Performed by: INTERNAL MEDICINE

## 2021-03-15 NOTE — PROGRESS NOTES
Cardiology Follow-up    Merissa Corrigan  1935  [id number]  HEART & VASCULAR  [facility] [place]  [address] [address]    1  Coronary artery disease involving native coronary artery of native heart without angina pectoris  POCT ECG   2  Nonrheumatic aortic valve stenosis     3  S/P CABG x 4     4  S/P AVR (aortic valve replacement)     5  Benign essential hypertension     6  Pure hypercholesterolemia         Discussion/Summary:    1  CAD - Helen Schmid is status post CABG x4  He tolerated the surgery well  He will remain on low-dose torsemide given lower extremity edema, which has remained stable  No changes were made to his regimen  We will see him back in 1 year  2   Aortic stenosis - He is also status post bioprosthetic aortic valve replacement at the time of his CABG  Echocardiogram in May 2019 showed a normally functioning AVR  He should take antibiotic prophylaxis for any dental procedures  No testing is needed at this time  3   Hypertension - His blood pressure is well controlled  He has come off of lisinopril due to worsening renal function, and follows with Nephrology closely  He will remain on the same dose of metoprolol  We will see him back in 1 year  4   Hyperlipidemia - He has been on Crestor  We will continue to follow blood work closely along with his internist       HPI:    Mr Riki Baker comes in for follow-up given his history multivessel coronary artery disease and aortic stenosis  I met him at the time of a stress nuclear study back in May of 2018  This was ordered by his internist, Dr Laura Pinedo, secondary to abdominal pain, exertional at times, and multiple risk factors for CAD  This demonstrated a reversible defect and ischemia of the anterior to apical wall  His ejection fraction was normal   He also has moderate aortic stenosis by echocardiogram from December    He also has hypertension, dyslipidemia and diabetes mellitus  Cardiac catheterization showed multivessel CAD  At that point he went and met cardiothoracic surgery, who recommended both core artery bypass grafting and a bioprosthetic aortic valve replacement  He had this performed in 9/2018, and it went well without complications  He had CABG x4, with a LIMA to the LAD, SVG to an OM 2 and SVG Y graft" to an OM 3 and left posterior descending artery  He also had a bioprosthetic aortic valve replacement  He did go home on diuretic therapy but due to some edema associated with cellulitis torsemide was restarted  He then developed recurrent pleural effusions  He was in the hospital in which he was requiring thoracentesis  He ended up having surgery and pleurodesis  He has recovered well from this  He did not go home on diuretic therapy due to some acute kidney injury  He started to show increasing signs of volume overload, weight gain and lower extremity edema  Torsemide was restarted at 20 mg daily, and we decrease this to 10 mg daily  He has been stable on this dose  Since last visit he has been having some worsening renal function, and has come off lisinopril  Due to bradycardia his metoprolol has been decreased to once daily  Sury Bowen overall is feeling well  He denies any chest pain or shortness of breath  He denies lightheadedness, palpitations or syncope  He still has lower extremity swelling, but it is improved  He denies any other signs/symptoms of CHF        Patient Active Problem List   Diagnosis    Benign prostatic hyperplasia with nocturia    Benign essential hypertension    Gout with tophus    Hypothyroidism    Obesity, morbid ([address] )    Pure hypercholesterolemia    Renal cyst    Sexual dysfunction    Nonrheumatic aortic valve stenosis    GERD (gastroesophageal reflux disease)    S/P CABG x 4    S/P AVR (aortic valve replacement)    CAD (coronary artery disease)    Aortic stenosis    Anemia due to stage 3 chronic kidney disease    Ambulatory dysfunction    Type 2 diabetes mellitus with kidney complication, without long-term current use of insulin (HCC)    Primary open angle glaucoma (POAG)    Age-related cataract of both eyes    Atherosclerosis of aorta (HCC)    Chronic diastolic congestive heart failure (HCC)    Stable angina (HCC)    Chronic kidney disease (CKD), stage IV (severe) ([facility])    Acute postprocedural respiratory failure ([facility])     Past Medical History:   Diagnosis Date    Aortic stenosis     CKD (chronic kidney disease)     baseline Cr 1 3-1 5    Coronary artery disease     Cough     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HCC)     type 2, insulin dependent    GERD (gastroesophageal reflux disease)     Glaucoma     Gout     History of prostate cancer     Hypertension     Hypothyroidism     Overweight     Peripheral neuropathy, idiopathic     Pleural effusion, left     Pure hypercholesterolemia     LA   14   R   14     Visual impairment     cataract left eye    Weight gain      Social History     Socioeconomic History    Marital status:      Spouse name: Not on file    Number of children: Not on file    Years of education: Not on file    Highest education level: Not on file   Occupational History    Occupation: retired    Social Needs    Financial resource strain: Not on file    Food insecurity     Worry: Not on file     Inability: Not on file   [employer] needs     Medical: No     Non-medical: No   Tobacco Use    Smoking status: Former Smoker     Packs/day: 0 25     Years: 1 00     Pack years: 0 25     Types: Cigarettes     Quit date: 1970     Years since quittin 2    Smokeless tobacco: Never Used    Tobacco comment: Only in the service    Substance and Sexual Activity    Alcohol use: No    Drug use: No    Sexual activity: Not Currently   Lifestyle    Physical activity     Days per week: 3 days     Minutes per session: 20 min    Stress:  To some extent Relationships    Social connections     Talks on phone: Not on file     Gets together: Not on file     Attends Voodoo service: Not on file     Active member of club or organization: Not on file     Attends meetings of clubs or organizations: Not on file     Relationship status: Not on file    Intimate partner violence     Fear of current or ex partner: Not on file     Emotionally abused: Not on file     Physically abused: Not on file     Forced sexual activity: Not on file   Other Topics Concern    Not on file   Social History Narrative    Caffeine use / coffee diet cola and tea    Lives with family     Living situation supportive and safe    No advance directives  -denied      Family History   Problem Relation Age of Onset    Diabetes Mother     Pancreatic cancer Brother     Diabetes Maternal Grandmother     Colon cancer Son     Diabetes Family     Substance Abuse Neg Hx     Mental illness Neg Hx      Past Surgical History:   Procedure Laterality Date    CARDIAC CATHETERIZATION      EYE SURGERY      IR THORACENTESIS  10/23/2018    IR THORACENTESIS  11/2/2018    IR THORACENTESIS  11/9/2018    IR THORACENTESIS  11/23/2018    OK CABG, ARTERY-VEIN, THREE N/A 9/17/2018    Procedure: CORONARY ARTERY BYPASS GRAFT (CABG) x 4 VESSELS with LIMA - LAD, SVG/LEFT LEG EVH - LEFT PDA, OM3, & OM2;  Surgeon: Chloe Aparicio MD;  [facility];  Service: Cardiac Surgery    OK ECHO TRANSESOPHAG [address] N/A 9/17/2018    Procedure: TRANSESOPHAGEAL ECHOCARDIOGRAM (VERONICA);   Surgeon: Chloe Aparicio MD;  [facility];  Service: Cardiac Surgery    OK RPLCMT AORTIC VALVE OPN W/STENTLESS TISSUE VALVE N/A 9/17/2018    Procedure: REPLACEMENT VALVE AORTIC (AVR)- 23mm tissue Intuity Valve;  Surgeon: Chloe Aparicio MD;  [facility];  Service: Cardiac Surgery    THORACOSCOPY VIDEO ASSISTED SURGERY (VATS) Left 11/27/2018    Procedure: THORACOSCOPY VIDEO ASSISTED SURGERY (VATS), talc pleurodesis,;  Surgeon: Felicita Schneider MD;  [facility];  Service: Thoracic    THYROID SURGERY         Current Outpatient Medications:     acetaminophen (TYLENOL) 325 mg tablet, 650 mg every 4 to 6 hours as needed for pain (do not take with percocet), Disp: 30 tablet, Rfl: 0    allopurinol (ZYLOPRIM) 300 mg tablet, TAKE ONE TABLET BY MOUTH ONCE DAILY, Disp: 90 tablet, Rfl: 3    aspirin (ECOTRIN) 325 mg EC tablet, TAKE ONE TABLET BY MOUTH DAILY (Patient taking differently: Take 325 mg by mouth daily ), Disp: 28 tablet, Rfl: 3    Basaglar KwikPen 100 units/mL injection pen, Inject 20 Units under the skin daily at bedtime, Disp: 15 mL, Rfl: 3    ferrous sulfate 325 (65 Fe) mg tablet, TAKE ONE TABLET BY MOUTH DAILY, Disp: 28 tablet, Rfl: 3    gabapentin (NEURONTIN) 300 mg capsule, Take 1 capsule (300 mg total) by mouth daily at bedtime, Disp: 90 capsule, Rfl: 3    Insulin Pen Needle (Pen Needles) 32G X 4 MM MISC, Use 4 (four) times a day (before meals and at bedtime), Disp: 100 each, Rfl: 11    levothyroxine 150 mcg tablet, Take 1 tablet (150 mcg total) by mouth daily, Disp: 30 tablet, Rfl: 5    LORazepam (ATIVAN) 0 5 mg tablet, TAKE ONE TABLET BY MOUTH EVERY EIGHT HOURS AS NEEDED FOR ANXIETY, Disp: 30 tablet, Rfl: 3    metoprolol tartrate (LOPRESSOR) 25 mg tablet, TAKE 12 tablet BY MOUTH DAILY, Disp: 30 tablet, Rfl: 6    NovoLOG FlexPen 100 units/mL injection pen, INJECT TEN UNITS SUBCUTANEOUSLY THREE TIMES DAILY, Disp: 15 mL, Rfl: 1    pantoprazole (PROTONIX) 20 mg tablet, TAKE ONE TABLET BY MOUTH DAILY, Disp: 30 tablet, Rfl: 3    polyethylene glycol (MIRALAX) 17 g packet, Take 17 g by mouth daily, Disp: , Rfl:     rosuvastatin (CRESTOR) 40 MG tablet, TAKE ONE TABLET BY MOUTH DAILY, Disp: 30 tablet, Rfl: 0    tamsulosin (FLOMAX) 0 4 mg, TAKE ONE CAPSULE BY MOUTH DAILY WITH dinner, Disp: 30 capsule, Rfl: 6    timolol (TIMOPTIC) 0 5 % ophthalmic solution, Administer 1 drop to both eyes 2 (two) times a day , Disp: , Rfl:     torsemide (DEMADEX) 10 mg tablet, TAKE ONE TABLET BY MOUTH EVERY DAY, Disp: 30 tablet, Rfl: 5    albuterol (PROVENTIL HFA) 90 mcg/act inhaler, Inhale 2 puffs 4 (four) times a day as needed, Disp: , Rfl:     ascorbic acid (VITAMIN C) 500 MG tablet, TAKE ONE TABLET BY MOUTH DAILY (Patient not taking: Reported on 3/15/2021), Disp: 28 tablet, Rfl: 3  Allergies   Allergen Reactions    Amlodipine Nausea Only    Atorvastatin Myalgia       Labs:  Lab Results   Component Value Date     09/05/2017    K 3 9 02/24/2021     02/24/2021    CO2 25 02/24/2021    BUN 38 (H) 02/24/2021    CREATININE 2 62 (H) 02/24/2021    CREATININE 1 55 (H) 12/01/2018    CREATININE 1 56 (H) 09/05/2017    GLUCOSE 126 09/17/2018    CALCIUM 8 9 02/24/2021    CALCIUM 8 9 02/24/2021     Lab Results   Component Value Date    WBC 6 9 02/24/2021    WBC 8 91 12/01/2018    WBC 7 6 04/17/2017    HGB 12 1 (L) 02/24/2021    HGB 9 0 (L) 12/01/2018    HGB 14 0 04/17/2017    HCT 37 5 (L) 02/24/2021    HCT 29 4 (L) 12/01/2018    HCT 43 3 04/17/2017    MCV 99 5 02/24/2021    MCV 87 12/01/2018    MCV 97 2 04/17/2017     02/24/2021     12/01/2018     04/17/2017     Lab Results   Component Value Date    CHOL 197 04/17/2017    TRIG 138 11/17/2020    HDL 30 (L) 11/17/2020     Imaging:  ECG today shows sinus bradycardia, first-degree AV block, occasional PVCs and nonspecific IVCD  ECHO(5/2019):  LEFT VENTRICLE:  Systolic function was at the lower limits of normal by visual assessment  Ejection fraction was estimated in the range of 50 % to 55 %  Although no diagnostic regional wall motion abnormality was identified, this possibility cannot be completely excluded on the basis of this study    Features were consistent with a pseudonormal left ventricular filling pattern, with concomitant abnormal relaxation and increased filling pressure (grade 2 diastolic dysfunction)      LEFT ATRIUM:  The atrium was mildly dilated      MITRAL VALVE:  There was mild annular calcification  There was trace regurgitation      AORTIC VALVE:  A bioprosthesis was present  It exhibited normal function      TRICUSPID VALVE:  There was trace regurgitation      PULMONIC VALVE:  There was mild to moderate regurgitation  CARDIAC CATH(5/2018):  CORONARY CIRCULATION:  Proximal LAD: There was a 100 % stenosis  This lesion is a chronic total occlusion  1st obtuse marginal: There was a diffuse 90 % stenosis  2nd obtuse marginal: There was a diffuse 90 % stenosis  3rd obtuse marginal: There was a 80 % stenosis  1st left posterolateral: There was a 90 % stenosis  Mid RCA: There was a 95 % stenosis      CARDIAC STRUCTURES:  There was moderate aortic stenosis  Review of Systems:  Review of Systems   Constitutional: Positive for fatigue  HENT: Negative  Eyes: Negative  Respiratory: Negative  Cardiovascular: Negative  Gastrointestinal: Positive for constipation  Musculoskeletal: Negative  Skin: Negative  Allergic/Immunologic: Negative  Neurological: Negative  Hematological: Negative  Psychiatric/Behavioral: Negative  All other systems reviewed and are negative  Vitals:    03/15/21 1126   BP: 124/62   BP Location: Left arm   Patient Position: Sitting   Cuff Size: Standard   Pulse: (!) 45   Weight: 97 5 kg (215 lb)   Height: 5' 5" (1 651 m)       Physical Exam:  Physical Exam   Constitutional: He is oriented to person, place, and time  He appears well-developed and well-nourished  HENT:   Head: Normocephalic and atraumatic  Eyes: Pupils are equal, round, and reactive to light  EOM are normal  Right eye exhibits no discharge  Left eye exhibits no discharge  No scleral icterus  Neck: Normal range of motion  Neck supple  No JVD present  No thyromegaly present  Cardiovascular: Normal rate, regular rhythm, S1 normal, S2 normal, intact distal pulses and normal pulses    No extrasystoles are present  Exam reveals no gallop and no friction rub  Murmur heard  Systolic murmur is present with a grade of 3/6  Pulmonary/Chest: Effort normal and breath sounds normal  No respiratory distress  He has no wheezes  He has no rales  Abdominal: Soft  Bowel sounds are normal  He exhibits no distension  There is no abdominal tenderness  Musculoskeletal: Normal range of motion  General: No tenderness, deformity or edema  Neurological: He is alert and oriented to person, place, and time  No cranial nerve deficit  Skin: Skin is warm and dry  No rash noted  Psychiatric: He has a normal mood and affect  Judgment and thought content normal    Nursing note and vitals reviewed  Counseling / Coordination of Care  Total floor / unit time spent today 25 minutes  Greater than 50% of total time was spent with the patient and / or family counseling and / or coordination of care

## 2021-03-15 NOTE — PATIENT INSTRUCTIONS
Low-Sodium Diet   AMBULATORY CARE:   A low-sodium diet  limits foods that are high in sodium (salt)  You will need to follow a low-sodium diet if you have high blood pressure, kidney disease, or heart failure  You may also need to follow this diet if you have a condition that is causing your body to retain (hold) extra fluid  You may need to limit the amount of sodium you eat in a day to 1,500 to 2,000 mg  Ask your healthcare provider how much sodium you can have each day  How to use food labels to choose foods that are low in sodium:  Read food labels to find the amount of sodium they contain  The amount of sodium is listed in milligrams (mg)  The % Daily Value (DV) column tells you how much of your daily needs are met by 1 serving of the food for each nutrient listed  Choose foods that have less than 5% of the DV of sodium  These foods are considered low in sodium  Foods that have 20% or more of the DV of sodium are considered high in sodium  Some food labels may also list any of the following terms that tell you about the sodium content in the food:  · Sodium-free:  Less than 5 mg in each serving    · Very low sodium:  35 mg of sodium or less in each serving    · Low sodium:  140 mg of sodium or less in each serving    · Reduced sodium: At least 25% less sodium in each serving than the regular type    · Light in sodium:  50% less sodium in each serving    · Unsalted or no added salt:  No extra salt is added during processing (the food may still contain sodium)     Foods to avoid:  Salty foods are high in sodium  You should avoid the following:  · Processed foods:      ? Mixes for cornbread, biscuits, cake, and pudding     ? Instant foods, such as potatoes, cereals, noodles, and rice     ? Packaged foods, such as bread stuffing, rice and pasta mixes, snack dip mixes, and macaroni and cheese     ? Canned foods, such as canned vegetables, soups, broths, sauces, and vegetable or tomato juice    ?  Snack foods, such as salted chips, popcorn, pretzels, pork rinds, salted crackers, and salted nuts    ? Frozen foods, such as dinners, entrees, vegetables with sauces, and breaded meats    ? Sauerkraut, pickled vegetables, and other foods prepared in brine    · Meats and cheeses:      ? Smoked or cured meat, such as corned beef, pugh, ham, hot dogs, and sausage    ? Canned meats or spreads, such as potted meats, sardines, anchovies, and imitation seafood    ? Deli or lunch meats, such as bologna, ham, turkey, and roast beef    ? Processed cheese, such as American cheese and cheese spreads    · Condiments, sauces, and seasonings:      ? Salt (¼ teaspoon of salt contains 575 mg of sodium)    ? Seasonings made with salt, such as garlic salt, celery salt, onion salt, and seasoned salt    ? Regular soy sauce, barbecue sauce, teriyaki sauce, steak sauce, Worcestershire sauce, and most flavored vinegars    ? Canned gravy and mixes     ? Regular condiments, such as mustard, ketchup, and salad dressings    ? Pickles and olives    ? Meat tenderizers and monosodium glutamate (MSG)    Foods to include:  Read the food label to find the exact amount of sodium in each serving  · Bread and cereal:  Try to choose breads with less than 80 mg of sodium per serving  ? Bread, roll, virginie, tortilla, or unsalted crackers  ? Ready-to-eat cereals with less than 5% DV of sodium (examples include shredded wheat and puffed rice)    ? Pasta    · Vegetables and fruits:      ? Unsalted fresh, frozen, or canned vegetables    ? Fresh, frozen, or canned fruits    ? Fruit juice    · Dairy:  One serving has about 150 mg of sodium  ? Milk, all types    ? Yogurt    ? Hard cheese, such as cheddar, Swiss, [employer], or mozzarella    · Meat and other protein foods:  Some raw meats may have added sodium  ? Plain meats, fish, and poultry     ? Eggs    · Other foods:      ? Homemade pudding    ? Unsalted nuts, popcorn, or pretzels    ?  Unsalted butter or margarine    Ways to decrease sodium:   · Add spices and herbs to foods instead of salt during cooking  Use salt-free seasonings to add flavor to foods  Examples include onion powder, garlic powder, basil, madrigal powder, paprika, and parsley  Try lemon or lime juice or vinegar to give foods a tart flavor  Use hot peppers, pepper, or cayenne pepper to add a spicy flavor  · Do not keep a salt shaker at your kitchen table  This may help keep you from adding salt to food at the table  A teaspoon of salt has 2,300 mg of sodium  It may take time to get used to enjoying the natural flavor of food instead of adding salt  Talk to your healthcare provider before you use salt substitutes  Some salt substitutes have a high amount of potassium and need to be avoided if you have kidney disease  · Choose low-sodium foods at restaurants  Meals from restaurants are often high in sodium  Some restaurants have nutrition information on the menu that tells you the amount of sodium in their foods  If possible, ask for your food to be prepared with less, or no salt  · Shop for unsalted or low-sodium foods and snacks at the grocery store  Examples include unsalted or low-sodium broths, soups, and canned vegetables  Choose fresh or frozen vegetables instead  Choose unsalted nuts or seeds or fresh fruits or vegetables as snacks  Read food labels and choose salt-free, very low-sodium, or low-sodium foods  © Copyright [address] Information is for End User's use only and may not be sold, redistributed or otherwise used for commercial purposes  All illustrations and images included in CareNotes® are the copyrighted property of [employer]  [facility] [address]   The above information is an  only  It is not intended as medical advice for individual conditions or treatments  Talk to your doctor, nurse or pharmacist before following any medical regimen to see if it is safe and effective for you

## 2021-03-16 DIAGNOSIS — Z95.1 S/P CABG X 4: ICD-10-CM

## 2021-03-16 DIAGNOSIS — M1A.9XX1 CHRONIC GOUT WITH TOPHUS, UNSPECIFIED CAUSE, UNSPECIFIED SITE: ICD-10-CM

## 2021-03-16 DIAGNOSIS — Z95.2 S/P AVR (AORTIC VALVE REPLACEMENT): ICD-10-CM

## 2021-03-16 RX ORDER — ALLOPURINOL 300 MG/1
TABLET ORAL
Qty: 90 TABLET | Refills: 3 | Status: SHIPPED | OUTPATIENT
Start: 2021-03-16 | End: 2022-01-25

## 2021-03-16 RX ORDER — ROSUVASTATIN CALCIUM 40 MG/1
TABLET, COATED ORAL
Qty: 90 TABLET | Refills: 3 | Status: SHIPPED | OUTPATIENT
Start: 2021-03-16 | End: 2022-01-25

## 2021-03-22 ENCOUNTER — IMMUNIZATIONS (OUTPATIENT)
Dept: FAMILY MEDICINE CLINIC | Facility: HOSPITAL | Age: 86
End: 2021-03-22

## 2021-03-22 DIAGNOSIS — Z23 ENCOUNTER FOR IMMUNIZATION: Primary | ICD-10-CM

## 2021-03-22 PROCEDURE — 0002A SARS-COV-2 / COVID-19 MRNA VACCINE (PFIZER-BIONTECH) 30 MCG: CPT

## 2021-03-22 PROCEDURE — 91300 SARS-COV-2 / COVID-19 MRNA VACCINE (PFIZER-BIONTECH) 30 MCG: CPT

## 2021-04-26 DIAGNOSIS — G62.9 NEUROPATHY: ICD-10-CM

## 2021-04-26 RX ORDER — GABAPENTIN 300 MG/1
300 CAPSULE ORAL
Qty: 90 CAPSULE | Refills: 3 | Status: SHIPPED | OUTPATIENT
Start: 2021-04-26 | End: 2022-04-29

## 2021-05-03 LAB
ALBUMIN SERPL-MCNC: 3.7 G/DL (ref 3.6–5.1)
AMORPH SED URNS QL MICRO: ABNORMAL /HPF
APPEARANCE UR: ABNORMAL
BACTERIA UR QL AUTO: ABNORMAL /HPF
BASOPHILS # BLD AUTO: 79 CELLS/UL (ref 0–200)
BASOPHILS NFR BLD AUTO: 1.1 %
BILIRUB UR QL STRIP: NEGATIVE
BUN SERPL-MCNC: 30 MG/DL (ref 7–25)
BUN/CREAT SERPL: 14 (CALC) (ref 6–22)
CALCIUM SERPL-MCNC: 8.9 MG/DL (ref 8.6–10.3)
CALCIUM SERPL-MCNC: 8.9 MG/DL (ref 8.6–10.3)
CHLORIDE SERPL-SCNC: 108 MMOL/L (ref 98–110)
CO2 SERPL-SCNC: 30 MMOL/L (ref 20–32)
COLOR UR: YELLOW
CREAT SERPL-MCNC: 2.08 MG/DL (ref 0.7–1.11)
CREAT UR-MCNC: 107 MG/DL (ref 20–320)
EOSINOPHIL # BLD AUTO: 475 CELLS/UL (ref 15–500)
EOSINOPHIL NFR BLD AUTO: 6.6 %
ERYTHROCYTE [DISTWIDTH] IN BLOOD BY AUTOMATED COUNT: 13 % (ref 11–15)
GLUCOSE SERPL-MCNC: 139 MG/DL (ref 65–99)
GLUCOSE UR QL STRIP: NEGATIVE
GRAN CASTS #/AREA URNS LPF: ABNORMAL /LPF
HCT VFR BLD AUTO: 39.2 % (ref 38.5–50)
HGB BLD-MCNC: 12.7 G/DL (ref 13.2–17.1)
HGB UR QL STRIP: ABNORMAL
HYALINE CASTS #/AREA URNS LPF: ABNORMAL /LPF
KETONES UR QL STRIP: NEGATIVE
LEUKOCYTE ESTERASE UR QL STRIP: NEGATIVE
LYMPHOCYTES # BLD AUTO: 1714 CELLS/UL (ref 850–3900)
LYMPHOCYTES NFR BLD AUTO: 23.8 %
MAGNESIUM SERPL-MCNC: 2.4 MG/DL (ref 1.5–2.5)
MCH RBC QN AUTO: 32.3 PG (ref 27–33)
MCHC RBC AUTO-ENTMCNC: 32.4 G/DL (ref 32–36)
MCV RBC AUTO: 99.7 FL (ref 80–100)
MONOCYTES # BLD AUTO: 583 CELLS/UL (ref 200–950)
MONOCYTES NFR BLD AUTO: 8.1 %
NEUTROPHILS # BLD AUTO: 4349 CELLS/UL (ref 1500–7800)
NEUTROPHILS NFR BLD AUTO: 60.4 %
NITRITE UR QL STRIP: NEGATIVE
PH UR STRIP: ABNORMAL [PH] (ref 5–8)
PHOSPHATE SERPL-MCNC: 3.5 MG/DL (ref 2.1–4.3)
PLATELET # BLD AUTO: 180 THOUSAND/UL (ref 140–400)
PMV BLD REES-ECKER: 10.9 FL (ref 7.5–12.5)
POTASSIUM SERPL-SCNC: 4.1 MMOL/L (ref 3.5–5.3)
PROT UR QL STRIP: ABNORMAL
PROT UR-MCNC: 153 MG/DL (ref 5–25)
PROT/CREAT UR: 1.43 MG/MG CREAT (ref 0.02–0.13)
PROT/CREAT UR: 1430 MG/G CREAT (ref 22–128)
PTH-INTACT SERPL-MCNC: 77 PG/ML (ref 14–64)
RBC # BLD AUTO: 3.93 MILLION/UL (ref 4.2–5.8)
RBC #/AREA URNS HPF: ABNORMAL /HPF
SL AMB EGFR AFRICAN AMERICAN: 32 ML/MIN/1.73M2
SL AMB EGFR NON AFRICAN AMERICAN: 28 ML/MIN/1.73M2
SODIUM SERPL-SCNC: 143 MMOL/L (ref 135–146)
SP GR UR STRIP: 1.02 (ref 1–1.03)
SQUAMOUS #/AREA URNS HPF: ABNORMAL /HPF
URATE SERPL-MCNC: 3.4 MG/DL (ref 4–8)
WBC # BLD AUTO: 7.2 THOUSAND/UL (ref 3.8–10.8)
WBC #/AREA URNS HPF: ABNORMAL /HPF

## 2021-05-05 ENCOUNTER — OFFICE VISIT (OUTPATIENT)
Dept: NEPHROLOGY | Facility: HOSPITAL | Age: 86
End: 2021-05-05
Payer: COMMERCIAL

## 2021-05-05 VITALS
WEIGHT: 213 LBS | RESPIRATION RATE: 16 BRPM | SYSTOLIC BLOOD PRESSURE: 112 MMHG | HEIGHT: 65 IN | HEART RATE: 52 BPM | DIASTOLIC BLOOD PRESSURE: 58 MMHG | BODY MASS INDEX: 35.49 KG/M2

## 2021-05-05 DIAGNOSIS — N18.4 CKD (CHRONIC KIDNEY DISEASE), STAGE IV (HCC): Primary | ICD-10-CM

## 2021-05-05 PROCEDURE — 3078F DIAST BP <80 MM HG: CPT | Performed by: INTERNAL MEDICINE

## 2021-05-05 PROCEDURE — 3074F SYST BP LT 130 MM HG: CPT | Performed by: INTERNAL MEDICINE

## 2021-05-05 PROCEDURE — 99214 OFFICE O/P EST MOD 30 MIN: CPT | Performed by: INTERNAL MEDICINE

## 2021-05-05 NOTE — PROGRESS NOTES
OFFICE FOLLOW UP - Nephrology   Eugenia Mills  80 y o  male MRN: [id number]    Encounter: [id number]        ASSESSMENT & PLAN    1  Stage IIIB to stage 4 chronic kidney disease moderately increased proteinuria  -urinalysis:   Now with about 1 2 g of protein in his urine  -imaging:  CT scan of the pelvis shows bilateral renal cysts largest at the lower pole of the right kidney measuring up to 3 3 cm   -etiology:  Patient has a longstanding history of hypertension and type 2 diabetes mellitus this is likely progression of his CKD  -plan: His creatinine did improve now to 2 0  - will have continue him hold his lisinopril  - repeat BMP 4 months  - Completed CKD education and advance care meeting  - eGFR stable    2  Electrolytes:  -currently acceptable    3  Acid/Base  -no anion gap acid-base status acceptable    4  BP/HR  -History of hypertension- blood pressure is currently stable    5  Volume Status-euvolemic    6  Anemia of chronic kidney disease  - stable    7  MBD  -will check PTH and phosphorus with next set of blood work    8   Health Maintanance/Risk Reduction  -uncontrolled diabetes with elevated hemoglobin A1cs on insulin  -allopurinol for gout  -coronary artery disease and status post aortic valve replacement on diuretics ACE-inhibitor beta-blocker, statin    Repeat blood work in 4 months given stability    HPI: Eugenia Mills  is a 80 y o  male who is here for follow-up    overall Thu Iron doing relatively well he feels okay he denies any acute complaints no chest pain or shortness of breath fevers or chills no nausea vomiting diarrhea constipation no foamy or bloody urine    No acute complaints  Active with house work  No cp, sob, fevers, chills  No nausea, vomiting, diarrhea or constipation    ROS:   All systems reviewed and negative besides what was mentioned in the history of present illness    Allergies: Amlodipine and Atorvastatin    Medications:   Current Outpatient Medications:     acetaminophen (TYLENOL) 325 mg tablet, 650 mg every 4 to 6 hours as needed for pain (do not take with percocet), Disp: 30 tablet, Rfl: 0    albuterol (PROVENTIL HFA) 90 mcg/act inhaler, Inhale 2 puffs 4 (four) times a day as needed, Disp: , Rfl:     allopurinol (ZYLOPRIM) 300 mg tablet, TAKE ONE TABLET BY MOUTH ONCE DAILY, Disp: 90 tablet, Rfl: 3    ascorbic acid (VITAMIN C) 500 MG tablet, TAKE ONE TABLET BY MOUTH DAILY, Disp: 28 tablet, Rfl: 3    aspirin (ECOTRIN) 325 mg EC tablet, TAKE ONE TABLET BY MOUTH DAILY (Patient taking differently: Take 325 mg by mouth daily ), Disp: 28 tablet, Rfl: 3    Basaglar KwikPen 100 units/mL injection pen, Inject 20 Units under the skin daily at bedtime, Disp: 15 mL, Rfl: 3    ferrous sulfate 325 (65 Fe) mg tablet, TAKE ONE TABLET BY MOUTH DAILY, Disp: 28 tablet, Rfl: 3    gabapentin (NEURONTIN) 300 mg capsule, Take 1 capsule (300 mg total) by mouth daily at bedtime, Disp: 90 capsule, Rfl: 3    Insulin Pen Needle (Pen Needles) 32G X 4 MM MISC, Use 4 (four) times a day (before meals and at bedtime), Disp: 100 each, Rfl: 11    levothyroxine 150 mcg tablet, Take 1 tablet (150 mcg total) by mouth daily, Disp: 30 tablet, Rfl: 5    LORazepam (ATIVAN) 0 5 mg tablet, TAKE ONE TABLET BY MOUTH EVERY EIGHT HOURS AS NEEDED FOR ANXIETY, Disp: 30 tablet, Rfl: 3    metoprolol tartrate (LOPRESSOR) 25 mg tablet, TAKE 12 tablet BY MOUTH DAILY, Disp: 30 tablet, Rfl: 6    NovoLOG FlexPen 100 units/mL injection pen, INJECT TEN UNITS SUBCUTANEOUSLY THREE TIMES DAILY, Disp: 15 mL, Rfl: 1    pantoprazole (PROTONIX) 20 mg tablet, TAKE ONE TABLET BY MOUTH DAILY, Disp: 30 tablet, Rfl: 3    polyethylene glycol (MIRALAX) 17 g packet, Take 17 g by mouth daily, Disp: , Rfl:     rosuvastatin (CRESTOR) 40 MG tablet, TAKE ONE TABLET BY MOUTH DAILY, Disp: 90 tablet, Rfl: 3    tamsulosin (FLOMAX) 0 4 mg, TAKE ONE CAPSULE BY MOUTH DAILY WITH dinner, Disp: 30 capsule, Rfl: 6    timolol (TIMOPTIC) 0 5 % ophthalmic solution, Administer 1 drop to both eyes 2 (two) times a day , Disp: , Rfl:     torsemide (DEMADEX) 10 mg tablet, TAKE ONE TABLET BY MOUTH EVERY DAY, Disp: 30 tablet, Rfl: 5    Past Medical History:   Diagnosis Date    Aortic stenosis     CKD (chronic kidney disease)     baseline Cr 1 3-1 5    Coronary artery disease     Cough     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HCC)     type 2, insulin dependent    GERD (gastroesophageal reflux disease)     Glaucoma     Gout     History of prostate cancer     Hypertension     Hypothyroidism     Overweight     Peripheral neuropathy, idiopathic     Pleural effusion, left     Pure hypercholesterolemia     [place]   11/12/14   R   11/12/14     Visual impairment     cataract left eye    Weight gain      Past Surgical History:   Procedure Laterality Date    CARDIAC CATHETERIZATION      EYE SURGERY      IR THORACENTESIS  10/23/2018    IR THORACENTESIS  11/2/2018    IR THORACENTESIS  11/9/2018    IR THORACENTESIS  11/23/2018    IA CABG, ARTERY-VEIN, THREE N/A 9/17/2018    Procedure: CORONARY ARTERY BYPASS GRAFT (CABG) x 4 VESSELS with LIMA - LAD, SVG/LEFT LEG EVH - LEFT PDA, OM3, & OM2;  Surgeon: Tom Real MD;  [facility];  Service: Cardiac Surgery    IA ECHO TRANSESOPHAG MONTR CARDIAC PUMP FUNCTJ N/A 9/17/2018    Procedure: TRANSESOPHAGEAL ECHOCARDIOGRAM (VERONICA);   Surgeon: Tom Real MD;  [facility];  Service: Cardiac Surgery    IA RPLCMT AORTIC VALVE OPN W/STENTLESS TISSUE VALVE N/A 9/17/2018    Procedure: REPLACEMENT VALVE AORTIC (AVR)- 23mm tissue Intuity Valve;  Surgeon: Tom Real MD;  [facility];  Service: Cardiac Surgery    THORACOSCOPY VIDEO ASSISTED SURGERY (VATS) Left 11/27/2018    Procedure: THORACOSCOPY VIDEO ASSISTED SURGERY (VATS), talc pleurodesis,;  Surgeon: Genesis Ortiz MD;  [facility];  Service: Thoracic    THYROID SURGERY       Family History   Problem Relation Age of Onset    Diabetes Mother  Pancreatic cancer Brother     Diabetes Maternal Grandmother     Colon cancer Son     Diabetes Family     Substance Abuse Neg Hx     Mental illness Neg Hx       reports that he quit smoking about 51 years ago  His smoking use included cigarettes  He has a 0 25 pack-year smoking history  He has never used smokeless tobacco  He reports that he does not drink alcohol or use drugs  Physical Exam:   Vitals:    05/05/21 1118   BP: 112/58   BP Location: Left arm   Patient Position: Sitting   Cuff Size: Large   Pulse: (!) 52   Resp: 16   Weight: 96 6 kg (213 lb)   Height: 5' 5" (1 651 m)     Body mass index is 35 45 kg/m²      General: conscious, cooperative, in no acute distress  Eyes: conjunctivae pink, anicteric sclerae  ENT: lips and mucous membranes moist  Neck: supple, no JVD  Chest: clear breath sounds bilateral, no crackles, ronchus or wheezings  CVS: normal rate, regular rhythm  Abdomen: soft, non-tender, non-distended, normoactive bowel sounds  Extremities: no edema of both legs  Skin: no rash  Neuro: awake, alert, oriented  Psych:  Pleasant affect    Lab Results:    Results for orders placed or performed in visit on 04/30/21   PTH, Intact and Calcium   Result Value Ref Range    PTH, Intact 77 (H) 14 - 64 pg/mL    Calcium 8 9 8 6 - 10 3 mg/dL   Magnesium   Result Value Ref Range    Magnesium, Serum 2 4 1 5 - 2 5 mg/dL   Uric acid   Result Value Ref Range    Uric Acid 3 4 (L) 4 0 - 8 0 mg/dL   Renal function panel   Result Value Ref Range    Glucose, Random 139 (H) 65 - 99 mg/dL    BUN 30 (H) 7 - 25 mg/dL    Creatinine 2 08 (H) 0 70 - 1 11 mg/dL    eGFR Non  28 (L) > OR = 60 mL/min/1 73m2    eGFR  32 (L) > OR = 60 mL/min/1 73m2    SL AMB BUN/CREATININE RATIO 14 6 - 22 (calc)    Sodium 143 135 - 146 mmol/L    Potassium 4 1 3 5 - 5 3 mmol/L    Chloride 108 98 - 110 mmol/L    CO2 30 20 - 32 mmol/L    Calcium 8 9 8 6 - 10 3 mg/dL    Phosphorus, Serum 3 5 2 1 - 4 3 mg/dL Albumin 3 7 3 6 - 5 1 g/dL   Urinalysis with microscopic   Result Value Ref Range    Color UA YELLOW YELLOW    Urine Appearance CLOUDY (A) CLEAR    Specific Gravity 1 017 1 001 - 1 035    Ph < OR = 5 0 5 0 - 8 0    Glucose, Urine NEGATIVE NEGATIVE    Bilirubin, Urine NEGATIVE NEGATIVE    Ketone, Urine NEGATIVE NEGATIVE    Blood, Urine 1+ (A) NEGATIVE    Protein, Urine 2+ (A) NEGATIVE    SL AMB NITRITES URINE, QUAL  NEGATIVE NEGATIVE    Leukocyte Esterase NEGATIVE NEGATIVE    SL AMB WBC, URINE 0-5 < OR = 5 /HPF    RBC, Urine 0-2 < OR = 2 /HPF    Squamous Epithelial Cells 0-5 < OR = 5 /HPF    Bacteria, UA NONE SEEN NONE SEEN /HPF    Amorphous Sediment MODERATE (A) NONE OR FEW /HPF    Hyaline Casts 0-1 (A) NONE SEEN /LPF    Granular Casts UA 1-3 (A) NONE SEEN /LPF   CBC and differential   Result Value Ref Range    White Blood Cell Count 7 2 3 8 - 10 8 Thousand/uL    Red Blood Cell Count 3 93 (L) 4 20 - 5 80 Million/uL    Hemoglobin 12 7 (L) 13 2 - 17 1 g/dL    HCT 39 2 38 5 - 50 0 %    MCV 99 7 80 0 - 100 0 fL    MCH 32 3 27 0 - 33 0 pg    MCHC 32 4 32 0 - 36 0 g/dL    RDW 13 0 11 0 - 15 0 %    Platelet Count 520 761 - 400 Thousand/uL    SL AMB MPV 10 9 7 5 - 12 5 fL    Neutrophils (Absolute) 4,349 1,500 - 7,800 cells/uL    Lymphocytes (Absolute) 1,714 850 - 3,900 cells/uL    Monocytes (Absolute) 583 200 - 950 cells/uL    Eosinophils (Absolute) 475 15 - 500 cells/uL    Basophils ABS 79 0 - 200 cells/uL    Neutrophils 60 4 %    Lymphocytes 23 8 %    Monocytes 8 1 %    Eosinophils 6 6 %    Basophils PCT 1 1 %   Protein, Total w/Creat, Random Urine   Result Value Ref Range    Creatinine, Urine 107 20 - 320 mg/dL    Protein/Creat Ratio 1,430 (H) 22 - 128 mg/g creat    Protein/Creat Ratio 1 430 (H) 0 022 - 0 128 mg/mg creat    Total Protein, Urine 153 (H) 5 - 25 mg/dL       Portions of the record may have been created with voice recognition software   Occasional wrong word or "sound a like" substitutions may have occurred due to the inherent limitations of voice recognition software  Read the chart carefully and recognize, using context, where substitutions have occurred  If you have any questions, please contact the dictating provider

## 2021-05-05 NOTE — PATIENT INSTRUCTIONS
Chronic Kidney Disease   WHAT YOU NEED TO KNOW:   Chronic kidney disease (CKD) is the gradual and permanent loss of kidney function  It is also called chronic kidney failure, or chronic renal insufficiency  Normally, the kidneys remove fluid, chemicals, and waste from your blood  These wastes are turned into urine by your kidneys  CKD may worsen over time and lead to kidney failure  Your CKD team will help you and your family plan for your care at home  The team will help you create goals and find ways to meet your goals  Your care plan may change over time as your needs change  DISCHARGE INSTRUCTIONS:   Call your local emergency number (911 in the [address]) if:   · You have a seizure  · You have shortness of breath  Call your doctor or nephrologist if:   · You are confused and very drowsy  · You suddenly gain or lose more weight than your healthcare provider has told you is okay  · You have itchy skin or a rash  · You urinate more or less than you normally do  · You have blood in your urine  · You have nausea and are vomiting  · You have fatigue or muscle weakness  · You have hiccups that will not stop  · You have questions or concerns about your condition or care  Medicines:   · Medicines  may be given to decrease blood pressure and get rid of extra fluid  You may also receive medicine to manage health conditions that may occur with CKD, such as anemia, diabetes, and heart disease  · Take your medicine as directed  Contact your healthcare provider if you think your medicine is not helping or if you have side effects  Tell him or her if you are allergic to any medicine  Keep a list of the medicines, vitamins, and herbs you take  Include the amounts, and when and why you take them  Bring the list or the pill bottles to follow-up visits  Carry your medicine list with you in case of an emergency  What you can do to manage CKD: Management may include making some lifestyle changes  Tell your healthcare provider if you have any concerns about being able to make the changes  He or she can help you find solutions, including working with specialists  Ask for help creating a plan to break large goals into smaller steps  Your plan may include any of the following:  · Manage other health conditions  Your healthcare provider will work with you to make a care plan that meets your needs  You will be checked regularly for heart disease or other conditions that can make CKD worse, such as diabetes  Your blood pressure will be closely monitored  You will also get a target blood pressure and help making a plan to reach your target  This may include taking your blood pressure at home  · Maintain a healthy weight  Extra weight can strain your kidneys  Ask what a healthy weight is for you  Your provider can help you create a weight loss plan if you are overweight  · Create an exercise plan  Regular exercise can help you manage CKD, high blood pressure, and diabetes  Exercise also helps control weight  Your provider can help you create exercise goals and a plan to reach those goals  For example, your goal may be to exercise for 30 minutes in a day  Your plan can include breaking exercise into 10 minute sessions, 3 times during the day  · Create a healthy eating plan  Your provider may tell you to eat food low in sodium (salt), potassium, phosphorus, or protein  A dietitian can help you plan meals if needed  Ask how much liquid to drink each day and which liquids are best for you  · Limit alcohol as directed  Alcohol can cause fluid retention and can affect your kidneys  Ask how much alcohol is safe for you  A drink of alcohol is 12 ounces of beer, 5 ounces of wine, or 1½ ounces of liquor  · Do not smoke  Nicotine and other chemicals in cigarettes and cigars can cause kidney damage  Ask your provider for information if you currently smoke and need help to quit   E-cigarettes or smokeless tobacco still contain nicotine  Talk to your provider before you use these products  · Ask about over-the-counter medicines  Medicines such as NSAIDs and laxatives may harm your kidneys  Some cough and cold medicines can raise your blood pressure  Always ask if a medicine is safe before you take it  · Ask about vaccines you may need  Infections such as pneumonia, influenza, and hepatitis can be more harmful or more likely to occur in a person who has CKD  Vaccines lower your risk for infection  Follow up with your doctor as directed: You will need to return for tests to monitor your kidney and nerve function, and your parathyroid hormone level  Your medicines may be changed, based on certain test results  You may also be referred to a nephrologist (kidney specialist)  Write down your questions so you remember to ask them during your visits  © Copyright [address] Information is for End User's use only and may not be sold, redistributed or otherwise used for commercial purposes  All illustrations and images included in CareNotes® are the copyrighted property of A D A M , Inc  or [facility][note 3] Nichelle Dumont   The above information is an  only  It is not intended as medical advice for individual conditions or treatments  Talk to your doctor, nurse or pharmacist before following any medical regimen to see if it is safe and effective for you

## 2021-05-07 NOTE — PROGRESS NOTES
2/19/2020    Maciel Connolly   1935  [id number]      Assessment  -BPH with lower urinary tract symptoms    Discussion/Plan  Carey Gudino is a 80 y o  male being managed by Dr Stephan Davis        -We discussed patient's lower urinary tract symptoms of frequency and urgency  I reviewed dietary modifications and encouraged patient to increase water intake and avoid bladder irritants  We did review trialing medications, but would not recommend anticholinergics due to advanced age  He is not interested at this time and wishes to continue with Flomax 0 4 mg HS  This is currently being managed by his PCP  Patient can follow up with our office on as-needed basis  He is amenable with this plan  Patient was instructed to call with any issues  -All questions answered, patient agrees with plan      History of Present Illness  80 y o  male with a history of BPH with LUTS presents today for follow up  Patient continues to take Flomax 0 4mg HS which is managed by his PCP  He reports episodes of daytime urinary frequency and urgency  Patient wears 1 sanitary briefs daily for protection  He denies any gross hematuria or dysuria  Patient does admit to not hydrating enough with water and consumes at least 1 cup of tea daily  No changes to his overall health  Review of Systems  Review of Systems   Constitutional: Negative  HENT: Negative  Respiratory: Negative  Cardiovascular: Negative  Gastrointestinal: Negative  Genitourinary: Positive for frequency and urgency  Negative for decreased urine volume, difficulty urinating, dysuria, flank pain and hematuria  Musculoskeletal: Negative  Skin: Negative  Neurological: Negative  Psychiatric/Behavioral: Negative          Past Medical History  Past Medical History:   Diagnosis Date    Aortic stenosis     CKD (chronic kidney disease)     baseline Cr 1 3-1 5    Coronary artery disease     Cough     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HCC)     type 2, insulin dependent  GERD (gastroesophageal reflux disease)     Glaucoma     Gout     History of prostate cancer     Hypertension     Hypothyroidism     Overweight     Peripheral neuropathy, idiopathic     Pleural effusion, left     Pure hypercholesterolemia     LA   11/12/14   R   11/12/14     Visual impairment     cataract left eye    Weight gain        Past Social History  Past Surgical History:   Procedure Laterality Date    CARDIAC CATHETERIZATION      IR THORACENTESIS  10/23/2018    IR THORACENTESIS  11/2/2018    IR THORACENTESIS  11/9/2018    IR THORACENTESIS  11/23/2018    PA CABG, ARTERY-VEIN, THREE N/A 9/17/2018    Procedure: CORONARY ARTERY BYPASS GRAFT (CABG) x 4 VESSELS with LIMA - LAD, SVG/LEFT LEG EVH - LEFT PDA, OM3, & OM2;  Surgeon: Kimberly Thrasher MD;  [facility];  Service: Cardiac Surgery    PA ECHO TRANSESOPHAG MONTR CARDIAC PUMP FUNCTJ N/A 9/17/2018    Procedure: TRANSESOPHAGEAL ECHOCARDIOGRAM (VERONICA);   Surgeon: Kimberly Thrasher MD;  [facility];  Service: Cardiac Surgery    PA RPLCMT AORTIC VALVE OPN W/STENTLESS TISSUE VALVE N/A 9/17/2018    Procedure: REPLACEMENT VALVE AORTIC (AVR)- 23mm tissue Intuity Valve;  Surgeon: Kimberly Thrasher MD;  [facility];  Service: Cardiac Surgery    THORACOSCOPY VIDEO ASSISTED SURGERY (VATS) Left 11/27/2018    Procedure: THORACOSCOPY VIDEO ASSISTED SURGERY (VATS), talc pleurodesis,;  Surgeon: Sofia Casarez MD;  [facility];  Service: Thoracic    THYROID SURGERY         Past Family History  Family History   Problem Relation Age of Onset    Diabetes Mother     Pancreatic cancer Brother     Diabetes Maternal Grandmother     Colon cancer Son     Diabetes Family     Substance Abuse Neg Hx     Mental illness Neg Hx        Past Social history  Social History     Socioeconomic History    Marital status:      Spouse name: Not on file    Number of children: Not on file    Years of education: Not on file   [facility] Highest education level: Not on file   Occupational History    Not on file   Social Needs    Financial resource strain: Not on file    Food insecurity:     Worry: Not on file     Inability: Not on file    Transportation needs:     Medical: No     Non-medical: No   Tobacco Use    Smoking status: Never Smoker    Smokeless tobacco: Never Used    Tobacco comment: Only in the service    Substance and Sexual Activity    Alcohol use: No    Drug use: No    Sexual activity: Not Currently   Lifestyle    Physical activity:     Days per week: 3 days     Minutes per session: 20 min    Stress:  To some extent   Relationships    Social connections:     Talks on phone: Not on file     Gets together: Not on file     Attends Cheondo[employer] service: Not on file     Active member of club or organization: Not on file     Attends meetings of clubs or organizations: Not on file     Relationship status: Not on file    Intimate partner violence:     Fear of current or ex partner: Not on file     Emotionally abused: Not on file     Physically abused: Not on file     Forced sexual activity: Not on file   Other Topics Concern    Not on file   Social History Narrative    Caffeine use / coffee diet cola and tea    Lives with family     Living situation supportive and safe    No advance directives  -denied       Current Medications  Current Outpatient Medications   Medication Sig Dispense Refill    acetaminophen (TYLENOL) 325 mg tablet 650 mg every 4 to 6 hours as needed for pain (do not take with percocet) 30 tablet 0    albuterol (PROVENTIL HFA) 90 mcg/act inhaler Inhale 2 puffs 4 (four) times a day as needed      allopurinol (ZYLOPRIM) 300 mg tablet TAKE ONE TABLET BY MOUTH ONCE DAILY 90 tablet 3    ascorbic acid (VITAMIN C) 500 mg tablet TAKE ONE TABLET BY MOUTH DAILY 28 tablet 3    aspirin (ECOTRIN) 325 mg EC tablet TAKE ONE TABLET BY MOUTH DAILY 28 tablet 3    BASAGLAR KWIKPEN 100 units/mL injection pen Inject 20 Units under the skin daily at bedtime 15 mL 3    COMFORT EZ PEN NEEDLES 32G X 4 MM MISC AS DIRECTED FOUR TIMES DAILY 100 each 3    DUREZOL 0 05 % EMUL instill ONE DROP IN THE LEFT EYE FOUR TIMES DAILY  1    FEROSUL 325 (65 Fe) MG tablet TAKE ONE TABLET BY MOUTH DAILY 28 tablet 3    gabapentin (NEURONTIN) 300 mg capsule TAKE ONE CAPSULE BY MOUTH every night at bedtime 90 capsule 3    ILEVRO 0 3 % SUSP instill ONE DROP IN THE LEFT EYE EVERY MORNING  1    insulin aspart (NovoLOG) 100 units/mL injection Inject 8 Units under the skin 3 (three) times a day before meals (Patient taking differently: Inject 10 Units under the skin 3 (three) times a day before meals )  0    latanoprost (XALATAN) 0 005 % ophthalmic solution Administer 1 drop to both eyes daily at bedtime  11    levothyroxine 150 mcg tablet TAKE ONE TABLET BY MOUTH DAILY 30 tablet 0    lisinopril (ZESTRIL) 5 mg tablet Take 1 tablet (5 mg total) by mouth daily 30 tablet 5    LORazepam (ATIVAN) 0 5 mg tablet TAKE ONE TABLET BY MOUTH EVERY EIGHT HOURS AS NEEDED FOR ANXIETY 30 tablet 3    metoprolol tartrate (LOPRESSOR) 25 mg tablet Take 0 5 tablets (12 5 mg total) by mouth daily 30 tablet 6    ofloxacin (OCUFLOX) 0 3 % ophthalmic solution instill ONE DROP IN THE LEFT EYE FOUR TIMES DAILY  1    pantoprazole (PROTONIX) 20 mg tablet TAKE ONE TABLET BY MOUTH EVERY DAY Decreased DOSE ON 8/7/19] 30 tablet 3    polyethylene glycol (MIRALAX) 17 g packet Take 17 g by mouth daily      rosuvastatin (CRESTOR) 40 MG tablet TAKE ONE TABLET BY MOUTH DAILY 30 tablet 6    tamsulosin (FLOMAX) 0 4 mg TAKE ONE CAPSULE BY MOUTH DAILY WITH dinner 30 capsule 6    timolol (TIMOPTIC) 0 5 % ophthalmic solution Instill 1 drop into both eyes every morning  11    torsemide (DEMADEX) 10 mg tablet Take 1 tablet (10 mg total) by mouth daily 30 tablet 5     No current facility-administered medications for this visit          Allergies  Allergies   Allergen Reactions    Amlodipine Nausea Only    Atorvastatin Myalgia       Past Medical History, Social History, Family History, medications and allergies were reviewed  Vitals  There were no vitals filed for this visit  Physical Exam  Physical Exam   Constitutional: He is oriented to person, place, and time  He appears well-developed and well-nourished  HENT:   Head: Normocephalic  Eyes: Pupils are equal, round, and reactive to light  Neck: Normal range of motion  Cardiovascular: Normal rate and regular rhythm  Pulmonary/Chest: Effort normal    Abdominal: Soft  Normal appearance  There is no CVA tenderness  Musculoskeletal: Normal range of motion  Neurological: He is alert and oriented to person, place, and time  Skin: Skin is warm and dry  Psychiatric: He has a normal mood and affect  His behavior is normal  Judgment and thought content normal        Results    I have personally reviewed all pertinent lab results and reviewed with patient  No results found for: PSA  Lab Results   Component Value Date    GLUCOSE 126 09/17/2018    CALCIUM 9 3 12/30/2019     09/05/2017    K 4 7 12/30/2019    CO2 27 12/30/2019     12/30/2019    BUN 34 (H) 12/30/2019    CREATININE 1 99 (H) 12/30/2019     Lab Results   Component Value Date    WBC 7 9 12/30/2019    HGB 13 8 12/30/2019    HCT 41 5 12/30/2019    MCV 96 1 12/30/2019     12/30/2019     No results found for this or any previous visit (from the past 1 hour(s))  Strong peripheral pulses

## 2021-05-20 DIAGNOSIS — F41.9 ANXIETY: ICD-10-CM

## 2021-05-20 DIAGNOSIS — Z95.1 S/P CABG X 4: ICD-10-CM

## 2021-05-20 DIAGNOSIS — D64.9 ANEMIA, UNSPECIFIED TYPE: ICD-10-CM

## 2021-05-20 DIAGNOSIS — Z00.00 HEALTHCARE MAINTENANCE: ICD-10-CM

## 2021-05-20 DIAGNOSIS — I25.10 CORONARY ARTERY DISEASE INVOLVING NATIVE CORONARY ARTERY OF NATIVE HEART WITHOUT ANGINA PECTORIS: ICD-10-CM

## 2021-05-20 RX ORDER — LORAZEPAM 0.5 MG/1
TABLET ORAL
Qty: 30 TABLET | Refills: 3 | Status: SHIPPED | OUTPATIENT
Start: 2021-05-20 | End: 2021-10-06

## 2021-05-20 RX ORDER — ASCORBIC ACID 500 MG
TABLET ORAL
Qty: 28 TABLET | Refills: 3 | Status: SHIPPED | OUTPATIENT
Start: 2021-05-20 | End: 2021-10-06

## 2021-05-20 RX ORDER — ASPIRIN 325 MG
325 TABLET, DELAYED RELEASE (ENTERIC COATED) ORAL DAILY
Qty: 28 TABLET | Refills: 3 | Status: SHIPPED | OUTPATIENT
Start: 2021-05-20 | End: 2021-10-06

## 2021-05-20 RX ORDER — PANTOPRAZOLE SODIUM 20 MG/1
TABLET, DELAYED RELEASE ORAL
Qty: 30 TABLET | Refills: 3 | Status: SHIPPED | OUTPATIENT
Start: 2021-05-20 | End: 2021-10-06

## 2021-05-20 RX ORDER — FERROUS SULFATE 325(65) MG
TABLET ORAL
Qty: 28 TABLET | Refills: 3 | Status: SHIPPED | OUTPATIENT
Start: 2021-05-20 | End: 2021-10-06

## 2021-05-21 ENCOUNTER — RA CDI HCC (OUTPATIENT)
Dept: OTHER | Facility: HOSPITAL | Age: 86
End: 2021-05-21

## 2021-05-21 NOTE — PROGRESS NOTES
[facility]  coding opportunities             Chart reviewed, (number of) suggestions sent to provider: 2     Problem listed updated  Provider Accepted, (number of) suggestions accepted: 2        Patients insurance company: [address]  (Medicare Advantage and Commercial)     Visit status: Patient arrived for their scheduled appointment  Suggestions not used, provider didn't respond until after the appointment date  Problem list wasn't updated until then        [place] [id number]  coding opportunities             Chart reviewed, (number of) suggestions sent to provider: 2           Patients insurance company: [address]  (Medicare Advantage and Commercial)           I11 0 Hypertensive heart disease with heart failure ([facility] )   * code also type of heart failure, per coding guidelines    Z79 4 Long term, current, insulin use ([facility] )     If this is correct, please document and assess at your next visit 5/28/21

## 2021-05-28 ENCOUNTER — OFFICE VISIT (OUTPATIENT)
Dept: FAMILY MEDICINE CLINIC | Facility: HOSPITAL | Age: 86
End: 2021-05-28
Payer: COMMERCIAL

## 2021-05-28 VITALS
BODY MASS INDEX: 35.29 KG/M2 | WEIGHT: 211.8 LBS | DIASTOLIC BLOOD PRESSURE: 72 MMHG | HEART RATE: 56 BPM | SYSTOLIC BLOOD PRESSURE: 158 MMHG | OXYGEN SATURATION: 98 % | HEIGHT: 65 IN

## 2021-05-28 DIAGNOSIS — D63.1 ANEMIA DUE TO STAGE 3B CHRONIC KIDNEY DISEASE (HCC): Chronic | ICD-10-CM

## 2021-05-28 DIAGNOSIS — E66.01 OBESITY, MORBID (HCC): ICD-10-CM

## 2021-05-28 DIAGNOSIS — N18.32 ANEMIA DUE TO STAGE 3B CHRONIC KIDNEY DISEASE (HCC): Chronic | ICD-10-CM

## 2021-05-28 DIAGNOSIS — E03.9 ACQUIRED HYPOTHYROIDISM: Chronic | ICD-10-CM

## 2021-05-28 DIAGNOSIS — I50.32 CHRONIC DIASTOLIC CONGESTIVE HEART FAILURE (HCC): ICD-10-CM

## 2021-05-28 DIAGNOSIS — E11.22 TYPE 2 DIABETES MELLITUS WITH STAGE 3B CHRONIC KIDNEY DISEASE, WITHOUT LONG-TERM CURRENT USE OF INSULIN (HCC): Primary | ICD-10-CM

## 2021-05-28 DIAGNOSIS — I10 BENIGN ESSENTIAL HYPERTENSION: ICD-10-CM

## 2021-05-28 DIAGNOSIS — K21.9 GASTROESOPHAGEAL REFLUX DISEASE WITHOUT ESOPHAGITIS: ICD-10-CM

## 2021-05-28 DIAGNOSIS — N18.32 TYPE 2 DIABETES MELLITUS WITH STAGE 3B CHRONIC KIDNEY DISEASE, WITHOUT LONG-TERM CURRENT USE OF INSULIN (HCC): Primary | ICD-10-CM

## 2021-05-28 DIAGNOSIS — I25.10 CORONARY ARTERY DISEASE INVOLVING NATIVE CORONARY ARTERY OF NATIVE HEART WITHOUT ANGINA PECTORIS: ICD-10-CM

## 2021-05-28 PROBLEM — N18.4 CHRONIC KIDNEY DISEASE (CKD), STAGE IV (SEVERE) (HCC): Status: RESOLVED | Noted: 2021-01-27 | Resolved: 2021-05-28

## 2021-05-28 PROBLEM — J95.821 ACUTE POSTPROCEDURAL RESPIRATORY FAILURE (HCC): Status: RESOLVED | Noted: 2021-01-27 | Resolved: 2021-05-28

## 2021-05-28 PROCEDURE — 3725F SCREEN DEPRESSION PERFORMED: CPT | Performed by: INTERNAL MEDICINE

## 2021-05-28 PROCEDURE — 1101F PT FALLS ASSESS-DOCD LE1/YR: CPT | Performed by: INTERNAL MEDICINE

## 2021-05-28 PROCEDURE — 99214 OFFICE O/P EST MOD 30 MIN: CPT | Performed by: INTERNAL MEDICINE

## 2021-05-28 PROCEDURE — 3288F FALL RISK ASSESSMENT DOCD: CPT | Performed by: INTERNAL MEDICINE

## 2021-05-28 PROCEDURE — 1160F RVW MEDS BY RX/DR IN RCRD: CPT | Performed by: INTERNAL MEDICINE

## 2021-05-28 PROCEDURE — 1036F TOBACCO NON-USER: CPT | Performed by: INTERNAL MEDICINE

## 2021-05-28 NOTE — PATIENT INSTRUCTIONS
Do labs at [facility] in June    get records from Dr Becca Wheatley for diabetic eye exam- sign release   follow diet closely- keep active

## 2021-05-28 NOTE — ASSESSMENT & PLAN NOTE
Wt Readings from Last 3 Encounters:   05/28/21 96 1 kg (211 lb 12 8 oz)   05/05/21 96 6 kg (213 lb)   03/15/21 97 5 kg (215 lb)     No signs of decompensation

## 2021-05-28 NOTE — PROGRESS NOTES
Assessment/Plan:             Problem List Items Addressed This Visit        Digestive    GERD (gastroesophageal reflux disease)       Endocrine    Hypothyroidism (Chronic)    Type 2 diabetes mellitus with kidney complication, without long-term current use of insulin (HCC) - Primary    Relevant Orders    Hemoglobin U5X    Basic metabolic panel    Lipid Panel with Direct LDL reflex       Cardiovascular and Mediastinum    Benign essential hypertension     Well controlled         CAD (coronary artery disease)    Chronic diastolic congestive heart failure (HCC)     Wt Readings from Last 3 Encounters:   05/28/21 96 1 kg (211 lb 12 8 oz)   05/05/21 96 6 kg (213 lb)   03/15/21 97 5 kg (215 lb)     No signs of decompensation                Genitourinary    Anemia due to stage 3 chronic kidney disease (HCC) (Chronic)       Other    Obesity, morbid ([address] )     At 211- will encourage to exercise more                 Subjective:      Patient ID: Nadiya Gabriel  is a 80 y o  male    Doing well overall- saw Dr Myra Loaiza this month- has been off lisinopril since last year  Dm- sugars 115 this am       The following portions of the patient's history were reviewed and updated as appropriate: allergies, current medications and problem list      Review of Systems   Constitutional: Negative for fatigue and fever  HENT: Positive for postnasal drip  Negative for congestion  Respiratory: Negative for shortness of breath  Cardiovascular: Negative for chest pain and palpitations  Gastrointestinal: Negative for abdominal distention and constipation  Genitourinary: Negative for difficulty urinating  Musculoskeletal: Negative for arthralgias  Neurological: Negative for dizziness and headaches  All other systems reviewed and are negative          Objective:      Current Outpatient Medications:     acetaminophen (TYLENOL) 325 mg tablet, 650 mg every 4 to 6 hours as needed for pain (do not take with percocet), Disp: 30 tablet, Rfl: 0    albuterol (PROVENTIL HFA) 90 mcg/act inhaler, Inhale 2 puffs 4 (four) times a day as needed, Disp: , Rfl:     allopurinol (ZYLOPRIM) 300 mg tablet, TAKE ONE TABLET BY MOUTH ONCE DAILY, Disp: 90 tablet, Rfl: 3    ascorbic acid (VITAMIN C) 500 MG tablet, TAKE ONE TABLET BY MOUTH DAILY, Disp: 28 tablet, Rfl: 3    aspirin (ECOTRIN) 325 mg EC tablet, Take 1 tablet (325 mg total) by mouth daily, Disp: 28 tablet, Rfl: 3    Basaglar KwikPen 100 units/mL injection pen, Inject 20 Units under the skin daily at bedtime, Disp: 15 mL, Rfl: 3    FeroSul 325 (65 Fe) MG tablet, TAKE ONE TABLET BY MOUTH DAILY, Disp: 28 tablet, Rfl: 3    gabapentin (NEURONTIN) 300 mg capsule, Take 1 capsule (300 mg total) by mouth daily at bedtime, Disp: 90 capsule, Rfl: 3    Insulin Pen Needle (Pen Needles) 32G X 4 MM MISC, Use 4 (four) times a day (before meals and at bedtime), Disp: 100 each, Rfl: 11    levothyroxine 150 mcg tablet, Take 1 tablet (150 mcg total) by mouth daily, Disp: 30 tablet, Rfl: 5    LORazepam (ATIVAN) 0 5 mg tablet, TAKE ONE TABLET BY MOUTH EVERY EIGHT HOURS AS NEEDED FOR ANXIETY, Disp: 30 tablet, Rfl: 3    metoprolol tartrate (LOPRESSOR) 25 mg tablet, TAKE 12 tablet BY MOUTH DAILY, Disp: 30 tablet, Rfl: 6    NovoLOG FlexPen 100 units/mL injection pen, INJECT TEN UNITS SUBCUTANEOUSLY THREE TIMES DAILY, Disp: 15 mL, Rfl: 1    pantoprazole (PROTONIX) 20 mg tablet, TAKE ONE TABLET BY MOUTH DAILY, Disp: 30 tablet, Rfl: 3    polyethylene glycol (MIRALAX) 17 g packet, Take 17 g by mouth daily, Disp: , Rfl:     rosuvastatin (CRESTOR) 40 MG tablet, TAKE ONE TABLET BY MOUTH DAILY, Disp: 90 tablet, Rfl: 3    tamsulosin (FLOMAX) 0 4 mg, TAKE ONE CAPSULE BY MOUTH DAILY WITH dinner, Disp: 30 capsule, Rfl: 6    timolol (TIMOPTIC) 0 5 % ophthalmic solution, Administer 1 drop to both eyes 2 (two) times a day , Disp: , Rfl:     torsemide (DEMADEX) 10 mg tablet, TAKE ONE TABLET BY MOUTH EVERY DAY, Disp: 30 tablet, Rfl: 5    Blood pressure 158/72, pulse 56, height 5' 5" (1 651 m), weight 96 1 kg (211 lb 12 8 oz), SpO2 98 %  Physical Exam  Vitals signs and nursing note reviewed  Constitutional:       Appearance: Normal appearance  HENT:      Right Ear: Tympanic membrane normal       Left Ear: Tympanic membrane normal       Mouth/Throat:      Pharynx: No oropharyngeal exudate or posterior oropharyngeal erythema  Eyes:      General: No scleral icterus  Right eye: No discharge  Left eye: No discharge  Neck:      Musculoskeletal: Normal range of motion  No neck rigidity or muscular tenderness  Cardiovascular:      Rate and Rhythm: Normal rate and regular rhythm  Heart sounds: No murmur  Pulmonary:      Effort: Pulmonary effort is normal  No respiratory distress  Breath sounds: Normal breath sounds  No wheezing or rhonchi  Abdominal:      Palpations: Abdomen is soft  Comments: Mild obese   Musculoskeletal:         General: No swelling or tenderness  Skin:     Findings: No erythema or rash  Neurological:      General: No focal deficit present  Mental Status: He is alert and oriented to person, place, and time  Psychiatric:         Mood and Affect: Mood normal          Behavior: Behavior normal          Thought Content:  Thought content normal          Judgment: Judgment normal

## 2021-06-01 PROBLEM — Z79.4 TYPE 2 DIABETES MELLITUS WITH CHRONIC KIDNEY DISEASE, WITH LONG-TERM CURRENT USE OF INSULIN (HCC): Status: ACTIVE | Noted: 2019-01-22

## 2021-06-01 PROBLEM — E11.22 TYPE 2 DIABETES MELLITUS WITH CHRONIC KIDNEY DISEASE, WITH LONG-TERM CURRENT USE OF INSULIN (HCC): Status: ACTIVE | Noted: 2019-01-22

## 2021-06-23 LAB
BUN SERPL-MCNC: 30 MG/DL (ref 7–25)
BUN/CREAT SERPL: 16 (CALC) (ref 6–22)
CALCIUM SERPL-MCNC: 9.1 MG/DL (ref 8.6–10.3)
CHLORIDE SERPL-SCNC: 106 MMOL/L (ref 98–110)
CHOLEST SERPL-MCNC: 107 MG/DL
CHOLEST/HDLC SERPL: 4 (CALC)
CO2 SERPL-SCNC: 29 MMOL/L (ref 20–32)
CREAT SERPL-MCNC: 1.9 MG/DL (ref 0.7–1.11)
GLUCOSE SERPL-MCNC: 133 MG/DL (ref 65–99)
HBA1C MFR BLD: 7.9 % OF TOTAL HGB
HDLC SERPL-MCNC: 27 MG/DL
LDLC SERPL CALC-MCNC: 55 MG/DL (CALC)
NONHDLC SERPL-MCNC: 80 MG/DL (CALC)
POTASSIUM SERPL-SCNC: 4.1 MMOL/L (ref 3.5–5.3)
SL AMB EGFR AFRICAN AMERICAN: 36 ML/MIN/1.73M2
SL AMB EGFR NON AFRICAN AMERICAN: 31 ML/MIN/1.73M2
SODIUM SERPL-SCNC: 142 MMOL/L (ref 135–146)
TRIGL SERPL-MCNC: 176 MG/DL

## 2021-07-09 ENCOUNTER — TELEPHONE (OUTPATIENT)
Dept: FAMILY MEDICINE CLINIC | Facility: HOSPITAL | Age: 86
End: 2021-07-09

## 2021-07-09 NOTE — TELEPHONE ENCOUNTER
Pt called per Dr Terrence Stewart request he is calling  to give suger readings for the week:    7/2-106  7/3-75  7/4-150  7/5-161  7/6-148  7/7-135  7/8-138

## 2021-07-13 ENCOUNTER — TELEPHONE (OUTPATIENT)
Dept: NEPHROLOGY | Facility: CLINIC | Age: 86
End: 2021-07-13

## 2021-07-13 DIAGNOSIS — E03.9 HYPOTHYROIDISM, UNSPECIFIED TYPE: ICD-10-CM

## 2021-07-13 RX ORDER — LEVOTHYROXINE SODIUM 0.15 MG/1
150 TABLET ORAL DAILY
Qty: 30 TABLET | Refills: 5 | Status: SHIPPED | OUTPATIENT
Start: 2021-07-13 | End: 2021-12-06

## 2021-07-13 NOTE — TELEPHONE ENCOUNTER
Left a message to schedule pt 's follow up with Dr Isaiah Rivera  They can also be scheduled with one of the APs if needed

## 2021-07-17 DIAGNOSIS — N40.1 BENIGN PROSTATIC HYPERPLASIA WITH NOCTURIA: ICD-10-CM

## 2021-07-17 DIAGNOSIS — R60.9 EDEMA, UNSPECIFIED TYPE: ICD-10-CM

## 2021-07-17 DIAGNOSIS — R35.1 BENIGN PROSTATIC HYPERPLASIA WITH NOCTURIA: ICD-10-CM

## 2021-07-19 RX ORDER — TAMSULOSIN HYDROCHLORIDE 0.4 MG/1
CAPSULE ORAL
Qty: 30 CAPSULE | Refills: 6 | Status: SHIPPED | OUTPATIENT
Start: 2021-07-19 | End: 2022-02-16

## 2021-07-19 RX ORDER — TORSEMIDE 10 MG/1
TABLET ORAL
Qty: 30 TABLET | Refills: 5 | Status: SHIPPED | OUTPATIENT
Start: 2021-07-19 | End: 2021-12-30

## 2021-09-13 NOTE — ASSESSMENT & PLAN NOTE
· CXR s/p thoracentesis showed trace left-sided pneumothorax which was too small for chest tube placement  · Thoracic Surgery following No

## 2021-09-22 ENCOUNTER — TELEPHONE (OUTPATIENT)
Dept: NEPHROLOGY | Facility: CLINIC | Age: 86
End: 2021-09-22

## 2021-09-22 NOTE — TELEPHONE ENCOUNTER
I spoke to the patient's son, he confirmed the patient knows about his appointment with Dr Navdeep Sanchez on 9/29 and is getting his labs done today

## 2021-09-23 LAB
ALBUMIN SERPL-MCNC: 3.8 G/DL (ref 3.6–5.1)
APPEARANCE UR: ABNORMAL
BACTERIA UR QL AUTO: ABNORMAL /HPF
BASOPHILS # BLD AUTO: 92 CELLS/UL (ref 0–200)
BASOPHILS NFR BLD AUTO: 1.5 %
BILIRUB UR QL STRIP: NEGATIVE
BUN SERPL-MCNC: 30 MG/DL (ref 7–25)
BUN/CREAT SERPL: 14 (CALC) (ref 6–22)
CALCIUM SERPL-MCNC: 9 MG/DL (ref 8.6–10.3)
CALCIUM SERPL-MCNC: 9.2 MG/DL (ref 8.6–10.3)
CHLORIDE SERPL-SCNC: 106 MMOL/L (ref 98–110)
CO2 SERPL-SCNC: 27 MMOL/L (ref 20–32)
COLOR UR: YELLOW
CREAT SERPL-MCNC: 2.14 MG/DL (ref 0.7–1.11)
CREAT UR-MCNC: 106 MG/DL (ref 20–320)
EOSINOPHIL # BLD AUTO: 415 CELLS/UL (ref 15–500)
EOSINOPHIL NFR BLD AUTO: 6.8 %
ERYTHROCYTE [DISTWIDTH] IN BLOOD BY AUTOMATED COUNT: 13.1 % (ref 11–15)
GLUCOSE SERPL-MCNC: 159 MG/DL (ref 65–99)
GLUCOSE UR QL STRIP: NEGATIVE
GRAN CASTS #/AREA URNS LPF: ABNORMAL /LPF
HCT VFR BLD AUTO: 38.4 % (ref 38.5–50)
HGB BLD-MCNC: 12.7 G/DL (ref 13.2–17.1)
HGB UR QL STRIP: ABNORMAL
HYALINE CASTS #/AREA URNS LPF: ABNORMAL /LPF
KETONES UR QL STRIP: NEGATIVE
LEUKOCYTE ESTERASE UR QL STRIP: NEGATIVE
LYMPHOCYTES # BLD AUTO: 1720 CELLS/UL (ref 850–3900)
LYMPHOCYTES NFR BLD AUTO: 28.2 %
MAGNESIUM SERPL-MCNC: 2.4 MG/DL (ref 1.5–2.5)
MCH RBC QN AUTO: 31.9 PG (ref 27–33)
MCHC RBC AUTO-ENTMCNC: 33.1 G/DL (ref 32–36)
MCV RBC AUTO: 96.5 FL (ref 80–100)
MONOCYTES # BLD AUTO: 494 CELLS/UL (ref 200–950)
MONOCYTES NFR BLD AUTO: 8.1 %
NEUTROPHILS # BLD AUTO: 3379 CELLS/UL (ref 1500–7800)
NEUTROPHILS NFR BLD AUTO: 55.4 %
NITRITE UR QL STRIP: NEGATIVE
PH UR STRIP: ABNORMAL [PH] (ref 5–8)
PHOSPHATE SERPL-MCNC: 3.2 MG/DL (ref 2.1–4.3)
PLATELET # BLD AUTO: 174 THOUSAND/UL (ref 140–400)
PMV BLD REES-ECKER: 11.1 FL (ref 7.5–12.5)
POTASSIUM SERPL-SCNC: 4 MMOL/L (ref 3.5–5.3)
PROT UR QL STRIP: ABNORMAL
PROT UR-MCNC: 158 MG/DL (ref 5–25)
PROT/CREAT UR: 1.49 MG/MG CREAT (ref 0.02–0.13)
PROT/CREAT UR: 1491 MG/G CREAT (ref 22–128)
PTH-INTACT SERPL-MCNC: 47 PG/ML (ref 14–64)
RBC # BLD AUTO: 3.98 MILLION/UL (ref 4.2–5.8)
RBC #/AREA URNS HPF: ABNORMAL /HPF
SL AMB EGFR AFRICAN AMERICAN: 31 ML/MIN/1.73M2
SL AMB EGFR NON AFRICAN AMERICAN: 27 ML/MIN/1.73M2
SODIUM SERPL-SCNC: 140 MMOL/L (ref 135–146)
SP GR UR STRIP: 1.02 (ref 1–1.03)
SQUAMOUS #/AREA URNS HPF: ABNORMAL /HPF
URATE SERPL-MCNC: 3 MG/DL (ref 4–8)
WBC # BLD AUTO: 6.1 THOUSAND/UL (ref 3.8–10.8)
WBC #/AREA URNS HPF: ABNORMAL /HPF

## 2021-09-28 ENCOUNTER — TELEPHONE (OUTPATIENT)
Dept: NEPHROLOGY | Facility: HOSPITAL | Age: 86
End: 2021-09-28

## 2021-09-29 ENCOUNTER — OFFICE VISIT (OUTPATIENT)
Dept: NEPHROLOGY | Facility: HOSPITAL | Age: 86
End: 2021-09-29
Payer: COMMERCIAL

## 2021-09-29 VITALS
RESPIRATION RATE: 16 BRPM | HEIGHT: 65 IN | SYSTOLIC BLOOD PRESSURE: 112 MMHG | DIASTOLIC BLOOD PRESSURE: 58 MMHG | BODY MASS INDEX: 34.99 KG/M2 | HEART RATE: 52 BPM | WEIGHT: 210 LBS

## 2021-09-29 DIAGNOSIS — N18.4 CKD (CHRONIC KIDNEY DISEASE), STAGE IV (HCC): Primary | ICD-10-CM

## 2021-09-29 PROCEDURE — 99214 OFFICE O/P EST MOD 30 MIN: CPT | Performed by: INTERNAL MEDICINE

## 2021-09-29 RX ORDER — OLMESARTAN MEDOXOMIL 5 MG/1
5 TABLET ORAL DAILY
Qty: 30 TABLET | Refills: 3 | Status: SHIPPED | OUTPATIENT
Start: 2021-09-29 | End: 2022-01-03

## 2021-09-29 NOTE — PROGRESS NOTES
OFFICE FOLLOW UP - Nephrology   Duke Hampton  80 y o  male MRN: [id number]    Encounter: [id number]        ASSESSMENT & PLAN    1  Stage IIIB to stage 4 chronic kidney disease moderately increased proteinuria  -urinalysis:   Now with about 1 2 g of protein in his urine  -imaging:  CT scan of the pelvis shows bilateral renal cysts largest at the lower pole of the right kidney measuring up to 3 3 cm   -etiology:  Patient has a longstanding history of hypertension and type 2 diabetes mellitus this is likely progression of his CKD  -plan:  His creatinine remains between 2 and 2 4  - he has a significant amount of proteinuria will hold lisinopril and try olmesartan low dose at 5 mg for proteinuric kidney disease in the setting of diabetes   - repeat BMP in 1 week to ensure stability of potassium in creatinine  - Completed CKD education and advance care meeting  - eGFR stable     2  Electrolytes:  -currently acceptable    3  Acid/Base  -no anion gap acid-base status acceptable    4  BP/HR  -History of hypertension- blood pressure is currently stable    5  Volume Status-euvolemic    6  Anemia of chronic kidney disease  - stable    7  MBD  -will check PTH and phosphorus with next set of blood work    8   Health Maintanance/Risk Reduction  -uncontrolled diabetes with elevated hemoglobin A1cs on insulin  -allopurinol for gout  -coronary artery disease and status post aortic valve replacement on diuretics ACE-inhibitor beta-blocker, statin    Repeat blood work in 1 week given the addition of olmesartan and if stable follow-up in 4 months    HPI: Duke Hampton  is a 80 y o  male who is here for follow-up    We really is very active continues to a take care of his yd, goes on walks, he is eating relatively well denies any acute chest pain or shortness of breath no fevers or chills no nausea vomiting diarrhea constipation no foamy or bloody urine    ROS:   All systems reviewed and negative besides what was mentioned in the history of present illness    Allergies: Amlodipine and Atorvastatin    Medications:   Current Outpatient Medications:     acetaminophen (TYLENOL) 325 mg tablet, 650 mg every 4 to 6 hours as needed for pain (do not take with percocet), Disp: 30 tablet, Rfl: 0    albuterol (PROVENTIL HFA) 90 mcg/act inhaler, Inhale 2 puffs 4 (four) times a day as needed, Disp: , Rfl:     allopurinol (ZYLOPRIM) 300 mg tablet, TAKE ONE TABLET BY MOUTH ONCE DAILY, Disp: 90 tablet, Rfl: 3    ascorbic acid (VITAMIN C) 500 MG tablet, TAKE ONE TABLET BY MOUTH DAILY, Disp: 28 tablet, Rfl: 3    aspirin (ECOTRIN) 325 mg EC tablet, Take 1 tablet (325 mg total) by mouth daily, Disp: 28 tablet, Rfl: 3    Basaglar KwikPen 100 units/mL injection pen, Inject 20 Units under the skin daily at bedtime, Disp: 15 mL, Rfl: 3    FeroSul 325 (65 Fe) MG tablet, TAKE ONE TABLET BY MOUTH DAILY, Disp: 28 tablet, Rfl: 3    gabapentin (NEURONTIN) 300 mg capsule, Take 1 capsule (300 mg total) by mouth daily at bedtime, Disp: 90 capsule, Rfl: 3    Insulin Pen Needle (Pen Needles) 32G X 4 MM MISC, Use 4 (four) times a day (before meals and at bedtime), Disp: 100 each, Rfl: 11    levothyroxine 150 mcg tablet, Take 1 tablet (150 mcg total) by mouth daily, Disp: 30 tablet, Rfl: 5    LORazepam (ATIVAN) 0 5 mg tablet, TAKE ONE TABLET BY MOUTH EVERY EIGHT HOURS AS NEEDED FOR ANXIETY, Disp: 30 tablet, Rfl: 3    metoprolol tartrate (LOPRESSOR) 25 mg tablet, TAKE 12 tablet BY MOUTH DAILY, Disp: 30 tablet, Rfl: 6    NovoLOG FlexPen 100 units/mL injection pen, INJECT TEN UNITS SUBCUTANEOUSLY THREE TIMES DAILY, Disp: 15 mL, Rfl: 1    pantoprazole (PROTONIX) 20 mg tablet, TAKE ONE TABLET BY MOUTH DAILY, Disp: 30 tablet, Rfl: 3    polyethylene glycol (MIRALAX) 17 g packet, Take 17 g by mouth daily, Disp: , Rfl:     rosuvastatin (CRESTOR) 40 MG tablet, TAKE ONE TABLET BY MOUTH DAILY, Disp: 90 tablet, Rfl: 3    tamsulosin (FLOMAX) 0 4 mg, TAKE ONE CAPSULE BY MOUTH DAILY WITH dinner, Disp: 30 capsule, Rfl: 6    timolol (TIMOPTIC) 0 5 % ophthalmic solution, Administer 1 drop to both eyes 2 (two) times a day , Disp: , Rfl:     torsemide (DEMADEX) 10 mg tablet, TAKE ONE TABLET BY MOUTH EVERY DAY, Disp: 30 tablet, Rfl: 5    olmesartan (BENICAR) 5 mg tablet, Take 1 tablet (5 mg total) by mouth daily, Disp: 30 tablet, Rfl: 3    Past Medical History:   Diagnosis Date    Aortic stenosis     CKD (chronic kidney disease)     baseline Cr 1 3-1 5    Coronary artery disease     Cough     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HCC)     type 2, insulin dependent    GERD (gastroesophageal reflux disease)     Glaucoma     Gout     History of prostate cancer     Hypertension     Hypothyroidism     Overweight     Peripheral neuropathy, idiopathic     Pleural effusion, left     Pure hypercholesterolemia     [place]   11/12/14   R   11/12/14     Visual impairment     cataract left eye    Weight gain      Past Surgical History:   Procedure Laterality Date    CARDIAC CATHETERIZATION      EYE SURGERY      IR THORACENTESIS  10/23/2018    IR THORACENTESIS  11/2/2018    IR THORACENTESIS  11/9/2018    IR THORACENTESIS  11/23/2018    WA CABG, ARTERY-VEIN, THREE N/A 9/17/2018    Procedure: CORONARY ARTERY BYPASS GRAFT (CABG) x 4 VESSELS with LIMA - LAD, SVG/LEFT LEG EVH - LEFT PDA, OM3, & OM2;  Surgeon: Dre Cedeno MD;  [facility];  Service: Cardiac Surgery    WA ECHO TRANSESOPHAG MONTR CARDIAC PUMP FUNCTJ N/A 9/17/2018    Procedure: TRANSESOPHAGEAL ECHOCARDIOGRAM (VERONICA);   Surgeon: Dre Cedeno MD;  [facility];  Service: Cardiac Surgery    WA RPLCMT AORTIC VALVE OPN W/STENTLESS TISSUE VALVE N/A 9/17/2018    Procedure: REPLACEMENT VALVE AORTIC (AVR)- 23mm tissue Intuity Valve;  Surgeon: Dre Cedeno MD;  [facility];  Service: Cardiac Surgery    THORACOSCOPY VIDEO ASSISTED SURGERY (VATS) Left 11/27/2018    Procedure: THORACOSCOPY VIDEO ASSISTED SURGERY (VATS), talc pleurodesis,;  Surgeon: Ti Mai MD;  [facility];  Service: Thoracic    THYROID SURGERY       Family History   Problem Relation Age of Onset    Diabetes Mother     Pancreatic cancer Brother     Diabetes Maternal Grandmother     Colon cancer Son     Diabetes Family     Substance Abuse Neg Hx     Mental illness Neg Hx       reports that he quit smoking about 51 years ago  His smoking use included cigarettes  He has a 0 25 pack-year smoking history  He has never used smokeless tobacco  He reports that he does not drink alcohol and does not use drugs  Physical Exam:   Vitals:    09/29/21 0932   BP: 112/58   BP Location: Left arm   Patient Position: Sitting   Cuff Size: Large   Pulse: (!) 52   Resp: 16   Weight: 95 3 kg (210 lb)   Height: 5' 5" (1 651 m)     Body mass index is 34 95 kg/m²      General: conscious, cooperative, in no acute distress  Eyes: conjunctivae pink, anicteric sclerae  ENT: lips and mucous membranes moist  Neck: supple, no JVD  Chest: clear breath sounds bilateral, no crackles, ronchus or wheezings  CVS: normal rate, regular rhythm  Abdomen: soft, non-tender, non-distended, normoactive bowel sounds  Extremities: no edema of both legs  Skin: no rash  Neuro: awake, alert, oriented  Psych:  Pleasant affect    Lab Results:    Results for orders placed or performed in visit on 09/22/21   PTH, Intact and Calcium   Result Value Ref Range    PTH, Intact 47 14 - 64 pg/mL    Calcium 9 0 8 6 - 10 3 mg/dL   Magnesium   Result Value Ref Range    Magnesium, Serum 2 4 1 5 - 2 5 mg/dL   Uric acid   Result Value Ref Range    Uric Acid 3 0 (L) 4 0 - 8 0 mg/dL   Renal function panel   Result Value Ref Range    Glucose, Random 159 (H) 65 - 99 mg/dL    BUN 30 (H) 7 - 25 mg/dL    Creatinine 2 14 (H) 0 70 - 1 11 mg/dL    eGFR Non  27 (L) > OR = 60 mL/min/1 73m2    eGFR  31 (L) > OR = 60 mL/min/1 73m2    SL AMB BUN/CREATININE RATIO 14 6 - 22 (calc) Sodium 140 135 - 146 mmol/L    Potassium 4 0 3 5 - 5 3 mmol/L    Chloride 106 98 - 110 mmol/L    CO2 27 20 - 32 mmol/L    Calcium 9 2 8 6 - 10 3 mg/dL    Phosphorus, Serum 3 2 2 1 - 4 3 mg/dL    Albumin 3 8 3 6 - 5 1 g/dL   CBC and differential   Result Value Ref Range    White Blood Cell Count 6 1 3 8 - 10 8 Thousand/uL    Red Blood Cell Count 3 98 (L) 4 20 - 5 80 Million/uL    Hemoglobin 12 7 (L) 13 2 - 17 1 g/dL    HCT 38 4 (L) 38 5 - 50 0 %    MCV 96 5 80 0 - 100 0 fL    MCH 31 9 27 0 - 33 0 pg    MCHC 33 1 32 0 - 36 0 g/dL    RDW 13 1 11 0 - 15 0 %    Platelet Count 786 842 - 400 Thousand/uL    SL AMB MPV 11 1 7 5 - 12 5 fL    Neutrophils (Absolute) 3,379 1,500 - 7,800 cells/uL    Lymphocytes (Absolute) 1,720 850 - 3,900 cells/uL    Monocytes (Absolute) 494 200 - 950 cells/uL    Eosinophils (Absolute) 415 15 - 500 cells/uL    Basophils ABS 92 0 - 200 cells/uL    Neutrophils 55 4 %    Lymphocytes 28 2 %    Monocytes 8 1 %    Eosinophils 6 8 %    Basophils PCT 1 5 %   Protein, Total w/Creat, Random Urine   Result Value Ref Range    Creatinine, Urine 106 20 - 320 mg/dL    Protein/Creat Ratio 1,491 (H) 22 - 128 mg/g creat    Protein/Creat Ratio 1 491 (H) 0 022 - 0 128 mg/mg creat    Total Protein, Urine 158 (H) 5 - 25 mg/dL   UA (URINE) with reflex to Scope   Result Value Ref Range    Color UA YELLOW YELLOW    Urine Appearance CLOUDY (A) CLEAR    Specific Gravity 1 019 1 001 - 1 035    Ph < OR = 5 0 5 0 - 8 0    Glucose, Urine NEGATIVE NEGATIVE    Bilirubin, Urine NEGATIVE NEGATIVE    Ketone, Urine NEGATIVE NEGATIVE    Blood, Urine 1+ (A) NEGATIVE    Protein, Urine 2+ (A) NEGATIVE    SL AMB NITRITES URINE, QUAL   NEGATIVE NEGATIVE    Leukocyte Esterase NEGATIVE NEGATIVE    SL AMB WBC, URINE 0-5 < OR = 5 /HPF    RBC, Urine 0-2 < OR = 2 /HPF    Squamous Epithelial Cells 0-5 < OR = 5 /HPF    Bacteria, UA FEW (A) NONE SEEN /HPF    Hyaline Casts NONE SEEN NONE SEEN /LPF    Granular Casts UA 20-40 (A) NONE SEEN /LPF Portions of the record may have been created with voice recognition software  Occasional wrong word or "sound a like" substitutions may have occurred due to the inherent limitations of voice recognition software  Read the chart carefully and recognize, using context, where substitutions have occurred  If you have any questions, please contact the dictating provider

## 2021-09-29 NOTE — PATIENT INSTRUCTIONS
Chronic Kidney Disease   WHAT YOU NEED TO KNOW:   Chronic kidney disease (CKD) is the gradual and permanent loss of kidney function  It is also called chronic kidney failure, or chronic renal insufficiency  Normally, the kidneys remove fluid, chemicals, and waste from your blood  These wastes are turned into urine by your kidneys  CKD may worsen over time and lead to kidney failure  Your CKD team will help you and your family plan for your care at home  The team will help you create goals and find ways to meet your goals  Your care plan may change over time as your needs change  DISCHARGE INSTRUCTIONS:   Call your local emergency number (911 in the [address]) if:   · You have a seizure  · You have shortness of breath  Return to the emergency department if:   · You are confused and very drowsy  Call your doctor or nephrologist if:   · You suddenly gain or lose more weight than your healthcare provider has told you is okay  · You have itchy skin or a rash  · You urinate more or less than you normally do  · You have blood in your urine  · You have nausea and are vomiting  · You have fatigue or muscle weakness  · You have hiccups that will not stop  · You have questions or concerns about your condition or care  Medicines:   · Medicines  may be given to decrease blood pressure and get rid of extra fluid  You may also receive medicine to manage health conditions that may occur with CKD, such as anemia, diabetes, and heart disease  · Take your medicine as directed  Contact your healthcare provider if you think your medicine is not helping or if you have side effects  Tell him or her if you are allergic to any medicine  Keep a list of the medicines, vitamins, and herbs you take  Include the amounts, and when and why you take them  Bring the list or the pill bottles to follow-up visits  Carry your medicine list with you in case of an emergency      What you can do to manage CKD: Management may include making some lifestyle changes  Tell your healthcare provider if you have any concerns about being able to make changes  He or she can help you find solutions, including working with specialists  Ask for help creating a plan to break large goals into smaller steps  Your plan may include any of the following:  · Manage other health conditions  Your healthcare provider will work with you to make a care plan that meets your needs  You will be checked regularly for heart disease or other conditions that can make CKD worse, such as diabetes  Your blood pressure will be closely monitored  You will also get a target blood pressure and help making a plan to reach your target  This may include taking your blood pressure at home  · Maintain a healthy weight  Your weight and body mass index (BMI) will be checked regularly  BMI helps find if your weight is healthy for your height  Your healthcare provider will use other tests to check your muscle and protein levels  Extra weight can strain your kidneys  A low weight or low muscle mass can make you feel more tired  You may have trouble doing your daily activities  Ask your provider what a healthy weight is for you  He or she can help you create a plan to lose or gain weight safely, if needed  The plan may include keeping a food diary  This is a list of foods and liquids you have each day  Your provider will use the diary to help you make changes, if needed  Changes are based on your health and any other conditions you have, such as diabetes  · Create an exercise plan  Regular exercise can help you manage CKD, high blood pressure, and diabetes  Exercise also helps control weight  Your provider can help you create exercise goals and a plan to reach those goals  For example, your goal may be to exercise for 30 minutes in a day  Your plan can include breaking exercise into 10 minute sessions, 3 times during the day           · Create a healthy eating plan  Your provider may tell you to eat food low in potassium, phosphorus, or protein  Your provider may also recommend vitamin or mineral supplements  Do not take any supplements without talking to your provider  A dietitian can help you plan meals if needed  Ask how much liquid to drink each day and which liquids are best for you  · Limit sodium (salt) as directed  You may need to limit sodium to less than 2,300 milligrams (mg) each day  Ask your dietitian or healthcare provider how much sodium you can have each day  The amount depends on your stage of kidney disease  Table salt, canned foods, soups, salted snacks, and processed meats, like deli meats and sausage, are high in sodium  Your provider or a dietitian can show you how to read food labels for sodium  · Limit alcohol as directed  Alcohol can cause fluid retention and can affect your kidneys  Ask how much alcohol is safe for you  A drink of alcohol is 12 ounces of beer, 5 ounces of wine, or 1½ ounces of liquor  · Do not smoke  Nicotine and other chemicals in cigarettes and cigars can cause kidney damage  Ask your provider for information if you currently smoke and need help to quit  E-cigarettes or smokeless tobacco still contain nicotine  Talk to your provider before you use these products  · Ask about over-the-counter medicines  Medicines such as NSAIDs and laxatives may harm your kidneys  Some cough and cold medicines can raise your blood pressure  Always ask if a medicine is safe before you take it  · Ask about vaccines you may need  CKD can increase your risk for infections such as pneumonia, influenza, and hepatitis  Vaccines lower your risk for infection  Your healthcare provider will tell you which vaccines you need and when to get them  Follow up with your doctor or nephrologist as directed: You will need to return for tests to monitor your kidney and nerve function, and your parathyroid hormone level   Your medicines may be changed, based on certain test results  Write down your questions so you remember to ask them during your visits  © Copyright [employer] 2021 Information is for End User's use only and may not be sold, redistributed or otherwise used for commercial purposes  All illustrations and images included in CareNotes® are the copyrighted property of [employer]  or Adelaida Zarate  The above information is an  only  It is not intended as medical advice for individual conditions or treatments  Talk to your doctor, nurse or pharmacist before following any medical regimen to see if it is safe and effective for you

## 2021-10-05 DIAGNOSIS — I25.10 CORONARY ARTERY DISEASE INVOLVING NATIVE CORONARY ARTERY OF NATIVE HEART WITHOUT ANGINA PECTORIS: ICD-10-CM

## 2021-10-05 DIAGNOSIS — Z00.00 HEALTHCARE MAINTENANCE: ICD-10-CM

## 2021-10-05 DIAGNOSIS — F41.9 ANXIETY: ICD-10-CM

## 2021-10-05 DIAGNOSIS — Z95.1 S/P CABG X 4: ICD-10-CM

## 2021-10-05 DIAGNOSIS — D64.9 ANEMIA, UNSPECIFIED TYPE: ICD-10-CM

## 2021-10-06 RX ORDER — ASPIRIN 325 MG
325 TABLET, DELAYED RELEASE (ENTERIC COATED) ORAL DAILY
Qty: 28 TABLET | Refills: 3 | Status: SHIPPED | OUTPATIENT
Start: 2021-10-06 | End: 2021-12-30

## 2021-10-06 RX ORDER — ASCORBIC ACID 500 MG
TABLET ORAL
Qty: 28 TABLET | Refills: 3 | Status: SHIPPED | OUTPATIENT
Start: 2021-10-06 | End: 2021-12-30

## 2021-10-06 RX ORDER — PANTOPRAZOLE SODIUM 20 MG/1
TABLET, DELAYED RELEASE ORAL
Qty: 30 TABLET | Refills: 3 | Status: SHIPPED | OUTPATIENT
Start: 2021-10-06 | End: 2021-12-30

## 2021-10-06 RX ORDER — LORAZEPAM 0.5 MG/1
TABLET ORAL
Qty: 30 TABLET | Refills: 3 | Status: SHIPPED | OUTPATIENT
Start: 2021-10-06 | End: 2021-12-30

## 2021-10-06 RX ORDER — FERROUS SULFATE 325(65) MG
TABLET ORAL
Qty: 28 TABLET | Refills: 3 | Status: SHIPPED | OUTPATIENT
Start: 2021-10-06 | End: 2021-12-30

## 2021-10-07 LAB
BUN SERPL-MCNC: 32 MG/DL (ref 7–25)
BUN/CREAT SERPL: 14 (CALC) (ref 6–22)
CALCIUM SERPL-MCNC: 9 MG/DL (ref 8.6–10.3)
CHLORIDE SERPL-SCNC: 106 MMOL/L (ref 98–110)
CO2 SERPL-SCNC: 29 MMOL/L (ref 20–32)
CREAT SERPL-MCNC: 2.29 MG/DL (ref 0.7–1.11)
GLUCOSE SERPL-MCNC: 173 MG/DL (ref 65–99)
POTASSIUM SERPL-SCNC: 4.2 MMOL/L (ref 3.5–5.3)
SL AMB EGFR AFRICAN AMERICAN: 29 ML/MIN/1.73M2
SL AMB EGFR NON AFRICAN AMERICAN: 25 ML/MIN/1.73M2
SODIUM SERPL-SCNC: 140 MMOL/L (ref 135–146)

## 2021-10-11 ENCOUNTER — TELEPHONE (OUTPATIENT)
Dept: NEPHROLOGY | Facility: CLINIC | Age: 86
End: 2021-10-11

## 2021-10-25 ENCOUNTER — RA CDI HCC (OUTPATIENT)
Dept: OTHER | Facility: HOSPITAL | Age: 86
End: 2021-10-25

## 2021-10-27 PROBLEM — E11.40 TYPE 2 DIABETES MELLITUS WITH DIABETIC NEUROPATHY (HCC): Status: ACTIVE | Noted: 2021-10-27

## 2021-10-29 ENCOUNTER — OFFICE VISIT (OUTPATIENT)
Dept: FAMILY MEDICINE CLINIC | Facility: HOSPITAL | Age: 86
End: 2021-10-29
Payer: COMMERCIAL

## 2021-10-29 VITALS
OXYGEN SATURATION: 98 % | DIASTOLIC BLOOD PRESSURE: 60 MMHG | BODY MASS INDEX: 35.75 KG/M2 | HEART RATE: 56 BPM | WEIGHT: 214.6 LBS | HEIGHT: 65 IN | SYSTOLIC BLOOD PRESSURE: 120 MMHG

## 2021-10-29 DIAGNOSIS — N18.32 TYPE 2 DIABETES MELLITUS WITH STAGE 3B CHRONIC KIDNEY DISEASE, WITHOUT LONG-TERM CURRENT USE OF INSULIN (HCC): ICD-10-CM

## 2021-10-29 DIAGNOSIS — Z95.2 S/P AVR (AORTIC VALVE REPLACEMENT): ICD-10-CM

## 2021-10-29 DIAGNOSIS — E11.22 TYPE 2 DIABETES MELLITUS WITH STAGE 3B CHRONIC KIDNEY DISEASE, WITHOUT LONG-TERM CURRENT USE OF INSULIN (HCC): ICD-10-CM

## 2021-10-29 DIAGNOSIS — Z00.00 MEDICARE ANNUAL WELLNESS VISIT, SUBSEQUENT: Primary | ICD-10-CM

## 2021-10-29 DIAGNOSIS — Z95.1 S/P CABG X 4: ICD-10-CM

## 2021-10-29 PROCEDURE — 1125F AMNT PAIN NOTED PAIN PRSNT: CPT | Performed by: INTERNAL MEDICINE

## 2021-10-29 PROCEDURE — 1170F FXNL STATUS ASSESSED: CPT | Performed by: INTERNAL MEDICINE

## 2021-10-29 PROCEDURE — 3725F SCREEN DEPRESSION PERFORMED: CPT | Performed by: INTERNAL MEDICINE

## 2021-10-29 PROCEDURE — 3288F FALL RISK ASSESSMENT DOCD: CPT | Performed by: INTERNAL MEDICINE

## 2021-10-29 PROCEDURE — G0439 PPPS, SUBSEQ VISIT: HCPCS | Performed by: INTERNAL MEDICINE

## 2021-10-29 PROCEDURE — 3074F SYST BP LT 130 MM HG: CPT | Performed by: INTERNAL MEDICINE

## 2021-10-29 PROCEDURE — 3078F DIAST BP <80 MM HG: CPT | Performed by: INTERNAL MEDICINE

## 2021-10-29 PROCEDURE — 1160F RVW MEDS BY RX/DR IN RCRD: CPT | Performed by: INTERNAL MEDICINE

## 2021-10-29 PROCEDURE — 1101F PT FALLS ASSESS-DOCD LE1/YR: CPT | Performed by: INTERNAL MEDICINE

## 2021-10-29 PROCEDURE — 1036F TOBACCO NON-USER: CPT | Performed by: INTERNAL MEDICINE

## 2021-11-05 ENCOUNTER — APPOINTMENT (OUTPATIENT)
Dept: LAB | Facility: HOSPITAL | Age: 86
End: 2021-11-05
Attending: INTERNAL MEDICINE
Payer: COMMERCIAL

## 2021-11-05 ENCOUNTER — HOSPITAL ENCOUNTER (OUTPATIENT)
Dept: NON INVASIVE DIAGNOSTICS | Facility: HOSPITAL | Age: 86
Discharge: HOME | End: 2021-11-05
Attending: INTERNAL MEDICINE
Payer: COMMERCIAL

## 2021-11-05 VITALS
BODY MASS INDEX: 35.65 KG/M2 | HEIGHT: 65 IN | DIASTOLIC BLOOD PRESSURE: 60 MMHG | WEIGHT: 214 LBS | SYSTOLIC BLOOD PRESSURE: 120 MMHG | HEART RATE: 44 BPM

## 2021-11-05 DIAGNOSIS — Z95.2 S/P AVR (AORTIC VALVE REPLACEMENT): ICD-10-CM

## 2021-11-05 DIAGNOSIS — N18.32 TYPE 2 DIABETES MELLITUS WITH STAGE 3B CHRONIC KIDNEY DISEASE, WITHOUT LONG-TERM CURRENT USE OF INSULIN (HCC): ICD-10-CM

## 2021-11-05 DIAGNOSIS — Z95.1 S/P CABG X 4: ICD-10-CM

## 2021-11-05 DIAGNOSIS — E11.22 TYPE 2 DIABETES MELLITUS WITH STAGE 3B CHRONIC KIDNEY DISEASE, WITHOUT LONG-TERM CURRENT USE OF INSULIN (HCC): ICD-10-CM

## 2021-11-05 LAB
ALBUMIN SERPL BCP-MCNC: 3.1 G/DL (ref 3.5–5)
ALP SERPL-CCNC: 83 U/L (ref 46–116)
ALT SERPL W P-5'-P-CCNC: 47 U/L (ref 12–78)
ANION GAP SERPL CALCULATED.3IONS-SCNC: 10 MMOL/L (ref 4–13)
AST SERPL W P-5'-P-CCNC: 41 U/L (ref 5–45)
BILIRUB SERPL-MCNC: 0.7 MG/DL (ref 0.2–1)
BUN SERPL-MCNC: 36 MG/DL (ref 5–25)
CALCIUM ALBUM COR SERPL-MCNC: 9.2 MG/DL (ref 8.3–10.1)
CALCIUM SERPL-MCNC: 8.5 MG/DL (ref 8.3–10.1)
CHLORIDE SERPL-SCNC: 106 MMOL/L (ref 100–108)
CO2 SERPL-SCNC: 26 MMOL/L (ref 21–32)
CREAT SERPL-MCNC: 2.63 MG/DL (ref 0.6–1.3)
EST. AVERAGE GLUCOSE BLD GHB EST-MCNC: 206 MG/DL
GFR SERPL CREATININE-BSD FRML MDRD: 21 ML/MIN/1.73SQ M
GLUCOSE P FAST SERPL-MCNC: 231 MG/DL (ref 65–99)
HBA1C MFR BLD: 8.8 %
POTASSIUM SERPL-SCNC: 4.4 MMOL/L (ref 3.5–5.3)
PROT SERPL-MCNC: 6.5 G/DL (ref 6.4–8.2)
SODIUM SERPL-SCNC: 142 MMOL/L (ref 136–145)

## 2021-11-05 PROCEDURE — 93306 TTE W/DOPPLER COMPLETE: CPT | Performed by: INTERNAL MEDICINE

## 2021-11-05 PROCEDURE — 36415 COLL VENOUS BLD VENIPUNCTURE: CPT

## 2021-11-05 PROCEDURE — 93306 TTE W/DOPPLER COMPLETE: CPT

## 2021-11-05 PROCEDURE — 83036 HEMOGLOBIN GLYCOSYLATED A1C: CPT

## 2021-11-05 PROCEDURE — 80053 COMPREHEN METABOLIC PANEL: CPT

## 2021-11-08 LAB
AORTIC VALVE MEAN VELOCITY: 8.8 M/S
APICAL FOUR CHAMBER EJECTION FRACTION: 72 %
AV AREA BY CONTINUOUS VTI: 1.5 CM2
AV AREA PEAK VELOCITY: 1.4 CM2
AV LVOT MEAN GRADIENT: 2 MMHG
AV LVOT PEAK GRADIENT: 3 MMHG
AV MEAN GRADIENT: 4 MMHG
AV PEAK GRADIENT: 7 MMHG
AV VALVE AREA: 1.49 CM2
DOP CALC AO VTI: 29.5 CM
DOP CALC LVOT AREA: 2.01 CM2
DOP CALC LVOT DIAMETER: 1.6 CM
DOP CALC LVOT PEAK VEL VTI: 21.9 CM
DOP CALC LVOT PEAK VEL: 0.92 M/S
DOP CALC LVOT STROKE INDEX: 21.6 ML/M2
DOP CALC LVOT STROKE VOLUME: 44.01 CM3
E WAVE DECELERATION TIME: 364 MS
LAAS-AP2: 24.6 CM2
LAAS-AP4: 28.2 CM2
MV E'TISSUE VEL-SEP: 6 CM/S
MV PEAK A VEL: 0.82 M/S
MV PEAK E VEL: 137 CM/S
MV STENOSIS PRESSURE HALF TIME: 0 MS
PA SYSTOLIC PRESSURE: 30 MMHG
RIGHT VENTRICLE ID DIMENSION: 4.7 CM
SL CV LV EF: 55
TR PEAK VELOCITY: 2.3 M/S
TRICUSPID VALVE PEAK REGURGITATION VELOCITY: 2.29 M/S
TRICUSPID VALVE S': 67 CM/S
TV PEAK GRADIENT: 21 MMHG

## 2021-11-09 ENCOUNTER — TELEPHONE (OUTPATIENT)
Dept: NEPHROLOGY | Facility: CLINIC | Age: 86
End: 2021-11-09

## 2021-11-09 DIAGNOSIS — N18.30 ANEMIA DUE TO STAGE 3 CHRONIC KIDNEY DISEASE, UNSPECIFIED WHETHER STAGE 3A OR 3B CKD (HCC): Primary | ICD-10-CM

## 2021-11-09 DIAGNOSIS — I70.0 ATHEROSCLEROSIS OF AORTA (HCC): ICD-10-CM

## 2021-11-09 DIAGNOSIS — D63.1 ANEMIA DUE TO STAGE 3 CHRONIC KIDNEY DISEASE, UNSPECIFIED WHETHER STAGE 3A OR 3B CKD (HCC): Primary | ICD-10-CM

## 2021-11-09 DIAGNOSIS — Z79.4 TYPE 2 DIABETES MELLITUS WITH STAGE 3B CHRONIC KIDNEY DISEASE, WITH LONG-TERM CURRENT USE OF INSULIN (HCC): Primary | ICD-10-CM

## 2021-11-09 DIAGNOSIS — E11.22 TYPE 2 DIABETES MELLITUS WITH STAGE 3B CHRONIC KIDNEY DISEASE, WITH LONG-TERM CURRENT USE OF INSULIN (HCC): Primary | ICD-10-CM

## 2021-11-09 DIAGNOSIS — N18.32 TYPE 2 DIABETES MELLITUS WITH STAGE 3B CHRONIC KIDNEY DISEASE, WITH LONG-TERM CURRENT USE OF INSULIN (HCC): Primary | ICD-10-CM

## 2021-11-24 DIAGNOSIS — Z95.1 S/P CABG X 4: ICD-10-CM

## 2021-12-01 LAB
BUN SERPL-MCNC: 33 MG/DL (ref 7–25)
BUN/CREAT SERPL: 14 (CALC) (ref 6–22)
CALCIUM SERPL-MCNC: 9 MG/DL (ref 8.6–10.3)
CHLORIDE SERPL-SCNC: 107 MMOL/L (ref 98–110)
CO2 SERPL-SCNC: 26 MMOL/L (ref 20–32)
CREAT SERPL-MCNC: 2.36 MG/DL (ref 0.7–1.11)
GLUCOSE SERPL-MCNC: 110 MG/DL (ref 65–99)
POTASSIUM SERPL-SCNC: 4.1 MMOL/L (ref 3.5–5.3)
SL AMB EGFR AFRICAN AMERICAN: 28 ML/MIN/1.73M2
SL AMB EGFR NON AFRICAN AMERICAN: 24 ML/MIN/1.73M2
SODIUM SERPL-SCNC: 142 MMOL/L (ref 135–146)

## 2021-12-03 ENCOUNTER — TELEPHONE (OUTPATIENT)
Dept: NEPHROLOGY | Facility: CLINIC | Age: 86
End: 2021-12-03

## 2021-12-06 DIAGNOSIS — E03.9 HYPOTHYROIDISM, UNSPECIFIED TYPE: ICD-10-CM

## 2021-12-06 RX ORDER — LEVOTHYROXINE SODIUM 0.15 MG/1
150 TABLET ORAL DAILY
Qty: 30 TABLET | Refills: 5 | Status: SHIPPED | OUTPATIENT
Start: 2021-12-06 | End: 2022-06-07

## 2021-12-16 ENCOUNTER — CONSULT (OUTPATIENT)
Dept: ENDOCRINOLOGY | Facility: HOSPITAL | Age: 86
End: 2021-12-16
Payer: COMMERCIAL

## 2021-12-16 VITALS
DIASTOLIC BLOOD PRESSURE: 64 MMHG | BODY MASS INDEX: 35.99 KG/M2 | HEART RATE: 51 BPM | SYSTOLIC BLOOD PRESSURE: 132 MMHG | HEIGHT: 65 IN | WEIGHT: 216 LBS

## 2021-12-16 DIAGNOSIS — Z79.4 TYPE 2 DIABETES MELLITUS WITH STAGE 3B CHRONIC KIDNEY DISEASE, WITH LONG-TERM CURRENT USE OF INSULIN (HCC): ICD-10-CM

## 2021-12-16 DIAGNOSIS — E11.22 TYPE 2 DIABETES MELLITUS WITH STAGE 3B CHRONIC KIDNEY DISEASE, WITH LONG-TERM CURRENT USE OF INSULIN (HCC): ICD-10-CM

## 2021-12-16 DIAGNOSIS — N18.32 TYPE 2 DIABETES MELLITUS WITH STAGE 3B CHRONIC KIDNEY DISEASE, WITH LONG-TERM CURRENT USE OF INSULIN (HCC): ICD-10-CM

## 2021-12-16 DIAGNOSIS — E03.9 ACQUIRED HYPOTHYROIDISM: Primary | Chronic | ICD-10-CM

## 2021-12-16 PROCEDURE — 99204 OFFICE O/P NEW MOD 45 MIN: CPT | Performed by: INTERNAL MEDICINE

## 2021-12-16 PROCEDURE — 3075F SYST BP GE 130 - 139MM HG: CPT | Performed by: INTERNAL MEDICINE

## 2021-12-16 PROCEDURE — 1160F RVW MEDS BY RX/DR IN RCRD: CPT | Performed by: INTERNAL MEDICINE

## 2021-12-16 PROCEDURE — 1036F TOBACCO NON-USER: CPT | Performed by: INTERNAL MEDICINE

## 2021-12-16 PROCEDURE — 3078F DIAST BP <80 MM HG: CPT | Performed by: INTERNAL MEDICINE

## 2021-12-30 DIAGNOSIS — F41.9 ANXIETY: ICD-10-CM

## 2021-12-30 DIAGNOSIS — Z00.00 HEALTHCARE MAINTENANCE: ICD-10-CM

## 2021-12-30 DIAGNOSIS — Z95.1 S/P CABG X 4: ICD-10-CM

## 2021-12-30 DIAGNOSIS — D64.9 ANEMIA, UNSPECIFIED TYPE: ICD-10-CM

## 2021-12-30 DIAGNOSIS — I25.10 CORONARY ARTERY DISEASE INVOLVING NATIVE CORONARY ARTERY OF NATIVE HEART WITHOUT ANGINA PECTORIS: ICD-10-CM

## 2021-12-30 DIAGNOSIS — R60.9 EDEMA, UNSPECIFIED TYPE: ICD-10-CM

## 2021-12-30 RX ORDER — ASPIRIN 325 MG
325 TABLET, DELAYED RELEASE (ENTERIC COATED) ORAL DAILY
Qty: 28 TABLET | Refills: 3 | Status: SHIPPED | OUTPATIENT
Start: 2021-12-30 | End: 2022-05-17

## 2021-12-30 RX ORDER — LORAZEPAM 0.5 MG/1
TABLET ORAL
Qty: 30 TABLET | Refills: 3 | Status: SHIPPED | OUTPATIENT
Start: 2021-12-30 | End: 2022-05-17

## 2021-12-30 RX ORDER — TORSEMIDE 10 MG/1
TABLET ORAL
Qty: 30 TABLET | Refills: 5 | Status: SHIPPED | OUTPATIENT
Start: 2021-12-30 | End: 2022-07-13

## 2021-12-30 RX ORDER — PANTOPRAZOLE SODIUM 20 MG/1
TABLET, DELAYED RELEASE ORAL
Qty: 30 TABLET | Refills: 3 | Status: SHIPPED | OUTPATIENT
Start: 2021-12-30 | End: 2022-05-17

## 2021-12-30 RX ORDER — FERROUS SULFATE 325(65) MG
TABLET ORAL
Qty: 28 TABLET | Refills: 3 | Status: SHIPPED | OUTPATIENT
Start: 2021-12-30 | End: 2022-05-17

## 2021-12-30 RX ORDER — ASCORBIC ACID 500 MG
TABLET ORAL
Qty: 28 TABLET | Refills: 3 | Status: SHIPPED | OUTPATIENT
Start: 2021-12-30 | End: 2022-05-17

## 2022-01-18 ENCOUNTER — TELEPHONE (OUTPATIENT)
Dept: NEPHROLOGY | Facility: CLINIC | Age: 87
End: 2022-01-18

## 2022-01-18 NOTE — TELEPHONE ENCOUNTER
Called patient to schedule appointment  Patient's son stated he would be calling back to schedule the appointment

## 2022-01-19 ENCOUNTER — TELEPHONE (OUTPATIENT)
Dept: NEPHROLOGY | Facility: HOSPITAL | Age: 87
End: 2022-01-19

## 2022-01-19 NOTE — TELEPHONE ENCOUNTER
Patient called back and left a voicemail  I returned call, but had to leave another voicemail to call back

## 2022-01-25 ENCOUNTER — OFFICE VISIT (OUTPATIENT)
Dept: DIABETES SERVICES | Facility: HOSPITAL | Age: 87
End: 2022-01-25
Payer: COMMERCIAL

## 2022-01-25 VITALS — BODY MASS INDEX: 32.74 KG/M2 | HEIGHT: 68 IN | WEIGHT: 216 LBS

## 2022-01-25 DIAGNOSIS — E03.9 ACQUIRED HYPOTHYROIDISM: Chronic | ICD-10-CM

## 2022-01-25 DIAGNOSIS — E11.22 TYPE 2 DIABETES MELLITUS WITH STAGE 3B CHRONIC KIDNEY DISEASE, WITH LONG-TERM CURRENT USE OF INSULIN (HCC): Primary | ICD-10-CM

## 2022-01-25 DIAGNOSIS — N18.32 TYPE 2 DIABETES MELLITUS WITH STAGE 3B CHRONIC KIDNEY DISEASE, WITH LONG-TERM CURRENT USE OF INSULIN (HCC): Primary | ICD-10-CM

## 2022-01-25 DIAGNOSIS — Z79.4 TYPE 2 DIABETES MELLITUS WITH STAGE 3B CHRONIC KIDNEY DISEASE, WITH LONG-TERM CURRENT USE OF INSULIN (HCC): Primary | ICD-10-CM

## 2022-01-25 PROCEDURE — 95250 CONT GLUC MNTR PHYS/QHP EQP: CPT | Performed by: DIETITIAN, REGISTERED

## 2022-01-25 NOTE — PROGRESS NOTES
Dexcom G6 Professional Training    Met with Lani Cohen for [facility] G6 Professional training  Training today included:    - Explained procedure to Bear Jagdish sensor expiration date; Sensor lot number: [id number], Transmitter Id: [id number]  - Patient has a blood glucose meter and strips  - Transmitter has been cleaned with alcohol and dried  - Discussed site selection; identified insertion site: left abdomen  - Cleansed the skin with alcohol  - Inserted sensor per instructions: insert sensor,remove insertion tool, smooth tape on the skin  - Snapped in grey transmitter  - Verified transmitter fully snapped in on both sides (2clicks)  - Disposed insertion tool in sharps container  - Verified communication with office reader    Pt is using the dexcom unblinded and will running it through their smartphone  Connected in office, patient left in warmup mode  Patient instructions reviewed with patient:    - Sensor is waterproof for showering and swimming, remove for hot tub, MRI, Xray  - Remove if redness, bleeding, swelling, discomfort at site  - Removal is in 10days with return instructions, you may leave it on your body or remove it just like a bandaid  Place in ziplock back and bring back to the office  - Return for follow up with me in 2 weeks for download review  Sensor inserted by: Junior Helms RD, CDE    Navya Zhang will return in 2 weeks for sensor removal and data download        Junior Helms, [address] [place] [address]  [address] [place] [address] 20  [address] [facility] [id number]

## 2022-02-08 ENCOUNTER — OFFICE VISIT (OUTPATIENT)
Dept: DIABETES SERVICES | Facility: HOSPITAL | Age: 87
End: 2022-02-08
Payer: COMMERCIAL

## 2022-02-08 DIAGNOSIS — N18.32 TYPE 2 DIABETES MELLITUS WITH STAGE 3B CHRONIC KIDNEY DISEASE, WITH LONG-TERM CURRENT USE OF INSULIN (HCC): Primary | ICD-10-CM

## 2022-02-08 DIAGNOSIS — Z79.4 TYPE 2 DIABETES MELLITUS WITH STAGE 3B CHRONIC KIDNEY DISEASE, WITH LONG-TERM CURRENT USE OF INSULIN (HCC): Primary | ICD-10-CM

## 2022-02-08 DIAGNOSIS — E11.22 TYPE 2 DIABETES MELLITUS WITH STAGE 3B CHRONIC KIDNEY DISEASE, WITH LONG-TERM CURRENT USE OF INSULIN (HCC): Primary | ICD-10-CM

## 2022-02-08 PROCEDURE — 95251 CONT GLUC MNTR ANALYSIS I&R: CPT | Performed by: DIETITIAN, REGISTERED

## 2022-02-08 NOTE — PROGRESS NOTES
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 as he does not use a smart phone  Erickson Wheeler shows in goal range 35%, high 38%, very high 27%, no low blood sugars  Blood sugar consistently rises after breakfast, then makes a significant increase in the afternoon with his snack as he eats a snack instead of a real lunch and therefore does not take any insulin  Blood sugars then remain above goal for the rest of the day  He gets dinner from [employer], which is typically 60-75 g of carbs, he likely would benefit from more insulin at that meal   His Basaglar than pulls his blood sugars down overnight and he typically wakes up with blood sugars between 120 and 140 he states  He was surprised to see these high blood sugars in the download because he never sees anything like that in the morning, but this is consistent with his A1c of 8 8%  We discussed that I will review his download and the notes I made of his meals with doctor Jacklyn Mayer as a part of his interpretation and review, and the office will call him with insulin dose adjustments to be made  No follow-up needed at this time      [address] [place] [address] via [address] [place] [address] to provider for review    Lab Results   Component Value Date    HGBA1C 8 8 (H) 11/05/2021       Lab Results   Component Value Date     09/05/2017    SODIUM 142 12/01/2021    K 4 1 12/01/2021     12/01/2021    CO2 26 12/01/2021    AGAP 10 11/05/2021    BUN 33 (H) 12/01/2021    CREATININE 2 36 (H) 12/01/2021    GLUC 110 (H) 12/01/2021    GLUF 231 (H) 11/05/2021    CALCIUM 9 0 12/01/2021    AST 41 11/05/2021    ALT 47 11/05/2021    ALKPHOS 83 11/05/2021    PROT 6 9 04/17/2017    TP 6 5 11/05/2021    BILITOT 1 0 04/17/2017    TBILI 0 70 11/05/2021    EGFR 21 11/05/2021           Sensor Removal By: Imani PAGE  Download Performed by: Imani Arevalo RD, CDE

## 2022-02-09 ENCOUNTER — TELEPHONE (OUTPATIENT)
Dept: ENDOCRINOLOGY | Facility: HOSPITAL | Age: 87
End: 2022-02-09

## 2022-02-09 DIAGNOSIS — N18.30 TYPE 2 DIABETES MELLITUS WITH STAGE 3 CHRONIC KIDNEY DISEASE, WITHOUT LONG-TERM CURRENT USE OF INSULIN, UNSPECIFIED WHETHER STAGE 3A OR 3B CKD (HCC): ICD-10-CM

## 2022-02-09 DIAGNOSIS — L03.116 CELLULITIS OF LEFT LOWER EXTREMITY: ICD-10-CM

## 2022-02-09 DIAGNOSIS — E11.22 TYPE 2 DIABETES MELLITUS WITH STAGE 3 CHRONIC KIDNEY DISEASE, WITHOUT LONG-TERM CURRENT USE OF INSULIN, UNSPECIFIED WHETHER STAGE 3A OR 3B CKD (HCC): ICD-10-CM

## 2022-02-09 RX ORDER — INSULIN ASPART 100 [IU]/ML
INJECTION, SOLUTION INTRAVENOUS; SUBCUTANEOUS
Qty: 15 ML | Refills: 1
Start: 2022-02-09 | End: 2022-02-09 | Stop reason: SDUPTHER

## 2022-02-09 RX ORDER — INSULIN ASPART 100 [IU]/ML
INJECTION, SOLUTION INTRAVENOUS; SUBCUTANEOUS
Qty: 15 ML | Refills: 1
Start: 2022-02-09 | End: 2022-07-12

## 2022-02-09 RX ORDER — INSULIN GLARGINE 100 [IU]/ML
INJECTION, SOLUTION SUBCUTANEOUS
Qty: 15 ML | Refills: 3
Start: 2022-02-09 | End: 2022-02-28 | Stop reason: SDUPTHER

## 2022-02-09 NOTE — TELEPHONE ENCOUNTER
Dex com download from 01/25 through 2/4 shows an average glucose of 217   35% values are within range  0% are low  He is having hyperglycemia particularly postprandially  For his hyperglycemia after snack around 3:00 p m  would suggest adding a dose of NovoLog 5 units at that time  Continue NovoLog 10 units with breakfast and NovoLog 10 units with dinner  Blood sugars do trend down overnight so preemptively would decrease Basaglar to 16 units at bedtime  Would send in blood sugars for review another week or 2

## 2022-02-16 DIAGNOSIS — R35.1 BENIGN PROSTATIC HYPERPLASIA WITH NOCTURIA: ICD-10-CM

## 2022-02-16 DIAGNOSIS — N40.1 BENIGN PROSTATIC HYPERPLASIA WITH NOCTURIA: ICD-10-CM

## 2022-02-16 RX ORDER — TAMSULOSIN HYDROCHLORIDE 0.4 MG/1
CAPSULE ORAL
Qty: 30 CAPSULE | Refills: 6 | Status: SHIPPED | OUTPATIENT
Start: 2022-02-16

## 2022-02-25 ENCOUNTER — TELEPHONE (OUTPATIENT)
Dept: NEPHROLOGY | Facility: CLINIC | Age: 87
End: 2022-02-25

## 2022-02-28 ENCOUNTER — OFFICE VISIT (OUTPATIENT)
Dept: FAMILY MEDICINE CLINIC | Facility: HOSPITAL | Age: 87
End: 2022-02-28
Payer: COMMERCIAL

## 2022-02-28 VITALS
DIASTOLIC BLOOD PRESSURE: 62 MMHG | WEIGHT: 215 LBS | HEIGHT: 68 IN | BODY MASS INDEX: 32.58 KG/M2 | OXYGEN SATURATION: 97 % | HEART RATE: 69 BPM | SYSTOLIC BLOOD PRESSURE: 130 MMHG

## 2022-02-28 DIAGNOSIS — E66.01 OBESITY, MORBID (HCC): ICD-10-CM

## 2022-02-28 DIAGNOSIS — E03.9 ACQUIRED HYPOTHYROIDISM: Chronic | ICD-10-CM

## 2022-02-28 DIAGNOSIS — Z95.1 S/P CABG X 4: ICD-10-CM

## 2022-02-28 DIAGNOSIS — E11.40 TYPE 2 DIABETES MELLITUS WITH DIABETIC NEUROPATHY, WITH LONG-TERM CURRENT USE OF INSULIN (HCC): ICD-10-CM

## 2022-02-28 DIAGNOSIS — L98.9 FACIAL SKIN LESION: ICD-10-CM

## 2022-02-28 DIAGNOSIS — L03.116 CELLULITIS OF LEFT LOWER EXTREMITY: ICD-10-CM

## 2022-02-28 DIAGNOSIS — I70.0 ATHEROSCLEROSIS OF AORTA (HCC): ICD-10-CM

## 2022-02-28 DIAGNOSIS — Z79.4 TYPE 2 DIABETES MELLITUS WITH DIABETIC NEUROPATHY, WITH LONG-TERM CURRENT USE OF INSULIN (HCC): ICD-10-CM

## 2022-02-28 DIAGNOSIS — Z95.2 S/P AVR (AORTIC VALVE REPLACEMENT): ICD-10-CM

## 2022-02-28 DIAGNOSIS — N18.32 TYPE 2 DIABETES MELLITUS WITH STAGE 3B CHRONIC KIDNEY DISEASE, WITH LONG-TERM CURRENT USE OF INSULIN (HCC): Primary | ICD-10-CM

## 2022-02-28 DIAGNOSIS — I35.0 NONRHEUMATIC AORTIC VALVE STENOSIS: ICD-10-CM

## 2022-02-28 DIAGNOSIS — I11.0 HYPERTENSIVE HEART DISEASE WITH HF (HEART FAILURE) (HCC): ICD-10-CM

## 2022-02-28 DIAGNOSIS — E78.00 PURE HYPERCHOLESTEROLEMIA: ICD-10-CM

## 2022-02-28 DIAGNOSIS — I50.32 CHRONIC DIASTOLIC CONGESTIVE HEART FAILURE (HCC): ICD-10-CM

## 2022-02-28 DIAGNOSIS — E11.22 TYPE 2 DIABETES MELLITUS WITH STAGE 3B CHRONIC KIDNEY DISEASE, WITH LONG-TERM CURRENT USE OF INSULIN (HCC): Primary | ICD-10-CM

## 2022-02-28 DIAGNOSIS — H40.9 GLAUCOMA OF BOTH EYES, UNSPECIFIED GLAUCOMA TYPE: ICD-10-CM

## 2022-02-28 DIAGNOSIS — Z79.4 TYPE 2 DIABETES MELLITUS WITH STAGE 3B CHRONIC KIDNEY DISEASE, WITH LONG-TERM CURRENT USE OF INSULIN (HCC): Primary | ICD-10-CM

## 2022-02-28 PROBLEM — I20.8 STABLE ANGINA (HCC): Status: RESOLVED | Noted: 2020-01-24 | Resolved: 2022-02-28

## 2022-02-28 PROBLEM — I20.89 STABLE ANGINA: Status: RESOLVED | Noted: 2020-01-24 | Resolved: 2022-02-28

## 2022-02-28 PROCEDURE — 1036F TOBACCO NON-USER: CPT | Performed by: INTERNAL MEDICINE

## 2022-02-28 PROCEDURE — 99214 OFFICE O/P EST MOD 30 MIN: CPT | Performed by: INTERNAL MEDICINE

## 2022-02-28 PROCEDURE — 1160F RVW MEDS BY RX/DR IN RCRD: CPT | Performed by: INTERNAL MEDICINE

## 2022-02-28 PROCEDURE — 3725F SCREEN DEPRESSION PERFORMED: CPT | Performed by: INTERNAL MEDICINE

## 2022-02-28 RX ORDER — INSULIN GLARGINE 100 [IU]/ML
INJECTION, SOLUTION SUBCUTANEOUS
Qty: 15 ML | Refills: 3 | Status: SHIPPED | OUTPATIENT
Start: 2022-02-28

## 2022-02-28 RX ORDER — TIMOLOL MALEATE 5 MG/ML
1 SOLUTION/ DROPS OPHTHALMIC 2 TIMES DAILY
Start: 2022-02-28

## 2022-02-28 NOTE — ASSESSMENT & PLAN NOTE
Lab Results   Component Value Date    HGBA1C 8 8 (H) 11/05/2021   has some  Cramping in legs- no shooting pains currenlty

## 2022-02-28 NOTE — ASSESSMENT & PLAN NOTE
Wt Readings from Last 3 Encounters:   02/28/22 97 5 kg (215 lb)   01/25/22 98 kg (216 lb)   12/16/21 98 kg (216 lb)       Well controlled- on torsemide- Dr Olvera Query is followed

## 2022-02-28 NOTE — PROGRESS NOTES
Assessment/Plan:             Problem List Items Addressed This Visit        Endocrine    Hypothyroidism (Chronic)     Seeing Dr Luda Ackerman         Type 2 diabetes mellitus with chronic kidney disease, with long-term current use of insulin ([facility] ) - Primary       Lab Results   Component Value Date    HGBA1C 8 8 (H) 11/05/2021   seeing Dr Lauryn Nuno- for diabetic foot care- had ingrown toenail on right recently   seeing Dr Fer Merlos and Brittani billings- had trial of continuous monitor-on basaglar 16 in pm, and mealtime novolog 8 with meals and prn         Type 2 diabetes mellitus with diabetic neuropathy ([facility] )       Lab Results   Component Value Date    HGBA1C 8 8 (H) 11/05/2021   has some  Cramping in legs- no shooting pains currenlty            Cardiovascular and Mediastinum    Hypertensive heart disease with HF (heart failure) (HCC)     Wt Readings from Last 3 Encounters:   02/28/22 97 5 kg (215 lb)   01/25/22 98 kg (216 lb)   12/16/21 98 kg (216 lb)       Well controlled- on torsemide- Dr Paul Marking is followed           Aortic stenosis    Atherosclerosis of aorta ([facility] )     Sees Dr Bush in april         Chronic diastolic congestive heart failure (HCC)       Other    Obesity, morbid ([place] [address] )     Bmi 32-but has  diabetes and cardiac disease as risk factor         Pure hypercholesterolemia    S/P CABG x 4    S/P AVR (aortic valve replacement)      Other Visit Diagnoses     Glaucoma of both eyes, unspecified glaucoma type        Relevant Medications    timolol (TIMOPTIC) 0 5 % ophthalmic solution            Subjective:      Patient ID: Aris Marie  is a 80 y o  male    Overall energy levels are ok- not as acitve with winter weather- uses cane if unstable   left facial skin lesion - has for months- then opens up again-- has seen Dr Carrie Alves in past      The following portions of the patient's history were reviewed and updated as appropriate: allergies, current medications and problem list      Review of Systems   HENT: Negative for congestion  Cardiovascular: Negative for chest pain and palpitations  Mild edema at end of day   Gastrointestinal: Positive for constipation  Musculoskeletal:        Some knee pain   Skin:        Left facial skin lesion- with erythema   All other systems reviewed and are negative          Objective:      Current Outpatient Medications:     acetaminophen (TYLENOL) 325 mg tablet, 650 mg every 4 to 6 hours as needed for pain (do not take with percocet), Disp: 30 tablet, Rfl: 0    albuterol (PROVENTIL HFA) 90 mcg/act inhaler, Inhale 2 puffs 4 (four) times a day as needed, Disp: , Rfl:     allopurinol (ZYLOPRIM) 300 mg tablet, TAKE ONE TABLET BY MOUTH ONCE DAILY, Disp: 90 tablet, Rfl: 3    ascorbic acid (VITAMIN C) 500 MG tablet, TAKE ONE TABLET BY MOUTH DAILY, Disp: 28 tablet, Rfl: 3    aspirin (ECOTRIN) 325 mg EC tablet, Take 1 tablet (325 mg total) by mouth daily, Disp: 28 tablet, Rfl: 3    Basaglar KwikPen 100 units/mL injection pen, 16 units daily, Disp: 15 mL, Rfl: 3    FeroSul 325 (65 Fe) MG tablet, TAKE ONE TABLET BY MOUTH DAILY, Disp: 28 tablet, Rfl: 3    gabapentin (NEURONTIN) 300 mg capsule, Take 1 capsule (300 mg total) by mouth daily at bedtime, Disp: 90 capsule, Rfl: 3    insulin aspart (NovoLOG FlexPen) 100 UNIT/ML injection pen, 10 units with breakfast, 5 units with PM snack, 10 units with dinner, Disp: 15 mL, Rfl: 1    Insulin Pen Needle (Pen Needles) 32G X 4 MM MISC, Use 4 (four) times a day (before meals and at bedtime), Disp: 100 each, Rfl: 11    levothyroxine 150 mcg tablet, Take 1 tablet (150 mcg total) by mouth daily, Disp: 30 tablet, Rfl: 5    LORazepam (ATIVAN) 0 5 mg tablet, TAKE ONE TABLET BY MOUTH EVERY EIGHT HOURS AS NEEDED FOR ANXIETY, Disp: 30 tablet, Rfl: 3    metoprolol tartrate (LOPRESSOR) 25 mg tablet, TAKE 12 tablet BY MOUTH DAILY, Disp: 30 tablet, Rfl: 6    olmesartan (BENICAR) 5 mg tablet, Take 1 tablet (5 mg total) by mouth daily, Disp: 90 tablet, Rfl: 3    pantoprazole (PROTONIX) 20 mg tablet, TAKE ONE TABLET BY MOUTH DAILY, Disp: 30 tablet, Rfl: 3    rosuvastatin (CRESTOR) 40 MG tablet, TAKE ONE TABLET BY MOUTH DAILY, Disp: 30 tablet, Rfl: 3    tamsulosin (FLOMAX) 0 4 mg, TAKE ONE CAPSULE BY MOUTH DAILY WITH dinner, Disp: 30 capsule, Rfl: 6    torsemide (DEMADEX) 10 mg tablet, TAKE ONE TABLET BY MOUTH EVERY DAY, Disp: 30 tablet, Rfl: 5    timolol (TIMOPTIC) 0 5 % ophthalmic solution, Administer 1 drop to both eyes 2 (two) times a day, Disp: , Rfl:     Blood pressure 130/62, pulse 69, height 5' 8" (1 727 m), weight 97 5 kg (215 lb), SpO2 97 %  Physical Exam  Vitals and nursing note reviewed  Constitutional:       General: He is not in acute distress  HENT:      Head: Normocephalic and atraumatic  Right Ear: There is no impacted cerumen  Left Ear: There is no impacted cerumen  Nose: No congestion  Eyes:      General: No scleral icterus  Right eye: No discharge  Left eye: No discharge  Cardiovascular:      Rate and Rhythm: Normal rate and regular rhythm  Heart sounds: No murmur heard  Pulmonary:      Effort: Pulmonary effort is normal  No respiratory distress  Breath sounds: No wheezing or rhonchi  Abdominal:      General: There is no distension  Palpations: Abdomen is soft  Musculoskeletal:         General: No tenderness  Cervical back: No rigidity  Comments: No ankle edema today   Skin:     Coloration: Skin is not jaundiced  Findings: No erythema  Neurological:      General: No focal deficit present  Mental Status: He is alert and oriented to person, place, and time  Psychiatric:         Mood and Affect: Mood normal          Thought Content:  Thought content normal          Judgment: Judgment normal

## 2022-02-28 NOTE — ASSESSMENT & PLAN NOTE
Lab Results   Component Value Date    HGBA1C 8 8 (H) 11/05/2021   seeing Dr Kirstie Lin- for diabetic foot care- had ingrown toenail on right recently   seeing Dr Susan Gardiner and Brittani billings- had trial of continuous monitor-on basaglar 16 in pm, and mealtime novolog 8 with meals and prn

## 2022-03-01 LAB
ALBUMIN SERPL-MCNC: 3.8 G/DL (ref 3.6–5.1)
ALBUMIN/GLOB SERPL: 1.5 (CALC) (ref 1–2.5)
ALP SERPL-CCNC: 76 U/L (ref 35–144)
ALT SERPL-CCNC: 58 U/L (ref 9–46)
APPEARANCE UR: ABNORMAL
AST SERPL-CCNC: 51 U/L (ref 10–35)
BACTERIA UR QL AUTO: ABNORMAL /HPF
BASOPHILS # BLD AUTO: 52 CELLS/UL (ref 0–200)
BASOPHILS NFR BLD AUTO: 1 %
BILIRUB SERPL-MCNC: 0.6 MG/DL (ref 0.2–1.2)
BILIRUB UR QL STRIP: NEGATIVE
BUN SERPL-MCNC: 30 MG/DL (ref 7–25)
BUN/CREAT SERPL: 13 (CALC) (ref 6–22)
CALCIUM SERPL-MCNC: 8.8 MG/DL (ref 8.6–10.3)
CALCIUM SERPL-MCNC: 8.8 MG/DL (ref 8.6–10.3)
CHLORIDE SERPL-SCNC: 107 MMOL/L (ref 98–110)
CO2 SERPL-SCNC: 27 MMOL/L (ref 20–32)
COLOR UR: YELLOW
CREAT SERPL-MCNC: 2.39 MG/DL (ref 0.7–1.11)
CREAT UR-MCNC: 103 MG/DL (ref 20–320)
EOSINOPHIL # BLD AUTO: 645 CELLS/UL (ref 15–500)
EOSINOPHIL NFR BLD AUTO: 12.4 %
ERYTHROCYTE [DISTWIDTH] IN BLOOD BY AUTOMATED COUNT: 12.9 % (ref 11–15)
GLOBULIN SER CALC-MCNC: 2.6 G/DL (CALC) (ref 1.9–3.7)
GLUCOSE SERPL-MCNC: 176 MG/DL (ref 65–99)
GLUCOSE UR QL STRIP: ABNORMAL
GRAN CASTS #/AREA URNS LPF: ABNORMAL /LPF
HCT VFR BLD AUTO: 37.9 % (ref 38.5–50)
HGB BLD-MCNC: 12.1 G/DL (ref 13.2–17.1)
HGB UR QL STRIP: NEGATIVE
HYALINE CASTS #/AREA URNS LPF: ABNORMAL /LPF
KETONES UR QL STRIP: NEGATIVE
LEUKOCYTE ESTERASE UR QL STRIP: NEGATIVE
LYMPHOCYTES # BLD AUTO: 2075 CELLS/UL (ref 850–3900)
LYMPHOCYTES NFR BLD AUTO: 39.9 %
MAGNESIUM SERPL-MCNC: 2.3 MG/DL (ref 1.5–2.5)
MCH RBC QN AUTO: 31.8 PG (ref 27–33)
MCHC RBC AUTO-ENTMCNC: 31.9 G/DL (ref 32–36)
MCV RBC AUTO: 99.5 FL (ref 80–100)
MONOCYTES # BLD AUTO: 432 CELLS/UL (ref 200–950)
MONOCYTES NFR BLD AUTO: 8.3 %
NEUTROPHILS # BLD AUTO: 1997 CELLS/UL (ref 1500–7800)
NEUTROPHILS NFR BLD AUTO: 38.4 %
NITRITE UR QL STRIP: NEGATIVE
PH UR STRIP: 5.5 [PH] (ref 5–8)
PHOSPHATE SERPL-MCNC: 3.1 MG/DL (ref 2.1–4.3)
PLATELET # BLD AUTO: 162 THOUSAND/UL (ref 140–400)
PMV BLD REES-ECKER: 10.7 FL (ref 7.5–12.5)
POTASSIUM SERPL-SCNC: 3.9 MMOL/L (ref 3.5–5.3)
PROT SERPL-MCNC: 6.4 G/DL (ref 6.1–8.1)
PROT UR QL STRIP: ABNORMAL
PROT UR-MCNC: 195 MG/DL (ref 5–25)
PROT/CREAT UR: 1.89 MG/MG CREAT (ref 0.02–0.13)
PROT/CREAT UR: 1893 MG/G CREAT (ref 22–128)
PTH-INTACT SERPL-MCNC: 91 PG/ML (ref 14–64)
RBC # BLD AUTO: 3.81 MILLION/UL (ref 4.2–5.8)
RBC #/AREA URNS HPF: ABNORMAL /HPF
SL AMB EGFR AFRICAN AMERICAN: 27 ML/MIN/1.73M2
SL AMB EGFR NON AFRICAN AMERICAN: 24 ML/MIN/1.73M2
SODIUM SERPL-SCNC: 141 MMOL/L (ref 135–146)
SP GR UR STRIP: 1.02 (ref 1–1.03)
SQUAMOUS #/AREA URNS HPF: ABNORMAL /HPF
URATE SERPL-MCNC: 3.1 MG/DL (ref 4–8)
WBC # BLD AUTO: 5.2 THOUSAND/UL (ref 3.8–10.8)
WBC #/AREA URNS HPF: ABNORMAL /HPF

## 2022-03-03 ENCOUNTER — OFFICE VISIT (OUTPATIENT)
Dept: NEPHROLOGY | Facility: HOSPITAL | Age: 87
End: 2022-03-03
Payer: COMMERCIAL

## 2022-03-03 VITALS
HEART RATE: 55 BPM | HEIGHT: 68 IN | DIASTOLIC BLOOD PRESSURE: 70 MMHG | WEIGHT: 212 LBS | SYSTOLIC BLOOD PRESSURE: 144 MMHG | BODY MASS INDEX: 32.13 KG/M2

## 2022-03-03 DIAGNOSIS — N25.81 SECONDARY HYPERPARATHYROIDISM (HCC): ICD-10-CM

## 2022-03-03 DIAGNOSIS — N18.4 CKD (CHRONIC KIDNEY DISEASE), STAGE IV (HCC): Primary | ICD-10-CM

## 2022-03-03 PROCEDURE — 99214 OFFICE O/P EST MOD 30 MIN: CPT | Performed by: INTERNAL MEDICINE

## 2022-03-03 PROCEDURE — 1036F TOBACCO NON-USER: CPT | Performed by: INTERNAL MEDICINE

## 2022-03-03 PROCEDURE — 1160F RVW MEDS BY RX/DR IN RCRD: CPT | Performed by: INTERNAL MEDICINE

## 2022-03-03 RX ORDER — MELATONIN
1000 DAILY
Qty: 90 TABLET | Refills: 3 | Status: SHIPPED | OUTPATIENT
Start: 2022-03-03

## 2022-03-03 NOTE — PROGRESS NOTES
OFFICE FOLLOW UP - Nephrology   Herminio Gaspar  80 y o  male MRN: [id number]    Encounter: [id number]        ASSESSMENT & PLAN    1  Stage IIIB to stage 4 chronic kidney disease moderately increased proteinuria  -urinalysis:   Now with over 1 gram of protein in urine  -imaging:  CT scan of the pelvis shows bilateral renal cysts largest at the lower pole of the right kidney measuring up to 3 3 cm   -etiology:  Patient has a longstanding history of hypertension and type 2 diabetes mellitus this is likely progression of his CKD  -plan:  His creatinine remains between 2 and 2 4  - tolerating olmesartan will continue 5 mg daily  - repeat BMP in 1 week to ensure stability of potassium in creatinine  - Completed CKD education and advance care meeting  - eGFR stable     2  Electrolytes:  -currently acceptable    3  Acid/Base  -no anion gap acid-base status acceptable    4  BP/HR  -History of hypertension- blood pressure is currently stable    5  Volume Status-euvolemic    6  Anemia of chronic kidney disease  - stable    7  MBD  -PTH up start d3 1000 units daily    8  Health Maintanance/Risk Reduction  -uncontrolled diabetes with elevated hemoglobin A1cs on insulin  -allopurinol for gout  -coronary artery disease and status post aortic valve replacement on diuretics ACE-inhibitor beta-blocker, statin    Overall stable follow up in 1 month    HPI: Herminio Gaspar  is a 80 y o  male who is here for follow-up    He has no acute complaints  No cp, sob, fevers, chills, nausea, vomiting, diarrhea or constipation   Tolerating his medication    ROS:   All systems reviewed and negative besides what was mentioned in the history of present illness    Allergies: Amlodipine and Atorvastatin    Medications:   Current Outpatient Medications:     acetaminophen (TYLENOL) 325 mg tablet, 650 mg every 4 to 6 hours as needed for pain (do not take with percocet), Disp: 30 tablet, Rfl: 0    albuterol (PROVENTIL HFA) 90 mcg/act inhaler, Inhale 2 puffs 4 (four) times a day as needed, Disp: , Rfl:     allopurinol (ZYLOPRIM) 300 mg tablet, TAKE ONE TABLET BY MOUTH ONCE DAILY, Disp: 90 tablet, Rfl: 3    ascorbic acid (VITAMIN C) 500 MG tablet, TAKE ONE TABLET BY MOUTH DAILY, Disp: 28 tablet, Rfl: 3    aspirin (ECOTRIN) 325 mg EC tablet, Take 1 tablet (325 mg total) by mouth daily, Disp: 28 tablet, Rfl: 3    Basaglar KwikPen 100 units/mL injection pen, 16 units daily, Disp: 15 mL, Rfl: 3    FeroSul 325 (65 Fe) MG tablet, TAKE ONE TABLET BY MOUTH DAILY, Disp: 28 tablet, Rfl: 3    gabapentin (NEURONTIN) 300 mg capsule, Take 1 capsule (300 mg total) by mouth daily at bedtime, Disp: 90 capsule, Rfl: 3    insulin aspart (NovoLOG FlexPen) 100 UNIT/ML injection pen, 10 units with breakfast, 5 units with PM snack, 10 units with dinner, Disp: 15 mL, Rfl: 1    Insulin Pen Needle (Pen Needles) 32G X 4 MM MISC, Use 4 (four) times a day (before meals and at bedtime), Disp: 100 each, Rfl: 11    levothyroxine 150 mcg tablet, Take 1 tablet (150 mcg total) by mouth daily, Disp: 30 tablet, Rfl: 5    LORazepam (ATIVAN) 0 5 mg tablet, TAKE ONE TABLET BY MOUTH EVERY EIGHT HOURS AS NEEDED FOR ANXIETY, Disp: 30 tablet, Rfl: 3    metoprolol tartrate (LOPRESSOR) 25 mg tablet, TAKE 12 tablet BY MOUTH DAILY, Disp: 30 tablet, Rfl: 6    olmesartan (BENICAR) 5 mg tablet, Take 1 tablet (5 mg total) by mouth daily, Disp: 90 tablet, Rfl: 3    pantoprazole (PROTONIX) 20 mg tablet, TAKE ONE TABLET BY MOUTH DAILY, Disp: 30 tablet, Rfl: 3    rosuvastatin (CRESTOR) 40 MG tablet, TAKE ONE TABLET BY MOUTH DAILY, Disp: 30 tablet, Rfl: 3    tamsulosin (FLOMAX) 0 4 mg, TAKE ONE CAPSULE BY MOUTH DAILY WITH dinner, Disp: 30 capsule, Rfl: 6    timolol (TIMOPTIC) 0 5 % ophthalmic solution, Administer 1 drop to both eyes 2 (two) times a day, Disp: , Rfl:     torsemide (DEMADEX) 10 mg tablet, TAKE ONE TABLET BY MOUTH EVERY DAY, Disp: 30 tablet, Rfl: 5    cholecalciferol (VITAMIN D3) 1,000 units tablet, Take 1 tablet (1,000 Units total) by mouth daily, Disp: 90 tablet, Rfl: 3    Past Medical History:   Diagnosis Date    Aortic stenosis     CKD (chronic kidney disease)     baseline Cr 1 3-1 5    Coronary artery disease     Cough     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HCC)     type 2, insulin dependent    GERD (gastroesophageal reflux disease)     Glaucoma     Gout     History of prostate cancer     Hypertension     Hypothyroidism     Overweight     Peripheral neuropathy, idiopathic     Pleural effusion, left     Pure hypercholesterolemia     LA   11/12/14   R   11/12/14     Visual impairment     cataract left eye    Weight gain      Past Surgical History:   Procedure Laterality Date    CARDIAC CATHETERIZATION      EYE SURGERY      IR THORACENTESIS  10/23/2018    IR THORACENTESIS  11/2/2018    IR THORACENTESIS  11/9/2018    IR THORACENTESIS  11/23/2018    LA CABG, ARTERY-VEIN, THREE N/A 9/17/2018    Procedure: CORONARY ARTERY BYPASS GRAFT (CABG) x 4 VESSELS with LIMA - LAD, SVG/LEFT LEG EVH - LEFT PDA, OM3, & OM2;  Surgeon: Ilya House MD;  [facility];  Service: Cardiac Surgery    LA ECHO TRANSESOPHAG MONTR CARDIAC PUMP FUNCTJ N/A 9/17/2018    Procedure: TRANSESOPHAGEAL ECHOCARDIOGRAM (VERONICA);   Surgeon: Ilya House MD;  [facility];  Service: Cardiac Surgery    LA RPLCMT AORTIC VALVE OPN W/STENTLESS TISSUE VALVE N/A 9/17/2018    Procedure: REPLACEMENT VALVE AORTIC (AVR)- 23mm tissue Intuity Valve;  Surgeon: Ilya House MD;  [facility];  Service: Cardiac Surgery    THORACOSCOPY VIDEO ASSISTED SURGERY (VATS) Left 11/27/2018    Procedure: THORACOSCOPY VIDEO ASSISTED SURGERY (VATS), talc pleurodesis,;  Surgeon: Nesha Torrez MD;  [facility];  Service: Thoracic    THYROID SURGERY       Family History   Problem Relation Age of Onset    Diabetes Mother     Pancreatic cancer Brother     Diabetes Maternal Grandmother     Colon cancer Son  Diabetes Family     Substance Abuse Neg Hx     Mental illness Neg Hx       reports that he quit smoking about 52 years ago  His smoking use included cigarettes  He has a 0 25 pack-year smoking history  He has never used smokeless tobacco  He reports that he does not drink alcohol and does not use drugs  Physical Exam:   Vitals:    03/03/22 1157   BP: 144/70   BP Location: Left arm   Patient Position: Sitting   Cuff Size: Standard   Pulse: 55   Weight: 96 2 kg (212 lb)   Height: 5' 8" (1 727 m)     Body mass index is 32 23 kg/m²      General: conscious, cooperative, in no acute distress  Eyes: conjunctivae pink, anicteric sclerae  ENT: lips and mucous membranes moist  Neck: supple, no JVD  Chest: clear breath sounds bilateral, no crackles, ronchus or wheezings  CVS: normal rate, regular rhythm  Abdomen: soft, non-tender, non-distended, normoactive bowel sounds  Extremities: no edema of both legs  Skin: no rash  Neuro: awake, alert, oriented  Psych:  Pleasant affect    Lab Results:    Results for orders placed or performed in visit on 02/28/22   PTH, Intact and Calcium   Result Value Ref Range    PTH, Intact 91 (H) 14 - 64 pg/mL    Calcium 8 8 8 6 - 10 3 mg/dL   Magnesium   Result Value Ref Range    Magnesium, Serum 2 3 1 5 - 2 5 mg/dL   Phosphorus   Result Value Ref Range    Phosphorus, Serum 3 1 2 1 - 4 3 mg/dL   Uric acid   Result Value Ref Range    Uric Acid 3 1 (L) 4 0 - 8 0 mg/dL   Comprehensive metabolic panel   Result Value Ref Range    Glucose, Random 176 (H) 65 - 99 mg/dL    BUN 30 (H) 7 - 25 mg/dL    Creatinine 2 39 (H) 0 70 - 1 11 mg/dL    eGFR Non  24 (L) > OR = 60 mL/min/1 73m2    eGFR  27 (L) > OR = 60 mL/min/1 73m2    SL AMB BUN/CREATININE RATIO 13 6 - 22 (calc)    Sodium 141 135 - 146 mmol/L    Potassium 3 9 3 5 - 5 3 mmol/L    Chloride 107 98 - 110 mmol/L    CO2 27 20 - 32 mmol/L    Calcium 8 8 8 6 - 10 3 mg/dL    Protein, Total 6 4 6 1 - 8 1 g/dL    Albumin 3 8 3 6 - 5 1 g/dL    Globulin 2 6 1 9 - 3 7 g/dL (calc)    Albumin/Globulin Ratio 1 5 1 0 - 2 5 (calc)    TOTAL BILIRUBIN 0 6 0 2 - 1 2 mg/dL    Alkaline Phosphatase 76 35 - 144 U/L    AST 51 (H) 10 - 35 U/L    ALT 58 (H) 9 - 46 U/L   CBC and differential   Result Value Ref Range    White Blood Cell Count 5 2 3 8 - 10 8 Thousand/uL    Red Blood Cell Count 3 81 (L) 4 20 - 5 80 Million/uL    Hemoglobin 12 1 (L) 13 2 - 17 1 g/dL    HCT 37 9 (L) 38 5 - 50 0 %    MCV 99 5 80 0 - 100 0 fL    MCH 31 8 27 0 - 33 0 pg    MCHC 31 9 (L) 32 0 - 36 0 g/dL    RDW 12 9 11 0 - 15 0 %    Platelet Count 291 911 - 400 Thousand/uL    SL AMB MPV 10 7 7 5 - 12 5 fL    Neutrophils (Absolute) 1,997 1,500 - 7,800 cells/uL    Lymphocytes (Absolute) 2,075 850 - 3,900 cells/uL    Monocytes (Absolute) 432 200 - 950 cells/uL    Eosinophils (Absolute) 645 (H) 15 - 500 cells/uL    Basophils ABS 52 0 - 200 cells/uL    Neutrophils 38 4 %    Lymphocytes 39 9 %    Monocytes 8 3 %    Eosinophils 12 4 %    Basophils PCT 1 0 %   Protein, Total w/Creat, Random Urine   Result Value Ref Range    Creatinine, Urine 103 20 - 320 mg/dL    Protein/Creat Ratio 1,893 (H) 22 - 128 mg/g creat    Protein/Creat Ratio 1 893 (H) 0 022 - 0 128 mg/mg creat    Total Protein, Urine 195 (H) 5 - 25 mg/dL   UA (URINE) with reflex to Scope   Result Value Ref Range    Color UA YELLOW YELLOW    Urine Appearance CLOUDY (A) CLEAR    Specific Gravity 1 018 1 001 - 1 035    Ph 5 5 5 0 - 8 0    Glucose, Urine TRACE (A) NEGATIVE    Bilirubin, Urine NEGATIVE NEGATIVE    Ketone, Urine NEGATIVE NEGATIVE    Blood, Urine NEGATIVE NEGATIVE    Protein, Urine 2+ (A) NEGATIVE    SL AMB NITRITES URINE, QUAL   NEGATIVE NEGATIVE    Leukocyte Esterase NEGATIVE NEGATIVE    SL AMB WBC, URINE 0-5 < OR = 5 /HPF    RBC, Urine 0-2 < OR = 2 /HPF    Squamous Epithelial Cells NONE SEEN < OR = 5 /HPF    Bacteria, UA FEW (A) NONE SEEN /HPF    Hyaline Casts NONE SEEN NONE SEEN /LPF    Granular Casts UA 1-3 (A) NONE SEEN /LPF       Portions of the record may have been created with voice recognition software  Occasional wrong word or "sound a like" substitutions may have occurred due to the inherent limitations of voice recognition software  Read the chart carefully and recognize, using context, where substitutions have occurred  If you have any questions, please contact the dictating provider

## 2022-03-03 NOTE — PATIENT INSTRUCTIONS
Chronic Kidney Disease   WHAT YOU NEED TO KNOW:   Chronic kidney disease (CKD) is the gradual and permanent loss of kidney function  It is also called chronic kidney failure, or chronic renal insufficiency  Normally, the kidneys remove fluid, chemicals, and waste from your blood  These wastes are turned into urine by your kidneys  CKD may worsen over time and lead to kidney failure  Your CKD team will help you and your family plan for your care at home  The team will help you create goals and find ways to meet your goals  Your care plan may change over time as your needs change  DISCHARGE INSTRUCTIONS:   Call your local emergency number (911 in the [address]) if:   · You have a seizure  · You have shortness of breath  Return to the emergency department if:   · You are confused and very drowsy  Call your doctor or nephrologist if:   · You suddenly gain or lose more weight than your healthcare provider has told you is okay  · You have itchy skin or a rash  · You urinate more or less than you normally do  · You have blood in your urine  · You have nausea and are vomiting  · You have fatigue or muscle weakness  · You have hiccups that will not stop  · You have questions or concerns about your condition or care  Medicines:   · Medicines  may be given to decrease blood pressure and get rid of extra fluid  You may also receive medicine to manage health conditions that may occur with CKD, such as anemia, diabetes, and heart disease  · Take your medicine as directed  Contact your healthcare provider if you think your medicine is not helping or if you have side effects  Tell him or her if you are allergic to any medicine  Keep a list of the medicines, vitamins, and herbs you take  Include the amounts, and when and why you take them  Bring the list or the pill bottles to follow-up visits  Carry your medicine list with you in case of an emergency      What you can do to manage CKD: Management may include making some lifestyle changes  Tell your healthcare provider if you have any concerns about being able to make changes  He or she can help you find solutions, including working with specialists  Ask for help creating a plan to break large goals into smaller steps  Your plan may include any of the following:  · Manage other health conditions  Your healthcare provider will work with you to make a care plan that meets your needs  You will be checked regularly for heart disease or other conditions that can make CKD worse, such as diabetes  Your blood pressure will be closely monitored  You will also get a target blood pressure and help making a plan to reach your target  This may include taking your blood pressure at home  · Maintain a healthy weight  Your weight and body mass index (BMI) will be checked regularly  BMI helps find if your weight is healthy for your height  Your healthcare provider will use other tests to check your muscle and protein levels  Extra weight can strain your kidneys  A low weight or low muscle mass can make you feel more tired  You may have trouble doing your daily activities  Ask your provider what a healthy weight is for you  He or she can help you create a plan to lose or gain weight safely, if needed  The plan may include keeping a food diary  This is a list of foods and liquids you have each day  Your provider will use the diary to help you make changes, if needed  Changes are based on your health and any other conditions you have, such as diabetes  · Create an exercise plan  Regular exercise can help you manage CKD, high blood pressure, and diabetes  Exercise also helps control weight  Your provider can help you create exercise goals and a plan to reach those goals  For example, your goal may be to exercise for 30 minutes in a day  Your plan can include breaking exercise into 10 minute sessions, 3 times during the day           · Create a healthy eating plan  Your provider may tell you to eat food low in potassium, phosphorus, or protein  Your provider may also recommend vitamin or mineral supplements  Do not take any supplements without talking to your provider  A dietitian can help you plan meals if needed  Ask how much liquid to drink each day and which liquids are best for you  · Limit sodium (salt) as directed  You may need to limit sodium to less than 2,300 milligrams (mg) each day  Ask your dietitian or healthcare provider how much sodium you can have each day  The amount depends on your stage of kidney disease  Table salt, canned foods, soups, salted snacks, and processed meats, like deli meats and sausage, are high in sodium  Your provider or a dietitian can show you how to read food labels for sodium  · Limit alcohol as directed  Alcohol can cause fluid retention and can affect your kidneys  Ask how much alcohol is safe for you  A drink of alcohol is 12 ounces of beer, 5 ounces of wine, or 1½ ounces of liquor  · Do not smoke  Nicotine and other chemicals in cigarettes and cigars can cause kidney damage  Ask your provider for information if you currently smoke and need help to quit  E-cigarettes or smokeless tobacco still contain nicotine  Talk to your provider before you use these products  · Ask about over-the-counter medicines  Medicines such as NSAIDs and laxatives may harm your kidneys  Some cough and cold medicines can raise your blood pressure  Always ask if a medicine is safe before you take it  · Ask about vaccines you may need  CKD can increase your risk for infections such as pneumonia, influenza, and hepatitis  Vaccines lower your risk for infection  Your healthcare provider will tell you which vaccines you need and when to get them  Follow up with your doctor or nephrologist as directed: You will need to return for tests to monitor your kidney and nerve function, and your parathyroid hormone level   Your medicines may be changed, based on certain test results  Write down your questions so you remember to ask them during your visits  © Copyright [employer] 2022 Information is for End User's use only and may not be sold, redistributed or otherwise used for commercial purposes  All illustrations and images included in CareNotes® are the copyrighted property of [employer]  or Adelaida Zarate  The above information is an  only  It is not intended as medical advice for individual conditions or treatments  Talk to your doctor, nurse or pharmacist before following any medical regimen to see if it is safe and effective for you

## 2022-04-08 ENCOUNTER — OFFICE VISIT (OUTPATIENT)
Dept: CARDIOLOGY CLINIC | Facility: CLINIC | Age: 87
End: 2022-04-08
Payer: COMMERCIAL

## 2022-04-08 VITALS
HEART RATE: 54 BPM | SYSTOLIC BLOOD PRESSURE: 124 MMHG | HEIGHT: 68 IN | WEIGHT: 215 LBS | BODY MASS INDEX: 32.58 KG/M2 | DIASTOLIC BLOOD PRESSURE: 60 MMHG

## 2022-04-08 DIAGNOSIS — I25.10 CORONARY ARTERY DISEASE INVOLVING NATIVE CORONARY ARTERY OF NATIVE HEART WITHOUT ANGINA PECTORIS: Primary | ICD-10-CM

## 2022-04-08 DIAGNOSIS — I50.32 CHRONIC DIASTOLIC CONGESTIVE HEART FAILURE (HCC): ICD-10-CM

## 2022-04-08 DIAGNOSIS — Z95.1 S/P CABG X 4: ICD-10-CM

## 2022-04-08 DIAGNOSIS — E78.00 PURE HYPERCHOLESTEROLEMIA: ICD-10-CM

## 2022-04-08 DIAGNOSIS — I35.0 NONRHEUMATIC AORTIC VALVE STENOSIS: ICD-10-CM

## 2022-04-08 DIAGNOSIS — I11.0 HYPERTENSIVE HEART DISEASE WITH HF (HEART FAILURE) (HCC): ICD-10-CM

## 2022-04-08 DIAGNOSIS — Z95.2 S/P AVR (AORTIC VALVE REPLACEMENT): ICD-10-CM

## 2022-04-08 PROCEDURE — 99214 OFFICE O/P EST MOD 30 MIN: CPT | Performed by: INTERNAL MEDICINE

## 2022-04-08 PROCEDURE — 93000 ELECTROCARDIOGRAM COMPLETE: CPT | Performed by: INTERNAL MEDICINE

## 2022-04-08 NOTE — PROGRESS NOTES
Cardiology Follow-up    Tono Bone  1935  [id number]  HEART & VASCULAR  [facility] [place] [address] [address] [address]  [address] [address] [address]    1  Coronary artery disease involving native coronary artery of native heart without angina pectoris  POCT ECG   2  S/P AVR (aortic valve replacement)  Ambulatory referral to Cardiology    POCT ECG   3  S/P CABG x 4  Ambulatory referral to Cardiology   4  Nonrheumatic aortic valve stenosis     5  Chronic diastolic congestive heart failure ([address] )     6  Hypertensive heart disease with HF (heart failure) ([facility] [address] )     7  Pure hypercholesterolemia         Discussion/Summary:    1  CAD - Sasha Ford is status post CABG x4  He tolerated the surgery well  He will remain on low-dose torsemide given lower extremity edema, which has remained stable  We are discontinuing metoprolol due to bradycardia  No other changes were made to his regimen  We will see him back in 1 year  2   Aortic stenosis - He is also status post bioprosthetic aortic valve replacement at the time of his CABG  Echocardiogram in November 2021 showed a normally functioning AVR  He should take antibiotic prophylaxis for any dental procedures  No testing is needed at this time  3   Hypertension - His blood pressure is well controlled  He has come off of lisinopril due to worsening renal function, and follows with Nephrology closely  He is bradycardic with a competing junctional rhythm and therefore we will stop metoprolol  He should periodically check his blood pressure home  4   Hyperlipidemia - He has been on Crestor  We will continue to follow blood work closely along with his internist       HPI:    Mr Alexandre Denise comes in for follow-up given his history multivessel coronary artery disease and aortic stenosis  I met him at the time of a stress nuclear study back in May of 2018    This was ordered by his internist, Dr Mello Beal, secondary to abdominal pain, exertional at times, and multiple risk factors for CAD  This demonstrated a reversible defect and ischemia of the anterior to apical wall  His ejection fraction was normal   He also has moderate aortic stenosis by echocardiogram from December  He also has hypertension, dyslipidemia and diabetes mellitus  Cardiac catheterization showed multivessel CAD  At that point he went and met cardiothoracic surgery, who recommended both core artery bypass grafting and a bioprosthetic aortic valve replacement  He had this performed in 9/2018, and it went well without complications  He had CABG x4, with a LIMA to the LAD, SVG to an OM 2 and SVG Y graft" to an OM 3 and left posterior descending artery  He also had a bioprosthetic aortic valve replacement  He did go home on diuretic therapy but due to some edema associated with cellulitis torsemide was restarted  He then developed recurrent pleural effusions  He was in the hospital in which he was requiring thoracentesis  He ended up having surgery and pleurodesis  He has recovered well from this  He did not go home on diuretic therapy due to some acute kidney injury  He started to show increasing signs of volume overload, weight gain and lower extremity edema  Torsemide was restarted at 20 mg daily, and we decrease this to 10 mg daily  He has been stable on this dose  Since last visit he has been having some worsening renal function, and has come off lisinopril  Due to bradycardia his metoprolol has been decreased to once daily  Henry Gambino overall is feeling well  He denies any chest pain or shortness of breath  He denies chest pain or any symptoms of angina  He denies shortness of breath or any signs/symptoms of CHF currently  He will get intermittently lightheaded if he stands up too quickly  His heart rates are still running low  He denies near-syncope or syncope  No palpitations        Patient Active Problem List   Diagnosis    Benign prostatic hyperplasia with nocturia    Hypertensive heart disease with HF (heart failure) ([facility])    Gout with tophus    Hypothyroidism    Obesity, morbid ([address] )    Pure hypercholesterolemia    Renal cyst    Sexual dysfunction    Nonrheumatic aortic valve stenosis    GERD (gastroesophageal reflux disease)    S/P CABG x 4    S/P AVR (aortic valve replacement)    CAD (coronary artery disease)    Aortic stenosis    Anemia due to stage 3 chronic kidney disease ([facility])    Ambulatory dysfunction    Type 2 diabetes mellitus with chronic kidney disease, with long-term current use of insulin ([facility] [address] )    Primary open angle glaucoma (POAG)    Age-related cataract of both eyes    Atherosclerosis of aorta ([facility])    Chronic diastolic congestive heart failure ([facility])    Type 2 diabetes mellitus with diabetic neuropathy ([facility])    CKD (chronic kidney disease), stage IV ([facility])     Past Medical History:   Diagnosis Date    Aortic stenosis     CKD (chronic kidney disease)     baseline Cr 1 3-1 5    Coronary artery disease     Cough     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[facility])     type 2, insulin dependent    GERD (gastroesophageal reflux disease)     Glaucoma     Gout     History of prostate cancer     Hypertension     Hypothyroidism     Overweight     Peripheral neuropathy, idiopathic     Pleural effusion, left     Pure hypercholesterolemia     LA   14   R   14     Visual impairment     cataract left eye    Weight gain      Social History     Socioeconomic History    Marital status:      Spouse name: Not on file    Number of children: Not on file    Years of education: Not on file    Highest education level: Not on file   Occupational History    Occupation: retired    Tobacco Use    Smoking status: Former Smoker     Packs/day: 0 25     Years: 1 00     Pack years: 0 25     Types: Cigarettes     Quit date:      Years since quittin 3    Smokeless tobacco: Never Used    Tobacco comment: Only in the service    Vaping Use    Vaping Use: Never used   Substance and Sexual Activity    Alcohol use: No    Drug use: No    Sexual activity: Not Currently   Other Topics Concern    Not on file   Social History Narrative    Caffeine use / coffee diet cola and tea    Lives with family     Living situation supportive and safe    No advance directives  -denied     Social Determinants of Health     Financial Resource Strain: Not on file   Food Insecurity: Not on file   Transportation Needs: No Transportation Needs    Lack of Transportation (Medical): No    Lack of Transportation (Non-Medical): No   Physical Activity: Insufficiently Active    Days of Exercise per Week: 3 days    Minutes of Exercise per Session: 20 min   Stress: Stress Concern Present    Feeling of Stress : To some extent   Social Connections: Not on file   Intimate Partner Violence: Not on file   Housing Stability: Not on file      Family History   Problem Relation Age of Onset    Diabetes Mother     Pancreatic cancer Brother     Diabetes Maternal Grandmother     Colon cancer Son     Diabetes Family     Substance Abuse Neg Hx     Mental illness Neg Hx      Past Surgical History:   Procedure Laterality Date    CARDIAC CATHETERIZATION      EYE SURGERY      IR THORACENTESIS  10/23/2018    IR THORACENTESIS  11/2/2018    IR THORACENTESIS  11/9/2018    IR THORACENTESIS  11/23/2018    RI CABG, ARTERY-VEIN, THREE N/A 9/17/2018    Procedure: CORONARY ARTERY BYPASS GRAFT (CABG) x 4 VESSELS with LIMA - LAD, SVG/LEFT LEG EVH - LEFT PDA, OM3, & OM2;  Surgeon: Melissa Chris MD;  [facility];  Service: Cardiac Surgery    RI ECHO TRANSESOPHAG MONTR CARDIAC PUMP FUNCTJ N/A 9/17/2018    Procedure: TRANSESOPHAGEAL ECHOCARDIOGRAM (VERONICA);   Surgeon: Melissa Chris MD;  [facility];  Service: Cardiac Surgery    RI RPLCMT AORTIC VALVE OPN W/STENTLESS TISSUE VALVE N/A 9/17/2018    Procedure: REPLACEMENT VALVE AORTIC (AVR)- 23mm tissue Intuity Valve;  Surgeon: Jordy Phoenix MD;  [facility];  Service: Cardiac Surgery    THORACOSCOPY VIDEO ASSISTED SURGERY (VATS) Left 11/27/2018    Procedure: THORACOSCOPY VIDEO ASSISTED SURGERY (VATS), talc pleurodesis,;  Surgeon: Johnny Carroll MD;  [facility];  Service: Thoracic    THYROID SURGERY         Current Outpatient Medications:     acetaminophen (TYLENOL) 325 mg tablet, 650 mg every 4 to 6 hours as needed for pain (do not take with percocet), Disp: 30 tablet, Rfl: 0    albuterol (PROVENTIL HFA) 90 mcg/act inhaler, Inhale 2 puffs 4 (four) times a day as needed, Disp: , Rfl:     allopurinol (ZYLOPRIM) 300 mg tablet, TAKE ONE TABLET BY MOUTH ONCE DAILY, Disp: 90 tablet, Rfl: 3    ascorbic acid (VITAMIN C) 500 MG tablet, TAKE ONE TABLET BY MOUTH DAILY, Disp: 28 tablet, Rfl: 3    aspirin (ECOTRIN) 325 mg EC tablet, Take 1 tablet (325 mg total) by mouth daily, Disp: 28 tablet, Rfl: 3    Basaglar KwikPen 100 units/mL injection pen, 16 units daily (Patient taking differently: 20 units daily ), Disp: 15 mL, Rfl: 3    cholecalciferol (VITAMIN D3) 1,000 units tablet, Take 1 tablet (1,000 Units total) by mouth daily, Disp: 90 tablet, Rfl: 3    gabapentin (NEURONTIN) 300 mg capsule, Take 1 capsule (300 mg total) by mouth daily at bedtime, Disp: 90 capsule, Rfl: 3    insulin aspart (NovoLOG FlexPen) 100 UNIT/ML injection pen, 10 units with breakfast, 5 units with PM snack, 10 units with dinner, Disp: 15 mL, Rfl: 1    Insulin Pen Needle (Pen Needles) 32G X 4 MM MISC, Use 4 (four) times a day (before meals and at bedtime), Disp: 100 each, Rfl: 11    levothyroxine 150 mcg tablet, Take 1 tablet (150 mcg total) by mouth daily, Disp: 30 tablet, Rfl: 5    LORazepam (ATIVAN) 0 5 mg tablet, TAKE ONE TABLET BY MOUTH EVERY EIGHT HOURS AS NEEDED FOR ANXIETY, Disp: 30 tablet, Rfl: 3    olmesartan (BENICAR) 5 mg tablet, Take 1 tablet (5 mg total) by mouth daily, Disp: 90 tablet, Rfl: 3    pantoprazole (PROTONIX) 20 mg tablet, TAKE ONE TABLET BY MOUTH DAILY, Disp: 30 tablet, Rfl: 3    rosuvastatin (CRESTOR) 40 MG tablet, TAKE ONE TABLET BY MOUTH DAILY, Disp: 30 tablet, Rfl: 3    tamsulosin (FLOMAX) 0 4 mg, TAKE ONE CAPSULE BY MOUTH DAILY WITH dinner, Disp: 30 capsule, Rfl: 6    timolol (TIMOPTIC) 0 5 % ophthalmic solution, Administer 1 drop to both eyes 2 (two) times a day, Disp: , Rfl:     torsemide (DEMADEX) 10 mg tablet, TAKE ONE TABLET BY MOUTH EVERY DAY, Disp: 30 tablet, Rfl: 5    FeroSul 325 (65 Fe) MG tablet, TAKE ONE TABLET BY MOUTH DAILY, Disp: 28 tablet, Rfl: 3  Allergies   Allergen Reactions    Amlodipine Nausea Only    Atorvastatin Myalgia       Labs:  Lab Results   Component Value Date     09/05/2017    K 3 9 02/28/2022     02/28/2022    CO2 27 02/28/2022    BUN 30 (H) 02/28/2022    CREATININE 2 39 (H) 02/28/2022    CREATININE 2 63 (H) 11/05/2021    CREATININE 1 56 (H) 09/05/2017    GLUCOSE 126 09/17/2018    CALCIUM 8 8 02/28/2022    CALCIUM 8 8 02/28/2022     Lab Results   Component Value Date    WBC 5 2 02/28/2022    WBC 8 91 12/01/2018    WBC 7 6 04/17/2017    HGB 12 1 (L) 02/28/2022    HGB 9 0 (L) 12/01/2018    HGB 14 0 04/17/2017    HCT 37 9 (L) 02/28/2022    HCT 29 4 (L) 12/01/2018    HCT 43 3 04/17/2017    MCV 99 5 02/28/2022    MCV 87 12/01/2018    MCV 97 2 04/17/2017     02/28/2022     12/01/2018     04/17/2017     Lab Results   Component Value Date    CHOL 197 04/17/2017    TRIG 176 (H) 06/23/2021    HDL 27 (L) 06/23/2021     Imaging:  ECG today shows sinus bradycardia competing with a junctional rhythm with nonspecific IVCD  VEKU():    Left Ventricle: Left ventricular cavity size is normal  The left ventricular ejection fraction is 55%  Systolic function is normal  Wall motion is normal  Diastolic function is moderately abnormal, consistent with grade II (pseudonormal) relaxation      Left Atrium: The atrium is mildly dilated    Aortic Valve: There is a bovine bioprosthetic valve    Mitral Valve: There is mild annular calcification  There is mild regurgitation    Tricuspid Valve: There is mild regurgitation  CARDIAC CATH(5/2018):  CORONARY CIRCULATION:  Proximal LAD: There was a 100 % stenosis  This lesion is a chronic total occlusion  1st obtuse marginal: There was a diffuse 90 % stenosis  2nd obtuse marginal: There was a diffuse 90 % stenosis  3rd obtuse marginal: There was a 80 % stenosis  1st left posterolateral: There was a 90 % stenosis  Mid RCA: There was a 95 % stenosis      CARDIAC STRUCTURES:  There was moderate aortic stenosis  Review of Systems:  Review of Systems   Constitutional: Positive for fatigue  HENT: Negative  Eyes: Negative  Respiratory: Negative  Cardiovascular: Negative  Gastrointestinal: Positive for constipation  Musculoskeletal: Negative  Skin: Negative  Allergic/Immunologic: Negative  Neurological: Negative  Hematological: Negative  Psychiatric/Behavioral: Negative  All other systems reviewed and are negative  Vitals:    04/08/22 1058   BP: 124/60   BP Location: Left arm   Patient Position: Sitting   Cuff Size: Large   Pulse: (!) 54   Weight: 97 5 kg (215 lb)   Height: 5' 8" (1 727 m)       Physical Exam:  Physical Exam  Vitals and nursing note reviewed  Constitutional:       Appearance: He is well-developed  HENT:      Head: Normocephalic and atraumatic  Eyes:      General: No scleral icterus  Right eye: No discharge  Left eye: No discharge  Pupils: Pupils are equal, round, and reactive to light  Neck:      Thyroid: No thyromegaly  Vascular: No JVD  Cardiovascular:      Rate and Rhythm: Normal rate and regular rhythm  No extrasystoles are present  Pulses: Normal pulses  Heart sounds: S1 normal and S2 normal  Murmur heard  Systolic murmur is present with a grade of 3/6  No friction rub  No gallop  Pulmonary:      Effort: Pulmonary effort is normal  No respiratory distress  Breath sounds: Normal breath sounds  No wheezing or rales  Abdominal:      General: Bowel sounds are normal  There is no distension  Palpations: Abdomen is soft  Tenderness: There is no abdominal tenderness  Musculoskeletal:         General: No tenderness or deformity  Normal range of motion  Cervical back: Normal range of motion and neck supple  Skin:     General: Skin is warm and dry  Findings: No rash  Neurological:      Mental Status: He is alert and oriented to person, place, and time  Cranial Nerves: No cranial nerve deficit  Psychiatric:         Thought Content: Thought content normal          Judgment: Judgment normal        Counseling / Coordination of Care  Total floor / unit time spent today 25 minutes  Greater than 50% of total time was spent with the patient and / or family counseling and / or coordination of care

## 2022-04-08 NOTE — PATIENT INSTRUCTIONS
Low-Sodium Diet   AMBULATORY CARE:   A low-sodium diet  limits foods that are high in sodium (salt)  You will need to follow a low-sodium diet if you have high blood pressure, kidney disease, or heart failure  You may also need to follow this diet if you have a condition that is causing your body to retain (hold) extra fluid  You may need to limit the amount of sodium you eat in a day to 1,500 to 2,000 mg  Ask your healthcare provider how much sodium you can have each day  How to use food labels to choose foods that are low in sodium:  Read food labels to find the amount of sodium they contain  The amount of sodium is listed in milligrams (mg)  The % Daily Value (DV) column tells you how much of your daily needs are met by 1 serving of the food for each nutrient listed  Choose foods that have less than 5% of the DV of sodium  These foods are considered low in sodium  Foods that have 20% or more of the DV of sodium are considered high in sodium  Some food labels may also list any of the following terms that tell you about the sodium content in the food:  · Sodium-free:  Less than 5 mg in each serving    · Very low sodium:  35 mg of sodium or less in each serving    · Low sodium:  140 mg of sodium or less in each serving    · Reduced sodium: At least 25% less sodium in each serving than the regular type    · Light in sodium:  50% less sodium in each serving    · Unsalted or no added salt:  No extra salt is added during processing (the food may still contain sodium)       Foods to avoid:  Salty foods are high in sodium  You should avoid the following:  · Processed foods:      ? Mixes for cornbread, biscuits, cake, and pudding     ? Instant foods, such as potatoes, cereals, noodles, and rice     ? Packaged foods, such as bread stuffing, rice and pasta mixes, snack dip mixes, and macaroni and cheese     ? Canned foods, such as canned vegetables, soups, broths, sauces, and vegetable or tomato juice    ?  Snack foods, such as salted chips, popcorn, pretzels, pork rinds, salted crackers, and salted nuts    ? Frozen foods, such as dinners, entrees, vegetables with sauces, and breaded meats    ? Sauerkraut, pickled vegetables, and other foods prepared in brine    · Meats and cheeses:      ? Smoked or cured meat, such as corned beef, pugh, ham, hot dogs, and sausage    ? Canned meats or spreads, such as potted meats, sardines, anchovies, and imitation seafood    ? Deli or lunch meats, such as bologna, ham, turkey, and roast beef    ? Processed cheese, such as American cheese and cheese spreads    · Condiments, sauces, and seasonings:      ? Salt (¼ teaspoon of salt contains 575 mg of sodium)    ? Seasonings made with salt, such as garlic salt, celery salt, onion salt, and seasoned salt    ? Regular soy sauce, barbecue sauce, teriyaki sauce, steak sauce, Worcestershire sauce, and most flavored vinegars    ? Canned gravy and mixes     ? Regular condiments, such as mustard, ketchup, and salad dressings    ? Pickles and olives    ? Meat tenderizers and monosodium glutamate (MSG)    Foods to include:  Read the food label to find the exact amount of sodium in each serving  · Bread and cereal:  Try to choose breads with less than 80 mg of sodium per serving  ? Bread, roll, virginie, tortilla, or unsalted crackers  ? Ready-to-eat cereals with less than 5% DV of sodium (examples include shredded wheat and puffed rice)    ? Pasta    · Vegetables and fruits:      ? Unsalted fresh, frozen, or canned vegetables    ? Fresh, frozen, or canned fruits    ? Fruit juice    · Dairy:  One serving has about 150 mg of sodium  ? Milk, all types    ? Yogurt    ? Hard cheese, such as cheddar, Swiss, [employer], or mozzarella    · Meat and other protein foods:  Some raw meats may have added sodium  ? Plain meats, fish, and poultry     ? Eggs    · Other foods:      ? Homemade pudding    ? Unsalted nuts, popcorn, or pretzels    ?  Unsalted butter or margarine    Ways to decrease sodium:   · Add spices and herbs to foods instead of salt during cooking  Use salt-free seasonings to add flavor to foods  Examples include onion powder, garlic powder, basil, madrigal powder, paprika, and parsley  Try lemon or lime juice or vinegar to give foods a tart flavor  Use hot peppers, pepper, or cayenne pepper to add a spicy flavor  · Do not keep a salt shaker at your kitchen table  This may help keep you from adding salt to food at the table  A teaspoon of salt has 2,300 mg of sodium  It may take time to get used to enjoying the natural flavor of food instead of adding salt  Talk to your healthcare provider before you use salt substitutes  Some salt substitutes have a high amount of potassium and need to be avoided if you have kidney disease  · Choose low-sodium foods at restaurants  Meals from restaurants are often high in sodium  Some restaurants have nutrition information on the menu that tells you the amount of sodium in their foods  If possible, ask for your food to be prepared with less, or no salt  · Shop for unsalted or low-sodium foods and snacks at the grocery store  Examples include unsalted or low-sodium broths, soups, and canned vegetables  Choose fresh or frozen vegetables instead  Choose unsalted nuts or seeds or fresh fruits or vegetables as snacks  Read food labels and choose salt-free, very low-sodium, or low-sodium foods  © Copyright [employer] 2022 Information is for End User's use only and may not be sold, redistributed or otherwise used for commercial purposes  All illustrations and images included in CareNotes® are the copyrighted property of [employer]  or Adelaida Dumont   The above information is an  only  It is not intended as medical advice for individual conditions or treatments  Talk to your doctor, nurse or pharmacist before following any medical regimen to see if it is safe and effective for you

## 2022-04-22 LAB
ALBUMIN SERPL-MCNC: 3.8 G/DL (ref 3.6–5.1)
ALBUMIN/GLOB SERPL: 1.6 (CALC) (ref 1–2.5)
ALP SERPL-CCNC: 71 U/L (ref 35–144)
ALT SERPL-CCNC: 46 U/L (ref 9–46)
APPEARANCE UR: ABNORMAL
AST SERPL-CCNC: 36 U/L (ref 10–35)
BACTERIA UR QL AUTO: ABNORMAL /HPF
BASOPHILS # BLD AUTO: 98 CELLS/UL (ref 0–200)
BASOPHILS NFR BLD AUTO: 1.6 %
BILIRUB SERPL-MCNC: 1 MG/DL (ref 0.2–1.2)
BILIRUB UR QL STRIP: NEGATIVE
BUN SERPL-MCNC: 31 MG/DL (ref 7–25)
BUN/CREAT SERPL: 14 (CALC) (ref 6–22)
CALCIUM SERPL-MCNC: 9.1 MG/DL (ref 8.6–10.3)
CALCIUM SERPL-MCNC: 9.1 MG/DL (ref 8.6–10.3)
CHLORIDE SERPL-SCNC: 106 MMOL/L (ref 98–110)
CO2 SERPL-SCNC: 30 MMOL/L (ref 20–32)
COLOR UR: YELLOW
CREAT SERPL-MCNC: 2.24 MG/DL (ref 0.7–1.11)
CREAT UR-MCNC: 87 MG/DL (ref 20–320)
EOSINOPHIL # BLD AUTO: 543 CELLS/UL (ref 15–500)
EOSINOPHIL NFR BLD AUTO: 8.9 %
ERYTHROCYTE [DISTWIDTH] IN BLOOD BY AUTOMATED COUNT: 12.8 % (ref 11–15)
GLOBULIN SER CALC-MCNC: 2.4 G/DL (CALC) (ref 1.9–3.7)
GLUCOSE SERPL-MCNC: 140 MG/DL (ref 65–99)
GLUCOSE UR QL STRIP: NEGATIVE
GRAN CASTS #/AREA URNS LPF: ABNORMAL /LPF
HCT VFR BLD AUTO: 36.7 % (ref 38.5–50)
HGB BLD-MCNC: 12.3 G/DL (ref 13.2–17.1)
HGB UR QL STRIP: NEGATIVE
HYALINE CASTS #/AREA URNS LPF: ABNORMAL /LPF
KETONES UR QL STRIP: NEGATIVE
LEUKOCYTE ESTERASE UR QL STRIP: NEGATIVE
LYMPHOCYTES # BLD AUTO: 2001 CELLS/UL (ref 850–3900)
LYMPHOCYTES NFR BLD AUTO: 32.8 %
MAGNESIUM SERPL-MCNC: 2.4 MG/DL (ref 1.5–2.5)
MCH RBC QN AUTO: 32.5 PG (ref 27–33)
MCHC RBC AUTO-ENTMCNC: 33.5 G/DL (ref 32–36)
MCV RBC AUTO: 97.1 FL (ref 80–100)
MONOCYTES # BLD AUTO: 555 CELLS/UL (ref 200–950)
MONOCYTES NFR BLD AUTO: 9.1 %
NEUTROPHILS # BLD AUTO: 2904 CELLS/UL (ref 1500–7800)
NEUTROPHILS NFR BLD AUTO: 47.6 %
NITRITE UR QL STRIP: NEGATIVE
PH UR STRIP: 5.5 [PH] (ref 5–8)
PHOSPHATE SERPL-MCNC: 3.7 MG/DL (ref 2.1–4.3)
PLATELET # BLD AUTO: 178 THOUSAND/UL (ref 140–400)
PMV BLD REES-ECKER: 11 FL (ref 7.5–12.5)
POTASSIUM SERPL-SCNC: 4.1 MMOL/L (ref 3.5–5.3)
PROT SERPL-MCNC: 6.2 G/DL (ref 6.1–8.1)
PROT UR QL STRIP: ABNORMAL
PROT UR-MCNC: 131 MG/DL (ref 5–25)
PROT/CREAT UR: 1.51 MG/MG CREAT (ref 0.02–0.13)
PROT/CREAT UR: 1506 MG/G CREAT (ref 22–128)
PTH-INTACT SERPL-MCNC: 85 PG/ML (ref 16–77)
RBC # BLD AUTO: 3.78 MILLION/UL (ref 4.2–5.8)
RBC #/AREA URNS HPF: ABNORMAL /HPF
SL AMB EGFR AFRICAN AMERICAN: 29 ML/MIN/1.73M2
SL AMB EGFR NON AFRICAN AMERICAN: 25 ML/MIN/1.73M2
SODIUM SERPL-SCNC: 141 MMOL/L (ref 135–146)
SP GR UR STRIP: 1.02 (ref 1–1.03)
SQUAMOUS #/AREA URNS HPF: ABNORMAL /HPF
URATE SERPL-MCNC: 3.2 MG/DL (ref 4–8)
WBC # BLD AUTO: 6.1 THOUSAND/UL (ref 3.8–10.8)
WBC #/AREA URNS HPF: ABNORMAL /HPF

## 2022-04-25 ENCOUNTER — TELEPHONE (OUTPATIENT)
Dept: NEPHROLOGY | Facility: CLINIC | Age: 87
End: 2022-04-25

## 2022-04-25 NOTE — TELEPHONE ENCOUNTER
----- Message from Raghav Mederos DO sent at 4/24/2022  4:40 PM EDT -----  Labs stable from a renal standpoint    No changes

## 2022-04-26 ENCOUNTER — OFFICE VISIT (OUTPATIENT)
Dept: ENDOCRINOLOGY | Facility: HOSPITAL | Age: 87
End: 2022-04-26
Payer: COMMERCIAL

## 2022-04-26 VITALS
WEIGHT: 215.4 LBS | SYSTOLIC BLOOD PRESSURE: 102 MMHG | BODY MASS INDEX: 32.64 KG/M2 | HEIGHT: 68 IN | DIASTOLIC BLOOD PRESSURE: 64 MMHG | HEART RATE: 56 BPM

## 2022-04-26 DIAGNOSIS — Z79.4 TYPE 2 DIABETES MELLITUS WITH STAGE 3B CHRONIC KIDNEY DISEASE, WITH LONG-TERM CURRENT USE OF INSULIN (HCC): Primary | ICD-10-CM

## 2022-04-26 DIAGNOSIS — E03.9 ACQUIRED HYPOTHYROIDISM: Chronic | ICD-10-CM

## 2022-04-26 DIAGNOSIS — E11.22 TYPE 2 DIABETES MELLITUS WITH STAGE 3B CHRONIC KIDNEY DISEASE, WITH LONG-TERM CURRENT USE OF INSULIN (HCC): Primary | ICD-10-CM

## 2022-04-26 DIAGNOSIS — E78.00 PURE HYPERCHOLESTEROLEMIA: ICD-10-CM

## 2022-04-26 DIAGNOSIS — N18.32 TYPE 2 DIABETES MELLITUS WITH STAGE 3B CHRONIC KIDNEY DISEASE, WITH LONG-TERM CURRENT USE OF INSULIN (HCC): Primary | ICD-10-CM

## 2022-04-26 PROCEDURE — 1036F TOBACCO NON-USER: CPT | Performed by: PHYSICIAN ASSISTANT

## 2022-04-26 PROCEDURE — 99214 OFFICE O/P EST MOD 30 MIN: CPT | Performed by: PHYSICIAN ASSISTANT

## 2022-04-26 PROCEDURE — 1160F RVW MEDS BY RX/DR IN RCRD: CPT | Performed by: PHYSICIAN ASSISTANT

## 2022-04-26 NOTE — PATIENT INSTRUCTIONS
Monitor diet to help with blood sugar levels  Continue with current insulin doses  Get lab work completed at this time  Continue levothyroxine 150 mcg  Recommend checking blood sugar at least daily, and alternating times to get a better idea of how blood sugars look throughout the day  Contact the office with any concerns or questions      Follow-up in 3 months with lab work completed prior to visit

## 2022-04-26 NOTE — PROGRESS NOTES
Cody Mayorga  80 y o  male MRN: [id number]    Encounter: [id number]      Assessment/Plan     Assessment: This is a 80y o -year-old male with type 2 diabetes with hyperlipidemia, and hypothyroidism  Plan:  1  Type 2 diabetes:  Lab work was not completed prior to office visit  Last hemoglobin A1c was 8 8  Has only been checking fasting blood sugars in typically is in the low 100s  Will not make any adjustments to his insulin at this time  Recommend that he get lab work completed at earliest convenience  Encouraged him to check blood sugar once a day at alternating times to get a better idea what blood sugars doing, and we can make adjustments to all is insulins if needed  Contact the office with any concerns or questions  Follow-up in 3 months with lab work completed prior to visit  2  Hypothyroidism:  No recent thyroid lab work  Biochemically euthyroid  At this time he will continue with levothyroxine 150 mcg daily  Contact the office if there is any change in symptoms  Repeat lab work prior to next office visit  3  Hyperlipidemia:  Previous lab work showed LDL cholesterol was looking good, but triglycerides were slightly high  This could possibly be due to hyperglycemia  He continue with rosuvastatin  Repeat lipid panel prior to next office visit  CC:  Type 2 diabetes follow-up    History of Present Illness     HPI:  Cody Mayorga  is a 80y o  year old male with type 2 diabetes for about 5 years  States this was diagnosed when he was hospitalized and status post CABG  He is on insulin at home and takes Basaglar 20 units qhs and Novolog 10 units with breakfast and dinner, will take 5 units with a snack in the afternoon  States at times he has problems with the Basaglar insulin pens, and is not sure if eats a full dose  He denies any polyuria, polydipsia, nocturia and blurry vision  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 and he follows closely with nephrology    He also has history of CAD status post CABG  He denies history of peripheral neuropathy, retinopathy  overall doing well today without any concerns or questions      Hypoglycemic episodes:  Had 1 episode since his last office visit where he woke up in glucose level was 89  Review signs and symptoms of hypoglycemia as well as proper treatment of hypoglycemia and fast acting carbohydrates to keep by all times         The patient's last eye exam was recently per patient  He denies history of retinopathy        Blood Sugar/Glucometer/Pump/CGM review:  Is checking fasting blood sugar daily  Did not bring in glucose logs for review today  States that fasting glucose is in the low 100s     He has a hx of hypothyroidism treated with levothyroxine 150 mcg daily  Denies any hypothyroid or hypothyroid symptoms      For his hx of hld he is treated with and tolerating rosuvastatin 40 mg daily  Denies any headaches, vision changes, chest pain, mi or stroke-like symptoms  Review of Systems   Constitutional: Negative for activity change, appetite change, fatigue and unexpected weight change  HENT: Negative for trouble swallowing  Eyes: Negative for visual disturbance  Respiratory: Negative for chest tightness and shortness of breath  Cardiovascular: Negative for chest pain, palpitations and leg swelling  Gastrointestinal: Negative for abdominal pain, diarrhea, nausea and vomiting  Endocrine: Negative for cold intolerance, heat intolerance, polydipsia, polyphagia and polyuria  Genitourinary: Negative for frequency  Skin: Negative for rash and wound  Neurological: Negative for dizziness, weakness, light-headedness, numbness and headaches  Psychiatric/Behavioral: Negative for dysphoric mood and sleep disturbance  The patient is not nervous/anxious          Historical Information   Past Medical History:   Diagnosis Date    Aortic stenosis     CKD (chronic kidney disease)     baseline Cr 1 3-1 5    Coronary artery disease     Cough     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HCC)     type 2, insulin dependent    GERD (gastroesophageal reflux disease)     Glaucoma     Gout     History of prostate cancer     Hypertension     Hypothyroidism     Overweight     Peripheral neuropathy, idiopathic     Pleural effusion, left     Pure hypercholesterolemia     LA   14   R   14     Visual impairment     cataract left eye    Weight gain      Past Surgical History:   Procedure Laterality Date    CARDIAC CATHETERIZATION      EYE SURGERY      IR THORACENTESIS  10/23/2018    IR THORACENTESIS  2018    IR THORACENTESIS  2018    IR THORACENTESIS  2018    MN CABG, ARTERY-VEIN, THREE N/A 2018    Procedure: CORONARY ARTERY BYPASS GRAFT (CABG) x 4 VESSELS with LIMA - LAD, SVG/LEFT LEG EVH - LEFT PDA, OM3, & OM2;  Surgeon: Catracho Mcdonald MD;  [facility];  Service: Cardiac Surgery    MN ECHO TRANSESOPHAG MONTR CARDIAC PUMP FUNCTJ N/A 2018    Procedure: TRANSESOPHAGEAL ECHOCARDIOGRAM (VERONICA);   Surgeon: Catracho Mcdonald MD;  [facility];  Service: Cardiac Surgery    MN RPLCMT AORTIC VALVE OPN W/STENTLESS TISSUE VALVE N/A 2018    Procedure: REPLACEMENT VALVE AORTIC (AVR)- 23mm tissue Intuity Valve;  Surgeon: Catracho Mcdonald MD;  [facility];  Service: Cardiac Surgery    THORACOSCOPY VIDEO ASSISTED SURGERY (VATS) Left 2018    Procedure: THORACOSCOPY VIDEO ASSISTED SURGERY (VATS), talc pleurodesis,;  Surgeon: Ulysses Luna MD;  [facility];  Service: Thoracic    THYROID SURGERY       Social History   Social History     Substance and Sexual Activity   Alcohol Use No     Social History     Substance and Sexual Activity   Drug Use No     Social History     Tobacco Use   Smoking Status Former Smoker    Packs/day: 0 25    Years: 1 00    Pack years: 0 25    Types: Cigarettes    Quit date: 5    Years since quittin 3   Smokeless Tobacco Never Used   Tobacco Comment Only in the service      Family History:   Family History   Problem Relation Age of Onset    Diabetes Mother     Pancreatic cancer Brother     Diabetes Maternal Grandmother     Colon cancer Son     Diabetes Family     Substance Abuse Neg Hx     Mental illness Neg Hx        Meds/Allergies   Current Outpatient Medications   Medication Sig Dispense Refill    acetaminophen (TYLENOL) 325 mg tablet 650 mg every 4 to 6 hours as needed for pain (do not take with percocet) 30 tablet 0    albuterol (PROVENTIL HFA) 90 mcg/act inhaler Inhale 2 puffs 4 (four) times a day as needed      allopurinol (ZYLOPRIM) 300 mg tablet TAKE ONE TABLET BY MOUTH ONCE DAILY 90 tablet 3    ascorbic acid (VITAMIN C) 500 MG tablet TAKE ONE TABLET BY MOUTH DAILY 28 tablet 3    aspirin (ECOTRIN) 325 mg EC tablet Take 1 tablet (325 mg total) by mouth daily 28 tablet 3    Basaglar KwikPen 100 units/mL injection pen 16 units daily (Patient taking differently: 20 units daily ) 15 mL 3    cholecalciferol (VITAMIN D3) 1,000 units tablet Take 1 tablet (1,000 Units total) by mouth daily 90 tablet 3    FeroSul 325 (65 Fe) MG tablet TAKE ONE TABLET BY MOUTH DAILY 28 tablet 3    gabapentin (NEURONTIN) 300 mg capsule Take 1 capsule (300 mg total) by mouth daily at bedtime 90 capsule 3    insulin aspart (NovoLOG FlexPen) 100 UNIT/ML injection pen 10 units with breakfast, 5 units with PM snack, 10 units with dinner 15 mL 1    Insulin Pen Needle (Pen Needles) 32G X 4 MM MISC Use 4 (four) times a day (before meals and at bedtime) 100 each 11    levothyroxine 150 mcg tablet Take 1 tablet (150 mcg total) by mouth daily 30 tablet 5    LORazepam (ATIVAN) 0 5 mg tablet TAKE ONE TABLET BY MOUTH EVERY EIGHT HOURS AS NEEDED FOR ANXIETY 30 tablet 3    olmesartan (BENICAR) 5 mg tablet Take 1 tablet (5 mg total) by mouth daily 90 tablet 3    pantoprazole (PROTONIX) 20 mg tablet TAKE ONE TABLET BY MOUTH DAILY 30 tablet 3    rosuvastatin (CRESTOR) 40 MG tablet TAKE ONE TABLET BY MOUTH DAILY 30 tablet 3    tamsulosin (FLOMAX) 0 4 mg TAKE ONE CAPSULE BY MOUTH DAILY WITH dinner 30 capsule 6    timolol (TIMOPTIC) 0 5 % ophthalmic solution Administer 1 drop to both eyes 2 (two) times a day      torsemide (DEMADEX) 10 mg tablet TAKE ONE TABLET BY MOUTH EVERY DAY 30 tablet 5     No current facility-administered medications for this visit  Allergies   Allergen Reactions    Amlodipine Nausea Only    Atorvastatin Myalgia       Objective   Vitals: There were no vitals taken for this visit  Physical Exam  Vitals and nursing note reviewed  Constitutional:       General: He is not in acute distress  Appearance: Normal appearance  He is not diaphoretic  HENT:      Head: Normocephalic and atraumatic  Eyes:      General: No scleral icterus  Extraocular Movements: Extraocular movements intact  Conjunctiva/sclera: Conjunctivae normal       Pupils: Pupils are equal, round, and reactive to light  Cardiovascular:      Rate and Rhythm: Normal rate and regular rhythm  Heart sounds: No murmur heard  Pulmonary:      Effort: Pulmonary effort is normal  No respiratory distress  Breath sounds: Normal breath sounds  No wheezing  Musculoskeletal:      Right lower leg: No edema  Left lower leg: No edema  Lymphadenopathy:      Cervical: No cervical adenopathy  Skin:     General: Skin is warm and dry  Neurological:      Mental Status: He is alert and oriented to person, place, and time  Mental status is at baseline  Sensory: No sensory deficit  Gait: Gait normal    Psychiatric:         Mood and Affect: Mood normal          Behavior: Behavior normal          Thought Content: Thought content normal          The history was obtained from the review of the chart, patient      Lab Results:   Lab Results   Component Value Date/Time    Hemoglobin A1C 8 8 (H) 11/05/2021 10:58 AM    Hemoglobin A1C 7 9 (H) 06/23/2021 09:30 AM    White Blood Cell Count 6 1 04/22/2022 10:09 AM    White Blood Cell Count 5 2 02/28/2022 09:10 AM    White Blood Cell Count 6 1 09/22/2021 10:01 AM    Hemoglobin 12 3 (L) 04/22/2022 10:09 AM    Hemoglobin 12 1 (L) 02/28/2022 09:10 AM    Hemoglobin 12 7 (L) 09/22/2021 10:01 AM    HCT 36 7 (L) 04/22/2022 10:09 AM    HCT 37 9 (L) 02/28/2022 09:10 AM    HCT 38 4 (L) 09/22/2021 10:01 AM    MCV 97 1 04/22/2022 10:09 AM    MCV 99 5 02/28/2022 09:10 AM    MCV 96 5 09/22/2021 10:01 AM    Platelet Count 354 25/71/6989 10:09 AM    Platelet Count 390 78/51/5276 09:10 AM    Platelet Count 431 10/75/8763 10:01 AM    BUN 31 (H) 04/22/2022 10:09 AM    BUN 30 (H) 02/28/2022 09:10 AM    BUN 33 (H) 12/01/2021 10:26 AM    Potassium 4 1 04/22/2022 10:09 AM    Potassium 3 9 02/28/2022 09:10 AM    Potassium 4 1 12/01/2021 10:26 AM    Chloride 106 04/22/2022 10:09 AM    Chloride 107 02/28/2022 09:10 AM    Chloride 107 12/01/2021 10:26 AM    CO2 30 04/22/2022 10:09 AM    CO2 27 02/28/2022 09:10 AM    CO2 26 12/01/2021 10:26 AM    Creatinine 2 24 (H) 04/22/2022 10:09 AM    Creatinine 2 39 (H) 02/28/2022 09:10 AM    Creatinine 2 36 (H) 12/01/2021 10:26 AM    Creatinine 2 63 (H) 11/05/2021 10:58 AM    AST 36 (H) 04/22/2022 10:09 AM    AST 51 (H) 02/28/2022 09:10 AM    AST 41 11/05/2021 10:58 AM    ALT 46 04/22/2022 10:09 AM    ALT 58 (H) 02/28/2022 09:10 AM    ALT 47 11/05/2021 10:58 AM    Albumin 3 8 04/22/2022 10:09 AM    Albumin 3 8 02/28/2022 09:10 AM    Albumin 3 1 (L) 11/05/2021 10:58 AM    Albumin 3 8 09/22/2021 10:01 AM    Globulin 2 4 04/22/2022 10:09 AM    Globulin 2 6 02/28/2022 09:10 AM    HDL 27 (L) 06/23/2021 09:30 AM    Triglycerides 176 (H) 06/23/2021 09:30 AM     Portions of the record may have been created with voice recognition software  Occasional wrong word or "sound a like" substitutions may have occurred due to the inherent limitations of voice recognition software   Read the chart carefully and recognize, using context, where substitutions have occurred

## 2022-04-27 ENCOUNTER — TELEPHONE (OUTPATIENT)
Dept: ADMINISTRATIVE | Facility: OTHER | Age: 87
End: 2022-04-27

## 2022-04-27 DIAGNOSIS — G62.9 NEUROPATHY: ICD-10-CM

## 2022-04-27 NOTE — TELEPHONE ENCOUNTER
Upon review of the In Basket request we have found as a result of outreach that patient did not have the requested item(s) completed  See notes Eye outreach    Any additional questions or concerns should be emailed to the Practice Liaisons via [email or web address] email, please do not reply via In Basket      Thank you  José Luis Lewis

## 2022-04-27 NOTE — TELEPHONE ENCOUNTER
----- Message from [facility] sent at 4/26/2022  2:19 PM EDT -----  Regarding: DM EYE EXAM  04/26/22 2:19 PM    Hello, our patient Gianna Gomez  has had a DM Eye Exam performed by Dr Jesus Schultz in [address] [place] [address]  His number is [phone]        Thank you,  [address] [place] [address] [facility] George Bustillos

## 2022-04-27 NOTE — TELEPHONE ENCOUNTER
----- Message from [address] [employer] [facility] sent at 4/26/2022  2:18 PM EDT -----  Regarding: DM FOOT EXAM  04/26/22 2:18 PM    Hello, our patient Kamar Parikh  has had a DM Foot Exam performed at [facility]  Their number is [phone]      Thank you,  [address] [employer] [facility] [place]

## 2022-04-27 NOTE — TELEPHONE ENCOUNTER
Upon review of the In Basket request and the patient's chart, initial outreach has been made via telephone call, please see Contacts section for details        Att x1 foot     Thank you  Gary Cedeno

## 2022-04-27 NOTE — TELEPHONE ENCOUNTER
Upon review of the In Basket request we were able to locate, review, and update the patient chart as requested for Diabetic Foot Exam     Any additional questions or concerns should be emailed to the Practice Liaisons via [email or web address] email, please do not reply via In Basket      Thank you  Giorgio Round

## 2022-04-28 LAB
HBA1C MFR BLD: 8.9 % OF TOTAL HGB
T4 FREE SERPL-MCNC: 1 NG/DL (ref 0.8–1.8)
TSH SERPL-ACNC: 4.76 MIU/L (ref 0.4–4.5)

## 2022-04-29 RX ORDER — GABAPENTIN 300 MG/1
300 CAPSULE ORAL
Qty: 90 CAPSULE | Refills: 3 | Status: SHIPPED | OUTPATIENT
Start: 2022-04-29

## 2022-05-17 DIAGNOSIS — Z95.1 S/P CABG X 4: ICD-10-CM

## 2022-05-17 DIAGNOSIS — I25.10 CORONARY ARTERY DISEASE INVOLVING NATIVE CORONARY ARTERY OF NATIVE HEART WITHOUT ANGINA PECTORIS: ICD-10-CM

## 2022-05-17 DIAGNOSIS — F41.9 ANXIETY: ICD-10-CM

## 2022-05-17 DIAGNOSIS — D64.9 ANEMIA, UNSPECIFIED TYPE: ICD-10-CM

## 2022-05-17 DIAGNOSIS — Z00.00 HEALTHCARE MAINTENANCE: ICD-10-CM

## 2022-05-17 RX ORDER — ASPIRIN 325 MG
325 TABLET, DELAYED RELEASE (ENTERIC COATED) ORAL DAILY
Qty: 28 TABLET | Refills: 3 | Status: SHIPPED | OUTPATIENT
Start: 2022-05-17

## 2022-05-17 RX ORDER — FERROUS SULFATE 325(65) MG
TABLET ORAL
Qty: 28 TABLET | Refills: 3 | Status: SHIPPED | OUTPATIENT
Start: 2022-05-17

## 2022-05-17 RX ORDER — PANTOPRAZOLE SODIUM 20 MG/1
TABLET, DELAYED RELEASE ORAL
Qty: 30 TABLET | Refills: 3 | Status: SHIPPED | OUTPATIENT
Start: 2022-05-17

## 2022-05-17 RX ORDER — ASCORBIC ACID 500 MG
TABLET ORAL
Qty: 28 TABLET | Refills: 3 | Status: SHIPPED | OUTPATIENT
Start: 2022-05-17

## 2022-05-17 RX ORDER — LORAZEPAM 0.5 MG/1
TABLET ORAL
Qty: 30 TABLET | Refills: 3 | Status: SHIPPED | OUTPATIENT
Start: 2022-05-17

## 2022-05-23 DIAGNOSIS — Z95.1 S/P CABG X 4: ICD-10-CM

## 2022-05-23 DIAGNOSIS — Z95.2 S/P AVR (AORTIC VALVE REPLACEMENT): ICD-10-CM

## 2022-05-23 RX ORDER — ROSUVASTATIN CALCIUM 40 MG/1
TABLET, COATED ORAL
Qty: 30 TABLET | Refills: 3 | Status: SHIPPED | OUTPATIENT
Start: 2022-05-23

## 2022-05-27 ENCOUNTER — TELEPHONE (OUTPATIENT)
Dept: NEPHROLOGY | Facility: CLINIC | Age: 87
End: 2022-05-27

## 2022-06-06 DIAGNOSIS — E03.9 HYPOTHYROIDISM, UNSPECIFIED TYPE: ICD-10-CM

## 2022-06-07 RX ORDER — LEVOTHYROXINE SODIUM 0.15 MG/1
150 TABLET ORAL DAILY
Qty: 30 TABLET | Refills: 5 | Status: SHIPPED | OUTPATIENT
Start: 2022-06-07

## 2022-07-11 DIAGNOSIS — E11.22 TYPE 2 DIABETES MELLITUS WITH STAGE 3 CHRONIC KIDNEY DISEASE, WITHOUT LONG-TERM CURRENT USE OF INSULIN, UNSPECIFIED WHETHER STAGE 3A OR 3B CKD (HCC): ICD-10-CM

## 2022-07-11 DIAGNOSIS — N18.30 TYPE 2 DIABETES MELLITUS WITH STAGE 3 CHRONIC KIDNEY DISEASE, WITHOUT LONG-TERM CURRENT USE OF INSULIN, UNSPECIFIED WHETHER STAGE 3A OR 3B CKD (HCC): ICD-10-CM

## 2022-07-12 RX ORDER — INSULIN ASPART 100 [IU]/ML
INJECTION, SOLUTION INTRAVENOUS; SUBCUTANEOUS
Qty: 15 ML | Refills: 0 | Status: SHIPPED | OUTPATIENT
Start: 2022-07-12 | End: 2022-07-26 | Stop reason: SDUPTHER

## 2022-07-13 DIAGNOSIS — R60.9 EDEMA, UNSPECIFIED TYPE: ICD-10-CM

## 2022-07-13 RX ORDER — TORSEMIDE 10 MG/1
TABLET ORAL
Qty: 90 TABLET | Refills: 1 | Status: SHIPPED | OUTPATIENT
Start: 2022-07-13

## 2022-07-25 ENCOUNTER — TELEPHONE (OUTPATIENT)
Dept: NEPHROLOGY | Facility: HOSPITAL | Age: 87
End: 2022-07-25

## 2022-07-25 LAB
ALBUMIN SERPL-MCNC: 3.7 G/DL (ref 3.6–5.1)
ALBUMIN/GLOB SERPL: 1.6 (CALC) (ref 1–2.5)
ALP SERPL-CCNC: 60 U/L (ref 35–144)
ALT SERPL-CCNC: 35 U/L (ref 9–46)
AST SERPL-CCNC: 32 U/L (ref 10–35)
BILIRUB SERPL-MCNC: 1 MG/DL (ref 0.2–1.2)
BUN SERPL-MCNC: 43 MG/DL (ref 7–25)
BUN/CREAT SERPL: 14 (CALC) (ref 6–22)
CALCIUM SERPL-MCNC: 9.3 MG/DL (ref 8.6–10.3)
CHLORIDE SERPL-SCNC: 109 MMOL/L (ref 98–110)
CHOLEST SERPL-MCNC: 114 MG/DL
CHOLEST/HDLC SERPL: 3.8 (CALC)
CO2 SERPL-SCNC: 28 MMOL/L (ref 20–32)
CREAT SERPL-MCNC: 3.13 MG/DL (ref 0.7–1.22)
GFR/BSA.PRED SERPLBLD CYS-BASED-ARV: 19 ML/MIN/1.73M2
GLOBULIN SER CALC-MCNC: 2.3 G/DL (CALC) (ref 1.9–3.7)
GLUCOSE SERPL-MCNC: 123 MG/DL (ref 65–99)
HBA1C MFR BLD: 8.7 % OF TOTAL HGB
HDLC SERPL-MCNC: 30 MG/DL
LDLC SERPL CALC-MCNC: 63 MG/DL (CALC)
NONHDLC SERPL-MCNC: 84 MG/DL (CALC)
POTASSIUM SERPL-SCNC: 4 MMOL/L (ref 3.5–5.3)
PROT SERPL-MCNC: 6 G/DL (ref 6.1–8.1)
SODIUM SERPL-SCNC: 143 MMOL/L (ref 135–146)
T4 FREE SERPL-MCNC: 1 NG/DL (ref 0.8–1.8)
TRIGL SERPL-MCNC: 128 MG/DL
TSH SERPL-ACNC: 6.99 MIU/L (ref 0.4–4.5)

## 2022-07-25 NOTE — TELEPHONE ENCOUNTER
Patient called to confirm appt for August  Patient had questions regarding Endo   I advised to call them at [phone]

## 2022-07-26 ENCOUNTER — OFFICE VISIT (OUTPATIENT)
Dept: ENDOCRINOLOGY | Facility: HOSPITAL | Age: 87
End: 2022-07-26
Payer: COMMERCIAL

## 2022-07-26 VITALS
WEIGHT: 211.4 LBS | SYSTOLIC BLOOD PRESSURE: 100 MMHG | DIASTOLIC BLOOD PRESSURE: 62 MMHG | HEART RATE: 54 BPM | BODY MASS INDEX: 32.04 KG/M2 | HEIGHT: 68 IN

## 2022-07-26 DIAGNOSIS — E11.22 TYPE 2 DIABETES MELLITUS WITH STAGE 3 CHRONIC KIDNEY DISEASE, WITHOUT LONG-TERM CURRENT USE OF INSULIN, UNSPECIFIED WHETHER STAGE 3A OR 3B CKD (HCC): ICD-10-CM

## 2022-07-26 DIAGNOSIS — N18.30 TYPE 2 DIABETES MELLITUS WITH STAGE 3 CHRONIC KIDNEY DISEASE, WITHOUT LONG-TERM CURRENT USE OF INSULIN, UNSPECIFIED WHETHER STAGE 3A OR 3B CKD (HCC): ICD-10-CM

## 2022-07-26 DIAGNOSIS — N18.32 TYPE 2 DIABETES MELLITUS WITH STAGE 3B CHRONIC KIDNEY DISEASE, WITH LONG-TERM CURRENT USE OF INSULIN (HCC): Primary | ICD-10-CM

## 2022-07-26 DIAGNOSIS — Z79.4 TYPE 2 DIABETES MELLITUS WITH STAGE 3B CHRONIC KIDNEY DISEASE, WITH LONG-TERM CURRENT USE OF INSULIN (HCC): Primary | ICD-10-CM

## 2022-07-26 DIAGNOSIS — E11.22 TYPE 2 DIABETES MELLITUS WITH STAGE 3B CHRONIC KIDNEY DISEASE, WITH LONG-TERM CURRENT USE OF INSULIN (HCC): Primary | ICD-10-CM

## 2022-07-26 PROCEDURE — 99214 OFFICE O/P EST MOD 30 MIN: CPT | Performed by: PHYSICIAN ASSISTANT

## 2022-07-26 PROCEDURE — 1160F RVW MEDS BY RX/DR IN RCRD: CPT | Performed by: PHYSICIAN ASSISTANT

## 2022-07-26 RX ORDER — INSULIN ASPART 100 [IU]/ML
INJECTION, SOLUTION INTRAVENOUS; SUBCUTANEOUS
Qty: 15 ML | Refills: 3 | Status: SHIPPED | OUTPATIENT
Start: 2022-07-26

## 2022-07-26 NOTE — PATIENT INSTRUCTIONS
Monitor diet to help with blood sugar levels  Continue with same Basaglar daily  Increase Novolog to 11 units with each meal      Continue levothyroxine 150 mcg  Recommend checking blood sugar at least daily, and alternating times to get a better idea of how blood sugars look throughout the day  Contact the office with any concerns or questions       Follow-up in 3 months with lab work completed prior to visit

## 2022-07-26 NOTE — PROGRESS NOTES
Anton Palomino  80 y o  male MRN: [id number]    Encounter: [id number]      Assessment/Plan     Assessment: This is a 80y o -year-old male with type 2 diabetes with hyperlipidemia and hypothyroidism  Plan:  1  Type 2 diabetes:  Most recent hemoglobin A1c was 8 7  Does continue have concerns with hypoglycemia in the morning although has not been checking glucose levels recently  Will not make any adjustments to his Basaglar, but would recommend increasing NovoLog to 11 units with each meal   Encouraged him to check blood sugar once a day at alternating times to get a better idea what blood sugars doing, and we can make adjustments to all is insulins if needed  Contact the office with any concerns or questions  Follow-up in 3 months with lab work completed prior to visit      2  Hypothyroidism:  TSH slightly elevated, but free T4 is in normal range  Clinically euthyroid  At this time he will continue with levothyroxine 150 mcg daily  Contact the office if there is any change in symptoms  Repeat lab work prior to next office visit      3  Hyperlipidemia:  Lipid panel doing well at this time  Continue with rosuvastatin  Will monitor over time  CC:  Type 2 diabetes follow-up    History of Present Illness     HPI:  Vinicius Gracia Jr  is B 89 VTNJ old male with type 2 diabetes for about 6 years   States this was diagnosed when he was hospitalized and status post CABG   He is on insulin at home and takes Basaglar 20 units qhs and Novolog 10 units with breakfast and dinner, will take 5 units with a snack in the afternoon   He denies any polyuria, polydipsia, nocturia and blurry vision   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 and he follows closely with nephrology   He also has history of CAD status post CABG   He denies history of peripheral neuropathy, retinopathy  Overall doing well today  His only concern is with his medication of MiraLax    States that he has been having increased constipation recently      Hypoglycemic episodes:  Hypoglycemic symptoms in the morning typically        The patient's last eye exam was recently per patient  [facility] denies history of retinopathy        Blood Sugar/Glucometer/Pump/CGM review:  No blood sugar logs present     He has a hx of hypothyroidism treated with levothyroxine 150 mcg daily  Denies any hypothyroid or hypothyroid symptoms      For his hx of hld he is treated with and tolerating rosuvastatin 40 mg daily  Denies any headaches, vision changes, chest pain, mi or stroke-like symptoms  Review of Systems   Constitutional: Negative for activity change, appetite change, fatigue and unexpected weight change  HENT: Negative for trouble swallowing  Eyes: Negative for visual disturbance  Respiratory: Negative for chest tightness and shortness of breath  Cardiovascular: Negative for chest pain, palpitations and leg swelling  Gastrointestinal: Positive for constipation  Negative for abdominal pain, diarrhea, nausea and vomiting  Endocrine: Negative for cold intolerance, heat intolerance, polydipsia, polyphagia and polyuria  Genitourinary: Negative for frequency  Skin: Negative for rash and wound  Neurological: Negative for dizziness, weakness, light-headedness, numbness and headaches  Psychiatric/Behavioral: Negative for dysphoric mood and sleep disturbance  The patient is not nervous/anxious  Historical Information   Past Medical History:   Diagnosis Date    Aortic stenosis     CKD (chronic kidney disease)     baseline Cr 1 3-1 5    Coronary artery disease     Cough     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HCC)     type 2, insulin dependent    GERD (gastroesophageal reflux disease)     Glaucoma     Gout     History of prostate cancer     Hypertension     Hypothyroidism     Overweight     Peripheral neuropathy, idiopathic     Pleural effusion, left     Pure hypercholesterolemia     LA   11/12/14   R   11/12/14     Visual impairment cataract left eye    Weight gain      Past Surgical History:   Procedure Laterality Date    CARDIAC CATHETERIZATION      EYE SURGERY      IR THORACENTESIS  10/23/2018    IR THORACENTESIS  2018    IR THORACENTESIS  2018    IR THORACENTESIS  2018    [place] CABG, ARTERY-VEIN, THREE N/A 2018    Procedure: CORONARY ARTERY BYPASS GRAFT (CABG) x 4 VESSELS with LIMA - LAD, SVG/LEFT LEG EVH - LEFT PDA, OM3, & OM2;  Surgeon: Tari Hale MD;  [facility];  Service: Cardiac Surgery    HI ECHO TRANSESOPHAG MONTR CARDIAC PUMP FUNCTJ N/A 2018    Procedure: TRANSESOPHAGEAL ECHOCARDIOGRAM (VERONICA);   Surgeon: Tari Hale MD;  [facility];  Service: Cardiac Surgery    HI RPLCMT AORTIC VALVE OPN W/STENTLESS TISSUE VALVE N/A 2018    Procedure: REPLACEMENT VALVE AORTIC (AVR)- 23mm tissue Intuity Valve;  Surgeon: Tari Hale MD;  [facility];  Service: Cardiac Surgery    THORACOSCOPY VIDEO ASSISTED SURGERY (VATS) Left 2018    Procedure: THORACOSCOPY VIDEO ASSISTED SURGERY (VATS), talc pleurodesis,;  Surgeon: Rosa Chandler MD;  [facility];  Service: Thoracic    THYROID SURGERY       Social History   Social History     Substance and Sexual Activity   Alcohol Use No     Social History     Substance and Sexual Activity   Drug Use No     Social History     Tobacco Use   Smoking Status Former Smoker    Packs/day: 0 25    Years: 1 00    Pack years: 0 25    Types: Cigarettes    Quit date: 5    Years since quittin 6   Smokeless Tobacco Never Used   Tobacco Comment    Only in the service      Family History:   Family History   Problem Relation Age of Onset    Diabetes Mother     Pancreatic cancer Brother     Diabetes Maternal Grandmother     Colon cancer Son     Diabetes Family     Substance Abuse Neg Hx     Mental illness Neg Hx        Meds/Allergies   Current Outpatient Medications   Medication Sig Dispense Refill    acetaminophen (TYLENOL) 325 mg tablet 650 mg every 4 to 6 hours as needed for pain (do not take with percocet) 30 tablet 0    albuterol (PROVENTIL HFA) 90 mcg/act inhaler Inhale 2 puffs 4 (four) times a day as needed      allopurinol (ZYLOPRIM) 300 mg tablet TAKE ONE TABLET BY MOUTH ONCE DAILY 90 tablet 3    ascorbic acid (VITAMIN C) 500 mg tablet TAKE ONE TABLET BY MOUTH DAILY 28 tablet 3    aspirin (ECOTRIN) 325 mg EC tablet Take 1 tablet (325 mg total) by mouth daily 28 tablet 3    Basaglar KwikPen 100 units/mL injection pen 16 units daily (Patient taking differently: 20 units daily ) 15 mL 3    cholecalciferol (VITAMIN D3) 1,000 units tablet Take 1 tablet (1,000 Units total) by mouth daily 90 tablet 3    FeroSul 325 (65 Fe) MG tablet TAKE ONE TABLET BY MOUTH DAILY 28 tablet 3    gabapentin (NEURONTIN) 300 mg capsule Take 1 capsule (300 mg total) by mouth daily at bedtime 90 capsule 3    insulin aspart (NovoLOG FlexPen) 100 UNIT/ML injection pen INJECT TEN UNITS SUBCUTANEOUSLY THREE TIMES DAILY 15 mL 0    Insulin Pen Needle (Pen Needles) 32G X 4 MM MISC Use 4 (four) times a day (before meals and at bedtime) 100 each 11    levothyroxine 150 mcg tablet Take 1 tablet (150 mcg total) by mouth daily 30 tablet 5    LORazepam (ATIVAN) 0 5 mg tablet TAKE ONE TABLET BY MOUTH EVERY EIGHT HOURS AS NEEDED FOR ANXIETY 30 tablet 3    metoprolol tartrate (LOPRESSOR) 25 mg tablet Take 12 5 mg by mouth daily      olmesartan (BENICAR) 5 mg tablet Take 1 tablet (5 mg total) by mouth daily 90 tablet 3    pantoprazole (PROTONIX) 20 mg tablet TAKE ONE TABLET BY MOUTH DAILY 30 tablet 3    rosuvastatin (CRESTOR) 40 MG tablet TAKE ONE TABLET BY MOUTH DAILY 30 tablet 3    tamsulosin (FLOMAX) 0 4 mg TAKE ONE CAPSULE BY MOUTH DAILY WITH dinner 30 capsule 6    timolol (TIMOPTIC) 0 5 % ophthalmic solution Administer 1 drop to both eyes 2 (two) times a day      torsemide (DEMADEX) 10 mg tablet TAKE ONE TABLET BY MOUTH EVERY DAY 90 tablet 1     No current facility-administered medications for this visit  Allergies   Allergen Reactions    Amlodipine Nausea Only    Atorvastatin Myalgia       Objective   Vitals: There were no vitals taken for this visit  Physical Exam  Vitals and nursing note reviewed  Constitutional:       General: He is not in acute distress  Appearance: Normal appearance  He is not diaphoretic  HENT:      Head: Normocephalic and atraumatic  Eyes:      General: No scleral icterus  Extraocular Movements: Extraocular movements intact  Conjunctiva/sclera: Conjunctivae normal       Pupils: Pupils are equal, round, and reactive to light  Cardiovascular:      Rate and Rhythm: Normal rate and regular rhythm  Heart sounds: No murmur heard  Pulmonary:      Effort: Pulmonary effort is normal  No respiratory distress  Breath sounds: Normal breath sounds  No wheezing  Musculoskeletal:      Right lower leg: No edema  Left lower leg: No edema  Lymphadenopathy:      Cervical: No cervical adenopathy  Skin:     General: Skin is warm and dry  Neurological:      Mental Status: He is alert and oriented to person, place, and time  Mental status is at baseline  Sensory: No sensory deficit  Gait: Gait normal    Psychiatric:         Mood and Affect: Mood normal          Behavior: Behavior normal          Thought Content: Thought content normal          The history was obtained from the review of the chart, patient      Lab Results:   Lab Results   Component Value Date/Time    Hemoglobin A1C 8 7 (H) 07/25/2022 10:22 AM    Hemoglobin A1C 8 9 (H) 04/28/2022 09:44 AM    Hemoglobin A1C 8 8 (H) 11/05/2021 10:58 AM    White Blood Cell Count 6 1 04/22/2022 10:09 AM    White Blood Cell Count 5 2 02/28/2022 09:10 AM    White Blood Cell Count 6 1 09/22/2021 10:01 AM    Hemoglobin 12 3 (L) 04/22/2022 10:09 AM    Hemoglobin 12 1 (L) 02/28/2022 09:10 AM    Hemoglobin 12 7 (L) 09/22/2021 10:01 AM HCT 36 7 (L) 04/22/2022 10:09 AM    HCT 37 9 (L) 02/28/2022 09:10 AM    HCT 38 4 (L) 09/22/2021 10:01 AM    MCV 97 1 04/22/2022 10:09 AM    MCV 99 5 02/28/2022 09:10 AM    MCV 96 5 09/22/2021 10:01 AM    Platelet Count 267 60/33/4182 10:09 AM    Platelet Count 877 10/51/4899 09:10 AM    Platelet Count 180 68/56/2248 10:01 AM    BUN 43 (H) 07/25/2022 10:22 AM    BUN 31 (H) 04/22/2022 10:09 AM    BUN 30 (H) 02/28/2022 09:10 AM    Potassium 4 0 07/25/2022 10:22 AM    Potassium 4 1 04/22/2022 10:09 AM    Potassium 3 9 02/28/2022 09:10 AM    Chloride 109 07/25/2022 10:22 AM    Chloride 106 04/22/2022 10:09 AM    Chloride 107 02/28/2022 09:10 AM    CO2 28 07/25/2022 10:22 AM    CO2 30 04/22/2022 10:09 AM    CO2 27 02/28/2022 09:10 AM    Creatinine 3 13 (H) 07/25/2022 10:22 AM    Creatinine 2 24 (H) 04/22/2022 10:09 AM    Creatinine 2 39 (H) 02/28/2022 09:10 AM    Creatinine 2 63 (H) 11/05/2021 10:58 AM    AST 32 07/25/2022 10:22 AM    AST 36 (H) 04/22/2022 10:09 AM    AST 51 (H) 02/28/2022 09:10 AM    ALT 35 07/25/2022 10:22 AM    ALT 46 04/22/2022 10:09 AM    ALT 58 (H) 02/28/2022 09:10 AM    Albumin 3 7 07/25/2022 10:22 AM    Albumin 3 8 04/22/2022 10:09 AM    Albumin 3 8 02/28/2022 09:10 AM    Albumin 3 1 (L) 11/05/2021 10:58 AM    Globulin 2 3 07/25/2022 10:22 AM    Globulin 2 4 04/22/2022 10:09 AM    Globulin 2 6 02/28/2022 09:10 AM    HDL 30 (L) 07/25/2022 10:22 AM    Triglycerides 128 07/25/2022 10:22 AM       Portions of the record may have been created with voice recognition software  Occasional wrong word or "sound a like" substitutions may have occurred due to the inherent limitations of voice recognition software  Read the chart carefully and recognize, using context, where substitutions have occurred

## 2022-07-29 ENCOUNTER — TELEPHONE (OUTPATIENT)
Dept: NEPHROLOGY | Facility: CLINIC | Age: 87
End: 2022-07-29

## 2022-07-29 NOTE — TELEPHONE ENCOUNTER
Called and spoke with Child to complete their bloodwork prior to their appointment on 08/04/22 with Dr Elayne Deluca at the [address] [place] [address] location

## 2022-08-01 LAB
ALBUMIN SERPL-MCNC: 3.8 G/DL (ref 3.6–5.1)
ALBUMIN/GLOB SERPL: 1.5 (CALC) (ref 1–2.5)
ALP SERPL-CCNC: 61 U/L (ref 35–144)
ALT SERPL-CCNC: 34 U/L (ref 9–46)
AST SERPL-CCNC: 30 U/L (ref 10–35)
BILIRUB SERPL-MCNC: 0.9 MG/DL (ref 0.2–1.2)
BUN SERPL-MCNC: 42 MG/DL (ref 7–25)
BUN/CREAT SERPL: 15 (CALC) (ref 6–22)
CALCIUM SERPL-MCNC: 9.2 MG/DL (ref 8.6–10.3)
CHLORIDE SERPL-SCNC: 107 MMOL/L (ref 98–110)
CO2 SERPL-SCNC: 26 MMOL/L (ref 20–32)
CREAT SERPL-MCNC: 2.73 MG/DL (ref 0.7–1.22)
GFR/BSA.PRED SERPLBLD CYS-BASED-ARV: 22 ML/MIN/1.73M2
GLOBULIN SER CALC-MCNC: 2.5 G/DL (CALC) (ref 1.9–3.7)
GLUCOSE SERPL-MCNC: 153 MG/DL (ref 65–99)
HBA1C MFR BLD: 8.6 % OF TOTAL HGB
POTASSIUM SERPL-SCNC: 4.3 MMOL/L (ref 3.5–5.3)
PROT SERPL-MCNC: 6.3 G/DL (ref 6.1–8.1)
SODIUM SERPL-SCNC: 142 MMOL/L (ref 135–146)
T4 FREE SERPL-MCNC: 1.1 NG/DL (ref 0.8–1.8)
TSH SERPL-ACNC: 6.26 MIU/L (ref 0.4–4.5)

## 2022-08-04 ENCOUNTER — OFFICE VISIT (OUTPATIENT)
Dept: NEPHROLOGY | Facility: HOSPITAL | Age: 87
End: 2022-08-04
Payer: COMMERCIAL

## 2022-08-04 VITALS
HEIGHT: 68 IN | SYSTOLIC BLOOD PRESSURE: 112 MMHG | BODY MASS INDEX: 32.22 KG/M2 | HEART RATE: 61 BPM | WEIGHT: 212.6 LBS | DIASTOLIC BLOOD PRESSURE: 62 MMHG

## 2022-08-04 DIAGNOSIS — N18.4 CKD (CHRONIC KIDNEY DISEASE), STAGE IV (HCC): Primary | ICD-10-CM

## 2022-08-04 PROCEDURE — 99214 OFFICE O/P EST MOD 30 MIN: CPT | Performed by: INTERNAL MEDICINE

## 2022-08-04 NOTE — PROGRESS NOTES
OFFICE FOLLOW UP - Nephrology   Shannan Angulo  80 y o  male MRN: [id number]    Encounter: [id number]        ASSESSMENT & PLAN    1  Stage IIIB to stage 4 chronic kidney disease moderately increased proteinuria  -urinalysis:   Now with over 1 gram of protein in urine  -imaging:  CT scan of the pelvis shows bilateral renal cysts largest at the lower pole of the right kidney measuring up to 3 3 cm   -etiology:  Patient has a longstanding history of hypertension and type 2 diabetes mellitus this is likely progression of his CKD  -plan:  His creatinine did go up to 3 3 now 2 7  - tolerating olmesartan will continue 5 mg daily  -fluctuating creatinine now with a GFR around 20  - Completed CKD education and advance care meeting  - he would want dialysis if needed continue maximal conservative management if his closer to dialysis Will see how his hemodynamics as his only option may be hemodialysis    2  Electrolytes:  -currently acceptable    3  Acid/Base  -no anion gap acid-base status acceptable    4  BP/HR  -History of hypertension- blood pressure is currently stable    5  Volume Status-euvolemic    6  Anemia of chronic kidney disease  - stable    7  MBD  -PTH up start d3 1000 units daily    8   Health Maintanance/Risk Reduction  -uncontrolled diabetes with elevated hemoglobin A1cs on insulin  -allopurinol for gout  -coronary artery disease and status post aortic valve replacement on diuretics ACE-inhibitor beta-blocker, statin    Given is advanced CKD and logistics/discussions needed for potential progression will follow-up every 3 months    HPI: Shannan Angulo  is a 80 y o  male who is here for follow-up    He is doing relatively well since his last visit he has no acute complaints he is tolerating his medications without difficulty no chest pain or shortness of breath no fevers or chills nausea vomiting diarrhea or constipation    ROS:   All systems reviewed and negative besides what was mentioned in the history of present illness    Allergies: Amlodipine and Atorvastatin    Medications:   Current Outpatient Medications:     acetaminophen (TYLENOL) 325 mg tablet, 650 mg every 4 to 6 hours as needed for pain (do not take with percocet), Disp: 30 tablet, Rfl: 0    albuterol (PROVENTIL HFA,VENTOLIN HFA) 90 mcg/act inhaler, Inhale 2 puffs 4 (four) times a day as needed, Disp: , Rfl:     allopurinol (ZYLOPRIM) 300 mg tablet, TAKE ONE TABLET BY MOUTH ONCE DAILY, Disp: 90 tablet, Rfl: 3    ascorbic acid (VITAMIN C) 500 mg tablet, TAKE ONE TABLET BY MOUTH DAILY, Disp: 28 tablet, Rfl: 3    aspirin (ECOTRIN) 325 mg EC tablet, Take 1 tablet (325 mg total) by mouth daily, Disp: 28 tablet, Rfl: 3    Basaglar KwikPen 100 units/mL injection pen, 16 units daily (Patient taking differently: 20 units daily), Disp: 15 mL, Rfl: 3    cholecalciferol (VITAMIN D3) 1,000 units tablet, Take 1 tablet (1,000 Units total) by mouth daily, Disp: 90 tablet, Rfl: 3    FeroSul 325 (65 Fe) MG tablet, TAKE ONE TABLET BY MOUTH DAILY, Disp: 28 tablet, Rfl: 3    gabapentin (NEURONTIN) 300 mg capsule, Take 1 capsule (300 mg total) by mouth daily at bedtime, Disp: 90 capsule, Rfl: 3    insulin aspart (NovoLOG FlexPen) 100 UNIT/ML injection pen, INJECT 11 UNITS SUBCUTANEOUSLY THREE TIMES DAILY, Disp: 15 mL, Rfl: 3    Insulin Pen Needle (Pen Needles) 32G X 4 MM MISC, Use 4 (four) times a day (before meals and at bedtime), Disp: 100 each, Rfl: 11    levothyroxine 150 mcg tablet, Take 1 tablet (150 mcg total) by mouth daily, Disp: 30 tablet, Rfl: 5    LORazepam (ATIVAN) 0 5 mg tablet, TAKE ONE TABLET BY MOUTH EVERY EIGHT HOURS AS NEEDED FOR ANXIETY, Disp: 30 tablet, Rfl: 3    metoprolol tartrate (LOPRESSOR) 25 mg tablet, Take 12 5 mg by mouth daily, Disp: , Rfl:     olmesartan (BENICAR) 5 mg tablet, Take 1 tablet (5 mg total) by mouth daily, Disp: 90 tablet, Rfl: 3    pantoprazole (PROTONIX) 20 mg tablet, TAKE ONE TABLET BY MOUTH DAILY, Disp: 30 tablet, Rfl: 3    rosuvastatin (CRESTOR) 40 MG tablet, TAKE ONE TABLET BY MOUTH DAILY, Disp: 30 tablet, Rfl: 3    tamsulosin (FLOMAX) 0 4 mg, TAKE ONE CAPSULE BY MOUTH DAILY WITH dinner, Disp: 30 capsule, Rfl: 6    timolol (TIMOPTIC) 0 5 % ophthalmic solution, Administer 1 drop to both eyes 2 (two) times a day, Disp: , Rfl:     torsemide (DEMADEX) 10 mg tablet, TAKE ONE TABLET BY MOUTH EVERY DAY, Disp: 90 tablet, Rfl: 1    Past Medical History:   Diagnosis Date    Aortic stenosis     CKD (chronic kidney disease)     baseline Cr 1 3-1 5    Coronary artery disease     Cough     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HCC)     type 2, insulin dependent    GERD (gastroesophageal reflux disease)     Glaucoma     Gout     History of prostate cancer     Hypertension     Hypothyroidism     Overweight     Peripheral neuropathy, idiopathic     Pleural effusion, left     Pure hypercholesterolemia     LA   11/12/14   R   11/12/14     Visual impairment     cataract left eye    Weight gain      Past Surgical History:   Procedure Laterality Date    CARDIAC CATHETERIZATION      EYE SURGERY      IR THORACENTESIS  10/23/2018    IR THORACENTESIS  11/2/2018    IR THORACENTESIS  11/9/2018    IR THORACENTESIS  11/23/2018    [place] CABG, ARTERY-VEIN, THREE N/A 9/17/2018    Procedure: CORONARY ARTERY BYPASS GRAFT (CABG) x 4 VESSELS with LIMA - LAD, SVG/LEFT LEG EVH - LEFT PDA, OM3, & OM2;  Surgeon: Mango Richmond MD;  [facility];  Service: Cardiac Surgery    [place] ECHO TRANSESOPHAG MONTR CARDIAC PUMP FUNCTJ N/A 9/17/2018    Procedure: TRANSESOPHAGEAL ECHOCARDIOGRAM (VERONICA);   Surgeon: Mango Richmond MD;  [facility];  Service: Cardiac Surgery    [place] RPLCMT AORTIC VALVE OPN W/STENTLESS TISSUE VALVE N/A 9/17/2018    Procedure: REPLACEMENT VALVE AORTIC (AVR)- 23mm tissue Intuity Valve;  Surgeon: Mango Richmond MD;  [facility];  Service: Cardiac Surgery    THORACOSCOPY VIDEO ASSISTED SURGERY (VATS) Left 11/27/2018 Procedure: THORACOSCOPY VIDEO ASSISTED SURGERY (VATS), talc pleurodesis,;  Surgeon: Talib Antonio MD;  [facility];  Service: Thoracic    THYROID SURGERY       Family History   Problem Relation Age of Onset    Diabetes Mother     Pancreatic cancer Brother     Diabetes Maternal Grandmother     Colon cancer Son     Diabetes Family     Substance Abuse Neg Hx     Mental illness Neg Hx       reports that he quit smoking about 52 years ago  His smoking use included cigarettes  He has a 0 25 pack-year smoking history  He has never used smokeless tobacco  He reports that he does not drink alcohol and does not use drugs  Physical Exam:   Vitals:    08/04/22 1528   BP: 112/62   BP Location: Left arm   Patient Position: Sitting   Cuff Size: Standard   Pulse: 61   Weight: 96 4 kg (212 lb 9 6 oz)   Height: 5' 8" (1 727 m)     Body mass index is 32 33 kg/m²      General: conscious, cooperative, in no acute distress  Eyes: conjunctivae pink, anicteric sclerae  ENT: lips and mucous membranes moist  Neck: supple, no JVD  Chest: clear breath sounds bilateral, no crackles, ronchus or wheezings  CVS: normal rate, regular rhythm  Abdomen: soft, non-tender, non-distended, normoactive bowel sounds  Extremities:  Trace edema  Skin: no rash  Neuro: awake, alert, oriented  Psych:  Pleasant affect    Lab Results:    Results for orders placed or performed in visit on 08/01/22   Comprehensive metabolic panel   Result Value Ref Range    Glucose, Random 153 (H) 65 - 99 mg/dL    BUN 42 (H) 7 - 25 mg/dL    Creatinine 2 73 (H) 0 70 - 1 22 mg/dL    eGFR 22 (L) > OR = 60 mL/min/1 73m2    SL AMB BUN/CREATININE RATIO 15 6 - 22 (calc)    Sodium 142 135 - 146 mmol/L    Potassium 4 3 3 5 - 5 3 mmol/L    Chloride 107 98 - 110 mmol/L    CO2 26 20 - 32 mmol/L    Calcium 9 2 8 6 - 10 3 mg/dL    Protein, Total 6 3 6 1 - 8 1 g/dL    Albumin 3 8 3 6 - 5 1 g/dL    Globulin 2 5 1 9 - 3 7 g/dL (calc)    Albumin/Globulin Ratio 1 5 1 0 - 2 5 (calc)    TOTAL BILIRUBIN 0 9 0 2 - 1 2 mg/dL    Alkaline Phosphatase 61 35 - 144 U/L    AST 30 10 - 35 U/L    ALT 34 9 - 46 U/L   T4, free   Result Value Ref Range    Free t4 1 1 0 8 - 1 8 ng/dL   TSH, 3rd generation   Result Value Ref Range    TSH 6 26 (H) 0 40 - 4 50 mIU/L   Hemoglobin A1c (w/out EAG)   Result Value Ref Range    Hemoglobin A1C 8 6 (H) <5 7 % of total Hgb       Portions of the record may have been created with voice recognition software  Occasional wrong word or "sound a like" substitutions may have occurred due to the inherent limitations of voice recognition software  Read the chart carefully and recognize, using context, where substitutions have occurred  If you have any questions, please contact the dictating provider

## 2022-08-04 NOTE — PATIENT INSTRUCTIONS
Chronic Kidney Disease   WHAT YOU NEED TO KNOW:   Chronic kidney disease (CKD) is the gradual and permanent loss of kidney function  It is also called chronic kidney failure, or chronic renal insufficiency  Normally, the kidneys remove fluid, chemicals, and waste from your blood  These wastes are turned into urine by your kidneys  CKD may worsen over time and lead to kidney failure  Your CKD team will help you and your family plan for your care at home  The team will help you create goals and find ways to meet your goals  Your care plan may change over time as your needs change  DISCHARGE INSTRUCTIONS:   Call your local emergency number (911 in the [address]) if:   You have a seizure  You have shortness of breath  Return to the emergency department if:   You are confused and very drowsy  Call your doctor or nephrologist if:   You suddenly gain or lose more weight than your healthcare provider has told you is okay  You have itchy skin or a rash  You urinate more or less than you normally do  You have blood in your urine  You have nausea and are vomiting  You have fatigue or muscle weakness  You have hiccups that will not stop  You have questions or concerns about your condition or care  Medicines:   Medicines  may be given to decrease blood pressure and get rid of extra fluid  You may also receive medicine to manage health conditions that may occur with CKD, such as anemia, diabetes, and heart disease  Take your medicine as directed  Contact your healthcare provider if you think your medicine is not helping or if you have side effects  Tell him or her if you are allergic to any medicine  Keep a list of the medicines, vitamins, and herbs you take  Include the amounts, and when and why you take them  Bring the list or the pill bottles to follow-up visits  Carry your medicine list with you in case of an emergency  What you can do to manage CKD:   Management may include making some lifestyle changes  Tell your healthcare provider if you have any concerns about being able to make changes  He or she can help you find solutions, including working with specialists  Ask for help creating a plan to break large goals into smaller steps  Your plan may include any of the following:  Manage other health conditions  Your healthcare provider will work with you to make a care plan that meets your needs  You will be checked regularly for heart disease or other conditions that can make CKD worse, such as diabetes  Your blood pressure will be closely monitored  You will also get a target blood pressure and help making a plan to reach your target  This may include taking your blood pressure at home  Maintain a healthy weight  Your weight and body mass index (BMI) will be checked regularly  BMI helps find if your weight is healthy for your height  Your healthcare provider will use other tests to check your muscle and protein levels  Extra weight can strain your kidneys  A low weight or low muscle mass can make you feel more tired  You may have trouble doing your daily activities  Ask your provider what a healthy weight is for you  He or she can help you create a plan to lose or gain weight safely, if needed  The plan may include keeping a food diary  This is a list of foods and liquids you have each day  Your provider will use the diary to help you make changes, if needed  Changes are based on your health and any other conditions you have, such as diabetes  Create an exercise plan  Regular exercise can help you manage CKD, high blood pressure, and diabetes  Exercise also helps control weight  Your provider can help you create exercise goals and a plan to reach those goals  For example, your goal may be to exercise for 30 minutes in a day  Your plan can include breaking exercise into 10 minute sessions, 3 times during the day  Create a healthy eating plan    Your provider may tell you to eat food low in potassium, phosphorus, or protein  Your provider may also recommend vitamin or mineral supplements  Do not take any supplements without talking to your provider  A dietitian can help you plan meals if needed  Ask how much liquid to drink each day and which liquids are best for you  Limit sodium (salt) as directed  You may need to limit sodium to less than 2,300 milligrams (mg) each day  Ask your dietitian or healthcare provider how much sodium you can have each day  The amount depends on your stage of kidney disease  Table salt, canned foods, soups, salted snacks, and processed meats, like deli meats and sausage, are high in sodium  Your provider or a dietitian can show you how to read food labels for sodium  Limit alcohol as directed  Alcohol can cause fluid retention and can affect your kidneys  Ask how much alcohol is safe for you  A drink of alcohol is 12 ounces of beer, 5 ounces of wine, or 1½ ounces of liquor  Do not smoke  Nicotine and other chemicals in cigarettes and cigars can cause kidney damage  Ask your provider for information if you currently smoke and need help to quit  E-cigarettes or smokeless tobacco still contain nicotine  Talk to your provider before you use these products  Ask about over-the-counter medicines  Medicines such as NSAIDs and laxatives may harm your kidneys  Some cough and cold medicines can raise your blood pressure  Always ask if a medicine is safe before you take it  Ask about vaccines you may need  CKD can increase your risk for infections such as pneumonia, influenza, and hepatitis  Vaccines lower your risk for infection  Your healthcare provider will tell you which vaccines you need and when to get them  Follow up with your doctor or nephrologist as directed: You will need to return for tests to monitor your kidney and nerve function, and your parathyroid hormone level   Your medicines may be changed, based on certain test results  Write down your questions so you remember to ask them during your visits  © Copyright [employer] 2022 Information is for End User's use only and may not be sold, redistributed or otherwise used for commercial purposes  All illustrations and images included in CareNotes® are the copyrighted property of [employer]  or Adelaida Zarate  The above information is an  only  It is not intended as medical advice for individual conditions or treatments  Talk to your doctor, nurse or pharmacist before following any medical regimen to see if it is safe and effective for you

## 2022-08-30 ENCOUNTER — RA CDI HCC (OUTPATIENT)
Dept: OTHER | Facility: HOSPITAL | Age: 87
End: 2022-08-30

## 2022-08-30 NOTE — PROGRESS NOTES
[facility] 75  coding opportunities       Chart reviewed, no opportunity found:   [address]        Patients Insurance     Medicare Insurance: [employer]

## 2022-09-12 DIAGNOSIS — Z00.00 HEALTHCARE MAINTENANCE: ICD-10-CM

## 2022-09-12 DIAGNOSIS — R35.1 BENIGN PROSTATIC HYPERPLASIA WITH NOCTURIA: ICD-10-CM

## 2022-09-12 DIAGNOSIS — Z95.1 S/P CABG X 4: ICD-10-CM

## 2022-09-12 DIAGNOSIS — N40.1 BENIGN PROSTATIC HYPERPLASIA WITH NOCTURIA: ICD-10-CM

## 2022-09-12 DIAGNOSIS — D64.9 ANEMIA, UNSPECIFIED TYPE: ICD-10-CM

## 2022-09-12 DIAGNOSIS — I25.10 CORONARY ARTERY DISEASE INVOLVING NATIVE CORONARY ARTERY OF NATIVE HEART WITHOUT ANGINA PECTORIS: ICD-10-CM

## 2022-09-12 RX ORDER — TAMSULOSIN HYDROCHLORIDE 0.4 MG/1
CAPSULE ORAL
Qty: 30 CAPSULE | Refills: 6 | Status: SHIPPED | OUTPATIENT
Start: 2022-09-12

## 2022-09-13 DIAGNOSIS — F41.9 ANXIETY: ICD-10-CM

## 2022-09-13 RX ORDER — ASCORBIC ACID 500 MG
TABLET ORAL
Qty: 28 TABLET | Refills: 3 | Status: SHIPPED | OUTPATIENT
Start: 2022-09-13

## 2022-09-13 RX ORDER — ASPIRIN 325 MG
325 TABLET, DELAYED RELEASE (ENTERIC COATED) ORAL DAILY
Qty: 30 TABLET | Refills: 0 | Status: SHIPPED | OUTPATIENT
Start: 2022-09-13

## 2022-09-13 RX ORDER — PANTOPRAZOLE SODIUM 20 MG/1
TABLET, DELAYED RELEASE ORAL
Qty: 30 TABLET | Refills: 3 | Status: SHIPPED | OUTPATIENT
Start: 2022-09-13

## 2022-09-13 RX ORDER — FERROUS SULFATE 325(65) MG
TABLET ORAL
Qty: 28 TABLET | Refills: 3 | Status: SHIPPED | OUTPATIENT
Start: 2022-09-13

## 2022-09-13 RX ORDER — LORAZEPAM 0.5 MG/1
TABLET ORAL
Qty: 30 TABLET | Refills: 3 | Status: SHIPPED | OUTPATIENT
Start: 2022-09-13

## 2022-10-10 DIAGNOSIS — Z95.1 S/P CABG X 4: ICD-10-CM

## 2022-10-10 DIAGNOSIS — Z95.2 S/P AVR (AORTIC VALVE REPLACEMENT): ICD-10-CM

## 2022-10-10 RX ORDER — ROSUVASTATIN CALCIUM 40 MG/1
TABLET, COATED ORAL
Qty: 30 TABLET | Refills: 3 | Status: SHIPPED | OUTPATIENT
Start: 2022-10-10

## 2022-10-27 LAB
ALBUMIN SERPL-MCNC: 3.7 G/DL (ref 3.6–5.1)
ALBUMIN/GLOB SERPL: 1.5 (CALC) (ref 1–2.5)
ALP SERPL-CCNC: 63 U/L (ref 35–144)
ALT SERPL-CCNC: 17 U/L (ref 9–46)
APPEARANCE UR: ABNORMAL
AST SERPL-CCNC: 22 U/L (ref 10–35)
BACTERIA UR QL AUTO: ABNORMAL /HPF
BILIRUB SERPL-MCNC: 0.9 MG/DL (ref 0.2–1.2)
BILIRUB UR QL STRIP: NEGATIVE
BUN SERPL-MCNC: 40 MG/DL (ref 7–25)
BUN/CREAT SERPL: 13 (CALC) (ref 6–22)
CALCIUM SERPL-MCNC: 9.4 MG/DL (ref 8.6–10.3)
CALCIUM SERPL-MCNC: 9.4 MG/DL (ref 8.6–10.3)
CHLORIDE SERPL-SCNC: 106 MMOL/L (ref 98–110)
CO2 SERPL-SCNC: 29 MMOL/L (ref 20–32)
COLOR UR: YELLOW
CREAT SERPL-MCNC: 3.19 MG/DL (ref 0.7–1.22)
CREAT UR-MCNC: 94 MG/DL (ref 20–320)
ERYTHROCYTE [DISTWIDTH] IN BLOOD BY AUTOMATED COUNT: 12.8 % (ref 11–15)
GFR/BSA.PRED SERPLBLD CYS-BASED-ARV: 18 ML/MIN/1.73M2
GLOBULIN SER CALC-MCNC: 2.4 G/DL (CALC) (ref 1.9–3.7)
GLUCOSE SERPL-MCNC: 167 MG/DL (ref 65–99)
GLUCOSE UR QL STRIP: NEGATIVE
GRAN CASTS #/AREA URNS LPF: ABNORMAL /LPF
HCT VFR BLD AUTO: 33.4 % (ref 38.5–50)
HGB BLD-MCNC: 11.1 G/DL (ref 13.2–17.1)
HGB UR QL STRIP: ABNORMAL
HYALINE CASTS #/AREA URNS LPF: ABNORMAL /LPF
KETONES UR QL STRIP: NEGATIVE
LEUKOCYTE ESTERASE UR QL STRIP: NEGATIVE
MAGNESIUM SERPL-MCNC: 2.5 MG/DL (ref 1.5–2.5)
MCH RBC QN AUTO: 33.1 PG (ref 27–33)
MCHC RBC AUTO-ENTMCNC: 33.2 G/DL (ref 32–36)
MCV RBC AUTO: 99.7 FL (ref 80–100)
NITRITE UR QL STRIP: NEGATIVE
PH UR STRIP: 5.5 [PH] (ref 5–8)
PHOSPHATE SERPL-MCNC: 3.3 MG/DL (ref 2.1–4.3)
PLATELET # BLD AUTO: 156 THOUSAND/UL (ref 140–400)
PMV BLD REES-ECKER: 10.7 FL (ref 7.5–12.5)
POTASSIUM SERPL-SCNC: 5.2 MMOL/L (ref 3.5–5.3)
PROT SERPL-MCNC: 6.1 G/DL (ref 6.1–8.1)
PROT UR QL STRIP: ABNORMAL
PROT UR-MCNC: 147 MG/DL (ref 5–25)
PROT/CREAT UR: 1.56 MG/MG CREAT (ref 0.03–0.15)
PROT/CREAT UR: 1564 MG/G CREAT (ref 25–148)
PTH-INTACT SERPL-MCNC: 54 PG/ML (ref 16–77)
RBC # BLD AUTO: 3.35 MILLION/UL (ref 4.2–5.8)
RBC #/AREA URNS HPF: ABNORMAL /HPF
SODIUM SERPL-SCNC: 141 MMOL/L (ref 135–146)
SP GR UR STRIP: 1.02 (ref 1–1.03)
SQUAMOUS #/AREA URNS HPF: ABNORMAL /HPF
URATE SERPL-MCNC: 3.2 MG/DL (ref 4–8)
WBC # BLD AUTO: 5.4 THOUSAND/UL (ref 3.8–10.8)
WBC #/AREA URNS HPF: ABNORMAL /HPF

## 2022-10-28 ENCOUNTER — OFFICE VISIT (OUTPATIENT)
Dept: ENDOCRINOLOGY | Facility: HOSPITAL | Age: 87
End: 2022-10-28

## 2022-10-28 VITALS
WEIGHT: 215.2 LBS | HEIGHT: 68 IN | SYSTOLIC BLOOD PRESSURE: 120 MMHG | BODY MASS INDEX: 32.61 KG/M2 | HEART RATE: 55 BPM | DIASTOLIC BLOOD PRESSURE: 74 MMHG

## 2022-10-28 DIAGNOSIS — E11.22 TYPE 2 DIABETES MELLITUS WITH STAGE 3B CHRONIC KIDNEY DISEASE, WITH LONG-TERM CURRENT USE OF INSULIN (HCC): Primary | ICD-10-CM

## 2022-10-28 DIAGNOSIS — Z79.4 TYPE 2 DIABETES MELLITUS WITH STAGE 3B CHRONIC KIDNEY DISEASE, WITH LONG-TERM CURRENT USE OF INSULIN (HCC): Primary | ICD-10-CM

## 2022-10-28 DIAGNOSIS — N18.32 TYPE 2 DIABETES MELLITUS WITH STAGE 3B CHRONIC KIDNEY DISEASE, WITH LONG-TERM CURRENT USE OF INSULIN (HCC): Primary | ICD-10-CM

## 2022-10-28 NOTE — PROGRESS NOTES
Johanna Wright  80 y o  male MRN: [id number]    Encounter: [id number]      Assessment/Plan     Assessment: This is a 80y o -year-old male with type 2 diabetes with hyperlipidemia and hypothyroidism  Plan:  1  Type 2 diabetes:  Most recent hemoglobin A1c was 8 6  No immediate lab work was completed prior to this office visit as he did get his lab work shortly after last office visit  Did not make adjustments to his insulin after last office visit  Recommend increasing his NovoLog to 11 units with each meal   He will continue with Basaglar 20 units daily   Encouraged him to check blood sugar once a day at alternating times to get a better idea what blood sugars doing, and we can make adjustments to all is insulins if needed   Contact the office with any concerns or questions   Follow-up in 3 months with lab work completed prior to visit      2  Postsurgical Hypothyroidism:  TSH slightly elevated, but free T4 is in normal range  Clinically euthyroid    At this time he will continue with levothyroxine 150 mcg daily   Contact the office if there is any change in symptoms   Repeat lab work prior to next office visit  If TSH is still elevated, may need to increase his levothyroxine      3  Hyperlipidemia:  Lipid panel doing well at this time  Continue with rosuvastatin  Will monitor over time       CC:  Type 2 diabetes follow-up    History of Present Illness     HPI:  Carolina Rayo Syed  is B 11 WSJA old male with type 2 diabetes for about 6 years   States this was diagnosed when he was hospitalized and status post CABG   He is on insulin at home and takes Basaglar 20 units qhs and Novolog 10 units with each meal   He was recommended to increase to 11 units after last office visit, but this was never done   He denies any polyuria, polydipsia, nocturia and blurry vision   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 and he follows closely with nephrology   He also has history of CAD status post CABG   He denies history of peripheral neuropathy, retinopathy  Overall doing well today  He does not have any concerns or questions today  No longer has concerns of hypoglycemia that he was describing at last office visit      Hypoglycemic episodes:  None recently         The patient's last eye exam was recently per patient  [facility] denies history of retinopathy        Blood Sugar/Glucometer/Pump/CGM review:  No blood sugar logs present at office visit  States that he does check it, and is typically running high      He has postsurgical hypothyroidism treated with levothyroxine 150 mcg daily  Denies any increasing fatigue, heat or cold intolerance, palpitations, diarrhea, tremors, anxiety or depression  Does have chronic constipation and uses MiraLax as needed      For his hx of hld he is treated with and tolerating rosuvastatin 40 mg daily   Denies any headaches, vision changes, chest pain, MI or stroke-like symptoms  Review of Systems   Constitutional: Negative for activity change, appetite change, fatigue and unexpected weight change  HENT: Negative for trouble swallowing  Eyes: Negative for visual disturbance  Respiratory: Negative for chest tightness and shortness of breath  Cardiovascular: Negative for chest pain, palpitations and leg swelling  Gastrointestinal: Positive for constipation  Negative for abdominal pain, diarrhea, nausea and vomiting  Endocrine: Negative for cold intolerance, heat intolerance, polydipsia, polyphagia and polyuria  Genitourinary: Negative for frequency  Skin: Negative for rash and wound  Neurological: Negative for dizziness, weakness, light-headedness, numbness and headaches  Psychiatric/Behavioral: Negative for dysphoric mood and sleep disturbance  The patient is not nervous/anxious          Historical Information   Past Medical History:   Diagnosis Date   • Aortic stenosis    • CKD (chronic kidney disease)     baseline Cr 1 3-1 5   • Coronary artery disease    • Cough    • 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HCC)     type 2, insulin dependent   • GERD (gastroesophageal reflux disease)    • Glaucoma    • Gout    • History of prostate cancer    • Hypertension    • Hypothyroidism    • Overweight    • Peripheral neuropathy, idiopathic    • Pleural effusion, left    • Pure hypercholesterolemia     LA   14   R   14    • Visual impairment     cataract left eye   • Weight gain      Past Surgical History:   Procedure Laterality Date   • CARDIAC CATHETERIZATION     • EYE SURGERY     • IR THORACENTESIS  10/23/2018   • IR THORACENTESIS  2018   • IR THORACENTESIS  2018   • IR THORACENTESIS  2018   • DE CABG, ARTERY-VEIN, THREE N/A 2018    Procedure: CORONARY ARTERY BYPASS GRAFT (CABG) x 4 VESSELS with LIMA - LAD, SVG/LEFT LEG EVH - LEFT PDA, OM3, & OM2;  Surgeon: Bejnamin Marie MD;  [facility];  Service: Cardiac Surgery   • DE ECHO TRANSESOPHAG MONTR CARDIAC PUMP FUNCTJ N/A 2018    Procedure: TRANSESOPHAGEAL ECHOCARDIOGRAM (VERONICA);   Surgeon: Benjamin Marie MD;  [facility];  Service: Cardiac Surgery   • DE RPLCMT AORTIC VALVE OPN W/STENTLESS TISSUE VALVE N/A 2018    Procedure: REPLACEMENT VALVE AORTIC (AVR)- 23mm tissue Intuity Valve;  Surgeon: Benjamin Marie MD;  [facility];  Service: Cardiac Surgery   • THORACOSCOPY VIDEO ASSISTED SURGERY (VATS) Left 2018    Procedure: THORACOSCOPY VIDEO ASSISTED SURGERY (VATS), talc pleurodesis,;  Surgeon: Jamie Madera MD;  [facility];  Service: Thoracic   • THYROID SURGERY       Social History   Social History     Substance and Sexual Activity   Alcohol Use No     Social History     Substance and Sexual Activity   Drug Use No     Social History     Tobacco Use   Smoking Status Former Smoker   • Packs/day: 0 25   • Years: 1 00   • Pack years: 0 25   • Types: Cigarettes   • Quit date: 1970   • Years since quittin 8   Smokeless Tobacco Never Used   Tobacco Comment    Only in the service      Family History:   Family History   Problem Relation Age of Onset   • Diabetes Mother    • Pancreatic cancer Brother    • Diabetes Maternal Grandmother    • Colon cancer Son    • Diabetes Family    • Substance Abuse Neg Hx    • Mental illness Neg Hx        Meds/Allergies   Current Outpatient Medications   Medication Sig Dispense Refill   • acetaminophen (TYLENOL) 325 mg tablet 650 mg every 4 to 6 hours as needed for pain (do not take with percocet) 30 tablet 0   • albuterol (PROVENTIL HFA,VENTOLIN HFA) 90 mcg/act inhaler Inhale 2 puffs 4 (four) times a day as needed     • allopurinol (ZYLOPRIM) 300 mg tablet TAKE ONE TABLET BY MOUTH ONCE DAILY 90 tablet 3   • ascorbic acid (VITAMIN C) 500 mg tablet TAKE ONE TABLET BY MOUTH DAILY 28 tablet 3   • aspirin (ECOTRIN) 325 mg EC tablet Take 1 tablet (325 mg total) by mouth daily 30 tablet 0   • Basaglar KwikPen 100 units/mL injection pen 16 units daily (Patient taking differently: 20 units daily) 15 mL 3   • cholecalciferol (VITAMIN D3) 1,000 units tablet Take 1 tablet (1,000 Units total) by mouth daily 90 tablet 3   • FeroSul 325 (65 Fe) MG tablet TAKE ONE TABLET BY MOUTH DAILY 28 tablet 3   • gabapentin (NEURONTIN) 300 mg capsule Take 1 capsule (300 mg total) by mouth daily at bedtime 90 capsule 3   • insulin aspart (NovoLOG FlexPen) 100 UNIT/ML injection pen INJECT 11 UNITS SUBCUTANEOUSLY THREE TIMES DAILY 15 mL 3   • Insulin Pen Needle (Pen Needles) 32G X 4 MM MISC Use 4 (four) times a day (before meals and at bedtime) 100 each 11   • levothyroxine 150 mcg tablet Take 1 tablet (150 mcg total) by mouth daily 30 tablet 5   • LORazepam (ATIVAN) 0 5 mg tablet TAKE ONE TABLET BY MOUTH EVERY EIGHT HOURS AS NEEDED FOR ANXIETY 30 tablet 3   • metoprolol tartrate (LOPRESSOR) 25 mg tablet Take 12 5 mg by mouth daily     • olmesartan (BENICAR) 5 mg tablet Take 1 tablet (5 mg total) by mouth daily 90 tablet 3   • pantoprazole (PROTONIX) 20 mg tablet TAKE ONE TABLET BY MOUTH DAILY 30 tablet 3   • rosuvastatin (CRESTOR) 40 MG tablet TAKE ONE TABLET BY MOUTH DAILY 30 tablet 3   • tamsulosin (FLOMAX) 0 4 mg TAKE ONE CAPSULE BY MOUTH DAILY WITH dinner 30 capsule 6   • timolol (TIMOPTIC) 0 5 % ophthalmic solution Administer 1 drop to both eyes 2 (two) times a day     • torsemide (DEMADEX) 10 mg tablet TAKE ONE TABLET BY MOUTH EVERY DAY 90 tablet 1     No current facility-administered medications for this visit  Allergies   Allergen Reactions   • Amlodipine Nausea Only   • Atorvastatin Myalgia       Objective   Vitals: Blood pressure 120/74, pulse 55, height 5' 8" (1 727 m), weight 97 6 kg (215 lb 3 2 oz)  Physical Exam  Vitals and nursing note reviewed  Constitutional:       General: He is not in acute distress  Appearance: Normal appearance  He is not diaphoretic  HENT:      Head: Normocephalic and atraumatic  Eyes:      General: No scleral icterus  Extraocular Movements: Extraocular movements intact  Conjunctiva/sclera: Conjunctivae normal       Pupils: Pupils are equal, round, and reactive to light  Cardiovascular:      Rate and Rhythm: Normal rate and regular rhythm  Heart sounds: No murmur heard  Pulmonary:      Effort: Pulmonary effort is normal  No respiratory distress  Breath sounds: Normal breath sounds  No wheezing  Musculoskeletal:      Cervical back: Normal range of motion  Right lower leg: No edema  Left lower leg: No edema  Lymphadenopathy:      Cervical: No cervical adenopathy  Skin:     General: Skin is warm and dry  Neurological:      Mental Status: He is alert and oriented to person, place, and time  Mental status is at baseline  Sensory: No sensory deficit  Gait: Gait normal    Psychiatric:         Mood and Affect: Mood normal          Behavior: Behavior normal          Thought Content: Thought content normal          The history was obtained from the review of the chart, patient      Lab Results: Lab Results   Component Value Date/Time    Hemoglobin A1C 8 6 (H) 08/01/2022 10:01 AM    Hemoglobin A1C 8 7 (H) 07/25/2022 10:22 AM    Hemoglobin A1C 8 9 (H) 04/28/2022 09:44 AM    White Blood Cell Count 5 4 10/26/2022 10:13 AM    White Blood Cell Count 6 1 04/22/2022 10:09 AM    White Blood Cell Count 5 2 02/28/2022 09:10 AM    Hemoglobin 11 1 (L) 10/26/2022 10:13 AM    Hemoglobin 12 3 (L) 04/22/2022 10:09 AM    Hemoglobin 12 1 (L) 02/28/2022 09:10 AM    HCT 33 4 (L) 10/26/2022 10:13 AM    HCT 36 7 (L) 04/22/2022 10:09 AM    HCT 37 9 (L) 02/28/2022 09:10 AM    MCV 99 7 10/26/2022 10:13 AM    MCV 97 1 04/22/2022 10:09 AM    MCV 99 5 02/28/2022 09:10 AM    Platelet Count 142 26/92/0256 10:13 AM    Platelet Count 972 73/47/4520 10:09 AM    Platelet Count 187 81/90/1736 09:10 AM    BUN 40 (H) 10/26/2022 10:13 AM    BUN 42 (H) 08/01/2022 10:01 AM    BUN 43 (H) 07/25/2022 10:22 AM    Potassium 5 2 10/26/2022 10:13 AM    Potassium 4 3 08/01/2022 10:01 AM    Potassium 4 0 07/25/2022 10:22 AM    Chloride 106 10/26/2022 10:13 AM    Chloride 107 08/01/2022 10:01 AM    Chloride 109 07/25/2022 10:22 AM    CO2 29 10/26/2022 10:13 AM    CO2 26 08/01/2022 10:01 AM    CO2 28 07/25/2022 10:22 AM    Creatinine 3 19 (H) 10/26/2022 10:13 AM    Creatinine 2 73 (H) 08/01/2022 10:01 AM    Creatinine 3 13 (H) 07/25/2022 10:22 AM    Creatinine 2 63 (H) 11/05/2021 10:58 AM    AST 22 10/26/2022 10:13 AM    AST 30 08/01/2022 10:01 AM    AST 32 07/25/2022 10:22 AM    ALT 17 10/26/2022 10:13 AM    ALT 34 08/01/2022 10:01 AM    ALT 35 07/25/2022 10:22 AM    Albumin 3 7 10/26/2022 10:13 AM    Albumin 3 8 08/01/2022 10:01 AM    Albumin 3 7 07/25/2022 10:22 AM    Albumin 3 1 (L) 11/05/2021 10:58 AM    Globulin 2 4 10/26/2022 10:13 AM    Globulin 2 5 08/01/2022 10:01 AM    Globulin 2 3 07/25/2022 10:22 AM    HDL 30 (L) 07/25/2022 10:22 AM    Triglycerides 128 07/25/2022 10:22 AM           Portions of the record may have been created with voice recognition software  Occasional wrong word or "sound a like" substitutions may have occurred due to the inherent limitations of voice recognition software  Read the chart carefully and recognize, using context, where substitutions have occurred

## 2022-10-31 ENCOUNTER — TELEPHONE (OUTPATIENT)
Dept: ADMINISTRATIVE | Facility: OTHER | Age: 87
End: 2022-10-31

## 2022-10-31 NOTE — TELEPHONE ENCOUNTER
----- Message from [address] [facility] sent at 10/28/2022  1:56 PM EDT -----  Regarding: DM EYE EXAM  10/28/22 1:57 PM    Hello, our patient Tobi Canavan  has had a DM Eye Exam performed by Dr Guillermo Hazel in [facility]  Her number is [phone]      Thank you,  [address] [place] [address] [facility] Marcie Ramirez

## 2022-10-31 NOTE — TELEPHONE ENCOUNTER
Upon review of the In Basket request and the patient's chart, initial outreach has been made via fax to facility  , please see Contacts section for details       Thank you  Mirna Ramirez

## 2022-10-31 NOTE — LETTER
Diabetic Eye Exam Form    Date Requested: 10/31/22  Patient: Smooth Jack  Patient : [id number]   Referring Provider: Lazarus Eth, DO    DIABETIC Eye Exam Date _______________________________    Type of Exam MUST be documented for Diabetic Eye Exams  Please CHECK ONE  Retinal Exam       Dilated Retinal Exam       OCT       Optomap-Iris Exam      Fundus Photography     Left Eye - Please check Retinopathy AND Type or No Retinopathy      Exam did show retinopathy    Exam did not show retinopathy         Mild     Proliferative           Moderate    Severe            None         Right Eye - Please check Retinopathy AND Type or No Retinopathy     Exam did show retinopathy    Exam did not show retinopathy         Mild     Proliferative        Moderate    Severe        None       Comments __________________________________________________________    Practice Providing Exam ______________________________________________    Exam Performed By (print name) _______________________________________      Provider Signature ___________________________________________________    These reports are needed for  compliance  Please fax this completed form and a copy of the Diabetic Eye Exam report to our office located at [place] [address] as soon as possible via [phone] attention Sadie: Phone [phone]  We thank you for your assistance in treating our mutual patient

## 2022-11-07 NOTE — TELEPHONE ENCOUNTER
Upon review of the In Basket request we patient has appt 3/2023 for DM Eye Exam not seen yet     Any additional questions or concerns should be emailed to the Practice Liaisons via the appropriate education email address, please do not reply via In Basket      Thank you  Nicola Warren

## 2022-11-08 ENCOUNTER — HOSPITAL ENCOUNTER (EMERGENCY)
Facility: HOSPITAL | Age: 87
Discharge: HOME | End: 2022-11-08
Attending: EMERGENCY MEDICINE

## 2022-11-08 ENCOUNTER — OFFICE VISIT (OUTPATIENT)
Dept: FAMILY MEDICINE CLINIC | Facility: HOSPITAL | Age: 87
End: 2022-11-08

## 2022-11-08 ENCOUNTER — APPOINTMENT (EMERGENCY)
Dept: RADIOLOGY | Facility: HOSPITAL | Age: 87
End: 2022-11-08

## 2022-11-08 VITALS
HEIGHT: 68 IN | WEIGHT: 215.6 LBS | BODY MASS INDEX: 32.67 KG/M2 | SYSTOLIC BLOOD PRESSURE: 120 MMHG | HEART RATE: 65 BPM | OXYGEN SATURATION: 99 % | DIASTOLIC BLOOD PRESSURE: 72 MMHG

## 2022-11-08 VITALS
RESPIRATION RATE: 16 BRPM | WEIGHT: 215.6 LBS | SYSTOLIC BLOOD PRESSURE: 179 MMHG | HEIGHT: 68 IN | TEMPERATURE: 98.2 F | OXYGEN SATURATION: 98 % | HEART RATE: 56 BPM | BODY MASS INDEX: 32.67 KG/M2 | DIASTOLIC BLOOD PRESSURE: 84 MMHG

## 2022-11-08 DIAGNOSIS — E11.40 TYPE 2 DIABETES MELLITUS WITH DIABETIC NEUROPATHY, WITH LONG-TERM CURRENT USE OF INSULIN (HCC): ICD-10-CM

## 2022-11-08 DIAGNOSIS — S61.412A LACERATION OF LEFT HAND WITHOUT FOREIGN BODY, INITIAL ENCOUNTER: Primary | ICD-10-CM

## 2022-11-08 DIAGNOSIS — S65.312A LACERATION OF DEEP PALMAR ARCH OF LEFT HAND, INITIAL ENCOUNTER: ICD-10-CM

## 2022-11-08 DIAGNOSIS — Z95.2 S/P AVR (AORTIC VALVE REPLACEMENT): ICD-10-CM

## 2022-11-08 DIAGNOSIS — Z91.89 AT RISK FOR INFECTION ASSOCIATED WITH WOUND: ICD-10-CM

## 2022-11-08 DIAGNOSIS — Z79.4 TYPE 2 DIABETES MELLITUS WITH DIABETIC NEUROPATHY, WITH LONG-TERM CURRENT USE OF INSULIN (HCC): ICD-10-CM

## 2022-11-08 DIAGNOSIS — Z00.00 MEDICARE ANNUAL WELLNESS VISIT, SUBSEQUENT: Primary | ICD-10-CM

## 2022-11-08 RX ORDER — CEPHALEXIN 500 MG/1
500 CAPSULE ORAL EVERY 6 HOURS SCHEDULED
Qty: 28 CAPSULE | Refills: 0 | Status: SHIPPED | OUTPATIENT
Start: 2022-11-08 | End: 2022-11-15

## 2022-11-08 RX ORDER — CEPHALEXIN 250 MG/1
500 CAPSULE ORAL ONCE
Status: COMPLETED | OUTPATIENT
Start: 2022-11-08 | End: 2022-11-08

## 2022-11-08 RX ORDER — LIDOCAINE HYDROCHLORIDE 10 MG/ML
10 INJECTION, SOLUTION EPIDURAL; INFILTRATION; INTRACAUDAL; PERINEURAL ONCE
Status: COMPLETED | OUTPATIENT
Start: 2022-11-08 | End: 2022-11-08

## 2022-11-08 RX ADMIN — CEPHALEXIN 500 MG: 250 CAPSULE ORAL at 13:25

## 2022-11-08 RX ADMIN — LIDOCAINE HYDROCHLORIDE 10 ML: 10 INJECTION, SOLUTION EPIDURAL; INFILTRATION; INTRACAUDAL; PERINEURAL at 13:25

## 2022-11-08 NOTE — Clinical Note
Sophiezachary Reynas was seen and treated in our emergency department on 11/8/2022  Diagnosis:     Rubens Stone    He may return on this date: If you have any questions or concerns, please don't hesitate to call        Mervat Crawford PA-C    ______________________________           _______________          _______________  Hospital Representative                              Date                                Time

## 2022-11-08 NOTE — ASSESSMENT & PLAN NOTE
Lab Results   Component Value Date    HGBA1C 8 6 (H) 08/01/2022   will repeat hba1c in December follows with dr Jordan Alex

## 2022-11-08 NOTE — PATIENT INSTRUCTIONS
Medicare Preventive Visit Patient Instructions  Thank you for completing your Welcome to Medicare Visit or Medicare Annual Wellness Visit today  Your next wellness visit will be due in one year (11/9/2023)  The screening/preventive services that you may require over the next 5-10 years are detailed below  Some tests may not apply to you based off risk factors and/or age  Screening tests ordered at today's visit but not completed yet may show as past due  Also, please note that scanned in results may not display below  Preventive Screenings:  Service Recommendations Previous Testing/Comments   Colorectal Cancer Screening  · Colonoscopy    · Fecal Occult Blood Test (FOBT)/Fecal Immunochemical Test (FIT)  · Fecal DNA/Cologuard Test  · Flexible Sigmoidoscopy Age: 39-70 years old   Colonoscopy: every 10 years (May be performed more frequently if at higher risk)  OR  FOBT/FIT: every 1 year  OR  Cologuard: every 3 years  OR  Sigmoidoscopy: every 5 years  Screening may be recommended earlier than age 39 if at higher risk for colorectal cancer  Also, an individualized decision between you and your healthcare provider will decide whether screening between the ages of 74-80 would be appropriate   Colonoscopy: Not on file  FOBT/FIT: Not on file  Cologuard: Not on file  Sigmoidoscopy: Not on file    Screening Not Indicated     Prostate Cancer Screening Individualized decision between patient and health care provider in men between ages of 53-78   Medicare will cover every 12 months beginning on the day after your 50th birthday PSA: No results in last 5 years     History Prostate Cancer  Screening Not Indicated     Hepatitis C Screening Once for adults born between 1945 and 1965  More frequently in patients at high risk for Hepatitis C Hep C Antibody: Not on file        Diabetes Screening 1-2 times per year if you're at risk for diabetes or have pre-diabetes Fasting glucose: 231 mg/dL (11/5/2021)  A1C: 8 6 % of total Hgb (8/1/2022)  Screening Not Indicated  History Diabetes   Cholesterol Screening Once every 5 years if you don't have a lipid disorder  May order more often based on risk factors  Lipid panel: 07/25/2022  Screening Not Indicated  History Lipid Disorder      Other Preventive Screenings Covered by Medicare:  1  Abdominal Aortic Aneurysm (AAA) Screening: covered once if your at risk  You're considered to be at risk if you have a family history of AAA or a male between the age of 73-68 who smoking at least 100 cigarettes in your lifetime  2  Lung Cancer Screening: covers low dose CT scan once per year if you meet all of the following conditions: (1) Age 50-69; (2) No signs or symptoms of lung cancer; (3) Current smoker or have quit smoking within the last 15 years; (4) You have a tobacco smoking history of at least 20 pack years (packs per day x number of years you smoked); (5) You get a written order from a healthcare provider  3  Glaucoma Screening: covered annually if you're considered high risk: (1) You have diabetes OR (2) Family history of glaucoma OR (3)  aged 48 and older OR (3)  American aged 72 and older  3  Osteoporosis Screening: covered every 2 years if you meet one of the following conditions: (1) Have a vertebral abnormality; (2) On glucocorticoid therapy for more than 3 months; (3) Have primary hyperparathyroidism; (4) On osteoporosis medications and need to assess response to drug therapy  5  HIV Screening: covered annually if you're between the age of 12-76  Also covered annually if you are younger than 13 and older than 72 with risk factors for HIV infection  For pregnant patients, it is covered up to 3 times per pregnancy      Immunizations:  Immunization Recommendations   Influenza Vaccine Annual influenza vaccination during flu season is recommended for all persons aged >= 6 months who do not have contraindications   Pneumococcal Vaccine   * Pneumococcal conjugate vaccine = PCV13 (Prevnar 13), PCV15 (Vaxneuvance), PCV20 (Prevnar 20)  * Pneumococcal polysaccharide vaccine = PPSV23 (Pneumovax) Adults 2364 years old: 1-3 doses may be recommended based on certain risk factors  Adults 72 years old: 1-2 doses may be recommended based off what pneumonia vaccine you previously received   Hepatitis B Vaccine 3 dose series if at intermediate or high risk (ex: diabetes, end stage renal disease, liver disease)   Tetanus (Td) Vaccine - COST NOT COVERED BY MEDICARE PART B Following completion of primary series, a booster dose should be given every 10 years to maintain immunity against tetanus  Td may also be given as tetanus wound prophylaxis  Tdap Vaccine - COST NOT COVERED BY MEDICARE PART B Recommended at least once for all adults  For pregnant patients, recommended with each pregnancy  Shingles Vaccine (Shingrix) - COST NOT COVERED BY MEDICARE PART B  2 shot series recommended in those aged 48 and above     Health Maintenance Due:  There are no preventive care reminders to display for this patient  Immunizations Due:      Topic Date Due   • COVID-19 Vaccine (3 - Booster for Pfizer series) 08/22/2021   • Influenza Vaccine (1) 09/01/2022     Advance Directives   What are advance directives? Advance directives are legal documents that state your wishes and plans for medical care  These plans are made ahead of time in case you lose your ability to make decisions for yourself  Advance directives can apply to any medical decision, such as the treatments you want, and if you want to donate organs  What are the types of advance directives? There are many types of advance directives, and each state has rules about how to use them  You may choose a combination of any of the following:  · Living will: This is a written record of the treatment you want  You can also choose which treatments you do not want, which to limit, and which to stop at a certain time   This includes surgery, medicine, IV fluid, and tube feedings  · Durable power of  for healthcare [facility]): This is a written record that states who you want to make healthcare choices for you when you are unable to make them for yourself  This person, called a proxy, is usually a family member or a friend  You may choose more than 1 proxy  · Do not resuscitate (DNR) order:  A DNR order is used in case your heart stops beating or you stop breathing  It is a request not to have certain forms of treatment, such as CPR  A DNR order may be included in other types of advance directives  · Medical directive: This covers the care that you want if you are in a coma, near death, or unable to make decisions for yourself  You can list the treatments you want for each condition  Treatment may include pain medicine, surgery, blood transfusions, dialysis, IV or tube feedings, and a ventilator (breathing machine)  · Values history: This document has questions about your views, beliefs, and how you feel and think about life  This information can help others choose the care that you would choose  Why are advance directives important? An advance directive helps you control your care  Although spoken wishes may be used, it is better to have your wishes written down  Spoken wishes can be misunderstood, or not followed  Treatments may be given even if you do not want them  An advance directive may make it easier for your family to make difficult choices about your care  Weight Management   Why it is important to manage your weight:  Being overweight increases your risk of health conditions such as heart disease, high blood pressure, type 2 diabetes, and certain types of cancer  It can also increase your risk for osteoarthritis, sleep apnea, and other respiratory problems  Aim for a slow, steady weight loss  Even a small amount of weight loss can lower your risk of health problems    How to lose weight safely:  A safe and healthy way to lose weight is to eat fewer calories and get regular exercise  You can lose up about 1 pound a week by decreasing the number of calories you eat by 500 calories each day  Healthy meal plan for weight management:  A healthy meal plan includes a variety of foods, contains fewer calories, and helps you stay healthy  A healthy meal plan includes the following:  · Eat whole-grain foods more often  A healthy meal plan should contain fiber  Fiber is the part of grains, fruits, and vegetables that is not broken down by your body  Whole-grain foods are healthy and provide extra fiber in your diet  Some examples of whole-grain foods are whole-wheat breads and pastas, oatmeal, brown rice, and bulgur  · Eat a variety of vegetables every day  Include dark, leafy greens such as spinach, kale, ward greens, and mustard greens  Eat yellow and orange vegetables such as carrots, sweet potatoes, and winter squash  · Eat a variety of fruits every day  Choose fresh or canned fruit (canned in its own juice or light syrup) instead of juice  Fruit juice has very little or no fiber  · Eat low-fat dairy foods  Drink fat-free (skim) milk or 1% milk  Eat fat-free yogurt and low-fat cottage cheese  Try low-fat cheeses such as mozzarella and other reduced-fat cheeses  · Choose meat and other protein foods that are low in fat  Choose beans or other legumes such as split peas or lentils  Choose fish, skinless poultry (chicken or turkey), or lean cuts of red meat (beef or pork)  Before you cook meat or poultry, cut off any visible fat  · Use less fat and oil  Try baking foods instead of frying them  Add less fat, such as margarine, sour cream, regular salad dressing and mayonnaise to foods  Eat fewer high-fat foods  Some examples of high-fat foods include french fries, doughnuts, ice cream, and cakes  · Eat fewer sweets  Limit foods and drinks that are high in sugar  This includes candy, cookies, regular soda, and sweetened drinks    Exercise: Exercise at least 30 minutes per day on most days of the week  Some examples of exercise include walking, biking, dancing, and swimming  You can also fit in more physical activity by taking the stairs instead of the elevator or parking farther away from stores  Ask your healthcare provider about the best exercise plan for you  © Copyright [employer] 2018 Information is for End User's use only and may not be sold, redistributed or otherwise used for commercial purposes   All illustrations and images included in CareNotes® are the copyrighted property of [employer]  or [address] [place] [address]

## 2022-11-08 NOTE — DISCHARGE INSTRUCTIONS
Wound care instructions:    Change bandaging daily and wash , re-evaluate wound daily, and redress with Xeroform or nonstick dressing, coat wound with bacitracin, and secure with Kerlix and Coban      Return to emergency department if any signs or symptoms of wound infection including but not limited to extended spreading redness and pain, swelling, purulent discharge, fevers, etc

## 2022-11-08 NOTE — Clinical Note
Briana Watkins was seen and treated in our emergency department on 11/8/2022  Diagnosis:     Avtar Sandoval    He may return on this date: If you have any questions or concerns, please don't hesitate to call        Amanda Peck PA-C    ______________________________           _______________          _______________  Hospital Representative                              Date                                Time

## 2022-11-08 NOTE — Clinical Note
Tico Mckeon was seen and treated in our emergency department on 11/8/2022  Diagnosis:     Wyatt Fitzgerald    He may return on this date: If you have any questions or concerns, please don't hesitate to call        Layla Bone PA-C    ______________________________           _______________          _______________  Hospital Representative                              Date                                Time

## 2022-11-08 NOTE — Clinical Note
Leif King was seen and treated in our emergency department on 11/8/2022  Keep wound clean and dry  Redressed daily  Diagnosis:     Harpal Tomas    He may return on this date: If you have any questions or concerns, please don't hesitate to call        Corinne Buoy, PA-C    ______________________________           _______________          _______________  Hospital Representative                              Date                                Time

## 2022-11-08 NOTE — Clinical Note
Sophia Gordon was seen and treated in our emergency department on 11/8/2022  Keep wound clean and dry  Redressed daily  Diagnosis:     [address]    He may return on this date: If you have any questions or concerns, please don't hesitate to call        Malinda Merino PA-C    ______________________________           _______________          _______________  Hospital Representative                              Date                                Time

## 2022-11-08 NOTE — ED PROVIDER NOTES
History  Chief Complaint   Patient presents with   • Hand Injury     Patient reports to ED with left hand injury after left hand open after having playground set fall over cutting his hand when he tried to catch it  Patient sent by PCP for stitches  29-year-old male referred to emergency department for left hand laceration sustained last night while he was mowing the lawn and part of the playground set fell  He attempted to catch it with his left hand and lacerated his left palm  Happened approximately 18:00 last night  Tetanus is UTD  History provided by:  Patient  Hand Injury      Prior to Admission Medications   Prescriptions Last Dose Informant Patient Reported? Taking?    Basaglar KwikPen 100 units/mL injection pen   No No   Si units daily   Patient taking differently: 20 units daily   FeroSul 325 (65 Fe) MG tablet  Self No No   Sig: TAKE ONE TABLET BY MOUTH DAILY   Insulin Pen Needle (Pen Needles) 32G X 4 MM MISC  Self No No   Sig: Use 4 (four) times a day (before meals and at bedtime)   LORazepam (ATIVAN) 0 5 mg tablet  Self No No   Sig: TAKE ONE TABLET BY MOUTH EVERY EIGHT HOURS AS NEEDED FOR ANXIETY   albuterol (PROVENTIL HFA,VENTOLIN HFA) 90 mcg/act inhaler  Self Yes No   Sig: Inhale 2 puffs 4 (four) times a day as needed   Patient not taking: Reported on 2022   allopurinol (ZYLOPRIM) 300 mg tablet  Self No No   Sig: TAKE ONE TABLET BY MOUTH ONCE DAILY   Patient not taking: Reported on 2022   ascorbic acid (VITAMIN C) 500 mg tablet  Self No No   Sig: TAKE ONE TABLET BY MOUTH DAILY   aspirin (ECOTRIN) 325 mg EC tablet  Self No No   Sig: Take 1 tablet (325 mg total) by mouth daily   cholecalciferol (VITAMIN D3) 1,000 units tablet  Self No No   Sig: Take 1 tablet (1,000 Units total) by mouth daily   gabapentin (NEURONTIN) 300 mg capsule  Self No No   Sig: Take 1 capsule (300 mg total) by mouth daily at bedtime   insulin aspart (NovoLOG FlexPen) 100 UNIT/ML injection pen  Self No No Sig: INJECT 11 UNITS SUBCUTANEOUSLY THREE TIMES DAILY   levothyroxine 150 mcg tablet  Self No No   Sig: Take 1 tablet (150 mcg total) by mouth daily   metoprolol tartrate (LOPRESSOR) 25 mg tablet  Self Yes No   Sig: Take 12 5 mg by mouth daily   Patient not taking: Reported on 11/8/2022   olmesartan (BENICAR) 5 mg tablet  Self No No   Sig: Take 1 tablet (5 mg total) by mouth daily   pantoprazole (PROTONIX) 20 mg tablet  Self No No   Sig: TAKE ONE TABLET BY MOUTH DAILY   rosuvastatin (CRESTOR) 40 MG tablet  Self No No   Sig: TAKE ONE TABLET BY MOUTH DAILY   tamsulosin (FLOMAX) 0 4 mg  Self No No   Sig: TAKE ONE CAPSULE BY MOUTH DAILY WITH dinner   timolol (TIMOPTIC) 0 5 % ophthalmic solution  Self No No   Sig: Administer 1 drop to both eyes 2 (two) times a day   torsemide (DEMADEX) 10 mg tablet  Self No No   Sig: TAKE ONE TABLET BY MOUTH EVERY DAY      Facility-Administered Medications: None       Past Medical History:   Diagnosis Date   • Aortic stenosis    • CKD (chronic kidney disease)     baseline Cr 1 3-1 5   • Coronary artery disease    • Cough    • 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HCC)     type 2, insulin dependent   • GERD (gastroesophageal reflux disease)    • Glaucoma    • Gout    • History of prostate cancer    • Hypertension    • Hypothyroidism    • Overweight    • Peripheral neuropathy, idiopathic    • Pleural effusion, left    • Pure hypercholesterolemia     LA   11/12/14   R   11/12/14    • Visual impairment     cataract left eye   • Weight gain        Past Surgical History:   Procedure Laterality Date   • CARDIAC CATHETERIZATION     • EYE SURGERY     • IR THORACENTESIS  10/23/2018   • IR THORACENTESIS  11/2/2018   • IR THORACENTESIS  11/9/2018   • IR THORACENTESIS  11/23/2018   • MO CABG, ARTERY-VEIN, THREE N/A 9/17/2018    Procedure: CORONARY ARTERY BYPASS GRAFT (CABG) x 4 VESSELS with LIMA - LAD, SVG/LEFT LEG EVH - LEFT PDA, OM3, & OM2;  Surgeon: Chidi Nguyen MD;  [facility];  Service: Cardiac Surgery   • [place] ECHO TRANSESOPHAG MONTR CARDIAC PUMP FUNCTJ N/A 2018    Procedure: TRANSESOPHAGEAL ECHOCARDIOGRAM (VERONICA); Surgeon: Sherrell Mane MD;  [facility];  Service: Cardiac Surgery   • [place] RPLCMT AORTIC VALVE OPN W/STENTLESS TISSUE VALVE N/A 2018    Procedure: REPLACEMENT VALVE AORTIC (AVR)- 23mm tissue Intuity Valve;  Surgeon: Sherrell Mane MD;  [facility];  Service: Cardiac Surgery   • THORACOSCOPY VIDEO ASSISTED SURGERY (VATS) Left 2018    Procedure: THORACOSCOPY VIDEO ASSISTED SURGERY (VATS), talc pleurodesis,;  Surgeon: Nahed Wilder MD;  [facility];  Service: Thoracic   • THYROID SURGERY         Family History   Problem Relation Age of Onset   • Diabetes Mother    • Pancreatic cancer Brother    • Diabetes Maternal Grandmother    • Colon cancer Son    • Diabetes Family    • Substance Abuse Neg Hx    • Mental illness Neg Hx      I have reviewed and agree with the history as documented  E-Cigarette/Vaping   • E-Cigarette Use Never User      E-Cigarette/Vaping Substances   • Nicotine No    • THC No    • CBD No    • Flavoring No    • Other No    • Unknown No      Social History     Tobacco Use   • Smoking status: Former Smoker     Packs/day: 0 25     Years: 1 00     Pack years: 0 25     Types: Cigarettes     Quit date: 1970     Years since quittin 8   • Smokeless tobacco: Never Used   • Tobacco comment: Only in the service    Vaping Use   • Vaping Use: Never used   Substance Use Topics   • Alcohol use: No   • Drug use: No       Review of Systems   Skin: Positive for wound  All other systems reviewed and are negative  Physical Exam  Physical Exam  Vitals and nursing note reviewed  Constitutional:       Appearance: He is well-developed  HENT:      Head: Normocephalic and atraumatic  Eyes:      Conjunctiva/sclera: Conjunctivae normal    Cardiovascular:      Rate and Rhythm: Normal rate and regular rhythm        Heart sounds: No murmur heard   Pulmonary:      Effort: Pulmonary effort is normal  No respiratory distress  Breath sounds: Normal breath sounds  Abdominal:      Palpations: Abdomen is soft  Tenderness: There is no abdominal tenderness  Musculoskeletal:      Cervical back: Neck supple  Skin:     General: Skin is warm and dry  Findings: Laceration (To left palmar surface) present  Neurological:      Mental Status: He is alert  Vital Signs  ED Triage Vitals   Temperature Pulse Respirations Blood Pressure SpO2   11/08/22 1213 11/08/22 1215 11/08/22 1215 11/08/22 1215 11/08/22 1215   98 2 °F (36 8 °C) 56 16 (!) 179/84 98 %      Temp Source Heart Rate Source Patient Position - Orthostatic VS BP Location FiO2 (%)   11/08/22 1213 11/08/22 1215 -- -- --   Temporal Monitor         Pain Score       11/08/22 1215       3           Vitals:    11/08/22 1215   BP: (!) 179/84   Pulse: 56         Visual Acuity      ED Medications  Medications   cephalexin (KEFLEX) capsule 500 mg (500 mg Oral Given 11/8/22 1325)   lidocaine (PF) (XYLOCAINE-MPF) 1 % injection 10 mL (10 mL Infiltration Given 11/8/22 1325)       Diagnostic Studies  Results Reviewed     None                 XR hand 3+ views LEFT   ED Interpretation by Reba Velasquez PA-C (11/08 1339)   No fracture or foreign body                 Procedures  Laceration repair    Date/Time: 11/8/2022 1:00 PM  Performed by: Reba Velasquez PA-C  Authorized by: Reba Velasquez PA-C   Consent: Verbal consent obtained    Consent given by: patient  Patient understanding: patient states understanding of the procedure being performed  Patient consent: the patient's understanding of the procedure matches consent given  Patient identity confirmed: verbally with patient and hospital-assigned identification number  Body area: upper extremity  Location details: left hand  Laceration length: 4 cm  Foreign bodies: no foreign bodies  Tendon involvement: none  Nerve involvement: none  Vascular damage: no  Anesthesia: local infiltration    Anesthesia:  Local Anesthetic: lidocaine 1% with epinephrine  Anesthetic total: 3 mL    Sedation:  Patient sedated: no      Wound Dehiscence:  Superficial Wound Dehiscence: simple closure      Procedure Details:  Irrigation solution: saline  Irrigation method: syringe  Amount of cleaning: standard  Debridement: none  Degree of undermining: none  Skin closure: 4-0 nylon  Number of sutures: 5  Technique: simple  Approximation: loose  Approximation difficulty: simple  Dressing: antibiotic ointment, 4x4 sterile gauze and tube gauze               ED Course         stable course / discussed risk for wound infection / given Abx in ED and Rx for abx in outpatient setting as well for wound PPX  Discussed return emergency department for any newly developing or worsening signs or symptoms  Patient understood all instructions prior to discharge and plan agreed upon by patient and myself  SBIRT 22yo+    Flowsheet Row Most Recent Value   SBIRT (25 yo +)    In order to provide better care to our patients, we are screening all of our patients for alcohol and drug use  Would it be okay to ask you these screening questions? Yes Filed at: 11/08/2022 1327   Initial Alcohol Screen: US AUDIT-C     1  How often do you have a drink containing alcohol? 0 Filed at: 11/08/2022 1327   2  How many drinks containing alcohol do you have on a typical day you are drinking? 0 Filed at: 11/08/2022 1327   3a  Male UNDER 65: How often do you have five or more drinks on one occasion? 0 Filed at: 11/08/2022 1327   3b  FEMALE Any Age, or MALE 65+: How often do you have 4 or more drinks on one occassion? 0 Filed at: 11/08/2022 1327   Audit-C Score 0 Filed at: 11/08/2022 1327   LUCIA: How many times in the past year have you    Used an illegal drug or used a prescription medication for non-medical reasons?  Never Filed at: 11/08/2022 1327 MDM  Number of Diagnoses or Management Options  Laceration of left hand without foreign body, initial encounter: new and does not require workup      Disposition  Final diagnoses:   Laceration of left hand without foreign body, initial encounter   At risk for infection associated with wound     Time reflects when diagnosis was documented in both MDM as applicable and the Disposition within this note     Time User Action Codes Description Comment    11/8/2022  1:07 PM Tamara Simeon Add [J33 239D] Laceration of left hand without foreign body, initial encounter     11/8/2022  1:39 PM Tamara Simeon Add [Z91 [id number]] At risk for infection associated with wound       ED Disposition     ED Disposition   Discharge    Condition   Stable    Date/Time   Tue Nov 8, 2022  1:39 PM    Comment   Karen Russell  discharge to home/self care  Follow-up Information     Follow up With Specialties Details Why Contact Info Additional [id number] Thanh Chou, DO Internal Medicine In 3 days For follow-up wound check [address] Emergency Department Emergency Medicine  If symptoms worsen or development of fevers, pus discharge, redness of wound or other signs of illness   [address] [id number]  [address] [facility] Emergency Department, [address] [place] [address], [facility], [facility]          Discharge Medication List as of 11/8/2022  2:08 PM      START taking these medications    Details   cephalexin (KEFLEX) 500 mg capsule Take 1 capsule (500 mg total) by mouth every 6 (six) hours for 7 days, Starting Tue 11/8/2022, Until Tue 11/15/2022, Normal         CONTINUE these medications which have NOT CHANGED    Details   albuterol (PROVENTIL HFA,VENTOLIN HFA) 90 mcg/act inhaler Inhale 2 puffs 4 (four) times a day as needed, Historical Med      allopurinol (ZYLOPRIM) 300 mg tablet TAKE ONE TABLET BY MOUTH ONCE DAILY, Normal      ascorbic acid (VITAMIN C) 500 mg tablet TAKE ONE TABLET BY MOUTH DAILY, Normal      aspirin (ECOTRIN) 325 mg EC tablet Take 1 tablet (325 mg total) by mouth daily, Starting Tue 9/13/2022, Normal      Basaglar KwikPen 100 units/mL injection pen 16 units daily, Normal      cholecalciferol (VITAMIN D3) 1,000 units tablet Take 1 tablet (1,000 Units total) by mouth daily, Starting Thu 3/3/2022, Normal      FeroSul 325 (65 Fe) MG tablet TAKE ONE TABLET BY MOUTH DAILY, Normal      gabapentin (NEURONTIN) 300 mg capsule Take 1 capsule (300 mg total) by mouth daily at bedtime, Starting Fri 4/29/2022, Normal      insulin aspart (NovoLOG FlexPen) 100 UNIT/ML injection pen INJECT 11 UNITS SUBCUTANEOUSLY THREE TIMES DAILY, Normal      Insulin Pen Needle (Pen Needles) 32G X 4 MM MISC Use 4 (four) times a day (before meals and at bedtime), Starting Wed 1/27/2021, Normal      levothyroxine 150 mcg tablet Take 1 tablet (150 mcg total) by mouth daily, Starting Tue 6/7/2022, Normal      LORazepam (ATIVAN) 0 5 mg tablet TAKE ONE TABLET BY MOUTH EVERY EIGHT HOURS AS NEEDED FOR ANXIETY, Normal      metoprolol tartrate (LOPRESSOR) 25 mg tablet Take 12 5 mg by mouth daily, Starting Mon 7/18/2022, Historical Med      olmesartan (BENICAR) 5 mg tablet Take 1 tablet (5 mg total) by mouth daily, Starting Mon 1/3/2022, Normal      pantoprazole (PROTONIX) 20 mg tablet TAKE ONE TABLET BY MOUTH DAILY, Normal      rosuvastatin (CRESTOR) 40 MG tablet TAKE ONE TABLET BY MOUTH DAILY, Normal      tamsulosin (FLOMAX) 0 4 mg TAKE ONE CAPSULE BY MOUTH DAILY WITH dinner, Normal      timolol (TIMOPTIC) 0 5 % ophthalmic solution Administer 1 drop to both eyes 2 (two) times a day, Starting Mon 2/28/2022, No Print      torsemide (DEMADEX) 10 mg tablet TAKE ONE TABLET BY MOUTH EVERY DAY, Normal             No discharge procedures on file      PDMP Review       Value Time User    PDMP Reviewed  Yes 9/13/2022  5:06 PM Jorge A Craig, [address]           ED Provider  Electronically Signed by           Urbano Abdalla PA-C  11/08/22 0876

## 2022-11-08 NOTE — PROGRESS NOTES
Assessment and Plan:     Problem List Items Addressed This Visit        Endocrine    Type 2 diabetes mellitus with diabetic neuropathy [facility])       Lab Results   Component Value Date    HGBA1C 8 6 (H) 08/01/2022   will repeat hba1c in December   follows with dr Sabina Ayala            Other    S/P AVR (aortic valve replacement)      Other Visit Diagnoses     Medicare annual wellness visit, subsequent    -  Primary    Laceration of deep palmar arch of left hand, initial encounter            BMI Counseling: Body mass index is 32 78 kg/m²  The BMI is above normal  Nutrition recommendations include encouraging healthy choices of fruits and vegetables, moderation in carbohydrate intake and increasing intake of lean protein  Rationale for BMI follow-up plan is due to patient being overweight or obese  Had covid shot for bivalent booster    Depression Screening and Follow-up Plan: Patient was screened for depression during today's encounter  They screened negative with a PHQ-2 score of 0  Preventive health issues were discussed with patient, and age appropriate screening tests were ordered as noted in patient's After Visit Summary  Personalized health advice and appropriate referrals for health education or preventive services given if needed, as noted in patient's After Visit Summary  History of Present Illness:     Patient presents for a Medicare Wellness Visit    Keeping active- was mowing lawn yesterday about 4 pm and an old metal  swingset gfell onto him and cut hisleft palmar thenar aspect- he wrapped it up   had tetanus in 2013   Will have him go to er for sutures   had flu shot and  covid bivalent booster     Patient Care Team:  Rand Gamez DO as PCP - [address] [place] [address] DO Ning as PCP - Endocrinology (Endocrinology)  MD Laura Israel PA-C (Endocrinology)     Review of Systems:     Review of Systems   HENT: Negative for congestion  Respiratory: Negative for shortness of breath  Cardiovascular: Negative for chest pain  Gastrointestinal: Negative for abdominal distention and abdominal pain  Musculoskeletal: Negative for back pain  All other systems reviewed and are negative  Problem List:     Patient Active Problem List   Diagnosis   • Benign prostatic hyperplasia with nocturia   • Hypertensive heart disease with HF (heart failure) ([facility])   • Gout with tophus   • Hypothyroidism   • Obesity, morbid ([facility])   • Pure hypercholesterolemia   • Renal cyst   • Sexual dysfunction   • Nonrheumatic aortic valve stenosis   • GERD (gastroesophageal reflux disease)   • S/P CABG x 4   • S/P AVR (aortic valve replacement)   • CAD (coronary artery disease)   • Aortic stenosis   • Anemia due to stage 3 chronic kidney disease ([facility])   • Ambulatory dysfunction   • Type 2 diabetes mellitus with chronic kidney disease, with long-term current use of insulin ([facility])   • Primary open angle glaucoma (POAG)   • Age-related cataract of both eyes   • Atherosclerosis of aorta ([facility])   • Chronic diastolic congestive heart failure ([facility] [address] )   • Type 2 diabetes mellitus with diabetic neuropathy ([facility])   • CKD (chronic kidney disease), stage IV ([facility] [address] )      Past Medical and Surgical History:     Past Medical History:   Diagnosis Date   • Aortic stenosis    • CKD (chronic kidney disease)     baseline Cr 1 3-1 5   • Coronary artery disease    • Cough    • 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[facility])     type 2, insulin dependent   • GERD (gastroesophageal reflux disease)    • Glaucoma    • Gout    • History of prostate cancer    • Hypertension    • Hypothyroidism    • Overweight    • Peripheral neuropathy, idiopathic    • Pleural effusion, left    • Pure hypercholesterolemia     LA   11/12/14   R   11/12/14    • Visual impairment     cataract left eye   • Weight gain      Past Surgical History:   Procedure Laterality Date   • CARDIAC CATHETERIZATION     • EYE SURGERY     • IR THORACENTESIS  10/23/2018   • IR THORACENTESIS  11/2/2018   • IR THORACENTESIS  2018   • IR THORACENTESIS  2018   • CA CABG, ARTERY-VEIN, THREE N/A 2018    Procedure: CORONARY ARTERY BYPASS GRAFT (CABG) x 4 VESSELS with LIMA - LAD, SVG/LEFT LEG EVH - LEFT PDA, OM3, & OM2;  Surgeon: Maximo Valladares MD;  Location: [facility];  Service: Cardiac Surgery   • CA ECHO TRANSESOPHAG MONTR CARDIAC PUMP FUNCTJ N/A 2018    Procedure: TRANSESOPHAGEAL ECHOCARDIOGRAM (VERONICA);   Surgeon: Maximo Valladares MD;  Location[facility];  Service: Cardiac Surgery   • CA RPLCMT AORTIC VALVE OPN W/STENTLESS TISSUE VALVE N/A 2018    Procedure: REPLACEMENT VALVE AORTIC (AVR)- 23mm tissue Intuity Valve;  Surgeon: Maximo Valladares MD;  [facility];  Service: Cardiac Surgery   • THORACOSCOPY VIDEO ASSISTED SURGERY (VATS) Left 2018    Procedure: THORACOSCOPY VIDEO ASSISTED SURGERY (VATS), talc pleurodesis,;  Surgeon: Giancarlo Monae MD;  Location: [facility];  Service: Thoracic   • THYROID SURGERY        Family History:     Family History   Problem Relation Age of Onset   • Diabetes Mother    • Pancreatic cancer Brother    • Diabetes Maternal Grandmother    • Colon cancer Son    • Diabetes Family    • Substance Abuse Neg Hx    • Mental illness Neg Hx       Social History:     Social History     Socioeconomic History   • Marital status:      Spouse name: None   • Number of children: None   • Years of education: None   • Highest education level: None   Occupational History   • Occupation: retired    Tobacco Use   • Smoking status: Former Smoker     Packs/day: 0 25     Years: 1 00     Pack years: 0 25     Types: Cigarettes     Quit date: 1970     Years since quittin 8   • Smokeless tobacco: Never Used   • Tobacco comment: Only in the service    Vaping Use   • Vaping Use: Never used   Substance and Sexual Activity   • Alcohol use: No   • Drug use: No   • Sexual activity: Not Currently   Other Topics Concern   • None   Social History Narrative    Caffeine use / coffee diet cola and tea    Lives with family     Living situation supportive and safe    No advance directives  -denied     Social Determinants of Health     Financial Resource Strain: Medium Risk   • Difficulty of Paying Living Expenses: Somewhat hard   Food Insecurity: Not on file   Transportation Needs: No Transportation Needs   • Lack of Transportation (Medical): No   • Lack of Transportation (Non-Medical):  No   Physical Activity: Not on file   Stress: Not on file   Social Connections: Not on file   Intimate Partner Violence: Not on file   Housing Stability: Not on file      Medications and Allergies:     Current Outpatient Medications   Medication Sig Dispense Refill   • ascorbic acid (VITAMIN C) 500 mg tablet TAKE ONE TABLET BY MOUTH DAILY 28 tablet 3   • aspirin (ECOTRIN) 325 mg EC tablet Take 1 tablet (325 mg total) by mouth daily 30 tablet 0   • Basaglar KwikPen 100 units/mL injection pen 16 units daily (Patient taking differently: 20 units daily) 15 mL 3   • cholecalciferol (VITAMIN D3) 1,000 units tablet Take 1 tablet (1,000 Units total) by mouth daily 90 tablet 3   • FeroSul 325 (65 Fe) MG tablet TAKE ONE TABLET BY MOUTH DAILY 28 tablet 3   • gabapentin (NEURONTIN) 300 mg capsule Take 1 capsule (300 mg total) by mouth daily at bedtime 90 capsule 3   • insulin aspart (NovoLOG FlexPen) 100 UNIT/ML injection pen INJECT 11 UNITS SUBCUTANEOUSLY THREE TIMES DAILY 15 mL 3   • Insulin Pen Needle (Pen Needles) 32G X 4 MM MISC Use 4 (four) times a day (before meals and at bedtime) 100 each 11   • levothyroxine 150 mcg tablet Take 1 tablet (150 mcg total) by mouth daily 30 tablet 5   • LORazepam (ATIVAN) 0 5 mg tablet TAKE ONE TABLET BY MOUTH EVERY EIGHT HOURS AS NEEDED FOR ANXIETY 30 tablet 3   • olmesartan (BENICAR) 5 mg tablet Take 1 tablet (5 mg total) by mouth daily 90 tablet 3   • pantoprazole (PROTONIX) 20 mg tablet TAKE ONE TABLET BY MOUTH DAILY 30 tablet 3   • rosuvastatin (CRESTOR) 40 MG tablet TAKE ONE TABLET BY MOUTH DAILY 30 tablet 3   • tamsulosin (FLOMAX) 0 4 mg TAKE ONE CAPSULE BY MOUTH DAILY WITH dinner 30 capsule 6   • timolol (TIMOPTIC) 0 5 % ophthalmic solution Administer 1 drop to both eyes 2 (two) times a day     • torsemide (DEMADEX) 10 mg tablet TAKE ONE TABLET BY MOUTH EVERY DAY 90 tablet 1   • albuterol (PROVENTIL HFA,VENTOLIN HFA) 90 mcg/act inhaler Inhale 2 puffs 4 (four) times a day as needed (Patient not taking: Reported on 11/8/2022)     • allopurinol (ZYLOPRIM) 300 mg tablet TAKE ONE TABLET BY MOUTH ONCE DAILY (Patient not taking: Reported on 11/8/2022) 90 tablet 3   • metoprolol tartrate (LOPRESSOR) 25 mg tablet Take 12 5 mg by mouth daily (Patient not taking: Reported on 11/8/2022)       No current facility-administered medications for this visit  Allergies   Allergen Reactions   • Amlodipine Nausea Only   • Atorvastatin Myalgia      Immunizations:     Immunization History   Administered Date(s) Administered   • COVID-19 PFIZER VACCINE 0 3 ML IM 02/27/2021, 03/22/2021   • INFLUENZA 11/12/2014, 09/15/2015, 09/30/2015, 09/21/2016, 11/16/2018   • Influenza Split High Dose Preservative Free IM 09/15/2015, 09/30/2015, 09/21/2016, 08/21/2017   • Influenza, high dose seasonal 0 7 mL 11/16/2018, 11/08/2019, 09/29/2020   • Influenza, seasonal, injectable 10/15/2013, 11/01/2014   • Pneumococcal Conjugate 13-Valent 01/09/2018   • Pneumococcal Polysaccharide PPV23 01/01/2007   • TD (adult) Preservative Free 05/23/2013   • Td (adult), adsorbed 01/01/2000   • Zoster 11/01/2010      Health Maintenance: There are no preventive care reminders to display for this patient  Topic Date Due   • COVID-19 Vaccine (3 - Booster for Pfizer series) 08/22/2021   • Influenza Vaccine (1) 09/01/2022      Medicare Screening Tests and Risk Assessments:     Maryann Rice is here for his Subsequent Wellness visit  Health Risk Assessment:   Patient rates overall health as good   Patient feels that their physical health rating is same  Patient is satisfied with their life  Eyesight was rated as same  Hearing was rated as same  Patient feels that their emotional and mental health rating is same  Patients states they are sometimes angry  Patient states they are sometimes unusually tired/fatigued  Pain experienced in the last 7 days has been some  Patient's pain rating has been 8/10  Patient states that he has experienced no weight loss or gain in last 6 months  Depression Screening:   PHQ-2 Score: 0      Fall Risk Screening: In the past year, patient has experienced: no history of falling in past year      Home Safety:  Patient does not have trouble with stairs inside or outside of their home  Patient has working smoke alarms and has no working carbon monoxide detector  Home safety hazards include: none  Nutrition:   Current diet is Diabetic and Frequent junk food  Medications:   Patient is currently taking over-the-counter supplements  OTC medications include: see medication list  Patient is able to manage medications  Activities of Daily Living (ADLs)/Instrumental Activities of Daily Living (IADLs):   Walk and transfer into and out of bed and chair?: Yes  Dress and groom yourself?: Yes    Bathe or shower yourself?: Yes    Feed yourself? Yes  Do your laundry/housekeeping?: Yes  Manage your money, pay your bills and track your expenses?: Yes  Make your own meals?: Yes    Do your own shopping?: Yes    Previous Hospitalizations:   Any hospitalizations or ED visits within the last 12 months?: No      Advance Care Planning:   Living will: No    Durable POA for healthcare:  Yes    Advanced directive: Yes    Advanced directive counseling given: Yes    End of Life Decisions reviewed with patient: Yes      Comments: Full code 1- but if no hope for recovery would not want long term support  Son would be medical poa    Cognitive Screening:   Provider or family/friend/caregiver concerned regarding cognition?: No    PREVENTIVE SCREENINGS      Cardiovascular Screening:    General: Screening Not Indicated and History Lipid Disorder      Diabetes Screening:     General: Screening Not Indicated, History Diabetes and Risks and Benefits Discussed    Due for: Blood Glucose      Colorectal Cancer Screening:     General: Screening Not Indicated      Prostate Cancer Screening:    General: History Prostate Cancer and Screening Not Indicated      Osteoporosis Screening:    General: Risks and Benefits Discussed      Abdominal Aortic Aneurysm (AAA) Screening:    Risk factors include: tobacco use        General: Risks and Benefits Discussed      Lung Cancer Screening:     General: Screening Not Indicated      Hepatitis C Screening:    General: Risks and Benefits Discussed      Preventive Screening Comments:  Had missed September appointment    Screening, Brief Intervention, and Referral to Treatment (SBIRT)    Screening      AUDIT-C Screenin) How often did you have a drink containing alcohol in the past year? never  2) How many drinks did you have on a typical day when you were drinking in the past year? 0  3) How often did you have 6 or more drinks on one occasion in the past year? never    AUDIT-C Score: 0  Interpretation: Score 0-3 (male): Negative screen for alcohol misuse    Single Item Drug Screening:  How often have you used an illegal drug (including marijuana) or a prescription medication for non-medical reasons in the past year? never    Single Item Drug Screen Score: 0  Interpretation: Negative screen for possible drug use disorder    No exam data present     Physical Exam:     /72   Pulse 65   Ht 5' 8" (1 727 m)   Wt 97 8 kg (215 lb 9 6 oz)   SpO2 99%   BMI 32 78 kg/m²     Physical Exam     Juliette Fly, DO

## 2022-11-08 NOTE — Clinical Note
Richard Gould was seen and treated in our emergency department on 11/8/2022  Keep wound clean and dry  Redressed daily  Diagnosis:     Alma Delia Freedman    He may return on this date: If you have any questions or concerns, please don't hesitate to call        Aron Coleman PA-C    ______________________________           _______________          _______________  Hospital Representative                              Date                                Time

## 2022-11-09 ENCOUNTER — TELEPHONE (OUTPATIENT)
Dept: FAMILY MEDICINE CLINIC | Facility: HOSPITAL | Age: 87
End: 2022-11-09

## 2022-11-09 NOTE — TELEPHONE ENCOUNTER
Patient seen in ED 11/8  Was advised to f/u w/ pcp in 3 days  Patient was offered an appt early in day on 11/11  Patient cannot come until late in the afternoon that day      Is there anywhere we can squeeze him in?    pcb

## 2022-11-11 ENCOUNTER — RA CDI HCC (OUTPATIENT)
Dept: OTHER | Facility: HOSPITAL | Age: 87
End: 2022-11-11

## 2022-11-11 NOTE — PROGRESS NOTES
[facility] [address]  coding opportunities     [address]     Chart Reviewed number of suggestions sent to Provider: 1     Patients Insurance     Medicare Insurance: [address] [place] [address]

## 2022-11-14 ENCOUNTER — TELEPHONE (OUTPATIENT)
Dept: NEPHROLOGY | Facility: CLINIC | Age: 87
End: 2022-11-14

## 2022-11-14 NOTE — TELEPHONE ENCOUNTER
Appointment Confirmation   Person confirmed appointment with  If not patient, name of the person Answering Machine    Date and time of appointment 11/15 2pm    Patient acknowledged and will be at appointment? no    Did you advise the patient that they will need a urine sample if they are a new patient?  N/A    Did you advise the patient to bring their current medications for verification? (including any OTC) Yes    Additional Information

## 2022-11-15 ENCOUNTER — OFFICE VISIT (OUTPATIENT)
Dept: NEPHROLOGY | Facility: HOSPITAL | Age: 87
End: 2022-11-15

## 2022-11-15 VITALS
SYSTOLIC BLOOD PRESSURE: 132 MMHG | HEART RATE: 84 BPM | BODY MASS INDEX: 34.65 KG/M2 | WEIGHT: 215.6 LBS | DIASTOLIC BLOOD PRESSURE: 84 MMHG | HEIGHT: 66 IN

## 2022-11-15 DIAGNOSIS — E11.40 TYPE 2 DIABETES MELLITUS WITH DIABETIC NEUROPATHY, WITH LONG-TERM CURRENT USE OF INSULIN (HCC): ICD-10-CM

## 2022-11-15 DIAGNOSIS — N28.1 RENAL CYST: ICD-10-CM

## 2022-11-15 DIAGNOSIS — N40.1 BENIGN PROSTATIC HYPERPLASIA WITH NOCTURIA: ICD-10-CM

## 2022-11-15 DIAGNOSIS — I35.0 NONRHEUMATIC AORTIC VALVE STENOSIS: ICD-10-CM

## 2022-11-15 DIAGNOSIS — I50.32 CHRONIC DIASTOLIC CONGESTIVE HEART FAILURE (HCC): ICD-10-CM

## 2022-11-15 DIAGNOSIS — D63.1 ANEMIA DUE TO STAGE 3B CHRONIC KIDNEY DISEASE (HCC): Primary | Chronic | ICD-10-CM

## 2022-11-15 DIAGNOSIS — R35.1 BENIGN PROSTATIC HYPERPLASIA WITH NOCTURIA: ICD-10-CM

## 2022-11-15 DIAGNOSIS — N18.32 ANEMIA DUE TO STAGE 3B CHRONIC KIDNEY DISEASE (HCC): Primary | Chronic | ICD-10-CM

## 2022-11-15 DIAGNOSIS — N18.4 CKD (CHRONIC KIDNEY DISEASE), STAGE IV (HCC): ICD-10-CM

## 2022-11-15 DIAGNOSIS — Z79.4 TYPE 2 DIABETES MELLITUS WITH DIABETIC NEUROPATHY, WITH LONG-TERM CURRENT USE OF INSULIN (HCC): ICD-10-CM

## 2022-11-15 NOTE — PROGRESS NOTES
OFFICE FOLLOW UP - Nephrology   Yudelka Galvan  80 y o  male MRN: [id number]       ASSESSMENT and PLAN:  Diagnoses and all orders for this visit:    Anemia due to stage 3b chronic kidney disease ([address] )    Type 2 diabetes mellitus with diabetic neuropathy, with long-term current use of insulin (HCC)    Chronic diastolic congestive heart failure (HCC)    Nonrheumatic aortic valve stenosis    CKD (chronic kidney disease), stage IV (HCC)  -     Basic metabolic panel; Future  -     CBC; Future    Renal cyst    Benign prostatic hyperplasia with nocturia        CKD stage IIIB/IV with proteinuria: Etiology of chronic disease suspected secondary to longstanding hypertension, diabetic nephropathy, and age-related nephron loss  Baseline creatinine high 2's to low 3's  Follows with Dr Leigha Hutchinson    -most recent labs showed creatinine of 3 19, GFR of 18  Stable electrolytes  -most recent UA:  -UPCR 1 5 G  Remains stable  On ARB  -most recent UA with cloudy appearance, trace blood, 2+ protein, 0-5 hyaline casts, 6-10 coarse granular cast     -most recent renal imaging showed bilateral renal cysts, largest at the lower pole the right kidney measuring up to 3 3 cm    -recommend avoiding nephrotoxins including ibuprofen, motrin , and advil    -encourage oral hydration  Reports drinking tea, milk, coffee    -previously completed CKD Education and advanced care meeting   -per documentation, patient would want to pursue dialysis if needed, will continue with conservative management for the time being and re-evaluate once closer to needing dialysis  -check labs including BMP, CBC prior to next appointment  Hypertension: Blood pressure currently 132/84 mmHg  Occasionally dizzy with position changes   -Current medications:  Olmesartan 5 mg daily, torsemide 10 mg daily  -medication changes: none  -recommend low salt diet  BPH:  Maintained on Flomax  Urinates frequently       Volume status: Denies chest pain or issues with urination   -check weight daily  Call office for 2-3 lb weight gain in 1 day or 5 lbs in one week  Also, for increased LE edema or shortness of breathe    -current diuretic:  Torsemide 10 mg daily   -dry weight: 210-215 lbs   -cardiologist: Dr Sebastien SAUL in CKD:  -previously with elevated PTH, started on D3 1000 units daily   -most recent Mag and phos level appropriate  PTH level improved to 54  Diabetes: Following with endocrinology  Most recent A1c 8 6  Maintained on insulin  Status post AVR    Other:  Hyperlipidemia, hypothyroidism on Synthroid, coronary artery disease status post CABG  Patient will follow up in 3-4 months  with Dr Lacey Patel  HPI: Yo Edwards  is a 80 y o  male with history of CKD stage 4/5, hypertension, diabetes, hyperlipidemia, hypothyroidism, coronary artery disease status post CABG, who is here for routine CKD follow-up  Per record review, the patient had emergency room visit on 2022 due to hand laceration  The patient reports feeling well  He denies any any recent hospitalizations  He denies any illness or changes to his medical history since his last visit here  He denies chest discomfort or shortness of breath  He reports occasional dizziness while standing  He denies passing our falling  He denies any NSAID use  He denies nausea, vomiting, diarrhea, or issues with urination  He reports currently taking Keflex for hand laceration as described above  He reports having overall decreased appetite but denies any weight loss  ROS:   A complete review of systems was done  Pertinent positives and negatives as noted in the HPI, otherwise the review of systems is negative      Allergies: Amlodipine and Atorvastatin    Medications:   Current Outpatient Medications:   •  ascorbic acid (VITAMIN C) 500 mg tablet, TAKE ONE TABLET BY MOUTH DAILY, Disp: 28 tablet, Rfl: 3  •  aspirin (ECOTRIN) 325 mg EC tablet, Take 1 tablet (325 mg total) by mouth daily, Disp: 30 tablet, Rfl: 0  •  Basaglar KwikPen 100 units/mL injection pen, 16 units daily (Patient taking differently: 20 units daily), Disp: 15 mL, Rfl: 3  •  cephalexin (KEFLEX) 500 mg capsule, Take 1 capsule (500 mg total) by mouth every 6 (six) hours for 7 days, Disp: 28 capsule, Rfl: 0  •  cholecalciferol (VITAMIN D3) 1,000 units tablet, Take 1 tablet (1,000 Units total) by mouth daily, Disp: 90 tablet, Rfl: 3  •  FeroSul 325 (65 Fe) MG tablet, TAKE ONE TABLET BY MOUTH DAILY, Disp: 28 tablet, Rfl: 3  •  gabapentin (NEURONTIN) 300 mg capsule, Take 1 capsule (300 mg total) by mouth daily at bedtime, Disp: 90 capsule, Rfl: 3  •  insulin aspart (NovoLOG FlexPen) 100 UNIT/ML injection pen, INJECT 11 UNITS SUBCUTANEOUSLY THREE TIMES DAILY, Disp: 15 mL, Rfl: 3  •  Insulin Pen Needle (Pen Needles) 32G X 4 MM MISC, Use 4 (four) times a day (before meals and at bedtime), Disp: 100 each, Rfl: 11  •  levothyroxine 150 mcg tablet, Take 1 tablet (150 mcg total) by mouth daily, Disp: 30 tablet, Rfl: 5  •  LORazepam (ATIVAN) 0 5 mg tablet, TAKE ONE TABLET BY MOUTH EVERY EIGHT HOURS AS NEEDED FOR ANXIETY, Disp: 30 tablet, Rfl: 3  •  olmesartan (BENICAR) 5 mg tablet, Take 1 tablet (5 mg total) by mouth daily, Disp: 90 tablet, Rfl: 3  •  pantoprazole (PROTONIX) 20 mg tablet, TAKE ONE TABLET BY MOUTH DAILY, Disp: 30 tablet, Rfl: 3  •  rosuvastatin (CRESTOR) 40 MG tablet, TAKE ONE TABLET BY MOUTH DAILY, Disp: 30 tablet, Rfl: 3  •  tamsulosin (FLOMAX) 0 4 mg, TAKE ONE CAPSULE BY MOUTH DAILY WITH dinner, Disp: 30 capsule, Rfl: 6  •  timolol (TIMOPTIC) 0 5 % ophthalmic solution, Administer 1 drop to both eyes 2 (two) times a day, Disp: , Rfl:   •  torsemide (DEMADEX) 10 mg tablet, TAKE ONE TABLET BY MOUTH EVERY DAY, Disp: 90 tablet, Rfl: 1  •  albuterol (PROVENTIL HFA,VENTOLIN HFA) 90 mcg/act inhaler, Inhale 2 puffs 4 (four) times a day as needed (Patient not taking: No sig reported), Disp: , Rfl:   •  allopurinol (ZYLOPRIM) 300 mg tablet, TAKE ONE TABLET BY MOUTH ONCE DAILY (Patient not taking: No sig reported), Disp: 90 tablet, Rfl: 3  •  metoprolol tartrate (LOPRESSOR) 25 mg tablet, Take 12 5 mg by mouth daily (Patient not taking: No sig reported), Disp: , Rfl:     Past Medical History:   Diagnosis Date   • Aortic stenosis    • CKD (chronic kidney disease)     baseline Cr 1 3-1 5   • Coronary artery disease    • Cough    • 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HCC)     type 2, insulin dependent   • GERD (gastroesophageal reflux disease)    • Glaucoma    • Gout    • History of prostate cancer    • Hypertension    • Hypothyroidism    • Overweight    • Peripheral neuropathy, idiopathic    • Pleural effusion, left    • Pure hypercholesterolemia     LA   11/12/14   R   11/12/14    • Visual impairment     cataract left eye   • Weight gain      Past Surgical History:   Procedure Laterality Date   • CARDIAC CATHETERIZATION     • EYE SURGERY     • IR THORACENTESIS  10/23/2018   • IR THORACENTESIS  11/2/2018   • IR THORACENTESIS  11/9/2018   • IR THORACENTESIS  11/23/2018   • [place] CABG, ARTERY-VEIN, THREE N/A 9/17/2018    Procedure: CORONARY ARTERY BYPASS GRAFT (CABG) x 4 VESSELS with LIMA - LAD, SVG/LEFT LEG EVH - LEFT PDA, OM3, & OM2;  Surgeon: Anais Caban MD;  Location: [facility];  Service: Cardiac Surgery   • [place] ECHO TRANSESOPHAG MONTR CARDIAC PUMP FUNCTJ N/A 9/17/2018    Procedure: TRANSESOPHAGEAL ECHOCARDIOGRAM (VERONICA);   Surgeon: Anais Caban MD;  Location: [facility];  Service: Cardiac Surgery   • [place] RPLCMT AORTIC VALVE OPN W/STENTLESS TISSUE VALVE N/A 9/17/2018    Procedure: REPLACEMENT VALVE AORTIC (AVR)- 23mm tissue Intuity Valve;  Surgeon: Anais Caban MD;  Location[facility];  Service: Cardiac Surgery   • THORACOSCOPY VIDEO ASSISTED SURGERY (VATS) Left 11/27/2018    Procedure: THORACOSCOPY VIDEO ASSISTED SURGERY (VATS), talc pleurodesis,;  Surgeon: Flynn Gaines MD;  Location: [facility];  Service: Thoracic   • THYROID SURGERY       Family History   Problem Relation Age of Onset   • Diabetes Mother    • Pancreatic cancer Brother    • Diabetes Maternal Grandmother    • Colon cancer Son    • Diabetes Family    • Substance Abuse Neg Hx    • Mental illness Neg Hx       reports that he quit smoking about 52 years ago  His smoking use included cigarettes  He has a 0 25 pack-year smoking history  He has never used smokeless tobacco  He reports that he does not drink alcohol and does not use drugs  Physical Exam:   Vitals:    11/15/22 1408   BP: 132/84   Pulse: 84   Weight: 97 8 kg (215 lb 9 6 oz)   Height: 5' 6" (1 676 m)     Body mass index is 34 8 kg/m²      General: no acute distress   Eyes: conjunctivae pink, anicteric sclerae  ENT: mucous membranes moist  Neck: supple, no JVD  Chest: clear to auscultation bilaterally with no wheezes, rale or rhochi  CVS: regular rate and rhythm   Abdomen: soft, non-tender, non-distended  Extremities: trace B/L ankle extremity edema   Skin: no rash  Neuro: awake and alert       Lab Results:  Results for orders placed or performed in visit on 10/26/22   PTH, Intact and Calcium   Result Value Ref Range    PTH, Intact 54 16 - 77 pg/mL    Calcium 9 4 8 6 - 10 3 mg/dL   Magnesium   Result Value Ref Range    Magnesium, Serum 2 5 1 5 - 2 5 mg/dL   Phosphorus   Result Value Ref Range    Phosphorus, Serum 3 3 2 1 - 4 3 mg/dL   Uric acid   Result Value Ref Range    Uric Acid 3 2 (L) 4 0 - 8 0 mg/dL   Comprehensive metabolic panel   Result Value Ref Range    Glucose, Random 167 (H) 65 - 99 mg/dL    BUN 40 (H) 7 - 25 mg/dL    Creatinine 3 19 (H) 0 70 - 1 22 mg/dL    eGFR 18 (L) > OR = 60 mL/min/1 73m2    SL AMB BUN/CREATININE RATIO 13 6 - 22 (calc)    Sodium 141 135 - 146 mmol/L    Potassium 5 2 3 5 - 5 3 mmol/L    Chloride 106 98 - 110 mmol/L    CO2 29 20 - 32 mmol/L    Calcium 9 4 8 6 - 10 3 mg/dL    Protein, Total 6 1 6 1 - 8 1 g/dL    Albumin 3 7 3 6 - 5 1 g/dL    Globulin 2 4 1 9 - 3 7 g/dL (calc)    Albumin/Globulin Ratio 1 5 1 0 - 2 5 (calc)    TOTAL BILIRUBIN 0 9 0 2 - 1 2 mg/dL    Alkaline Phosphatase 63 35 - 144 U/L    AST 22 10 - 35 U/L    ALT 17 9 - 46 U/L   CBC   Result Value Ref Range    White Blood Cell Count 5 4 3 8 - 10 8 Thousand/uL    Red Blood Cell Count 3 35 (L) 4 20 - 5 80 Million/uL    Hemoglobin 11 1 (L) 13 2 - 17 1 g/dL    HCT 33 4 (L) 38 5 - 50 0 %    MCV 99 7 80 0 - 100 0 fL    MCH 33 1 (H) 27 0 - 33 0 pg    MCHC 33 2 32 0 - 36 0 g/dL    RDW 12 8 11 0 - 15 0 %    Platelet Count 344 504 - 400 Thousand/uL    SL AMB MPV 10 7 7 5 - 12 5 fL   Urinalysis with microscopic   Result Value Ref Range    Color UA YELLOW YELLOW    Urine Appearance CLOUDY (A) CLEAR    Specific Gravity 1 016 1 001 - 1 035    Ph 5 5 5 0 - 8 0    Glucose, Urine NEGATIVE NEGATIVE    Bilirubin, Urine NEGATIVE NEGATIVE    Ketone, Urine NEGATIVE NEGATIVE    Blood, Urine TRACE (A) NEGATIVE    Protein, Urine 2+ (A) NEGATIVE    SL AMB NITRITES URINE, QUAL  NEGATIVE NEGATIVE    Leukocyte Esterase NEGATIVE NEGATIVE    SL AMB WBC, URINE 0-5 < OR = 5 /HPF    RBC, Urine NONE SEEN < OR = 2 /HPF    Squamous Epithelial Cells 0-5 < OR = 5 /HPF    Bacteria, UA NONE SEEN NONE SEEN /HPF    Hyaline Casts 0-5 (A) NONE SEEN /LPF    Granular Casts UA 6-10 (A) NONE SEEN /LPF    Comment(s)     Protein, Total w/Creat, Random Urine   Result Value Ref Range    Creatinine, Urine 94 20 - 320 mg/dL    Protein/Creat Ratio 1,564 (H) 25 - 148 mg/g creat    Protein/Creat Ratio 1 564 (H) 0 025 - 0 148 mg/mg creat    Total Protein, Urine 147 (H) 5 - 25 mg/dL             Invalid input(s): ALBUMIN      Portions of the record may have been created with voice recognition software  Occasional wrong word or "sound a like" substitutions may have occurred due to the inherent limitations of voice recognition software  Read the chart carefully and recognize, using context, where substitutions have occurred  If you have any questions, please contact the dictating provider

## 2022-11-15 NOTE — PATIENT INSTRUCTIONS
We are seeing you today for routine follow up on your kidneys  Your kidney number remains stable  Please increase your water intake slightly  Avoid NSAID's for pain control  May take Tylenol for pain  Your blood pressure is well controlled  Continue to take all of your medications as prescribed  We recommend following a low salt diet  You may elevate your legs throughout the day  This will assist with the slight ankle swelling that you have  We will repeat your lab tests in 3 months prior to your next appointment  We will call you to schedule this      Thank You

## 2022-11-16 ENCOUNTER — OFFICE VISIT (OUTPATIENT)
Dept: FAMILY MEDICINE CLINIC | Facility: HOSPITAL | Age: 87
End: 2022-11-16

## 2022-11-16 VITALS
HEIGHT: 66 IN | BODY MASS INDEX: 34.78 KG/M2 | WEIGHT: 216.4 LBS | HEART RATE: 68 BPM | DIASTOLIC BLOOD PRESSURE: 70 MMHG | TEMPERATURE: 97.2 F | SYSTOLIC BLOOD PRESSURE: 128 MMHG

## 2022-11-16 DIAGNOSIS — Z48.02 ENCOUNTER FOR REMOVAL OF SUTURES: Primary | ICD-10-CM

## 2022-11-16 NOTE — PROGRESS NOTES
Suture removal    Date/Time: 11/16/2022 3:40 PM  Performed by: VALENCIA Brown  Authorized by: VALECNIA Brown   Universal Protocol:  Consent: Verbal consent obtained  Consent given by: patient  Patient understanding: patient states understanding of the procedure being performed        Patient location:  Clinic  Location:     Laterality:  Left    Location:  Upper extremity    Upper extremity location:  Hand    Hand location:  L hand  Procedure details: Tools used:  Scissors and tweezers    Wound appearance:  No sign(s) of infection, good wound healing and clean    Number of sutures removed:  5  Post-procedure details:     Post-removal:  Antibiotic ointment applied and Band-Aid applied    Patient tolerance of procedure:   Tolerated well, no immediate complications

## 2022-11-30 DIAGNOSIS — E03.9 HYPOTHYROIDISM, UNSPECIFIED TYPE: ICD-10-CM

## 2022-11-30 RX ORDER — LEVOTHYROXINE SODIUM 0.15 MG/1
150 TABLET ORAL DAILY
Qty: 30 TABLET | Refills: 5 | Status: SHIPPED | OUTPATIENT
Start: 2022-11-30

## 2022-12-05 DIAGNOSIS — F41.9 ANXIETY: ICD-10-CM

## 2022-12-05 DIAGNOSIS — N18.4 CKD (CHRONIC KIDNEY DISEASE), STAGE IV (HCC): ICD-10-CM

## 2022-12-05 DIAGNOSIS — R60.9 EDEMA, UNSPECIFIED TYPE: ICD-10-CM

## 2022-12-05 RX ORDER — LORAZEPAM 0.5 MG/1
TABLET ORAL
Qty: 30 TABLET | Refills: 3 | Status: SHIPPED | OUTPATIENT
Start: 2022-12-05

## 2022-12-05 RX ORDER — TORSEMIDE 10 MG/1
TABLET ORAL
Qty: 90 TABLET | Refills: 1 | Status: SHIPPED | OUTPATIENT
Start: 2022-12-05

## 2022-12-06 RX ORDER — OLMESARTAN MEDOXOMIL 5 MG/1
5 TABLET ORAL DAILY
Qty: 90 TABLET | Refills: 3 | Status: SHIPPED | OUTPATIENT
Start: 2022-12-06

## 2022-12-08 DIAGNOSIS — L03.116 CELLULITIS OF LEFT LOWER EXTREMITY: ICD-10-CM

## 2022-12-08 RX ORDER — INSULIN GLARGINE 100 [IU]/ML
INJECTION, SOLUTION SUBCUTANEOUS
Qty: 15 ML | Refills: 3 | Status: SHIPPED | OUTPATIENT
Start: 2022-12-08

## 2022-12-14 ENCOUNTER — OFFICE VISIT (OUTPATIENT)
Dept: FAMILY MEDICINE CLINIC | Facility: HOSPITAL | Age: 87
End: 2022-12-14

## 2022-12-14 VITALS
WEIGHT: 216.8 LBS | HEIGHT: 66 IN | OXYGEN SATURATION: 97 % | SYSTOLIC BLOOD PRESSURE: 130 MMHG | DIASTOLIC BLOOD PRESSURE: 68 MMHG | HEART RATE: 90 BPM | BODY MASS INDEX: 34.84 KG/M2

## 2022-12-14 DIAGNOSIS — E03.9 ACQUIRED HYPOTHYROIDISM: Chronic | ICD-10-CM

## 2022-12-14 DIAGNOSIS — N18.32 TYPE 2 DIABETES MELLITUS WITH STAGE 3B CHRONIC KIDNEY DISEASE, WITH LONG-TERM CURRENT USE OF INSULIN (HCC): ICD-10-CM

## 2022-12-14 DIAGNOSIS — N18.32 TYPE 2 DIABETES MELLITUS WITH STAGE 3B CHRONIC KIDNEY DISEASE, WITHOUT LONG-TERM CURRENT USE OF INSULIN (HCC): ICD-10-CM

## 2022-12-14 DIAGNOSIS — Z95.1 S/P CABG X 4: ICD-10-CM

## 2022-12-14 DIAGNOSIS — I35.0 NONRHEUMATIC AORTIC VALVE STENOSIS: ICD-10-CM

## 2022-12-14 DIAGNOSIS — I35.0 NONRHEUMATIC AORTIC (VALVE) STENOSIS: ICD-10-CM

## 2022-12-14 DIAGNOSIS — Z95.2 PRESENCE OF PROSTHETIC HEART VALVE: ICD-10-CM

## 2022-12-14 DIAGNOSIS — E11.22 TYPE 2 DIABETES MELLITUS WITH STAGE 3B CHRONIC KIDNEY DISEASE, WITH LONG-TERM CURRENT USE OF INSULIN (HCC): ICD-10-CM

## 2022-12-14 DIAGNOSIS — Z79.4 TYPE 2 DIABETES MELLITUS WITH STAGE 3B CHRONIC KIDNEY DISEASE, WITH LONG-TERM CURRENT USE OF INSULIN (HCC): ICD-10-CM

## 2022-12-14 DIAGNOSIS — E11.40 TYPE 2 DIABETES MELLITUS WITH DIABETIC NEUROPATHY, WITH LONG-TERM CURRENT USE OF INSULIN (HCC): Primary | ICD-10-CM

## 2022-12-14 DIAGNOSIS — H40.1130 PRIMARY OPEN ANGLE GLAUCOMA OF BOTH EYES, UNSPECIFIED GLAUCOMA STAGE: ICD-10-CM

## 2022-12-14 DIAGNOSIS — I50.32 CHRONIC DIASTOLIC CONGESTIVE HEART FAILURE (HCC): ICD-10-CM

## 2022-12-14 DIAGNOSIS — E11.22 TYPE 2 DIABETES MELLITUS WITH STAGE 3B CHRONIC KIDNEY DISEASE, WITHOUT LONG-TERM CURRENT USE OF INSULIN (HCC): ICD-10-CM

## 2022-12-14 DIAGNOSIS — Z79.4 TYPE 2 DIABETES MELLITUS WITH DIABETIC NEUROPATHY, WITH LONG-TERM CURRENT USE OF INSULIN (HCC): Primary | ICD-10-CM

## 2022-12-14 DIAGNOSIS — K21.9 GASTROESOPHAGEAL REFLUX DISEASE WITHOUT ESOPHAGITIS: ICD-10-CM

## 2022-12-14 PROBLEM — N40.0 BENIGN PROSTATIC HYPERPLASIA WITHOUT LOWER URINARY TRACT SYMPTOMS: Status: ACTIVE | Noted: 2018-09-21

## 2022-12-14 PROBLEM — I25.10 ATHEROSCLEROTIC HEART DISEASE OF NATIVE CORONARY ARTERY WITHOUT ANGINA PECTORIS: Status: ACTIVE | Noted: 2018-09-21

## 2022-12-14 PROBLEM — H40.9 UNSPECIFIED GLAUCOMA: Status: ACTIVE | Noted: 2018-09-21

## 2022-12-14 RX ORDER — PEN NEEDLE, DIABETIC 30 GX3/16"
NEEDLE, DISPOSABLE MISCELLANEOUS
Qty: 100 EACH | Refills: 11 | Status: SHIPPED | OUTPATIENT
Start: 2022-12-14

## 2022-12-14 NOTE — ASSESSMENT & PLAN NOTE
Wt Readings from Last 3 Encounters:   12/14/22 98 3 kg (216 lb 12 8 oz)   11/16/22 98 2 kg (216 lb 6 4 oz)   11/15/22 97 8 kg (215 lb 9 6 oz)     No edema

## 2022-12-14 NOTE — ASSESSMENT & PLAN NOTE
Lab Results   Component Value Date    HGBA1C 8 6 (H) 08/01/2022   sees Dr Elfego Kaminski - nephrology   eating some extra  Sweets- has paper work for labs this week at [employer]   ran out of basaglar last week- had 2 days without it - then could not afford it   no shaky episodes

## 2022-12-14 NOTE — PROGRESS NOTES
Assessment/Plan:     Diagnosis ICD-10-CM Associated Orders   1  Type 2 diabetes mellitus with diabetic neuropathy, with long-term current use of insulin ([facility])  E11 40     Z79 4       2  Type 2 diabetes mellitus with stage 3b chronic kidney disease, with long-term current use of insulin ([facility])  E11 22     N18 32     Z79 4       3  Type 2 diabetes mellitus with stage 3b chronic kidney disease, without long-term current use of insulin ([facility])  E11 22 Insulin Pen Needle (Pen Needles) 32G X 4 MM MISC    N18 32       4  Nonrheumatic aortic (valve) stenosis  I35 0       5  Gastroesophageal reflux disease without esophagitis  K21 9       6  Acquired hypothyroidism  E03 9       7  Chronic diastolic congestive heart failure (HCC)  I50 32       8  Nonrheumatic aortic valve stenosis  I35 0       9  Presence of prosthetic heart valve  Z95 2       10  Primary open angle glaucoma of both eyes, unspecified glaucoma stage  H40 1130       11   S/P CABG x 4  Z95 1           Problem List Items Addressed This Visit        Digestive    GERD (gastroesophageal reflux disease)     On pantoprazole daily            Endocrine    Hypothyroidism (Chronic)     On synthroid 150 mcg daily         Type 2 diabetes mellitus with chronic kidney disease, with long-term current use of insulin ([facility])       Lab Results   Component Value Date    HGBA1C 8 6 (H) 08/01/2022   sees Dr Dori Fragoso - nephrology   eating some extra  Sweets- has paper work for labs this week at [employer]   ran out of wikifolio last week- had 2 days without it - then could not afford it   no shaky episodes          Relevant Medications    Insulin Pen Needle (Pen Needles) 32G X 4 MM MISC    Type 2 diabetes mellitus with diabetic neuropathy ([address] ) - Primary       Cardiovascular and Mediastinum    Aortic stenosis    Chronic diastolic congestive heart failure ([address] )     Wt Readings from Last 3 Encounters:   12/14/22 98 3 kg (216 lb 12 8 oz)   11/16/22 98 2 kg (216 lb 6 4 oz)   11/15/22 97 8 kg (215 lb 9 6 oz)     No edema             Nonrheumatic aortic (valve) stenosis       Other    S/P CABG x 4    Primary open angle glaucoma (POAG)    Presence of prosthetic heart valve   Other Visit Diagnoses     Type 2 diabetes mellitus with stage 3b chronic kidney disease, without long-term current use of insulin (HCC)        Relevant Medications    Insulin Pen Needle (Pen Needles) 32G X 4 MM MISC            No follow-ups on file  Subjective:    Patient ID: Angela Addison  is a 80 y o  male    Has run out of money until check is deposited- does not have his insulin and his eye drops for glaucoma- will try to contact [employer] to see if assist      The following portions of the patient's history were reviewed and updated as appropriate: allergies, current medications and problem list      Review of Systems   Constitutional: Negative for chills, fatigue and fever  Respiratory: Negative for cough and stridor  Cardiovascular: Negative for chest pain and leg swelling  Gastrointestinal: Negative for abdominal pain  Musculoskeletal: Positive for arthralgias  Right knee pain   All other systems reviewed and are negative          Objective:      Current Outpatient Medications:   •  allopurinol (ZYLOPRIM) 300 mg tablet, TAKE ONE TABLET BY MOUTH ONCE DAILY, Disp: 90 tablet, Rfl: 3  •  ascorbic acid (VITAMIN C) 500 mg tablet, TAKE ONE TABLET BY MOUTH DAILY, Disp: 28 tablet, Rfl: 3  •  aspirin (ECOTRIN) 325 mg EC tablet, Take 1 tablet (325 mg total) by mouth daily, Disp: 30 tablet, Rfl: 0  •  Basaglar KwikPen 100 units/mL SOPN, inject 16 units subcutaneously daily, Disp: 15 mL, Rfl: 3  •  cholecalciferol (VITAMIN D3) 1,000 units tablet, Take 1 tablet (1,000 Units total) by mouth daily, Disp: 90 tablet, Rfl: 3  •  FeroSul 325 (65 Fe) MG tablet, TAKE ONE TABLET BY MOUTH DAILY, Disp: 28 tablet, Rfl: 3  •  gabapentin (NEURONTIN) 300 mg capsule, Take 1 capsule (300 mg total) by mouth daily at bedtime, Disp: 90 capsule, Rfl: 3  •  insulin aspart (NovoLOG FlexPen) 100 UNIT/ML injection pen, INJECT 11 UNITS SUBCUTANEOUSLY THREE TIMES DAILY, Disp: 15 mL, Rfl: 3  •  Insulin Pen Needle (Pen Needles) 32G X 4 MM MISC, Use 4 (four) times a day (before meals and at bedtime), Disp: 100 each, Rfl: 11  •  levothyroxine 150 mcg tablet, Take 1 tablet (150 mcg total) by mouth daily, Disp: 30 tablet, Rfl: 5  •  LORazepam (ATIVAN) 0 5 mg tablet, TAKE ONE TABLET BY MOUTH EVERY EIGHT HOURS AS NEEDED FOR ANXIETY, Disp: 30 tablet, Rfl: 3  •  olmesartan (BENICAR) 5 mg tablet, Take 1 tablet (5 mg total) by mouth daily, Disp: 90 tablet, Rfl: 3  •  pantoprazole (PROTONIX) 20 mg tablet, TAKE ONE TABLET BY MOUTH DAILY, Disp: 30 tablet, Rfl: 3  •  rosuvastatin (CRESTOR) 40 MG tablet, TAKE ONE TABLET BY MOUTH DAILY, Disp: 30 tablet, Rfl: 3  •  tamsulosin (FLOMAX) 0 4 mg, TAKE ONE CAPSULE BY MOUTH DAILY WITH dinner, Disp: 30 capsule, Rfl: 6  •  timolol (TIMOPTIC) 0 5 % ophthalmic solution, Administer 1 drop to both eyes 2 (two) times a day, Disp: , Rfl:   •  torsemide (DEMADEX) 10 mg tablet, TAKE ONE TABLET BY MOUTH EVERY DAY, Disp: 90 tablet, Rfl: 1  •  albuterol (PROVENTIL HFA,VENTOLIN HFA) 90 mcg/act inhaler, Inhale 2 puffs 4 (four) times a day as needed (Patient not taking: Reported on 11/8/2022), Disp: , Rfl:   •  metoprolol tartrate (LOPRESSOR) 25 mg tablet, Take 12 5 mg by mouth daily, Disp: , Rfl:     Blood pressure 130/68, pulse 90, height 5' 6" (1 676 m), weight 98 3 kg (216 lb 12 8 oz), SpO2 97 %  Physical Exam  Vitals and nursing note reviewed  Constitutional:       General: He is not in acute distress  Appearance: He is not ill-appearing  HENT:      Head: Normocephalic  Right Ear: Tympanic membrane normal  There is no impacted cerumen  Left Ear: Tympanic membrane normal  There is no impacted cerumen  Nose: No congestion  Eyes:      General: No scleral icterus  Right eye: No discharge           Left eye: No discharge  Cardiovascular:      Rate and Rhythm: Normal rate and regular rhythm  Heart sounds: No murmur heard  Comments: Crisp aort ic valve closure sounds  Pulmonary:      Breath sounds: No wheezing or rhonchi  Abdominal:      Tenderness: There is no abdominal tenderness  Hernia: No hernia is present  Musculoskeletal:         General: No swelling or tenderness  Skin:     Findings: No erythema  Neurological:      General: No focal deficit present  Mental Status: He is alert  Psychiatric:         Mood and Affect: Mood normal          Thought Content:  Thought content normal          Judgment: Judgment normal

## 2022-12-19 LAB
ALBUMIN SERPL-MCNC: 3.6 G/DL (ref 3.6–5.1)
ALBUMIN/GLOB SERPL: 1.4 (CALC) (ref 1–2.5)
ALP SERPL-CCNC: 63 U/L (ref 35–144)
ALT SERPL-CCNC: 24 U/L (ref 9–46)
AST SERPL-CCNC: 28 U/L (ref 10–35)
BILIRUB SERPL-MCNC: 0.6 MG/DL (ref 0.2–1.2)
BUN SERPL-MCNC: 40 MG/DL (ref 7–25)
BUN/CREAT SERPL: 15 (CALC) (ref 6–22)
CALCIUM SERPL-MCNC: 9 MG/DL (ref 8.6–10.3)
CHLORIDE SERPL-SCNC: 106 MMOL/L (ref 98–110)
CO2 SERPL-SCNC: 29 MMOL/L (ref 20–32)
CREAT SERPL-MCNC: 2.7 MG/DL (ref 0.7–1.22)
GFR/BSA.PRED SERPLBLD CYS-BASED-ARV: 22 ML/MIN/1.73M2
GLOBULIN SER CALC-MCNC: 2.5 G/DL (CALC) (ref 1.9–3.7)
GLUCOSE SERPL-MCNC: 117 MG/DL (ref 65–99)
HBA1C MFR BLD: 8.3 % OF TOTAL HGB
POTASSIUM SERPL-SCNC: 4.1 MMOL/L (ref 3.5–5.3)
PROT SERPL-MCNC: 6.1 G/DL (ref 6.1–8.1)
SODIUM SERPL-SCNC: 142 MMOL/L (ref 135–146)

## 2023-01-02 DIAGNOSIS — Z95.1 S/P CABG X 4: ICD-10-CM

## 2023-01-02 DIAGNOSIS — Z00.00 HEALTHCARE MAINTENANCE: ICD-10-CM

## 2023-01-02 RX ORDER — PANTOPRAZOLE SODIUM 20 MG/1
TABLET, DELAYED RELEASE ORAL
Qty: 30 TABLET | Refills: 3 | Status: SHIPPED | OUTPATIENT
Start: 2023-01-02

## 2023-01-02 RX ORDER — ASCORBIC ACID 500 MG
TABLET ORAL
Qty: 28 TABLET | Refills: 3 | Status: SHIPPED | OUTPATIENT
Start: 2023-01-02

## 2023-01-03 DIAGNOSIS — M1A.9XX1 CHRONIC GOUT WITH TOPHUS, UNSPECIFIED CAUSE, UNSPECIFIED SITE: ICD-10-CM

## 2023-01-03 RX ORDER — ALLOPURINOL 300 MG/1
TABLET ORAL
Qty: 90 TABLET | Refills: 3 | Status: SHIPPED | OUTPATIENT
Start: 2023-01-03

## 2023-01-25 DIAGNOSIS — Z95.2 S/P AVR (AORTIC VALVE REPLACEMENT): ICD-10-CM

## 2023-01-25 DIAGNOSIS — Z95.1 S/P CABG X 4: ICD-10-CM

## 2023-01-25 RX ORDER — ROSUVASTATIN CALCIUM 40 MG/1
TABLET, COATED ORAL
Qty: 30 TABLET | Refills: 3 | Status: SHIPPED | OUTPATIENT
Start: 2023-01-25

## 2023-02-27 DIAGNOSIS — G62.9 NEUROPATHY: ICD-10-CM

## 2023-02-28 RX ORDER — GABAPENTIN 300 MG/1
CAPSULE ORAL
Qty: 90 CAPSULE | Refills: 3 | Status: SHIPPED | OUTPATIENT
Start: 2023-02-28

## 2023-03-12 ENCOUNTER — OFFICE VISIT (OUTPATIENT)
Dept: URGENT CARE | Facility: CLINIC | Age: 88
End: 2023-03-12

## 2023-03-12 VITALS
HEART RATE: 60 BPM | SYSTOLIC BLOOD PRESSURE: 152 MMHG | RESPIRATION RATE: 18 BRPM | DIASTOLIC BLOOD PRESSURE: 78 MMHG | TEMPERATURE: 98.6 F | OXYGEN SATURATION: 99 %

## 2023-03-12 DIAGNOSIS — J01.10 ACUTE NON-RECURRENT FRONTAL SINUSITIS: Primary | ICD-10-CM

## 2023-03-12 RX ORDER — BENZONATATE 200 MG/1
200 CAPSULE ORAL 3 TIMES DAILY PRN
Qty: 30 CAPSULE | Refills: 0 | Status: SHIPPED | OUTPATIENT
Start: 2023-03-12

## 2023-03-12 RX ORDER — AZITHROMYCIN 250 MG/1
TABLET, FILM COATED ORAL
Qty: 6 TABLET | Refills: 0 | Status: SHIPPED | OUTPATIENT
Start: 2023-03-12 | End: 2023-03-16

## 2023-03-12 NOTE — PROGRESS NOTES
[facility]        NAME: Frida Koo  is a 80 y o  male  : 1935    MRN: [id number]  DATE: 2023  TIME: 4:30 PM    Assessment and Plan   Acute non-recurrent frontal sinusitis [J01 10]  1  Acute non-recurrent frontal sinusitis  azithromycin (ZITHROMAX) 250 mg tablet    benzonatate (TESSALON) 200 MG capsule            Patient Instructions       Follow up with PCP in 3-5 days  Proceed to  ER if symptoms worsen  Chief Complaint     Chief Complaint   Patient presents with   • Cough     Pt has had a productive cough with yellow sputum for the last 2 days  Denies any fever, chills, nasal congestion, or headache  History of Present Illness       80year-old male with 3-day history of productive cough, congestion and loss of voice  Denies any fevers or chills  Denies any chest tightness, wheezing or shortness of breath  Review of Systems   Review of Systems   Constitutional: Negative  HENT: Positive for congestion  Eyes: Negative  Respiratory: Positive for cough  Cardiovascular: Negative  Negative for chest pain  Gastrointestinal: Negative  Genitourinary: Negative  Skin: Negative  Allergic/Immunologic: Negative  Neurological: Negative  Hematological: Negative  Psychiatric/Behavioral: Negative            Current Medications       Current Outpatient Medications:   •  allopurinol (ZYLOPRIM) 300 mg tablet, TAKE ONE TABLET BY MOUTH ONCE DAILY, Disp: 90 tablet, Rfl: 3  •  ascorbic acid (VITAMIN C) 500 mg tablet, TAKE ONE TABLET BY MOUTH DAILY, Disp: 28 tablet, Rfl: 3  •  aspirin (ECOTRIN) 325 mg EC tablet, Take 1 tablet (325 mg total) by mouth daily, Disp: 30 tablet, Rfl: 0  •  azithromycin (ZITHROMAX) 250 mg tablet, Take 2 tablets today then 1 tablet daily x 4 days, Disp: 6 tablet, Rfl: 0  •  Basaglar KwikPen 100 units/mL SOPN, inject 16 units subcutaneously daily, Disp: 15 mL, Rfl: 3  •  benzonatate (TESSALON) 200 MG capsule, Take 1 capsule (200 mg total) by mouth 3 (three) times a day as needed for cough, Disp: 30 capsule, Rfl: 0  •  cholecalciferol (VITAMIN D3) 1,000 units tablet, Take 1 tablet (1,000 Units total) by mouth daily, Disp: 90 tablet, Rfl: 3  •  FeroSul 325 (65 Fe) MG tablet, TAKE ONE TABLET BY MOUTH DAILY, Disp: 28 tablet, Rfl: 3  •  gabapentin (NEURONTIN) 300 mg capsule, TAKE ONE CAPSULE BY MOUTH DAILY AT BEDTIME, Disp: 90 capsule, Rfl: 3  •  insulin aspart (NovoLOG FlexPen) 100 UNIT/ML injection pen, INJECT 11 UNITS SUBCUTANEOUSLY THREE TIMES DAILY, Disp: 15 mL, Rfl: 3  •  Insulin Pen Needle (Pen Needles) 32G X 4 MM MISC, Use 4 (four) times a day (before meals and at bedtime), Disp: 100 each, Rfl: 11  •  levothyroxine 150 mcg tablet, Take 1 tablet (150 mcg total) by mouth daily, Disp: 30 tablet, Rfl: 5  •  LORazepam (ATIVAN) 0 5 mg tablet, TAKE ONE TABLET BY MOUTH EVERY EIGHT HOURS AS NEEDED FOR ANXIETY, Disp: 30 tablet, Rfl: 3  •  metoprolol tartrate (LOPRESSOR) 25 mg tablet, Take 12 5 mg by mouth daily, Disp: , Rfl:   •  olmesartan (BENICAR) 5 mg tablet, Take 1 tablet (5 mg total) by mouth daily, Disp: 90 tablet, Rfl: 3  •  pantoprazole (PROTONIX) 20 mg tablet, TAKE ONE TABLET BY MOUTH DAILY, Disp: 30 tablet, Rfl: 3  •  rosuvastatin (CRESTOR) 40 MG tablet, TAKE ONE TABLET BY MOUTH DAILY, Disp: 30 tablet, Rfl: 3  •  tamsulosin (FLOMAX) 0 4 mg, TAKE ONE CAPSULE BY MOUTH DAILY WITH dinner, Disp: 30 capsule, Rfl: 6  •  timolol (TIMOPTIC) 0 5 % ophthalmic solution, Administer 1 drop to both eyes 2 (two) times a day, Disp: , Rfl:   •  torsemide (DEMADEX) 10 mg tablet, TAKE ONE TABLET BY MOUTH EVERY DAY, Disp: 90 tablet, Rfl: 1  •  albuterol (PROVENTIL HFA,VENTOLIN HFA) 90 mcg/act inhaler, Inhale 2 puffs 4 (four) times a day as needed (Patient not taking: Reported on 11/8/2022), Disp: , Rfl:     Current Allergies     Allergies as of 03/12/2023 - Reviewed 03/12/2023   Allergen Reaction Noted   • Amlodipine Nausea Only 11/05/2014   • Atorvastatin Myalgia 11/05/2014            The following portions of the patient's history were reviewed and updated as appropriate: allergies, current medications, past family history, past medical history, past social history, past surgical history and problem list      Past Medical History:   Diagnosis Date   • Aortic stenosis    • CKD (chronic kidney disease)     baseline Cr 1 3-1 5   • Coronary artery disease    • Cough    • 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, insulin dependent   • GERD (gastroesophageal reflux disease)    • Glaucoma    • Gout    • History of prostate cancer    • Hypertension    • Hypothyroidism    • Overweight    • Peripheral neuropathy, idiopathic    • Pleural effusion, left    • Pure hypercholesterolemia     LA   11/12/14   R   11/12/14    • Visual impairment     cataract left eye   • Weight gain        Past Surgical History:   Procedure Laterality Date   • CARDIAC CATHETERIZATION     • EYE SURGERY     • IR THORACENTESIS  10/23/2018   • IR THORACENTESIS  11/2/2018   • IR THORACENTESIS  11/9/2018   • IR THORACENTESIS  11/23/2018   • AR CORONARY ARTERY BYP W/VEIN & ARTERY GRAFT 3 VEIN N/A 9/17/2018    Procedure: CORONARY ARTERY BYPASS GRAFT (CABG) x 4 VESSELS with LIMA - LAD, SVG/LEFT LEG EVH - LEFT PDA, OM3, & OM2;  Surgeon: Erika Parker MD;  [facility];  Service: Cardiac Surgery   • AR ECHO TRANSESOPHAG MONTR CARDIAC PUMP FUNCTJ N/A 9/17/2018    Procedure: TRANSESOPHAGEAL ECHOCARDIOGRAM (VERONICA);   Surgeon: Erika Parker MD;  [facility];  Service: Cardiac Surgery   • AR RPLCMT AORTIC VALVE OPN W/STENTLESS TISSUE VALVE N/A 9/17/2018    Procedure: REPLACEMENT VALVE AORTIC (AVR)- 23mm tissue Intuity Valve;  Surgeon: Erika Parker MD;  [facility];  Service: Cardiac Surgery   • THORACOSCOPY VIDEO ASSISTED SURGERY (VATS) Left 11/27/2018    Procedure: THORACOSCOPY VIDEO ASSISTED SURGERY (VATS), talc pleurodesis,;  Surgeon: Reyes Negrete MD;  [facility]; Service: Thoracic   • THYROID SURGERY         Family History   Problem Relation Age of Onset   • Diabetes Mother    • Pancreatic cancer Brother    • Diabetes Maternal Grandmother    • Colon cancer Son    • Diabetes Family    • Substance Abuse Neg Hx    • Mental illness Neg Hx          Medications have been verified  Objective   /78   Pulse 60   Temp 98 6 °F (37 °C)   Resp 18   SpO2 99%   No LMP for male patient  Physical Exam     Physical Exam  Constitutional:       Appearance: He is well-developed  HENT:      Head: Normocephalic  Nose: Congestion present  Eyes:      Pupils: Pupils are equal, round, and reactive to light  Cardiovascular:      Rate and Rhythm: Normal rate and regular rhythm  Pulmonary:      Effort: Pulmonary effort is normal    Musculoskeletal:         General: Normal range of motion  Cervical back: Normal range of motion  Skin:     General: Skin is warm  Neurological:      Mental Status: He is alert and oriented to person, place, and time

## 2023-03-20 DIAGNOSIS — R35.1 BENIGN PROSTATIC HYPERPLASIA WITH NOCTURIA: ICD-10-CM

## 2023-03-20 DIAGNOSIS — N40.1 BENIGN PROSTATIC HYPERPLASIA WITH NOCTURIA: ICD-10-CM

## 2023-03-20 RX ORDER — TAMSULOSIN HYDROCHLORIDE 0.4 MG/1
CAPSULE ORAL
Qty: 30 CAPSULE | Refills: 6 | Status: SHIPPED | OUTPATIENT
Start: 2023-03-20

## 2023-04-24 DIAGNOSIS — Z95.2 S/P AVR (AORTIC VALVE REPLACEMENT): ICD-10-CM

## 2023-04-24 DIAGNOSIS — Z95.1 S/P CABG X 4: ICD-10-CM

## 2023-04-24 DIAGNOSIS — E03.9 HYPOTHYROIDISM, UNSPECIFIED TYPE: ICD-10-CM

## 2023-04-24 RX ORDER — ROSUVASTATIN CALCIUM 40 MG/1
TABLET, COATED ORAL
Qty: 30 TABLET | Refills: 3 | Status: SHIPPED | OUTPATIENT
Start: 2023-04-24

## 2023-04-24 RX ORDER — LEVOTHYROXINE SODIUM 0.15 MG/1
150 TABLET ORAL DAILY
Qty: 30 TABLET | Refills: 5 | Status: SHIPPED | OUTPATIENT
Start: 2023-04-24

## 2023-05-11 PROBLEM — J01.10 ACUTE NON-RECURRENT FRONTAL SINUSITIS: Status: RESOLVED | Noted: 2023-03-12 | Resolved: 2023-05-11

## 2023-05-22 DIAGNOSIS — R60.9 EDEMA, UNSPECIFIED TYPE: ICD-10-CM

## 2023-05-22 RX ORDER — TORSEMIDE 10 MG/1
TABLET ORAL
Qty: 90 TABLET | Refills: 0 | Status: SHIPPED | OUTPATIENT
Start: 2023-05-22

## 2023-06-04 ENCOUNTER — OFFICE VISIT (OUTPATIENT)
Dept: URGENT CARE | Facility: CLINIC | Age: 88
End: 2023-06-04
Payer: COMMERCIAL

## 2023-06-04 ENCOUNTER — APPOINTMENT (OUTPATIENT)
Dept: RADIOLOGY | Facility: CLINIC | Age: 88
End: 2023-06-04
Payer: COMMERCIAL

## 2023-06-04 VITALS
DIASTOLIC BLOOD PRESSURE: 60 MMHG | OXYGEN SATURATION: 97 % | HEART RATE: 57 BPM | SYSTOLIC BLOOD PRESSURE: 138 MMHG | TEMPERATURE: 96.3 F | RESPIRATION RATE: 16 BRPM

## 2023-06-04 DIAGNOSIS — S51.012A SKIN TEAR OF LEFT ELBOW WITHOUT COMPLICATION, INITIAL ENCOUNTER: Primary | ICD-10-CM

## 2023-06-04 DIAGNOSIS — Z91.81 STATUS POST FALL: ICD-10-CM

## 2023-06-04 PROCEDURE — 73090 X-RAY EXAM OF FOREARM: CPT

## 2023-06-04 PROCEDURE — 99213 OFFICE O/P EST LOW 20 MIN: CPT | Performed by: FAMILY MEDICINE

## 2023-06-04 PROCEDURE — 73080 X-RAY EXAM OF ELBOW: CPT

## 2023-06-04 PROCEDURE — S9083 URGENT CARE CENTER GLOBAL: HCPCS | Performed by: FAMILY MEDICINE

## 2023-06-04 RX ORDER — DORZOLAMIDE HYDROCHLORIDE AND TIMOLOL MALEATE 20; 5 MG/ML; MG/ML
1 SOLUTION/ DROPS OPHTHALMIC 2 TIMES DAILY
COMMUNITY
Start: 2023-05-24

## 2023-06-04 NOTE — PROGRESS NOTES
[facility]        NAME: Ever Valladares  is a 80 y o  male  : 1935    MRN: [id number]  DATE: 2023  TIME: 2:45 PM    Assessment and Plan   Skin tear of left elbow without complication, initial encounter [S51 012A]  1  Skin tear of left elbow without complication, initial encounter  Ambulatory Referral to Wound Care      2  Status post fall  XR forearm 2 vw left    XR elbow 3+ vw left            Patient Instructions       Follow up with PCP in 3-5 days  Proceed to  ER if symptoms worsen  Chief Complaint     Chief Complaint   Patient presents with   • Skin Tear Left Arm      Pt reports he tripped over telephone pole and fell against macadum on Friday evening  Pt's left forearm is bruised and skin tear is next to left elbow  History of Present Illness       30-year-old male presenting with skin tear to his left elbow  He reports 2 nights ago, tripping over a telephone pole and onto the blacktop surface of a parking lot  He reports some bruising in his elbow and clear discharge after his bleeding stopped  Denies any elbow pain, denies any joint swelling or motion restrictions  Review of Systems   Review of Systems   Constitutional: Negative  HENT: Negative  Eyes: Negative  Respiratory: Negative  Cardiovascular: Negative  Gastrointestinal: Negative  Genitourinary: Negative  Skin: Positive for wound  Allergic/Immunologic: Negative  Neurological: Negative  Hematological: Negative  Psychiatric/Behavioral: Negative            Current Medications       Current Outpatient Medications:   •  allopurinol (ZYLOPRIM) 300 mg tablet, TAKE ONE TABLET BY MOUTH ONCE DAILY, Disp: 90 tablet, Rfl: 3  •  ascorbic acid (VITAMIN C) 500 mg tablet, TAKE ONE TABLET BY MOUTH DAILY, Disp: 28 tablet, Rfl: 3  •  aspirin (ECOTRIN) 325 mg EC tablet, Take 1 tablet (325 mg total) by mouth daily, Disp: 30 tablet, Rfl: 0  •  Basaglar KwikPen 100 units/mL SOPN, inject 24 units subcutaneously daily, Disp: 15 mL, Rfl: 3  •  cholecalciferol (VITAMIN D3) 1,000 units tablet, Take 1 tablet (1,000 Units total) by mouth daily, Disp: 90 tablet, Rfl: 3  •  dorzolamide-timolol (COSOPT) 22 3-6 8 MG/ML ophthalmic solution, Administer 1 drop to both eyes 2 (two) times a day, Disp: , Rfl:   •  FeroSul 325 (65 Fe) MG tablet, TAKE ONE TABLET BY MOUTH DAILY, Disp: 28 tablet, Rfl: 3  •  gabapentin (NEURONTIN) 300 mg capsule, TAKE ONE CAPSULE BY MOUTH DAILY AT BEDTIME, Disp: 90 capsule, Rfl: 3  •  insulin aspart (NovoLOG FlexPen) 100 UNIT/ML injection pen, INJECT 11 UNITS SUBCUTANEOUSLY THREE TIMES DAILY, Disp: 15 mL, Rfl: 3  •  levothyroxine 150 mcg tablet, Take 1 tablet (150 mcg total) by mouth daily, Disp: 30 tablet, Rfl: 5  •  LORazepam (ATIVAN) 0 5 mg tablet, TAKE ONE TABLET BY MOUTH EVERY EIGHT HOURS AS NEEDED FOR ANXIETY, Disp: 30 tablet, Rfl: 3  •  metoprolol tartrate (LOPRESSOR) 25 mg tablet, Take 12 5 mg by mouth daily, Disp: , Rfl:   •  olmesartan (BENICAR) 5 mg tablet, Take 1 tablet (5 mg total) by mouth daily, Disp: 90 tablet, Rfl: 3  •  pantoprazole (PROTONIX) 20 mg tablet, TAKE ONE TABLET BY MOUTH DAILY, Disp: 30 tablet, Rfl: 3  •  rosuvastatin (CRESTOR) 40 MG tablet, TAKE ONE TABLET BY MOUTH DAILY, Disp: 30 tablet, Rfl: 3  •  tamsulosin (FLOMAX) 0 4 mg, TAKE ONE CAPSULE BY MOUTH DAILY WITH dinner, Disp: 30 capsule, Rfl: 6  •  torsemide (DEMADEX) 10 mg tablet, TAKE ONE TABLET BY MOUTH EVERY DAY, Disp: 90 tablet, Rfl: 0  •  albuterol (PROVENTIL HFA,VENTOLIN HFA) 90 mcg/act inhaler, Inhale 2 puffs 4 (four) times a day as needed (Patient not taking: Reported on 11/8/2022), Disp: , Rfl:   •  benzonatate (TESSALON) 200 MG capsule, Take 1 capsule (200 mg total) by mouth 3 (three) times a day as needed for cough (Patient not taking: Reported on 4/12/2023), Disp: 30 capsule, Rfl: 0  •  Insulin Pen Needle (Pen Needles) 32G X 4 MM MISC, Use 4 (four) times a day (before meals and at bedtime), Disp: 100 each, Rfl: 11  •  timolol (TIMOPTIC) 0 5 % ophthalmic solution, Administer 1 drop to both eyes 2 (two) times a day (Patient not taking: Reported on 6/4/2023), Disp: , Rfl:     Current Allergies     Allergies as of 06/04/2023 - Reviewed 06/04/2023   Allergen Reaction Noted   • Amlodipine Nausea Only 11/05/2014   • Atorvastatin Myalgia 11/05/2014            The following portions of the patient's history were reviewed and updated as appropriate: allergies, current medications, past family history, past medical history, past social history, past surgical history and problem list      Past Medical History:   Diagnosis Date   • Aortic stenosis    • CKD (chronic kidney disease)     baseline Cr 1 3-1 5   • Coronary artery disease    • Cough    • 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[facility] )     type 2, insulin dependent   • GERD (gastroesophageal reflux disease)    • Glaucoma    • Gout    • History of prostate cancer    • Hypertension    • Hypothyroidism    • Overweight    • Peripheral neuropathy, idiopathic    • Pleural effusion, left    • Pure hypercholesterolemia     LA   11/12/14   R   11/12/14    • Visual impairment     cataract left eye   • Weight gain        Past Surgical History:   Procedure Laterality Date   • CARDIAC CATHETERIZATION     • EYE SURGERY     • IR THORACENTESIS  10/23/2018   • IR THORACENTESIS  11/2/2018   • IR THORACENTESIS  11/9/2018   • IR THORACENTESIS  11/23/2018   • SC CORONARY ARTERY BYP W/VEIN & ARTERY GRAFT 3 VEIN N/A 9/17/2018    Procedure: CORONARY ARTERY BYPASS GRAFT (CABG) x 4 VESSELS with LIMA - LAD, SVG/LEFT LEG EVH - LEFT PDA, OM3, & OM2;  Surgeon: Domi Timmons MD;  [facility];  Service: Cardiac Surgery   • SC ECHO TRANSESOPHAG MONTR CARDIAC PUMP FUNCTJ N/A 9/17/2018    Procedure: TRANSESOPHAGEAL ECHOCARDIOGRAM (VERONICA);   Surgeon: Domi Timmons MD;  [facility];  Service: Cardiac Surgery   • SC RPLCMT AORTIC VALVE OPN W/STENTLESS TISSUE VALVE N/A 9/17/2018    Procedure: REPLACEMENT VALVE AORTIC (AVR)- 23mm tissue Intuity Valve;  Surgeon: Cherelle Ellis MD;  [facility];  Service: Cardiac Surgery   • THORACOSCOPY VIDEO ASSISTED SURGERY (VATS) Left 11/27/2018    Procedure: THORACOSCOPY VIDEO ASSISTED SURGERY (VATS), talc pleurodesis,;  Surgeon: Jose Joyce MD;  [facility];  Service: Thoracic   • THYROID SURGERY         Family History   Problem Relation Age of Onset   • Diabetes Mother    • Pancreatic cancer Brother    • Diabetes Maternal Grandmother    • Colon cancer Son    • Diabetes Family    • Substance Abuse Neg Hx    • Mental illness Neg Hx          Medications have been verified  Objective   /60 (BP Location: Right arm, Patient Position: Sitting, Cuff Size: Standard)   Pulse 57   Temp (!) 96 3 °F (35 7 °C) (Tympanic)   Resp 16   SpO2 97%   No LMP for male patient  Physical Exam     Physical Exam  Constitutional:       Appearance: He is well-developed  HENT:      Head: Normocephalic  Eyes:      Pupils: Pupils are equal, round, and reactive to light  Pulmonary:      Effort: Pulmonary effort is normal    Musculoskeletal:         General: Normal range of motion  Cervical back: Normal range of motion  Skin:     General: Skin is warm  Findings: Abrasion present  Neurological:      Mental Status: He is alert and oriented to person, place, and time

## 2023-06-05 ENCOUNTER — TELEPHONE (OUTPATIENT)
Dept: URGENT CARE | Facility: CLINIC | Age: 88
End: 2023-06-05

## 2023-06-05 DIAGNOSIS — R93.6 ABNORMAL X-RAY OF FOREARM: Primary | ICD-10-CM

## 2023-06-05 NOTE — TELEPHONE ENCOUNTER
Called and spoke with patient regarding XR results  Orthopedics referral placed, patient aware of same

## 2023-06-08 ENCOUNTER — OFFICE VISIT (OUTPATIENT)
Dept: WOUND CARE | Facility: HOSPITAL | Age: 88
End: 2023-06-08
Payer: COMMERCIAL

## 2023-06-08 VITALS
TEMPERATURE: 96.6 F | HEIGHT: 67 IN | WEIGHT: 213 LBS | DIASTOLIC BLOOD PRESSURE: 78 MMHG | BODY MASS INDEX: 33.43 KG/M2 | SYSTOLIC BLOOD PRESSURE: 138 MMHG

## 2023-06-08 DIAGNOSIS — S51.002A OPEN WOUND OF LEFT ELBOW, INITIAL ENCOUNTER: Primary | ICD-10-CM

## 2023-06-08 PROCEDURE — 97597 DBRDMT OPN WND 1ST 20 CM/<: CPT | Performed by: NURSE PRACTITIONER

## 2023-06-08 PROCEDURE — 99204 OFFICE O/P NEW MOD 45 MIN: CPT | Performed by: NURSE PRACTITIONER

## 2023-06-08 PROCEDURE — 99213 OFFICE O/P EST LOW 20 MIN: CPT | Performed by: NURSE PRACTITIONER

## 2023-06-08 PROCEDURE — NC001 PR NO CHARGE: Performed by: NURSE PRACTITIONER

## 2023-06-08 RX ORDER — LIDOCAINE HYDROCHLORIDE 40 MG/ML
5 SOLUTION TOPICAL ONCE
Status: COMPLETED | OUTPATIENT
Start: 2023-06-08 | End: 2023-06-08

## 2023-06-08 RX ADMIN — LIDOCAINE HYDROCHLORIDE 5 ML: 40 SOLUTION TOPICAL at 08:39

## 2023-06-08 NOTE — PATIENT INSTRUCTIONS
Orders Placed This Encounter   Procedures    Wound cleansing and dressings     Wash your hands with soap and water  Remove old dressing, discard into plastic bag and place in trash  Cleanse the wound with Soap and water prior to applying a clean dressing  Do not use tissue or cotton balls  Do not scrub the wound  Pat dry using gauze  Shower yes  Apply Dermagram to the left elbow wound  Cover with ALLEVYN  Change dressing 3XWEEK   CHANGE SATURDAY OR  SUNDAY AND TUESDAY AND RETURN TO WOUND CARE ON THURSDAY  DONE TODAY    RETURN IN 1 WEEK     Standing Status:   Future     Standing Expiration Date:   6/8/2024

## 2023-06-08 NOTE — PROGRESS NOTES
"Patient ID: Aixa Boyer  is a 80 y o  male Date of Birth 1935     Chief Complaint  Chief Complaint   Patient presents with   • New Patient Visit     Wound care       Allergies  Amlodipine and Atorvastatin    Assessment:     Diagnoses and all orders for this visit:    Open wound of left elbow, initial encounter  -     Cancel: Wound cleansing and dressings; Future  -     lidocaine (XYLOCAINE) 4 % topical solution 5 mL  -     Wound cleansing and dressings; Future    Other orders  -     Debridement              Debridement   Wound 06/08/23 Traumatic Elbow Left;Posterior    Universal Protocol:  Consent: Written consent obtained  Consent given by: patient  Time out: Immediately prior to procedure a \"time out\" was called to verify the correct patient, procedure, equipment, support staff and site/side marked as required  Timeout called at: 6/8/2023 9:17 AM   Patient identity confirmed: verbally with patient      Performed by: NP  Debridement type: selective  Pain control: lidocaine 4%  Pre-debridement measurements  Length (cm): 4  Width (cm): 1 4  Depth (cm): 0 1  Surface Area (cm^2): 5 6  Volume (cm^3): 0 56    Post-debridement measurements  Length (cm): 4  Width (cm): 1 4  Depth (cm): 0 1  Percent debrided: 100%  Surface Area (cm^2): 5 6  Area debrided (cm^2): 5 6  Volume (cm^3): 0 56  Devitalized tissue debrided: biofilm, fibrin and slough  Instrument(s) utilized: curette  Bleeding: small  Hemostasis obtained with: pressure  Procedural pain (0-10): 0  Post-procedural pain: 0   Response to treatment: procedure was tolerated well          Plan:  1  F/u visit  Wound debrided  Wound is a full thickness traumatic wound not showing any s/s of infection  Will have Dermagran gauze applied to wound covered with a bordered foam dressing changed 3x per week   Patient will follow up in 1 week     Wound 06/08/23 Traumatic Elbow Left;Posterior (Active)   Wound Image Images linked 06/08/23 0825   Wound Description " Eschar;Hypergranulation;Pink 06/08/23 0824   Samanta-wound Assessment Intact;Fragile 06/08/23 0824   Wound Length (cm) 4 cm 06/08/23 0824   Wound Width (cm) 1 4 cm 06/08/23 0824   Wound Depth (cm) 0 1 cm 06/08/23 0824   Wound Surface Area (cm^2) 5 6 cm^2 06/08/23 0824   Wound Volume (cm^3) 0 56 cm^3 06/08/23 0824   Calculated Wound Volume (cm^3) 0 56 cm^3 06/08/23 0824   Drainage Amount Moderate 06/08/23 0824   Drainage Description Serous 06/08/23 0824   Non-staged Wound Description Full thickness 06/08/23 0824   Treatments Cleansed 06/08/23 0824   Wound packed? No 06/08/23 0824   Dressing Changed Changed 06/08/23 0824   Patient Tolerance Tolerated well 06/08/23 0824   Dressing Status Intact 06/08/23 0824       Wound 09/18/18 Laceration Hand Right (Active)   Date First Assessed/Time First Assessed: 09/18/18 1148   Pre-Existing Wound: Yes  Wound Type: Laceration  Location: Hand  Wound Location Orientation: Right  Dressing Status: Clean;Dry; Intact       Incision 09/17/18 Chest Other (Comment) (Active)   Date First Assessed/Time First Assessed: 09/17/18 0922   Location: Chest  Wound Location Orientation: Other (Comment)  Wound Description (Comments):  Chest incision  CHG used  Incision 09/17/18 Chest Other (Comment) (Active)   Date First Assessed/Time First Assessed: 09/17/18 0922   Location: Chest  Wound Location Orientation: Other (Comment)  Wound Description (Comments):  Chest tube incisions  CHG used  Incision 09/17/18 Leg Left (Active)   Date First Assessed/Time First Assessed: 09/17/18 2427   Location: Leg  Wound Location Orientation: Left  Wound Description (Comments):  EVH Site         Incision 11/27/18 Chest Left (Active)   Date First Assessed/Time First Assessed: 11/27/18 1520   Location: Chest  Wound Location Orientation: Left  Wound Description (Comments):  x2 port sites       Wound 06/08/23 Traumatic Elbow Left;Posterior (Active)   Date First Assessed/Time First Assessed: 06/08/23 0956   Present on Original Admission: Yes  Primary Wound Type: Traumatic  Location: Elbow  Wound Location Orientation: Left;Posterior       [REMOVED] Incision 10/23/18 Back Mid;Lower; Left (Removed)   Resolved Date/Resolved Time: 06/08/23 0824  Date First Assessed/Time First Assessed: 10/23/18 1516   Pre-Existing Wound: No  Location: Back  Wound Location Orientation: Mid;Lower; Left  Wound Description (Comments): puncture for thora  Wound Outcome: U  Subjective:        Patient is an 80year old male who presents to the [facility] wound center as a new patient for a traumatic wound of his left elbow  Patient reports he sustained a fall last Friday evening while at a baseball game  Patient was seen at Urgent Care and had an XR of his left elbow which was negative for fracture  He reports his wound drains a small amount of drainage  He has been keeping his wound covered with telfa pads and coban  He denies any pain, fevers, or chills  The following portions of the patient's history were reviewed and updated as appropriate:   He  has a past medical history of Aortic stenosis, CKD (chronic kidney disease), Coronary artery disease, Cough, 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[address] ), GERD (gastroesophageal reflux disease), Glaucoma, Gout, History of prostate cancer, Hypertension, Hypothyroidism, Overweight, Peripheral neuropathy, idiopathic, Pleural effusion, left, Pure hypercholesterolemia, Visual impairment, and Weight gain    He   Patient Active Problem List    Diagnosis Date Noted   • Skin tear of left elbow without complication 83/05/3415   • CKD (chronic kidney disease), stage IV ([address] ) 03/03/2022   • Type 2 diabetes mellitus with diabetic neuropathy ([facility] [address] ) 10/27/2021   • Chronic diastolic congestive heart failure [facility] ) 01/24/2020   • Primary open angle glaucoma (POAG) 11/08/2019   • Age-related cataract of both eyes 11/08/2019   • Atherosclerosis of aorta ([address] ) 11/08/2019   • Type 2 diabetes mellitus with chronic kidney disease, with long-term current use of insulin ([place] [address] ) 01/22/2019   • Ambulatory dysfunction 12/01/2018   • Anemia due to stage 3 chronic kidney disease ([place] [address] ) 11/27/2018   • CAD (coronary artery disease) 11/24/2018   • Aortic stenosis 11/24/2018   • Benign prostatic hyperplasia without lower urinary tract symptoms 09/21/2018   • Long term (current) use of insulin ([place] [address] ) 09/21/2018   • Presence of aortocoronary bypass graft 09/21/2018   • Presence of prosthetic heart valve 09/21/2018   • Unspecified glaucoma 09/21/2018   • Nonrheumatic aortic (valve) stenosis 09/21/2018   • Atherosclerotic heart disease of native coronary artery without angina pectoris 09/21/2018   • S/P CABG x 4 09/17/2018   • S/P AVR (aortic valve replacement) 09/17/2018   • GERD (gastroesophageal reflux disease)    • Benign prostatic hyperplasia with nocturia 02/13/2018   • Sexual dysfunction 10/09/2017   • Renal cyst 01/12/2017   • 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   • Pure hypercholesterolemia 11/12/2014   • Hypertensive heart disease with HF (heart failure) ([place] [address] ) 11/05/2014   • Hypothyroidism 11/05/2014   • Obesity, morbid ([place] [address] ) 11/05/2014     He  has a past surgical history that includes Thyroid surgery; Cardiac catheterization; pr coronary artery byp w/vein & artery graft 3 vein (N/A, 9/17/2018); pr rplcmt aortic valve opn w/stentless tissue valve (N/A, 9/17/2018); pr echo transesophag montr cardiac pump functj (N/A, 9/17/2018); IR thoracentesis (10/23/2018); IR thoracentesis (11/2/2018); IR thoracentesis (11/9/2018); IR thoracentesis (11/23/2018); THORACOSCOPY VIDEO ASSISTED SURGERY (VATS) (Left, 11/27/2018); and Eye surgery  His family history includes Colon cancer in his son; Diabetes in his family, maternal grandmother, and mother; Pancreatic cancer in his brother  He  reports that he quit smoking about 53 years ago  His smoking use included cigarettes  He has a 0 25 pack-year smoking history   He has never used smokeless tobacco  He reports that he does not drink alcohol and does not use drugs    Current Outpatient Medications   Medication Sig Dispense Refill   • albuterol (PROVENTIL HFA,VENTOLIN HFA) 90 mcg/act inhaler Inhale 2 puffs 4 (four) times a day as needed (Patient not taking: Reported on 11/8/2022)     • allopurinol (ZYLOPRIM) 300 mg tablet TAKE ONE TABLET BY MOUTH ONCE DAILY 90 tablet 3   • ascorbic acid (VITAMIN C) 500 mg tablet TAKE ONE TABLET BY MOUTH DAILY 28 tablet 3   • aspirin (ECOTRIN) 325 mg EC tablet Take 1 tablet (325 mg total) by mouth daily 30 tablet 0   • Basaglar KwikPen 100 units/mL SOPN inject 24 units subcutaneously daily 15 mL 3   • benzonatate (TESSALON) 200 MG capsule Take 1 capsule (200 mg total) by mouth 3 (three) times a day as needed for cough (Patient not taking: Reported on 4/12/2023) 30 capsule 0   • cholecalciferol (VITAMIN D3) 1,000 units tablet Take 1 tablet (1,000 Units total) by mouth daily 90 tablet 3   • dorzolamide-timolol (COSOPT) 22 3-6 8 MG/ML ophthalmic solution Administer 1 drop to both eyes 2 (two) times a day     • FeroSul 325 (65 Fe) MG tablet TAKE ONE TABLET BY MOUTH DAILY 28 tablet 3   • gabapentin (NEURONTIN) 300 mg capsule TAKE ONE CAPSULE BY MOUTH DAILY AT BEDTIME 90 capsule 3   • insulin aspart (NovoLOG FlexPen) 100 UNIT/ML injection pen INJECT 11 UNITS SUBCUTANEOUSLY THREE TIMES DAILY 15 mL 3   • Insulin Pen Needle (Pen Needles) 32G X 4 MM MISC Use 4 (four) times a day (before meals and at bedtime) 100 each 11   • levothyroxine 150 mcg tablet Take 1 tablet (150 mcg total) by mouth daily 30 tablet 5   • LORazepam (ATIVAN) 0 5 mg tablet TAKE ONE TABLET BY MOUTH EVERY EIGHT HOURS AS NEEDED FOR ANXIETY 30 tablet 3   • metoprolol tartrate (LOPRESSOR) 25 mg tablet Take 12 5 mg by mouth daily     • olmesartan (BENICAR) 5 mg tablet Take 1 tablet (5 mg total) by mouth daily 90 tablet 3   • pantoprazole (PROTONIX) 20 mg tablet TAKE ONE TABLET BY MOUTH DAILY 30 tablet 3   • rosuvastatin (CRESTOR) 40 MG tablet TAKE ONE TABLET BY MOUTH DAILY 30 tablet 3   • tamsulosin (FLOMAX) 0 4 mg TAKE ONE CAPSULE BY MOUTH DAILY WITH dinner 30 capsule 6   • timolol (TIMOPTIC) 0 5 % ophthalmic solution Administer 1 drop to both eyes 2 (two) times a day (Patient not taking: Reported on 6/4/2023)     • torsemide (DEMADEX) 10 mg tablet TAKE ONE TABLET BY MOUTH EVERY DAY 90 tablet 0     No current facility-administered medications for this visit  He is allergic to amlodipine and atorvastatin       Review of Systems   Constitutional: Negative  HENT: Negative for ear pain and hearing loss  Eyes: Negative for pain  Respiratory: Negative for chest tightness and shortness of breath  Cardiovascular: Negative for chest pain, palpitations and leg swelling  Gastrointestinal: Negative for diarrhea, nausea and vomiting  Genitourinary: Negative for dysuria  Musculoskeletal: Negative for gait problem  Skin: Positive for wound  Neurological: Negative for tremors and weakness  Psychiatric/Behavioral: Negative for behavioral problems, confusion and suicidal ideas  Objective:       Wound 06/08/23 Traumatic Elbow Left;Posterior (Active)   Wound Image Images linked 06/08/23 0825   Wound Description Eschar;Hypergranulation;Pink 06/08/23 0824   Samanta-wound Assessment Intact;Fragile 06/08/23 0824   Wound Length (cm) 4 cm 06/08/23 0824   Wound Width (cm) 1 4 cm 06/08/23 0824   Wound Depth (cm) 0 1 cm 06/08/23 0824   Wound Surface Area (cm^2) 5 6 cm^2 06/08/23 0824   Wound Volume (cm^3) 0 56 cm^3 06/08/23 0824   Calculated Wound Volume (cm^3) 0 56 cm^3 06/08/23 0824   Drainage Amount Moderate 06/08/23 0824   Drainage Description Serous 06/08/23 0824   Non-staged Wound Description Full thickness 06/08/23 0824   Treatments Cleansed 06/08/23 0824   Wound packed?  No 06/08/23 0824   Dressing Changed Changed 06/08/23 0824   Patient Tolerance Tolerated well 06/08/23 0824   Dressing Status Intact 06/08/23 0824       /78   Temp (!) 96 6 °F (35 9 °C) " Ht 5' 7\" (1 702 m)   Wt 96 6 kg (213 lb)   BMI 33 36 kg/m²     Physical Exam  Vitals and nursing note reviewed  Constitutional:       General: He is not in acute distress  Appearance: Normal appearance  He is obese  HENT:      Head: Normocephalic and atraumatic  Eyes:      General:         Right eye: No discharge  Left eye: No discharge  Pulmonary:      Effort: Pulmonary effort is normal  No respiratory distress  Musculoskeletal:         General: Normal range of motion  Cervical back: Normal range of motion and neck supple  No rigidity  Left lower leg: No edema  Skin:     General: Skin is warm  Findings: Wound present  No erythema  Neurological:      General: No focal deficit present  Mental Status: He is alert and oriented to person, place, and time  Mental status is at baseline  Psychiatric:         Mood and Affect: Mood normal          Behavior: Behavior normal          Thought Content: Thought content normal          Judgment: Judgment normal                    Wound Instructions:  Orders Placed This Encounter   Procedures   • Wound cleansing and dressings     Wash your hands with soap and water  Remove old dressing, discard into plastic bag and place in trash  Cleanse the wound with Soap and water prior to applying a clean dressing  Do not use tissue or cotton balls  Do not scrub the wound  Pat dry using gauze  Shower yes  Apply Dermagram to the left elbow wound  Cover with ALLEVYN  Change dressing 3XWEEK  CHANGE SATURDAY OR  SUNDAY AND TUESDAY AND RETURN TO WOUND CARE ON THURSDAY  DONE TODAY    RETURN IN 1 WEEK     Standing Status:   Future     Standing Expiration Date:   6/8/2024   • Debridement     This order was created via procedure documentation        Diagnosis ICD-10-CM Associated Orders   1   Open wound of left elbow, initial encounter  S51 002A lidocaine (XYLOCAINE) 4 % topical solution 5 mL     Wound cleansing and dressings          "

## 2023-06-10 DIAGNOSIS — N18.30 TYPE 2 DIABETES MELLITUS WITH STAGE 3 CHRONIC KIDNEY DISEASE, WITHOUT LONG-TERM CURRENT USE OF INSULIN, UNSPECIFIED WHETHER STAGE 3A OR 3B CKD (HCC): ICD-10-CM

## 2023-06-10 DIAGNOSIS — E11.22 TYPE 2 DIABETES MELLITUS WITH STAGE 3 CHRONIC KIDNEY DISEASE, WITHOUT LONG-TERM CURRENT USE OF INSULIN, UNSPECIFIED WHETHER STAGE 3A OR 3B CKD (HCC): ICD-10-CM

## 2023-06-12 RX ORDER — INSULIN ASPART 100 [IU]/ML
INJECTION, SOLUTION INTRAVENOUS; SUBCUTANEOUS
Qty: 15 ML | Refills: 3 | Status: SHIPPED | OUTPATIENT
Start: 2023-06-12

## 2023-06-15 ENCOUNTER — OFFICE VISIT (OUTPATIENT)
Dept: WOUND CARE | Facility: HOSPITAL | Age: 88
End: 2023-06-15
Payer: COMMERCIAL

## 2023-06-15 VITALS
DIASTOLIC BLOOD PRESSURE: 80 MMHG | HEART RATE: 60 BPM | SYSTOLIC BLOOD PRESSURE: 122 MMHG | RESPIRATION RATE: 16 BRPM | TEMPERATURE: 96.6 F

## 2023-06-15 DIAGNOSIS — S51.002A OPEN WOUND OF LEFT ELBOW, INITIAL ENCOUNTER: Primary | ICD-10-CM

## 2023-06-15 PROCEDURE — 99212 OFFICE O/P EST SF 10 MIN: CPT | Performed by: NURSE PRACTITIONER

## 2023-06-15 RX ORDER — LIDOCAINE 40 MG/G
CREAM TOPICAL ONCE
Status: COMPLETED | OUTPATIENT
Start: 2023-06-15 | End: 2023-06-15

## 2023-06-15 RX ADMIN — LIDOCAINE: 40 CREAM TOPICAL at 09:39

## 2023-06-15 NOTE — PROGRESS NOTES
Patient ID: Lena Seals  is a 80 y o  male Date of Birth 1935     Chief Complaint  Chief Complaint   Patient presents with   • Follow Up Wound Care Visit     Dressing on elbow but not staying on wound  Allergies  Amlodipine and Atorvastatin    Assessment:     Diagnoses and all orders for this visit:    Open wound of left elbow, initial encounter  -     lidocaine (LMX) 4 % cream  -     Wound cleansing and dressings; Future          Plan:  1  F/u visit  Wound cleansed with NSS and gauze  Wound improving and measuring smaller  Continue current plan of care  Patient will follow up in 2 weeks  Wound 06/08/23 Traumatic Elbow Left;Posterior (Active)   Wound Image Images linked 06/15/23 0931   Wound Description Hypergranulation 06/15/23 0926   Samanta-wound Assessment Intact 06/15/23 0926   Wound Length (cm) 1 cm 06/15/23 0926   Wound Width (cm) 0 cm 06/15/23 0926   Wound Depth (cm) 0 1 cm 06/15/23 0926   Wound Surface Area (cm^2) 0 cm^2 06/15/23 0926   Wound Volume (cm^3) 0 cm^3 06/15/23 0926   Calculated Wound Volume (cm^3) 0 cm^3 06/15/23 0926   Change in Wound Size % 100 06/15/23 0926   Drainage Amount Moderate 06/15/23 0926   Drainage Description Serosanguineous 06/15/23 0926   Non-staged Wound Description Full thickness 06/15/23 0926   Dressing Status Intact 06/15/23 0926       Incision 09/17/18 Chest Other (Comment) (Active)   Date First Assessed/Time First Assessed: 09/17/18 0922   Location: Chest  Wound Location Orientation: Other (Comment)  Wound Description (Comments):  Chest incision  CHG used  Incision 09/17/18 Chest Other (Comment) (Active)   Date First Assessed/Time First Assessed: 09/17/18 0922   Location: Chest  Wound Location Orientation: Other (Comment)  Wound Description (Comments):  Chest tube incisions  CHG used         Incision 11/27/18 Chest Left (Active)   Date First Assessed/Time First Assessed: 11/27/18 1520   Location: Chest  Wound Location Orientation: Left  Wound Description (Comments):  x2 port sites       Wound 06/08/23 Traumatic Elbow Left;Posterior (Active)   Date First Assessed/Time First Assessed: 06/08/23 0823   Present on Original Admission: Yes  Primary Wound Type: Traumatic  Location: Elbow  Wound Location Orientation: Left;Posterior       [REMOVED] Wound 09/18/18 Laceration Hand Right (Removed)   Resolved Date/Resolved Time: 06/15/23 0926  Date First Assessed/Time First Assessed: 09/18/18 1148   Traumatic Wound Type: Laceration  Location: Hand  Wound Location Orientation: Right  Dressing Status: Clean;Dry; Intact       [REMOVED] Incision 09/17/18 Leg Left (Removed)   Resolved Date/Resolved Time: 06/15/23 0926  Date First Assessed/Time First Assessed: 09/17/18 0922   Location: Leg  Wound Location Orientation: Left  Wound Description (Comments):  [facility] Site  Wound Outcome: Healed       [REMOVED] Incision 10/23/18 Back Mid;Lower; Left (Removed)   Resolved Date/Resolved Time: 06/08/23 0824  Date First Assessed/Time First Assessed: 10/23/18 1516   Pre-Existing Wound: No  Location: Back  Wound Location Orientation: Mid;Lower; Left  Wound Description (Comments): puncture for thora  Wound Outcome: U  Subjective:        F/u visit traumatic wound of left elbow  No new complaints  He denies any pain, fevers, or chills  The following portions of the patient's history were reviewed and updated as appropriate:   He  has a past medical history of Aortic stenosis, CKD (chronic kidney disease), Coronary artery disease, Cough, 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[address] ), GERD (gastroesophageal reflux disease), Glaucoma, Gout, History of prostate cancer, Hypertension, Hypothyroidism, Overweight, Peripheral neuropathy, idiopathic, Pleural effusion, left, Pure hypercholesterolemia, Visual impairment, and Weight gain    He   Patient Active Problem List    Diagnosis Date Noted   • Skin tear of left elbow without complication 76/82/0097   • CKD (chronic kidney disease), stage IV ([address] ) 03/03/2022 • Type 2 diabetes mellitus with diabetic neuropathy ([place] [address] ) 10/27/2021   • Chronic diastolic congestive heart failure ([place] [address] ) 01/24/2020   • Primary open angle glaucoma (POAG) 11/08/2019   • Age-related cataract of both eyes 11/08/2019   • Atherosclerosis of aorta ([place] [address] ) 11/08/2019   • Type 2 diabetes mellitus with chronic kidney disease, with long-term current use of insulin ([place] [address] ) 01/22/2019   • Ambulatory dysfunction 12/01/2018   • Anemia due to stage 3 chronic kidney disease ([place] [address] ) 11/27/2018   • CAD (coronary artery disease) 11/24/2018   • Aortic stenosis 11/24/2018   • Benign prostatic hyperplasia without lower urinary tract symptoms 09/21/2018   • Long term (current) use of insulin ([place] [address] ) 09/21/2018   • Presence of aortocoronary bypass graft 09/21/2018   • Presence of prosthetic heart valve 09/21/2018   • Unspecified glaucoma 09/21/2018   • Nonrheumatic aortic (valve) stenosis 09/21/2018   • Atherosclerotic heart disease of native coronary artery without angina pectoris 09/21/2018   • S/P CABG x 4 09/17/2018   • S/P AVR (aortic valve replacement) 09/17/2018   • GERD (gastroesophageal reflux disease)    • Benign prostatic hyperplasia with nocturia 02/13/2018   • Sexual dysfunction 10/09/2017   • Renal cyst 01/12/2017   • 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   • Pure hypercholesterolemia 11/12/2014   • Hypertensive heart disease with HF (heart failure) ([place] [address] ) 11/05/2014   • Hypothyroidism 11/05/2014   • Obesity, morbid ([place] [address] ) 11/05/2014     He  has a past surgical history that includes Thyroid surgery; Cardiac catheterization; pr coronary artery byp w/vein & artery graft 3 vein (N/A, 9/17/2018); pr rplcmt aortic valve opn w/stentless tissue valve (N/A, 9/17/2018); pr echo transesophag montr cardiac pump functj (N/A, 9/17/2018); IR thoracentesis (10/23/2018); IR thoracentesis (11/2/2018); IR thoracentesis (11/9/2018); IR thoracentesis (11/23/2018);  THORACOSCOPY VIDEO ASSISTED SURGERY (VATS) (Left, 11/27/2018); and Eye surgery  His family history includes Colon cancer in his son; Diabetes in his family, maternal grandmother, and mother; Pancreatic cancer in his brother  He  reports that he quit smoking about 53 years ago  His smoking use included cigarettes  He has a 0 25 pack-year smoking history  He has never used smokeless tobacco  He reports that he does not drink alcohol and does not use drugs    Current Outpatient Medications   Medication Sig Dispense Refill   • albuterol (PROVENTIL HFA,VENTOLIN HFA) 90 mcg/act inhaler Inhale 2 puffs 4 (four) times a day as needed (Patient not taking: Reported on 11/8/2022)     • allopurinol (ZYLOPRIM) 300 mg tablet TAKE ONE TABLET BY MOUTH ONCE DAILY 90 tablet 3   • ascorbic acid (VITAMIN C) 500 mg tablet TAKE ONE TABLET BY MOUTH DAILY 28 tablet 3   • aspirin (ECOTRIN) 325 mg EC tablet Take 1 tablet (325 mg total) by mouth daily 30 tablet 0   • Basaglar KwikPen 100 units/mL SOPN inject 24 units subcutaneously daily 15 mL 3   • benzonatate (TESSALON) 200 MG capsule Take 1 capsule (200 mg total) by mouth 3 (three) times a day as needed for cough (Patient not taking: Reported on 4/12/2023) 30 capsule 0   • cholecalciferol (VITAMIN D3) 1,000 units tablet Take 1 tablet (1,000 Units total) by mouth daily 90 tablet 3   • dorzolamide-timolol (COSOPT) 22 3-6 8 MG/ML ophthalmic solution Administer 1 drop to both eyes 2 (two) times a day     • FeroSul 325 (65 Fe) MG tablet TAKE ONE TABLET BY MOUTH DAILY 28 tablet 3   • gabapentin (NEURONTIN) 300 mg capsule TAKE ONE CAPSULE BY MOUTH DAILY AT BEDTIME 90 capsule 3   • insulin aspart (NovoLOG FlexPen) 100 UNIT/ML injection pen INJECT 11 UNITS SUBCUTANEOUSLY THREE TIMES DAILY 15 mL 3   • Insulin Pen Needle (Pen Needles) 32G X 4 MM MISC Use 4 (four) times a day (before meals and at bedtime) 100 each 11   • levothyroxine 150 mcg tablet Take 1 tablet (150 mcg total) by mouth daily 30 tablet 5   • LORazepam (ATIVAN) 0 5 mg tablet TAKE ONE TABLET BY MOUTH EVERY EIGHT HOURS AS NEEDED FOR ANXIETY 30 tablet 3   • metoprolol tartrate (LOPRESSOR) 25 mg tablet Take 12 5 mg by mouth daily     • olmesartan (BENICAR) 5 mg tablet Take 1 tablet (5 mg total) by mouth daily 90 tablet 3   • pantoprazole (PROTONIX) 20 mg tablet TAKE ONE TABLET BY MOUTH DAILY 30 tablet 3   • rosuvastatin (CRESTOR) 40 MG tablet TAKE ONE TABLET BY MOUTH DAILY 30 tablet 3   • tamsulosin (FLOMAX) 0 4 mg TAKE ONE CAPSULE BY MOUTH DAILY WITH dinner 30 capsule 6   • timolol (TIMOPTIC) 0 5 % ophthalmic solution Administer 1 drop to both eyes 2 (two) times a day (Patient not taking: Reported on 6/4/2023)     • torsemide (DEMADEX) 10 mg tablet TAKE ONE TABLET BY MOUTH EVERY DAY 90 tablet 0     No current facility-administered medications for this visit  He is allergic to amlodipine and atorvastatin       Review of Systems   Constitutional: Negative  HENT: Negative for ear pain and hearing loss  Eyes: Negative for pain  Respiratory: Negative for chest tightness and shortness of breath  Cardiovascular: Negative for chest pain, palpitations and leg swelling  Gastrointestinal: Negative for diarrhea, nausea and vomiting  Genitourinary: Negative for dysuria  Musculoskeletal: Negative for gait problem  Skin: Positive for wound  Neurological: Negative for tremors and weakness  Psychiatric/Behavioral: Negative for behavioral problems, confusion and suicidal ideas           Objective:       Wound 06/08/23 Traumatic Elbow Left;Posterior (Active)   Wound Image Images linked 06/15/23 0931   Wound Description Hypergranulation 06/15/23 0926   Samanta-wound Assessment Intact 06/15/23 0926   Wound Length (cm) 1 cm 06/15/23 0926   Wound Width (cm) 0 cm 06/15/23 0926   Wound Depth (cm) 0 1 cm 06/15/23 0926   Wound Surface Area (cm^2) 0 cm^2 06/15/23 0926   Wound Volume (cm^3) 0 cm^3 06/15/23 0926   Calculated Wound Volume (cm^3) 0 cm^3 06/15/23 0926   Change in Wound Size % 100 06/15/23 0926   Drainage Amount Moderate 06/15/23 0926   Drainage Description Serosanguineous 06/15/23 0926   Non-staged Wound Description Full thickness 06/15/23 0926   Dressing Status Intact 06/15/23 0926       /80   Pulse 60   Temp (!) 96 6 °F (35 9 °C)   Resp 16     Physical Exam  Vitals and nursing note reviewed  Constitutional:       General: He is not in acute distress  Appearance: Normal appearance  He is normal weight  HENT:      Head: Normocephalic and atraumatic  Eyes:      General:         Right eye: No discharge  Pulmonary:      Effort: Pulmonary effort is normal  No respiratory distress  Musculoskeletal:         General: Normal range of motion  Cervical back: Normal range of motion and neck supple  No rigidity  Right lower leg: No edema  Left lower leg: No edema  Skin:     General: Skin is warm and dry  Findings: Wound present  No erythema  Neurological:      General: No focal deficit present  Mental Status: He is alert and oriented to person, place, and time  Mental status is at baseline  Psychiatric:         Mood and Affect: Mood normal          Behavior: Behavior normal          Thought Content: Thought content normal          Judgment: Judgment normal                    Wound Instructions:  Orders Placed This Encounter   Procedures   • Wound cleansing and dressings     Left elbow wound    Wash your hands with soap and water  Remove old dressing, discard into plastic bag and place in trash  Cleanse the wound with soap and water prior to applying a clean dressing  Do not use tissue or cotton balls  Do not scrub the wound  Pat dry using gauze  Shower yes   Apply moisturizer to dry skin  Apply dermagran gauze to the open wound  Cover with gauze  Secure with tubifast blue or damir and tape  Change dressing every other day  Done today     Standing Status:   Future     Standing Expiration Date:   6/15/2024        Diagnosis ICD-10-CM Associated Orders   1   Open wound of left elbow, initial encounter  S51 002A lidocaine (LMX) 4 % cream     Wound cleansing and dressings

## 2023-06-15 NOTE — PATIENT INSTRUCTIONS
Orders Placed This Encounter   Procedures    Wound cleansing and dressings     Left elbow wound    Wash your hands with soap and water  Remove old dressing, discard into plastic bag and place in trash  Cleanse the wound with soap and water prior to applying a clean dressing  Do not use tissue or cotton balls  Do not scrub the wound  Pat dry using gauze  Shower yes   Apply moisturizer to dry skin  Apply dermagran gauze to the open wound    Cover with gauze  Secure with tubifast blue or damir and tape  Change dressing every other day  Done today     Standing Status:   Future     Standing Expiration Date:   6/15/2024

## 2023-06-29 ENCOUNTER — OFFICE VISIT (OUTPATIENT)
Dept: WOUND CARE | Facility: HOSPITAL | Age: 88
End: 2023-06-29
Payer: COMMERCIAL

## 2023-06-29 VITALS
RESPIRATION RATE: 16 BRPM | SYSTOLIC BLOOD PRESSURE: 110 MMHG | DIASTOLIC BLOOD PRESSURE: 60 MMHG | TEMPERATURE: 97.5 F | HEART RATE: 68 BPM

## 2023-06-29 DIAGNOSIS — S51.002A OPEN WOUND OF LEFT ELBOW, INITIAL ENCOUNTER: Primary | ICD-10-CM

## 2023-06-29 PROCEDURE — 99212 OFFICE O/P EST SF 10 MIN: CPT | Performed by: NURSE PRACTITIONER

## 2023-06-29 NOTE — PATIENT INSTRUCTIONS
Orders Placed This Encounter   Procedures    Wound cleansing and dressings     Left elbow  Wound is healed today  Keep area moisturized  Purchase tressa   Or dianna sleeves sleeves for protection if desired    Done today     Standing Status:   Future     Standing Expiration Date:   6/29/2024

## 2023-06-29 NOTE — PROGRESS NOTES
Patient ID: Danyel Krishnan  is a 80 y o  male Date of Birth 1935     Chief Complaint  Chief Complaint   Patient presents with   • Follow Up Wound Care Visit     Dressing intact on arrival, reports he thinks it is healed  Allergies  Amlodipine and Atorvastatin    Assessment:     Diagnoses and all orders for this visit:    Open wound of left elbow, initial encounter  -     Wound cleansing and dressings; Future          Plan:  1  F/U visit  Wound epithelialized  Counseled patient to moisturize area daily with lotion  Patient is discharged in the wound center today  He may follow-up as needed     Wound 06/08/23 Traumatic Elbow Left;Posterior (Active)   Wound Image Images linked 06/29/23 1042   Wound Description Epithelialization 06/29/23 1042   Wound Length (cm) 0 cm 06/29/23 1042   Wound Width (cm) 0 cm 06/29/23 1042   Wound Depth (cm) 0 cm 06/29/23 1042   Wound Surface Area (cm^2) 0 cm^2 06/29/23 1042   Wound Volume (cm^3) 0 cm^3 06/29/23 1042   Calculated Wound Volume (cm^3) 0 cm^3 06/29/23 1042   Change in Wound Size % 100 06/29/23 1042   Drainage Amount None 06/29/23 1042   Non-staged Wound Description Not applicable 30/84/60 9344   Dressing Status Intact 06/29/23 1042       Wound 06/08/23 Traumatic Elbow Left;Posterior (Active)   Date First Assessed/Time First Assessed: 06/08/23 0823   Present on Original Admission: Yes  Primary Wound Type: Traumatic  Location: Elbow  Wound Location Orientation: Left;Posterior       [REMOVED] Incision 09/17/18 Chest Other (Comment) (Removed)   Resolved Date/Resolved Time: 06/29/23 1043  Date First Assessed/Time First Assessed: 09/17/18 0969   Location: Chest  Wound Location Orientation: Other (Comment)  Wound Description (Comments):  Chest incision  CHG used    Wound Outcome: Healed       [REMOVED] Incision 09/17/18 Chest Other (Comment) (Removed)   Resolved Date/Resolved Time: 06/29/23 1043  Date First Assessed/Time First Assessed: 09/17/18 1464   Location: Chest Wound Location Orientation: Other (Comment)  Wound Description (Comments):  Chest tube incisions  CHG used  Wound Outcome: Healed       [REMOVED] Incision 11/27/18 Chest Left (Removed)   Resolved Date/Resolved Time: 06/29/23 1043  Date First Assessed/Time First Assessed: 11/27/18 1520   Location: Chest  Wound Location Orientation: Left  Wound Description (Comments):  x2 port sites  Wound Outcome: Healed       Subjective:        F/u visit for traumatic wound of left elbow  No new complaints  He denies any pain, fevers, or chills  The following portions of the patient's history were reviewed and updated as appropriate:   He  has a past medical history of Aortic stenosis, CKD (chronic kidney disease), Coronary artery disease, Cough, 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[facility] [address] ), GERD (gastroesophageal reflux disease), Glaucoma, Gout, History of prostate cancer, Hypertension, Hypothyroidism, Overweight, Peripheral neuropathy, idiopathic, Pleural effusion, left, Pure hypercholesterolemia, Visual impairment, and Weight gain    He   Patient Active Problem List    Diagnosis Date Noted   • Skin tear of left elbow without complication 02/78/9787   • CKD (chronic kidney disease), stage IV ([facility] [address] ) 03/03/2022   • Type 2 diabetes mellitus with diabetic neuropathy [facility] ) 10/27/2021   • Chronic diastolic congestive heart failure ([facility] ) 01/24/2020   • Primary open angle glaucoma (POAG) 11/08/2019   • Age-related cataract of both eyes 11/08/2019   • Atherosclerosis of aorta ([facility] ) 11/08/2019   • Type 2 diabetes mellitus with chronic kidney disease, with long-term current use of insulin ([facility] ) 01/22/2019   • Ambulatory dysfunction 12/01/2018   • Anemia due to stage 3 chronic kidney disease ([facility] ) 11/27/2018   • CAD (coronary artery disease) 11/24/2018   • Aortic stenosis 11/24/2018   • Benign prostatic hyperplasia without lower urinary tract symptoms 09/21/2018   • Long term (current) use of insulin ([facility] ) 09/21/2018   • Presence of aortocoronary bypass graft 09/21/2018   • Presence of prosthetic heart valve 09/21/2018   • Unspecified glaucoma 09/21/2018   • Nonrheumatic aortic (valve) stenosis 09/21/2018   • Atherosclerotic heart disease of native coronary artery without angina pectoris 09/21/2018   • S/P CABG x 4 09/17/2018   • S/P AVR (aortic valve replacement) 09/17/2018   • GERD (gastroesophageal reflux disease)    • Benign prostatic hyperplasia with nocturia 02/13/2018   • Sexual dysfunction 10/09/2017   • Renal cyst 01/12/2017   • 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   • Pure hypercholesterolemia 11/12/2014   • Hypertensive heart disease with HF (heart failure) ([facility] ) 11/05/2014   • Hypothyroidism 11/05/2014   • Obesity, morbid ([facility] ) 11/05/2014     He  has a past surgical history that includes Thyroid surgery; Cardiac catheterization; pr coronary artery byp w/vein & artery graft 3 vein (N/A, 9/17/2018); pr rplcmt aortic valve opn w/stentless tissue valve (N/A, 9/17/2018); pr echo transesophag montr cardiac pump functj (N/A, 9/17/2018); IR thoracentesis (10/23/2018); IR thoracentesis (11/2/2018); IR thoracentesis (11/9/2018); IR thoracentesis (11/23/2018); THORACOSCOPY VIDEO ASSISTED SURGERY (VATS) (Left, 11/27/2018); and Eye surgery  His family history includes Colon cancer in his son; Diabetes in his family, maternal grandmother, and mother; Pancreatic cancer in his brother  He  reports that he quit smoking about 53 years ago  His smoking use included cigarettes  He has a 0 25 pack-year smoking history  He has never used smokeless tobacco  He reports that he does not drink alcohol and does not use drugs    Current Outpatient Medications   Medication Sig Dispense Refill   • albuterol (PROVENTIL HFA,VENTOLIN HFA) 90 mcg/act inhaler Inhale 2 puffs 4 (four) times a day as needed (Patient not taking: Reported on 11/8/2022)     • allopurinol (ZYLOPRIM) 300 mg tablet TAKE ONE TABLET BY MOUTH ONCE DAILY 90 tablet 3   • ascorbic acid (VITAMIN C) 500 mg tablet TAKE ONE TABLET BY MOUTH DAILY 28 tablet 3   • aspirin (ECOTRIN) 325 mg EC tablet Take 1 tablet (325 mg total) by mouth daily 30 tablet 0   • Basaglar KwikPen 100 units/mL SOPN inject 24 units subcutaneously daily 15 mL 3   • benzonatate (TESSALON) 200 MG capsule Take 1 capsule (200 mg total) by mouth 3 (three) times a day as needed for cough (Patient not taking: Reported on 4/12/2023) 30 capsule 0   • cholecalciferol (VITAMIN D3) 1,000 units tablet Take 1 tablet (1,000 Units total) by mouth daily 90 tablet 3   • dorzolamide-timolol (COSOPT) 22 3-6 8 MG/ML ophthalmic solution Administer 1 drop to both eyes 2 (two) times a day     • FeroSul 325 (65 Fe) MG tablet TAKE ONE TABLET BY MOUTH DAILY 28 tablet 3   • gabapentin (NEURONTIN) 300 mg capsule TAKE ONE CAPSULE BY MOUTH DAILY AT BEDTIME 90 capsule 3   • insulin aspart (NovoLOG FlexPen) 100 UNIT/ML injection pen INJECT 11 UNITS SUBCUTANEOUSLY THREE TIMES DAILY 15 mL 3   • Insulin Pen Needle (Pen Needles) 32G X 4 MM MISC Use 4 (four) times a day (before meals and at bedtime) 100 each 11   • levothyroxine 150 mcg tablet Take 1 tablet (150 mcg total) by mouth daily 30 tablet 5   • LORazepam (ATIVAN) 0 5 mg tablet TAKE ONE TABLET BY MOUTH EVERY EIGHT HOURS AS NEEDED FOR ANXIETY 30 tablet 3   • metoprolol tartrate (LOPRESSOR) 25 mg tablet Take 12 5 mg by mouth daily     • olmesartan (BENICAR) 5 mg tablet Take 1 tablet (5 mg total) by mouth daily 90 tablet 3   • pantoprazole (PROTONIX) 20 mg tablet TAKE ONE TABLET BY MOUTH DAILY 30 tablet 3   • rosuvastatin (CRESTOR) 40 MG tablet TAKE ONE TABLET BY MOUTH DAILY 30 tablet 3   • tamsulosin (FLOMAX) 0 4 mg TAKE ONE CAPSULE BY MOUTH DAILY WITH dinner 30 capsule 6   • timolol (TIMOPTIC) 0 5 % ophthalmic solution Administer 1 drop to both eyes 2 (two) times a day (Patient not taking: Reported on 6/4/2023)     • torsemide (DEMADEX) 10 mg tablet TAKE ONE TABLET BY MOUTH EVERY DAY 90 tablet 0     No current facility-administered medications for this visit  He is allergic to amlodipine and atorvastatin       Review of Systems   Constitutional: Negative  HENT: Negative for ear pain and hearing loss  Eyes: Negative for pain  Respiratory: Negative for chest tightness and shortness of breath  Cardiovascular: Negative for chest pain, palpitations and leg swelling  Gastrointestinal: Negative for diarrhea, nausea and vomiting  Genitourinary: Negative for dysuria  Musculoskeletal: Negative for gait problem  Skin: Positive for wound  Neurological: Negative for tremors and weakness  Psychiatric/Behavioral: Negative for behavioral problems, confusion and suicidal ideas  Objective:       Wound 06/08/23 Traumatic Elbow Left;Posterior (Active)   Wound Image Images linked 06/29/23 1042   Wound Description Epithelialization 06/29/23 1042   Wound Length (cm) 0 cm 06/29/23 1042   Wound Width (cm) 0 cm 06/29/23 1042   Wound Depth (cm) 0 cm 06/29/23 1042   Wound Surface Area (cm^2) 0 cm^2 06/29/23 1042   Wound Volume (cm^3) 0 cm^3 06/29/23 1042   Calculated Wound Volume (cm^3) 0 cm^3 06/29/23 1042   Change in Wound Size % 100 06/29/23 1042   Drainage Amount None 06/29/23 1042   Non-staged Wound Description Not applicable 77/53/61 2825   Dressing Status Intact 06/29/23 1042       /60   Pulse 68   Temp 97 5 °F (36 4 °C)   Resp 16     Physical Exam  Vitals and nursing note reviewed  Constitutional:       General: He is not in acute distress  Appearance: Normal appearance  He is normal weight  HENT:      Head: Normocephalic and atraumatic  Eyes:      General:         Right eye: No discharge  Left eye: No discharge  Pulmonary:      Effort: Pulmonary effort is normal  No respiratory distress  Musculoskeletal:         General: Normal range of motion  Cervical back: Normal range of motion and neck supple  No rigidity  Right lower leg: No edema  Left lower leg: No edema     Skin:     General: Skin is warm and dry  Findings: Wound present  No erythema  Neurological:      General: No focal deficit present  Mental Status: He is alert and oriented to person, place, and time  Mental status is at baseline  Psychiatric:         Mood and Affect: Mood normal          Behavior: Behavior normal          Thought Content: Thought content normal          Judgment: Judgment normal                    Wound Instructions:  Orders Placed This Encounter   Procedures   • Wound cleansing and dressings     Left elbow  Wound is healed today  Keep area moisturized  Purchase tressa  Or dianna sleeves sleeves for protection if desired    Done today     Standing Status:   Future     Standing Expiration Date:   6/29/2024        Diagnosis ICD-10-CM Associated Orders   1   Open wound of left elbow, initial encounter  S51 002A Wound cleansing and dressings

## 2023-07-17 DIAGNOSIS — Z95.1 S/P CABG X 4: ICD-10-CM

## 2023-07-17 DIAGNOSIS — F41.9 ANXIETY: ICD-10-CM

## 2023-07-17 DIAGNOSIS — Z00.00 HEALTHCARE MAINTENANCE: ICD-10-CM

## 2023-07-17 RX ORDER — PANTOPRAZOLE SODIUM 20 MG/1
TABLET, DELAYED RELEASE ORAL
Qty: 30 TABLET | Refills: 3 | Status: SHIPPED | OUTPATIENT
Start: 2023-07-17

## 2023-07-17 RX ORDER — ASCORBIC ACID 500 MG
TABLET ORAL
Qty: 28 TABLET | Refills: 3 | Status: SHIPPED | OUTPATIENT
Start: 2023-07-17

## 2023-07-18 RX ORDER — LORAZEPAM 0.5 MG/1
TABLET ORAL
Qty: 30 TABLET | Refills: 3 | Status: SHIPPED | OUTPATIENT
Start: 2023-07-18

## 2023-07-24 ENCOUNTER — TELEPHONE (OUTPATIENT)
Dept: FAMILY MEDICINE CLINIC | Facility: HOSPITAL | Age: 88
End: 2023-07-24

## 2023-07-24 NOTE — TELEPHONE ENCOUNTER
Pt states [employer] is threatening to shut off his electric. Pt is requesting letter of medical necessity stating that he needs his electric on.    Pt is asking call to be made to [facility] [phone] PCB pt with any info

## 2023-07-25 NOTE — TELEPHONE ENCOUNTER
[employer] faxing over form to be filled out. Needs to be done within 7 days.  Then the account will be good for 30 days

## 2023-08-07 ENCOUNTER — RA CDI HCC (OUTPATIENT)
Dept: OTHER | Facility: HOSPITAL | Age: 88
End: 2023-08-07

## 2023-08-11 ENCOUNTER — OFFICE VISIT (OUTPATIENT)
Dept: FAMILY MEDICINE CLINIC | Facility: HOSPITAL | Age: 88
End: 2023-08-11
Payer: COMMERCIAL

## 2023-08-11 VITALS
HEART RATE: 80 BPM | DIASTOLIC BLOOD PRESSURE: 78 MMHG | BODY MASS INDEX: 33.9 KG/M2 | WEIGHT: 216 LBS | HEIGHT: 67 IN | OXYGEN SATURATION: 96 % | SYSTOLIC BLOOD PRESSURE: 128 MMHG

## 2023-08-11 DIAGNOSIS — N25.81 SECONDARY HYPERPARATHYROIDISM (HCC): ICD-10-CM

## 2023-08-11 DIAGNOSIS — E03.9 ACQUIRED HYPOTHYROIDISM: Chronic | ICD-10-CM

## 2023-08-11 DIAGNOSIS — E11.22 TYPE 2 DIABETES MELLITUS WITH STAGE 3B CHRONIC KIDNEY DISEASE, WITH LONG-TERM CURRENT USE OF INSULIN (HCC): Primary | ICD-10-CM

## 2023-08-11 DIAGNOSIS — E03.9 HYPOTHYROIDISM, UNSPECIFIED TYPE: ICD-10-CM

## 2023-08-11 DIAGNOSIS — Z79.4 TYPE 2 DIABETES MELLITUS WITH STAGE 3B CHRONIC KIDNEY DISEASE, WITH LONG-TERM CURRENT USE OF INSULIN (HCC): Primary | ICD-10-CM

## 2023-08-11 DIAGNOSIS — N18.32 TYPE 2 DIABETES MELLITUS WITH STAGE 3B CHRONIC KIDNEY DISEASE, WITH LONG-TERM CURRENT USE OF INSULIN (HCC): Primary | ICD-10-CM

## 2023-08-11 LAB — SL AMB POCT HEMOGLOBIN AIC: 8.1 (ref ?–6.5)

## 2023-08-11 PROCEDURE — 83036 HEMOGLOBIN GLYCOSYLATED A1C: CPT | Performed by: INTERNAL MEDICINE

## 2023-08-11 PROCEDURE — 99214 OFFICE O/P EST MOD 30 MIN: CPT | Performed by: INTERNAL MEDICINE

## 2023-08-11 RX ORDER — LEVOTHYROXINE SODIUM 0.15 MG/1
TABLET ORAL
Qty: 90 TABLET | Refills: 5 | Status: SHIPPED | OUTPATIENT
Start: 2023-08-11

## 2023-08-11 NOTE — PROGRESS NOTES
Assessment/Plan:     Diagnosis ICD-10-CM Associated Orders   1. Type 2 diabetes mellitus with stage 3b chronic kidney disease, with long-term current use of insulin ([facility])  E11.22 POCT hemoglobin A1c    N18.32 CBC and differential    Z79.4 Comprehensive metabolic panel     Lipid Panel with Direct LDL reflex     Hemoglobin A1C     CBC and differential     Comprehensive metabolic panel     Lipid Panel with Direct LDL reflex      2. Acquired hypothyroidism  E03.9       3. Hypothyroidism, unspecified type  E03.9 levothyroxine 150 mcg tablet     TSH, 3rd generation with Free T4 reflex     TSH, 3rd generation with Free T4 reflex      4. Secondary hyperparathyroidism ([address])  N25.81           Problem List Items Addressed This Visit        Endocrine    Hypothyroidism (Chronic)    Relevant Medications    levothyroxine 150 mcg tablet    Other Relevant Orders    TSH, 3rd generation with Free T4 reflex    Type 2 diabetes mellitus with chronic kidney disease, with long-term current use of insulin ([address]) - Primary    Relevant Orders    POCT hemoglobin A1c (Completed)    CBC and differential    Comprehensive metabolic panel    Lipid Panel with Direct LDL reflex    Hemoglobin A1C    Secondary hyperparathyroidism ([address])     Will repeat  labs in november              Return in about 3 months (around 11/11/2023) for [facility]. Subjective:    Patient ID: Claudene Loosen. is a 80 y.o. male    Here for 4 month folllow up   gets meals on wheels   mows his grass- not walking much due to knee pain- walks in store- uses cane when outside      The following portions of the patient's history were reviewed and updated as appropriate: allergies, current medications and problem list.     Review of Systems   Constitutional: Negative for fatigue. Respiratory: Negative for shortness of breath. Musculoskeletal: Positive for back pain and joint swelling.         Some knee pain   All other systems reviewed and are negative.         Objective:      Current Outpatient Medications:   •  allopurinol (ZYLOPRIM) 300 mg tablet, TAKE ONE TABLET BY MOUTH ONCE DAILY, Disp: 90 tablet, Rfl: 3  •  ascorbic acid (VITAMIN C) 500 mg tablet, TAKE ONE TABLET BY MOUTH DAILY, Disp: 28 tablet, Rfl: 3  •  Basaglar KwikPen 100 units/mL SOPN, inject 24 units subcutaneously daily, Disp: 15 mL, Rfl: 3  •  cholecalciferol (VITAMIN D3) 1,000 units tablet, Take 1 tablet (1,000 Units total) by mouth daily, Disp: 90 tablet, Rfl: 3  •  dorzolamide-timolol (COSOPT) 22.3-6.8 MG/ML ophthalmic solution, Administer 1 drop to both eyes 2 (two) times a day, Disp: , Rfl:   •  FeroSul 325 (65 Fe) MG tablet, TAKE ONE TABLET BY MOUTH DAILY, Disp: 28 tablet, Rfl: 3  •  gabapentin (NEURONTIN) 300 mg capsule, TAKE ONE CAPSULE BY MOUTH DAILY AT BEDTIME, Disp: 90 capsule, Rfl: 3  •  insulin aspart (NovoLOG FlexPen) 100 UNIT/ML injection pen, INJECT 11 UNITS SUBCUTANEOUSLY THREE TIMES DAILY, Disp: 15 mL, Rfl: 3  •  Insulin Pen Needle (Pen Needles) 32G X 4 MM MISC, Use 4 (four) times a day (before meals and at bedtime), Disp: 100 each, Rfl: 11  •  levothyroxine 150 mcg tablet, 1/2 tab on Sundays, whole tab other days, Disp: 90 tablet, Rfl: 5  •  LORazepam (ATIVAN) 0.5 mg tablet, TAKE ONE TABLET BY MOUTH EVERY EIGHT HOURS AS NEEDED FOR ANXIETY, Disp: 30 tablet, Rfl: 3  •  metoprolol tartrate (LOPRESSOR) 25 mg tablet, Take 12.5 mg by mouth daily, Disp: , Rfl:   •  olmesartan (BENICAR) 5 mg tablet, Take 1 tablet (5 mg total) by mouth daily, Disp: 90 tablet, Rfl: 3  •  pantoprazole (PROTONIX) 20 mg tablet, TAKE ONE TABLET BY MOUTH DAILY, Disp: 30 tablet, Rfl: 3  •  rosuvastatin (CRESTOR) 40 MG tablet, TAKE ONE TABLET BY MOUTH DAILY, Disp: 30 tablet, Rfl: 3  •  tamsulosin (FLOMAX) 0.4 mg, TAKE ONE CAPSULE BY MOUTH DAILY WITH dinner, Disp: 30 capsule, Rfl: 6  •  torsemide (DEMADEX) 10 mg tablet, TAKE ONE TABLET BY MOUTH EVERY DAY, Disp: 90 tablet, Rfl: 0  •  aspirin (ECOTRIN) 325 mg EC tablet, Take 1 tablet (325 mg total) by mouth daily (Patient not taking: Reported on 8/11/2023), Disp: 30 tablet, Rfl: 0    Blood pressure 128/78, pulse 80, height 5' 7" (1.702 m), weight 98 kg (216 lb), SpO2 96 %. Physical Exam  Vitals and nursing note reviewed. Constitutional:       General: He is not in acute distress. HENT:      Head: Normocephalic. Right Ear: There is no impacted cerumen. Nose: No congestion. Mouth/Throat:      Pharynx: No oropharyngeal exudate. Eyes:      General:         Right eye: No discharge. Cardiovascular:      Rate and Rhythm: Normal rate. Heart sounds: No murmur heard. Pulmonary:      Breath sounds: No wheezing. Abdominal:      Tenderness: There is no rebound. Musculoskeletal:         General: No tenderness. Psychiatric:         Mood and Affect: Mood normal.         Thought Content:  Thought content normal.         Judgment: Judgment normal.

## 2023-09-05 DIAGNOSIS — Z95.1 S/P CABG X 4: ICD-10-CM

## 2023-09-05 DIAGNOSIS — Z95.2 S/P AVR (AORTIC VALVE REPLACEMENT): ICD-10-CM

## 2023-09-05 DIAGNOSIS — R60.9 EDEMA, UNSPECIFIED TYPE: ICD-10-CM

## 2023-09-05 RX ORDER — TORSEMIDE 10 MG/1
TABLET ORAL
Qty: 90 TABLET | Refills: 0 | Status: SHIPPED | OUTPATIENT
Start: 2023-09-05

## 2023-09-06 RX ORDER — ROSUVASTATIN CALCIUM 40 MG/1
TABLET, COATED ORAL
Qty: 90 TABLET | Refills: 3 | Status: SHIPPED | OUTPATIENT
Start: 2023-09-06

## 2023-09-11 DIAGNOSIS — R35.1 BENIGN PROSTATIC HYPERPLASIA WITH NOCTURIA: ICD-10-CM

## 2023-09-11 DIAGNOSIS — N40.1 BENIGN PROSTATIC HYPERPLASIA WITH NOCTURIA: ICD-10-CM

## 2023-09-11 RX ORDER — TAMSULOSIN HYDROCHLORIDE 0.4 MG/1
CAPSULE ORAL
Qty: 30 CAPSULE | Refills: 6 | Status: SHIPPED | OUTPATIENT
Start: 2023-09-11

## 2023-10-09 DIAGNOSIS — N18.32 TYPE 2 DIABETES MELLITUS WITH STAGE 3B CHRONIC KIDNEY DISEASE, WITH LONG-TERM CURRENT USE OF INSULIN (HCC): Primary | ICD-10-CM

## 2023-10-09 DIAGNOSIS — E11.29 MICROALBUMINURIA DUE TO TYPE 2 DIABETES MELLITUS: ICD-10-CM

## 2023-10-09 DIAGNOSIS — R80.9 MICROALBUMINURIA DUE TO TYPE 2 DIABETES MELLITUS: ICD-10-CM

## 2023-10-09 DIAGNOSIS — E11.22 TYPE 2 DIABETES MELLITUS WITH STAGE 3B CHRONIC KIDNEY DISEASE, WITH LONG-TERM CURRENT USE OF INSULIN (HCC): Primary | ICD-10-CM

## 2023-10-09 DIAGNOSIS — Z79.4 TYPE 2 DIABETES MELLITUS WITH STAGE 3B CHRONIC KIDNEY DISEASE, WITH LONG-TERM CURRENT USE OF INSULIN (HCC): Primary | ICD-10-CM

## 2023-10-09 NOTE — PROGRESS NOTES
Labs show microalbumin elevated 125-   last seen by nephrology in  fall 2022- will place order for followup=   [facility] office would work best for patient  gfr in April ordered by endocrin eteam shows  gfr 21- likely ckd stage 4 now

## 2023-10-11 ENCOUNTER — TELEPHONE (OUTPATIENT)
Dept: NEPHROLOGY | Facility: CLINIC | Age: 88
End: 2023-10-11

## 2023-10-11 NOTE — TELEPHONE ENCOUNTER
LVM to return call. There is a referral in the [address]. Pt follows with Dr. Fallon Saini in [address]. This will be overdue follow up.

## 2023-10-13 ENCOUNTER — ANTICOAG VISIT (OUTPATIENT)
Dept: FAMILY MEDICINE CLINIC | Facility: HOSPITAL | Age: 88
End: 2023-10-13

## 2023-10-13 ENCOUNTER — TELEPHONE (OUTPATIENT)
Dept: FAMILY MEDICINE CLINIC | Facility: HOSPITAL | Age: 88
End: 2023-10-13

## 2023-10-13 NOTE — TELEPHONE ENCOUNTER
RAYMONM to return call to schedule overdue follow up. I wanted to see if pt could come in on 11/22/23 to see Kaila Washington in the [employer]

## 2023-11-06 DIAGNOSIS — N18.4 CKD (CHRONIC KIDNEY DISEASE), STAGE IV (HCC): ICD-10-CM

## 2023-11-06 RX ORDER — OLMESARTAN MEDOXOMIL 5 MG/1
5 TABLET ORAL DAILY
Qty: 90 TABLET | Refills: 3 | Status: SHIPPED | OUTPATIENT
Start: 2023-11-06

## 2023-11-07 ENCOUNTER — RA CDI HCC (OUTPATIENT)
Dept: OTHER | Facility: HOSPITAL | Age: 88
End: 2023-11-07

## 2023-11-07 NOTE — PROGRESS NOTES
I13.0, E11.65  [address] [facility] coding opportunities          Chart Reviewed number of suggestions sent to Provider: 2     Patients Insurance     Medicare Insurance: [employer] Advantage

## 2023-11-14 ENCOUNTER — OFFICE VISIT (OUTPATIENT)
Dept: FAMILY MEDICINE CLINIC | Facility: HOSPITAL | Age: 88
End: 2023-11-14
Payer: COMMERCIAL

## 2023-11-14 VITALS
SYSTOLIC BLOOD PRESSURE: 132 MMHG | HEIGHT: 67 IN | DIASTOLIC BLOOD PRESSURE: 68 MMHG | OXYGEN SATURATION: 92 % | WEIGHT: 214 LBS | BODY MASS INDEX: 33.59 KG/M2 | HEART RATE: 63 BPM

## 2023-11-14 DIAGNOSIS — N18.32 TYPE 2 DIABETES MELLITUS WITH STAGE 3B CHRONIC KIDNEY DISEASE, WITH LONG-TERM CURRENT USE OF INSULIN (HCC): ICD-10-CM

## 2023-11-14 DIAGNOSIS — Z79.4 TYPE 2 DIABETES MELLITUS WITH DIABETIC NEUROPATHY, WITH LONG-TERM CURRENT USE OF INSULIN (HCC): ICD-10-CM

## 2023-11-14 DIAGNOSIS — E11.40 TYPE 2 DIABETES MELLITUS WITH DIABETIC NEUROPATHY, WITH LONG-TERM CURRENT USE OF INSULIN (HCC): ICD-10-CM

## 2023-11-14 DIAGNOSIS — E11.22 TYPE 2 DIABETES MELLITUS WITH STAGE 3B CHRONIC KIDNEY DISEASE, WITH LONG-TERM CURRENT USE OF INSULIN (HCC): ICD-10-CM

## 2023-11-14 DIAGNOSIS — I11.0 HYPERTENSIVE HEART DISEASE WITH HF (HEART FAILURE) (HCC): ICD-10-CM

## 2023-11-14 DIAGNOSIS — E11.29 MICROALBUMINURIA DUE TO TYPE 2 DIABETES MELLITUS: ICD-10-CM

## 2023-11-14 DIAGNOSIS — Z79.4 TYPE 2 DIABETES MELLITUS WITH STAGE 3B CHRONIC KIDNEY DISEASE, WITH LONG-TERM CURRENT USE OF INSULIN (HCC): ICD-10-CM

## 2023-11-14 DIAGNOSIS — R80.9 MICROALBUMINURIA DUE TO TYPE 2 DIABETES MELLITUS: ICD-10-CM

## 2023-11-14 DIAGNOSIS — I50.32 CHRONIC DIASTOLIC CONGESTIVE HEART FAILURE (HCC): ICD-10-CM

## 2023-11-14 DIAGNOSIS — N18.4 CKD (CHRONIC KIDNEY DISEASE), STAGE IV (HCC): ICD-10-CM

## 2023-11-14 DIAGNOSIS — N25.81 SECONDARY HYPERPARATHYROIDISM (HCC): ICD-10-CM

## 2023-11-14 DIAGNOSIS — I25.10 ATHEROSCLEROSIS OF NATIVE CORONARY ARTERY OF NATIVE HEART WITHOUT ANGINA PECTORIS: ICD-10-CM

## 2023-11-14 DIAGNOSIS — E89.0 POSTOPERATIVE HYPOTHYROIDISM: Primary | Chronic | ICD-10-CM

## 2023-11-14 DIAGNOSIS — E03.9 HYPOTHYROIDISM, UNSPECIFIED TYPE: ICD-10-CM

## 2023-11-14 LAB
ALBUMIN SERPL-MCNC: 3.9 G/DL (ref 3.6–5.1)
ALBUMIN/GLOB SERPL: 1.6 (CALC) (ref 1–2.5)
ALP SERPL-CCNC: 59 U/L (ref 35–144)
ALT SERPL-CCNC: 33 U/L (ref 9–46)
AST SERPL-CCNC: 46 U/L (ref 10–35)
BASOPHILS # BLD AUTO: 99 CELLS/UL (ref 0–200)
BASOPHILS NFR BLD AUTO: 1.5 %
BILIRUB SERPL-MCNC: 1.1 MG/DL (ref 0.2–1.2)
BUN SERPL-MCNC: 43 MG/DL (ref 7–25)
BUN/CREAT SERPL: 17 (CALC) (ref 6–22)
CALCIUM SERPL-MCNC: 9.7 MG/DL (ref 8.6–10.3)
CHLORIDE SERPL-SCNC: 107 MMOL/L (ref 98–110)
CHOLEST SERPL-MCNC: 123 MG/DL
CHOLEST/HDLC SERPL: 4.1 (CALC)
CO2 SERPL-SCNC: 26 MMOL/L (ref 20–32)
CREAT SERPL-MCNC: 2.49 MG/DL (ref 0.7–1.22)
EOSINOPHIL # BLD AUTO: 508 CELLS/UL (ref 15–500)
EOSINOPHIL NFR BLD AUTO: 7.7 %
ERYTHROCYTE [DISTWIDTH] IN BLOOD BY AUTOMATED COUNT: 12.9 % (ref 11–15)
GFR/BSA.PRED SERPLBLD CYS-BASED-ARV: 24 ML/MIN/1.73M2
GLOBULIN SER CALC-MCNC: 2.5 G/DL (CALC) (ref 1.9–3.7)
GLUCOSE SERPL-MCNC: 197 MG/DL (ref 65–99)
HBA1C MFR BLD: 7.9 % OF TOTAL HGB
HCT VFR BLD AUTO: 36 % (ref 38.5–50)
HDLC SERPL-MCNC: 30 MG/DL
HGB BLD-MCNC: 11.8 G/DL (ref 13.2–17.1)
LDLC SERPL CALC-MCNC: 70 MG/DL (CALC)
LYMPHOCYTES # BLD AUTO: 1742 CELLS/UL (ref 850–3900)
LYMPHOCYTES NFR BLD AUTO: 26.4 %
MCH RBC QN AUTO: 33.2 PG (ref 27–33)
MCHC RBC AUTO-ENTMCNC: 32.8 G/DL (ref 32–36)
MCV RBC AUTO: 101.4 FL (ref 80–100)
MONOCYTES # BLD AUTO: 548 CELLS/UL (ref 200–950)
MONOCYTES NFR BLD AUTO: 8.3 %
NEUTROPHILS # BLD AUTO: 3703 CELLS/UL (ref 1500–7800)
NEUTROPHILS NFR BLD AUTO: 56.1 %
NONHDLC SERPL-MCNC: 93 MG/DL (CALC)
PLATELET # BLD AUTO: 154 THOUSAND/UL (ref 140–400)
PMV BLD REES-ECKER: 11 FL (ref 7.5–12.5)
POTASSIUM SERPL-SCNC: 4.8 MMOL/L (ref 3.5–5.3)
PROT SERPL-MCNC: 6.4 G/DL (ref 6.1–8.1)
RBC # BLD AUTO: 3.55 MILLION/UL (ref 4.2–5.8)
REF LAB TEST NAME: NORMAL
REF LAB TEST: NORMAL
SL AMB CLIENT CONTACT: NORMAL
SODIUM SERPL-SCNC: 140 MMOL/L (ref 135–146)
T4 FREE SERPL-MCNC: 1 NG/DL (ref 0.8–1.8)
TRIGL SERPL-MCNC: 145 MG/DL
TSH SERPL-ACNC: 6.66 MIU/L (ref 0.4–4.5)
WBC # BLD AUTO: 6.6 THOUSAND/UL (ref 3.8–10.8)

## 2023-11-14 PROCEDURE — G0439 PPPS, SUBSEQ VISIT: HCPCS | Performed by: INTERNAL MEDICINE

## 2023-11-14 RX ORDER — LEVOTHYROXINE SODIUM 0.15 MG/1
150 TABLET ORAL DAILY
Qty: 90 TABLET | Refills: 3 | Status: SHIPPED | OUTPATIENT
Start: 2023-11-14

## 2023-11-14 NOTE — ASSESSMENT & PLAN NOTE
Now on 150 mcg daily except 1/2 tab on Sundays- now has elevated tsh at 6.6- will increase to 150 mcg daily

## 2023-11-14 NOTE — ASSESSMENT & PLAN NOTE
Wt Readings from Last 3 Encounters:   11/14/23 97.1 kg (214 lb)   08/11/23 98 kg (216 lb)   06/08/23 96.6 kg (213 lb)         No sob - has occasional swelling in legs-

## 2023-11-14 NOTE — PROGRESS NOTES
Assessment and Plan:     Problem List Items Addressed This Visit          Endocrine    Postoperative hypothyroidism - Primary     Now on 150 mcg daily except 1/2 tab on Sundays- now has elevated tsh at 6.6- will increase to 150 mcg daily         Type 2 diabetes mellitus with chronic kidney disease, with long-term current use of insulin (HCC)    Type 2 diabetes mellitus with diabetic neuropathy (HCC)       Lab Results   Component Value Date    HGBA1C 8.1 (A) 08/11/2023   On gabapentin         Secondary hyperparathyroidism ([address])     Being followed by endocrinology and h nephrology team         Microalbuminuria due to type 2 diabetes mellitus        Cardiovascular and Mediastinum    Hypertensive heart disease with HF (heart failure) ([address])     Wt Readings from Last 3 Encounters:   11/14/23 97.1 kg (214 lb)   08/11/23 98 kg (216 lb)   06/08/23 96.6 kg (213 lb)       No sob - has occasional swelling in legs-           Chronic diastolic congestive heart failure ([address])     Wt Readings from Last 3 Encounters:   11/14/23 97.1 kg (214 lb)   08/11/23 98 kg (216 lb)   06/08/23 96.6 kg (213 lb)     Now off metoprolol  ( had heart block in 2022)    Now on lower dose of diuretic with Dr. Evelina Lowe- no new changes               Atherosclerotic heart disease of native coronary artery without angina pectoris       Genitourinary    CKD (chronic kidney disease), stage IV ([address])     BMI Counseling: Body mass index is 33.52 kg/m². The BMI is above normal. Nutrition recommendations include encouraging healthy choices of fruits and vegetables and moderation in carbohydrate intake. Rationale for BMI follow-up plan is due to patient being overweight or obese. Depression Screening and Follow-up Plan: Patient was screened for depression during today's encounter. They screened negative with a PHQ-2 score of 0.       Preventive health issues were discussed with patient, and age appropriate screening tests were ordered as noted in patient's After Visit Summary. Personalized health advice and appropriate referrals for health education or preventive services given if needed, as noted in patient's After Visit Summary. History of Present Illness:     Patient presents for a Medicare Wellness Visit    Here for medicare wellness        Patient Care Team:  Leland Middleton DO as PCP - General  Amina Kerr DO as PCP - Endocrinology (Endocrinology)  MD Bonnie Mireles PA-C (Endocrinology)  Mario Garcia as Nurse Practitioner (Nephrology)     Review of Systems:     Review of Systems   Constitutional:  Negative for chills and fatigue. HENT:  Negative for congestion. Cardiovascular:  Negative for chest pain and palpitations. Gastrointestinal:  Negative for abdominal pain. Skin:  Negative for rash. All other systems reviewed and are negative.        Problem List:     Patient Active Problem List   Diagnosis    Benign prostatic hyperplasia with nocturia    Hypertensive heart disease with HF (heart failure) ([facility])    Gout with tophus    Postoperative hypothyroidism    Obesity, morbid (HCC)    Pure hypercholesterolemia    Renal cyst    Sexual dysfunction    GERD (gastroesophageal reflux disease)    S/P CABG x 4    S/P AVR (aortic valve replacement)    CAD (coronary artery disease)    Aortic stenosis    Anemia due to stage 3 chronic kidney disease     Ambulatory dysfunction    Type 2 diabetes mellitus with chronic kidney disease, with long-term current use of insulin (HCC)    Primary open angle glaucoma (POAG)    Age-related cataract of both eyes    Atherosclerosis of aorta (HCC)    Chronic diastolic congestive heart failure (HCC)    Type 2 diabetes mellitus with diabetic neuropathy (HCC)    CKD (chronic kidney disease), stage IV (HCC)    Benign prostatic hyperplasia without lower urinary tract symptoms    Long term (current) use of insulin ([address])    Presence of aortocoronary bypass graft    Presence of prosthetic heart valve    Unspecified glaucoma    Nonrheumatic aortic (valve) stenosis    Atherosclerotic heart disease of native coronary artery without angina pectoris    Skin tear of left elbow without complication    Secondary hyperparathyroidism ([address])    Microalbuminuria due to type 2 diabetes mellitus       Past Medical and Surgical History:     Past Medical History:   Diagnosis Date    Aortic stenosis     CKD (chronic kidney disease)     baseline Cr 1.3-1.5    Coronary artery disease     Cough     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HCC)     type 2, insulin dependent    GERD (gastroesophageal reflux disease)     Glaucoma     Gout     History of prostate cancer     Hypertension     Hypothyroidism     Overweight     Peripheral neuropathy, idiopathic     Pleural effusion, left     Pure hypercholesterolemia     LA. ..11/12/14   R. ...11/12/14     Visual impairment     cataract left eye    Weight gain      Past Surgical History:   Procedure Laterality Date    CARDIAC CATHETERIZATION      EYE SURGERY      IR THORACENTESIS  10/23/2018    IR THORACENTESIS  11/2/2018    IR THORACENTESIS  11/9/2018    IR THORACENTESIS  11/23/2018    IA CORONARY ARTERY BYP W/VEIN & ARTERY GRAFT 3 VEIN N/A 9/17/2018    Procedure: CORONARY ARTERY BYPASS GRAFT (CABG) x 4 VESSELS with LIMA - LAD, SVG/LEFT LEG EVH - LEFT PDA, OM3, & OM2;  Surgeon: Viola Giron MD;  Location: [facility];  Service: Cardiac Surgery    IA ECHO TRANSESOPHAG MONTR CARDIAC PUMP FUNCTJ N/A 9/17/2018    Procedure: TRANSESOPHAGEAL ECHOCARDIOGRAM (VERONICA);   Surgeon: Viola Giron MD;  Location: [facility];  Service: Cardiac Surgery    IA RPLCMT AORTIC VALVE OPN W/STENTLESS TISSUE VALVE N/A 9/17/2018    Procedure: REPLACEMENT VALVE AORTIC (AVR)- 23mm tissue Intuity Valve;  Surgeon: Viola Giron MD;  Location: [facility];  Service: Cardiac Surgery    THORACOSCOPY VIDEO ASSISTED SURGERY (VATS) Left 11/27/2018    Procedure: THORACOSCOPY VIDEO ASSISTED SURGERY (VATS), talc pleurodesis,;  Surgeon: Jesus Alberto Lima MD; Location: [facility];  Service: Thoracic    THYROID SURGERY        Family History:     Family History   Problem Relation Age of Onset    Diabetes Mother     Pancreatic cancer Brother     Diabetes Maternal Grandmother     Colon cancer Son     Diabetes Family     Substance Abuse Neg Hx     Mental illness Neg Hx       Social History:     Social History     Socioeconomic History    Marital status:      Spouse name: None    Number of children: None    Years of education: None    Highest education level: None   Occupational History    Occupation: retired    Tobacco Use    Smoking status: Former     Packs/day: 0.25     Years: 1.00     Total pack years: 0.25     Types: Cigarettes     Quit date: 1970     Years since quittin.9    Smokeless tobacco: Never    Tobacco comments: Only in the service    Vaping Use    Vaping Use: Never used   Substance and Sexual Activity    Alcohol use: No    Drug use: No    Sexual activity: Not Currently   Other Topics Concern    None   Social History Narrative    Caffeine use / coffee diet cola and tea    Lives with family     Living situation supportive and safe    No advance directives  -denied     Social Determinants of Health     Financial Resource Strain: High Risk (2023)    Overall Financial Resource Strain (CARDIA)     Difficulty of Paying Living Expenses: Hard   Food Insecurity: Not on file   Transportation Needs: No Transportation Needs (2023)    PRAPARE - Transportation     Lack of Transportation (Medical): No     Lack of Transportation (Non-Medical): No   Physical Activity: Insufficiently Active (2021)    Exercise Vital Sign     Days of Exercise per Week: 3 days     Minutes of Exercise per Session: 20 min   Stress: Stress Concern Present (2021)    [facility]     Feeling of Stress :  To some extent   Social Connections: Not on file   Intimate Partner Violence: Not on file   Housing Stability: Not on file      Medications and Allergies:     Current Outpatient Medications   Medication Sig Dispense Refill    allopurinol (ZYLOPRIM) 300 mg tablet TAKE ONE TABLET BY MOUTH ONCE DAILY 90 tablet 3    ascorbic acid (VITAMIN C) 500 mg tablet TAKE ONE TABLET BY MOUTH DAILY 28 tablet 3    aspirin (ECOTRIN) 325 mg EC tablet Take 1 tablet (325 mg total) by mouth daily 30 tablet 0    Basaglar KwikPen 100 units/mL SOPN inject 24 units subcutaneously daily 15 mL 3    cholecalciferol (VITAMIN D3) 1,000 units tablet Take 1 tablet (1,000 Units total) by mouth daily 90 tablet 3    dorzolamide-timolol (COSOPT) 22.3-6.8 MG/ML ophthalmic solution Administer 1 drop to both eyes 2 (two) times a day      FeroSul 325 (65 Fe) MG tablet TAKE ONE TABLET BY MOUTH DAILY 28 tablet 3    gabapentin (NEURONTIN) 300 mg capsule TAKE ONE CAPSULE BY MOUTH DAILY AT BEDTIME 90 capsule 3    insulin aspart (NovoLOG FlexPen) 100 UNIT/ML injection pen INJECT 11 UNITS SUBCUTANEOUSLY THREE TIMES DAILY 15 mL 3    Insulin Pen Needle (Pen Needles) 32G X 4 MM MISC Use 4 (four) times a day (before meals and at bedtime) 100 each 11    levothyroxine 150 mcg tablet 1/2 tab on Sundays, whole tab other days 90 tablet 5    LORazepam (ATIVAN) 0.5 mg tablet TAKE ONE TABLET BY MOUTH EVERY EIGHT HOURS AS NEEDED FOR ANXIETY 30 tablet 3    olmesartan (BENICAR) 5 mg tablet Take 1 tablet (5 mg total) by mouth daily 90 tablet 3    pantoprazole (PROTONIX) 20 mg tablet TAKE ONE TABLET BY MOUTH DAILY 30 tablet 3    rosuvastatin (CRESTOR) 40 MG tablet TAKE ONE TABLET BY MOUTH DAILY 90 tablet 3    tamsulosin (FLOMAX) 0.4 mg TAKE ONE CAPSULE BY MOUTH DAILY WITH dinner 30 capsule 6    torsemide (DEMADEX) 10 mg tablet TAKE ONE TABLET BY MOUTH EVERY DAY 90 tablet 0     No current facility-administered medications for this visit.      Allergies   Allergen Reactions    Amlodipine Nausea Only    Atorvastatin Myalgia      Immunizations:     Immunization History   Administered Date(s) Administered    COVID-19 PFIZER VACCINE 0.3 ML IM 02/27/2021, 03/22/2021    INFLUENZA 11/12/2014, 09/15/2015, 09/30/2015, 09/21/2016, 11/16/2018    Influenza Split High Dose Preservative Free IM 09/15/2015, 09/30/2015, 09/21/2016, 08/21/2017    Influenza, high dose seasonal 0.7 mL 11/16/2018, 11/08/2019, 09/29/2020    Influenza, seasonal, injectable 10/15/2013, 11/01/2014    Pneumococcal Conjugate 13-Valent 01/09/2018, 01/09/2018    Pneumococcal Polysaccharide PPV23 01/01/2007    TD (adult) Preservative Free 05/23/2013    Td (adult), adsorbed 01/01/2000    Tuberculin Skin Test 09/22/2018, 09/29/2018    Zoster 11/01/2010      Health Maintenance: There are no preventive care reminders to display for this patient. Topic Date Due    COVID-19 Vaccine (3 - Pfizer series) 05/17/2021    Influenza Vaccine (1) 09/01/2023      Medicare Screening Tests and Risk Assessments:     Janae Caruso is here for his Subsequent Wellness visit. Health Risk Assessment:   Patient rates overall health as fair. Patient feels that their physical health rating is same. Patient is satisfied with their life. Eyesight was rated as same. Hearing was rated as same. Patient feels that their emotional and mental health rating is same. Patients states they are never, rarely angry. Patient states they are sometimes unusually tired/fatigued. Pain experienced in the last 7 days has been some. Patient's pain rating has been 5/10. Patient states that he has experienced no weight loss or gain in last 6 months. Depression Screening:   PHQ-2 Score: 0      Fall Risk Screening: In the past year, patient has experienced: history of falling in past year    Number of falls: 1  Injured during fall?: Yes    Feels unsteady when standing or walking?: Yes    Worried about falling?: Yes      Home Safety:  Patient does not have trouble with stairs inside or outside of their home.  Patient has working smoke alarms and has no working carbon monoxide detector. Home safety hazards include: none. Nutrition:   Current diet is Diabetic. Medications:   Patient is currently taking over-the-counter supplements. OTC medications include: see medication list. Patient is not able to manage medications. [place] does a pill pack    Activities of Daily Living (ADLs)/Instrumental Activities of Daily Living (IADLs):   Walk and transfer into and out of bed and chair?: Yes  Dress and groom yourself?: Yes    Bathe or shower yourself?: Yes    Feed yourself? Yes  Do your laundry/housekeeping?: Yes  Manage your money, pay your bills and track your expenses?: Yes  Make your own meals?: Yes    Do your own shopping?: Yes    Previous Hospitalizations:   Any hospitalizations or ED visits within the last 12 months?: Yes    How many hospitalizations have you had in the last year?: 1-2    Advance Care Planning:   Living will: No    Durable POA for healthcare:  Yes    Advanced directive counseling given: Yes    End of Life Decisions reviewed with patient: Yes    Provider agrees with end of life decisions: Yes      Comments: Son to be medical poa- had recent p brain bleed and in [facility]  He has been fo ull oced but not sure if that is what he would want long term      PREVENTIVE SCREENINGS      Cardiovascular Screening:    General: Screening Not Indicated and History Lipid Disorder      Diabetes Screening:     General: Screening Not Indicated and History Diabetes      Colorectal Cancer Screening:     General: Screening Not Indicated      Prostate Cancer Screening:    General: History Prostate Cancer and Screening Not Indicated      Abdominal Aortic Aneurysm (AAA) Screening:    Risk factors include: tobacco use        Lung Cancer Screening:     General: Screening Not Indicated    Screening, Brief Intervention, and Referral to Treatment (SBIRT)    Screening      AUDIT-C Screenin) How often did you have a drink containing alcohol in the past year? never  2) How many drinks did you have on a typical day when you were drinking in the past year? 0  3) How often did you have 6 or more drinks on one occasion in the past year? never    AUDIT-C Score: 0  Interpretation: Score 0-3 (male): Negative screen for alcohol misuse    Single Item Drug Screening:  How often have you used an illegal drug (including marijuana) or a prescription medication for non-medical reasons in the past year? never    Single Item Drug Screen Score: 0  Interpretation: Negative screen for possible drug use disorder    No results found. Physical Exam:     /68   Pulse 63   Ht 5' 7" (1.702 m)   Wt 97.1 kg (214 lb)   SpO2 92%   BMI 33.52 kg/m²     Physical Exam  Vitals and nursing note reviewed. HENT:      Head: Normocephalic. Right Ear: Tympanic membrane normal. There is no impacted cerumen. Left Ear: Tympanic membrane normal.      Nose: No congestion. Eyes:      General:         Right eye: No discharge. Left eye: No discharge. Neck:      Comments: Bilateral shoddy nodes- has some dental cracking in bottom remaining teeth  Cardiovascular:      Rate and Rhythm: Normal rate and regular rhythm. Heart sounds: No murmur heard. Pulmonary:      Breath sounds: No wheezing or rhonchi. Abdominal:      General: There is no distension. Palpations: Abdomen is soft. Tenderness: There is no abdominal tenderness. Musculoskeletal:         General: No tenderness or deformity. Cervical back: No tenderness. Lymphadenopathy:      Cervical: Cervical adenopathy present. Skin:     Findings: No erythema. Neurological:      Comments: Mild left facial lab   Psychiatric:         Mood and Affect: Mood normal.         Thought Content:  Thought content normal.         Judgment: Judgment normal.          Lenin Malik DO

## 2023-11-14 NOTE — ASSESSMENT & PLAN NOTE
Wt Readings from Last 3 Encounters:   11/14/23 97.1 kg (214 lb)   08/11/23 98 kg (216 lb)   06/08/23 96.6 kg (213 lb)       Now off metoprolol  ( had heart block in 2022)    Now on lower dose of diuretic with Dr. Cydney Scales- no new changes

## 2023-11-14 NOTE — PATIENT INSTRUCTIONS
Give pt advanced care form  Medicare Preventive Visit Patient Instructions  Thank you for completing your Welcome to Medicare Visit or Medicare Annual Wellness Visit today. Your next wellness visit will be due in one year (11/14/2024). The screening/preventive services that you may require over the next 5-10 years are detailed below. Some tests may not apply to you based off risk factors and/or age. Screening tests ordered at today's visit but not completed yet may show as past due. Also, please note that scanned in results may not display below. Preventive Screenings:  Service Recommendations Previous Testing/Comments   Colorectal Cancer Screening  Colonoscopy    Fecal Occult Blood Test (FOBT)/Fecal Immunochemical Test (FIT)  Fecal DNA/Cologuard Test  Flexible Sigmoidoscopy Age: 43-73 years old   Colonoscopy: every 10 years (May be performed more frequently if at higher risk)  OR  FOBT/FIT: every 1 year  OR  Cologuard: every 3 years  OR  Sigmoidoscopy: every 5 years  Screening may be recommended earlier than age 39 if at higher risk for colorectal cancer. Also, an individualized decision between you and your healthcare provider will decide whether screening between the ages of 77-80 would be appropriate.  Colonoscopy: Not on file  FOBT/FIT: Not on file  Cologuard: Not on file  Sigmoidoscopy: Not on file    Screening Not Indicated     Prostate Cancer Screening Individualized decision between patient and health care provider in men between ages of 53-66   Medicare will cover every 12 months beginning on the day after your 50th birthday PSA: No results in last 5 years     History Prostate Cancer  Screening Not Indicated     Hepatitis C Screening Once for adults born between 1945 and 1965  More frequently in patients at high risk for Hepatitis C Hep C Antibody: Not on file        Diabetes Screening 1-2 times per year if you're at risk for diabetes or have pre-diabetes Fasting glucose: 231 mg/dL (11/5/2021)  A1C: 8.1 (8/11/2023)  Screening Not Indicated  History Diabetes   Cholesterol Screening Once every 5 years if you don't have a lipid disorder. May order more often based on risk factors. Lipid panel: 11/13/2023  Screening Not Indicated  History Lipid Disorder      Other Preventive Screenings Covered by Medicare:  Abdominal Aortic Aneurysm (AAA) Screening: covered once if your at risk. You're considered to be at risk if you have a family history of AAA or a male between the age of 70-76 who smoking at least 100 cigarettes in your lifetime. Lung Cancer Screening: covers low dose CT scan once per year if you meet all of the following conditions: (1) Age 48-67; (2) No signs or symptoms of lung cancer; (3) Current smoker or have quit smoking within the last 15 years; (4) You have a tobacco smoking history of at least 20 pack years (packs per day x number of years you smoked); (5) You get a written order from a healthcare provider. Glaucoma Screening: covered annually if you're considered high risk: (1) You have diabetes OR (2) Family history of glaucoma OR (3)  aged 48 and older OR (3)  American aged 72 and older  Osteoporosis Screening: covered every 2 years if you meet one of the following conditions: (1) Have a vertebral abnormality; (2) On glucocorticoid therapy for more than 3 months; (3) Have primary hyperparathyroidism; (4) On osteoporosis medications and need to assess response to drug therapy. HIV Screening: covered annually if you're between the age of 14-79. Also covered annually if you are younger than 13 and older than 72 with risk factors for HIV infection. For pregnant patients, it is covered up to 3 times per pregnancy.     Immunizations:  Immunization Recommendations   Influenza Vaccine Annual influenza vaccination during flu season is recommended for all persons aged >= 6 months who do not have contraindications   Pneumococcal Vaccine   * Pneumococcal conjugate vaccine = PCV13 (Prevnar 15), PCV15 (Vaxneuvance), PCV20 (Prevnar 20)  * Pneumococcal polysaccharide vaccine = PPSV23 (Pneumovax) Adults 48-46 yo with certain risk factors or if 69+ yo  If never received any pneumonia vaccine: recommend Prevnar 20 (PCV20)  Give PCV20 if previously received 1 dose of PCV13 or PPSV23   Hepatitis B Vaccine 3 dose series if at intermediate or high risk (ex: diabetes, end stage renal disease, liver disease)   Respiratory syncytial virus (RSV) Vaccine - COVERED BY MEDICARE PART D  * RSVPreF3 (Arexvy) CDC recommends that adults 61years of age and older may receive a single dose of RSV vaccine using shared clinical decision-making (SCDM)   Tetanus (Td) Vaccine - COST NOT COVERED BY MEDICARE PART B Following completion of primary series, a booster dose should be given every 10 years to maintain immunity against tetanus. Td may also be given as tetanus wound prophylaxis. Tdap Vaccine - COST NOT COVERED BY MEDICARE PART B Recommended at least once for all adults. For pregnant patients, recommended with each pregnancy. Shingles Vaccine (Shingrix) - COST NOT COVERED BY MEDICARE PART B  2 shot series recommended in those 19 years and older who have or will have weakened immune systems or those 50 years and older     Health Maintenance Due:  There are no preventive care reminders to display for this patient. Immunizations Due:      Topic Date Due    COVID-19 Vaccine (3 - Pfizer series) 05/17/2021    Influenza Vaccine (1) 09/01/2023     Advance Directives   What are advance directives? Advance directives are legal documents that state your wishes and plans for medical care. These plans are made ahead of time in case you lose your ability to make decisions for yourself. Advance directives can apply to any medical decision, such as the treatments you want, and if you want to donate organs. What are the types of advance directives?   There are many types of advance directives, and each state has rules about how to use them. You may choose a combination of any of the following:  Living will: This is a written record of the treatment you want. You can also choose which treatments you do not want, which to limit, and which to stop at a certain time. This includes surgery, medicine, IV fluid, and tube feedings. Durable power of  for healthcare [facility]): This is a written record that states who you want to make healthcare choices for you when you are unable to make them for yourself. This person, called a proxy, is usually a family member or a friend. You may choose more than 1 proxy. Do not resuscitate (DNR) order:  A DNR order is used in case your heart stops beating or you stop breathing. It is a request not to have certain forms of treatment, such as CPR. A DNR order may be included in other types of advance directives. Medical directive: This covers the care that you want if you are in a coma, near death, or unable to make decisions for yourself. You can list the treatments you want for each condition. Treatment may include pain medicine, surgery, blood transfusions, dialysis, IV or tube feedings, and a ventilator (breathing machine). Values history: This document has questions about your views, beliefs, and how you feel and think about life. This information can help others choose the care that you would choose. Why are advance directives important? An advance directive helps you control your care. Although spoken wishes may be used, it is better to have your wishes written down. Spoken wishes can be misunderstood, or not followed. Treatments may be given even if you do not want them. An advance directive may make it easier for your family to make difficult choices about your care. Fall Prevention    Fall prevention  includes ways to make your home and other areas safer. It also includes ways you can move more carefully to prevent a fall.  Health conditions that cause changes in your blood pressure, vision, or muscle strength and coordination may increase your risk for falls. Medicines may also increase your risk for falls if they make you dizzy, weak, or sleepy. Fall prevention tips:   Stand or sit up slowly. Use assistive devices as directed. Wear shoes that fit well and have soles that . Wear a personal alarm. Stay active. Manage your medical conditions. Home Safety Tips:  Add items to prevent falls in the bathroom. Keep paths clear. Install bright lights in your home. Keep items you use often on shelves within reach. Paint or place reflective tape on the edges of your stairs. Weight Management   Why it is important to manage your weight:  Being overweight increases your risk of health conditions such as heart disease, high blood pressure, type 2 diabetes, and certain types of cancer. It can also increase your risk for osteoarthritis, sleep apnea, and other respiratory problems. Aim for a slow, steady weight loss. Even a small amount of weight loss can lower your risk of health problems. How to lose weight safely:  A safe and healthy way to lose weight is to eat fewer calories and get regular exercise. You can lose up about 1 pound a week by decreasing the number of calories you eat by 500 calories each day. Healthy meal plan for weight management:  A healthy meal plan includes a variety of foods, contains fewer calories, and helps you stay healthy. A healthy meal plan includes the following:  Eat whole-grain foods more often. A healthy meal plan should contain fiber. Fiber is the part of grains, fruits, and vegetables that is not broken down by your body. Whole-grain foods are healthy and provide extra fiber in your diet. Some examples of whole-grain foods are whole-wheat breads and pastas, oatmeal, brown rice, and bulgur. Eat a variety of vegetables every day. Include dark, leafy greens such as spinach, kale, ward greens, and mustard greens.  Eat yellow and orange vegetables such as carrots, sweet potatoes, and winter squash. Eat a variety of fruits every day. Choose fresh or canned fruit (canned in its own juice or light syrup) instead of juice. Fruit juice has very little or no fiber. Eat low-fat dairy foods. Drink fat-free (skim) milk or 1% milk. Eat fat-free yogurt and low-fat cottage cheese. Try low-fat cheeses such as mozzarella and other reduced-fat cheeses. Choose meat and other protein foods that are low in fat. Choose beans or other legumes such as split peas or lentils. Choose fish, skinless poultry (chicken or turkey), or lean cuts of red meat (beef or pork). Before you cook meat or poultry, cut off any visible fat. Use less fat and oil. Try baking foods instead of frying them. Add less fat, such as margarine, sour cream, regular salad dressing and mayonnaise to foods. Eat fewer high-fat foods. Some examples of high-fat foods include french fries, doughnuts, ice cream, and cakes. Eat fewer sweets. Limit foods and drinks that are high in sugar. This includes candy, cookies, regular soda, and sweetened drinks. Exercise:  Exercise at least 30 minutes per day on most days of the week. Some examples of exercise include walking, biking, dancing, and swimming. You can also fit in more physical activity by taking the stairs instead of the elevator or parking farther away from stores. Ask your healthcare provider about the best exercise plan for you. © Copyright [employer] 2018 Information is for End User's use only and may not be sold, redistributed or otherwise used for commercial purposes.  All illustrations and images included in CareNotes® are the copyrighted property of [employer]., Inc. or  [employer]

## 2023-11-15 ENCOUNTER — TELEPHONE (OUTPATIENT)
Dept: ENDOCRINOLOGY | Facility: HOSPITAL | Age: 88
End: 2023-11-15

## 2023-11-15 ENCOUNTER — TELEPHONE (OUTPATIENT)
Dept: NEPHROLOGY | Facility: CLINIC | Age: 88
End: 2023-11-15

## 2023-11-15 NOTE — TELEPHONE ENCOUNTER
----- Message from Stuart Leary DO sent at 11/14/2023  3:17 PM EST -----  Stable from a renal standpoint

## 2023-11-15 NOTE — TELEPHONE ENCOUNTER
Left message for patient to schedule an appointment. Has not been seen since 10-28-22. Last labs were done 11-13-23 and are in Epic.

## 2023-11-15 NOTE — TELEPHONE ENCOUNTER
Called patient and left a voicemail stating the following information:     Stable from a renal standpoint. I asked the patient please call back with further questions.

## 2023-11-16 PROBLEM — R80.1 PERSISTENT PROTEINURIA: Status: ACTIVE | Noted: 2023-11-16

## 2023-11-16 NOTE — PROGRESS NOTES
Assessment and Plan:    Diogo Resendiz was seen today for follow-up and chronic kidney disease. Diagnoses and all orders for this visit:    CKD (chronic kidney disease), stage IV ([address])  -     Renal function panel; Future  -     PTH, intact; Future  -     Urinalysis with microscopic; Future  -     Albumin / creatinine urine ratio; Future  -     Renal function panel  -     PTH, intact  -     Urinalysis with microscopic    Renal cyst    Chronic diastolic congestive heart failure (HCC)    Type 2 diabetes mellitus with diabetic neuropathy, with long-term current use of insulin (HCC)    Persistent proteinuria    Type 2 diabetes mellitus with stage 3b chronic kidney disease, with long-term current use of insulin ([address])  -     Ambulatory Referral to Nephrology    Microalbuminuria due to type 2 diabetes mellitus   -     Ambulatory Referral to Nephrology      CKD IV  -Baseline creatinine: 2.2-3.1, noted to have wavering creatinine  -Most recent creatinine: 2.49 from 11/13/2023/GFR is 24  -Etiology: Age associated nephron loss, hypertensive nephrosclerosis, diabetic nephropathy  -Managing Nephrologist: Dr. Gavin St, last seen in 8/2022 (seen 11/2022 by CALEB Davis)  -Avoid nephrotoxins, avoid NSAIDS (including naproxen, advil, motrin, toradol)  -Already completed CKD education/kidney smart classes, completed ACP meeting  -Prior discussions regarding renal placement therapies, patient is unsure if he would pursue dialysis if required, discussed that he currently does not require dialysis. I discussed situations and indications for dialysis with the patient.   He will think further about his decisions to pursue HD.  -Recent microalbumin to creatinine ratio was 123  -No recent renal imaging, prior CT imaging from 2017 with bilateral renal cysts, no suspicious solid renal masses    Proteinuria/albuminuria  -Continue Benicar, recent microalbumin to creatinine ratio as above    Blood Pressure  -Office visit BP: 102/54 then 110/65 on repeat  -Current antihypertensive medications: Olmesartan 5 mg daily, torsemide 10 mg daily, Flomax  -Reports occasional dizziness upon standing, discussed with patient to call office if he has any repeated episodes, might have to discontinue benicar    CKD MBD  -Recent calcium is 9.7, no recent PTH, Phos, will send with next appointment  -Currently on vitamin D3 1000 units daily    DM 2  -Recent A1c 7.9, overall improving trends over the past few months  -Follows with endocrinology as outpatient    HFpEF/history of AVR  -Follows with cardiology as outpatient  -11/2021 TTE: EF 90%, grade 2 diastolic dysfunction, 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 Jon Loaiza in 6 months with repeat lab work in 4 months. Please call the office with any questions or concerns. AVOID NSAIDS INCLUDING MOTRIN, IBUPROFEN, ADVIL, ALEVE, NAPROXEN    Your renal function is stable and at your baseline    No changes to medications    PLEASE CALL OFFICE IF YOUR HAVING ANY DIZZINESS OR LIGHTHEADEDNESS    SEE YOUR BACK IN OFFICE IN 6 MONTHS WITH REPEAT LABS IN 4 MONTHS      Reason for Visit: Follow-up and Chronic Kidney Disease    HPI: Yenni Gonzalez is a 80 y.o. male who is here for follow up of CKD 4, last seen in office approximately 1 year ago. Since then he reports he has not had any hospitalizations/medical procedures/illnesses. He reports he watches his diet. He denies any NSAID use, reports occasional acetaminophen use. Denies any nausea/vomiting/diarrhea/dysuria. Reports his breathing is at stable, denies any chest pain. ROS:   Review of Systems   Constitutional: Negative. Cardiovascular:  Positive for leg swelling.    Gastrointestinal: Negative. Genitourinary: Negative. Neurological:  Positive for dizziness (upon standing intermittently). A complete 10 point review of systems was performed and found to be negative unless otherwise noted above or in the HPI.     Allergies:   Amlodipine and Atorvastatin    Medications:     Current Outpatient Medications:     allopurinol (ZYLOPRIM) 300 mg tablet, TAKE ONE TABLET BY MOUTH ONCE DAILY, Disp: 90 tablet, Rfl: 3    ascorbic acid (VITAMIN C) 500 mg tablet, TAKE ONE TABLET BY MOUTH DAILY, Disp: 28 tablet, Rfl: 3    aspirin (ECOTRIN) 325 mg EC tablet, Take 1 tablet (325 mg total) by mouth daily, Disp: 30 tablet, Rfl: 0    Basaglar KwikPen 100 units/mL SOPN, inject 26 units subcutaneously daily, Disp: 15 mL, Rfl: 3    cholecalciferol (VITAMIN D3) 1,000 units tablet, Take 1 tablet (1,000 Units total) by mouth daily, Disp: 90 tablet, Rfl: 3    dorzolamide-timolol (COSOPT) 22.3-6.8 MG/ML ophthalmic solution, Administer 1 drop to both eyes 2 (two) times a day, Disp: , Rfl:     FeroSul 325 (65 Fe) MG tablet, TAKE ONE TABLET BY MOUTH DAILY, Disp: 28 tablet, Rfl: 3    gabapentin (NEURONTIN) 300 mg capsule, TAKE ONE CAPSULE BY MOUTH DAILY AT BEDTIME, Disp: 90 capsule, Rfl: 3    insulin aspart (NovoLOG FlexPen) 100 UNIT/ML injection pen, INJECT 11 UNITS SUBCUTANEOUSLY THREE TIMES DAILY, Disp: 15 mL, Rfl: 3    Insulin Pen Needle (Pen Needles) 32G X 4 MM MISC, Use 4 (four) times a day (before meals and at bedtime), Disp: 100 each, Rfl: 11    levothyroxine 150 mcg tablet, Take 1 tablet (150 mcg total) by mouth daily, Disp: 90 tablet, Rfl: 3    LORazepam (ATIVAN) 0.5 mg tablet, TAKE ONE TABLET BY MOUTH EVERY EIGHT HOURS AS NEEDED FOR ANXIETY, Disp: 30 tablet, Rfl: 3    olmesartan (BENICAR) 5 mg tablet, Take 1 tablet (5 mg total) by mouth daily, Disp: 90 tablet, Rfl: 3    pantoprazole (PROTONIX) 20 mg tablet, TAKE ONE TABLET BY MOUTH DAILY, Disp: 30 tablet, Rfl: 3    rosuvastatin (CRESTOR) 40 MG tablet, TAKE ONE TABLET BY MOUTH DAILY, Disp: 90 tablet, Rfl: 3    tamsulosin (FLOMAX) 0.4 mg, TAKE ONE CAPSULE BY MOUTH DAILY WITH dinner, Disp: 30 capsule, Rfl: 6    torsemide (DEMADEX) 10 mg tablet, TAKE ONE TABLET BY MOUTH EVERY DAY, Disp: 90 tablet, Rfl: 0    Past Medical History:   Diagnosis Date    Aortic stenosis     CKD (chronic kidney disease)     baseline Cr 1.3-1.5    Coronary artery disease     Cough     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HCC)     type 2, insulin dependent    GERD (gastroesophageal reflux disease)     Glaucoma     Gout     History of prostate cancer     Hypertension     Hypothyroidism     Overweight     Peripheral neuropathy, idiopathic     Pleural effusion, left     Pure hypercholesterolemia     LA. ..11/12/14   R. ...11/12/14     Visual impairment     cataract left eye    Weight gain      Past Surgical History:   Procedure Laterality Date    CARDIAC CATHETERIZATION      EYE SURGERY      IR THORACENTESIS  10/23/2018    IR THORACENTESIS  11/2/2018    IR THORACENTESIS  11/9/2018    IR THORACENTESIS  11/23/2018    IA CORONARY ARTERY BYP W/VEIN & ARTERY GRAFT 3 VEIN N/A 9/17/2018    Procedure: CORONARY ARTERY BYPASS GRAFT (CABG) x 4 VESSELS with LIMA - LAD, SVG/LEFT LEG EVH - LEFT PDA, OM3, & OM2;  Surgeon: Padmini Elliott MD;  Location: [facility];  Service: Cardiac Surgery    IA ECHO TRANSESOPHAG MONTR CARDIAC PUMP FUNCTJ N/A 9/17/2018    Procedure: TRANSESOPHAGEAL ECHOCARDIOGRAM (VERONICA);   Surgeon: Padmini Elliott MD;  Location: [facility];  Service: Cardiac Surgery    IA RPLCMT AORTIC VALVE OPN W/STENTLESS TISSUE VALVE N/A 9/17/2018    Procedure: REPLACEMENT VALVE AORTIC (AVR)- 23mm tissue Intuity Valve;  Surgeon: Padmini Elliott MD;  Location: [facility];  Service: Cardiac Surgery    THORACOSCOPY VIDEO ASSISTED SURGERY (VATS) Left 11/27/2018    Procedure: THORACOSCOPY VIDEO ASSISTED SURGERY (VATS), talc pleurodesis,;  Surgeon: Noemi Ridley MD;  Location: [facility];  Service: Thoracic    THYROID SURGERY Family History   Problem Relation Age of Onset    Diabetes Mother     Pancreatic cancer Brother     Diabetes Maternal Grandmother     Colon cancer Son     Diabetes Family     Substance Abuse Neg Hx     Mental illness Neg Hx       reports that he quit smoking about 53 years ago. His smoking use included cigarettes. He has a 0.25 pack-year smoking history. He has never used smokeless tobacco. He reports that he does not drink alcohol and does not use drugs. Physical Exam:   Vitals:    11/22/23 1330 11/22/23 1348   BP: 102/54 110/65   BP Location: Left arm Right arm   Patient Position: Sitting    Cuff Size: Large    Weight: 97.1 kg (214 lb)    Height: 5' 7" (1.702 m)      Body mass index is 33.52 kg/m². Physical Exam  Vitals reviewed. Constitutional:       General: He is not in acute distress. Appearance: He is not toxic-appearing or diaphoretic. HENT:      Head: Normocephalic and atraumatic. Nose: Nose normal.      Mouth/Throat:      Mouth: Mucous membranes are moist.   Eyes:      General: No scleral icterus. Cardiovascular:      Rate and Rhythm: Normal rate and regular rhythm. Pulses: Normal pulses. Pulmonary:      Effort: Pulmonary effort is normal. No respiratory distress. Breath sounds: No wheezing or rales. Abdominal:      General: Abdomen is flat. Musculoskeletal:      Cervical back: Neck supple. Right lower leg: Edema present. Left lower leg: Edema present. Skin:     General: Skin is warm and dry. Capillary Refill: Capillary refill takes less than 2 seconds. Neurological:      Mental Status: He is alert and oriented to person, place, and time. Procedure:  No results found for this or any previous visit.     Lab Results   Component Value Date    GLUCOSE 126 09/17/2018    CALCIUM 9.7 11/13/2023     09/05/2017    K 4.8 11/13/2023    CO2 26 11/13/2023     11/13/2023    BUN 43 (H) 11/13/2023    CREATININE 2.49 (H) 11/13/2023 Invalid input(s): "ALBUMIN"      I have personally reviewed the blood work as stated above and in my note. I have personally reviewed cardiology, prior nephrology, endocrinology note.

## 2023-11-17 ENCOUNTER — OFFICE VISIT (OUTPATIENT)
Dept: ENDOCRINOLOGY | Facility: HOSPITAL | Age: 88
End: 2023-11-17
Payer: COMMERCIAL

## 2023-11-17 VITALS
OXYGEN SATURATION: 98 % | WEIGHT: 214 LBS | BODY MASS INDEX: 33.59 KG/M2 | DIASTOLIC BLOOD PRESSURE: 62 MMHG | SYSTOLIC BLOOD PRESSURE: 110 MMHG | HEART RATE: 64 BPM | HEIGHT: 67 IN

## 2023-11-17 DIAGNOSIS — E11.22 TYPE 2 DIABETES MELLITUS WITH STAGE 3B CHRONIC KIDNEY DISEASE, WITH LONG-TERM CURRENT USE OF INSULIN (HCC): Primary | ICD-10-CM

## 2023-11-17 DIAGNOSIS — L03.116 CELLULITIS OF LEFT LOWER EXTREMITY: ICD-10-CM

## 2023-11-17 DIAGNOSIS — E89.0 POSTOPERATIVE HYPOTHYROIDISM: ICD-10-CM

## 2023-11-17 DIAGNOSIS — Z79.4 TYPE 2 DIABETES MELLITUS WITH STAGE 3B CHRONIC KIDNEY DISEASE, WITH LONG-TERM CURRENT USE OF INSULIN (HCC): Primary | ICD-10-CM

## 2023-11-17 DIAGNOSIS — E78.00 PURE HYPERCHOLESTEROLEMIA: ICD-10-CM

## 2023-11-17 DIAGNOSIS — N18.32 TYPE 2 DIABETES MELLITUS WITH STAGE 3B CHRONIC KIDNEY DISEASE, WITH LONG-TERM CURRENT USE OF INSULIN (HCC): Primary | ICD-10-CM

## 2023-11-17 DIAGNOSIS — I11.0 HYPERTENSIVE HEART DISEASE WITH HF (HEART FAILURE) (HCC): ICD-10-CM

## 2023-11-17 PROCEDURE — 99214 OFFICE O/P EST MOD 30 MIN: CPT | Performed by: PHYSICIAN ASSISTANT

## 2023-11-17 RX ORDER — INSULIN GLARGINE 100 [IU]/ML
INJECTION, SOLUTION SUBCUTANEOUS
Qty: 15 ML | Refills: 3 | Status: SHIPPED | OUTPATIENT
Start: 2023-11-17

## 2023-11-17 NOTE — PROGRESS NOTES
Tio Baker. 80 y.o. male MRN: [id number]    Encounter: [id number]      Assessment/Plan     Assessment: This is a 80y.o.-year-old male with type 2 diabetes with hyperlipidemia and hypertension, and postsurgical hypothyroidism. Plan:  1. Type 2 diabetes: Most recent hemoglobin A1c was 7.9. For his age and chronic medical conditions this is acceptable. He does report blood sugars have been consistently running high with no concerns of hypoglycemia. At this time I would like to increase his Basaglar to 26 units daily. He will continue with NovoLog 11 units with each meal.  I asked him to write down his blood sugars so when he comes into the office next time we can review and make appropriate changes to his insulin. If there is any concerns with hypoglycemia in the meantime, please contact the office. Follow-up in 3 months with lab work completed prior to visit. 2. Postsurgical Hypothyroidism:  TSH slightly elevated, but free T4 is in normal range. Clinically euthyroid. At this time he will continue with levothyroxine 150 mcg daily. Contact the office if there is any change in symptoms. Repeat lab work prior to next office visit. If TSH is still elevated, may need to increase his levothyroxine. 3. Hyperlipidemia:  Lipid panel doing well at this time. Continue with rosuvastatin. Will monitor over time. 4.  Hypertension: Normotensive in the office today. Kidney function remains stable, and does have an appointment with nephrology next week. Continue with Benicar and Demadex. Repeat CMP prior to next office visit. CC: Type 2 diabetes follow-up    History of Present Illness     HPI:  Tio Baker. is a 80year old male with type 2 diabetes for about 7 years. States this was diagnosed when he was hospitalized and status post CABG. he was lost to follow-up, and has not been seen since October 2022.   He is on insulin at home and takes Basaglar 24 units qhs and Novolog 11 units with each meal.  He denies any polyuria, polydipsia, nocturia and blurry vision. 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 He also has history of CAD status post CABG. He denies history of peripheral neuropathy, retinopathy. Overall doing well today without any concerns or questions. No major complications over the last year. Did have a fall on his left side that did require some treatment through wound care, but everything is healed nicely and is doing well at this time. Does admit to some stress at this time as his son recently had a brain bleed and is in recovery. Hypoglycemic episodes:  None recently. The patient's last eye exam was recently per patient. He denies history of retinopathy. Last diabetic foot exam was completed April 2023. Blood Sugar/Glucometer/Pump/CGM review:  No blood sugar logs present at office visit. States that he does check it, and is typically running high. He has postsurgical hypothyroidism treated with levothyroxine 150 mcg daily. Denies any increasing fatigue, heat or cold intolerance, palpitations, diarrhea, tremors, anxiety or depression. Does have chronic constipation and uses MiraLax as needed. For his hx of hld he is treated with and tolerating rosuvastatin 40 mg daily. Denies any headaches, vision changes, chest pain, MI or stroke-like symptoms. Review of Systems   Constitutional:  Negative for activity change, appetite change, fatigue and unexpected weight change. HENT:  Negative for trouble swallowing. Eyes:  Negative for visual disturbance. Respiratory:  Negative for chest tightness and shortness of breath. Cardiovascular:  Negative for chest pain, palpitations and leg swelling. Gastrointestinal:  Positive for constipation. Negative for abdominal pain, diarrhea, nausea and vomiting. Endocrine: Negative for cold intolerance, heat intolerance, polydipsia, polyphagia and polyuria.    Genitourinary:  Negative for frequency. Skin:  Negative for rash and wound. Neurological:  Negative for dizziness, weakness, light-headedness, numbness and headaches. Psychiatric/Behavioral:  Negative for dysphoric mood and sleep disturbance. The patient is not nervous/anxious. Historical Information   Past Medical History:   Diagnosis Date   • Aortic stenosis    • CKD (chronic kidney disease)     baseline Cr 1.3-1.5   • Coronary artery disease    • Cough    • 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HCC)     type 2, insulin dependent   • GERD (gastroesophageal reflux disease)    • Glaucoma    • Gout    • History of prostate cancer    • Hypertension    • Hypothyroidism    • Overweight    • Peripheral neuropathy, idiopathic    • Pleural effusion, left    • Pure hypercholesterolemia     LA. ..11/12/14   R. ...11/12/14    • Visual impairment     cataract left eye   • Weight gain      Past Surgical History:   Procedure Laterality Date   • CARDIAC CATHETERIZATION     • EYE SURGERY     • IR THORACENTESIS  10/23/2018   • IR THORACENTESIS  11/2/2018   • IR THORACENTESIS  11/9/2018   • IR THORACENTESIS  11/23/2018   • GA CORONARY ARTERY BYP W/VEIN & ARTERY GRAFT 3 VEIN N/A 9/17/2018    Procedure: CORONARY ARTERY BYPASS GRAFT (CABG) x 4 VESSELS with LIMA - LAD, SVG/LEFT LEG EVH - LEFT PDA, OM3, & OM2;  Surgeon: Jack Castillo MD;  Location: [facility];  Service: Cardiac Surgery   • GA ECHO TRANSESOPHAG MONTR CARDIAC PUMP FUNCTJ N/A 9/17/2018    Procedure: TRANSESOPHAGEAL ECHOCARDIOGRAM (VERONICA);   Surgeon: Jack Castillo MD;  Location: [facility];  Service: Cardiac Surgery   • GA RPLCMT AORTIC VALVE OPN W/STENTLESS TISSUE VALVE N/A 9/17/2018    Procedure: REPLACEMENT VALVE AORTIC (AVR)- 23mm tissue Intuity Valve;  Surgeon: Jack Castillo MD;  Location: [facility];  Service: Cardiac Surgery   • THORACOSCOPY VIDEO ASSISTED SURGERY (VATS) Left 11/27/2018    Procedure: THORACOSCOPY VIDEO ASSISTED SURGERY (VATS), talc pleurodesis,;  Surgeon: Dali Lee MD; Location: [facility];  Service: Thoracic   • THYROID SURGERY       Social History   Social History     Substance and Sexual Activity   Alcohol Use No     Social History     Substance and Sexual Activity   Drug Use No     Social History     Tobacco Use   Smoking Status Former   • Packs/day: 0.25   • Years: 1.00   • Total pack years: 0.25   • Types: Cigarettes   • Quit date:    • Years since quittin.9   Smokeless Tobacco Never   Tobacco Comments    Only in the service      Family History:   Family History   Problem Relation Age of Onset   • Diabetes Mother    • Pancreatic cancer Brother    • Diabetes Maternal Grandmother    • Colon cancer Son    • Diabetes Family    • Substance Abuse Neg Hx    • Mental illness Neg Hx        Meds/Allergies   Current Outpatient Medications   Medication Sig Dispense Refill   • allopurinol (ZYLOPRIM) 300 mg tablet TAKE ONE TABLET BY MOUTH ONCE DAILY 90 tablet 3   • ascorbic acid (VITAMIN C) 500 mg tablet TAKE ONE TABLET BY MOUTH DAILY 28 tablet 3   • aspirin (ECOTRIN) 325 mg EC tablet Take 1 tablet (325 mg total) by mouth daily 30 tablet 0   • Basaglar KwikPen 100 units/mL SOPN inject 26 units subcutaneously daily 15 mL 3   • cholecalciferol (VITAMIN D3) 1,000 units tablet Take 1 tablet (1,000 Units total) by mouth daily 90 tablet 3   • dorzolamide-timolol (COSOPT) 22.3-6.8 MG/ML ophthalmic solution Administer 1 drop to both eyes 2 (two) times a day     • FeroSul 325 (65 Fe) MG tablet TAKE ONE TABLET BY MOUTH DAILY 28 tablet 3   • gabapentin (NEURONTIN) 300 mg capsule TAKE ONE CAPSULE BY MOUTH DAILY AT BEDTIME 90 capsule 3   • insulin aspart (NovoLOG FlexPen) 100 UNIT/ML injection pen INJECT 11 UNITS SUBCUTANEOUSLY THREE TIMES DAILY 15 mL 3   • Insulin Pen Needle (Pen Needles) 32G X 4 MM MISC Use 4 (four) times a day (before meals and at bedtime) 100 each 11   • levothyroxine 150 mcg tablet Take 1 tablet (150 mcg total) by mouth daily 90 tablet 3   • LORazepam (ATIVAN) 0.5 mg tablet TAKE ONE TABLET BY MOUTH EVERY EIGHT HOURS AS NEEDED FOR ANXIETY 30 tablet 3   • olmesartan (BENICAR) 5 mg tablet Take 1 tablet (5 mg total) by mouth daily 90 tablet 3   • pantoprazole (PROTONIX) 20 mg tablet TAKE ONE TABLET BY MOUTH DAILY 30 tablet 3   • rosuvastatin (CRESTOR) 40 MG tablet TAKE ONE TABLET BY MOUTH DAILY 90 tablet 3   • tamsulosin (FLOMAX) 0.4 mg TAKE ONE CAPSULE BY MOUTH DAILY WITH dinner 30 capsule 6   • torsemide (DEMADEX) 10 mg tablet TAKE ONE TABLET BY MOUTH EVERY DAY 90 tablet 0     No current facility-administered medications for this visit. Allergies   Allergen Reactions   • Amlodipine Nausea Only   • Atorvastatin Myalgia       Objective   Vitals: Blood pressure 110/62, pulse 64, height 5' 7" (1.702 m), weight 97.1 kg (214 lb), SpO2 98 %. Physical Exam  Vitals and nursing note reviewed. Constitutional:       General: He is not in acute distress. Appearance: Normal appearance. He is not diaphoretic. HENT:      Head: Normocephalic and atraumatic. Eyes:      General: No scleral icterus. Extraocular Movements: Extraocular movements intact. Conjunctiva/sclera: Conjunctivae normal.      Pupils: Pupils are equal, round, and reactive to light. Neck:      Comments: Thyroid resected  Cardiovascular:      Rate and Rhythm: Normal rate and regular rhythm. Heart sounds: No murmur heard. Pulmonary:      Effort: Pulmonary effort is normal. No respiratory distress. Breath sounds: Normal breath sounds. No wheezing. Musculoskeletal:      Cervical back: Normal range of motion. Right lower leg: No edema. Left lower leg: No edema. Lymphadenopathy:      Cervical: No cervical adenopathy. Skin:     General: Skin is warm and dry. Neurological:      Mental Status: He is alert and oriented to person, place, and time. Mental status is at baseline. Sensory: No sensory deficit.       Gait: Gait normal.   Psychiatric:         Mood and Affect: Mood normal. Behavior: Behavior normal.         Thought Content: Thought content normal.         The history was obtained from the review of the chart, patient. Lab Results:   Lab Results   Component Value Date/Time    Hemoglobin A1C 7.9 (H) 11/13/2023 12:29 PM    Hemoglobin A1C 8.1 (A) 08/11/2023 11:05 AM    Hemoglobin A1C 8.9 (H) 04/15/2023 11:24 AM    Hemoglobin A1C 9.2 (A) 04/12/2023 11:36 AM    Hemoglobin A1C 8.3 (H) 12/19/2022 10:12 AM    White Blood Cell Count 6.6 11/13/2023 12:29 PM    Hemoglobin 11.8 (L) 11/13/2023 12:29 PM    HCT 36.0 (L) 11/13/2023 12:29 PM    .4 (H) 11/13/2023 12:29 PM    Platelet Count 096 96/70/6922 12:29 PM    BUN 43 (H) 11/13/2023 12:29 PM    BUN 40 (H) 04/15/2023 11:24 AM    BUN 40 (H) 12/19/2022 10:12 AM    Potassium 4.8 11/13/2023 12:29 PM    Potassium 4.2 04/15/2023 11:24 AM    Potassium 4.1 12/19/2022 10:12 AM    Chloride 107 11/13/2023 12:29 PM    Chloride 106 04/15/2023 11:24 AM    Chloride 106 12/19/2022 10:12 AM    CO2 26 11/13/2023 12:29 PM    CO2 26 04/15/2023 11:24 AM    CO2 29 12/19/2022 10:12 AM    Creatinine 2.49 (H) 11/13/2023 12:29 PM    Creatinine 2.83 (H) 04/15/2023 11:24 AM    Creatinine 2.70 (H) 12/19/2022 10:12 AM    AST 46 (H) 11/13/2023 12:29 PM    AST 26 04/15/2023 11:24 AM    AST 28 12/19/2022 10:12 AM    ALT 33 11/13/2023 12:29 PM    ALT 27 04/15/2023 11:24 AM    ALT 24 12/19/2022 10:12 AM    Protein, Total 6.4 11/13/2023 12:29 PM    Protein, Total 6.2 04/15/2023 11:24 AM    Protein, Total 6.1 12/19/2022 10:12 AM    Albumin 3.9 11/13/2023 12:29 PM    Albumin 3.7 04/15/2023 11:24 AM    Albumin 3.6 12/19/2022 10:12 AM    Globulin 2.5 11/13/2023 12:29 PM    Globulin 2.5 04/15/2023 11:24 AM    Globulin 2.5 12/19/2022 10:12 AM    HDL 30 (L) 11/13/2023 12:29 PM    Triglycerides 145 11/13/2023 12:29 PM       Portions of the record may have been created with voice recognition software.  Occasional wrong word or "sound a like" substitutions may have occurred due to the inherent limitations of voice recognition software. Read the chart carefully and recognize, using context, where substitutions have occurred.

## 2023-11-17 NOTE — PATIENT INSTRUCTIONS
Monitor diet to help with blood sugar levels. Increase Basaglar 26 units daily and NovoLog 11 units with each meal.     Continue levothyroxine 150 mcg. Recommend checking blood sugar at least daily, and alternating times to get a better idea of how blood sugars look throughout the day. Contact the office with any concerns or questions.      Follow-up in 3 months with lab work completed prior to visit

## 2023-11-22 ENCOUNTER — OFFICE VISIT (OUTPATIENT)
Dept: NEPHROLOGY | Facility: HOSPITAL | Age: 88
End: 2023-11-22
Payer: COMMERCIAL

## 2023-11-22 VITALS
WEIGHT: 214 LBS | BODY MASS INDEX: 33.59 KG/M2 | DIASTOLIC BLOOD PRESSURE: 65 MMHG | SYSTOLIC BLOOD PRESSURE: 110 MMHG | HEIGHT: 67 IN

## 2023-11-22 DIAGNOSIS — E11.22 TYPE 2 DIABETES MELLITUS WITH STAGE 3B CHRONIC KIDNEY DISEASE, WITH LONG-TERM CURRENT USE OF INSULIN (HCC): ICD-10-CM

## 2023-11-22 DIAGNOSIS — N18.32 TYPE 2 DIABETES MELLITUS WITH STAGE 3B CHRONIC KIDNEY DISEASE, WITH LONG-TERM CURRENT USE OF INSULIN (HCC): ICD-10-CM

## 2023-11-22 DIAGNOSIS — Z79.4 TYPE 2 DIABETES MELLITUS WITH DIABETIC NEUROPATHY, WITH LONG-TERM CURRENT USE OF INSULIN (HCC): ICD-10-CM

## 2023-11-22 DIAGNOSIS — N18.4 CKD (CHRONIC KIDNEY DISEASE), STAGE IV (HCC): Primary | ICD-10-CM

## 2023-11-22 DIAGNOSIS — N28.1 RENAL CYST: ICD-10-CM

## 2023-11-22 DIAGNOSIS — Z79.4 TYPE 2 DIABETES MELLITUS WITH STAGE 3B CHRONIC KIDNEY DISEASE, WITH LONG-TERM CURRENT USE OF INSULIN (HCC): ICD-10-CM

## 2023-11-22 DIAGNOSIS — E11.29 MICROALBUMINURIA DUE TO TYPE 2 DIABETES MELLITUS: ICD-10-CM

## 2023-11-22 DIAGNOSIS — I50.32 CHRONIC DIASTOLIC CONGESTIVE HEART FAILURE (HCC): ICD-10-CM

## 2023-11-22 DIAGNOSIS — E11.40 TYPE 2 DIABETES MELLITUS WITH DIABETIC NEUROPATHY, WITH LONG-TERM CURRENT USE OF INSULIN (HCC): ICD-10-CM

## 2023-11-22 DIAGNOSIS — R80.9 MICROALBUMINURIA DUE TO TYPE 2 DIABETES MELLITUS: ICD-10-CM

## 2023-11-22 DIAGNOSIS — R80.1 PERSISTENT PROTEINURIA: ICD-10-CM

## 2023-11-22 PROCEDURE — 99213 OFFICE O/P EST LOW 20 MIN: CPT | Performed by: NURSE PRACTITIONER

## 2023-11-22 NOTE — PATIENT INSTRUCTIONS
PATIENT INSTRUCTIONS  Follow up with Dr. Jenifer Chappell in 6 months with repeat lab work in 4 months. Please call the office with any questions or concerns.     AVOID NSAIDS INCLUDING MOTRIN, IBUPROFEN, ADVIL, ALEVE, NAPROXEN    Your renal function is stable and at your baseline    No changes to medications    PLEASE CALL OFFICE IF YOUR HAVING ANY DIZZINESS OR LIGHTHEADEDNESS    SEE YOUR BACK IN OFFICE IN 6 MONTHS WITH REPEAT LABS IN 4 MONTHS

## 2023-11-30 DIAGNOSIS — Z00.00 HEALTHCARE MAINTENANCE: ICD-10-CM

## 2023-11-30 DIAGNOSIS — R60.9 EDEMA, UNSPECIFIED TYPE: ICD-10-CM

## 2023-11-30 LAB
LEFT EYE DIABETIC RETINOPATHY: NORMAL
RIGHT EYE DIABETIC RETINOPATHY: NORMAL

## 2023-11-30 RX ORDER — TORSEMIDE 10 MG/1
TABLET ORAL
Qty: 90 TABLET | Refills: 1 | Status: SHIPPED | OUTPATIENT
Start: 2023-11-30

## 2023-11-30 RX ORDER — ASCORBIC ACID 500 MG
TABLET ORAL
Qty: 28 TABLET | Refills: 3 | Status: SHIPPED | OUTPATIENT
Start: 2023-11-30

## 2023-12-08 DIAGNOSIS — M1A.9XX1 CHRONIC GOUT WITH TOPHUS, UNSPECIFIED CAUSE, UNSPECIFIED SITE: ICD-10-CM

## 2023-12-08 DIAGNOSIS — Z95.1 S/P CABG X 4: ICD-10-CM

## 2023-12-08 RX ORDER — PANTOPRAZOLE SODIUM 20 MG/1
TABLET, DELAYED RELEASE ORAL
Qty: 30 TABLET | Refills: 3 | Status: SHIPPED | OUTPATIENT
Start: 2023-12-08

## 2023-12-08 RX ORDER — ALLOPURINOL 300 MG/1
TABLET ORAL
Qty: 90 TABLET | Refills: 3 | Status: SHIPPED | OUTPATIENT
Start: 2023-12-08

## 2023-12-28 DIAGNOSIS — F41.9 ANXIETY: ICD-10-CM

## 2023-12-28 RX ORDER — LORAZEPAM 0.5 MG/1
TABLET ORAL
Qty: 30 TABLET | Refills: 3 | Status: SHIPPED | OUTPATIENT
Start: 2023-12-28

## 2024-01-01 DIAGNOSIS — E11.22 TYPE 2 DIABETES MELLITUS WITH STAGE 3 CHRONIC KIDNEY DISEASE, WITHOUT LONG-TERM CURRENT USE OF INSULIN, UNSPECIFIED WHETHER STAGE 3A OR 3B CKD (HCC): ICD-10-CM

## 2024-01-01 DIAGNOSIS — N18.30 TYPE 2 DIABETES MELLITUS WITH STAGE 3 CHRONIC KIDNEY DISEASE, WITHOUT LONG-TERM CURRENT USE OF INSULIN, UNSPECIFIED WHETHER STAGE 3A OR 3B CKD (HCC): ICD-10-CM

## 2024-01-02 RX ORDER — INSULIN ASPART 100 [IU]/ML
INJECTION, SOLUTION INTRAVENOUS; SUBCUTANEOUS
Qty: 15 ML | Refills: 3 | Status: SHIPPED | OUTPATIENT
Start: 2024-01-02

## 2024-01-25 DIAGNOSIS — G62.9 NEUROPATHY: ICD-10-CM

## 2024-01-26 RX ORDER — GABAPENTIN 300 MG/1
CAPSULE ORAL
Qty: 90 CAPSULE | Refills: 3 | Status: SHIPPED | OUTPATIENT
Start: 2024-01-26

## 2024-01-29 LAB
ALBUMIN SERPL-MCNC: 3.6 G/DL (ref 3.6–5.1)
ALBUMIN/GLOB SERPL: 1.4 (CALC) (ref 1–2.5)
ALP SERPL-CCNC: 50 U/L (ref 35–144)
ALT SERPL-CCNC: 25 U/L (ref 9–46)
AST SERPL-CCNC: 31 U/L (ref 10–35)
BILIRUB SERPL-MCNC: 0.8 MG/DL (ref 0.2–1.2)
BUN SERPL-MCNC: 35 MG/DL (ref 7–25)
BUN/CREAT SERPL: 13 (CALC) (ref 6–22)
CALCIUM SERPL-MCNC: 9.1 MG/DL (ref 8.6–10.3)
CHLORIDE SERPL-SCNC: 109 MMOL/L (ref 98–110)
CO2 SERPL-SCNC: 28 MMOL/L (ref 20–32)
CREAT SERPL-MCNC: 2.64 MG/DL (ref 0.7–1.22)
GFR/BSA.PRED SERPLBLD CYS-BASED-ARV: 23 ML/MIN/1.73M2
GLOBULIN SER CALC-MCNC: 2.5 G/DL (CALC) (ref 1.9–3.7)
GLUCOSE SERPL-MCNC: 102 MG/DL (ref 65–99)
HBA1C MFR BLD: 8.3 % OF TOTAL HGB
POTASSIUM SERPL-SCNC: 4.8 MMOL/L (ref 3.5–5.3)
PROT SERPL-MCNC: 6.1 G/DL (ref 6.1–8.1)
SODIUM SERPL-SCNC: 142 MMOL/L (ref 135–146)
T4 FREE SERPL-MCNC: 0.8 NG/DL (ref 0.8–1.8)
TSH SERPL-ACNC: 30.17 MIU/L (ref 0.4–4.5)

## 2024-01-30 LAB
ALBUMIN SERPL-MCNC: 3.6 G/DL (ref 3.6–5.1)
APPEARANCE UR: ABNORMAL
BACTERIA UR QL AUTO: ABNORMAL /HPF
BILIRUB UR QL STRIP: NEGATIVE
BUN SERPL-MCNC: 33 MG/DL (ref 7–25)
BUN/CREAT SERPL: 12 (CALC) (ref 6–22)
CALCIUM SERPL-MCNC: 8.9 MG/DL (ref 8.6–10.3)
CHLORIDE SERPL-SCNC: 108 MMOL/L (ref 98–110)
CO2 SERPL-SCNC: 30 MMOL/L (ref 20–32)
COLOR UR: YELLOW
CREAT SERPL-MCNC: 2.68 MG/DL (ref 0.7–1.22)
GFR/BSA.PRED SERPLBLD CYS-BASED-ARV: 22 ML/MIN/1.73M2
GLUCOSE SERPL-MCNC: 103 MG/DL (ref 65–99)
GLUCOSE UR QL STRIP: NEGATIVE
HGB UR QL STRIP: ABNORMAL
HYALINE CASTS #/AREA URNS LPF: ABNORMAL /LPF
KETONES UR QL STRIP: NEGATIVE
LEUKOCYTE ESTERASE UR QL STRIP: NEGATIVE
NITRITE UR QL STRIP: NEGATIVE
PH UR STRIP: 5.5 [PH] (ref 5–8)
PHOSPHATE SERPL-MCNC: 3.5 MG/DL (ref 2.1–4.3)
POTASSIUM SERPL-SCNC: 4.6 MMOL/L (ref 3.5–5.3)
PROT UR QL STRIP: ABNORMAL
PTH-INTACT SERPL-MCNC: 58 PG/ML (ref 16–77)
RBC #/AREA URNS HPF: ABNORMAL /HPF
SODIUM SERPL-SCNC: 142 MMOL/L (ref 135–146)
SP GR UR STRIP: 1.01 (ref 1–1.03)
SQUAMOUS #/AREA URNS HPF: ABNORMAL /HPF
WBC #/AREA URNS HPF: ABNORMAL /HPF

## 2024-01-31 ENCOUNTER — TELEPHONE (OUTPATIENT)
Dept: NEPHROLOGY | Facility: CLINIC | Age: 89
End: 2024-01-31

## 2024-01-31 NOTE — TELEPHONE ENCOUNTER
----- Message from Taya Souza PA-C sent at 1/30/2024  4:02 PM EST -----  Blood work reviewed and creatinine stable at baseline.  Electrolytes normal.  Will monitor.

## 2024-02-02 ENCOUNTER — OFFICE VISIT (OUTPATIENT)
Dept: CARDIOLOGY CLINIC | Facility: CLINIC | Age: 89
End: 2024-02-02
Payer: COMMERCIAL

## 2024-02-02 VITALS
DIASTOLIC BLOOD PRESSURE: 56 MMHG | HEART RATE: 57 BPM | WEIGHT: 222 LBS | SYSTOLIC BLOOD PRESSURE: 104 MMHG | BODY MASS INDEX: 34.77 KG/M2

## 2024-02-02 DIAGNOSIS — I50.32 CHRONIC DIASTOLIC CONGESTIVE HEART FAILURE (HCC): ICD-10-CM

## 2024-02-02 DIAGNOSIS — E11.29 MICROALBUMINURIA DUE TO TYPE 2 DIABETES MELLITUS: ICD-10-CM

## 2024-02-02 DIAGNOSIS — E78.00 PURE HYPERCHOLESTEROLEMIA: ICD-10-CM

## 2024-02-02 DIAGNOSIS — Z79.4 TYPE 2 DIABETES MELLITUS WITH STAGE 3B CHRONIC KIDNEY DISEASE, WITH LONG-TERM CURRENT USE OF INSULIN (HCC): ICD-10-CM

## 2024-02-02 DIAGNOSIS — Z95.2 S/P AVR (AORTIC VALVE REPLACEMENT): ICD-10-CM

## 2024-02-02 DIAGNOSIS — I25.10 CORONARY ARTERY DISEASE INVOLVING NATIVE CORONARY ARTERY OF NATIVE HEART WITHOUT ANGINA PECTORIS: Primary | ICD-10-CM

## 2024-02-02 DIAGNOSIS — I35.0 NONRHEUMATIC AORTIC (VALVE) STENOSIS: ICD-10-CM

## 2024-02-02 DIAGNOSIS — I11.0 HYPERTENSIVE HEART DISEASE WITH HF (HEART FAILURE) (HCC): ICD-10-CM

## 2024-02-02 DIAGNOSIS — E11.22 TYPE 2 DIABETES MELLITUS WITH STAGE 3B CHRONIC KIDNEY DISEASE, WITH LONG-TERM CURRENT USE OF INSULIN (HCC): ICD-10-CM

## 2024-02-02 DIAGNOSIS — N18.32 TYPE 2 DIABETES MELLITUS WITH STAGE 3B CHRONIC KIDNEY DISEASE, WITH LONG-TERM CURRENT USE OF INSULIN (HCC): ICD-10-CM

## 2024-02-02 DIAGNOSIS — Z95.1 S/P CABG X 4: ICD-10-CM

## 2024-02-02 DIAGNOSIS — R80.9 MICROALBUMINURIA DUE TO TYPE 2 DIABETES MELLITUS: ICD-10-CM

## 2024-02-02 PROCEDURE — 99214 OFFICE O/P EST MOD 30 MIN: CPT | Performed by: INTERNAL MEDICINE

## 2024-02-02 PROCEDURE — 93000 ELECTROCARDIOGRAM COMPLETE: CPT | Performed by: INTERNAL MEDICINE

## 2024-02-02 NOTE — PROGRESS NOTES
Cardiology Follow-up    Carlos Enrique Roth Jr.  1935  [id number]  HEART & VASCULAR  [facility] [place] [address] [address]  [facility] [id number]    1. Coronary artery disease involving native coronary artery of native heart without angina pectoris  POCT ECG      2. Hypertensive heart disease with HF (heart failure) ([facility])  POCT ECG    Echo complete w/ contrast if indicated      3. Chronic diastolic congestive heart failure ([facility])        4. Microalbuminuria due to type 2 diabetes mellitus         5. Type 2 diabetes mellitus with stage 3b chronic kidney disease, with long-term current use of insulin ([facility])        6. Nonrheumatic aortic (valve) stenosis        7. S/P CABG x 4        8. S/P AVR (aortic valve replacement)  Echo complete w/ contrast if indicated      9. Pure hypercholesterolemia            Discussion/Summary:    1.  CAD - Carlos Enrique is status post CABG x4.  He tolerated the surgery well.  He will remain on low-dose torsemide given lower extremity edema, which has remained stable.  We are discontinuing metoprolol due to bradycardia.  No other changes were made to his regimen.  We will see him back in 1 year.    2.  Aortic stenosis - He is also status post bioprosthetic aortic valve replacement at the time of his CABG.  Echocardiogram in November 2021 showed a normally functioning AVR.  He should take antibiotic prophylaxis for any dental procedures.  We ordered an updated echocardiogram today.    3.  Hypertension - His blood pressure has been running low and he is somewhat lightheaded after he takes his morning medications.  He has come off of lisinopril due to worsening renal function, and follows with Nephrology closely.  He was started on low-dose olmesartan.  At our last visit we did stop the metoprolol given bradycardia and a competing junctional rhythm.  I am going to have him stop the olmesartan given his lightheadedness and he should periodically  "check his blood pressure at home.    4.  Hyperlipidemia - He has been on Crestor.  We will continue to follow blood work closely along with his internist.  His LDL is at goal.      HPI:    Mr. Roth comes in for follow-up given his history multivessel coronary artery disease and aortic stenosis.  I met him at the time of a stress nuclear study back in May of 2018.  This was ordered by his internist, Dr. Cid, secondary to abdominal pain, exertional at times, and multiple risk factors for CAD.  This demonstrated a reversible defect and ischemia of the anterior to apical wall.  His ejection fraction was normal.  He also has moderate aortic stenosis by echocardiogram from December.  He also has hypertension, dyslipidemia and diabetes mellitus.    Cardiac catheterization showed multivessel CAD.  At that point he went and met cardiothoracic surgery, who recommended both core artery bypass grafting and a bioprosthetic aortic valve replacement.  He had this performed in 9/2018, and it went well without complications.  He had CABG x4, with a LIMA to the LAD, SVG to an OM 2 and SVG “Y graft\" to an OM 3 and left posterior descending artery.  He also had a bioprosthetic aortic valve replacement.    He did go home on diuretic therapy but due to some edema associated with cellulitis torsemide was restarted.  He then developed recurrent pleural effusions.  He was in the hospital in which he was requiring thoracentesis.  He ended up having surgery and pleurodesis.  He has recovered well from this.  He did not go home on diuretic therapy due to some acute kidney injury.  He started to show increasing signs of volume overload, weight gain and lower extremity edema.  Torsemide was restarted at 20 mg daily, and we decreased this to 10 mg daily.  He has been stable on this dose.  Since last visit he has been having some worsening renal function, and has come off lisinopril.  He is on low-dose olmesartan.  Due to bradycardia his " metoprolol has been decreased to once daily, and at our last visit was stopped.  I last saw Carlos Enrique close to 2 years ago.    Carlos Enrique overall is feeling well.  His only symptom is some intermittent lightheadedness after he takes his morning medications.  No near-syncope or syncope.  His blood pressure has been running low.  He denies any chest pain or shortness of breath.  He denies chest pain or any symptoms of angina.  He denies shortness of breath or any signs/symptoms of CHF currently.  He denies palpitations.      Patient Active Problem List   Diagnosis    Benign prostatic hyperplasia with nocturia    Hypertensive heart disease with HF (heart failure) ([facility])    Gout with tophus    Postoperative hypothyroidism    Obesity, morbid ([facility])    Pure hypercholesterolemia    Renal cyst    Sexual dysfunction    GERD (gastroesophageal reflux disease)    S/P CABG x 4    S/P AVR (aortic valve replacement)    CAD (coronary artery disease)    Aortic stenosis    Anemia due to stage 3 chronic kidney disease     Ambulatory dysfunction    Type 2 diabetes mellitus with chronic kidney disease, with long-term current use of insulin ([facility])    Primary open angle glaucoma (POAG)    Age-related cataract of both eyes    Atherosclerosis of aorta ([facility])    Chronic diastolic congestive heart failure (HCC)    Type 2 diabetes mellitus with diabetic neuropathy ([facility])    CKD (chronic kidney disease), stage IV ([facility])    Benign prostatic hyperplasia without lower urinary tract symptoms    Long term (current) use of insulin ([facility])    Presence of aortocoronary bypass graft    Presence of prosthetic heart valve    Unspecified glaucoma    Nonrheumatic aortic (valve) stenosis    Atherosclerotic heart disease of native coronary artery without angina pectoris    Skin tear of left elbow without complication    Secondary hyperparathyroidism ([facility])    Microalbuminuria due to type 2 diabetes mellitus     Persistent proteinuria     Past Medical History:   Diagnosis Date     Aortic stenosis     CKD (chronic kidney disease)     baseline Cr 1.3-1.5    Coronary artery disease     Cough     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HCC)     type 2, insulin dependent    GERD (gastroesophageal reflux disease)     Glaucoma     Gout     History of prostate cancer     Hypertension     Hypothyroidism     Overweight     Peripheral neuropathy, idiopathic     Pleural effusion, left     Pure hypercholesterolemia     LA...14   R....14     Visual impairment     cataract left eye    Weight gain      Social History     Socioeconomic History    Marital status:      Spouse name: Not on file    Number of children: Not on file    Years of education: Not on file    Highest education level: Not on file   Occupational History    Occupation: retired    Tobacco Use    Smoking status: Former     Current packs/day: 0.00     Average packs/day: 0.3 packs/day for 1 year (0.3 ttl pk-yrs)     Types: Cigarettes     Start date:      Quit date:      Years since quittin.1    Smokeless tobacco: Never    Tobacco comments:     Only in the service    Vaping Use    Vaping status: Never Used   Substance and Sexual Activity    Alcohol use: No    Drug use: No    Sexual activity: Not Currently   Other Topics Concern    Not on file   Social History Narrative    Caffeine use / coffee diet cola and tea    Lives with family     Living situation supportive and safe    No advance directives  -denied     Social Determinants of Health     Financial Resource Strain: High Risk (2023)    Overall Financial Resource Strain (CARDIA)     Difficulty of Paying Living Expenses: Hard   Food Insecurity: Not on file   Transportation Needs: No Transportation Needs (2023)    PRAPARE - Transportation     Lack of Transportation (Medical): No     Lack of Transportation (Non-Medical): No   Physical Activity: Insufficiently Active (2021)    Exercise Vital Sign     Days of Exercise per Week: 3 days     Minutes of Exercise per  Session: 20 min   Stress: Stress Concern Present (5/28/2021)    [facility] [employer] - Occupational Stress Questionnaire     Feeling of Stress : To some extent   Social Connections: Not on file   Intimate Partner Violence: Not on file   Housing Stability: Not on file      Family History   Problem Relation Age of Onset    Diabetes Mother     Pancreatic cancer Brother     Diabetes Maternal Grandmother     Colon cancer Son     Diabetes Family     Substance Abuse Neg Hx     Mental illness Neg Hx      Past Surgical History:   Procedure Laterality Date    CARDIAC CATHETERIZATION      EYE SURGERY      IR THORACENTESIS  10/23/2018    IR THORACENTESIS  11/2/2018    IR THORACENTESIS  11/9/2018    IR THORACENTESIS  11/23/2018    CA CORONARY ARTERY BYP W/VEIN & ARTERY GRAFT 3 VEIN N/A 9/17/2018    Procedure: CORONARY ARTERY BYPASS GRAFT (CABG) x 4 VESSELS with LIMA - LAD, SVG/LEFT LEG EVH - LEFT PDA, OM3, & OM2;  Surgeon: Remy Ash MD;  Location: [facility];  Service: Cardiac Surgery    CA ECHO TRANSESOPHAG MONTR CARDIAC PUMP FUNCTJ N/A 9/17/2018    Procedure: TRANSESOPHAGEAL ECHOCARDIOGRAM (VERONICA);  Surgeon: Remy Ash MD;  Location: [facility];  Service: Cardiac Surgery    CA RPLCMT AORTIC VALVE OPN W/STENTLESS TISSUE VALVE N/A 9/17/2018    Procedure: REPLACEMENT VALVE AORTIC (AVR)- 23mm tissue Intuity Valve;  Surgeon: Remy Ash MD;  Location: [facility];  Service: Cardiac Surgery    THORACOSCOPY VIDEO ASSISTED SURGERY (VATS) Left 11/27/2018    Procedure: THORACOSCOPY VIDEO ASSISTED SURGERY (VATS), talc pleurodesis,;  Surgeon: Ciara Green MD;  Location: [facility];  Service: Thoracic    THYROID SURGERY         Current Outpatient Medications:     allopurinol (ZYLOPRIM) 300 mg tablet, TAKE ONE TABLET BY MOUTH ONCE DAILY, Disp: 90 tablet, Rfl: 3    ascorbic acid (VITAMIN C) 500 mg tablet, TAKE ONE TABLET BY MOUTH DAILY, Disp: 28 tablet, Rfl: 3    aspirin (ECOTRIN) 325 mg EC tablet,  Take 1 tablet (325 mg total) by mouth daily, Disp: 30 tablet, Rfl: 0    Basaglar KwikPen 100 units/mL SOPN, inject 26 units subcutaneously daily, Disp: 15 mL, Rfl: 3    cholecalciferol (VITAMIN D3) 1,000 units tablet, Take 1 tablet (1,000 Units total) by mouth daily, Disp: 90 tablet, Rfl: 3    dorzolamide-timolol (COSOPT) 22.3-6.8 MG/ML ophthalmic solution, Administer 1 drop to both eyes 2 (two) times a day, Disp: , Rfl:     FeroSul 325 (65 Fe) MG tablet, TAKE ONE TABLET BY MOUTH DAILY, Disp: 28 tablet, Rfl: 3    gabapentin (NEURONTIN) 300 mg capsule, TAKE ONE CAPSULE BY MOUTH DAILY AT BEDTIME, Disp: 90 capsule, Rfl: 3    insulin aspart (NovoLOG FlexPen) 100 UNIT/ML injection pen, INJECT 11 UNITS SUBCUTANEOUSLY THREE TIMES DAILY, Disp: 15 mL, Rfl: 3    Insulin Pen Needle (Pen Needles) 32G X 4 MM MISC, Use 4 (four) times a day (before meals and at bedtime), Disp: 100 each, Rfl: 11    levothyroxine 150 mcg tablet, Take 1 tablet (150 mcg total) by mouth daily, Disp: 90 tablet, Rfl: 3    LORazepam (ATIVAN) 0.5 mg tablet, TAKE ONE TABLET BY MOUTH EVERY EIGHT HOURS AS NEEDED FOR ANXIETY, Disp: 30 tablet, Rfl: 3    pantoprazole (PROTONIX) 20 mg tablet, TAKE ONE TABLET BY MOUTH DAILY, Disp: 30 tablet, Rfl: 3    rosuvastatin (CRESTOR) 40 MG tablet, TAKE ONE TABLET BY MOUTH DAILY, Disp: 90 tablet, Rfl: 3    tamsulosin (FLOMAX) 0.4 mg, TAKE ONE CAPSULE BY MOUTH DAILY WITH dinner, Disp: 30 capsule, Rfl: 6    torsemide (DEMADEX) 10 mg tablet, TAKE ONE TABLET BY MOUTH EVERY DAY, Disp: 90 tablet, Rfl: 1  Allergies   Allergen Reactions    Amlodipine Nausea Only    Atorvastatin Myalgia       Labs:  Lab Results   Component Value Date     09/05/2017    K 4.6 01/29/2024     01/29/2024    CO2 30 01/29/2024    BUN 33 (H) 01/29/2024    CREATININE 2.68 (H) 01/29/2024    CREATININE 2.63 (H) 11/05/2021    CREATININE 1.56 (H) 09/05/2017    GLUCOSE 126 09/17/2018    CALCIUM 8.9 01/29/2024     Lab Results   Component Value Date     WBC 6.6 11/13/2023    WBC 8.91 12/01/2018    WBC 7.6 04/17/2017    HGB 11.8 (L) 11/13/2023    HGB 9.0 (L) 12/01/2018    HGB 14.0 04/17/2017    HCT 36.0 (L) 11/13/2023    HCT 29.4 (L) 12/01/2018    HCT 43.3 04/17/2017    .4 (H) 11/13/2023    MCV 87 12/01/2018    MCV 97.2 04/17/2017     11/13/2023     12/01/2018     04/17/2017     Lab Results   Component Value Date    CHOL 197 04/17/2017    TRIG 145 11/13/2023    HDL 30 (L) 11/13/2023     Imaging:  ECG today shows sinus bradycardia with nonspecific IVCD.    ECHO():    Left Ventricle: Left ventricular cavity size is normal. The left ventricular ejection fraction is 55%. Systolic function is normal. Wall motion is normal. Diastolic function is moderately abnormal, consistent with grade II (pseudonormal) relaxation.    Left Atrium: The atrium is mildly dilated.    Aortic Valve: There is a bovine bioprosthetic valve.    Mitral Valve: There is mild annular calcification. There is mild regurgitation.    Tricuspid Valve: There is mild regurgitation.      CARDIAC CATH(5/2018):  CORONARY CIRCULATION:  Proximal LAD: There was a 100 % stenosis. This lesion is a chronic total occlusion.  1st obtuse marginal: There was a diffuse 90 % stenosis.  2nd obtuse marginal: There was a diffuse 90 % stenosis.  3rd obtuse marginal: There was a 80 % stenosis.  1st left posterolateral: There was a 90 % stenosis.  Mid RCA: There was a 95 % stenosis.     CARDIAC STRUCTURES:  There was moderate aortic stenosis.      Review of Systems:  Review of Systems   Constitutional:  Positive for fatigue.   HENT: Negative.     Eyes: Negative.    Respiratory: Negative.     Cardiovascular: Negative.    Gastrointestinal:  Positive for constipation.   Musculoskeletal: Negative.    Skin: Negative.    Allergic/Immunologic: Negative.    Neurological: Negative.    Hematological: Negative.    Psychiatric/Behavioral: Negative.     All other systems reviewed and are  negative.    Vitals:    02/02/24 1114   BP: 104/56   BP Location: Left arm   Patient Position: Sitting   Cuff Size: Standard   Pulse: 57   Weight: 101 kg (222 lb)       Physical Exam:  Physical Exam  Vitals and nursing note reviewed.   Constitutional:       Appearance: He is well-developed.   HENT:      Head: Normocephalic and atraumatic.   Eyes:      General: No scleral icterus.        Right eye: No discharge.         Left eye: No discharge.      Pupils: Pupils are equal, round, and reactive to light.   Neck:      Thyroid: No thyromegaly.      Vascular: No JVD.   Cardiovascular:      Rate and Rhythm: Normal rate and regular rhythm. No extrasystoles are present.     Pulses: Normal pulses.      Heart sounds: S1 normal and S2 normal. Murmur heard.      Systolic murmur is present with a grade of 3/6.      No friction rub. No gallop.   Pulmonary:      Effort: Pulmonary effort is normal. No respiratory distress.      Breath sounds: Normal breath sounds. No wheezing or rales.   Abdominal:      General: Bowel sounds are normal. There is no distension.      Palpations: Abdomen is soft.      Tenderness: There is no abdominal tenderness.   Musculoskeletal:         General: No tenderness or deformity. Normal range of motion.      Cervical back: Normal range of motion and neck supple.   Skin:     General: Skin is warm and dry.      Findings: No rash.   Neurological:      Mental Status: He is alert and oriented to person, place, and time.      Cranial Nerves: No cranial nerve deficit.   Psychiatric:         Thought Content: Thought content normal.         Judgment: Judgment normal.       Counseling / Coordination of Care  Total floor / unit time spent today 25 minutes.  Greater than 50% of total time was spent with the patient and / or family counseling and / or coordination of care.

## 2024-02-03 DIAGNOSIS — L03.116 CELLULITIS OF LEFT LOWER EXTREMITY: ICD-10-CM

## 2024-02-03 RX ORDER — INSULIN GLARGINE 100 [IU]/ML
INJECTION, SOLUTION SUBCUTANEOUS
Qty: 15 ML | Refills: 3 | Status: SHIPPED | OUTPATIENT
Start: 2024-02-03

## 2024-02-08 ENCOUNTER — RA CDI HCC (OUTPATIENT)
Dept: OTHER | Facility: HOSPITAL | Age: 89
End: 2024-02-08

## 2024-02-08 ENCOUNTER — TELEPHONE (OUTPATIENT)
Dept: ENDOCRINOLOGY | Facility: HOSPITAL | Age: 89
End: 2024-02-08

## 2024-02-08 NOTE — TELEPHONE ENCOUNTER
Note taken from 1-29-24 lab results  Patient has an upcoming appointment next month at which time we can go into further detail with his lab results.  I do want to make sure that he is currently taking levothyroxine 150 mcg daily.  And that he is taking it first thing in the morning prior to having anything to eat.  It does look like his PCP increased the dose slightly in November, but thyroid levels did not improve.     Spoke to patient.  He is currently taking 150 mcg daily, however he takes it along with his other medications and eats after.  I advised him to take the levothyroxine by itself, then wait at least 30 minutes before eating or taking any other medications and/or vitamins.

## 2024-02-08 NOTE — PROGRESS NOTES
E11.65, E11.22 I13.0, e11.40  HCC coding opportunities          Chart Reviewed number of suggestions sent to Provider: 4     Patients Insurance     Medicare Insurance: [employer] Advantage

## 2024-02-09 ENCOUNTER — OFFICE VISIT (OUTPATIENT)
Dept: FAMILY MEDICINE CLINIC | Facility: HOSPITAL | Age: 89
End: 2024-02-09
Payer: COMMERCIAL

## 2024-02-09 VITALS
SYSTOLIC BLOOD PRESSURE: 118 MMHG | OXYGEN SATURATION: 98 % | HEART RATE: 60 BPM | BODY MASS INDEX: 34.37 KG/M2 | DIASTOLIC BLOOD PRESSURE: 60 MMHG | HEIGHT: 67 IN | WEIGHT: 219 LBS

## 2024-02-09 DIAGNOSIS — I35.0 NONRHEUMATIC AORTIC (VALVE) STENOSIS: ICD-10-CM

## 2024-02-09 DIAGNOSIS — I25.10 ATHEROSCLEROSIS OF NATIVE CORONARY ARTERY OF NATIVE HEART WITHOUT ANGINA PECTORIS: Primary | ICD-10-CM

## 2024-02-09 DIAGNOSIS — E11.22 CKD STAGE 4 DUE TO TYPE 2 DIABETES MELLITUS (HCC): ICD-10-CM

## 2024-02-09 DIAGNOSIS — Z79.4 TYPE 2 DIABETES MELLITUS WITH DIABETIC NEUROPATHY, WITH LONG-TERM CURRENT USE OF INSULIN (HCC): ICD-10-CM

## 2024-02-09 DIAGNOSIS — N18.4 CKD STAGE 4 DUE TO TYPE 2 DIABETES MELLITUS (HCC): ICD-10-CM

## 2024-02-09 DIAGNOSIS — N40.1 BENIGN PROSTATIC HYPERPLASIA WITH NOCTURIA: ICD-10-CM

## 2024-02-09 DIAGNOSIS — E11.40 TYPE 2 DIABETES MELLITUS WITH DIABETIC NEUROPATHY, WITH LONG-TERM CURRENT USE OF INSULIN (HCC): ICD-10-CM

## 2024-02-09 DIAGNOSIS — Z95.1 S/P CABG X 4: ICD-10-CM

## 2024-02-09 DIAGNOSIS — E03.9 HYPOTHYROIDISM, UNSPECIFIED TYPE: ICD-10-CM

## 2024-02-09 DIAGNOSIS — I50.32 CHRONIC DIASTOLIC CONGESTIVE HEART FAILURE (HCC): ICD-10-CM

## 2024-02-09 DIAGNOSIS — R35.1 BENIGN PROSTATIC HYPERPLASIA WITH NOCTURIA: ICD-10-CM

## 2024-02-09 DIAGNOSIS — M1A.9XX1 GOUT WITH TOPHUS: ICD-10-CM

## 2024-02-09 DIAGNOSIS — Z79.4 LONG TERM (CURRENT) USE OF INSULIN (HCC): ICD-10-CM

## 2024-02-09 DIAGNOSIS — R80.9 MICROALBUMINURIA DUE TO TYPE 2 DIABETES MELLITUS: ICD-10-CM

## 2024-02-09 DIAGNOSIS — N25.81 SECONDARY HYPERPARATHYROIDISM (HCC): ICD-10-CM

## 2024-02-09 DIAGNOSIS — K59.00 CONSTIPATION, UNSPECIFIED CONSTIPATION TYPE: ICD-10-CM

## 2024-02-09 DIAGNOSIS — E89.0 POSTOPERATIVE HYPOTHYROIDISM: ICD-10-CM

## 2024-02-09 DIAGNOSIS — E66.01 OBESITY, MORBID (HCC): ICD-10-CM

## 2024-02-09 DIAGNOSIS — I11.0 HYPERTENSIVE HEART DISEASE WITH HF (HEART FAILURE) (HCC): ICD-10-CM

## 2024-02-09 DIAGNOSIS — I70.0 ATHEROSCLEROSIS OF AORTA (HCC): ICD-10-CM

## 2024-02-09 DIAGNOSIS — E11.29 MICROALBUMINURIA DUE TO TYPE 2 DIABETES MELLITUS: ICD-10-CM

## 2024-02-09 PROCEDURE — 99214 OFFICE O/P EST MOD 30 MIN: CPT | Performed by: INTERNAL MEDICINE

## 2024-02-09 RX ORDER — LEVOTHYROXINE SODIUM 0.15 MG/1
150 TABLET ORAL DAILY
Qty: 90 TABLET | Refills: 3 | Status: SHIPPED | OUTPATIENT
Start: 2024-02-09

## 2024-02-09 RX ORDER — SENNOSIDES A AND B 8.6 MG/1
1 TABLET, FILM COATED ORAL DAILY
Qty: 90 TABLET | Refills: 3 | Status: SHIPPED | OUTPATIENT
Start: 2024-02-09

## 2024-02-09 NOTE — ASSESSMENT & PLAN NOTE
Wt Readings from Last 3 Encounters:   02/09/24 99.3 kg (219 lb)   02/02/24 101 kg (222 lb)   11/22/23 97.1 kg (214 lb)         Discussed keeping track of daily weight

## 2024-02-09 NOTE — ASSESSMENT & PLAN NOTE
Wt Readings from Last 3 Encounters:   02/09/24 99.3 kg (219 lb)   02/02/24 101 kg (222 lb)   11/22/23 97.1 kg (214 lb)         On  torsemide - olmasartan was stopped last week by / /amanda- had dizziness and lower bp

## 2024-02-09 NOTE — ASSESSMENT & PLAN NOTE
No abdominal pain   Doxycycline Pregnancy And Lactation Text: This medication is Pregnancy Category D and not consider safe during pregnancy. It is also excreted in breast milk but is considered safe for shorter treatment courses.

## 2024-02-09 NOTE — ASSESSMENT & PLAN NOTE
Had seen Dr. Abraham in past- is stable    If worsening will have him [facility]ados [facility]

## 2024-02-09 NOTE — PROGRESS NOTES
Assessment/Plan:     Diagnosis ICD-10-CM Associated Orders   1. Atherosclerosis of native coronary artery of native heart without angina pectoris  I25.10       2. Postoperative hypothyroidism  E89.0       3. Nonrheumatic aortic (valve) stenosis  I35.0       4. Hypertensive heart disease with HF (heart failure) ([facility])  I11.0       5. Benign prostatic hyperplasia with nocturia  N40.1     R35.1       6. Gout with tophus  M1A.[id number]       7. Long term (current) use of insulin ([facility])  Z79.4       8. Obesity, morbid ([facility])  E66.01       9. S/P CABG x 4  Z95.1       10. CKD stage 4 due to type 2 diabetes mellitus ([facility])  E11.22     N18.4       11. Chronic diastolic congestive heart failure ([facility])  I50.32       12. Secondary hyperparathyroidism ([facility])  N25.81       13. Microalbuminuria due to type 2 diabetes mellitus   E11.29     R80.9       14. Type 2 diabetes mellitus with diabetic neuropathy, with long-term current use of insulin ([facility])  E11.40     Z79.4           Problem List Items Addressed This Visit          Endocrine    Postoperative hypothyroidism     On 150 mcg- take 30 minutes before other meds   Repeat labs for thyroid in 3 months         Type 2 diabetes mellitus with diabetic neuropathy ([facility])    CKD stage 4 due to type 2 diabetes mellitus ([facility])     Lab Results   Component Value Date    EGFR 22 (L) 01/29/2024    EGFR 23 (L) 01/29/2024    EGFR 24 (L) 11/13/2023    CREATININE 2.68 (H) 01/29/2024    CREATININE 2.64 (H) 01/29/2024    CREATININE 2.49 (H) 11/13/2023            Secondary hyperparathyroidism ([facility])     Due to ckd stage 4         Microalbuminuria due to type 2 diabetes mellitus        Cardiovascular and Mediastinum    Hypertensive heart disease with HF (heart failure) ([facility])     Wt Readings from Last 3 Encounters:   02/09/24 99.3 kg (219 lb)   02/02/24 101 kg (222 lb)   11/22/23 97.1 kg (214 lb)         On  torsemide - olmasartan was stopped last week by / /amanda- had dizziness and lower bp           Chronic  diastolic congestive heart failure (HCC)     Wt Readings from Last 3 Encounters:   02/09/24 99.3 kg (219 lb)   02/02/24 101 kg (222 lb)   11/22/23 97.1 kg (214 lb)         Discussed keeping track of daily weight           Nonrheumatic aortic (valve) stenosis    Atherosclerotic heart disease of native coronary artery without angina pectoris - Primary     Seen recently by Dr patel- no angina issues            Other    Gout with tophus     Has rare symptoms- will check uric acid with next labs         Obesity, morbid (HCC)     Bmi is now 34         S/P CABG x 4    Long term (current) use of insulin (HCC)     Discussed limiting his cookies and milk         RESOLVED: Benign prostatic hyperplasia with nocturia     Had seen Dr. Abraham in past- is stable    If worsening will have him see Dr. Granados and [facility] team              No follow-ups on file.      Subjective:    Patient ID: Carlos Enrique Roth Jr. is a 88 y.o. male    Labs reviewed- has ongoing ckd   Also had high tsh 30- was increased to 150 mcg - to have repeat in  2 months- will check with [facility] on dosing 150 mcg daily  Constipation- will have him take senekot daily- cabrera end to [facility]   Discussed water intake    2. Chf- edema is stable- on torsemide          The following portions of the patient's history were reviewed and updated as appropriate: allergies, current medications and problem list.     Review of Systems   Gastrointestinal:  Positive for constipation.   Musculoskeletal:  Positive for gait problem.        Some  knee pain         Objective:      Current Outpatient Medications:     allopurinol (ZYLOPRIM) 300 mg tablet, TAKE ONE TABLET BY MOUTH ONCE DAILY, Disp: 90 tablet, Rfl: 3    ascorbic acid (VITAMIN C) 500 mg tablet, TAKE ONE TABLET BY MOUTH DAILY, Disp: 28 tablet, Rfl: 3    aspirin (ECOTRIN) 325 mg EC tablet, Take 1 tablet (325 mg total) by mouth daily, Disp: 30 tablet, Rfl: 0    Basaglar KwikPen 100 units/mL SOPN, inject 24 units  "subcutaneously daily, Disp: 15 mL, Rfl: 3    cholecalciferol (VITAMIN D3) 1,000 units tablet, Take 1 tablet (1,000 Units total) by mouth daily, Disp: 90 tablet, Rfl: 3    dorzolamide-timolol (COSOPT) 22.3-6.8 MG/ML ophthalmic solution, Administer 1 drop to both eyes 2 (two) times a day, Disp: , Rfl:     FeroSul 325 (65 Fe) MG tablet, TAKE ONE TABLET BY MOUTH DAILY, Disp: 28 tablet, Rfl: 3    gabapentin (NEURONTIN) 300 mg capsule, TAKE ONE CAPSULE BY MOUTH DAILY AT BEDTIME, Disp: 90 capsule, Rfl: 3    insulin aspart (NovoLOG FlexPen) 100 UNIT/ML injection pen, INJECT 11 UNITS SUBCUTANEOUSLY THREE TIMES DAILY, Disp: 15 mL, Rfl: 3    Insulin Pen Needle (Pen Needles) 32G X 4 MM MISC, Use 4 (four) times a day (before meals and at bedtime), Disp: 100 each, Rfl: 11    levothyroxine 150 mcg tablet, Take 1 tablet (150 mcg total) by mouth daily, Disp: 90 tablet, Rfl: 3    LORazepam (ATIVAN) 0.5 mg tablet, TAKE ONE TABLET BY MOUTH EVERY EIGHT HOURS AS NEEDED FOR ANXIETY, Disp: 30 tablet, Rfl: 3    pantoprazole (PROTONIX) 20 mg tablet, TAKE ONE TABLET BY MOUTH DAILY, Disp: 30 tablet, Rfl: 3    rosuvastatin (CRESTOR) 40 MG tablet, TAKE ONE TABLET BY MOUTH DAILY, Disp: 90 tablet, Rfl: 3    tamsulosin (FLOMAX) 0.4 mg, TAKE ONE CAPSULE BY MOUTH DAILY WITH dinner, Disp: 30 capsule, Rfl: 6    torsemide (DEMADEX) 10 mg tablet, TAKE ONE TABLET BY MOUTH EVERY DAY, Disp: 90 tablet, Rfl: 1    Blood pressure 118/60, pulse 60, height 5' 7\" (1.702 m), weight 99.3 kg (219 lb), SpO2 98%.     Physical Exam  Vitals and nursing note reviewed.   Constitutional:       General: He is not in acute distress.  HENT:      Head: Normocephalic.      Right Ear: Tympanic membrane normal. There is no impacted cerumen.      Left Ear: Tympanic membrane normal.      Nose: No congestion.   Eyes:      General:         Right eye: No discharge.         Left eye: No discharge.   Neck:      Comments: Bilateral shoddy nodes- has some dental cracking in bottom " remaining teeth  Cardiovascular:      Rate and Rhythm: Normal rate and regular rhythm.      Heart sounds: No murmur heard.  Pulmonary:      Breath sounds: No wheezing or rhonchi.   Abdominal:      General: There is no distension.      Palpations: Abdomen is soft.      Tenderness: There is no abdominal tenderness.   Musculoskeletal:         General: No tenderness or deformity.      Cervical back: No tenderness.   Lymphadenopathy:      Cervical: No cervical adenopathy.   Skin:     Findings: No erythema.   Neurological:      Mental Status: He is alert.      Motor: No weakness.      Comments: Mild left facial lab   Psychiatric:         Mood and Affect: Mood normal.         Thought Content: Thought content normal.         Judgment: Judgment normal.

## 2024-02-27 ENCOUNTER — TELEPHONE (OUTPATIENT)
Dept: ADMINISTRATIVE | Facility: OTHER | Age: 89
End: 2024-02-27

## 2024-02-27 ENCOUNTER — OFFICE VISIT (OUTPATIENT)
Dept: ENDOCRINOLOGY | Facility: HOSPITAL | Age: 89
End: 2024-02-27
Payer: COMMERCIAL

## 2024-02-27 VITALS
WEIGHT: 215.4 LBS | BODY MASS INDEX: 33.81 KG/M2 | HEIGHT: 67 IN | DIASTOLIC BLOOD PRESSURE: 64 MMHG | SYSTOLIC BLOOD PRESSURE: 114 MMHG | HEART RATE: 78 BPM

## 2024-02-27 DIAGNOSIS — E89.0 POSTOPERATIVE HYPOTHYROIDISM: ICD-10-CM

## 2024-02-27 DIAGNOSIS — Z79.4 TYPE 2 DIABETES MELLITUS WITH DIABETIC NEUROPATHY, WITH LONG-TERM CURRENT USE OF INSULIN (HCC): Primary | ICD-10-CM

## 2024-02-27 DIAGNOSIS — E11.40 TYPE 2 DIABETES MELLITUS WITH DIABETIC NEUROPATHY, WITH LONG-TERM CURRENT USE OF INSULIN (HCC): Primary | ICD-10-CM

## 2024-02-27 PROCEDURE — 99214 OFFICE O/P EST MOD 30 MIN: CPT | Performed by: PHYSICIAN ASSISTANT

## 2024-02-27 NOTE — TELEPHONE ENCOUNTER
----- Message from Krista Mc sent at 2/27/2024 10:33 AM EST -----  Regarding: DM EYE EXAM  02/27/24 10:33 AM    Hello, our patient Carlos Enrique Roth . has had a DM Eye Exam performed by Dr Marie in [place]. Her number is  [phone]  Thank you,  Krista Mc  [facility]

## 2024-02-27 NOTE — PROGRESS NOTES
Carlos Enrique Roth Jr. 88 y.o. male MRN: [id number]    Encounter: [id number]      Assessment/Plan     Assessment:  This is a 88 y.o.-year-old male with type 2 diabetes with hyperlipidemia and hypertension, postsurgical hypothyroidism.    Plan:  1. Type 2 diabetes: Most recent hemoglobin A1c was 8.3.  Unfortunately he did not bring in glucose logs into the office today and is typically only checking fasting glucose levels.  That being said what he reports seems like fasting blood sugars are doing well.  It would be hard to make further recommendations at this time.  I did ask him to check glucose levels when he feels little lightheaded to make sure he is not having any episodes of hypoglycemia.  He will continue with Basaglar 24 units daily and NovoLog 11 units with meals.  Contact the office with any concerns or questions.  Again check blood sugars at least daily, but more times if needed.  Follow-up in 3 months with lab work completed prior to visit.     2. Postsurgical Hypothyroidism: Recent TSH was elevated and free T4 was at the low end of normal.  Does appear that PCP may have increased dose recently.  Is already scheduled to have repeat thyroid lab work.  Based on the neck set of lab work may have to make further adjustments to thyroid medication.  Did inform him how to appropriately take his thyroid medication.     3. Hyperlipidemia:  Lipid panel doing well at this time.  Continue with rosuvastatin.  Will monitor over time.     4.  Hypertension: Normotensive in the office today.  Kidney function remains stable, and does have an appointment with nephrology next week.  Continue with Demadex.  Repeat CMP prior to next office visit.    CC: Type 2 diabetes follow-up    History of Present Illness     HPI:  Carlos Enrique Roth Jr. is a 88 year old male with type 2 diabetes for about 7 years.  States this was diagnosed when he was hospitalized and status post CABG.  He is on insulin at home and takes Basaglar 24 units qhs and  Novolog 11 units with each meal.  He denies any polyuria, polydipsia, nocturia and blurry vision.  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  He also has history of CAD status post CABG.  He denies history of peripheral neuropathy, retinopathy.  Overall doing well today without any concerns or questions.  No major complications over the last year, but did have a fall around June 2023 which did require the assistance of wound care.  There were no complications doing well at this time.  No recent falls.  Does state that he gets the occasional episodes of lightheadedness and dizziness, but unfortunately does not check glucose levels around this time.     Hypoglycemic episodes:  None recently.  Typically only checking fasting glucose levels.     The patient's last eye exam was recently per patient.  He denies history of retinopathy.  Last diabetic foot exam was completed April 2023.     Blood Sugar/Glucometer/Pump/CGM review: States fasting blood sugars are typically around the mid 100s.  This morning it was 153.     He has postsurgical hypothyroidism treated with levothyroxine 150 mcg daily.  This was recently adjusted by PCP as TSH was elevated with a low normal free T4.  Denies any increasing fatigue, heat or cold intolerance, palpitations, diarrhea, tremors, anxiety or depression.  Does have chronic constipation and was recently switched to Senokot, but does not believe this is working as well as MiraLAX.     For his hx of hld he is treated with and tolerating rosuvastatin 40 mg daily.  Has also been diagnosed with hypertension in the past and is currently only on torsemide 10 mg every other day.  Denies any headaches, vision changes, chest pain, MI or stroke-like symptoms.     Review of Systems   Constitutional:  Negative for activity change, appetite change, fatigue and unexpected weight change.   HENT:  Negative for trouble swallowing.    Eyes:  Negative for visual disturbance.    Respiratory:  Negative for chest tightness and shortness of breath.    Cardiovascular:  Negative for chest pain, palpitations and leg swelling.   Gastrointestinal:  Positive for constipation. Negative for abdominal pain, diarrhea, nausea and vomiting.   Endocrine: Negative for cold intolerance, heat intolerance, polydipsia, polyphagia and polyuria.   Genitourinary:  Negative for frequency.   Skin:  Negative for rash and wound.   Neurological:  Negative for dizziness, weakness, light-headedness, numbness and headaches.   Psychiatric/Behavioral:  Negative for dysphoric mood and sleep disturbance. The patient is not nervous/anxious.        Historical Information   Past Medical History:   Diagnosis Date   • Aortic stenosis    • CKD (chronic kidney disease)     baseline Cr 1.3-1.5   • Coronary artery disease    • Cough    • 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HCC)     type 2, insulin dependent   • GERD (gastroesophageal reflux disease)    • Glaucoma    • Gout    • History of prostate cancer    • Hypertension    • Hypothyroidism    • Overweight    • Peripheral neuropathy, idiopathic    • Pleural effusion, left    • Pure hypercholesterolemia     LA...11/12/14   R....11/12/14    • Visual impairment     cataract left eye   • Weight gain      Past Surgical History:   Procedure Laterality Date   • CARDIAC CATHETERIZATION     • EYE SURGERY     • IR THORACENTESIS  10/23/2018   • IR THORACENTESIS  11/2/2018   • IR THORACENTESIS  11/9/2018   • IR THORACENTESIS  11/23/2018   • MI CORONARY ARTERY BYP W/VEIN & ARTERY GRAFT 3 VEIN N/A 9/17/2018    Procedure: CORONARY ARTERY BYPASS GRAFT (CABG) x 4 VESSELS with LIMA - LAD, SVG/LEFT LEG EVH - LEFT PDA, OM3, & OM2;  Surgeon: Remy Ash MD;  Location: [facility];  Service: Cardiac Surgery   • MI ECHO TRANSESOPHAG MONTR CARDIAC PUMP FUNCTJ N/A 9/17/2018    Procedure: TRANSESOPHAGEAL ECHOCARDIOGRAM (VERONICA);  Surgeon: Remy Ash MD;  Location: [facility];  Service: Cardiac Surgery   • MI RPLCMT  AORTIC VALVE OPN W/STENTLESS TISSUE VALVE N/A 2018    Procedure: REPLACEMENT VALVE AORTIC (AVR)- 23mm tissue Intuity Valve;  Surgeon: Remy Ash MD;  Location: [facility];  Service: Cardiac Surgery   • THORACOSCOPY VIDEO ASSISTED SURGERY (VATS) Left 2018    Procedure: THORACOSCOPY VIDEO ASSISTED SURGERY (VATS), talc pleurodesis,;  Surgeon: Ciara Green MD;  Location: [facility];  Service: Thoracic   • THYROID SURGERY       Social History   Social History     Substance and Sexual Activity   Alcohol Use No     Social History     Substance and Sexual Activity   Drug Use No     Social History     Tobacco Use   Smoking Status Former   • Current packs/day: 0.00   • Average packs/day: 0.3 packs/day for 1 year (0.3 ttl pk-yrs)   • Types: Cigarettes   • Start date:    • Quit date:    • Years since quittin.1   Smokeless Tobacco Never   Tobacco Comments    Only in the service      Family History:   Family History   Problem Relation Age of Onset   • Diabetes Mother    • Pancreatic cancer Brother    • Diabetes Maternal Grandmother    • Colon cancer Son    • Diabetes Family    • Substance Abuse Neg Hx    • Mental illness Neg Hx        Meds/Allergies   Current Outpatient Medications   Medication Sig Dispense Refill   • allopurinol (ZYLOPRIM) 300 mg tablet TAKE ONE TABLET BY MOUTH ONCE DAILY 90 tablet 3   • ascorbic acid (VITAMIN C) 500 mg tablet TAKE ONE TABLET BY MOUTH DAILY 28 tablet 3   • aspirin (ECOTRIN) 325 mg EC tablet Take 1 tablet (325 mg total) by mouth daily 30 tablet 0   • Basaglar KwikPen 100 units/mL SOPN inject 24 units subcutaneously daily 15 mL 3   • cholecalciferol (VITAMIN D3) 1,000 units tablet Take 1 tablet (1,000 Units total) by mouth daily 90 tablet 3   • dorzolamide-timolol (COSOPT) 22.3-6.8 MG/ML ophthalmic solution Administer 1 drop to both eyes 2 (two) times a day     • FeroSul 325 (65 Fe) MG tablet TAKE ONE TABLET BY MOUTH DAILY 28 tablet 3   • gabapentin (NEURONTIN)  "300 mg capsule TAKE ONE CAPSULE BY MOUTH DAILY AT BEDTIME 90 capsule 3   • insulin aspart (NovoLOG FlexPen) 100 UNIT/ML injection pen INJECT 11 UNITS SUBCUTANEOUSLY THREE TIMES DAILY 15 mL 3   • Insulin Pen Needle (Pen Needles) 32G X 4 MM MISC Use 4 (four) times a day (before meals and at bedtime) 100 each 11   • levothyroxine 150 mcg tablet Take 1 tablet (150 mcg total) by mouth daily 90 tablet 3   • LORazepam (ATIVAN) 0.5 mg tablet TAKE ONE TABLET BY MOUTH EVERY EIGHT HOURS AS NEEDED FOR ANXIETY 30 tablet 3   • pantoprazole (PROTONIX) 20 mg tablet TAKE ONE TABLET BY MOUTH DAILY 30 tablet 3   • rosuvastatin (CRESTOR) 40 MG tablet TAKE ONE TABLET BY MOUTH DAILY 90 tablet 3   • senna (SENOKOT) 8.6 MG tablet Take 1 tablet (8.6 mg total) by mouth daily 90 tablet 3   • tamsulosin (FLOMAX) 0.4 mg TAKE ONE CAPSULE BY MOUTH DAILY WITH dinner 30 capsule 6   • torsemide (DEMADEX) 10 mg tablet TAKE ONE TABLET BY MOUTH EVERY DAY 90 tablet 1     No current facility-administered medications for this visit.     Allergies   Allergen Reactions   • Amlodipine Nausea Only   • Atorvastatin Myalgia       Objective   Vitals: Blood pressure 114/64, pulse 78, height 5' 7\" (1.702 m), weight 97.7 kg (215 lb 6.4 oz).    Physical Exam  Vitals and nursing note reviewed.   Constitutional:       General: He is not in acute distress.     Appearance: Normal appearance. He is not diaphoretic.   HENT:      Head: Normocephalic and atraumatic.   Eyes:      General: No scleral icterus.     Extraocular Movements: Extraocular movements intact.      Conjunctiva/sclera: Conjunctivae normal.      Pupils: Pupils are equal, round, and reactive to light.   Cardiovascular:      Rate and Rhythm: Normal rate and regular rhythm.      Heart sounds: No murmur heard.  Pulmonary:      Effort: Pulmonary effort is normal. No respiratory distress.      Breath sounds: Normal breath sounds. No wheezing.   Musculoskeletal:      Cervical back: Normal range of motion.      " Right lower leg: No edema.      Left lower leg: No edema.   Lymphadenopathy:      Cervical: No cervical adenopathy.   Skin:     General: Skin is warm and dry.   Neurological:      Mental Status: He is alert and oriented to person, place, and time. Mental status is at baseline.      Sensory: No sensory deficit.      Gait: Gait normal.   Psychiatric:         Mood and Affect: Mood normal.         Behavior: Behavior normal.         Thought Content: Thought content normal.         The history was obtained from the review of the chart, patient.    Lab Results:   Lab Results   Component Value Date/Time    Hemoglobin A1C 8.3 (H) 01/29/2024 11:18 AM    Hemoglobin A1C 7.9 (H) 11/13/2023 12:29 PM    Hemoglobin A1C 8.1 (A) 08/11/2023 11:05 AM    Hemoglobin A1C 8.9 (H) 04/15/2023 11:24 AM    White Blood Cell Count 6.6 11/13/2023 12:29 PM    Hemoglobin 11.8 (L) 11/13/2023 12:29 PM    HCT 36.0 (L) 11/13/2023 12:29 PM    .4 (H) 11/13/2023 12:29 PM    Platelet Count 154 11/13/2023 12:29 PM    BUN 33 (H) 01/29/2024 11:23 AM    BUN 35 (H) 01/29/2024 11:18 AM    BUN 43 (H) 11/13/2023 12:29 PM    Potassium 4.6 01/29/2024 11:23 AM    Potassium 4.8 01/29/2024 11:18 AM    Potassium 4.8 11/13/2023 12:29 PM    Chloride 108 01/29/2024 11:23 AM    Chloride 109 01/29/2024 11:18 AM    Chloride 107 11/13/2023 12:29 PM    CO2 30 01/29/2024 11:23 AM    CO2 28 01/29/2024 11:18 AM    CO2 26 11/13/2023 12:29 PM    Creatinine 2.68 (H) 01/29/2024 11:23 AM    Creatinine 2.64 (H) 01/29/2024 11:18 AM    Creatinine 2.49 (H) 11/13/2023 12:29 PM    AST 31 01/29/2024 11:18 AM    AST 46 (H) 11/13/2023 12:29 PM    AST 26 04/15/2023 11:24 AM    ALT 25 01/29/2024 11:18 AM    ALT 33 11/13/2023 12:29 PM    ALT 27 04/15/2023 11:24 AM    Protein, Total 6.1 01/29/2024 11:18 AM    Protein, Total 6.4 11/13/2023 12:29 PM    Protein, Total 6.2 04/15/2023 11:24 AM    Albumin 3.6 01/29/2024 11:23 AM    Albumin 3.6 01/29/2024 11:18 AM    Albumin 3.9 11/13/2023 12:29 PM  "   Globulin 2.5 01/29/2024 11:18 AM    Globulin 2.5 11/13/2023 12:29 PM    Globulin 2.5 04/15/2023 11:24 AM    HDL 30 (L) 11/13/2023 12:29 PM    Triglycerides 145 11/13/2023 12:29 PM         Portions of the record may have been created with voice recognition software. Occasional wrong word or \"sound a like\" substitutions may have occurred due to the inherent limitations of voice recognition software. Read the chart carefully and recognize, using context, where substitutions have occurred.    "

## 2024-02-27 NOTE — LETTER
Diabetic Eye Exam Form    Date Requested: 24  Patient: Carlos Enrique Rtoh Jr.  Patient : [id number]   Referring Provider: Juliette Cid DO      DIABETIC Eye Exam Date _______________________________      Type of Exam MUST be documented for Diabetic Eye Exams. Please CHECK ONE.     Retinal Exam       Dilated Retinal Exam       OCT       Optomap-Iris Exam      Fundus Photography       Left Eye - Please check Retinopathy or No Retinopathy        Exam did show retinopathy    Exam did not show retinopathy       Right Eye - Please check Retinopathy or No Retinopathy       Exam did show retinopathy    Exam did not show retinopathy       Comments __________________________________________________________    Practice Providing Exam ______________________________________________    Exam Performed By (print name) _______________________________________      Provider Signature ___________________________________________________      These reports are needed for  compliance.  Please fax this completed form and a copy of the Diabetic Eye Exam report to our office located at [address] [place] [address] as soon as possible via Fax [phone] attention Courtney: Phone [phone]  We thank you for your assistance in treating our mutual patient.

## 2024-02-27 NOTE — PATIENT INSTRUCTIONS
Monitor diet to help with blood sugar levels.     Continue Basaglar 24 units daily and NovoLog 11 units with each meal.     Continue levothyroxine 150 mcg.     Recommend checking blood sugar at least daily, and alternating times to get a better idea of how blood sugars look throughout the day.     Contact the office with any concerns or questions.     Follow-up in 3 months with lab work completed prior to visit.

## 2024-02-28 NOTE — TELEPHONE ENCOUNTER
Upon review of the In Basket request we were able to locate, review, and update the patient chart as requested for Diabetic Eye Exam.    Any additional questions or concerns should be emailed to the Practice Liaisons via the appropriate education email address, please do not reply via In Basket.    Thank you  Courtney Deluca

## 2024-02-28 NOTE — TELEPHONE ENCOUNTER
Upon review of the In Basket request and the patient's chart, initial outreach has been made via fax to facility. Please see Contacts section for details.     Thank you  Courtney Deluca

## 2024-03-11 ENCOUNTER — HOSPITAL ENCOUNTER (OUTPATIENT)
Dept: NON INVASIVE DIAGNOSTICS | Age: 89
Discharge: HOME | End: 2024-03-11
Payer: COMMERCIAL

## 2024-03-11 VITALS
HEIGHT: 67 IN | SYSTOLIC BLOOD PRESSURE: 128 MMHG | BODY MASS INDEX: 33.74 KG/M2 | HEART RATE: 58 BPM | DIASTOLIC BLOOD PRESSURE: 64 MMHG | WEIGHT: 215 LBS

## 2024-03-11 DIAGNOSIS — I11.0 HYPERTENSIVE HEART DISEASE WITH HF (HEART FAILURE) (HCC): ICD-10-CM

## 2024-03-11 DIAGNOSIS — Z95.2 S/P AVR (AORTIC VALVE REPLACEMENT): ICD-10-CM

## 2024-03-11 LAB
AORTIC ROOT: 3.7 CM
AORTIC VALVE MEAN VELOCITY: 13.6 M/S
APICAL FOUR CHAMBER EJECTION FRACTION: 73 %
ASCENDING AORTA: 4 CM
AV AREA BY CONTINUOUS VTI: 2 CM2
AV AREA PEAK VELOCITY: 2 CM2
AV LVOT MEAN GRADIENT: 3 MMHG
AV LVOT PEAK GRADIENT: 6 MMHG
AV MEAN GRADIENT: 8 MMHG
AV PEAK GRADIENT: 16 MMHG
AV VALVE AREA: 1.89 CM2
AV VELOCITY RATIO: 0.57
BSA FOR ECHO PROCEDURE: 2.09 M2
DOP CALC AO PEAK VEL: 2.2 M/S
DOP CALC AO VTI: 51 CM
DOP CALC LVOT AREA: 3.14 CM2
DOP CALC LVOT CARDIAC INDEX: 2.42 L/MIN/M2
DOP CALC LVOT CARDIAC OUTPUT: 5.05 L/MIN
DOP CALC LVOT DIAMETER: 2 CM
DOP CALC LVOT PEAK VEL VTI: 30.66 CM
DOP CALC LVOT PEAK VEL: 1.25 M/S
DOP CALC LVOT STROKE INDEX: 45.9 ML/M2
DOP CALC LVOT STROKE VOLUME: 96.27 CM3
E WAVE DECELERATION TIME: 298 MS
E/A RATIO: 1.33
FRACTIONAL SHORTENING: 37 (ref 28–44)
INTERVENTRICULAR SEPTUM IN DIASTOLE (PARASTERNAL SHORT AXIS VIEW): 1.4 CM
INTERVENTRICULAR SEPTUM: 1.4 CM (ref 0.6–1.1)
LAAS-AP2: 25.7 CM2
LAAS-AP4: 30.6 CM2
LEFT ATRIUM AREA SYSTOLE SINGLE PLANE A4C: 25.8 CM2
LEFT ATRIUM SIZE: 4.9 CM
LEFT ATRIUM VOLUME (MOD BIPLANE): 93 ML
LEFT ATRIUM VOLUME INDEX (MOD BIPLANE): 44.5 ML/M2
LEFT INTERNAL DIMENSION IN SYSTOLE: 2.7 CM (ref 2.1–4)
LEFT VENTRICULAR INTERNAL DIMENSION IN DIASTOLE: 4.3 CM (ref 3.5–6)
LEFT VENTRICULAR POSTERIOR WALL IN END DIASTOLE: 1.3 CM
LEFT VENTRICULAR STROKE VOLUME: 55 ML
LVSV (TEICH): 55 ML
MV E'TISSUE VEL-SEP: 4 CM/S
MV PEAK A VEL: 0.88 M/S
MV PEAK E VEL: 117 CM/S
MV STENOSIS PRESSURE HALF TIME: 86 MS
MV VALVE AREA P 1/2 METHOD: 2.56
RA PRESSURE ESTIMATED: 3 MMHG
RIGHT ATRIUM AREA SYSTOLE A4C: 21.4 CM2
RIGHT VENTRICLE ID DIMENSION: 3.6 CM
RV PSP: 33 MMHG
SL CV LEFT ATRIUM LENGTH A2C: 6.6 CM
SL CV LV EF: 65
SL CV PED ECHO LEFT VENTRICLE DIASTOLIC VOLUME (MOD BIPLANE) 2D: 83 ML
SL CV PED ECHO LEFT VENTRICLE SYSTOLIC VOLUME (MOD BIPLANE) 2D: 27 ML
TR MAX PG: 30 MMHG
TR PEAK VELOCITY: 2.7 M/S
TRICUSPID ANNULAR PLANE SYSTOLIC EXCURSION: 1.6 CM
TRICUSPID VALVE PEAK REGURGITATION VELOCITY: 2.73 M/S

## 2024-03-11 PROCEDURE — 93306 TTE W/DOPPLER COMPLETE: CPT | Performed by: INTERNAL MEDICINE

## 2024-03-11 PROCEDURE — 93306 TTE W/DOPPLER COMPLETE: CPT

## 2024-03-20 DIAGNOSIS — Z00.00 HEALTHCARE MAINTENANCE: ICD-10-CM

## 2024-03-20 DIAGNOSIS — F41.9 ANXIETY: ICD-10-CM

## 2024-03-20 RX ORDER — LORAZEPAM 0.5 MG/1
TABLET ORAL
Qty: 30 TABLET | Refills: 3 | Status: SHIPPED | OUTPATIENT
Start: 2024-03-20

## 2024-03-20 RX ORDER — ASCORBIC ACID 500 MG
TABLET ORAL
Qty: 28 TABLET | Refills: 3 | Status: SHIPPED | OUTPATIENT
Start: 2024-03-20

## 2024-03-25 DIAGNOSIS — R35.1 BENIGN PROSTATIC HYPERPLASIA WITH NOCTURIA: ICD-10-CM

## 2024-03-25 DIAGNOSIS — Z95.1 S/P CABG X 4: ICD-10-CM

## 2024-03-25 DIAGNOSIS — N40.1 BENIGN PROSTATIC HYPERPLASIA WITH NOCTURIA: ICD-10-CM

## 2024-03-25 RX ORDER — PANTOPRAZOLE SODIUM 20 MG/1
TABLET, DELAYED RELEASE ORAL
Qty: 30 TABLET | Refills: 3 | Status: SHIPPED | OUTPATIENT
Start: 2024-03-25

## 2024-03-25 RX ORDER — TAMSULOSIN HYDROCHLORIDE 0.4 MG/1
CAPSULE ORAL
Qty: 30 CAPSULE | Refills: 6 | Status: SHIPPED | OUTPATIENT
Start: 2024-03-25

## 2024-04-25 DIAGNOSIS — R60.9 EDEMA, UNSPECIFIED TYPE: ICD-10-CM

## 2024-04-26 RX ORDER — TORSEMIDE 10 MG/1
TABLET ORAL
Qty: 90 TABLET | Refills: 1 | Status: SHIPPED | OUTPATIENT
Start: 2024-04-26

## 2024-05-07 ENCOUNTER — TELEPHONE (OUTPATIENT)
Dept: NEPHROLOGY | Facility: CLINIC | Age: 89
End: 2024-05-07

## 2024-05-07 NOTE — TELEPHONE ENCOUNTER
LVM to return call to schedule follow up with Dr. Bass. If pt calls back please transfer to office staff.

## 2024-05-09 ENCOUNTER — RA CDI HCC (OUTPATIENT)
Dept: OTHER | Facility: HOSPITAL | Age: 89
End: 2024-05-09

## 2024-05-09 NOTE — PROGRESS NOTES
E11/65, e11.22, I13.0    HCC coding opportunities          Chart Reviewed number of suggestions sent to Provider: 3     Patients Insurance     Medicare Insurance: [employer]age

## 2024-05-15 LAB
ALBUMIN SERPL-MCNC: 3.6 G/DL (ref 3.6–5.1)
ALBUMIN/GLOB SERPL: 1.5 (CALC) (ref 1–2.5)
ALP SERPL-CCNC: 60 U/L (ref 35–144)
ALT SERPL-CCNC: 17 U/L (ref 9–46)
AST SERPL-CCNC: 20 U/L (ref 10–35)
BASOPHILS # BLD AUTO: 50 CELLS/UL (ref 0–200)
BASOPHILS NFR BLD AUTO: 0.8 %
BILIRUB SERPL-MCNC: 1 MG/DL (ref 0.2–1.2)
BUN SERPL-MCNC: 31 MG/DL (ref 7–25)
BUN/CREAT SERPL: 14 (CALC) (ref 6–22)
CALCIUM SERPL-MCNC: 9.2 MG/DL (ref 8.6–10.3)
CHLORIDE SERPL-SCNC: 106 MMOL/L (ref 98–110)
CO2 SERPL-SCNC: 26 MMOL/L (ref 20–32)
CREAT SERPL-MCNC: 2.23 MG/DL (ref 0.7–1.22)
EOSINOPHIL # BLD AUTO: 434 CELLS/UL (ref 15–500)
EOSINOPHIL NFR BLD AUTO: 7 %
ERYTHROCYTE [DISTWIDTH] IN BLOOD BY AUTOMATED COUNT: 12.6 % (ref 11–15)
GFR/BSA.PRED SERPLBLD CYS-BASED-ARV: 27 ML/MIN/1.73M2
GLOBULIN SER CALC-MCNC: 2.4 G/DL (CALC) (ref 1.9–3.7)
GLUCOSE SERPL-MCNC: 158 MG/DL (ref 65–99)
HBA1C MFR BLD: 8.7 % OF TOTAL HGB
HCT VFR BLD AUTO: 36 % (ref 38.5–50)
HGB BLD-MCNC: 11.7 G/DL (ref 13.2–17.1)
LYMPHOCYTES # BLD AUTO: 1947 CELLS/UL (ref 850–3900)
LYMPHOCYTES NFR BLD AUTO: 31.4 %
MCH RBC QN AUTO: 32.8 PG (ref 27–33)
MCHC RBC AUTO-ENTMCNC: 32.5 G/DL (ref 32–36)
MCV RBC AUTO: 100.8 FL (ref 80–100)
MONOCYTES # BLD AUTO: 515 CELLS/UL (ref 200–950)
MONOCYTES NFR BLD AUTO: 8.3 %
NEUTROPHILS # BLD AUTO: 3255 CELLS/UL (ref 1500–7800)
NEUTROPHILS NFR BLD AUTO: 52.5 %
PLATELET # BLD AUTO: 159 THOUSAND/UL (ref 140–400)
PMV BLD REES-ECKER: 11 FL (ref 7.5–12.5)
POTASSIUM SERPL-SCNC: 3.8 MMOL/L (ref 3.5–5.3)
PROT SERPL-MCNC: 6 G/DL (ref 6.1–8.1)
RBC # BLD AUTO: 3.57 MILLION/UL (ref 4.2–5.8)
SODIUM SERPL-SCNC: 139 MMOL/L (ref 135–146)
T4 FREE SERPL-MCNC: 1.4 NG/DL (ref 0.8–1.8)
T4 FREE SERPL-MCNC: 1.4 NG/DL (ref 0.8–1.8)
TSH SERPL-ACNC: 0.08 MIU/L (ref 0.4–4.5)
TSH SERPL-ACNC: 0.08 MIU/L (ref 0.4–4.5)
URATE SERPL-MCNC: 2.9 MG/DL (ref 4–8)
WBC # BLD AUTO: 6.2 THOUSAND/UL (ref 3.8–10.8)

## 2024-05-16 ENCOUNTER — OFFICE VISIT (OUTPATIENT)
Dept: FAMILY MEDICINE CLINIC | Facility: HOSPITAL | Age: 89
End: 2024-05-16
Payer: COMMERCIAL

## 2024-05-16 VITALS
BODY MASS INDEX: 32.49 KG/M2 | OXYGEN SATURATION: 97 % | HEIGHT: 67 IN | DIASTOLIC BLOOD PRESSURE: 72 MMHG | HEART RATE: 75 BPM | WEIGHT: 207 LBS | SYSTOLIC BLOOD PRESSURE: 120 MMHG

## 2024-05-16 DIAGNOSIS — M65.842 STENOSING TENOSYNOVITIS OF FINGER OF LEFT HAND: Primary | ICD-10-CM

## 2024-05-16 DIAGNOSIS — E78.00 PURE HYPERCHOLESTEROLEMIA: ICD-10-CM

## 2024-05-16 DIAGNOSIS — Z95.2 PRESENCE OF PROSTHETIC HEART VALVE: ICD-10-CM

## 2024-05-16 DIAGNOSIS — I50.32 CHRONIC DIASTOLIC CONGESTIVE HEART FAILURE (HCC): ICD-10-CM

## 2024-05-16 DIAGNOSIS — N18.4 CKD STAGE 4 DUE TO TYPE 2 DIABETES MELLITUS (HCC): ICD-10-CM

## 2024-05-16 DIAGNOSIS — E66.01 OBESITY, MORBID (HCC): ICD-10-CM

## 2024-05-16 DIAGNOSIS — L03.116 CELLULITIS OF LEFT LOWER EXTREMITY: ICD-10-CM

## 2024-05-16 DIAGNOSIS — E11.22 CKD STAGE 4 DUE TO TYPE 2 DIABETES MELLITUS (HCC): ICD-10-CM

## 2024-05-16 PROCEDURE — G2211 COMPLEX E/M VISIT ADD ON: HCPCS | Performed by: INTERNAL MEDICINE

## 2024-05-16 PROCEDURE — 99214 OFFICE O/P EST MOD 30 MIN: CPT | Performed by: INTERNAL MEDICINE

## 2024-05-16 RX ORDER — INSULIN GLARGINE 100 [IU]/ML
INJECTION, SOLUTION SUBCUTANEOUS
Qty: 15 ML | Refills: 3 | Status: SHIPPED | OUTPATIENT
Start: 2024-05-16

## 2024-05-16 NOTE — ASSESSMENT & PLAN NOTE
Lab Results   Component Value Date    HGBA1C 8.7 (H) 05/15/2024   Discussed limiting  sweets   Increase the  basaglar  to 27 units form 24 units   To see endocrinology

## 2024-05-16 NOTE — PROGRESS NOTES
Assessment/Plan:     Diagnosis ICD-10-CM Associated Orders   1. 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 Cellulitis of left lower extremity  L03.116       3. 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 Chronic diastolic congestive heart failure (HCC)  I50.32 Hemoglobin A1C     Lipid Panel with Direct LDL reflex     CBC and differential     Comprehensive metabolic panel     Lipid Panel with Direct LDL reflex     CBC and differential     Comprehensive metabolic panel      5. Presence of prosthetic heart valve  Z95.2       6. Obesity, morbid (HCC)  E66.01       7. 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)       Lab Results   Component Value Date    HGBA1C 8.7 (H) 05/15/2024   Discussed limiting  sweets   Increase the  basaglar  to 27 units form 24 units   To see endocrinology         Relevant Medications    Basaglar KwikPen 100 units/mL SOPN    Other Relevant Orders    Hemoglobin A1C    Lipid Panel with Direct LDL reflex    CBC and differential    Comprehensive metabolic panel       Other    Obesity, morbid (HCC)    Presence of prosthetic heart valve     Has bioprothesis aortic valve- had recent echo with Dr Bush- stable         Pure hypercholesterolemia     Other Visit Diagnoses       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      Subjective:    Patient ID: Carlos Enrique Roth Jr. is a 89 y.o. male    Reviewed recent labs- has high hba1c and will increase  long acting basaglar  Eats oatmeal  and fruit  in am- will check if he is using the low sugar type- eats donuts at times- discussed healthy approach   Sees podiatry- Dr. Clark   Had a near fall and caught  with his  left hand on ground- now has locking of left 4th digit- had some swelling- using ty ware  The patient has a history of falls. I did complete a risk assessment for falls. A plan of care for falls was documented. Offered outpatient PT to evalaution- has cane he uses prn- ongoing knee pain        The following portions of the patient's history were reviewed and updated as appropriate: allergies, current medications and problem list.     Review of Systems   Constitutional:  Negative for chills and fever.   Respiratory:  Negative for shortness of breath.         Mows  yard with  riding mower     Cardiovascular:  Negative for chest pain and palpitations.   Gastrointestinal:  Positive for constipation. Negative for abdominal pain.        Using metamucil and prn miralax         Objective:      Current Outpatient Medications:   •  allopurinol (ZYLOPRIM) 300 mg tablet, TAKE ONE TABLET BY MOUTH ONCE DAILY, Disp: 90 tablet, Rfl: 3  •  ascorbic acid (VITAMIN C) 500 mg tablet, TAKE ONE TABLET BY MOUTH DAILY, Disp: 28 tablet, Rfl: 3  •  aspirin (ECOTRIN) 325 mg EC tablet, Take 1 tablet (325 mg total) by mouth daily, Disp: 30 tablet, Rfl: 0  •  Basaglar KwikPen 100 units/mL SOPN, inject 27 units subcutaneously daily, Disp: 15 mL, Rfl: 3  •  cholecalciferol (VITAMIN D3) 1,000 units tablet, Take 1 tablet (1,000 Units total) by mouth daily, Disp: 90 tablet, Rfl: 3  •  dorzolamide-timolol  "(COSOPT) 22.3-6.8 MG/ML ophthalmic solution, Administer 1 drop to both eyes 2 (two) times a day, Disp: , Rfl:   •  FeroSul 325 (65 Fe) MG tablet, TAKE ONE TABLET BY MOUTH DAILY, Disp: 28 tablet, Rfl: 3  •  gabapentin (NEURONTIN) 300 mg capsule, TAKE ONE CAPSULE BY MOUTH DAILY AT BEDTIME, Disp: 90 capsule, Rfl: 3  •  insulin aspart (NovoLOG FlexPen) 100 UNIT/ML injection pen, INJECT 11 UNITS SUBCUTANEOUSLY THREE TIMES DAILY, Disp: 15 mL, Rfl: 3  •  Insulin Pen Needle (Pen Needles) 32G X 4 MM MISC, Use 4 (four) times a day (before meals and at bedtime), Disp: 100 each, Rfl: 11  •  levothyroxine 150 mcg tablet, Take 1 tablet (150 mcg total) by mouth daily, Disp: 90 tablet, Rfl: 3  •  LORazepam (ATIVAN) 0.5 mg tablet, TAKE ONE TABLET BY MOUTH EVERY EIGHT HOURS AS NEEDED FOR ANXIETY, Disp: 30 tablet, Rfl: 3  •  pantoprazole (PROTONIX) 20 mg tablet, TAKE ONE TABLET BY MOUTH DAILY, Disp: 30 tablet, Rfl: 3  •  rosuvastatin (CRESTOR) 40 MG tablet, TAKE ONE TABLET BY MOUTH DAILY, Disp: 90 tablet, Rfl: 3  •  senna (SENOKOT) 8.6 MG tablet, Take 1 tablet (8.6 mg total) by mouth daily, Disp: 90 tablet, Rfl: 3  •  tamsulosin (FLOMAX) 0.4 mg, TAKE ONE CAPSULE BY MOUTH DAILY WITH dinner, Disp: 30 capsule, Rfl: 6  •  torsemide (DEMADEX) 10 mg tablet, TAKE ONE TABLET BY MOUTH EVERY DAY, Disp: 90 tablet, Rfl: 1    Blood pressure 120/72, pulse 75, height 5' 7\" (1.702 m), weight 93.9 kg (207 lb), SpO2 97%.     Physical Exam  Vitals and nursing note reviewed.   Constitutional:       General: He is not in acute distress.  HENT:      Head: Normocephalic.      Right Ear: Tympanic membrane normal. There is no impacted cerumen.      Left Ear: Tympanic membrane normal.      Nose: No congestion.      Mouth/Throat:      Pharynx: No posterior oropharyngeal erythema.   Eyes:      General:         Right eye: No discharge.         Left eye: No discharge.   Neck:      Comments:  nontender  Cardiovascular:      Rate and Rhythm: Normal rate and regular " rhythm.      Heart sounds: No murmur heard.  Pulmonary:      Breath sounds: No wheezing or rhonchi.   Abdominal:      General: There is no distension.      Palpations: Abdomen is soft.      Tenderness: There is no abdominal tenderness.   Musculoskeletal:         General: Tenderness present. No deformity.      Cervical back: No tenderness.      Comments: Left 4th digit with locking - some tenderness over palmar aspect   Lymphadenopathy:      Cervical: No cervical adenopathy.   Skin:     Findings: No erythema.   Neurological:      Mental Status: He is alert and oriented to person, place, and time.      Motor: No weakness.      Comments: Mild left facial weakness with lag   Psychiatric:         Mood and Affect: Mood normal.         Thought Content: Thought content normal.         Judgment: Judgment normal.

## 2024-05-29 ENCOUNTER — TELEPHONE (OUTPATIENT)
Dept: ADMINISTRATIVE | Facility: OTHER | Age: 89
End: 2024-05-29

## 2024-05-29 ENCOUNTER — OFFICE VISIT (OUTPATIENT)
Dept: ENDOCRINOLOGY | Facility: HOSPITAL | Age: 89
End: 2024-05-29
Payer: COMMERCIAL

## 2024-05-29 VITALS
HEART RATE: 51 BPM | BODY MASS INDEX: 31.55 KG/M2 | SYSTOLIC BLOOD PRESSURE: 98 MMHG | DIASTOLIC BLOOD PRESSURE: 50 MMHG | OXYGEN SATURATION: 97 % | WEIGHT: 201 LBS | HEIGHT: 67 IN

## 2024-05-29 DIAGNOSIS — Z79.4 TYPE 2 DIABETES MELLITUS WITH DIABETIC NEUROPATHY, WITH LONG-TERM CURRENT USE OF INSULIN (HCC): Primary | ICD-10-CM

## 2024-05-29 DIAGNOSIS — E89.0 POSTOPERATIVE HYPOTHYROIDISM: ICD-10-CM

## 2024-05-29 DIAGNOSIS — E11.40 TYPE 2 DIABETES MELLITUS WITH DIABETIC NEUROPATHY, WITH LONG-TERM CURRENT USE OF INSULIN (HCC): Primary | ICD-10-CM

## 2024-05-29 DIAGNOSIS — E03.9 HYPOTHYROIDISM, UNSPECIFIED TYPE: ICD-10-CM

## 2024-05-29 PROCEDURE — 99214 OFFICE O/P EST MOD 30 MIN: CPT | Performed by: PHYSICIAN ASSISTANT

## 2024-05-29 RX ORDER — LEVOTHYROXINE SODIUM 0.15 MG/1
TABLET ORAL
Qty: 90 TABLET | Refills: 3 | Status: SHIPPED | OUTPATIENT
Start: 2024-05-29

## 2024-05-29 RX ORDER — INSULIN GLARGINE 100 [IU]/ML
27 INJECTION, SOLUTION SUBCUTANEOUS DAILY
Qty: 15 ML | Refills: 3 | Status: SHIPPED | OUTPATIENT
Start: 2024-05-29

## 2024-05-29 NOTE — PROGRESS NOTES
Carlos Enrique Roth Jr. 89 y.o. male MRN: [id number]    Encounter: [id number]      Assessment & Plan     Assessment:  This is a 89 y.o.-year-old male with type 2 diabetes with hyperlipidemia and hypertension, postsurgical hypothyroidism.    Plan:  1. Type 2 diabetes: Most recent hemoglobin A1c was 8.7.  Blood sugar logs present at today's office visit.  PCP already made adjustments to his Basaglar and he is taking 27 units daily with NovoLog 11 units with each meals.  He does have concerns with the Basaglar pens, and said he had better response with the Lantus pen.  I am fine with switching his insulin at this time to Lantus 27 units daily. Contact the office with any concerns or questions.  Again check blood sugars at least daily, but more times if needed.  Follow-up in 3 months with lab work completed prior to visit.     2. Postsurgical Hypothyroidism: Recent thyroid lab work came back with an elevated TSH and a normal free T4.  At this time would like to decrease his levothyroxine 150 mcg to 1 tablet 6 days a week and half tablet on Sunday.  Will recheck thyroid lab work prior to next office visit.     3. Hyperlipidemia:  Lipid panel doing well at this time.  Continue with rosuvastatin.  Will monitor over time.     4.  Hypertension: Normotensive in the office today.  Kidney function remains stable, and does have an appointment with nephrology next week.  Continue with Demadex.  Repeat CMP prior to next office visit.    CC: Type 2 diabetes and hypothyroidism follow-up    History of Present Illness     HPI:  Carlos Enrique Roth Jr. is a 89 year old male with type 2 diabetes for about 8 years.  States this was diagnosed when he was hospitalized and status post CABG.  He is on insulin at home and takes Basaglar 27 units qhs and Novolog 11 units with each meal.  Basaglar was recently increased by PCP as his A1c was running high.  He denies any polyuria, polydipsia, nocturia and blurry vision.  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  He also has history of CAD status post CABG.  He denies history of peripheral neuropathy, retinopathy.  Overall doing well today without any concerns or questions.  No recent falls.  Does state that he gets the occasional episodes of lightheadedness and dizziness, but unfortunately does not check glucose levels around this time.     Hypoglycemic episodes:  None recently.  Typically only checking fasting glucose levels.     The patient's last eye exam was completed November 2023.  He denies history of retinopathy.  Last diabetic foot exam was completed April 2023.     Blood Sugar/Glucometer/Pump/CGM review: States fasting blood sugars are typically around the mid 100s.  This morning it was 153.     He has postsurgical hypothyroidism treated with levothyroxine 150 mcg daily.  Denies any increasing fatigue, heat or cold intolerance, palpitations, diarrhea, tremors, anxiety or depression.  Does have chronic constipation and was recently switched to Senokot, but does not believe this is working as well as MiraLAX.     For his hx of hyperlipidemia and he is treated with rosuvastatin 40 mg daily.  Has also been diagnosed with hypertension in the past and is currently only on torsemide 10 mg every other day.  Denies any headaches, vision changes, chest pain, MI or stroke-like symptoms.     Review of Systems   Constitutional:  Negative for activity change, appetite change, fatigue and unexpected weight change.   HENT:  Negative for trouble swallowing.    Eyes:  Negative for visual disturbance.   Respiratory:  Negative for chest tightness and shortness of breath.    Cardiovascular:  Positive for leg swelling. Negative for chest pain and palpitations.   Gastrointestinal:  Positive for constipation. Negative for abdominal pain, diarrhea, nausea and vomiting.   Endocrine: Negative for cold intolerance, heat intolerance, polydipsia, polyphagia and polyuria.   Genitourinary:  Negative for  frequency.   Skin:  Negative for rash and wound.   Neurological:  Negative for dizziness, weakness, light-headedness, numbness and headaches.   Psychiatric/Behavioral:  Negative for dysphoric mood and sleep disturbance. The patient is not nervous/anxious.        Historical Information   Past Medical History:   Diagnosis Date   • Aortic stenosis    • CKD (chronic kidney disease)     baseline Cr 1.3-1.5   • Coronary artery disease    • Cough    • 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HCC)     type 2, insulin dependent   • GERD (gastroesophageal reflux disease)    • Glaucoma    • Gout    • History of prostate cancer    • Hypertension    • Hypothyroidism    • Overweight    • Peripheral neuropathy, idiopathic    • Pleural effusion, left    • Pure hypercholesterolemia     LA...11/12/14   R....11/12/14    • Visual impairment     cataract left eye   • Weight gain      Past Surgical History:   Procedure Laterality Date   • CARDIAC CATHETERIZATION     • EYE SURGERY     • IR THORACENTESIS  10/23/2018   • IR THORACENTESIS  11/2/2018   • IR THORACENTESIS  11/9/2018   • IR THORACENTESIS  11/23/2018   • CT CORONARY ARTERY BYP W/VEIN & ARTERY GRAFT 3 VEIN N/A 9/17/2018    Procedure: CORONARY ARTERY BYPASS GRAFT (CABG) x 4 VESSELS with LIMA - LAD, SVG/LEFT LEG EVH - LEFT PDA, OM3, & OM2;  Surgeon: Remy Ash MD;  Location: [facility];  Service: Cardiac Surgery   • CT ECHO TRANSESOPHAG MONTR CARDIAC PUMP FUNCTJ N/A 9/17/2018    Procedure: TRANSESOPHAGEAL ECHOCARDIOGRAM (VERONICA);  Surgeon: Remy Ash MD;  Location: [facility];  Service: Cardiac Surgery   • CT RPLCMT AORTIC VALVE OPN W/STENTLESS TISSUE VALVE N/A 9/17/2018    Procedure: REPLACEMENT VALVE AORTIC (AVR)- 23mm tissue Intuity Valve;  Surgeon: Remy Ash MD;  Location: [facility];  Service: Cardiac Surgery   • THORACOSCOPY VIDEO ASSISTED SURGERY (VATS) Left 11/27/2018    Procedure: THORACOSCOPY VIDEO ASSISTED SURGERY (VATS), talc pleurodesis,;  Surgeon: Ciara Green MD;   Location: [facility];  Service: Thoracic   • THYROID SURGERY       Social History   Social History     Substance and Sexual Activity   Alcohol Use No     Social History     Substance and Sexual Activity   Drug Use No     Social History     Tobacco Use   Smoking Status Former   • Current packs/day: 0.00   • Average packs/day: 0.3 packs/day for 1 year (0.3 ttl pk-yrs)   • Types: Cigarettes   • Start date:    • Quit date:    • Years since quittin.4   Smokeless Tobacco Never   Tobacco Comments    Only in the service      Family History:   Family History   Problem Relation Age of Onset   • Diabetes Mother    • Pancreatic cancer Brother    • Diabetes Maternal Grandmother    • Colon cancer Son    • Diabetes Family    • Substance Abuse Neg Hx    • Mental illness Neg Hx        Meds/Allergies   Current Outpatient Medications   Medication Sig Dispense Refill   • allopurinol (ZYLOPRIM) 300 mg tablet TAKE ONE TABLET BY MOUTH ONCE DAILY 90 tablet 3   • ascorbic acid (VITAMIN C) 500 mg tablet TAKE ONE TABLET BY MOUTH DAILY 28 tablet 3   • aspirin (ECOTRIN) 325 mg EC tablet Take 1 tablet (325 mg total) by mouth daily 30 tablet 0   • cholecalciferol (VITAMIN D3) 1,000 units tablet Take 1 tablet (1,000 Units total) by mouth daily 90 tablet 3   • dorzolamide-timolol (COSOPT) 22.3-6.8 MG/ML ophthalmic solution Administer 1 drop to both eyes 2 (two) times a day     • FeroSul 325 (65 Fe) MG tablet TAKE ONE TABLET BY MOUTH DAILY 28 tablet 3   • gabapentin (NEURONTIN) 300 mg capsule TAKE ONE CAPSULE BY MOUTH DAILY AT BEDTIME 90 capsule 3   • insulin aspart (NovoLOG FlexPen) 100 UNIT/ML injection pen INJECT 11 UNITS SUBCUTANEOUSLY THREE TIMES DAILY 15 mL 3   • Insulin Pen Needle (Pen Needles) 32G X 4 MM MISC Use 4 (four) times a day (before meals and at bedtime) 100 each 11   • Lantus SoloStar 100 units/mL SOPN Inject 0.27 mL (27 Units total) under the skin in the morning 15 mL 3   • levothyroxine 150 mcg tablet Take 1 tab 6  "days a week and half a tab on Sunday. 90 tablet 3   • LORazepam (ATIVAN) 0.5 mg tablet TAKE ONE TABLET BY MOUTH EVERY EIGHT HOURS AS NEEDED FOR ANXIETY 30 tablet 3   • pantoprazole (PROTONIX) 20 mg tablet TAKE ONE TABLET BY MOUTH DAILY 30 tablet 3   • rosuvastatin (CRESTOR) 40 MG tablet TAKE ONE TABLET BY MOUTH DAILY 90 tablet 3   • senna (SENOKOT) 8.6 MG tablet Take 1 tablet (8.6 mg total) by mouth daily 90 tablet 3   • tamsulosin (FLOMAX) 0.4 mg TAKE ONE CAPSULE BY MOUTH DAILY WITH dinner 30 capsule 6   • torsemide (DEMADEX) 10 mg tablet TAKE ONE TABLET BY MOUTH EVERY DAY 90 tablet 1     No current facility-administered medications for this visit.     Allergies   Allergen Reactions   • Amlodipine Nausea Only   • Atorvastatin Myalgia       Objective   Vitals: Blood pressure 98/50, pulse (!) 51, height 5' 7\" (1.702 m), weight 91.2 kg (201 lb), SpO2 97%.    Physical Exam  Vitals and nursing note reviewed.   Constitutional:       General: He is not in acute distress.     Appearance: Normal appearance. He is not diaphoretic.   HENT:      Head: Normocephalic and atraumatic.   Eyes:      General: No scleral icterus.     Extraocular Movements: Extraocular movements intact.      Conjunctiva/sclera: Conjunctivae normal.      Pupils: Pupils are equal, round, and reactive to light.   Neck:      Comments: Thyroid resected  Cardiovascular:      Rate and Rhythm: Normal rate and regular rhythm.      Heart sounds: No murmur heard.  Pulmonary:      Effort: Pulmonary effort is normal. No respiratory distress.      Breath sounds: Normal breath sounds. No wheezing.   Musculoskeletal:      Cervical back: Normal range of motion.      Right lower leg: Edema present.      Left lower leg: Edema present.   Lymphadenopathy:      Cervical: No cervical adenopathy.   Skin:     General: Skin is warm and dry.   Neurological:      Mental Status: He is alert and oriented to person, place, and time. Mental status is at baseline.      Sensory: No " sensory deficit.      Gait: Gait normal.   Psychiatric:         Mood and Affect: Mood normal.         Behavior: Behavior normal.         Thought Content: Thought content normal.         The history was obtained from the review of the chart, patient.    Lab Results:   Lab Results   Component Value Date/Time    Hemoglobin A1C 8.7 (H) 05/15/2024 10:14 AM    Hemoglobin A1C 8.3 (H) 01/29/2024 11:18 AM    Hemoglobin A1C 7.9 (H) 11/13/2023 12:29 PM    White Blood Cell Count 6.2 05/15/2024 10:12 AM    White Blood Cell Count 6.6 11/13/2023 12:29 PM    Hemoglobin 11.7 (L) 05/15/2024 10:12 AM    Hemoglobin 11.8 (L) 11/13/2023 12:29 PM    HCT 36.0 (L) 05/15/2024 10:12 AM    HCT 36.0 (L) 11/13/2023 12:29 PM    .8 (H) 05/15/2024 10:12 AM    .4 (H) 11/13/2023 12:29 PM    Platelet Count 159 05/15/2024 10:12 AM    Platelet Count 154 11/13/2023 12:29 PM    BUN 31 (H) 05/15/2024 10:12 AM    BUN 33 (H) 01/29/2024 11:23 AM    BUN 35 (H) 01/29/2024 11:18 AM    Potassium 3.8 05/15/2024 10:12 AM    Potassium 4.6 01/29/2024 11:23 AM    Potassium 4.8 01/29/2024 11:18 AM    Chloride 106 05/15/2024 10:12 AM    Chloride 108 01/29/2024 11:23 AM    Chloride 109 01/29/2024 11:18 AM    CO2 26 05/15/2024 10:12 AM    CO2 30 01/29/2024 11:23 AM    CO2 28 01/29/2024 11:18 AM    Creatinine 2.23 (H) 05/15/2024 10:12 AM    Creatinine 2.68 (H) 01/29/2024 11:23 AM    Creatinine 2.64 (H) 01/29/2024 11:18 AM    AST 20 05/15/2024 10:12 AM    AST 31 01/29/2024 11:18 AM    AST 46 (H) 11/13/2023 12:29 PM    ALT 17 05/15/2024 10:12 AM    ALT 25 01/29/2024 11:18 AM    ALT 33 11/13/2023 12:29 PM    Protein, Total 6.0 (L) 05/15/2024 10:12 AM    Protein, Total 6.1 01/29/2024 11:18 AM    Protein, Total 6.4 11/13/2023 12:29 PM    Albumin 3.6 05/15/2024 10:12 AM    Albumin 3.6 01/29/2024 11:23 AM    Albumin 3.6 01/29/2024 11:18 AM    Globulin 2.4 05/15/2024 10:12 AM    Globulin 2.5 01/29/2024 11:18 AM    Globulin 2.5 11/13/2023 12:29 PM    HDL 30 (L)  "11/13/2023 12:29 PM    Triglycerides 145 11/13/2023 12:29 PM         Portions of the record may have been created with voice recognition software. Occasional wrong word or \"sound a like\" substitutions may have occurred due to the inherent limitations of voice recognition software. Read the chart carefully and recognize, using context, where substitutions have occurred.    "

## 2024-05-29 NOTE — LETTER
Diabetic Foot Exam Form    Date Requested: 24  Patient: Carlos Enrique Roth Jr.  Patient : [id number]   Referring Provider: Juliette Cid DO    Diabetic Foot Exam Performed with shoes and socks removed        Yes         No     Date of Diabetic Foot Exam ______________________________  Risk Score ____________________________________________    Left Foot       Visual Inspection         Monofilament Testing Sensory Exam        Pedal Pulses         Additional Comments         Right Foot      Visual Inspection         Monofilament Testing Sensory Exam       Pedal Pulses         Additional Comments         Comments __________________________________________________________    Practice Providing Exam ______________________________________________    Exam Performed By (print name) _______________________________________      Provider Signature ___________________________________________________      These reports are needed for  compliance.    Please fax this completed form and a copy of the Diabetic Foot Exam report to our office located at [address] [place] [address] as soon as possible via Fax [phone] attention José: Phone [phone]    We thank you for your assistance in treating our mutual patient.

## 2024-05-29 NOTE — TELEPHONE ENCOUNTER
Upon review of the In Basket request and the patient's chart, initial outreach has been made via fax to facility. Please see Contacts section for details.     Thank you  José Paige

## 2024-05-29 NOTE — PATIENT INSTRUCTIONS
Monitor diet to help with blood sugar levels.     Switch to Lantus 27 units daily.  Continue NovoLog 11 units with each meal.     Decrease levothyroxine 150 mcg 1 tab 6 days a week and half a tab on Sunday.     Recommend checking blood sugar at least daily, and alternating times to get a better idea of how blood sugars look throughout the day.     Contact the office with any concerns or questions.     Follow-up in 3 months with lab work completed prior to visit.

## 2024-05-29 NOTE — TELEPHONE ENCOUNTER
----- Message from Heydi SYLVESTER sent at 5/29/2024 11:26 AM EDT -----  Regarding: diabetic foot exam  05/29/24 11:26 AM    Hello, our patient Carlos Enrique Roth Jr. has had Diabetic Foot Exam completed/performed. Please assist in updating the patient chart by making an External outreach to [facility] facility located in [facility]. The date of service is 2024.    Thank you,  Heydi Jenkins MA  [facility] [place]

## 2024-05-31 ENCOUNTER — OFFICE VISIT (OUTPATIENT)
Dept: OBGYN CLINIC | Facility: CLINIC | Age: 89
End: 2024-05-31
Payer: COMMERCIAL

## 2024-05-31 VITALS
BODY MASS INDEX: 32.02 KG/M2 | DIASTOLIC BLOOD PRESSURE: 80 MMHG | HEIGHT: 67 IN | SYSTOLIC BLOOD PRESSURE: 122 MMHG | WEIGHT: 204 LBS

## 2024-05-31 DIAGNOSIS — M65.342 TRIGGER RING FINGER OF LEFT HAND: Primary | ICD-10-CM

## 2024-05-31 DIAGNOSIS — E11.40 TYPE 2 DIABETES MELLITUS WITH DIABETIC NEUROPATHY, UNSPECIFIED WHETHER LONG TERM INSULIN USE (HCC): ICD-10-CM

## 2024-05-31 PROCEDURE — 99203 OFFICE O/P NEW LOW 30 MIN: CPT | Performed by: ORTHOPAEDIC SURGERY

## 2024-05-31 PROCEDURE — 20550 NJX 1 TENDON SHEATH/LIGAMENT: CPT | Performed by: ORTHOPAEDIC SURGERY

## 2024-05-31 RX ORDER — LIDOCAINE HYDROCHLORIDE 10 MG/ML
1 INJECTION, SOLUTION INFILTRATION; PERINEURAL
Status: COMPLETED | OUTPATIENT
Start: 2024-05-31 | End: 2024-05-31

## 2024-05-31 RX ORDER — BETAMETHASONE SODIUM PHOSPHATE AND BETAMETHASONE ACETATE 3; 3 MG/ML; MG/ML
6 INJECTION, SUSPENSION INTRA-ARTICULAR; INTRALESIONAL; INTRAMUSCULAR; SOFT TISSUE
Status: COMPLETED | OUTPATIENT
Start: 2024-05-31 | End: 2024-05-31

## 2024-05-31 RX ADMIN — BETAMETHASONE SODIUM PHOSPHATE AND BETAMETHASONE ACETATE 6 MG: 3; 3 INJECTION, SUSPENSION INTRA-ARTICULAR; INTRALESIONAL; INTRAMUSCULAR; SOFT TISSUE at 13:00

## 2024-05-31 RX ADMIN — LIDOCAINE HYDROCHLORIDE 1 ML: 10 INJECTION, SOLUTION INFILTRATION; PERINEURAL at 13:00

## 2024-05-31 NOTE — PROGRESS NOTES
Assessment/Plan:  1. Trigger ring finger of left hand  Ambulatory Referral to Orthopedic Surgery    Hand/upper extremity injection: L ring A1      2. Type 2 diabetes mellitus with diabetic neuropathy, unspecified whether long term insulin use (HCC)            Subjective history, physical examination performed, diagnostic imaging reviewed at today's visit  Diagnosis was discussed, left ring trigger finger   Treatment options were discussed which included nonoperative and operative treatment.    Risks, benefits, and realistic expectations for treatment options were discussed.    The patient was given an opportunity to ask questions.  Questions were answered to the patient's satisfaction.    Through shared decision making, the patient decided to move forward with a left ring trigger finger CSI. Left ring trigger finger CSI was performed in the office without complication. Post injection protocol/expectations were reviewed. He will monitor his blood sugar as he is a diabetic. We discussed up to 3 trigger finger CSI's prior to a trigger finger release.   Follow up in 8 weeks time for clinical re-evaluation, at which time a 2nd CSI may be performed if warranted.         Hand/upper extremity injection: L ring A1  Universal Protocol:  Consent: Verbal consent obtained. Written consent not obtained.  Consent given by: patient  Patient understanding: patient states understanding of the procedure being performed  Patient identity confirmed: verbally with patient  Supporting Documentation  Indications: pain   Procedure Details  Condition:trigger finger Location: ring finger - L ring A1   Preparation: Patient was prepped and draped in the usual sterile fashion  Needle size: 25 G  Ultrasound guidance: no  Medications administered: 1 mL lidocaine 1 %; 6 mg betamethasone acetate-betamethasone sodium phosphate 6 (3-3) mg/mL  Patient tolerance: patient tolerated the procedure well with no immediate complications  Dressing:  Sterile  dressing applied         cc: my finger gets stuck     Subjective:   Carlos Enrique Roth Jr. is a right hand dominant 89 y.o. male who presents to the office for evaluation of his left ring finger. He notes his left ring finger has been locking for a few weeks. This started after he had a near fall and jammed his left ring finger on the ground. He does have to use his other hand to unlock the finger, which is painful. At baseline this is not painful for him and he is not currently taking anything for pain control. He has been taping the finger is extension as this resolves the locking he is experiencing. He feels his symptoms are improving.       Review of Systems   Constitutional:  Negative for chills, fever and unexpected weight change.   HENT:  Negative for hearing loss, nosebleeds and sore throat.    Eyes:  Negative for pain, redness and visual disturbance.   Respiratory:  Negative for cough, shortness of breath and wheezing.    Cardiovascular:  Negative for chest pain, palpitations and leg swelling.   Gastrointestinal:  Negative for abdominal pain, nausea and vomiting.   Endocrine: Negative for polydipsia and polyuria.   Genitourinary:  Negative for difficulty urinating and hematuria.   Musculoskeletal:  Negative for arthralgias, joint swelling and myalgias.   Skin:  Negative for rash and wound.   Neurological:  Negative for dizziness, numbness and headaches.   Psychiatric/Behavioral:  Negative for decreased concentration, dysphoric mood and suicidal ideas. The patient is not nervous/anxious.          Past Medical History:   Diagnosis Date    Aortic stenosis     CKD (chronic kidney disease)     baseline Cr 1.3-1.5    Coronary artery disease     Cough     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HCC)     type 2, insulin dependent    GERD (gastroesophageal reflux disease)     Glaucoma     Gout     History of prostate cancer     Hypertension     Hypothyroidism     Overweight     Peripheral neuropathy, idiopathic     Pleural effusion, left      Pure hypercholesterolemia     LA...14   R....14     Visual impairment     cataract left eye    Weight gain        Past Surgical History:   Procedure Laterality Date    CARDIAC CATHETERIZATION      EYE SURGERY      IR THORACENTESIS  10/23/2018    IR THORACENTESIS  2018    IR THORACENTESIS  2018    IR THORACENTESIS  2018    [facility] CORONARY ARTERY BYP W/VEIN & ARTERY GRAFT 3 VEIN N/A 2018    Procedure: CORONARY ARTERY BYPASS GRAFT (CABG) x 4 VESSELS with LIMA - LAD, SVG/LEFT LEG EVH - LEFT PDA, OM3, & OM2;  Surgeon: Remy Ash MD;  Location: [facility];  Service: Cardiac Surgery    NH ECHO TRANSESOPHAG MONTR CARDIAC PUMP FUNCTJ N/A 2018    Procedure: TRANSESOPHAGEAL ECHOCARDIOGRAM (VERONICA);  Surgeon: Remy Ash MD;  Location: [facility];  Service: Cardiac Surgery    NH RPLCMT AORTIC VALVE OPN W/STENTLESS TISSUE VALVE N/A 2018    Procedure: REPLACEMENT VALVE AORTIC (AVR)- 23mm tissue Intuity Valve;  Surgeon: Remy Ash MD;  Location: [facility];  Service: Cardiac Surgery    THORACOSCOPY VIDEO ASSISTED SURGERY (VATS) Left 2018    Procedure: THORACOSCOPY VIDEO ASSISTED SURGERY (VATS), talc pleurodesis,;  Surgeon: Ciara Green MD;  Location: [facility];  Service: Thoracic    THYROID SURGERY         Family History   Problem Relation Age of Onset    Diabetes Mother     Pancreatic cancer Brother     Diabetes Maternal Grandmother     Colon cancer Son     Diabetes Family     Substance Abuse Neg Hx     Mental illness Neg Hx        Social History     Occupational History    Occupation: retired    Tobacco Use    Smoking status: Former     Current packs/day: 0.00     Average packs/day: 0.3 packs/day for 1 year (0.3 ttl pk-yrs)     Types: Cigarettes     Start date:      Quit date: 1970     Years since quittin.4    Smokeless tobacco: Never    Tobacco comments:     Only in the service    Vaping Use    Vaping status: Never Used   Substance and Sexual Activity     Alcohol use: No    Drug use: No    Sexual activity: Not Currently         Current Outpatient Medications:     allopurinol (ZYLOPRIM) 300 mg tablet, TAKE ONE TABLET BY MOUTH ONCE DAILY, Disp: 90 tablet, Rfl: 3    ascorbic acid (VITAMIN C) 500 mg tablet, TAKE ONE TABLET BY MOUTH DAILY, Disp: 28 tablet, Rfl: 3    aspirin (ECOTRIN) 325 mg EC tablet, Take 1 tablet (325 mg total) by mouth daily, Disp: 30 tablet, Rfl: 0    cholecalciferol (VITAMIN D3) 1,000 units tablet, Take 1 tablet (1,000 Units total) by mouth daily, Disp: 90 tablet, Rfl: 3    dorzolamide-timolol (COSOPT) 22.3-6.8 MG/ML ophthalmic solution, Administer 1 drop to both eyes 2 (two) times a day, Disp: , Rfl:     FeroSul 325 (65 Fe) MG tablet, TAKE ONE TABLET BY MOUTH DAILY, Disp: 28 tablet, Rfl: 3    gabapentin (NEURONTIN) 300 mg capsule, TAKE ONE CAPSULE BY MOUTH DAILY AT BEDTIME, Disp: 90 capsule, Rfl: 3    insulin aspart (NovoLOG FlexPen) 100 UNIT/ML injection pen, INJECT 11 UNITS SUBCUTANEOUSLY THREE TIMES DAILY, Disp: 15 mL, Rfl: 3    Insulin Pen Needle (Pen Needles) 32G X 4 MM MISC, Use 4 (four) times a day (before meals and at bedtime), Disp: 100 each, Rfl: 11    Lantus SoloStar 100 units/mL SOPN, Inject 0.27 mL (27 Units total) under the skin in the morning, Disp: 15 mL, Rfl: 3    levothyroxine 150 mcg tablet, Take 1 tab 6 days a week and half a tab on Sunday., Disp: 90 tablet, Rfl: 3    LORazepam (ATIVAN) 0.5 mg tablet, TAKE ONE TABLET BY MOUTH EVERY EIGHT HOURS AS NEEDED FOR ANXIETY, Disp: 30 tablet, Rfl: 3    pantoprazole (PROTONIX) 20 mg tablet, TAKE ONE TABLET BY MOUTH DAILY, Disp: 30 tablet, Rfl: 3    rosuvastatin (CRESTOR) 40 MG tablet, TAKE ONE TABLET BY MOUTH DAILY, Disp: 90 tablet, Rfl: 3    senna (SENOKOT) 8.6 MG tablet, Take 1 tablet (8.6 mg total) by mouth daily, Disp: 90 tablet, Rfl: 3    tamsulosin (FLOMAX) 0.4 mg, TAKE ONE CAPSULE BY MOUTH DAILY WITH dinner, Disp: 30 capsule, Rfl: 6    torsemide (DEMADEX) 10 mg tablet, TAKE ONE  TABLET BY MOUTH EVERY DAY, Disp: 90 tablet, Rfl: 1    Allergies   Allergen Reactions    Amlodipine Nausea Only    Atorvastatin Myalgia       Objective:  Vitals:    05/31/24 1300   BP: 122/80       The patient was awake, alert, and oriented to person, place, and time.  No acute distress.  Normocephalic.  EOMI.  Mucous membranes moist.  Normal respiratory effort.    Examination of the affected extremity was compared to the unaffected extremity.  Skin was intact.  No swelling or ecchymosis.  No deformity.  Hand and fingers were warm and well-perfused.  Capillary refill was brisk.  Full active range of motion of the elbows, forearms, wrists, and fingers.   No A1 pulley tenderness of the ring finger. + clicking of the ring finger. + locking of the ring finger. No subluxation of extrinsic extensor tendon.      Imaging/Diagnostic Studies:    No imaging to review       This document was created using speech voice recognition software.   Grammatical errors, random word insertions, pronoun errors, and incomplete sentences are an occasional consequence of this system due to software limitations, ambient noise, and hardware issues.   Any formal questions or concerns about content, text, or information contained within the body of this dictation should be directly addressed to the provider for clarification.    Scribe Attestation      I,:  Eri Garcia am acting as a scribe while in the presence of the attending physician.:       I,:  Rosa Estrada MD personally performed the services described in this documentation    as scribed in my presence.:

## 2024-06-18 NOTE — TELEPHONE ENCOUNTER
As a follow-up, a second attempt has been made for outreach via fax to facility. Please see Contacts section for details.    Thank you  José Paige

## 2024-06-20 ENCOUNTER — APPOINTMENT (OUTPATIENT)
Dept: RADIOLOGY | Facility: HOSPITAL | Age: 89
DRG: 481 | End: 2024-06-20
Payer: COMMERCIAL

## 2024-06-20 ENCOUNTER — APPOINTMENT (INPATIENT)
Dept: RADIOLOGY | Facility: HOSPITAL | Age: 89
DRG: 481 | End: 2024-06-20
Payer: COMMERCIAL

## 2024-06-20 ENCOUNTER — HOSPITAL ENCOUNTER (INPATIENT)
Facility: HOSPITAL | Age: 89
LOS: 2 days | Discharge: NURSING, REHAB OR LONG-TERM CARE | DRG: 481 | End: 2024-06-22
Attending: EMERGENCY MEDICINE | Admitting: INTERNAL MEDICINE
Payer: COMMERCIAL

## 2024-06-20 ENCOUNTER — APPOINTMENT (OUTPATIENT)
Dept: CT IMAGING | Facility: HOSPITAL | Age: 89
DRG: 481 | End: 2024-06-20
Payer: COMMERCIAL

## 2024-06-20 ENCOUNTER — ANESTHESIA (INPATIENT)
Dept: PERIOP | Facility: HOSPITAL | Age: 89
DRG: 481 | End: 2024-06-20
Payer: COMMERCIAL

## 2024-06-20 ENCOUNTER — ANESTHESIA EVENT (INPATIENT)
Dept: PERIOP | Facility: HOSPITAL | Age: 89
DRG: 481 | End: 2024-06-20
Payer: COMMERCIAL

## 2024-06-20 DIAGNOSIS — S72.142A CLOSED DISPLACED INTERTROCHANTERIC FRACTURE OF LEFT FEMUR, INITIAL ENCOUNTER (HCC): ICD-10-CM

## 2024-06-20 DIAGNOSIS — S72.145D CLOSED NONDISPLACED INTERTROCHANTERIC FRACTURE OF LEFT FEMUR WITH ROUTINE HEALING, SUBSEQUENT ENCOUNTER: ICD-10-CM

## 2024-06-20 DIAGNOSIS — I48.91 NEW ONSET A-FIB (HCC): ICD-10-CM

## 2024-06-20 DIAGNOSIS — S72.002A CLOSED FRACTURE OF LEFT HIP, INITIAL ENCOUNTER (HCC): Primary | ICD-10-CM

## 2024-06-20 DIAGNOSIS — F41.9 ANXIETY: ICD-10-CM

## 2024-06-20 DIAGNOSIS — I48.91 NEW ONSET ATRIAL FIBRILLATION (HCC): ICD-10-CM

## 2024-06-20 LAB
ABO GROUP BLD: NORMAL
ANION GAP SERPL CALCULATED.3IONS-SCNC: 5 MMOL/L (ref 4–13)
APTT PPP: 26 SECONDS (ref 23–37)
ATRIAL RATE: 49 BPM
BASOPHILS # BLD AUTO: 0.07 THOUSANDS/ÂΜL (ref 0–0.1)
BASOPHILS NFR BLD AUTO: 1 % (ref 0–1)
BLD GP AB SCN SERPL QL: NEGATIVE
BUN SERPL-MCNC: 35 MG/DL (ref 5–25)
CALCIUM SERPL-MCNC: 9.1 MG/DL (ref 8.4–10.2)
CHLORIDE SERPL-SCNC: 107 MMOL/L (ref 96–108)
CO2 SERPL-SCNC: 26 MMOL/L (ref 21–32)
CREAT SERPL-MCNC: 2.18 MG/DL (ref 0.6–1.3)
EOSINOPHIL # BLD AUTO: 0.3 THOUSAND/ÂΜL (ref 0–0.61)
EOSINOPHIL NFR BLD AUTO: 4 % (ref 0–6)
ERYTHROCYTE [DISTWIDTH] IN BLOOD BY AUTOMATED COUNT: 13.2 % (ref 11.6–15.1)
GFR SERPL CREATININE-BSD FRML MDRD: 25 ML/MIN/1.73SQ M
GLUCOSE SERPL-MCNC: 170 MG/DL (ref 65–140)
GLUCOSE SERPL-MCNC: 172 MG/DL (ref 65–140)
HCT VFR BLD AUTO: 38.3 % (ref 36.5–49.3)
HGB BLD-MCNC: 12 G/DL (ref 12–17)
IMM GRANULOCYTES # BLD AUTO: 0.04 THOUSAND/UL (ref 0–0.2)
IMM GRANULOCYTES NFR BLD AUTO: 1 % (ref 0–2)
INR PPP: 1.09 (ref 0.84–1.19)
LYMPHOCYTES # BLD AUTO: 1.93 THOUSANDS/ÂΜL (ref 0.6–4.47)
LYMPHOCYTES NFR BLD AUTO: 23 % (ref 14–44)
MCH RBC QN AUTO: 32.2 PG (ref 26.8–34.3)
MCHC RBC AUTO-ENTMCNC: 31.3 G/DL (ref 31.4–37.4)
MCV RBC AUTO: 103 FL (ref 82–98)
MONOCYTES # BLD AUTO: 0.62 THOUSAND/ÂΜL (ref 0.17–1.22)
MONOCYTES NFR BLD AUTO: 7 % (ref 4–12)
NEUTROPHILS # BLD AUTO: 5.54 THOUSANDS/ÂΜL (ref 1.85–7.62)
NEUTS SEG NFR BLD AUTO: 64 % (ref 43–75)
NRBC BLD AUTO-RTO: 0 /100 WBCS
PLATELET # BLD AUTO: 129 THOUSANDS/UL (ref 149–390)
PMV BLD AUTO: 10.7 FL (ref 8.9–12.7)
POTASSIUM SERPL-SCNC: 4.2 MMOL/L (ref 3.5–5.3)
PROTHROMBIN TIME: 14.5 SECONDS (ref 11.6–14.5)
QRS AXIS: -20 DEGREES
QRSD INTERVAL: 136 MS
QT INTERVAL: 450 MS
QTC INTERVAL: 438 MS
RBC # BLD AUTO: 3.73 MILLION/UL (ref 3.88–5.62)
RH BLD: POSITIVE
SODIUM SERPL-SCNC: 138 MMOL/L (ref 135–147)
SPECIMEN EXPIRATION DATE: NORMAL
T WAVE AXIS: 96 DEGREES
VENTRICULAR RATE: 57 BPM
WBC # BLD AUTO: 8.5 THOUSAND/UL (ref 4.31–10.16)

## 2024-06-20 PROCEDURE — 80048 BASIC METABOLIC PNL TOTAL CA: CPT | Performed by: EMERGENCY MEDICINE

## 2024-06-20 PROCEDURE — 99223 1ST HOSP IP/OBS HIGH 75: CPT | Performed by: INTERNAL MEDICINE

## 2024-06-20 PROCEDURE — C1769 GUIDE WIRE: HCPCS | Performed by: ORTHOPAEDIC SURGERY

## 2024-06-20 PROCEDURE — 36415 COLL VENOUS BLD VENIPUNCTURE: CPT | Performed by: EMERGENCY MEDICINE

## 2024-06-20 PROCEDURE — 85730 THROMBOPLASTIN TIME PARTIAL: CPT | Performed by: PHYSICIAN ASSISTANT

## 2024-06-20 PROCEDURE — 86900 BLOOD TYPING SEROLOGIC ABO: CPT | Performed by: EMERGENCY MEDICINE

## 2024-06-20 PROCEDURE — 85025 COMPLETE CBC W/AUTO DIFF WBC: CPT | Performed by: EMERGENCY MEDICINE

## 2024-06-20 PROCEDURE — C1713 ANCHOR/SCREW BN/BN,TIS/BN: HCPCS | Performed by: ORTHOPAEDIC SURGERY

## 2024-06-20 PROCEDURE — 93010 ELECTROCARDIOGRAM REPORT: CPT | Performed by: INTERNAL MEDICINE

## 2024-06-20 PROCEDURE — 73501 X-RAY EXAM HIP UNI 1 VIEW: CPT

## 2024-06-20 PROCEDURE — 71045 X-RAY EXAM CHEST 1 VIEW: CPT

## 2024-06-20 PROCEDURE — 72125 CT NECK SPINE W/O DYE: CPT

## 2024-06-20 PROCEDURE — 86850 RBC ANTIBODY SCREEN: CPT | Performed by: EMERGENCY MEDICINE

## 2024-06-20 PROCEDURE — 99285 EMERGENCY DEPT VISIT HI MDM: CPT

## 2024-06-20 PROCEDURE — 99285 EMERGENCY DEPT VISIT HI MDM: CPT | Performed by: EMERGENCY MEDICINE

## 2024-06-20 PROCEDURE — 99223 1ST HOSP IP/OBS HIGH 75: CPT | Performed by: STUDENT IN AN ORGANIZED HEALTH CARE EDUCATION/TRAINING PROGRAM

## 2024-06-20 PROCEDURE — 73552 X-RAY EXAM OF FEMUR 2/>: CPT

## 2024-06-20 PROCEDURE — 72170 X-RAY EXAM OF PELVIS: CPT

## 2024-06-20 PROCEDURE — 82948 REAGENT STRIP/BLOOD GLUCOSE: CPT

## 2024-06-20 PROCEDURE — 86901 BLOOD TYPING SEROLOGIC RH(D): CPT | Performed by: EMERGENCY MEDICINE

## 2024-06-20 PROCEDURE — 85610 PROTHROMBIN TIME: CPT | Performed by: PHYSICIAN ASSISTANT

## 2024-06-20 PROCEDURE — 74177 CT ABD & PELVIS W/CONTRAST: CPT

## 2024-06-20 PROCEDURE — 96374 THER/PROPH/DIAG INJ IV PUSH: CPT

## 2024-06-20 PROCEDURE — 0QH706Z INSERTION OF INTRAMEDULLARY INTERNAL FIXATION DEVICE INTO LEFT UPPER FEMUR, OPEN APPROACH: ICD-10-PCS | Performed by: ORTHOPAEDIC SURGERY

## 2024-06-20 PROCEDURE — 27245 TREAT THIGH FRACTURE: CPT | Performed by: ORTHOPAEDIC SURGERY

## 2024-06-20 PROCEDURE — 93005 ELECTROCARDIOGRAM TRACING: CPT

## 2024-06-20 PROCEDURE — 70450 CT HEAD/BRAIN W/O DYE: CPT

## 2024-06-20 DEVICE — TFNA FENESTRATED HELICAL BLADE 100MM - STERILE
Type: IMPLANTABLE DEVICE | Site: HIP | Status: FUNCTIONAL
Brand: TFN-ADVANCE

## 2024-06-20 DEVICE — 10MM/130 DEG TI CANN TFNA 170MM - STERILE
Type: IMPLANTABLE DEVICE | Site: HIP | Status: FUNCTIONAL
Brand: TFN-ADVANCE

## 2024-06-20 DEVICE — LOCKING SCREW FOR IM NAIL Ø 5MM/ 36MM/ XL25/ STERILE: Type: IMPLANTABLE DEVICE | Site: HIP | Status: FUNCTIONAL

## 2024-06-20 RX ORDER — FENTANYL CITRATE/PF 50 MCG/ML
25 SYRINGE (ML) INJECTION
Status: DISCONTINUED | OUTPATIENT
Start: 2024-06-20 | End: 2024-06-20 | Stop reason: HOSPADM

## 2024-06-20 RX ORDER — FERROUS SULFATE 325(65) MG
325 TABLET ORAL DAILY
Status: DISCONTINUED | OUTPATIENT
Start: 2024-06-21 | End: 2024-06-22 | Stop reason: HOSPADM

## 2024-06-20 RX ORDER — TRANEXAMIC ACID 10 MG/ML
1000 INJECTION, SOLUTION INTRAVENOUS ONCE
Status: CANCELLED | OUTPATIENT
Start: 2024-06-20 | End: 2024-06-20

## 2024-06-20 RX ORDER — LEVOTHYROXINE SODIUM 0.07 MG/1
150 TABLET ORAL
Status: DISCONTINUED | OUTPATIENT
Start: 2024-06-21 | End: 2024-06-22 | Stop reason: HOSPADM

## 2024-06-20 RX ORDER — FENTANYL CITRATE 50 UG/ML
INJECTION, SOLUTION INTRAMUSCULAR; INTRAVENOUS CONTINUOUS PRN
Status: DISCONTINUED | OUTPATIENT
Start: 2024-06-20 | End: 2024-06-20

## 2024-06-20 RX ORDER — INSULIN LISPRO 100 [IU]/ML
1-5 INJECTION, SOLUTION INTRAVENOUS; SUBCUTANEOUS
Status: DISCONTINUED | OUTPATIENT
Start: 2024-06-20 | End: 2024-06-22 | Stop reason: HOSPADM

## 2024-06-20 RX ORDER — MIDAZOLAM HYDROCHLORIDE 2 MG/2ML
INJECTION, SOLUTION INTRAMUSCULAR; INTRAVENOUS AS NEEDED
Status: DISCONTINUED | OUTPATIENT
Start: 2024-06-20 | End: 2024-06-20

## 2024-06-20 RX ORDER — ASCORBIC ACID 500 MG
500 TABLET ORAL DAILY
Status: DISCONTINUED | OUTPATIENT
Start: 2024-06-21 | End: 2024-06-22 | Stop reason: HOSPADM

## 2024-06-20 RX ORDER — HEPARIN SODIUM 1000 [USP'U]/ML
4000 INJECTION, SOLUTION INTRAVENOUS; SUBCUTANEOUS EVERY 6 HOURS PRN
Status: DISCONTINUED | OUTPATIENT
Start: 2024-06-20 | End: 2024-06-20

## 2024-06-20 RX ORDER — ALLOPURINOL 300 MG/1
300 TABLET ORAL DAILY
Status: DISCONTINUED | OUTPATIENT
Start: 2024-06-21 | End: 2024-06-22 | Stop reason: HOSPADM

## 2024-06-20 RX ORDER — PANTOPRAZOLE SODIUM 20 MG/1
20 TABLET, DELAYED RELEASE ORAL DAILY
Status: DISCONTINUED | OUTPATIENT
Start: 2024-06-21 | End: 2024-06-22 | Stop reason: HOSPADM

## 2024-06-20 RX ORDER — CEFAZOLIN SODIUM 2 G/50ML
2000 SOLUTION INTRAVENOUS EVERY 8 HOURS
Status: COMPLETED | OUTPATIENT
Start: 2024-06-21 | End: 2024-06-21

## 2024-06-20 RX ORDER — BUPIVACAINE HYDROCHLORIDE 7.5 MG/ML
INJECTION, SOLUTION INTRASPINAL AS NEEDED
Status: DISCONTINUED | OUTPATIENT
Start: 2024-06-20 | End: 2024-06-20

## 2024-06-20 RX ORDER — LORAZEPAM 0.5 MG/1
0.5 TABLET ORAL EVERY 8 HOURS PRN
Status: DISCONTINUED | OUTPATIENT
Start: 2024-06-20 | End: 2024-06-22 | Stop reason: HOSPADM

## 2024-06-20 RX ORDER — EPHEDRINE SULFATE 50 MG/ML
INJECTION INTRAVENOUS AS NEEDED
Status: DISCONTINUED | OUTPATIENT
Start: 2024-06-20 | End: 2024-06-20

## 2024-06-20 RX ORDER — HEPARIN SODIUM 1000 [USP'U]/ML
4000 INJECTION, SOLUTION INTRAVENOUS; SUBCUTANEOUS ONCE
Status: DISCONTINUED | OUTPATIENT
Start: 2024-06-20 | End: 2024-06-20

## 2024-06-20 RX ORDER — DORZOLAMIDE HYDROCHLORIDE AND TIMOLOL MALEATE 20; 5 MG/ML; MG/ML
1 SOLUTION/ DROPS OPHTHALMIC 2 TIMES DAILY
Status: DISCONTINUED | OUTPATIENT
Start: 2024-06-20 | End: 2024-06-22 | Stop reason: HOSPADM

## 2024-06-20 RX ORDER — ONDANSETRON 2 MG/ML
INJECTION INTRAMUSCULAR; INTRAVENOUS AS NEEDED
Status: DISCONTINUED | OUTPATIENT
Start: 2024-06-20 | End: 2024-06-20

## 2024-06-20 RX ORDER — SENNOSIDES 8.6 MG
1 TABLET ORAL DAILY
Status: DISCONTINUED | OUTPATIENT
Start: 2024-06-21 | End: 2024-06-22 | Stop reason: HOSPADM

## 2024-06-20 RX ORDER — SODIUM CHLORIDE, SODIUM LACTATE, POTASSIUM CHLORIDE, CALCIUM CHLORIDE 600; 310; 30; 20 MG/100ML; MG/100ML; MG/100ML; MG/100ML
INJECTION, SOLUTION INTRAVENOUS CONTINUOUS PRN
Status: DISCONTINUED | OUTPATIENT
Start: 2024-06-20 | End: 2024-06-20

## 2024-06-20 RX ORDER — TAMSULOSIN HYDROCHLORIDE 0.4 MG/1
0.4 CAPSULE ORAL
Status: DISCONTINUED | OUTPATIENT
Start: 2024-06-20 | End: 2024-06-22 | Stop reason: HOSPADM

## 2024-06-20 RX ORDER — PROPOFOL 10 MG/ML
INJECTION, EMULSION INTRAVENOUS CONTINUOUS PRN
Status: DISCONTINUED | OUTPATIENT
Start: 2024-06-20 | End: 2024-06-20

## 2024-06-20 RX ORDER — CEFAZOLIN SODIUM 2 G/50ML
2000 SOLUTION INTRAVENOUS EVERY 8 HOURS
Status: DISCONTINUED | OUTPATIENT
Start: 2024-06-20 | End: 2024-06-20

## 2024-06-20 RX ORDER — FENTANYL CITRATE 50 UG/ML
50 INJECTION, SOLUTION INTRAMUSCULAR; INTRAVENOUS ONCE
Status: COMPLETED | OUTPATIENT
Start: 2024-06-20 | End: 2024-06-20

## 2024-06-20 RX ORDER — TORSEMIDE 10 MG/1
10 TABLET ORAL DAILY
Status: DISCONTINUED | OUTPATIENT
Start: 2024-06-21 | End: 2024-06-22 | Stop reason: HOSPADM

## 2024-06-20 RX ORDER — GABAPENTIN 300 MG/1
300 CAPSULE ORAL
Status: DISCONTINUED | OUTPATIENT
Start: 2024-06-20 | End: 2024-06-22 | Stop reason: HOSPADM

## 2024-06-20 RX ORDER — DEXAMETHASONE SODIUM PHOSPHATE 10 MG/ML
INJECTION, SOLUTION INTRAMUSCULAR; INTRAVENOUS AS NEEDED
Status: DISCONTINUED | OUTPATIENT
Start: 2024-06-20 | End: 2024-06-20

## 2024-06-20 RX ORDER — HEPARIN SODIUM 1000 [USP'U]/ML
2000 INJECTION, SOLUTION INTRAVENOUS; SUBCUTANEOUS EVERY 6 HOURS PRN
Status: DISCONTINUED | OUTPATIENT
Start: 2024-06-20 | End: 2024-06-20

## 2024-06-20 RX ORDER — INSULIN LISPRO 100 [IU]/ML
1-6 INJECTION, SOLUTION INTRAVENOUS; SUBCUTANEOUS
Status: DISCONTINUED | OUTPATIENT
Start: 2024-06-20 | End: 2024-06-22 | Stop reason: HOSPADM

## 2024-06-20 RX ORDER — HEPARIN SODIUM 10000 [USP'U]/100ML
3-20 INJECTION, SOLUTION INTRAVENOUS
Status: DISCONTINUED | OUTPATIENT
Start: 2024-06-20 | End: 2024-06-20

## 2024-06-20 RX ADMIN — MIDAZOLAM 1 MG: 1 INJECTION INTRAMUSCULAR; INTRAVENOUS at 20:09

## 2024-06-20 RX ADMIN — CEFAZOLIN SODIUM 2000 MG: 2 SOLUTION INTRAVENOUS at 20:08

## 2024-06-20 RX ADMIN — GABAPENTIN 300 MG: 300 CAPSULE ORAL at 22:42

## 2024-06-20 RX ADMIN — DEXAMETHASONE SODIUM PHOSPHATE 10 MG: 10 INJECTION INTRAMUSCULAR; INTRAVENOUS at 20:21

## 2024-06-20 RX ADMIN — MIDAZOLAM 1 MG: 1 INJECTION INTRAMUSCULAR; INTRAVENOUS at 20:22

## 2024-06-20 RX ADMIN — PROPOFOL 10 MCG/KG/MIN: 10 INJECTION, EMULSION INTRAVENOUS at 20:19

## 2024-06-20 RX ADMIN — ONDANSETRON 4 MG: 2 INJECTION INTRAMUSCULAR; INTRAVENOUS at 20:21

## 2024-06-20 RX ADMIN — BUPIVACAINE HYDROCHLORIDE IN DEXTROSE 1.4 ML: 7.5 INJECTION, SOLUTION SUBARACHNOID at 20:27

## 2024-06-20 RX ADMIN — FENTANYL CITRATE 50 MCG: 50 INJECTION INTRAMUSCULAR; INTRAVENOUS at 12:07

## 2024-06-20 RX ADMIN — EPHEDRINE SULFATE 10 MG: 50 INJECTION INTRAVENOUS at 20:29

## 2024-06-20 RX ADMIN — SODIUM CHLORIDE, SODIUM LACTATE, POTASSIUM CHLORIDE, AND CALCIUM CHLORIDE: .6; .31; .03; .02 INJECTION, SOLUTION INTRAVENOUS at 20:08

## 2024-06-20 RX ADMIN — IOHEXOL 100 ML: 350 INJECTION, SOLUTION INTRAVENOUS at 12:14

## 2024-06-20 RX ADMIN — PHENYLEPHRINE HYDROCHLORIDE 50 MCG/MIN: 10 INJECTION, SOLUTION INTRAVENOUS at 20:18

## 2024-06-20 NOTE — OP NOTE
OPERATIVE REPORT  PATIENT NAME: Carlos Enrique Roth Jr.    :  1935  MRN: [id number]  Pt Location:  OR ROOM 02    SURGERY DATE: 2024    Surgeons and Role:     * DO Parish Hickman Primary    Preop Diagnosis:  Closed displaced intertrochanteric fracture of left femur, initial encounter ([facility]) [S72.142A]    Post-Op Diagnosis Codes:     * Closed displaced intertrochanteric fracture of left femur, initial encounter ([facility]) [S72.142A]    Procedure(s):  Left - INSERTION NAIL IM FEMUR ANTEGRADE (TROCHANTERIC)    Specimen(s):  * No specimens in log *    Estimated Blood Loss:   Minimal    Drains:  * No LDAs found *    Anesthesia Type:   General    Operative Indications:  Closed displaced intertrochanteric fracture of left femur, initial encounter ([facility]) [S72.142A]      Complications:   None    Procedure and Technique:         Estimated Blood Loss:  100 cc              Drains:  none           Implants:  Synthes 10 mm ° with 105 mm helical blade and  36 mm distal interlocking screw     Procedure Details      The patient was seen in the Holding Room. The risks, benefits, complications, treatment options, and expected outcomes were discussed with the patient. The risks and potential complications of their problem and purposed treatment including but not limited to failure of fracture healing or hardware, infection, neurovascular injury, anesthetic complications. The site of surgery properly noted/marked. The patient was taken to Operating Room and identified and the procedure verified as      Left  hip cephalomedullary nail with Synthes TFN. A Time Out was held and the above information confirmed.     After induction of anesthesia, administration of IV  antibiotics, surgical pause was conducted. The patient was placed in a supine position on the fracture table with all bony prominences well padded including the peroneal post.  Care was taken to pad the well leg in a well leg walker.  After the patient was set up  appropriate AP and lateral x-rays were obtained with traction and slight internal rotation of the extremity to confirm appropriate anatomic reduction of the intertrochanteric hip fracture.  This was confirmed to be appropriately aligned in anatomic in its closed reduction on the fracture table prior to the procedure being performed.     Left  leg and thigh were prepped and draped in the usual sterile fashion.     After the patient was set up appropriate AP and lateral x-rays were obtained with traction and slight internal rotation of the extremity to confirm appropriate anatomic reduction of the intertrochanteric hip fracture.  This was confirmed to be appropriately aligned in anatomic in its closed reduction on the fracture table prior to the procedure being performed.     A #10 blade scalpel used to dissect a 2 in incision proximal and in line with the femur to the greater trochanteric tip.  The skin and subcutaneous fat were dissected sharply with meticulous hemostasis obtained with electrocautery.  The IT band was split in line with its fibers sharply in line with the skin incision.  The abductors were split bluntly with finger dissection to expose the tip of the greater trochanter.  The guide pin was placed under live x-ray at the proximal and just medial tip of the greater trochanter and centered on both AP and lateral x-rays.  This guide pin was then advanced down the center of the femoral canal and confirmed on AP and lateral x-rays to be in the appropriate placement.  The since these femoral opening Reamer was then used under x-ray guidance to open the proximal femoral canal.  A 10 mm Synthes TFN was then placed on the guide down the proximal femur.  This was confirmed on AP and lateral x-rays.     The trocar system was inserted and a skin incision was made 1/2 inches in line with the appropriate placement of the trocar for the helical blade.  The skin incision was dissected down through the skin and  subcutaneous tissue with sharp dissection using a 10 blade and meticulous hemostasis obtained during the dissection with electrocautery.  The lateral IT band and vastus lateralis was split sharply and then bluntly along the lateral femur and the triple trocar guide was then advanced failed to the lateral femoral cortex.     X-rays confirmed appropriate placement of the guide on the lateral femur and proximal distal in the femoral neck.  The guide pin was then placed up the center of the femoral head on the AP and lateral planes and confirmed under x-ray to be in the appropriate placement.  The lateral opening Reamer was then used to open the lateral femoral cortex and a final measurement of the screw length was then obtained and it measured 105 mm.  A Synthes drill was set to 105 mm and the femoral head was reamed with a drill and confirmed under multiple planes of x-ray to show appropriate length for the helical blade.  A helical blade was then advanced over a guidewire into the femoral head and confirmed in AP and lateral x-rays to be appropriately placed at the center center position in the femoral head.  The proximal screwdriver was then used to lock down the screw on the helical blade proximally and confirmed under x-ray to show advancement of the interference screw.     Attention was then turned to the distal interlocking screw.  The trocar was inserted through the guide and the skin incision previously made for the helical blade was extended 1 extra cm distally to accommodate the interlocking screws through the same incision.  The skin and subcutaneous tissue was dissected along with the lateral fascia.  The guide was placed on the bony confirmed on x-ray to be in appropriate placement along the femoral cortex.  A drill was used to drill both the lateral and medial femoral cortex.  A Synthes depth gauge confirmed a 36 mm screw which was placed under x-ray fluoroscopic guidance appropriately.  At this time the  proximal screwdriver was used to disengage the guide from the TFN intramedullary nail.  Final AP and lateral x-rays showed anatomic alignment of the fracture as well as appropriate placement of the Synthes short cephalomedullary nail with interlocking screw distally.           Once this was done,   I irrigated and then closed the deep fascial layers in both incisions with interrupted 0 Vicryl stitches.  I then through 0 Vicryl subcutaneous buried fat stitches to close down the dead space of the fat layer.  2-0 Vicryl was then used to close the subcutaneous tissue with interrupted buried subcutaneous stitches.  The skin was then closed with staples.     A Mepilex soft dressing, was applied. The patient was taken out of the fracture table in  returned to the recovery room without incident            I was present for the entire procedure.    Patient Disposition:  PACU         SIGNATURE: Sukh Reece DO  DATE: June 20, 2024  TIME: 6:10 PM

## 2024-06-20 NOTE — ASSESSMENT & PLAN NOTE
Initiated heparin drip  Patient will ultimately need to transition to NOAC once surgery is performed and patient is cleared from surgical standpoint  AV giuseppe blocking agents due to bradycardia  Cardiology consulted

## 2024-06-20 NOTE — H&P
"[facility]  H&P  Name: Carlos Enrique Roth Jr. 89 y.o. male I MRN: [id number]  Unit/Bed#: -01 I Date of Admission: 6/20/2024   Date of Service: 6/20/2024  Hospital Day: 0      Assessment & Plan   * Closed intertrochanteric fracture of left femur (HCC)  Assessment & Plan  Following mechanical fall, patient collided with his son in the kitchen and subsequently fell this morning  Per discussion with orthopedics patient will either go to the operating room this evening or tomorrow morning  Heparin drip until patient is to go to the OR  PT/OT postoperatively  Continue pain control  Monitor hemoglobin postoperatively  Patient moderate risk based on age and chronic comorbid conditions but is stable to proceed with surgery    New onset a-fib (HCC)  Assessment & Plan  Initiated heparin drip  Patient will ultimately need to transition to NOAC once surgery is performed and patient is cleared from surgical standpoint  AV giuseppe blocking agents due to bradycardia  Cardiology consulted    Type 2 diabetes mellitus with diabetic neuropathy ([facility])  Assessment & Plan  Lab Results   Component Value Date    HGBA1C 8.7 (H) 05/15/2024       No results for input(s): \"POCGLU\" in the last 72 hours.    Blood Sugar Average: Last 72 hrs:    Maintained on 27 units basal coverage with 11 units meal coverage  Continue this while patient has oral intake plus sliding scale insulin with meals    Primary open angle glaucoma (POAG)  Assessment & Plan  Continue dorzolamide timolol drops twice daily    Postoperative hypothyroidism  Assessment & Plan  Continue levothyroxine 150 mcg daily           VTE Pharmacologic Prophylaxis: VTE Score: 9 High Risk (Score >/= 5) - Pharmacological DVT Prophylaxis Contraindicated. Sequential Compression Devices Ordered. - perioperative  Code Status: Prior  Discussion with family: Attempted to update  (son) via phone. Unable to contact.    Anticipated Length of Stay: Patient will " be admitted on an inpatient basis with an anticipated length of stay of greater than 2 midnights secondary to hip fracture.    Total Time Spent on Date of Encounter in care of patient: 65 mins. This time was spent on one or more of the following: performing physical exam; counseling and coordination of care; obtaining or reviewing history; documenting in the medical record; reviewing/ordering tests, medications or procedures; communicating with other healthcare professionals and discussing with patient's family/caregivers.    Chief Complaint: Mechanical fall with hip pain    History of Present Illness:  Carlos Enrique Roth Jr. is a 89 y.o. male with a PMH of CKD 4, CHF, status post CABG x 4, status post prosthetic heart valve, hypercholesterolemia who presents with fall and subsequent hip pain following collision with son in the kitchen.  Patient reports he maintained consciousness and that the fall was mechanical in nature.  Incidentally patient was found to be in atrial fibrillation at time of admission.  Cardiology consulted for preoperative clearance and patient was placed on heparin drip pending decision in regards to surgical timing.  Ultimately he will need to be placed on a NOAC.  He reports he was in his usual state of health prior to today.  Denying any nausea, vomiting, diarrhea, chest pain, lightheadedness, dizziness numbness or tingling.   He also has some lower extremity edema noted with history of CHF, anticipate he may need additional diuretics postoperatively as he will likely receive fluid intraoperatively.    Review of Systems:  Review of Systems    Past Medical and Surgical History:   Past Medical History:   Diagnosis Date    Aortic stenosis     CKD (chronic kidney disease)     baseline Cr 1.3-1.5    Coronary artery disease     Cough     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HCC)     type 2, insulin dependent    GERD (gastroesophageal reflux disease)     Glaucoma     Gout     History of prostate cancer     Hypertension      Hypothyroidism     Overweight     Peripheral neuropathy, idiopathic     Pleural effusion, left     Pure hypercholesterolemia     LA...11/12/14   R....11/12/14     Visual impairment     cataract left eye    Weight gain        Past Surgical History:   Procedure Laterality Date    CARDIAC CATHETERIZATION      EYE SURGERY      IR THORACENTESIS  10/23/2018    IR THORACENTESIS  11/2/2018    IR THORACENTESIS  11/9/2018    IR THORACENTESIS  11/23/2018    VT CORONARY ARTERY BYP W/VEIN & ARTERY GRAFT 3 VEIN N/A 9/17/2018    Procedure: CORONARY ARTERY BYPASS GRAFT (CABG) x 4 VESSELS with LIMA - LAD, SVG/LEFT LEG EVH - LEFT PDA, OM3, & OM2;  Surgeon: Remy Ash MD;  Location: [facility];  Service: Cardiac Surgery    VT ECHO TRANSESOPHAG MONTR CARDIAC PUMP FUNCTJ N/A 9/17/2018    Procedure: TRANSESOPHAGEAL ECHOCARDIOGRAM (VERONIAC);  Surgeon: Remy Ash MD;  Location: [facility];  Service: Cardiac Surgery    VT RPLCMT AORTIC VALVE OPN W/STENTLESS TISSUE VALVE N/A 9/17/2018    Procedure: REPLACEMENT VALVE AORTIC (AVR)- 23mm tissue Intuity Valve;  Surgeon: Remy Ash MD;  Location: [facility];  Service: Cardiac Surgery    THORACOSCOPY VIDEO ASSISTED SURGERY (VATS) Left 11/27/2018    Procedure: THORACOSCOPY VIDEO ASSISTED SURGERY (VATS), talc pleurodesis,;  Surgeon: Ciara Green MD;  Location: [facility];  Service: Thoracic    THYROID SURGERY         Meds/Allergies:  Prior to Admission medications    Medication Sig Start Date End Date Taking? Authorizing Provider   allopurinol (ZYLOPRIM) 300 mg tablet TAKE ONE TABLET BY MOUTH ONCE DAILY 12/8/23   Juliette Fly, DO   ascorbic acid (VITAMIN C) 500 mg tablet TAKE ONE TABLET BY MOUTH DAILY 3/20/24   Juliette Fly, DO   aspirin (ECOTRIN) 325 mg EC tablet Take 1 tablet (325 mg total) by mouth daily 9/13/22   Juliette Fly, DO   cholecalciferol (VITAMIN D3) 1,000 units tablet Take 1 tablet (1,000 Units total) by mouth daily 3/3/22   Ángel Bass, DO   dorzolamide-timolol (COSOPT)  22.3-6.8 MG/ML ophthalmic solution Administer 1 drop to both eyes 2 (two) times a day 5/24/23   Historical Provider, MD   FeroSul 325 (65 Fe) MG tablet TAKE ONE TABLET BY MOUTH DAILY 9/13/22   Vivian Roberts,    gabapentin (NEURONTIN) 300 mg capsule TAKE ONE CAPSULE BY MOUTH DAILY AT BEDTIME 1/26/24   Juliette Fly, DO   insulin aspart (NovoLOG FlexPen) 100 UNIT/ML injection pen INJECT 11 UNITS SUBCUTANEOUSLY THREE TIMES DAILY 1/2/24   Derrick Lebron PA-C   Insulin Pen Needle (Pen Needles) 32G X 4 MM MISC Use 4 (four) times a day (before meals and at bedtime) 12/14/22   Juliette Fly, DO   Lantus SoloStar 100 units/mL SOPN Inject 0.27 mL (27 Units total) under the skin in the morning 5/29/24   Derrick Lebron PA-C   levothyroxine 150 mcg tablet Take 1 tab 6 days a week and half a tab on Sunday. 5/29/24   Derrick Lebron PA-C   LORazepam (ATIVAN) 0.5 mg tablet TAKE ONE TABLET BY MOUTH EVERY EIGHT HOURS AS NEEDED FOR ANXIETY 3/20/24   Juliette Fly, DO   pantoprazole (PROTONIX) 20 mg tablet TAKE ONE TABLET BY MOUTH DAILY 3/25/24   Juliette Fly, DO   rosuvastatin (CRESTOR) 40 MG tablet TAKE ONE TABLET BY MOUTH DAILY 9/6/23   Juliette Fly, DO   senna (SENOKOT) 8.6 MG tablet Take 1 tablet (8.6 mg total) by mouth daily 2/9/24   Juliette Fly, DO   tamsulosin (FLOMAX) 0.4 mg TAKE ONE CAPSULE BY MOUTH DAILY WITH dinner 3/25/24   Juliette Fly, DO   torsemide (DEMADEX) 10 mg tablet TAKE ONE TABLET BY MOUTH EVERY DAY 4/26/24   Bart Bush MD     I have reviewed home medications using recent Epic encounter.    Allergies:   Allergies   Allergen Reactions    Amlodipine Nausea Only    Atorvastatin Myalgia       Social History:  Marital Status:    Occupation: retired  Patient Pre-hospital Living Situation: Home  Patient Pre-hospital Level of Mobility: walks  Patient Pre-hospital Diet Restrictions: Diabetic, Cardiac  Substance Use History:   Social History     Substance and Sexual Activity   Alcohol Use No     Social History      Tobacco Use   Smoking Status Former    Current packs/day: 0.00    Average packs/day: 0.3 packs/day for 1 year (0.3 ttl pk-yrs)    Types: Cigarettes    Start date:     Quit date: 1970    Years since quittin.5   Smokeless Tobacco Never   Tobacco Comments    Only in the service      Social History     Substance and Sexual Activity   Drug Use No       Family History:  Family History   Problem Relation Age of Onset    Diabetes Mother     Pancreatic cancer Brother     Diabetes Maternal Grandmother     Colon cancer Son     Diabetes Family     Substance Abuse Neg Hx     Mental illness Neg Hx        Physical Exam:     Vitals:   Blood Pressure: 121/64 (24 1534)  Pulse: 62 (24 1534)  Temperature: (!) 97.2 °F (36.2 °C) (24 1150)  Temp Source: Oral (24 1150)  Respirations: 19 (24 1430)  SpO2: 96 % (24 1534)    Physical Exam  Cardiovascular:      Rate and Rhythm: Rhythm irregularly irregular.      Heart sounds: Murmur heard.   Musculoskeletal:      Right lower leg: Edema present.      Left lower leg: Edema present.        Additional Data:     Lab Results:  Results from last 7 days   Lab Units 24  1209   WBC Thousand/uL 8.50   HEMOGLOBIN g/dL 12.0   HEMATOCRIT % 38.3   PLATELETS Thousands/uL 129*   SEGS PCT % 64   LYMPHO PCT % 23   MONO PCT % 7   EOS PCT % 4     Results from last 7 days   Lab Units 24  1209   SODIUM mmol/L 138   POTASSIUM mmol/L 4.2   CHLORIDE mmol/L 107   CO2 mmol/L 26   BUN mg/dL 35*   CREATININE mg/dL 2.18*   ANION GAP mmol/L 5   CALCIUM mg/dL 9.1   GLUCOSE RANDOM mg/dL 172*     Results from last 7 days   Lab Units 24  1434   INR  1.09         Lab Results   Component Value Date    HGBA1C 8.7 (H) 05/15/2024    HGBA1C 8.3 (H) 2024    HGBA1C 7.9 (H) 2023           Lines/Drains:  Invasive Devices       Peripheral Intravenous Line  Duration             Peripheral IV 24 Left;Ventral (anterior) Forearm <1 day                         Imaging: Reviewed radiology reports from this admission including: chest xray, abdominal/pelvic CT, xray(s), and CT spine  TRAUMA - CT head wo contrast   Final Result by Hernan Dee MD (06/20 1257)      No acute intracranial abnormality.            This was discussed with Dr. Almanzar at 12:51 p.m.      Workstation performed: [id number]         TRAUMA - CT spine cervical wo contrast   Final Result by Hernan Dee MD (06/20 1255)      No cervical spine fracture or traumatic malalignment.         This was discussed with Dr. Almanzar at 12:51 p.m.         Workstation performed: [id number]         TRAUMA - CT abdomen pelvis w contrast   Final Result by Hernan Dee MD (06/20 1253)      Comminuted intratrochanteric fracture of the left hip.      No evidence of solid organ trauma or ascites.      Gallstone.      Hyperdense lesion involves the right kidney and could represent a hyperdense cyst or nodule. Nonemergent follow-up ultrasound is advised.      Asymmetric density in the left prostate is only slightly more pronounced than 2017. Correlation with PSA is advised.      High density pleural thickening at the left lung base is probably related to old inflammation given pleural effusion in 2018.      This was discussed with Dr. Garcia's at 12:51 p.m.      Workstation performed: [id number]         XR Trauma pelvis ap only 1 or 2 vw   Final Result by Hernan Dee MD (06/20 1258)      Intertrochanteric fracture left hip. This was discussed with Dr. Almanzar's at 12:51 p.m.      Workstation performed: [id number]         XR Trauma chest portable   Final Result by Lucila Horan MD (06/20 1247)      No acute cardiopulmonary disease.      No acute displaced fracture.      Workstation performed: [id number]         XR femur 2 vw left    (Results Pending)       EKG and Other Studies Reviewed on Admission:   EKG: Personally Reviewed. Atrial fibrillation. HR 57.    ** Please Note: This note has  been constructed using a voice recognition system. **

## 2024-06-20 NOTE — ANESTHESIA PREPROCEDURE EVALUATION
Procedure:  INSERTION NAIL IM FEMUR ANTEGRADE (TROCHANTERIC) (Left: Leg Upper)    Relevant Problems   CARDIO   (+) Aortic stenosis   (+) Atherosclerosis of aorta (HCC)   (+) Atherosclerotic heart disease of native coronary artery without angina pectoris   (+) CAD (coronary artery disease)   (+) Chronic diastolic congestive heart failure (HCC)   (+) New onset a-fib ([facility])   (+) Nonrheumatic aortic (valve) stenosis   (+) Pure hypercholesterolemia      ENDO   (+) Postoperative hypothyroidism   (+) Secondary hyperparathyroidism (HCC)   (+) Type 2 diabetes mellitus with diabetic neuropathy ([facility])      GI/HEPATIC   (+) GERD (gastroesophageal reflux disease)      /RENAL   (+) Anemia due to stage 3 chronic kidney disease  ([facility])   (+) Benign prostatic hyperplasia without lower urinary tract symptoms   (+) CKD stage 4 due to type 2 diabetes mellitus ([facility])   (+) Microalbuminuria due to type 2 diabetes mellitus  ([facility])   (+) Renal cyst      HEMATOLOGY   (+) Anemia due to stage 3 chronic kidney disease  ([facility])      MUSCULOSKELETAL   (+) Gout with tophus      NEURO/PSYCH   (+) Type 2 diabetes mellitus with diabetic neuropathy ([facility])      Surgery/Wound/Pain   (+) S/P AVR (aortic valve replacement)   (+) S/P CABG x 4      Cardiovascular/Peripheral Vascular   (+) Hypertensive heart disease with HF (heart failure) ([facility])        Physical Exam    Airway    Mallampati score: II  TM Distance: >3 FB  Neck ROM: limited     Dental    upper dentures,     Cardiovascular      Pulmonary      Other Findings        Anesthesia Plan  ASA Score- 3     Anesthesia Type- spinal with ASA Monitors.         Additional Monitors:     Airway Plan:            Plan Factors-    Chart reviewed. EKG reviewed.  Existing labs reviewed. Patient summary reviewed.    Patient is not a current smoker.              Induction- intravenous.    Postoperative Plan- Plan for postoperative opioid use.     Perioperative Resuscitation Plan - Level 1 - Full Code.       Informed  Consent- Anesthetic plan and risks discussed with patient.  I personally reviewed this patient with the CRNA. Discussed and agreed on the Anesthesia Plan with the CRNA..

## 2024-06-20 NOTE — ASSESSMENT & PLAN NOTE
Following mechanical fall, patient collided with his son in the kitchen and subsequently fell this morning  Per discussion with orthopedics patient will either go to the operating room this evening or tomorrow morning  Heparin drip until patient is to go to the OR  PT/OT postoperatively  Continue pain control  Monitor hemoglobin postoperatively  Patient moderate risk based on age and chronic comorbid conditions but is stable to proceed with surgery

## 2024-06-20 NOTE — PLAN OF CARE
Problem: PAIN - ADULT  Goal: Verbalizes/displays adequate comfort level or baseline comfort level  Description: Interventions:  - Encourage patient to monitor pain and request assistance  - Assess pain using appropriate pain scale  - Administer analgesics based on type and severity of pain and evaluate response  - Implement non-pharmacological measures as appropriate and evaluate response  - Consider cultural and social influences on pain and pain management  - Notify physician/advanced practitioner if interventions unsuccessful or patient reports new pain  Outcome: Progressing     Problem: INFECTION - ADULT  Goal: Absence or prevention of progression during hospitalization  Description: INTERVENTIONS:  - Assess and monitor for signs and symptoms of infection  - Monitor lab/diagnostic results  - Monitor all insertion sites, i.e. indwelling lines, tubes, and drains  - Monitor endotracheal if appropriate and nasal secretions for changes in amount and color  - [place] appropriate cooling/warming therapies per order  - Administer medications as ordered  - Instruct and encourage patient and family to use good hand hygiene technique  - Identify and instruct in appropriate isolation precautions for identified infection/condition  Outcome: Progressing  Goal: Absence of fever/infection during neutropenic period  Description: INTERVENTIONS:  - Monitor WBC    Outcome: Progressing     Problem: SAFETY ADULT  Goal: Patient will remain free of falls  Description: INTERVENTIONS:  - Educate patient/family on patient safety including physical limitations  - Instruct patient to call for assistance with activity   - Consult OT/PT to assist with strengthening/mobility   - Keep Call bell within reach  - Keep bed low and locked with side rails adjusted as appropriate  - Keep care items and personal belongings within reach  - Initiate and maintain comfort rounds  - Make Fall Risk Sign visible to staff  - Offer Toileting every x Hours,  in advance of need  - Initiate/Maintain xxalarm  - Obtain necessary fall risk management equipment: x  - Apply yellow socks and bracelet for high fall risk patients  - Consider moving patient to room near nurses station  Outcome: Progressing  Goal: Maintain or return to baseline ADL function  Description: INTERVENTIONS:  -  Assess patient's ability to carry out ADLs; assess patient's baseline for ADL function and identify physical deficits which impact ability to perform ADLs (bathing, care of mouth/teeth, toileting, grooming, dressing, etc.)  - Assess/evaluate cause of self-care deficits   - Assess range of motion  - Assess patient's mobility; develop plan if impaired  - Assess patient's need for assistive devices and provide as appropriate  - Encourage maximum independence but intervene and supervise when necessary  - Involve family in performance of ADLs  - Assess for home care needs following discharge   - Consider OT consult to assist with ADL evaluation and planning for discharge  - Provide patient education as appropriate  Outcome: Progressing  Goal: Maintains/Returns to pre admission functional level  Description: INTERVENTIONS:  - Perform AM-PAC 6 Click Basic Mobility/ Daily Activity assessment daily.  - Set and communicate daily mobility goal to care team and patient/family/caregiver.   - Collaborate with rehabilitation services on mobility goals if consulted  - Perform Range of Motion x times a day.  - Reposition patient every x hours.  - Dangle patient x times a day  - Stand patient x times a day  - Ambulate patient x times a day  - Out of bed to chair x times a day   - Out of bed for meals x times a day  - Out of bed for toileting  - Record patient progress and toleration of activity level   Outcome: Progressing     Problem: DISCHARGE PLANNING  Goal: Discharge to home or other facility with appropriate resources  Description: INTERVENTIONS:  - Identify barriers to discharge w/patient and caregiver  -  Arrange for needed discharge resources and transportation as appropriate  - Identify discharge learning needs (meds, wound care, etc.)  - Arrange for interpretive services to assist at discharge as needed  - Refer to Case Management Department for coordinating discharge planning if the patient needs post-hospital services based on physician/advanced practitioner order or complex needs related to functional status, cognitive ability, or social support system  Outcome: Progressing     Problem: Knowledge Deficit  Goal: Patient/family/caregiver demonstrates understanding of disease process, treatment plan, medications, and discharge instructions  Description: Complete learning assessment and assess knowledge base.  Interventions:  - Provide teaching at level of understanding  - Provide teaching via preferred learning methods  Outcome: Progressing

## 2024-06-20 NOTE — CONSULTS
Consult - Cardiology   Carlos Enrique Roth Jr. 89 y.o. male MRN: [id number]  Unit/Bed#: ED 06 Encounter: [id number]        Reason For Consult: Atrial fibrillation  Outpatient Cardiologist: Dr. Bush               ASSESSMENT:  Acute hip fracture  New onset AF with controlled VR  CAD status post CABG x 4 in 2018  3/11/2024 echo: EF 65%, grade 2 diastolic dysfunction, well-seated bioprosthetic AVR with no evidence of regurgitation and gradients within expected range, mild MR, mild TR, mild UT  Severe AS status post bio AVR in 2018  Baseline bradycardia and history of junctional rhythm while on metoprolol    PLAN/ DISCUSSION:     Atrial fibrillation  Currently rate controlled  Continue heparin and change to NOAC when able from surgical perspective  Avoid AV giuseppe blocking agents due to bradycardia  CAD  This appears stable  Restart aspirin when able but with initiation of anticoagulation can reduce dose to 81 mg daily  Continue statin  Avoid beta-blocker due to bradycardia  Severe AS with bio AVR  Stable on recent echocardiogram  Dental prophylaxis  Acute hip fracture  Orthopedic surgery has been consulted    History Of Present Illness:  Carlos Enrique is a pleasant 89-year-old gentleman is currently in the emergency room after falling and sustaining hip fracture.  He has new onset atrial fibrillation for which we are asked see in consultation. There was no LOC. He remembers the entire event of falling.    Past Medical History:        Past Medical History:   Diagnosis Date    Aortic stenosis     CKD (chronic kidney disease)     baseline Cr 1.3-1.5    Coronary artery disease     Cough     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HCC)     type 2, insulin dependent    GERD (gastroesophageal reflux disease)     Glaucoma     Gout     History of prostate cancer     Hypertension     Hypothyroidism     Overweight     Peripheral neuropathy, idiopathic     Pleural effusion, left     Pure hypercholesterolemia     LA...11/12/14   R....11/12/14     Visual impairment      cataract left eye    Weight gain       Past Surgical History:   Procedure Laterality Date    CARDIAC CATHETERIZATION      EYE SURGERY      IR THORACENTESIS  10/23/2018    IR THORACENTESIS  11/2/2018    IR THORACENTESIS  11/9/2018    IR THORACENTESIS  11/23/2018    MT CORONARY ARTERY BYP W/VEIN & ARTERY GRAFT 3 VEIN N/A 9/17/2018    Procedure: CORONARY ARTERY BYPASS GRAFT (CABG) x 4 VESSELS with LIMA - LAD, SVG/LEFT LEG EVH - LEFT PDA, OM3, & OM2;  Surgeon: Remy Ash MD;  [facility];  Service: Cardiac Surgery    MT ECHO TRANSESOPHAG MONTR CARDIAC PUMP FUNCTJ N/A 9/17/2018    Procedure: TRANSESOPHAGEAL ECHOCARDIOGRAM (VERONICA);  Surgeon: Remy Ash MD;  [facility];  Service: Cardiac Surgery    MT RPLCMT AORTIC VALVE OPN W/STENTLESS TISSUE VALVE N/A 9/17/2018    Procedure: REPLACEMENT VALVE AORTIC (AVR)- 23mm tissue Intuity Valve;  Surgeon: Remy Ash MD;  [facility];  Service: Cardiac Surgery    THORACOSCOPY VIDEO ASSISTED SURGERY (VATS) Left 11/27/2018    Procedure: THORACOSCOPY VIDEO ASSISTED SURGERY (VATS), talc pleurodesis,;  Surgeon: Ciara Green MD;  [facility];  Service: Thoracic    THYROID SURGERY          Allergy:        Allergies   Allergen Reactions    Amlodipine Nausea Only    Atorvastatin Myalgia       Medications:       Prior to Admission medications    Medication Sig Start Date End Date Taking? Authorizing Provider   allopurinol (ZYLOPRIM) 300 mg tablet TAKE ONE TABLET BY MOUTH ONCE DAILY 12/8/23   Juliette Fly, DO   ascorbic acid (VITAMIN C) 500 mg tablet TAKE ONE TABLET BY MOUTH DAILY 3/20/24   Juliette Fly, DO   aspirin (ECOTRIN) 325 mg EC tablet Take 1 tablet (325 mg total) by mouth daily 9/13/22   Juliette Fly, DO   cholecalciferol (VITAMIN D3) 1,000 units tablet Take 1 tablet (1,000 Units total) by mouth daily 3/3/22   Ángel Bass, DO   dorzolamide-timolol (COSOPT) 22.3-6.8 MG/ML ophthalmic solution Administer 1 drop to both eyes 2 (two) times a  day 5/24/23   Historical Provider, MD   FeroSul 325 (65 Fe) MG tablet TAKE ONE TABLET BY MOUTH DAILY 9/13/22   Vivian Roberts DO   gabapentin (NEURONTIN) 300 mg capsule TAKE ONE CAPSULE BY MOUTH DAILY AT BEDTIME 1/26/24   Juliette Fly, DO   insulin aspart (NovoLOG FlexPen) 100 UNIT/ML injection pen INJECT 11 UNITS SUBCUTANEOUSLY THREE TIMES DAILY 1/2/24   Derrick Lebron PA-C   Insulin Pen Needle (Pen Needles) 32G X 4 MM MISC Use 4 (four) times a day (before meals and at bedtime) 12/14/22   Juliette Fly, DO   Lantus SoloStar 100 units/mL SOPN Inject 0.27 mL (27 Units total) under the skin in the morning 5/29/24   Derrick Lebron PA-C   levothyroxine 150 mcg tablet Take 1 tab 6 days a week and half a tab on Sunday. 5/29/24   Derrick Lebron PA-C   LORazepam (ATIVAN) 0.5 mg tablet TAKE ONE TABLET BY MOUTH EVERY EIGHT HOURS AS NEEDED FOR ANXIETY 3/20/24   Juliette Fly, DO   pantoprazole (PROTONIX) 20 mg tablet TAKE ONE TABLET BY MOUTH DAILY 3/25/24   Juliette Fly, DO   rosuvastatin (CRESTOR) 40 MG tablet TAKE ONE TABLET BY MOUTH DAILY 9/6/23   Juliette Fly, DO   senna (SENOKOT) 8.6 MG tablet Take 1 tablet (8.6 mg total) by mouth daily 2/9/24   Juliette Fly, DO   tamsulosin (FLOMAX) 0.4 mg TAKE ONE CAPSULE BY MOUTH DAILY WITH dinner 3/25/24   Juliette Fly, DO   torsemide (DEMADEX) 10 mg tablet TAKE ONE TABLET BY MOUTH EVERY DAY 4/26/24   Bart Bush MD       Family History:     Family History   Problem Relation Age of Onset    Diabetes Mother     Pancreatic cancer Brother     Diabetes Maternal Grandmother     Colon cancer Son     Diabetes Family     Substance Abuse Neg Hx     Mental illness Neg Hx         Social History:       Social History     Socioeconomic History    Marital status:      Spouse name: None    Number of children: None    Years of education: None    Highest education level: None   Occupational History    Occupation: retired    Tobacco Use    Smoking status: Former     Current packs/day: 0.00      Average packs/day: 0.3 packs/day for 1 year (0.3 ttl pk-yrs)     Types: Cigarettes     Start date:      Quit date: 1970     Years since quittin.5    Smokeless tobacco: Never    Tobacco comments:     Only in the service    Vaping Use    Vaping status: Never Used   Substance and Sexual Activity    Alcohol use: No    Drug use: No    Sexual activity: Not Currently   Other Topics Concern    None   Social History Narrative    Caffeine use / coffee diet cola and tea    Lives with family     Living situation supportive and safe    No advance directives  -denied     Social Determinants of Health     Financial Resource Strain: High Risk (2023)    Overall Financial Resource Strain (CARDIA)     Difficulty of Paying Living Expenses: Hard   Food Insecurity: Not on file   Transportation Needs: No Transportation Needs (2023)    PRAPARE - Transportation     Lack of Transportation (Medical): No     Lack of Transportation (Non-Medical): No   Physical Activity: Insufficiently Active (2021)    Exercise Vital Sign     Days of Exercise per Week: 3 days     Minutes of Exercise per Session: 20 min   Stress: Stress Concern Present (2021)    [employer] - Occupational Stress Questionnaire     Feeling of Stress : To some extent   Social Connections: Not on file   Intimate Partner Violence: Not on file   Housing Stability: Not on file       ROS:  14 point ROS negative except as outlined above  Remainder review of systems is negative    Exam:  General:  alert, oriented and in no distress, cooperative  Head: Normocephalic, atraumatic.  Eyes:  EOMI. Pupils - equal, round, reactive to accomodation.  No icterus.  Normal Conjunctiva.   Oropharynx: moist and normal-appearing mucosa  Neck: supple, symmetrical, trachea midline and no JVD  Heart:  RRR, No: murmer, rub or gallop, S1 & S2 normal   Respiratory effort / Chest Inspection: unlabored  Lungs:  normal air entry, lungs clear to  auscultation and no rales, rhonchi or wheezing   Abdomen: flat, normal findings: bowel sounds normal and soft, non-tender  Lower Limbs:  no pitting edema  Pulses::  RLE - DP: present 2+                 LLE - DP: present 2+  Musculoskeletal: ROM grossly normal        DATA:      ECG:                     Telemetry: Atrial fibrillation. HR 60's          Echocardiogram:           Ischemic Testing:         Weights:    Wt Readings from Last 3 Encounters:   05/31/24 92.5 kg (204 lb)   05/29/24 91.2 kg (201 lb)   05/16/24 93.9 kg (207 lb)   , There is no height or weight on file to calculate BMI.         Lab Studies:             Results from last 7 days   Lab Units 06/20/24  1209   WBC Thousand/uL 8.50   HEMOGLOBIN g/dL 12.0   HEMATOCRIT % 38.3   PLATELETS Thousands/uL 129*   ,   Results from last 7 days   Lab Units 06/20/24  1209   POTASSIUM mmol/L 4.2   CHLORIDE mmol/L 107   CO2 mmol/L 26   BUN mg/dL 35*   CREATININE mg/dL 2.18*   CALCIUM mg/dL 9.1

## 2024-06-20 NOTE — ASSESSMENT & PLAN NOTE
"Lab Results   Component Value Date    HGBA1C 8.7 (H) 05/15/2024       No results for input(s): \"POCGLU\" in the last 72 hours.    Blood Sugar Average: Last 72 hrs:    Maintained on 27 units basal coverage with 11 units meal coverage  Continue this while patient has oral intake plus sliding scale insulin with meals  "

## 2024-06-20 NOTE — CONSULTS
Orthopedics   Carlos Enrique Roth . 89 y.o. male MRN: [id number]  Unit/Bed#: -01      Chief Complaint:   Left hip pain s/p fall    HPI:  89 y.o. male who ambulates without assistive devices at home. Patient has a medical history of new onset a-fibt, T2DM with neuropathy, CKD stage 4, chronic diastolic heart failure. Patient reports that this morning around 9- 10am he was in his kitchen when he bumped into his son and fell onto the ground landing on his left side. Patient notes that he had pain immediately and was not able to move or ambulate. Pain is at the lateral greater trochanteric region. States that while at rest he is not in pain, reports that he is in 10/10 pain with left leg motion. Patient states that he has not had anything to eat or drink since last night. At this time patient denies any numbness or tingling.     Review Of Systems:   Skin: Normal  Neuro: See HPI  Musculoskeletal: See HPI  14 point review of systems negative except as stated above     Past Medical History:   Past Medical History:   Diagnosis Date    Aortic stenosis     CKD (chronic kidney disease)     baseline Cr 1.3-1.5    Coronary artery disease     Cough     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HCC)     type 2, insulin dependent    GERD (gastroesophageal reflux disease)     Glaucoma     Gout     History of prostate cancer     Hypertension     Hypothyroidism     Overweight     Peripheral neuropathy, idiopathic     Pleural effusion, left     Pure hypercholesterolemia     LA...11/12/14   R....11/12/14     Visual impairment     cataract left eye    Weight gain        Past Surgical History:   Past Surgical History:   Procedure Laterality Date    CARDIAC CATHETERIZATION      EYE SURGERY      IR THORACENTESIS  10/23/2018    IR THORACENTESIS  11/2/2018    IR THORACENTESIS  11/9/2018    IR THORACENTESIS  11/23/2018    RI CORONARY ARTERY BYP W/VEIN & ARTERY GRAFT 3 VEIN N/A 9/17/2018    Procedure: CORONARY ARTERY BYPASS GRAFT (CABG) x 4 VESSELS with LIMA - LAD,  SVG/LEFT LEG EVH - LEFT PDA, OM3, & OM2;  Surgeon: Remy Ash MD;  [facility];  Service: Cardiac Surgery    AL ECHO TRANSESOPHAG MONTR CARDIAC PUMP FUNCTJ N/A 2018    Procedure: TRANSESOPHAGEAL ECHOCARDIOGRAM (VERONICA);  Surgeon: Remy Ash MD;  [facility];  Service: Cardiac Surgery    AL RPLCMT AORTIC VALVE OPN W/STENTLESS TISSUE VALVE N/A 2018    Procedure: REPLACEMENT VALVE AORTIC (AVR)- 23mm tissue Intuity Valve;  Surgeon: Remy Ash MD;  [facility];  Service: Cardiac Surgery    THORACOSCOPY VIDEO ASSISTED SURGERY (VATS) Left 2018    Procedure: THORACOSCOPY VIDEO ASSISTED SURGERY (VATS), talc pleurodesis,;  Surgeon: Ciara Green MD;  [facility];  Service: Thoracic    THYROID SURGERY         Family History:  Family history reviewed and non-contributory  Family History   Problem Relation Age of Onset    Diabetes Mother     Pancreatic cancer Brother     Diabetes Maternal Grandmother     Colon cancer Son     Diabetes Family     Substance Abuse Neg Hx     Mental illness Neg Hx        Social History:  Social History     Socioeconomic History    Marital status:      Spouse name: None    Number of children: None    Years of education: None    Highest education level: None   Occupational History    Occupation: retired    Tobacco Use    Smoking status: Former     Current packs/day: 0.00     Average packs/day: 0.3 packs/day for 1 year (0.3 ttl pk-yrs)     Types: Cigarettes     Start date:      Quit date: 1970     Years since quittin.5    Smokeless tobacco: Never    Tobacco comments:     Only in the service    Vaping Use    Vaping status: Never Used   Substance and Sexual Activity    Alcohol use: No    Drug use: No    Sexual activity: Not Currently   Other Topics Concern    None   Social History Narrative    Caffeine use / coffee diet cola and tea    Lives with family     Living situation supportive and safe    No advance directives   -denied     Social Determinants of Health     Financial Resource Strain: High Risk (11/14/2023)    Overall Financial Resource Strain (CARDIA)     Difficulty of Paying Living Expenses: Hard   Food Insecurity: Not on file   Transportation Needs: No Transportation Needs (11/14/2023)    PRAPARE - Transportation     Lack of Transportation (Medical): No     Lack of Transportation (Non-Medical): No   Physical Activity: Insufficiently Active (5/28/2021)    Exercise Vital Sign     Days of Exercise per Week: 3 days     Minutes of Exercise per Session: 20 min   Stress: Stress Concern Present (5/28/2021)    [employer] - Occupational Stress Questionnaire     Feeling of Stress : To some extent   Social Connections: Not on file   Intimate Partner Violence: Not on file   Housing Stability: Not on file       Allergies:   Allergies   Allergen Reactions    Amlodipine Nausea Only    Atorvastatin Myalgia           Labs:  0   Lab Value Date/Time    HCT 38.3 06/20/2024 1209    HCT 36.0 (L) 05/15/2024 1012    HCT 36.0 (L) 11/13/2023 1229    HCT 33.4 (L) 10/26/2022 1013    HCT 29.4 (L) 12/01/2018 0515    HCT 34.4 (L) 11/29/2018 0544    HCT 43.3 04/17/2017 1030    HGB 12.0 06/20/2024 1209    HGB 11.7 (L) 05/15/2024 1012    HGB 11.8 (L) 11/13/2023 1229    HGB 11.1 (L) 10/26/2022 1013    HGB 9.0 (L) 12/01/2018 0515    HGB 10.7 (L) 11/29/2018 0544    HGB 14.0 04/17/2017 1030    INR 1.09 06/20/2024 1434    WBC 8.50 06/20/2024 1209    WBC 6.2 05/15/2024 1012    WBC 6.6 11/13/2023 1229    WBC 5.4 10/26/2022 1013    WBC 8.91 12/01/2018 0515    WBC 12.04 (H) 11/29/2018 0544    WBC 7.6 04/17/2017 1030       Meds:    Current Facility-Administered Medications:     heparin (porcine) 25,000 units in 0.45% NaCl 250 mL infusion (premix), 3-20 Units/kg/hr (Order-Specific), Intravenous, Titrated, Maria Eugenia Trinidad PA-C    heparin (porcine) injection 2,000 Units, 2,000 Units, Intravenous, Q6H PRN, Maria Eugenia Trinidad PA-C    heparin (porcine)  "injection 4,000 Units, 4,000 Units, Intravenous, Once, Maria Eugenia Trinidad PA-C    heparin (porcine) injection 4,000 Units, 4,000 Units, Intravenous, Q6H PRN, Maria Eugenia Trinidad PA-C    insulin lispro (HumALOG/ADMELOG) 100 units/mL subcutaneous injection 1-5 Units, 1-5 Units, Subcutaneous, HS, Maria Eugenia Trinidad PA-C    insulin lispro (HumALOG/ADMELOG) 100 units/mL subcutaneous injection 1-6 Units, 1-6 Units, Subcutaneous, TID AC **AND** Fingerstick Glucose (POCT), , , TID AC, Maria Eugenia Trinidad PA-C    Blood Culture:   No results found for: \"BLOODCX\"    Wound Culture:   No results found for: \"WOUNDCULT\"    Ins and Outs:  No intake/output data recorded.          Physical Exam:   /64   Pulse 62   Temp (!) 97.2 °F (36.2 °C) (Oral)   Resp 19   SpO2 96%   Gen: No acute distress, resting comfortably in bed  HEENT: Eyes clear, moist mucus membranes, hearing intact  Respiratory: No audible wheezing or stridor  Cardiovascular: Well Perfused peripherally, 2+ distal pulse  Abdomen: nondistended, no peritoneal signs    Musculoskeletal: left lower extremity  Skin intact  TTP of greater trochanteric region  SILT s/s/sp/dp/t.   Motor intact 5/5  ankle dorsi/plantar flexion, EHL/FHL. Motor function not tested for hip due to known fracture  1+ DP/PT pulse due to pitting edema  Pitting edema bilaterally  Musculature is soft and compressible  Leg lengths equal, left leg externally rotated    Bilateral clavicles, shoulder, elbow, forearm, wrist and hand NTTP, full ROM, sensation and motor function intact  Right hip, thigh, knee, ankle and foot NTTP with full ROM, sensation and motor function intact    Radiology:   I personally reviewed the films with my attending Dr. Seay.    X-ray left knee: shows no acute osseous abnormality or dislocation  X-ray left pelvis: shows intertrochanteric left hip fracture  CT abdomen and pelvis: shows left comminuted intertrochanteric fracture  XR left hand: shows no acute osseous abnormality with scattered " osteoarthritis  Xr left forearm: show mild cortical irregularity of radial head articular surface correlated with patients age.  XR left elbow: shows mild cortical irregularity of radial head articular surface correlated with patients age, no acute osseous abnormality      Assessment:  89 y.o.male with left intertrochanteric femur fracture s/p fall     Plan:   NWB LLE  OR for left TFN today.  Informed consent obtained  NPO now  PreOp clearance performed  Stat cbc, bmp, pt/inr, aptt, cxr, ekg, type and screen available  2Uprbc on hold OR a.m.  Ancef on hold OR a.m.  Post op PT/OT eval  Dispo: Ortho will follow  Pain control per primary team  DVT ppx per primary team post op, NOAC preffered for new A fib  There is no height or weight on file to calculate BMI. mildly obese. Recommend behavior modifications, nutrition, and physical activity.  Dispo    Jeaneth Shirley PA-C

## 2024-06-21 PROBLEM — D62 ACUTE BLOOD LOSS ANEMIA: Status: ACTIVE | Noted: 2024-06-21

## 2024-06-21 LAB
ALBUMIN SERPL BCG-MCNC: 3.2 G/DL (ref 3.5–5)
ALP SERPL-CCNC: 50 U/L (ref 34–104)
ALT SERPL W P-5'-P-CCNC: 9 U/L (ref 7–52)
ANION GAP SERPL CALCULATED.3IONS-SCNC: 10 MMOL/L (ref 4–13)
AST SERPL W P-5'-P-CCNC: 18 U/L (ref 13–39)
BASOPHILS # BLD AUTO: 0.02 THOUSANDS/ÂΜL (ref 0–0.1)
BASOPHILS NFR BLD AUTO: 0 % (ref 0–1)
BILIRUB SERPL-MCNC: 1.01 MG/DL (ref 0.2–1)
BUN SERPL-MCNC: 32 MG/DL (ref 5–25)
CALCIUM ALBUM COR SERPL-MCNC: 9.3 MG/DL (ref 8.3–10.1)
CALCIUM SERPL-MCNC: 8.7 MG/DL (ref 8.4–10.2)
CHLORIDE SERPL-SCNC: 108 MMOL/L (ref 96–108)
CO2 SERPL-SCNC: 21 MMOL/L (ref 21–32)
CREAT SERPL-MCNC: 1.95 MG/DL (ref 0.6–1.3)
EOSINOPHIL # BLD AUTO: 0 THOUSAND/ÂΜL (ref 0–0.61)
EOSINOPHIL NFR BLD AUTO: 0 % (ref 0–6)
ERYTHROCYTE [DISTWIDTH] IN BLOOD BY AUTOMATED COUNT: 12.7 % (ref 11.6–15.1)
GFR SERPL CREATININE-BSD FRML MDRD: 29 ML/MIN/1.73SQ M
GLUCOSE SERPL-MCNC: 206 MG/DL (ref 65–140)
GLUCOSE SERPL-MCNC: 212 MG/DL (ref 65–140)
GLUCOSE SERPL-MCNC: 265 MG/DL (ref 65–140)
GLUCOSE SERPL-MCNC: 316 MG/DL (ref 65–140)
GLUCOSE SERPL-MCNC: 354 MG/DL (ref 65–140)
GLUCOSE SERPL-MCNC: 408 MG/DL (ref 65–140)
HCT VFR BLD AUTO: 34.9 % (ref 36.5–49.3)
HGB BLD-MCNC: 10.8 G/DL (ref 12–17)
IMM GRANULOCYTES # BLD AUTO: 0.06 THOUSAND/UL (ref 0–0.2)
IMM GRANULOCYTES NFR BLD AUTO: 1 % (ref 0–2)
LYMPHOCYTES # BLD AUTO: 0.84 THOUSANDS/ÂΜL (ref 0.6–4.47)
LYMPHOCYTES NFR BLD AUTO: 9 % (ref 14–44)
MAGNESIUM SERPL-MCNC: 2.2 MG/DL (ref 1.9–2.7)
MCH RBC QN AUTO: 32.2 PG (ref 26.8–34.3)
MCHC RBC AUTO-ENTMCNC: 30.9 G/DL (ref 31.4–37.4)
MCV RBC AUTO: 104 FL (ref 82–98)
MONOCYTES # BLD AUTO: 0.37 THOUSAND/ÂΜL (ref 0.17–1.22)
MONOCYTES NFR BLD AUTO: 4 % (ref 4–12)
NEUTROPHILS # BLD AUTO: 7.92 THOUSANDS/ÂΜL (ref 1.85–7.62)
NEUTS SEG NFR BLD AUTO: 86 % (ref 43–75)
NRBC BLD AUTO-RTO: 0 /100 WBCS
PLATELET # BLD AUTO: 114 THOUSANDS/UL (ref 149–390)
PMV BLD AUTO: 10.6 FL (ref 8.9–12.7)
POTASSIUM SERPL-SCNC: 4 MMOL/L (ref 3.5–5.3)
PROT SERPL-MCNC: 5.6 G/DL (ref 6.4–8.4)
RBC # BLD AUTO: 3.35 MILLION/UL (ref 3.88–5.62)
SODIUM SERPL-SCNC: 139 MMOL/L (ref 135–147)
WBC # BLD AUTO: 9.21 THOUSAND/UL (ref 4.31–10.16)

## 2024-06-21 PROCEDURE — 82948 REAGENT STRIP/BLOOD GLUCOSE: CPT

## 2024-06-21 PROCEDURE — 99232 SBSQ HOSP IP/OBS MODERATE 35: CPT | Performed by: PHYSICIAN ASSISTANT

## 2024-06-21 PROCEDURE — 80053 COMPREHEN METABOLIC PANEL: CPT | Performed by: PHYSICIAN ASSISTANT

## 2024-06-21 PROCEDURE — 97530 THERAPEUTIC ACTIVITIES: CPT

## 2024-06-21 PROCEDURE — 83735 ASSAY OF MAGNESIUM: CPT | Performed by: PHYSICIAN ASSISTANT

## 2024-06-21 PROCEDURE — 97167 OT EVAL HIGH COMPLEX 60 MIN: CPT

## 2024-06-21 PROCEDURE — 85025 COMPLETE CBC W/AUTO DIFF WBC: CPT | Performed by: PHYSICIAN ASSISTANT

## 2024-06-21 PROCEDURE — 99024 POSTOP FOLLOW-UP VISIT: CPT | Performed by: PHYSICIAN ASSISTANT

## 2024-06-21 PROCEDURE — 97116 GAIT TRAINING THERAPY: CPT

## 2024-06-21 PROCEDURE — 99232 SBSQ HOSP IP/OBS MODERATE 35: CPT | Performed by: INTERNAL MEDICINE

## 2024-06-21 PROCEDURE — 97163 PT EVAL HIGH COMPLEX 45 MIN: CPT

## 2024-06-21 RX ORDER — INSULIN LISPRO 100 [IU]/ML
10 INJECTION, SOLUTION INTRAVENOUS; SUBCUTANEOUS
Status: DISCONTINUED | OUTPATIENT
Start: 2024-06-21 | End: 2024-06-22 | Stop reason: HOSPADM

## 2024-06-21 RX ORDER — INSULIN GLARGINE 100 [IU]/ML
20 INJECTION, SOLUTION SUBCUTANEOUS EVERY MORNING
Status: DISCONTINUED | OUTPATIENT
Start: 2024-06-21 | End: 2024-06-22 | Stop reason: HOSPADM

## 2024-06-21 RX ORDER — INSULIN LISPRO 100 [IU]/ML
5 INJECTION, SOLUTION INTRAVENOUS; SUBCUTANEOUS
Status: DISCONTINUED | OUTPATIENT
Start: 2024-06-21 | End: 2024-06-21

## 2024-06-21 RX ORDER — ACETAMINOPHEN 325 MG/1
650 TABLET ORAL EVERY 6 HOURS PRN
Status: DISCONTINUED | OUTPATIENT
Start: 2024-06-21 | End: 2024-06-22 | Stop reason: HOSPADM

## 2024-06-21 RX ADMIN — INSULIN LISPRO 6 UNITS: 100 INJECTION, SOLUTION INTRAVENOUS; SUBCUTANEOUS at 17:29

## 2024-06-21 RX ADMIN — SENNOSIDES 8.6 MG: 8.6 TABLET, FILM COATED ORAL at 08:42

## 2024-06-21 RX ADMIN — INSULIN LISPRO 2 UNITS: 100 INJECTION, SOLUTION INTRAVENOUS; SUBCUTANEOUS at 07:59

## 2024-06-21 RX ADMIN — INSULIN LISPRO 2 UNITS: 100 INJECTION, SOLUTION INTRAVENOUS; SUBCUTANEOUS at 21:16

## 2024-06-21 RX ADMIN — GABAPENTIN 300 MG: 300 CAPSULE ORAL at 21:16

## 2024-06-21 RX ADMIN — CEFAZOLIN SODIUM 2000 MG: 2 SOLUTION INTRAVENOUS at 03:36

## 2024-06-21 RX ADMIN — DORZOLAMIDE HYDROCHLORIDE AND TIMOLOL MALEATE 1 DROP: 20; 5 SOLUTION/ DROPS OPHTHALMIC at 17:31

## 2024-06-21 RX ADMIN — ALLOPURINOL 300 MG: 300 TABLET ORAL at 08:41

## 2024-06-21 RX ADMIN — LEVOTHYROXINE SODIUM 150 MCG: 75 TABLET ORAL at 05:01

## 2024-06-21 RX ADMIN — DORZOLAMIDE HYDROCHLORIDE AND TIMOLOL MALEATE 1 DROP: 20; 5 SOLUTION/ DROPS OPHTHALMIC at 08:42

## 2024-06-21 RX ADMIN — OXYCODONE HYDROCHLORIDE AND ACETAMINOPHEN 500 MG: 500 TABLET ORAL at 08:42

## 2024-06-21 RX ADMIN — TAMSULOSIN HYDROCHLORIDE 0.4 MG: 0.4 CAPSULE ORAL at 17:31

## 2024-06-21 RX ADMIN — CEFAZOLIN SODIUM 2000 MG: 2 SOLUTION INTRAVENOUS at 11:59

## 2024-06-21 RX ADMIN — TORSEMIDE 10 MG: 10 TABLET ORAL at 08:42

## 2024-06-21 RX ADMIN — APIXABAN 2.5 MG: 2.5 TABLET, FILM COATED ORAL at 08:42

## 2024-06-21 RX ADMIN — INSULIN LISPRO 10 UNITS: 100 INJECTION, SOLUTION INTRAVENOUS; SUBCUTANEOUS at 17:29

## 2024-06-21 RX ADMIN — PANTOPRAZOLE SODIUM 20 MG: 20 TABLET, DELAYED RELEASE ORAL at 08:42

## 2024-06-21 RX ADMIN — FERROUS SULFATE TAB 325 MG (65 MG ELEMENTAL FE) 325 MG: 325 (65 FE) TAB at 08:41

## 2024-06-21 RX ADMIN — INSULIN GLARGINE 20 UNITS: 100 INJECTION, SOLUTION SUBCUTANEOUS at 08:42

## 2024-06-21 RX ADMIN — INSULIN LISPRO 6 UNITS: 100 INJECTION, SOLUTION INTRAVENOUS; SUBCUTANEOUS at 12:02

## 2024-06-21 RX ADMIN — Medication 1000 UNITS: at 08:42

## 2024-06-21 RX ADMIN — ACETAMINOPHEN 650 MG: 325 TABLET, FILM COATED ORAL at 00:39

## 2024-06-21 RX ADMIN — APIXABAN 2.5 MG: 2.5 TABLET, FILM COATED ORAL at 17:31

## 2024-06-21 NOTE — ASSESSMENT & PLAN NOTE
OK to start eliquis per orthopedics  AV giuseppe blocking agents due to bradycardia  Cardiology consulted - converted to NSR  OK to hold ASA  Outpt follow up

## 2024-06-21 NOTE — PROGRESS NOTES
"Carlos Enrique Roth Jr.  89 y.o.  male  MR#: [id number]  6/21/2024    Post-op days: 1  Extremity: left hip    Subjective: Patient seen and examined.  Lying comfortably in bed.  Awake and alert this morning.  No issues overnight.  Pain is well-controlled at this time.  He denies any chest pain, shortness of breath, fevers or chills.    Vitals:   Vitals:    06/21/24 0400   BP: 118/58   Pulse: (!) 52   Resp: 14   Temp: 97.5 °F (36.4 °C)   SpO2: 95%       Exam:   A&O x 3 NAD  Left hip:  Mepilex dressings c/d/I  Thigh and calf compartments are soft, compressible  Actively moving ankle and toes  DP 2+  Sensation intact to light touch\    X-rays:  Left hip: well aligned comminuted intertroch fracture with hardware intact    Labs:   WBC   Recent Labs     06/20/24  1209 06/21/24  0430   WBC 8.50 9.21     H/H   Recent Labs     06/20/24  1209 06/21/24  0430   HGB 12.0 10.8*   /  Recent Labs     06/20/24  1209 06/21/24  0430   HCT 38.3 34.9*     Sed Rate No results for input(s): \"SEDRATE\" in the last 72 hours.  CRP No results for input(s): \"CRP\" in the last 72 hours.    Assessment:   S/p left hip TFNA by Dr. Reece, POD1    Plan:   Weightbearing as tolerated to left lower extremity with assistance  PT/OT  DVT prophylaxis as per medical/CC team   Pain control as needed  ABLA: hbg 10.8. Continue to monitor vitals and H/H.   Encourage incentive spirometry  DC planning    "

## 2024-06-21 NOTE — UTILIZATION REVIEW
Initial Clinical Review    Admission: Date/Time/Statement:   Admission Orders (From admission, onward)       Ordered        06/20/24 1422  INPATIENT ADMISSION  Once                          Orders Placed This Encounter   Procedures    INPATIENT ADMISSION     Standing Status:   Standing     Number of Occurrences:   1     Order Specific Question:   Level of Care     Answer:   Med Surg [16]     Order Specific Question:   Estimated length of stay     Answer:   More than 2 Midnights     Order Specific Question:   Certification     Answer:   I certify that inpatient services are medically necessary for this patient for a duration of greater than two midnights. See H&P and MD Progress Notes for additional information about the patient's course of treatment.     ED Arrival Information       Expected   -    Arrival   6/20/2024 11:52    Acuity   Emergent              Means of arrival   Ambulance    Escorted by   [facility]    Service   Hospitalist    Admission type   Emergency              Arrival complaint   Fall             Chief Complaint   Patient presents with    Fall     Pt from home via ems - bumped into son, fell. L leg shortened and rotated       Initial Presentation: 89 y.o. male  to ED via EMS from home.    Admitted to inpatient with Dx:Closed intertrochanteric fracture of left femur/New onset A-fib/Diabetes/  Hypothyroidism .  Presented to ED with left hip pain starting after fall about 20 minutes prior to arrival on left side.  On asa.    PMHx: CKD 4, CHF, status post CABG x 4, status post prosthetic heart valve, hypercholesterolemia . On exam: bradycardia.  Left hip tenderness.  LLE externally rotated and shortened.   Bun 35.  Creatinine 2.18.  glucose 172.   Imaging shows intertrochanteric fracture of left hip.  Ecg atrial fib, bradycardic and no history of atrial fib.   ED treatment:  given fentanyl.    Plan includes to continue pain control.  Anticipate surgical intervention and will need PT/OT post  op and monitoring of hgb.  Start Heparin drip.  telemetry.  Continue home insulin and start SSI. Consult Cardiology and orthopedics     Per Cardiology - acute hip fracture/Paroxysmal atrial fib, new diagnosis/Severe aortic stenosis status post Bio AVR.  Plan is continue IV Heparin drip.  Telemetry.  No indication for rate or rhythm control.  Continue statin and restart asa. Avoid beta blocker.  Orthopedics for fracture    Per Orthopedics - left stable intertrochanteric fracture.  Plan is surgical intervention later today.   Hold anticoagulation.   Procedure 6/20/24 Left - INSERTION NAIL IM FEMUR ANTEGRADE (TROCHANTERIC)     Anticipated Length of Stay/Certification Statement:  Patient will be admitted on an inpatient basis with an anticipated length of stay of greater than 2 midnights secondary to hip fracture.     Date: 6/21/24    Day 2:  POD #1 status post left hip TFNA   pain is controlled. Has converted to sinus.   On exam  of left hip:  Mepilex dressings c/d/I.   Thigh and calf compartments are soft, compressible.  Actively moving ankle and toes.   DP 2+.   Sensation intact to light touch.  H&H 10.8/34.9  Plan:  Weightbearing as tolerated to left lower extremity with assistance.  PT/OT.  Pain control.  DVT ppx, to start Eliquis.  Asa on hold.  Incentive spirometry.  Trend H&H.   Home Lantus decreased from 27 to 20 , continue SSI.  Avoid AV giuseppe blocking agents due to bradycardia     ED Triage Vitals   Temperature Pulse Respirations Blood Pressure SpO2 Pain Score   06/20/24 1150 06/20/24 1150 06/20/24 1150 06/20/24 1150 06/20/24 1150 06/20/24 1558   (!) 97.2 °F (36.2 °C) (!) 53 20 161/70 99 % 3     Weight (last 2 days)       Date/Time Weight    06/21/24 0558 94.4 (208.11)    06/20/24 1744 92.5 (204)            Vital Signs (last 3 days)       Date/Time Temp Pulse Resp BP MAP (mmHg) SpO2 O2 Device Cardiac (WDL) Patient Position - Orthostatic VS [place] Coma Scale Score Pain    06/21/24 10:16:25 98.8 °F (37.1 °C)  62 16 107/61 76 97 % -- -- -- -- --    06/21/24 0800 -- -- -- -- -- -- -- -- -- 15 2    06/21/24 0753 98.4 °F (36.9 °C) 58 20 -- -- 94 % -- -- -- -- --    06/21/24 0730 -- 52 14 147/70 100 94 % None (Room air) -- -- -- --    06/21/24 0400 97.5 °F (36.4 °C) 52 14 118/58 84 95 % None (Room air) -- Lying -- --    06/21/24 0039 -- -- -- -- -- -- -- -- -- -- 3    06/21/24 0030 -- 59 15 -- -- 95 % -- -- -- -- --    06/21/24 0015 -- 58 15 141/64 92 96 % -- -- -- -- --    06/21/24 0000 98.1 °F (36.7 °C) 58 15 142/62 89 96 % None (Room air) -- Lying -- --    06/20/24 2300 -- 56 16 134/61 88 96 % -- -- -- -- --    06/20/24 2200 97.6 °F (36.4 °C) 58 7 138/61 88 94 % None (Room air) -- -- 15 --    06/20/24 2145 -- 66 25 128/68 88 95 % -- -- -- -- --    06/20/24 2130 98.7 °F (37.1 °C) 66 17 83/47 60 96 % None (Room air) X -- 14 No Pain    06/20/24 1744 98.3 °F (36.8 °C) 62 20 173/75 -- 99 % None (Room air) -- -- -- No Pain    06/20/24 1558 -- -- -- -- -- -- -- -- -- -- 3    06/20/24 15:34:23 -- 62 -- 121/64 83 96 % -- -- -- -- --    06/20/24 1400 -- 59 17 151/67 -- 95 % -- -- -- -- --    06/20/24 1305 -- 58 20 162/71 -- 95 % -- -- -- -- --    06/20/24 1300 -- 57 20 -- -- 94 % -- -- -- -- --    06/20/24 1200 -- 64 37 -- -- 97 % -- -- -- -- --    06/20/24 1155 -- 52 25 -- -- 99 % -- -- -- -- --    06/20/24 1150 97.2 °F (36.2 °C) 53 20 161/70 -- 99 % None (Room air) -- Lying 15 --              Pertinent Labs/Diagnostic Test Results:   Radiology:  XR hip/pelv 1 vw left if performed   Final Interpretation by Bradley Landon Kocher, MD (06/21 0826)      Postsurgical changes status post intramedullary nail placement with near-anatomic alignment of the left intertrochanteric fracture.      Moderate left hip osteoarthritis.            Resident: ALISIA Cobian I, the attending radiologist, have reviewed the images and agree with the final report above.      Workstation performed: [id number]         XR hip/pelv 1 vw left if performed    Final Interpretation by Bradley Landon Kocher, MD (06/21 0825)      Fluoroscopy provided for procedure guidance.      Please refer to the separate procedure note for additional details.                     Resident: ALISIA Cobian I, the attending radiologist, have reviewed the images and agree with the final report above.      Workstation performed: [id number]         XR femur 2 vw left   Final Interpretation by Bradley Landon Kocher, MD (06/21 7221)      Impacted left intertrochanteric fracture, better visualized on subsequent CT study.      Moderate left hip osteoarthritis.      Resident: ALISIA Cobian I, the attending radiologist, have reviewed the images and agree with the final report above.      Workstation performed: [id number]         TRAUMA - CT head wo contrast   Final Interpretation by Hernan Dee MD (06/20 1257)      No acute intracranial abnormality.            This was discussed with Dr. Almanzar at 12:51 p.m.      Workstation performed: [id number]         TRAUMA - CT spine cervical wo contrast   Final Interpretation by Hernan Dee MD (06/20 1255)      No cervical spine fracture or traumatic malalignment.         This was discussed with Dr. Almanzar at 12:51 p.m.         Workstation performed: [id number]         TRAUMA - CT abdomen pelvis w contrast   Final Interpretation by Hernan Dee MD (06/20 1253)      Comminuted intratrochanteric fracture of the left hip.      No evidence of solid organ trauma or ascites.      Gallstone.      Hyperdense lesion involves the right kidney and could represent a hyperdense cyst or nodule. Nonemergent follow-up ultrasound is advised.      Asymmetric density in the left prostate is only slightly more pronounced than 2017. Correlation with PSA is advised.      High density pleural thickening at the left lung base is probably related to old inflammation given pleural effusion in 2018.      This was discussed with Dr. Garcia's at 12:51 p.m.       Workstation performed: [id number]         XR Trauma pelvis ap only 1 or 2 vw   Final Interpretation by Hernan Dee MD (06/20 1258)      Intertrochanteric fracture left hip. This was discussed with Dr. Almanzar's at 12:51 p.m.      Workstation performed: [id number]         XR Trauma chest portable   Final Interpretation by Lucila Horan MD (06/20 2077)      No acute cardiopulmonary disease.      No acute displaced fracture.      Workstation performed: [id number]           Cardiology:  ECG 12 lead   Final Result by Jaqui Magdaleno MD (06/20 1673)   Atrial fibrillation with slow ventricular response   Left bundle branch block   Abnormal ECG   When compared with ECG of 24-NOV-2018 05:27,   Atrial fibrillation has replaced Sinus rhythm   Left bundle branch block has replaced Incomplete left bundle branch block   Nonspecific T wave abnormality no longer evident in Inferior leads   T wave inversion less evident in Lateral leads   Confirmed by Jaqui Magdaleno ([id number]) on 6/20/2024 11:33:05 PM          Results from last 7 days   Lab Units 06/21/24  0430 06/20/24  1209   WBC Thousand/uL 9.21 8.50   HEMOGLOBIN g/dL 10.8* 12.0   HEMATOCRIT % 34.9* 38.3   PLATELETS Thousands/uL 114* 129*   TOTAL NEUT ABS Thousands/µL 7.92* 5.54     Results from last 7 days   Lab Units 06/21/24  0430 06/20/24  1209   SODIUM mmol/L 139 138   POTASSIUM mmol/L 4.0 4.2   CHLORIDE mmol/L 108 107   CO2 mmol/L 21 26   ANION GAP mmol/L 10 5   BUN mg/dL 32* 35*   CREATININE mg/dL 1.95* 2.18*   EGFR ml/min/1.73sq m 29 25   CALCIUM mg/dL 8.7 9.1   MAGNESIUM mg/dL 2.2  --      Results from last 7 days   Lab Units 06/21/24  0430   AST U/L 18   ALT U/L 9   ALK PHOS U/L 50   TOTAL PROTEIN g/dL 5.6*   ALBUMIN g/dL 3.2*   TOTAL BILIRUBIN mg/dL 1.01*     Results from last 7 days   Lab Units 06/21/24  1120 06/21/24  0758 06/20/24  1739   POC GLUCOSE mg/dl 354* 206* 170*     Results from last 7 days   Lab Units 06/21/24  0430 06/20/24  8919    GLUCOSE RANDOM mg/dL 212* 172*     Results from last 7 days   Lab Units 06/20/24  1434   PROTIME seconds 14.5   INR  1.09   PTT seconds 26         ED Treatment-Medication Administration from 06/20/2024 1152 to 06/20/2024 1528         Date/Time Order Dose Route Action     06/20/2024 1207 fentaNYL injection 50 mcg 50 mcg Intravenous Given            Past Medical History:   Diagnosis Date    Aortic stenosis     CKD (chronic kidney disease)     baseline Cr 1.3-1.5    Coronary artery disease     Cough     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HCC)     type 2, insulin dependent    GERD (gastroesophageal reflux disease)     Glaucoma     Gout     History of prostate cancer     Hypertension     Hypothyroidism     Overweight     Peripheral neuropathy, idiopathic     Pleural effusion, left     Pure hypercholesterolemia     LA...11/12/14   R....11/12/14     Visual impairment     cataract left eye    Weight gain      Present on Admission:   Type 2 diabetes mellitus with diabetic neuropathy ([facility])   Secondary hyperparathyroidism (HCC)   Primary open angle glaucoma (POAG)   Postoperative hypothyroidism   CKD stage 4 due to type 2 diabetes mellitus ([facility])   Chronic diastolic congestive heart failure ([facility])   Benign prostatic hyperplasia without lower urinary tract symptoms      Admitting Diagnosis: New onset atrial fibrillation ([facility]) [I48.91]  New onset a-fib ([facility]) [I48.91]  Closed fracture of left hip, initial encounter ([facility]) [S72.002A]  Multiple medical problems [R69]  Age/Sex: 89 y.o. male  Admission Orders:  Scheduled Medications:  allopurinol, 300 mg, Oral, Daily  apixaban, 2.5 mg, Oral, BID  ascorbic acid, 500 mg, Oral, Daily  cholecalciferol, 1,000 Units, Oral, Daily  dorzolamide-timolol, 1 drop, Both Eyes, BID  ferrous sulfate, 325 mg, Oral, Daily  gabapentin, 300 mg, Oral, HS  insulin glargine, 20 Units, Subcutaneous, QAM  insulin lispro, 1-5 Units, Subcutaneous, HS  insulin lispro, 1-6 Units, Subcutaneous, TID AC  levothyroxine, 150 mcg,  Oral, Early Morning  pantoprazole, 20 mg, Oral, Daily  senna, 1 tablet, Oral, Daily  tamsulosin, 0.4 mg, Oral, Daily With Dinner  torsemide, 10 mg, Oral, Daily    ceFAZolin (ANCEF) IVPB (premix in dextrose) 2,000 mg 50 mL  Dose: 2,000 mg  Freq: Every 8 hours Route: IV  Last Dose: 2,000 mg (06/21/24 1159)  Start: 06/21/24 0400 End: 06/21/24 1229       Continuous IV Infusions:  heparin (porcine) 25,000 units in 0.45% NaCl 250 mL infusion (premix)  Rate: 2.7-18 mL/hr Dose: 3-20 Units/kg/hr  Weight Dosing Info: 90 kg (Order-Specific)  Freq: Titrated Route: IV  Last Dose: Stopped (06/20/24 1619)  Start: 06/20/24 1445 End: 06/20/24 1626     PRN Meds:  acetaminophen, 650 mg, Oral, Q6H PRN x 1 6/21  Artificial Tears, 1 drop, Both Eyes, Q6H PRN  LORazepam, 0.5 mg, Oral, Q8H PRN    Telemetry  PT/OT    IP CONSULT TO CARDIOLOGY  IP CONSULT TO ORTHOPEDIC SURGERY    Network Utilization Review Department  ATTENTION: Please call with any questions or concerns to [phone] and carefully listen to the prompts so that you are directed to the right person. All voicemails are confidential.   For Discharge needs, contact Care Management DC Support Team at [phone] opt. 2  Send all requests for admission clinical reviews, approved or denied determinations and any other requests to dedicated fax number below belonging to the campus where the patient is receiving treatment. List of dedicated fax numbers for the Facilities:  FACILITY NAME UR FAX NUMBER   ADMISSION DENIALS (Administrative/Medical Necessity) [phone]   DISCHARGE SUPPORT TEAM (NETWORK) [phone]   PARENT CHILD HEALTH (Maternity/NICU/Pediatrics) [phone]   [facility] [phone]   [facility] [phone]   [facility] [phone]   [facility] [phone]   [facility] [phone]   [facility]  [phone]   [facility] [phone]   [facility] [phone]   [facility] [phone]   [facility] [phone]   [facility] [phone]   [facility] [phone]

## 2024-06-21 NOTE — ASSESSMENT & PLAN NOTE
Lab Results   Component Value Date    HGBA1C 8.7 (H) 05/15/2024       Recent Labs     06/20/24  1739 06/21/24  0758   POCGLU 170* 206*       Blood Sugar Average: Last 72 hrs:  (P) 188  Maintained on 27 units basal coverage with 11 units meal coverage  Will resume lantus at 20 units daily, SSI + accuchek - uptitrate as needed

## 2024-06-21 NOTE — PROGRESS NOTES
Progress Note - Cardiology   Carlos Enrique Roth Jr. 89 y.o. male MRN: [id number]  Unit/Bed#: -01 Encounter: [id number]        Problem List:  Principal Problem:    Closed intertrochanteric fracture of left femur (HCC)  Active Problems:    Postoperative hypothyroidism    Primary open angle glaucoma (POAG)    Chronic diastolic congestive heart failure (HCC)    Type 2 diabetes mellitus with diabetic neuropathy (HCC)    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)    Benign prostatic hyperplasia without lower urinary tract symptoms    Secondary hyperparathyroidism (HCC)    New onset a-fib (HCC)      ASSESSMENT:  Acute hip fracture  New onset AF with controlled VR  6/20/2024 EKG: A-fib with controlled VR  6/21/2024 spontaneous conversion to NSR  CAD status post CABG x 4 in 2018  3/11/2024 echo: EF 65%, grade 2 diastolic dysfunction, well-seated bioprosthetic AVR with no evidence of regurgitation and gradients within expected range, mild MR, mild TR, mild SC  Severe AS status post bio AVR in 2018  Baseline bradycardia and history of junctional rhythm while on metoprolol     PLAN/ DISCUSSION:     Atrial fibrillation, paroxysmal  He converted spontaneously back to NSR  Continue Eliquis 2.5 twice daily (age > 80, creatinine > 1.5)  Can hold aspirin for now  Avoid AV giuseppe blocking agents due to bradycardia  CAD  This appears stable  Can hold aspirin for now since we are starting Eliquis  Continue statin  Avoid beta-blocker due to bradycardia  Severe AS with bio AVR  Stable on recent echocardiogram  Dental prophylaxis  Acute hip fracture  Status post surgical fixation 6/20/2024    Message sent to cardiology office for follow-up  We will sign off, please call if needed    Subjective:  Overall feeling well today  No chest pain  Breathing is stable  Converted to sinus rhythm on telemetry    Vitals:  Vitals:    06/20/24 1744 06/21/24 0558   Weight: 92.5 kg (204 lb) 94.4 kg (208 lb 1.8 oz)   ,  Vitals:    06/21/24 0400  06/21/24 0558 06/21/24 0730 06/21/24 0753   BP: 118/58  147/70    BP Location: Right arm      Pulse: (!) 52  (!) 52 58   Resp: 14  14 20   Temp: 97.5 °F (36.4 °C)   98.4 °F (36.9 °C)   TempSrc: Oral   Oral   SpO2: 95%  94% 94%   Weight:  94.4 kg (208 lb 1.8 oz)     Height:           Exam:  General: Alert awake and oriented, no acute distress  Heart:  Regular rate and rhythm, no murmurs, Normal S1, no edema    Respiratory effort/ Lungs:  Breathing comfortably on room air, clear bilaterally without wheezing, rales, crackles   Abdominal: Non-tender to palpation, + bowel sounds, soft, no masses or distension  Lower Limbs:  No edema            Telemetry:       Normal sinus rhythm, , Heart Rate frequent PACs    Medications:    Current Facility-Administered Medications:     acetaminophen (TYLENOL) tablet 650 mg, 650 mg, Oral, Q6H PRN, Ondina Sanders PA-C, 650 mg at 06/21/24 0039    allopurinol (ZYLOPRIM) tablet 300 mg, 300 mg, Oral, Daily, Ondina Sanders PA-C, 300 mg at 06/21/24 0841    apixaban (ELIQUIS) tablet 2.5 mg, 2.5 mg, Oral, BID, Zoey Gray PA-C, 2.5 mg at 06/21/24 0842    Artificial Tears ophthalmic solution 1 drop, 1 drop, Both Eyes, Q6H PRN, Daniela Barragan PA-C    ascorbic acid (VITAMIN C) tablet 500 mg, 500 mg, Oral, Daily, Ondina Sanders PA-C, 500 mg at 06/21/24 0842    ceFAZolin (ANCEF) IVPB (premix in dextrose) 2,000 mg 50 mL, 2,000 mg, Intravenous, Q8H, Ondina Sanders PA-C, Stopped at 06/21/24 0430    Cholecalciferol (VITAMIN D3) tablet 1,000 Units, 1,000 Units, Oral, Daily, Ondina Sanders PA-C, 1,000 Units at 06/21/24 0842    dorzolamide-timolol (COSOPT) 2-0.5 % ophthalmic solution 1 drop, 1 drop, Both Eyes, BID, Ondina Sanders PA-C, 1 drop at 06/21/24 0842    ferrous sulfate tablet 325 mg, 325 mg, Oral, Daily, Ondina Sanders PA-C, 325 mg at 06/21/24 0841    gabapentin (NEURONTIN) capsule 300 mg, 300 mg, Oral, HS, Ondina Sanders PA-C, 300 mg at 06/20/24  2242    insulin glargine (LANTUS) subcutaneous injection 20 Units 0.2 mL, 20 Units, Subcutaneous, QAM, Zoey Gray, CHON, 20 Units at 06/21/24 0842    insulin lispro (HumALOG/ADMELOG) 100 units/mL subcutaneous injection 1-5 Units, 1-5 Units, Subcutaneous, HS, Ondina Sanders PA-C    insulin lispro (HumALOG/ADMELOG) 100 units/mL subcutaneous injection 1-6 Units, 1-6 Units, Subcutaneous, TID AC, 2 Units at 06/21/24 0759 **AND** Fingerstick Glucose (POCT), , , TID AC, Ondina Sandesr PA-C    levothyroxine tablet 150 mcg, 150 mcg, Oral, Early Morning, Ondina Sanders PA-C, 150 mcg at 06/21/24 0501    LORazepam (ATIVAN) tablet 0.5 mg, 0.5 mg, Oral, Q8H PRN, Ondina Sanders PA-C    pantoprazole (PROTONIX) EC tablet 20 mg, 20 mg, Oral, Daily, Ondina Sanders PA-C, 20 mg at 06/21/24 0842    senna (SENOKOT) tablet 8.6 mg, 1 tablet, Oral, Daily, Ondina Sanders PA-C, 8.6 mg at 06/21/24 0842    tamsulosin (FLOMAX) capsule 0.4 mg, 0.4 mg, Oral, Daily With Dinner, Ondina Sanders PA-C    torsemide (DEMADEX) tablet 10 mg, 10 mg, Oral, Daily, Ondina Sanders PA-C, 10 mg at 06/21/24 0842      Labs/Data:        Results from last 7 days   Lab Units 06/21/24  0430 06/20/24  1209   WBC Thousand/uL 9.21 8.50   HEMOGLOBIN g/dL 10.8* 12.0   HEMATOCRIT % 34.9* 38.3   PLATELETS Thousands/uL 114* 129*     Results from last 7 days   Lab Units 06/21/24  0430 06/20/24  1209   POTASSIUM mmol/L 4.0 4.2   CHLORIDE mmol/L 108 107   CO2 mmol/L 21 26   BUN mg/dL 32* 35*   CREATININE mg/dL 1.95* 2.18*

## 2024-06-21 NOTE — OCCUPATIONAL THERAPY NOTE
Occupational Therapy Evaluation & Treatment     Patient Name: Carlos Enrique Roth Jr.  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)    Benign prostatic hyperplasia without lower urinary tract symptoms    Secondary hyperparathyroidism (HCC)    New onset a-fib (HCC)    Acute blood loss anemia    Past Medical History  Past Medical History:   Diagnosis Date    Aortic stenosis     CKD (chronic kidney disease)     baseline Cr 1.3-1.5    Coronary artery disease     Cough     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HCC)     type 2, insulin dependent    GERD (gastroesophageal reflux disease)     Glaucoma     Gout     History of prostate cancer     Hypertension     Hypothyroidism     Overweight     Peripheral neuropathy, idiopathic     Pleural effusion, left     Pure hypercholesterolemia     LA...11/12/14   R....11/12/14     Visual impairment     cataract left eye    Weight gain      Past Surgical History  Past Surgical History:   Procedure Laterality Date    CARDIAC CATHETERIZATION      EYE SURGERY      IR THORACENTESIS  10/23/2018    IR THORACENTESIS  11/2/2018    IR THORACENTESIS  11/9/2018    IR THORACENTESIS  11/23/2018    LA CORONARY ARTERY BYP W/VEIN & ARTERY GRAFT 3 VEIN N/A 9/17/2018    Procedure: CORONARY ARTERY BYPASS GRAFT (CABG) x 4 VESSELS with LIMA - LAD, SVG/LEFT LEG EVH - LEFT PDA, OM3, & OM2;  Surgeon: Remy Ash MD;  [facility];  Service: Cardiac Surgery    LA ECHO TRANSESOPHAG MONTR CARDIAC PUMP FUNCTJ N/A 9/17/2018    Procedure: TRANSESOPHAGEAL ECHOCARDIOGRAM (VERONICA);  Surgeon: Remy Ash MD;  [facility];  Service: Cardiac Surgery    LA OPTX FEM SHFT FX W/INSJ IMED IMPLT W/WO SCREW  "Left 6/20/2024    Procedure: INSERTION NAIL IM FEMUR ANTEGRADE (TROCHANTERIC);  Surgeon: Sukh Reece DO;  [facility];  Service: Orthopedics    MA RPLCMT AORTIC VALVE OPN W/STENTLESS TISSUE VALVE N/A 9/17/2018    Procedure: REPLACEMENT VALVE AORTIC (AVR)- 23mm tissue Intuity Valve;  Surgeon: Remy Ash MD;  [facility];  Service: Cardiac Surgery    THORACOSCOPY VIDEO ASSISTED SURGERY (VATS) Left 11/27/2018    Procedure: THORACOSCOPY VIDEO ASSISTED SURGERY (VATS), talc pleurodesis,;  Surgeon: Ciara Green MD;  [facility];  Service: Thoracic    THYROID SURGERY             06/21/24 1304   OT Last Visit   OT Visit Date 06/21/24   Note Type   Note type Evaluation   Pain Assessment   Pain Assessment Tool 0-10   Pain Score 2   Pain Location/Orientation Orientation: Left;Location: Hip;Location: Leg   Hospital Pain Intervention(s) Ambulation/increased activity;Repositioned;Emotional support   Restrictions/Precautions   Weight Bearing Precautions Per Order Yes   LLE Weight Bearing Per Order WBAT   Other Precautions Chair Alarm;Bed Alarm;WBS;Fall Risk;Multiple lines   Home Living   Type of Home House   Home Layout One level;Stairs to enter with rails  (3STE)   Home Equipment Walker;Cane   Additional Comments Pt reports using no DME at home   Prior Function   Level of [place] Independent with ADLs;Independent with functional mobility   Lives With Son  (works during the day)   IADLs Independent with driving   Falls in the last 6 months 1 to 4  (1)   Vocational Retired   General   Family/Caregiver Present No   Subjective   Subjective \"I tried to go but I couldn't\"   ADL   Eating Assistance 5  Supervision/Setup   Grooming Assistance 4  Minimal Assistance   UB Bathing Assistance 4  Minimal Assistance   LB Bathing Assistance 3  Moderate Assistance   UB Dressing Assistance 4  Minimal Assistance   LB Dressing Assistance 3  Moderate Assistance   LB Dressing Deficit Don/doff R " sock;Don/doff L sock;Thread RLE into underwear;Thread LLE into underwear;Pull up over hips   Toileting Assistance  4  Minimal Assistance   Toileting Deficit Bedside commode;Supervison/safety;Verbal cueing;Perineal hygiene   Bed Mobility   Additional Comments RYNE - pt received OOB to commode   Transfers   Sit to Stand 2  Maximal assistance   Additional items Assist x 1;Armrests;Verbal cues   Stand to Sit 2  Maximal assistance   Additional items Assist x 1;Armrests;Verbal cues   Toilet transfer 2  Maximal assistance   Additional items Assist x 1;Commode;Verbal cues;Armrests   Functional Mobility   Functional Mobility 3  Moderate assistance   Additional Comments x1 - 3 ft   Additional items Rolling walker   Balance   Static Sitting Fair +   Dynamic Sitting Fair   Static Standing Poor +   Dynamic Standing Poor +   Ambulatory Poor -   Activity Tolerance   Activity Tolerance Patient limited by fatigue   Medical Staff Made Aware PT Gertrudis   Nurse Made Aware NICOLAS URIOSTEGUI Assessment   RUE Assessment WFL   LUE Assessment   LUE Assessment WFL   Vision-Basic Assessment   Visual History Glaucoma;Cataracts   Cognition   Overall Cognitive Status WFL   Arousal/Participation Alert;Cooperative   Attention Within functional limits   Orientation Level Oriented X4   Memory Decreased short term memory;Decreased recall of recent events   Following Commands Follows multistep commands with increased time or repetition   Assessment   Limitation Decreased ADL status;Decreased UE strength;Decreased endurance;Decreased self-care trans;Decreased high-level ADLs   Prognosis Fair   Assessment Pt is a 89 y.o. male seen for OT evaluation at [facility], admitted 6/20/2024 w/ Closed intertrochanteric fracture of left femur (HCC) & POD#1 L hip TFNA. OT completed extensive review of pt's medical and social history. Comorbidities affecting pt's functional performance at time of assessment include: h/o postoperative hypothyroidism, primary  open angle glaucoma, type II DM, CHF, CKD, new onset A-fib, acute blood loss anemia, h/o CABG, h/o aortic valve replacement & cataracts. Personal factors affecting pt at time of IE include: limited home support, difficulty performing ADLS, difficulty performing IADLS , health management , and environment. Prior to admission, pt was living w/ his son in a 1SH w/ 2STE.  Pt was IND w/  ADLS and IND w/ IADLS, (+) drove, & required use of no DME/AD PTA. Upon evaluation: Pt requires Max Ax1 for functional transfers, Mod Ax1 w/ RW for functional mobility, Min A for UB ADLs and Mod A for LB ADLS 2* the following deficits impacting occupational performance: weakness, decreased strength, decreased balance, decreased tolerance, increased pain, and orthopedic restrictions. Full objective findings from OT assessment regarding body systems outlined above. Pt to benefit from continued skilled OT tx while in the hospital to address deficits as defined above and maximize level of functional independence w/ ADL's and functional mobility. Occupational Performance areas to address include: grooming, bathing/shower, toilet hygiene, dressing, socialization, health maintenance, functional mobility, and clothing management. Based on findings, pt is of high complexity. The patient's raw score on the AM-PAC Daily Activity inpatient short form is 17, standardized score is 37.26, less than 39.4. Patients at this level are likely to benefit from DC to post-acute rehabilitation services. However, please refer to therapist recommendation for discharge planning given other factors that may influence destination. At this time, OT recommendations at time of discharge are DC with Level II - Moderate Rehab Resource Intensity resources.   Goals   Patient Goals None stated   Plan   Treatment Interventions ADL retraining;Functional transfer training;UE strengthening/ROM;Endurance training;Patient/family training;Compensatory technique education;Continued  evaluation;Energy conservation   Goal Expiration Date 07/01/24   OT Treatment Day 0   OT Frequency 3-5x/wk   Discharge Recommendation   Rehab Resource Intensity Level, OT II (Moderate Resource Intensity)   AM-PAC Daily Activity Inpatient   Lower Body Dressing 2   Bathing 2   Toileting 3   Upper Body Dressing 3   Grooming 3   Eating 4   Daily Activity Raw Score 17   Daily Activity Standardized Score (Calc for Raw Score >=11) 37.26   AM-PAC Applied Cognition Inpatient   Following a Speech/Presentation 3   Understanding Ordinary Conversation 4   Taking Medications 2   Remembering Where Things Are Placed or Put Away 3   Remembering List of 4-5 Errands 3   Taking Care of Complicated Tasks 3   Applied Cognition Raw Score 18   Applied Cognition Standardized Score 38.07   Additional Treatment Session   Start Time 1256   End Time 1304   Treatment Assessment S: Pt seated in recliner and was agreeable to further mobility w/ RW.  O: Pt completed sit>stand trxfer w/ Max Ax1. Pt required Vcs for hand placement. Pt completed  functional mobility w/ Mod Ax1 w/ RW for. Pt able to mobilize 8 ft forward w/ a chair follow and Mod Ax1 w/ Sx1. Pt completed stand>sit trxfer w/ Mod Ax1. Patient left in recliner, chair alarm set, all needs within reach & RN aware.   A: Pt required Mod Ax1 overall for functional mobility, however was limited by fatigue. Pt required Max Ax1 overall for sit<>stand trxfers.  P: Pt to benefit from further OT sessions at this time 3-5x/week. Continue plan of care to address functional trxfers and mobility training and ADL retraining.    End of Consult   Education Provided Yes   Patient Position at End of Consult Bedside chair;Bed/Chair alarm activated;All needs within reach     Pt will achieve the following goals within 10 days.    *Pt will complete grooming with S.    *Pt will complete UB bathing and dressing with S.    *Pt will complete LB bathing and dressing with Min A & DME PRN.    *Pt will complete  toileting w/ S w/ G hygiene/thoroughness using DME PRN    *Pt will perform functional transfers with on/off all surfaces with Min A using DME as needed w/ G balance/safety.    *Pt will demonstrate increased activity tolerance >20 min in order to complete ADL routine.    *Pt will improve functional mobility during ADL/IADL/leisure tasks to Min A using DME as needed w/ G balance/safety.     *Pt will improve standing balance to G for 8-10 minutes during purposeful activity w/ Min A & G endurance.     *Pt will improve functional activity tolerance to 10 minutes of sustained functional tasks to increase participation in basic self-care and decrease assistance level.     Angelica Roche, OTS

## 2024-06-21 NOTE — CASE MANAGEMENT
[facility] received request for authorization from Care Manager.  Authorization request submitted for: [facility]  Facility Name: [facility]   NPI:[id number]  Facility MD:  Dr. Rincon    NPI:[id number]   Authorization initiated by contacting insurance:  [employer]  Via: [employer]   Clinicals submitted via i.Meter attachment   Pending Reference #:[id number]     Care Manager notified: Cali Bocanegra     Updates to authorization status will be noted in chart. Please reach out to CM for updates on any clinical information.

## 2024-06-21 NOTE — PROGRESS NOTES
[facility]  Progress Note  Name: Carlos Enrique Burkett I  MRN: [id number]  Unit/Bed#: [facility]-01 I Date of Admission: 6/20/2024   Date of Service: 6/21/2024 I Hospital Day: 1    Assessment & Plan   * Closed intertrochanteric fracture of left femur (HCC)  Assessment & Plan  Following mechanical fall, patient collided with his son in the kitchen and subsequently fell   POD 1 left hip TFNA  PT/OT postoperatively  Continue pain control  Monitor hemoglobin - some decrease to 10.8 no evidence of active bleeding  Recommend monitoring additional 24 hrs as starting eliquis for afib/DVT ppx.  OK to start per orthopedics    Acute blood loss anemia  Assessment & Plan  Hemoglobin 12.0 on presentation  Decreased to 10.8 this AM  In setting of hip surgery.  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)  Assessment & Plan  Baseline creatinine 2.0-2.6  Creatinine within baseline  Trend BMP    Type 2 diabetes mellitus with diabetic neuropathy ([facility])  Assessment & Plan  Lab Results   Component Value Date    HGBA1C 8.7 (H) 05/15/2024       Recent Labs     06/20/24  1739 06/21/24  0758   POCGLU 170* 206*         Blood Sugar Average: Last 72 hrs:  (P) 188  Maintained on 27 units basal coverage with 11 units meal coverage  Will resume lantus at 20 units daily, SSI + accuchek - uptitrate as needed    Chronic diastolic congestive heart failure (HCC)  Assessment & Plan  Wt Readings from Last 3 Encounters:   06/21/24 94.4 kg (208 lb 1.8 oz)   05/31/24 92.5 kg (204 lb)   05/29/24 91.2 kg (201 lb)     Appears euvolemic  Continue torsemide          Primary open angle glaucoma (POAG)  Assessment & Plan  Continue dorzolamide timolol drops twice daily    Postoperative  hypothyroidism  Assessment & Plan  Continue levothyroxine 150 mcg daily         VTE Pharmacologic Prophylaxis: VTE Score: 9 High Risk (Score >/= 5) - Pharmacological DVT Prophylaxis Ordered: apixaban (Eliquis). Sequential Compression Devices Ordered.    Mobility:   Basic Mobility Inpatient Raw Score: 12  JH-HLM Goal: 4: Move to chair/commode  JH-HLM Achieved: 2: Bed activities/Dependent transfer  JH-HLM Goal NOT achieved. Continue with multidisciplinary rounding and encourage appropriate mobility to improve upon JH-HLM goals.    Patient Centered Rounds: I performed bedside rounds with nursing staff today.   Discussions with Specialists or Other Care Team Provider: CM, ortho AP, appreciate cardiology input    Education and Discussions with Family / Patient: Attempted to update  (son) via phone. Left voicemail.     Total Time Spent on Date of Encounter in care of patient: 45 mins. This time was spent on one or more of the following: performing physical exam; counseling and coordination of care; obtaining or reviewing history; documenting in the medical record; reviewing/ordering tests, medications or procedures; communicating with other healthcare professionals and discussing with patient's family/caregivers.    Current Length of Stay: 1 day(s)  Current Patient Status: Inpatient   Certification Statement: The patient will continue to require additional inpatient hospital stay due to monitoring hemoglobin, PT/OT eval  Discharge Plan: Anticipate discharge tomorrow to discharge location to be determined pending rehab evaluations.    Code Status: Level 1 - Full Code    Subjective:   Patient overall feels well today.  Hip pain well-controlled.  No events overnight.    Objective:     Vitals:   Temp (24hrs), Av °F (36.7 °C), Min:97.2 °F (36.2 °C), Max:98.7 °F (37.1 °C)    Temp:  [97.2 °F (36.2 °C)-98.7 °F (37.1 °C)] 98.4 °F (36.9 °C)  HR:  [48-91] 58  Resp:  [7-37] 20  BP: ()/(47-99) 147/70  SpO2:   [90 %-99 %] 94 %  Body mass index is 32.6 kg/m².     Input and Output Summary (last 24 hours):     Intake/Output Summary (Last 24 hours) at 6/21/2024 1015  Last data filed at 6/21/2024 0901  Gross per 24 hour   Intake 960 ml   Output 500 ml   Net 460 ml       Physical Exam:   Physical Exam  Vitals and nursing note reviewed.   Constitutional:       Appearance: He is well-developed.      Comments: No acute distress   HENT:      Head: Normocephalic.   Eyes:      General: No scleral icterus.     Extraocular Movements: Extraocular movements intact.      Conjunctiva/sclera: Conjunctivae normal.   Cardiovascular:      Rate and Rhythm: Normal rate and regular rhythm.      Heart sounds: Murmur heard.   Pulmonary:      Effort: Pulmonary effort is normal. No respiratory distress.      Breath sounds: Normal breath sounds. No wheezing, rhonchi or rales.   Abdominal:      General: Bowel sounds are normal.      Palpations: Abdomen is soft.      Tenderness: There is no abdominal tenderness. There is no guarding or rebound.   Musculoskeletal:      Cervical back: Normal range of motion.      Comments: Able to move upper/lower extremities bilaterally, no edema   Skin:     General: Skin is warm and dry.   Neurological:      Mental Status: He is alert and oriented to person, place, and time.   Psychiatric:         Mood and Affect: Mood normal.         Speech: Speech normal.         Behavior: Behavior normal.          Additional Data:     Labs:  Results from last 7 days   Lab Units 06/21/24  0430   WBC Thousand/uL 9.21   HEMOGLOBIN g/dL 10.8*   HEMATOCRIT % 34.9*   PLATELETS Thousands/uL 114*   SEGS PCT % 86*   LYMPHO PCT % 9*   MONO PCT % 4   EOS PCT % 0     Results from last 7 days   Lab Units 06/21/24  0430   SODIUM mmol/L 139   POTASSIUM mmol/L 4.0   CHLORIDE mmol/L 108   CO2 mmol/L 21   BUN mg/dL 32*   CREATININE mg/dL 1.95*   ANION GAP mmol/L 10   CALCIUM mg/dL 8.7   ALBUMIN g/dL 3.2*   TOTAL BILIRUBIN mg/dL 1.01*   ALK PHOS U/L 50    ALT U/L 9   AST U/L 18   GLUCOSE RANDOM mg/dL 212*     Results from last 7 days   Lab Units 06/20/24  1434   INR  1.09     Results from last 7 days   Lab Units 06/21/24  0758 06/20/24  1739   POC GLUCOSE mg/dl 206* 170*               Lines/Drains:  Invasive Devices       Peripheral Intravenous Line  Duration             Peripheral IV 06/20/24 Distal;Left;Upper;Ventral (anterior) Arm <1 day    Peripheral IV 06/20/24 Dorsal (posterior);Right Hand <1 day                      Telemetry:  Telemetry Orders (From admission, onward)               24 Hour Telemetry Monitoring  Continuous x 24 Hours (Telem)        Question:  Reason for 24 Hour Telemetry  Answer:  Arrhythmias requiring acute medical intervention / PPM or ICD malfunction                     Telemetry Reviewed: Sinus Bradycardia  Indication for Continued Telemetry Use: Arrthymias requiring medical therapy             Imaging: Reviewed radiology reports from this admission including: xray(s)    Recent Cultures (last 7 days):         Last 24 Hours Medication List:   Current Facility-Administered Medications   Medication Dose Route Frequency Provider Last Rate    acetaminophen  650 mg Oral Q6H PRN Zoey Gray PA-C      allopurinol  300 mg Oral Daily Zoey Gray PA-C      apixaban  2.5 mg Oral BID Zoey Gray PA-C      Artificial Tears  1 drop Both Eyes Q6H PRN Zoey Gray PA-C      ascorbic acid  500 mg Oral Daily Zoey Gray PA-C      cefazolin  2,000 mg Intravenous Q8H Zoey Gray PA-C Stopped (06/21/24 0430)    cholecalciferol  1,000 Units Oral Daily Zoey Gray PA-C      dorzolamide-timolol  1 drop Both Eyes BID Zoey Gray PA-C      ferrous sulfate  325 mg Oral Daily Zoey Gray PA-C      gabapentin  300 mg Oral HS Zoey Gray PA-C      insulin glargine  20 Units Subcutaneous QAM Zoey Gray PA-C      insulin lispro  1-5 Units Subcutaneous HS Zoey Gray PA-C      insulin  lispro  1-6 Units Subcutaneous TID AC Zoey Gray PA-C      levothyroxine  150 mcg Oral Early Morning Zoey Gray PA-C      LORazepam  0.5 mg Oral Q8H PRN Zoey Gray PA-C      pantoprazole  20 mg Oral Daily Zoey Gray PA-C      senna  1 tablet Oral Daily Zoey Gray PA-C      tamsulosin  0.4 mg Oral Daily With Dinner Zoey Gray PA-C      torsemide  10 mg Oral Daily Zoey Gray PA-C          Today, Patient Was Seen By: Zoey Gray PA-C    **Please Note: This note may have been constructed using a voice recognition system.**

## 2024-06-21 NOTE — TELEPHONE ENCOUNTER
As a final attempt, a third outreach has been made via telephone call to facility. Please see Contacts section for details. This encounter will be closed and completed by end of day. Should we receive the requested information because of previous outreach attempts, the requested patient's chart will be updated appropriately.     Thank you  José Paige

## 2024-06-21 NOTE — ANESTHESIA PROCEDURE NOTES
Spinal Block    Patient location during procedure: OR  Start time: 6/20/2024 8:27 PM  Reason for block: primary anesthetic  Staffing  Performed by: Rocco Denny DO  Authorized by: Rocco Denny DO    Preanesthetic Checklist  Completed: patient identified, IV checked, site marked, risks and benefits discussed, surgical consent, monitors and equipment checked, pre-op evaluation and timeout performed  Spinal Block  Patient position: left lateral decubitus  Prep: ChloraPrep  Patient monitoring: heart rate, cardiac monitor, continuous pulse ox and frequent blood pressure checks  Approach: left paramedian  Location: L3-4  Needle  Needle type: Pencil Point   Needle gauge: 24 G  Needle length: 4 in  Assessment  Sensory level: T10  Injection Assessment:  negative aspiration for heme, no paresthesia on injection and positive aspiration for clear CSF.  Post-procedure:  site cleaned

## 2024-06-21 NOTE — PROGRESS NOTES
Patient:    MRN:  [id number]    Brooke Request ID:  [id number]    Level of care reserved:  Skilled Nursing Facility    Partner Reserved:  [facility], [place] [address] [phone]    Clinical needs requested:    Geography searched:  20 miles around [id number]    Start of Service:    Request sent:  11:51am EDT on 6/21/2024 by Maria Eugenia Green    Partner reserved:  3:42pm EDT on 6/21/2024 by Cali Bocanegra    Choice list shared:

## 2024-06-21 NOTE — ASSESSMENT & PLAN NOTE
Following mechanical fall, patient collided with his son in the kitchen and subsequently fell   POD 2 left hip TFNA  PT/OT postoperatively - recommending rehab  Continue pain control  Monitor hemoglobin - initial decrease in hgb but now stable  Stable for discharge to rehab  Outpt follow up with orthopedics

## 2024-06-21 NOTE — PLAN OF CARE
Problem: OCCUPATIONAL THERAPY ADULT  Goal: Performs self-care activities at highest level of function for planned discharge setting.  See evaluation for individualized goals.  Description: Treatment Interventions: ADL retraining, Functional transfer training, UE strengthening/ROM, Endurance training, Patient/family training, Compensatory technique education, Continued evaluation, Energy conservation          See flowsheet documentation for full assessment, interventions and recommendations.   6/21/2024 1554 by Angelica Roche  Note: Limitation: Decreased ADL status, Decreased UE strength, Decreased endurance, Decreased self-care trans, Decreased high-level ADLs  Prognosis: Fair  Assessment: Pt is a 89 y.o. male seen for OT evaluation at [facility], admitted 6/20/2024 w/ Closed intertrochanteric fracture of left femur (HCC). OT completed extensive review of pt's medical and social history. Comorbidities affecting pt's functional performance at time of assessment include: h/o postoperative hypothyroidism, primary open angle glaucoma, type II DM, CHF, CKD, new onset A-fib, acute blood loss anemia, h/o CABG, h/o aortic valve replacement & cataracts. Personal factors affecting pt at time of IE include: limited home support, difficulty performing ADLS, difficulty performing IADLS , health management , and environment. Prior to admission, pt was living w/ his son in a 1SH w/ 2STE.  Pt was IND w/  ADLS and IND w/ IADLS, (+) drove, & required use of no DME/AD PTA. Upon evaluation: Pt requires Max Ax1 for functional transfers, Mod Ax1 w/ RW for functional mobility, Min A for UB ADLs and Mod A for LB ADLS 2* the following deficits impacting occupational performance: weakness, decreased strength, decreased balance, decreased tolerance, increased pain, and orthopedic restrictions. Full objective findings from OT assessment regarding body systems outlined above. Pt to benefit from continued skilled OT tx while in the  hospital to address deficits as defined above and maximize level of functional independence w/ ADL's and functional mobility. Occupational Performance areas to address include: grooming, bathing/shower, toilet hygiene, dressing, socialization, health maintenance, functional mobility, and clothing management. Based on findings, pt is of high complexity. The patient's raw score on the AM-PAC Daily Activity inpatient short form is 17, standardized score is 37.26, less than 39.4. Patients at this level are likely to benefit from DC to post-acute rehabilitation services. However, please refer to therapist recommendation for discharge planning given other factors that may influence destination. At this time, OT recommendations at time of discharge are DC with Level II - Moderate Rehab Resource Intensity resources.     Rehab Resource Intensity Level, OT: II (Moderate Resource Intensity)

## 2024-06-21 NOTE — CASE MANAGEMENT
Case Management Discharge Planning Note    Patient name Carlos Enrique Roth Jr.  Location /-01 MRN [id number]  : 1935 Date 2024       Current Admission Date: 2024  Current Admission Diagnosis:Closed intertrochanteric fracture of left femur (HCC)   Patient Active Problem List    Diagnosis Date Noted Date Diagnosed    Acute blood loss anemia 2024     New onset a-fib ([facility]) 2024     Closed intertrochanteric fracture of left femur ([facility]) 2024     Persistent proteinuria 2023     Microalbuminuria due to type 2 diabetes mellitus  ([facility]) 10/09/2023     Secondary hyperparathyroidism ([facility]) 2023     Skin tear of left elbow without complication 2023     CKD stage 4 due to type 2 diabetes mellitus ([facility]) 2022     Type 2 diabetes mellitus with diabetic neuropathy ([facility]) 10/27/2021     Chronic diastolic congestive heart failure ([facility]) 2020     Primary open angle glaucoma (POAG) 2019     Age-related cataract of both eyes 2019     Atherosclerosis of aorta ([facility]) 2019     Ambulatory dysfunction 2018     Anemia due to stage 3 chronic kidney disease  ([facility]) 2018     CAD (coronary artery disease) 2018     Aortic stenosis 2018     Benign prostatic hyperplasia without lower urinary tract symptoms 2018     Long term (current) use of insulin ([facility]) 2018     Presence of aortocoronary bypass graft 2018     Presence of prosthetic heart valve 2018     Unspecified glaucoma 2018     Nonrheumatic aortic (valve) stenosis 2018     Atherosclerotic heart disease of native coronary artery without angina pectoris 2018     S/P CABG x 4 2018     S/P AVR (aortic valve replacement) 2018     GERD (gastroesophageal reflux disease)      Sexual dysfunction 10/09/2017     Renal cyst 2017     Gout with tophus 2016     Pure hypercholesterolemia 2014     Hypertensive heart disease with HF  (heart failure) ([facility]) 11/05/2014     Postoperative hypothyroidism 11/05/2014     Obesity, morbid ([facility]) 11/05/2014       LOS (days): 1  Geometric Mean LOS (GMLOS) (days): 4.5  Days to GMLOS:3.5     OBJECTIVE:  Risk of Unplanned Readmission Score: 16.88         Current admission status: Inpatient   Preferred Pharmacy:   [facility] - [facility] [facility] - [address]  [address]  [place] [id number]  Phone: [phone] Fax: [phone]    [facility] [place] - [address]  [address]  [place] [id number]  Phone: [phone] Fax: [phone]    Primary Care Provider: Juliette Cid DO    [facility]  Secondary Insurance:     DISCHARGE DETAILS:                                          Other Referral/Resources/Interventions Provided:  Interventions: Short Term Rehab, SNF  Referral Comments: [facility]. [facility] pt [facility]         Treatment Team Recommendation: Short Term Rehab, SNF  Discharge Destination Plan:: Short Term Rehab, SNF                                         Additional Comments: Informed pt that [facility]. Additional referrals made to [facility]. Pt stated that he tried calling son to inform son of progress, but wasn't able to speak with him.

## 2024-06-21 NOTE — CASE MANAGEMENT
Case Management Discharge Planning Note    Patient name Carlos Enrique Roth Jr.  Location /-01 MRN [id number]  : 1935 Date 2024       Current Admission Date: 2024  Current Admission Diagnosis:Closed intertrochanteric fracture of left femur (HCC)   Patient Active Problem List    Diagnosis Date Noted Date Diagnosed    Acute blood loss anemia 2024     New onset a-fib ([facility]) 2024     Closed intertrochanteric fracture of left femur ([facility]) 2024     Persistent proteinuria 2023     Microalbuminuria due to type 2 diabetes mellitus  ([facility]) 10/09/2023     Secondary hyperparathyroidism ([facility]) 2023     Skin tear of left elbow without complication 2023     CKD stage 4 due to type 2 diabetes mellitus ([facility]) 2022     Type 2 diabetes mellitus with diabetic neuropathy ([facility]) 10/27/2021     Chronic diastolic congestive heart failure ([facility]) 2020     Primary open angle glaucoma (POAG) 2019     Age-related cataract of both eyes 2019     Atherosclerosis of aorta ([facility]) 2019     Ambulatory dysfunction 2018     Anemia due to stage 3 chronic kidney disease  ([facility]) 2018     CAD (coronary artery disease) 2018     Aortic stenosis 2018     Benign prostatic hyperplasia without lower urinary tract symptoms 2018     Long term (current) use of insulin ([facility]) 2018     Presence of aortocoronary bypass graft 2018     Presence of prosthetic heart valve 2018     Unspecified glaucoma 2018     Nonrheumatic aortic (valve) stenosis 2018     Atherosclerotic heart disease of native coronary artery without angina pectoris 2018     S/P CABG x 4 2018     S/P AVR (aortic valve replacement) 2018     GERD (gastroesophageal reflux disease)      Sexual dysfunction 10/09/2017     Renal cyst 2017     Gout with tophus 2016     Pure hypercholesterolemia 2014     Hypertensive heart disease with HF  (heart failure) ([facility]) 11/05/2014     Postoperative hypothyroidism 11/05/2014     Obesity, morbid ([facility]) 11/05/2014       LOS (days): 1  Geometric Mean LOS (GMLOS) (days): 4.5  Days to GMLOS:3.4     OBJECTIVE:  Risk of Unplanned Readmission Score: 16.57         Current admission status: Inpatient   Preferred Pharmacy:   [facility] - [place] - [address]  [address]  [place] [id number]  Phone: [phone] Fax: [phone]    [facility]  [place] - [address]  [address]  [place] [id number]  Phone: [phone] Fax: [phone]    Primary Care Provider: Juliette Cid DO    [facility]  Secondary Insurance:     DISCHARGE DETAILS:    Discharge planning discussed with:: Pt and pt's son  Freedom of Choice: Yes     CM contacted family/caregiver?: Yes  Were Treatment Team discharge recommendations reviewed with patient/caregiver?: Yes  Did patient/caregiver verbalize understanding of patient care needs?: Yes  Were patient/caregiver advised of the risks associated with not following Treatment Team discharge recommendations?: Yes    Contacts  Patient Contacts: Ciro Roth  Relationship to Patient:: Family  Contact Method: Phone  Phone Number: [phone]  Reason/Outcome: Referral, Discharge Planning              Other Referral/Resources/Interventions Provided:  Interventions: SNF, Short Term Rehab  Referral Comments: Pt agreed to [facility]. Reserved on [facility]. Insurance auth pending. Dr. Musa is facility doctor, NPI:[id number]         Treatment Team Recommendation: Short Term Rehab, SNF  Discharge Destination Plan:: SNF, Short Term Rehab                                         Additional Comments: Pt agreed to [facility]. Reserved via [employer]. Pt has not been able to contact son. CM called three times today and left VMs.    Accepting Facility Name, City & State : [facility]  Receiving Facility/Agency Phone  Number: [phone]  Facility/Agency Fax Number: [phone]

## 2024-06-21 NOTE — PLAN OF CARE
Problem: PAIN - ADULT  Goal: Verbalizes/displays adequate comfort level or baseline comfort level  Description: Interventions:  - Encourage patient to monitor pain and request assistance  - Assess pain using appropriate pain scale  - Administer analgesics based on type and severity of pain and evaluate response  - Implement non-pharmacological measures as appropriate and evaluate response  - Consider cultural and social influences on pain and pain management  - Notify physician/advanced practitioner if interventions unsuccessful or patient reports new pain  Outcome: Progressing     Problem: INFECTION - ADULT  Goal: Absence or prevention of progression during hospitalization  Description: INTERVENTIONS:  - Assess and monitor for signs and symptoms of infection  - Monitor lab/diagnostic results  - Monitor all insertion sites, i.e. indwelling lines, tubes, and drains  - Monitor endotracheal if appropriate and nasal secretions for changes in amount and color  - [place] appropriate cooling/warming therapies per order  - Administer medications as ordered  - Instruct and encourage patient and family to use good hand hygiene technique  - Identify and instruct in appropriate isolation precautions for identified infection/condition  Outcome: Progressing  Goal: Absence of fever/infection during neutropenic period  Description: INTERVENTIONS:  - Monitor WBC    Outcome: Progressing     Problem: SAFETY ADULT  Goal: Patient will remain free of falls  Description: INTERVENTIONS:  - Educate patient/family on patient safety including physical limitations  - Instruct patient to call for assistance with activity   - Consult OT/PT to assist with strengthening/mobility   - Keep Call bell within reach  - Keep bed low and locked with side rails adjusted as appropriate  - Keep care items and personal belongings within reach  - Initiate and maintain comfort rounds  - Make Fall Risk Sign visible to staff  - Apply yellow socks and bracelet  for high fall risk patients  - Consider moving patient to room near nurses station  Outcome: Progressing  Goal: Maintain or return to baseline ADL function  Description: INTERVENTIONS:  -  Assess patient's ability to carry out ADLs; assess patient's baseline for ADL function and identify physical deficits which impact ability to perform ADLs (bathing, care of mouth/teeth, toileting, grooming, dressing, etc.)  - Assess/evaluate cause of self-care deficits   - Assess range of motion  - Assess patient's mobility; develop plan if impaired  - Assess patient's need for assistive devices and provide as appropriate  - Encourage maximum independence but intervene and supervise when necessary  - Involve family in performance of ADLs  - Assess for home care needs following discharge   - Consider OT consult to assist with ADL evaluation and planning for discharge  - Provide patient education as appropriate  Outcome: Progressing  Goal: Maintains/Returns to pre admission functional level  Description: INTERVENTIONS:  - Perform AM-PAC 6 Click Basic Mobility/ Daily Activity assessment daily.  - Set and communicate daily mobility goal to care team and patient/family/caregiver.   - Collaborate with rehabilitation services on mobility goals if consulted  - Out of bed for toileting  - Record patient progress and toleration of activity level   Outcome: Progressing     Problem: DISCHARGE PLANNING  Goal: Discharge to home or other facility with appropriate resources  Description: INTERVENTIONS:  - Identify barriers to discharge w/patient and caregiver  - Arrange for needed discharge resources and transportation as appropriate  - Identify discharge learning needs (meds, wound care, etc.)  - Arrange for interpretive services to assist at discharge as needed  - Refer to Case Management Department for coordinating discharge planning if the patient needs post-hospital services based on physician/advanced practitioner order or complex needs  related to functional status, cognitive ability, or social support system  Outcome: Progressing     Problem: Knowledge Deficit  Goal: Patient/family/caregiver demonstrates understanding of disease process, treatment plan, medications, and discharge instructions  Description: Complete learning assessment and assess knowledge base.  Interventions:  - Provide teaching at level of understanding  - Provide teaching via preferred learning methods  Outcome: Progressing     Problem: Prexisting or High Potential for Compromised Skin Integrity  Goal: Skin integrity is maintained or improved  Description: INTERVENTIONS:  - Identify patients at risk for skin breakdown  - Assess and monitor skin integrity  - Assess and monitor nutrition and hydration status  - Monitor labs   - Assess for incontinence   - Turn and reposition patient  - Assist with mobility/ambulation  - Relieve pressure over bony prominences  - Avoid friction and shearing  - Provide appropriate hygiene as needed including keeping skin clean and dry  - Evaluate need for skin moisturizer/barrier cream  - Collaborate with interdisciplinary team   - Patient/family teaching  - Consider wound care consult   Outcome: Progressing

## 2024-06-21 NOTE — DISCHARGE INSTR - AVS FIRST PAGE
Discharge Instructions - Orthopedics  Carlos Enriquecarin Shinemckenna Burkett 89 y.o. male MRN: [id number]  Unit/Bed#: -01    Weight Bearing Status:                                           WBAT to LLE with assistance    DVT prophylaxis:  As per medical team x 6 weeks    Pain:  Continue analgesics as directed    Dressing Instructions:   Please keep clean, dry and intact until follow up. Dressing is waterproof.     Appt Instructions:   If you do not have your appointment, please call the clinic at [phone] to schedule follow up with Dr. Reece in 10-14 days  Otherwise follow up as scheduled.    Contact the office sooner if you experience any increased numbness/tingling in the extremities.      Miscellaneous:  Ice to left hip

## 2024-06-21 NOTE — CASE MANAGEMENT
Case Management Discharge Planning Note    Patient name Carlos Enrique Roth Jr.  Location /-01 MRN [id number]  : 1935 Date 2024       Current Admission Date: 2024  Current Admission Diagnosis:Closed intertrochanteric fracture of left femur (HCC)   Patient Active Problem List    Diagnosis Date Noted Date Diagnosed    Acute blood loss anemia 2024     New onset a-fib ([facility]) 2024     Closed intertrochanteric fracture of left femur ([facility]) 2024     Persistent proteinuria 2023     Microalbuminuria due to type 2 diabetes mellitus  ([facility]) 10/09/2023     Secondary hyperparathyroidism ([facility]) 2023     Skin tear of left elbow without complication 2023     CKD stage 4 due to type 2 diabetes mellitus ([facility]) 2022     Type 2 diabetes mellitus with diabetic neuropathy ([facility]) 10/27/2021     Chronic diastolic congestive heart failure ([facility]) 2020     Primary open angle glaucoma (POAG) 2019     Age-related cataract of both eyes 2019     Atherosclerosis of aorta ([facility]) 2019     Ambulatory dysfunction 2018     Anemia due to stage 3 chronic kidney disease  ([facility]) 2018     CAD (coronary artery disease) 2018     Aortic stenosis 2018     Benign prostatic hyperplasia without lower urinary tract symptoms 2018     Long term (current) use of insulin ([facility]) 2018     Presence of aortocoronary bypass graft 2018     Presence of prosthetic heart valve 2018     Unspecified glaucoma 2018     Nonrheumatic aortic (valve) stenosis 2018     Atherosclerotic heart disease of native coronary artery without angina pectoris 2018     S/P CABG x 4 2018     S/P AVR (aortic valve replacement) 2018     GERD (gastroesophageal reflux disease)      Sexual dysfunction 10/09/2017     Renal cyst 2017     Gout with tophus 2016     Pure hypercholesterolemia 2014     Hypertensive heart disease with HF  (heart failure) ([facility]) 11/05/2014     Postoperative hypothyroidism 11/05/2014     Obesity, morbid ([facility]) 11/05/2014       LOS (days): 1  Geometric Mean LOS (GMLOS) (days): 4.5  Days to GMLOS:3.4     OBJECTIVE:  Risk of Unplanned Readmission Score: 16.57         Current admission status: Inpatient   Preferred Pharmacy:   [facility] - [place] - [address]  [address]  [place] [id number]  Phone: [phone] Fax: [phone]    [facility] [place] - [address]  [address]  [place] [id number]  Phone: [phone] Fax: [phone]    Primary Care Provider: Juliette Cid DO    Primary Insurance: KIMBERLY JIMENEZ  Secondary Insurance:     DISCHARGE DETAILS:                                                                                                               Facility Insurance Auth Number: [id number]

## 2024-06-21 NOTE — ANESTHESIA POSTPROCEDURE EVALUATION
Post-Op Assessment Note    CV Status:  Stable  Pain Score: 0    Pain management: adequate       Mental Status:  Alert and awake   Hydration Status:  Euvolemic   PONV Controlled:  None   Airway Patency:  Patent     Post Op Vitals Reviewed: Yes    No anethesia notable event occurred.    Staff: Anesthesiologist               BP (!) 83/47 (06/20/24 2130)    Temp 98.7 °F (37.1 °C) (06/20/24 2130)    Pulse 66 (06/20/24 2130)   Resp 17 (06/20/24 2130)    SpO2 96 % (06/20/24 2130)

## 2024-06-21 NOTE — ASSESSMENT & PLAN NOTE
Wt Readings from Last 3 Encounters:   06/21/24 94.4 kg (208 lb 1.8 oz)   05/31/24 92.5 kg (204 lb)   05/29/24 91.2 kg (201 lb)     Appears euvolemic  Continue torsemide

## 2024-06-21 NOTE — PHYSICAL THERAPY NOTE
PHYSICAL THERAPY EVALUATION NOTE      Patient Name: Carlos Enrique Roth Jr.  Today's Date: 2024    AGE:   89 y.o.  Mrn:   [id number]  ADMIT DX:  New onset atrial fibrillation (HCC) [I48.91]  New onset a-fib (HCC) [I48.91]  Closed fracture of left hip, initial encounter ([facility]) [S72.002A]  Multiple medical problems [R69]    Past Medical History:   Diagnosis Date    Aortic stenosis     CKD (chronic kidney disease)     baseline Cr 1.3-1.5    Coronary artery disease     Cough     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HCC)     type 2, insulin dependent    GERD (gastroesophageal reflux disease)     Glaucoma     Gout     History of prostate cancer     Hypertension     Hypothyroidism     Overweight     Peripheral neuropathy, idiopathic     Pleural effusion, left     Pure hypercholesterolemia     LA...14   R....14     Visual impairment     cataract left eye    Weight gain      Length Of Stay: 1  PHYSICAL THERAPY EVALUATION :   Patient's identity confirmed via 2 patient identifiers (full name and ) at start of session       24 1242   PT Last Visit   PT Visit Date 24   Note Type   Note type Evaluation   Pain Assessment   Pain Assessment Tool 0-10   Pain Score 2   Pain Location/Orientation Orientation: Left;Location: Hip;Location: Leg   Hospital Pain Intervention(s) Repositioned;Ambulation/increased activity;Emotional support   Restrictions/Precautions   Weight Bearing Precautions Per Order Yes   LLE Weight Bearing Per Order WBAT   Other Precautions Chair Alarm;Bed Alarm;WBS;Fall Risk;Multiple lines   Home Living   Type of Home House   Home Layout One level;Stairs to enter with rails  (2 RENE)   Home Equipment Walker;Cane   Additional Comments Pt reports not using AD in the home   Prior Function   Level of [place] Independent with ADLs;Independent with functional mobility   Lives With Son  (works during the day)   IADLs Independent with  driving   Falls in the last 6 months 1 to 4  (1)   Vocational Retired   General   Family/Caregiver Present No   Cognition   Arousal/Participation Alert   Orientation Level Oriented X4   Memory Decreased recall of recent events  (details, timeline)   Following Commands Follows multistep commands with increased time or repetition   Comments Pt is very pleasant and agreeable to participate in PT eval   RLE Assessment   RLE Assessment WFL   Strength RLE   RLE Overall Strength 4-/5   LLE Assessment   LLE Assessment WFL   Strength LLE   LLE Overall Strength 3+/5   Vision-Basic Assessment   Visual History Glaucoma   Bed Mobility   Supine to Sit Unable to assess   Additional Comments Pt OOB on commode upon arrival   Transfers   Sit to Stand 2  Maximal assistance   Additional items Assist x 1   Stand to Sit 2  Maximal assistance   Additional items Assist x 1   Ambulation/Elevation   Gait pattern Decreased foot clearance;Short stride;Excessively slow   Gait Assistance 3  Moderate assist   Additional items Assist x 2;Verbal cues   Assistive Device Rolling walker   Distance 2 ft   Ambulation/Elevation Additional Comments see tx note below for additional mobility   Balance   Static Sitting Fair +   Static Standing Poor   Ambulatory Poor -   Activity Tolerance   Activity Tolerance Patient limited by fatigue   Medical Staff Made Aware DOUG Espitia   Nurse Made Aware NICOLAS Burgess   Assessment   Prognosis Fair   Problem List Decreased strength;Decreased endurance;Impaired balance;Decreased mobility;Orthopedic restrictions;Pain   Assessment Carlos Enrique Roth Jr. is a 89 y.o. Male who presents to [facility] on 6/20/2024 from home w/ c/o mechanical fall and diagnosis of closed displaced intertrochanteric fracture of L femur, now POD1 s/p L IM nail. Orders for PT eval and treat received, w/ activity orders of WBAT L LE and fall precautions. Pt presents w/ comorbidities of primary open angle glaucoma, DMII, CKD Stage 4, Afib, BPH, hx of CABG x  4. At baseline, pt mobilizes independently w/ no AD, and reports 1 falls in the last 6 months. Upon evaluation, pt presents w/ the following deficits: weakness, impaired balance, impaired vision, decreased endurance, and pain limiting functional mobility. Upon eval, pt requires max A x 1 for transfers, and mod A x 2 for gait. Based on this PT evaluation today, patient's discharge recommendation is for Level II - Moderate Rehab Resource Intensity. During this admission, pt would benefit from continued skilled inpatient PT in the acute care setting in order to address the abovementioned deficits to maximize function and mobility before DC from acute care.   Barriers to Discharge Inaccessible home environment;Decreased caregiver support   Goals   Patient Goals to return to OF   [facility] Expiration Date 07/01/24   Short Term Goal #1 Patient will: Perform all bed mobility tasks w/ minx1 to improve pt's independence w/ repositioning for decrease risk of skin breakdown, Perform all transfers w/ minx1 consistently from various height surfaces in order to improve I w/ engagement w/ real-world environments/situations, Ambulate at least 75 ft. with roller walker w/ minx1 w/o LOB to facilitate return and engagement w/ previous living environment, and Navigate 2 stairs w/ minx1 with unilateral handrail to either improve independence w/ entering home and/or so patient can fully access living areas in home   PT Treatment Day 0   Plan   Treatment/Interventions Functional transfer training;LE strengthening/ROM;Therapeutic exercise;Endurance training;Elevations;Cognitive reorientation;Patient/family training;Equipment eval/education;Bed mobility;Gait training   PT Frequency 3-5x/wk   Discharge Recommendation   Rehab Resource Intensity Level, PT II (Moderate Resource Intensity)   Equipment Recommended Walker   AM-PAC Basic Mobility Inpatient   Turning in Flat Bed Without Bedrails 3   Lying on Back to Sitting on Edge of Flat Bed Without  Bedrails 3   Moving Bed to Chair 2   Standing Up From Chair Using Arms 2   Walk in Room 2   Climb 3-5 Stairs With Railing 1   Basic Mobility Inpatient Raw Score 13   Basic Mobility Standardized Score 33.99   [facility] Highest Level Of Mobility   -[facility] Goal 4: Move to chair/commode   -HL Achieved 5: Stand (1 or more minutes)   Additional Treatment Session   Start Time 1255   End Time 1305   Treatment Assessment Pt seated OOB in recliner chair, agreeable to participate in PT intervention. He is able to perform STS from recliner chair w/ max A x 1 and VC for hand placement. He is able to ambulate about 8 ft w/ RW w/ mod A x 1 and SBA of 2nd person for SBA. He is seated OOB in recliner chair   Equipment Use RW   Additional Treatment Day 1   End of Consult   Patient Position at End of Consult Bedside chair;Bed/Chair alarm activated;All needs within reach         The patient's AM-PAC Basic Mobility Inpatient Short Form Raw Score is 13, Standardized Score is 33.99. A standardized score less than 38.32 (raw score of 16) suggests the patient may benefit from discharge to post-acute rehabilitation services which may not coincide with above PT recommendations. However please refer to therapist recommendation for discharge planning given other factors that may influence destination.    Pt would benefit from skilled inpatient PT during this admission in order to facilitate progress towards goals to maximize functional independence    Gertrudis Smith PT, DPT        28-Feb-2020 14:45

## 2024-06-21 NOTE — ASSESSMENT & PLAN NOTE
Following mechanical fall, patient collided with his son in the kitchen and subsequently fell   POD 1 left hip TFNA  PT/OT postoperatively  Continue pain control  Monitor hemoglobin - some decrease to 10.8 no evidence of active bleeding  Recommend monitoring additional 24 hrs as starting eliquis for afib/DVT ppx.  OK to start per orthopedics

## 2024-06-21 NOTE — PLAN OF CARE
Problem: PHYSICAL THERAPY ADULT  Goal: Performs mobility at highest level of function for planned discharge setting.  See evaluation for individualized goals.  Description: Treatment/Interventions: Functional transfer training, LE strengthening/ROM, Therapeutic exercise, Endurance training, Elevations, Cognitive reorientation, Patient/family training, Equipment eval/education, Bed mobility, Gait training  Equipment Recommended: Walker       See flowsheet documentation for full assessment, interventions and recommendations.  6/21/2024 1412 by Gertrudis Smith PT  Note: Prognosis: Fair  Problem List: Decreased strength, Decreased endurance, Impaired balance, Decreased mobility, Orthopedic restrictions, Pain  Assessment: Carlos Enrique Roth Jr. is a 89 y.o. Male who [facility] to [facility] on [facility]/20/2024 from home w/ c/o mechanical fall and diagnosis of closed displaced intertrochanteric fracture of L femur, now POD1 s/p L IM nail. Orders for PT eval and treat received, w/ activity orders of WBAT L LE and fall precautions. Pt presents w/ comorbidities of primary open angle glaucoma, DMII, CKD Stage 4, Afib, BPH, hx of CABG x 4. At baseline, pt mobilizes independently w/ no AD, and reports 1 falls in the last 6 months. Upon evaluation, pt presents w/ the following deficits: weakness, impaired balance, impaired vision, decreased endurance, and pain limiting functional mobility. Upon eval, pt requires max A x 1 for transfers, and mod A x 2 for gait. Based on this PT evaluation today, patient's discharge recommendation is for Level II - Moderate Rehab Resource Intensity. During this admission, pt would benefit from continued skilled inpatient PT in the acute care setting in order to address the abovementioned deficits to maximize function and mobility before DC from acute care.  Barriers to Discharge: Inaccessible home environment, Decreased caregiver support     Rehab Resource Intensity Level, PT: II (Moderate Resource  Intensity)    See flowsheet documentation for full assessment.

## 2024-06-21 NOTE — CASE MANAGEMENT
Case Management Assessment & Discharge Planning Note    Patient name Carlos Enrique Roth Jr.  Location /-01 MRN [id number]  : 1935 Date 2024       Current Admission Date: 2024  Current Admission Diagnosis:Closed intertrochanteric fracture of left femur (HCC)   Patient Active Problem List    Diagnosis Date Noted Date Diagnosed    Acute blood loss anemia 2024     New onset a-fib ([facility]) 2024     Closed intertrochanteric fracture of left femur ([facility]) 2024     Persistent proteinuria 2023     Microalbuminuria due to type 2 diabetes mellitus  ([facility]) 10/09/2023     Secondary hyperparathyroidism ([facility]) 2023     Skin tear of left elbow without complication 2023     CKD stage 4 due to type 2 diabetes mellitus ([facility]) 2022     Type 2 diabetes mellitus with diabetic neuropathy ([facility]) 10/27/2021     Chronic diastolic congestive heart failure ([facility]) 2020     Primary open angle glaucoma (POAG) 2019     Age-related cataract of both eyes 2019     Atherosclerosis of aorta ([facility]) 2019     Ambulatory dysfunction 2018     Anemia due to stage 3 chronic kidney disease  ([facility]) 2018     CAD (coronary artery disease) 2018     Aortic stenosis 2018     Benign prostatic hyperplasia without lower urinary tract symptoms 2018     Long term (current) use of insulin ([facility]) 2018     Presence of aortocoronary bypass graft 2018     Presence of prosthetic heart valve 2018     Unspecified glaucoma 2018     Nonrheumatic aortic (valve) stenosis 2018     Atherosclerotic heart disease of native coronary artery without angina pectoris 2018     S/P CABG x 4 2018     S/P AVR (aortic valve replacement) 2018     GERD (gastroesophageal reflux disease)      Sexual dysfunction 10/09/2017     Renal cyst 2017     Gout with tophus 2016     Pure hypercholesterolemia 2014     Hypertensive heart  disease with HF (heart failure) ([facility]) 11/05/2014     Postoperative hypothyroidism 11/05/2014     Obesity, morbid ([facility]) 11/05/2014       LOS (days): 1  Geometric Mean LOS (GMLOS) (days): 4.5  Days to GMLOS:3.6     OBJECTIVE:    Risk of Unplanned Readmission Score: 15.42         Current admission status: Inpatient       Preferred Pharmacy:   [facility] - [place] - [address]  [address]  [place] [id number]  Phone: [phone] Fax: [phone]    [id number] - [place] - [address]  [address]  [place] [id number]  Phone: [phone] Fax: [phone]    Primary Care Provider: Juliette Cid DO    Primary Insurance: [facility]  Secondary Insurance:     ASSESSMENT:  Active Health Care Proxies    There are no active Health Care Proxies on file.       Advance Directives  Does patient have a Health Care POA?: No  Was patient offered paperwork?: Yes  Does patient currently have a Health Care decision maker?: No  Does patient have Advance Directives?: No  Was patient offered paperwork?: Yes         Readmission Root Cause  30 Day Readmission: No    Patient Information  Admitted from:: Home  Mental Status: Alert  During Assessment patient was accompanied by: Not accompanied during assessment  Assessment information provided by:: Patient  Primary Caregiver: Self  Support Systems: Self, Son  County of Residence: [place]  What city do you live in?: [place]  Home entry access options. Select all that apply.: Stairs  Number of steps to enter home.: 2  Do the steps have railings?: Yes  Type of Current Residence: [place]  Upon entering residence, is there a bedroom on the main floor (no further steps)?: Yes  Upon entering residence, is there a bathroom on the main floor (no further steps)?: Yes  Living Arrangements: Lives w/ Son  Is patient a ?: Yes ([employer]  Is patient active with VA ([employer] [facility])?: No    Activities of Daily Living Prior to  Admission  Functional Status: Independent  Completes ADLs independently?: Yes  Ambulates independently?: Yes  Does patient use assisted devices?: Yes  Assisted Devices (DME) used: Straight Cane, Walker  Does patient currently own DME?: Yes  What DME does the patient currently own?: Straight Cane, Walker  Does patient have a history of Outpatient Therapy (PT/OT)?: Yes  Does the patient have a history of Short-Term Rehab?: Yes ([facility])  Does patient have a history of HHC?: Yes  Does patient currently have HHC?: No         Patient Information Continued  Income Source: SSI/SSD (Pension as well.)  Does patient have prescription coverage?: Yes  Does patient have a history of substance abuse?: No  Does patient have a history of Mental Health Diagnosis?: No         Means of Transportation  Means of Transport to App:: Drives Self      Social Determinants of Health (SDOH)      Flowsheet Row Most Recent Value   Housing Stability    In the last 12 months, was there a time when you were not able to pay the mortgage or rent on time? Y   In the past 12 months, how many times have you moved where you were living? 0   At any time in the past 12 months, were you homeless or living in a shelter (including now)? N   Transportation Needs    In the past 12 months, has lack of transportation kept you from medical appointments or from getting medications? no   In the past 12 months, has lack of transportation kept you from meetings, work, or from getting things needed for daily living? No   Food Insecurity    Within the past 12 months, you worried that your food would run out before you got the money to buy more. Never true   Within the past 12 months, the food you bought just didn't last and you didn't have money to get more. Never true   Utilities    In the past 12 months has the electric, gas, oil, or water company threatened to shut off services in your home? No            DISCHARGE DETAILS:    Discharge planning discussed with::  Pt.  Freedom of Choice: Yes     CM contacted family/caregiver?: Yes  Were Treatment Team discharge recommendations reviewed with patient/caregiver?: Yes  Did patient/caregiver verbalize understanding of patient care needs?: Yes  Were patient/caregiver advised of the risks associated with not following Treatment Team discharge recommendations?: Yes    Contacts  Patient Contacts: Ciro Roth  Relationship to Patient:: Family  Contact Method: Phone  Phone Number: [phone]  Reason/Outcome: Discharge Planning, Referral              Other Referral/Resources/Interventions Provided:  Interventions: Short Term Rehab, SNF  Referral Comments: [place] referrals to SNFs/ rehabs made via [facility]. Currently awaiting accepting.         Treatment Team Recommendation: Short Term Rehab, SNF  Discharge Destination Plan:: SNF, Short Term Rehab                                         Additional Comments: Pt lives with his son in a 1sh, 1st fl set up, 2 RENE. Pt was independent with ADLs and IADLs PTA. Owns a walker and sc, but only uses the sc for ambulation. Has a hx of HHC, STR, and OPPT. [facility] was the preferred rehab, as he was there previously. Reports no mental health or D&A concerns. Pt did report that he at times struggles to pay mortgage on time, as he also pays alimony to his wife. Pt reports that he is 'pretty up to date' with his mortgage payments. Drives self. No medical POA and declined information. [facility] is preferred rehab. CM left son a VM to inform him on pt's status. Referrals for SNFs/rehabs made via [facility]. Currently awaiting accepting.

## 2024-06-21 NOTE — ASSESSMENT & PLAN NOTE
Hemoglobin 12.0 on presentation  Decreased to 10.8 this AM  In setting of hip surgery.  No evidence of active bleeding  Trend H/H

## 2024-06-21 NOTE — CASE MANAGEMENT
[facility] has received APPROVED authorization.  Insurance:   [employer]  Authorization received for: SNF  Facility: [facility]   Authorization #:[id number]    Start of Care: 06/22  Next Review Date:07/04  Submit next review to: [phone]      Care Manager notified:Cali Bocanegra      Please reach out to CM for updates on any clinical information.

## 2024-06-21 NOTE — ASSESSMENT & PLAN NOTE
Hemoglobin 12.0 on presentation  Hgb stable at 10.6  In setting of hip surgery.  No evidence of active bleeding

## 2024-06-22 VITALS
RESPIRATION RATE: 16 BRPM | WEIGHT: 208.11 LBS | HEIGHT: 67 IN | BODY MASS INDEX: 32.66 KG/M2 | OXYGEN SATURATION: 93 % | TEMPERATURE: 98.3 F | DIASTOLIC BLOOD PRESSURE: 61 MMHG | HEART RATE: 68 BPM | SYSTOLIC BLOOD PRESSURE: 141 MMHG

## 2024-06-22 PROBLEM — S72.142A CLOSED INTERTROCHANTERIC FRACTURE OF LEFT FEMUR (HCC): Status: RESOLVED | Noted: 2024-06-20 | Resolved: 2024-06-22

## 2024-06-22 PROBLEM — D62 ACUTE BLOOD LOSS ANEMIA: Status: RESOLVED | Noted: 2024-06-21 | Resolved: 2024-06-22

## 2024-06-22 LAB
ANION GAP SERPL CALCULATED.3IONS-SCNC: 7 MMOL/L (ref 4–13)
BASOPHILS # BLD AUTO: 0.03 THOUSANDS/ÂΜL (ref 0–0.1)
BASOPHILS NFR BLD AUTO: 0 % (ref 0–1)
BUN SERPL-MCNC: 42 MG/DL (ref 5–25)
CALCIUM SERPL-MCNC: 9 MG/DL (ref 8.4–10.2)
CHLORIDE SERPL-SCNC: 107 MMOL/L (ref 96–108)
CO2 SERPL-SCNC: 24 MMOL/L (ref 21–32)
CREAT SERPL-MCNC: 2.21 MG/DL (ref 0.6–1.3)
EOSINOPHIL # BLD AUTO: 0.02 THOUSAND/ÂΜL (ref 0–0.61)
EOSINOPHIL NFR BLD AUTO: 0 % (ref 0–6)
ERYTHROCYTE [DISTWIDTH] IN BLOOD BY AUTOMATED COUNT: 13 % (ref 11.6–15.1)
GFR SERPL CREATININE-BSD FRML MDRD: 25 ML/MIN/1.73SQ M
GLUCOSE SERPL-MCNC: 153 MG/DL (ref 65–140)
GLUCOSE SERPL-MCNC: 156 MG/DL (ref 65–140)
GLUCOSE SERPL-MCNC: 180 MG/DL (ref 65–140)
HCT VFR BLD AUTO: 33.8 % (ref 36.5–49.3)
HGB BLD-MCNC: 10.6 G/DL (ref 12–17)
IMM GRANULOCYTES # BLD AUTO: 0.08 THOUSAND/UL (ref 0–0.2)
IMM GRANULOCYTES NFR BLD AUTO: 1 % (ref 0–2)
LYMPHOCYTES # BLD AUTO: 1.32 THOUSANDS/ÂΜL (ref 0.6–4.47)
LYMPHOCYTES NFR BLD AUTO: 11 % (ref 14–44)
MCH RBC QN AUTO: 31.9 PG (ref 26.8–34.3)
MCHC RBC AUTO-ENTMCNC: 31.4 G/DL (ref 31.4–37.4)
MCV RBC AUTO: 102 FL (ref 82–98)
MONOCYTES # BLD AUTO: 1.06 THOUSAND/ÂΜL (ref 0.17–1.22)
MONOCYTES NFR BLD AUTO: 9 % (ref 4–12)
NEUTROPHILS # BLD AUTO: 9.03 THOUSANDS/ÂΜL (ref 1.85–7.62)
NEUTS SEG NFR BLD AUTO: 79 % (ref 43–75)
NRBC BLD AUTO-RTO: 0 /100 WBCS
PLATELET # BLD AUTO: 135 THOUSANDS/UL (ref 149–390)
PMV BLD AUTO: 10.7 FL (ref 8.9–12.7)
POTASSIUM SERPL-SCNC: 4.1 MMOL/L (ref 3.5–5.3)
RBC # BLD AUTO: 3.32 MILLION/UL (ref 3.88–5.62)
SARS-COV-2 RNA RESP QL NAA+PROBE: NEGATIVE
SODIUM SERPL-SCNC: 138 MMOL/L (ref 135–147)
WBC # BLD AUTO: 11.54 THOUSAND/UL (ref 4.31–10.16)

## 2024-06-22 PROCEDURE — 99024 POSTOP FOLLOW-UP VISIT: CPT | Performed by: ORTHOPAEDIC SURGERY

## 2024-06-22 PROCEDURE — 80048 BASIC METABOLIC PNL TOTAL CA: CPT | Performed by: INTERNAL MEDICINE

## 2024-06-22 PROCEDURE — 99239 HOSP IP/OBS DSCHRG MGMT >30: CPT | Performed by: PHYSICIAN ASSISTANT

## 2024-06-22 PROCEDURE — 87635 SARS-COV-2 COVID-19 AMP PRB: CPT | Performed by: PHYSICIAN ASSISTANT

## 2024-06-22 PROCEDURE — 82948 REAGENT STRIP/BLOOD GLUCOSE: CPT

## 2024-06-22 PROCEDURE — 85025 COMPLETE CBC W/AUTO DIFF WBC: CPT | Performed by: INTERNAL MEDICINE

## 2024-06-22 RX ORDER — LORAZEPAM 0.5 MG/1
0.5 TABLET ORAL EVERY 8 HOURS PRN
Qty: 10 TABLET | Refills: 0 | Status: SHIPPED | OUTPATIENT
Start: 2024-06-22 | End: 2024-07-02

## 2024-06-22 RX ORDER — ACETAMINOPHEN 325 MG/1
650 TABLET ORAL EVERY 6 HOURS PRN
Start: 2024-06-22

## 2024-06-22 RX ORDER — LORAZEPAM 0.5 MG/1
0.5 TABLET ORAL EVERY 8 HOURS PRN
Qty: 30 TABLET | Refills: 0 | Status: SHIPPED | OUTPATIENT
Start: 2024-06-22 | End: 2024-06-22

## 2024-06-22 RX ADMIN — APIXABAN 2.5 MG: 2.5 TABLET, FILM COATED ORAL at 08:02

## 2024-06-22 RX ADMIN — LEVOTHYROXINE SODIUM 150 MCG: 75 TABLET ORAL at 05:18

## 2024-06-22 RX ADMIN — Medication 1000 UNITS: at 08:02

## 2024-06-22 RX ADMIN — ALLOPURINOL 300 MG: 300 TABLET ORAL at 08:02

## 2024-06-22 RX ADMIN — INSULIN GLARGINE 20 UNITS: 100 INJECTION, SOLUTION SUBCUTANEOUS at 08:02

## 2024-06-22 RX ADMIN — INSULIN LISPRO 10 UNITS: 100 INJECTION, SOLUTION INTRAVENOUS; SUBCUTANEOUS at 08:03

## 2024-06-22 RX ADMIN — PANTOPRAZOLE SODIUM 20 MG: 20 TABLET, DELAYED RELEASE ORAL at 08:02

## 2024-06-22 RX ADMIN — SENNOSIDES 8.6 MG: 8.6 TABLET, FILM COATED ORAL at 08:02

## 2024-06-22 RX ADMIN — DORZOLAMIDE HYDROCHLORIDE AND TIMOLOL MALEATE 1 DROP: 20; 5 SOLUTION/ DROPS OPHTHALMIC at 08:02

## 2024-06-22 RX ADMIN — TORSEMIDE 10 MG: 10 TABLET ORAL at 08:02

## 2024-06-22 RX ADMIN — FERROUS SULFATE TAB 325 MG (65 MG ELEMENTAL FE) 325 MG: 325 (65 FE) TAB at 08:02

## 2024-06-22 RX ADMIN — OXYCODONE HYDROCHLORIDE AND ACETAMINOPHEN 500 MG: 500 TABLET ORAL at 08:02

## 2024-06-22 RX ADMIN — INSULIN LISPRO 1 UNITS: 100 INJECTION, SOLUTION INTRAVENOUS; SUBCUTANEOUS at 08:03

## 2024-06-22 NOTE — DISCHARGE SUMMARY
[facility]  Discharge- Carlos Enrique Roth JrMega 1935, 89 y.o. male MRN: [id number]  Unit/Bed#: MS [facility] Encounter: [id number]  Primary Care Provider: Juliette Cid DO   Date and time admitted to hospital: 6/20/2024 11:54 AM    * Closed intertrochanteric fracture of left femur (HCC)-resolved as of 6/22/2024  Assessment & Plan  Following mechanical fall, patient collided with his son in the kitchen and subsequently fell   POD 2 left hip TFNA  PT/OT postoperatively - recommending rehab  Continue pain control  Monitor hemoglobin - initial decrease in hgb but now stable  Stable for discharge to rehab  Outpt follow up with orthopedics    New onset a-fib (HCC)  Assessment & Plan  OK to start eliquis per orthopedics  AV giuseppe blocking agents due to bradycardia  Cardiology consulted - converted to NSR  OK to hold ASA  Outpt follow up    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)  Assessment & Plan  Baseline creatinine 2.0-2.6  Creatinine within baseline    Type 2 diabetes mellitus with diabetic neuropathy (HCC)  Assessment & Plan  Lab Results   Component Value Date    HGBA1C 8.7 (H) 05/15/2024       Recent Labs     06/20/24  1739 06/21/24  0758   POCGLU 170* 206*         Blood Sugar Average: Last 72 hrs:  (P) 188  Maintained on 27 units basal coverage with 11 units meal coverage - continue on discharge    Chronic diastolic congestive heart failure (HCC)  Assessment & Plan  Wt Readings from Last 3 Encounters:   06/21/24 94.4 kg (208 lb 1.8 oz)   05/31/24 92.5 kg (204 lb)   05/29/24 91.2 kg (201 lb)     Appears euvolemic  Continue torsemide      Primary open angle glaucoma (POAG)  Assessment & Plan  Continue dorzolamide timolol drops twice daily    Postoperative hypothyroidism  Assessment & Plan  Continue levothyroxine 150 mcg daily    Acute blood loss anemia-resolved as of 6/22/2024  Assessment & Plan  Hemoglobin 12.0 on presentation  Hgb stable at 10.6  In setting of hip surgery.  No evidence  of active bleeding      Medical Problems       Resolved Problems  Date Reviewed: 6/22/2024            Resolved    * (Principal) Closed intertrochanteric fracture of left femur (HCC) 6/22/2024     Resolved by  Zoey Gray PA-C    Acute blood loss anemia 6/22/2024     Resolved by  Zoey Gray PA-C        Discharging Physician / Practitioner: Zoey Gray PA-C  PCP: Juliette Cid DO  Admission Date:   Admission Orders (From admission, onward)       Ordered        06/20/24 1422  INPATIENT ADMISSION  Once                          Discharge Date: 06/22/24    Consultations During Hospital Stay:  Cardiology  Orthopedics  PT/OT  Case management    Procedures Performed:   6/20: Left TFNA    Significant Findings / Test Results:   CXR: No acute cardiopulmonary disease  XR pelvis: Intertrochanteric fracture left hip  CT head: No acute intracranial abnormality  CT cervical spine: No cervical spine fracture or traumatic malalignment  CT abdomen/pelvis: Comminuted intratrochanteric fracture of the left hip.  No evidence of solid organ trauma or ascites.  Gallstone.  Hyperdense lesion involves the right kidney and could represent a hyperdense cyst or nodule. Nonemergent follow-up ultrasound is advised.  Asymmetric density in the left prostate is only slightly more pronounced than 2017. Correlation with PSA is advised.  High density pleural thickening at the left lung base is probably related to old inflammation given pleural effusion in 2018.      Incidental Findings:   As above   I reviewed the above mentioned incidental findings with the patient and/or family and they expressed understanding.    Test Results Pending at Discharge (will require follow up):   None     Outpatient Tests Requested:  PSA, US kidney and bladder per PCP    Complications:  None    Reason for Admission: Left hip fracture    Hospital Course:   Carlos Enrique Roth  is a 89 y.o. male patient who originally presented to the hospital on 6/20/2024 due  "to fall and hip pain.    Past medical history significant for chronic kidney disease, CHF, coronary artery disease, severe AS s/p bioprosthetic AVR.  Patient presented to the emergency department due to mechanical fall and subsequent left hip pain.  Patient was found to have left hip fracture and orthopedics was consulted.  Patient underwent a left TFNA on 6/20.  Had initial decrease in hemoglobin however this remained stable without further intervention.  Patient was also noted to be in new onset A-fib and cardiology was consulted.  Ultimately patient converted to NSR with tendency towards bradycardia therefore it was recommended to avoid beta-blockers at this time.  Patient was started on Eliquis for both stroke prevention in setting of A-fib and DVT prophylaxis due to recent left hip repair.  PT/OT recommended rehab to which patient and family were agreeable.  On day of discharge patient was hemodynamically stable, pain well-controlled and verbalized understanding for requested outpatient follow-up.    Please see above list of diagnoses and related plan for additional information.     Condition at Discharge: stable    Discharge Day Visit / Exam:   Subjective:  Feels well.  Denies any complaints.  Hip pain well controlled.  No events overnight.  Vitals: Blood Pressure: 141/61 (06/22/24 0645)  Pulse: 68 (06/22/24 0645)  Temperature: 98.3 °F (36.8 °C) (06/22/24 0645)  Temp Source: Oral (06/22/24 0645)  Respirations: 16 (06/22/24 0645)  Height: 5' 7\" (170.2 cm) (06/20/24 1744)  Weight - Scale: 94.4 kg (208 lb 1.8 oz) (06/21/24 0558)  SpO2: 93 % (06/22/24 0645)  Exam:   Physical Exam  Vitals and nursing note reviewed.   Constitutional:       Comments: No acute distress   HENT:      Head: Normocephalic.   Eyes:      General: No scleral icterus.     Extraocular Movements: Extraocular movements intact.      Conjunctiva/sclera: Conjunctivae normal.   Cardiovascular:      Rate and Rhythm: Normal rate and regular rhythm.    "   Heart sounds: Murmur heard.   Pulmonary:      Effort: Pulmonary effort is normal. No respiratory distress.      Breath sounds: Normal breath sounds. No wheezing, rhonchi or rales.   Abdominal:      General: Bowel sounds are normal.      Palpations: Abdomen is soft.      Tenderness: There is no abdominal tenderness.   Musculoskeletal:      Cervical back: Normal range of motion.      Comments: Able to move upper/lower ext bilaterally, trace pedal edema.  L hip dressing without evidence of active bleeding   Skin:     General: Skin is warm and dry.   Neurological:      Mental Status: He is alert and oriented to person, place, and time.   Psychiatric:         Mood and Affect: Mood normal.         Speech: Speech normal.         Behavior: Behavior normal.          Discussion with Family: Attempted to update  (son) via phone. Unable to contact.    Discharge instructions/Information to patient and family:   See after visit summary for information provided to patient and family.      Provisions for Follow-Up Care:  See after visit summary for information related to follow-up care and any pertinent home health orders.      Mobility at time of Discharge:   Basic Mobility Inpatient Raw Score: 13  JH-HLM Goal: 4: Move to chair/commode  JH-HLM Achieved: 4: Move to chair/commode  HLM Goal achieved. Continue to encourage appropriate mobility.     Disposition:   Other Skilled Nursing Facility at [facility]    Planned Readmission: None     Discharge Statement:  I spent 60 minutes discharging the patient. This time was spent on the day of discharge. I had direct contact with the patient on the day of discharge. Greater than 50% of the total time was spent examining patient, answering all patient questions, arranging and discussing plan of care with patient as well as directly providing post-discharge instructions.  Additional time then spent on discharge activities.    Discharge Medications:  See after visit summary for  reconciled discharge medications provided to patient and/or family.      **Please Note: This note may have been constructed using a voice recognition system**

## 2024-06-22 NOTE — CASE MANAGEMENT
Case Management Progress Note    Patient name Carlos Enrique Roth Jr.  Location /-01 MRN [id number]  : 1935 Date 2024       LOS (days): 2  Geometric Mean LOS (GMLOS) (days): 4.5  Days to GMLOS:2.7        OBJECTIVE:        Current admission status: Inpatient  Preferred Pharmacy:   [facility] - [place] - [address]  [address]  [place] [address]  Phone: [phone] Fax: [phone]    [facility] [place] - [address]  [address]  [place] [address]  Phone: [phone] Fax: [phone]    Primary Care Provider: Juliette Cid DO    Primary Insurance: [employer] REP  Secondary Insurance:     PROGRESS NOTE:  [facility] wheelchair van to transport to [facility]. Pickup is 12:30pm. Pt's nurse(Jenifer), Maria Eugenia at [facility] and Pt informed of transport time. Attempted to call Pt's son(Biralvarez: [phone]), left message to inform of transport time to [facility] today. Await return call.

## 2024-06-22 NOTE — CASE MANAGEMENT
Case Management Progress Note    Patient name Carlos Enrique Roth Jr.  Location /-01 MRN [id number]  : 1935 Date 2024       LOS (days): 2  Geometric Mean LOS (GMLOS) (days): 4.5  Days to GMLOS:2.7        OBJECTIVE:        Current admission status: Inpatient  Preferred Pharmacy:   [facility] - [place] - [address].  [address]  [place] [id number]  Phone: [phone] Fax: [phone]    [facility] [place] - [address]  [address]  [place] [address]  Phone: [phone] Fax: [phone]    Primary Care Provider: Juliette Cid DO    Primary Insurance: [employer]  REP  Secondary Insurance:     PROGRESS NOTE:  [facility] can accept Pt today. Wheelchair van trransport request sent to [facility]. Await transport time.

## 2024-06-22 NOTE — CASE MANAGEMENT
Case Management Progress Note    Patient name Carlos Enrique Roth Jr.  Location /-01 MRN [id number]  : 1935 Date 2024       LOS (days): 2  Geometric Mean LOS (GMLOS) (days): 4.5  Days to GMLOS:2.7        OBJECTIVE:        Current admission status: Inpatient  Preferred Pharmacy:   [facility] - [place] - [address].  [address]  [place] [id number]  Phone: [phone] Fax: [phone]    [facility] [place] - [address]  [address]  [place] [id number]  Phone: [phone] Fax: [phone]    Primary Care Provider: Juliette Cid DO    Primary Insurance: [employer]ET[employer]  REP  Secondary Insurance:     PROGRESS NOTE:  Late Entry from . Called received from Pt's son(Ciro) and CM informed that Pt will need SNF and chose [facility]. Pt's son in agreement with plan.

## 2024-06-22 NOTE — UTILIZATION REVIEW
Continued Stay Review    SEE INITIAL REVIEW AT BOTTOM    Date: 6/22/24      Day 3: Has surpassed a 2nd midnight with active treatments and services.  Discharge Summary    Hospital Course:   Carlos Enrique Roth Jr. is a 89 y.o. male patient who originally presented to the hospital on 6/20/2024 due to fall and hip pain.     Past medical history significant for chronic kidney disease, CHF, coronary artery disease, severe AS s/p bioprosthetic AVR.  Patient presented to the emergency department due to mechanical fall and subsequent left hip pain.  Patient was found to have left hip fracture and orthopedics was consulted.  Patient underwent a left TFNA on 6/20.  Had initial decrease in hemoglobin however this remained stable without further intervention.  Patient was also noted to be in new onset A-fib and cardiology was consulted.  Ultimately patient converted to NSR with tendency towards bradycardia therefore it was recommended to avoid beta-blockers at this time.  Patient was started on Eliquis for both stroke prevention in setting of A-fib and DVT prophylaxis due to recent left hip repair.  PT/OT recommended rehab to which patient and family were agreeable.  On day of discharge patient was hemodynamically stable, pain well-controlled and verbalized understanding for requested outpatient follow-up.    Disposition: Other Skilled Nursing Facility at [facility]      Vital Signs (last 3 days)       Date/Time Temp Pulse Resp BP MAP (mmHg) SpO2 O2 Device Cardiac (WDL) Patient Position - Orthostatic VS [place] Coma Scale Score Pain    06/22/24 06:45:20 98.3 °F (36.8 °C) 68 16 141/61 88 93 % None (Room air) -- Lying -- --    06/21/24 2100 -- -- -- -- -- -- -- -- -- 15 --    06/21/24 19:58:10 98.1 °F (36.7 °C) 69 18 137/56 83 96 % None (Room air) -- -- -- 2    06/21/24 15:56:53 98.1 °F (36.7 °C) 70 14 134/60 85 97 % None (Room air) -- Sitting -- --    06/21/24 1304 -- -- -- -- -- -- -- -- -- -- 2    06/21/24 1242 -- -- -- -- -- -- -- --  -- -- 2    06/21/24 1019 -- -- -- -- -- -- -- -- -- 15 --    06/21/24 10:16:25 98.8 °F (37.1 °C) 62 16 107/61 76 97 % -- -- -- -- --    06/21/24 0800 -- -- -- -- -- -- -- -- -- 15 2    06/21/24 0753 98.4 °F (36.9 °C) 58 20 -- -- 94 % -- -- -- -- --    06/21/24 0730 -- 52 14 147/70 100 94 % None (Room air) -- -- -- --    06/21/24 0400 97.5 °F (36.4 °C) 52 14 118/58 84 95 % None (Room air) -- Lying -- --    06/21/24 0039 -- -- -- -- -- -- -- -- -- -- 3    06/21/24 0030 -- 59 15 -- -- 95 % -- -- -- -- --    06/21/24 0015 -- 58 15 141/64 92 96 % -- -- -- -- --    06/21/24 0000 98.1 °F (36.7 °C) 58 15 142/62 89 96 % None (Room air) -- Lying -- --    06/20/24 2345 -- 58 16 145/64 92 95 % -- -- -- -- --    06/20/24 2330 -- 57 16 136/61 88 95 % -- -- -- -- --    06/20/24 2315 -- 57 16 135/65 94 95 % -- -- -- -- --    06/20/24 2300 -- 56 16 134/61 88 96 % -- -- -- -- --    06/20/24 2259 -- -- -- -- -- -- -- -- -- 15 2    06/20/24 2245 -- 58 22 131/60 86 94 % -- -- -- -- --    06/20/24 2230 -- 55 24 137/65 93 96 % -- -- -- -- --    06/20/24 2200 97.6 °F (36.4 °C) 58 7 138/61 88 94 % None (Room air) -- -- 15 --    06/20/24 2145 -- 66 25 128/68 88 95 % -- -- -- -- --    06/20/24 2130 98.7 °F (37.1 °C) 66 17 83/47 60 96 % None (Room air) X -- 14 No Pain    06/20/24 1744 98.3 °F (36.8 °C) 62 20 173/75 -- 99 % None (Room air) -- -- -- No Pain    06/20/24 1558 -- -- -- -- -- -- -- -- -- -- 3    06/20/24 15:34:23 -- 62 -- 121/64 83 96 % -- -- -- -- --    06/20/24 1430 -- 58 19 168/69 -- 96 % -- -- -- -- --    06/20/24 1425 -- 63 21 -- -- 95 % -- -- -- -- --    06/20/24 1420 -- 71 27 -- -- 95 % -- -- -- -- --    06/20/24 1415 -- 68 24 162/72 -- 96 % -- -- -- -- --    06/20/24 1410 -- 58 19 -- -- 96 % -- -- -- -- --    06/20/24 1405 -- 61 20 -- -- 95 % -- -- -- -- --    06/20/24 1400 -- 59 17 151/67 -- 95 % -- -- -- -- --    06/20/24 1355 -- 91 28 -- -- 90 % -- -- -- -- --    06/20/24 13:53:50 -- 67 20 157/65 -- 99 % None (Room air)  -- -- 15 --    06/20/24 1350 -- 60 19 157/65 -- 96 % -- -- -- -- --    06/20/24 1345 -- 57 18 -- -- 96 % -- -- -- -- --    06/20/24 1340 -- 62 22 -- -- 95 % -- -- -- -- --    06/20/24 1335 -- 57 21 164/74 -- 94 % -- -- -- -- --    06/20/24 1330 -- 62 24 -- -- 94 % -- -- -- -- --    06/20/24 1325 -- 70 33 -- -- 95 % -- -- -- -- --    06/20/24 13:20:34 -- 58 20 165/72 -- 99 % None (Room air) -- Lying 15 --    06/20/24 1320 -- 61 17 165/72 -- 95 % -- -- -- -- --    06/20/24 1315 -- 61 23 -- -- 95 % -- -- -- -- --    06/20/24 1310 -- 58 19 -- -- 94 % -- -- -- -- --    06/20/24 1305 -- 58 20 162/71 -- 95 % -- -- -- -- --    06/20/24 1300 -- 57 20 -- -- 94 % -- -- -- -- --    06/20/24 1255 -- 58 25 -- -- 95 % -- -- -- -- --    06/20/24 1250 -- 59 20 159/85 -- 99 % None (Room air) -- Lying 15 --    06/20/24 1246 -- -- -- 159/65 -- -- -- -- -- -- --    06/20/24 1245 -- 57 19 -- -- 95 % -- -- -- -- --    06/20/24 1240 -- 60 18 -- -- 96 % -- -- -- -- --    06/20/24 12:35:29 -- 55 20 165/64 -- 98 % None (Room air) -- -- 15 --    06/20/24 1235 -- 55 25 -- -- 95 % -- -- -- -- --    06/20/24 1230 -- 59 17 163/72 -- 95 % -- -- -- -- --    06/20/24 1227 -- -- -- 135/69 -- -- -- -- -- -- --    06/20/24 1226 -- -- 22 -- -- -- -- -- -- -- --    06/20/24 1225 -- 67 -- -- -- 93 % -- -- -- -- --    06/20/24 1220 -- 56 18 135/99 -- 97 % None (Room air) -- Lying 15 --    06/20/24 12:11:10 -- 48 20 162/66 -- 98 % -- -- -- -- --    06/20/24 1205 -- 54 11 162/66 -- 98 % -- -- -- 15 --    06/20/24 1200 -- 64 37 -- -- 97 % -- -- -- -- --    06/20/24 1155 -- 52 25 -- -- 99 % -- -- -- -- --    06/20/24 1150 97.2 °F (36.2 °C) 53 20 161/70 -- 99 % None (Room air) -- Lying 15 --          Weight (last 2 days)       Date/Time Weight    06/21/24 0558 94.4 (208.11)    06/20/24 1744 92.5 (204)              Pertinent Labs/Diagnostic Results:   Radiology:  XR hip/pelv 1 vw left if performed   Final Interpretation by Bradley Landon Kocher, MD (06/21 0826)       Postsurgical changes status post intramedullary nail placement with near-anatomic alignment of the left intertrochanteric fracture.      Moderate left hip osteoarthritis.            Resident: ALISIA Cobian I, the attending radiologist, have reviewed the images and agree with the final report above.      Workstation performed: [id number]         XR hip/pelv 1 vw left if performed   Final Interpretation by Bradley Landon Kocher, MD (06/21 0825)      Fluoroscopy provided for procedure guidance.      Please refer to the separate procedure note for additional details.                     Resident: ALISIA Cobian I, the attending radiologist, have reviewed the images and agree with the final report above.      Workstation performed: [id number]         XR femur 2 vw left   Final Interpretation by Bradley Landon Kocher, MD (06/21 0824)      Impacted left intertrochanteric fracture, better visualized on subsequent CT study.      Moderate left hip osteoarthritis.      Resident: ALISIA Cobian I, the attending radiologist, have reviewed the images and agree with the final report above.      Workstation performed: [id number]         TRAUMA - CT head wo contrast   Final Interpretation by Hernan Dee MD (06/20 1257)      No acute intracranial abnormality.            This was discussed with Dr. Almanzar at 12:51 p.m.      Workstation performed: [id number]         TRAUMA - CT spine cervical wo contrast   Final Interpretation by Hernan Dee MD (06/20 1255)      No cervical spine fracture or traumatic malalignment.         This was discussed with Dr. Almanzar at 12:51 p.m.         Workstation performed: [id number]         TRAUMA - CT abdomen pelvis w contrast   Final Interpretation by Hernan Dee MD (06/20 1253)      Comminuted intratrochanteric fracture of the left hip.      No evidence of solid organ trauma or ascites.      Gallstone.      Hyperdense lesion involves the right kidney and could  represent a hyperdense cyst or nodule. Nonemergent follow-up ultrasound is advised.      Asymmetric density in the left prostate is only slightly more pronounced than 2017. Correlation with PSA is advised.      High density pleural thickening at the left lung base is probably related to old inflammation given pleural effusion in 2018.      This was discussed with Dr. Higgins at 12:51 p.m.      Workstation performed: [id number]         XR Trauma pelvis ap only 1 or 2 vw   Final Interpretation by Hernan Dee MD (06/20 8318)      Intertrochanteric fracture left hip. This was discussed with Dr. Saavedra at 12:51 p.m.      Workstation performed: [id number]         XR Trauma chest portable   Final Interpretation by Lucila Horan MD (06/20 5359)      No acute cardiopulmonary disease.      No acute displaced fracture.      Workstation performed: [id number]           Cardiology:  ECG 12 lead   Final Result by Jaqui Magdaleno MD (06/20 6374)   Atrial fibrillation with slow ventricular response   Left bundle branch block   Abnormal ECG   When compared with ECG of 24-NOV-2018 05:27,   Atrial fibrillation has replaced Sinus rhythm   Left bundle branch block has replaced Incomplete left bundle branch block   Nonspecific T wave abnormality no longer evident in Inferior leads   T wave inversion less evident in Lateral leads   Confirmed by Jaqui Magdaleno ([id number]) on 6/20/2024 11:33:05 PM        GI:  No orders to display           Results from last 7 days   Lab Units 06/22/24  0129 06/21/24  0430 06/20/24  1209   WBC Thousand/uL 11.54* 9.21 8.50   HEMOGLOBIN g/dL 10.6* 10.8* 12.0   HEMATOCRIT % 33.8* 34.9* 38.3   PLATELETS Thousands/uL 135* 114* 129*   TOTAL NEUT ABS Thousands/µL 9.03* 7.92* 5.54         Results from last 7 days   Lab Units 06/22/24  0129 06/21/24  0430 06/20/24  1209   SODIUM mmol/L 138 139 138   POTASSIUM mmol/L 4.1 4.0 4.2   CHLORIDE mmol/L 107 108 107   CO2 mmol/L 24 21 26   ANION GAP  "mmol/L 7 10 5   BUN mg/dL 42* 32* 35*   CREATININE mg/dL 2.21* 1.95* 2.18*   EGFR ml/min/1.73sq m 25 29 25   CALCIUM mg/dL 9.0 8.7 9.1   MAGNESIUM mg/dL  --  2.2  --      Results from last 7 days   Lab Units 06/21/24  0430   AST U/L 18   ALT U/L 9   ALK PHOS U/L 50   TOTAL PROTEIN g/dL 5.6*   ALBUMIN g/dL 3.2*   TOTAL BILIRUBIN mg/dL 1.01*     Results from last 7 days   Lab Units 06/22/24  0644 06/21/24  2051 06/21/24  1844 06/21/24  1701 06/21/24  1120 06/21/24  0758 06/20/24  1739   POC GLUCOSE mg/dl 180* 265* 316* 408* 354* 206* 170*     Results from last 7 days   Lab Units 06/22/24  0129 06/21/24  0430 06/20/24  1209   GLUCOSE RANDOM mg/dL 156* 212* 172*             No results found for: \"BETA-HYDROXYBUTYRATE\"                           Results from last 7 days   Lab Units 06/20/24  1434   PROTIME seconds 14.5   INR  1.09   PTT seconds 26                                                                                                               Medications:   Scheduled Medications:  allopurinol, 300 mg, Oral, Daily  apixaban, 2.5 mg, Oral, BID  ascorbic acid, 500 mg, Oral, Daily  cholecalciferol, 1,000 Units, Oral, Daily  dorzolamide-timolol, 1 drop, Both Eyes, BID  ferrous sulfate, 325 mg, Oral, Daily  gabapentin, 300 mg, Oral, HS  insulin glargine, 20 Units, Subcutaneous, QAM  insulin lispro, 1-5 Units, Subcutaneous, HS  insulin lispro, 1-6 Units, Subcutaneous, TID AC  insulin lispro, 10 Units, Subcutaneous, TID With Meals  levothyroxine, 150 mcg, Oral, Early Morning  pantoprazole, 20 mg, Oral, Daily  senna, 1 tablet, Oral, Daily  tamsulosin, 0.4 mg, Oral, Daily With Dinner  torsemide, 10 mg, Oral, Daily      Continuous IV Infusions:     PRN Meds:  acetaminophen, 650 mg, Oral, Q6H PRN  Artificial Tears, 1 drop, Both Eyes, Q6H PRN  LORazepam, 0.5 mg, Oral, Q8H PRN        Discharge Plan: TBD    Network Utilization Review Department  ATTENTION: Please call with any questions or concerns to [phone] and " carefully listen to the prompts so that you are directed to the right person. All voicemails are confidential.   For Discharge needs, contact Care Management DC Support Team at [phone] opt. 2  Send all requests for admission clinical reviews, approved or denied determinations and any other requests to dedicated fax number below belonging to the campus where the patient is receiving treatment. List of dedicated fax numbers for the Facilities:  FACILITY NAME UR FAX NUMBER   ADMISSION DENIALS (Administrative/Medical Necessity) [phone]   DISCHARGE SUPPORT TEAM (NETWORK) [phone]   PARENT CHILD HEALTH (Maternity/NICU/Pediatrics) [phone]   [facility] [phone]   [facility] [phone]   [facility] [phone]   [facility] [phone]   [facility] [phone]   [facility] [phone]   [facility] [phone]   [facility] [phone]   [facility] [phone]   [facility] [phone]   [facility] [phone]   [facility] [phone]

## 2024-06-22 NOTE — PROGRESS NOTES
"Carlos Enrique Roth Jr.  89 y.o.  male  MR#: [id number]  6/22/2024    Post-op days: 2  Extremity: left hip    Subjective: Patient seen and examined.  Lying comfortably in bed.  Awake and alert this morning.  No issues overnight.  Pain is well-controlled at this time.  He denies any chest pain, shortness of breath, fevers or chills.    Vitals:   Vitals:    06/22/24 0645   BP: 141/61   Pulse: 68   Resp: 16   Temp: 98.3 °F (36.8 °C)   SpO2: 93%       Exam:   A&O x 3 NAD  Left hip:  Mepilex dressings c/d/I  Thigh and calf compartments are soft, compressible  Actively moving ankle and toes  DP 2+  Sensation intact to light touch\    X-rays:  Left hip: well aligned comminuted intertroch fracture with hardware intact    Labs:   WBC   Recent Labs     06/20/24  1209 06/21/24  0430 06/22/24  0129   WBC 8.50 9.21 11.54*     H/H   Recent Labs     06/20/24  1209 06/21/24  0430 06/22/24  0129   HGB 12.0 10.8* 10.6*   /  Recent Labs     06/20/24  1209 06/21/24  0430 06/22/24  0129   HCT 38.3 34.9* 33.8*     Sed Rate No results for input(s): \"SEDRATE\" in the last 72 hours.  CRP No results for input(s): \"CRP\" in the last 72 hours.    Assessment:   S/p left hip TFNA pod 2    Plan:   Weightbearing as tolerated to left lower extremity with assistance  PT/OT  DVT prophylaxis as per medical/CC team   Pain control as needed  ABLA: hbg 10.8. Continue to monitor vitals and H/H.   Encourage incentive spirometry  DC planning    "

## 2024-06-22 NOTE — INCIDENTAL FINDINGS
The following findings require follow up:  Radiographic finding   Finding: Hyperdense lesion involves the right kidney and could represent a hyperdense cyst or nodule. Asymmetric density in the left prostate is only slightly more pronounced than 2017.    Follow up required: US kidney/bladder, PSA   Follow up should be done within 3 month(s)    Please notify the following clinician to assist with the follow up:   Dr. Cid/PCP    Incidental finding results were discussed with the Patient by Zoey Gray PA-C on 06/22/24.   They expressed understanding and all questions answered.

## 2024-06-22 NOTE — PLAN OF CARE
Problem: PAIN - ADULT  Goal: Verbalizes/displays adequate comfort level or baseline comfort level  Description: Interventions:  - Encourage patient to monitor pain and request assistance  - Assess pain using appropriate pain scale  - Administer analgesics based on type and severity of pain and evaluate response  - Implement non-pharmacological measures as appropriate and evaluate response  - Consider cultural and social influences on pain and pain management  - Notify physician/advanced practitioner if interventions unsuccessful or patient reports new pain  Outcome: Progressing     Problem: INFECTION - ADULT  Goal: Absence or prevention of progression during hospitalization  Description: INTERVENTIONS:  - Assess and monitor for signs and symptoms of infection  - Monitor lab/diagnostic results  - Monitor all insertion sites, i.e. indwelling lines, tubes, and drains  - Monitor endotracheal if appropriate and nasal secretions for changes in amount and color  - [place] appropriate cooling/warming therapies per order  - Administer medications as ordered  - Instruct and encourage patient and family to use good hand hygiene technique  - Identify and instruct in appropriate isolation precautions for identified infection/condition  Outcome: Progressing  Goal: Absence of fever/infection during neutropenic period  Description: INTERVENTIONS:  - Monitor WBC    Outcome: Progressing     Problem: SAFETY ADULT  Goal: Patient will remain free of falls  Description: INTERVENTIONS:  - Educate patient/family on patient safety including physical limitations  - Instruct patient to call for assistance with activity   - Consult OT/PT to assist with strengthening/mobility   - Keep Call bell within reach  - Keep bed low and locked with side rails adjusted as appropriate  - Keep care items and personal belongings within reach  - Initiate and maintain comfort rounds  - Make Fall Risk Sign visible to staff  - Offer Toileting every x Hours,  in advance of need  - Initiate/Maintain xalarm  - Obtain necessary fall risk management equipment: x  - Apply yellow socks and bracelet for high fall risk patients  - Consider moving patient to room near nurses station  Outcome: Progressing  Goal: Maintain or return to baseline ADL function  Description: INTERVENTIONS:  -  Assess patient's ability to carry out ADLs; assess patient's baseline for ADL function and identify physical deficits which impact ability to perform ADLs (bathing, care of mouth/teeth, toileting, grooming, dressing, etc.)  - Assess/evaluate cause of self-care deficits   - Assess range of motion  - Assess patient's mobility; develop plan if impaired  - Assess patient's need for assistive devices and provide as appropriate  - Encourage maximum independence but intervene and supervise when necessary  - Involve family in performance of ADLs  - Assess for home care needs following discharge   - Consider OT consult to assist with ADL evaluation and planning for discharge  - Provide patient education as appropriate  Outcome: Progressing  Goal: Maintains/Returns to pre admission functional level  Description: INTERVENTIONS:  - Perform AM-PAC 6 Click Basic Mobility/ Daily Activity assessment daily.  - Set and communicate daily mobility goal to care team and patient/family/caregiver.   - Collaborate with rehabilitation services on mobility goals if consulted  - Perform Range of Motion x times a day.  - Reposition patient every x hours.  - Dangle patient x times a day  - Stand patient x times a day  - Ambulate patient x times a day  - Out of bed to chair x times a day   - Out of bed for meals xx times a day  - Out of bed for toileting  - Record patient progress and toleration of activity level   Outcome: Progressing     Problem: DISCHARGE PLANNING  Goal: Discharge to home or other facility with appropriate resources  Description: INTERVENTIONS:  - Identify barriers to discharge w/patient and caregiver  -  Arrange for needed discharge resources and transportation as appropriate  - Identify discharge learning needs (meds, wound care, etc.)  - Arrange for interpretive services to assist at discharge as needed  - Refer to Case Management Department for coordinating discharge planning if the patient needs post-hospital services based on physician/advanced practitioner order or complex needs related to functional status, cognitive ability, or social support system  Outcome: Progressing     Problem: Knowledge Deficit  Goal: Patient/family/caregiver demonstrates understanding of disease process, treatment plan, medications, and discharge instructions  Description: Complete learning assessment and assess knowledge base.  Interventions:  - Provide teaching at level of understanding  - Provide teaching via preferred learning methods  Outcome: Progressing     Problem: Prexisting or High Potential for Compromised Skin Integrity  Goal: Skin integrity is maintained or improved  Description: INTERVENTIONS:  - Identify patients at risk for skin breakdown  - Assess and monitor skin integrity  - Assess and monitor nutrition and hydration status  - Monitor labs   - Assess for incontinence   - Turn and reposition patient  - Assist with mobility/ambulation  - Relieve pressure over bony prominences  - Avoid friction and shearing  - Provide appropriate hygiene as needed including keeping skin clean and dry  - Evaluate need for skin moisturizer/barrier cream  - Collaborate with interdisciplinary team   - Patient/family teaching  - Consider wound care consult   Outcome: Progressing

## 2024-06-23 ENCOUNTER — NURSING HOME VISIT (OUTPATIENT)
Dept: FAMILY MEDICINE CLINIC | Facility: CLINIC | Age: 89
End: 2024-06-23
Payer: COMMERCIAL

## 2024-06-23 DIAGNOSIS — Z95.2 S/P AVR (AORTIC VALVE REPLACEMENT): ICD-10-CM

## 2024-06-23 DIAGNOSIS — N18.32 ANEMIA DUE TO STAGE 3B CHRONIC KIDNEY DISEASE  (HCC): Chronic | ICD-10-CM

## 2024-06-23 DIAGNOSIS — R26.2 AMBULATORY DYSFUNCTION: ICD-10-CM

## 2024-06-23 DIAGNOSIS — I25.10 CORONARY ARTERY DISEASE INVOLVING NATIVE CORONARY ARTERY OF NATIVE HEART WITHOUT ANGINA PECTORIS: ICD-10-CM

## 2024-06-23 DIAGNOSIS — S72.002A FRACTURE OF UNSPECIFIED PART OF NECK OF LEFT FEMUR, INITIAL ENCOUNTER FOR CLOSED FRACTURE (HCC): Primary | ICD-10-CM

## 2024-06-23 DIAGNOSIS — I35.0 NONRHEUMATIC AORTIC VALVE STENOSIS: ICD-10-CM

## 2024-06-23 DIAGNOSIS — E11.40 TYPE 2 DIABETES MELLITUS WITH DIABETIC NEUROPATHY, WITH LONG-TERM CURRENT USE OF INSULIN (HCC): ICD-10-CM

## 2024-06-23 DIAGNOSIS — D63.1 ANEMIA DUE TO STAGE 3B CHRONIC KIDNEY DISEASE  (HCC): Chronic | ICD-10-CM

## 2024-06-23 DIAGNOSIS — I48.91 NEW ONSET A-FIB (HCC): ICD-10-CM

## 2024-06-23 DIAGNOSIS — Z79.4 TYPE 2 DIABETES MELLITUS WITH DIABETIC NEUROPATHY, WITH LONG-TERM CURRENT USE OF INSULIN (HCC): ICD-10-CM

## 2024-06-23 PROCEDURE — 99305 1ST NF CARE MODERATE MDM 35: CPT | Performed by: FAMILY MEDICINE

## 2024-06-24 ENCOUNTER — NURSING HOME VISIT (OUTPATIENT)
Dept: FAMILY MEDICINE CLINIC | Facility: CLINIC | Age: 89
End: 2024-06-24
Payer: COMMERCIAL

## 2024-06-24 DIAGNOSIS — I50.32 CHRONIC DIASTOLIC CONGESTIVE HEART FAILURE (HCC): ICD-10-CM

## 2024-06-24 DIAGNOSIS — I35.0 NONRHEUMATIC AORTIC VALVE STENOSIS: ICD-10-CM

## 2024-06-24 DIAGNOSIS — I25.10 CORONARY ARTERY DISEASE INVOLVING NATIVE CORONARY ARTERY OF NATIVE HEART WITHOUT ANGINA PECTORIS: ICD-10-CM

## 2024-06-24 DIAGNOSIS — I48.91 NEW ONSET A-FIB (HCC): ICD-10-CM

## 2024-06-24 DIAGNOSIS — Z59.819 HOUSING INSECURITY: Primary | ICD-10-CM

## 2024-06-24 DIAGNOSIS — S72.002A FRACTURE OF UNSPECIFIED PART OF NECK OF LEFT FEMUR, INITIAL ENCOUNTER FOR CLOSED FRACTURE (HCC): Primary | ICD-10-CM

## 2024-06-24 PROCEDURE — 99308 SBSQ NF CARE LOW MDM 20: CPT | Performed by: FAMILY MEDICINE

## 2024-06-24 SDOH — ECONOMIC STABILITY - HOUSING INSECURITY: HOUSING INSTABILITY UNSPECIFIED: Z59.819

## 2024-06-24 NOTE — PROGRESS NOTES
[facility]  [address] [place] [address]  Facility: [facility]    NAME: Carlos Enrique Roth Jr.  AGE: 89 y.o. SEX: male    DATE OF ENCOUNTER: 6/24/2024    Code status:  DNR w/ Hospitalization    Assessment and Plan     1. Fracture of unspecified part of neck of left femur, initial encounter for closed fracture (HCC)  2. Nonrheumatic aortic valve stenosis  3. Coronary artery disease involving native coronary artery of native heart without angina pectoris  4. Chronic diastolic congestive heart failure (HCC)  5. New onset a-fib (HCC)      All medications and routine orders were reviewed and updated as needed.    Plan discussed with: Patient    Chief Complaint     Interim evaluation    History of Present Illness     Patient complains of constipation and would like something to help.  He would use MiraLAX normally at home.  [place] and does not need to negotiate steps to be able to return home.  He is denying any dyspnea.  Appetite is at baseline.  No dysuria.  No swallowing problems.    The following portions of the patient's history were reviewed and updated as appropriate: current medications, past family history, past medical history, past social history, past surgical history and problem list.    Allergies:  Allergies   Allergen Reactions    Amlodipine Nausea Only    Atorvastatin Myalgia       Review of Systems     Review of Systems   Constitutional:  Negative for activity change, appetite change, chills, diaphoresis, fatigue and unexpected weight change.   HENT:  Negative for congestion, ear discharge, ear pain, hearing loss, nosebleeds and rhinorrhea.    Eyes:  Negative for pain, redness, itching and visual disturbance.   Respiratory:  Negative for cough, choking, chest tightness and shortness of breath.    Cardiovascular:  Negative for chest pain and leg swelling.   Gastrointestinal:  Negative for abdominal pain, blood in stool, constipation, diarrhea and  nausea.   Endocrine: Negative for cold intolerance, polydipsia and polyphagia.   Genitourinary:  Negative for dysuria, frequency, hematuria and urgency.   Musculoskeletal:  Positive for arthralgias and gait problem. Negative for back pain, joint swelling, neck pain and neck stiffness.   Skin:  Negative for color change and rash.   Allergic/Immunologic: Negative for environmental allergies and food allergies.   Neurological:  Positive for weakness. Negative for dizziness, tremors, seizures, speech difficulty, numbness and headaches.   Hematological:  Negative for adenopathy. Does not bruise/bleed easily.   Psychiatric/Behavioral:  Negative for behavioral problems, dysphoric mood, hallucinations and self-injury.        Medications and orders     All medications reviewed and updated in correction EMR.      Objective     Vitals: per nursing home records    Physical Exam  Constitutional:       General: He is not in acute distress.     Appearance: He is well-developed. He is not diaphoretic.   HENT:      Head: Normocephalic and atraumatic.      Right Ear: External ear normal.      Left Ear: External ear normal.      Nose: Nose normal.      Mouth/Throat:      Pharynx: No oropharyngeal exudate.   Eyes:      General: No scleral icterus.        Right eye: No discharge.         Left eye: No discharge.      Conjunctiva/sclera: Conjunctivae normal.      Pupils: Pupils are equal, round, and reactive to light.   Neck:      Thyroid: No thyromegaly.   Cardiovascular:      Rate and Rhythm: Normal rate. Rhythm irregular.      Heart sounds: Murmur heard.   Pulmonary:      Effort: Pulmonary effort is normal.      Breath sounds: Normal breath sounds. No wheezing or rales.   Abdominal:      General: Bowel sounds are normal.      Palpations: Abdomen is soft. There is no mass.      Tenderness: There is no abdominal tenderness. There is no guarding.   Musculoskeletal:         General: Tenderness present. Normal range of motion.       Cervical back: Normal range of motion and neck supple.   Lymphadenopathy:      Cervical: No cervical adenopathy.   Skin:     General: Skin is warm and dry.      Findings: Bruising present.   Neurological:      Mental Status: He is alert and oriented to person, place, and time.      Deep Tendon Reflexes: Reflexes are normal and symmetric.   Psychiatric:         Thought Content: Thought content normal.         Judgment: Judgment normal.         Pertinent Laboratory/Diagnostic Studies:     The following studies were reviewed please see chart or hospital paperwork for details.    Space for lab dictation no new diagnostics    - One-time dose of MiraLAX today.  Check CBC later in the week    Ag Rincon DO  6/24/2024 12:49 PM

## 2024-06-24 NOTE — UTILIZATION REVIEW
NOTIFICATION OF ADMISSION DISCHARGE   This is a Notification of Discharge from [facility]. Please be advised that this patient has been discharge from our facility. Below you will find the admission and discharge date and time including the patient’s disposition.   UTILIZATION REVIEW CONTACT:  Nyla Jett  Utilization   Network Utilization Review Department  Phone: [phone] x carefully listen to the prompts. All voicemails are confidential.  Email: [email or web address]     ADMISSION INFORMATION  PRESENTATION DATE: 6/20/2024 11:54 AM  OBERVATION ADMISSION DATE:   INPATIENT ADMISSION DATE: 6/20/24  2:22 PM   DISCHARGE DATE: 6/22/2024 12:21 PM   [facility]    Network Utilization Review Department  ATTENTION: Please call with any questions or concerns to [phone] and carefully listen to the prompts so that you are directed to the right person. All voicemails are confidential.   For Discharge needs, contact Care Management DC Support Team at [phone] opt. 2  Send all requests for admission clinical reviews, approved or denied determinations and any other requests to dedicated fax number below belonging to the campus where the patient is receiving treatment. List of dedicated fax numbers for the Facilities:  FACILITY NAME UR FAX NUMBER   ADMISSION DENIALS (Administrative/Medical Necessity) [phone]   DISCHARGE SUPPORT TEAM ([facility]) [phone]   PARENT CHILD HEALTH (Maternity/NICU/Pediatrics) [phone]   [facility] [phone]   [facility] [phone]   [facility] [phone]   [facility] [phone]   [facility] [phone]   [facility] [phone]   [facility] [phone]   [facility]  [phone]   [facility] [phone]   formerly [facility] [phone]   [facility] [phone]   [facility] [phone]

## 2024-06-25 NOTE — TELEPHONE ENCOUNTER
Upon review of the In Basket request we were able to locate, review, and update the patient chart as requested for Diabetic Foot Exam.    Any additional questions or concerns should be emailed to the Practice Liaisons via the appropriate education email address, please do not reply via In Basket.    Thank you  José Paige   [facility]

## 2024-06-26 ENCOUNTER — PATIENT OUTREACH (OUTPATIENT)
Dept: FAMILY MEDICINE CLINIC | Facility: HOSPITAL | Age: 89
End: 2024-06-26

## 2024-06-26 NOTE — PROGRESS NOTES
SACHI MAURER received an [facility] referral for housing instability for patient. [facility] is currently at [facility]. However, SACHI MAURER called patient's cell phone ([phone]). Patient did not answer. SACHI MAURER left a message including SACHI MAURER contact information and requested a call back. SACHI MAURER will attempt to contact patient one more additional time. Per care coordination notes from REHANA Benites on 6/21/24, housing instability does not seem present. However the note does reflect that he struggles with paying his mortgage.

## 2024-06-27 ENCOUNTER — PATIENT OUTREACH (OUTPATIENT)
Dept: FAMILY MEDICINE CLINIC | Facility: HOSPITAL | Age: 89
End: 2024-06-27

## 2024-06-27 ENCOUNTER — TELEPHONE (OUTPATIENT)
Dept: CARDIOLOGY CLINIC | Facility: CLINIC | Age: 89
End: 2024-06-27

## 2024-06-27 ENCOUNTER — NURSING HOME VISIT (OUTPATIENT)
Dept: FAMILY MEDICINE CLINIC | Facility: CLINIC | Age: 89
End: 2024-06-27
Payer: COMMERCIAL

## 2024-06-27 DIAGNOSIS — D63.1 ANEMIA DUE TO STAGE 3B CHRONIC KIDNEY DISEASE  (HCC): Chronic | ICD-10-CM

## 2024-06-27 DIAGNOSIS — I35.0 NONRHEUMATIC AORTIC (VALVE) STENOSIS: ICD-10-CM

## 2024-06-27 DIAGNOSIS — I35.0 NONRHEUMATIC AORTIC VALVE STENOSIS: ICD-10-CM

## 2024-06-27 DIAGNOSIS — S72.002A FRACTURE OF UNSPECIFIED PART OF NECK OF LEFT FEMUR, INITIAL ENCOUNTER FOR CLOSED FRACTURE (HCC): Primary | ICD-10-CM

## 2024-06-27 DIAGNOSIS — N18.32 ANEMIA DUE TO STAGE 3B CHRONIC KIDNEY DISEASE  (HCC): Chronic | ICD-10-CM

## 2024-06-27 DIAGNOSIS — I48.91 NEW ONSET A-FIB (HCC): ICD-10-CM

## 2024-06-27 DIAGNOSIS — Z79.4 TYPE 2 DIABETES MELLITUS WITH DIABETIC NEUROPATHY, WITH LONG-TERM CURRENT USE OF INSULIN (HCC): ICD-10-CM

## 2024-06-27 DIAGNOSIS — E11.40 TYPE 2 DIABETES MELLITUS WITH DIABETIC NEUROPATHY, WITH LONG-TERM CURRENT USE OF INSULIN (HCC): ICD-10-CM

## 2024-06-27 PROCEDURE — 99308 SBSQ NF CARE LOW MDM 20: CPT | Performed by: FAMILY MEDICINE

## 2024-06-27 NOTE — PROGRESS NOTES
SACHI MAURER received an [facility] referral for housing instability for patient. Patient is currently at [facility]. However, SACHI MAURER called patient's cell phone ([phone]) a second time  Patient did not answer. SACHI MAURER left a message including SACHI MAURER contact information and requested a call back. SACHI MAURER will send unable to reach letter and close. Please re consult SACHI MAURER as needed.

## 2024-06-27 NOTE — LETTER
[address]  [address]  [place] [id number]    Re: Unsa   6/27/2024       Dear Carlos Enrique,    I am a [facility]  and wanted to be certain you had information to contact me should you desire assistance with or have questions about non-medical aspects of your care such as [but not limited to] medical insurance, housing, transportation, material needs, or emergency needs.  If I do not have an answer I will assist you in finding the appropriate agency or individual who can help.      Please feel free to contact me at Dept: [phone]. Thank You.    Sincerely,         JAMES Hilliard

## 2024-06-27 NOTE — PROGRESS NOTES
[facility]  [address] [place] [address]  Facility: [facility]    NAME: Carlos Enrique Roth Jr.  AGE: 89 y.o. SEX: male    DATE OF ENCOUNTER: 6/27/2024    Code status:  DNR w/ Hospitalization    Assessment and Plan     1. Fracture of unspecified part of neck of left femur, initial encounter for closed fracture ([facility])  2. Nonrheumatic aortic (valve) stenosis  3. New onset a-fib ([facility])  4. Nonrheumatic aortic valve stenosis  5. Type 2 diabetes mellitus with diabetic neuropathy, with long-term current use of insulin ([facility])  6. Anemia due to stage 3b chronic kidney disease  ([facility])      All medications and routine orders were reviewed and updated as needed.    Plan discussed with: Family member    Chief Complaint     Interim evaluation    History of Present Illness     The patient reports significant pain which is restricting his ability to perform therapy.  He only has discomfort when he is standing or bearing weight.  He notes that his bowels were sluggish and finds that at home he would use unsweetened prune juice.  He is denying any dyspnea.  He reports that there has been some clear drainage from the lower end of the inferior incision.  No fever.    The following portions of the patient's history were reviewed and updated as appropriate: current medications, past family history, past medical history, past social history, past surgical history and problem list.    Allergies:  Allergies   Allergen Reactions    Amlodipine Nausea Only    Atorvastatin Myalgia       Review of Systems     Review of Systems   Constitutional:  Negative for activity change, appetite change, chills, diaphoresis, fatigue and unexpected weight change.   HENT:  Negative for congestion, ear discharge, ear pain, hearing loss, nosebleeds and rhinorrhea.    Eyes:  Negative for pain, redness, itching and visual disturbance.   Respiratory:  Negative for cough, choking, chest tightness and shortness of breath.     Cardiovascular:  Negative for chest pain and leg swelling.   Gastrointestinal:  Negative for abdominal pain, blood in stool, constipation, diarrhea and nausea.   Endocrine: Negative for cold intolerance, polydipsia and polyphagia.   Genitourinary:  Negative for dysuria, frequency, hematuria and urgency.   Musculoskeletal:  Positive for arthralgias and gait problem. Negative for back pain, joint swelling, neck pain and neck stiffness.   Skin:  Positive for wound. Negative for color change and rash.   Allergic/Immunologic: Negative for environmental allergies and food allergies.   Neurological:  Positive for weakness. Negative for dizziness, tremors, seizures, speech difficulty, numbness and headaches.   Hematological:  Negative for adenopathy. Does not bruise/bleed easily.   Psychiatric/Behavioral:  Negative for behavioral problems, dysphoric mood, hallucinations and self-injury.        Medications and orders     All medications reviewed and updated in custodial EMR.      Objective     Vitals: per nursing home records    Physical Exam  Constitutional:       General: He is not in acute distress.     Appearance: He is well-developed. He is not diaphoretic.   HENT:      Head: Normocephalic and atraumatic.      Right Ear: External ear normal.      Left Ear: External ear normal.      Nose: Nose normal.      Mouth/Throat:      Pharynx: No oropharyngeal exudate.   Eyes:      General: No scleral icterus.        Right eye: No discharge.         Left eye: No discharge.      Conjunctiva/sclera: Conjunctivae normal.      Pupils: Pupils are equal, round, and reactive to light.   Neck:      Thyroid: No thyromegaly.   Cardiovascular:      Rate and Rhythm: Normal rate. Rhythm irregular.      Heart sounds: Murmur heard.   Pulmonary:      Effort: Pulmonary effort is normal.      Breath sounds: Normal breath sounds. No wheezing or rales.   Abdominal:      General: Bowel sounds are normal.      Palpations: Abdomen is soft. There is  no mass.      Tenderness: There is no abdominal tenderness. There is no guarding.   Musculoskeletal:         General: Tenderness present. Normal range of motion.      Cervical back: Normal range of motion and neck supple.   Lymphadenopathy:      Cervical: No cervical adenopathy.   Skin:     General: Skin is warm and dry.      Findings: Bruising present.   Neurological:      Mental Status: He is alert and oriented to person, place, and time.      Motor: Weakness present.      Deep Tendon Reflexes: Reflexes are normal and symmetric.   Psychiatric:         Thought Content: Thought content normal.         Judgment: Judgment normal.         Pertinent Laboratory/Diagnostic Studies:     The following studies were reviewed please see chart or hospital paperwork for details.    Space for lab dictation no new diagnostics    - Add tramadol at 9 AM and 1 PM.  Use prune juice as needed for his bowel regimen    Ag Rincon,   6/27/2024 11:08 AM

## 2024-06-27 NOTE — TELEPHONE ENCOUNTER
SHIVANI (11:31a) to reschedule 7/11/2024 appointment with Mic as he is not in the office.  Schedule for the first available appointment thereafter

## 2024-07-01 ENCOUNTER — NURSING HOME VISIT (OUTPATIENT)
Dept: FAMILY MEDICINE CLINIC | Facility: CLINIC | Age: 89
End: 2024-07-01
Payer: COMMERCIAL

## 2024-07-01 DIAGNOSIS — S72.002A FRACTURE OF UNSPECIFIED PART OF NECK OF LEFT FEMUR, INITIAL ENCOUNTER FOR CLOSED FRACTURE (HCC): ICD-10-CM

## 2024-07-01 DIAGNOSIS — R26.2 AMBULATORY DYSFUNCTION: ICD-10-CM

## 2024-07-01 DIAGNOSIS — I48.91 NEW ONSET A-FIB (HCC): ICD-10-CM

## 2024-07-01 DIAGNOSIS — I50.32 CHRONIC DIASTOLIC CONGESTIVE HEART FAILURE (HCC): ICD-10-CM

## 2024-07-01 DIAGNOSIS — K59.03 DRUG-INDUCED CONSTIPATION: Primary | ICD-10-CM

## 2024-07-01 DIAGNOSIS — Z79.4 LONG TERM (CURRENT) USE OF INSULIN (HCC): ICD-10-CM

## 2024-07-01 PROCEDURE — 99308 SBSQ NF CARE LOW MDM 20: CPT | Performed by: FAMILY MEDICINE

## 2024-07-01 NOTE — PROGRESS NOTES
[facility]  [address] [place] [address]  Facility: [facility]    NAME: Carlos Enrique Roth Jr.  AGE: 89 y.o. SEX: male    DATE OF ENCOUNTER: 7/1/2024    Code status:  DNR w/ Hospitalization    Assessment and Plan     1. Drug-induced constipation  2. Fracture of unspecified part of neck of left femur, initial encounter for closed fracture (HCC)  3. Ambulatory dysfunction  4. Long term (current) use of insulin (HCC)  5. New onset a-fib (HCC)  6. Chronic diastolic congestive heart failure (HCC)      All medications and routine orders were reviewed and updated as needed.    Plan discussed with: Family member    Chief Complaint     Interim evaluation    History of Present Illness     Patient reports persistent constipation despite the use of senna and MiraLAX.  He notes that the pain control is improved with the scheduled tramadol.  He is urinating well.  He has some skin irritation on his left leg.  There continues to be some drainage that is serous in nature from the lower incision on the left thigh.  No dyspnea.  No trouble swallowing.    The following portions of the patient's history were reviewed and updated as appropriate: current medications, past family history, past medical history, past social history, past surgical history and problem list.    Allergies:  Allergies   Allergen Reactions    Amlodipine Nausea Only    Atorvastatin Myalgia       Review of Systems     Review of Systems   Constitutional:  Negative for activity change, appetite change, chills, diaphoresis, fatigue and unexpected weight change.   HENT:  Negative for congestion, ear discharge, ear pain, hearing loss, nosebleeds and rhinorrhea.    Eyes:  Negative for pain, redness, itching and visual disturbance.   Respiratory:  Negative for cough, choking, chest tightness and shortness of breath.    Cardiovascular:  Negative for chest pain and leg swelling.   Gastrointestinal:  Positive for constipation. Negative for  abdominal pain, blood in stool, diarrhea and nausea.   Endocrine: Negative for cold intolerance, polydipsia and polyphagia.   Genitourinary:  Negative for dysuria, frequency, hematuria and urgency.   Musculoskeletal:  Positive for arthralgias and gait problem. Negative for back pain, joint swelling, neck pain and neck stiffness.   Skin:  Negative for color change and rash.   Allergic/Immunologic: Negative for environmental allergies and food allergies.   Neurological:  Positive for weakness. Negative for dizziness, tremors, seizures, speech difficulty, numbness and headaches.   Hematological:  Negative for adenopathy. Does not bruise/bleed easily.   Psychiatric/Behavioral:  Negative for behavioral problems, dysphoric mood, hallucinations and self-injury.        Medications and orders     All medications reviewed and updated in [facility] EMR.      Objective     Vitals: per nursing home records    Physical Exam  Constitutional:       General: He is not in acute distress.     Appearance: He is well-developed. He is not diaphoretic.   HENT:      Head: Normocephalic and atraumatic.      Right Ear: External ear normal.      Left Ear: External ear normal.      Nose: Nose normal.      Mouth/Throat:      Pharynx: No oropharyngeal exudate.   Eyes:      General: No scleral icterus.        Right eye: No discharge.         Left eye: No discharge.      Conjunctiva/sclera: Conjunctivae normal.      Pupils: Pupils are equal, round, and reactive to light.   Neck:      Thyroid: No thyromegaly.   Cardiovascular:      Rate and Rhythm: Normal rate. Rhythm irregular.      Heart sounds: Normal heart sounds. No murmur heard.  Pulmonary:      Effort: Pulmonary effort is normal.      Breath sounds: Normal breath sounds. No wheezing or rales.   Abdominal:      General: Bowel sounds are normal.      Palpations: Abdomen is soft. There is no mass.      Tenderness: There is no abdominal tenderness. There is no guarding.   Musculoskeletal:          General: Tenderness present. Normal range of motion.      Cervical back: Normal range of motion and neck supple.   Lymphadenopathy:      Cervical: No cervical adenopathy.   Skin:     General: Skin is warm and dry.   Neurological:      Mental Status: He is alert and oriented to person, place, and time.      Deep Tendon Reflexes: Reflexes are normal and symmetric.   Psychiatric:         Thought Content: Thought content normal.         Judgment: Judgment normal.         Pertinent Laboratory/Diagnostic Studies:     The following studies were reviewed please see chart or hospital paperwork for details.    Space for lab dictation labs are stable    - Add Amitiza 24 mcg daily for 3 days    Ag Rincon DO  7/1/2024 3:47 PM

## 2024-07-02 ENCOUNTER — TELEPHONE (OUTPATIENT)
Dept: CARDIOLOGY CLINIC | Facility: CLINIC | Age: 89
End: 2024-07-02

## 2024-07-02 NOTE — TELEPHONE ENCOUNTER
LMVM (9:18am) for patient's son Ciro (HIPAA) to call to reschedule his Father's 7/11/2024 appointment with Mic.    LM (9:11am) to call back to reschedule 7/11/2024 appointment with Mic.  Mic will not be in the office.  Schedule next available appointment.

## 2024-07-04 ENCOUNTER — NURSING HOME VISIT (OUTPATIENT)
Dept: FAMILY MEDICINE CLINIC | Facility: CLINIC | Age: 89
End: 2024-07-04
Payer: COMMERCIAL

## 2024-07-04 DIAGNOSIS — S72.002A FRACTURE OF UNSPECIFIED PART OF NECK OF LEFT FEMUR, INITIAL ENCOUNTER FOR CLOSED FRACTURE (HCC): Primary | ICD-10-CM

## 2024-07-04 DIAGNOSIS — E11.22 CKD STAGE 4 DUE TO TYPE 2 DIABETES MELLITUS (HCC): ICD-10-CM

## 2024-07-04 DIAGNOSIS — I35.0 NONRHEUMATIC AORTIC VALVE STENOSIS: ICD-10-CM

## 2024-07-04 DIAGNOSIS — N18.4 CKD STAGE 4 DUE TO TYPE 2 DIABETES MELLITUS (HCC): ICD-10-CM

## 2024-07-04 DIAGNOSIS — I50.32 CHRONIC DIASTOLIC CONGESTIVE HEART FAILURE (HCC): ICD-10-CM

## 2024-07-04 DIAGNOSIS — I25.10 CORONARY ARTERY DISEASE INVOLVING NATIVE CORONARY ARTERY OF NATIVE HEART WITHOUT ANGINA PECTORIS: ICD-10-CM

## 2024-07-04 PROCEDURE — 99308 SBSQ NF CARE LOW MDM 20: CPT | Performed by: FAMILY MEDICINE

## 2024-07-04 NOTE — PROGRESS NOTES
[facility]  [address] [place] [address]  Facility: [facility]    NAME: Carlos Enrique Roth Jr.  AGE: 89 y.o. SEX: male    DATE OF ENCOUNTER: 7/4/2024    Code status:  DNR w/ Hospitalization    Assessment and Plan     1. Fracture of unspecified part of neck of left femur, initial encounter for closed fracture (HCC)  2. 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 Coronary artery disease involving native coronary artery of native heart without angina pectoris  4. Nonrheumatic aortic valve stenosis  5. Chronic diastolic congestive heart failure (HCC)      All medications and routine orders were reviewed and updated as needed.    Plan discussed with: Patient    Chief Complaint     Interim evaluation    History of Present Illness     The patient reports that his bowels moved after receiving the Amitiza.  He is doing well with his therapy and increasing the distance that he is able to ambulate.  Denies any dyspnea.  Has no dysuria.  Appetite is at baseline.  Sleeping well.  Overall progressing    The following portions of the patient's history were reviewed and updated as appropriate: current medications, past family history, past medical history, past social history, past surgical history and problem list.    Allergies:  Allergies   Allergen Reactions    Amlodipine Nausea Only    Atorvastatin Myalgia       Review of Systems     Review of Systems   Constitutional:  Negative for activity change, appetite change, chills, diaphoresis, fatigue and unexpected weight change.   HENT:  Negative for congestion, ear discharge, ear pain, hearing loss, nosebleeds and rhinorrhea.    Eyes:  Negative for pain, redness, itching and visual disturbance.   Respiratory:  Negative for cough, choking, chest tightness and shortness of breath.    Cardiovascular:  Negative for chest pain and leg swelling.   Gastrointestinal:  Positive for constipation. Negative for abdominal pain, blood in stool,  diarrhea and nausea.   Endocrine: Negative for cold intolerance, polydipsia and polyphagia.   Genitourinary:  Negative for dysuria, frequency, hematuria and urgency.   Musculoskeletal:  Positive for arthralgias and gait problem. Negative for back pain, joint swelling, neck pain and neck stiffness.   Skin:  Negative for color change and rash.   Allergic/Immunologic: Negative for environmental allergies and food allergies.   Neurological:  Positive for weakness. Negative for dizziness, tremors, seizures, speech difficulty, numbness and headaches.   Hematological:  Negative for adenopathy. Does not bruise/bleed easily.   Psychiatric/Behavioral:  Negative for behavioral problems, dysphoric mood, hallucinations and self-injury.        Medications and orders     All medications reviewed and updated in [facility] EMR.      Objective     Vitals: per nursing home records    Physical Exam  Constitutional:       General: He is not in acute distress.     Appearance: He is well-developed. He is not diaphoretic.   HENT:      Head: Normocephalic and atraumatic.      Right Ear: External ear normal.      Left Ear: External ear normal.      Nose: Nose normal.      Mouth/Throat:      Pharynx: No oropharyngeal exudate.   Eyes:      General: No scleral icterus.        Right eye: No discharge.         Left eye: No discharge.      Conjunctiva/sclera: Conjunctivae normal.      Pupils: Pupils are equal, round, and reactive to light.   Neck:      Thyroid: No thyromegaly.   Cardiovascular:      Rate and Rhythm: Normal rate. Rhythm irregular.      Heart sounds: Murmur heard.   Pulmonary:      Effort: Pulmonary effort is normal.      Breath sounds: Normal breath sounds. No wheezing or rales.   Abdominal:      General: Bowel sounds are normal.      Palpations: Abdomen is soft. There is no mass.      Tenderness: There is no abdominal tenderness. There is no guarding.   Musculoskeletal:         General: Tenderness present. Normal range of  motion.      Cervical back: Normal range of motion and neck supple.   Lymphadenopathy:      Cervical: No cervical adenopathy.   Skin:     General: Skin is warm and dry.   Neurological:      Mental Status: He is alert and oriented to person, place, and time.      Deep Tendon Reflexes: Reflexes are normal and symmetric.   Psychiatric:         Thought Content: Thought content normal.         Judgment: Judgment normal.         Pertinent Laboratory/Diagnostic Studies:     The following studies were reviewed please see chart or hospital paperwork for details.    Space for lab dictation new studies    - Maintain the current regimen    Ag Rincon DO  7/4/2024 1:56 PM

## 2024-07-08 ENCOUNTER — OFFICE VISIT (OUTPATIENT)
Dept: OBGYN CLINIC | Facility: MEDICAL CENTER | Age: 89
End: 2024-07-08

## 2024-07-08 ENCOUNTER — NURSING HOME VISIT (OUTPATIENT)
Dept: FAMILY MEDICINE CLINIC | Facility: CLINIC | Age: 89
End: 2024-07-08
Payer: COMMERCIAL

## 2024-07-08 ENCOUNTER — APPOINTMENT (OUTPATIENT)
Dept: RADIOLOGY | Facility: MEDICAL CENTER | Age: 89
End: 2024-07-08
Payer: COMMERCIAL

## 2024-07-08 VITALS
DIASTOLIC BLOOD PRESSURE: 70 MMHG | SYSTOLIC BLOOD PRESSURE: 128 MMHG | BODY MASS INDEX: 32.6 KG/M2 | HEIGHT: 67 IN | HEART RATE: 66 BPM

## 2024-07-08 DIAGNOSIS — M89.8X5 PAIN OF LEFT FEMUR: ICD-10-CM

## 2024-07-08 DIAGNOSIS — Z98.890 STATUS POST HIP SURGERY: Primary | ICD-10-CM

## 2024-07-08 DIAGNOSIS — I35.0 NONRHEUMATIC AORTIC VALVE STENOSIS: ICD-10-CM

## 2024-07-08 DIAGNOSIS — S72.002A FRACTURE OF UNSPECIFIED PART OF NECK OF LEFT FEMUR, INITIAL ENCOUNTER FOR CLOSED FRACTURE (HCC): Primary | ICD-10-CM

## 2024-07-08 DIAGNOSIS — S72.142A CLOSED DISPLACED INTERTROCHANTERIC FRACTURE OF LEFT FEMUR, INITIAL ENCOUNTER (HCC): ICD-10-CM

## 2024-07-08 DIAGNOSIS — I48.91 NEW ONSET A-FIB (HCC): ICD-10-CM

## 2024-07-08 DIAGNOSIS — E11.22 CKD STAGE 4 DUE TO TYPE 2 DIABETES MELLITUS (HCC): ICD-10-CM

## 2024-07-08 DIAGNOSIS — M25.552 LEFT HIP PAIN: ICD-10-CM

## 2024-07-08 DIAGNOSIS — I25.10 ATHEROSCLEROSIS OF NATIVE CORONARY ARTERY OF NATIVE HEART WITHOUT ANGINA PECTORIS: ICD-10-CM

## 2024-07-08 DIAGNOSIS — N18.4 CKD STAGE 4 DUE TO TYPE 2 DIABETES MELLITUS (HCC): ICD-10-CM

## 2024-07-08 PROCEDURE — 99024 POSTOP FOLLOW-UP VISIT: CPT | Performed by: ORTHOPAEDIC SURGERY

## 2024-07-08 PROCEDURE — 99308 SBSQ NF CARE LOW MDM 20: CPT | Performed by: FAMILY MEDICINE

## 2024-07-08 PROCEDURE — 73502 X-RAY EXAM HIP UNI 2-3 VIEWS: CPT

## 2024-07-08 RX ORDER — DOXYCYCLINE 100 MG/1
100 TABLET ORAL 2 TIMES DAILY
COMMUNITY

## 2024-07-08 RX ORDER — TRAMADOL HYDROCHLORIDE 50 MG/1
50 TABLET ORAL EVERY 6 HOURS PRN
COMMUNITY

## 2024-07-08 NOTE — PROGRESS NOTES
Ortho Sports Medicine Hip- Post-op Visit    Assesment:   89 y.o. male 2 weeks status post left hip antegrade IM nail femur, DOS: 06/20/2024.    Plan:    Post-Operative treatment:    Ice to knee for 20 minutes at least 1-2 times daily.  Tylenol for pain.  Staples removed in office today and steri-strips applied. Steri-strips should be removed in 1 week.  PT/OT for left hip fracture. WBAT. Can progress as tolerated.  Paperwork for [facility] nursing home completed and returned to the patient.  No concern for infection at this time. Redness at incision site is secondary to staples.  Recommend nursing home facility fix or replace wheelchair right foot rest. Patient is requesting wider wheelchair as he previously had.    Imaging:    All imaging from today was reviewed by myself and explained to the patient.     Weight bearing:  as tolerated     ROM:  Full    Brace:  No brace needed    DVT Prophylaxis:  Ambulation    Follow up:   Return in about 6 weeks (around 8/19/2024) for Recheck with Dr. Reece.    Patient was advised that if they have any fevers, chills, chest pain, shortness of breath, redness or drainage from the incision, please let our office know immediately.            Chief Complaint   Patient presents with    Left Hip - Post-op       History of Present Illness:    The patient is a 89 y.o. male who is being evaluated post operatively 2 weeks status post left hip antegrade IM nail femur, DOS: 06/20/2024 for a closed intertrochanteric fracture of left femur. Patient is in [facility].    Pain is well controlled.  Patient reports thigh pain and pain in the hip with weightbearing. The patient is using ice to control swelling.    They have started physical therapy at [facility] for WBAT and hip range of motion.     The patient  Ambulation for DVT ppx.  The patient has been ambulating with a wheelchair.    Patient reports [facility] placed him on antibiotic due to possible surgical site infection.    The patient  "denies any fevers, chills, calf pain, chest pain/shortness of breath, redness or drainage from the incision.         I have reviewed the past medical, surgical, social and family history, medications and allergies as documented in the EMR.    Review of systems: ROS is negative other than that noted in the HPI.  Constitutional: Negative for fatigue and fever.      Physical Exam:    Blood pressure 128/70, pulse 66, height 5' 7\" (1.702 m).    General/Constitutional: NAD, well developed, well nourished  HENT: Normocephalic, atraumatic  CV: Intact distal pulses, regular rate  Resp: No respiratory distress or labored breathing  Abd: No abdominal distention  Lymphatic: No lymphadenopathy palpated  Neuro: Alert and Oriented x 3, no focal deficits noted  Psych: Normal mood, normal affect, normal judgement, normal behavior  Skin: Warm, dry, no rashes, no erythema     Hip Exam (focused):    left hip:     No dislocation/deformity  ROM: Full  Greater troch:tender   SI joint: non-tender  Emeka test: deferred due to postop period  Kathrine's test:  deferred due to postop period  Abduction: deferred due to postop period  AT/GS intact     No calf tenderness to palpation bilaterally    Incisions show no erythema, no drainage    LE NV Exam: +2 DP/PT pulses bilaterally  Sensation intact to light touch L2-S1 bilaterally      Hip Imaging:    X-rays of the left hip from today were reviewed, which demonstrate stable hardware alignment of IM nail and screw placement with near anatomic alignment of the intertrochanteric fracture.  I have reviewed the radiology report anddo not currently have a radiology reading from [facility], but will check the result once the reading is performed.      Scribe Attestation      I,:  Andreina Monsalve am acting as a scribe while in the presence of the attending physician.:       I,:  Sukh Reece, DO personally performed the services described in this documentation    as scribed in my presence.:             "

## 2024-07-08 NOTE — PROGRESS NOTES
[facility]  [address] [place] [address]  Facility: [facility]    NAME: Carlos Enrique Roth Jr.  AGE: 89 y.o. SEX: male    DATE OF ENCOUNTER: 7/8/2024    Code status:  DNR w/ Hospitalization    Assessment and Plan     1. Fracture of unspecified part of neck of left femur, initial encounter for closed fracture ([facility])  2. CKD stage 4 due to type 2 diabetes mellitus ([facility])  3. New onset a-fib ([facility])  4. Nonrheumatic aortic valve stenosis  5. Atherosclerosis of native coronary artery of native heart without angina pectoris      All medications and routine orders were reviewed and updated as needed.    Plan discussed with: Patient    Chief Complaint     Interim evaluation    History of Present Illness     The patient is scheduled to see orthopedics later today.  He will have his staples removed at that time.  The left lower incision looks much better with less erythema.  There is some bruising there.  He is denying pain.  He has been performing well in therapy.  He reports a vigorous appetite and explains that is why he has gained several pounds since coming here.  He does not feel short of breath    The following portions of the patient's history were reviewed and updated as appropriate: current medications, past family history, past medical history, past social history, past surgical history and problem list.    Allergies:  Allergies   Allergen Reactions    Amlodipine Nausea Only    Atorvastatin Myalgia       Review of Systems     Review of Systems   Constitutional:  Negative for activity change, appetite change, chills, diaphoresis, fatigue and unexpected weight change.   HENT:  Negative for congestion, ear discharge, ear pain, hearing loss, nosebleeds and rhinorrhea.    Eyes:  Negative for pain, redness, itching and visual disturbance.   Respiratory:  Negative for cough, choking, chest tightness and shortness of breath.    Cardiovascular:  Negative for chest pain and leg swelling.    Gastrointestinal:  Positive for constipation. Negative for abdominal pain, blood in stool, diarrhea and nausea.   Endocrine: Negative for cold intolerance, polydipsia and polyphagia.   Genitourinary:  Negative for dysuria, frequency, hematuria and urgency.   Musculoskeletal:  Positive for arthralgias and gait problem. Negative for back pain, joint swelling, neck pain and neck stiffness.   Skin:  Negative for color change and rash.   Allergic/Immunologic: Negative for environmental allergies and food allergies.   Neurological:  Positive for weakness. Negative for dizziness, tremors, seizures, speech difficulty, numbness and headaches.   Hematological:  Negative for adenopathy. Does not bruise/bleed easily.   Psychiatric/Behavioral:  Negative for behavioral problems, dysphoric mood, hallucinations and self-injury.        Medications and orders     All medications reviewed and updated in longterm EMR.      Objective     Vitals: per nursing home records    Physical Exam  Constitutional:       General: He is not in acute distress.     Appearance: He is well-developed. He is not diaphoretic.   HENT:      Head: Normocephalic and atraumatic.      Right Ear: External ear normal.      Left Ear: External ear normal.      Nose: Nose normal.      Mouth/Throat:      Pharynx: No oropharyngeal exudate.   Eyes:      General: No scleral icterus.        Right eye: No discharge.         Left eye: No discharge.      Conjunctiva/sclera: Conjunctivae normal.      Pupils: Pupils are equal, round, and reactive to light.   Neck:      Thyroid: No thyromegaly.   Cardiovascular:      Rate and Rhythm: Normal rate. Rhythm irregular.      Heart sounds: Murmur heard.   Pulmonary:      Effort: Pulmonary effort is normal.      Breath sounds: Normal breath sounds. No wheezing or rales.   Abdominal:      General: Bowel sounds are normal.      Palpations: Abdomen is soft. There is no mass.      Tenderness: There is no abdominal tenderness. There  is no guarding.   Musculoskeletal:         General: Tenderness present. Normal range of motion.      Cervical back: Normal range of motion and neck supple.   Lymphadenopathy:      Cervical: No cervical adenopathy.   Skin:     General: Skin is warm and dry.      Findings: Bruising present.   Neurological:      Mental Status: He is alert and oriented to person, place, and time.      Motor: Weakness present.      Deep Tendon Reflexes: Reflexes are normal and symmetric.   Psychiatric:         Thought Content: Thought content normal.         Judgment: Judgment normal.         Pertinent Laboratory/Diagnostic Studies:     The following studies were reviewed please see chart or hospital paperwork for details.    Space for lab dictation no new diagnostics    - Check labs.  Follow-up with Bree Rincon DO  7/8/2024 11:51 AM

## 2024-07-11 ENCOUNTER — TELEPHONE (OUTPATIENT)
Age: 89
End: 2024-07-11

## 2024-07-11 ENCOUNTER — OFFICE VISIT (OUTPATIENT)
Dept: CARDIOLOGY CLINIC | Facility: CLINIC | Age: 89
End: 2024-07-11
Payer: COMMERCIAL

## 2024-07-11 ENCOUNTER — NURSING HOME VISIT (OUTPATIENT)
Dept: FAMILY MEDICINE CLINIC | Facility: CLINIC | Age: 89
End: 2024-07-11
Payer: COMMERCIAL

## 2024-07-11 VITALS
HEART RATE: 77 BPM | DIASTOLIC BLOOD PRESSURE: 54 MMHG | BODY MASS INDEX: 32.02 KG/M2 | SYSTOLIC BLOOD PRESSURE: 116 MMHG | HEIGHT: 67 IN | WEIGHT: 204 LBS

## 2024-07-11 DIAGNOSIS — I50.32 CHRONIC DIASTOLIC CONGESTIVE HEART FAILURE (HCC): ICD-10-CM

## 2024-07-11 DIAGNOSIS — S72.002A FRACTURE OF UNSPECIFIED PART OF NECK OF LEFT FEMUR, INITIAL ENCOUNTER FOR CLOSED FRACTURE (HCC): Primary | ICD-10-CM

## 2024-07-11 DIAGNOSIS — I25.10 CORONARY ARTERY DISEASE INVOLVING NATIVE CORONARY ARTERY OF NATIVE HEART WITHOUT ANGINA PECTORIS: Primary | ICD-10-CM

## 2024-07-11 DIAGNOSIS — R60.9 EDEMA, UNSPECIFIED TYPE: ICD-10-CM

## 2024-07-11 DIAGNOSIS — E11.40 TYPE 2 DIABETES MELLITUS WITH DIABETIC NEUROPATHY, WITH LONG-TERM CURRENT USE OF INSULIN (HCC): ICD-10-CM

## 2024-07-11 DIAGNOSIS — I25.10 CORONARY ARTERY DISEASE INVOLVING NATIVE CORONARY ARTERY OF NATIVE HEART WITHOUT ANGINA PECTORIS: ICD-10-CM

## 2024-07-11 DIAGNOSIS — I35.0 NONRHEUMATIC AORTIC (VALVE) STENOSIS: ICD-10-CM

## 2024-07-11 DIAGNOSIS — I48.0 PAROXYSMAL ATRIAL FIBRILLATION (HCC): ICD-10-CM

## 2024-07-11 DIAGNOSIS — Z79.4 TYPE 2 DIABETES MELLITUS WITH DIABETIC NEUROPATHY, WITH LONG-TERM CURRENT USE OF INSULIN (HCC): ICD-10-CM

## 2024-07-11 DIAGNOSIS — E11.22 CKD STAGE 4 DUE TO TYPE 2 DIABETES MELLITUS (HCC): ICD-10-CM

## 2024-07-11 DIAGNOSIS — R26.2 AMBULATORY DYSFUNCTION: ICD-10-CM

## 2024-07-11 DIAGNOSIS — N18.4 CKD STAGE 4 DUE TO TYPE 2 DIABETES MELLITUS (HCC): ICD-10-CM

## 2024-07-11 DIAGNOSIS — Z95.2 S/P AVR (AORTIC VALVE REPLACEMENT): ICD-10-CM

## 2024-07-11 DIAGNOSIS — Z95.1 S/P CABG X 4: ICD-10-CM

## 2024-07-11 PROBLEM — I48.19 PERSISTENT ATRIAL FIBRILLATION (HCC): Status: ACTIVE | Noted: 2024-06-20

## 2024-07-11 PROCEDURE — 99214 OFFICE O/P EST MOD 30 MIN: CPT | Performed by: INTERNAL MEDICINE

## 2024-07-11 PROCEDURE — 93000 ELECTROCARDIOGRAM COMPLETE: CPT | Performed by: INTERNAL MEDICINE

## 2024-07-11 PROCEDURE — 99308 SBSQ NF CARE LOW MDM 20: CPT | Performed by: FAMILY MEDICINE

## 2024-07-11 RX ORDER — TORSEMIDE 20 MG/1
20 TABLET ORAL DAILY
Qty: 90 TABLET | Refills: 3 | Status: SHIPPED | OUTPATIENT
Start: 2024-07-11

## 2024-07-11 RX ORDER — NITROGLYCERIN 0.4 MG/1
0.4 TABLET SUBLINGUAL
Qty: 25 TABLET | Refills: 1 | Status: SHIPPED | OUTPATIENT
Start: 2024-07-11

## 2024-07-11 NOTE — TELEPHONE ENCOUNTER
Sirisha calling from [facility] to schedule TCM for pt. Stated he will be getting discharged on 7/13. Call disconnected while transferring to office.

## 2024-07-11 NOTE — PATIENT INSTRUCTIONS
"Patient Education     Low-sodium diet   The Basics   Written by the doctors and editors at [facility]   What is sodium? -- This is the main ingredient in table salt. It is also found in lots of foods. The body needs a very small amount of sodium to work normally, but most people eat much more sodium than their body needs.  Who should eat less sodium? -- Nearly everyone eats too much sodium. The average American takes in 3400 milligrams of sodium each day. Experts say that most people should have no more than 2300 milligrams a day.  Some people with certain health conditions should follow a low-sodium diet. Ask your doctor how much sodium you should have.  Why should I eat less sodium? -- Reducing the amount of sodium you eat can have lots of health benefits:   It can lower your blood pressure. This means that it can help lower your risk of stroke, heart attack, kidney damage, and lots of other health problems.   It can reduce the amount of fluid in your body, which means that your heart doesn't have to work as hard.   It can keep the kidneys from having to work too hard. This is especially important in people who have kidney disease.   It can reduce swelling in the ankles and belly, which can be uncomfortable and make it hard to move.   It can lower the chances of forming kidney stones.   It can help keep your bones strong.  Which foods have the most sodium? -- Processed foods have the most sodium. These foods usually come in cans, boxes, jars, and bags. They tend to have a lot of sodium even if they don't taste salty. In fact, many sweet foods have a lot of sodium in them. The only way to know for sure how much sodium is in a food is to check the label (figure 1).  Here are some examples of foods that often have too much sodium:   Canned soups   Rice and noodle mixes   Sauces, dressings, and condiments (such as ketchup and mustard)   Pre-made frozen meals (also called \"TV dinners\")   Deli meats, hot dogs, and " "cheeses   Smoked, cured, or pickled foods   Salted snack foods and nuts   Restaurant meals  What should I do to reduce the amount of sodium in my diet? -- Many people think that eating a low-sodium diet just means not adding salt to their food. But this is not true. Not adding salt at the table or when cooking will help a little. But almost all of the sodium you eat is already in the food you buy at the grocery store or at restaurants (figure 2).  Here are some tips to help you eat less sodium:   Avoid processed foods when possible. This is the most important thing you can do to eat less sodium. Processed foods include most foods that are sold in cans, boxes, jars, and bags.   Instead of buying pre-made, processed foods, buy fresh or fresh-frozen fruits and vegetables. (\"Fresh-frozen\" means that the food is frozen without anything added to it.)   Buy meats, fish, chicken, and turkey that are fresh instead of canned or sold at the deli counter. (Meats sold at the deli counter are high in sodium.)   Try to eat at restaurants less often.   When possible, try to make meals from scratch at home using fresh ingredients.   If you do buy canned or packaged foods, choose ones that are labeled \"sodium free\" or \"very low sodium\" (table 1). Or choose foods that have less than 400 milligrams of sodium in each serving. The amount of sodium in each serving appears on the nutrition label (figure 1).  The table has some examples of foods to avoid and foods to choose instead (table 2).  Whatever changes you make, make them slowly. Choose 1 thing to do differently, and do that for a while. If you can keep doing that change easily, add another change. For instance, if you usually eat green beans from a can, try buying fresh or fresh-frozen green beans and cooking them at home without adding salt. If that works for you, keep doing it. Then, choose another thing to change.  If you try making a change and it doesn't work right away, don't " "give up. See if you can reduce sodium in other ways. The important thing is to take small steps and to keep doing the changes that work for you.  Can I still eat out at restaurants sometimes? -- One of best ways to limit your sodium is to only eat out at restaurants every once in a while. When you do eat out, try to choose places that offer healthier choices and fresh ingredients.  No matter where you eat, when choosing your food:   Ask your  if your meal can be made without salt.   Avoid foods that come with sauces or dips.   Choose plain grilled meats or fish and steamed vegetables.   Ask for oil and vinegar for your salad, rather than dressing.   If a meal you really want has more sodium than you should have, consider saving half of it to eat another day.  What if food just does not taste as good without sodium? -- Starting a low-sodium diet can be hard. The good news is that your taste buds can get used to having less sodium. But you have to give them some time to adjust.  It can also help to try other ways to add flavor to your foods. Try things like herbs, spices, lemon juice, and vinegar.  What about salt substitutes? -- Flavoring your food with a salt substitute is a good way to reduce how much salt you eat. But check with your doctor or nurse before trying this. Some salt substitutes can be dangerous if you have certain health problems or take certain medicines.  Do medicines have sodium? -- Yes, some medicines contain sodium. If you are buying medicines you can get without a prescription, look to see how much sodium they have. Avoid products that have \"sodium carbonate\" or \"sodium bicarbonate\" unless your doctor prescribes them. (Sodium bicarbonate is baking soda.)  All topics are updated as new evidence becomes available and our peer review process is complete.  This topic retrieved from [employer] on: Mar 22, 2024.  Topic 78212 Version 14.0  Release: 32.2.4 - C32.80  © 2024 [employer] Inc[employer] and/or its " "affiliates. All rights reserved.  figure 1: Food labels can be tricky     To figure out how much sodium you are eating, check the label to find out how much sodium is in 1 serving. If you are having more than 1 serving, multiply that amount by the number of servings you plan to eat. For instance, if you are going to eat this whole can of soup, you should multiply 850 by 2. That means that you will be having 1700 milligrams of sodium. That's more sodium than many people are supposed to have in 1 day.  Graphic 99070 Version 8.0  figure 2: Sources of sodium in your diet     Graphic 40388 Version 2.0  table 1: A guide to common nutrient claims and what they mean  Salt/sodium-free  Less than 5 mg of sodium per serving   Very low sodium  35 mg or less of sodium per serving   Low sodium  140 mg or less of sodium per serving   Reduced sodium  At least 25% less sodium than the regular product   Light or lite in sodium  At least 50% less sodium than the regular product   No salt added or unsalted  No salt is added during processing, but these products may not be salt/sodium-free unless stated   mg: milligram; %: percent.  Graphic 71483 Version 7.0  table 2: Ways to cut down on salt (sodium)  Avoid these foods  Try these foods instead    Cured and smoked foods like pugh, sausage, smoked fish and meats, hot dogs, ham, lunch meats, corned beef, and pickles Fresh turkey, chicken, and lean beef   Canned fish (tuna, sardines) Unsalted tuna or sardines   Canned meats Fresh unprocessed meats, vegetable protein, and fish  Frozen and canned meats, vegetable protein, and fish that are labeled \"low-sodium\"   Salted pretzels, crackers, potato chips, tortilla chips, and nuts Low-sodium and unsalted versions of these foods   Most cheeses Low-sodium cheeses (check label for actual sodium content)   Sauces (tomato and cream, etc), tomato juices Low-sodium versions of these foods, such as low-sodium tomato juice   Processed, instant, and " "convenience foods like frozen dinners, packaged meals, canned soups, and boxed pasta blends Cook and freeze your own low-sodium meals, soups, and broths    If you do need to use convenience or processed foods, read the labels. Choose items with 140 to 200 mg of sodium per serving.    For an entire convenience meal (frozen dinner), try to find options with less than 500 to 600 mg of sodium.    If you used canned foods, look for those labeled \"sodium-free.\" Or you can rinse the canned food under water to lower the sodium content.   Fast foods and foods prepared at restaurants (unless without cheese, sauces, or added salt) Fresh foods and foods with sauces on the side  Request that food be prepared without cheese or added salt   Graphic 90294 Version 10.0  Consumer Information Use and Disclaimer   Disclaimer: This generalized information is a limited summary of diagnosis, treatment, and/or medication information. It is not meant to be comprehensive and should be used as a tool to help the user understand and/or assess potential diagnostic and treatment options. It does NOT include all information about conditions, treatments, medications, side effects, or risks that may apply to a specific patient. It is not intended to be medical advice or a substitute for the medical advice, diagnosis, or treatment of a health care provider based on the health care provider's examination and assessment of a patient's specific and unique circumstances. Patients must speak with a health care provider for complete information about their health, medical questions, and treatment options, including any risks or benefits regarding use of medications. This information does not endorse any treatments or medications as safe, effective, or approved for treating a specific patient. Up[employer], Inc[employer] and its affiliates disclaim any warranty or liability relating to this information or the use thereof.The use of this information is governed by the " Terms of Use, available at https://www.w[employer]tersApplicouwer.com/en/know/clinical-effectiveness-terms. 2024© [employer] and its affiliates and/or licensors. All rights reserved.  Copyright   © 2024 [employer] and/or its affiliates. All rights reserved.

## 2024-07-11 NOTE — PROGRESS NOTES
Cardiology Follow-up    Carlos Enrique Roth Jr.  1935  [id number]  HEART & VASCULAR  [facility] [place] [address] [address]  [facility] [id number]    1. Coronary artery disease involving native coronary artery of native heart without angina pectoris  nitroglycerin (NITROSTAT) 0.4 mg SL tablet      2. Nonrheumatic aortic (valve) stenosis        3. Paroxysmal atrial fibrillation (HCC)  POCT ECG      4. Chronic diastolic congestive heart failure (HCC)  torsemide (DEMADEX) 20 mg tablet    Basic metabolic panel    Basic metabolic panel      5. S/P CABG x 4        6. S/P AVR (aortic valve replacement)        7. Edema, unspecified type            Discussion/Summary:    1.  CAD - Carlos Enrique is status post CABG x 4.  He is without symptoms of angina.  His LV function is normal.  No other issues from this standpoint.  He is no longer on any agents for hypertension and is off the metoprolol due to bradycardia.    2.  Aortic stenosis - He is also status post bioprosthetic aortic valve replacement at the time of his CABG.  Echocardiogram performed earlier this year did show a normally functioning aortic valve replacement.  He knows to take antibiotic prophylaxis for any dental procedures.    3.  Chronic heart failure with a preserved ejection fraction - He is more volume overloaded today compared to when I last saw him, likely worsening since his hospitalization and while in rehabilitation.  I am going to increase his torsemide to 20 mg daily.  Given his chronic kidney disease we will repeat blood work in 2 to 4 weeks.  Low-sodium diet recommended.  We will see him back in 3 months.    4.  Paroxysmal atrial fibrillation - This was new onset during his recent hospitalization for a hip fracture.  He did convert back to sinus rhythm by discharge, but he is now back in atrial fibrillation today.  His rates are controlled without the need for AV giuseppe blockers and he is  "asymptomatic.  We will continue a rate control strategy.  He is on Eliquis for stroke prevention.    4.  Hyperlipidemia - He has been on Crestor.  We will continue to follow blood work closely along with his internist.  His LDL is at goal.      HPI:    Mr. Roth comes in for follow-up given his history multivessel coronary artery disease and aortic stenosis.  I met him at the time of a stress nuclear study back in May of 2018.  This was ordered by his internist, Dr. Cid, secondary to abdominal pain, exertional at times, and multiple risk factors for CAD.  This demonstrated a reversible defect and ischemia of the anterior to apical wall.  His ejection fraction was normal.  He also has moderate aortic stenosis by echocardiogram from December.  He also has hypertension, dyslipidemia and diabetes mellitus.    Cardiac catheterization showed multivessel CAD.  At that point he went and met cardiothoracic surgery, who recommended both core artery bypass grafting and a bioprosthetic aortic valve replacement.  He had this performed in 9/2018, and it went well without complications.  He had CABG x4, with a LIMA to the LAD, SVG to an OM 2 and SVG “Y graft\" to an OM 3 and left posterior descending artery.  He also had a bioprosthetic aortic valve replacement.    He did go not home on diuretic therapy but due to some edema associated with cellulitis torsemide was restarted.  He then developed recurrent pleural effusions.  He was in the hospital in which he was requiring thoracentesis.  He ended up having surgery and pleurodesis.  He has recovered well from this.  He did not go home on diuretic therapy due to some acute kidney injury.  He started to show increasing signs of volume overload, weight gain and lower extremity edema.  Torsemide was restarted at 20 mg daily, and we decreased this to 10 mg daily.  He has been stable on this dose.  He was then having some worsening renal function, and has come off lisinopril.  He is on " low-dose olmesartan.  He also had come off of the metoprolol due to bradycardia and transient junctional rhythms.  I saw him earlier this year, and prior to that it was 2 years since I saw him.  At that visit we stopped the olmesartan due to some lightheadedness and low blood pressures.  We repeated an echocardiogram that showed no significant change from prior.    Carlos Enrique was then in the hospital after having a mechanical fall and sustaining a hip fracture.  He did require surgical fixation for this.  In the setting of this he was found to have new onset atrial fibrillation that was rate controlled.  His fall was mechanical, and there was no reported syncope.  He did not need AV giuseppe blockers started but was started on Eliquis 2.5 mg twice daily for stroke prevention.  It was stated that he did go back to normal sinus rhythm, per our consultation.  Today he is back in atrial fibrillation with controlled ventricular response.    Carlos Enrique overall is feeling well and is currently rehabilitating at [facility].  He appears a bit more volume overloaded since his hospitalization.  He remains on torsemide 10 mg daily.  He denies any worsening shortness of breath, orthopnea or PND.  He denies chest pain or any symptoms of angina.  He does not feel his atrial fibrillation.  No palpitations.  He does have some occasional lightheadedness if he stands up too quickly which has been chronic.  No near-syncope or syncope.      Patient Active Problem List   Diagnosis    Gout with tophus    Postoperative hypothyroidism    Obesity, morbid (HCC)    Pure hypercholesterolemia    Renal cyst    Sexual dysfunction    GERD (gastroesophageal reflux disease)    S/P CABG x 4    S/P AVR (aortic valve replacement)    CAD (coronary artery disease)    Aortic stenosis    Anemia due to stage 3 chronic kidney disease  (HCC)    Ambulatory dysfunction    Primary open angle glaucoma (POAG)    Age-related cataract of both eyes    Atherosclerosis of aorta ([facility])     Chronic diastolic congestive heart failure (HCC)    Type 2 diabetes mellitus with diabetic neuropathy (HCC)    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)    Benign prostatic hyperplasia without lower urinary tract symptoms    Long term (current) use of insulin (HCC)    Presence of aortocoronary bypass graft    Presence of prosthetic heart valve    Unspecified glaucoma    Nonrheumatic aortic (valve) stenosis    Atherosclerotic heart disease of native coronary artery without angina pectoris    Skin tear of left elbow without complication    Secondary hyperparathyroidism (HCC)    Microalbuminuria due to type 2 diabetes mellitus  (HCC)    Persistent proteinuria    Persistent atrial fibrillation (HCC)    Fracture of unspecified part of neck of left femur, initial encounter for closed fracture (HCC)     Past Medical History:   Diagnosis Date    Aortic stenosis     CKD (chronic kidney disease)     baseline Cr 1.3-1.5    Coronary artery disease     Cough     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HCC)     type 2, insulin dependent    GERD (gastroesophageal reflux disease)     Glaucoma     Gout     History of prostate cancer     Hypertension     Hypothyroidism     Overweight     Peripheral neuropathy, idiopathic     Pleural effusion, left     Pure hypercholesterolemia     LA...14   R....14     Visual impairment     cataract left eye    Weight gain      Social History     Socioeconomic History    Marital status:      Spouse name: Not on file    Number of children: Not on file    Years of education: Not on file    Highest education level: Not on file   Occupational History    Occupation: retired    Tobacco Use    Smoking status: Former     Current packs/day: 0.00     Average packs/day: 0.3 packs/day for 1 year (0.3 ttl pk-yrs)     Types: Cigarettes     Start date:      Quit date:      Years since quittin.5    Smokeless tobacco: Not on file    Tobacco comments:     Only in the service    Vaping Use    Vaping  status: Never Used   Substance and Sexual Activity    Alcohol use: Never    Drug use: No    Sexual activity: Not Currently   Other Topics Concern    Not on file   Social History Narrative    Caffeine use / coffee diet cola and tea    Lives with family     Living situation supportive and safe    No advance directives  -denied     Social Determinants of Health     Financial Resource Strain: High Risk (11/14/2023)    Overall Financial Resource Strain (CARDIA)     Difficulty of Paying Living Expenses: Hard   Food Insecurity: No Food Insecurity (6/21/2024)    Hunger Vital Sign     Worried About Running Out of Food in the Last Year: Never true     Ran Out of Food in the Last Year: Never true   Transportation Needs: No Transportation Needs (6/21/2024)    PRAPARE - Transportation     Lack of Transportation (Medical): No     Lack of Transportation (Non-Medical): No   Physical Activity: Insufficiently Active (5/28/2021)    Exercise Vital Sign     Days of Exercise per Week: 3 days     Minutes of Exercise per Session: 20 min   Stress: Stress Concern Present (5/28/2021)    [employer] - Occupational Stress Questionnaire     Feeling of Stress : To some extent   Social Connections: Not on file   Intimate Partner Violence: Not on file   Housing Stability: High Risk (6/21/2024)    Housing Stability Vital Sign     Unable to Pay for Housing in the Last Year: Yes     Number of Times Moved in the Last Year: 0     Homeless in the Last Year: No      Family History   Problem Relation Age of Onset    Diabetes Mother     Pancreatic cancer Brother     Diabetes Maternal Grandmother     Colon cancer Son     Diabetes Family     Substance Abuse Neg Hx     Mental illness Neg Hx      Past Surgical History:   Procedure Laterality Date    CARDIAC CATHETERIZATION      EYE SURGERY      IR THORACENTESIS  10/23/2018    IR THORACENTESIS  11/2/2018    IR THORACENTESIS  11/9/2018    IR THORACENTESIS  11/23/2018    MS CORONARY  ARTERY BYP W/VEIN & ARTERY GRAFT 3 VEIN N/A 9/17/2018    Procedure: CORONARY ARTERY BYPASS GRAFT (CABG) x 4 VESSELS with LIMA - LAD, SVG/LEFT LEG EVH - LEFT PDA, OM3, & OM2;  Surgeon: Remy Ash MD;  Location: [facility];  Service: Cardiac Surgery    CO ECHO TRANSESOPHAG MONTR CARDIAC PUMP FUNCTJ N/A 9/17/2018    Procedure: TRANSESOPHAGEAL ECHOCARDIOGRAM (VERONICA);  Surgeon: Remy Ash MD;  Location: [facility];  Service: Cardiac Surgery    CO OPTX FEM SHFT FX W/INSJ IMED IMPLT W/WO SCREW Left 6/20/2024    Procedure: INSERTION NAIL IM FEMUR ANTEGRADE (TROCHANTERIC);  Surgeon: Sukh Reece DO;  Location: [facility];  Service: Orthopedics    CO RPLCMT AORTIC VALVE OPN W/STENTLESS TISSUE VALVE N/A 9/17/2018    Procedure: REPLACEMENT VALVE AORTIC (AVR)- 23mm tissue Intuity Valve;  Surgeon: Remy Ash MD;  Location: [facility];  Service: Cardiac Surgery    THORACOSCOPY VIDEO ASSISTED SURGERY (VATS) Left 11/27/2018    Procedure: THORACOSCOPY VIDEO ASSISTED SURGERY (VATS), talc pleurodesis,;  Surgeon: Ciara Green MD;  Location: [facility];  Service: Thoracic    THYROID SURGERY         Current Outpatient Medications:     acetaminophen (TYLENOL) 325 mg tablet, Take 2 tablets (650 mg total) by mouth every 6 (six) hours as needed for mild pain, headaches or fever, Disp: , Rfl:     allopurinol (ZYLOPRIM) 300 mg tablet, TAKE ONE TABLET BY MOUTH ONCE DAILY, Disp: 90 tablet, Rfl: 3    apixaban (ELIQUIS) 2.5 mg, Take 1 tablet (2.5 mg total) by mouth 2 (two) times a day, Disp: , Rfl:     ascorbic acid (VITAMIN C) 500 mg tablet, TAKE ONE TABLET BY MOUTH DAILY, Disp: 28 tablet, Rfl: 3    cholecalciferol (VITAMIN D3) 1,000 units tablet, Take 1 tablet (1,000 Units total) by mouth daily, Disp: 90 tablet, Rfl: 3    dorzolamide-timolol (COSOPT) 22.3-6.8 MG/ML ophthalmic solution, Administer 1 drop to both eyes 2 (two) times a day, Disp: , Rfl:     doxycycline (ADOXA) 100 MG tablet, Take 100 mg by mouth 2 (two) times  a day For 7 days, Disp: , Rfl:     FeroSul 325 (65 Fe) MG tablet, TAKE ONE TABLET BY MOUTH DAILY, Disp: 28 tablet, Rfl: 3    gabapentin (NEURONTIN) 300 mg capsule, TAKE ONE CAPSULE BY MOUTH DAILY AT BEDTIME, Disp: 90 capsule, Rfl: 3    insulin aspart (NovoLOG FlexPen) 100 UNIT/ML injection pen, INJECT 11 UNITS SUBCUTANEOUSLY THREE TIMES DAILY, Disp: 15 mL, Rfl: 3    Insulin Pen Needle (Pen Needles) 32G X 4 MM MISC, Use 4 (four) times a day (before meals and at bedtime), Disp: 100 each, Rfl: 11    Lantus SoloStar 100 units/mL SOPN, Inject 0.27 mL (27 Units total) under the skin in the morning, Disp: 15 mL, Rfl: 3    levothyroxine 150 mcg tablet, Take 1 tab 6 days a week and half a tab on Sunday., Disp: 90 tablet, Rfl: 3    nitroglycerin (NITROSTAT) 0.4 mg SL tablet, Place 1 tablet (0.4 mg total) under the tongue every 5 (five) minutes as needed for chest pain, Disp: 25 tablet, Rfl: 1    pantoprazole (PROTONIX) 20 mg tablet, TAKE ONE TABLET BY MOUTH DAILY, Disp: 30 tablet, Rfl: 3    rosuvastatin (CRESTOR) 40 MG tablet, TAKE ONE TABLET BY MOUTH DAILY, Disp: 90 tablet, Rfl: 3    senna (SENOKOT) 8.6 MG tablet, Take 1 tablet (8.6 mg total) by mouth daily, Disp: 90 tablet, Rfl: 3    tamsulosin (FLOMAX) 0.4 mg, TAKE ONE CAPSULE BY MOUTH DAILY WITH dinner, Disp: 30 capsule, Rfl: 6    torsemide (DEMADEX) 20 mg tablet, Take 1 tablet (20 mg total) by mouth daily, Disp: 90 tablet, Rfl: 3    traMADol (ULTRAM) 50 mg tablet, Take 50 mg by mouth every 6 (six) hours as needed for moderate pain As needed., Disp: , Rfl:     LORazepam (ATIVAN) 0.5 mg tablet, Take 1 tablet (0.5 mg total) by mouth every 8 (eight) hours as needed for anxiety for up to 10 days (Patient not taking: Reported on 7/11/2024), Disp: 10 tablet, Rfl: 0  Allergies   Allergen Reactions    Amlodipine Nausea Only    Atorvastatin Myalgia       Labs:  Lab Results   Component Value Date     09/05/2017    K 4.1 06/22/2024    K 3.8 05/15/2024     06/22/2024      05/15/2024    CO2 24 06/22/2024    CO2 26 05/15/2024    BUN 42 (H) 06/22/2024    BUN 31 (H) 05/15/2024    CREATININE 2.21 (H) 06/22/2024    CREATININE 1.56 (H) 09/05/2017    GLUCOSE 126 09/17/2018    CALCIUM 9.0 06/22/2024    CALCIUM 9.2 05/15/2024     Lab Results   Component Value Date    WBC 11.54 (H) 06/22/2024    WBC 7.6 04/17/2017    HGB 10.6 (L) 06/22/2024    HGB 14.0 04/17/2017    HCT 33.8 (L) 06/22/2024    HCT 43.3 04/17/2017     (H) 06/22/2024    MCV 97.2 04/17/2017     (L) 06/22/2024     04/17/2017     Lab Results   Component Value Date    CHOL 197 04/17/2017    TRIG 145 11/13/2023    HDL 30 (L) 11/13/2023     Imaging:  ECG today shows atrial fibrillation, occasional PVCs, and nonspecific IVCD.    ECHO (3/11/2024):    Left Ventricle: Left ventricular cavity size is normal. Wall thickness is not well visualized. The left ventricular ejection fraction is 65% by visual estimation. Systolic function is normal. Wall motion is normal. Diastolic function is moderately abnormal, consistent with grade II (pseudonormal) relaxation.    Right Ventricle: Right ventricular cavity size is normal. Systolic function is normal.    Left Atrium: The atrium is moderately dilated.    Right Atrium: The atrium is dilated.    Aortic Valve: There is a bioprosthetic valve (#23 Intuity). The prosthetic valve appears well-seated. Prosthetic valve leaflet motion is normal. There is no evidence of paravalvular regurgitation. There is trace transvalvular regurgitation. The gradient recorded across the prosthetic aortic valve is within the expected range. The aortic valve peak velocity is 2.2 m/s. The aortic valve mean gradient is 8 mmHg. The dimensionless velocity index is 0.57. The aortic valve area is 1.89 cm2.    Mitral Valve: There is mild regurgitation.    Tricuspid Valve: There is mild regurgitation.    Pulmonic Valve: There is mild regurgitation.    Aorta: The aortic root is normal in size. The  "ascending aorta is mildly dilated.    Prior TTE study available for comparison. Prior study date: 11/5/2021. No significant changes noted compared to the prior study.      CARDIAC CATH(5/2018):  CORONARY CIRCULATION:  Proximal LAD: There was a 100 % stenosis. This lesion is a chronic total occlusion.  1st obtuse marginal: There was a diffuse 90 % stenosis.  2nd obtuse marginal: There was a diffuse 90 % stenosis.  3rd obtuse marginal: There was a 80 % stenosis.  1st left posterolateral: There was a 90 % stenosis.  Mid RCA: There was a 95 % stenosis.     CARDIAC STRUCTURES:  There was moderate aortic stenosis.      Review of Systems:  Review of Systems   Constitutional:  Positive for fatigue.   HENT: Negative.     Eyes: Negative.    Respiratory: Negative.     Cardiovascular:  Positive for leg swelling.   Musculoskeletal:  Positive for arthralgias.   Skin: Negative.    Allergic/Immunologic: Negative.    Neurological:  Positive for light-headedness.   Hematological: Negative.    Psychiatric/Behavioral: Negative.     All other systems reviewed and are negative.    Vitals:    07/11/24 1041   BP: 116/54   BP Location: Left arm   Patient Position: Sitting   Cuff Size: Standard   Pulse: 77   Weight: 92.5 kg (204 lb)   Height: 5' 7\" (1.702 m)     Physical Exam  Vitals and nursing note reviewed.   Constitutional:       Appearance: He is well-developed.   HENT:      Head: Normocephalic and atraumatic.   Eyes:      General: No scleral icterus.        Right eye: No discharge.         Left eye: No discharge.      Pupils: Pupils are equal, round, and reactive to light.   Neck:      Thyroid: No thyromegaly.      Vascular: No JVD.   Cardiovascular:      Rate and Rhythm: Normal rate. Rhythm irregularly irregular.      Pulses: Normal pulses.      Heart sounds: S1 normal and S2 normal. Murmur heard.      Systolic murmur is present with a grade of 3/6.      No friction rub. No gallop.   Pulmonary:      Effort: Pulmonary effort is normal. " No respiratory distress.      Breath sounds: Normal breath sounds. No wheezing or rales.   Abdominal:      General: Bowel sounds are normal. There is no distension.      Palpations: Abdomen is soft.      Tenderness: There is no abdominal tenderness.   Musculoskeletal:         General: No tenderness or deformity. Normal range of motion.      Cervical back: Normal range of motion and neck supple.      Right lower le+ Pitting Edema present.      Left lower le+ Pitting Edema present.   Skin:     General: Skin is warm and dry.      Findings: No rash.   Neurological:      Mental Status: He is alert and oriented to person, place, and time.      Cranial Nerves: No cranial nerve deficit.   Psychiatric:         Thought Content: Thought content normal.         Judgment: Judgment normal.       Counseling / Coordination of Care  Total floor / unit time spent today 25 minutes.  Greater than 50% of total time was spent with the patient and / or family counseling and / or coordination of care.

## 2024-07-11 NOTE — PROGRESS NOTES
[facility]  [address] [place] [address]  Facility: [facility]    NAME: Carlos Enrique Roth Jr.  AGE: 89 y.o. SEX: male    DATE OF ENCOUNTER: 7/11/2024    Code status:  Full Code    Assessment and Plan     1. Fracture of unspecified part of neck of left femur, initial encounter for closed fracture (HCC)  2. CKD stage 4 due to type 2 diabetes mellitus ([facility])  3. Coronary artery disease involving native coronary artery of native heart without angina pectoris  4. Type 2 diabetes mellitus with diabetic neuropathy, with long-term current use of insulin ([facility])  5. Ambulatory dysfunction      All medications and routine orders were reviewed and updated as needed.    Plan discussed with: Patient    Chief Complaint     Interim evaluation    History of Present Illness     The patient had an episode of orthostatic hypotension earlier today in the bathroom.  He reports he has had a sufficient fluid intake.  He had labs recently which shows stable CKD 4.  He has been ambulating with his walker but not taking himself to the bathroom frequently enough.  Bowel habits are stable.  Pain in hip is controlled and he was evaluated by orthopedics and had his staples removed.  The wound is progressing nicely    The following portions of the patient's history were reviewed and updated as appropriate: current medications, past family history, past medical history, past social history, past surgical history and problem list.    Allergies:  Allergies   Allergen Reactions    Amlodipine Nausea Only    Atorvastatin Myalgia       Review of Systems     Review of Systems   Constitutional:  Negative for activity change, appetite change, chills, diaphoresis, fatigue and unexpected weight change.   HENT:  Negative for congestion, ear discharge, ear pain, hearing loss, nosebleeds and rhinorrhea.    Eyes:  Negative for pain, redness, itching and visual disturbance.   Respiratory:  Negative for cough, choking, chest  tightness and shortness of breath.    Cardiovascular:  Negative for chest pain and leg swelling.   Gastrointestinal:  Negative for abdominal pain, blood in stool, constipation, diarrhea and nausea.   Endocrine: Negative for cold intolerance, polydipsia and polyphagia.   Genitourinary:  Negative for dysuria, frequency, hematuria and urgency.   Musculoskeletal:  Positive for arthralgias and gait problem. Negative for back pain, joint swelling, neck pain and neck stiffness.   Skin:  Negative for color change and rash.   Allergic/Immunologic: Negative for environmental allergies and food allergies.   Neurological:  Positive for weakness and light-headedness. Negative for dizziness, tremors, seizures, speech difficulty, numbness and headaches.   Hematological:  Negative for adenopathy. Does not bruise/bleed easily.   Psychiatric/Behavioral:  Negative for behavioral problems, dysphoric mood, hallucinations and self-injury.        Medications and orders     All medications reviewed and updated in [facility] EMR.      Objective     Vitals: per nursing home records    Physical Exam  Constitutional:       General: He is not in acute distress.     Appearance: He is well-developed. He is not diaphoretic.   HENT:      Head: Normocephalic and atraumatic.      Right Ear: External ear normal.      Left Ear: External ear normal.      Nose: Nose normal.      Mouth/Throat:      Pharynx: No oropharyngeal exudate.   Eyes:      General: No scleral icterus.        Right eye: No discharge.         Left eye: No discharge.      Conjunctiva/sclera: Conjunctivae normal.      Pupils: Pupils are equal, round, and reactive to light.   Neck:      Thyroid: No thyromegaly.   Cardiovascular:      Rate and Rhythm: Normal rate. Rhythm irregular.      Heart sounds: Murmur heard.   Pulmonary:      Effort: Pulmonary effort is normal.      Breath sounds: Normal breath sounds. No wheezing or rales.   Abdominal:      General: Bowel sounds are normal.       Palpations: Abdomen is soft. There is no mass.      Tenderness: There is no abdominal tenderness. There is no guarding.   Musculoskeletal:         General: Tenderness present. Normal range of motion.      Cervical back: Normal range of motion and neck supple.      Left lower leg: Edema present.   Lymphadenopathy:      Cervical: No cervical adenopathy.   Skin:     General: Skin is warm and dry.      Findings: Bruising present.   Neurological:      Mental Status: He is alert and oriented to person, place, and time.      Deep Tendon Reflexes: Reflexes are normal and symmetric.   Psychiatric:         Thought Content: Thought content normal.         Judgment: Judgment normal.         Pertinent Laboratory/Diagnostic Studies:     The following studies were reviewed please see chart or hospital paperwork for details.    Space for lab dictation BMP shows CKD 4    - Maintain torsemide at 10 mg daily.  Increase fluid intake.  Continue with therapy    Ag Rincon DO  7/11/2024 11:11 AM

## 2024-07-15 ENCOUNTER — NURSING HOME VISIT (OUTPATIENT)
Dept: FAMILY MEDICINE CLINIC | Facility: CLINIC | Age: 89
End: 2024-07-15
Payer: COMMERCIAL

## 2024-07-15 DIAGNOSIS — S72.002A FRACTURE OF UNSPECIFIED PART OF NECK OF LEFT FEMUR, INITIAL ENCOUNTER FOR CLOSED FRACTURE (HCC): Primary | ICD-10-CM

## 2024-07-15 DIAGNOSIS — I35.0 NONRHEUMATIC AORTIC (VALVE) STENOSIS: ICD-10-CM

## 2024-07-15 DIAGNOSIS — R26.2 AMBULATORY DYSFUNCTION: ICD-10-CM

## 2024-07-15 DIAGNOSIS — I48.19 PERSISTENT ATRIAL FIBRILLATION (HCC): ICD-10-CM

## 2024-07-15 DIAGNOSIS — E11.22 CKD STAGE 4 DUE TO TYPE 2 DIABETES MELLITUS (HCC): ICD-10-CM

## 2024-07-15 DIAGNOSIS — N18.4 CKD STAGE 4 DUE TO TYPE 2 DIABETES MELLITUS (HCC): ICD-10-CM

## 2024-07-15 DIAGNOSIS — I50.32 CHRONIC DIASTOLIC CONGESTIVE HEART FAILURE (HCC): ICD-10-CM

## 2024-07-15 PROCEDURE — 99308 SBSQ NF CARE LOW MDM 20: CPT | Performed by: FAMILY MEDICINE

## 2024-07-15 NOTE — PROGRESS NOTES
[facility]  [address] [place] [address]  Facility: [facility]    NAME: Carlos Enrique Roth Jr.  AGE: 89 y.o. SEX: male    DATE OF ENCOUNTER: 7/15/2024    Code status:  DNR w/ Hospitalization    Assessment and Plan     1. Fracture of unspecified part of neck of left femur, initial encounter for closed fracture (HCC)  2. 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 Chronic diastolic congestive heart failure (HCC)  4. Nonrheumatic aortic (valve) stenosis  5. Persistent atrial fibrillation (HCC)  6. Ambulatory dysfunction      All medications and routine orders were reviewed and updated as needed.    Plan discussed with: Family member    Chief Complaint     Interim evaluation    History of Present Illness     The patient was seen by the cardiologist last week and had his torsemide increased.  As a result he is urinating more frequently and his weight is down.  He reports persistent pain in his left hip when he bears weight.  He continues to perform therapy well however.  Bowel habits are stable.  Appetite is at baseline.    The following portions of the patient's history were reviewed and updated as appropriate: current medications, past family history, past medical history, past social history, past surgical history and problem list.    Allergies:  Allergies   Allergen Reactions    Amlodipine Nausea Only    Atorvastatin Myalgia       Review of Systems     Review of Systems   Constitutional:  Negative for activity change, appetite change, chills, diaphoresis, fatigue and unexpected weight change.   HENT:  Negative for congestion, ear discharge, ear pain, hearing loss, nosebleeds and rhinorrhea.    Eyes:  Negative for pain, redness, itching and visual disturbance.   Respiratory:  Negative for cough, choking and chest tightness.    Cardiovascular:  Positive for leg swelling. Negative for chest pain.   Gastrointestinal:  Negative for abdominal pain, blood in stool, constipation,  diarrhea and nausea.   Endocrine: Negative for cold intolerance, polydipsia and polyphagia.   Genitourinary:  Negative for dysuria, frequency, hematuria and urgency.   Musculoskeletal:  Positive for arthralgias and gait problem. Negative for back pain, joint swelling, neck pain and neck stiffness.   Skin:  Negative for color change and rash.   Allergic/Immunologic: Negative for environmental allergies and food allergies.   Neurological:  Positive for weakness. Negative for dizziness, tremors, seizures, speech difficulty, numbness and headaches.   Hematological:  Negative for adenopathy. Does not bruise/bleed easily.   Psychiatric/Behavioral:  Negative for behavioral problems, dysphoric mood, hallucinations and self-injury.        Medications and orders     All medications reviewed and updated in detention EMR.      Objective     Vitals: per nursing home records    Physical Exam  Constitutional:       General: He is not in acute distress.     Appearance: He is well-developed. He is not diaphoretic.   HENT:      Head: Normocephalic and atraumatic.      Right Ear: External ear normal.      Left Ear: External ear normal.      Nose: Nose normal.      Mouth/Throat:      Pharynx: No oropharyngeal exudate.   Eyes:      General: No scleral icterus.        Right eye: No discharge.         Left eye: No discharge.      Conjunctiva/sclera: Conjunctivae normal.      Pupils: Pupils are equal, round, and reactive to light.   Neck:      Thyroid: No thyromegaly.   Cardiovascular:      Rate and Rhythm: Normal rate. Rhythm irregular.      Heart sounds: Murmur heard.   Pulmonary:      Effort: Pulmonary effort is normal.      Breath sounds: Normal breath sounds. No wheezing or rales.   Abdominal:      General: Bowel sounds are normal.      Palpations: Abdomen is soft. There is no mass.      Tenderness: There is no abdominal tenderness. There is no guarding.   Musculoskeletal:         General: Tenderness present. Normal range of  motion.      Cervical back: Normal range of motion and neck supple.      Right lower leg: Edema present.      Left lower leg: Edema present.   Lymphadenopathy:      Cervical: No cervical adenopathy.   Skin:     General: Skin is warm and dry.   Neurological:      Mental Status: He is alert and oriented to person, place, and time.      Deep Tendon Reflexes: Reflexes are normal and symmetric.   Psychiatric:         Thought Content: Thought content normal.         Judgment: Judgment normal.         Pertinent Laboratory/Diagnostic Studies:     The following studies were reviewed please see chart or hospital paperwork for details.    Space for lab dictation CBC and BMP are pending    - Maintain torsemide at the current dose    Ag Rincon DO  7/15/2024 11:43 AM

## 2024-07-16 DIAGNOSIS — Z79.4 TYPE 2 DIABETES MELLITUS WITH DIABETIC NEUROPATHY, WITH LONG-TERM CURRENT USE OF INSULIN (HCC): ICD-10-CM

## 2024-07-16 DIAGNOSIS — E11.40 TYPE 2 DIABETES MELLITUS WITH DIABETIC NEUROPATHY, WITH LONG-TERM CURRENT USE OF INSULIN (HCC): ICD-10-CM

## 2024-07-17 RX ORDER — INSULIN GLARGINE 100 [IU]/ML
INJECTION, SOLUTION SUBCUTANEOUS
Qty: 15 ML | Refills: 3 | Status: SHIPPED | OUTPATIENT
Start: 2024-07-17

## 2024-07-22 ENCOUNTER — NURSING HOME VISIT (OUTPATIENT)
Dept: FAMILY MEDICINE CLINIC | Facility: CLINIC | Age: 89
End: 2024-07-22
Payer: COMMERCIAL

## 2024-07-22 DIAGNOSIS — I35.0 NONRHEUMATIC AORTIC (VALVE) STENOSIS: ICD-10-CM

## 2024-07-22 DIAGNOSIS — E11.40 TYPE 2 DIABETES MELLITUS WITH DIABETIC NEUROPATHY, WITH LONG-TERM CURRENT USE OF INSULIN (HCC): ICD-10-CM

## 2024-07-22 DIAGNOSIS — I25.10 ATHEROSCLEROSIS OF NATIVE CORONARY ARTERY OF NATIVE HEART WITHOUT ANGINA PECTORIS: ICD-10-CM

## 2024-07-22 DIAGNOSIS — Z79.4 TYPE 2 DIABETES MELLITUS WITH DIABETIC NEUROPATHY, WITH LONG-TERM CURRENT USE OF INSULIN (HCC): ICD-10-CM

## 2024-07-22 DIAGNOSIS — I50.32 CHRONIC DIASTOLIC CONGESTIVE HEART FAILURE (HCC): ICD-10-CM

## 2024-07-22 DIAGNOSIS — S72.002A FRACTURE OF UNSPECIFIED PART OF NECK OF LEFT FEMUR, INITIAL ENCOUNTER FOR CLOSED FRACTURE (HCC): Primary | ICD-10-CM

## 2024-07-22 PROCEDURE — 99308 SBSQ NF CARE LOW MDM 20: CPT | Performed by: FAMILY MEDICINE

## 2024-07-22 NOTE — PROGRESS NOTES
[facility]  [address] [place] [address]  Facility: [facility]    NAME: Carlos Enrique Roth Jr.  AGE: 89 y.o. SEX: male    DATE OF ENCOUNTER: 7/22/2024    Code status:  DNR w/ Hospitalization    Assessment and Plan     1. Fracture of unspecified part of neck of left femur, initial encounter for closed fracture ([facility])  2. Type 2 diabetes mellitus with diabetic neuropathy, with long-term current use of insulin (HCC)  3. Chronic diastolic congestive heart failure (HCC)  4. Nonrheumatic aortic (valve) stenosis  5. Atherosclerosis of native coronary artery of native heart without angina pectoris      All medications and routine orders were reviewed and updated as needed.    Plan discussed with: Family member    Chief Complaint     Interim evaluation    History of Present Illness     Patient reports that his breathing has significantly improved with the adjustment of his diuretic.  He notes that his pain is coming under improved control and he is ambulating with a cane at this point.  He still does not feel confident in his ability to return home however.  Bowels are moving better.  Denies any dysuria.  Appetite is at baseline.  No swallowing problems    The following portions of the patient's history were reviewed and updated as appropriate: current medications, past family history, past medical history, past social history, past surgical history and problem list.    Allergies:  Allergies   Allergen Reactions    Amlodipine Nausea Only    Atorvastatin Myalgia       Review of Systems     Review of Systems   Constitutional:  Negative for activity change, appetite change, chills, diaphoresis, fatigue and unexpected weight change.   HENT:  Negative for congestion, ear discharge, ear pain, hearing loss, nosebleeds and rhinorrhea.    Eyes:  Negative for pain, redness, itching and visual disturbance.   Respiratory:  Negative for cough, choking, chest tightness and shortness of breath.     Cardiovascular:  Positive for leg swelling. Negative for chest pain.   Gastrointestinal:  Negative for abdominal pain, blood in stool, constipation, diarrhea and nausea.   Endocrine: Negative for cold intolerance, polydipsia and polyphagia.   Genitourinary:  Negative for dysuria, frequency, hematuria and urgency.   Musculoskeletal:  Positive for arthralgias and gait problem. Negative for back pain, joint swelling, neck pain and neck stiffness.   Skin:  Negative for color change and rash.   Allergic/Immunologic: Negative for environmental allergies and food allergies.   Neurological:  Positive for weakness. Negative for dizziness, tremors, seizures, speech difficulty, numbness and headaches.   Hematological:  Negative for adenopathy. Does not bruise/bleed easily.   Psychiatric/Behavioral:  Negative for behavioral problems, dysphoric mood, hallucinations and self-injury.        Medications and orders     All medications reviewed and updated in residential EMR.      Objective     Vitals: per nursing home records    Physical Exam  Constitutional:       General: He is not in acute distress.     Appearance: He is well-developed. He is not diaphoretic.   HENT:      Head: Normocephalic and atraumatic.      Right Ear: External ear normal.      Left Ear: External ear normal.      Nose: Nose normal.      Mouth/Throat:      Pharynx: No oropharyngeal exudate.   Eyes:      General: No scleral icterus.        Right eye: No discharge.         Left eye: No discharge.      Conjunctiva/sclera: Conjunctivae normal.      Pupils: Pupils are equal, round, and reactive to light.   Neck:      Thyroid: No thyromegaly.   Cardiovascular:      Rate and Rhythm: Normal rate. Rhythm irregular.      Heart sounds: Murmur heard.   Pulmonary:      Effort: Pulmonary effort is normal.      Breath sounds: Normal breath sounds. No wheezing or rales.   Abdominal:      General: Bowel sounds are normal.      Palpations: Abdomen is soft. There is no mass.       Tenderness: There is no abdominal tenderness. There is no guarding.   Musculoskeletal:         General: Tenderness present. Normal range of motion.      Cervical back: Normal range of motion and neck supple.      Right lower leg: Edema present.      Left lower leg: Edema present.   Lymphadenopathy:      Cervical: No cervical adenopathy.   Skin:     General: Skin is warm and dry.   Neurological:      Mental Status: He is alert and oriented to person, place, and time.      Deep Tendon Reflexes: Reflexes are normal and symmetric.   Psychiatric:         Thought Content: Thought content normal.         Judgment: Judgment normal.         Pertinent Laboratory/Diagnostic Studies:     The following studies were reviewed please see chart or hospital paperwork for details.    Space for lab dictation BMP reflects CKD 4    - Maintain the current therapy plan and medication regimen    Ag Rincon DO  7/22/2024 1:11 PM

## 2024-08-01 ENCOUNTER — OFFICE VISIT (OUTPATIENT)
Dept: FAMILY MEDICINE CLINIC | Facility: HOSPITAL | Age: 89
End: 2024-08-01
Payer: COMMERCIAL

## 2024-08-01 VITALS
OXYGEN SATURATION: 97 % | WEIGHT: 204 LBS | SYSTOLIC BLOOD PRESSURE: 148 MMHG | DIASTOLIC BLOOD PRESSURE: 82 MMHG | HEART RATE: 58 BPM | BODY MASS INDEX: 32.02 KG/M2 | HEIGHT: 67 IN

## 2024-08-01 DIAGNOSIS — Z79.4 TYPE 2 DIABETES MELLITUS WITH DIABETIC NEUROPATHY, WITH LONG-TERM CURRENT USE OF INSULIN (HCC): ICD-10-CM

## 2024-08-01 DIAGNOSIS — D69.6 PLATELETS DECREASED (HCC): ICD-10-CM

## 2024-08-01 DIAGNOSIS — S72.92XK CLOSED FRACTURE OF LEFT FEMUR WITH NONUNION, UNSPECIFIED FRACTURE MORPHOLOGY, UNSPECIFIED PORTION OF FEMUR, SUBSEQUENT ENCOUNTER: Primary | ICD-10-CM

## 2024-08-01 DIAGNOSIS — E11.40 TYPE 2 DIABETES MELLITUS WITH DIABETIC NEUROPATHY, WITH LONG-TERM CURRENT USE OF INSULIN (HCC): ICD-10-CM

## 2024-08-01 DIAGNOSIS — I50.32 CHRONIC DIASTOLIC CONGESTIVE HEART FAILURE (HCC): ICD-10-CM

## 2024-08-01 PROCEDURE — G2211 COMPLEX E/M VISIT ADD ON: HCPCS | Performed by: INTERNAL MEDICINE

## 2024-08-01 PROCEDURE — 99215 OFFICE O/P EST HI 40 MIN: CPT | Performed by: INTERNAL MEDICINE

## 2024-08-01 RX ORDER — TORSEMIDE 10 MG/1
10 TABLET ORAL DAILY
COMMUNITY
Start: 2024-07-24

## 2024-08-01 NOTE — PROGRESS NOTES
Assessment/Plan:     Diagnosis ICD-10-CM Associated Orders   1. 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 Type 2 diabetes mellitus with diabetic neuropathy, with long-term current use of insulin ([facility])  E11.40 CBC and differential    Z79.4 Comprehensive metabolic panel     Hemoglobin A1C     CBC and differential     Comprehensive metabolic panel      3. Chronic diastolic congestive heart failure ([facility])  I50.32 CBC and differential     CBC and differential      4. 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      Subjective:    Patient ID: Carlos Enrique Roth Jr. is a 89 y.o. male    Had left femur fracture 6/20/24 -  fell in kitchen after bumping into son--treated at [facility] with TFN done- then was at snf for  rehab- overall doing well   Meds reviewed closely- some mild edema- if worsened will increase diuretics    Diabetes mellitus type 2- will have him follow glucose readings at home        The following portions of the patient's history were reviewed and updated as appropriate: allergies, current medications and problem list.     Review of Systems   Constitutional:  Negative for appetite change.   Respiratory:   Negative for shortness of breath.    Cardiovascular:  Negative for chest pain.   Neurological:  Negative for dizziness.   All other systems reviewed and are negative.        Objective:      Current Outpatient Medications:     acetaminophen (TYLENOL) 325 mg tablet, Take 2 tablets (650 mg total) by mouth every 6 (six) hours as needed for mild pain, headaches or fever, Disp: , Rfl:     allopurinol (ZYLOPRIM) 300 mg tablet, TAKE ONE TABLET BY MOUTH ONCE DAILY, Disp: 90 tablet, Rfl: 3    cholecalciferol (VITAMIN D3) 1,000 units tablet, Take 1 tablet (1,000 Units total) by mouth daily, Disp: 90 tablet, Rfl: 3    dorzolamide-timolol (COSOPT) 22.3-6.8 MG/ML ophthalmic solution, Administer 1 drop to both eyes 2 (two) times a day, Disp: , Rfl:     doxycycline (ADOXA) 100 MG tablet, Take 100 mg by mouth 2 (two) times a day For 7 days (Patient not taking: Reported on 9/10/2024), Disp: , Rfl:     FeroSul 325 (65 Fe) MG tablet, TAKE ONE TABLET BY MOUTH DAILY, Disp: 28 tablet, Rfl: 3    gabapentin (NEURONTIN) 300 mg capsule, TAKE ONE CAPSULE BY MOUTH DAILY AT BEDTIME, Disp: 90 capsule, Rfl: 3    insulin aspart (NovoLOG FlexPen) 100 UNIT/ML injection pen, INJECT 11 UNITS SUBCUTANEOUSLY THREE TIMES DAILY, Disp: 15 mL, Rfl: 3    Insulin Pen Needle (Pen Needles) 32G X 4 MM MISC, Use 4 (four) times a day (before meals and at bedtime), Disp: 100 each, Rfl: 11    levothyroxine 150 mcg tablet, Take 1 tab 6 days a week and half a tab on Sunday., Disp: 90 tablet, Rfl: 3    nitroglycerin (NITROSTAT) 0.4 mg SL tablet, Place 1 tablet (0.4 mg total) under the tongue every 5 (five) minutes as needed for chest pain (Patient not taking: Reported on 9/10/2024), Disp: 25 tablet, Rfl: 1    senna (SENOKOT) 8.6 MG tablet, Take 1 tablet (8.6 mg total) by mouth daily (Patient not taking: Reported on 9/10/2024), Disp: 90 tablet, Rfl: 3    tamsulosin (FLOMAX) 0.4 mg, TAKE ONE CAPSULE BY MOUTH DAILY WITH dinner, Disp: 30 capsule, Rfl: 6    torsemide  "(DEMADEX) 10 mg tablet, Take 10 mg by mouth daily, Disp: , Rfl:     ascorbic acid (VITAMIN C) 500 mg tablet, TAKE ONE TABLET BY MOUTH DAILY, Disp: 28 tablet, Rfl: 3    clobetasol (TEMOVATE) 0.05 % cream, Apply topically 2 (two) times a day for 7 days, Disp: 30 g, Rfl: 1    Eliquis 2.5 MG, TAKE ONE TABLET BY MOUTH TWICE DAILY, Disp: 60 tablet, Rfl: 0    Insulin Glargine Solostar (Lantus SoloStar) 100 UNIT/ML SOPN, Inject 0.24 mL (24 Units total) under the skin in the morning, Disp: 15 mL, Rfl: 2    LORazepam (ATIVAN) 0.5 mg tablet, TAKE ONE TABLET BY MOUTH EVERY EIGHT HOURS AS NEEDED FOR ANXIETY, Disp: 30 tablet, Rfl: 3    pantoprazole (PROTONIX) 20 mg tablet, TAKE ONE TABLET BY MOUTH DAILY, Disp: 30 tablet, Rfl: 3    rosuvastatin (CRESTOR) 40 MG tablet, TAKE ONE TABLET BY MOUTH DAILY, Disp: 90 tablet, Rfl: 1    Blood pressure 148/82, pulse 58, height 5' 7\" (1.702 m), weight 92.5 kg (204 lb), SpO2 97%.     Physical Exam  Vitals and nursing note reviewed.   Constitutional:       General: He is not in acute distress.     Appearance: He is well-developed.   HENT:      Head: Normocephalic and atraumatic.      Right Ear: External ear normal.      Left Ear: External ear normal.      Nose: Nose normal.      Mouth/Throat:      Pharynx: No oropharyngeal exudate.   Eyes:      General: No scleral icterus.        Right eye: No discharge.         Left eye: No discharge.      Conjunctiva/sclera: Conjunctivae normal.   Neck:      Vascular: No JVD.   Cardiovascular:      Rate and Rhythm: Normal rate and regular rhythm.      Heart sounds: No murmur heard.     No friction rub.   Pulmonary:      Effort: Pulmonary effort is normal. No respiratory distress.      Breath sounds: Normal breath sounds. No wheezing or rales.   Abdominal:      General: Bowel sounds are normal. There is no distension.      Palpations: Abdomen is soft. There is no mass.      Tenderness: There is no abdominal tenderness. There is no rebound.   Musculoskeletal:    "      General: Tenderness present. No deformity.      Cervical back: Normal range of motion and neck supple.      Right lower leg: Edema present.      Left lower leg: Edema present.      Comments: Mild lateral hip tenderness   Lymphadenopathy:      Cervical: No cervical adenopathy.   Skin:     General: Skin is warm and dry.      Findings: No erythema or rash.   Neurological:      Mental Status: He is alert and oriented to person, place, and time.      Cranial Nerves: No cranial nerve deficit.      Coordination: Coordination normal.      Deep Tendon Reflexes: Reflexes normal.   Psychiatric:         Mood and Affect: Mood normal.         Behavior: Behavior normal.

## 2024-08-06 ENCOUNTER — TELEPHONE (OUTPATIENT)
Age: 89
End: 2024-08-06

## 2024-08-06 NOTE — TELEPHONE ENCOUNTER
PA for insulin aspart (NovoLOG FlexPen) 100 UNIT/ML injection pen  SUBMITTED     via    [x]CMM-KEY [id number]  []Surescripts-Case ID #    []Faxed to plan   []Other website    []Phone call Case ID #      Office notes sent, clinical questions answered. Awaiting determination    Turnaround time for your insurance to make a decision on your Prior Authorization can take 7-21 business days.

## 2024-08-07 NOTE — TELEPHONE ENCOUNTER
PA for (NovoLOG FlexPen) 100 UNIT/ML injection pen  Approved     Date(s) approved 1/1/24-08/06/25    Case #     Patient advised by          [] MyChart Message  [] Phone call   []LMOM  []L/M to call office as no active Communication consent on file  []Unable to leave detailed message as VM not approved on Communication consent   Could not leave a message as mailbox was full    Pharmacy advised by    [x]Fax  []Phone call    Approval letter scanned into Media Yes

## 2024-08-26 DIAGNOSIS — S72.145D CLOSED NONDISPLACED INTERTROCHANTERIC FRACTURE OF LEFT FEMUR WITH ROUTINE HEALING, SUBSEQUENT ENCOUNTER: ICD-10-CM

## 2024-08-26 DIAGNOSIS — Z95.2 S/P AVR (AORTIC VALVE REPLACEMENT): ICD-10-CM

## 2024-08-26 DIAGNOSIS — Z00.00 HEALTHCARE MAINTENANCE: ICD-10-CM

## 2024-08-26 DIAGNOSIS — I48.91 NEW ONSET ATRIAL FIBRILLATION (HCC): ICD-10-CM

## 2024-08-26 DIAGNOSIS — Z95.1 S/P CABG X 4: ICD-10-CM

## 2024-08-26 DIAGNOSIS — F41.9 ANXIETY: ICD-10-CM

## 2024-08-27 RX ORDER — ROSUVASTATIN CALCIUM 40 MG/1
TABLET, COATED ORAL
Qty: 90 TABLET | Refills: 1 | Status: SHIPPED | OUTPATIENT
Start: 2024-08-27

## 2024-08-27 RX ORDER — APIXABAN 2.5 MG/1
2.5 TABLET, FILM COATED ORAL 2 TIMES DAILY
Qty: 60 TABLET | Refills: 5 | Status: SHIPPED | OUTPATIENT
Start: 2024-08-27

## 2024-08-27 RX ORDER — LORAZEPAM 0.5 MG/1
TABLET ORAL
Qty: 30 TABLET | Refills: 3 | Status: SHIPPED | OUTPATIENT
Start: 2024-08-27

## 2024-08-27 RX ORDER — ASCORBIC ACID 500 MG
TABLET ORAL
Qty: 28 TABLET | Refills: 3 | Status: SHIPPED | OUTPATIENT
Start: 2024-08-27

## 2024-09-05 ENCOUNTER — TELEPHONE (OUTPATIENT)
Dept: ENDOCRINOLOGY | Facility: HOSPITAL | Age: 89
End: 2024-09-05

## 2024-09-05 ENCOUNTER — OFFICE VISIT (OUTPATIENT)
Dept: ENDOCRINOLOGY | Facility: HOSPITAL | Age: 89
End: 2024-09-05
Payer: COMMERCIAL

## 2024-09-05 VITALS
WEIGHT: 206 LBS | HEIGHT: 67 IN | OXYGEN SATURATION: 99 % | HEART RATE: 77 BPM | DIASTOLIC BLOOD PRESSURE: 70 MMHG | BODY MASS INDEX: 32.33 KG/M2 | SYSTOLIC BLOOD PRESSURE: 120 MMHG

## 2024-09-05 DIAGNOSIS — E11.40 TYPE 2 DIABETES MELLITUS WITH DIABETIC NEUROPATHY, WITH LONG-TERM CURRENT USE OF INSULIN (HCC): Primary | ICD-10-CM

## 2024-09-05 DIAGNOSIS — Z79.4 TYPE 2 DIABETES MELLITUS WITH DIABETIC NEUROPATHY, WITH LONG-TERM CURRENT USE OF INSULIN (HCC): Primary | ICD-10-CM

## 2024-09-05 LAB
ALBUMIN SERPL-MCNC: 3.5 G/DL (ref 3.6–5.1)
ALBUMIN/GLOB SERPL: 1.4 (CALC) (ref 1–2.5)
ALP SERPL-CCNC: 90 U/L (ref 35–144)
ALT SERPL-CCNC: 11 U/L (ref 9–46)
AST SERPL-CCNC: 19 U/L (ref 10–35)
BASOPHILS # BLD AUTO: 78 CELLS/UL (ref 0–200)
BASOPHILS NFR BLD AUTO: 1.3 %
BILIRUB SERPL-MCNC: 0.8 MG/DL (ref 0.2–1.2)
BUN SERPL-MCNC: 26 MG/DL (ref 7–25)
BUN/CREAT SERPL: 10 (CALC) (ref 6–22)
CALCIUM SERPL-MCNC: 9.2 MG/DL (ref 8.6–10.3)
CHLORIDE SERPL-SCNC: 107 MMOL/L (ref 98–110)
CO2 SERPL-SCNC: 29 MMOL/L (ref 20–32)
CREAT SERPL-MCNC: 2.59 MG/DL (ref 0.7–1.22)
EOSINOPHIL # BLD AUTO: 408 CELLS/UL (ref 15–500)
EOSINOPHIL NFR BLD AUTO: 6.8 %
ERYTHROCYTE [DISTWIDTH] IN BLOOD BY AUTOMATED COUNT: 13 % (ref 11–15)
GFR/BSA.PRED SERPLBLD CYS-BASED-ARV: 23 ML/MIN/1.73M2
GLOBULIN SER CALC-MCNC: 2.5 G/DL (CALC) (ref 1.9–3.7)
GLUCOSE SERPL-MCNC: 97 MG/DL (ref 65–139)
HBA1C MFR BLD: 6.7 % OF TOTAL HGB
HCT VFR BLD AUTO: 39.4 % (ref 38.5–50)
HGB BLD-MCNC: 12 G/DL (ref 13.2–17.1)
LYMPHOCYTES # BLD AUTO: 2256 CELLS/UL (ref 850–3900)
LYMPHOCYTES NFR BLD AUTO: 37.6 %
MCH RBC QN AUTO: 32 PG (ref 27–33)
MCHC RBC AUTO-ENTMCNC: 30.5 G/DL (ref 32–36)
MCV RBC AUTO: 105.1 FL (ref 80–100)
MONOCYTES # BLD AUTO: 558 CELLS/UL (ref 200–950)
MONOCYTES NFR BLD AUTO: 9.3 %
NEUTROPHILS # BLD AUTO: 2700 CELLS/UL (ref 1500–7800)
NEUTROPHILS NFR BLD AUTO: 45 %
PLATELET # BLD AUTO: 188 THOUSAND/UL (ref 140–400)
PMV BLD REES-ECKER: 10.6 FL (ref 7.5–12.5)
POTASSIUM SERPL-SCNC: 3.8 MMOL/L (ref 3.5–5.3)
PROT SERPL-MCNC: 6 G/DL (ref 6.1–8.1)
RBC # BLD AUTO: 3.75 MILLION/UL (ref 4.2–5.8)
SODIUM SERPL-SCNC: 142 MMOL/L (ref 135–146)
WBC # BLD AUTO: 6 THOUSAND/UL (ref 3.8–10.8)

## 2024-09-05 PROCEDURE — 1160F RVW MEDS BY RX/DR IN RCRD: CPT | Performed by: PHYSICIAN ASSISTANT

## 2024-09-05 PROCEDURE — 99214 OFFICE O/P EST MOD 30 MIN: CPT | Performed by: PHYSICIAN ASSISTANT

## 2024-09-05 PROCEDURE — 1159F MED LIST DOCD IN RCRD: CPT | Performed by: PHYSICIAN ASSISTANT

## 2024-09-05 RX ORDER — INSULIN GLARGINE 100 [IU]/ML
24 INJECTION, SOLUTION SUBCUTANEOUS DAILY
Qty: 15 ML | Refills: 2 | Status: SHIPPED | OUTPATIENT
Start: 2024-09-05

## 2024-09-05 NOTE — PATIENT INSTRUCTIONS
Monitor diet.    Switch to Lantus and decrease to 24 units daily.    Continue Novolog 11 units with meals.    We will work on getting a Dexcom.    Continue same dose of levothyroxine.    Call with any concerns or questions.    Follow up in 3 months with labs.

## 2024-09-05 NOTE — PROGRESS NOTES
Carlos Enrique Roth Jr. 89 y.o. male MRN: [id number]    Encounter: [id number]      Assessment & Plan     Assessment:  This is a 89 y.o.-year-old male with type 2 diabetes with hyperlipidemia and hypertension, and postsurgical hypothyroidism.    Plan:  1. Type 2 diabetes: Most recent hemoglobin A1c is 6.7.  This is a significant drop.  I am concerned about episodes of hypoglycemia he is unaware of, however cannot fully rule out a falsely low A1c.  Ultimately at this time I think would be beneficial for him to utilize a CGM such as a Dexcom G7 to monitor his glucose levels which will give us a better idea of what his glucose levels are doing and we can make adjustments to his insulin, plus will alert him of episodes of hypoglycemia.  That being said and like to decrease his long-acting insulin, and also switch him back to Lantus which she will take 24 units daily.  He will continue with NovoLog 11 units with meals.  Contact the office with any concerns or questions.  At this time until we can get a CGM continue checking blood sugar at least daily.  Follow-up in 3 months with lab work completed prior to visit.    Patient is a type II diabetic currently utilizing for insulin injections a day and checking blood sugars at least daily.  He is having episodes of hypoglycemia, unclear if he is having episodes of hypoglycemic unawareness.  Due to his age and the amount of insulin he is on, I think it be extremely beneficial for him to be on a continuous glucose monitor such as a Dexcom G7 to monitor glucose levels and prevent complications.     2. Postsurgical Hypothyroidism: Unfortunately there was no recent thyroid lab work completed.  At this time want him to continue with levothyroxine 150 mcg 1 tablet 6 days a week and half tablet on Sunday.  Asked him to have repeat thyroid lab work prior to next office visit.     3. Hyperlipidemia:  Lipid panel doing well at this time.  Continue with rosuvastatin.  Will monitor over time.      4.  Hypertension: Normotensive in the office today.  Kidney function remains stable, and does have an appointment with nephrology next week.  Continue with Demadex.  Repeat CMP prior to next office visit.    CC: Type 2 diabetes and hypothyroidism follow-up    History of Present Illness     HPI:  Carlos Enrique Roth Jr. is a 89 year old male with type 2 diabetes for about 8 years.  States this was diagnosed when he was hospitalized and status post CABG.  He is on insulin at home and takes Basaglar 27 units qhs and Novolog 11 units with each meal.  He denies any polyuria, polydipsia, nocturia and blurry vision.  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  He also has history of CAD status post CABG.  He denies history of peripheral neuropathy, retinopathy.  Overall doing well today without any concerns or questions.  No recent falls.  Does state that he gets the occasional episodes of lightheadedness and dizziness, but unfortunately does not check glucose levels around this time.     Hypoglycemic episodes:  None recently.  Typically only checking fasting glucose levels.     The patient's last eye exam was completed November 2023.  He denies history of retinopathy.  Last diabetic foot exam was completed April 2023.     Blood Sugar/Glucometer/Pump/CGM review: Checking fasting blood sugars on a daily basis typically between 100-200.  Is having episodes of hypoglycemia in the morning once or twice a week.     He has postsurgical hypothyroidism treated with levothyroxine 150 mcg 1 tablet 6 days a week and half tablet on Sunday.  Denies any increasing fatigue, heat or cold intolerance, palpitations, diarrhea, tremors, anxiety or depression.  Does have chronic constipation and was recently switched to Senokot, but does not believe this is working as well as MiraLAX.     For his hx of hyperlipidemia and he is treated with rosuvastatin 40 mg daily.  Has also been diagnosed with hypertension in the past and  is currently only on torsemide 10 mg every other day.  Denies any headaches, vision changes, chest pain, MI or stroke-like symptoms.     Review of Systems   Constitutional:  Negative for activity change, appetite change, fatigue and unexpected weight change.   HENT:  Negative for trouble swallowing.    Eyes:  Negative for visual disturbance.   Respiratory:  Negative for chest tightness and shortness of breath.    Cardiovascular:  Positive for leg swelling. Negative for chest pain and palpitations.   Gastrointestinal:  Positive for constipation. Negative for abdominal pain, diarrhea, nausea and vomiting.   Endocrine: Negative for cold intolerance, heat intolerance, polydipsia, polyphagia and polyuria.   Genitourinary:  Negative for frequency.   Musculoskeletal:  Positive for gait problem (Utilizing a cane).   Skin:  Negative for rash and wound.   Neurological:  Negative for dizziness, weakness, light-headedness, numbness and headaches.   Psychiatric/Behavioral:  Negative for dysphoric mood and sleep disturbance. The patient is not nervous/anxious.        Historical Information   Past Medical History:   Diagnosis Date   • Aortic stenosis    • CKD (chronic kidney disease)     baseline Cr 1.3-1.5   • Coronary artery disease    • Cough    • 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HCC)     type 2, insulin dependent   • GERD (gastroesophageal reflux disease)    • Glaucoma    • Gout    • History of prostate cancer    • Hypertension    • Hypothyroidism    • Overweight    • Peripheral neuropathy, idiopathic    • Pleural effusion, left    • Pure hypercholesterolemia     LA...11/12/14   R....11/12/14    • Visual impairment     cataract left eye   • Weight gain      Past Surgical History:   Procedure Laterality Date   • CARDIAC CATHETERIZATION     • EYE SURGERY     • IR THORACENTESIS  10/23/2018   • IR THORACENTESIS  11/2/2018   • IR THORACENTESIS  11/9/2018   • IR THORACENTESIS  11/23/2018   • GA CORONARY ARTERY BYP W/VEIN & ARTERY GRAFT 3 VEIN N/A  2018    Procedure: CORONARY ARTERY BYPASS GRAFT (CABG) x 4 VESSELS with LIMA - LAD, SVG/LEFT LEG EVH - LEFT PDA, OM3, & OM2;  Surgeon: Remy Ash MD;  Location: [facility];  Service: Cardiac Surgery   • DE ECHO TRANSESOPHAG MONTR CARDIAC PUMP FUNCTJ N/A 2018    Procedure: TRANSESOPHAGEAL ECHOCARDIOGRAM (VERONICA);  Surgeon: Remy Ash MD;  Location: [facility];  Service: Cardiac Surgery   • DE OPTX FEM SHFT FX W/INSJ IMED IMPLT W/WO SCREW Left 2024    Procedure: INSERTION NAIL IM FEMUR ANTEGRADE (TROCHANTERIC);  Surgeon: Skuh Reece DO;  Location: [facility];  Service: Orthopedics   • DE RPLCMT AORTIC VALVE OPN W/STENTLESS TISSUE VALVE N/A 2018    Procedure: REPLACEMENT VALVE AORTIC (AVR)- 23mm tissue Intuity Valve;  Surgeon: Remy Ash MD;  Location: [facility];  Service: Cardiac Surgery   • THORACOSCOPY VIDEO ASSISTED SURGERY (VATS) Left 2018    Procedure: THORACOSCOPY VIDEO ASSISTED SURGERY (VATS), talc pleurodesis,;  Surgeon: Ciara Green MD;  Location: [facility];  Service: Thoracic   • THYROID SURGERY       Social History   Social History     Substance and Sexual Activity   Alcohol Use Never     Social History     Substance and Sexual Activity   Drug Use No     Social History     Tobacco Use   Smoking Status Former   • Current packs/day: 0.00   • Average packs/day: 0.3 packs/day for 1 year (0.3 ttl pk-yrs)   • Types: Cigarettes   • Start date:    • Quit date:    • Years since quittin.7   Smokeless Tobacco Not on file   Tobacco Comments    Only in the service      Family History:   Family History   Problem Relation Age of Onset   • Diabetes Mother    • Pancreatic cancer Brother    • Diabetes Maternal Grandmother    • Colon cancer Son    • Diabetes Family    • Substance Abuse Neg Hx    • Mental illness Neg Hx        Meds/Allergies   Current Outpatient Medications   Medication Sig Dispense Refill   • acetaminophen (TYLENOL) 325 mg tablet Take 2 tablets  (650 mg total) by mouth every 6 (six) hours as needed for mild pain, headaches or fever     • allopurinol (ZYLOPRIM) 300 mg tablet TAKE ONE TABLET BY MOUTH ONCE DAILY 90 tablet 3   • ascorbic acid (VITAMIN C) 500 mg tablet TAKE ONE TABLET BY MOUTH DAILY 28 tablet 3   • cholecalciferol (VITAMIN D3) 1,000 units tablet Take 1 tablet (1,000 Units total) by mouth daily 90 tablet 3   • dorzolamide-timolol (COSOPT) 22.3-6.8 MG/ML ophthalmic solution Administer 1 drop to both eyes 2 (two) times a day     • doxycycline (ADOXA) 100 MG tablet Take 100 mg by mouth 2 (two) times a day For 7 days     • Eliquis 2.5 MG TAKE ONE TABLET BY MOUTH TWICE DAILY 60 tablet 5   • FeroSul 325 (65 Fe) MG tablet TAKE ONE TABLET BY MOUTH DAILY 28 tablet 3   • gabapentin (NEURONTIN) 300 mg capsule TAKE ONE CAPSULE BY MOUTH DAILY AT BEDTIME 90 capsule 3   • insulin aspart (NovoLOG FlexPen) 100 UNIT/ML injection pen INJECT 11 UNITS SUBCUTANEOUSLY THREE TIMES DAILY 15 mL 3   • Insulin Glargine Solostar (Lantus SoloStar) 100 UNIT/ML SOPN Inject 0.24 mL (24 Units total) under the skin in the morning 15 mL 2   • Insulin Pen Needle (Pen Needles) 32G X 4 MM MISC Use 4 (four) times a day (before meals and at bedtime) 100 each 11   • levothyroxine 150 mcg tablet Take 1 tab 6 days a week and half a tab on Sunday. 90 tablet 3   • LORazepam (ATIVAN) 0.5 mg tablet TAKE ONE TABLET BY MOUTH EVERY EIGHT HOURS AS NEEDED FOR ANXIETY 30 tablet 3   • nitroglycerin (NITROSTAT) 0.4 mg SL tablet Place 1 tablet (0.4 mg total) under the tongue every 5 (five) minutes as needed for chest pain 25 tablet 1   • pantoprazole (PROTONIX) 20 mg tablet TAKE ONE TABLET BY MOUTH DAILY 30 tablet 3   • rosuvastatin (CRESTOR) 40 MG tablet TAKE ONE TABLET BY MOUTH DAILY 90 tablet 1   • senna (SENOKOT) 8.6 MG tablet Take 1 tablet (8.6 mg total) by mouth daily 90 tablet 3   • tamsulosin (FLOMAX) 0.4 mg TAKE ONE CAPSULE BY MOUTH DAILY WITH dinner 30 capsule 6   • torsemide (DEMADEX) 10 mg  "tablet Take 10 mg by mouth daily     • torsemide (DEMADEX) 20 mg tablet Take 1 tablet (20 mg total) by mouth daily 90 tablet 3   • traMADol (ULTRAM) 50 mg tablet Take 50 mg by mouth every 6 (six) hours as needed for moderate pain As needed.       No current facility-administered medications for this visit.     Allergies   Allergen Reactions   • Amlodipine Nausea Only   • Atorvastatin Myalgia       Objective   Vitals: Blood pressure 120/70, pulse 77, height 5' 7\" (1.702 m), weight 93.4 kg (206 lb), SpO2 99%.    Physical Exam  Vitals and nursing note reviewed.   Constitutional:       General: He is not in acute distress.     Appearance: Normal appearance. He is not diaphoretic.   HENT:      Head: Normocephalic and atraumatic.   Eyes:      General: No scleral icterus.     Extraocular Movements: Extraocular movements intact.      Conjunctiva/sclera: Conjunctivae normal.      Pupils: Pupils are equal, round, and reactive to light.   Cardiovascular:      Rate and Rhythm: Normal rate and regular rhythm.      Heart sounds: No murmur heard.  Pulmonary:      Effort: Pulmonary effort is normal. No respiratory distress.      Breath sounds: Normal breath sounds. No wheezing.   Musculoskeletal:      Cervical back: Normal range of motion.      Right lower leg: Edema present.      Left lower leg: Edema present.   Lymphadenopathy:      Cervical: No cervical adenopathy.   Skin:     General: Skin is warm and dry.   Neurological:      Mental Status: He is alert and oriented to person, place, and time. Mental status is at baseline.      Sensory: No sensory deficit.      Gait: Gait abnormal (Utilizes a cane).   Psychiatric:         Mood and Affect: Mood normal.         Behavior: Behavior normal.         Thought Content: Thought content normal.         The history was obtained from the review of the chart, patient.    Lab Results:   Lab Results   Component Value Date/Time    Hemoglobin A1C 6.7 (H) 09/04/2024 10:25 AM    Hemoglobin A1C 8.7 " (H) 05/15/2024 10:14 AM    Hemoglobin A1C 8.3 (H) 01/29/2024 11:18 AM    WBC 11.54 (H) 06/22/2024 01:29 AM    WBC 9.21 06/21/2024 04:30 AM    WBC 8.50 06/20/2024 12:09 PM    White Blood Cell Count 6.0 09/04/2024 10:25 AM    Hemoglobin 12.0 (L) 09/04/2024 10:25 AM    Hemoglobin 10.6 (L) 06/22/2024 01:29 AM    Hemoglobin 10.8 (L) 06/21/2024 04:30 AM    Hemoglobin 12.0 06/20/2024 12:09 PM    Hematocrit 33.8 (L) 06/22/2024 01:29 AM    Hematocrit 34.9 (L) 06/21/2024 04:30 AM    Hematocrit 38.3 06/20/2024 12:09 PM    HCT 39.4 09/04/2024 10:25 AM    .1 (H) 09/04/2024 10:25 AM     (H) 06/22/2024 01:29 AM     (H) 06/21/2024 04:30 AM     (H) 06/20/2024 12:09 PM    Platelet Count 188 09/04/2024 10:25 AM    Platelets 135 (L) 06/22/2024 01:29 AM    Platelets 114 (L) 06/21/2024 04:30 AM    Platelets 129 (L) 06/20/2024 12:09 PM    BUN 26 (H) 09/04/2024 10:25 AM    BUN 42 (H) 06/22/2024 01:29 AM    BUN 32 (H) 06/21/2024 04:30 AM    BUN 35 (H) 06/20/2024 12:09 PM    BUN 31 (H) 05/15/2024 10:12 AM    BUN 33 (H) 01/29/2024 11:23 AM    Potassium 3.8 09/04/2024 10:25 AM    Potassium 4.1 06/22/2024 01:29 AM    Potassium 4.0 06/21/2024 04:30 AM    Potassium 4.2 06/20/2024 12:09 PM    Potassium 3.8 05/15/2024 10:12 AM    Potassium 4.6 01/29/2024 11:23 AM    Chloride 107 09/04/2024 10:25 AM    Chloride 107 06/22/2024 01:29 AM    Chloride 108 06/21/2024 04:30 AM    Chloride 107 06/20/2024 12:09 PM    Chloride 106 05/15/2024 10:12 AM    Chloride 108 01/29/2024 11:23 AM    CO2 29 09/04/2024 10:25 AM    CO2 24 06/22/2024 01:29 AM    CO2 21 06/21/2024 04:30 AM    CO2 26 06/20/2024 12:09 PM    CO2 26 05/15/2024 10:12 AM    CO2 30 01/29/2024 11:23 AM    Creatinine 2.59 (H) 09/04/2024 10:25 AM    Creatinine 2.21 (H) 06/22/2024 01:29 AM    Creatinine 1.95 (H) 06/21/2024 04:30 AM    Creatinine 2.18 (H) 06/20/2024 12:09 PM    AST 19 09/04/2024 10:25 AM    AST 18 06/21/2024 04:30 AM    AST 20 05/15/2024 10:12 AM    AST 31  "01/29/2024 11:18 AM    ALT 11 09/04/2024 10:25 AM    ALT 9 06/21/2024 04:30 AM    ALT 17 05/15/2024 10:12 AM    ALT 25 01/29/2024 11:18 AM    Total Protein 5.6 (L) 06/21/2024 04:30 AM    Protein, Total 6.0 (L) 09/04/2024 10:25 AM    Protein, Total 6.0 (L) 05/15/2024 10:12 AM    Protein, Total 6.1 01/29/2024 11:18 AM    Albumin 3.5 (L) 09/04/2024 10:25 AM    Albumin 3.2 (L) 06/21/2024 04:30 AM    Albumin 3.6 05/15/2024 10:12 AM    Albumin 3.6 01/29/2024 11:23 AM    Globulin 2.5 09/04/2024 10:25 AM    Globulin 2.4 05/15/2024 10:12 AM    Globulin 2.5 01/29/2024 11:18 AM    HDL 30 (L) 11/13/2023 12:29 PM    Triglycerides 145 11/13/2023 12:29 PM           Portions of the record may have been created with voice recognition software. Occasional wrong word or \"sound a like\" substitutions may have occurred due to the inherent limitations of voice recognition software. Read the chart carefully and recognize, using context, where substitutions have occurred.    "

## 2024-09-10 ENCOUNTER — OFFICE VISIT (OUTPATIENT)
Dept: FAMILY MEDICINE CLINIC | Facility: HOSPITAL | Age: 89
End: 2024-09-10
Payer: COMMERCIAL

## 2024-09-10 VITALS
BODY MASS INDEX: 31.71 KG/M2 | HEIGHT: 67 IN | OXYGEN SATURATION: 97 % | WEIGHT: 202 LBS | DIASTOLIC BLOOD PRESSURE: 68 MMHG | SYSTOLIC BLOOD PRESSURE: 130 MMHG | HEART RATE: 58 BPM

## 2024-09-10 DIAGNOSIS — Z95.2 S/P AVR (AORTIC VALVE REPLACEMENT): ICD-10-CM

## 2024-09-10 DIAGNOSIS — I50.32 CHRONIC DIASTOLIC CONGESTIVE HEART FAILURE (HCC): ICD-10-CM

## 2024-09-10 DIAGNOSIS — E89.0 POSTOPERATIVE HYPOTHYROIDISM: ICD-10-CM

## 2024-09-10 DIAGNOSIS — Z23 NEED FOR INFLUENZA VACCINATION: ICD-10-CM

## 2024-09-10 DIAGNOSIS — E11.40 TYPE 2 DIABETES MELLITUS WITH DIABETIC NEUROPATHY, WITH LONG-TERM CURRENT USE OF INSULIN (HCC): ICD-10-CM

## 2024-09-10 DIAGNOSIS — I25.10 CORONARY ARTERY DISEASE INVOLVING NATIVE CORONARY ARTERY OF NATIVE HEART WITHOUT ANGINA PECTORIS: ICD-10-CM

## 2024-09-10 DIAGNOSIS — R60.0 PEDAL EDEMA: Primary | ICD-10-CM

## 2024-09-10 DIAGNOSIS — L40.9 PSORIASIS: ICD-10-CM

## 2024-09-10 DIAGNOSIS — Z79.4 TYPE 2 DIABETES MELLITUS WITH DIABETIC NEUROPATHY, WITH LONG-TERM CURRENT USE OF INSULIN (HCC): ICD-10-CM

## 2024-09-10 PROCEDURE — 99214 OFFICE O/P EST MOD 30 MIN: CPT | Performed by: INTERNAL MEDICINE

## 2024-09-10 PROCEDURE — G0008 ADMIN INFLUENZA VIRUS VAC: HCPCS

## 2024-09-10 PROCEDURE — 90662 IIV NO PRSV INCREASED AG IM: CPT

## 2024-09-10 RX ORDER — CLOBETASOL PROPIONATE 0.5 MG/G
CREAM TOPICAL 2 TIMES DAILY
Qty: 30 G | Refills: 1 | Status: SHIPPED | OUTPATIENT
Start: 2024-09-10 | End: 2024-09-17

## 2024-09-10 NOTE — ASSESSMENT & PLAN NOTE
Wt Readings from Last 3 Encounters:   09/10/24 91.6 kg (202 lb)   09/05/24 93.4 kg (206 lb)   08/01/24 92.5 kg (204 lb)       Weight is down 4 lbs since last month- continue on torsemide 10 mg daily.   Has been using more salt with fresh tomatoes and counseled him on stopping the extra salt

## 2024-09-10 NOTE — ASSESSMENT & PLAN NOTE
Lab Results   Component Value Date    HGBA1C 6.7 (H) 09/04/2024   Seeing endocrinology- Derrick Lebron NP - having problems with his basiglar pen malfunctioning- they are looking into  possibility of other pens getting approved due to malfunctioning- patient was on hold over an hour with no response- may be candidate for pump

## 2024-09-10 NOTE — PROGRESS NOTES
Assessment/Plan:     Diagnosis ICD-10-CM Associated Orders   1. Pedal edema  R60.0       2. S/P AVR (aortic valve replacement)  Z95.2       3. Chronic diastolic congestive heart failure (HCC)  I50.32       4. Type 2 diabetes mellitus with diabetic neuropathy, with long-term current use of insulin (HCC)  E11.40     Z79.4       5. Psoriasis  L40.9 clobetasol (TEMOVATE) 0.05 % cream      6. Coronary artery disease involving native coronary artery of native heart without angina pectoris  I25.10 CBC and differential     Comprehensive metabolic panel     CBC and differential     Comprehensive metabolic panel      7. Postoperative hypothyroidism  E89.0 TSH, 3rd generation with Free T4 reflex     TSH, 3rd generation with Free T4 reflex          Problem List Items Addressed This Visit          Cardiovascular and Mediastinum    CAD (coronary artery disease)    Relevant Orders    CBC and differential    Comprehensive metabolic panel    Chronic diastolic congestive heart failure (HCC)     Wt Readings from Last 3 Encounters:   09/10/24 91.6 kg (202 lb)   09/05/24 93.4 kg (206 lb)   08/01/24 92.5 kg (204 lb)       Weight is down 4 lbs since last month- continue on torsemide 10 mg daily.   Has been using more salt with fresh tomatoes and counseled him on stopping the extra salt                Endocrine    Postoperative hypothyroidism    Relevant Orders    TSH, 3rd generation with Free T4 reflex    Type 2 diabetes mellitus with diabetic neuropathy ([facility])       Lab Results   Component Value Date    HGBA1C 6.7 (H) 09/04/2024   Seeing endocrinology- Derrick Lebron NP - having problems with his basiglar pen malfunctioning- they are looking into  possibility of other pens getting approved due to malfunctioning- patient was on hold over an hour with no response- may be candidate for pump            Surgery/Wound/Pain    S/P AVR (aortic valve replacement)     Other Visit Diagnoses       Pedal edema    -  Primary    Psoriasis        Relevant  Medications    clobetasol (TEMOVATE) 0.05 % cream              Return in about 3 months (around 12/10/2024) for Annual Medicare Wellness.      Subjective:    Patient ID: Carlos Enrique Roth Jr. is a 89 y.o. male    Here for follow up   Will give flu shot today   Has a new rash on   left leg-? Psoriasis related- no itching - slightly better than a feww weeks ago        The following portions of the patient's history were reviewed and updated as appropriate: allergies, current medications and problem list.     Review of Systems   Constitutional:  Negative for chills, fatigue and fever.   Respiratory:  Positive for shortness of breath.    Cardiovascular:  Positive for leg swelling.   Gastrointestinal:  Negative for constipation and diarrhea.   Musculoskeletal:         Ongoing hip stiffness- using cane for support   All other systems reviewed and are negative.        Objective:      Current Outpatient Medications:     acetaminophen (TYLENOL) 325 mg tablet, Take 2 tablets (650 mg total) by mouth every 6 (six) hours as needed for mild pain, headaches or fever, Disp: , Rfl:     allopurinol (ZYLOPRIM) 300 mg tablet, TAKE ONE TABLET BY MOUTH ONCE DAILY, Disp: 90 tablet, Rfl: 3    ascorbic acid (VITAMIN C) 500 mg tablet, TAKE ONE TABLET BY MOUTH DAILY, Disp: 28 tablet, Rfl: 3    cholecalciferol (VITAMIN D3) 1,000 units tablet, Take 1 tablet (1,000 Units total) by mouth daily, Disp: 90 tablet, Rfl: 3    clobetasol (TEMOVATE) 0.05 % cream, Apply topically 2 (two) times a day for 7 days, Disp: 30 g, Rfl: 1    dorzolamide-timolol (COSOPT) 22.3-6.8 MG/ML ophthalmic solution, Administer 1 drop to both eyes 2 (two) times a day, Disp: , Rfl:     Eliquis 2.5 MG, TAKE ONE TABLET BY MOUTH TWICE DAILY, Disp: 60 tablet, Rfl: 5    FeroSul 325 (65 Fe) MG tablet, TAKE ONE TABLET BY MOUTH DAILY, Disp: 28 tablet, Rfl: 3    gabapentin (NEURONTIN) 300 mg capsule, TAKE ONE CAPSULE BY MOUTH DAILY AT BEDTIME, Disp: 90 capsule, Rfl: 3    insulin aspart  "(NovoLOG FlexPen) 100 UNIT/ML injection pen, INJECT 11 UNITS SUBCUTANEOUSLY THREE TIMES DAILY, Disp: 15 mL, Rfl: 3    Insulin Glargine Solostar (Lantus SoloStar) 100 UNIT/ML SOPN, Inject 0.24 mL (24 Units total) under the skin in the morning, Disp: 15 mL, Rfl: 2    Insulin Pen Needle (Pen Needles) 32G X 4 MM MISC, Use 4 (four) times a day (before meals and at bedtime), Disp: 100 each, Rfl: 11    levothyroxine 150 mcg tablet, Take 1 tab 6 days a week and half a tab on Sunday., Disp: 90 tablet, Rfl: 3    LORazepam (ATIVAN) 0.5 mg tablet, TAKE ONE TABLET BY MOUTH EVERY EIGHT HOURS AS NEEDED FOR ANXIETY, Disp: 30 tablet, Rfl: 3    pantoprazole (PROTONIX) 20 mg tablet, TAKE ONE TABLET BY MOUTH DAILY, Disp: 30 tablet, Rfl: 3    rosuvastatin (CRESTOR) 40 MG tablet, TAKE ONE TABLET BY MOUTH DAILY, Disp: 90 tablet, Rfl: 1    tamsulosin (FLOMAX) 0.4 mg, TAKE ONE CAPSULE BY MOUTH DAILY WITH dinner, Disp: 30 capsule, Rfl: 6    torsemide (DEMADEX) 10 mg tablet, Take 10 mg by mouth daily, Disp: , Rfl:     doxycycline (ADOXA) 100 MG tablet, Take 100 mg by mouth 2 (two) times a day For 7 days (Patient not taking: Reported on 9/10/2024), Disp: , Rfl:     nitroglycerin (NITROSTAT) 0.4 mg SL tablet, Place 1 tablet (0.4 mg total) under the tongue every 5 (five) minutes as needed for chest pain (Patient not taking: Reported on 9/10/2024), Disp: 25 tablet, Rfl: 1    senna (SENOKOT) 8.6 MG tablet, Take 1 tablet (8.6 mg total) by mouth daily (Patient not taking: Reported on 9/10/2024), Disp: 90 tablet, Rfl: 3    Blood pressure 130/68, pulse 58, height 5' 7\" (1.702 m), weight 91.6 kg (202 lb), SpO2 97%.     Physical Exam  Vitals and nursing note reviewed.   Constitutional:       General: He is not in acute distress.  HENT:      Head: Normocephalic.      Right Ear: There is no impacted cerumen.      Left Ear: There is no impacted cerumen.      Nose: No congestion.      Mouth/Throat:      Pharynx: No posterior oropharyngeal erythema.   Eyes: "      General:         Right eye: No discharge.   Cardiovascular:      Rate and Rhythm: Normal rate and regular rhythm.      Heart sounds: No murmur heard.  Pulmonary:      Breath sounds: No wheezing or rhonchi.   Abdominal:      Palpations: Abdomen is soft.      Tenderness: There is no abdominal tenderness.   Musculoskeletal:      Right lower leg: Edema present.      Left lower leg: Edema present.      Comments: Plus 1 bilateral edema   Skin:     Findings: Rash present. No erythema.   Neurological:      Mental Status: He is alert and oriented to person, place, and time.   Psychiatric:         Mood and Affect: Mood normal.         Thought Content: Thought content normal.

## 2024-09-18 LAB
DME PARACHUTE DELIVERY DATE ACTUAL: NORMAL
DME PARACHUTE DELIVERY DATE EXPECTED: NORMAL
DME PARACHUTE DELIVERY DATE REQUESTED: NORMAL
DME PARACHUTE ITEM DESCRIPTION: NORMAL
DME PARACHUTE ITEM DESCRIPTION: NORMAL
DME PARACHUTE ORDER STATUS: NORMAL
DME PARACHUTE SUPPLIER NAME: NORMAL
DME PARACHUTE SUPPLIER PHONE: NORMAL

## 2024-09-23 DIAGNOSIS — S72.145D CLOSED NONDISPLACED INTERTROCHANTERIC FRACTURE OF LEFT FEMUR WITH ROUTINE HEALING, SUBSEQUENT ENCOUNTER: ICD-10-CM

## 2024-09-23 DIAGNOSIS — Z95.1 S/P CABG X 4: ICD-10-CM

## 2024-09-23 DIAGNOSIS — I48.91 NEW ONSET ATRIAL FIBRILLATION (HCC): ICD-10-CM

## 2024-09-24 RX ORDER — PANTOPRAZOLE SODIUM 20 MG/1
TABLET, DELAYED RELEASE ORAL
Qty: 30 TABLET | Refills: 3 | Status: SHIPPED | OUTPATIENT
Start: 2024-09-24

## 2024-09-24 RX ORDER — APIXABAN 2.5 MG/1
2.5 TABLET, FILM COATED ORAL 2 TIMES DAILY
Qty: 60 TABLET | Refills: 0 | Status: SHIPPED | OUTPATIENT
Start: 2024-09-24

## 2024-09-24 NOTE — TELEPHONE ENCOUNTER
Patient called to request a refill for their Eliquis and pantoprizole  advised a refill was requested on 9/23 and is pending approval. Patient verbalized understanding and is in agreement.

## 2024-11-08 ENCOUNTER — TELEPHONE (OUTPATIENT)
Dept: NEPHROLOGY | Facility: CLINIC | Age: 89
End: 2024-11-08

## 2024-11-13 ENCOUNTER — RESULTS FOLLOW-UP (OUTPATIENT)
Dept: FAMILY MEDICINE CLINIC | Facility: HOSPITAL | Age: 89
End: 2024-11-13

## 2024-11-13 DIAGNOSIS — S72.145D CLOSED NONDISPLACED INTERTROCHANTERIC FRACTURE OF LEFT FEMUR WITH ROUTINE HEALING, SUBSEQUENT ENCOUNTER: ICD-10-CM

## 2024-11-13 DIAGNOSIS — I48.91 NEW ONSET ATRIAL FIBRILLATION (HCC): ICD-10-CM

## 2024-11-13 DIAGNOSIS — E87.6 HYPOKALEMIA: Primary | ICD-10-CM

## 2024-11-13 DIAGNOSIS — Z95.1 S/P CABG X 4: ICD-10-CM

## 2024-11-13 LAB
ALBUMIN SERPL-MCNC: 4 G/DL (ref 3.6–5.1)
ALBUMIN/GLOB SERPL: 1.5 (CALC) (ref 1–2.5)
ALP SERPL-CCNC: 93 U/L (ref 35–144)
ALT SERPL-CCNC: 11 U/L (ref 9–46)
AST SERPL-CCNC: 18 U/L (ref 10–35)
BASOPHILS # BLD AUTO: 89 CELLS/UL (ref 0–200)
BASOPHILS NFR BLD AUTO: 1.1 %
BILIRUB SERPL-MCNC: 1.4 MG/DL (ref 0.2–1.2)
BUN SERPL-MCNC: 33 MG/DL (ref 7–25)
BUN/CREAT SERPL: 13 (CALC) (ref 6–22)
CALCIUM SERPL-MCNC: 9.5 MG/DL (ref 8.6–10.3)
CHLORIDE SERPL-SCNC: 106 MMOL/L (ref 98–110)
CO2 SERPL-SCNC: 29 MMOL/L (ref 20–32)
CREAT SERPL-MCNC: 2.47 MG/DL (ref 0.7–1.22)
EOSINOPHIL # BLD AUTO: 292 CELLS/UL (ref 15–500)
EOSINOPHIL NFR BLD AUTO: 3.6 %
ERYTHROCYTE [DISTWIDTH] IN BLOOD BY AUTOMATED COUNT: 13.4 % (ref 11–15)
GFR/BSA.PRED SERPLBLD CYS-BASED-ARV: 24 ML/MIN/1.73M2
GLOBULIN SER CALC-MCNC: 2.6 G/DL (CALC) (ref 1.9–3.7)
GLUCOSE SERPL-MCNC: 98 MG/DL (ref 65–99)
HCT VFR BLD AUTO: 39.1 % (ref 38.5–50)
HGB BLD-MCNC: 12.8 G/DL (ref 13.2–17.1)
LYMPHOCYTES # BLD AUTO: 2211 CELLS/UL (ref 850–3900)
LYMPHOCYTES NFR BLD AUTO: 27.3 %
MCH RBC QN AUTO: 33.2 PG (ref 27–33)
MCHC RBC AUTO-ENTMCNC: 32.7 G/DL (ref 32–36)
MCV RBC AUTO: 101.3 FL (ref 80–100)
MONOCYTES # BLD AUTO: 753 CELLS/UL (ref 200–950)
MONOCYTES NFR BLD AUTO: 9.3 %
NEUTROPHILS # BLD AUTO: 4755 CELLS/UL (ref 1500–7800)
NEUTROPHILS NFR BLD AUTO: 58.7 %
PLATELET # BLD AUTO: 188 THOUSAND/UL (ref 140–400)
PMV BLD REES-ECKER: 10.7 FL (ref 7.5–12.5)
POTASSIUM SERPL-SCNC: 3.2 MMOL/L (ref 3.5–5.3)
PROT SERPL-MCNC: 6.6 G/DL (ref 6.1–8.1)
RBC # BLD AUTO: 3.86 MILLION/UL (ref 4.2–5.8)
SODIUM SERPL-SCNC: 141 MMOL/L (ref 135–146)
TSH SERPL-ACNC: 0.79 MIU/L (ref 0.4–4.5)
WBC # BLD AUTO: 8.1 THOUSAND/UL (ref 3.8–10.8)

## 2024-11-13 RX ORDER — POTASSIUM CHLORIDE 1500 MG/1
20 TABLET, EXTENDED RELEASE ORAL DAILY
Qty: 30 TABLET | Refills: 5 | Status: SHIPPED | OUTPATIENT
Start: 2024-11-13

## 2024-11-14 ENCOUNTER — OFFICE VISIT (OUTPATIENT)
Dept: NEPHROLOGY | Facility: HOSPITAL | Age: 89
End: 2024-11-14
Payer: COMMERCIAL

## 2024-11-14 VITALS
HEIGHT: 67 IN | HEART RATE: 60 BPM | SYSTOLIC BLOOD PRESSURE: 120 MMHG | WEIGHT: 196.4 LBS | BODY MASS INDEX: 30.83 KG/M2 | DIASTOLIC BLOOD PRESSURE: 78 MMHG

## 2024-11-14 DIAGNOSIS — D63.1 ANEMIA DUE TO STAGE 4 CHRONIC KIDNEY DISEASE  (HCC): ICD-10-CM

## 2024-11-14 DIAGNOSIS — E11.29 MICROALBUMINURIA DUE TO TYPE 2 DIABETES MELLITUS  (HCC): ICD-10-CM

## 2024-11-14 DIAGNOSIS — E11.40 TYPE 2 DIABETES MELLITUS WITH DIABETIC NEUROPATHY, WITH LONG-TERM CURRENT USE OF INSULIN (HCC): ICD-10-CM

## 2024-11-14 DIAGNOSIS — Z79.4 TYPE 2 DIABETES MELLITUS WITH DIABETIC NEUROPATHY, WITH LONG-TERM CURRENT USE OF INSULIN (HCC): ICD-10-CM

## 2024-11-14 DIAGNOSIS — E66.01 OBESITY, MORBID (HCC): ICD-10-CM

## 2024-11-14 DIAGNOSIS — E87.6 HYPOKALEMIA: ICD-10-CM

## 2024-11-14 DIAGNOSIS — E11.22 CKD STAGE 4 DUE TO TYPE 2 DIABETES MELLITUS (HCC): Primary | ICD-10-CM

## 2024-11-14 DIAGNOSIS — R80.9 MICROALBUMINURIA DUE TO TYPE 2 DIABETES MELLITUS  (HCC): ICD-10-CM

## 2024-11-14 DIAGNOSIS — I50.32 CHRONIC DIASTOLIC CONGESTIVE HEART FAILURE (HCC): ICD-10-CM

## 2024-11-14 DIAGNOSIS — N18.4 CKD STAGE 4 DUE TO TYPE 2 DIABETES MELLITUS (HCC): Primary | ICD-10-CM

## 2024-11-14 DIAGNOSIS — N25.81 SECONDARY HYPERPARATHYROIDISM (HCC): ICD-10-CM

## 2024-11-14 DIAGNOSIS — N28.1 RENAL CYST: ICD-10-CM

## 2024-11-14 DIAGNOSIS — N18.4 ANEMIA DUE TO STAGE 4 CHRONIC KIDNEY DISEASE  (HCC): ICD-10-CM

## 2024-11-14 DIAGNOSIS — N40.0 BENIGN PROSTATIC HYPERPLASIA WITHOUT LOWER URINARY TRACT SYMPTOMS: ICD-10-CM

## 2024-11-14 PROCEDURE — 99214 OFFICE O/P EST MOD 30 MIN: CPT | Performed by: NURSE PRACTITIONER

## 2024-11-14 RX ORDER — APIXABAN 2.5 MG/1
2.5 TABLET, FILM COATED ORAL 2 TIMES DAILY
Qty: 60 TABLET | Refills: 5 | Status: SHIPPED | OUTPATIENT
Start: 2024-11-14

## 2024-11-14 RX ORDER — PANTOPRAZOLE SODIUM 20 MG/1
20 TABLET, DELAYED RELEASE ORAL DAILY
Qty: 30 TABLET | Refills: 5 | Status: SHIPPED | OUTPATIENT
Start: 2024-11-14

## 2024-11-14 NOTE — PROGRESS NOTES
Name: Carlos Enrique Roth Jr.      : 1935      MRN: [id number]  Encounter Provider: VALENCIA Perry  Encounter Date: 2024   Encounter department: 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 Bass.  Baseline creatinine 2.2-3.1 with fluctuation, likely due to volume status and diuretics.  Most recent creatinine 2.47, GFR of 24.  On gabapentin, renally dosed.  Etiology of chronic disease likely due to hypertensive nephrosclerosis, diabetic nephropathy, obesity related FSGS, and age-related nephron loss.  Continue to avoid nephrotoxins including NSAID's.  Previously completed CKD education and ACP meeting.  Previous office visits and discussions, patient is unsure if he would like to pursue dialysis if his renal function declines.  No current indication for dialysis at this time.    Chronic diastolic congestive heart failure (HCC)  Wt Readings from Last 3 Encounters:   09/10/24 91.6 kg (202 lb)   24 93.4 kg (206 lb)   24 92.5 kg (204 lb)     Diuretic: Torsemide 10 mg daily + potassium chloride 20 mEq daily.  Continue daily weights, call for weight gain 3 pounds in 1 day or 5 pounds throughout the week.  Recommend following low sodium diet.  Follows with cardiology, Dr. Bush.  Previously on metoprolol but discontinued due to bradycardia.  History of aortic stenosis, status post bioprosthetic aortic valve replacement and CABG x 4.  Most recent echo with EF of 65%, grade 2 diastolic dysfunction.  He has had ongoing issues with lower extremity edema.  Microalbuminuria due to type 2 diabetes mellitus  (HCC)  Maintained on Crestor, not on ACE/ARB.  Previously on Benicar but discontinued as patient was having some hypotension.  UA and urine microalbumin to creatinine ratio prior to next appointment.    Type 2 diabetes mellitus with diabetic neuropathy, with long-term current use of insulin ([facility])    Lab Results    Component Value Date    HGBA1C 6.7 (H) 09/04/2024   Controlled with insulin.  Following with endocrinology.    Secondary hyperparathyroidism (HCC)  Continue vitamin D 1000 units daily.  Will check PTH, phos, magnesium, vitamin D prior to next appointment.    Anemia due to stage 4 chronic kidney disease  (HCC)  Continue to monitor and transfuse as needed for hemoglobin less than 7.0.    Obesity, morbid (HCC)  Continue weight loss efforts.    Renal cyst  Most recent CT scan 6/20/2024 with multiple renal cysts.    Benign prostatic hyperplasia without lower urinary tract symptoms  Denies current urinary issues.  Continue on Flomax.    Hypokalemia  Suspect due to diuretic, recently started on K-Dur 20 mEq daily with plan to repeat lab work in 1 month.         Other: CAD, status post CABG x 4, atrial fibrillation, GERD    History of Present Illness   HPI  Carlos Enrique Roth Jr. is a 89 y.o. male with multiple comorbidities including CKD stage IV, BPH, CHF, chronic lower extremity edema, diabetes, CAD status post CABG x 4, A-fib, who presents for routine CKD follow-up.  He denies any changes to his health besides breaking his hip. He went to [facility] for rehab. He is back at home. He is ambulating with a cane. He resides with his son. He denies fevers or chills. He denies chest pain or shortness of breathe. He denies changes to his appetite, he is a small eater. He denies issues with urination. He reports losing some weight. He has chronic LE edema. He denies NSAID use.        History obtained from: patient  Review of Systems   Constitutional:  Negative for chills and fever.   Respiratory:  Negative for chest tightness and shortness of breath.    Cardiovascular:  Positive for leg swelling. Negative for chest pain.   Gastrointestinal:  Negative for abdominal pain, diarrhea, nausea and vomiting.   Genitourinary:  Positive for frequency. Negative for difficulty urinating.     Pertinent Medical History       Current Outpatient  Medications on File Prior to Visit   Medication Sig Dispense Refill    potassium chloride (Klor-Con M20) 20 mEq tablet Take 1 tablet (20 mEq total) by mouth daily 30 tablet 5    acetaminophen (TYLENOL) 325 mg tablet Take 2 tablets (650 mg total) by mouth every 6 (six) hours as needed for mild pain, headaches or fever      allopurinol (ZYLOPRIM) 300 mg tablet TAKE ONE TABLET BY MOUTH ONCE DAILY 90 tablet 3    apixaban (Eliquis) 2.5 mg TAKE ONE TABLET BY MOUTH TWICE DAILY 60 tablet 5    ascorbic acid (VITAMIN C) 500 mg tablet TAKE ONE TABLET BY MOUTH DAILY 28 tablet 3    cholecalciferol (VITAMIN D3) 1,000 units tablet Take 1 tablet (1,000 Units total) by mouth daily 90 tablet 3    clobetasol (TEMOVATE) 0.05 % cream Apply topically 2 (two) times a day for 7 days 30 g 1    dorzolamide-timolol (COSOPT) 22.3-6.8 MG/ML ophthalmic solution Administer 1 drop to both eyes 2 (two) times a day      FeroSul 325 (65 Fe) MG tablet TAKE ONE TABLET BY MOUTH DAILY 28 tablet 3    gabapentin (NEURONTIN) 300 mg capsule TAKE ONE CAPSULE BY MOUTH DAILY AT BEDTIME 90 capsule 3    insulin aspart (NovoLOG FlexPen) 100 UNIT/ML injection pen INJECT 11 UNITS SUBCUTANEOUSLY THREE TIMES DAILY 15 mL 3    Insulin Glargine Solostar (Lantus SoloStar) 100 UNIT/ML SOPN Inject 0.24 mL (24 Units total) under the skin in the morning 15 mL 2    Insulin Pen Needle (Pen Needles) 32G X 4 MM MISC Use 4 (four) times a day (before meals and at bedtime) 100 each 11    levothyroxine 150 mcg tablet Take 1 tab 6 days a week and half a tab on Sunday. 90 tablet 3    LORazepam (ATIVAN) 0.5 mg tablet TAKE ONE TABLET BY MOUTH EVERY EIGHT HOURS AS NEEDED FOR ANXIETY 30 tablet 3    nitroglycerin (NITROSTAT) 0.4 mg SL tablet Place 1 tablet (0.4 mg total) under the tongue every 5 (five) minutes as needed for chest pain (Patient not taking: Reported on 9/10/2024) 25 tablet 1    pantoprazole (PROTONIX) 20 mg tablet TAKE ONE TABLET BY MOUTH EVERY DAY 30 tablet 5    rosuvastatin  (CRESTOR) 40 MG tablet TAKE ONE TABLET BY MOUTH DAILY 90 tablet 1    senna (SENOKOT) 8.6 MG tablet Take 1 tablet (8.6 mg total) by mouth daily (Patient not taking: Reported on 9/10/2024) 90 tablet 3    tamsulosin (FLOMAX) 0.4 mg TAKE ONE CAPSULE BY MOUTH DAILY WITH dinner 30 capsule 6    torsemide (DEMADEX) 10 mg tablet Take 10 mg by mouth daily      [DISCONTINUED] Eliquis 2.5 MG TAKE ONE TABLET BY MOUTH TWICE DAILY 60 tablet 0    [DISCONTINUED] pantoprazole (PROTONIX) 20 mg tablet TAKE ONE TABLET BY MOUTH DAILY 30 tablet 3     No current facility-administered medications on file prior to visit.         Objective   There were no vitals taken for this visit.     Physical Exam  Vitals reviewed.   HENT:      Head: Normocephalic.      Nose: Nose normal.      Mouth/Throat:      Mouth: Mucous membranes are moist.   Cardiovascular:      Heart sounds: Normal heart sounds.   Pulmonary:      Breath sounds: Normal breath sounds.   Abdominal:      Palpations: Abdomen is soft.   Musculoskeletal:      Right lower leg: Edema present.      Left lower leg: Edema present.   Skin:     General: Skin is warm and dry.   Neurological:      General: No focal deficit present.      Mental Status: He is alert. Mental status is at baseline.   Psychiatric:         Mood and Affect: Mood normal.

## 2024-11-14 NOTE — ASSESSMENT & PLAN NOTE
Continue vitamin D 1000 units daily.  Will check PTH, phos, magnesium, vitamin D prior to next appointment.

## 2024-11-14 NOTE — ASSESSMENT & PLAN NOTE
Maintained on Crestor, not on ACE/ARB.  Previously on Benicar but discontinued as patient was having some hypotension.  UA and urine microalbumin to creatinine ratio prior to next appointment.

## 2024-11-14 NOTE — PATIENT INSTRUCTIONS
You are doing great. We will see you back in 4 months. Please repeat lab work 1-2 weeks prior to the appointment.

## 2024-11-14 NOTE — ASSESSMENT & PLAN NOTE
Lab Results   Component Value Date    HGBA1C 6.7 (H) 09/04/2024   Controlled with insulin.  Following with endocrinology.

## 2024-11-14 NOTE — ASSESSMENT & PLAN NOTE
Follows with Dr. Bass.  Baseline creatinine 2.2-3.1 with fluctuation, likely due to volume status and diuretics.  Most recent creatinine 2.47, GFR of 24.  On gabapentin, renally dosed.  Etiology of chronic disease likely due to hypertensive nephrosclerosis, diabetic nephropathy, obesity related FSGS, and age-related nephron loss.  Continue to avoid nephrotoxins including NSAID's.  Previously completed CKD education and ACP meeting.  Previous office visits and discussions, patient is unsure if he would like to pursue dialysis if his renal function declines.  No current indication for dialysis at this time.

## 2024-11-14 NOTE — ASSESSMENT & PLAN NOTE
Wt Readings from Last 3 Encounters:   09/10/24 91.6 kg (202 lb)   09/05/24 93.4 kg (206 lb)   08/01/24 92.5 kg (204 lb)     Diuretic: Torsemide 10 mg daily + potassium chloride 20 mEq daily.  Continue daily weights, call for weight gain 3 pounds in 1 day or 5 pounds throughout the week.  Recommend following low sodium diet.  Follows with cardiology, Dr. Bush.  Previously on metoprolol but discontinued due to bradycardia.  History of aortic stenosis, status post bioprosthetic aortic valve replacement and CABG x 4.  Most recent echo with EF of 65%, grade 2 diastolic dysfunction.  He has had ongoing issues with lower extremity edema.

## 2024-11-14 NOTE — ASSESSMENT & PLAN NOTE
Suspect due to diuretic, recently started on K-Dur 20 mEq daily with plan to repeat lab work in 1 month.

## 2024-11-18 DIAGNOSIS — L40.9 PSORIASIS: ICD-10-CM

## 2024-11-18 DIAGNOSIS — R35.1 BENIGN PROSTATIC HYPERPLASIA WITH NOCTURIA: ICD-10-CM

## 2024-11-18 DIAGNOSIS — Z00.00 HEALTHCARE MAINTENANCE: ICD-10-CM

## 2024-11-18 DIAGNOSIS — N40.1 BENIGN PROSTATIC HYPERPLASIA WITH NOCTURIA: ICD-10-CM

## 2024-11-19 RX ORDER — TAMSULOSIN HYDROCHLORIDE 0.4 MG/1
CAPSULE ORAL
Qty: 30 CAPSULE | Refills: 5 | Status: SHIPPED | OUTPATIENT
Start: 2024-11-19

## 2024-11-19 RX ORDER — CLOBETASOL PROPIONATE 0.5 MG/G
CREAM TOPICAL 2 TIMES DAILY
Qty: 30 G | Refills: 1 | Status: SHIPPED | OUTPATIENT
Start: 2024-11-19 | End: 2024-11-26

## 2024-11-19 RX ORDER — ASCORBIC ACID 500 MG
500 TABLET ORAL DAILY
Qty: 30 TABLET | Refills: 5 | Status: SHIPPED | OUTPATIENT
Start: 2024-11-19

## 2024-12-04 ENCOUNTER — RA CDI HCC (OUTPATIENT)
Dept: OTHER | Facility: HOSPITAL | Age: 89
End: 2024-12-04

## 2024-12-04 ENCOUNTER — RESULTS FOLLOW-UP (OUTPATIENT)
Dept: OTHER | Facility: HOSPITAL | Age: 89
End: 2024-12-04

## 2024-12-04 LAB
25(OH)D3 SERPL-MCNC: 48 NG/ML (ref 30–100)
ALBUMIN SERPL-MCNC: 3.8 G/DL (ref 3.6–5.1)
APPEARANCE UR: CLEAR
BACTERIA UR QL AUTO: ABNORMAL /HPF
BILIRUB UR QL STRIP: NEGATIVE
BUN SERPL-MCNC: 39 MG/DL (ref 7–25)
BUN/CREAT SERPL: 15 (CALC) (ref 6–22)
CALCIUM SERPL-MCNC: 9.4 MG/DL (ref 8.6–10.3)
CHLORIDE SERPL-SCNC: 107 MMOL/L (ref 98–110)
CO2 SERPL-SCNC: 26 MMOL/L (ref 20–32)
COLOR UR: YELLOW
CREAT SERPL-MCNC: 2.68 MG/DL (ref 0.7–1.22)
GFR/BSA.PRED SERPLBLD CYS-BASED-ARV: 22 ML/MIN/1.73M2
GLUCOSE SERPL-MCNC: 147 MG/DL (ref 65–99)
GLUCOSE UR QL STRIP: NEGATIVE
HGB UR QL STRIP: ABNORMAL
HYALINE CASTS #/AREA URNS LPF: ABNORMAL /LPF
KETONES UR QL STRIP: NEGATIVE
LEUKOCYTE ESTERASE UR QL STRIP: ABNORMAL
MAGNESIUM SERPL-MCNC: 2.4 MG/DL (ref 1.5–2.5)
NITRITE UR QL STRIP: NEGATIVE
PH UR STRIP: 7 [PH] (ref 5–8)
PHOSPHATE SERPL-MCNC: 3.4 MG/DL (ref 2.1–4.3)
POTASSIUM SERPL-SCNC: 4.1 MMOL/L (ref 3.5–5.3)
PROT UR QL STRIP: ABNORMAL
PTH-INTACT SERPL-MCNC: 30 PG/ML (ref 16–77)
RBC #/AREA URNS HPF: ABNORMAL /HPF
SODIUM SERPL-SCNC: 141 MMOL/L (ref 135–146)
SP GR UR STRIP: 1.01 (ref 1–1.03)
SQUAMOUS #/AREA URNS HPF: ABNORMAL /HPF
WBC #/AREA URNS HPF: ABNORMAL /HPF

## 2024-12-04 NOTE — TELEPHONE ENCOUNTER
Called pt.  Mailbox full; will try again.    ----- Message from Taya Souza PA-C sent at 12/4/2024  9:48 AM EST -----  Creatinine at baseline.  No changes needed at this time.

## 2024-12-04 NOTE — PROGRESS NOTES
E11.65, I13.0  HCC coding opportunities          Chart Reviewed number of suggestions sent to Provider: 2     Patients Insurance     Medicare Insurance: [employer]vantage

## 2024-12-04 NOTE — TELEPHONE ENCOUNTER
12/4/24  3:50 pm   Talked with pt.  Advised that CR stable per Taya.  No changes.    ----- Message from Taya Souza PA-C sent at 12/4/2024  9:48 AM EST -----  Creatinine at baseline.  No changes needed at this time.

## 2024-12-12 ENCOUNTER — OFFICE VISIT (OUTPATIENT)
Dept: FAMILY MEDICINE CLINIC | Facility: HOSPITAL | Age: 89
End: 2024-12-12
Payer: COMMERCIAL

## 2024-12-12 VITALS
HEIGHT: 67 IN | BODY MASS INDEX: 29.98 KG/M2 | DIASTOLIC BLOOD PRESSURE: 62 MMHG | OXYGEN SATURATION: 96 % | HEART RATE: 54 BPM | WEIGHT: 191 LBS | SYSTOLIC BLOOD PRESSURE: 124 MMHG

## 2024-12-12 DIAGNOSIS — E11.40 TYPE 2 DIABETES MELLITUS WITH DIABETIC NEUROPATHY, WITH LONG-TERM CURRENT USE OF INSULIN (HCC): Primary | ICD-10-CM

## 2024-12-12 DIAGNOSIS — I50.32 CHRONIC DIASTOLIC CONGESTIVE HEART FAILURE (HCC): ICD-10-CM

## 2024-12-12 DIAGNOSIS — I48.19 PERSISTENT ATRIAL FIBRILLATION (HCC): ICD-10-CM

## 2024-12-12 DIAGNOSIS — Z79.4 TYPE 2 DIABETES MELLITUS WITH DIABETIC NEUROPATHY, WITH LONG-TERM CURRENT USE OF INSULIN (HCC): Primary | ICD-10-CM

## 2024-12-12 DIAGNOSIS — Z00.00 MEDICARE ANNUAL WELLNESS VISIT, SUBSEQUENT: ICD-10-CM

## 2024-12-12 DIAGNOSIS — I25.10 ATHEROSCLEROSIS OF NATIVE CORONARY ARTERY OF NATIVE HEART WITHOUT ANGINA PECTORIS: ICD-10-CM

## 2024-12-12 DIAGNOSIS — L40.9 PSORIASIS: ICD-10-CM

## 2024-12-12 LAB — SL AMB POCT HEMOGLOBIN AIC: 7.4 (ref ?–6.5)

## 2024-12-12 PROCEDURE — 83036 HEMOGLOBIN GLYCOSYLATED A1C: CPT | Performed by: INTERNAL MEDICINE

## 2024-12-12 PROCEDURE — G0439 PPPS, SUBSEQ VISIT: HCPCS | Performed by: INTERNAL MEDICINE

## 2024-12-12 RX ORDER — CLOBETASOL PROPIONATE 0.5 MG/G
CREAM TOPICAL 2 TIMES DAILY
Qty: 30 G | Refills: 1 | Status: SHIPPED | OUTPATIENT
Start: 2024-12-12 | End: 2024-12-19

## 2024-12-12 NOTE — ASSESSMENT & PLAN NOTE
No symptoms of  high heart rates   On eliquis  for anticoagulation and  rates are controlled without meds

## 2024-12-12 NOTE — PROGRESS NOTES
Name: Carlos Enrique Roth Jr.      : 1935      MRN: [id number]  Encounter Provider: Juliette Cid DO  Encounter Date: 2024   Encounter department: [facility] CARE [address]    Assessment & Plan  Type 2 diabetes mellitus with diabetic neuropathy, with long-term current use of insulin (HCC)    Lab Results   Component Value Date    HGBA1C 7.4 (A) 2024   7.4% today- did not test sugar today- is heading to [employer] table at [facility] for lunch today  Orders:  •  POCT hemoglobin A1c    Chronic diastolic congestive heart failure (HCC)  Wt Readings from Last 3 Encounters:   24 86.6 kg (191 lb)   24 89.1 kg (196 lb 6.4 oz)   09/10/24 91.6 kg (202 lb)     No chest pain symptoms or light headedness             Atherosclerosis of native coronary artery of native heart without angina pectoris         Persistent atrial fibrillation (HCC)  No symptoms of  high heart rates   On eliquis  for anticoagulation and  rates are controlled without meds       Psoriasis    Orders:  •  clobetasol (TEMOVATE) 0.05 % cream; Apply topically 2 (two) times a day for 7 days    Medicare annual wellness visit, subsequent         BMI 29.0-29.9,adult           Depression Screening and Follow-up Plan: Patient was screened for depression during today's encounter. They screened negative with a PHQ-2 score of 0.      Preventive health issues were discussed with patient, and age appropriate screening tests were ordered as noted in patient's After Visit Summary. Personalized health advice and appropriate referrals for health education or preventive services given if needed, as noted in patient's After Visit Summary.    History of Present Illness     1. Ongoing balance issues- has cane with him today- no recent falls   He asked about resuming driving but I would not  feel he is stable to do that- if he wishes we can send him to ot program for  evaluation       Patient Care Team:  Juliette Cid DO as PCP -  General  MD Derrick Mitchell PA-C (Endocrinology)  VALENCIA Perry as Nurse Practitioner (Nephrology)  VALENCIA Escobar as Nurse Practitioner (Nurse Practitioner)    Review of Systems   Constitutional:  Negative for fatigue.   HENT:  Negative for congestion.    Cardiovascular:  Negative for chest pain and palpitations.   Gastrointestinal:  Negative for abdominal pain.   Musculoskeletal:  Positive for gait problem. Negative for back pain.        Uses cane or walker   All other systems reviewed and are negative.    Medical History Reviewed by provider this encounter:  Tobacco  Allergies  Meds  Problems  Med Hx  Surg Hx  Fam Hx       Annual Wellness Visit Questionnaire   Carlos Enrique is here for his Subsequent Wellness visit.     Health Risk Assessment:   Patient rates overall health as poor. Patient feels that their physical health rating is same. Patient is satisfied with their life. Eyesight was rated as same. Hearing was rated as same. Patient feels that their emotional and mental health rating is same. Patients states they are sometimes angry. Patient states they are sometimes unusually tired/fatigued. Pain experienced in the last 7 days has been none. Patient states that he has experienced no weight loss or gain in last 6 months.     Depression Screening:   PHQ-2 Score: 0      Fall Risk Screening:   In the past year, patient has experienced: history of falling in past year    Number of falls: 1  Injured during fall?: No    Feels unsteady when standing or walking?: Yes    Worried about falling?: Yes      Home Safety:  Patient does not have trouble with stairs inside or outside of their home. Patient has working smoke alarms and has no working carbon monoxide detector. Home safety hazards include: none.     Nutrition:   Current diet is Regular, Low Carb and No Added Salt.     Medications:   Patient is currently taking over-the-counter supplements. OTC medications include: see  medication list. Patient is able to manage medications. [facility] pharmacy is doing pill pacs    Activities of Daily Living (ADLs)/Instrumental Activities of Daily Living (IADLs):   Walk and transfer into and out of bed and chair?: Yes  Dress and groom yourself?: Yes    Bathe or shower yourself?: Yes    Feed yourself? Yes  Do your laundry/housekeeping?: Yes  Manage your money, pay your bills and track your expenses?: Yes  Make your own meals?: No    Do your own shopping?: Yes    ADL comments: Gets meals on wheels 5 days a week     Previous Hospitalizations:   Any hospitalizations or ED visits within the last 12 months?: Yes    How many hospitalizations have you had in the last year?: 1-2    Advance Care Planning:   Living will: No    Durable POA for healthcare: Yes    Advanced directive: Yes    Advanced directive counseling given: Yes    End of Life Decisions reviewed with patient: Yes    Provider agrees with end of life decisions: Yes      Comments: Wishes to be a dnr level 3    Cognitive Screening:   Provider or family/friend/caregiver concerned regarding cognition?: No    PREVENTIVE SCREENINGS      Cardiovascular Screening:    General: Screening Not Indicated and History Lipid Disorder      Diabetes Screening:     General: Screening Not Indicated and History Diabetes      Colorectal Cancer Screening:     General: Screening Not Indicated      Prostate Cancer Screening:    General: History Prostate Cancer and Screening Not Indicated      Abdominal Aortic Aneurysm (AAA) Screening:    Risk factors include: tobacco use        Lung Cancer Screening:     General: Screening Not Indicated    Screening, Brief Intervention, and Referral to Treatment (SBIRT)    Screening      AUDIT-C Screenin) How often did you have a drink containing alcohol in the past year? never  2) How many drinks did you have on a typical day when you were drinking in the past year? 0  3) How often did you have 6 or more drinks on one occasion in  "the past year? never    AUDIT-C Score: 0  Interpretation: Score 0-3 (male): Negative screen for alcohol misuse    Single Item Drug Screening:  How often have you used an illegal drug (including marijuana) or a prescription medication for non-medical reasons in the past year? never    Single Item Drug Screen Score: 0  Interpretation: Negative screen for possible drug use disorder    SDOH Risk Assessment  Social determinants of health (SDOH) risk assesment tool was completed. The tool at a minimum covered housing stability, food insecurity, transportation needs, and utility difficulty. Patient had at risk responses for the following SDOH domains: utilities.     Social Drivers of Health     Financial Resource Strain: High Risk (11/14/2023)    Overall Financial Resource Strain (CARDIA)    • Difficulty of Paying Living Expenses: Hard   Food Insecurity: No Food Insecurity (12/12/2024)    Hunger Vital Sign    • Worried About Running Out of Food in the Last Year: Never true    • Ran Out of Food in the Last Year: Never true   Transportation Needs: No Transportation Needs (12/12/2024)    PRAPARE - Transportation    • Lack of Transportation (Medical): No    • Lack of Transportation (Non-Medical): No   Housing Stability: Low Risk  (12/12/2024)    Housing Stability Vital Sign    • Unable to Pay for Housing in the Last Year: No    • Number of Times Moved in the Last Year: 0    • Homeless in the Last Year: No   Utilities: At Risk (12/12/2024)    [facility] Utilities    • Threatened with loss of utilities: Yes     No results found.    Objective   /62   Pulse (!) 54   Ht 5' 7\" (1.702 m)   Wt 86.6 kg (191 lb)   SpO2 96%   BMI 29.91 kg/m²     Physical Exam  Vitals and nursing note reviewed.   Constitutional:       General: He is not in acute distress.  HENT:      Head: Normocephalic.      Right Ear: Tympanic membrane normal. There is no impacted cerumen.      Left Ear: Tympanic membrane normal. There is no impacted cerumen.      " Nose: No congestion.      Mouth/Throat:      Pharynx: No posterior oropharyngeal erythema.   Eyes:      General:         Right eye: No discharge.         Left eye: No discharge.   Cardiovascular:      Rate and Rhythm: Normal rate and regular rhythm.      Heart sounds: No murmur heard.  Pulmonary:      Breath sounds: No wheezing or rhonchi.   Abdominal:      Palpations: Abdomen is soft.      Tenderness: There is no abdominal tenderness. There is no guarding.   Musculoskeletal:      Cervical back: No tenderness.      Right lower leg: Edema present.      Left lower leg: Edema present.      Comments: trace bilateral edema   Skin:     Findings: Rash present. No erythema.      Comments: Skin thickening left lower left leg consistent with psoriasis   Neurological:      Mental Status: He is alert and oriented to person, place, and time.      Motor: No weakness.      Comments: Mild speech hesitancy   Psychiatric:         Mood and Affect: Mood normal.         Thought Content: Thought content normal.

## 2024-12-12 NOTE — ASSESSMENT & PLAN NOTE
Lab Results   Component Value Date    HGBA1C 7.4 (A) 12/12/2024   7.4% today- did not test sugar today- is heading to [facility] table at [facility] for lunch today  Orders:  •  POCT hemoglobin A1c

## 2024-12-12 NOTE — ASSESSMENT & PLAN NOTE
Wt Readings from Last 3 Encounters:   12/12/24 86.6 kg (191 lb)   11/14/24 89.1 kg (196 lb 6.4 oz)   09/10/24 91.6 kg (202 lb)     No chest pain symptoms or light headedness

## 2024-12-12 NOTE — PATIENT INSTRUCTIONS
Do not recommend you return to driving  Medicare Preventive Visit Patient Instructions  Thank you for completing your Welcome to Medicare Visit or Medicare Annual Wellness Visit today. Your next wellness visit will be due in one year (12/13/2025).  The screening/preventive services that you may require over the next 5-10 years are detailed below. Some tests may not apply to you based off risk factors and/or age. Screening tests ordered at today's visit but not completed yet may show as past due. Also, please note that scanned in results may not display below.  Preventive Screenings:  Service Recommendations Previous Testing/Comments   Colorectal Cancer Screening  Colonoscopy    Fecal Occult Blood Test (FOBT)/Fecal Immunochemical Test (FIT)  Fecal DNA/Cologuard Test  Flexible Sigmoidoscopy Age: 45-75 years old   Colonoscopy: every 10 years (May be performed more frequently if at higher risk)  OR  FOBT/FIT: every 1 year  OR  Cologuard: every 3 years  OR  Sigmoidoscopy: every 5 years  Screening may be recommended earlier than age 45 if at higher risk for colorectal cancer. Also, an individualized decision between you and your healthcare provider will decide whether screening between the ages of 76-85 would be appropriate. Colonoscopy: Not on file  FOBT/FIT: Not on file  Cologuard: Not on file  Sigmoidoscopy: Not on file    Screening Not Indicated     Prostate Cancer Screening Individualized decision between patient and health care provider in men between ages of 55-69   Medicare will cover every 12 months beginning on the day after your 50th birthday PSA: No results in last 5 years     History Prostate Cancer  Screening Not Indicated     Hepatitis C Screening Once for adults born between 1945 and 1965  More frequently in patients at high risk for Hepatitis C Hep C Antibody: Not on file        Diabetes Screening 1-2 times per year if you're at risk for diabetes or have pre-diabetes Fasting glucose: 231 mg/dL  (11/5/2021)  A1C: 7.4 (12/12/2024)  Screening Not Indicated  History Diabetes   Cholesterol Screening Once every 5 years if you don't have a lipid disorder. May order more often based on risk factors. Lipid panel: 11/13/2023  Screening Not Indicated  History Lipid Disorder      Other Preventive Screenings Covered by Medicare:  Abdominal Aortic Aneurysm (AAA) Screening: covered once if your at risk. You're considered to be at risk if you have a family history of AAA or a male between the age of 65-75 who smoking at least 100 cigarettes in your lifetime.  Lung Cancer Screening: covers low dose CT scan once per year if you meet all of the following conditions: (1) Age 55-77; (2) No signs or symptoms of lung cancer; (3) Current smoker or have quit smoking within the last 15 years; (4) You have a tobacco smoking history of at least 20 pack years (packs per day x number of years you smoked); (5) You get a written order from a healthcare provider.  Glaucoma Screening: covered annually if you're considered high risk: (1) You have diabetes OR (2) Family history of glaucoma OR (3)  aged 50 and older OR (4)  American aged 65 and older  Osteoporosis Screening: covered every 2 years if you meet one of the following conditions: (1) Have a vertebral abnormality; (2) On glucocorticoid therapy for more than 3 months; (3) Have primary hyperparathyroidism; (4) On osteoporosis medications and need to assess response to drug therapy.  HIV Screening: covered annually if you're between the age of 15-65. Also covered annually if you are younger than 15 and older than 65 with risk factors for HIV infection. For pregnant patients, it is covered up to 3 times per pregnancy.    Immunizations:  Immunization Recommendations   Influenza Vaccine Annual influenza vaccination during flu season is recommended for all persons aged >= 6 months who do not have contraindications   Pneumococcal Vaccine   * Pneumococcal conjugate  vaccine = PCV13 (Prevnar 13), PCV15 (Vaxneuvance), PCV20 (Prevnar 20)  * Pneumococcal polysaccharide vaccine = PPSV23 (Pneumovax) Adults 19-65 yo with certain risk factors or if 65+ yo  If never received any pneumonia vaccine: recommend Prevnar 20 (PCV20)  Give PCV20 if previously received 1 dose of PCV13 or PPSV23   Hepatitis B Vaccine 3 dose series if at intermediate or high risk (ex: diabetes, end stage renal disease, liver disease)   Respiratory syncytial virus (RSV) Vaccine - COVERED BY MEDICARE PART D  * RSVPreF3 (Arexvy) CDC recommends that adults 60 years of age and older may receive a single dose of RSV vaccine using shared clinical decision-making (SCDM)   Tetanus (Td) Vaccine - COST NOT COVERED BY MEDICARE PART B Following completion of primary series, a booster dose should be given every 10 years to maintain immunity against tetanus. Td may also be given as tetanus wound prophylaxis.   Tdap Vaccine - COST NOT COVERED BY MEDICARE PART B Recommended at least once for all adults. For pregnant patients, recommended with each pregnancy.   Shingles Vaccine (Shingrix) - COST NOT COVERED BY MEDICARE PART B  2 shot series recommended in those 19 years and older who have or will have weakened immune systems or those 50 years and older     Health Maintenance Due:  There are no preventive care reminders to display for this patient.  Immunizations Due:      Topic Date Due    COVID-19 Vaccine (6 - 2024-25 season) 09/01/2024     Advance Directives   What are advance directives?  Advance directives are legal documents that state your wishes and plans for medical care. These plans are made ahead of time in case you lose your ability to make decisions for yourself. Advance directives can apply to any medical decision, such as the treatments you want, and if you want to donate organs.   What are the types of advance directives?  There are many types of advance directives, and each state has rules about how to use them. You  may choose a combination of any of the following:  Living will:  This is a written record of the treatment you want. You can also choose which treatments you do not want, which to limit, and which to stop at a certain time. This includes surgery, medicine, IV fluid, and tube feedings.   Durable power of  for healthcare (DPAHC):  This is a written record that states who you want to make healthcare choices for you when you are unable to make them for yourself. This person, called a proxy, is usually a family member or a friend. You may choose more than 1 proxy.  Do not resuscitate (DNR) order:  A DNR order is used in case your heart stops beating or you stop breathing. It is a request not to have certain forms of treatment, such as CPR. A DNR order may be included in other types of advance directives.  Medical directive:  This covers the care that you want if you are in a coma, near death, or unable to make decisions for yourself. You can list the treatments you want for each condition. Treatment may include pain medicine, surgery, blood transfusions, dialysis, IV or tube feedings, and a ventilator (breathing machine).  Values history:  This document has questions about your views, beliefs, and how you feel and think about life. This information can help others choose the care that you would choose.  Why are advance directives important?  An advance directive helps you control your care. Although spoken wishes may be used, it is better to have your wishes written down. Spoken wishes can be misunderstood, or not followed. Treatments may be given even if you do not want them. An advance directive may make it easier for your family to make difficult choices about your care.   Fall Prevention    Fall prevention  includes ways to make your home and other areas safer. It also includes ways you can move more carefully to prevent a fall. Health conditions that cause changes in your blood pressure, vision, or muscle  strength and coordination may increase your risk for falls. Medicines may also increase your risk for falls if they make you dizzy, weak, or sleepy.   Fall prevention tips:   Stand or sit up slowly.    Use assistive devices as directed.    Wear shoes that fit well and have soles that .    Wear a personal alarm.    Stay active.    Manage your medical conditions.    Home Safety Tips:  Add items to prevent falls in the bathroom.    Keep paths clear.    Install bright lights in your home.    Keep items you use often on shelves within reach.    Paint or place reflective tape on the edges of your stairs.    Weight Management   Why it is important to manage your weight:  Being overweight increases your risk of health conditions such as heart disease, high blood pressure, type 2 diabetes, and certain types of cancer. It can also increase your risk for osteoarthritis, sleep apnea, and other respiratory problems. Aim for a slow, steady weight loss. Even a small amount of weight loss can lower your risk of health problems.  How to lose weight safely:  A safe and healthy way to lose weight is to eat fewer calories and get regular exercise. You can lose up about 1 pound a week by decreasing the number of calories you eat by 500 calories each day.   Healthy meal plan for weight management:  A healthy meal plan includes a variety of foods, contains fewer calories, and helps you stay healthy. A healthy meal plan includes the following:  Eat whole-grain foods more often.  A healthy meal plan should contain fiber. Fiber is the part of grains, fruits, and vegetables that is not broken down by your body. Whole-grain foods are healthy and provide extra fiber in your diet. Some examples of whole-grain foods are whole-wheat breads and pastas, oatmeal, brown rice, and bulgur.  Eat a variety of vegetables every day.  Include dark, leafy greens such as spinach, kale, ward greens, and mustard greens. Eat yellow and orange vegetables  such as carrots, sweet potatoes, and winter squash.   Eat a variety of fruits every day.  Choose fresh or canned fruit (canned in its own juice or light syrup) instead of juice. Fruit juice has very little or no fiber.  Eat low-fat dairy foods.  Drink fat-free (skim) milk or 1% milk. Eat fat-free yogurt and low-fat cottage cheese. Try low-fat cheeses such as mozzarella and other reduced-fat cheeses.  Choose meat and other protein foods that are low in fat.  Choose beans or other legumes such as split peas or lentils. Choose fish, skinless poultry (chicken or turkey), or lean cuts of red meat (beef or pork). Before you cook meat or poultry, cut off any visible fat.   Use less fat and oil.  Try baking foods instead of frying them. Add less fat, such as margarine, sour cream, regular salad dressing and mayonnaise to foods. Eat fewer high-fat foods. Some examples of high-fat foods include french fries, doughnuts, ice cream, and cakes.  Eat fewer sweets.  Limit foods and drinks that are high in sugar. This includes candy, cookies, regular soda, and sweetened drinks.  Exercise:  Exercise at least 30 minutes per day on most days of the week. Some examples of exercise include walking, biking, dancing, and swimming. You can also fit in more physical activity by taking the stairs instead of the elevator or parking farther away from stores. Ask your healthcare provider about the best exercise plan for you.      © Copyright [employer] 2018 Information is for End User's use only and may not be sold, redistributed or otherwise used for commercial purposes. All illustrations and images included in CareNotes® are the copyrighted property of [employer] or [employer]

## 2024-12-17 DIAGNOSIS — F41.9 ANXIETY: ICD-10-CM

## 2024-12-17 DIAGNOSIS — R60.9 EDEMA, UNSPECIFIED: ICD-10-CM

## 2024-12-17 DIAGNOSIS — M1A.9XX1 CHRONIC GOUT WITH TOPHUS, UNSPECIFIED CAUSE, UNSPECIFIED SITE: ICD-10-CM

## 2024-12-18 RX ORDER — ALLOPURINOL 300 MG/1
300 TABLET ORAL DAILY
Qty: 90 TABLET | Refills: 1 | Status: SHIPPED | OUTPATIENT
Start: 2024-12-18

## 2024-12-18 RX ORDER — LORAZEPAM 0.5 MG/1
0.5 TABLET ORAL
Qty: 30 TABLET | Refills: 1 | Status: SHIPPED | OUTPATIENT
Start: 2025-01-16

## 2024-12-18 RX ORDER — TORSEMIDE 10 MG/1
10 TABLET ORAL DAILY
Qty: 90 TABLET | Refills: 1 | Status: SHIPPED | OUTPATIENT
Start: 2024-12-18

## 2025-01-09 DIAGNOSIS — E11.22 TYPE 2 DIABETES MELLITUS WITH STAGE 3 CHRONIC KIDNEY DISEASE, WITHOUT LONG-TERM CURRENT USE OF INSULIN, UNSPECIFIED WHETHER STAGE 3A OR 3B CKD (HCC): ICD-10-CM

## 2025-01-09 DIAGNOSIS — N18.30 TYPE 2 DIABETES MELLITUS WITH STAGE 3 CHRONIC KIDNEY DISEASE, WITHOUT LONG-TERM CURRENT USE OF INSULIN, UNSPECIFIED WHETHER STAGE 3A OR 3B CKD (HCC): ICD-10-CM

## 2025-01-10 RX ORDER — INSULIN ASPART 100 [IU]/ML
INJECTION, SOLUTION INTRAVENOUS; SUBCUTANEOUS
Qty: 15 ML | Refills: 1 | Status: SHIPPED | OUTPATIENT
Start: 2025-01-10

## 2025-02-03 DIAGNOSIS — G62.9 NEUROPATHY: ICD-10-CM

## 2025-02-04 RX ORDER — GABAPENTIN 300 MG/1
300 CAPSULE ORAL
Qty: 90 CAPSULE | Refills: 1 | Status: SHIPPED | OUTPATIENT
Start: 2025-02-04

## 2025-02-06 DIAGNOSIS — E03.9 HYPOTHYROIDISM, UNSPECIFIED TYPE: ICD-10-CM

## 2025-02-07 RX ORDER — LEVOTHYROXINE SODIUM 150 UG/1
150 TABLET ORAL DAILY
Qty: 90 TABLET | Refills: 1 | Status: SHIPPED | OUTPATIENT
Start: 2025-02-07

## 2025-02-08 DIAGNOSIS — Z95.1 S/P CABG X 4: ICD-10-CM

## 2025-02-08 DIAGNOSIS — Z95.2 S/P AVR (AORTIC VALVE REPLACEMENT): ICD-10-CM

## 2025-02-10 RX ORDER — ROSUVASTATIN CALCIUM 40 MG/1
40 TABLET, COATED ORAL DAILY
Qty: 90 TABLET | Refills: 0 | Status: SHIPPED | OUTPATIENT
Start: 2025-02-10

## 2025-02-24 ENCOUNTER — OFFICE VISIT (OUTPATIENT)
Dept: FAMILY MEDICINE CLINIC | Facility: HOSPITAL | Age: OVER 89
End: 2025-02-24
Payer: COMMERCIAL

## 2025-02-24 VITALS
HEIGHT: 67 IN | WEIGHT: 190.8 LBS | TEMPERATURE: 97.2 F | DIASTOLIC BLOOD PRESSURE: 74 MMHG | BODY MASS INDEX: 29.95 KG/M2 | SYSTOLIC BLOOD PRESSURE: 122 MMHG | OXYGEN SATURATION: 99 % | HEART RATE: 54 BPM

## 2025-02-24 DIAGNOSIS — E87.6 HYPOKALEMIA: ICD-10-CM

## 2025-02-24 DIAGNOSIS — I50.32 CHRONIC DIASTOLIC CONGESTIVE HEART FAILURE (HCC): ICD-10-CM

## 2025-02-24 DIAGNOSIS — I25.10 CORONARY ARTERY DISEASE INVOLVING NATIVE CORONARY ARTERY OF NATIVE HEART WITHOUT ANGINA PECTORIS: Primary | ICD-10-CM

## 2025-02-24 DIAGNOSIS — L03.115 CELLULITIS OF RIGHT LOWER EXTREMITY: ICD-10-CM

## 2025-02-24 DIAGNOSIS — R60.9 EDEMA, UNSPECIFIED: ICD-10-CM

## 2025-02-24 PROCEDURE — G2211 COMPLEX E/M VISIT ADD ON: HCPCS

## 2025-02-24 PROCEDURE — 99214 OFFICE O/P EST MOD 30 MIN: CPT

## 2025-02-24 RX ORDER — LATANOPROST 50 UG/ML
1 SOLUTION/ DROPS OPHTHALMIC
COMMUNITY
Start: 2025-01-02

## 2025-02-24 RX ORDER — CEPHALEXIN 500 MG/1
500 CAPSULE ORAL EVERY 8 HOURS SCHEDULED
Qty: 30 CAPSULE | Refills: 0 | Status: SHIPPED | OUTPATIENT
Start: 2025-02-24 | End: 2025-03-04 | Stop reason: SDUPTHER

## 2025-02-24 RX ORDER — TORSEMIDE 10 MG/1
20 TABLET ORAL DAILY
Qty: 120 TABLET | Refills: 0 | Status: SHIPPED | OUTPATIENT
Start: 2025-02-24

## 2025-02-24 RX ORDER — TORSEMIDE 10 MG/1
10 TABLET ORAL DAILY
Qty: 60 TABLET | Refills: 0 | Status: SHIPPED | OUTPATIENT
Start: 2025-02-24 | End: 2025-02-24

## 2025-02-24 NOTE — ASSESSMENT & PLAN NOTE
Noted on prior labs, now on potassium chloride 20 mill equivalents daily, advised to continue and complete BMP in 1 week

## 2025-02-24 NOTE — ASSESSMENT & PLAN NOTE
Wt Readings from Last 3 Encounters:   02/24/25 86.5 kg (190 lb 12.8 oz)   12/12/24 86.6 kg (191 lb)   11/14/24 89.1 kg (196 lb 6.4 oz)               Orders:    Ambulatory Referral to Cardiology; Future    Basic metabolic panel; Future

## 2025-02-24 NOTE — PROGRESS NOTES
Name: Carlos Enrique Roth Jr.      : 1935      MRN: [id number]  Encounter Provider: Rocco Everett MD  Encounter Date: 2025   Encounter department: [facility] [address]  :  CrCl cannot be calculated (Patient's most recent lab result is older than the maximum 7 days allowed.). = 25      Assessment & Plan  Coronary artery disease involving native coronary artery of native heart without angina pectoris    Patient overdue to see cardiology, new referral placed    Orders:    Ambulatory Referral to Cardiology; Future    Chronic diastolic congestive heart failure (HCC)  Wt Readings from Last 3 Encounters:   25 86.5 kg (190 lb 12.8 oz)   24 86.6 kg (191 lb)   24 89.1 kg (196 lb 6.4 oz)               Orders:    Ambulatory Referral to Cardiology; Future    Basic metabolic panel; Future    Hypokalemia  Noted on prior labs, now on potassium chloride 20 mill equivalents daily, advised to continue and complete BMP in 1 week         Cellulitis of right lower extremity    Increased warmth erythema and swelling on right lower leg, concerning for possible cellulitis, will treat with Keflex and evaluate again next week.  Counseled on signs of a systemic infection that would warrant immediate emergency care          Orders:    cephalexin (KEFLEX) 500 mg capsule; Take 1 capsule (500 mg total) by mouth every 8 (eight) hours for 10 days    Edema, unspecified    Significant bilateral edema, had been prescribed torsemide 20 mg daily but reduce dose to 10 mg due to frequent urination, will increase back to 20 mg today, advised to repeat BMP in 1 week, also recommended compression stockings and elevation    Orders:    Basic metabolic panel; Future    torsemide (DEMADEX) 10 mg tablet; Take 2 tablets (20 mg total) by mouth daily           History of Present Illness   HPI  Patient presents with concern for pain and swelling and erythema in his right lower limb, he denies any fever or  "chills    Review of Systems   Constitutional:  Negative for chills and fever.   Respiratory:  Negative for shortness of breath.    Cardiovascular:  Negative for chest pain.   Skin:  Positive for rash (lower right limb).   Neurological:  Negative for dizziness and headaches.   Psychiatric/Behavioral:  Negative for confusion.        Objective   /74   Pulse (!) 54   Temp (!) 97.2 °F (36.2 °C)   Ht 5' 7\" (1.702 m)   Wt 86.5 kg (190 lb 12.8 oz)   SpO2 99%   BMI 29.88 kg/m²      Physical Exam  Constitutional:       Appearance: Normal appearance. He is not toxic-appearing or diaphoretic.   Musculoskeletal:      Right lower leg: Edema (+2) present.      Left lower leg: Edema (+2) present.   Skin:     General: Skin is warm.      Findings: Erythema present.      Comments: Warmth and erythema in right lower limb, see media   Neurological:      Mental Status: He is alert and oriented to person, place, and time.   Psychiatric:         Mood and Affect: Mood normal.         Behavior: Behavior normal.         "

## 2025-02-24 NOTE — ASSESSMENT & PLAN NOTE
Patient overdue to see cardiology, new referral placed    Orders:    Ambulatory Referral to Cardiology; Future

## 2025-03-03 DIAGNOSIS — F41.9 ANXIETY: ICD-10-CM

## 2025-03-04 ENCOUNTER — OFFICE VISIT (OUTPATIENT)
Dept: FAMILY MEDICINE CLINIC | Facility: HOSPITAL | Age: OVER 89
End: 2025-03-04
Payer: COMMERCIAL

## 2025-03-04 VITALS
HEART RATE: 54 BPM | BODY MASS INDEX: 28.56 KG/M2 | SYSTOLIC BLOOD PRESSURE: 126 MMHG | WEIGHT: 182 LBS | OXYGEN SATURATION: 99 % | HEIGHT: 67 IN | DIASTOLIC BLOOD PRESSURE: 70 MMHG

## 2025-03-04 DIAGNOSIS — L03.115 CELLULITIS OF RIGHT LOWER EXTREMITY: Primary | ICD-10-CM

## 2025-03-04 DIAGNOSIS — E11.40 TYPE 2 DIABETES MELLITUS WITH DIABETIC NEUROPATHY, UNSPECIFIED WHETHER LONG TERM INSULIN USE (HCC): ICD-10-CM

## 2025-03-04 DIAGNOSIS — I48.19 PERSISTENT ATRIAL FIBRILLATION (HCC): ICD-10-CM

## 2025-03-04 DIAGNOSIS — N18.4 CKD STAGE 4 DUE TO TYPE 2 DIABETES MELLITUS (HCC): ICD-10-CM

## 2025-03-04 DIAGNOSIS — R60.0 LOCALIZED EDEMA: ICD-10-CM

## 2025-03-04 DIAGNOSIS — E66.01 OBESITY, MORBID (HCC): ICD-10-CM

## 2025-03-04 DIAGNOSIS — E11.22 CKD STAGE 4 DUE TO TYPE 2 DIABETES MELLITUS (HCC): ICD-10-CM

## 2025-03-04 LAB — SL AMB POCT HEMOGLOBIN AIC: 7.2 (ref ?–6.5)

## 2025-03-04 PROCEDURE — 83036 HEMOGLOBIN GLYCOSYLATED A1C: CPT | Performed by: INTERNAL MEDICINE

## 2025-03-04 PROCEDURE — 99214 OFFICE O/P EST MOD 30 MIN: CPT | Performed by: INTERNAL MEDICINE

## 2025-03-04 PROCEDURE — G2211 COMPLEX E/M VISIT ADD ON: HCPCS | Performed by: INTERNAL MEDICINE

## 2025-03-04 RX ORDER — LORAZEPAM 0.5 MG/1
0.5 TABLET ORAL
Qty: 30 TABLET | Refills: 1 | Status: SHIPPED | OUTPATIENT
Start: 2025-03-04

## 2025-03-04 RX ORDER — CEPHALEXIN 500 MG/1
500 CAPSULE ORAL EVERY 8 HOURS SCHEDULED
Qty: 30 CAPSULE | Refills: 0 | Status: SHIPPED | OUTPATIENT
Start: 2025-03-04 | End: 2025-03-14

## 2025-03-04 NOTE — PROGRESS NOTES
Assessment/Plan:     Diagnosis ICD-10-CM Associated Orders   1. Cellulitis of right lower extremity  L03.115 cephalexin (KEFLEX) 500 mg capsule      2. Type 2 diabetes mellitus with diabetic neuropathy, unspecified whether long term insulin use ([facility])  E11.40 POCT hemoglobin A1c      3. Localized edema  R60.0  VAS VENOUS DUPLEX - LOWER LIMB BILATERAL      4. 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 Persistent atrial fibrillation (HCC)  I48.19       6. 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[facility])      Lab Results   Component Value Date    HGBA1C 7.2 (A) 03/04/2025   Had recent infection with elevation of the hba1c noted today   Will repeat in 4 months         Relevant Orders    Basic metabolic panel    Type 2 diabetes mellitus with diabetic neuropathy ([facility])    Relevant Orders    POCT hemoglobin A1c (Completed)       Other    Obesity, morbid ([facility])    {Improved - now bmi 28        Other Visit Diagnoses       Cellulitis of right lower extremity    -  Primary      Localized edema        Relevant Orders     VAS VENOUS DUPLEX - LOWER LIMB BILATERAL            Return in about 4 weeks (around 4/1/2025) for Recheck.      Subjective:    Patient ID: Carlos Enrique Roth Jr. is a 89 y.o. male    Here with son - has had some improvement in leg redness- increased the lasix    Has completed antibioitcs. No fever or chills        The following portions of the patient's history were reviewed and updated as appropriate: allergies, current medications and problem list.     Review of Systems   Constitutional:  Positive for fatigue. Negative for chills and fever.   HENT:  Negative for congestion.    Respiratory:  Negative for cough.    Cardiovascular:   Positive for leg swelling.   All other systems reviewed and are negative.        Objective:      Current Outpatient Medications:   •  acetaminophen (TYLENOL) 325 mg tablet, Take 2 tablets (650 mg total) by mouth every 6 (six) hours as needed for mild pain, headaches or fever, Disp: , Rfl:   •  allopurinol (ZYLOPRIM) 300 mg tablet, TAKE ONE TABLET BY MOUTH ONCE DAILY, Disp: 90 tablet, Rfl: 1  •  apixaban (Eliquis) 2.5 mg, TAKE ONE TABLET BY MOUTH TWICE DAILY, Disp: 60 tablet, Rfl: 5  •  ascorbic acid (VITAMIN C) 500 mg tablet, TAKE ONE TABLET BY MOUTH DAILY, Disp: 30 tablet, Rfl: 5  •  cholecalciferol (VITAMIN D3) 1,000 units tablet, Take 1 tablet (1,000 Units total) by mouth daily, Disp: 90 tablet, Rfl: 3  •  clobetasol (TEMOVATE) 0.05 % cream, Apply topically 2 (two) times a day for 7 days, Disp: 30 g, Rfl: 1  •  dorzolamide-timolol (COSOPT) 22.3-6.8 MG/ML ophthalmic solution, Administer 1 drop to both eyes 2 (two) times a day, Disp: , Rfl:   •  FeroSul 325 (65 Fe) MG tablet, TAKE ONE TABLET BY MOUTH DAILY, Disp: 28 tablet, Rfl: 3  •  gabapentin (NEURONTIN) 300 mg capsule, TAKE ONE CAPSULE BY MOUTH DAILY AT BEDTIME, Disp: 90 capsule, Rfl: 1  •  Insulin Glargine Solostar (Lantus SoloStar) 100 UNIT/ML SOPN, Inject 0.24 mL (24 Units total) under the skin in the morning, Disp: 15 mL, Rfl: 2  •  Insulin Pen Needle (Pen Needles) 32G X 4 MM MISC, Use 4 (four) times a day (before meals and at bedtime), Disp: 100 each, Rfl: 11  •  latanoprost (XALATAN) 0.005 % ophthalmic solution, Administer 1 drop into the left eye daily at bedtime, Disp: , Rfl:   •  levothyroxine 150 mcg tablet, TAKE ONE TABLET BY MOUTH DAILY, Disp: 90 tablet, Rfl: 1  •  pantoprazole (PROTONIX) 20 mg tablet, TAKE ONE TABLET BY MOUTH EVERY DAY, Disp: 30 tablet, Rfl: 5  •  potassium chloride (Klor-Con M20) 20 mEq tablet, Take 1 tablet (20 mEq total) by mouth daily, Disp: 30 tablet, Rfl: 5  •  rosuvastatin (CRESTOR) 40 MG tablet, TAKE ONE TABLET BY MOUTH  "DAILY, Disp: 90 tablet, Rfl: 0  •  tamsulosin (FLOMAX) 0.4 mg, TAKE ONE CAPSULE BY MOUTH DAILY WITH dinner, Disp: 30 capsule, Rfl: 5  •  torsemide (DEMADEX) 10 mg tablet, Take 2 tablets (20 mg total) by mouth daily, Disp: 120 tablet, Rfl: 0  •  insulin aspart (NovoLOG FlexPen) 100 UNIT/ML injection pen, INJECT 11 UNITS SUBCUTANEOUSLY THREE TIMES DAILY, Disp: 15 mL, Rfl: 3  •  LORazepam (ATIVAN) 0.5 mg tablet, Take 1 tablet (0.5 mg total) by mouth daily at bedtime Do not start before January 16, 2025., Disp: 30 tablet, Rfl: 1  •  nitroglycerin (NITROSTAT) 0.4 mg SL tablet, Place 1 tablet (0.4 mg total) under the tongue every 5 (five) minutes as needed for chest pain, Disp: 25 tablet, Rfl: 1    Blood pressure 126/70, pulse (!) 54, height 5' 7\" (1.702 m), weight 82.6 kg (182 lb), SpO2 99%.     Physical Exam  Vitals and nursing note reviewed.   Constitutional:       General: He is not in acute distress.  HENT:      Head: Normocephalic.      Right Ear: Tympanic membrane normal. There is no impacted cerumen.      Left Ear: Tympanic membrane normal. There is no impacted cerumen.      Nose: No congestion.      Mouth/Throat:      Pharynx: No posterior oropharyngeal erythema.   Eyes:      General:         Right eye: No discharge.         Left eye: No discharge.   Cardiovascular:      Rate and Rhythm: Normal rate and regular rhythm.      Heart sounds: No murmur heard.  Pulmonary:      Breath sounds: No wheezing or rhonchi.   Abdominal:      Palpations: Abdomen is soft.      Tenderness: There is no abdominal tenderness. There is no guarding.   Musculoskeletal:      Cervical back: No tenderness.      Right lower leg: Edema present.      Left lower leg: Edema present.      Comments: trace bilateral edema   Skin:     Findings: Rash present. No erythema.      Comments: Skin thickening left lower left leg consistent with psoriasis   Less redness compared to  recent photo with    Neurological:      Mental Status: He is " alert and oriented to person, place, and time.      Motor: No weakness.      Comments: Mild speech hesitancy   Psychiatric:         Mood and Affect: Mood normal.         Thought Content: Thought content normal.

## 2025-03-04 NOTE — ASSESSMENT & PLAN NOTE
Lab Results   Component Value Date    HGBA1C 7.2 (A) 03/04/2025   Had recent infection with elevation of the hba1c noted today   Will repeat in 4 months

## 2025-03-06 DIAGNOSIS — E11.22 TYPE 2 DIABETES MELLITUS WITH STAGE 3 CHRONIC KIDNEY DISEASE, WITHOUT LONG-TERM CURRENT USE OF INSULIN, UNSPECIFIED WHETHER STAGE 3A OR 3B CKD (HCC): ICD-10-CM

## 2025-03-06 DIAGNOSIS — N18.30 TYPE 2 DIABETES MELLITUS WITH STAGE 3 CHRONIC KIDNEY DISEASE, WITHOUT LONG-TERM CURRENT USE OF INSULIN, UNSPECIFIED WHETHER STAGE 3A OR 3B CKD (HCC): ICD-10-CM

## 2025-03-06 RX ORDER — INSULIN ASPART 100 [IU]/ML
INJECTION, SOLUTION INTRAVENOUS; SUBCUTANEOUS
Qty: 15 ML | Refills: 3 | Status: SHIPPED | OUTPATIENT
Start: 2025-03-06

## 2025-03-10 ENCOUNTER — OFFICE VISIT (OUTPATIENT)
Dept: CARDIOLOGY CLINIC | Facility: CLINIC | Age: OVER 89
End: 2025-03-10
Payer: COMMERCIAL

## 2025-03-10 VITALS
HEIGHT: 67 IN | WEIGHT: 188 LBS | HEART RATE: 65 BPM | BODY MASS INDEX: 29.51 KG/M2 | SYSTOLIC BLOOD PRESSURE: 128 MMHG | OXYGEN SATURATION: 100 % | DIASTOLIC BLOOD PRESSURE: 58 MMHG

## 2025-03-10 DIAGNOSIS — I50.32 CHRONIC DIASTOLIC CONGESTIVE HEART FAILURE (HCC): ICD-10-CM

## 2025-03-10 DIAGNOSIS — I25.10 CORONARY ARTERY DISEASE INVOLVING NATIVE CORONARY ARTERY OF NATIVE HEART WITHOUT ANGINA PECTORIS: ICD-10-CM

## 2025-03-10 PROCEDURE — 99214 OFFICE O/P EST MOD 30 MIN: CPT | Performed by: PHYSICIAN ASSISTANT

## 2025-03-10 NOTE — PROGRESS NOTES
"Cardiology Office Follow Up  Carlos Enrique Roth Jr.  1935  [id number]      ASSESSMENT/PLAN:  CAD/CABG x4 (9/2018; CABG x4, with a LIMA to the LAD, SVG to an OM 2 and SVG “Y graft\" to an OM 3 and left posterior descending artery)  Denies anginal symptoms  As SL NTG for as needed use but has not needed this  Not on BB due to baseline bradycardia  Not on ASA due to increased bleeding risk on OAC. Also high fall risk.   Continue statin  Aortic stenosis s/p bioAVR  AVR placed at the time of CABG  Normal functioning device per last TTE 3/2024  Aware of antibiotic prophylaxis prior to any dental procedures  Chronic HFpEF  Euvolemic at present time; wt today 182lbs <204lbs last OV  Volume overloaded last OV 7/11/2024. Torsemide increased to 20 mg daily.  Sodium/fluid restriction advised  Daily standing weights  Paroxysmal atrial fibrillation   HR controlled.  Covered during hospitalization for hip fracture converted on its own to SR by discharge.  Was found in AF during his last OV in July.  On Eliquis 2. 1 5 mg twice daily (age >80, Cr >1.5)   Hyperlipidemia  PCP follows labs  LDL at goal.  On Crestor 40mg without myalgias  .  Interval History/ HPI:   Carlos Enrique Kwan is a 89-year-old male with history of CAD/CABG x 4, AS with BioAVR during CABG, Chronic HFpEF, Paroxysmal asymptomatic atrial fibrillation, hyperlipidemia, pre-DM2, CKD presents for routine follow up visit. Know to Dr Bush, last OV 7/2024.  Uses a cane at baseline now. No dyspnea at rest or with walking. No sleep issues. Has some swelling of his bilateral LE with soreness and pain. Was started on Keflex and given skin creams for suspected cellulitis. Wts are way down since increasing torsemide to 20mg QD. Taking all medications otherwise. Has hx of mechanical falls due to LE weakness and L knee gout. He is interested in working with PT at home and will discuss this further with his PCP.     Vitals:  128/58  65  188lbs    Past Medical History:   Diagnosis Date    " Aortic stenosis     CKD (chronic kidney disease)     baseline Cr 1.3-1.5    Coronary artery disease     Cough     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HCC)     type 2, insulin dependent    GERD (gastroesophageal reflux disease)     Glaucoma     Gout     History of prostate cancer     Hypertension     Hypothyroidism     Overweight     Peripheral neuropathy, idiopathic     Pleural effusion, left     Pure hypercholesterolemia     LA...14   R....14     Visual impairment     cataract left eye    Weight gain      Social History     Socioeconomic History    Marital status:      Spouse name: Not on file    Number of children: Not on file    Years of education: Not on file    Highest education level: Not on file   Occupational History    Occupation: retired    Tobacco Use    Smoking status: Former     Current packs/day: 0.00     Average packs/day: 0.3 packs/day for 1 year (0.3 ttl pk-yrs)     Types: Cigarettes     Start date:      Quit date:      Years since quittin.2    Smokeless tobacco: Not on file    Tobacco comments:     Only in the service    Vaping Use    Vaping status: Never Used   Substance and Sexual Activity    Alcohol use: Never    Drug use: No    Sexual activity: Not Currently   Other Topics Concern    Not on file   Social History Narrative    Caffeine use / coffee diet cola and tea    Lives with family     Living situation supportive and safe    No advance directives  -denied     Social Drivers of Health     Financial Resource Strain: High Risk (2023)    Overall Financial Resource Strain (CARDIA)     Difficulty of Paying Living Expenses: Hard   Food Insecurity: No Food Insecurity (2024)    Hunger Vital Sign     Worried About Running Out of Food in the Last Year: Never true     Ran Out of Food in the Last Year: Never true   Transportation Needs: No Transportation Needs (2024)    PRAPARE - Transportation     Lack of Transportation (Medical): No     Lack of Transportation  (Non-Medical): No   Physical Activity: Insufficiently Active (5/28/2021)    Exercise Vital Sign     Days of Exercise per Week: 3 days     Minutes of Exercise per Session: 20 min   Stress: Stress Concern Present (5/28/2021)    [employer] - Occupational Stress Questionnaire     Feeling of Stress : To some extent   Social Connections: Not on file   Intimate Partner Violence: Not on file   Housing Stability: Low Risk  (12/12/2024)    Housing Stability Vital Sign     Unable to Pay for Housing in the Last Year: No     Number of Times Moved in the Last Year: 0     Homeless in the Last Year: No      Family History   Problem Relation Age of Onset    Diabetes Mother     Pancreatic cancer Brother     Diabetes Maternal Grandmother     Colon cancer Son     Diabetes Family     Substance Abuse Neg Hx     Mental illness Neg Hx      Past Surgical History:   Procedure Laterality Date    CARDIAC CATHETERIZATION      EYE SURGERY      IR THORACENTESIS  10/23/2018    IR THORACENTESIS  11/2/2018    IR THORACENTESIS  11/9/2018    IR THORACENTESIS  11/23/2018    RI CORONARY ARTERY BYP W/VEIN & ARTERY GRAFT 3 VEIN N/A 9/17/2018    Procedure: CORONARY ARTERY BYPASS GRAFT (CABG) x 4 VESSELS with LIMA - LAD, SVG/LEFT LEG EVH - LEFT PDA, OM3, & OM2;  Surgeon: Remy Ash MD;  Location: [facility];  Service: Cardiac Surgery    RI ECHO TRANSESOPHAG MONTR CARDIAC PUMP FUNCTJ N/A 9/17/2018    Procedure: TRANSESOPHAGEAL ECHOCARDIOGRAM (VERONICA);  Surgeon: Remy Ash MD;  Location: [facility];  Service: Cardiac Surgery    RI OPTX FEM SHFT FX W/INSJ IMED IMPLT W/WO SCREW Left 6/20/2024    Procedure: INSERTION NAIL IM FEMUR ANTEGRADE (TROCHANTERIC);  Surgeon: Sukh Reece DO;  Location: [facility];  Service: Orthopedics    RI RPLCMT AORTIC VALVE OPN W/STENTLESS TISSUE VALVE N/A 9/17/2018    Procedure: REPLACEMENT VALVE AORTIC (AVR)- 23mm tissue Intuity Valve;  Surgeon: Remy Ash MD;  Location: [facility];   Service: Cardiac Surgery    THORACOSCOPY VIDEO ASSISTED SURGERY (VATS) Left 11/27/2018    Procedure: THORACOSCOPY VIDEO ASSISTED SURGERY (VATS), talc pleurodesis,;  Surgeon: Ciara Green MD;  Location: [facility];  Service: Thoracic    THYROID SURGERY         Current Outpatient Medications:     acetaminophen (TYLENOL) 325 mg tablet, Take 2 tablets (650 mg total) by mouth every 6 (six) hours as needed for mild pain, headaches or fever, Disp: , Rfl:     allopurinol (ZYLOPRIM) 300 mg tablet, TAKE ONE TABLET BY MOUTH ONCE DAILY, Disp: 90 tablet, Rfl: 1    apixaban (Eliquis) 2.5 mg, TAKE ONE TABLET BY MOUTH TWICE DAILY, Disp: 60 tablet, Rfl: 5    ascorbic acid (VITAMIN C) 500 mg tablet, TAKE ONE TABLET BY MOUTH DAILY, Disp: 30 tablet, Rfl: 5    cephalexin (KEFLEX) 500 mg capsule, Take 1 capsule (500 mg total) by mouth every 8 (eight) hours for 10 days, Disp: 30 capsule, Rfl: 0    cholecalciferol (VITAMIN D3) 1,000 units tablet, Take 1 tablet (1,000 Units total) by mouth daily, Disp: 90 tablet, Rfl: 3    clobetasol (TEMOVATE) 0.05 % cream, Apply topically 2 (two) times a day for 7 days, Disp: 30 g, Rfl: 1    dorzolamide-timolol (COSOPT) 22.3-6.8 MG/ML ophthalmic solution, Administer 1 drop to both eyes 2 (two) times a day, Disp: , Rfl:     FeroSul 325 (65 Fe) MG tablet, TAKE ONE TABLET BY MOUTH DAILY, Disp: 28 tablet, Rfl: 3    gabapentin (NEURONTIN) 300 mg capsule, TAKE ONE CAPSULE BY MOUTH DAILY AT BEDTIME, Disp: 90 capsule, Rfl: 1    insulin aspart (NovoLOG FlexPen) 100 UNIT/ML injection pen, INJECT 11 UNITS SUBCUTANEOUSLY THREE TIMES DAILY, Disp: 15 mL, Rfl: 3    Insulin Glargine Solostar (Lantus SoloStar) 100 UNIT/ML SOPN, Inject 0.24 mL (24 Units total) under the skin in the morning, Disp: 15 mL, Rfl: 2    Insulin Pen Needle (Pen Needles) 32G X 4 MM MISC, Use 4 (four) times a day (before meals and at bedtime), Disp: 100 each, Rfl: 11    latanoprost (XALATAN) 0.005 % ophthalmic solution, Administer 1 drop  into the left eye daily at bedtime, Disp: , Rfl:     levothyroxine 150 mcg tablet, TAKE ONE TABLET BY MOUTH DAILY, Disp: 90 tablet, Rfl: 1    LORazepam (ATIVAN) 0.5 mg tablet, Take 1 tablet (0.5 mg total) by mouth daily at bedtime Do not start before January 16, 2025., Disp: 30 tablet, Rfl: 1    nitroglycerin (NITROSTAT) 0.4 mg SL tablet, Place 1 tablet (0.4 mg total) under the tongue every 5 (five) minutes as needed for chest pain (Patient not taking: Reported on 9/10/2024), Disp: 25 tablet, Rfl: 1    pantoprazole (PROTONIX) 20 mg tablet, TAKE ONE TABLET BY MOUTH EVERY DAY, Disp: 30 tablet, Rfl: 5    potassium chloride (Klor-Con M20) 20 mEq tablet, Take 1 tablet (20 mEq total) by mouth daily, Disp: 30 tablet, Rfl: 5    rosuvastatin (CRESTOR) 40 MG tablet, TAKE ONE TABLET BY MOUTH DAILY, Disp: 90 tablet, Rfl: 0    tamsulosin (FLOMAX) 0.4 mg, TAKE ONE CAPSULE BY MOUTH DAILY WITH dinner, Disp: 30 capsule, Rfl: 5    torsemide (DEMADEX) 10 mg tablet, Take 2 tablets (20 mg total) by mouth daily, Disp: 120 tablet, Rfl: 0    Review of Systems:  Review of Systems   Constitutional:  Negative for appetite change, chills, diaphoresis, fatigue and fever.   Respiratory:  Negative for cough, chest tightness and shortness of breath.    Cardiovascular:  Negative for chest pain, palpitations and leg swelling.   Gastrointestinal:  Negative for diarrhea, nausea and vomiting.   Endocrine: Negative for cold intolerance and heat intolerance.   Genitourinary:  Negative for difficulty urinating, dysuria and enuresis.   Musculoskeletal:  Negative for arthralgias, back pain and gait problem.   Allergic/Immunologic: Negative for environmental allergies and food allergies.   Neurological:  Negative for dizziness, facial asymmetry and headaches.   Hematological:  Negative for adenopathy. Does not bruise/bleed easily.   Psychiatric/Behavioral:  Negative for agitation, behavioral problems and confusion.      Physical Exam:  Physical  Exam  Constitutional:       Appearance: He is well-developed.   HENT:      Right Ear: External ear normal.      Left Ear: External ear normal.   Eyes:      Pupils: Pupils are equal, round, and reactive to light.   Cardiovascular:      Rate and Rhythm: Normal rate and regular rhythm.      Heart sounds: Normal heart sounds. No murmur heard.     No friction rub. No gallop.   Pulmonary:      Effort: Pulmonary effort is normal.      Breath sounds: Normal breath sounds.   Abdominal:      Palpations: Abdomen is soft.   Musculoskeletal:         General: Normal range of motion.      Cervical back: Normal range of motion.   Skin:     General: Skin is warm and dry.   Neurological:      Mental Status: He is alert and oriented to person, place, and time.      Deep Tendon Reflexes: Reflexes are normal and symmetric.   Psychiatric:         Behavior: Behavior normal.         Thought Content: Thought content normal.         Judgment: Judgment normal.       This note was completed in part utilizing [employer] Direct Software.  Grammatical errors, random word insertions, spelling mistakes, and incomplete sentences can be an occasional consequence of this system secondary to software limitations, ambient noise, and hardware issues.  If you have any questions or concerns about the content, text, or information contained within the body of this dictation, please contact the provider for clarification.

## 2025-03-28 ENCOUNTER — RA CDI HCC (OUTPATIENT)
Dept: OTHER | Facility: HOSPITAL | Age: OVER 89
End: 2025-03-28

## 2025-04-01 DIAGNOSIS — R60.9 EDEMA, UNSPECIFIED: ICD-10-CM

## 2025-04-01 DIAGNOSIS — I48.91 NEW ONSET ATRIAL FIBRILLATION (HCC): ICD-10-CM

## 2025-04-01 DIAGNOSIS — S72.145D CLOSED NONDISPLACED INTERTROCHANTERIC FRACTURE OF LEFT FEMUR WITH ROUTINE HEALING, SUBSEQUENT ENCOUNTER: ICD-10-CM

## 2025-04-02 RX ORDER — APIXABAN 2.5 MG/1
2.5 TABLET, FILM COATED ORAL 2 TIMES DAILY
Qty: 60 TABLET | Refills: 5 | Status: SHIPPED | OUTPATIENT
Start: 2025-04-02

## 2025-04-02 RX ORDER — TORSEMIDE 10 MG/1
20 TABLET ORAL DAILY
Qty: 120 TABLET | Refills: 1 | Status: SHIPPED | OUTPATIENT
Start: 2025-04-02

## 2025-04-03 ENCOUNTER — TELEPHONE (OUTPATIENT)
Dept: ENDOCRINOLOGY | Facility: HOSPITAL | Age: OVER 89
End: 2025-04-03

## 2025-04-04 ENCOUNTER — OFFICE VISIT (OUTPATIENT)
Dept: FAMILY MEDICINE CLINIC | Facility: HOSPITAL | Age: OVER 89
End: 2025-04-04
Payer: COMMERCIAL

## 2025-04-04 ENCOUNTER — TELEPHONE (OUTPATIENT)
Dept: DIABETES SERVICES | Facility: CLINIC | Age: OVER 89
End: 2025-04-04

## 2025-04-04 ENCOUNTER — TELEPHONE (OUTPATIENT)
Dept: ENDOCRINOLOGY | Facility: HOSPITAL | Age: OVER 89
End: 2025-04-04

## 2025-04-04 VITALS
DIASTOLIC BLOOD PRESSURE: 78 MMHG | BODY MASS INDEX: 29.44 KG/M2 | SYSTOLIC BLOOD PRESSURE: 118 MMHG | HEIGHT: 67 IN | HEART RATE: 94 BPM | OXYGEN SATURATION: 95 %

## 2025-04-04 DIAGNOSIS — E11.22 CKD STAGE 4 DUE TO TYPE 2 DIABETES MELLITUS (HCC): ICD-10-CM

## 2025-04-04 DIAGNOSIS — N18.4 ANEMIA DUE TO STAGE 4 CHRONIC KIDNEY DISEASE  (HCC): ICD-10-CM

## 2025-04-04 DIAGNOSIS — E78.00 PURE HYPERCHOLESTEROLEMIA: ICD-10-CM

## 2025-04-04 DIAGNOSIS — I25.10 ATHEROSCLEROSIS OF NATIVE CORONARY ARTERY OF NATIVE HEART WITHOUT ANGINA PECTORIS: ICD-10-CM

## 2025-04-04 DIAGNOSIS — N40.0 BENIGN PROSTATIC HYPERPLASIA WITHOUT LOWER URINARY TRACT SYMPTOMS: ICD-10-CM

## 2025-04-04 DIAGNOSIS — R54 FRAILTY: Primary | ICD-10-CM

## 2025-04-04 DIAGNOSIS — Z79.4 TYPE 2 DIABETES MELLITUS WITH DIABETIC NEUROPATHY, WITH LONG-TERM CURRENT USE OF INSULIN (HCC): Primary | ICD-10-CM

## 2025-04-04 DIAGNOSIS — Z79.4 TYPE 2 DIABETES MELLITUS WITH DIABETIC NEUROPATHY, WITH LONG-TERM CURRENT USE OF INSULIN (HCC): ICD-10-CM

## 2025-04-04 DIAGNOSIS — E11.40 TYPE 2 DIABETES MELLITUS WITH DIABETIC NEUROPATHY, WITH LONG-TERM CURRENT USE OF INSULIN (HCC): ICD-10-CM

## 2025-04-04 DIAGNOSIS — E11.40 TYPE 2 DIABETES MELLITUS WITH DIABETIC NEUROPATHY, WITH LONG-TERM CURRENT USE OF INSULIN (HCC): Primary | ICD-10-CM

## 2025-04-04 DIAGNOSIS — E89.0 POSTOPERATIVE HYPOTHYROIDISM: ICD-10-CM

## 2025-04-04 DIAGNOSIS — Z95.2 S/P AVR (AORTIC VALVE REPLACEMENT): ICD-10-CM

## 2025-04-04 DIAGNOSIS — N18.4 CKD STAGE 4 DUE TO TYPE 2 DIABETES MELLITUS (HCC): ICD-10-CM

## 2025-04-04 DIAGNOSIS — D63.1 ANEMIA DUE TO STAGE 4 CHRONIC KIDNEY DISEASE  (HCC): ICD-10-CM

## 2025-04-04 DIAGNOSIS — I48.19 PERSISTENT ATRIAL FIBRILLATION (HCC): ICD-10-CM

## 2025-04-04 DIAGNOSIS — Z95.2 PRESENCE OF PROSTHETIC HEART VALVE: ICD-10-CM

## 2025-04-04 DIAGNOSIS — I50.32 CHRONIC DIASTOLIC CONGESTIVE HEART FAILURE (HCC): ICD-10-CM

## 2025-04-04 DIAGNOSIS — H40.1130 PRIMARY OPEN ANGLE GLAUCOMA OF BOTH EYES, UNSPECIFIED GLAUCOMA STAGE: ICD-10-CM

## 2025-04-04 PROCEDURE — 99214 OFFICE O/P EST MOD 30 MIN: CPT | Performed by: INTERNAL MEDICINE

## 2025-04-04 PROCEDURE — G2211 COMPLEX E/M VISIT ADD ON: HCPCS | Performed by: INTERNAL MEDICINE

## 2025-04-04 NOTE — PROGRESS NOTES
Assessment/Plan:     Diagnosis ICD-10-CM Associated Orders   1. Frailty  R54       2. Pure hypercholesterolemia  E78.00 Ambulatory Referral to Endocrinology      3. Presence of prosthetic heart valve  Z95.2       4. S/P AVR (aortic valve replacement)  Z95.2 Ambulatory Referral to Endocrinology      5. Persistent atrial fibrillation (HCC)  I48.19       6. Chronic diastolic congestive heart failure (HCC)  I50.32 Ambulatory Referral to Endocrinology      7. Atherosclerosis of native coronary artery of native heart without angina pectoris  I25.10       8. 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 Postoperative hypothyroidism  E89.0       10. Benign prostatic hyperplasia without lower urinary tract symptoms  N40.0       11. Type 2 diabetes mellitus with diabetic neuropathy, with long-term current use of insulin ([facility])  E11.40     Z79.4       12. Primary open angle glaucoma of both eyes, unspecified glaucoma stage  H40.1130       13. Anemia due to stage 4 chronic kidney disease  (HCC)  N18.4     D63.1           Problem List Items Addressed This Visit        Cardiovascular and Mediastinum    Atherosclerotic heart disease of native coronary artery without angina pectoris    No recent chest pain  or sob         Chronic diastolic congestive heart failure (HCC)    Wt Readings from Last 3 Encounters:   03/10/25 85.3 kg (188 lb)   03/04/25 82.6 kg (182 lb)   02/24/25 86.5 kg (190 lb 12.8 oz)     No sob with walking but is limited in activity   Was on torsemide 20 mg daily- will have family check that he is on this dose.                 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)      Lab Results   Component Value Date    HGBA1C 7.2 (A) 03/04/2025   Sees  At [facility] for glaucoma          Postoperative hypothyroidism    Type 2 diabetes mellitus with diabetic neuropathy (HCC)      Lab Results   Component Value Date    HGBA1C 7.2 (A) 03/04/2025   Is uncertain if he is actually taking any of his medications            Genitourinary    Benign prostatic hyperplasia without lower urinary tract symptoms       Blood    Anemia due to stage 4 chronic kidney disease  (HCC)       Eye    Primary open angle glaucoma (POAG)       Surgery/Wound/Pain    S/P AVR (aortic valve replacement)    Relevant Orders    Ambulatory Referral to Endocrinology       Other    Presence of prosthetic heart valve    Pure hypercholesterolemia    Relevant Orders    Ambulatory Referral to Endocrinology   Other Visit Diagnoses       Frailty    -  Primary            No follow-ups on file.      Subjective:    Patient ID: Carlos Enrique Roth Jr. is a 89 y.o. male    Here with family- having more difficulty remembering meds- takes pill package in am( sometimes not awakening unitl after noon.   They have noticed that her missed meds  for past  week- has missed eveneing a nd am doses   Will have family call with other food preferences.  Sleeping all day for past 2-3 weeks        The following portions of the patient's history were reviewed and updated as appropriate: allergies, current medications and problem list.     Review of Systems   Constitutional:  Positive for fatigue.   HENT:  Positive for congestion.    All other systems reviewed and are negative.        Objective:      Current Outpatient Medications:   •  acetaminophen (TYLENOL) 325 mg tablet, Take 2 tablets (650 mg total) by mouth every 6 (six) hours as needed for mild pain, headaches or fever, Disp: , Rfl:   •  allopurinol (ZYLOPRIM) 300 mg tablet, TAKE ONE TABLET BY MOUTH ONCE DAILY, Disp: 90 tablet, Rfl: 1  •  ascorbic acid (VITAMIN C) 500 mg tablet, TAKE ONE TABLET BY MOUTH DAILY, Disp: 30 tablet, Rfl: 5  •  cholecalciferol (VITAMIN D3) 1,000 units tablet, Take 1 tablet (1,000 Units  total) by mouth daily, Disp: 90 tablet, Rfl: 3  •  dorzolamide-timolol (COSOPT) 22.3-6.8 MG/ML ophthalmic solution, Administer 1 drop to both eyes 2 (two) times a day, Disp: , Rfl:   •  Eliquis 2.5 MG, TAKE ONE TABLET BY MOUTH TWICE DAILY, Disp: 60 tablet, Rfl: 5  •  FeroSul 325 (65 Fe) MG tablet, TAKE ONE TABLET BY MOUTH DAILY, Disp: 28 tablet, Rfl: 3  •  gabapentin (NEURONTIN) 300 mg capsule, TAKE ONE CAPSULE BY MOUTH DAILY AT BEDTIME, Disp: 90 capsule, Rfl: 1  •  insulin aspart (NovoLOG FlexPen) 100 UNIT/ML injection pen, INJECT 11 UNITS SUBCUTANEOUSLY THREE TIMES DAILY, Disp: 15 mL, Rfl: 3  •  Insulin Glargine Solostar (Lantus SoloStar) 100 UNIT/ML SOPN, Inject 0.24 mL (24 Units total) under the skin in the morning (Patient taking differently: Inject 24 Units under the skin daily at bedtime), Disp: 15 mL, Rfl: 2  •  latanoprost (XALATAN) 0.005 % ophthalmic solution, Administer 1 drop into the left eye daily at bedtime, Disp: , Rfl:   •  levothyroxine 150 mcg tablet, TAKE ONE TABLET BY MOUTH DAILY, Disp: 90 tablet, Rfl: 1  •  LORazepam (ATIVAN) 0.5 mg tablet, Take 1 tablet (0.5 mg total) by mouth daily at bedtime Do not start before January 16, 2025., Disp: 30 tablet, Rfl: 1  •  pantoprazole (PROTONIX) 20 mg tablet, TAKE ONE TABLET BY MOUTH EVERY DAY, Disp: 30 tablet, Rfl: 5  •  potassium chloride (Klor-Con M20) 20 mEq tablet, Take 1 tablet (20 mEq total) by mouth daily, Disp: 30 tablet, Rfl: 5  •  rosuvastatin (CRESTOR) 40 MG tablet, TAKE ONE TABLET BY MOUTH DAILY, Disp: 90 tablet, Rfl: 0  •  tamsulosin (FLOMAX) 0.4 mg, TAKE ONE CAPSULE BY MOUTH DAILY WITH dinner, Disp: 30 capsule, Rfl: 5  •  torsemide (DEMADEX) 10 mg tablet, Take 2 tablets (20 mg total) by mouth daily, Disp: 120 tablet, Rfl: 1  •  clobetasol (TEMOVATE) 0.05 % cream, Apply topically 2 (two) times a day for 7 days, Disp: 30 g, Rfl: 1  •  Insulin Pen Needle (Pen Needles) 32G X 4 MM MISC, Use 4 (four) times a day (before meals and at bedtime)  "(Patient not taking: Reported on 4/4/2025), Disp: 100 each, Rfl: 11  •  nitroglycerin (NITROSTAT) 0.4 mg SL tablet, Place 1 tablet (0.4 mg total) under the tongue every 5 (five) minutes as needed for chest pain (Patient not taking: Reported on 4/4/2025), Disp: 25 tablet, Rfl: 1    Blood pressure 118/78, pulse 94, height 5' 7\" (1.702 m), SpO2 95%.     Physical Exam  Vitals and nursing note reviewed.   Constitutional:       General: He is not in acute distress.     Appearance: He is not toxic-appearing.   HENT:      Head: Normocephalic.      Right Ear: Tympanic membrane normal. There is no impacted cerumen.      Left Ear: Tympanic membrane normal. There is no impacted cerumen.      Nose: No rhinorrhea.      Mouth/Throat:      Pharynx: No posterior oropharyngeal erythema.   Eyes:      General:         Right eye: No discharge.         Left eye: No discharge.   Neck:      Vascular: Carotid bruit present.   Cardiovascular:      Rate and Rhythm: Normal rate and regular rhythm.      Heart sounds: No murmur heard.  Pulmonary:      Breath sounds: No wheezing or rhonchi.   Abdominal:      Palpations: There is no mass.      Tenderness: There is no abdominal tenderness. There is no guarding.   Musculoskeletal:      Cervical back: No tenderness.      Right lower leg: No edema.      Left lower leg: No edema.   Skin:     Findings: No erythema.   Neurological:      Mental Status: He is alert and oriented to person, place, and time.   Psychiatric:         Thought Content: Thought content normal.        "

## 2025-04-04 NOTE — ASSESSMENT & PLAN NOTE
No complaints of rapid heart rates   Seen by  cardiology last month- no med changes   dr vik saldaña     hospitalist     transfer to [place] for tertiary level of care .  fu with dr ellis schrader your onch there .

## 2025-04-04 NOTE — ASSESSMENT & PLAN NOTE
Wt Readings from Last 3 Encounters:   03/10/25 85.3 kg (188 lb)   03/04/25 82.6 kg (182 lb)   02/24/25 86.5 kg (190 lb 12.8 oz)     No sob with walking but is limited in activity   Was on torsemide 20 mg daily- will have family check that he is on this dose.

## 2025-04-04 NOTE — ASSESSMENT & PLAN NOTE
Lab Results   Component Value Date    HGBA1C 7.2 (A) 03/04/2025   Is uncertain if he is actually taking any of his medications

## 2025-04-04 NOTE — TELEPHONE ENCOUNTER
Patient stopped in saying he is getting a G7 and will be in need of education on how to attach it and general usage. Please reach out to patient for more information.     Thanks!

## 2025-04-04 NOTE — ASSESSMENT & PLAN NOTE
Lab Results   Component Value Date    HGBA1C 7.2 (A) 03/04/2025   Sees  At [facility] for glaucoma

## 2025-04-04 NOTE — TELEPHONE ENCOUNTER
Please enter a referral to see diabetes education for Dexcom training. Patient has an appt on 4/10. Thank you

## 2025-04-08 DIAGNOSIS — E11.40 TYPE 2 DIABETES MELLITUS WITH DIABETIC NEUROPATHY, WITH LONG-TERM CURRENT USE OF INSULIN (HCC): Primary | ICD-10-CM

## 2025-04-08 DIAGNOSIS — Z79.4 TYPE 2 DIABETES MELLITUS WITH DIABETIC NEUROPATHY, WITH LONG-TERM CURRENT USE OF INSULIN (HCC): Primary | ICD-10-CM

## 2025-04-08 DIAGNOSIS — E89.0 POSTOPERATIVE HYPOTHYROIDISM: ICD-10-CM

## 2025-04-08 NOTE — TELEPHONE ENCOUNTER
Patient scheduled for 5-8-24.  Was last seen 9-5-24.  What labs do you need done prior to the appointment?    Let patient know when labs have been ordered & mail them to him.

## 2025-04-22 ENCOUNTER — APPOINTMENT (EMERGENCY)
Dept: RADIOLOGY | Facility: HOSPITAL | Age: OVER 89
End: 2025-04-22
Payer: COMMERCIAL

## 2025-04-22 ENCOUNTER — APPOINTMENT (EMERGENCY)
Dept: CT IMAGING | Facility: HOSPITAL | Age: OVER 89
End: 2025-04-22
Payer: COMMERCIAL

## 2025-04-22 ENCOUNTER — HOSPITAL ENCOUNTER (EMERGENCY)
Facility: HOSPITAL | Age: OVER 89
Discharge: HOME | End: 2025-04-22
Attending: EMERGENCY MEDICINE
Payer: COMMERCIAL

## 2025-04-22 VITALS
RESPIRATION RATE: 16 BRPM | HEART RATE: 68 BPM | DIASTOLIC BLOOD PRESSURE: 69 MMHG | OXYGEN SATURATION: 100 % | TEMPERATURE: 97.4 F | SYSTOLIC BLOOD PRESSURE: 146 MMHG

## 2025-04-22 DIAGNOSIS — R29.6 FREQUENT FALLS: ICD-10-CM

## 2025-04-22 DIAGNOSIS — N39.0 UTI (URINARY TRACT INFECTION): ICD-10-CM

## 2025-04-22 DIAGNOSIS — R62.7 FAILURE TO THRIVE IN ADULT: Primary | ICD-10-CM

## 2025-04-22 LAB
2HR DELTA HS TROPONIN: -8 NG/L
4HR DELTA HS TROPONIN: -3 NG/L
ALBUMIN SERPL BCG-MCNC: 3.7 G/DL (ref 3.5–5)
ALP SERPL-CCNC: 68 U/L (ref 34–104)
ALT SERPL W P-5'-P-CCNC: 16 U/L (ref 7–52)
AMORPH URATE CRY URNS QL MICRO: ABNORMAL
ANION GAP SERPL CALCULATED.3IONS-SCNC: 12 MMOL/L (ref 4–13)
APTT PPP: 26 SECONDS (ref 23–34)
AST SERPL W P-5'-P-CCNC: 30 U/L (ref 13–39)
ATRIAL RATE: 74 BPM
BACTERIA UR QL AUTO: ABNORMAL /HPF
BASOPHILS # BLD AUTO: 0.04 THOUSANDS/ÂΜL (ref 0–0.1)
BASOPHILS NFR BLD AUTO: 0 % (ref 0–1)
BILIRUB SERPL-MCNC: 1.58 MG/DL (ref 0.2–1)
BILIRUB UR QL STRIP: NEGATIVE
BNP SERPL-MCNC: 142 PG/ML (ref 0–100)
BUN SERPL-MCNC: 46 MG/DL (ref 5–25)
CALCIUM SERPL-MCNC: 10.6 MG/DL (ref 8.4–10.2)
CARDIAC TROPONIN I PNL SERPL HS: 88 NG/L (ref ?–50)
CARDIAC TROPONIN I PNL SERPL HS: 93 NG/L (ref ?–50)
CARDIAC TROPONIN I PNL SERPL HS: 96 NG/L (ref ?–50)
CHLORIDE SERPL-SCNC: 104 MMOL/L (ref 96–108)
CLARITY UR: ABNORMAL
CO2 SERPL-SCNC: 23 MMOL/L (ref 21–32)
COLOR UR: YELLOW
CREAT SERPL-MCNC: 2.74 MG/DL (ref 0.6–1.3)
EOSINOPHIL # BLD AUTO: 0.04 THOUSAND/ÂΜL (ref 0–0.61)
EOSINOPHIL NFR BLD AUTO: 0 % (ref 0–6)
ERYTHROCYTE [DISTWIDTH] IN BLOOD BY AUTOMATED COUNT: 16.1 % (ref 11.6–15.1)
FLUAV RNA RESP QL NAA+PROBE: NEGATIVE
FLUBV RNA RESP QL NAA+PROBE: NEGATIVE
GFR SERPL CREATININE-BSD FRML MDRD: 19 ML/MIN/1.73SQ M
GLUCOSE SERPL-MCNC: 133 MG/DL (ref 65–140)
GLUCOSE SERPL-MCNC: 154 MG/DL (ref 65–140)
GLUCOSE UR STRIP-MCNC: NEGATIVE MG/DL
HCT VFR BLD AUTO: 43.9 % (ref 36.5–49.3)
HGB BLD-MCNC: 14 G/DL (ref 12–17)
HGB UR QL STRIP.AUTO: ABNORMAL
IMM GRANULOCYTES # BLD AUTO: 0.07 THOUSAND/UL (ref 0–0.2)
IMM GRANULOCYTES NFR BLD AUTO: 1 % (ref 0–2)
INR PPP: 1.27 (ref 0.85–1.19)
KETONES UR STRIP-MCNC: ABNORMAL MG/DL
LEUKOCYTE ESTERASE UR QL STRIP: ABNORMAL
LYMPHOCYTES # BLD AUTO: 1.37 THOUSANDS/ÂΜL (ref 0.6–4.47)
LYMPHOCYTES NFR BLD AUTO: 13 % (ref 14–44)
MAGNESIUM SERPL-MCNC: 2.3 MG/DL (ref 1.9–2.7)
MCH RBC QN AUTO: 33.5 PG (ref 26.8–34.3)
MCHC RBC AUTO-ENTMCNC: 31.9 G/DL (ref 31.4–37.4)
MCV RBC AUTO: 105 FL (ref 82–98)
MONOCYTES # BLD AUTO: 0.57 THOUSAND/ÂΜL (ref 0.17–1.22)
MONOCYTES NFR BLD AUTO: 5 % (ref 4–12)
NEUTROPHILS # BLD AUTO: 8.39 THOUSANDS/ÂΜL (ref 1.85–7.62)
NEUTS SEG NFR BLD AUTO: 81 % (ref 43–75)
NITRITE UR QL STRIP: POSITIVE
NON-SQ EPI CELLS URNS QL MICRO: ABNORMAL /HPF
NRBC BLD AUTO-RTO: 0 /100 WBCS
OTHER STN SPEC: ABNORMAL
PH UR STRIP.AUTO: 7 [PH]
PLATELET # BLD AUTO: 161 THOUSANDS/UL (ref 149–390)
PMV BLD AUTO: 9.8 FL (ref 8.9–12.7)
POTASSIUM SERPL-SCNC: 4.5 MMOL/L (ref 3.5–5.3)
PROT SERPL-MCNC: 6.7 G/DL (ref 6.4–8.4)
PROT UR STRIP-MCNC: ABNORMAL MG/DL
PROTHROMBIN TIME: 16.4 SECONDS (ref 12.3–15)
QRS AXIS: 7 DEGREES
QRSD INTERVAL: 126 MS
QT INTERVAL: 418 MS
QTC INTERVAL: 470 MS
RBC # BLD AUTO: 4.18 MILLION/UL (ref 3.88–5.62)
RBC #/AREA URNS AUTO: ABNORMAL /HPF
RSV RNA RESP QL NAA+PROBE: NEGATIVE
SARS-COV-2 RNA RESP QL NAA+PROBE: NEGATIVE
SODIUM SERPL-SCNC: 139 MMOL/L (ref 135–147)
SP GR UR STRIP.AUTO: 1.02 (ref 1–1.03)
T WAVE AXIS: 179 DEGREES
T4 FREE SERPL-MCNC: 0.79 NG/DL (ref 0.61–1.12)
TSH SERPL DL<=0.05 MIU/L-ACNC: 37.43 UIU/ML (ref 0.45–4.5)
UROBILINOGEN UR STRIP-ACNC: <2 MG/DL
VENTRICULAR RATE: 76 BPM
WBC # BLD AUTO: 10.48 THOUSAND/UL (ref 4.31–10.16)
WBC #/AREA URNS AUTO: ABNORMAL /HPF

## 2025-04-22 PROCEDURE — 96361 HYDRATE IV INFUSION ADD-ON: CPT

## 2025-04-22 PROCEDURE — 71250 CT THORAX DX C-: CPT

## 2025-04-22 PROCEDURE — 85610 PROTHROMBIN TIME: CPT

## 2025-04-22 PROCEDURE — 83735 ASSAY OF MAGNESIUM: CPT

## 2025-04-22 PROCEDURE — 87186 SC STD MICRODIL/AGAR DIL: CPT

## 2025-04-22 PROCEDURE — 70450 CT HEAD/BRAIN W/O DYE: CPT

## 2025-04-22 PROCEDURE — 82948 REAGENT STRIP/BLOOD GLUCOSE: CPT

## 2025-04-22 PROCEDURE — 84484 ASSAY OF TROPONIN QUANT: CPT

## 2025-04-22 PROCEDURE — 99285 EMERGENCY DEPT VISIT HI MDM: CPT

## 2025-04-22 PROCEDURE — 96365 THER/PROPH/DIAG IV INF INIT: CPT

## 2025-04-22 PROCEDURE — 83880 ASSAY OF NATRIURETIC PEPTIDE: CPT

## 2025-04-22 PROCEDURE — 71045 X-RAY EXAM CHEST 1 VIEW: CPT

## 2025-04-22 PROCEDURE — 93005 ELECTROCARDIOGRAM TRACING: CPT

## 2025-04-22 PROCEDURE — 97167 OT EVAL HIGH COMPLEX 60 MIN: CPT

## 2025-04-22 PROCEDURE — 85025 COMPLETE CBC W/AUTO DIFF WBC: CPT

## 2025-04-22 PROCEDURE — 97163 PT EVAL HIGH COMPLEX 45 MIN: CPT

## 2025-04-22 PROCEDURE — 36415 COLL VENOUS BLD VENIPUNCTURE: CPT

## 2025-04-22 PROCEDURE — 87077 CULTURE AEROBIC IDENTIFY: CPT

## 2025-04-22 PROCEDURE — 84443 ASSAY THYROID STIM HORMONE: CPT

## 2025-04-22 PROCEDURE — 87086 URINE CULTURE/COLONY COUNT: CPT

## 2025-04-22 PROCEDURE — 80053 COMPREHEN METABOLIC PANEL: CPT

## 2025-04-22 PROCEDURE — 81001 URINALYSIS AUTO W/SCOPE: CPT

## 2025-04-22 PROCEDURE — 85730 THROMBOPLASTIN TIME PARTIAL: CPT

## 2025-04-22 PROCEDURE — 93010 ELECTROCARDIOGRAM REPORT: CPT | Performed by: INTERNAL MEDICINE

## 2025-04-22 PROCEDURE — 0241U HB NFCT DS VIR RESP RNA 4 TRGT: CPT

## 2025-04-22 PROCEDURE — 84439 ASSAY OF FREE THYROXINE: CPT

## 2025-04-22 RX ORDER — CEFTRIAXONE 1 G/50ML
1000 INJECTION, SOLUTION INTRAVENOUS ONCE
Status: COMPLETED | OUTPATIENT
Start: 2025-04-22 | End: 2025-04-22

## 2025-04-22 RX ORDER — CEPHALEXIN 500 MG/1
500 CAPSULE ORAL 3 TIMES DAILY
Qty: 21 CAPSULE | Refills: 0 | Status: SHIPPED | OUTPATIENT
Start: 2025-04-22 | End: 2025-04-29

## 2025-04-22 RX ADMIN — CEFTRIAXONE 1000 MG: 1 INJECTION, SOLUTION INTRAVENOUS at 15:12

## 2025-04-22 RX ADMIN — SODIUM CHLORIDE 500 ML: 0.9 INJECTION, SOLUTION INTRAVENOUS at 15:35

## 2025-04-22 RX ADMIN — SODIUM CHLORIDE 500 ML: 0.9 INJECTION, SOLUTION INTRAVENOUS at 08:23

## 2025-04-22 NOTE — ED NOTES
Transport arrived to take pt to [facility]. This RN gave report and paperwork to transport crew.      Erin Khanna RN  04/22/25 [id number]

## 2025-04-22 NOTE — ED NOTES
Meal tray delivered to pt bedside. This RN assisted pt to sit up in bed and and set up meal tray. Pt currently eating meal tray. Bed locked in low position and side rails up x2. HOB at 90 degrees. Call bell in reach.      Erin Khanna RN  04/22/25 [id number]

## 2025-04-22 NOTE — CASE MANAGEMENT
Case Management Assessment & Discharge Planning Note    Patient name Carlos Enrique Roth Jr.  Location ED TR13/TR13B MRN [id number]  : 1935 Date 2025       Current Admission Date: 2025  Current Admission Diagnosis:Weakness generalized   Patient Active Problem List    Diagnosis Date Noted Date Diagnosed    UTI (urinary tract infection) 2025     Failure to thrive in adult 2025     Hypokalemia 2024     Platelets decreased ([facility]) 2024     Fracture of unspecified part of neck of left femur, initial encounter for closed fracture ([facility]) 2024     Persistent atrial fibrillation ([facility]) 2024     Persistent proteinuria 2023     Microalbuminuria due to type 2 diabetes mellitus  ([facility]) 10/09/2023     Secondary hyperparathyroidism ([facility]) 2023     Skin tear of left elbow without complication 2023     CKD stage 4 due to type 2 diabetes mellitus ([facility]) 2022     Type 2 diabetes mellitus with diabetic neuropathy ([facility]) 10/27/2021     Chronic diastolic congestive heart failure ([facility]) 2020     Primary open angle glaucoma (POAG) 2019     Age-related cataract of both eyes 2019     Atherosclerosis of aorta ([facility]) 2019     Ambulatory dysfunction 2018     Anemia due to stage 4 chronic kidney disease  ([facility]) 2018     CAD (coronary artery disease) 2018     Aortic stenosis 2018     Benign prostatic hyperplasia without lower urinary tract symptoms 2018     Long term (current) use of insulin ([facility]) 2018     Presence of aortocoronary bypass graft 2018     Presence of prosthetic heart valve 2018     Unspecified glaucoma 2018     Nonrheumatic aortic (valve) stenosis 2018     Atherosclerotic heart disease of native coronary artery without angina pectoris 2018     S/P CABG x 4 2018     S/P AVR (aortic valve replacement) 2018     GERD (gastroesophageal reflux disease)      Sexual  dysfunction 10/09/2017     Renal cyst 01/12/2017     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  [address] [facility].  [facility] [address]  Phone: [phone] Fax: [phone]    Primary Care Provider: Juliette Cid DO    [facility]  Secondary Insurance:     ASSESSMENT:  Active Health Care Proxies    There are no active Health Care Proxies on file.       Patient Information  Admitted from:: Home  Mental Status: Confused  During Assessment patient was accompanied by: Not accompanied during assessment  Assessment information provided by:: Son  Primary Caregiver: Self  Support Systems: Self, Son  Home entry access options. Select all that apply.: No steps to enter home  Type of Current Residence: [facility]  Living Arrangements: Lives w/ Son    Activities of Daily Living Prior to Admission  Functional Status: Independent  Completes ADLs independently?: Yes  Ambulates independently?: Yes  Does patient use assisted devices?: Yes  Assisted Devices (DME) used: Walker  Does patient currently own DME?: Yes  What DME does the patient currently own?: Walker, Straight Cane  Does patient have a history of Outpatient Therapy (PT/OT)?: No  Does the patient have a history of Short-Term Rehab?: Yes ([facility])  Does patient have a history of HHC?: No  Does patient currently have HHC?: No    Patient Information Continued  Income Source: Pension/jail  Does patient have prescription coverage?: Yes  Can the patient afford their medications and any related supplies (such as glucometers or test strips)?: Yes  Does patient receive dialysis treatments?: No  Does patient have a history of  substance abuse?: No  Does patient have a history of Mental Health Diagnosis?: No    Means of Transportation  Means of Transport to Appts:: Family transport    DISCHARGE DETAILS:    Discharge planning discussed with:: patient's son  Freedom of Choice: Yes  Comments - Freedom of Choice: agreeable for SNF-referrals sent via [employer]IDIN  CM contacted family/caregiver?: Yes    Contacts  Patient Contacts: Ciro Roth:  Relationship to Patient:: Family  Contact Method: Phone  Phone Number: [phone]  Reason/Outcome: Emergency Contact, Discharge Planning    Requested Home Health Care         Is the patient interested in HHC at discharge?: No    DME Referral Provided  Referral made for DME?: No    Treatment Team Recommendation: Short Term Rehab     Transport at Discharge : [facility]    Additional Comments: Spoke with Pt's son(Ciro), discussed role of case management. Pt lives with son in 1sh, 2 ruddy into living room. Uses/owns walker and cane. Son drives, grocery shopping. Hx of [facility]. No bx of [facility]. Informed Pt's son of SNF recommendation. Pt agreeable for blanket referrals.for SNF-referrals sent via AIDIN. List of accepting SNF facilities obtained and reviewed over phone with Pt's son, Ciro. Ciro is choosing [facility]. [facility] reserved via [facility]. SNF auth request sent to  discharge support. Auto auth obtained and given to [facility]. [facility] to transport Pt to [facility] at 6:30pm. [facility] and nurse aware of transport time. Call placed to Pt's son(Ciro: [phone]), left message to inform Pt will be going to [facility] this evening at 6:30pm.

## 2025-04-22 NOTE — ED NOTES
This RN called and gave report to [facility] RN      Erin Khanna, NICOLAS  04/22/25 0486

## 2025-04-22 NOTE — PROGRESS NOTES
Patient:  LINDSEY MCKEON    MRN:  [id number]    Aidin Request ID:  [id number]    Level of care reserved:  Skilled Nursing Facility    Partner Reserved:  [facility], [place] [address] [phone]    Clinical needs requested:    Geography searched:  10 miles around [id number]    Start of Service:    Request sent:  1:53pm EDT on 4/22/2025 by Lily Guzman    Partner reserved:  4:05pm EDT on 4/22/2025 by Lily Guzman    Choice list shared:  3:48pm EDT on 4/22/2025 by Lily Guzman

## 2025-04-22 NOTE — ED NOTES
Patient unable to get out of bed with staff, patient extremely weak and unsteady unable to safely ambulate with staff x1     Mary Raygoza RN  04/22/25 1402

## 2025-04-22 NOTE — PLAN OF CARE
Problem: OCCUPATIONAL THERAPY ADULT  Goal: Performs self-care activities at highest level of function for planned discharge setting.  See evaluation for individualized goals.  Description: Treatment Interventions: ADL retraining, Functional transfer training, Endurance training, Equipment evaluation/education, Compensatory technique education, Continued evaluation, UE strengthening/ROM, Cognitive reorientation, Patient/family training, Activityengagement          See flowsheet documentation for full assessment, interventions and recommendations.   Note: Limitation: Decreased ADL status, Decreased Safe judgement during ADL, Decreased cognition, Decreased endurance, Decreased self-care trans, Decreased high-level ADLs, Decreased UE strength  Prognosis: Poor  Assessment: Pt is a 89 y.o. male seen for OT evaluation at [facility], admitted 4/22/2025 w/ generalized weakness. OT completed extensive review of pt's medical and social history. Comorbidities affecting pt's functional performance at time of assessment include: obesity, h/o CABG x4, ambulatory dysfunction, cataracts, aortic stenosis, glaucoma, frequent falls. Personal factors affecting pt at time of IE include: steps to enter environment, limited home support, behavioral pattern, difficulty performing ADLS, difficulty performing IADLS , limited insight into deficits, compliance, flat affect, decreased initiation and engagement , health management , and environment. Prior to admission, pt was living with his son in a 1SH with 3 RENE.  Pt was A w/  ADLS and w/ IADLS, (-) drove, & required use of RW PTA. Upon evaluation: Pt requires overall Mod A for bed mobility, overall Max A for functional transfers, overall Mod A for functional mobility, Mod-Max A for UB ADLs and Max-Total A for LB ADLS 2* the following deficits impacting occupational performance: weakness, decreased strength, decreased balance, decreased tolerance, impaired initiation, impaired  memory, impaired sequencing, impaired problem solving, and decreased safety awareness. Full objective findings from OT assessment regarding body systems outlined above. Pt to benefit from continued skilled OT tx while in the hospital to address deficits as defined above and maximize level of functional independence w/ ADL's and functional mobility. Occupational Performance areas to address include: eating, grooming, bathing/shower, toilet hygiene, dressing, functional mobility, and clothing management. Based on findings, pt is of high complexity. The patient's raw score on the AM-PAC Daily Activity inpatient short form is 10, standardized score is 26.28, less than 39.4. Patients at this level are likely to benefit from DC to post-acute rehabilitation services. However, please refer to therapist recommendation for discharge planning given other factors that may influence destination. At this time, OT recommendations at time of discharge are DC with Level II - Moderate Rehab Resource Intensity.     Rehab Resource Intensity Level, OT: II (Moderate Resource Intensity)

## 2025-04-22 NOTE — CASE MANAGEMENT
Case Management Discharge Planning Note    Patient name Carlos Enrique Roth Jr.  Location ED TR13/TR13B MRN [id number]  : 1935 Date 2025       Current Admission Date: 2025  Current Admission Diagnosis:Weakness generalized   Patient Active Problem List    Diagnosis Date Noted Date Diagnosed    UTI (urinary tract infection) 2025     Failure to thrive in adult 2025     Hypokalemia 2024     Platelets decreased ([facility]) 2024     Fracture of unspecified part of neck of left femur, initial encounter for closed fracture ([facility]) 2024     Persistent atrial fibrillation ([facility]) 2024     Persistent proteinuria 2023     Microalbuminuria due to type 2 diabetes mellitus  ([facility]) 10/09/2023     Secondary hyperparathyroidism ([facility]) 2023     Skin tear of left elbow without complication 2023     CKD stage 4 due to type 2 diabetes mellitus ([facility]) 2022     Type 2 diabetes mellitus with diabetic neuropathy ([facility]) 10/27/2021     Chronic diastolic congestive heart failure ([facility]) 2020     Primary open angle glaucoma (POAG) 2019     Age-related cataract of both eyes 2019     Atherosclerosis of aorta ([facility]) 2019     Ambulatory dysfunction 2018     Anemia due to stage 4 chronic kidney disease  ([facility]) 2018     CAD (coronary artery disease) 2018     Aortic stenosis 2018     Benign prostatic hyperplasia without lower urinary tract symptoms 2018     Long term (current) use of insulin ([facility]) 2018     Presence of aortocoronary bypass graft 2018     Presence of prosthetic heart valve 2018     Unspecified glaucoma 2018     Nonrheumatic aortic (valve) stenosis 2018     Atherosclerotic heart disease of native coronary artery without angina pectoris 2018     S/P CABG x 4 2018     S/P AVR (aortic valve replacement) 2018     GERD (gastroesophageal reflux disease)      Sexual dysfunction 10/09/2017      Renal cyst 01/12/2017     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  [address] [address]  [place] [address]  Phone: [phone] Fax: [phone]    Primary Care Provider: Juliette Cid DO    Primary Insurance: [employer]  REP  Secondary Insurance:     DISCHARGE DETAILS:                                          Other Referral/Resources/Interventions Provided:  Interventions: Short Term Rehab  Referral Comments: Auth approved for [facility]. [facility] can accept ride for 06:30pm today. Called [facility] to inform them pt is in ED.  is aware. Informed [facility] and family of  time. Report numbers for [facility] are: Phone: [phone],  Fax: [phone]. Medical Necessity is with ED department.         Treatment Team Recommendation: Short Term Rehab  Discharge Destination Plan:: Short Term Rehab  Transport at Discharge : [facility]     Number/Name of Dispatcher: [phone]  Transported by (Company and Unit #): [facility]  ETA of Transport (Date): 04/22/25  ETA of Transport (Time): 1830

## 2025-04-22 NOTE — DISCHARGE INSTRUCTIONS
Take the prescribed antibiotic as directed for your urinary tract infection.    Return to the ER if develop fever, difficulty breathing, severe chest pain, vomiting, weakness, confusion, or lethargy.

## 2025-04-22 NOTE — DISCHARGE SUPPORT
Case Management Assessment & Discharge Planning Note    Patient name Carlos Enrique Roth Jr.  Location ED TR13/TR13B MRN [id number]  : 1935 Date 2025       Current Admission Date: 2025  Current Admission Diagnosis:Postoperative hypothyroidism   Patient Active Problem List    Diagnosis Date Noted Date Diagnosed    UTI (urinary tract infection) 2025     Failure to thrive in adult 2025     Hypokalemia 2024     Platelets decreased ([facility]) 2024     Fracture of unspecified part of neck of left femur, initial encounter for closed fracture ([facility]) 2024     Persistent atrial fibrillation ([facility]) 2024     Persistent proteinuria 2023     Microalbuminuria due to type 2 diabetes mellitus  ([facility]) 10/09/2023     Secondary hyperparathyroidism ([facility]) 2023     Skin tear of left elbow without complication 2023     CKD stage 4 due to type 2 diabetes mellitus ([facility]) 2022     Type 2 diabetes mellitus with diabetic neuropathy ([facility]) 10/27/2021     Chronic diastolic congestive heart failure ([facility]) 2020     Primary open angle glaucoma (POAG) 2019     Age-related cataract of both eyes 2019     Atherosclerosis of aorta ([facility]) 2019     Ambulatory dysfunction 2018     Anemia due to stage 4 chronic kidney disease  ([facility]) 2018     CAD (coronary artery disease) 2018     Aortic stenosis 2018     Benign prostatic hyperplasia without lower urinary tract symptoms 2018     Long term (current) use of insulin ([facility]) 2018     Presence of aortocoronary bypass graft 2018     Presence of prosthetic heart valve 2018     Unspecified glaucoma 2018     Nonrheumatic aortic (valve) stenosis 2018     Atherosclerotic heart disease of native coronary artery without angina pectoris 2018     S/P CABG x 4 2018     S/P AVR (aortic valve replacement) 2018     GERD (gastroesophageal reflux disease)      Sexual  dysfunction 10/09/2017     Renal cyst 01/12/2017     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     Pure hypercholesterolemia 11/12/2014     Postoperative hypothyroidism 11/05/2014     Obesity, morbid (HCC) 11/05/2014       LOS (days): 0  Geometric Mean LOS (GMLOS) (days):   Days to GMLOS:   Facility Authorization Initiated  [facility] Support Center received request for auth from : Lily ALVARADO  Authorization Request Submitted for: SNF  Requested Start of Care Date: 04/22/25  Facility Name: [facility]  Facility NPI: [id number]  Facility MD: Dr Manjula Briseno  Facility MD NPI: [id number]  Authorization initiated by contacting insurance: [employer]  Via: [place]  Clinicals submitted via: Portal Attachment  Pending reference #: [id number]   notified: Lily ALVARADO     and     Facility Authorization Approved  DC Support Center received approved auth for: SNF  Insurance: [employer]  Determination made after peer to peer was completed?: Not applicable  Authorization Obtained Via: Portal  Facility Name: [facility]  Approved Authorization Number: [id number]  Start of Care Date: 04/22/25  Next Review Date: 04/28/25  Submit Next Review to: [phone]   notified: Lily ALVARADO     Updates to authorization status will be noted in chart.    Please reach out to CM for updates on any clinical information.

## 2025-04-22 NOTE — OCCUPATIONAL THERAPY NOTE
Occupational Therapy Evaluation     Patient Name: Carlos Enrique Roth Jr.  Today's Date: 4/22/2025  Problem List  Active Problems:  There are no active Hospital Problems.    Past Medical History  Past Medical History:   Diagnosis Date    Aortic stenosis     CKD (chronic kidney disease)     baseline Cr 1.3-1.5    Coronary artery disease     Cough     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HCC)     type 2, insulin dependent    GERD (gastroesophageal reflux disease)     Glaucoma     Gout     History of prostate cancer     Hypertension     Hypothyroidism     Overweight     Peripheral neuropathy, idiopathic     Pleural effusion, left     Pure hypercholesterolemia     LA...11/12/14   R....11/12/14     Visual impairment     cataract left eye    Weight gain      Past Surgical History  Past Surgical History:   Procedure Laterality Date    CARDIAC CATHETERIZATION      EYE SURGERY      IR THORACENTESIS  10/23/2018    IR THORACENTESIS  11/2/2018    IR THORACENTESIS  11/9/2018    IR THORACENTESIS  11/23/2018    [place] CORONARY ARTERY BYP W/VEIN & ARTERY GRAFT 3 VEIN N/A 9/17/2018    Procedure: CORONARY ARTERY BYPASS GRAFT (CABG) x 4 VESSELS with LIMA - LAD, SVG/LEFT LEG EVH - LEFT PDA, OM3, & OM2;  Surgeon: Remy Ash MD;  [facility];  Service: Cardiac Surgery    WY ECHO TRANSESOPHAG MONTR CARDIAC PUMP FUNCTJ N/A 9/17/2018    Procedure: TRANSESOPHAGEAL ECHOCARDIOGRAM (VERONICA);  Surgeon: Remy Ash MD;  [facility];  Service: Cardiac Surgery    WY OPTX FEM SHFT FX W/INSJ IMED IMPLT W/WO SCREW Left 6/20/2024    Procedure: INSERTION NAIL IM FEMUR ANTEGRADE (TROCHANTERIC);  Surgeon: Sukh Reece DO;  Location[facility];  Service: Orthopedics    WY RPLCMT AORTIC VALVE OPN W/STENTLESS TISSUE VALVE N/A 9/17/2018    Procedure: REPLACEMENT VALVE AORTIC (AVR)- 23mm tissue Intuity Valve;  Surgeon: Remy Ash MD;  [facility];  Service: Cardiac Surgery    THORACOSCOPY VIDEO ASSISTED SURGERY (VATS) Left 11/27/2018     "Procedure: THORACOSCOPY VIDEO ASSISTED SURGERY (VATS), talc pleurodesis,;  Surgeon: Ciara Green MD;  Location[facility];  Service: Thoracic    THYROID SURGERY             04/22/25 1400   OT Last Visit   OT Visit Date 04/22/25   Note Type   Note type Evaluation   Pain Assessment   Pain Assessment Tool Condon-Baker FACES   Condon-Baker FACES Pain Rating 0   Restrictions/Precautions   Weight Bearing Precautions Per Order No   Other Precautions Cognitive;Chair Alarm;Bed Alarm;Telemetry;Multiple lines;Fall Risk   Home Living   Type of Home House   Home Layout One level  (3 RENE)   Home Equipment Walker;Cane   Additional Comments RW PTA   Prior Function   Level of [place] Needs assistance with ADLs;Needs assistance with IADLS;Independent with functional mobility  (Typically IND with ADLs, however, last month A for ADLs with progressive decline)   Lives With Son  (Works during day)   Receives Help From Family   IADLs Family/Friend/Other provides transportation;Family/Friend/Other provides meals;Family/Friend/Other provides medication management   Falls in the last 6 months 5 to 10   Vocational Retired   General   Family/Caregiver Present No   Subjective   Subjective \"I haven't peed all day\"   ADL   Eating Assistance 5  Supervision/Setup   Eating Deficit Setup;Verbal cueing;Supervision/safety;Increased time to complete  (Pt with significant time required to chew hamburger (approx 5 min) - no swallowing seen. Pt appeared to be pocketing food/spitting food while chewing. Ultimately required pt to spit food out. S/u for beverage management & jello)   Grooming Assistance 3  Moderate Assistance   UB Bathing Assistance 2  Maximal Assistance   LB Bathing Assistance 1  Total Assistance   LB Bathing Deficit Right upper leg;Left upper leg;Right lower leg including foot;Left lower leg including foot   UB Dressing Assistance 2  Maximal Assistance   UB Dressing Deficit Thread LUE;Thread RUE   LB Dressing Assistance 1  Total " Assistance   LB Dressing Deficit Don/doff L sock;Don/doff R sock   Toileting Assistance  1  Total Assistance   Toileting Deficit Perineal hygiene   Bed Mobility   Rolling R 5  Supervision   Additional items Verbal cues   Supine to Sit 4  Minimal assistance   Additional items Assist x 1;HOB elevated;Increased time required;Verbal cues;LE management   Sit to Supine 1  Dependent   Additional items Assist x 2;Verbal cues;LE management   Additional Comments Pt able to sit EOB x4-5 min with close supervision   Transfers   Sit to Stand 3  Moderate assistance   Additional items Assist x 1;Increased time required;Verbal cues   Stand to Sit 1  Dependent   Additional items Assist x 2;Verbal cues   Additional Comments BPs taken throughout (supine/sitting, s/p mobility) - VSS   Functional Mobility   Functional Mobility 3  Moderate assistance   Additional Comments x1-2, initially Min Ax1. However, pt became incontinent of urine in stance. Pt able to tolerate standing to urinate in urinal x2 min with Min A. Pt then noted to have increased difficulty following commands/sequencing, less responsive/communicative. Pt ultimately required Max Ax2 for mobility with RW to safely get to EOB. Responsiveness improved once supine   Additional items Rolling walker   Balance   Static Sitting Good   Dynamic Sitting Fair +   Static Standing Poor +   Dynamic Standing Poor   Ambulatory Poor   Activity Tolerance   Activity Tolerance Treatment limited secondary to medical complications (Comment)   Medical Staff Made Aware PT Gertrudis, SPT Leif, EDWIN Craft, CM Babak   Nurse Made Aware NICOLAS URIOSTEGUI Assessment   RUE Assessment WFL   LUE Assessment   LUE Assessment WFL  (Grossly 3+/5 MMT)   Cognition   Overall Cognitive Status Impaired   Arousal/Participation Cooperative   Attention Attends with cues to redirect   Orientation Level Oriented to person;Oriented to place;Disoriented to time;Oriented to situation   Memory Decreased recall of  precautions;Decreased recall of recent events;Decreased short term memory   Following Commands Follows one step commands with increased time or repetition   Assessment   Limitation Decreased ADL status;Decreased Safe judgement during ADL;Decreased cognition;Decreased endurance;Decreased self-care trans;Decreased high-level ADLs;Decreased UE strength   Prognosis Poor   Assessment Pt is a 89 y.o. male seen for OT evaluation at [facility], admitted 4/22/2025 w/ generalized weakness. OT completed extensive review of pt's medical and social history. Comorbidities affecting pt's functional performance at time of assessment include: obesity, h/o CABG x4, ambulatory dysfunction, cataracts, aortic stenosis, glaucoma, frequent falls. Personal factors affecting pt at time of IE include: steps to enter environment, limited home support, behavioral pattern, difficulty performing ADLS, difficulty performing IADLS , limited insight into deficits, compliance, flat affect, decreased initiation and engagement , health management , and environment. Prior to admission, pt was living with his son in a 1SH with 3 RENE.  Pt was A w/  ADLS and w/ IADLS, (-) drove, & required use of RW PTA. Upon evaluation: Pt requires overall Mod A for bed mobility, overall Max A for functional transfers, overall Mod A for functional mobility, Mod-Max A for UB ADLs and Max-Total A for LB ADLS 2* the following deficits impacting occupational performance: weakness, decreased strength, decreased balance, decreased tolerance, impaired initiation, impaired memory, impaired sequencing, impaired problem solving, and decreased safety awareness. Full objective findings from OT assessment regarding body systems outlined above. Pt to benefit from continued skilled OT tx while in the hospital to address deficits as defined above and maximize level of functional independence w/ ADL's and functional mobility. Occupational Performance areas to address include:  eating, grooming, bathing/shower, toilet hygiene, dressing, functional mobility, and clothing management. Based on findings, pt is of high complexity. The patient's raw score on the AM-PAC Daily Activity inpatient short form is 10, standardized score is 26.28, less than 39.4. Patients at this level are likely to benefit from DC to post-acute rehabilitation services. However, please refer to therapist recommendation for discharge planning given other factors that may influence destination. At this time, OT recommendations at time of discharge are DC with Level II - Moderate Rehab Resource Intensity.   Goals   Patient Goals To eat   Plan   Treatment Interventions ADL retraining;Functional transfer training;Endurance training;Equipment evaluation/education;Compensatory technique education;Continued evaluation;UE strengthening/ROM;Cognitive reorientation;Patient/family training;Activityengagement   Goal Expiration Date 05/02/25   OT Treatment Day 0   OT Frequency 3-5x/wk   Discharge Recommendation   Rehab Resource Intensity Level, OT II (Moderate Resource Intensity)   AM-PAC Daily Activity Inpatient   Lower Body Dressing 1   Bathing 2   Toileting 1   Upper Body Dressing 2   Grooming 2   Eating 2   Daily Activity Raw Score 10   Turning Head Towards Sound 3   Follow Simple Instructions 2   Low Function Daily Activity Raw Score 15   Low Function Daily Activity Standardized Score  26.28   -[facility] Applied Cognition Inpatient   Following a Speech/Presentation 2   Understanding Ordinary Conversation 4   Taking Medications 3   Remembering Where Things Are Placed or Put Away 2   Remembering List of 4-5 Errands 2   Taking Care of Complicated Tasks 2   Applied Cognition Raw Score 15   Applied Cognition Standardized Score 33.54   End of Consult   Education Provided Yes   Patient Position at End of Consult All needs within reach;Supine   Nurse Communication Nurse aware of consult     Pt will achieve the following goals within 10  days.    *Pt will perform feeding with set-up.    *Pt will complete grooming with set-up.    *Pt will complete UB bathing and dressing with Min A.    *Pt will complete LB bathing and dressing with Mod A & AD PRN.    *Pt will complete toileting w/ Mod A w/ G hygiene/thoroughness using AD PRN    *Pt will complete bed mobility with S, with HOB elevated & use of side rails PRN to prep for purposeful tasks    *Pt will perform functional transfers with on/off all surfaces with Min Ax1 using DME as needed w/ G balance/safety.    *Pt will improve functional mobility during ADL/IADL/leisure tasks to Min Ax1 at all times using DME as needed w/ G balance/safety.    *Pt will participate in UE therapeutic exercise in order to maximize strength for ADL transfers.    *Pt will increase OOB/ sitting tolerance to 2-4 hours per day for increased participation in self care and leisure tasks with no s/s of exertion.    *Pt will improve functional activity tolerance to 15+ minutes of sustained functional tasks to increase participation in basic self-care and decrease assistance level.       Thu Solares, OTR/L

## 2025-04-22 NOTE — PLAN OF CARE
Problem: PHYSICAL THERAPY ADULT  Goal: Performs mobility at highest level of function for planned discharge setting.  See evaluation for individualized goals.  Description: Treatment/Interventions: Functional transfer training, LE strengthening/ROM, Therapeutic exercise, Endurance training, Cognitive reorientation, Patient/family training, Equipment eval/education, Bed mobility, Gait training          See flowsheet documentation for full assessment, interventions and recommendations.  4/22/2025 1500 by Gertrudis Smith PT  Note: Prognosis: Fair  Problem List: Decreased strength, Decreased endurance, Impaired balance, Decreased coordination, Decreased mobility, Decreased cognition, Decreased safety awareness, Impaired judgement  Assessment: Carlos Enrique Roth is a 89 yo male presenting to [facility] with c/o weakness and fatigue. Relevant comorbidities include CAD, T2DM, atrial fibrillation, CHF, CKD stage IV, and hx of L TFNA. At baseline, Carlos Enrique is functional independent with RW and ADLs. Upon evaluation, Carlos Enrique requires min Ax1 for supine>sit, dependent Ax2 for sit>supine, mod Ax1 for standing, dependent Ax2 for sitting, and fluctuating min Ax1-max Ax2 for ambulation with RW. Carlos Enrique benefited from repeated mod-max Vcs for task initiation and follow-through. Following urination while walking, patient required increased assistance for walking, sitting, and bed mobility compared to the beginning of the session; he also became less responsive to cues and his gait form degraded. He currently presents with decreased strength, decreased endurance, impaired balance, decreased mobility, decreased coordination, decreased cognition, impaired judgement, and decreased safety awareness. He would benefit from being seen by acute PT to progress mobility and accomplish goals. He would benefit from level II therapy resources to address impairments and return to functional baseline.        Rehab Resource Intensity Level, PT: II (Moderate Resource  Intensity)    See flowsheet documentation for full assessment.

## 2025-04-22 NOTE — PHYSICAL THERAPY NOTE
"                                                                                  PHYSICAL THERAPY EVALUATION NOTE          Patient Name: Carlos Enrique Roth Jr.  Today's Date: 4/22/2025 04/22/25 1328   PT Last Visit   PT Visit Date 04/22/25   Note Type   Note type Evaluation   Pain Assessment   Pain Assessment Tool FLACC   Pain Rating: FLACC (Rest) - Face 0   Pain Rating: FLACC (Rest) - Legs 0   Pain Rating: FLACC (Rest) - Activity 0   Pain Rating: FLACC (Rest) - Cry 0   Pain Rating: FLACC (Rest) - Consolability 0   Score: FLACC (Rest) 0   Pain Rating: FLACC (Activity) - Face 0   Pain Rating: FLACC (Activity) - Legs 0   Pain Rating: FLACC (Activity) - Activity 0   Pain Rating: FLACC (Activity) - Cry 0   Pain Rating: FLACC (Activity) - Consolability 0   Score: FLACC (Activity) 0   Restrictions/Precautions   Weight Bearing Precautions Per Order No   Other Precautions Cognitive;Fall Risk;Telemetry   Home Living   Type of Home House   Home Layout One level;Performs ADLs on one level;Stairs to enter with rails  (2 RENE)   Home Equipment Walker;Cane   Prior Function   Level of [place] Independent with ADLs;Independent with functional mobility;Needs assistance with IADLS   Lives With Son  (works during the day)   Receives Help From Family   IADLs Family/Friend/Other provides transportation;Family/Friend/Other provides meals;Family/Friend/Other provides medication management  (Per chart review, he is unmotivated to take his medication)   Falls in the last 6 months 5 to 10  (\"4 to 5\")   Vocational Retired   Comments uses RW to ambulate at baseline; Per chart review, son \"has had to have a friend come over almost daily to get him up with a two-person assist.\"   General   Additional Pertinent History upon evaluation, pt was taking an abnormal amount of time to chew and swallow his lunch, had to spit it out so session could get started   Family/Caregiver Present No   Cognition   Overall Cognitive Status Impaired " "  Arousal/Participation Alert   Attention Attends with cues to redirect   Orientation Level Oriented to person;Oriented to place;Oriented to situation;Disoriented to time  (\"April 1st 2025\")   Memory Decreased short term memory;Decreased recall of recent events   Following Commands Follows one step commands with increased time or repetition   Comments pleasantly confused; requires mod-max VCs to redirect to task and follow through, demonstrated difficulty direction following when asked to take steps; unaware that he started urinating, attempted to walk when he was still urinating into urinal; reports feeling more tired recently   Subjective   Subjective \"I need to pee.\"   Strength RLE   R Hip Flexion 4-/5   R Knee Extension 4-/5   R Ankle Dorsiflexion 3+/5   Strength LLE   L Hip Flexion 3+/5   L Knee Extension 4-/5   L Ankle Dorsiflexion 4-/5   Bed Mobility   Rolling R 5  Supervision   Additional items Assist x 1;Verbal cues   Supine to Sit 4  Minimal assistance   Additional items Assist x 1;Increased time required;Verbal cues;Other  (HHA; VC to follow-through with task)   Sit to Supine 1  Dependent   Additional items Assist x 2;Increased time required;LE management   Additional Comments Able to sit EOB with CGAx1 for 4 minutes, weight-shifted posteriorly when MMTs were evaluated, able to recover without A; Sit>Supine performed after ambulation when patient was requiring more assist and being less responsive; once supine, he began responding to therapists again   Transfers   Sit to Stand 3  Moderate assistance   Additional items Assist x 1;Armrests;Verbal cues;Other  (from elevated bed; VCs to push off from bed, get feet underneath him)   Stand to Sit 1  Dependent   Additional items Assist x 2;Bed elevated;Increased time required   Additional Comments Sitting down performed after ambulation/when patient was requiring more A for movement and being less responsive to cues   Ambulation/Elevation   Gait pattern Short " stride;Step to;Excessively slow;Foward flexed;Inconsistent david;Improper Weight shift  ([place] stride on R, would sometimes forget to bring R foot forward; struggled weight-shifting to L to bring R foot forward)   Gait Assistance 2  Maximal assist  (initially min Ax1, max Ax2 when patient became fatigued at end of ambulation)   Additional items Assist x 1;Assist x 2;Verbal cues;Other (Comment)  (physical assistance needed for RW management; max VCs for patient to stay within RW, take steps)   Assistive Device Rolling walker   Distance 15 feet   Ambulation/Elevation Additional Comments Patient reported needing to urinate upon standing OOB, patient began urinating without warning during ambulation before a urinal could be brought to room; patient stood urinating into urinal with min Ax1 to maintain balance, and max Ax1 for positioning of urinal, but would need VCs to keep him from walking forward while urinating; After urinating, patient began walking to bed and progressively moved more slowly and required max Ax2 and increased VCs to take steps, soon not responding to VCs at all; Patient was dependently brought to bedside to sit down   Balance   Static Sitting Good   Dynamic Sitting Fair +   Static Standing Poor +   Dynamic Standing Poor   Ambulatory Poor -   Activity Tolerance   Activity Tolerance Patient limited by fatigue   Medical Staff Made Aware NOBLE Fuentes   Nurse Made Aware NICOLAS Khan   Assessment   Prognosis Fair   Problem List Decreased strength;Decreased endurance;Impaired balance;Decreased coordination;Decreased mobility;Decreased cognition;Decreased safety awareness;Impaired judgement   Assessment Carlos Enrique Roth is a 89 yo male presenting to [facility] with c/o weakness and fatigue. Relevant comorbidities include CAD, T2DM, atrial fibrillation, CHF, CKD stage IV, and hx of L TFNA. At baseline, Carlos Enrique is functional independent with RW and ADLs. Upon evaluation, Carlos Enrique requires min Ax1 for supine>sit,  dependent Ax2 for sit>supine, mod Ax1 for standing, dependent Ax2 for sitting, and fluctuating min Ax1-max Ax2 for ambulation with RW. Carlos Enrique benefited from repeated mod-max Vcs for task initiation and follow-through. Following urination while walking, patient required increased assistance for walking, sitting, and bed mobility compared to the beginning of the session; he also became less responsive to cues and his gait form degraded. He currently presents with decreased strength, decreased endurance, impaired balance, decreased mobility, decreased coordination, decreased cognition, impaired judgement, and decreased safety awareness. He would benefit from being seen by acute PT to progress mobility and accomplish goals. He would benefit from level II therapy resources to address impairments and return to functional baseline.   Goals   Patient Goals to eat his lunch   STG Expiration Date 05/02/25   Short Term Goal #1 Patient will: perform all bed mobility with supervision to improve ability to reposition; perform all transfers with min Ax1 to improve functional mobility; ambulate 150 feet with RW and supervision to improve independence at home; improve all balance by at least half a stage to improve safety   Plan   Treatment/Interventions Functional transfer training;LE strengthening/ROM;Therapeutic exercise;Endurance training;Cognitive reorientation;Patient/family training;Equipment eval/education;Bed mobility;Gait training   PT Frequency 3-5x/wk   Discharge Recommendation   Rehab Resource Intensity Level, PT II (Moderate Resource Intensity)   AM-PAC Basic Mobility Inpatient   Turning in Flat Bed Without Bedrails 3   Lying on Back to Sitting on Edge of Flat Bed Without Bedrails 3   Moving Bed to Chair 2   Standing Up From Chair Using Arms 2   Walk in Room 1   Climb 3-5 Stairs With Railing 1   Basic Mobility Inpatient Raw Score 12   Basic Mobility Standardized Score 32.23   [facility] Level Of Mobility    JH-HLM Goal 4: Move to chair/commode   -HLM Achieved 6: Walk 10 steps or more   End of Consult   Patient Position at End of Consult Supine;All needs within reach     Bart Navarro, SPT

## 2025-04-22 NOTE — ASSESSMENT & PLAN NOTE
Lab Results   Component Value Date    HGBA1C 7.2 (A) 03/04/2025     Home regimen: Lantus 24 units at bedtime + NovoLog 11 units 3 times daily AC  ISS plus Accu-Cheks  Carb controlled diet  
Continue home statin  
Creatinine baseline appears to be around 2.6  Creatinine on admission 2.7  Avoid nephrotoxins and hypotension  Trend BMP    
Not meeting SIRS criteria  UA with pyuria, bacteriuria, nitrate positive  Urine culture pending, no prior cultures for reference  Initiated on IV Rocephin 4/22, continue and adjust per culture data  
Presented from home due to ongoing functional decline and family noting decreased p.o. intake, significant difficulty with ambulation and overall weakness.  Family interested in nursing facility placement.  CTH unremarkable  CT chest without infection or acute abnormality  Not meeting SIRS criteria however UA positive for UTI as below  Suspect this is contributing to generalized weakness  Viral swab negative  Calcium mildly elevated suggestive of dehydration  Received 500 cc bolus in the ED  PT/OT recommending discharge to postacute rehab  Family is agreeable to this. If patient ultimately declined at rehab, they would likely transition him to hospice in the outpatient setting at that point.  
Rate control: None  AC: Eliquis 2.5 mg twice daily  
TSH elevated, T4 WNL  Continue home Synthroid  
Wt Readings from Last 3 Encounters:   03/10/25 85.3 kg (188 lb)   03/04/25 82.6 kg (182 lb)   02/24/25 86.5 kg (190 lb 12.8 oz)     TTE 3/2024: EF 65%, normal systolic function, grade 2 diastolic dysfunction. Bioprosthetic aortic valve  Home diuretic: Torsemide 20 mg daily  I/O, daily weights  
No significant past surgical history

## 2025-04-23 ENCOUNTER — RESULTS FOLLOW-UP (OUTPATIENT)
Dept: EMERGENCY DEPT | Facility: HOSPITAL | Age: OVER 89
End: 2025-04-23

## 2025-04-24 ENCOUNTER — RA CDI HCC (OUTPATIENT)
Dept: OTHER | Facility: HOSPITAL | Age: OVER 89
End: 2025-04-24

## 2025-04-24 LAB — BACTERIA UR CULT: ABNORMAL

## 2025-05-01 ENCOUNTER — TELEPHONE (OUTPATIENT)
Age: OVER 89
End: 2025-05-01

## 2025-05-01 NOTE — TELEPHONE ENCOUNTER
Mervat, from [facility], called to see if patient needs labs before appointment. Made aware patient cancelled appointment. Mervat verbalized understanding.

## 2025-05-22 PROBLEM — N39.0 UTI (URINARY TRACT INFECTION): Status: RESOLVED | Noted: 2025-04-22 | Resolved: 2025-05-22

## 2025-06-11 ENCOUNTER — APPOINTMENT (EMERGENCY)
Dept: RADIOLOGY | Facility: HOSPITAL | Age: OVER 89
DRG: 291 | End: 2025-06-11
Payer: MEDICARE

## 2025-06-11 ENCOUNTER — HOSPITAL ENCOUNTER (INPATIENT)
Facility: HOSPITAL | Age: OVER 89
LOS: 6 days | Discharge: NURSING, REHAB OR LONG-TERM CARE | DRG: 291 | End: 2025-06-17
Attending: EMERGENCY MEDICINE | Admitting: INTERNAL MEDICINE
Payer: MEDICARE

## 2025-06-11 DIAGNOSIS — I50.9 CHF EXACERBATION (HCC): Primary | ICD-10-CM

## 2025-06-11 DIAGNOSIS — D64.9 ANEMIA: ICD-10-CM

## 2025-06-11 DIAGNOSIS — E11.40 TYPE 2 DIABETES MELLITUS WITH DIABETIC NEUROPATHY, WITH LONG-TERM CURRENT USE OF INSULIN (HCC): ICD-10-CM

## 2025-06-11 DIAGNOSIS — Z79.4 TYPE 2 DIABETES MELLITUS WITH DIABETIC NEUROPATHY, WITH LONG-TERM CURRENT USE OF INSULIN (HCC): ICD-10-CM

## 2025-06-11 DIAGNOSIS — I50.33 ACUTE ON CHRONIC DIASTOLIC CONGESTIVE HEART FAILURE (HCC): ICD-10-CM

## 2025-06-11 DIAGNOSIS — J90 PLEURAL EFFUSION: ICD-10-CM

## 2025-06-11 PROBLEM — J96.11 CHRONIC RESPIRATORY FAILURE WITH HYPOXIA (HCC): Status: ACTIVE | Noted: 2025-06-11

## 2025-06-11 PROBLEM — R53.1 GENERALIZED WEAKNESS: Status: ACTIVE | Noted: 2025-06-11

## 2025-06-11 LAB
2HR DELTA HS TROPONIN: 1 NG/L
ALBUMIN SERPL BCG-MCNC: 3.5 G/DL (ref 3.5–5)
ALP SERPL-CCNC: 99 U/L (ref 34–104)
ALT SERPL W P-5'-P-CCNC: 37 U/L (ref 7–52)
ANION GAP SERPL CALCULATED.3IONS-SCNC: 7 MMOL/L (ref 4–13)
AST SERPL W P-5'-P-CCNC: 39 U/L (ref 13–39)
BASOPHILS # BLD AUTO: 0.05 THOUSANDS/ÂΜL (ref 0–0.1)
BASOPHILS NFR BLD AUTO: 1 % (ref 0–1)
BILIRUB SERPL-MCNC: 0.56 MG/DL (ref 0.2–1)
BNP SERPL-MCNC: 566 PG/ML (ref 0–100)
BUN SERPL-MCNC: 59 MG/DL (ref 5–25)
CALCIUM SERPL-MCNC: 9.1 MG/DL (ref 8.4–10.2)
CARDIAC TROPONIN I PNL SERPL HS: 34 NG/L (ref ?–50)
CARDIAC TROPONIN I PNL SERPL HS: 35 NG/L (ref ?–50)
CHLORIDE SERPL-SCNC: 97 MMOL/L (ref 96–108)
CO2 SERPL-SCNC: 33 MMOL/L (ref 21–32)
CREAT SERPL-MCNC: 2.32 MG/DL (ref 0.6–1.3)
EOSINOPHIL # BLD AUTO: 0.16 THOUSAND/ÂΜL (ref 0–0.61)
EOSINOPHIL NFR BLD AUTO: 2 % (ref 0–6)
ERYTHROCYTE [DISTWIDTH] IN BLOOD BY AUTOMATED COUNT: 14.6 % (ref 11.6–15.1)
GFR SERPL CREATININE-BSD FRML MDRD: 23 ML/MIN/1.73SQ M
GLUCOSE SERPL-MCNC: 111 MG/DL (ref 65–140)
GLUCOSE SERPL-MCNC: 168 MG/DL (ref 65–140)
HCT VFR BLD AUTO: 27 % (ref 36.5–49.3)
HEMOCCULT STL QL IA: NEGATIVE
HGB BLD-MCNC: 8.3 G/DL (ref 12–17)
IMM GRANULOCYTES # BLD AUTO: 0.03 THOUSAND/UL (ref 0–0.2)
IMM GRANULOCYTES NFR BLD AUTO: 0 % (ref 0–2)
LYMPHOCYTES # BLD AUTO: 1.14 THOUSANDS/ÂΜL (ref 0.6–4.47)
LYMPHOCYTES NFR BLD AUTO: 16 % (ref 14–44)
MAGNESIUM SERPL-MCNC: 2.3 MG/DL (ref 1.9–2.7)
MCH RBC QN AUTO: 33.7 PG (ref 26.8–34.3)
MCHC RBC AUTO-ENTMCNC: 30.7 G/DL (ref 31.4–37.4)
MCV RBC AUTO: 110 FL (ref 82–98)
MONOCYTES # BLD AUTO: 0.84 THOUSAND/ÂΜL (ref 0.17–1.22)
MONOCYTES NFR BLD AUTO: 12 % (ref 4–12)
NEUTROPHILS # BLD AUTO: 5 THOUSANDS/ÂΜL (ref 1.85–7.62)
NEUTS SEG NFR BLD AUTO: 69 % (ref 43–75)
NRBC BLD AUTO-RTO: 0 /100 WBCS
PLATELET # BLD AUTO: 237 THOUSANDS/UL (ref 149–390)
PMV BLD AUTO: 9.7 FL (ref 8.9–12.7)
POTASSIUM SERPL-SCNC: 4.1 MMOL/L (ref 3.5–5.3)
PROT SERPL-MCNC: 6.8 G/DL (ref 6.4–8.4)
RBC # BLD AUTO: 2.46 MILLION/UL (ref 3.88–5.62)
SODIUM SERPL-SCNC: 137 MMOL/L (ref 135–147)
TSH SERPL DL<=0.05 MIU/L-ACNC: 14.36 UIU/ML (ref 0.45–4.5)
WBC # BLD AUTO: 7.22 THOUSAND/UL (ref 4.31–10.16)

## 2025-06-11 PROCEDURE — 99223 1ST HOSP IP/OBS HIGH 75: CPT | Performed by: PHYSICIAN ASSISTANT

## 2025-06-11 PROCEDURE — 82948 REAGENT STRIP/BLOOD GLUCOSE: CPT

## 2025-06-11 PROCEDURE — 93005 ELECTROCARDIOGRAM TRACING: CPT

## 2025-06-11 PROCEDURE — 83880 ASSAY OF NATRIURETIC PEPTIDE: CPT | Performed by: EMERGENCY MEDICINE

## 2025-06-11 PROCEDURE — 82746 ASSAY OF FOLIC ACID SERUM: CPT | Performed by: PHYSICIAN ASSISTANT

## 2025-06-11 PROCEDURE — 99285 EMERGENCY DEPT VISIT HI MDM: CPT

## 2025-06-11 PROCEDURE — 83550 IRON BINDING TEST: CPT | Performed by: PHYSICIAN ASSISTANT

## 2025-06-11 PROCEDURE — 87081 CULTURE SCREEN ONLY: CPT | Performed by: PHYSICIAN ASSISTANT

## 2025-06-11 PROCEDURE — 99285 EMERGENCY DEPT VISIT HI MDM: CPT | Performed by: EMERGENCY MEDICINE

## 2025-06-11 PROCEDURE — 71045 X-RAY EXAM CHEST 1 VIEW: CPT

## 2025-06-11 PROCEDURE — 82728 ASSAY OF FERRITIN: CPT | Performed by: PHYSICIAN ASSISTANT

## 2025-06-11 PROCEDURE — 83735 ASSAY OF MAGNESIUM: CPT | Performed by: EMERGENCY MEDICINE

## 2025-06-11 PROCEDURE — 84443 ASSAY THYROID STIM HORMONE: CPT | Performed by: PHYSICIAN ASSISTANT

## 2025-06-11 PROCEDURE — 36415 COLL VENOUS BLD VENIPUNCTURE: CPT | Performed by: EMERGENCY MEDICINE

## 2025-06-11 PROCEDURE — 84484 ASSAY OF TROPONIN QUANT: CPT | Performed by: EMERGENCY MEDICINE

## 2025-06-11 PROCEDURE — 80053 COMPREHEN METABOLIC PANEL: CPT | Performed by: EMERGENCY MEDICINE

## 2025-06-11 PROCEDURE — 85025 COMPLETE CBC W/AUTO DIFF WBC: CPT | Performed by: EMERGENCY MEDICINE

## 2025-06-11 PROCEDURE — G0328 FECAL BLOOD SCRN IMMUNOASSAY: HCPCS | Performed by: EMERGENCY MEDICINE

## 2025-06-11 PROCEDURE — 82607 VITAMIN B-12: CPT | Performed by: PHYSICIAN ASSISTANT

## 2025-06-11 PROCEDURE — 83540 ASSAY OF IRON: CPT | Performed by: PHYSICIAN ASSISTANT

## 2025-06-11 RX ORDER — GABAPENTIN 300 MG/1
300 CAPSULE ORAL
Status: DISCONTINUED | OUTPATIENT
Start: 2025-06-11 | End: 2025-06-17 | Stop reason: HOSPADM

## 2025-06-11 RX ORDER — ASCORBIC ACID 500 MG
500 TABLET ORAL DAILY
Status: DISCONTINUED | OUTPATIENT
Start: 2025-06-12 | End: 2025-06-17 | Stop reason: HOSPADM

## 2025-06-11 RX ORDER — PANTOPRAZOLE SODIUM 20 MG/1
20 TABLET, DELAYED RELEASE ORAL
Status: DISCONTINUED | OUTPATIENT
Start: 2025-06-12 | End: 2025-06-17 | Stop reason: HOSPADM

## 2025-06-11 RX ORDER — INSULIN LISPRO 100 [IU]/ML
11 INJECTION, SOLUTION INTRAVENOUS; SUBCUTANEOUS
Status: DISCONTINUED | OUTPATIENT
Start: 2025-06-12 | End: 2025-06-13

## 2025-06-11 RX ORDER — MAGNESIUM HYDROXIDE/ALUMINUM HYDROXICE/SIMETHICONE 120; 1200; 1200 MG/30ML; MG/30ML; MG/30ML
30 SUSPENSION ORAL EVERY 6 HOURS PRN
Status: DISCONTINUED | OUTPATIENT
Start: 2025-06-11 | End: 2025-06-17 | Stop reason: HOSPADM

## 2025-06-11 RX ORDER — LORAZEPAM 0.5 MG/1
0.5 TABLET ORAL
Status: DISCONTINUED | OUTPATIENT
Start: 2025-06-11 | End: 2025-06-17 | Stop reason: HOSPADM

## 2025-06-11 RX ORDER — FUROSEMIDE 10 MG/ML
50 INJECTION INTRAMUSCULAR; INTRAVENOUS
Status: DISCONTINUED | OUTPATIENT
Start: 2025-06-12 | End: 2025-06-12

## 2025-06-11 RX ORDER — TORSEMIDE 20 MG/1
20 TABLET ORAL DAILY
Status: DISCONTINUED | OUTPATIENT
Start: 2025-06-12 | End: 2025-06-16

## 2025-06-11 RX ORDER — LEVOTHYROXINE SODIUM 75 UG/1
150 TABLET ORAL
Status: DISCONTINUED | OUTPATIENT
Start: 2025-06-12 | End: 2025-06-17 | Stop reason: HOSPADM

## 2025-06-11 RX ORDER — DORZOLAMIDE HYDROCHLORIDE AND TIMOLOL MALEATE 20; 5 MG/ML; MG/ML
1 SOLUTION/ DROPS OPHTHALMIC EVERY 12 HOURS SCHEDULED
Status: DISCONTINUED | OUTPATIENT
Start: 2025-06-11 | End: 2025-06-17 | Stop reason: HOSPADM

## 2025-06-11 RX ORDER — ATORVASTATIN CALCIUM 40 MG/1
80 TABLET, FILM COATED ORAL
Status: DISCONTINUED | OUTPATIENT
Start: 2025-06-12 | End: 2025-06-11

## 2025-06-11 RX ORDER — TAMSULOSIN HYDROCHLORIDE 0.4 MG/1
0.4 CAPSULE ORAL
Status: DISCONTINUED | OUTPATIENT
Start: 2025-06-12 | End: 2025-06-17 | Stop reason: HOSPADM

## 2025-06-11 RX ORDER — FERROUS SULFATE 325(65) MG
325 TABLET ORAL DAILY
Status: DISCONTINUED | OUTPATIENT
Start: 2025-06-12 | End: 2025-06-17 | Stop reason: HOSPADM

## 2025-06-11 RX ORDER — INSULIN GLARGINE 100 [IU]/ML
24 INJECTION, SOLUTION SUBCUTANEOUS EVERY MORNING
Status: DISCONTINUED | OUTPATIENT
Start: 2025-06-12 | End: 2025-06-13

## 2025-06-11 RX ORDER — ACETAMINOPHEN 325 MG/1
650 TABLET ORAL EVERY 4 HOURS PRN
Status: DISCONTINUED | OUTPATIENT
Start: 2025-06-11 | End: 2025-06-17 | Stop reason: HOSPADM

## 2025-06-11 RX ORDER — INSULIN LISPRO 100 [IU]/ML
1-5 INJECTION, SOLUTION INTRAVENOUS; SUBCUTANEOUS
Status: DISCONTINUED | OUTPATIENT
Start: 2025-06-12 | End: 2025-06-17 | Stop reason: HOSPADM

## 2025-06-11 RX ORDER — ONDANSETRON 2 MG/ML
4 INJECTION INTRAMUSCULAR; INTRAVENOUS EVERY 6 HOURS PRN
Status: DISCONTINUED | OUTPATIENT
Start: 2025-06-11 | End: 2025-06-17 | Stop reason: HOSPADM

## 2025-06-11 RX ORDER — SENNOSIDES 8.6 MG
1 TABLET ORAL
Status: DISCONTINUED | OUTPATIENT
Start: 2025-06-11 | End: 2025-06-17 | Stop reason: HOSPADM

## 2025-06-11 RX ORDER — PRAVASTATIN SODIUM 80 MG/1
80 TABLET ORAL
Status: DISCONTINUED | OUTPATIENT
Start: 2025-06-12 | End: 2025-06-17 | Stop reason: HOSPADM

## 2025-06-11 RX ORDER — LATANOPROST 50 UG/ML
1 SOLUTION/ DROPS OPHTHALMIC
Status: DISCONTINUED | OUTPATIENT
Start: 2025-06-11 | End: 2025-06-17 | Stop reason: HOSPADM

## 2025-06-11 RX ORDER — ALLOPURINOL 300 MG/1
300 TABLET ORAL DAILY
Status: DISCONTINUED | OUTPATIENT
Start: 2025-06-12 | End: 2025-06-17 | Stop reason: HOSPADM

## 2025-06-11 RX ORDER — FUROSEMIDE 10 MG/ML
50 INJECTION INTRAMUSCULAR; INTRAVENOUS ONCE
Status: COMPLETED | OUTPATIENT
Start: 2025-06-11 | End: 2025-06-11

## 2025-06-11 RX ADMIN — APIXABAN 2.5 MG: 2.5 TABLET, FILM COATED ORAL at 22:53

## 2025-06-11 RX ADMIN — DORZOLAMIDE HYDROCHLORIDE AND TIMOLOL MALEATE 1 DROP: 20; 5 SOLUTION OPHTHALMIC at 22:53

## 2025-06-11 RX ADMIN — GABAPENTIN 300 MG: 300 CAPSULE ORAL at 22:53

## 2025-06-11 RX ADMIN — LORAZEPAM 0.5 MG: 0.5 TABLET ORAL at 22:53

## 2025-06-11 RX ADMIN — LATANOPROST 1 DROP: 50 SOLUTION OPHTHALMIC at 22:53

## 2025-06-11 RX ADMIN — FUROSEMIDE 50 MG: 10 INJECTION, SOLUTION INTRAVENOUS at 20:50

## 2025-06-12 ENCOUNTER — APPOINTMENT (INPATIENT)
Dept: NON INVASIVE DIAGNOSTICS | Facility: HOSPITAL | Age: OVER 89
DRG: 291 | End: 2025-06-12
Payer: MEDICARE

## 2025-06-12 LAB
ALBUMIN SERPL BCG-MCNC: 3.2 G/DL (ref 3.5–5)
ALP SERPL-CCNC: 85 U/L (ref 34–104)
ALT SERPL W P-5'-P-CCNC: 29 U/L (ref 7–52)
ANION GAP SERPL CALCULATED.3IONS-SCNC: 9 MMOL/L (ref 4–13)
AORTIC ROOT: 3.3 CM
AORTIC VALVE MEAN VELOCITY: 15.2 M/S
AST SERPL W P-5'-P-CCNC: 30 U/L (ref 13–39)
AV AREA BY CONTINUOUS VTI: 1.7 CM2
AV AREA PEAK VELOCITY: 1.6 CM2
AV LVOT MEAN GRADIENT: 3 MMHG
AV LVOT PEAK GRADIENT: 5 MMHG
AV MEAN PRESS GRAD SYS DOP V1V2: 11 MMHG
AV ORIFICE AREA US: 1.67 CM2
AV PEAK GRADIENT: 20 MMHG
AV VELOCITY RATIO: 0.53
AV VMAX SYS DOP: 2.26 M/S
BILIRUB SERPL-MCNC: 0.57 MG/DL (ref 0.2–1)
BSA FOR ECHO PROCEDURE: 1.89 M2
BUN SERPL-MCNC: 58 MG/DL (ref 5–25)
CALCIUM ALBUM COR SERPL-MCNC: 9.5 MG/DL (ref 8.3–10.1)
CALCIUM SERPL-MCNC: 8.9 MG/DL (ref 8.4–10.2)
CHLORIDE SERPL-SCNC: 97 MMOL/L (ref 96–108)
CO2 SERPL-SCNC: 32 MMOL/L (ref 21–32)
CREAT SERPL-MCNC: 2.2 MG/DL (ref 0.6–1.3)
DOP CALC AO VTI: 49.08 CM
DOP CALC LVOT AREA: 3.14 CM2
DOP CALC LVOT CARDIAC INDEX: 2.27 L/MIN/M2
DOP CALC LVOT CARDIAC OUTPUT: 4.29 L/MIN
DOP CALC LVOT DIAMETER: 2 CM
DOP CALC LVOT PEAK VEL VTI: 26.12 CM
DOP CALC LVOT PEAK VEL: 1.15 M/S
DOP CALC LVOT STROKE INDEX: 43.4 ML/M2
DOP CALC LVOT STROKE VOLUME: 82.02
DOP CALC MV VTI: 42.93 CM
E WAVE DECELERATION TIME: 201 MS
E/A RATIO: 2.4
ERYTHROCYTE [DISTWIDTH] IN BLOOD BY AUTOMATED COUNT: 14.6 % (ref 11.6–15.1)
EST. AVERAGE GLUCOSE BLD GHB EST-MCNC: 143 MG/DL
FERRITIN SERPL-MCNC: 99 NG/ML (ref 30–336)
FOLATE SERPL-MCNC: >22.3 NG/ML
FRACTIONAL SHORTENING: 33 (ref 28–44)
GFR SERPL CREATININE-BSD FRML MDRD: 25 ML/MIN/1.73SQ M
GLUCOSE SERPL-MCNC: 134 MG/DL (ref 65–140)
GLUCOSE SERPL-MCNC: 136 MG/DL (ref 65–140)
GLUCOSE SERPL-MCNC: 152 MG/DL (ref 65–140)
GLUCOSE SERPL-MCNC: 155 MG/DL (ref 65–140)
GLUCOSE SERPL-MCNC: 62 MG/DL (ref 65–140)
GLUCOSE SERPL-MCNC: 64 MG/DL (ref 65–140)
GLUCOSE SERPL-MCNC: 83 MG/DL (ref 65–140)
HBA1C MFR BLD: 6.6 %
HCT VFR BLD AUTO: 25.8 % (ref 36.5–49.3)
HGB BLD-MCNC: 8 G/DL (ref 12–17)
INTERVENTRICULAR SEPTUM IN DIASTOLE (PARASTERNAL SHORT AXIS VIEW): 1.5 CM
INTERVENTRICULAR SEPTUM: 1.5 CM (ref 0.6–1.1)
IRON SATN MFR SERPL: 7 % (ref 15–50)
IRON SERPL-MCNC: 17 UG/DL (ref 50–212)
LAAS-AP2: 24.2 CM2
LAAS-AP4: 25.4 CM2
LEFT ATRIUM SIZE: 4.4 CM
LEFT ATRIUM VOLUME (MOD BIPLANE): 86 ML
LEFT ATRIUM VOLUME INDEX (MOD BIPLANE): 45.5 ML/M2
LEFT INTERNAL DIMENSION IN SYSTOLE: 2.8 CM (ref 2.1–4)
LEFT VENTRICLE DIASTOLIC VOLUME (MOD BIPLANE): 155 ML
LEFT VENTRICLE DIASTOLIC VOLUME INDEX (MOD BIPLANE): 82 ML/M2
LEFT VENTRICLE SYSTOLIC VOLUME (MOD BIPLANE): 64 ML
LEFT VENTRICLE SYSTOLIC VOLUME INDEX (MOD BIPLANE): 33.9 ML/M2
LEFT VENTRICULAR INTERNAL DIMENSION IN DIASTOLE: 4.2 CM (ref 3.5–6)
LEFT VENTRICULAR POSTERIOR WALL IN END DIASTOLE: 0.9 CM
LEFT VENTRICULAR STROKE VOLUME: 48 ML
LV EF BIPLANE MOD: 59 %
LV EF US.2D.A4C+ESTIMATED: 64 %
LVSV (TEICH): 48 ML
MAGNESIUM SERPL-MCNC: 2.3 MG/DL (ref 1.9–2.7)
MCH RBC QN AUTO: 34.3 PG (ref 26.8–34.3)
MCHC RBC AUTO-ENTMCNC: 31 G/DL (ref 31.4–37.4)
MCV RBC AUTO: 111 FL (ref 82–98)
MV MEAN GRADIENT: 2 MMHG
MV PEAK A VEL: 0.65 M/S
MV PEAK E VEL: 156 CM/S
MV PEAK GRADIENT: 10 MMHG
MV STENOSIS PRESSURE HALF TIME: 58 MS
MV VALVE AREA BY CONTINUITY EQUATION: 1.91 CM2
MV VALVE AREA P 1/2 METHOD: 3.79
PHOSPHATE SERPL-MCNC: 4.7 MG/DL (ref 2.3–4.1)
PLATELET # BLD AUTO: 224 THOUSANDS/UL (ref 149–390)
PMV BLD AUTO: 9.6 FL (ref 8.9–12.7)
POTASSIUM SERPL-SCNC: 3.6 MMOL/L (ref 3.5–5.3)
PROT SERPL-MCNC: 5.9 G/DL (ref 6.4–8.4)
RA PRESSURE ESTIMATED: 8 MMHG
RBC # BLD AUTO: 2.33 MILLION/UL (ref 3.88–5.62)
RIGHT ATRIAL 2D VOLUME: 37 ML
RIGHT ATRIUM AREA SYSTOLE A4C: 14.8 CM2
RIGHT VENTRICLE ID DIMENSION: 4.8 CM
RV PSP: 54 MMHG
SL CV LEFT ATRIUM LENGTH A2C: 5.8 CM
SL CV LV EF: 50
SL CV PED ECHO LEFT VENTRICLE DIASTOLIC VOLUME (MOD BIPLANE) 2D: 77 ML
SL CV PED ECHO LEFT VENTRICLE SYSTOLIC VOLUME (MOD BIPLANE) 2D: 29 ML
SODIUM SERPL-SCNC: 138 MMOL/L (ref 135–147)
TIBC SERPL-MCNC: 250.6 UG/DL (ref 250–450)
TR MAX PG: 46 MMHG
TR PEAK VELOCITY: 3.4 M/S
TRANSFERRIN SERPL-MCNC: 179 MG/DL (ref 203–362)
TRICUSPID ANNULAR PLANE SYSTOLIC EXCURSION: 1.7 CM
TRICUSPID VALVE PEAK REGURGITATION VELOCITY: 3.38 M/S
UIBC SERPL-MCNC: 234 UG/DL (ref 155–355)
VIT B12 SERPL-MCNC: 271 PG/ML (ref 180–914)
WBC # BLD AUTO: 7.98 THOUSAND/UL (ref 4.31–10.16)

## 2025-06-12 PROCEDURE — 85027 COMPLETE CBC AUTOMATED: CPT | Performed by: INTERNAL MEDICINE

## 2025-06-12 PROCEDURE — 80053 COMPREHEN METABOLIC PANEL: CPT | Performed by: INTERNAL MEDICINE

## 2025-06-12 PROCEDURE — 99223 1ST HOSP IP/OBS HIGH 75: CPT | Performed by: INTERNAL MEDICINE

## 2025-06-12 PROCEDURE — 99233 SBSQ HOSP IP/OBS HIGH 50: CPT | Performed by: STUDENT IN AN ORGANIZED HEALTH CARE EDUCATION/TRAINING PROGRAM

## 2025-06-12 PROCEDURE — 83036 HEMOGLOBIN GLYCOSYLATED A1C: CPT | Performed by: INTERNAL MEDICINE

## 2025-06-12 PROCEDURE — 82948 REAGENT STRIP/BLOOD GLUCOSE: CPT

## 2025-06-12 PROCEDURE — 93971 EXTREMITY STUDY: CPT | Performed by: SURGERY

## 2025-06-12 PROCEDURE — 93306 TTE W/DOPPLER COMPLETE: CPT

## 2025-06-12 PROCEDURE — 93971 EXTREMITY STUDY: CPT

## 2025-06-12 PROCEDURE — 93306 TTE W/DOPPLER COMPLETE: CPT | Performed by: INTERNAL MEDICINE

## 2025-06-12 PROCEDURE — 83735 ASSAY OF MAGNESIUM: CPT | Performed by: INTERNAL MEDICINE

## 2025-06-12 PROCEDURE — 84100 ASSAY OF PHOSPHORUS: CPT | Performed by: INTERNAL MEDICINE

## 2025-06-12 RX ORDER — FUROSEMIDE 10 MG/ML
40 INJECTION INTRAMUSCULAR; INTRAVENOUS ONCE
Status: COMPLETED | OUTPATIENT
Start: 2025-06-12 | End: 2025-06-12

## 2025-06-12 RX ORDER — FUROSEMIDE 10 MG/ML
80 INJECTION INTRAMUSCULAR; INTRAVENOUS
Status: DISCONTINUED | OUTPATIENT
Start: 2025-06-12 | End: 2025-06-16

## 2025-06-12 RX ORDER — POTASSIUM CHLORIDE 1500 MG/1
40 TABLET, EXTENDED RELEASE ORAL ONCE
Status: COMPLETED | OUTPATIENT
Start: 2025-06-12 | End: 2025-06-12

## 2025-06-12 RX ADMIN — APIXABAN 2.5 MG: 2.5 TABLET, FILM COATED ORAL at 08:24

## 2025-06-12 RX ADMIN — GABAPENTIN 300 MG: 300 CAPSULE ORAL at 19:45

## 2025-06-12 RX ADMIN — FUROSEMIDE 50 MG: 10 INJECTION, SOLUTION INTRAVENOUS at 08:25

## 2025-06-12 RX ADMIN — DORZOLAMIDE HYDROCHLORIDE AND TIMOLOL MALEATE 1 DROP: 20; 5 SOLUTION OPHTHALMIC at 08:42

## 2025-06-12 RX ADMIN — OXYCODONE HYDROCHLORIDE AND ACETAMINOPHEN 500 MG: 500 TABLET ORAL at 08:24

## 2025-06-12 RX ADMIN — APIXABAN 2.5 MG: 2.5 TABLET, FILM COATED ORAL at 17:27

## 2025-06-12 RX ADMIN — ALLOPURINOL 300 MG: 300 TABLET ORAL at 08:24

## 2025-06-12 RX ADMIN — LATANOPROST 1 DROP: 50 SOLUTION OPHTHALMIC at 19:44

## 2025-06-12 RX ADMIN — LORAZEPAM 0.5 MG: 0.5 TABLET ORAL at 19:45

## 2025-06-12 RX ADMIN — INSULIN LISPRO 11 UNITS: 100 INJECTION, SOLUTION INTRAVENOUS; SUBCUTANEOUS at 08:38

## 2025-06-12 RX ADMIN — INSULIN GLARGINE 24 UNITS: 100 INJECTION, SOLUTION SUBCUTANEOUS at 08:25

## 2025-06-12 RX ADMIN — DORZOLAMIDE HYDROCHLORIDE AND TIMOLOL MALEATE 1 DROP: 20; 5 SOLUTION OPHTHALMIC at 19:44

## 2025-06-12 RX ADMIN — PANTOPRAZOLE SODIUM 20 MG: 20 TABLET, DELAYED RELEASE ORAL at 06:03

## 2025-06-12 RX ADMIN — INSULIN LISPRO 11 UNITS: 100 INJECTION, SOLUTION INTRAVENOUS; SUBCUTANEOUS at 13:20

## 2025-06-12 RX ADMIN — PRAVASTATIN SODIUM 80 MG: 80 TABLET ORAL at 17:27

## 2025-06-12 RX ADMIN — TAMSULOSIN HYDROCHLORIDE 0.4 MG: 0.4 CAPSULE ORAL at 17:27

## 2025-06-12 RX ADMIN — FUROSEMIDE 80 MG: 10 INJECTION, SOLUTION INTRAVENOUS at 17:27

## 2025-06-12 RX ADMIN — FUROSEMIDE 40 MG: 10 INJECTION, SOLUTION INTRAVENOUS at 13:15

## 2025-06-12 RX ADMIN — LEVOTHYROXINE SODIUM 150 MCG: 75 TABLET ORAL at 06:03

## 2025-06-12 RX ADMIN — POTASSIUM CHLORIDE 40 MEQ: 1500 TABLET, EXTENDED RELEASE ORAL at 06:46

## 2025-06-12 RX ADMIN — Medication 1000 UNITS: at 08:24

## 2025-06-12 RX ADMIN — FERROUS SULFATE TAB 325 MG (65 MG ELEMENTAL FE) 325 MG: 325 (65 FE) TAB at 08:24

## 2025-06-12 NOTE — ASSESSMENT & PLAN NOTE
Not on any AV giuseppe blockers due to baseline bradycardia  Anticoagulated with Eliquis 2.5 Mg twice daily  Continue home Eliquis  Sinus rhythm at time of admission

## 2025-06-12 NOTE — ASSESSMENT & PLAN NOTE
History of anemia secondary to CKD stage IV  Hemoglobin 8.3 on admission  Hemoglobin 1 month ago was 14.0, however suspect that this was erroneous lab our as patient's baseline as of a year ago was in the tens  Fecal occult stool in the ED was negative  Continue home iron supplementation  Check B12, folate, and iron panel  Continue Eliquis for now as there are no signs of active bleeding and suspect that this is around patient's baseline

## 2025-06-12 NOTE — PROGRESS NOTES
Progress Note - Hospitalist   Name: Carlos Enrique Roth  89 y.o. male I MRN: [id number]  Unit/Bed#: [facility] I Date of Admission: 6/11/2025   Date of Service: 6/12/2025 I Hospital Day: 1    Assessment & Plan  Acute on chronic diastolic congestive heart failure (HCC)  Wt Readings from Last 3 Encounters:   06/12/25 77.1 kg (170 lb)   03/10/25 85.3 kg (188 lb)   03/04/25 82.6 kg (182 lb)   Presenting to the ED with cough and leg swelling x2 weeks   Last echo March 2024: LVEF 65% with G2 DD  Home diuretic is torsemide 20 Mg daily - SNF had planned to increase diuretic day of admit (torsemide to 60 Mg daily x 5 days with Zaroxolyn 2.5mg Tues/Thurs/Sat x1 week and then increase home torsemide to 40mg daily after)   CXR with mild pulmonary congestion and small pleural effusion -admission exam: B/L LE with 3-4+ pitting edema and fine rales bilaterally  Of note, RUE significantly swollen as well - check RUE duplex for completeness (VTE less likely though as he is on eliquis)   Given Lasix 50 Mg IV x 1 in the ED  I/O and daily weights  Sodium fluid restriction  Initially admitted as observation, however upon clinical assessment pt made inpatient due to aggressive IV diuresis needs   Echo: Showing an LVEF of 50% systolic function mildly reduced with severe diastolic dysfunction multiple segments are hypokinetic left atrium moderately dilated, aortic bioprosthetic valve present elevated right ventricular systolic pressure at 54 aortic root exhibiting severe fibrocalcific change  discussed with cardio lasix increased to 80 IV bid  Anemia due to stage 4 chronic kidney disease  (HCC)  History of anemia secondary to CKD stage IV  Hemoglobin 8.3 on admission  Hemoglobin 1 month ago was 14.0, however suspect that this was erroneous lab our as patient's baseline as of a year ago was in the tens  Fecal occult stool in the ED was negative  Hold home iron supplementation  B12, WNL  folate WNL   iron panel low will initiate venofer  Continue  Eliquis for now as there are no signs of active bleeding and suspect that this is around patient's baseline  Generalized weakness  Currently at [facility] for acute rehab  Plan to return there upon discharge  Pt/Ot eval  Chronic respiratory failure with hypoxia (HCC)  Contained on 2-3 L nasal cannula at baseline  No increased O2 requirement  Continue home oxygen  Type 2 diabetes mellitus with diabetic neuropathy (HCC)  Lab Results   Component Value Date    HGBA1C 6.6 (H) 06/12/2025       Recent Labs     06/12/25  1056 06/12/25  1607 06/12/25  1632 06/12/25  1703   POCGLU 152* 64* 62* 83       Blood Sugar Average: Last 72 hrs:  (P) 101  Home regimen: Lantus 24 units in morning and NovoLog 11 units 3 times daily with meals  Continue home regimen with the addition of SSI with meals    Persistent atrial fibrillation (HCC)  Not on any AV giuseppe blockers due to baseline bradycardia  Anticoagulated with Eliquis 2.5 Mg twice daily  Continue home Eliquis  Sinus rhythm at time of admission  CAD (coronary artery disease)  CAD s/p CABG x 4 in 2018  Continue formulary equivalent for home statin  Denies any anginal symptoms  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)  Baseline creatinine 2.2-2.6  Creatinine on admission is 2.32  Trend BMP daily in setting of IV diuresis  Patient still making urine  Postoperative hypothyroidism  Continue home Synthroid 150 mcg daily -Per SNF records this dose was new as of 4/22  TSH 4/22 was elevated at 37  Presuming Synthroid dose from facility was increased from elevated TSH, will continue home dose and recheck TSH now    VTE Pharmacologic Prophylaxis: VTE Score: 5 High Risk (Score >/= 5) - Pharmacological DVT Prophylaxis Ordered: apixaban (Eliquis). Sequential Compression Devices Ordered.    Mobility:   Basic Mobility Inpatient Raw Score: 13  JH-HLM Goal: 4: Move to chair/commode  JH-HLM Achieved: 5: Stand (1 or more minutes)  JH-HLM Goal achieved. Continue to encourage appropriate  mobility.    Patient Centered Rounds: I performed bedside rounds with nursing staff today.   Discussions with Specialists or Other Care Team Provider: cardio, CM, Pt, Ot    Education and Discussions with Family / Patient: Updated  (son) via phone.    Current Length of Stay: 1 day(s)  Current Patient Status: Inpatient   Certification Statement: The patient will continue to require additional inpatient hospital stay due to achfe  Discharge Plan: Anticipate discharge in 48-72 hrs to rehab facility.    Code Status: Level 1 - Full Code    Diane Monaco was seen and examined at bedside.  No acute events overnight.  Discussed plan of care.  All questions and concerns were answered and addressed.  Has no acute complaints at this time.  Discussed with cardiology Lasix increased to 80 twice daily.    Objective :  Temp:  [97.4 °F (36.3 °C)-98.9 °F (37.2 °C)] 98.9 °F (37.2 °C)  HR:  [60-73] 62  BP: (112-141)/(45-67) 136/64  Resp:  [19-20] 19  SpO2:  [96 %-100 %] 100 %  O2 Device: Nasal cannula  Nasal Cannula O2 Flow Rate (L/min):  [3 L/min] 3 L/min    Body mass index is 26.63 kg/m².     Input and Output Summary (last 24 hours):     Intake/Output Summary (Last 24 hours) at 6/12/2025 1817  Last data filed at 6/12/2025 1635  Gross per 24 hour   Intake 960 ml   Output 2508.7 ml   Net -1548.7 ml       Physical Exam  Vitals and nursing note reviewed.   Constitutional:       General: He is not in acute distress.     Appearance: He is ill-appearing (chronically).   HENT:      Head: Normocephalic and atraumatic.     Cardiovascular:      Rate and Rhythm: Normal rate and regular rhythm.      Pulses: Normal pulses.      Heart sounds: Normal heart sounds.   Pulmonary:      Effort: Pulmonary effort is normal.      Breath sounds: Normal breath sounds.   Abdominal:      General: Abdomen is flat. Bowel sounds are normal.      Palpations: Abdomen is soft.     Musculoskeletal:      Right lower leg: Edema present.      Left  lower leg: Edema present.     Skin:     General: Skin is warm.     Neurological:      General: No focal deficit present.      Mental Status: He is alert and oriented to person, place, and time.           Lines/Drains:              Lab Results: I have reviewed the following results:   Results from last 7 days   Lab Units 06/12/25  0532 06/11/25  1656   WBC Thousand/uL 7.98 7.22   HEMOGLOBIN g/dL 8.0* 8.3*   HEMATOCRIT % 25.8* 27.0*   PLATELETS Thousands/uL 224 237   SEGS PCT %  --  69   LYMPHO PCT %  --  16   MONO PCT %  --  12   EOS PCT %  --  2     Results from last 7 days   Lab Units 06/12/25  0532   SODIUM mmol/L 138   POTASSIUM mmol/L 3.6   CHLORIDE mmol/L 97   CO2 mmol/L 32   BUN mg/dL 58*   CREATININE mg/dL 2.20*   ANION GAP mmol/L 9   CALCIUM mg/dL 8.9   ALBUMIN g/dL 3.2*   TOTAL BILIRUBIN mg/dL 0.57   ALK PHOS U/L 85   ALT U/L 29   AST U/L 30   GLUCOSE RANDOM mg/dL 136         Results from last 7 days   Lab Units 06/12/25  1703 06/12/25  1632 06/12/25  1607 06/12/25  1056 06/12/25  0757 06/11/25  2301   POC GLUCOSE mg/dl 83 62* 64* 152* 134 111     Results from last 7 days   Lab Units 06/12/25  0532   HEMOGLOBIN A1C % 6.6*           Recent Cultures (last 7 days):         Imaging Results Review: No pertinent imaging studies reviewed.  Other Study Results Review: No additional pertinent studies reviewed.    Last 24 Hours Medication List:     Current Facility-Administered Medications:     acetaminophen (TYLENOL) tablet 650 mg, Q4H PRN    allopurinol (ZYLOPRIM) tablet 300 mg, Daily    aluminum-magnesium hydroxide-simethicone (MAALOX) oral suspension 30 mL, Q6H PRN    apixaban (ELIQUIS) tablet 2.5 mg, BID    ascorbic acid (VITAMIN C) tablet 500 mg, Daily    Cholecalciferol (VITAMIN D3) tablet 1,000 Units, Daily    dorzolamide-timolol (COSOPT) 2-0.5 % ophthalmic solution 1 drop, Q12H JAVAD    [Held by provider] ferrous sulfate tablet 325 mg, Daily    furosemide (LASIX) injection 80 mg, BID (diuretic)    gabapentin  (NEURONTIN) capsule 300 mg, HS    insulin glargine (LANTUS) subcutaneous injection 24 Units 0.24 mL, QAM    insulin lispro (HumALOG/ADMELOG) 100 units/mL subcutaneous injection 1-5 Units, TID AC **AND** Fingerstick Glucose (POCT), TID AC    insulin lispro (HumALOG/ADMELOG) 100 units/mL subcutaneous injection 11 Units, TID With Meals    [START ON 6/13/2025] iron sucrose (VENOFER) 300 mg in sodium chloride 0.9 % 250 mL IVPB, Daily    latanoprost (XALATAN) 0.005 % ophthalmic solution 1 drop, HS    levothyroxine tablet 150 mcg, Early Morning    LORazepam (ATIVAN) tablet 0.5 mg, HS    ondansetron (ZOFRAN) injection 4 mg, Q6H PRN    pantoprazole (PROTONIX) EC tablet 20 mg, Daily Before Breakfast    pravastatin (PRAVACHOL) tablet 80 mg, Daily With Dinner    senna (SENOKOT) tablet 8.6 mg, HS PRN    tamsulosin (FLOMAX) capsule 0.4 mg, Daily With Dinner    [Held by provider] torsemide (DEMADEX) tablet 20 mg, Daily    Administrative Statements   Today, Patient Was Seen By: Esha Cee MD  I have spent a total time of 57 minutes in caring for this patient on the day of the visit/encounter including Diagnostic results, Prognosis, Risks and benefits of tx options, Instructions for management, Patient and family education, Importance of tx compliance, Risk factor reductions, Impressions, Counseling / Coordination of care, Documenting in the medical record, Reviewing/placing orders in the medical record (including tests, medications, and/or procedures), Obtaining or reviewing history  , and Communicating with other healthcare professionals .    **Please Note: This note may have been constructed using a voice recognition system.**

## 2025-06-12 NOTE — ASSESSMENT & PLAN NOTE
Lab Results   Component Value Date    HGBA1C 6.6 (H) 06/12/2025       Recent Labs     06/12/25  1056 06/12/25  1607 06/12/25  1632 06/12/25  1703   POCGLU 152* 64* 62* 83       Blood Sugar Average: Last 72 hrs:  (P) 101  Home regimen: Lantus 24 units in morning and NovoLog 11 units 3 times daily with meals  Continue home regimen with the addition of SSI with meals

## 2025-06-12 NOTE — ASSESSMENT & PLAN NOTE
Wt Readings from Last 3 Encounters:   06/11/25 88 kg (194 lb 0.1 oz)   03/10/25 85.3 kg (188 lb)   03/04/25 82.6 kg (182 lb)   Presenting to the ED with cough and leg swelling x2 weeks   Last echo March 2024: LVEF 65% with G2 DD  Home diuretic is torsemide 20 Mg daily - SNF had planned to increase diuretic day of admit (torsemide to 60 Mg daily x 5 days with Zaroxolyn 2.5mg Tues/Thurs/Sat x1 week and then increase home torsemide to 40mg daily after)   CXR with mild pulmonary congestion and small pleural effusion -admission exam: B/L LE with 3-4+ pitting edema and fine rales bilaterally  Of note, RUE significantly swollen as well - check RUE duplex for completeness (VTE less likely though as he is on eliquis)   Given Lasix 50 Mg IV x 1 in the ED-continue Lasix 50 Mg IV twice daily  I/O and daily weights  Sodium fluid restriction  Cardiology consulted  Will update echo  Initially admitted as observation, however upon clinical assessment pt made inpatient due to aggressive IV diuresis needs

## 2025-06-12 NOTE — PLAN OF CARE
Problem: Potential for Falls  Goal: Patient will remain free of falls  Description: INTERVENTIONS:  - Educate patient/family on patient safety including physical limitations  - Instruct patient to call for assistance with activity   - Consider consulting OT/PT to assist with strengthening/mobility based on AM PAC & JH-HLM score  - Consult OT/PT to assist with strengthening/mobility   - Keep Call bell within reach  - Keep bed low and locked with side rails adjusted as appropriate  - Keep care items and personal belongings within reach  - Initiate and maintain comfort rounds  - Make Fall Risk Sign visible to staff  - Offer Toileting every X Hours, in advance of need  - Initiate/Maintain Xalarm  - Obtain necessary fall risk management equipment: XXX  - Apply yellow socks and bracelet for high fall risk patients  - Consider moving patient to room near nurses station  Outcome: Progressing     Problem: Decreased Cardiac Output  Goal: Cardiac output adequate for individual needs  Description: INTERVENTIONS: Monitor for signs and symptoms of decreased cardiac output   - Monitor for dyspnea with exertion and at rest  - Monitor for orthopnea  - Monitor for signs of tachycardia. Place patient on telemetry monitoring.  - Assess patient for jugular vein distention  - Assess patient for lower extremity edema and poor peripheral perfusion   - Auscultate lung sound for Fine bibasilar crackles   - Monitor for cardiac arrythmias   - Administer beta blockers, antiarrhythmic, and blood pressure medications as ordered    Outcome: Progressing     Problem: Impaired Gas Exchange  Goal: Optimize oxygenation and ensure adequate ventilation  Description: INTERVENTIONS: Monitor for signs and symptoms of respiratory distress                - Elevate HOB or use high fowlers to promote lung expansion                - Administer oxygen as ordered to maintain adequate oxygenation                - Encourage use of IS to promote lung expansion and  prevent PN                - Monitor ABGs to assess oxygenation status                - Monitor blood oxygen level to maintain adequate oxygenation                - Encourage cough and deep breathing exercises to promote lung expansion                - Monitor patient's mental status for increased confusion    Outcome: Progressing     Problem: Excess Fluid Volume  Goal: Patient is able to achieve and maintain homeostasis  Description: INTERVENTIONS: Monitor for sign and symptoms of fluid overload  - Evaluate LE edema every shift  - Elevate LE to prevent dependent edema  - Apply ALFREDO stockings as ordered   - Monitor ankle circumference daily  - Assess for jugular vein distention  - Evaluate provider orders for the CHF diuretic algorithm. Administer diuretics as ordered  - Weigh the patient daily at 0600 and report a weight gain of five pounds or more   - Strict intake and output  - Monitor fluid intake and adhere to fluid restrictions  - Assess lung sounds every shift and as needed  - Monitor vital signs and lab values (CBC, chem, BUN, BNP)  - Measure and document urine output    Outcome: Progressing     Problem: Activity Intolerance  Goal: Patient is able to perform activities within their limitations  Description: INTERVENTIONS:                       -   Alternate periods of activity with periods of rest                 -   Patients is able to maintain normal vitals heart rhythm during activity                 -   Gradually increase activity and exercise as patient can tolerate                 -   Monitor blood pressure and heart before and after exercise                  -   Monitor blood oxygen saturation during activity and apply oxygen as needed    Outcome: Progressing     Problem: Knowledge Deficit  Goal: Patient is able to verbalize understanding of Heart Failure after education  Description: INTERVENTIONS:  - Educate the patient and family on signs and symptoms of HF  - Provide the patient with HF education  and HF zone tool  - Educate on the importance of daily weight in the AM and reporting a weight gain               of 3 or more pounds to their primary care physician  - Monitor for SOB  - Maintain and sodium and fluid restriction  - Educate the patient on the importance of medications such as: diuretics, betablockers,               antiarrhythmics and their purpose, dose, route, side effects and labs               if they are needed    Outcome: Progressing

## 2025-06-12 NOTE — PLAN OF CARE
Problem: Potential for Falls  Goal: Patient will remain free of falls  Description: INTERVENTIONS:  - Educate patient/family on patient safety including physical limitations  - Instruct patient to call for assistance with activity   - Consider consulting OT/PT to assist with strengthening/mobility based on AM PAC & JH-HLM score  - Consult OT/PT to assist with strengthening/mobility   - Keep Call bell within reach  - Keep bed low and locked with side rails adjusted as appropriate  - Keep care items and personal belongings within reach  - Initiate and maintain comfort rounds  - Make Fall Risk Sign visible to staff  - Offer Toileting every 2 Hours, in advance of need  - Initiate/Maintain 2alarm  - Obtain necessary fall risk management equipment: 2  - Apply yellow socks and bracelet for high fall risk patients  - Consider moving patient to room near nurses station  Outcome: Progressing     Problem: Decreased Cardiac Output  Goal: Cardiac output adequate for individual needs  Description: INTERVENTIONS: Monitor for signs and symptoms of decreased cardiac output   - Monitor for dyspnea with exertion and at rest  - Monitor for orthopnea  - Monitor for signs of tachycardia. Place patient on telemetry monitoring.  - Assess patient for jugular vein distention  - Assess patient for lower extremity edema and poor peripheral perfusion   - Auscultate lung sound for Fine bibasilar crackles   - Monitor for cardiac arrythmias   - Administer beta blockers, antiarrhythmic, and blood pressure medications as ordered    Outcome: Progressing     Problem: Knowledge Deficit  Goal: Patient is able to verbalize understanding of Heart Failure after education  Description: INTERVENTIONS:  - Educate the patient and family on signs and symptoms of HF  - Provide the patient with HF education and HF zone tool  - Educate on the importance of daily weight in the AM and reporting a weight gain               of 3 or more pounds to their primary care  physician  - Monitor for SOB  - Maintain and sodium and fluid restriction  - Educate the patient on the importance of medications such as: diuretics, betablockers,               antiarrhythmics and their purpose, dose, route, side effects and labs               if they are needed    Outcome: Progressing     Problem: Activity Intolerance  Goal: Patient is able to perform activities within their limitations  Description: INTERVENTIONS:                       -   Alternate periods of activity with periods of rest                 -   Patients is able to maintain normal vitals heart rhythm during activity                 -   Gradually increase activity and exercise as patient can tolerate                 -   Monitor blood pressure and heart before and after exercise                  -   Monitor blood oxygen saturation during activity and apply oxygen as needed    Outcome: Progressing

## 2025-06-12 NOTE — ASSESSMENT & PLAN NOTE
Lab Results   Component Value Date    HGBA1C 7.2 (A) 03/04/2025       Recent Labs     06/11/25  2301   POCGLU 111       Blood Sugar Average: Last 72 hrs:  (P) 111  Home regimen: Lantus 24 units in morning and NovoLog 11 units 3 times daily with meals  Continue home regimen with the addition of SSI with meals  Update A1c

## 2025-06-12 NOTE — ASSESSMENT & PLAN NOTE
amphetamine-dextroamphetamine (ADDERALL) 5 MG tablet      Last Written Prescription Date:  2/11/2019  Last Fill Quantity: 20 tablet,   # refills: 0  Last Office Visit: 1/11/2019  w/ Fransico, H    Future Office visit:       Routing refill request to provider for review/approval because:  Drug not on the [facility], P or  Health refill protocol or controlled substance          amphetamine-dextroamphetamine (ADDERALL XR) 25 MG 24 hr capsule      Last Written Prescription Date:  2/11/2019  Last Fill Quantity: 20 capsule,   # refills: 0  Last Office Visit: 1/11/2019 w/Fransico, H  Future Office visit:       Routing refill request to provider for review/approval because:  Drug not on the [facility], P or  Health refill protocol or controlled substance     Contained on 2-3 L nasal cannula at baseline  No increased O2 requirement  Continue home oxygen

## 2025-06-12 NOTE — ASSESSMENT & PLAN NOTE
CAD s/p CABG x 4 in 2018  Continue formulary equivalent for home statin  Denies any anginal symptoms

## 2025-06-12 NOTE — ED PROVIDER NOTES
Time reflects when diagnosis was documented in both MDM as applicable and the Disposition within this note       Time User Action Codes Description Comment    6/11/2025  8:35 PM Jaspal Sorenson Add [I50.9] CHF exacerbation (HCC)     6/11/2025  8:35 PM Jaspal Sorenson Add [J90] Pleural effusion     6/11/2025  8:35 PM Jaspal Sorenson Add [D64.9] Anemia     6/11/2025 10:41 PM SandersOndina Add [I50.33] Acute on chronic diastolic congestive heart failure (HCC)           ED Disposition       ED Disposition   Admit    Condition   Stable    Date/Time   Wed Jun 11, 2025  8:35 PM    Comment   Case was discussed with TONG and the patient's admission status was agreed to be Admission Status: observation status to the service of Dr. Mendoza .               Assessment & Plan       Medical Decision Making  89-year-old male presenting with shortness of breath, lower extremity swelling.  Differential includes pneumonia, pneumothorax electro demise, ACS, congestive heart failure exacerbation.  Patient x-ray shows pulmonary edema small pleural effusion.  His hemoglobin was 8 down from as high as 14.  Digital rectal exam did not reveal any gross blood, fecal to hold blood testing had no blood either.  He feels well at rest.  Feels worse with exertion.  Given his chest x-ray findings, increased BNP and dropping hemoglobin.  Will admit for treatment, diuresis.  His anemia is likely due to chronic disease.  At this point his vitals are stable and he is not in acute distress, will hold on any blood transfusions.  Patient agreeable with plan for admission.    Amount and/or Complexity of Data Reviewed  Labs: ordered.  Radiology: ordered and independent interpretation performed.    Risk  Prescription drug management.  Decision regarding hospitalization.        ED Course as of 06/12/25 0941   Wed Jun 11, 2025   1928 Digital rectal exam performed.,  Stool was brown, dark no overt bleeding.  Fecal occult blood testing sent.   1930 Procedure  Note: EKG  Date/Time: 06/11/25 7:30 PM   Performed by: Fidel Sorenson  Authorized by: Fidel Sorenson  ECG interpreted by me, the ED Provider: yes   The EKG demonstrates:  Rate 73  Rhythm sinus  QTc 431  No ST elevations/depressions           Medications   furosemide (LASIX) injection 50 mg (50 mg Intravenous Given 6/11/25 2050)       ED Risk Strat Scores                    No data recorded        SBIRT 22yo+      Flowsheet Row Most Recent Value   Initial Alcohol Screen: US AUDIT-C     1. How often do you have a drink containing alcohol? 0 Filed at: 06/11/2025 1532   2. How many drinks containing alcohol do you have on a typical day you are drinking?  0 Filed at: 06/11/2025 1532   3a. Male UNDER 65: How often do you have five or more drinks on one occasion? 0 Filed at: 06/11/2025 1532   3b. FEMALE Any Age, or MALE 65+: How often do you have 4 or more drinks on one occassion? 0 Filed at: 06/11/2025 1532   Audit-C Score 0 Filed at: 06/11/2025 1532                            History of Present Illness       Chief Complaint   Patient presents with    Leg Swelling     Leg swelling, SOB, cough. From [facility]       Past Medical History[1]   Past Surgical History[2]   Family History[3]   Social History[4]   E-Cigarette/Vaping    E-Cigarette Use Never User       E-Cigarette/Vaping Substances    Nicotine No     THC No     CBD No     Flavoring No     Other No     Unknown No       I have reviewed and agree with the history as documented.     Patient presents with:  Leg Swelling: Leg swelling, SOB, cough. From [facility]              Review of Systems   Constitutional: Negative.  Negative for chills and fever.   HENT: Negative.  Negative for rhinorrhea, sore throat, trouble swallowing and voice change.    Eyes: Negative.  Negative for pain and visual disturbance.   Respiratory:  Positive for shortness of breath. Negative for cough and wheezing.    Cardiovascular: Negative.  Negative for chest pain and palpitations.    Gastrointestinal:  Negative for abdominal pain, diarrhea, nausea and vomiting.   Genitourinary: Negative.  Negative for dysuria and frequency.   Musculoskeletal: Negative.  Negative for neck pain and neck stiffness.   Skin: Negative.  Negative for rash.   Neurological: Negative.  Negative for dizziness, speech difficulty, weakness, light-headedness and numbness.           Objective       ED Triage Vitals   Temperature Pulse Blood Pressure Respirations SpO2 Patient Position - Orthostatic VS   06/11/25 1526 06/11/25 1532 06/11/25 1609 06/11/25 1532 06/11/25 1532 06/11/25 1733   98 °F (36.7 °C) 73 146/67 22 96 % Sitting      Temp Source Heart Rate Source BP Location FiO2 (%) Pain Score    06/11/25 2124 06/11/25 1733 06/11/25 1733 -- 06/11/25 2300    Oral Monitor Right arm  No Pain      Vitals      Date and Time Temp Pulse SpO2 Resp BP Pain Score FACES Pain Rating User   06/12/25 0800 -- -- -- 20 -- No Pain -- AS   06/12/25 0800 97.4 °F (36.3 °C) 73 98 % -- 121/65 -- -- DII   06/12/25 0524 97.4 °F (36.3 °C) 67 99 % -- 127/66 -- -- DII   06/11/25 2300 -- -- -- -- -- No Pain -- DG   06/11/25 2124 -- -- -- 20 -- -- -- JK   06/11/25 2124 98.5 °F (36.9 °C) 72 96 % -- 126/67 -- -- DII   06/11/25 2045 -- 67 99 % 20 120/56 -- -- TH   06/11/25 1945 -- 64 100 % 20 129/64 -- -- TH   06/11/25 1900 -- 70 99 % 20 141/64 -- -- TH   06/11/25 1733 -- 65 99 % 18 138/64 -- -- GS   06/11/25 1609 -- -- -- -- 146/67 -- --    06/11/25 1545 -- 71 98 % 21 -- -- --    06/11/25 1532 -- 73 96 % 22 -- -- -- KP   06/11/25 1526 98 °F (36.7 °C) -- -- -- -- -- --             Physical Exam  Vitals and nursing note reviewed.   Constitutional:       General: He is not in acute distress.     Appearance: He is well-developed.   HENT:      Head: Normocephalic and atraumatic.     Eyes:      Conjunctiva/sclera: Conjunctivae normal.      Pupils: Pupils are equal, round, and reactive to light.     Neck:      Trachea: No tracheal deviation.  "    Cardiovascular:      Rate and Rhythm: Normal rate and regular rhythm.   Pulmonary:      Effort: Pulmonary effort is normal. No respiratory distress.      Breath sounds: Examination of the right-lower field reveals decreased breath sounds and rales. Examination of the left-lower field reveals decreased breath sounds and rales. Decreased breath sounds and rales present. No wheezing.   Abdominal:      General: Bowel sounds are normal. There is no distension.      Palpations: Abdomen is soft.      Tenderness: There is no abdominal tenderness. There is no guarding or rebound.     Musculoskeletal:         General: No tenderness or deformity. Normal range of motion.      Cervical back: Normal range of motion and neck supple.     Skin:     General: Skin is warm and dry.      Capillary Refill: Capillary refill takes less than 2 seconds.      Findings: No rash.     Neurological:      Mental Status: He is alert and oriented to person, place, and time.     Psychiatric:         Behavior: Behavior normal.         Results Reviewed       Procedure Component Value Units Date/Time    Vitamin B12 [[id number]]  (Normal) Collected: 06/11/25 1902    Lab Status: Final result Specimen: Blood from Arm, Left Updated: 06/12/25 0527     Vitamin B-12 271 pg/mL     Folate [[id number]]  (Normal) Collected: 06/11/25 1902    Lab Status: Final result Specimen: Blood from Arm, Left Updated: 06/12/25 0527     Folate >22.3 ng/mL     Ferritin [[id number]]  (Normal) Collected: 06/11/25 1902    Lab Status: Final result Specimen: Blood from Arm, Left Updated: 06/12/25 0527     Ferritin 99 ng/mL     Narrative:      \"Guideline based recommendations vary for ferritin cutoff values in defining iron deficiency; additionally, cutoff levels for ferritin may differ depending on the condition. For example, a higher minimum ferritin is typically preferred in adults with restless leg syndrome, where a target of > 75 ng/ml is sought. Ultimately, clinical correlation " "is needed in determining actionable minimum ferritin level.\"    TIBC Panel (incl. Iron, TIBC, % Iron Saturation) [[id number]]  (Abnormal) Collected: 06/11/25 1902    Lab Status: Final result Specimen: Blood from Arm, Left Updated: 06/12/25 0447     Iron Saturation 7 %      TIBC 250.6 ug/dL      Iron 17 ug/dL      Transferrin 179 mg/dL      UIBC 234 ug/dL     TSH, 3rd generation [[id number]]  (Abnormal) Collected: 06/11/25 1656    Lab Status: Final result Specimen: Blood from Arm, Left Updated: 06/11/25 2324     TSH 3RD GENERATON 14.361 uIU/mL     iFOBT (FIT) [[id number]]  (Normal) Collected: 06/11/25 1928    Lab Status: Final result Specimen: Stool from Per Rectum Updated: 06/11/25 1943     OCCULT BLD, FECAL IMMUNOLOGICAL Negative    Narrative:        Performed by Fecal Immunochemical Test.    HS Troponin I 2hr [[id number]]  (Normal) Collected: 06/11/25 1902    Lab Status: Final result Specimen: Blood from Arm, Left Updated: 06/11/25 1929     hs TnI 2hr 35 ng/L      Delta 2hr hsTnI 1 ng/L     B-Type Natriuretic Peptide(BNP) [[id number]]  (Abnormal) Collected: 06/11/25 1656    Lab Status: Final result Specimen: Blood from Arm, Left Updated: 06/11/25 1741      pg/mL     HS Troponin 0hr (reflex protocol) [[id number]]  (Normal) Collected: 06/11/25 1656    Lab Status: Final result Specimen: Blood from Arm, Left Updated: 06/11/25 1729     hs TnI 0hr 34 ng/L     Comprehensive metabolic panel [[id number]]  (Abnormal) Collected: 06/11/25 1656    Lab Status: Final result Specimen: Blood from Arm, Left Updated: 06/11/25 1724     Sodium 137 mmol/L      Potassium 4.1 mmol/L      Chloride 97 mmol/L      CO2 33 mmol/L      ANION GAP 7 mmol/L      BUN 59 mg/dL      Creatinine 2.32 mg/dL      Glucose 168 mg/dL      Calcium 9.1 mg/dL      AST 39 U/L      ALT 37 U/L      Alkaline Phosphatase 99 U/L      Total Protein 6.8 g/dL      Albumin 3.5 g/dL      Total Bilirubin 0.56 mg/dL      eGFR 23 ml/min/1.73sq m     Narrative:      " National Kidney Disease Foundation guidelines for Chronic Kidney Disease (CKD):     Stage 1 with normal or high GFR (GFR > 90 mL/min/1.73 square meters)    Stage 2 Mild CKD (GFR = 60-89 mL/min/1.73 square meters)    Stage 3A Moderate CKD (GFR = 45-59 mL/min/1.73 square meters)    Stage 3B Moderate CKD (GFR = 30-44 mL/min/1.73 square meters)    Stage 4 Severe CKD (GFR = 15-29 mL/min/1.73 square meters)    Stage 5 End Stage CKD (GFR <15 mL/min/1.73 square meters)  Note: GFR calculation is accurate only with a steady state creatinine    Magnesium [[id number]]  (Normal) Collected: 06/11/25 1656    Lab Status: Final result Specimen: Blood from Arm, Left Updated: 06/11/25 1724     Magnesium 2.3 mg/dL     CBC and differential [[id number]]  (Abnormal) Collected: 06/11/25 1656    Lab Status: Final result Specimen: Blood from Arm, Left Updated: 06/11/25 1715     WBC 7.22 Thousand/uL      RBC 2.46 Million/uL      Hemoglobin 8.3 g/dL      Hematocrit 27.0 %       fL      MCH 33.7 pg      MCHC 30.7 g/dL      RDW 14.6 %      MPV 9.7 fL      Platelets 237 Thousands/uL      nRBC 0 /100 WBCs      Segmented % 69 %      Immature Grans % 0 %      Lymphocytes % 16 %      Monocytes % 12 %      Eosinophils Relative 2 %      Basophils Relative 1 %      Absolute Neutrophils 5.00 Thousands/µL      Absolute Immature Grans 0.03 Thousand/uL      Absolute Lymphocytes 1.14 Thousands/µL      Absolute Monocytes 0.84 Thousand/µL      Eosinophils Absolute 0.16 Thousand/µL      Basophils Absolute 0.05 Thousands/µL             XR chest 1 view portable   Final Interpretation by Alfredo Cabrera MD (06/11 1640)      Vascular congestion with small right pleural effusion.            Workstation performed: [id number]         VAS upper limb venous duplex scan, unilateral/limited    (Results Pending)       Procedures    ED Medication and Procedure Management   Prior to Admission Medications   Prescriptions Last Dose Informant Patient Reported?  Taking?   Eliquis 2.5 MG 6/11/2025 Morning  No Yes   Sig: TAKE ONE TABLET BY MOUTH TWICE DAILY   FeroSul 325 (65 Fe) MG tablet 6/11/2025 Morning Self No Yes   Sig: TAKE ONE TABLET BY MOUTH DAILY   Insulin Glargine Solostar (Lantus SoloStar) 100 UNIT/ML SOPN 6/11/2025 Morning Self No Yes   Sig: Inject 0.24 mL (24 Units total) under the skin in the morning   Insulin Pen Needle (Pen Needles) 32G X 4 MM MISC Past Week Self No Yes   Sig: Use 4 (four) times a day (before meals and at bedtime)   LORazepam (ATIVAN) 0.5 mg tablet 6/10/2025 Bedtime Self No Yes   Sig: Take 1 tablet (0.5 mg total) by mouth daily at bedtime Do not start before January 16, 2025.   acetaminophen (TYLENOL) 325 mg tablet  Self No No   Sig: Take 2 tablets (650 mg total) by mouth every 6 (six) hours as needed for mild pain, headaches or fever   allopurinol (ZYLOPRIM) 300 mg tablet 6/11/2025 Morning Self No Yes   Sig: TAKE ONE TABLET BY MOUTH ONCE DAILY   ascorbic acid (VITAMIN C) 500 mg tablet 6/11/2025 Morning Self No Yes   Sig: TAKE ONE TABLET BY MOUTH DAILY   cholecalciferol (VITAMIN D3) 1,000 units tablet 6/11/2025 Morning Self No Yes   Sig: Take 1 tablet (1,000 Units total) by mouth daily   clobetasol (TEMOVATE) 0.05 % cream  Self No No   Sig: Apply topically 2 (two) times a day for 7 days   dorzolamide-timolol (COSOPT) 22.3-6.8 MG/ML ophthalmic solution 6/11/2025 Morning Self Yes Yes   Sig: Administer 1 drop to both eyes in the morning and 1 drop in the evening.   gabapentin (NEURONTIN) 300 mg capsule 6/10/2025 Bedtime Self No Yes   Sig: TAKE ONE CAPSULE BY MOUTH DAILY AT BEDTIME   insulin aspart (NovoLOG FlexPen) 100 UNIT/ML injection pen 6/11/2025 Noon Self No Yes   Sig: INJECT 11 UNITS SUBCUTANEOUSLY THREE TIMES DAILY   Patient taking differently: Inject 11 Units under the skin 3 (three) times a day with meals INJECT 11 UNITS SUBCUTANEOUSLY THREE TIMES DAILY   latanoprost (XALATAN) 0.005 % ophthalmic solution 6/10/2025 Bedtime Self Yes Yes    Sig: Administer 1 drop into the left eye daily at bedtime   levothyroxine 150 mcg tablet 2025 Morning Self No Yes   Sig: TAKE ONE TABLET BY MOUTH DAILY   pantoprazole (PROTONIX) 20 mg tablet 2025 Morning Self No Yes   Sig: TAKE ONE TABLET BY MOUTH EVERY DAY   potassium chloride (Klor-Con M20) 20 mEq tablet 2025 Morning Self No Yes   Sig: Take 1 tablet (20 mEq total) by mouth daily   rosuvastatin (CRESTOR) 40 MG tablet 2025 Morning Self No Yes   Sig: TAKE ONE TABLET BY MOUTH DAILY   tamsulosin (FLOMAX) 0.4 mg 6/10/2025 Self No Yes   Sig: TAKE ONE CAPSULE BY MOUTH DAILY WITH dinner   torsemide (DEMADEX) 10 mg tablet 2025  No Yes   Sig: Take 2 tablets (20 mg total) by mouth daily      Facility-Administered Medications: None     Current Discharge Medication List        CONTINUE these medications which have NOT CHANGED    Details   allopurinol (ZYLOPRIM) 300 mg tablet TAKE ONE TABLET BY MOUTH ONCE DAILY  Qty: 90 tablet, Refills: 1    Comments:  please send new thank you:)  Associated Diagnoses: Chronic gout with tophus, unspecified cause, unspecified site      ascorbic acid (VITAMIN C) 500 mg tablet TAKE ONE TABLET BY MOUTH DAILY  Qty: 30 tablet, Refills: 5    Comments: This prescription was filled on 2024. Any refills authorized will be placed on file.  Associated Diagnoses: Healthcare maintenance      cholecalciferol (VITAMIN D3) 1,000 units tablet Take 1 tablet (1,000 Units total) by mouth daily  Qty: 90 tablet, Refills: 3    Associated Diagnoses: Secondary hyperparathyroidism (HCC)      dorzolamide-timolol (COSOPT) 22.3-6.8 MG/ML ophthalmic solution Administer 1 drop to both eyes in the morning and 1 drop in the evening.      Eliquis 2.5 MG TAKE ONE TABLET BY MOUTH TWICE DAILY  Qty: 60 tablet, Refills: 5    Comments: This prescription was filled on 2025. Any refills authorized will be placed on file.  Associated Diagnoses: New onset atrial fibrillation (HCC); Closed  nondisplaced intertrochanteric fracture of left femur with routine healing, subsequent encounter      FeroSul 325 (65 Fe) MG tablet TAKE ONE TABLET BY MOUTH DAILY  Qty: 28 tablet, Refills: 3    Associated Diagnoses: Anemia, unspecified type      gabapentin (NEURONTIN) 300 mg capsule TAKE ONE CAPSULE BY MOUTH DAILY AT BEDTIME  Qty: 90 capsule, Refills: 1    Associated Diagnoses: Neuropathy      insulin aspart (NovoLOG FlexPen) 100 UNIT/ML injection pen INJECT 11 UNITS SUBCUTANEOUSLY THREE TIMES DAILY  Qty: 15 mL, Refills: 3    Comments: This prescription was filled on 3/6/2025. Any refills authorized will be placed on file.  Associated Diagnoses: Type 2 diabetes mellitus with stage 3 chronic kidney disease, without long-term current use of insulin, unspecified whether stage 3a or 3b CKD ([facility])      Insulin Glargine Solostar (Lantus SoloStar) 100 UNIT/ML SOPN Inject 0.24 mL (24 Units total) under the skin in the morning  Qty: 15 mL, Refills: 2    Associated Diagnoses: Type 2 diabetes mellitus with diabetic neuropathy, with long-term current use of insulin ([facility])      Insulin Pen Needle (Pen Needles) 32G X 4 MM MISC Use 4 (four) times a day (before meals and at bedtime)  Qty: 100 each, Refills: 11    Associated Diagnoses: Type 2 diabetes mellitus with stage 3b chronic kidney disease, without long-term current use of insulin ([facility])      latanoprost (XALATAN) 0.005 % ophthalmic solution Administer 1 drop into the left eye daily at bedtime      levothyroxine 150 mcg tablet TAKE ONE TABLET BY MOUTH DAILY  Qty: 90 tablet, Refills: 1    Associated Diagnoses: Hypothyroidism, unspecified type      LORazepam (ATIVAN) 0.5 mg tablet Take 1 tablet (0.5 mg total) by mouth daily at bedtime Do not start before January 16, 2025.  Qty: 30 tablet, Refills: 1    Comments: This prescription was filled on 3/3/2025. Any refills authorized will be placed on file.  Associated Diagnoses: Anxiety      pantoprazole (PROTONIX) 20 mg tablet TAKE ONE  TABLET BY MOUTH EVERY DAY  Qty: 30 tablet, Refills: 5    Associated Diagnoses: S/P CABG x 4      potassium chloride (Klor-Con M20) 20 mEq tablet Take 1 tablet (20 mEq total) by mouth daily  Qty: 30 tablet, Refills: 5    Associated Diagnoses: Hypokalemia      rosuvastatin (CRESTOR) 40 MG tablet TAKE ONE TABLET BY MOUTH DAILY  Qty: 90 tablet, Refills: 0    Comments: This prescription was filled on 2/8/2025. Any refills authorized will be placed on file.  Associated Diagnoses: S/P CABG x 4; S/P AVR (aortic valve replacement)      tamsulosin (FLOMAX) 0.4 mg TAKE ONE CAPSULE BY MOUTH DAILY WITH dinner  Qty: 30 capsule, Refills: 5    Comments: This prescription was filled on 11/18/2024. Any refills authorized will be placed on file.  Associated Diagnoses: Benign prostatic hyperplasia with nocturia      torsemide (DEMADEX) 10 mg tablet Take 2 tablets (20 mg total) by mouth daily  Qty: 120 tablet, Refills: 1    Associated Diagnoses: Edema, unspecified      acetaminophen (TYLENOL) 325 mg tablet Take 2 tablets (650 mg total) by mouth every 6 (six) hours as needed for mild pain, headaches or fever    Associated Diagnoses: Closed nondisplaced intertrochanteric fracture of left femur with routine healing, subsequent encounter      clobetasol (TEMOVATE) 0.05 % cream Apply topically 2 (two) times a day for 7 days  Qty: 30 g, Refills: 1    Comments: This prescription was filled on 11/18/2024. Any refills authorized will be placed on file.  Associated Diagnoses: Psoriasis           No discharge procedures on file.  ED SEPSIS DOCUMENTATION   Time reflects when diagnosis was documented in both MDM as applicable and the Disposition within this note       Time User Action Codes Description Comment    6/11/2025  8:35 PM Jaspal Sorenson [I50.9] CHF exacerbation (HCC)     6/11/2025  8:35 PM Jaspal Sorenson [J90] Pleural effusion     6/11/2025  8:35 PM Jaspal Sorenson [D64.9] Anemia     6/11/2025 10:41 PM Ondina Sanders  [I50.33] Acute on chronic diastolic congestive heart failure (HCC)                    [1]   Past Medical History:  Diagnosis Date    Aortic stenosis     CKD (chronic kidney disease)     baseline Cr 1.3-1.5    Coronary artery disease     Cough     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HCC)     type 2, insulin dependent    GERD (gastroesophageal reflux disease)     Glaucoma     Gout     History of prostate cancer     Hypertension     Hypothyroidism     Overweight     Peripheral neuropathy, idiopathic     Pleural effusion, left     Pure hypercholesterolemia     LA...11/12/14   R....11/12/14     Visual impairment     cataract left eye    Weight gain    [2]   Past Surgical History:  Procedure Laterality Date    CARDIAC CATHETERIZATION      EYE SURGERY      IR THORACENTESIS  10/23/2018    IR THORACENTESIS  11/2/2018    IR THORACENTESIS  11/9/2018    IR THORACENTESIS  11/23/2018    LA CORONARY ARTERY BYP W/VEIN & ARTERY GRAFT 3 VEIN N/A 9/17/2018    Procedure: CORONARY ARTERY BYPASS GRAFT (CABG) x 4 VESSELS with LIMA - LAD, SVG/LEFT LEG EVH - LEFT PDA, OM3, & OM2;  Surgeon: Remy Ash MD;  Location: [facility];  Service: Cardiac Surgery    LA ECHO TRANSESOPHAG MONTR CARDIAC PUMP FUNCTJ N/A 9/17/2018    Procedure: TRANSESOPHAGEAL ECHOCARDIOGRAM (VERONICA);  Surgeon: Remy Ash MD;  Location: [facility];  Service: Cardiac Surgery    LA OPTX FEM SHFT FX W/INSJ IMED IMPLT W/WO SCREW Left 6/20/2024    Procedure: INSERTION NAIL IM FEMUR ANTEGRADE (TROCHANTERIC);  Surgeon: Sukh Reece DO;  Location: [facility];  Service: Orthopedics    LA RPLCMT AORTIC VALVE OPN W/STENTLESS TISSUE VALVE N/A 9/17/2018    Procedure: REPLACEMENT VALVE AORTIC (AVR)- 23mm tissue Intuity Valve;  Surgeon: Remy Ash MD;  Location: [facility];  Service: Cardiac Surgery    THORACOSCOPY VIDEO ASSISTED SURGERY (VATS) Left 11/27/2018    Procedure: THORACOSCOPY VIDEO ASSISTED SURGERY (VATS), talc pleurodesis,;  Surgeon: Ciara Green MD;  Location: [facility];  Service: Thoracic    THYROID SURGERY     [3]   Family History  Problem Relation Name Age of Onset    Diabetes Mother      Pancreatic cancer Brother      Diabetes Maternal Grandmother      Colon cancer Son      Diabetes Family      Substance Abuse Neg Hx      Mental illness Neg Hx     [4]   Social History  Tobacco Use    Smoking status: Former     Current packs/day: 0.00     Average packs/day: 0.3 packs/day for 1 year (0.3 ttl pk-yrs)     Types: Cigarettes     Start date:      Quit date:      Years since quittin.4    Tobacco comments:     Only in the service    Vaping Use    Vaping status: Never Used   Substance Use Topics    Alcohol use: Never    Drug use: No        Jaspal Sorenson,   25 0945

## 2025-06-12 NOTE — CASE MANAGEMENT
Case Management Assessment & Discharge Planning Note    Patient name Carlos Enrique Roth Jr.  Location /-01 MRN [id number]  : 1935 Date 2025       Current Admission Date: 2025  Current Admission Diagnosis:Acute on chronic diastolic congestive heart failure (HCC)   Patient Active Problem List    Diagnosis Date Noted    Chronic respiratory failure with hypoxia ([facility]) 2025    Generalized weakness 2025    Failure to thrive in adult 2025    Hypokalemia 2024    Platelets decreased ([facility]) 2024    Fracture of unspecified part of neck of left femur, initial encounter for closed fracture ([facility]) 2024    Persistent atrial fibrillation ([facility]) 2024    Persistent proteinuria 2023    Microalbuminuria due to type 2 diabetes mellitus  ([facility]) 10/09/2023    Secondary hyperparathyroidism ([facility]) 2023    Skin tear of left elbow without complication 2023    CKD stage 4 due to type 2 diabetes mellitus ([facility]) 2022    Type 2 diabetes mellitus with diabetic neuropathy ([facility]) 10/27/2021    Acute on chronic diastolic congestive heart failure ([facility]) 2020    Primary open angle glaucoma (POAG) 2019    Age-related cataract of both eyes 2019    Atherosclerosis of aorta ([facility]) 2019    Ambulatory dysfunction 2018    Anemia due to stage 4 chronic kidney disease  ([facility]) 2018    CAD (coronary artery disease) 2018    Aortic stenosis 2018    Benign prostatic hyperplasia without lower urinary tract symptoms 2018    Long term (current) use of insulin ([facility]) 2018    Presence of aortocoronary bypass graft 2018    Presence of prosthetic heart valve 2018    Unspecified glaucoma 2018    Nonrheumatic aortic (valve) stenosis 2018    Atherosclerotic heart disease of native coronary artery without angina pectoris 2018    S/P CABG x 4 2018    S/P AVR (aortic valve replacement) 2018     GERD (gastroesophageal reflux disease)     Sexual dysfunction 10/09/2017    Renal cyst 01/12/2017    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  [address]  [place] [address]  Phone: [phone] Fax: [phone]    Primary Care Provider: Juliette Cid DO    Primary Insurance: MEDICARE  Secondary Insurance:     ASSESSMENT:  Active Health Care Proxies    There are no active Health Care Proxies on file.       Advance Directives  Primary Contact: Galen, son.         Readmission Root Cause  30 Day Readmission: No    Patient Information  Admitted from:: Facility  Mental Status: Confused  Assessment information provided by::  (Left two VMs for son.)  Primary Caregiver: Self  Support Systems: Self, Children, Son  County of Residence: [place]  What [facility] do you live in?: [place].  Home entry access options. Select all that apply.: No steps to enter home  Type of Current Residence: [facility]  Living Arrangements: Lives w/ Son    Activities of Daily Living Prior to Admission  Functional Status: Independent  Completes ADLs independently?: Yes  Ambulates independently?: Yes  Does patient use assisted devices?: Yes  Assisted Devices (DME) used: Straight Cane, Walker  Does patient currently own DME?: Yes  What DME does the patient currently own?: Walker, Straight Cane  Does patient have a history of Outpatient Therapy (PT/OT)?: No  Does the patient have a history of Short-Term Rehab?: Yes  Does patient have a history of HHC?: No  Does patient currently have HHC?: No         Patient Information Continued  Income Source: SSI/SSD  Can the patient afford their  "medications and any related supplies (such as glucometers or test strips)?: Yes  Does patient receive dialysis treatments?: No  Does patient have a history of substance abuse?: No  Does patient have a history of Mental Health Diagnosis?: No         Means of Transportation  Means of Transport to Appts:: Family transport          DISCHARGE DETAILS:    Discharge planning discussed with:: Left two VMs to son Ciro. Discussed plan for DC with [facility].  Freedom of Choice: Yes     CM contacted family/caregiver?: Yes  Were Treatment Team discharge recommendations reviewed with patient/caregiver?: Yes  Did patient/caregiver verbalize understanding of patient care needs?: Yes  Were patient/caregiver advised of the risks associated with not following Treatment Team discharge recommendations?: Yes    Contacts  Patient Contacts: Ciro Roth:  Relationship to Patient:: Family  Contact Method: Phone  Phone Number: [phone] and [phone]  Reason/Outcome: Emergency Contact, Discharge Planning, Referral    Requested Home Health Care         Is the patient interested in HHC at discharge?: No    DME Referral Provided  Referral made for DME?: No    Other Referral/Resources/Interventions Provided:  Interventions: Short Term Rehab  Referral Comments: Admits from [facility]. [facility] reserved in [place].         Treatment Team Recommendation: Short Term Rehab  Expected Discharge Disposition: Skilled Nursing Facility  Additional Discharge Dispositions: Skilled Nursing Facility  Transport at Discharge : [facility]LS Ambulance                                      Additional Comments: CM called pt's son Ciro twice and left two VMs. Pt admits from [facility] for STR. At [facility], pt mobility is: \"-dependent. requires total lift. divided leg sling for transfers. Assist of 2 or more staff and uses wheelchair\" Before rehab, pt lived with son Ciro in a 1sh, 0ste, but 2steps to living room. Pt uses a cane and walker at home, and son " grocery shops and drives. No IP psych or D&A treatment. Awaiting return call from Ciro to confirm discharge plan.

## 2025-06-12 NOTE — ASSESSMENT & PLAN NOTE
CAD s/p CABG x 4 in 2018  Continue formulary equivalent for home statin  Denies any anginal symptoms   Admitting MD

## 2025-06-12 NOTE — ASSESSMENT & PLAN NOTE
Wt Readings from Last 3 Encounters:   06/12/25 77.1 kg (170 lb)   03/10/25 85.3 kg (188 lb)   03/04/25 82.6 kg (182 lb)   Presenting to the ED with cough and leg swelling x2 weeks   Last echo March 2024: LVEF 65% with G2 DD  Home diuretic is torsemide 20 Mg daily - SNF had planned to increase diuretic day of admit (torsemide to 60 Mg daily x 5 days with Zaroxolyn 2.5mg Tues/Thurs/Sat x1 week and then increase home torsemide to 40mg daily after)   CXR with mild pulmonary congestion and small pleural effusion -admission exam: B/L LE with 3-4+ pitting edema and fine rales bilaterally  Of note, RUE significantly swollen as well - check RUE duplex for completeness (VTE less likely though as he is on eliquis)   Given Lasix 50 Mg IV x 1 in the ED  I/O and daily weights  Sodium fluid restriction  Initially admitted as observation, however upon clinical assessment pt made inpatient due to aggressive IV diuresis needs   Echo: Showing an LVEF of 50% systolic function mildly reduced with severe diastolic dysfunction multiple segments are hypokinetic left atrium moderately dilated, aortic bioprosthetic valve present elevated right ventricular systolic pressure at 54 aortic root exhibiting severe fibrocalcific change  discussed with cardio lasix increased to 80 IV bid

## 2025-06-12 NOTE — H&P
H&P - Hospitalist   Name: Carlos Enrique Roth  89 y.o. male I MRN: [id number]  Unit/Bed#: [facility] I Date of Admission: 6/11/2025   Date of Service: 6/11/2025 I Hospital Day: 0     Assessment & Plan  Acute on chronic diastolic congestive heart failure (HCC)  Wt Readings from Last 3 Encounters:   06/11/25 88 kg (194 lb 0.1 oz)   03/10/25 85.3 kg (188 lb)   03/04/25 82.6 kg (182 lb)   Presenting to the ED with cough and leg swelling x2 weeks   Last echo March 2024: LVEF 65% with G2 DD  Home diuretic is torsemide 20 Mg daily - SNF had planned to increase diuretic day of admit (torsemide to 60 Mg daily x 5 days with Zaroxolyn 2.5mg Tues/Thurs/Sat x1 week and then increase home torsemide to 40mg daily after)   CXR with mild pulmonary congestion and small pleural effusion -admission exam: B/L LE with 3-4+ pitting edema and fine rales bilaterally  Of note, RUE significantly swollen as well - check RUE duplex for completeness (VTE less likely though as he is on eliquis)   Given Lasix 50 Mg IV x 1 in the ED-continue Lasix 50 Mg IV twice daily  I/O and daily weights  Sodium fluid restriction  Cardiology consulted  Will update echo  Initially admitted as observation, however upon clinical assessment pt made inpatient due to aggressive IV diuresis needs   Anemia due to stage 4 chronic kidney disease  (HCC)  History of anemia secondary to CKD stage IV  Hemoglobin 8.3 on admission  Hemoglobin 1 month ago was 14.0, however suspect that this was erroneous lab our as patient's baseline as of a year ago was in the tens  Fecal occult stool in the ED was negative  Continue home iron supplementation  Check B12, folate, and iron panel  Continue Eliquis for now as there are no signs of active bleeding and suspect that this is around patient's baseline  Generalized weakness  Currently at [facility] for acute rehab  Plan to return there upon discharge  Chronic respiratory failure with hypoxia (HCC)  Contained on 2-3 L nasal cannula at  "baseline  No increased O2 requirement  Continue home oxygen  Type 2 diabetes mellitus with diabetic neuropathy (HCC)  Lab Results   Component Value Date    HGBA1C 7.2 (A) 03/04/2025       Recent Labs     06/11/25  2301   POCGLU 111       Blood Sugar Average: Last 72 hrs:  (P) 111  Home regimen: Lantus 24 units in morning and NovoLog 11 units 3 times daily with meals  Continue home regimen with the addition of SSI with meals  Update A1c  Persistent atrial fibrillation (HCC)  Not on any AV giuseppe blockers due to baseline bradycardia  Anticoagulated with Eliquis 2.5 Mg twice daily  Continue home Eliquis  Sinus rhythm at time of admission  CAD (coronary artery disease)  CAD s/p CABG x 4 in 2018  Continue formulary equivalent for home statin  Denies any anginal symptoms  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)  Baseline creatinine 2.2-2.6  Creatinine on admission is 2.32  Trend BMP daily in setting of IV diuresis  Patient still making urine  Postoperative hypothyroidism  Continue home Synthroid 150 mcg daily -Per SNF records this dose was new as of 4/22  TSH 4/22 was elevated at 37  Presuming Synthroid dose from facility was increased from elevated TSH, will continue home dose and recheck TSH now      VTE Pharmacologic Prophylaxis: VTE Score: 5 High Risk (Score >/= 5) - Pharmacological DVT Prophylaxis Ordered: apixaban (Eliquis). Sequential Compression Devices Ordered.  Code Status: Level 1 - Full Code   Discussion with family: Patient asked that his son be called during the daytime.     Anticipated Length of Stay: Patient will be admitted on an inpatient basis with an anticipated length of stay of greater than 2 midnights secondary to acute on chronic diastolic heart failure requiring IV diuresis, cardiology consult, and updated echo.    History of Present Illness   Chief Complaint: \" I have this cough\"    Carlos Enrique Roth Jr. is a 89 y.o. male with a PMH of diastolic HF, CAD s/p CABG x 4, s/p AVR, PAF, " hypothyroidism, CKD stage IV, and NIDDM 2 who presents with persistent cough x 2 weeks.  Patient reports cough is dry nonproductive.  Denies any fever.  He denies any dyspnea but there was reports of dyspnea in the ED.  He reports significant leg swelling x 2 weeks.  No chest pain.  No abdominal pain, nausea, vomiting, or change in bowel or bladder habits.    Review of Systems   Constitutional:  Negative for chills and fever.   HENT:  Negative for congestion.    Respiratory:  Positive for cough and shortness of breath.    Cardiovascular:  Positive for leg swelling. Negative for chest pain.   Gastrointestinal:  Negative for abdominal pain, constipation, diarrhea, nausea and vomiting.   Musculoskeletal:  Positive for gait problem (walker/wheelchair at baseline).   Neurological:  Negative for weakness and numbness.   All other systems reviewed and are negative.      Historical Information   Past Medical History[1]  Past Surgical History[2]  Social History[3]  E-Cigarette/Vaping    E-Cigarette Use Never User      E-Cigarette/Vaping Substances    Nicotine No     THC No     CBD No     Flavoring No     Other No     Unknown No      Family History[4]  Social History:  Marital Status:    Occupation: Retired  Patient Pre-hospital Living Situation: Currently at acute rehab at [facility]  Patient Pre-hospital Level of Mobility: walks with walker and wheelchair  Patient Pre-hospital Diet Restrictions: 1500 cc fluid restriction with puréed diet and thin liquids    Meds/Allergies   I have reveiwed home medications using records provided by [facility].  Prior to Admission medications    Medication Sig Start Date End Date Taking? Authorizing Provider   allopurinol (ZYLOPRIM) 300 mg tablet TAKE ONE TABLET BY MOUTH ONCE DAILY 12/18/24  Yes Juliette Cid, DO   ascorbic acid (VITAMIN C) 500 mg tablet TAKE ONE TABLET BY MOUTH DAILY 11/19/24  Yes Juliette Cid, DO   cholecalciferol (VITAMIN D3) 1,000 units tablet Take 1 tablet (1,000 Units  total) by mouth daily 3/3/22  Yes Ángel Bass, DO   dorzolamide-timolol (COSOPT) 22.3-6.8 MG/ML ophthalmic solution Administer 1 drop to both eyes in the morning and 1 drop in the evening. 5/24/23  Yes Historical Provider, MD   Eliquis 2.5 MG TAKE ONE TABLET BY MOUTH TWICE DAILY 4/2/25  Yes Juliette Cid DO   FeroSul 325 (65 Fe) MG tablet TAKE ONE TABLET BY MOUTH DAILY 9/13/22  Yes Vivian Roberts DO   gabapentin (NEURONTIN) 300 mg capsule TAKE ONE CAPSULE BY MOUTH DAILY AT BEDTIME 2/4/25  Yes Juliette Cid DO   insulin aspart (NovoLOG FlexPen) 100 UNIT/ML injection pen INJECT 11 UNITS SUBCUTANEOUSLY THREE TIMES DAILY  Patient taking differently: Inject 11 Units under the skin 3 (three) times a day with meals INJECT 11 UNITS SUBCUTANEOUSLY THREE TIMES DAILY 3/6/25  Yes Derrick Lebron PA-C   Insulin Glargine Solostar (Lantus SoloStar) 100 UNIT/ML SOPN Inject 0.24 mL (24 Units total) under the skin in the morning 9/5/24  Yes Derrick Lebron PA-C   Insulin Pen Needle (Pen Needles) 32G X 4 MM MISC Use 4 (four) times a day (before meals and at bedtime) 12/14/22  Yes Juliette Cid DO   latanoprost (XALATAN) 0.005 % ophthalmic solution Administer 1 drop into the left eye daily at bedtime 1/2/25  Yes Historical Provider, MD   levothyroxine 150 mcg tablet TAKE ONE TABLET BY MOUTH DAILY 2/7/25  Yes Juliette Cid DO   LORazepam (ATIVAN) 0.5 mg tablet Take 1 tablet (0.5 mg total) by mouth daily at bedtime Do not start before January 16, 2025. 3/4/25  Yes Juliette Cid DO   pantoprazole (PROTONIX) 20 mg tablet TAKE ONE TABLET BY MOUTH EVERY DAY 11/14/24  Yes Juliette Cid DO   potassium chloride (Klor-Con M20) 20 mEq tablet Take 1 tablet (20 mEq total) by mouth daily 11/13/24  Yes Juliette Cid DO   rosuvastatin (CRESTOR) 40 MG tablet TAKE ONE TABLET BY MOUTH DAILY 2/10/25  Yes Juliette Fly, DO   tamsulosin (FLOMAX) 0.4 mg TAKE ONE CAPSULE BY MOUTH DAILY WITH dinner 11/19/24  Yes Juliette Fly, DO   torsemide (DEMADEX) 10 mg tablet Take 2 tablets (20  mg total) by mouth daily 4/2/25  Yes Rocco Callaway MD   acetaminophen (TYLENOL) 325 mg tablet Take 2 tablets (650 mg total) by mouth every 6 (six) hours as needed for mild pain, headaches or fever 6/22/24   Zoey Gray PA-C   clobetasol (TEMOVATE) 0.05 % cream Apply topically 2 (two) times a day for 7 days 12/12/24 3/10/25  Juliette Fly, DO   nitroglycerin (NITROSTAT) 0.4 mg SL tablet Place 1 tablet (0.4 mg total) under the tongue every 5 (five) minutes as needed for chest pain  Patient not taking: Reported on 4/4/2025 7/11/24 6/11/25  Bart Bush MD     Allergies   Allergen Reactions    Amlodipine Nausea Only    Atorvastatin Myalgia       Objective :  Temp:  [98 °F (36.7 °C)-98.5 °F (36.9 °C)] 98.5 °F (36.9 °C)  HR:  [64-73] 72  BP: (120-146)/(56-67) 126/67  Resp:  [18-22] 20  SpO2:  [96 %-100 %] 96 %  O2 Device: Nasal cannula  Nasal Cannula O2 Flow Rate (L/min):  [3 L/min-3.5 L/min] 3 L/min    Physical Exam  Vitals and nursing note reviewed.   Constitutional:       General: He is not in acute distress.     Appearance: Normal appearance. He is not ill-appearing.      Comments: Pleasant and conversational   HENT:      Head: Normocephalic.      Nose: Nose normal.      Mouth/Throat:      Mouth: Mucous membranes are moist.     Eyes:      Conjunctiva/sclera: Conjunctivae normal.       Cardiovascular:      Rate and Rhythm: Normal rate and regular rhythm.   Pulmonary:      Effort: Pulmonary effort is normal.      Comments: Fine bibasilar rales in posterior lung fields.  Wet sounding cough.  Abdominal:      Palpations: Abdomen is soft.      Tenderness: There is no abdominal tenderness. There is no guarding or rebound.     Musculoskeletal:      Cervical back: Normal range of motion.      Comments: 3+ pitting edema to RUE.  3-4+ pitting edema to B/L LE     Skin:     General: Skin is warm and dry.      Coloration: Skin is not pale.     Neurological:      Mental Status: He is alert.      Comments: Right sided  facial droop at rest, however patient states that his face looks normal to him and there is no droop when he smiles.  Oriented to self, place, month, and president.  Initially said the year was 1925 but then corrected himself to 2025.   Psychiatric:         Mood and Affect: Mood normal.         Thought Content: Thought content normal.          Lines/Drains:            Lab Results: I have reviewed the following results:  Results from last 7 days   Lab Units 06/11/25  1656   WBC Thousand/uL 7.22   HEMOGLOBIN g/dL 8.3*   HEMATOCRIT % 27.0*   PLATELETS Thousands/uL 237   SEGS PCT % 69   LYMPHO PCT % 16   MONO PCT % 12   EOS PCT % 2     Results from last 7 days   Lab Units 06/11/25  1656   SODIUM mmol/L 137   POTASSIUM mmol/L 4.1   CHLORIDE mmol/L 97   CO2 mmol/L 33*   BUN mg/dL 59*   CREATININE mg/dL 2.32*   ANION GAP mmol/L 7   CALCIUM mg/dL 9.1   ALBUMIN g/dL 3.5   TOTAL BILIRUBIN mg/dL 0.56   ALK PHOS U/L 99   ALT U/L 37   AST U/L 39   GLUCOSE RANDOM mg/dL 168*         Results from last 7 days   Lab Units 06/11/25  2301   POC GLUCOSE mg/dl 111     Lab Results   Component Value Date    HGBA1C 7.2 (A) 03/04/2025    HGBA1C 7.4 (A) 12/12/2024    HGBA1C 6.7 (H) 09/04/2024           Imaging Results Review: I reviewed radiology reports from this admission including: chest xray.  Other Study Results Review: EKG was personally reviewed and my interpretation is: Sinus with wide QRS.  Normal QTc...    Administrative Statements       ** Please Note: This note has been constructed using a voice recognition system. **         [1]   Past Medical History:  Diagnosis Date    Aortic stenosis     CKD (chronic kidney disease)     baseline Cr 1.3-1.5    Coronary artery disease     Cough     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HCC)     type 2, insulin dependent    GERD (gastroesophageal reflux disease)     Glaucoma     Gout     History of prostate cancer     Hypertension     Hypothyroidism     Overweight     Peripheral neuropathy, idiopathic     Pleural  effusion, left     Pure hypercholesterolemia     LA...14   R....14     Visual impairment     cataract left eye    Weight gain    [2]   Past Surgical History:  Procedure Laterality Date    CARDIAC CATHETERIZATION      EYE SURGERY      IR THORACENTESIS  10/23/2018    IR THORACENTESIS  2018    IR THORACENTESIS  2018    IR THORACENTESIS  2018    WI CORONARY ARTERY BYP W/VEIN & ARTERY GRAFT 3 VEIN N/A 2018    Procedure: CORONARY ARTERY BYPASS GRAFT (CABG) x 4 VESSELS with LIMA - LAD, SVG/LEFT LEG EVH - LEFT PDA, OM3, & OM2;  Surgeon: Remy Ash MD;  [facility];  Service: Cardiac Surgery    WI ECHO TRANSESOPHAG MONTR CARDIAC PUMP FUNCTJ N/A 2018    Procedure: TRANSESOPHAGEAL ECHOCARDIOGRAM (VERONICA);  Surgeon: Remy Ash MD;  [facility];  Service: Cardiac Surgery    WI OPTX FEM SHFT FX W/INSJ IMED IMPLT W/WO SCREW Left 2024    Procedure: INSERTION NAIL IM FEMUR ANTEGRADE (TROCHANTERIC);  Surgeon: Sukh Reece DO;  Location: [facility];  Service: Orthopedics    WI RPLCMT AORTIC VALVE OPN W/STENTLESS TISSUE VALVE N/A 2018    Procedure: REPLACEMENT VALVE AORTIC (AVR)- 23mm tissue Intuity Valve;  Surgeon: Remy Ash MD;  [facility];  Service: Cardiac Surgery    THORACOSCOPY VIDEO ASSISTED SURGERY (VATS) Left 2018    Procedure: THORACOSCOPY VIDEO ASSISTED SURGERY (VATS), talc pleurodesis,;  Surgeon: Ciara Green MD;  Location: [facility];  Service: Thoracic    THYROID SURGERY     [3]   Social History  Tobacco Use    Smoking status: Former     Current packs/day: 0.00     Average packs/day: 0.3 packs/day for 1 year (0.3 ttl pk-yrs)     Types: Cigarettes     Start date:      Quit date: 1970     Years since quittin.4    Tobacco comments:     Only in the service    Vaping Use    Vaping status: Never Used   Substance and Sexual Activity    Alcohol use: Never    Drug use: No    Sexual activity: Not Currently   [4]    Family History  Problem Relation Name Age of Onset    Diabetes Mother      Pancreatic cancer Brother      Diabetes Maternal Grandmother      Colon cancer Son      Diabetes Family      Substance Abuse Neg Hx      Mental illness Neg Hx

## 2025-06-12 NOTE — ASSESSMENT & PLAN NOTE
Baseline creatinine 2.2-2.6  Creatinine on admission is 2.32  Trend BMP daily in setting of IV diuresis  Patient still making urine   The patient slept throughout the night and remained HDS. Sitter still at bedside for safety. Patient was A+O x4 for nurse. No complains of SI or pain.     Laura PALACIOS      Problem: Fall/Injury  Goal: Not fall by end of shift  Outcome: Progressing     Problem: Skin  Goal: Prevent/manage excess moisture  Outcome: Progressing  Flowsheets (Taken 11/9/2024 0016)  Prevent/manage excess moisture:   Moisturize dry skin   Cleanse incontinence/protect with barrier cream     Problem: Skin  Goal: Prevent/minimize sheer/friction injuries  Outcome: Progressing  Flowsheets (Taken 11/9/2024 0016)  Prevent/minimize sheer/friction injuries:   HOB 30 degrees or less   Turn/reposition every 2 hours/use positioning/transfer devices   Use pull sheet

## 2025-06-12 NOTE — ASSESSMENT & PLAN NOTE
Continue home Synthroid 150 mcg daily -Per SNF records this dose was new as of 4/22  TSH 4/22 was elevated at 37  Presuming Synthroid dose from facility was increased from elevated TSH, will continue home dose and recheck TSH now

## 2025-06-12 NOTE — CONSULTS
"Consult - Cardiology   Carlos Enrique Roth Jr. 89 y.o. male MRN: [id number]  Unit/Bed#: -01 Encounter: [id number]    Reason For Consult: CHF  Outpatient Cardiologist: Dr MELISSA Bush     ASSESSMENT:  Acute on chronic HFpEF  CAD/CABG x4 (9/2018; CABG x4, with a LIMA to the LAD, SVG to an OM 2 and SVG “Y graft\" to an OM 3 and left posterior descending artery)   Aortic stenosis s/p bioAVR   Paroxysmal atrial fibrillation  Hyperlipidemia   CKD    PLAN/ DISCUSSION:     Carlos Enrique presents with increased LE edema after the past several weeks. Reports he lives with his son who helps him with his meals and medications. Does admit to drinking a heavy amount of fluids throughout the day with the warmer weather. This may have contributed to his admission.   Objectively volume overloaded with . CXR with vascular congestion with small right pleural effusions. Initial standing wt 170lbs.   Serial troponins have been negative. EKG shows wide QRS, non-specific intra-ventricular conduction block.     Check repeat echo   Increase IV lasix to 80mg BID  Daily BMP qAM, standing weights  Cardiac diet -- advised specifically on 2 GM sodium and 2L fluid restriction  Strict I/O monitoring  Intrinsically rate controlled on his own  Remainder of cardiac Rx as written: Eliquis 2.5 mg, Crestor 40 mg    History of Present Illness:  Carlos Enrique Kwan is a 89-year-old male with history of CAD/CABG x 4, AS with BioAVR during CABG, Chronic HFpEF, Paroxysmal asymptomatic atrial fibrillation, hyperlipidemia, pre-DM2, CKD, known to Dr Bush presents to [facility] with progressive bilateral lower extremity edema and about 5 pound weight gain from his baseline.  Patient is a limited historian at baseline.  He reports that he lives with his son who helps him with his meals and with his medications.  Reports compliance with sodium restrictions and taking his medications every day including his diuretic.  He does report of drinking a heavy " amount of fluids with water due to feeling parched with the warmer weather.  He denies any pulmonary manifestations i.e. shortness of breath, dyspnea on exertion, orthopnea or PND.  Home diuretic: Torsemide 20mg QD. Started on IV lasix 50mg BID on admission.       Past Medical History:        Past Medical History[1] Past Surgical History[2]     Allergy:        Allergies[3]    Medications:       Prior to Admission medications    Medication Sig Start Date End Date Taking? Authorizing Provider   allopurinol (ZYLOPRIM) 300 mg tablet TAKE ONE TABLET BY MOUTH ONCE DAILY 12/18/24  Yes Juliette Cid DO   ascorbic acid (VITAMIN C) 500 mg tablet TAKE ONE TABLET BY MOUTH DAILY 11/19/24  Yes Juliette Cid DO   cholecalciferol (VITAMIN D3) 1,000 units tablet Take 1 tablet (1,000 Units total) by mouth daily 3/3/22  Yes Ángel Bass DO   dorzolamide-timolol (COSOPT) 22.3-6.8 MG/ML ophthalmic solution Administer 1 drop to both eyes in the morning and 1 drop in the evening. 5/24/23  Yes Historical Provider, MD   Eliquis 2.5 MG TAKE ONE TABLET BY MOUTH TWICE DAILY 4/2/25  Yes Juliette Cid DO   FeroSul 325 (65 Fe) MG tablet TAKE ONE TABLET BY MOUTH DAILY 9/13/22  Yes Vivian Roberts DO   gabapentin (NEURONTIN) 300 mg capsule TAKE ONE CAPSULE BY MOUTH DAILY AT BEDTIME 2/4/25  Yes Juliette Cid DO   insulin aspart (NovoLOG FlexPen) 100 UNIT/ML injection pen INJECT 11 UNITS SUBCUTANEOUSLY THREE TIMES DAILY  Patient taking differently: Inject 11 Units under the skin 3 (three) times a day with meals INJECT 11 UNITS SUBCUTANEOUSLY THREE TIMES DAILY 3/6/25  Yes Derrick Lebron PA-C   Insulin Glargine Solostar (Lantus SoloStar) 100 UNIT/ML SOPN Inject 0.24 mL (24 Units total) under the skin in the morning 9/5/24  Yes Derrick Lebron PA-C   Insulin Pen Needle (Pen Needles) 32G X 4 MM MISC Use 4 (four) times a day (before meals and at bedtime) 12/14/22  Yes Juliette Cid DO   latanoprost (XALATAN) 0.005 % ophthalmic solution Administer 1 drop into  the left eye daily at bedtime 1/2/25  Yes Historical Provider, MD   levothyroxine 150 mcg tablet TAKE ONE TABLET BY MOUTH DAILY 2/7/25  Yes Juliette Fly, DO   LORazepam (ATIVAN) 0.5 mg tablet Take 1 tablet (0.5 mg total) by mouth daily at bedtime Do not start before January 16, 2025. 3/4/25  Yes Juliette Fly, DO   pantoprazole (PROTONIX) 20 mg tablet TAKE ONE TABLET BY MOUTH EVERY DAY 11/14/24  Yes Juliette Fly, DO   potassium chloride (Klor-Con M20) 20 mEq tablet Take 1 tablet (20 mEq total) by mouth daily 11/13/24  Yes Juliette Fly, DO   rosuvastatin (CRESTOR) 40 MG tablet TAKE ONE TABLET BY MOUTH DAILY 2/10/25  Yes Juliette Fly, DO   tamsulosin (FLOMAX) 0.4 mg TAKE ONE CAPSULE BY MOUTH DAILY WITH dinner 11/19/24  Yes Juliette Fly, DO   torsemide (DEMADEX) 10 mg tablet Take 2 tablets (20 mg total) by mouth daily 4/2/25  Yes Rocco Callaway MD   acetaminophen (TYLENOL) 325 mg tablet Take 2 tablets (650 mg total) by mouth every 6 (six) hours as needed for mild pain, headaches or fever 6/22/24   Zoey Gray PA-C   clobetasol (TEMOVATE) 0.05 % cream Apply topically 2 (two) times a day for 7 days 12/12/24 3/10/25  Juliette Fly, DO       Family History:     Family History[4]     Social History:       Social History[5]    Weights/BMI:    Wt Readings from Last 3 Encounters:   06/12/25 77.3 kg (170 lb 8 oz)   03/10/25 85.3 kg (188 lb)   03/04/25 82.6 kg (182 lb)   , Body mass index is 26.7 kg/m².      ROS:  14 point ROS negative except as outlined above  Remainder review of systems is negative    Exam:  General: Alert, oriented and in no acute distress, cooperative  Head: Normocephalic, atraumatic.  Eyes: PERRLA. No icterus. Normal Conjunctiva.   Oropharynx: Moist and normal-appearing mucosa  Neck: Supple, symmetrical, trachea midline, JVD not appreciated.   Heart: RRR, no murmur, rub or gallop, S1 & S2 normal   Lungs: Normal air entry, lungs clear to auscultation and no rales, rhonchi or wheezing   Abdomen: Flat, normal findings: bowel  sounds normal and soft, non-tender  Lower Limbs:  No pitting edema, 2+ peripheral pulses, capillary refill within normal limits  Musculoskeletal: ROM grossly normal               [1]   Past Medical History:  Diagnosis Date    Aortic stenosis     CKD (chronic kidney disease)     baseline Cr 1.3-1.5    Coronary artery disease     Cough     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HCC)     type 2, insulin dependent    GERD (gastroesophageal reflux disease)     Glaucoma     Gout     History of prostate cancer     Hypertension     Hypothyroidism     Overweight     Peripheral neuropathy, idiopathic     Pleural effusion, left     Pure hypercholesterolemia     LA...11/12/14   R....11/12/14     Visual impairment     cataract left eye    Weight gain    [2]   Past Surgical History:  Procedure Laterality Date    CARDIAC CATHETERIZATION      EYE SURGERY      IR THORACENTESIS  10/23/2018    IR THORACENTESIS  11/2/2018    IR THORACENTESIS  11/9/2018    IR THORACENTESIS  11/23/2018    SC CORONARY ARTERY BYP W/VEIN & ARTERY GRAFT 3 VEIN N/A 9/17/2018    Procedure: CORONARY ARTERY BYPASS GRAFT (CABG) x 4 VESSELS with LIMA - LAD, SVG/LEFT LEG EVH - LEFT PDA, OM3, & OM2;  Surgeon: Remy Ash MD;  [facility];  Service: Cardiac Surgery    SC ECHO TRANSESOPHAG MONTR CARDIAC PUMP FUNCTJ N/A 9/17/2018    Procedure: TRANSESOPHAGEAL ECHOCARDIOGRAM (VERONICA);  Surgeon: Remy Ash MD;  [facility];  Service: Cardiac Surgery    SC OPTX FEM SHFT FX W/INSJ IMED IMPLT W/WO SCREW Left 6/20/2024    Procedure: INSERTION NAIL IM FEMUR ANTEGRADE (TROCHANTERIC);  Surgeon: Sukh Reece DO;  [facility];  Service: Orthopedics    SC RPLCMT AORTIC VALVE OPN W/STENTLESS TISSUE VALVE N/A 9/17/2018    Procedure: REPLACEMENT VALVE AORTIC (AVR)- 23mm tissue Intuity Valve;  Surgeon: Remy Ash MD;  [facility];  Service: Cardiac Surgery    THORACOSCOPY VIDEO ASSISTED SURGERY (VATS) Left 11/27/2018    Procedure: THORACOSCOPY VIDEO  ASSISTED SURGERY (VATS), talc pleurodesis,;  Surgeon: Ciara Green MD;  [facility];  Service: Thoracic    THYROID SURGERY     [3]   Allergies  Allergen Reactions    Amlodipine Nausea Only    Atorvastatin Myalgia   [4]   Family History  Problem Relation Name Age of Onset    Diabetes Mother      Pancreatic cancer Brother      Diabetes Maternal Grandmother      Colon cancer Son      Diabetes Family      Substance Abuse Neg Hx      Mental illness Neg Hx     [5]   Social History  Socioeconomic History    Marital status:    Occupational History    Occupation: retired    Tobacco Use    Smoking status: Former     Current packs/day: 0.00     Average packs/day: 0.3 packs/day for 1 year (0.3 ttl pk-yrs)     Types: Cigarettes     Start date:      Quit date:      Years since quittin.4    Tobacco comments:     Only in the service    Vaping Use    Vaping status: Never Used   Substance and Sexual Activity    Alcohol use: Never    Drug use: No    Sexual activity: Not Currently   Social History Narrative    Caffeine use / coffee diet cola and tea    Lives with family     Living situation supportive and safe    No advance directives  -denied     Social Drivers of Health     Financial Resource Strain: High Risk (2023)    Overall Financial Resource Strain (CARDIA)     Difficulty of Paying Living Expenses: Hard   Food Insecurity: No Food Insecurity (2025)    Nursing - Inadequate Food Risk Classification     Worried About Running Out of Food in the Last Year: Never true     Ran Out of Food in the Last Year: Never true     Ran Out of Food in the Last Year: Never true   Transportation Needs: No Transportation Needs (2025)    Nursing - Transportation Risk Classification     Lack of Transportation: No   Physical Activity: Insufficiently Active (2021)    Exercise Vital Sign     Days of Exercise per Week: 3 days     Minutes of Exercise per Session: 20 min   Stress: Stress Concern  Present (2021)    [facility] - Occupational Stress Questionnaire     Feeling of Stress : To some extent   Intimate Partner Violence: Unknown (2025)    Nursing IPS     Physically Hurt by Someone: No     Hurt or Threatened by Someone: No   Housing Stability: Unknown (2025)    Nursing: Inadequate Housing Risk Classification     Unable to Pay for Housing in the Last Year: No     Has Housin

## 2025-06-12 NOTE — ASSESSMENT & PLAN NOTE
Baseline creatinine 2.2-2.6  Creatinine on admission is 2.32  Trend BMP daily in setting of IV diuresis  Patient still making urine

## 2025-06-13 LAB
ANION GAP SERPL CALCULATED.3IONS-SCNC: 9 MMOL/L (ref 4–13)
ATRIAL RATE: 71 BPM
BUN SERPL-MCNC: 60 MG/DL (ref 5–25)
CALCIUM SERPL-MCNC: 9.2 MG/DL (ref 8.4–10.2)
CHLORIDE SERPL-SCNC: 95 MMOL/L (ref 96–108)
CO2 SERPL-SCNC: 39 MMOL/L (ref 21–32)
CREAT SERPL-MCNC: 2.3 MG/DL (ref 0.6–1.3)
ERYTHROCYTE [DISTWIDTH] IN BLOOD BY AUTOMATED COUNT: 14.4 % (ref 11.6–15.1)
GFR SERPL CREATININE-BSD FRML MDRD: 24 ML/MIN/1.73SQ M
GLUCOSE SERPL-MCNC: 135 MG/DL (ref 65–140)
GLUCOSE SERPL-MCNC: 157 MG/DL (ref 65–140)
GLUCOSE SERPL-MCNC: 174 MG/DL (ref 65–140)
GLUCOSE SERPL-MCNC: 84 MG/DL (ref 65–140)
GLUCOSE SERPL-MCNC: 87 MG/DL (ref 65–140)
HCT VFR BLD AUTO: 28 % (ref 36.5–49.3)
HGB BLD-MCNC: 8.4 G/DL (ref 12–17)
MCH RBC QN AUTO: 33.1 PG (ref 26.8–34.3)
MCHC RBC AUTO-ENTMCNC: 30 G/DL (ref 31.4–37.4)
MCV RBC AUTO: 110 FL (ref 82–98)
MRSA NOSE QL CULT: NORMAL
PLATELET # BLD AUTO: 235 THOUSANDS/UL (ref 149–390)
PMV BLD AUTO: 9.5 FL (ref 8.9–12.7)
POTASSIUM SERPL-SCNC: 3.3 MMOL/L (ref 3.5–5.3)
QRS AXIS: 16 DEGREES
QRSD INTERVAL: 128 MS
QT INTERVAL: 392 MS
QTC INTERVAL: 431 MS
RBC # BLD AUTO: 2.54 MILLION/UL (ref 3.88–5.62)
SODIUM SERPL-SCNC: 143 MMOL/L (ref 135–147)
T WAVE AXIS: 123 DEGREES
VENTRICULAR RATE: 73 BPM
WBC # BLD AUTO: 7.48 THOUSAND/UL (ref 4.31–10.16)

## 2025-06-13 PROCEDURE — 99232 SBSQ HOSP IP/OBS MODERATE 35: CPT | Performed by: INTERNAL MEDICINE

## 2025-06-13 PROCEDURE — 97167 OT EVAL HIGH COMPLEX 60 MIN: CPT

## 2025-06-13 PROCEDURE — 99233 SBSQ HOSP IP/OBS HIGH 50: CPT | Performed by: STUDENT IN AN ORGANIZED HEALTH CARE EDUCATION/TRAINING PROGRAM

## 2025-06-13 PROCEDURE — 82948 REAGENT STRIP/BLOOD GLUCOSE: CPT

## 2025-06-13 PROCEDURE — 85027 COMPLETE CBC AUTOMATED: CPT | Performed by: STUDENT IN AN ORGANIZED HEALTH CARE EDUCATION/TRAINING PROGRAM

## 2025-06-13 PROCEDURE — 97163 PT EVAL HIGH COMPLEX 45 MIN: CPT

## 2025-06-13 PROCEDURE — 93010 ELECTROCARDIOGRAM REPORT: CPT | Performed by: INTERNAL MEDICINE

## 2025-06-13 PROCEDURE — 80048 BASIC METABOLIC PNL TOTAL CA: CPT | Performed by: STUDENT IN AN ORGANIZED HEALTH CARE EDUCATION/TRAINING PROGRAM

## 2025-06-13 RX ORDER — INSULIN GLARGINE 100 [IU]/ML
12 INJECTION, SOLUTION SUBCUTANEOUS EVERY MORNING
Status: DISCONTINUED | OUTPATIENT
Start: 2025-06-13 | End: 2025-06-17 | Stop reason: HOSPADM

## 2025-06-13 RX ORDER — POTASSIUM CHLORIDE 1500 MG/1
40 TABLET, EXTENDED RELEASE ORAL 2 TIMES DAILY
Status: COMPLETED | OUTPATIENT
Start: 2025-06-13 | End: 2025-06-13

## 2025-06-13 RX ORDER — INSULIN LISPRO 100 [IU]/ML
8 INJECTION, SOLUTION INTRAVENOUS; SUBCUTANEOUS
Status: DISCONTINUED | OUTPATIENT
Start: 2025-06-13 | End: 2025-06-17 | Stop reason: HOSPADM

## 2025-06-13 RX ADMIN — FUROSEMIDE 80 MG: 10 INJECTION, SOLUTION INTRAVENOUS at 15:44

## 2025-06-13 RX ADMIN — OXYCODONE HYDROCHLORIDE AND ACETAMINOPHEN 500 MG: 500 TABLET ORAL at 08:30

## 2025-06-13 RX ADMIN — APIXABAN 2.5 MG: 2.5 TABLET, FILM COATED ORAL at 08:30

## 2025-06-13 RX ADMIN — LEVOTHYROXINE SODIUM 150 MCG: 75 TABLET ORAL at 05:10

## 2025-06-13 RX ADMIN — INSULIN LISPRO 1 UNITS: 100 INJECTION, SOLUTION INTRAVENOUS; SUBCUTANEOUS at 12:17

## 2025-06-13 RX ADMIN — ALLOPURINOL 300 MG: 300 TABLET ORAL at 08:30

## 2025-06-13 RX ADMIN — PRAVASTATIN SODIUM 80 MG: 80 TABLET ORAL at 15:44

## 2025-06-13 RX ADMIN — TAMSULOSIN HYDROCHLORIDE 0.4 MG: 0.4 CAPSULE ORAL at 15:44

## 2025-06-13 RX ADMIN — INSULIN GLARGINE 12 UNITS: 100 INJECTION, SOLUTION SUBCUTANEOUS at 08:42

## 2025-06-13 RX ADMIN — GABAPENTIN 300 MG: 300 CAPSULE ORAL at 20:56

## 2025-06-13 RX ADMIN — Medication 1000 UNITS: at 08:30

## 2025-06-13 RX ADMIN — APIXABAN 2.5 MG: 2.5 TABLET, FILM COATED ORAL at 16:50

## 2025-06-13 RX ADMIN — POTASSIUM CHLORIDE 40 MEQ: 1500 TABLET, EXTENDED RELEASE ORAL at 16:50

## 2025-06-13 RX ADMIN — FUROSEMIDE 80 MG: 10 INJECTION, SOLUTION INTRAVENOUS at 08:30

## 2025-06-13 RX ADMIN — LORAZEPAM 0.5 MG: 0.5 TABLET ORAL at 20:56

## 2025-06-13 RX ADMIN — PANTOPRAZOLE SODIUM 20 MG: 20 TABLET, DELAYED RELEASE ORAL at 05:10

## 2025-06-13 RX ADMIN — IRON SUCROSE 300 MG: 20 INJECTION, SOLUTION INTRAVENOUS at 08:29

## 2025-06-13 RX ADMIN — POTASSIUM CHLORIDE 40 MEQ: 1500 TABLET, EXTENDED RELEASE ORAL at 08:30

## 2025-06-13 RX ADMIN — INSULIN LISPRO 8 UNITS: 100 INJECTION, SOLUTION INTRAVENOUS; SUBCUTANEOUS at 16:50

## 2025-06-13 RX ADMIN — DORZOLAMIDE HYDROCHLORIDE AND TIMOLOL MALEATE 1 DROP: 20; 5 SOLUTION OPHTHALMIC at 20:56

## 2025-06-13 RX ADMIN — LATANOPROST 1 DROP: 50 SOLUTION OPHTHALMIC at 20:56

## 2025-06-13 RX ADMIN — DORZOLAMIDE HYDROCHLORIDE AND TIMOLOL MALEATE 1 DROP: 20; 5 SOLUTION OPHTHALMIC at 08:43

## 2025-06-13 RX ADMIN — INSULIN LISPRO 8 UNITS: 100 INJECTION, SOLUTION INTRAVENOUS; SUBCUTANEOUS at 12:17

## 2025-06-13 NOTE — OCCUPATIONAL THERAPY NOTE
"    Occupational Therapy Evaluation     Patient Name: Carlos Enrique Roth Jr.  Today's Date: 6/13/2025  Problem List  Principal Problem:    Acute on chronic diastolic congestive heart failure (HCC)  Active Problems:    Postoperative hypothyroidism    CAD (coronary artery disease)    Anemia due to stage 4 chronic kidney disease  (HCC)    Type 2 diabetes mellitus with diabetic neuropathy (HCC)    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)    Persistent atrial fibrillation (HCC)    Chronic respiratory failure with hypoxia (HCC)    Generalized weakness    Past Medical History  Past Medical History[1]  Past Surgical History  Past Surgical History[2]        06/13/25 1523   OT Last Visit   OT Visit Date 06/13/25   Note Type   Note type Evaluation   Pain Assessment   Pain Assessment Tool 0-10   Pain Score No Pain   Restrictions/Precautions   Weight Bearing Precautions Per Order No   Other Precautions Cognitive;Chair Alarm;Bed Alarm;Fall Risk;Pain;O2  (3L o2 via NC)   Home Living   Type of Home SNF  ([facility] at [facility])   Home Layout One level;Performs ADLs on one level;Able to live on main level with bedroom/bathroom   Prior Function   Lives With Facility staff   Receives Help From Personal care attendant   IADLs Family/Friend/Other provides transportation;Family/Friend/Other provides meals;Family/Friend/Other provides medication management   Vocational Retired   Lifestyle   Autonomy Per CM open, At [facility], pt mobility is: \"-dependent. requires total lift. divided leg sling for transfers. Assist of 2 or more staff and uses wheelchair.\" Prior to rehab, pt lived with son Ciro in a 1SH, 0 RENE, 2 RENE to living room. Pt uses a cane and walker at home, and son grocery shops and drives   General   Family/Caregiver Present No   Subjective   Subjective \"I have to pee.\"   ADL   Eating Assistance 5  Supervision/Setup   Grooming Assistance 5  Supervision/Setup   UB Bathing Assistance 3  Moderate Assistance   LB Bathing Assistance 2  Maximal " Assistance   UB Dressing Assistance 3  Moderate Assistance   LB Dressing Assistance 2  Maximal Assistance   Toileting Assistance  2  Maximal Assistance   Bed Mobility   Rolling R 3  Moderate assistance   Additional items Assist x 1;Bedrails;Increased time required;Verbal cues   Rolling L 3  Moderate assistance   Additional items Assist x 1;Bedrails;Increased time required;Verbal cues   Supine to Sit 3  Moderate assistance   Additional items Assist x 2;HOB elevated;Bedrails;Increased time required;Verbal cues   Sit to Supine 3  Moderate assistance   Additional items Assist x 2;HOB elevated;Bedrails;Increased time required;Verbal cues   Additional Comments Upon arrival pt incontinent of urine; assisted with rolling R and L for ian care and pad changes. Pt able to sit EOB x3-4 minutes with S   Transfers   Sit to Stand 4  Minimal assistance   Additional items Armrests;Verbal cues;Assist x 2   Stand to Sit 4  Minimal assistance   Additional items Armrests;Verbal cues;Assist x 2   Additional Comments VC's for hand placement   Functional Mobility   Functional Mobility 4  Minimal assistance   Additional Comments x2   Additional items Rolling walker  (5-6 side steps towards R. Additional 3 steps forwards and backwads with RW)   Balance   Static Sitting Good   Dynamic Sitting Fair +   Static Standing Fair -   Dynamic Standing Fair -   Activity Tolerance   Activity Tolerance Patient tolerated treatment well   Medical Staff Made Aware PT Tom   Nurse Made Aware NICOLAS URIOSTEGUI Assessment   RUE Assessment WFL   LUE Assessment   LUE Assessment WFL   Hand Function   Gross Motor Coordination Functional   Fine Motor Coordination Functional   Cognition   Overall Cognitive Status Impaired   Arousal/Participation Alert;Cooperative   Attention Attends with cues to redirect   Orientation Level Oriented to person;Oriented to place;Disoriented to time;Disoriented to situation   Memory Decreased recall of precautions;Decreased recall of  "recent events;Decreased short term memory   Following Commands Follows one step commands with increased time or repetition   Comments Pt willing to participate in OT evaluation   Assessment   Limitation Decreased ADL status;Decreased Safe judgement during ADL;Decreased cognition;Decreased endurance;Decreased self-care trans;Decreased high-level ADLs   Prognosis Fair   Assessment Pt is a 89 y.o. male seen for OT evaluation at [facility], admitted 6/11/2025 w/ Acute on chronic diastolic congestive heart failure (HCC). Extensive chart review completed by OT. Comorbidities affecting the pt's functional performance include a significant PMH of: CAD, anemia, stage 4 CKD, T2DM, A-fib, generalized weakness, gout with tophus, hypercholesterolemia, renal cyst, s/p CABG x4, s/p AVR, aortic stenosis, ambulatory dysfunction, cataract, hypokalemia, failure to thrive, glaucoma. Personal factors affecting pt at time of IE include: behavioral pattern, difficulty performing ADLS, difficulty performing IADLS , limited insight into deficits, flat affect, health management , and environment. Pt admitted from [facility]. Per CM open, At [facility], pt mobility is: \"-dependent. requires total lift. divided leg sling for transfers. Assist of 2 or more staff and uses wheelchair.\" Prior to rehab, pt lived with son Ciro in a 1SH, 0 RENE, 2 RENE to living room. Pt uses a cane and walker at home, and son grocery shops and drives.  Upon OT IE pt demonstrated  mod Ax1-2 for bed mobility, min Ax2 for functional transfers, min Ax2 for functional mobility, ModA for UB ADLs and maxA for LB ADLS 2* the following deficits impacting occupational performance: weakness, decreased strength, decreased balance, decreased tolerance, impaired memory, impaired sequencing, and impaired problem solving. Full objective findings from OT assessment regarding body systems outlined above. Pt to benefit from continued skilled OT tx while in the hospital to address deficits as " defined above and maximize level of functional independence w/ ADL's and functional mobility. Occupational Performance areas to address include: eating, grooming, bathing/shower, toilet hygiene, dressing, functional mobility, and clothing management. Based on findings, pt is of high complexity. The patient's raw score on the -PAC Daily Activity inpatient short form is 14 , standardized score is 33.39 , less than 39.4. Patients at this level are likely to benefit from DC to post-acute rehabilitation services. However, please refer to therapist recommendation for discharge planning given other factors that may influence destination. At this time, OT recommendations at time of discharge are DC with Level II - Moderate Rehab Resource Intensity resources.   Goals   Patient Goals To lay down   Plan   Treatment Interventions ADL retraining;Endurance training;Functional transfer training;UE strengthening/ROM;Cognitive reorientation;Patient/family training;Equipment evaluation/education;Compensatory technique education;Continued evaluation;Energy conservation   Goal Expiration Date 06/22/25   OT Treatment Day 0   OT Frequency 3-5x/wk   Discharge Recommendation   Rehab Resource Intensity Level, OT II (Moderate Resource Intensity)   -PAC Daily Activity Inpatient   Lower Body Dressing 2   Bathing 2   Toileting 2   Upper Body Dressing 2   Grooming 3   Eating 3   Daily Activity Raw Score 14   Daily Activity Standardized Score (Calc for Raw Score >=11) 33.39   AM-PAC Applied Cognition Inpatient   Following a Speech/Presentation 3   Understanding Ordinary Conversation 2   Taking Medications 3   Remembering Where Things Are Placed or Put Away 3   Remembering List of 4-5 Errands 3   Taking Care of Complicated Tasks 3   Applied Cognition Raw Score 17   Applied Cognition Standardized Score 36.52   End of Consult   Education Provided Yes   Patient Position at End of Consult All needs within reach;Supine;Bed/Chair alarm activated    Nurse Communication Nurse aware of consult       Pt will complete the following goals within 10 days:     Pt will complete grooming with ROSIO.    Pt will complete UB bathing and dressing with Jazmin.    Pt will complete LB bathing and dressing with modA & DME PRN.    Pt will complete toileting w/ modA w/ G hygiene/thoroughness using DME PRN    Pt will improve functional mobility during ADL/IADL/leisure tasks to min Ax1 using DME as needed w/ G balance/safety.    Pt will perform functional transfers with min Ax1 in order to facilitate completion of  ADL routine.    Pt will increase standing tolerance to 5+ minutes in order to complete ADL routine.      Pt will demonstrate G carryover of pt/caregiver education and training as appropriate w/ Mod I w/o cues w/ good tolerance.    Pt will improve balance by 1/2 grade to facilitate completion of ADL tasks.     Pt will complete bed mobility with Jazmin, with HOB elevated & use of side rails PRN to prep for purposeful tasks        Phyllis Balderrama OTR/L              [1]   Past Medical History:  Diagnosis Date    Aortic stenosis     CKD (chronic kidney disease)     baseline Cr 1.3-1.5    Coronary artery disease     Cough     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HCC)     type 2, insulin dependent    GERD (gastroesophageal reflux disease)     Glaucoma     Gout     History of prostate cancer     Hypertension     Hypothyroidism     Overweight     Peripheral neuropathy, idiopathic     Pleural effusion, left     Pure hypercholesterolemia     LA...11/12/14   R....11/12/14     Visual impairment     cataract left eye    Weight gain    [2]   Past Surgical History:  Procedure Laterality Date    CARDIAC CATHETERIZATION      EYE SURGERY      IR THORACENTESIS  10/23/2018    IR THORACENTESIS  11/2/2018    IR THORACENTESIS  11/9/2018    IR THORACENTESIS  11/23/2018    [place] CORONARY ARTERY BYP W/VEIN & ARTERY GRAFT 3 VEIN N/A 9/17/2018    Procedure: CORONARY ARTERY BYPASS GRAFT (CABG) x 4 VESSELS with LIMA - LAD,  SVG/LEFT LEG EVH - LEFT PDA, OM3, & OM2;  Surgeon: Remy Ash MD;  [facility];  Service: Cardiac Surgery    AR ECHO TRANSESOPHAG MONTR CARDIAC PUMP FUNCTJ N/A 9/17/2018    Procedure: TRANSESOPHAGEAL ECHOCARDIOGRAM (VERONICA);  Surgeon: Remy Ash MD;  [facility];  Service: Cardiac Surgery    AR OPTX FEM SHFT FX W/INSJ IMED IMPLT W/WO SCREW Left 6/20/2024    Procedure: INSERTION NAIL IM FEMUR ANTEGRADE (TROCHANTERIC);  Surgeon: Sukh Reece DO;  [facility];  Service: Orthopedics    AR RPLCMT AORTIC VALVE OPN W/STENTLESS TISSUE VALVE N/A 9/17/2018    Procedure: REPLACEMENT VALVE AORTIC (AVR)- 23mm tissue Intuity Valve;  Surgeon: Remy Ash MD;  [facility];  Service: Cardiac Surgery    THORACOSCOPY VIDEO ASSISTED SURGERY (VATS) Left 11/27/2018    Procedure: THORACOSCOPY VIDEO ASSISTED SURGERY (VATS), talc pleurodesis,;  Surgeon: Ciara Green MD;  [facility];  Service: Thoracic    THYROID SURGERY

## 2025-06-13 NOTE — ASSESSMENT & PLAN NOTE
History of anemia secondary to CKD stage IV  Hemoglobin 8.3 on admission  Hemoglobin 1 month ago was 14.0, however suspect that this was erroneous lab our as patient's baseline as of a year ago was in the tens  Fecal occult stool in the ED was negative  Hold home iron supplementation  B12, WNL  folate WNL   iron panel low will initiate venofer x3  Continue Eliquis for now as there are no signs of active bleeding and suspect that this is around patient's baseline

## 2025-06-13 NOTE — CASE MANAGEMENT
Case Management Discharge Planning Note    Patient name Carlos Enrique Roth Jr.  Location /-01 MRN [id number]  : 1935 Date 2025       Current Admission Date: 2025  Current Admission Diagnosis:Acute on chronic diastolic congestive heart failure (HCC)   Patient Active Problem List    Diagnosis Date Noted    Chronic respiratory failure with hypoxia ([facility]) 2025    Generalized weakness 2025    Failure to thrive in adult 2025    Hypokalemia 2024    Platelets decreased ([facility]) 2024    Fracture of unspecified part of neck of left femur, initial encounter for closed fracture ([facility]) 2024    Persistent atrial fibrillation ([facility]) 2024    Persistent proteinuria 2023    Microalbuminuria due to type 2 diabetes mellitus  ([facility]) 10/09/2023    Secondary hyperparathyroidism ([facility]) 2023    Skin tear of left elbow without complication 2023    CKD stage 4 due to type 2 diabetes mellitus ([facility]) 2022    Type 2 diabetes mellitus with diabetic neuropathy ([facility]) 10/27/2021    Acute on chronic diastolic congestive heart failure ([facility]) 2020    Primary open angle glaucoma (POAG) 2019    Age-related cataract of both eyes 2019    Atherosclerosis of aorta ([facility]) 2019    Ambulatory dysfunction 2018    Anemia due to stage 4 chronic kidney disease  ([facility]) 2018    CAD (coronary artery disease) 2018    Aortic stenosis 2018    Benign prostatic hyperplasia without lower urinary tract symptoms 2018    Long term (current) use of insulin ([facility]) 2018    Presence of aortocoronary bypass graft 2018    Presence of prosthetic heart valve 2018    Unspecified glaucoma 2018    Nonrheumatic aortic (valve) stenosis 2018    Atherosclerotic heart disease of native coronary artery without angina pectoris 2018    S/P CABG x 4 2018    S/P AVR (aortic valve replacement) 2018    GERD  (gastroesophageal reflux disease)     Sexual dysfunction 10/09/2017    Renal cyst 01/12/2017    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  [facility] [address]  Phone: [phone] Fax: [phone]    Primary Care Provider: Juliette Cid DO    Primary Insurance: MEDICARE  Secondary Insurance:     DISCHARGE DETAILS:                                          Other Referral/Resources/Interventions Provided:  Interventions: Short Term Rehab         Treatment Team Recommendation: Short Term Rehab  Expected Discharge Disposition: Skilled Nursing Facility  Additional Discharge Dispositions: Skilled Nursing Facility  [facility]charge [facility]      CM got in contact with son Ciro. Ciro is recovering from an eye surgery. Received SLIM's , and is agreeable for plan to dc back to [facility].                        IMM Given (Date):: 06/13/25  IMM Given to:: Family      IMM reviewed with son.

## 2025-06-13 NOTE — PHYSICAL THERAPY NOTE
"                                                                                  PHYSICAL THERAPY EVALUATION NOTE      Patient Name: Carlos Enrique Roth Jr.  Today's Date: 2025    AGE:   89 y.o.  Mrn:   [id number]  ADMIT DX:  Weakness [R53.1]  Anemia [D64.9]  Pleural effusion [J90]  CHF exacerbation (HCC) [I50.9]    Past Medical History[1]  Length Of Stay: 2  PHYSICAL THERAPY EVALUATION :   Patient's identity confirmed via 2 patient identifiers (full name and ) at start of session       25 1459   PT Last Visit   PT Visit Date 25   Note Type   Note type Evaluation   Pain Assessment   Pain Assessment Tool 0-10   Pain Score No Pain   Restrictions/Precautions   Weight Bearing Precautions Per Order No   Other Precautions Cognitive;Chair Alarm;Bed Alarm;Fall Risk;O2  (3L O2 via NC)   Home Living   Type of Home SNF  ([facility])   Home Layout One level;Performs ADLs on one level;Able to live on main level with bedroom/bathroom   Prior Function   Lives With Facility staff   Receives Help From Personal care attendant   IADLs Family/Friend/Other provides transportation;Family/Friend/Other provides meals;Family/Friend/Other provides medication management   Vocational Retired   General   Family/Caregiver Present No   Cognition   Overall Cognitive Status Impaired   Arousal/Participation Alert   Attention Attends with cues to redirect   Orientation Level Oriented to person;Oriented to place;Disoriented to time;Disoriented to situation   Memory Decreased recall of precautions;Decreased recall of recent events;Decreased short term memory   Following Commands Follows one step commands with increased time or repetition   Comments Pt received supine in bed, agreeable to therapy session.   Subjective   Subjective \"I don't think I want to sit in the chair right now. We will see how it goes\".   RUE Assessment   RUE Assessment WFL   LUE Assessment   LUE Assessment WFL   RLE Assessment   RLE Assessment " WFL  (grossly 3+/5 throughout)   LLE Assessment   LLE Assessment WFL  (grossly 3+/5 throughout)   Light Touch   RLE Light Touch Grossly intact   LLE Light Touch Grossly intact   Bed Mobility   Rolling R 3  Moderate assistance   Additional items Assist x 1;Bedrails;Increased time required;Verbal cues   Rolling L 3  Moderate assistance   Additional items Assist x 1;Bedrails;Increased time required;Verbal cues;LE management   Supine to Sit 3  Moderate assistance   Additional items Assist x 2;HOB elevated;Bedrails;Increased time required;Verbal cues;LE management   Sit to Supine 3  Moderate assistance   Additional items Assist x 2;Bedrails;Increased time required;Verbal cues;LE management  (and trunk support)   Additional Comments Pt was received incontinent of urine in bed. Performed rolling for hygiene and linen change prior to mobility trials OOB.   Transfers   Sit to Stand 4  Minimal assistance   Additional items Assist x 2;Armrests;Verbal cues   Stand to Sit 4  Minimal assistance   Additional items Assist x 2;Armrests;Increased time required;Verbal cues   Additional Comments cues for hand placement and technique.   Ambulation/Elevation   Gait pattern Decreased foot clearance;Step to  (slow david, decreased heel strike, cues for posture)   Gait Assistance 4  Minimal assist   Additional items Assist x 2;Verbal cues;Tactile cues   Assistive Device Rolling walker   Distance 8ft   Ambulation/Elevation Additional Comments 4ft lateral side stepping and 4ft forward and backward. trial limited by fatigue and pt declined sitting OOB in recliner chair.   Balance   Static Sitting Good   Dynamic Sitting Fair +   Static Standing Fair   Dynamic Standing Fair -   Ambulatory Fair -   Activity Tolerance   Activity Tolerance Patient tolerated treatment well   Medical Staff Made Aware DOUG Ferguson   Nurse Made Aware NICOLAS Chang   Assessment   Prognosis Good   Problem List Decreased strength;Decreased endurance;Impaired balance;Decreased  mobility;Decreased coordination;Decreased safety awareness;Decreased skin integrity   Assessment Carlos Enrique Roth Jr. is a 89 y.o. Male who presents to [facility] on 6/11/25 from short term rehab at [facility] w/ c/o non-productive cough. Pt admitted with diagnosis of Acute on chronic CHF. Orders for PT eval and treat received, w/ activity orders of up and out of bed as tolerated and fall precautions. Pt presents w/ comorbidities of diastolic HF, CAD s/p CABG x 4, s/p AVR, PAF, hypothyroidism, CKD stage IV, and NIDDM 2. At baseline, pt mobilizes independently but has required more assistance recently resulting in rehab stay after April hospitalization. Upon evaluation, pt presents w/ the following deficits: weakness, decreased ROM, impaired coordination, impaired skin integrity, impaired balance, and decreased endurance. Pt currently demonstrates mod assist x 2 for bed mobility, min assist x 2 for transfers, and min assist x 2 with a RW for short ambulation trial. The patient's AM-PAC Basic Mobility Inpatient Short Form Raw Score is 14. A Raw score of less than or equal to 17 suggests the patient may benefit from discharge to post-acute rehabilitation services. Please also refer to the recommendation of the Physical Therapist for safe discharge planning. Based on current status, Level 2 rehab intensity is recommended at time of discharge. At this time, pt would benefit from continued skilled PT services, in the acute care setting, in order to address the abovementioned deficits and maximize independence and functional gains prior to discharge.   Barriers to Discharge   (functional status)   Goals   Patient Goals to go back to bed   STG Expiration Date 06/27/25   Short Term Goal #1 1. supine to sit independently. 2. sit to stand modified independently. 3. ambulate with RW >150ft modified independently.   PT Treatment Day 0   Plan   Treatment/Interventions Functional transfer training;LE strengthening/ROM;Therapeutic  exercise;Endurance training;Patient/family training;Bed mobility;Gait training;Compensatory technique education;Spoke to nursing;OT   PT Frequency 3-5x/wk   Discharge Recommendation   Rehab Resource Intensity Level, PT II (Moderate Resource Intensity)   AM-PAC Basic Mobility Inpatient   Turning in Flat Bed Without Bedrails 2   Lying on Back to Sitting on Edge of Flat Bed Without Bedrails 2   Moving Bed to Chair 3   Standing Up From Chair Using Arms 3   Walk in Room 3   Climb 3-5 Stairs With Railing 1   Basic Mobility Inpatient Raw Score 14   Basic Mobility Standardized Score 35.55   [facility] Highest Level Of Mobility   [facility] Goal 4: Move to chair/commode   [facility] Achieved 5: Stand (1 or more minutes)   End of Consult   Patient Position at End of Consult Supine;Bed/Chair alarm activated;All needs within reach  (HOB elevated, tray table in front)       The patient's AM-PAC Basic Mobility Inpatient Short Form Raw Score is 14, Standardized Score is 35.55. A standardized score less than 38.32 (raw score of 16) suggests the patient may benefit from discharge to post-acute rehabilitation services which may not coincide with above PT recommendations. However please refer to therapist recommendation for discharge planning given other factors that may influence destination.      Pt would benefit from skilled inpatient PT during this admission in order to facilitate progress towards goals to maximize functional independence    Tom Lu, PT             [1]   Past Medical History:  Diagnosis Date    Aortic stenosis     CKD (chronic kidney disease)     baseline Cr 1.3-1.5    Coronary artery disease     Cough     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HCC)     type 2, insulin dependent    GERD (gastroesophageal reflux disease)     Glaucoma     Gout     History of prostate cancer     Hypertension     Hypothyroidism     Overweight     Peripheral neuropathy, idiopathic     Pleural effusion, left     Pure hypercholesterolemia      LA...11/12/14   R....11/12/14     Visual impairment     cataract left eye    Weight gain

## 2025-06-13 NOTE — PLAN OF CARE
Problem: PHYSICAL THERAPY ADULT  Goal: Performs mobility at highest level of function for planned discharge setting.  See evaluation for individualized goals.  Description: Treatment/Interventions: Functional transfer training, LE strengthening/ROM, Therapeutic exercise, Endurance training, Patient/family training, Bed mobility, Gait training, Compensatory technique education, Spoke to nursing, OT          See flowsheet documentation for full assessment, interventions and recommendations.  Note: Prognosis: Good  Problem List: Decreased strength, Decreased endurance, Impaired balance, Decreased mobility, Decreased coordination, Decreased safety awareness, Decreased skin integrity  Assessment: Carlos Enrique Roth Jr. is a 89 y.o. Male who presents to [facility] on 6/11/25 from short term rehab at [facility] w/ c/o non-productive cough. Pt admitted with diagnosis of Acute on chronic CHF. Orders for PT eval and treat received, w/ activity orders of up and out of bed as tolerated and fall precautions. Pt presents w/ comorbidities of diastolic HF, CAD s/p CABG x 4, s/p AVR, PAF, hypothyroidism, CKD stage IV, and NIDDM 2. At baseline, pt mobilizes independently but has required more assistance recently resulting in rehab stay after April hospitalization. Upon evaluation, pt presents w/ the following deficits: weakness, decreased ROM, impaired coordination, impaired skin integrity, impaired balance, and decreased endurance. Pt currently demonstrates mod assist x 2 for bed mobility, min assist x 2 for transfers, and min assist x 2 with a RW for short ambulation trial. The patient's AM-PAC Basic Mobility Inpatient Short Form Raw Score is 14. A Raw score of less than or equal to 17 suggests the patient may benefit from discharge to post-acute rehabilitation services. Please also refer to the recommendation of the Physical Therapist for safe discharge planning. Based on current status, Level 2 rehab intensity is recommended at  time of discharge. At this time, pt would benefit from continued skilled PT services, in the acute care setting, in order to address the abovementioned deficits and maximize independence and functional gains prior to discharge.  Barriers to Discharge:  (functional status)     Rehab Resource Intensity Level, PT: II (Moderate Resource Intensity)    See flowsheet documentation for full assessment.         Ketoconazole Pregnancy And Lactation Text: This medication is Pregnancy Category C and it isn't know if it is safe during pregnancy. It is also excreted in breast milk and breast feeding isn't recommended.

## 2025-06-13 NOTE — PLAN OF CARE
Problem: Potential for Falls  Goal: Patient will remain free of falls  Description: INTERVENTIONS:  - Educate patient/family on patient safety including physical limitations  - Instruct patient to call for assistance with activity   - Consider consulting OT/PT to assist with strengthening/mobility based on AM PAC & JH-HLM score  - Consult OT/PT to assist with strengthening/mobility   - Keep Call bell within reach  - Keep bed low and locked with side rails adjusted as appropriate  - Keep care items and personal belongings within reach  - Initiate and maintain comfort rounds  - Make Fall Risk Sign visible to staff  - Offer Toileting every x Hours, in advance of need  - Initiate/Maintain xalarm  - Obtain necessary fall risk management equipment: x  - Apply yellow socks and bracelet for high fall risk patients  - Consider moving patient to room near nurses station  Outcome: Progressing     Problem: Decreased Cardiac Output  Goal: Cardiac output adequate for individual needs  Description: INTERVENTIONS: Monitor for signs and symptoms of decreased cardiac output   - Monitor for dyspnea with exertion and at rest  - Monitor for orthopnea  - Monitor for signs of tachycardia. Place patient on telemetry monitoring.  - Assess patient for jugular vein distention  - Assess patient for lower extremity edema and poor peripheral perfusion   - Auscultate lung sound for Fine bibasilar crackles   - Monitor for cardiac arrythmias   - Administer beta blockers, antiarrhythmic, and blood pressure medications as ordered    Outcome: Progressing     Problem: Impaired Gas Exchange  Goal: Optimize oxygenation and ensure adequate ventilation  Description: INTERVENTIONS: Monitor for signs and symptoms of respiratory distress                - Elevate HOB or use high fowlers to promote lung expansion                - Administer oxygen as ordered to maintain adequate oxygenation                - Encourage use of IS to promote lung expansion and  prevent PN                - Monitor ABGs to assess oxygenation status                - Monitor blood oxygen level to maintain adequate oxygenation                - Encourage cough and deep breathing exercises to promote lung expansion                - Monitor patient's mental status for increased confusion    Outcome: Progressing     Problem: Excess Fluid Volume  Goal: Patient is able to achieve and maintain homeostasis  Description: INTERVENTIONS: Monitor for sign and symptoms of fluid overload  - Evaluate LE edema every shift  - Elevate LE to prevent dependent edema  - Apply ALFREDO stockings as ordered   - Monitor ankle circumference daily  - Assess for jugular vein distention  - Evaluate provider orders for the CHF diuretic algorithm. Administer diuretics as ordered  - Weigh the patient daily at 0600 and report a weight gain of five pounds or more   - Strict intake and output  - Monitor fluid intake and adhere to fluid restrictions  - Assess lung sounds every shift and as needed  - Monitor vital signs and lab values (CBC, chem, BUN, BNP)  - Measure and document urine output    Outcome: Progressing     Problem: Activity Intolerance  Goal: Patient is able to perform activities within their limitations  Description: INTERVENTIONS:                       -   Alternate periods of activity with periods of rest                 -   Patients is able to maintain normal vitals heart rhythm during activity                 -   Gradually increase activity and exercise as patient can tolerate                 -   Monitor blood pressure and heart before and after exercise                  -   Monitor blood oxygen saturation during activity and apply oxygen as needed    Outcome: Progressing     Problem: Knowledge Deficit  Goal: Patient is able to verbalize understanding of Heart Failure after education  Description: INTERVENTIONS:  - Educate the patient and family on signs and symptoms of HF  - Provide the patient with HF education  and HF zone tool  - Educate on the importance of daily weight in the AM and reporting a weight gain               of 3 or more pounds to their primary care physician  - Monitor for SOB  - Maintain and sodium and fluid restriction  - Educate the patient on the importance of medications such as: diuretics, betablockers,               antiarrhythmics and their purpose, dose, route, side effects and labs               if they are needed    Outcome: Progressing     Problem: Prexisting or High Potential for Compromised Skin Integrity  Goal: Skin integrity is maintained or improved  Description: INTERVENTIONS:  - Identify patients at risk for skin breakdown  - Assess and monitor skin integrity including under and around medical devices   - Assess and monitor nutrition and hydration status  - Monitor labs  - Assess for incontinence   - Turn and reposition patient  - Assist with mobility/ambulation  - Relieve pressure over mar prominences   - Avoid friction and shearing  - Provide appropriate hygiene as needed including keeping skin clean and dry  - Evaluate need for skin moisturizer/barrier cream  - Collaborate with interdisciplinary team  - Patient/family teaching  - Consider wound care consult    Assess:  - Review Hernandez scale daily  - Clean and moisturize skin every x  - Inspect skin when repositioning, toileting, and assisting with ADLS  - Assess under medical devices such as xx every x  - Assess extremities for adequate circulation and sensation     Bed Management:  - Have minimal linens on bed & keep smooth, unwrinkled  - Change linens as needed when moist or perspiring  - Avoid sitting or lying in one position for more than x hours while in bed?Keep HOB at xdegrees   - Toileting:  - Offer bedside commode  - Assess for incontinence every x  - Use incontinent care products after each incontinent episode such as x    Activity:  - Mobilize patient x times a day  - Encourage activity and walks on unit  - Encourage or  provide ROM exercises   - Turn and reposition patient every x Hours  - Use appropriate equipment to lift or move patient in bed  - Instruct/ Assist with weight shifting every x when out of bed in chair  - Consider limitation of chair time x hour intervals    Skin Care:  - Avoid use of baby powder, tape, friction and shearing, hot water or constrictive clothing  - Relieve pressure over bony prominences using x  - Do not massage red bony areas    Next Steps:  - Teach patient strategies to minimize risks such as x  - Consider consults to  interdisciplinary teams such as x  Outcome: Progressing

## 2025-06-13 NOTE — PROGRESS NOTES
Progress Note - Hospitalist   Name: Carlos Enrique Roth  89 y.o. male I MRN: [id number]  Unit/Bed#: [facility] I Date of Admission: 6/11/2025   Date of Service: 6/13/2025 I Hospital Day: 2    Assessment & Plan  Acute on chronic diastolic congestive heart failure (HCC)  Wt Readings from Last 3 Encounters:   06/13/25 89.2 kg (196 lb 9.6 oz)   03/10/25 85.3 kg (188 lb)   03/04/25 82.6 kg (182 lb)   Presenting to the ED with cough and leg swelling x2 weeks   Last echo March 2024: LVEF 65% with G2 DD  Home diuretic is torsemide 20 Mg daily - SNF had planned to increase diuretic day of admit (torsemide to 60 Mg daily x 5 days with Zaroxolyn 2.5mg Tues/Thurs/Sat x1 week and then increase home torsemide to 40mg daily after)   CXR with mild pulmonary congestion and small pleural effusion -admission exam: B/L LE with 3-4+ pitting edema and fine rales bilaterally  Of note, RUE significantly swollen as well - check RUE duplex for completeness (VTE less likely though as he is on eliquis)   Given Lasix 50 Mg IV x 1 in the ED  I/O and daily weights  Sodium fluid restriction  Initially admitted as observation, however upon clinical assessment pt made inpatient due to aggressive IV diuresis needs   Echo: Showing an LVEF of 50% systolic function mildly reduced with severe diastolic dysfunction multiple segments are hypokinetic left atrium moderately dilated, aortic bioprosthetic valve present elevated right ventricular systolic pressure at 54 aortic root exhibiting severe fibrocalcific change  discussed with cardio continue lasix increased to 80 IV bid, Pt had good uop with net -2.5L Cr increase slightly but still at baseline  Anemia due to stage 4 chronic kidney disease  (HCC)  History of anemia secondary to CKD stage IV  Hemoglobin 8.3 on admission  Hemoglobin 1 month ago was 14.0, however suspect that this was erroneous lab our as patient's baseline as of a year ago was in the tens  Fecal occult stool in the ED was negative  Hold home  iron supplementation  B12, WNL  folate WNL   iron panel low will initiate venofer x3  Continue Eliquis for now as there are no signs of active bleeding and suspect that this is around patient's baseline  Generalized weakness  Currently at [facility] for acute rehab  Plan to return there upon discharge  Pt/Ot eval  Chronic respiratory failure with hypoxia (HCC)  Contained on 2-3 L nasal cannula at baseline  No increased O2 requirement  Continue home oxygen  Type 2 diabetes mellitus with diabetic neuropathy (HCC)  Lab Results   Component Value Date    HGBA1C 6.6 (H) 06/12/2025       Recent Labs     06/12/25  1607 06/12/25  1632 06/12/25  1703 06/12/25 2015   POCGLU 64* 62* 83 155*       Blood Sugar Average: Last 72 hrs:  (P) [id number]  Home regimen: Lantus 24 units in morning and NovoLog 11 units 3 times daily with meals  Continue home regimen with the addition of SSI with meals  BS on the lower side decreased lantus to 12 and humalog to 8  Persistent atrial fibrillation (HCC)  Not on any AV giuseppe blockers due to baseline bradycardia  Anticoagulated with Eliquis 2.5 Mg twice daily  Continue home Eliquis  Sinus rhythm at time of admission  CAD (coronary artery disease)  CAD s/p CABG x 4 in 2018  Continue formulary equivalent for home statin  Denies any anginal symptoms  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)  Baseline creatinine 2.2-2.6  Creatinine on admission is 2.32  Trend BMP daily in setting of IV diuresis  Patient still making urine  Postoperative hypothyroidism  Continue home Synthroid 150 mcg daily -Per SNF records this dose was new as of 4/22  TSH 4/22 was elevated at 37  Presuming Synthroid dose from facility was increased from elevated TSH, will continue home dose and recheck TSH now    VTE Pharmacologic Prophylaxis: VTE Score: 5 High Risk (Score >/= 5) - Pharmacological DVT Prophylaxis Ordered: apixaban (Eliquis). Sequential Compression Devices Ordered.    Mobility:   Basic  Mobility Inpatient Raw Score: 13  JH-HLM Goal: 4: Move to chair/commode  JH-HLM Achieved: 5: Stand (1 or more minutes)  JH-HLM Goal achieved. Continue to encourage appropriate mobility.    Patient Centered Rounds: I performed bedside rounds with nursing staff today.   Discussions with Specialists or Other Care Team Provider: cardio, CM, Pt, Ot    Education and Discussions with Family / Patient: Updated  (son) via phone.    Current Length of Stay: 2 day(s)  Current Patient Status: Inpatient   Certification Statement: The patient will continue to require additional inpatient hospital stay due to achfe  Discharge Plan: Anticipate discharge in 48-72 hrs to rehab facility.    Code Status: Level 1 - Full Code    Diane Monaco was seen and examined at bedside.  No acute events overnight.  Discussed plan of care.  All questions and concerns were answered and addressed.  Has no acute complaints at this time.  Discussed with cardio continue lasix 80 bid.  Creatinine slightly elevated today however still at baseline.  Will continue to monitor creatinine.  Patient with a great urine output.    Objective :  Temp:  [97.4 °F (36.3 °C)-99 °F (37.2 °C)] 98.7 °F (37.1 °C)  HR:  [60-74] 74  BP: (110-136)/(45-65) 110/54  Resp:  [18-20] 18  SpO2:  [98 %-100 %] 99 %  O2 Device: Nasal cannula  Nasal Cannula O2 Flow Rate (L/min):  [3 L/min] 3 L/min    Body mass index is 30.79 kg/m².     Input and Output Summary (last 24 hours):     Intake/Output Summary (Last 24 hours) at 6/13/2025 0637  Last data filed at 6/13/2025 0500  Gross per 24 hour   Intake 720 ml   Output 3343.7 ml   Net -2623.7 ml       Physical Exam  Vitals and nursing note reviewed.   Constitutional:       General: He is not in acute distress.     Appearance: He is ill-appearing (chronically).   HENT:      Head: Normocephalic and atraumatic.     Cardiovascular:      Rate and Rhythm: Normal rate and regular rhythm.      Pulses: Normal pulses.      Heart  sounds: Normal heart sounds.   Pulmonary:      Effort: Pulmonary effort is normal.      Breath sounds: Normal breath sounds.   Abdominal:      General: Abdomen is flat. Bowel sounds are normal.      Palpations: Abdomen is soft.     Musculoskeletal:      Right lower leg: Edema present.      Left lower leg: Edema present.     Skin:     General: Skin is warm.     Neurological:      General: No focal deficit present.      Mental Status: He is alert and oriented to person, place, and time.           Lines/Drains:              Lab Results: I have reviewed the following results:   Results from last 7 days   Lab Units 06/13/25  0519 06/12/25  0532 06/11/25  1656   WBC Thousand/uL 7.48   < > 7.22   HEMOGLOBIN g/dL 8.4*   < > 8.3*   HEMATOCRIT % 28.0*   < > 27.0*   PLATELETS Thousands/uL 235   < > 237   SEGS PCT %  --   --  69   LYMPHO PCT %  --   --  16   MONO PCT %  --   --  12   EOS PCT %  --   --  2    < > = values in this interval not displayed.     Results from last 7 days   Lab Units 06/13/25  0519 06/12/25  0532   SODIUM mmol/L 143 138   POTASSIUM mmol/L 3.3* 3.6   CHLORIDE mmol/L 95* 97   CO2 mmol/L 39* 32   BUN mg/dL 60* 58*   CREATININE mg/dL 2.30* 2.20*   ANION GAP mmol/L 9 9   CALCIUM mg/dL 9.2 8.9   ALBUMIN g/dL  --  3.2*   TOTAL BILIRUBIN mg/dL  --  0.57   ALK PHOS U/L  --  85   ALT U/L  --  29   AST U/L  --  30   GLUCOSE RANDOM mg/dL 84 136         Results from last 7 days   Lab Units 06/12/25  2015 06/12/25  1703 06/12/25  1632 06/12/25  1607 06/12/25  1056 06/12/25  0757 06/11/25  2301   POC GLUCOSE mg/dl 155* 83 62* 64* 152* 134 111     Results from last 7 days   Lab Units 06/12/25  0532   HEMOGLOBIN A1C % 6.6*           Recent Cultures (last 7 days):         Imaging Results Review: No pertinent imaging studies reviewed.  Other Study Results Review: No additional pertinent studies reviewed.    Last 24 Hours Medication List:     Current Facility-Administered Medications:     acetaminophen (TYLENOL) tablet  650 mg, Q4H PRN    allopurinol (ZYLOPRIM) tablet 300 mg, Daily    aluminum-magnesium hydroxide-simethicone (MAALOX) oral suspension 30 mL, Q6H PRN    apixaban (ELIQUIS) tablet 2.5 mg, BID    ascorbic acid (VITAMIN C) tablet 500 mg, Daily    Cholecalciferol (VITAMIN D3) tablet 1,000 Units, Daily    dorzolamide-timolol (COSOPT) 2-0.5 % ophthalmic solution 1 drop, Q12H JAVAD    [Held by provider] ferrous sulfate tablet 325 mg, Daily    furosemide (LASIX) injection 80 mg, BID (diuretic)    gabapentin (NEURONTIN) capsule 300 mg, HS    insulin glargine (LANTUS) subcutaneous injection 24 Units 0.24 mL, QAM    insulin lispro (HumALOG/ADMELOG) 100 units/mL subcutaneous injection 1-5 Units, TID AC **AND** Fingerstick Glucose (POCT), TID AC    insulin lispro (HumALOG/ADMELOG) 100 units/mL subcutaneous injection 11 Units, TID With Meals    iron sucrose (VENOFER) 300 mg in sodium chloride 0.9 % 250 mL IVPB, Daily    latanoprost (XALATAN) 0.005 % ophthalmic solution 1 drop, HS    levothyroxine tablet 150 mcg, Early Morning    LORazepam (ATIVAN) tablet 0.5 mg, HS    ondansetron (ZOFRAN) injection 4 mg, Q6H PRN    pantoprazole (PROTONIX) EC tablet 20 mg, Daily Before Breakfast    potassium chloride (Klor-Con M20) CR tablet 40 mEq, BID    pravastatin (PRAVACHOL) tablet 80 mg, Daily With Dinner    senna (SENOKOT) tablet 8.6 mg, HS PRN    tamsulosin (FLOMAX) capsule 0.4 mg, Daily With Dinner    [Held by provider] torsemide (DEMADEX) tablet 20 mg, Daily    Administrative Statements   Today, Patient Was Seen By: Esha Cee MD  I have spent a total time of 57 minutes in caring for this patient on the day of the visit/encounter including Diagnostic results, Prognosis, Risks and benefits of tx options, Instructions for management, Patient and family education, Importance of tx compliance, Risk factor reductions, Impressions, Counseling / Coordination of care, Documenting in the medical record, Reviewing/placing orders in the medical  record (including tests, medications, and/or procedures), Obtaining or reviewing history  , and Communicating with other healthcare professionals .    **Please Note: This note may have been constructed using a voice recognition system.**

## 2025-06-13 NOTE — ASSESSMENT & PLAN NOTE
Wt Readings from Last 3 Encounters:   06/13/25 89.2 kg (196 lb 9.6 oz)   03/10/25 85.3 kg (188 lb)   03/04/25 82.6 kg (182 lb)   Presenting to the ED with cough and leg swelling x2 weeks   Last echo March 2024: LVEF 65% with G2 DD  Home diuretic is torsemide 20 Mg daily - SNF had planned to increase diuretic day of admit (torsemide to 60 Mg daily x 5 days with Zaroxolyn 2.5mg Tues/Thurs/Sat x1 week and then increase home torsemide to 40mg daily after)   CXR with mild pulmonary congestion and small pleural effusion -admission exam: B/L LE with 3-4+ pitting edema and fine rales bilaterally  Of note, RUE significantly swollen as well - check RUE duplex for completeness (VTE less likely though as he is on eliquis)   Given Lasix 50 Mg IV x 1 in the ED  I/O and daily weights  Sodium fluid restriction  Initially admitted as observation, however upon clinical assessment pt made inpatient due to aggressive IV diuresis needs   Echo: Showing an LVEF of 50% systolic function mildly reduced with severe diastolic dysfunction multiple segments are hypokinetic left atrium moderately dilated, aortic bioprosthetic valve present elevated right ventricular systolic pressure at 54 aortic root exhibiting severe fibrocalcific change  discussed with cardio continue lasix increased to 80 IV bid, Pt had good uop with net -2.5L Cr increase slightly but still at baseline

## 2025-06-13 NOTE — PROGRESS NOTES
"Cardiology Progress Note   Carlos Enrique Roth Jr. 89 y.o. male MRN: [id number]    Unit/Bed#: -01 Encounter: [id number]    ASSESSMENT:  Acute on chronic HFpEF  CAD/CABG x4 (9/2018; CABG x4, with a LIMA to the LAD, SVG to an OM 2 and SVG “Y graft\" to an OM 3 and left posterior descending artery)   Aortic stenosis s/p bioAVR   Paroxysmal atrial fibrillation  Hyperlipidemia   CKD     PLAN/ DISCUSSION:     Carlos Enrique presents with increased LE edema after the past several weeks. Reports he lives with his son who helps him with his meals and medications. Does admit to drinking a heavy amount of fluids throughout the day with the warmer weather. This may have contributed to his admission.   Objectively volume overloaded with . CXR with vascular congestion with small right pleural effusions. Initial standing wt 170lbs.   Serial troponins have been negative. EKG shows wide QRS, non-specific intra-ventricular conduction block.   Initially started on IV lasix 50mg BID (6/11), increased by Cardiology to 80mg BID (6/12)    Overnight, -2.5L net. -3.5L since admission.   Presenting wt 194lbs (bed scale; 6/11) -> 196lbs (standing scale; 6/13)     IP Echo 6/12/2025: EF 50%, hypokinetic mid anteroseptal, apical septal and apex, moderate LA dilation, bio AVR #23 Intuity no evidence of paravalvular or transvalvular regurgitation, mild MR, mild TR, RVSP 54 mmHg, aortic root exhibited severe fibrocalcific change.    Continue IV lasix to 80mg BID  Daily BMP qAM, daily standing weights for more consistent wt trends  Cardiac diet -- advised specifically on 2 GM sodium and 2L fluid restriction  Strict I/O monitoring  Intrinsically rate controlled on his own  Remainder of cardiac Rx as written: Eliquis 2.5 mg, Crestor 40 mg    Likely will need to diurese throughout the weekend. If Cr trends up stop IV and switch to PO torsemide 20mg BID (previously on QD dosing)    Subjective:   Patient seen and examined. Overnight events reviewed. " "    Objective:     Vitals: Blood pressure 113/54, pulse 65, temperature 97.7 °F (36.5 °C), resp. rate 22, height 5' 7\" (1.702 m), weight 89.2 kg (196 lb 9.6 oz), SpO2 98%., Body mass index is 30.79 kg/m².,   Orthostatic Blood Pressures      Flowsheet Row Most Recent Value   Blood Pressure 113/54 filed at 06/13/2025 0744   Patient Position - Orthostatic VS Lying filed at 06/13/2025 0500              Intake/Output Summary (Last 24 hours) at 6/13/2025 0842  Last data filed at 6/13/2025 0500  Gross per 24 hour   Intake 720 ml   Output 3340 ml   Net -2620 ml         Physical Exam:    GEN: Carlos Enrique Roth Jr. appears well, alert and oriented x 3, pleasant and cooperative   HEENT: Sclera anicteric, conjunctivae pink, mucous membranes moist. Oropharynx clear.   NECK: supple, no significant JVD, Trachea midline, no thyromegaly.   HEART: regular rhythm, normal S1 and S2, no murmurs, clicks, gallops or rubs   LUNGS: clear to auscultation bilaterally; no wheezes, rales, or rhonchi. No increased work of breathing or signs of respiratory distress.   ABDOMEN: Soft, nontender, nondistended  EXTREMITIES: Skin warm and well perfused, no clubbing, cyanosis, +1 LE edema.  NEURO: No focal findings. Normal speech. Mood and affect normal.   SKIN: Normal without suspicious lesions on exposed skin.      Medications:    Current Medications[1]     Labs & Results:        Results from last 7 days   Lab Units 06/13/25  0519 06/12/25  0532 06/11/25  1656   WBC Thousand/uL 7.48 7.98 7.22   HEMOGLOBIN g/dL 8.4* 8.0* 8.3*   HEMATOCRIT % 28.0* 25.8* 27.0*   PLATELETS Thousands/uL 235 224 237         Results from last 7 days   Lab Units 06/13/25  0519 06/12/25  0532 06/11/25  1656   POTASSIUM mmol/L 3.3* 3.6 4.1   CHLORIDE mmol/L 95* 97 97   CO2 mmol/L 39* 32 33*   BUN mg/dL 60* 58* 59*   CREATININE mg/dL 2.30* 2.20* 2.32*   CALCIUM mg/dL 9.2 8.9 9.1   ALK PHOS U/L  --  85 99   ALT U/L  --  29 37   AST U/L  --  30 39         Results from last 7 days "   Lab Units 06/12/25  0532 06/11/25  1656   MAGNESIUM mg/dL 2.3 2.3             [1]   Current Facility-Administered Medications:     acetaminophen (TYLENOL) tablet 650 mg, 650 mg, Oral, Q4H PRN, Ondina Sanders PA-C    allopurinol (ZYLOPRIM) tablet 300 mg, 300 mg, Oral, Daily, Ondina Sanders PA-C, 300 mg at 06/13/25 0830    aluminum-magnesium hydroxide-simethicone (MAALOX) oral suspension 30 mL, 30 mL, Oral, Q6H PRN, Ondina Sanders PA-C    apixaban (ELIQUIS) tablet 2.5 mg, 2.5 mg, Oral, BID, Ondina Sanders PA-C, 2.5 mg at 06/13/25 0830    ascorbic acid (VITAMIN C) tablet 500 mg, 500 mg, Oral, Daily, Ondina Sanders PA-C, 500 mg at 06/13/25 0830    Cholecalciferol (VITAMIN D3) tablet 1,000 Units, 1,000 Units, Oral, Daily, Ondnia Sanders PA-C, 1,000 Units at 06/13/25 0830    dorzolamide-timolol (COSOPT) 2-0.5 % ophthalmic solution 1 drop, 1 drop, Both Eyes, Q12H JAVAD, Ondina Sanders PA-C, 1 drop at 06/12/25 1944    [Held by provider] ferrous sulfate tablet 325 mg, 325 mg, Oral, Daily, Ondina Sanders PA-C, 325 mg at 06/12/25 0824    furosemide (LASIX) injection 80 mg, 80 mg, Intravenous, BID (diuretic), Sami Tapia PA-C, 80 mg at 06/13/25 0830    gabapentin (NEURONTIN) capsule 300 mg, 300 mg, Oral, HS, Ondina Sanders PA-C, 300 mg at 06/12/25 1945    insulin glargine (LANTUS) subcutaneous injection 12 Units 0.12 mL, 12 Units, Subcutaneous, Esha HERNANDEZ MD    insulin lispro (HumALOG/ADMELOG) 100 units/mL subcutaneous injection 1-5 Units, 1-5 Units, Subcutaneous, TID AC **AND** Fingerstick Glucose (POCT), , , TID AC, Ondina Sanders PA-C    insulin lispro (HumALOG/ADMELOG) 100 units/mL subcutaneous injection 8 Units, 8 Units, Subcutaneous, TID With Meals, Esha Cee MD    iron sucrose (VENOFER) 300 mg in sodium chloride 0.9 % 250 mL IVPB, 300 mg, Intravenous, Daily, Esha Cee MD, Last Rate: 166.7 mL/hr at 06/13/25 0829, 300 mg at 06/13/25 0829    latanoprost  (XALATAN) 0.005 % ophthalmic solution 1 drop, 1 drop, Left Eye, HS, Ondina Sanders PA-C, 1 drop at 06/12/25 1944    levothyroxine tablet 150 mcg, 150 mcg, Oral, Early Morning, Ondina Sanders PA-C, 150 mcg at 06/13/25 0510    LORazepam (ATIVAN) tablet 0.5 mg, 0.5 mg, Oral, HS, Ondina Sanders PA-C, 0.5 mg at 06/12/25 1945    ondansetron (ZOFRAN) injection 4 mg, 4 mg, Intravenous, Q6H PRN, Ondina Sanders PA-C    pantoprazole (PROTONIX) EC tablet 20 mg, 20 mg, Oral, Daily Before Breakfast, Ondina Sanders PA-C, 20 mg at 06/13/25 0510    potassium chloride (Klor-Con M20) CR tablet 40 mEq, 40 mEq, Oral, BID, Esha Cee MD, 40 mEq at 06/13/25 0830    pravastatin (PRAVACHOL) tablet 80 mg, 80 mg, Oral, Daily With Dinner, Ondina Sanders PA-C, 80 mg at 06/12/25 1727    senna (SENOKOT) tablet 8.6 mg, 1 tablet, Oral, HS PRN, Ondina Sanders PA-C    tamsulosin (FLOMAX) capsule 0.4 mg, 0.4 mg, Oral, Daily With Dinner, Ondina Sanders PA-C, 0.4 mg at 06/12/25 1727    [Held by provider] torsemide (DEMADEX) tablet 20 mg, 20 mg, Oral, Daily, Ondina Sanders PA-C

## 2025-06-13 NOTE — ASSESSMENT & PLAN NOTE
Lab Results   Component Value Date    HGBA1C 6.6 (H) 06/12/2025       Recent Labs     06/12/25  1607 06/12/25  1632 06/12/25  1703 06/12/25 2015   POCGLU 64* 62* 83 155*       Blood Sugar Average: Last 72 hrs:  (P) [id number]  Home regimen: Lantus 24 units in morning and NovoLog 11 units 3 times daily with meals  Continue home regimen with the addition of SSI with meals  BS on the lower side decreased lantus to 12 and humalog to 8

## 2025-06-13 NOTE — PLAN OF CARE
"  Problem: OCCUPATIONAL THERAPY ADULT  Goal: Performs self-care activities at highest level of function for planned discharge setting.  See evaluation for individualized goals.  Description: Treatment Interventions: ADL retraining, Endurance training, Functional transfer training, UE strengthening/ROM, Cognitive reorientation, Patient/family training, Equipment evaluation/education, Compensatory technique education, Continued evaluation, Energy conservation          See flowsheet documentation for full assessment, interventions and recommendations.   Note: Limitation: Decreased ADL status, Decreased Safe judgement during ADL, Decreased cognition, Decreased endurance, Decreased self-care trans, Decreased high-level ADLs  Prognosis: Fair  Assessment: Pt is a 89 y.o. male seen for OT evaluation at [facility], admitted 6/11/2025 w/ Acute on chronic diastolic congestive heart failure (HCC). Extensive chart review completed by OT. Comorbidities affecting the pt's functional performance include a significant PMH of: CAD, anemia, stage 4 CKD, T2DM, A-fib, generalized weakness, gout with tophus, hypercholesterolemia, renal cyst, s/p CABG x4, s/p AVR, aortic stenosis, ambulatory dysfunction, cataract, hypokalemia, failure to thrive, glaucoma. Personal factors affecting pt at time of IE include: behavioral pattern, difficulty performing ADLS, difficulty performing IADLS , limited insight into deficits, flat affect, health management , and environment. [facility]. Per CM open, At [facility], pt mobility is: \"-dependent. requires total lift. divided leg sling for transfers. Assist of 2 or more staff and uses wheelchair.\" Prior to rehab, pt lived with son Ciro in a 1SH, 0 RENE, 2 RENE to living room. Pt uses a cane and walker at home, and son grocery shops and drives.  Upon OT IE pt demonstrated  mod Ax1-2 for bed mobility, min Ax2 for functional transfers, min Ax2 for functional mobility, ModA for UB ADLs and maxA for LB ADLS " 2* the following deficits impacting occupational performance: weakness, decreased strength, decreased balance, decreased tolerance, impaired memory, impaired sequencing, and impaired problem solving. Full objective findings from OT assessment regarding body systems outlined above. Pt to benefit from continued skilled OT tx while in the hospital to address deficits as defined above and maximize level of functional independence w/ ADL's and functional mobility. Occupational Performance areas to address include: eating, grooming, bathing/shower, toilet hygiene, dressing, functional mobility, and clothing management. Based on findings, pt is of high complexity. The patient's raw score on the -PAC Daily Activity inpatient short form is 14 , standardized score is 33.39 , less than 39.4. Patients at this level are likely to benefit from DC to post-acute rehabilitation services. However, please refer to therapist recommendation for discharge planning given other factors that may influence destination. At this time, OT recommendations at time of discharge are DC with Level II - Moderate Rehab Resource Intensity resources.     Rehab Resource Intensity Level, OT: II (Moderate Resource Intensity)

## 2025-06-14 LAB
ANION GAP SERPL CALCULATED.3IONS-SCNC: 10 MMOL/L (ref 4–13)
BUN SERPL-MCNC: 62 MG/DL (ref 5–25)
CALCIUM SERPL-MCNC: 9.4 MG/DL (ref 8.4–10.2)
CHLORIDE SERPL-SCNC: 93 MMOL/L (ref 96–108)
CO2 SERPL-SCNC: 38 MMOL/L (ref 21–32)
CREAT SERPL-MCNC: 2.17 MG/DL (ref 0.6–1.3)
ERYTHROCYTE [DISTWIDTH] IN BLOOD BY AUTOMATED COUNT: 14.4 % (ref 11.6–15.1)
GFR SERPL CREATININE-BSD FRML MDRD: 25 ML/MIN/1.73SQ M
GLUCOSE SERPL-MCNC: 125 MG/DL (ref 65–140)
GLUCOSE SERPL-MCNC: 150 MG/DL (ref 65–140)
GLUCOSE SERPL-MCNC: 158 MG/DL (ref 65–140)
GLUCOSE SERPL-MCNC: 190 MG/DL (ref 65–140)
GLUCOSE SERPL-MCNC: 192 MG/DL (ref 65–140)
HCT VFR BLD AUTO: 28.8 % (ref 36.5–49.3)
HGB BLD-MCNC: 8.8 G/DL (ref 12–17)
MAGNESIUM SERPL-MCNC: 2.5 MG/DL (ref 1.9–2.7)
MCH RBC QN AUTO: 33.5 PG (ref 26.8–34.3)
MCHC RBC AUTO-ENTMCNC: 30.6 G/DL (ref 31.4–37.4)
MCV RBC AUTO: 110 FL (ref 82–98)
PLATELET # BLD AUTO: 264 THOUSANDS/UL (ref 149–390)
PMV BLD AUTO: 9.6 FL (ref 8.9–12.7)
POTASSIUM SERPL-SCNC: 3.6 MMOL/L (ref 3.5–5.3)
RBC # BLD AUTO: 2.63 MILLION/UL (ref 3.88–5.62)
SODIUM SERPL-SCNC: 141 MMOL/L (ref 135–147)
WBC # BLD AUTO: 8.83 THOUSAND/UL (ref 4.31–10.16)

## 2025-06-14 PROCEDURE — 83735 ASSAY OF MAGNESIUM: CPT | Performed by: STUDENT IN AN ORGANIZED HEALTH CARE EDUCATION/TRAINING PROGRAM

## 2025-06-14 PROCEDURE — 82948 REAGENT STRIP/BLOOD GLUCOSE: CPT

## 2025-06-14 PROCEDURE — 80048 BASIC METABOLIC PNL TOTAL CA: CPT | Performed by: STUDENT IN AN ORGANIZED HEALTH CARE EDUCATION/TRAINING PROGRAM

## 2025-06-14 PROCEDURE — 99233 SBSQ HOSP IP/OBS HIGH 50: CPT | Performed by: STUDENT IN AN ORGANIZED HEALTH CARE EDUCATION/TRAINING PROGRAM

## 2025-06-14 PROCEDURE — 85027 COMPLETE CBC AUTOMATED: CPT | Performed by: STUDENT IN AN ORGANIZED HEALTH CARE EDUCATION/TRAINING PROGRAM

## 2025-06-14 RX ORDER — POTASSIUM CHLORIDE 1500 MG/1
40 TABLET, EXTENDED RELEASE ORAL ONCE
Status: COMPLETED | OUTPATIENT
Start: 2025-06-14 | End: 2025-06-14

## 2025-06-14 RX ADMIN — APIXABAN 2.5 MG: 2.5 TABLET, FILM COATED ORAL at 08:17

## 2025-06-14 RX ADMIN — INSULIN LISPRO 8 UNITS: 100 INJECTION, SOLUTION INTRAVENOUS; SUBCUTANEOUS at 08:18

## 2025-06-14 RX ADMIN — FUROSEMIDE 80 MG: 10 INJECTION, SOLUTION INTRAVENOUS at 16:05

## 2025-06-14 RX ADMIN — LEVOTHYROXINE SODIUM 150 MCG: 75 TABLET ORAL at 05:10

## 2025-06-14 RX ADMIN — PRAVASTATIN SODIUM 80 MG: 80 TABLET ORAL at 17:07

## 2025-06-14 RX ADMIN — POTASSIUM CHLORIDE 40 MEQ: 1500 TABLET, EXTENDED RELEASE ORAL at 13:11

## 2025-06-14 RX ADMIN — GABAPENTIN 300 MG: 300 CAPSULE ORAL at 21:10

## 2025-06-14 RX ADMIN — OXYCODONE HYDROCHLORIDE AND ACETAMINOPHEN 500 MG: 500 TABLET ORAL at 08:17

## 2025-06-14 RX ADMIN — APIXABAN 2.5 MG: 2.5 TABLET, FILM COATED ORAL at 17:07

## 2025-06-14 RX ADMIN — INSULIN LISPRO 1 UNITS: 100 INJECTION, SOLUTION INTRAVENOUS; SUBCUTANEOUS at 17:04

## 2025-06-14 RX ADMIN — LORAZEPAM 0.5 MG: 0.5 TABLET ORAL at 21:10

## 2025-06-14 RX ADMIN — INSULIN LISPRO 8 UNITS: 100 INJECTION, SOLUTION INTRAVENOUS; SUBCUTANEOUS at 13:11

## 2025-06-14 RX ADMIN — IRON SUCROSE 300 MG: 20 INJECTION, SOLUTION INTRAVENOUS at 08:26

## 2025-06-14 RX ADMIN — LATANOPROST 1 DROP: 50 SOLUTION OPHTHALMIC at 21:11

## 2025-06-14 RX ADMIN — PANTOPRAZOLE SODIUM 20 MG: 20 TABLET, DELAYED RELEASE ORAL at 05:10

## 2025-06-14 RX ADMIN — FUROSEMIDE 80 MG: 10 INJECTION, SOLUTION INTRAVENOUS at 08:17

## 2025-06-14 RX ADMIN — DORZOLAMIDE HYDROCHLORIDE AND TIMOLOL MALEATE 1 DROP: 20; 5 SOLUTION OPHTHALMIC at 21:11

## 2025-06-14 RX ADMIN — INSULIN LISPRO 1 UNITS: 100 INJECTION, SOLUTION INTRAVENOUS; SUBCUTANEOUS at 08:17

## 2025-06-14 RX ADMIN — INSULIN GLARGINE 12 UNITS: 100 INJECTION, SOLUTION SUBCUTANEOUS at 08:17

## 2025-06-14 RX ADMIN — INSULIN LISPRO 1 UNITS: 100 INJECTION, SOLUTION INTRAVENOUS; SUBCUTANEOUS at 13:11

## 2025-06-14 RX ADMIN — ALLOPURINOL 300 MG: 300 TABLET ORAL at 08:17

## 2025-06-14 RX ADMIN — Medication 1000 UNITS: at 08:17

## 2025-06-14 RX ADMIN — TAMSULOSIN HYDROCHLORIDE 0.4 MG: 0.4 CAPSULE ORAL at 17:06

## 2025-06-14 RX ADMIN — INSULIN LISPRO 8 UNITS: 100 INJECTION, SOLUTION INTRAVENOUS; SUBCUTANEOUS at 17:04

## 2025-06-14 RX ADMIN — DORZOLAMIDE HYDROCHLORIDE AND TIMOLOL MALEATE 1 DROP: 20; 5 SOLUTION OPHTHALMIC at 08:19

## 2025-06-14 NOTE — ASSESSMENT & PLAN NOTE
Wt Readings from Last 3 Encounters:   06/14/25 86.2 kg (190 lb)   03/10/25 85.3 kg (188 lb)   03/04/25 82.6 kg (182 lb)   Presenting to the ED with cough and leg swelling x2 weeks   Last echo March 2024: LVEF 65% with G2 DD  Home diuretic is torsemide 20 Mg daily - SNF had planned to increase diuretic day of admit (torsemide to 60 Mg daily x 5 days with Zaroxolyn 2.5mg Tues/Thurs/Sat x1 week and then increase home torsemide to 40mg daily after)   CXR with mild pulmonary congestion and small pleural effusion -admission exam: B/L LE with 3-4+ pitting edema and fine rales bilaterally  Of note, RUE significantly swollen as well - check RUE duplex for completeness (VTE less likely though as he is on eliquis)   Given Lasix 50 Mg IV x 1 in the ED  I/O and daily weights  Sodium fluid restriction  Initially admitted as observation, however upon clinical assessment pt made inpatient due to aggressive IV diuresis needs   Echo: Showing an LVEF of 50% systolic function mildly reduced with severe diastolic dysfunction multiple segments are hypokinetic left atrium moderately dilated, aortic bioprosthetic valve present elevated right ventricular systolic pressure at 54 aortic root exhibiting severe fibrocalcific change  discussed with cardio continue lasix increased to 80 IV bid, Pt continues to have a good uop with net -1.9L with improvement in Cr.

## 2025-06-14 NOTE — ASSESSMENT & PLAN NOTE
Continue home Synthroid 150 mcg daily -Per SNF records this dose was new as of 4/22  TSH 4/22 was elevated at 37  Presuming Synthroid dose from facility was increased from elevated TSH, will continue home dose as TSH has improved to 14 will need repeat in 4-6 weeks

## 2025-06-14 NOTE — ASSESSMENT & PLAN NOTE
Lab Results   Component Value Date    HGBA1C 6.6 (H) 06/12/2025       Recent Labs     06/13/25  1205 06/13/25  1600 06/13/25 2051 06/14/25  0736   POCGLU 157* 135 174* 150*       Blood Sugar Average: Last 72 hrs:  (P) 122  Home regimen: Lantus 24 units in morning and NovoLog 11 units 3 times daily with meals  Continue home regimen with the addition of SSI with meals  BS on the lower side decreased lantus to 12 and humalog to 8-> will continue this regimen as BS have improved

## 2025-06-14 NOTE — PLAN OF CARE
Problem: Impaired Gas Exchange  Goal: Optimize oxygenation and ensure adequate ventilation  Description: INTERVENTIONS: Monitor for signs and symptoms of respiratory distress                - Elevate HOB or use high fowlers to promote lung expansion                - Administer oxygen as ordered to maintain adequate oxygenation                - Encourage use of IS to promote lung expansion and prevent PN                - Monitor ABGs to assess oxygenation status                - Monitor blood oxygen level to maintain adequate oxygenation                - Encourage cough and deep breathing exercises to promote lung expansion                - Monitor patient's mental status for increased confusion    Outcome: Progressing     Problem: Decreased Cardiac Output  Goal: Cardiac output adequate for individual needs  Description: INTERVENTIONS: Monitor for signs and symptoms of decreased cardiac output   - Monitor for dyspnea with exertion and at rest  - Monitor for orthopnea  - Monitor for signs of tachycardia. Place patient on telemetry monitoring.  - Assess patient for jugular vein distention  - Assess patient for lower extremity edema and poor peripheral perfusion   - Auscultate lung sound for Fine bibasilar crackles   - Monitor for cardiac arrythmias   - Administer beta blockers, antiarrhythmic, and blood pressure medications as ordered    Outcome: Progressing     Problem: Excess Fluid Volume  Goal: Patient is able to achieve and maintain homeostasis  Description: INTERVENTIONS: Monitor for sign and symptoms of fluid overload  - Evaluate LE edema every shift  - Elevate LE to prevent dependent edema  - Apply ALFREDO stockings as ordered   - Monitor ankle circumference daily  - Assess for jugular vein distention  - Evaluate provider orders for the CHF diuretic algorithm. Administer diuretics as ordered  - Weigh the patient daily at 0600 and report a weight gain of five pounds or more   - Strict intake and output  - Monitor  fluid intake and adhere to fluid restrictions  - Assess lung sounds every shift and as needed  - Monitor vital signs and lab values (CBC, chem, BUN, BNP)  - Measure and document urine output    Outcome: Progressing     Problem: Activity Intolerance  Goal: Patient is able to perform activities within their limitations  Description: INTERVENTIONS:                       -   Alternate periods of activity with periods of rest                 -   Patients is able to maintain normal vitals heart rhythm during activity                 -   Gradually increase activity and exercise as patient can tolerate                 -   Monitor blood pressure and heart before and after exercise                  -   Monitor blood oxygen saturation during activity and apply oxygen as needed    Outcome: Progressing     Problem: Prexisting or High Potential for Compromised Skin Integrity  Goal: Skin integrity is maintained or improved  Description: INTERVENTIONS:  - Identify patients at risk for skin breakdown  - Assess and monitor skin integrity including under and around medical devices   - Assess and monitor nutrition and hydration status  - Monitor labs  - Assess for incontinence   - Turn and reposition patient  - Assist with mobility/ambulation  - Relieve pressure over mar prominences   - Avoid friction and shearing  - Provide appropriate hygiene as needed including keeping skin clean and dry  - Evaluate need for skin moisturizer/barrier cream  - Collaborate with interdisciplinary team  - Patient/family teaching  - Consider wound care consult

## 2025-06-14 NOTE — PROGRESS NOTES
Progress Note - Hospitalist   Name: Carlos Enrique Roth  89 y.o. male I MRN: [id number]  Unit/Bed#: [facility] I Date of Admission: 6/11/2025   Date of Service: 6/14/2025 I Hospital Day: 3    Assessment & Plan  Acute on chronic diastolic congestive heart failure (HCC)  Wt Readings from Last 3 Encounters:   06/14/25 86.2 kg (190 lb)   03/10/25 85.3 kg (188 lb)   03/04/25 82.6 kg (182 lb)   Presenting to the ED with cough and leg swelling x2 weeks   Last echo March 2024: LVEF 65% with G2 DD  Home diuretic is torsemide 20 Mg daily - SNF had planned to increase diuretic day of admit (torsemide to 60 Mg daily x 5 days with Zaroxolyn 2.5mg Tues/Thurs/Sat x1 week and then increase home torsemide to 40mg daily after)   CXR with mild pulmonary congestion and small pleural effusion -admission exam: B/L LE with 3-4+ pitting edema and fine rales bilaterally  Of note, RUE significantly swollen as well - check RUE duplex for completeness (VTE less likely though as he is on eliquis)   Given Lasix 50 Mg IV x 1 in the ED  I/O and daily weights  Sodium fluid restriction  Initially admitted as observation, however upon clinical assessment pt made inpatient due to aggressive IV diuresis needs   Echo: Showing an LVEF of 50% systolic function mildly reduced with severe diastolic dysfunction multiple segments are hypokinetic left atrium moderately dilated, aortic bioprosthetic valve present elevated right ventricular systolic pressure at 54 aortic root exhibiting severe fibrocalcific change  discussed with cardio continue lasix increased to 80 IV bid, Pt continues to have a good uop with net -1.9L with improvement in Cr.  Anemia due to stage 4 chronic kidney disease  (HCC)  History of anemia secondary to CKD stage IV  Hemoglobin 8.3 on admission  Hemoglobin 1 month ago was 14.0, however suspect that this was erroneous lab our as patient's baseline as of a year ago was in the tens  Fecal occult stool in the ED was negative  Hold home iron  supplementation  B12, WNL  folate WNL   iron panel low will initiate venofer 2/3  Continue Eliquis for now as there are no signs of active bleeding and suspect that this is around patient's baseline  Generalized weakness  Currently at [facility] for acute rehab  Plan to return there upon discharge  Pt/Ot rubin recommending Level 2 care  Chronic respiratory failure with hypoxia (HCC)  Contained on 2-3 L nasal cannula at baseline  No increased O2 requirement  Continue home oxygen  Type 2 diabetes mellitus with diabetic neuropathy (HCC)  Lab Results   Component Value Date    HGBA1C 6.6 (H) 06/12/2025       Recent Labs     06/13/25  1205 06/13/25  1600 06/13/25 2051 06/14/25  0736   POCGLU 157* 135 174* 150*       Blood Sugar Average: Last 72 hrs:  (P) 122  Home regimen: Lantus 24 units in morning and NovoLog 11 units 3 times daily with meals  Continue home regimen with the addition of SSI with meals  BS on the lower side decreased lantus to 12 and humalog to 8-> will continue this regimen as BS have improved  Persistent atrial fibrillation (HCC)  Not on any AV giuseppe blockers due to baseline bradycardia  Anticoagulated with Eliquis 2.5 Mg twice daily  Continue home Eliquis  Sinus rhythm at time of admission  CAD (coronary artery disease)  CAD s/p CABG x 4 in 2018  Continue formulary equivalent for home statin  Denies any anginal symptoms  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)  Baseline creatinine 2.2-2.6  Creatinine on admission is 2.32  Trend BMP daily in setting of IV diuresis  Patient still making urine  Postoperative hypothyroidism  Continue home Synthroid 150 mcg daily -Per SNF records this dose was new as of 4/22  TSH 4/22 was elevated at 37  Presuming Synthroid dose from facility was increased from elevated TSH, will continue home dose as TSH has improved to 14 will need repeat in 4-6 weeks    VTE Pharmacologic Prophylaxis: VTE Score: 5 High Risk (Score >/= 5) - Pharmacological DVT Prophylaxis  Ordered: apixaban (Eliquis). Sequential Compression Devices Ordered.    Mobility:   Basic Mobility Inpatient Raw Score: 14  JH-HLM Goal: 4: Move to chair/commode  JH-HLM Achieved: 4: Move to chair/commode  JH-HLM Goal achieved. Continue to encourage appropriate mobility.    Patient Centered Rounds: I performed bedside rounds with nursing staff today.   Discussions with Specialists or Other Care Team Provider: cardio, CM, Pt, Ot    Education and Discussions with Family / Patient: Updated  (son) via phone.    Current Length of Stay: 3 day(s)  Current Patient Status: Inpatient   Certification Statement: The patient will continue to require additional inpatient hospital stay due to achfe  Discharge Plan: Anticipate discharge in 48-72 hrs to rehab facility.    Code Status: Level 1 - Full Code    Diane Monaco was seen and examined at bedside.  No acute events overnight.  Discussed plan of care.  All questions and concerns were answered and addressed.  Has no acute complaints at this time.  Discussed with Cardio patient continues to have a good urine output with a decrease in creatinine.  Patient still with bilateral lower extremity edema.  Will continue Lasix 80 twice daily.    Objective :  Temp:  [96.8 °F (36 °C)-98.2 °F (36.8 °C)] 96.8 °F (36 °C)  HR:  [57-74] 70  BP: (103-140)/(54-76) 122/57  Resp:  [12-18] 12  SpO2:  [93 %-100 %] 95 %  O2 Device: Nasal cannula  Nasal Cannula O2 Flow Rate (L/min):  [3 L/min] 3 L/min    Body mass index is 29.76 kg/m².     Input and Output Summary (last 24 hours):     Intake/Output Summary (Last 24 hours) at 6/14/2025 0923  Last data filed at 6/14/2025 0826  Gross per 24 hour   Intake 460 ml   Output 2419 ml   Net -1959 ml       Physical Exam  Vitals and nursing note reviewed.   Constitutional:       General: He is not in acute distress.     Appearance: He is ill-appearing (chronically).   HENT:      Head: Normocephalic and atraumatic.     Cardiovascular:      Rate  and Rhythm: Normal rate and regular rhythm.      Pulses: Normal pulses.      Heart sounds: Normal heart sounds.   Pulmonary:      Effort: Pulmonary effort is normal.      Breath sounds: Normal breath sounds.   Abdominal:      General: Abdomen is flat. Bowel sounds are normal.      Palpations: Abdomen is soft.     Musculoskeletal:      Right lower leg: Edema present.      Left lower leg: Edema present.     Skin:     General: Skin is warm.     Neurological:      General: No focal deficit present.      Mental Status: He is alert and oriented to person, place, and time.           Lines/Drains:              Lab Results: I have reviewed the following results:   Results from last 7 days   Lab Units 06/14/25  0527 06/12/25  0532 06/11/25  1656   WBC Thousand/uL 8.83   < > 7.22   HEMOGLOBIN g/dL 8.8*   < > 8.3*   HEMATOCRIT % 28.8*   < > 27.0*   PLATELETS Thousands/uL 264   < > 237   SEGS PCT %  --   --  69   LYMPHO PCT %  --   --  16   MONO PCT %  --   --  12   EOS PCT %  --   --  2    < > = values in this interval not displayed.     Results from last 7 days   Lab Units 06/14/25  0527 06/13/25  0519 06/12/25  0532   SODIUM mmol/L 141   < > 138   POTASSIUM mmol/L 3.6   < > 3.6   CHLORIDE mmol/L 93*   < > 97   CO2 mmol/L 38*   < > 32   BUN mg/dL 62*   < > 58*   CREATININE mg/dL 2.17*   < > 2.20*   ANION GAP mmol/L 10   < > 9   CALCIUM mg/dL 9.4   < > 8.9   ALBUMIN g/dL  --   --  3.2*   TOTAL BILIRUBIN mg/dL  --   --  0.57   ALK PHOS U/L  --   --  85   ALT U/L  --   --  29   AST U/L  --   --  30   GLUCOSE RANDOM mg/dL 125   < > 136    < > = values in this interval not displayed.         Results from last 7 days   Lab Units 06/14/25  0736 06/13/25  2051 06/13/25  1600 06/13/25  1205 06/13/25  0741 06/12/25  2015 06/12/25  1703 06/12/25  1632 06/12/25  1607 06/12/25  1056 06/12/25  0757 06/11/25  2301   POC GLUCOSE mg/dl 150* 174* 135 157* 87 155* 83 62* 64* 152* 134 111     Results from last 7 days   Lab Units 06/12/25  0554    HEMOGLOBIN A1C % 6.6*           Recent Cultures (last 7 days):         Imaging Results Review: No pertinent imaging studies reviewed.  Other Study Results Review: No additional pertinent studies reviewed.    Last 24 Hours Medication List:     Current Facility-Administered Medications:     acetaminophen (TYLENOL) tablet 650 mg, Q4H PRN    allopurinol (ZYLOPRIM) tablet 300 mg, Daily    aluminum-magnesium hydroxide-simethicone (MAALOX) oral suspension 30 mL, Q6H PRN    apixaban (ELIQUIS) tablet 2.5 mg, BID    ascorbic acid (VITAMIN C) tablet 500 mg, Daily    Cholecalciferol (VITAMIN D3) tablet 1,000 Units, Daily    dorzolamide-timolol (COSOPT) 2-0.5 % ophthalmic solution 1 drop, Q12H JAVAD    [Held by provider] ferrous sulfate tablet 325 mg, Daily    furosemide (LASIX) injection 80 mg, BID (diuretic)    gabapentin (NEURONTIN) capsule 300 mg, HS    insulin glargine (LANTUS) subcutaneous injection 12 Units 0.12 mL, QAM    insulin lispro (HumALOG/ADMELOG) 100 units/mL subcutaneous injection 1-5 Units, TID AC **AND** Fingerstick Glucose (POCT), TID AC    insulin lispro (HumALOG/ADMELOG) 100 units/mL subcutaneous injection 8 Units, TID With Meals    iron sucrose (VENOFER) 300 mg in sodium chloride 0.9 % 250 mL IVPB, Daily, Last Rate: 300 mg (06/14/25 0826)    latanoprost (XALATAN) 0.005 % ophthalmic solution 1 drop, HS    levothyroxine tablet 150 mcg, Early Morning    LORazepam (ATIVAN) tablet 0.5 mg, HS    ondansetron (ZOFRAN) injection 4 mg, Q6H PRN    pantoprazole (PROTONIX) EC tablet 20 mg, Daily Before Breakfast    pravastatin (PRAVACHOL) tablet 80 mg, Daily With Dinner    senna (SENOKOT) tablet 8.6 mg, HS PRN    tamsulosin (FLOMAX) capsule 0.4 mg, Daily With Dinner    [Held by provider] torsemide (DEMADEX) tablet 20 mg, Daily    Administrative Statements   Today, Patient Was Seen By: Esha Cee MD  I have spent a total time of 57 minutes in caring for this patient on the day of the visit/encounter including  Diagnostic results, Prognosis, Risks and benefits of tx options, Instructions for management, Patient and family education, Importance of tx compliance, Risk factor reductions, Impressions, Counseling / Coordination of care, Documenting in the medical record, Reviewing/placing orders in the medical record (including tests, medications, and/or procedures), Obtaining or reviewing history  , and Communicating with other healthcare professionals .    **Please Note: This note may have been constructed using a voice recognition system.**

## 2025-06-14 NOTE — PLAN OF CARE
Problem: Potential for Falls  Goal: Patient will remain free of falls  Description: INTERVENTIONS:  - Educate patient/family on patient safety including physical limitations  - Instruct patient to call for assistance with activity   - Consider consulting OT/PT to assist with strengthening/mobility based on AM PAC & JH-HLM score  - Consult OT/PT to assist with strengthening/mobility   - Keep Call bell within reach  - Keep bed low and locked with side rails adjusted as appropriate  - Keep care items and personal belongings within reach  - Initiate and maintain comfort rounds  - Make Fall Risk Sign visible to staff  - Offer Toileting every 2 Hours, in advance of need  - Initiate/Maintain 2alarm  - Obtain necessary fall risk management equipment: 2  - Apply yellow socks and bracelet for high fall risk patients  - Consider moving patient to room near nurses station  Outcome: Progressing     Problem: Decreased Cardiac Output  Goal: Cardiac output adequate for individual needs  Description: INTERVENTIONS: Monitor for signs and symptoms of decreased cardiac output   - Monitor for dyspnea with exertion and at rest  - Monitor for orthopnea  - Monitor for signs of tachycardia. Place patient on telemetry monitoring.  - Assess patient for jugular vein distention  - Assess patient for lower extremity edema and poor peripheral perfusion   - Auscultate lung sound for Fine bibasilar crackles   - Monitor for cardiac arrythmias   - Administer beta blockers, antiarrhythmic, and blood pressure medications as ordered    Outcome: Progressing

## 2025-06-14 NOTE — PLAN OF CARE
Problem: Potential for Falls  Goal: Patient will remain free of falls  Description: INTERVENTIONS:  - Educate patient/family on patient safety including physical limitations  - Instruct patient to call for assistance with activity   - Consider consulting OT/PT to assist with strengthening/mobility based on AM PAC & JH-HLM score  - Consult OT/PT to assist with strengthening/mobility   - Keep Call bell within reach  - Keep bed low and locked with side rails adjusted as appropriate  - Keep care items and personal belongings within reach  - Initiate and maintain comfort rounds  - Make Fall Risk Sign visible to staff  - Offer Toileting every 2 Hours, in advance of need  - Initiate/Maintain bed/chairalarm  - Obtain necessary fall risk management equipment: yellow bracelet/socks  - Apply yellow socks and bracelet for high fall risk patients  - Consider moving patient to room near nurses station  Outcome: Progressing     Problem: Decreased Cardiac Output  Goal: Cardiac output adequate for individual needs  Description: INTERVENTIONS: Monitor for signs and symptoms of decreased cardiac output   - Monitor for dyspnea with exertion and at rest  - Monitor for orthopnea  - Monitor for signs of tachycardia. Place patient on telemetry monitoring.  - Assess patient for jugular vein distention  - Assess patient for lower extremity edema and poor peripheral perfusion   - Auscultate lung sound for Fine bibasilar crackles   - Monitor for cardiac arrythmias   - Administer beta blockers, antiarrhythmic, and blood pressure medications as ordered    Outcome: Progressing     Problem: Impaired Gas Exchange  Goal: Optimize oxygenation and ensure adequate ventilation  Description: INTERVENTIONS: Monitor for signs and symptoms of respiratory distress                - Elevate HOB or use high fowlers to promote lung expansion                - Administer oxygen as ordered to maintain adequate oxygenation                - Encourage use of IS to  promote lung expansion and prevent PN                - Monitor ABGs to assess oxygenation status                - Monitor blood oxygen level to maintain adequate oxygenation                - Encourage cough and deep breathing exercises to promote lung expansion                - Monitor patient's mental status for increased confusion    Outcome: Progressing     Problem: Excess Fluid Volume  Goal: Patient is able to achieve and maintain homeostasis  Description: INTERVENTIONS: Monitor for sign and symptoms of fluid overload  - Evaluate LE edema every shift  - Elevate LE to prevent dependent edema  - Apply ALFREDO stockings as ordered   - Monitor ankle circumference daily  - Assess for jugular vein distention  - Evaluate provider orders for the CHF diuretic algorithm. Administer diuretics as ordered  - Weigh the patient daily at 0600 and report a weight gain of five pounds or more   - Strict intake and output  - Monitor fluid intake and adhere to fluid restrictions  - Assess lung sounds every shift and as needed  - Monitor vital signs and lab values (CBC, chem, BUN, BNP)  - Measure and document urine output    Outcome: Progressing     Problem: Activity Intolerance  Goal: Patient is able to perform activities within their limitations  Description: INTERVENTIONS:                       -   Alternate periods of activity with periods of rest                 -   Patients is able to maintain normal vitals heart rhythm during activity                 -   Gradually increase activity and exercise as patient can tolerate                 -   Monitor blood pressure and heart before and after exercise                  -   Monitor blood oxygen saturation during activity and apply oxygen as needed    Outcome: Progressing     Problem: Knowledge Deficit  Goal: Patient is able to verbalize understanding of Heart Failure after education  Description: INTERVENTIONS:  - Educate the patient and family on signs and symptoms of HF  - Provide the  patient with HF education and HF zone tool  - Educate on the importance of daily weight in the AM and reporting a weight gain               of 3 or more pounds to their primary care physician  - Monitor for SOB  - Maintain and sodium and fluid restriction  - Educate the patient on the importance of medications such as: diuretics, betablockers,               antiarrhythmics and their purpose, dose, route, side effects and labs               if they are needed    Outcome: Progressing     Problem: Prexisting or High Potential for Compromised Skin Integrity  Goal: Skin integrity is maintained or improved  Description: INTERVENTIONS:  - Identify patients at risk for skin breakdown  - Assess and monitor skin integrity including under and around medical devices   - Assess and monitor nutrition and hydration status  - Monitor labs  - Assess for incontinence   - Turn and reposition patient  - Assist with mobility/ambulation  - Relieve pressure over mar prominences   - Avoid friction and shearing  - Provide appropriate hygiene as needed including keeping skin clean and dry  - Evaluate need for skin moisturizer/barrier cream  - Collaborate with interdisciplinary team  - Patient/family teaching  - Consider wound care consult

## 2025-06-14 NOTE — ASSESSMENT & PLAN NOTE
2024    Rozina Monterroso (:  1976) is a 48 y.o. female, here for evaluation of the following medical concerns:    Chief Complaint   Patient presents with    Establish Care        HPI    Patient is here today to establish care.   She is concerned about bilateral LE swelling. This is chronic problem but has been getting worse. She notes that her mom has bad lymphedema in both legs.   Patient is on her feet a lot. Swelling is worse towards the end of the day, best in the morning.     Review of Systems   Constitutional:  Negative for fatigue and unexpected weight change.   HENT:  Negative for hearing loss and sinus pain.    Eyes:  Negative for pain.   Respiratory:  Negative for chest tightness and shortness of breath.    Cardiovascular:  Negative for chest pain and palpitations.   Gastrointestinal:  Negative for abdominal pain, constipation, diarrhea and vomiting.   Genitourinary:  Negative for difficulty urinating and vaginal bleeding.   Musculoskeletal:  Negative for myalgias.   Neurological:  Negative for dizziness and weakness.   Psychiatric/Behavioral:  Negative for dysphoric mood.        Prior to Visit Medications    Medication Sig Taking? Authorizing Provider   pantoprazole (PROTONIX) 40 MG tablet Take 1 tablet by mouth daily Yes Tiffany Sweeney MD   ibuprofen (ADVIL;MOTRIN) 200 MG tablet Take 2 tablets by mouth every 6 hours as needed for Pain Took 800 mg at 1730 today 2021  ProviderMyesha MD        Allergies   Allergen Reactions    Bactrim [Sulfamethoxazole-Trimethoprim] Other (See Comments)     Flu like symptoms       Past Medical History:   Diagnosis Date    Acute bronchitis     BV (bacterial vaginosis) 10/20/2016    COVID-19 2020    Edema     Hirsutism     Obesity     Prediabetes     Shingles 2011       No past surgical history on file.    Social History     Socioeconomic History    Marital status:      Spouse name: Not on file    Number of children: Not on  Currently at [facility] for acute rehab  Plan to return there upon discharge  Pt/Ot rubin recommending Level 2 care

## 2025-06-14 NOTE — ASSESSMENT & PLAN NOTE
History of anemia secondary to CKD stage IV  Hemoglobin 8.3 on admission  Hemoglobin 1 month ago was 14.0, however suspect that this was erroneous lab our as patient's baseline as of a year ago was in the tens  Fecal occult stool in the ED was negative  Hold home iron supplementation  B12, WNL  folate WNL   iron panel low will initiate venofer 2/3  Continue Eliquis for now as there are no signs of active bleeding and suspect that this is around patient's baseline

## 2025-06-15 LAB
ANION GAP SERPL CALCULATED.3IONS-SCNC: 8 MMOL/L (ref 4–13)
BUN SERPL-MCNC: 62 MG/DL (ref 5–25)
CALCIUM SERPL-MCNC: 9.3 MG/DL (ref 8.4–10.2)
CHLORIDE SERPL-SCNC: 92 MMOL/L (ref 96–108)
CO2 SERPL-SCNC: 42 MMOL/L (ref 21–32)
CREAT SERPL-MCNC: 2.3 MG/DL (ref 0.6–1.3)
ERYTHROCYTE [DISTWIDTH] IN BLOOD BY AUTOMATED COUNT: 14.3 % (ref 11.6–15.1)
GFR SERPL CREATININE-BSD FRML MDRD: 24 ML/MIN/1.73SQ M
GLUCOSE SERPL-MCNC: 113 MG/DL (ref 65–140)
GLUCOSE SERPL-MCNC: 126 MG/DL (ref 65–140)
GLUCOSE SERPL-MCNC: 156 MG/DL (ref 65–140)
GLUCOSE SERPL-MCNC: 172 MG/DL (ref 65–140)
GLUCOSE SERPL-MCNC: 180 MG/DL (ref 65–140)
HCT VFR BLD AUTO: 27.6 % (ref 36.5–49.3)
HGB BLD-MCNC: 8.6 G/DL (ref 12–17)
MCH RBC QN AUTO: 33.6 PG (ref 26.8–34.3)
MCHC RBC AUTO-ENTMCNC: 31.2 G/DL (ref 31.4–37.4)
MCV RBC AUTO: 108 FL (ref 82–98)
PLATELET # BLD AUTO: 270 THOUSANDS/UL (ref 149–390)
PMV BLD AUTO: 9.6 FL (ref 8.9–12.7)
POTASSIUM SERPL-SCNC: 3.5 MMOL/L (ref 3.5–5.3)
RBC # BLD AUTO: 2.56 MILLION/UL (ref 3.88–5.62)
SODIUM SERPL-SCNC: 142 MMOL/L (ref 135–147)
WBC # BLD AUTO: 8.59 THOUSAND/UL (ref 4.31–10.16)

## 2025-06-15 PROCEDURE — 82948 REAGENT STRIP/BLOOD GLUCOSE: CPT

## 2025-06-15 PROCEDURE — 80048 BASIC METABOLIC PNL TOTAL CA: CPT | Performed by: STUDENT IN AN ORGANIZED HEALTH CARE EDUCATION/TRAINING PROGRAM

## 2025-06-15 PROCEDURE — 99233 SBSQ HOSP IP/OBS HIGH 50: CPT | Performed by: STUDENT IN AN ORGANIZED HEALTH CARE EDUCATION/TRAINING PROGRAM

## 2025-06-15 PROCEDURE — 85027 COMPLETE CBC AUTOMATED: CPT | Performed by: STUDENT IN AN ORGANIZED HEALTH CARE EDUCATION/TRAINING PROGRAM

## 2025-06-15 RX ADMIN — LORAZEPAM 0.5 MG: 0.5 TABLET ORAL at 22:15

## 2025-06-15 RX ADMIN — FUROSEMIDE 80 MG: 10 INJECTION, SOLUTION INTRAVENOUS at 18:40

## 2025-06-15 RX ADMIN — LATANOPROST 1 DROP: 50 SOLUTION OPHTHALMIC at 22:15

## 2025-06-15 RX ADMIN — PRAVASTATIN SODIUM 80 MG: 80 TABLET ORAL at 18:42

## 2025-06-15 RX ADMIN — INSULIN LISPRO 8 UNITS: 100 INJECTION, SOLUTION INTRAVENOUS; SUBCUTANEOUS at 08:34

## 2025-06-15 RX ADMIN — IRON SUCROSE 300 MG: 20 INJECTION, SOLUTION INTRAVENOUS at 08:48

## 2025-06-15 RX ADMIN — DORZOLAMIDE HYDROCHLORIDE AND TIMOLOL MALEATE 1 DROP: 20; 5 SOLUTION OPHTHALMIC at 08:34

## 2025-06-15 RX ADMIN — DORZOLAMIDE HYDROCHLORIDE AND TIMOLOL MALEATE 1 DROP: 20; 5 SOLUTION OPHTHALMIC at 22:15

## 2025-06-15 RX ADMIN — INSULIN LISPRO 1 UNITS: 100 INJECTION, SOLUTION INTRAVENOUS; SUBCUTANEOUS at 13:00

## 2025-06-15 RX ADMIN — LEVOTHYROXINE SODIUM 150 MCG: 75 TABLET ORAL at 04:36

## 2025-06-15 RX ADMIN — ALLOPURINOL 300 MG: 300 TABLET ORAL at 08:33

## 2025-06-15 RX ADMIN — INSULIN GLARGINE 12 UNITS: 100 INJECTION, SOLUTION SUBCUTANEOUS at 08:34

## 2025-06-15 RX ADMIN — FUROSEMIDE 80 MG: 10 INJECTION, SOLUTION INTRAVENOUS at 08:33

## 2025-06-15 RX ADMIN — GABAPENTIN 300 MG: 300 CAPSULE ORAL at 22:14

## 2025-06-15 RX ADMIN — Medication 1000 UNITS: at 08:34

## 2025-06-15 RX ADMIN — INSULIN LISPRO 8 UNITS: 100 INJECTION, SOLUTION INTRAVENOUS; SUBCUTANEOUS at 18:41

## 2025-06-15 RX ADMIN — PANTOPRAZOLE SODIUM 20 MG: 20 TABLET, DELAYED RELEASE ORAL at 04:36

## 2025-06-15 RX ADMIN — INSULIN LISPRO 8 UNITS: 100 INJECTION, SOLUTION INTRAVENOUS; SUBCUTANEOUS at 13:00

## 2025-06-15 RX ADMIN — APIXABAN 2.5 MG: 2.5 TABLET, FILM COATED ORAL at 18:40

## 2025-06-15 RX ADMIN — TAMSULOSIN HYDROCHLORIDE 0.4 MG: 0.4 CAPSULE ORAL at 18:42

## 2025-06-15 RX ADMIN — INSULIN LISPRO 1 UNITS: 100 INJECTION, SOLUTION INTRAVENOUS; SUBCUTANEOUS at 18:40

## 2025-06-15 RX ADMIN — OXYCODONE HYDROCHLORIDE AND ACETAMINOPHEN 500 MG: 500 TABLET ORAL at 08:33

## 2025-06-15 RX ADMIN — APIXABAN 2.5 MG: 2.5 TABLET, FILM COATED ORAL at 08:34

## 2025-06-15 NOTE — ASSESSMENT & PLAN NOTE
Wt Readings from Last 3 Encounters:   06/15/25 85.1 kg (187 lb 9.8 oz)   03/10/25 85.3 kg (188 lb)   03/04/25 82.6 kg (182 lb)   Presenting to the ED with cough and leg swelling x2 weeks   Last echo March 2024: LVEF 65% with G2 DD  Home diuretic is torsemide 20 Mg daily - SNF had planned to increase diuretic day of admit (torsemide to 60 Mg daily x 5 days with Zaroxolyn 2.5mg Tues/Thurs/Sat x1 week and then increase home torsemide to 40mg daily after)   CXR with mild pulmonary congestion and small pleural effusion -admission exam: B/L LE with 3-4+ pitting edema and fine rales bilaterally  Of note, RUE significantly swollen as well - check RUE duplex for completeness (VTE less likely though as he is on eliquis)   Given Lasix 50 Mg IV x 1 in the ED  I/O and daily weights  Sodium fluid restriction  Initially admitted as observation, however upon clinical assessment pt made inpatient due to aggressive IV diuresis needs   Echo: Showing an LVEF of 50% systolic function mildly reduced with severe diastolic dysfunction multiple segments are hypokinetic left atrium moderately dilated, aortic bioprosthetic valve present elevated right ventricular systolic pressure at 54 aortic root exhibiting severe fibrocalcific change  discussed with cardio continue lasix 80 IV bid, Pt continues to have a good uop with net -2 L. Cr continues to fluctuate at baseline.

## 2025-06-15 NOTE — ASSESSMENT & PLAN NOTE
Currently at [facility] for acute rehab  Plan to return there upon discharge  Pt/Ot rubin recommending Level 2 care

## 2025-06-15 NOTE — ASSESSMENT & PLAN NOTE
History of anemia secondary to CKD stage IV  Hemoglobin 8.3 on admission  Hemoglobin 1 month ago was 14.0, however suspect that this was erroneous lab our as patient's baseline as of a year ago was in the tens  Fecal occult stool in the ED was negative  Hold home iron supplementation  B12, WNL  folate WNL   iron panel low will initiate venofer 3/3  Continue Eliquis for now as there are no signs of active bleeding and suspect that this is around patient's baseline

## 2025-06-15 NOTE — PLAN OF CARE
Problem: Potential for Falls  Goal: Patient will remain free of falls  Description: INTERVENTIONS:  - Educate patient/family on patient safety including physical limitations  - Instruct patient to call for assistance with activity   - Consider consulting OT/PT to assist with strengthening/mobility based on AM PAC & JH-HLM score  - Consult OT/PT to assist with strengthening/mobility   - Keep Call bell within reach  - Keep bed low and locked with side rails adjusted as appropriate  - Keep care items and personal belongings within reach  - Initiate and maintain comfort rounds  - Make Fall Risk Sign visible to staff  - Offer Toileting every 2 Hours, in advance of need  - Initiate/Maintain 2alarm  - Obtain necessary fall risk management equipment: 2  - Apply yellow socks and bracelet for high fall risk patients  - Consider moving patient to room near nurses station  Outcome: Progressing     Problem: Decreased Cardiac Output  Goal: Cardiac output adequate for individual needs  Description: INTERVENTIONS: Monitor for signs and symptoms of decreased cardiac output   - Monitor for dyspnea with exertion and at rest  - Monitor for orthopnea  - Monitor for signs of tachycardia. Place patient on telemetry monitoring.  - Assess patient for jugular vein distention  - Assess patient for lower extremity edema and poor peripheral perfusion   - Auscultate lung sound for Fine bibasilar crackles   - Monitor for cardiac arrythmias   - Administer beta blockers, antiarrhythmic, and blood pressure medications as ordered    Outcome: Progressing     Problem: Impaired Gas Exchange  Goal: Optimize oxygenation and ensure adequate ventilation  Description: INTERVENTIONS: Monitor for signs and symptoms of respiratory distress                - Elevate HOB or use high fowlers to promote lung expansion                - Administer oxygen as ordered to maintain adequate oxygenation                - Encourage use of IS to promote lung expansion and  prevent PN                - Monitor ABGs to assess oxygenation status                - Monitor blood oxygen level to maintain adequate oxygenation                - Encourage cough and deep breathing exercises to promote lung expansion                - Monitor patient's mental status for increased confusion    Outcome: Progressing     Problem: Excess Fluid Volume  Goal: Patient is able to achieve and maintain homeostasis  Description: INTERVENTIONS: Monitor for sign and symptoms of fluid overload  - Evaluate LE edema every shift  - Elevate LE to prevent dependent edema  - Apply ALFREDO stockings as ordered   - Monitor ankle circumference daily  - Assess for jugular vein distention  - Evaluate provider orders for the CHF diuretic algorithm. Administer diuretics as ordered  - Weigh the patient daily at 0600 and report a weight gain of five pounds or more   - Strict intake and output  - Monitor fluid intake and adhere to fluid restrictions  - Assess lung sounds every shift and as needed  - Monitor vital signs and lab values (CBC, chem, BUN, BNP)  - Measure and document urine output    Outcome: Progressing     Problem: Activity Intolerance  Goal: Patient is able to perform activities within their limitations  Description: INTERVENTIONS:                       -   Alternate periods of activity with periods of rest                 -   Patients is able to maintain normal vitals heart rhythm during activity                 -   Gradually increase activity and exercise as patient can tolerate                 -   Monitor blood pressure and heart before and after exercise                  -   Monitor blood oxygen saturation during activity and apply oxygen as needed    Outcome: Progressing     Problem: Prexisting or High Potential for Compromised Skin Integrity  Goal: Skin integrity is maintained or improved  Description: INTERVENTIONS:  - Identify patients at risk for skin breakdown  - Assess and monitor skin integrity including  under and around medical devices   - Assess and monitor nutrition and hydration status  - Monitor labs  - Assess for incontinence   - Turn and reposition patient  - Assist with mobility/ambulation  - Relieve pressure over mar prominences   - Avoid friction and shearing  - Provide appropriate hygiene as needed including keeping skin clean and dry  - Evaluate need for skin moisturizer/barrier cream  - Collaborate with interdisciplinary team  - Patient/family teaching  - Consider wound care consult    Assess:  - Review Hernandez scale daily  - Clean and moisturize skin every 2  - Inspect skin when repositioning, toileting, and assisting with ADLS  - Assess under medical devices such as 2 every 2  - Assess extremities for adequate circulation and sensation     Bed Management:  - Have minimal linens on bed & keep smooth, unwrinkled  - Change linens as needed when moist or perspiring  - Avoid sitting or lying in one position for more than 2 hours while in bed?Keep HOB at 2degrees   - Toileting:  - Offer bedside commode  - Assess for incontinence every 2  - Use incontinent care products after each incontinent episode such as 2    Activity:  - Mobilize patient 2 times a day  - Encourage activity and walks on unit  - Encourage or provide ROM exercises   - Turn and reposition patient every 2 Hours  - Use appropriate equipment to lift or move patient in bed  - Instruct/ Assist with weight shifting every 2 when out of bed in chair  - Consider limitation of chair time 2 hour intervals    Skin Care:  - Avoid use of baby powder, tape, friction and shearing, hot water or constrictive clothing  - Relieve pressure over bony prominences using 2  - Do not massage red bony areas    Next Steps:  - Teach patient strategies to minimize risks such as 2  - Consider consults to  interdisciplinary teams such as 2  Outcome: Progressing     Problem: Knowledge Deficit  Goal: Patient is able to verbalize understanding of Heart Failure after  education  Description: INTERVENTIONS:  - Educate the patient and family on signs and symptoms of HF  - Provide the patient with HF education and HF zone tool  - Educate on the importance of daily weight in the AM and reporting a weight gain               of 3 or more pounds to their primary care physician  - Monitor for SOB  - Maintain and sodium and fluid restriction  - Educate the patient on the importance of medications such as: diuretics, betablockers,               antiarrhythmics and their purpose, dose, route, side effects and labs               if they are needed    Outcome: Progressing

## 2025-06-15 NOTE — PROGRESS NOTES
Progress Note - Hospitalist   Name: Carlos Enrique Roth  89 y.o. male I MRN: [id number]  Unit/Bed#: [facility] I Date of Admission: 6/11/2025   Date of Service: 6/15/2025 I Hospital Day: 4    Assessment & Plan  Acute on chronic diastolic congestive heart failure (HCC)  Wt Readings from Last 3 Encounters:   06/15/25 85.1 kg (187 lb 9.8 oz)   03/10/25 85.3 kg (188 lb)   03/04/25 82.6 kg (182 lb)   Presenting to the ED with cough and leg swelling x2 weeks   Last echo March 2024: LVEF 65% with G2 DD  Home diuretic is torsemide 20 Mg daily - SNF had planned to increase diuretic day of admit (torsemide to 60 Mg daily x 5 days with Zaroxolyn 2.5mg Tues/Thurs/Sat x1 week and then increase home torsemide to 40mg daily after)   CXR with mild pulmonary congestion and small pleural effusion -admission exam: B/L LE with 3-4+ pitting edema and fine rales bilaterally  Of note, RUE significantly swollen as well - check RUE duplex for completeness (VTE less likely though as he is on eliquis)   Given Lasix 50 Mg IV x 1 in the ED  I/O and daily weights  Sodium fluid restriction  Initially admitted as observation, however upon clinical assessment pt made inpatient due to aggressive IV diuresis needs   Echo: Showing an LVEF of 50% systolic function mildly reduced with severe diastolic dysfunction multiple segments are hypokinetic left atrium moderately dilated, aortic bioprosthetic valve present elevated right ventricular systolic pressure at 54 aortic root exhibiting severe fibrocalcific change  discussed with cardio continue lasix 80 IV bid, Pt continues to have a good uop with net -2 L. Cr continues to fluctuate at baseline.    Anemia due to stage 4 chronic kidney disease  (HCC)  History of anemia secondary to CKD stage IV  Hemoglobin 8.3 on admission  Hemoglobin 1 month ago was 14.0, however suspect that this was erroneous lab our as patient's baseline as of a year ago was in the tens  Fecal occult stool in the ED was negative  Hold home  iron supplementation  B12, WNL  folate WNL   iron panel low will initiate venofer 3/3  Continue Eliquis for now as there are no signs of active bleeding and suspect that this is around patient's baseline  Generalized weakness  Currently at [facility] for acute rehab  Plan to return there upon discharge  Pt/Ot rubin recommending Level 2 care  Chronic respiratory failure with hypoxia (HCC)  Contained on 2-3 L nasal cannula at baseline  No increased O2 requirement  Continue home oxygen  Type 2 diabetes mellitus with diabetic neuropathy (HCC)  Lab Results   Component Value Date    HGBA1C 6.6 (H) 06/12/2025       Recent Labs     06/14/25  2050 06/15/25  0713 06/15/25  1112 06/15/25  1613   POCGLU 158* 126 180* 172*       Blood Sugar Average: Last 72 hrs:  (P) 139.0035890210846231  Home regimen: Lantus 24 units in morning and NovoLog 11 units 3 times daily with meals  Continue home regimen with the addition of SSI with meals  BS on the lower side decreased lantus to 12 and humalog to 8-> will continue this regimen as BS have improved  Persistent atrial fibrillation (HCC)  Not on any AV giuseppe blockers due to baseline bradycardia  Anticoagulated with Eliquis 2.5 Mg twice daily  Continue home Eliquis  Sinus rhythm at time of admission  CAD (coronary artery disease)  CAD s/p CABG x 4 in 2018  Continue formulary equivalent for home statin  Denies any anginal symptoms  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)  Baseline creatinine 2.2-2.6  Creatinine on admission is 2.32  Trend BMP daily in setting of IV diuresis  Patient still making urine  Postoperative hypothyroidism  Continue home Synthroid 150 mcg daily -Per SNF records this dose was new as of 4/22  TSH 4/22 was elevated at 37  Presuming Synthroid dose from facility was increased from elevated TSH, will continue home dose as TSH has improved to 14 will need repeat in 4-6 weeks    VTE Pharmacologic Prophylaxis: VTE Score: 5 High Risk (Score >/= 5) -  Pharmacological DVT Prophylaxis Ordered: apixaban (Eliquis). Sequential Compression Devices Ordered.    Mobility:   Basic Mobility Inpatient Raw Score: 12  JH-HLM Goal: 4: Move to chair/commode  JH-HLM Achieved: 4: Move to chair/commode  JH-HLM Goal achieved. Continue to encourage appropriate mobility.    Patient Centered Rounds: I performed bedside rounds with nursing staff today.   Discussions with Specialists or Other Care Team Provider: cardio, CM, Pt, Ot    Education and Discussions with Family / Patient: Updated  (son) via phone.    Current Length of Stay: 4 day(s)  Current Patient Status: Inpatient   Certification Statement: The patient will continue to require additional inpatient hospital stay due to achfe  Discharge Plan: Anticipate discharge in 48-72 hrs to rehab facility.    Code Status: Level 1 - Full Code    Diane Monaco was seen and examined at bedside.  No acute events overnight.  Discussed plan of care.  All questions and concerns were answered and addressed.  Has no acute complaints at this time.  Discussed with cardiology continue Lasix IV twice daily    Objective :  Temp:  [97.8 °F (36.6 °C)-97.9 °F (36.6 °C)] 97.8 °F (36.6 °C)  HR:  [66-85] 71  BP: (103-135)/(50-68) 131/62  Resp:  [18-20] 18  SpO2:  [89 %-96 %] 95 %    Body mass index is 29.38 kg/m².     Input and Output Summary (last 24 hours):     Intake/Output Summary (Last 24 hours) at 6/15/2025 1623  Last data filed at 6/15/2025 1524  Gross per 24 hour   Intake 830 ml   Output 2096 ml   Net -1266 ml       Physical Exam  Vitals and nursing note reviewed.   Constitutional:       General: He is not in acute distress.     Appearance: He is ill-appearing (chronically).   HENT:      Head: Normocephalic and atraumatic.     Cardiovascular:      Rate and Rhythm: Normal rate and regular rhythm.      Pulses: Normal pulses.      Heart sounds: Normal heart sounds.   Pulmonary:      Effort: Pulmonary effort is normal.      Breath  sounds: Normal breath sounds.   Abdominal:      General: Abdomen is flat. Bowel sounds are normal.      Palpations: Abdomen is soft.     Musculoskeletal:      Right lower leg: Edema present.      Left lower leg: Edema present.     Skin:     General: Skin is warm.     Neurological:      General: No focal deficit present.      Mental Status: He is alert and oriented to person, place, and time.           Lines/Drains:              Lab Results: I have reviewed the following results:   Results from last 7 days   Lab Units 06/15/25  0449 06/12/25  0532 06/11/25  1656   WBC Thousand/uL 8.59   < > 7.22   HEMOGLOBIN g/dL 8.6*   < > 8.3*   HEMATOCRIT % 27.6*   < > 27.0*   PLATELETS Thousands/uL 270   < > 237   SEGS PCT %  --   --  69   LYMPHO PCT %  --   --  16   MONO PCT %  --   --  12   EOS PCT %  --   --  2    < > = values in this interval not displayed.     Results from last 7 days   Lab Units 06/15/25  0449 06/13/25  0519 06/12/25  0532   SODIUM mmol/L 142   < > 138   POTASSIUM mmol/L 3.5   < > 3.6   CHLORIDE mmol/L 92*   < > 97   CO2 mmol/L 42*   < > 32   BUN mg/dL 62*   < > 58*   CREATININE mg/dL 2.30*   < > 2.20*   ANION GAP mmol/L 8   < > 9   CALCIUM mg/dL 9.3   < > 8.9   ALBUMIN g/dL  --   --  3.2*   TOTAL BILIRUBIN mg/dL  --   --  0.57   ALK PHOS U/L  --   --  85   ALT U/L  --   --  29   AST U/L  --   --  30   GLUCOSE RANDOM mg/dL 113   < > 136    < > = values in this interval not displayed.         Results from last 7 days   Lab Units 06/15/25  1613 06/15/25  1112 06/15/25  0713 06/14/25 2050 06/14/25  1618 06/14/25  1126 06/14/25  0736 06/13/25 2051 06/13/25  1600 06/13/25  1205 06/13/25  0741 06/12/25 2015   POC GLUCOSE mg/dl 172* 180* 126 158* 192* 190* 150* 174* 135 157* 87 155*     Results from last 7 days   Lab Units 06/12/25  0532   HEMOGLOBIN A1C % 6.6*           Recent Cultures (last 7 days):         Imaging Results Review: No pertinent imaging studies reviewed.  Other Study Results Review: No  additional pertinent studies reviewed.    Last 24 Hours Medication List:     Current Facility-Administered Medications:     acetaminophen (TYLENOL) tablet 650 mg, Q4H PRN    allopurinol (ZYLOPRIM) tablet 300 mg, Daily    aluminum-magnesium hydroxide-simethicone (MAALOX) oral suspension 30 mL, Q6H PRN    apixaban (ELIQUIS) tablet 2.5 mg, BID    ascorbic acid (VITAMIN C) tablet 500 mg, Daily    Cholecalciferol (VITAMIN D3) tablet 1,000 Units, Daily    dorzolamide-timolol (COSOPT) 2-0.5 % ophthalmic solution 1 drop, Q12H JAVAD    [Held by provider] ferrous sulfate tablet 325 mg, Daily    furosemide (LASIX) injection 80 mg, BID (diuretic)    gabapentin (NEURONTIN) capsule 300 mg, HS    insulin glargine (LANTUS) subcutaneous injection 12 Units 0.12 mL, QAM    insulin lispro (HumALOG/ADMELOG) 100 units/mL subcutaneous injection 1-5 Units, TID AC **AND** Fingerstick Glucose (POCT), TID AC    insulin lispro (HumALOG/ADMELOG) 100 units/mL subcutaneous injection 8 Units, TID With Meals    latanoprost (XALATAN) 0.005 % ophthalmic solution 1 drop, HS    levothyroxine tablet 150 mcg, Early Morning    LORazepam (ATIVAN) tablet 0.5 mg, HS    ondansetron (ZOFRAN) injection 4 mg, Q6H PRN    pantoprazole (PROTONIX) EC tablet 20 mg, Daily Before Breakfast    pravastatin (PRAVACHOL) tablet 80 mg, Daily With Dinner    senna (SENOKOT) tablet 8.6 mg, HS PRN    tamsulosin (FLOMAX) capsule 0.4 mg, Daily With Dinner    [Held by provider] torsemide (DEMADEX) tablet 20 mg, Daily    Administrative Statements   Today, Patient Was Seen By: Esha Cee MD  I have spent a total time of 57 minutes in caring for this patient on the day of the visit/encounter including Diagnostic results, Prognosis, Risks and benefits of tx options, Instructions for management, Patient and family education, Importance of tx compliance, Risk factor reductions, Impressions, Counseling / Coordination of care, Documenting in the medical record, Reviewing/placing orders  in the medical record (including tests, medications, and/or procedures), Obtaining or reviewing history  , and Communicating with other healthcare professionals .    **Please Note: This note may have been constructed using a voice recognition system.**

## 2025-06-15 NOTE — ASSESSMENT & PLAN NOTE
Lab Results   Component Value Date    HGBA1C 6.6 (H) 06/12/2025       Recent Labs     06/14/25  2050 06/15/25  0713 06/15/25  1112 06/15/25  1613   POCGLU 158* 126 180* 172*       Blood Sugar Average: Last 72 hrs:  (P) [id number]  Home regimen: Lantus 24 units in morning and NovoLog 11 units 3 times daily with meals  Continue home regimen with the addition of SSI with meals  BS on the lower side decreased lantus to 12 and humalog to 8-> will continue this regimen as BS have improved

## 2025-06-16 LAB
ANION GAP SERPL CALCULATED.3IONS-SCNC: 11 MMOL/L (ref 4–13)
BUN SERPL-MCNC: 67 MG/DL (ref 5–25)
CALCIUM SERPL-MCNC: 9.3 MG/DL (ref 8.4–10.2)
CHLORIDE SERPL-SCNC: 90 MMOL/L (ref 96–108)
CO2 SERPL-SCNC: 42 MMOL/L (ref 21–32)
CREAT SERPL-MCNC: 2.34 MG/DL (ref 0.6–1.3)
ERYTHROCYTE [DISTWIDTH] IN BLOOD BY AUTOMATED COUNT: 14.5 % (ref 11.6–15.1)
GFR SERPL CREATININE-BSD FRML MDRD: 23 ML/MIN/1.73SQ M
GLUCOSE SERPL-MCNC: 148 MG/DL (ref 65–140)
GLUCOSE SERPL-MCNC: 155 MG/DL (ref 65–140)
GLUCOSE SERPL-MCNC: 176 MG/DL (ref 65–140)
GLUCOSE SERPL-MCNC: 187 MG/DL (ref 65–140)
GLUCOSE SERPL-MCNC: 200 MG/DL (ref 65–140)
HCT VFR BLD AUTO: 26.6 % (ref 36.5–49.3)
HGB BLD-MCNC: 8 G/DL (ref 12–17)
MCH RBC QN AUTO: 32.8 PG (ref 26.8–34.3)
MCHC RBC AUTO-ENTMCNC: 30.1 G/DL (ref 31.4–37.4)
MCV RBC AUTO: 109 FL (ref 82–98)
PLATELET # BLD AUTO: 291 THOUSANDS/UL (ref 149–390)
PMV BLD AUTO: 9.7 FL (ref 8.9–12.7)
POTASSIUM SERPL-SCNC: 3.5 MMOL/L (ref 3.5–5.3)
RBC # BLD AUTO: 2.44 MILLION/UL (ref 3.88–5.62)
SODIUM SERPL-SCNC: 143 MMOL/L (ref 135–147)
WBC # BLD AUTO: 7.55 THOUSAND/UL (ref 4.31–10.16)

## 2025-06-16 PROCEDURE — 85027 COMPLETE CBC AUTOMATED: CPT | Performed by: STUDENT IN AN ORGANIZED HEALTH CARE EDUCATION/TRAINING PROGRAM

## 2025-06-16 PROCEDURE — 82948 REAGENT STRIP/BLOOD GLUCOSE: CPT

## 2025-06-16 PROCEDURE — 99232 SBSQ HOSP IP/OBS MODERATE 35: CPT | Performed by: INTERNAL MEDICINE

## 2025-06-16 PROCEDURE — 80048 BASIC METABOLIC PNL TOTAL CA: CPT | Performed by: STUDENT IN AN ORGANIZED HEALTH CARE EDUCATION/TRAINING PROGRAM

## 2025-06-16 RX ADMIN — LEVOTHYROXINE SODIUM 150 MCG: 75 TABLET ORAL at 05:15

## 2025-06-16 RX ADMIN — ALLOPURINOL 300 MG: 300 TABLET ORAL at 08:13

## 2025-06-16 RX ADMIN — Medication 1000 UNITS: at 08:13

## 2025-06-16 RX ADMIN — LATANOPROST 1 DROP: 50 SOLUTION OPHTHALMIC at 22:23

## 2025-06-16 RX ADMIN — APIXABAN 2.5 MG: 2.5 TABLET, FILM COATED ORAL at 17:36

## 2025-06-16 RX ADMIN — APIXABAN 2.5 MG: 2.5 TABLET, FILM COATED ORAL at 08:13

## 2025-06-16 RX ADMIN — INSULIN LISPRO 8 UNITS: 100 INJECTION, SOLUTION INTRAVENOUS; SUBCUTANEOUS at 17:36

## 2025-06-16 RX ADMIN — INSULIN LISPRO 1 UNITS: 100 INJECTION, SOLUTION INTRAVENOUS; SUBCUTANEOUS at 08:20

## 2025-06-16 RX ADMIN — PANTOPRAZOLE SODIUM 20 MG: 20 TABLET, DELAYED RELEASE ORAL at 05:14

## 2025-06-16 RX ADMIN — INSULIN LISPRO 8 UNITS: 100 INJECTION, SOLUTION INTRAVENOUS; SUBCUTANEOUS at 08:20

## 2025-06-16 RX ADMIN — DORZOLAMIDE HYDROCHLORIDE AND TIMOLOL MALEATE 1 DROP: 20; 5 SOLUTION OPHTHALMIC at 08:20

## 2025-06-16 RX ADMIN — INSULIN LISPRO 1 UNITS: 100 INJECTION, SOLUTION INTRAVENOUS; SUBCUTANEOUS at 13:02

## 2025-06-16 RX ADMIN — INSULIN LISPRO 1 UNITS: 100 INJECTION, SOLUTION INTRAVENOUS; SUBCUTANEOUS at 17:36

## 2025-06-16 RX ADMIN — LORAZEPAM 0.5 MG: 0.5 TABLET ORAL at 22:23

## 2025-06-16 RX ADMIN — PRAVASTATIN SODIUM 80 MG: 80 TABLET ORAL at 17:36

## 2025-06-16 RX ADMIN — INSULIN LISPRO 8 UNITS: 100 INJECTION, SOLUTION INTRAVENOUS; SUBCUTANEOUS at 13:02

## 2025-06-16 RX ADMIN — OXYCODONE HYDROCHLORIDE AND ACETAMINOPHEN 500 MG: 500 TABLET ORAL at 08:13

## 2025-06-16 RX ADMIN — TAMSULOSIN HYDROCHLORIDE 0.4 MG: 0.4 CAPSULE ORAL at 17:36

## 2025-06-16 RX ADMIN — INSULIN GLARGINE 12 UNITS: 100 INJECTION, SOLUTION SUBCUTANEOUS at 08:20

## 2025-06-16 RX ADMIN — DORZOLAMIDE HYDROCHLORIDE AND TIMOLOL MALEATE 1 DROP: 20; 5 SOLUTION OPHTHALMIC at 22:23

## 2025-06-16 RX ADMIN — GABAPENTIN 300 MG: 300 CAPSULE ORAL at 22:23

## 2025-06-16 NOTE — ASSESSMENT & PLAN NOTE
CABG x 4 in 2018 -LIMA to LAD, SVG to OM 2 and SVG Y graft to OM 3 and left posterior descending.  Patient denies any chest pain

## 2025-06-16 NOTE — ASSESSMENT & PLAN NOTE
Wt Readings from Last 3 Encounters:   06/16/25 82.3 kg (181 lb 8 oz)   03/10/25 85.3 kg (188 lb)   03/04/25 82.6 kg (182 lb)   Presenting to the ED with cough and leg swelling x2 weeks   Last echo March 2024: LVEF 65% with G2 DD  Home diuretic is torsemide 20 Mg daily - SNF had planned to increase diuretic day of admit (torsemide to 60 Mg daily x 5 days with Zaroxolyn 2.5mg Tues/Thurs/Sat x1 week and then increase home torsemide to 40mg daily after)   CXR with mild pulmonary congestion and small pleural effusion -admission exam: B/L LE with 3-4+ pitting edema and fine rales bilaterally  Of note, RUE significantly swollen as well - check RUE duplex for completeness (VTE less likely though as he is on eliquis)   Given Lasix 50 Mg IV x 1 in the ED  I/O and daily weights  Sodium fluid restriction  Initially admitted as observation, however upon clinical assessment pt made inpatient due to aggressive IV diuresis needs   Echo: Showing an LVEF of 50% systolic function mildly reduced with severe diastolic dysfunction multiple segments are hypokinetic left atrium moderately dilated, aortic bioprosthetic valve present elevated right ventricular systolic pressure at 54 aortic root exhibiting severe fibrocalcific change  discussed with cardio continue lasix 80 IV bid, Pt continues to have a good uop with net -2 L. Cr continues to fluctuate at baseline.

## 2025-06-16 NOTE — ASSESSMENT & PLAN NOTE
Currently at [facility] for rehab  Plan to return there upon discharge  Pt/Ot rubin recommending Level 2 care

## 2025-06-16 NOTE — ASSESSMENT & PLAN NOTE
Lab Results   Component Value Date    HGBA1C 6.6 (H) 06/12/2025       Recent Labs     06/15/25  1112 06/15/25  1613 06/15/25  2057 06/16/25  0747   POCGLU 180* 172* 156* 155*       Blood Sugar Average: Last 72 hrs:  (P) 156.5596798579656265  Home regimen: Lantus 24 units in morning and NovoLog 11 units 3 times daily with meals  Continue home regimen with the addition of SSI with meals  BS on the lower side decreased lantus to 12 and humalog to 8-> will continue this regimen as BS have improved

## 2025-06-16 NOTE — PLAN OF CARE
Problem: Potential for Falls  Goal: Patient will remain free of falls  Description: INTERVENTIONS:  - Educate patient/family on patient safety including physical limitations  - Instruct patient to call for assistance with activity   - Consider consulting OT/PT to assist with strengthening/mobility based on AM PAC & JH-HLM score  - Consult OT/PT to assist with strengthening/mobility   - Keep Call bell within reach  - Keep bed low and locked with side rails adjusted as appropriate  - Keep care items and personal belongings within reach  - Initiate and maintain comfort rounds  - Make Fall Risk Sign visible to staff  - Offer Toileting every 2 Hours, in advance of need  - Initiate/Maintain 2alarm  - Obtain necessary fall risk management equipment: 2  - Apply yellow socks and bracelet for high fall risk patients  - Consider moving patient to room near nurses station  Outcome: Progressing     Problem: Decreased Cardiac Output  Goal: Cardiac output adequate for individual needs  Description: INTERVENTIONS: Monitor for signs and symptoms of decreased cardiac output   - Monitor for dyspnea with exertion and at rest  - Monitor for orthopnea  - Monitor for signs of tachycardia. Place patient on telemetry monitoring.  - Assess patient for jugular vein distention  - Assess patient for lower extremity edema and poor peripheral perfusion   - Auscultate lung sound for Fine bibasilar crackles   - Monitor for cardiac arrythmias   - Administer beta blockers, antiarrhythmic, and blood pressure medications as ordered    Outcome: Progressing     Problem: Impaired Gas Exchange  Goal: Optimize oxygenation and ensure adequate ventilation  Description: INTERVENTIONS: Monitor for signs and symptoms of respiratory distress                - Elevate HOB or use high fowlers to promote lung expansion                - Administer oxygen as ordered to maintain adequate oxygenation                - Encourage use of IS to promote lung expansion and  prevent PN                - Monitor ABGs to assess oxygenation status                - Monitor blood oxygen level to maintain adequate oxygenation                - Encourage cough and deep breathing exercises to promote lung expansion                - Monitor patient's mental status for increased confusion    Outcome: Progressing     Problem: Excess Fluid Volume  Goal: Patient is able to achieve and maintain homeostasis  Description: INTERVENTIONS: Monitor for sign and symptoms of fluid overload  - Evaluate LE edema every shift  - Elevate LE to prevent dependent edema  - Apply ALFREDO stockings as ordered   - Monitor ankle circumference daily  - Assess for jugular vein distention  - Evaluate provider orders for the CHF diuretic algorithm. Administer diuretics as ordered  - Weigh the patient daily at 0600 and report a weight gain of five pounds or more   - Strict intake and output  - Monitor fluid intake and adhere to fluid restrictions  - Assess lung sounds every shift and as needed  - Monitor vital signs and lab values (CBC, chem, BUN, BNP)  - Measure and document urine output    Outcome: Progressing     Problem: Activity Intolerance  Goal: Patient is able to perform activities within their limitations  Description: INTERVENTIONS:                       -   Alternate periods of activity with periods of rest                 -   Patients is able to maintain normal vitals heart rhythm during activity                 -   Gradually increase activity and exercise as patient can tolerate                 -   Monitor blood pressure and heart before and after exercise                  -   Monitor blood oxygen saturation during activity and apply oxygen as needed    Outcome: Progressing     Problem: Knowledge Deficit  Goal: Patient is able to verbalize understanding of Heart Failure after education  Description: INTERVENTIONS:  - Educate the patient and family on signs and symptoms of HF  - Provide the patient with HF education  and HF zone tool  - Educate on the importance of daily weight in the AM and reporting a weight gain               of 3 or more pounds to their primary care physician  - Monitor for SOB  - Maintain and sodium and fluid restriction  - Educate the patient on the importance of medications such as: diuretics, betablockers,               antiarrhythmics and their purpose, dose, route, side effects and labs               if they are needed    Outcome: Progressing     Problem: Prexisting or High Potential for Compromised Skin Integrity  Goal: Skin integrity is maintained or improved  Description: INTERVENTIONS:  - Identify patients at risk for skin breakdown  - Assess and monitor skin integrity including under and around medical devices   - Assess and monitor nutrition and hydration status  - Monitor labs  - Assess for incontinence   - Turn and reposition patient  - Assist with mobility/ambulation  - Relieve pressure over mar prominences   - Avoid friction and shearing  - Provide appropriate hygiene as needed including keeping skin clean and dry  - Evaluate need for skin moisturizer/barrier cream  - Collaborate with interdisciplinary team  - Patient/family teaching  - Consider wound care consult    Assess:  - Review Hernandez scale daily  - Clean and moisturize skin every 2  - Inspect skin when repositioning, toileting, and assisting with ADLS  - Assess under medical devices such as 2 every 2  - Assess extremities for adequate circulation and sensation     Bed Management:  - Have minimal linens on bed & keep smooth, unwrinkled  - Change linens as needed when moist or perspiring  - Avoid sitting or lying in one position for more than 2 hours while in bed?Keep HOB at 2degrees   - Toileting:  - Offer bedside commode  - Assess for incontinence every 2  - Use incontinent care products after each incontinent episode such as 2    Activity:  - Mobilize patient 2 times a day  - Encourage activity and walks on unit  - Encourage or  provide ROM exercises   - Turn and reposition patient every 2 Hours  - Use appropriate equipment to lift or move patient in bed  - Instruct/ Assist with weight shifting every 2 when out of bed in chair  - Consider limitation of chair time 2 hour intervals    Skin Care:  - Avoid use of baby powder, tape, friction and shearing, hot water or constrictive clothing  - Relieve pressure over bony prominences using 2  - Do not massage red bony areas    Next Steps:  - Teach patient strategies to minimize risks such as 2  - Consider consults to  interdisciplinary teams such as 2  Outcome: Progressing

## 2025-06-16 NOTE — PROGRESS NOTES
Progress Note - Cardiology   Name: Carlos Enrique Roth Jr. 89 y.o. male I MRN: [id number]  Unit/Bed#: -01 I Date of Admission: 6/11/2025   Date of Service: 6/16/2025 I Hospital Day: 5    Assessment & Plan  Acute on chronic diastolic congestive heart failure (HCC)  Wt Readings from Last 3 Encounters:   06/16/25 82.3 kg (181 lb 8 oz)   03/10/25 85.3 kg (188 lb)   03/04/25 82.6 kg (182 lb)   HFpEF -presented with increased lower extremity edema over several weeks  Initial , troponins negative x 3,   Dry weight approximately 182 pounds  - initial accurate weight 196  EKG: Regular rhythm -wide QRS  Chest x-ray with vascular congestion and small right pleural effusion  Echocardiogram: EF 50%, hypokinesis in the mid anteroseptal, apical septal and apex, moderate LA dilatation, well-functioning bioprosthetic AVR, mild MR, mild TR, 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 torsemide in the a.m. -patient previously on 20 mg of torsemide daily -will increase to 40 mg daily.  CAD (coronary artery disease)  CABG x 4 in 2018 -LIMA to LAD, SVG to OM 2 and SVG Y graft to OM 3 and left posterior descending.  Patient denies any chest pain  Persistent atrial fibrillation (HCC)  On Eliquis 2.5 mg twice daily for stroke prevention, regular rate controlled rhythm at this time.  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)    Generalized weakness    Plan  Discontinue IV diuresis  Will likely start torsemide 40 mg daily tomorrow  Daily BMP  Strict I's and O's, daily weights    Subjective   Patient seen and examined, without complaints at this time    Objective :  Temp:  [98.7 °F (37.1 °C)-99.3 °F (37.4 °C)] 98.7 °F (37.1 °C)  HR:  [67-71] 68  BP: (104-131)/(46-62) 104/46  Resp:  [19-20] 20  SpO2:  [90 %-98 %] 98 %  O2 Device: Nasal cannula  Nasal Cannula O2 Flow Rate (L/min):  [3 L/min] 3 L/min  Orthostatic Blood Pressures      Flowsheet Row Most Recent Value   Blood Pressure 104/46 filed at 06/16/2025 0742   Patient Position - Orthostatic VS Lying filed at 06/16/2025 0742          First Weight: Weight - Scale: 88 kg (194 lb 0.1 oz) (06/11/25 2037)  Vitals:    06/15/25 0554 06/16/25 0528   Weight: 85.1 kg (187 lb 9.8 oz) 82.3 kg (181 lb 8 oz)     Physical Exam  Constitutional:       Appearance: Normal appearance. He is obese.   HENT:      Head: Normocephalic.      Nose: Nose normal.      Mouth/Throat:      Mouth: Mucous membranes are moist.     Cardiovascular:      Rate and Rhythm: Normal rate and regular rhythm.      Pulses: Normal pulses.      Heart sounds: Normal heart sounds.   Pulmonary:      Effort: Pulmonary effort is normal.   Abdominal:      General: There is no distension.      Palpations: Abdomen is soft.      Tenderness: There is no abdominal tenderness. There is no guarding.     Musculoskeletal:      Right lower leg: No edema.      Left lower leg: No edema.     Skin:     General: Skin is warm.      Capillary Refill: Capillary refill takes less than 2 seconds.     Neurological:      General: No focal deficit present.      Mental Status: He is alert and oriented to person, place, and time. Mental status is at baseline.     Psychiatric:         Mood and Affect: Mood normal.         Behavior: Behavior normal.         Thought Content: Thought content normal.           Lab Results: I have reviewed the following  results:  Results from last 7 days   Lab Units 06/16/25  0523 06/15/25  0449 06/14/25  0527   WBC Thousand/uL 7.55 8.59 8.83   HEMOGLOBIN g/dL 8.0* 8.6* 8.8*   HEMATOCRIT % 26.6* 27.6* 28.8*   PLATELETS Thousands/uL 291 270 264     Results from last 7 days   Lab Units 06/16/25  0523 06/15/25  0449 06/14/25  0527   POTASSIUM mmol/L 3.5 3.5 3.6   CHLORIDE mmol/L 90* 92* 93*   CO2 mmol/L 42* 42* 38*   BUN mg/dL 67* 62* 62*   CREATININE mg/dL 2.34* 2.30* 2.17*   CALCIUM mg/dL 9.3 9.3 9.4         Lab Results   Component Value Date    HGBA1C 6.6 (H) 06/12/2025     Lab Results   Component Value Date    TROPONINI 0.21 (H) 11/24/2018       Imaging Results Review: I reviewed radiology reports from this admission including: chest xray.  Other Study Results Review: EKG was reviewed.     VTE Pharmacologic Prophylaxis: VTE covered by:  apixaban, Oral, 2.5 mg at 06/16/25 0813     VTE Mechanical Prophylaxis: sequential compression device

## 2025-06-16 NOTE — ASSESSMENT & PLAN NOTE
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 torsemide in the a.m. -patient previously on 20 mg of torsemide daily -will increase to 40 mg daily.

## 2025-06-16 NOTE — PROGRESS NOTES
Progress Note - Hospitalist   Name: Carlos Enrique Roth JrMega 89 y.o. male I MRN: [id number]  Unit/Bed#: [facility] I Date of Admission: 6/11/2025   Date of Service: 6/16/2025 I Hospital Day: 5    Assessment & Plan  Acute on chronic diastolic congestive heart failure (HCC)  Wt Readings from Last 3 Encounters:   06/16/25 82.3 kg (181 lb 8 oz)   03/10/25 85.3 kg (188 lb)   03/04/25 82.6 kg (182 lb)   Presenting to the ED with cough and leg swelling x2 weeks   Last echo March 2024: LVEF 65% with G2 DD  Home diuretic is torsemide 20 Mg daily - SNF had planned to increase diuretic day of admit (torsemide to 60 Mg daily x 5 days with Zaroxolyn 2.5mg Tues/Thurs/Sat x1 week and then increase home torsemide to 40mg daily after)   CXR with mild pulmonary congestion and small pleural effusion -admission exam: B/L LE with 3-4+ pitting edema and fine rales bilaterally  Of note, RUE significantly swollen as well - check RUE duplex for completeness (VTE less likely though as he is on eliquis)   Given Lasix 50 Mg IV x 1 in the ED  I/O and daily weights  Sodium fluid restriction  Initially admitted as observation, however upon clinical assessment pt made inpatient due to aggressive IV diuresis needs   Echo: Showing an LVEF of 50% systolic function mildly reduced with severe diastolic dysfunction multiple segments are hypokinetic left atrium moderately dilated, aortic bioprosthetic valve present elevated right ventricular systolic pressure at 54 aortic root exhibiting severe fibrocalcific change  discussed with cardio continue lasix 80 IV bid, Pt continues to have a good uop with net -2 L. Cr continues to fluctuate at baseline.    Anemia due to stage 4 chronic kidney disease  (HCC)  History of anemia secondary to CKD stage IV  Hemoglobin 8.3 on admission  Hemoglobin 1 month ago was 14.0, however suspect that this was erroneous lab our as patient's baseline as of a year ago was in the tens  Fecal occult stool in the ED was negative  Hold home  iron supplementation  B12, WNL  folate WNL   iron panel low will initiate venofer 3/3  Continue Eliquis for now as there are no signs of active bleeding and suspect that this is around patient's baseline  Generalized weakness  Currently at [facility] for [facility]  Plan to return there upon discharge  Pt/Ot rubin recommending Level 2 care  Chronic respiratory failure with hypoxia (HCC)  Contained on 2-3 L nasal cannula at baseline  No increased O2 requirement  Continue home oxygen  Type 2 diabetes mellitus with diabetic neuropathy (HCC)  Lab Results   Component Value Date    HGBA1C 6.6 (H) 06/12/2025       Recent Labs     06/15/25  1112 06/15/25  1613 06/15/25  2057 06/16/25  0747   POCGLU 180* 172* 156* 155*       Blood Sugar Average: Last 72 hrs:  (P) [id number]  Home regimen: Lantus 24 units in morning and NovoLog 11 units 3 times daily with meals  Continue home regimen with the addition of SSI with meals  BS on the lower side decreased lantus to 12 and humalog to 8-> will continue this regimen as BS have improved  Persistent atrial fibrillation (HCC)  Not on any AV giuseppe blockers due to baseline bradycardia  Anticoagulated with Eliquis 2.5 Mg twice daily  Continue home Eliquis  Sinus rhythm at time of admission  CAD (coronary artery disease)  CAD s/p CABG x 4 in 2018  Continue formulary equivalent for home statin  Denies any anginal symptoms  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    VTE Prophylaxis: in place.    Code Status:   Level 1 - Full Code    Patient Centered Rounds: I have performed bedside rounds with nursing staff today.    Mobility:   Basic Mobility Inpatient Raw Score: 12  JH-HLM Goal: 4: Move to chair/commode  JH-HLM Achieved: 1: Laying in bed  JH-HLM Goal NOT achieved. Continue with multidisciplinary rounding and encourage appropriate mobility to improve upon JH-HLM goals.    Discussions with Specialists or Other Care Team Provider: RN    Education and Discussions with Family / Patient: Yeny voicemail    Total Time Spent on Date of Encounter in care of patient: 35 mins. This time was spent on one or more of the following: performing physical exam; counseling and coordination of care; obtaining or reviewing history; documenting in the medical record; reviewing/ordering tests, medications or procedures; communicating with other healthcare professionals and discussing with patient's family/caregivers.    Current Length of Stay: 5 day(s)    Current Patient Status: Inpatient   Certification Statement: The patient will continue to require additional inpatient hospital stay due to see my assessment and plan.     Subjective:   Patient is seen and examined at bedside.  No new complaints.  Afebrile  All other ROS are negative.    Objective:    Vitals: Blood pressure (!) 104/46, pulse 68, temperature 98.7 °F (37.1 °C), resp. rate 20, height 5' 7\" (1.702 m), weight 82.3 kg (181 lb 8 oz), SpO2 98%.,Body mass index is 28.43 kg/m².  SPO2 RA Rest      Flowsheet Row ED to Hosp-Admission (Current) from 6/11/2025 in [facility] Med [facility]   SpO2 98 %   SpO2 Activity At Rest   O2 Device Nasal cannula   O2 Flow Rate --          I&O:   Intake/Output Summary (Last 24 hours) at 6/16/2025 0750  Last data filed at 6/16/2025 0532  Gross per 24 hour   Intake 780 ml   Output 1516 ml   Net -736 ml       Physical Exam:    General- Alert, lying comfortably in bed. Not in " any acute distress.  Neck- Supple, No JVD  CVS- regular, S1 and S2 normal  Chest- Bilateral Air entry, No rhochi, crackles or wheezing present.  Abdomen- soft, nontender, not distended, no guarding or rigidity, BS+  Extremities-  No pedal edema, No calf tenderness                         Normal ROM in all extremities.  CNS-   Alert, awake and orientedx3. No focal deficits present.    Invasive Devices       Peripheral Intravenous Line  Duration             Peripheral IV 06/15/25 Right Antecubital 1 day                          Social History  reviewed  Family History[1] reviewed    Meds:  Current Medications[2]     Medications Prior to Admission:     allopurinol (ZYLOPRIM) 300 mg tablet    ascorbic acid (VITAMIN C) 500 mg tablet    cholecalciferol (VITAMIN D3) 1,000 units tablet    dorzolamide-timolol (COSOPT) 22.3-6.8 MG/ML ophthalmic solution    Eliquis 2.5 MG    FeroSul 325 (65 Fe) MG tablet    gabapentin (NEURONTIN) 300 mg capsule    insulin aspart (NovoLOG FlexPen) 100 UNIT/ML injection pen    Insulin Glargine Solostar (Lantus SoloStar) 100 UNIT/ML SOPN    Insulin Pen Needle (Pen Needles) 32G X 4 MM MISC    latanoprost (XALATAN) 0.005 % ophthalmic solution    levothyroxine 150 mcg tablet    LORazepam (ATIVAN) 0.5 mg tablet    pantoprazole (PROTONIX) 20 mg tablet    potassium chloride (Klor-Con M20) 20 mEq tablet    rosuvastatin (CRESTOR) 40 MG tablet    tamsulosin (FLOMAX) 0.4 mg    torsemide (DEMADEX) 10 mg tablet    acetaminophen (TYLENOL) 325 mg tablet    clobetasol (TEMOVATE) 0.05 % cream    Labs:  Results from last 7 days   Lab Units 06/16/25  0523 06/15/25  0449 06/14/25  0527 06/12/25  0532 06/11/25  1656   WBC Thousand/uL 7.55 8.59 8.83   < > 7.22   HEMOGLOBIN g/dL 8.0* 8.6* 8.8*   < > 8.3*   HEMATOCRIT % 26.6* 27.6* 28.8*   < > 27.0*   PLATELETS Thousands/uL 291 270 264   < > 237   SEGS PCT %  --   --   --   --  69   LYMPHO PCT %  --   --   --   --  16   MONO PCT %  --   --   --   --  12   EOS PCT %  --    --   --   --  2    < > = values in this interval not displayed.     Results from last 7 days   Lab Units 06/16/25  0523 06/15/25  0449 06/14/25  0527 06/13/25  0519 06/12/25  0532 06/11/25  1656   POTASSIUM mmol/L 3.5 3.5 3.6   < > 3.6 4.1   CHLORIDE mmol/L 90* 92* 93*   < > 97 97   CO2 mmol/L 42* 42* 38*   < > 32 33*   BUN mg/dL 67* 62* 62*   < > 58* 59*   CREATININE mg/dL 2.34* 2.30* 2.17*   < > 2.20* 2.32*   CALCIUM mg/dL 9.3 9.3 9.4   < > 8.9 9.1   ALK PHOS U/L  --   --   --   --  85 99   ALT U/L  --   --   --   --  29 37   AST U/L  --   --   --   --  30 39    < > = values in this interval not displayed.     Lab Results   Component Value Date    TROPONINI 0.21 (H) 11/24/2018    TROPONINI 0.24 (H) 11/24/2018    TROPONINI 0.26 (H) 11/23/2018         Lab Results   Component Value Date    URINECX 80,000-89,000 cfu/ml Proteus mirabilis (A) 04/22/2025         Imaging:  Results for orders placed during the hospital encounter of 06/11/25    XR chest 1 view portable    Narrative  XR CHEST PORTABLE    INDICATION: cough, leg swelling.    COMPARISON: CT 4/22/2025    FINDINGS: Monitoring leads and clips project over the chest.    Vascular congestion. Small right pleural effusion.    Unchanged cardiomediastinal silhouette status post CABG and TAVR.    Bones are unremarkable for age. Sternal wires.    Normal upper abdomen.    Impression  Vascular congestion with small right pleural effusion.        Workstation performed: [id number]    Results for orders placed during the hospital encounter of 01/24/20    XR chest pa & lateral    Narrative  CHEST    INDICATION:   J40: Bronchitis, not specified as acute or chronic.    COMPARISON:  December 13, 2018    EXAM PERFORMED/VIEWS:  XR CHEST PA & LATERAL  The frontal view was performed utilizing dual energy radiographic technique.      FINDINGS:    Status post open heart surgery and aortic valve replacement.    The lungs are clear.  No pneumothorax or pleural effusion.    Osseous  structures appear within normal limits for patient age.    Impression  No acute abnormality in the chest.        Workstation performed: [id number]      Last 24 Hours Medication List:   Current Facility-Administered Medications   Medication Dose Route Frequency Provider Last Rate    acetaminophen  650 mg Oral Q4H PRN Ondina Sanders PA-C      allopurinol  300 mg Oral Daily Ondina Sanders PA-C      aluminum-magnesium hydroxide-simethicone  30 mL Oral Q6H PRN Ondina Sanders PA-C      apixaban  2.5 mg Oral BID Ondina Sanders PA-C      ascorbic acid  500 mg Oral Daily Ondina Sanders PA-C      cholecalciferol  1,000 Units Oral Daily Ondina Sanders PA-C      dorzolamide-timolol  1 drop Both Eyes Q12H JAVAD Ondina Sanders PA-C      [Held by provider] ferrous sulfate  325 mg Oral Daily Ondina Sanders PA-C      furosemide  80 mg Intravenous BID (diuretic) Sami Tapia PA-C      gabapentin  300 mg Oral HS Ondina Sanders PA-C      insulin glargine  12 Units Subcutaneous QAM Esha Cee MD      insulin lispro  1-5 Units Subcutaneous TID AC Ondina Sanders PA-C      insulin lispro  8 Units Subcutaneous TID With Meals Esha Cee MD      latanoprost  1 drop Left Eye HS Ondina Sanders PA-C      levothyroxine  150 mcg Oral Early Morning Ondina Sanders PA-C      LORazepam  0.5 mg Oral HS Ondina Sanders PA-C      ondansetron  4 mg Intravenous Q6H PRN Ondina Sanders PA-C      pantoprazole  20 mg Oral Daily Before Breakfast Ondina Sanders PA-C      pravastatin  80 mg Oral Daily With Dinner Ondina Sanders PA-C      senna  1 tablet Oral HS PRN Ondina Sanders PA-C      tamsulosin  0.4 mg Oral Daily With Dinner Ondina Sanders PA-C      [Held by provider] torsemide  20 mg Oral Daily Ondina Sanders PA-C          Today, Patient Was Seen By: Gonzalez Jackson MD    ** Please Note: Dictation voice to text software may have been used in the creation of this  document. **             [1]   Family History  Problem Relation Name Age of Onset    Diabetes Mother      Pancreatic cancer Brother      Diabetes Maternal Grandmother      Colon cancer Son      Diabetes Family      Substance Abuse Neg Hx      Mental illness Neg Hx     [2]   Current Facility-Administered Medications   Medication Dose Route Frequency Provider Last Rate Last Admin    acetaminophen (TYLENOL) tablet 650 mg  650 mg Oral Q4H PRN Ondina Sanders PA-C        allopurinol (ZYLOPRIM) tablet 300 mg  300 mg Oral Daily Ondina Sanders PA-C   300 mg at 06/15/25 0833    aluminum-magnesium hydroxide-simethicone (MAALOX) oral suspension 30 mL  30 mL Oral Q6H PRN Ondina Sanders PA-C        apixaban (ELIQUIS) tablet 2.5 mg  2.5 mg Oral BID Ondina Sanders PA-C   2.5 mg at 06/15/25 1840    ascorbic acid (VITAMIN C) tablet 500 mg  500 mg Oral Daily Ondina Sanders PA-C   500 mg at 06/15/25 0833    Cholecalciferol (VITAMIN D3) tablet 1,000 Units  1,000 Units Oral Daily Ondina Sanders PA-C   1,000 Units at 06/15/25 0834    dorzolamide-timolol (COSOPT) 2-0.5 % ophthalmic solution 1 drop  1 drop Both Eyes Q12H [facility] Ondina Sanders PA-C   1 drop at 06/15/25 2215    [Held by provider] ferrous sulfate tablet 325 mg  325 mg Oral Daily Ondina Sanders PA-C   325 mg at 06/12/25 0824    furosemide (LASIX) injection 80 mg  80 mg Intravenous BID (diuretic) Sami Tapia PA-C   80 mg at 06/15/25 1840    gabapentin (NEURONTIN) capsule 300 mg  300 mg Oral HS Ondina Sanders PA-C   300 mg at 06/15/25 2214    insulin glargine (LANTUS) subcutaneous injection 12 Units 0.12 mL  12 Units Subcutaneous QAM Esha Cee MD   12 Units at 06/15/25 0834    insulin lispro (HumALOG/ADMELOG) 100 units/mL subcutaneous injection 1-5 Units  1-5 Units Subcutaneous TID AC Ondina Sanders PA-C   1 Units at 06/15/25 1840    insulin lispro (HumALOG/ADMELOG) 100 units/mL subcutaneous injection 8 Units  8 Units Subcutaneous  TID With Meals Esha Cee MD   8 Units at 06/15/25 1841    latanoprost (XALATAN) 0.005 % ophthalmic solution 1 drop  1 drop Left Eye HS Ondina Sanders PA-C   1 drop at 06/15/25 2215    levothyroxine tablet 150 mcg  150 mcg Oral Early Morning Ondina Sanders PA-C   150 mcg at 06/16/25 0515    LORazepam (ATIVAN) tablet 0.5 mg  0.5 mg Oral HS Ondina Sanders PA-C   0.5 mg at 06/15/25 2215    ondansetron (ZOFRAN) injection 4 mg  4 mg Intravenous Q6H PRN Ondina Sanders PA-C        pantoprazole (PROTONIX) EC tablet 20 mg  20 mg Oral Daily Before Breakfast Ondina Sanders PA-C   20 mg at 06/16/25 0514    pravastatin (PRAVACHOL) tablet 80 mg  80 mg Oral Daily With Dinner Ondina Sanders PA-C   80 mg at 06/15/25 1842    senna (SENOKOT) tablet 8.6 mg  1 tablet Oral HS PRN Ondina Sanders PA-C        tamsulosin (FLOMAX) capsule 0.4 mg  0.4 mg Oral Daily With Dinner Ondina Sanders PA-C   0.4 mg at 06/15/25 1842    [Held by provider] torsemide (DEMADEX) tablet 20 mg  20 mg Oral Daily Ondina Sanders PA-C

## 2025-06-16 NOTE — ASSESSMENT & PLAN NOTE
On Eliquis 2.5 mg twice daily for stroke prevention, regular rate controlled rhythm at this time.  No beta-blockers secondary to bradycardia

## 2025-06-17 VITALS
BODY MASS INDEX: 29.07 KG/M2 | HEART RATE: 69 BPM | TEMPERATURE: 98.1 F | OXYGEN SATURATION: 99 % | HEIGHT: 67 IN | DIASTOLIC BLOOD PRESSURE: 53 MMHG | WEIGHT: 185.19 LBS | SYSTOLIC BLOOD PRESSURE: 109 MMHG | RESPIRATION RATE: 17 BRPM

## 2025-06-17 LAB
ANION GAP SERPL CALCULATED.3IONS-SCNC: 7 MMOL/L (ref 4–13)
BASOPHILS # BLD AUTO: 0.06 THOUSANDS/ÂΜL (ref 0–0.1)
BASOPHILS NFR BLD AUTO: 1 % (ref 0–1)
BUN SERPL-MCNC: 72 MG/DL (ref 5–25)
CALCIUM SERPL-MCNC: 9.1 MG/DL (ref 8.4–10.2)
CHLORIDE SERPL-SCNC: 91 MMOL/L (ref 96–108)
CO2 SERPL-SCNC: 44 MMOL/L (ref 21–32)
CREAT SERPL-MCNC: 2.46 MG/DL (ref 0.6–1.3)
EOSINOPHIL # BLD AUTO: 0.22 THOUSAND/ÂΜL (ref 0–0.61)
EOSINOPHIL NFR BLD AUTO: 3 % (ref 0–6)
ERYTHROCYTE [DISTWIDTH] IN BLOOD BY AUTOMATED COUNT: 14.8 % (ref 11.6–15.1)
GFR SERPL CREATININE-BSD FRML MDRD: 22 ML/MIN/1.73SQ M
GLUCOSE SERPL-MCNC: 145 MG/DL (ref 65–140)
GLUCOSE SERPL-MCNC: 151 MG/DL (ref 65–140)
GLUCOSE SERPL-MCNC: 181 MG/DL (ref 65–140)
HCT VFR BLD AUTO: 26 % (ref 36.5–49.3)
HGB BLD-MCNC: 7.7 G/DL (ref 12–17)
IMM GRANULOCYTES # BLD AUTO: 0.05 THOUSAND/UL (ref 0–0.2)
IMM GRANULOCYTES NFR BLD AUTO: 1 % (ref 0–2)
LYMPHOCYTES # BLD AUTO: 1.97 THOUSANDS/ÂΜL (ref 0.6–4.47)
LYMPHOCYTES NFR BLD AUTO: 28 % (ref 14–44)
MCH RBC QN AUTO: 32.6 PG (ref 26.8–34.3)
MCHC RBC AUTO-ENTMCNC: 29.6 G/DL (ref 31.4–37.4)
MCV RBC AUTO: 110 FL (ref 82–98)
MONOCYTES # BLD AUTO: 0.96 THOUSAND/ÂΜL (ref 0.17–1.22)
MONOCYTES NFR BLD AUTO: 14 % (ref 4–12)
NEUTROPHILS # BLD AUTO: 3.85 THOUSANDS/ÂΜL (ref 1.85–7.62)
NEUTS SEG NFR BLD AUTO: 53 % (ref 43–75)
NRBC BLD AUTO-RTO: 0 /100 WBCS
PLATELET # BLD AUTO: 271 THOUSANDS/UL (ref 149–390)
PMV BLD AUTO: 9.8 FL (ref 8.9–12.7)
POTASSIUM SERPL-SCNC: 3.7 MMOL/L (ref 3.5–5.3)
RBC # BLD AUTO: 2.36 MILLION/UL (ref 3.88–5.62)
SODIUM SERPL-SCNC: 142 MMOL/L (ref 135–147)
WBC # BLD AUTO: 7.11 THOUSAND/UL (ref 4.31–10.16)

## 2025-06-17 PROCEDURE — 80048 BASIC METABOLIC PNL TOTAL CA: CPT | Performed by: INTERNAL MEDICINE

## 2025-06-17 PROCEDURE — 85025 COMPLETE CBC W/AUTO DIFF WBC: CPT | Performed by: INTERNAL MEDICINE

## 2025-06-17 PROCEDURE — 99232 SBSQ HOSP IP/OBS MODERATE 35: CPT | Performed by: INTERNAL MEDICINE

## 2025-06-17 PROCEDURE — 82948 REAGENT STRIP/BLOOD GLUCOSE: CPT

## 2025-06-17 PROCEDURE — 99239 HOSP IP/OBS DSCHRG MGMT >30: CPT | Performed by: INTERNAL MEDICINE

## 2025-06-17 RX ORDER — INSULIN LISPRO 100 [IU]/ML
8 INJECTION, SOLUTION INTRAVENOUS; SUBCUTANEOUS
Start: 2025-06-17

## 2025-06-17 RX ORDER — TORSEMIDE 20 MG/1
40 TABLET ORAL DAILY
Start: 2025-06-18

## 2025-06-17 RX ORDER — INSULIN GLARGINE 100 [IU]/ML
20 INJECTION, SOLUTION SUBCUTANEOUS DAILY
Start: 2025-06-17

## 2025-06-17 RX ORDER — TORSEMIDE 20 MG/1
40 TABLET ORAL DAILY
Status: DISCONTINUED | OUTPATIENT
Start: 2025-06-17 | End: 2025-06-17 | Stop reason: HOSPADM

## 2025-06-17 RX ORDER — PRAVASTATIN SODIUM 80 MG/1
80 TABLET ORAL
Start: 2025-06-17

## 2025-06-17 RX ADMIN — INSULIN GLARGINE 12 UNITS: 100 INJECTION, SOLUTION SUBCUTANEOUS at 09:12

## 2025-06-17 RX ADMIN — Medication 1000 UNITS: at 09:11

## 2025-06-17 RX ADMIN — OXYCODONE HYDROCHLORIDE AND ACETAMINOPHEN 500 MG: 500 TABLET ORAL at 09:11

## 2025-06-17 RX ADMIN — INSULIN LISPRO 1 UNITS: 100 INJECTION, SOLUTION INTRAVENOUS; SUBCUTANEOUS at 09:11

## 2025-06-17 RX ADMIN — ALLOPURINOL 300 MG: 300 TABLET ORAL at 09:11

## 2025-06-17 RX ADMIN — INSULIN LISPRO 8 UNITS: 100 INJECTION, SOLUTION INTRAVENOUS; SUBCUTANEOUS at 11:55

## 2025-06-17 RX ADMIN — INSULIN LISPRO 1 UNITS: 100 INJECTION, SOLUTION INTRAVENOUS; SUBCUTANEOUS at 11:54

## 2025-06-17 RX ADMIN — INSULIN LISPRO 8 UNITS: 100 INJECTION, SOLUTION INTRAVENOUS; SUBCUTANEOUS at 09:12

## 2025-06-17 RX ADMIN — TORSEMIDE 40 MG: 20 TABLET ORAL at 11:54

## 2025-06-17 RX ADMIN — APIXABAN 2.5 MG: 2.5 TABLET, FILM COATED ORAL at 09:11

## 2025-06-17 RX ADMIN — LEVOTHYROXINE SODIUM 150 MCG: 75 TABLET ORAL at 06:00

## 2025-06-17 RX ADMIN — DORZOLAMIDE HYDROCHLORIDE AND TIMOLOL MALEATE 1 DROP: 20; 5 SOLUTION OPHTHALMIC at 09:12

## 2025-06-17 RX ADMIN — PANTOPRAZOLE SODIUM 20 MG: 20 TABLET, DELAYED RELEASE ORAL at 06:00

## 2025-06-17 NOTE — ASSESSMENT & PLAN NOTE
Wt Readings from Last 3 Encounters:   06/17/25 84 kg (185 lb 3 oz)   03/10/25 85.3 kg (188 lb)   03/04/25 82.6 kg (182 lb)   HFpEF -presented with increased lower extremity edema over several weeks  Initial , troponins negative x 3,   Dry weight approximately 182 pounds  - initial accurate weight 196  EKG: Regular rhythm -wide QRS  Chest x-ray with vascular congestion and small right pleural effusion  Echocardiogram: EF 50%, hypokinesis in the mid anteroseptal, apical septal and apex, moderate LA dilatation, well-functioning bioprosthetic AVR, mild MR, mild TR, severe fibrocalcific change to the aortic root  Initially diuresed with Lasix 50 mg twice daily -increased to 80 mg twice daily on 6/12.  IV diuresis discontinued on 6/16 -will start p.o. torsemide at an increased dose from home dose today.

## 2025-06-17 NOTE — CASE MANAGEMENT
Case Management Assessment & Discharge Planning Note    Patient name Carlos Enrique Roth Jr.  Location /-01 MRN [id number]  : 1935 Date 2025       Current Admission Date: 2025  Current Admission Diagnosis:Acute on chronic diastolic congestive heart failure (HCC)   Patient Active Problem List    Diagnosis Date Noted    Chronic respiratory failure with hypoxia ([facility]) 2025    Generalized weakness 2025    Failure to thrive in adult 2025    Hypokalemia 2024    Platelets decreased ([facility]) 2024    Fracture of unspecified part of neck of left femur, initial encounter for closed fracture ([facility]) 2024    Persistent atrial fibrillation ([facility]) 2024    Persistent proteinuria 2023    Microalbuminuria due to type 2 diabetes mellitus  ([facility]) 10/09/2023    Secondary hyperparathyroidism ([facility]) 2023    Skin tear of left elbow without complication 2023    CKD stage 4 due to type 2 diabetes mellitus ([facility]) 2022    Type 2 diabetes mellitus with diabetic neuropathy ([facility]) 10/27/2021    Acute on chronic diastolic congestive heart failure ([facility]) 2020    Primary open angle glaucoma (POAG) 2019    Age-related cataract of both eyes 2019    Atherosclerosis of aorta ([facility]) 2019    Ambulatory dysfunction 2018    Anemia due to stage 4 chronic kidney disease  ([facility]) 2018    CAD (coronary artery disease) 2018    Aortic stenosis 2018    Benign prostatic hyperplasia without lower urinary tract symptoms 2018    Long term (current) use of insulin ([facility]) 2018    Presence of aortocoronary bypass graft 2018    Presence of prosthetic heart valve 2018    Unspecified glaucoma 2018    Nonrheumatic aortic (valve) stenosis 2018    Atherosclerotic heart disease of native coronary artery without angina pectoris 2018    S/P CABG x 4 2018    S/P AVR (aortic valve replacement) 2018     GERD (gastroesophageal reflux disease)     Sexual dysfunction 10/09/2017    Renal cyst 01/12/2017    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  [address]  [place] [facility] [address]  Phone: [phone] Fax: [phone]    Primary Care Provider: Juliette Cid DO    Primary Insurance: MEDICARE  Secondary Insurance:     ASSESSMENT:  Active Health Care Proxies    There are no active Health Care Proxies on file.                                                          DISCHARGE DETAILS:                                          Other Referral/Resources/Interventions Provided:  Interventions: Transportation, Short Term Rehab  Referral Comments: [facility] can offer bed today. CRYSTAL LEANDROS claimed ride back to [facility] for 3pm today in Roundtrip. Pt is going for STR. Informed pt, pt's son Ciro, RN, TONG, and [facility] of transportation time. Medical Necessity is in paper chart. Provided RN with report numbers.         Treatment Team Recommendation: Short Term Rehab  Expected Discharge Disposition: Skilled Nursing Facility  Additional Discharge Dispositions: Skilled Nursing Facility  Transport at Discharge : [facility]     Number/Name of Dispatcher: [phone]  Transported by (Company and Unit #): [employer]  ETA of Transport (Date): 06/17/25  ETA of Transport (Time): 1500              IMM Given (Date):: 06/17/25  IMM Given to:: Family  Family notified:: SonCiro.     IMM reviewed with patient's caregiver, patient's caregiver agrees with discharge determination.

## 2025-06-17 NOTE — PROGRESS NOTES
Progress Note - Cardiology   Name: Carlos Enrique Roth Jr. 89 y.o. male I MRN: [id number]  Unit/Bed#: -01 I Date of Admission: 6/11/2025   Date of Service: 6/17/2025 I Hospital Day: 6    Assessment & Plan  Acute on chronic diastolic congestive heart failure (HCC)  Wt Readings from Last 3 Encounters:   06/17/25 84 kg (185 lb 3 oz)   03/10/25 85.3 kg (188 lb)   03/04/25 82.6 kg (182 lb)   HFpEF -presented with increased lower extremity edema over several weeks  Initial , troponins negative x 3,   Dry weight approximately 182 pounds  - initial accurate weight 196  EKG: Regular rhythm -wide QRS  Chest x-ray with vascular congestion and small right pleural effusion  Echocardiogram: EF 50%, hypokinesis in the mid anteroseptal, apical septal and apex, moderate LA dilatation, well-functioning bioprosthetic AVR, mild MR, mild TR, severe fibrocalcific change to the aortic root  Initially diuresed with Lasix 50 mg twice daily -increased to 80 mg twice daily on 6/12.  IV diuresis discontinued on 6/16 -will start p.o. torsemide at an increased dose from home dose today.  CAD (coronary artery disease)  CABG x 4 in 2018 -LIMA to LAD, SVG to OM 2 and SVG Y graft to OM 3 and left posterior descending.  Patient denies any chest pain  Persistent atrial fibrillation (HCC)  On Eliquis 2.5 mg twice daily for stroke prevention, regular rate controlled rhythm at this time.  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    Objective :  Temp:  [98.2 °F (36.8 °C)-99.7 °F (37.6 °C)] 98.4 °F (36.9 °C)  HR:  [69-81] 69  BP: (116-130)/(52-58) 116/53  Resp:  [17-20] 17  SpO2:  [93 %-98 %] 93 %  O2 Device: Nasal cannula  Nasal Cannula O2 Flow Rate (L/min):  [3 L/min] 3 L/min  Orthostatic Blood Pressures      Flowsheet Row Most Recent Value   Blood Pressure 116/53 filed at 06/17/2025 0729   Patient Position - Orthostatic VS Lying filed at 06/17/2025 0729          First Weight: Weight - Scale: 88 kg (194 lb 0.1 oz) (06/11/25 2037)  Vitals:    06/16/25 0528 06/17/25 0300   Weight: 82.3 kg (181 lb 8 oz) 84 kg (185 lb 3 oz)     Physical Exam  Constitutional:       Appearance: Normal appearance. He is obese.   HENT:      Head: Normocephalic and atraumatic.      Mouth/Throat:      Mouth: Mucous membranes are moist.     Cardiovascular:      Rate and Rhythm: Normal rate and regular rhythm.      Pulses: Normal pulses.      Heart sounds: Normal heart sounds.   Pulmonary:      Effort: Pulmonary effort is normal.      Breath sounds: Decreased air movement present.   Abdominal:      Palpations: Abdomen is soft.     Musculoskeletal:      Cervical back: Normal range of motion.      Right lower leg: No edema.      Left lower leg: No edema.     Skin:     General: Skin is warm.      Capillary Refill: Capillary refill takes less than 2 seconds.     Neurological:      General: No focal deficit present.      Mental Status: He is alert.     Psychiatric:         Mood and Affect: Mood normal.         Lab Results: I have reviewed the following results:  Results from last 7 days   Lab Units 06/17/25  0425 06/16/25  0523 06/15/25  0449   WBC Thousand/uL 7.11 7.55 8.59   HEMOGLOBIN g/dL 7.7* 8.0* 8.6*   HEMATOCRIT % 26.0* 26.6* 27.6*   PLATELETS Thousands/uL 271 291 270     Results from last 7 days   Lab Units 06/17/25  0425 06/16/25  0523 06/15/25  0449   POTASSIUM mmol/L 3.7 3.5 3.5   CHLORIDE mmol/L 91* 90* 92*   CO2 mmol/L 44* 42* 42*   BUN mg/dL 72* 67*  62*   CREATININE mg/dL 2.46* 2.34* 2.30*   CALCIUM mg/dL 9.1 9.3 9.3         Lab Results   Component Value Date    HGBA1C 6.6 (H) 06/12/2025     Lab Results   Component Value Date    TROPONINI 0.21 (H) 11/24/2018       Imaging Results Review: I reviewed radiology reports from this admission including: chest xray.  Other Study Results Review: EKG was reviewed.     VTE Pharmacologic Prophylaxis: VTE covered by:  apixaban, Oral, 2.5 mg at 06/16/25 1736     VTE Mechanical Prophylaxis: sequential compression device

## 2025-06-17 NOTE — ASSESSMENT & PLAN NOTE
Wt Readings from Last 3 Encounters:   06/17/25 84 kg (185 lb 3 oz)   03/10/25 85.3 kg (188 lb)   03/04/25 82.6 kg (182 lb)   Presenting to the ED with cough and leg swelling x2 weeks   Last echo March 2024: LVEF 65% with G2 DD  Home diuretic is torsemide 20 Mg daily - SNF had planned to increase diuretic day of admit (torsemide to 60 Mg daily x 5 days with Zaroxolyn 2.5mg Tues/Thurs/Sat x1 week and then increase home torsemide to 40mg daily after)   CXR with mild pulmonary congestion and small pleural effusion -admission exam: B/L LE with 3-4+ pitting edema and fine rales bilaterally  Of note, RUE significantly swollen as well - check RUE duplex for completeness (VTE less likely though as he is on eliquis)   Given Lasix 50 Mg IV x 1 in the ED  I/O and daily weights  Sodium fluid restriction  Initially admitted as observation, however upon clinical assessment pt made inpatient due to aggressive IV diuresis needs   Echo: Showing an LVEF of 50% systolic function mildly reduced with severe diastolic dysfunction multiple segments are hypokinetic left atrium moderately dilated, aortic bioprosthetic valve present elevated right ventricular systolic pressure at 54 aortic root exhibiting severe fibrocalcific change  discussed with cardio continue lasix 80 IV bid, Pt continues to have a good uop with net -2 L. Cr continues to fluctuate at baseline.

## 2025-06-17 NOTE — ASSESSMENT & PLAN NOTE
History of anemia secondary to CKD stage IV  Hemoglobin 8.3 on admission  Hemoglobin 1 month ago was 14.0, however suspect that this was erroneous lab our as patient's baseline as of a year ago was in the tens  Fecal occult stool in the ED was negative  Hold home iron supplementation  B12, WNL  folate WNL   iron panel low will initiate venofer 3/3  Continue Eliquis for now as there are no signs of active bleeding and suspect that this is around patient's baseline  Stable at discharge

## 2025-06-17 NOTE — ASSESSMENT & PLAN NOTE
Lab Results   Component Value Date    HGBA1C 6.6 (H) 06/12/2025       Recent Labs     06/16/25  1119 06/16/25  1657 06/16/25  2158 06/17/25  0729   POCGLU 176* 200* 187* 151*       Blood Sugar Average: Last 72 hrs:  (P) 168.4606166421568079  Home regimen: Lantus 24 units in morning and NovoLog 11 units 3 times daily with meals  Continue home regimen with the addition of SSI with meals  BS on the lower side decreased lantus to 12 and humalog to 8-> will continue this regimen as BS have improved

## 2025-06-17 NOTE — ASSESSMENT & PLAN NOTE
Lab Results   Component Value Date    HGBA1C 6.6 (H) 06/12/2025       Recent Labs     06/16/25  1657 06/16/25  2158 06/17/25  0729 06/17/25  1104   POCGLU 200* 187* 151* 181*       Blood Sugar Average: Last 72 hrs:  (P) 169.7222089477694790  Home regimen: Lantus 24 units in morning and NovoLog 11 units 3 times daily with meals  Continue home regimen with the addition of SSI with meals  Discharged on Lantus insulin 20 units and NovoLog 8 units 3 times daily

## 2025-06-17 NOTE — DISCHARGE INSTR - AVS FIRST PAGE
Follow-up with PCP and cardiology as outpatient  Repeat thyroid function test in 4 to 6 weeks as outpatient

## 2025-06-17 NOTE — NURSING NOTE
Gave report to the nurse at [facility]. Gave discharge paperwork to transport team. IV removed.

## 2025-06-17 NOTE — ASSESSMENT & PLAN NOTE
Currently at [facility] for rehab  Plan to return there upon discharge  Pt/Ot rubin recommending Level 2 care

## 2025-06-17 NOTE — PLAN OF CARE
Problem: Potential for Falls  Goal: Patient will remain free of falls  Description: INTERVENTIONS:  - Educate patient/family on patient safety including physical limitations  - Instruct patient to call for assistance with activity   - Consider consulting OT/PT to assist with strengthening/mobility based on AM PAC & JH-HLM score  - Consult OT/PT to assist with strengthening/mobility   - Keep Call bell within reach  - Keep bed low and locked with side rails adjusted as appropriate  - Keep care items and personal belongings within reach  - Initiate and maintain comfort rounds  - Make Fall Risk Sign visible to staff  - Offer Toileting every 2 Hours, in advance of need  - Initiate/Maintain 2alarm  - Obtain necessary fall risk management equipment: 2  - Apply yellow socks and bracelet for high fall risk patients  - Consider moving patient to room near nurses station  Outcome: Progressing     Problem: Excess Fluid Volume  Goal: Patient is able to achieve and maintain homeostasis  Description: INTERVENTIONS: Monitor for sign and symptoms of fluid overload  - Evaluate LE edema every shift  - Elevate LE to prevent dependent edema  - Apply ALFREDO stockings as ordered   - Monitor ankle circumference daily  - Assess for jugular vein distention  - Evaluate provider orders for the CHF diuretic algorithm. Administer diuretics as ordered  - Weigh the patient daily at 0600 and report a weight gain of five pounds or more   - Strict intake and output  - Monitor fluid intake and adhere to fluid restrictions  - Assess lung sounds every shift and as needed  - Monitor vital signs and lab values (CBC, chem, BUN, BNP)  - Measure and document urine output    Outcome: Progressing     Problem: Activity Intolerance  Goal: Patient is able to perform activities within their limitations  Description: INTERVENTIONS:                       -   Alternate periods of activity with periods of rest                 -   Patients is able to maintain normal  vitals heart rhythm during activity                 -   Gradually increase activity and exercise as patient can tolerate                 -   Monitor blood pressure and heart before and after exercise                  -   Monitor blood oxygen saturation during activity and apply oxygen as needed    Outcome: Progressing

## 2025-06-17 NOTE — ASSESSMENT & PLAN NOTE
Wt Readings from Last 3 Encounters:   06/17/25 84 kg (185 lb 3 oz)   03/10/25 85.3 kg (188 lb)   03/04/25 82.6 kg (182 lb)   Presenting to the ED with cough and leg swelling x2 weeks   Last echo March 2024: LVEF 65% with G2 DD  Home diuretic is torsemide 20 Mg daily - SNF had planned to increase diuretic day of admit (torsemide to 60 Mg daily x 5 days with Zaroxolyn 2.5mg Tues/Thurs/Sat x1 week and then increase home torsemide to 40mg daily after)   CXR with mild pulmonary congestion and small pleural effusion -admission exam: B/L LE with 3-4+ pitting edema and fine rales bilaterally  Of note, RUE significantly swollen as well - check RUE duplex for completeness (VTE less likely though as he is on eliquis)   Given Lasix 50 Mg IV x 1 in the ED  I/O and daily weights  Sodium fluid restriction  Initially admitted as observation, however upon clinical assessment pt made inpatient due to aggressive IV diuresis needs   Echo: Showing an LVEF of 50% systolic function mildly reduced with severe diastolic dysfunction multiple segments are hypokinetic left atrium moderately dilated, aortic bioprosthetic valve present elevated right ventricular systolic pressure at 54 aortic root exhibiting severe fibrocalcific change  discussed with cardio continue lasix 80 IV bid, Pt continues to have a good uop with net -2 L. Cr continues to fluctuate at baseline.    As per cardiology patient can be discharged on torsemide 40 mg daily with outpatient follow-up  Patient will be discharged to [facility]

## 2025-06-17 NOTE — ASSESSMENT & PLAN NOTE
Currently at [facility] for rehab  Plan to return there upon discharge  Pt/Ot rubin recommending Level 2 care

## 2025-06-17 NOTE — DISCHARGE SUMMARY
Discharge Summary - Hospitalist   Name: Carlos Enrique Roth Jr. 89 y.o. male I MRN: [id number]  Unit/Bed#: -01 I Date of Admission: 6/11/2025   Date of Service: 6/17/2025 I Hospital Day: 6     Assessment & Plan  Acute on chronic diastolic congestive heart failure (HCC)  Wt Readings from Last 3 Encounters:   06/17/25 84 kg (185 lb 3 oz)   03/10/25 85.3 kg (188 lb)   03/04/25 82.6 kg (182 lb)   Presenting to the ED with cough and leg swelling x2 weeks   Last echo March 2024: LVEF 65% with G2 DD  Home diuretic is torsemide 20 Mg daily - SNF had planned to increase diuretic day of admit (torsemide to 60 Mg daily x 5 days with Zaroxolyn 2.5mg Tues/Thurs/Sat x1 week and then increase home torsemide to 40mg daily after)   CXR with mild pulmonary congestion and small pleural effusion -admission exam: B/L LE with 3-4+ pitting edema and fine rales bilaterally  Of note, RUE significantly swollen as well - check RUE duplex for completeness (VTE less likely though as he is on eliquis)   Given Lasix 50 Mg IV x 1 in the ED  I/O and daily weights  Sodium fluid restriction  Initially admitted as observation, however upon clinical assessment pt made inpatient due to aggressive IV diuresis needs   Echo: Showing an LVEF of 50% systolic function mildly reduced with severe diastolic dysfunction multiple segments are hypokinetic left atrium moderately dilated, aortic bioprosthetic valve present elevated right ventricular systolic pressure at 54 aortic root exhibiting severe fibrocalcific change  discussed with cardio continue lasix 80 IV bid, Pt continues to have a good uop with net -2 L. Cr continues to fluctuate at baseline.    As per cardiology patient can be discharged on torsemide 40 mg daily with outpatient follow-up  Patient will be discharged to [facility]  Anemia due to stage 4 chronic kidney disease  (HCC)  History of anemia secondary to CKD stage IV  Hemoglobin 8.3 on admission  Hemoglobin 1 month ago was  14.0, however suspect that this was erroneous lab our as patient's baseline as of a year ago was in the tens  Fecal occult stool in the ED was negative  Hold home iron supplementation  B12, WNL  folate WNL   iron panel low will initiate venofer 3/3  Continue Eliquis for now as there are no signs of active bleeding and suspect that this is around patient's baseline  Stable at discharge  Generalized weakness  Currently at [facility] for rehab  Plan to return there upon discharge  Pt/Ot rubin recommending Level 2 care  Chronic respiratory failure with hypoxia (HCC)  Contained on 2-3 L nasal cannula at baseline  No increased O2 requirement  Continue home oxygen  Type 2 diabetes mellitus with diabetic neuropathy (HCC)  Lab Results   Component Value Date    HGBA1C 6.6 (H) 06/12/2025       Recent Labs     06/16/25  1657 06/16/25  2158 06/17/25  0729 06/17/25  1104   POCGLU 200* 187* 151* 181*       Blood Sugar Average: Last 72 hrs:  (P) 169.9848781711234570  Home regimen: Lantus 24 units in morning and NovoLog 11 units 3 times daily with meals  Continue home regimen with the addition of SSI with meals  Discharged on Lantus insulin 20 units and NovoLog 8 units 3 times daily  Persistent atrial fibrillation (HCC)  Not on any AV giuseppe blockers due to baseline bradycardia  Anticoagulated with Eliquis 2.5 Mg twice daily  Continue home Eliquis  Sinus rhythm at time of admission  CAD (coronary artery disease)  CAD s/p CABG x 4 in 2018  Continue formulary equivalent for home statin  Denies any anginal symptoms  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)  Baseline creatinine 2.2-2.6  Creatinine on admission is 2.32  Trend BMP daily in setting of IV diuresis  Patient still making urine  Postoperative hypothyroidism  Continue home Synthroid 150 mcg daily -Per SNF records this dose was new as of 4/22  TSH 4/22 was elevated at 37  Presuming Synthroid dose from facility was increased from elevated TSH, will continue home  dose as TSH has improved to 14 will need repeat in 4-6 weeks   Hospital Course:     Carlos Enrique Mckeon Jr. is a 89 y.o. male patient who originally presented to the hospital on   Admission Orders (From admission, onward)       Ordered        06/11/25 2244  INPATIENT ADMISSION  Once            06/11/25 2035  Place in Observation  Once                         due to acute on chronic diastolic heart failure.  Patient was diuresed with IV Lasix and was seen by cardiology.  Patient responded well with Lasix 80 mg IV twice daily.  Patient creatinine is stable.  Patient was switched to torsemide 40 mg at discharge per cardiology recommendation.  Patient was seen by PT OT and recommended rehab.  Patient will be discharged to [facility]  Patient has chronic respiratory failure with hypoxia and is on baseline oxygen requirements at discharge.  On Exam-  Chest-bilateral air entry, clear to auscultation  Abdomen-soft, nontender  Heart-S1 S2 regular  Extremities-no pedal edema or calf tenderness  Neuro-alert awake oriented x3.  No focal deficits    Please see above list of diagnoses and related plan for additional information.   Follow-up with PCP and cardiology as outpatient  Repeat thyroid function test in 4 to 6 weeks as outpatient    CONSULTING PROVIDERS   IP CONSULT TO CARDIOLOGY    PROCEDURES PERFORMED  * No surgery found *    RADIOLOGY RESULTS  VAS upper limb venous duplex scan, unilateral/limited  Result Date: 6/12/2025  Narrative: [facility] . CARLOS ENRIQUE MCKEON is a 89 year old M who was referred by SILVIA STEELE.    Indications: Patient presents with Right upper extremity edema x 2 months. Operative History: CABG Risk Factors: The patient reports hypertension, hyperlipidemia, diabetes and CAD.    CONCLUSION:  RIGHT UPPER LIMB: No evidence of acute or chronic deep vein thrombosis.  No evidence of superficial thrombophlebitis is noted. Doppler evaluation shows a normal response to  augmentation maneuver   LEFT UPPER LIMB LIMITED: Evaluation shows no evidence of thrombus in the internal jugular vein, subclavian vein, and the innominate vein.  SIGNATURE Signed by NIA ANAYA on 2025-06-12 14:55:12 [email or web address]    Echo complete  Result Date: 6/12/2025  Narrative:   Left Ventricle: Left ventricular cavity size is normal. Wall thickness is normal. The left ventricular ejection fraction is 50%. Systolic function is mildly reduced. Diastolic function is severely abnormal, consistent with ungraded restrictive relaxation.   The following segments are hypokinetic: mid anteroseptal, apical septal and apex.   All other segments are normal.   Right Ventricle: Right ventricular cavity size is normal. Systolic function is low normal.   Left Atrium: The atrium is moderately dilated.   Aortic Valve: There is a bioprosthetic valve (#23 Intuity). Prosthetic valve leaflets are not well visualized. There is no evidence of paravalvular regurgitation. There is no evidence of transvalvular regurgitation. The gradient recorded across the prosthetic aortic valve is within the expected range.   Mitral Valve: There is annular calcification. There is mild regurgitation.   Tricuspid Valve: There is mild regurgitation. The right ventricular systolic pressure is moderately elevated. The estimated right ventricular systolic pressure is 54.00 mmHg.   Aorta: The aortic root is normal in size. The aortic root exhibited severe fibrocalcific change.     XR chest 1 view portable  Result Date: 6/11/2025  Narrative: XR CHEST PORTABLE INDICATION: cough, leg swelling. COMPARISON: CT 4/22/2025 FINDINGS: Monitoring leads and clips project over the chest. Vascular congestion. Small right pleural effusion. Unchanged cardiomediastinal silhouette status post CABG and TAVR. Bones are unremarkable for age. Sternal wires. Normal  upper abdomen.     Impression: Vascular congestion with small right pleural effusion. Workstation performed: [id number]       LABS  Results from last 7 days   Lab Units 06/17/25  0425 06/16/25  0523 06/15/25  0449 06/14/25  0527 06/13/25  0519 06/12/25  0532 06/11/25  1656   WBC Thousand/uL 7.11 7.55 8.59 8.83 7.48 7.98 7.22   HEMOGLOBIN g/dL 7.7* 8.0* 8.6* 8.8* 8.4* 8.0* 8.3*   HEMATOCRIT % 26.0* 26.6* 27.6* 28.8* 28.0* 25.8* 27.0*   MCV fL 110* 109* 108* 110* 110* 111* 110*   PLATELETS Thousands/uL 271 291 270 264 235 224 237     Results from last 7 days   Lab Units 06/17/25  0425 06/16/25  0523 06/15/25  0449 06/14/25  0527 06/13/25  0519 06/12/25  0532 06/11/25  1656   SODIUM mmol/L 142 143 142 141 143 138 137   POTASSIUM mmol/L 3.7 3.5 3.5 3.6 3.3* 3.6 4.1   CHLORIDE mmol/L 91* 90* 92* 93* 95* 97 97   CO2 mmol/L 44* 42* 42* 38* 39* 32 33*   BUN mg/dL 72* 67* 62* 62* 60* 58* 59*   CREATININE mg/dL 2.46* 2.34* 2.30* 2.17* 2.30* 2.20* 2.32*   CALCIUM mg/dL 9.1 9.3 9.3 9.4 9.2 8.9 9.1   ALBUMIN g/dL  --   --   --   --   --  3.2* 3.5   TOTAL BILIRUBIN mg/dL  --   --   --   --   --  0.57 0.56   ALK PHOS U/L  --   --   --   --   --  85 99   ALT U/L  --   --   --   --   --  29 37   AST U/L  --   --   --   --   --  30 39   EGFR ml/min/1.73sq m 22 23 24 25 24 25 23   GLUCOSE RANDOM mg/dL 145* 148* 113 125 84 136 168*     Results from last 7 days   Lab Units 06/11/25  1902 06/11/25  1656   HS TNI 0HR ng/L  --  34   HS TNI 2HR ng/L 35  --               Results from last 7 days   Lab Units 06/17/25  1104 06/17/25  0729 06/16/25  2158 06/16/25  1657 06/16/25  1119 06/16/25  0747 06/15/25  2057 06/15/25  1613 06/15/25  1112 06/15/25  0713   POC GLUCOSE mg/dl 181* 151* 187* 200* 176* 155* 156* 172* 180* 126     Results from last 7 days   Lab Units 06/12/25  0532   HEMOGLOBIN A1C % 6.6*     Results from last 7 days   Lab Units 06/11/25  1656   TSH 3RD GENERATON uIU/mL 14.361*               Cultures:         Invalid input(s):  "\"URIBILINOGEN\"              Condition at Discharge:  good      Discharge instructions/Information to patient and family:   See after visit summary for information provided to patient and family.      Provisions for Follow-Up Care:  See after visit summary for information related to follow-up care and any pertinent home health orders.      Disposition:     Other Skilled Nursing Facility at [facility]       Discharge Statement:  I spent 40 minutes discharging the patient. This time was spent on the day of discharge. I had direct contact with the patient on the day of discharge. Greater than 50% of the total time was spent examining patient, answering all patient questions, arranging and discussing plan of care with patient as well as directly providing post-discharge instructions.  Additional time then spent on discharge activities.    Discharge Medications:  See after visit summary for reconciled discharge medications provided to patient and family.      ** Please Note: This note has been constructed using a voice recognition system **  "

## 2025-06-25 ENCOUNTER — OFFICE VISIT (OUTPATIENT)
Dept: CARDIOLOGY CLINIC | Facility: CLINIC | Age: OVER 89
End: 2025-06-25
Payer: MEDICARE

## 2025-06-25 VITALS
HEIGHT: 67 IN | DIASTOLIC BLOOD PRESSURE: 66 MMHG | HEART RATE: 78 BPM | WEIGHT: 185.2 LBS | SYSTOLIC BLOOD PRESSURE: 130 MMHG | BODY MASS INDEX: 29.07 KG/M2

## 2025-06-25 DIAGNOSIS — I50.32 CHRONIC DIASTOLIC CONGESTIVE HEART FAILURE (HCC): Primary | ICD-10-CM

## 2025-06-25 PROCEDURE — 99214 OFFICE O/P EST MOD 30 MIN: CPT | Performed by: PHYSICIAN ASSISTANT

## 2025-06-25 NOTE — PROGRESS NOTES
Cardiology Office Follow Up  Carlos Enrique Roth Jr.  1935  [id number]      ASSESSMENT:  Chronic diastolic CHF  CAD/CABG x 4 with LIMA to LAD, SVG to OM 2, SVG Y graft to OM 3 and left posterior descending artery in 2018  Aortic stenosis s/p bio AVR  Persistent atrial fibrillation on Eliquis  Chronic respiratory failure on 2 to 3 L O2 at baseline  Hyperlipidemia  Anemia of chronic disease secondary to CKD4  DM type II, A1c 6.6%    PLAN:  Volume is reasonable. Does have +2 LE edema B/L. Unclear if this is his baseline.  Continue with torsemide 40mg QD  Repeat BMP in 1 week  Follow wts and volume closely  RTO in 2 weeks or sooner if needed   Continue Eliquis 2.5mg BID  Both rosuvastatin and pravastatin listed as home meds. Discontinue pravastatin. Continue with Rosuvastatin 40mg QD only.    Interval History/ HPI:   Carlos Enrique Kwan is a 89-year-old male with history of diastolic CHF, multivessel CAD with CABG x 4, persistent atrial fibrillation on Eliquis, chronic respiratory failure on 2 to 3 L O2 via nasal cannula at baseline, CKD 4, anemia of chronic disease, DM type II who presents for hospital follow-up visit.  Presents to [facility] 6/12/2025 with increased LE edema over several weeks.  Lab work showed initial .  Serial troponins were negative.  Chest x-ray showed pulmonary vascular congestion with small right pleural effusion.  Echocardiogram showed low normal EF 50% with hypokinesis in the mid anteroseptal, apical septal and apex, moderate LA dilation, well-functioning bioprosthetic AVR, mild MR/TR, severe fibrocalcific change to aortic root, initially diuresed with IV Lasix 50 mg twice daily which was increased to 80 mg twice daily on 6/12/2025.  IV diuresis discontinued on 616 and transition to PO torsemide with increased home dose of 40 mg daily. Discharged 6/17/2025 with cardiology follow-up today. Discharge wt 185lbs.   Today, feels well overall. Presents from [place]  [facility]. No breathing issues or concerns. Has LE edema chronically. Non worsening. Wts same today as discharge. Having issues with swallowing solid foods. Receiving swallowing study today, awaiting results. Appetite is well and eats double portions. Watches salt and fat intake.     Vitals:  130/66  78  185lbs    Past Medical History[1]  Social History     Socioeconomic History    Marital status:      Spouse name: Not on file    Number of children: Not on file    Years of education: Not on file    Highest education level: Not on file   Occupational History    Occupation: retired    Tobacco Use    Smoking status: Former     Current packs/day: 0.00     Average packs/day: 0.3 packs/day for 1 year (0.3 ttl pk-yrs)     Types: Cigarettes     Start date:      Quit date:      Years since quittin.5    Smokeless tobacco: Not on file    Tobacco comments:     Only in the service    Vaping Use    Vaping status: Never Used   Substance and Sexual Activity    Alcohol use: Never    Drug use: No    Sexual activity: Not Currently   Other Topics Concern    Not on file   Social History Narrative    Caffeine use / coffee diet cola and tea    Lives with family     Living situation supportive and safe    No advance directives  -denied     Social Drivers of Health     Financial Resource Strain: High Risk (2023)    Overall Financial Resource Strain (CARDIA)     Difficulty of Paying Living Expenses: Hard   Food Insecurity: No Food Insecurity (2025)    Nursing - Inadequate Food Risk Classification     Worried About Running Out of Food in the Last Year: Never true     Ran Out of Food in the Last Year: Never true     Ran Out of Food in the Last Year: Never true   Transportation Needs: No Transportation Needs (2025)    Nursing - Transportation Risk Classification     Lack of Transportation: Not on file     Lack of Transportation: No   Physical Activity: Insufficiently Active (2021)    Exercise Vital Sign      Days of Exercise per Week: 3 days     Minutes of Exercise per Session: 20 min   Stress: Stress Concern Present (5/28/2021)    [employer] - Occupational Stress Questionnaire     Feeling of Stress : To some extent   Social Connections: Not on file   Intimate Partner Violence: Unknown (6/11/2025)    Nursing IPS     Feels Physically and Emotionally Safe: Not on file     Physically Hurt by Someone: Not on file     Humiliated or Emotionally Abused by Someone: Not on file     Physically Hurt by Someone: No     Hurt or Threatened by Someone: No   Housing Stability: Unknown (6/11/2025)    Nursing: Inadequate Housing Risk Classification     Has Housing: Not on file     Worried About Losing Housing: Not on file     Unable to Get Utilities: Not on file     Unable to Pay for Housing in the Last Year: No     Has Housing: No      Family History[2]  Past Surgical History[3]  Current Medications[4]    Review of Systems:  Review of Systems   Constitutional:  Negative for appetite change, chills, diaphoresis, fatigue and fever.   Respiratory:  Negative for cough, chest tightness and shortness of breath.    Cardiovascular:  Positive for leg swelling. Negative for chest pain and palpitations.   Gastrointestinal:  Negative for diarrhea, nausea and vomiting.   Endocrine: Negative for cold intolerance and heat intolerance.   Genitourinary:  Negative for difficulty urinating, dysuria and enuresis.   Musculoskeletal:  Negative for arthralgias, back pain and gait problem.   Allergic/Immunologic: Negative for environmental allergies and food allergies.   Neurological:  Negative for dizziness, facial asymmetry and headaches.   Hematological:  Negative for adenopathy. Does not bruise/bleed easily.   Psychiatric/Behavioral:  Negative for agitation, behavioral problems and confusion.      Physical Exam:  Physical Exam  Constitutional:       Appearance: He is well-developed.   HENT:      Right Ear: External ear  normal.      Left Ear: External ear normal.     Eyes:      Pupils: Pupils are equal, round, and reactive to light.       Cardiovascular:      Rate and Rhythm: Normal rate and regular rhythm.      Heart sounds: Normal heart sounds. No murmur heard.     No friction rub. No gallop.   Pulmonary:      Effort: Pulmonary effort is normal.      Breath sounds: Normal breath sounds.   Abdominal:      Palpations: Abdomen is soft.     Musculoskeletal:         General: Normal range of motion.      Cervical back: Normal range of motion.      Right lower leg: Edema present.      Left lower leg: Edema present.     Skin:     General: Skin is warm and dry.     Neurological:      Mental Status: He is alert and oriented to person, place, and time.      Deep Tendon Reflexes: Reflexes are normal and symmetric.     Psychiatric:         Behavior: Behavior normal.         Thought Content: Thought content normal.         Judgment: Judgment normal.       This note was completed in part utilizing M-Modal Fluency Direct Software.  Grammatical errors, random word insertions, spelling mistakes, and incomplete sentences can be an occasional consequence of this system secondary to software limitations, ambient noise, and hardware issues.  If you have any questions or concerns about the content, text, or information contained within the body of this dictation, please contact the provider for clarification.          [1]   Past Medical History:  Diagnosis Date    Aortic stenosis     CKD (chronic kidney disease)     baseline Cr 1.3-1.5    Coronary artery disease     Cough     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HCC)     type 2, insulin dependent    GERD (gastroesophageal reflux disease)     Glaucoma     Gout     History of prostate cancer     Hypertension     Hypothyroidism     Overweight     Peripheral neuropathy, idiopathic     Pleural effusion, left     Pure hypercholesterolemia     LA...11/12/14   R....11/12/14     Visual impairment     cataract left eye    Weight  gain    [2]   Family History  Problem Relation Name Age of Onset    Diabetes Mother      Pancreatic cancer Brother      Diabetes Maternal Grandmother      Colon cancer Son      Diabetes Family      Substance Abuse Neg Hx      Mental illness Neg Hx     [3]   Past Surgical History:  Procedure Laterality Date    CARDIAC CATHETERIZATION      EYE SURGERY      IR THORACENTESIS  10/23/2018    IR THORACENTESIS  11/2/2018    IR THORACENTESIS  11/9/2018    IR THORACENTESIS  11/23/2018    VA CORONARY ARTERY BYP W/VEIN & ARTERY GRAFT 3 VEIN N/A 9/17/2018    Procedure: CORONARY ARTERY BYPASS GRAFT (CABG) x 4 VESSELS with LIMA - LAD, SVG/LEFT LEG EVH - LEFT PDA, OM3, & OM2;  Surgeon: Remy Ash MD;  Location: [facility];  Service: Cardiac Surgery    VA ECHO TRANSESOPHAG MONTR CARDIAC PUMP FUNCTJ N/A 9/17/2018    Procedure: TRANSESOPHAGEAL ECHOCARDIOGRAM (EVRONICA);  Surgeon: Remy Ash MD;  Location: [facility];  Service: Cardiac Surgery    VA OPTX FEM SHFT FX W/INSJ IMED IMPLT W/WO SCREW Left 6/20/2024    Procedure: INSERTION NAIL IM FEMUR ANTEGRADE (TROCHANTERIC);  Surgeon: Sukh Reece DO;  Location: [facility];  Service: Orthopedics    VA RPLCMT AORTIC VALVE OPN W/STENTLESS TISSUE VALVE N/A 9/17/2018    Procedure: REPLACEMENT VALVE AORTIC (AVR)- 23mm tissue Intuity Valve;  Surgeon: Remy Ash MD;  Location: [facility];  Service: Cardiac Surgery    THORACOSCOPY VIDEO ASSISTED SURGERY (VATS) Left 11/27/2018    Procedure: THORACOSCOPY VIDEO ASSISTED SURGERY (VATS), talc pleurodesis,;  Surgeon: Ciara Green MD;  Location: [facility];  Service: Thoracic    THYROID SURGERY     [4]   Current Outpatient Medications:     acetaminophen (TYLENOL) 325 mg tablet, Take 2 tablets (650 mg total) by mouth every 6 (six) hours as needed for mild pain, headaches or fever, Disp: , Rfl:     allopurinol (ZYLOPRIM) 300 mg tablet, TAKE ONE TABLET BY MOUTH ONCE DAILY, Disp: 90 tablet, Rfl: 1    ascorbic acid (VITAMIN C) 500 mg  tablet, TAKE ONE TABLET BY MOUTH DAILY, Disp: 30 tablet, Rfl: 5    cholecalciferol (VITAMIN D3) 1,000 units tablet, Take 1 tablet (1,000 Units total) by mouth daily, Disp: 90 tablet, Rfl: 3    clobetasol (TEMOVATE) 0.05 % cream, Apply topically 2 (two) times a day for 7 days, Disp: 30 g, Rfl: 1    dorzolamide-timolol (COSOPT) 22.3-6.8 MG/ML ophthalmic solution, Administer 1 drop to both eyes in the morning and 1 drop in the evening., Disp: , Rfl:     Eliquis 2.5 MG, TAKE ONE TABLET BY MOUTH TWICE DAILY, Disp: 60 tablet, Rfl: 5    FeroSul 325 (65 Fe) MG tablet, TAKE ONE TABLET BY MOUTH DAILY, Disp: 28 tablet, Rfl: 3    gabapentin (NEURONTIN) 300 mg capsule, TAKE ONE CAPSULE BY MOUTH DAILY AT BEDTIME, Disp: 90 capsule, Rfl: 1    Insulin Glargine Solostar (Lantus SoloStar) 100 UNIT/ML SOPN, Inject 0.2 mL (20 Units total) under the skin in the morning, Disp: , Rfl:     insulin lispro (HumALOG/ADMELOG) 100 units/mL injection, Inject 8 Units under the skin 3 (three) times a day with meals, Disp: , Rfl:     Insulin Pen Needle (Pen Needles) 32G X 4 MM MISC, Use 4 (four) times a day (before meals and at bedtime), Disp: 100 each, Rfl: 11    latanoprost (XALATAN) 0.005 % ophthalmic solution, Administer 1 drop into the left eye daily at bedtime, Disp: , Rfl:     levothyroxine 150 mcg tablet, TAKE ONE TABLET BY MOUTH DAILY, Disp: 90 tablet, Rfl: 1    pantoprazole (PROTONIX) 20 mg tablet, TAKE ONE TABLET BY MOUTH EVERY DAY, Disp: 30 tablet, Rfl: 5    potassium chloride (Klor-Con M20) 20 mEq tablet, Take 1 tablet (20 mEq total) by mouth daily, Disp: 30 tablet, Rfl: 5    pravastatin (PRAVACHOL) 80 mg tablet, Take 1 tablet (80 mg total) by mouth daily with dinner, Disp: , Rfl:     rosuvastatin (CRESTOR) 40 MG tablet, TAKE ONE TABLET BY MOUTH DAILY, Disp: 90 tablet, Rfl: 0    tamsulosin (FLOMAX) 0.4 mg, TAKE ONE CAPSULE BY MOUTH DAILY WITH dinner, Disp: 30 capsule, Rfl: 5    torsemide (DEMADEX) 20 mg tablet, Take 2 tablets (40 mg  total) by mouth daily, Disp: , Rfl:

## 2025-07-01 ENCOUNTER — TELEPHONE (OUTPATIENT)
Age: OVER 89
End: 2025-07-01

## 2025-07-01 NOTE — TELEPHONE ENCOUNTER
Patient's son Ciro called stating that the patient's  needs doctor Fly to write a letter of capacity which basically states that the patient is aware of his assets and that he is able to make the decision to make his son power of . Can someone please follow up with Ciro about this/    thank you

## 2025-07-02 NOTE — TELEPHONE ENCOUNTER
Patients son Ciro returned earlier call to schedule patient to come into office to see Dr Cid this week and then Dr Cid would evaluate the letter after, spoke with office staff as there are no available time slots this week, soonest appointment was 07/10. Office staff advised to send message and they would then discuss with provider and follow up with Ciro to schedule. Ciro can be reached at [phone].  Thank you

## 2025-07-02 NOTE — TELEPHONE ENCOUNTER
Left detailed message for Ciro. Dr Cid will need to see Carlos Enrique in the office for an evaluation. Asked that he call back and schedule an appointment.

## 2025-07-02 NOTE — TELEPHONE ENCOUNTER
Left message for Ciro. I have two appointments available for tomorrow with Dr Cid. 10 AM or 2 PM, asked that he call back to let me know if either of those will work.

## 2025-07-15 ENCOUNTER — OFFICE VISIT (OUTPATIENT)
Dept: CARDIOLOGY CLINIC | Facility: CLINIC | Age: OVER 89
End: 2025-07-15
Payer: MEDICARE

## 2025-07-15 VITALS
HEART RATE: 61 BPM | HEIGHT: 67 IN | BODY MASS INDEX: 29.03 KG/M2 | SYSTOLIC BLOOD PRESSURE: 108 MMHG | DIASTOLIC BLOOD PRESSURE: 62 MMHG | WEIGHT: 185 LBS

## 2025-07-15 DIAGNOSIS — I48.0 PAROXYSMAL ATRIAL FIBRILLATION (HCC): ICD-10-CM

## 2025-07-15 DIAGNOSIS — Z95.2 S/P AVR (AORTIC VALVE REPLACEMENT): ICD-10-CM

## 2025-07-15 DIAGNOSIS — I25.10 CORONARY ARTERY DISEASE INVOLVING NATIVE CORONARY ARTERY OF NATIVE HEART WITHOUT ANGINA PECTORIS: Primary | ICD-10-CM

## 2025-07-15 DIAGNOSIS — Z95.1 S/P CABG X 4: ICD-10-CM

## 2025-07-15 DIAGNOSIS — I35.0 NONRHEUMATIC AORTIC (VALVE) STENOSIS: ICD-10-CM

## 2025-07-15 DIAGNOSIS — I50.32 CHRONIC DIASTOLIC CONGESTIVE HEART FAILURE (HCC): ICD-10-CM

## 2025-07-15 DIAGNOSIS — E78.00 PURE HYPERCHOLESTEROLEMIA: ICD-10-CM

## 2025-07-15 PROCEDURE — 99214 OFFICE O/P EST MOD 30 MIN: CPT | Performed by: INTERNAL MEDICINE

## 2025-07-17 ENCOUNTER — PATIENT OUTREACH (OUTPATIENT)
Dept: CASE MANAGEMENT | Facility: OTHER | Age: OVER 89
End: 2025-07-17

## 2025-07-18 ENCOUNTER — PATIENT OUTREACH (OUTPATIENT)
Dept: CASE MANAGEMENT | Facility: OTHER | Age: OVER 89
End: 2025-07-18

## 2025-07-18 ENCOUNTER — TELEPHONE (OUTPATIENT)
Age: OVER 89
End: 2025-07-18

## 2025-07-18 NOTE — PROGRESS NOTES
Outpatient Care Management Note:  Referral for care management received after recent hospitalization and STR stay. Chart review completed.     History includes CAD, aortic stenosis, HF, atrial fibrillation, GERD, chronic respiratory failure, DM with recent A1C of 6.6, hypothyroidism, CKD-4, BPH, anemia, CABG, AVR, and failure to thrive in an adult.    6/11/2025-6/17/2025-Presented to ED with cough and leg swelling X 2 weeks.  CXR showed mild pulmonary congestion and small pleural effusion.  Given Lasix in ED. Admitted for aggressive IV diuresis.  Discharged to [facility] on 40 mg of torsemide.      7/15/2025-cardiology office visit. No medication changes.     7/17/2025-Discharged from [facility] to home.     Outreach call attempted to Mr Roth. Message left for patient to please return call.  Contact information left on message.     Call reminder sent.

## 2025-07-19 ENCOUNTER — HOSPITAL ENCOUNTER (OUTPATIENT)
Facility: HOSPITAL | Age: OVER 89
Setting detail: OBSERVATION
Discharge: NURSING, REHAB OR LONG-TERM CARE | End: 2025-07-20
Attending: EMERGENCY MEDICINE | Admitting: STUDENT IN AN ORGANIZED HEALTH CARE EDUCATION/TRAINING PROGRAM
Payer: MEDICARE

## 2025-07-19 ENCOUNTER — APPOINTMENT (EMERGENCY)
Dept: CT IMAGING | Facility: HOSPITAL | Age: OVER 89
End: 2025-07-19
Payer: MEDICARE

## 2025-07-19 ENCOUNTER — APPOINTMENT (EMERGENCY)
Dept: RADIOLOGY | Facility: HOSPITAL | Age: OVER 89
End: 2025-07-19
Payer: MEDICARE

## 2025-07-19 DIAGNOSIS — T14.8XXA MULTIPLE SKIN TEARS: ICD-10-CM

## 2025-07-19 DIAGNOSIS — R26.2 AMBULATORY DYSFUNCTION: ICD-10-CM

## 2025-07-19 DIAGNOSIS — S02.92XA FACIAL FRACTURE (HCC): ICD-10-CM
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
## 2025-07-19 PROBLEM — I95.1 ORTHOSTATIC HYPOTENSION: Status: ACTIVE | Noted: 2025-07-19

## 2025-07-19 LAB
ANION GAP SERPL CALCULATED.3IONS-SCNC: 9 MMOL/L (ref 4–13)
ATRIAL RATE: 84 BPM
BASOPHILS # BLD AUTO: 0.05 THOUSANDS/ÂΜL (ref 0–0.1)
BASOPHILS NFR BLD AUTO: 1 % (ref 0–1)
BNP SERPL-MCNC: 229 PG/ML (ref 0–100)
BUN SERPL-MCNC: 53 MG/DL (ref 5–25)
CALCIUM SERPL-MCNC: 10.2 MG/DL (ref 8.4–10.2)
CHLORIDE SERPL-SCNC: 102 MMOL/L (ref 96–108)
CO2 SERPL-SCNC: 27 MMOL/L (ref 21–32)
CREAT SERPL-MCNC: 2.51 MG/DL (ref 0.6–1.3)
EOSINOPHIL # BLD AUTO: 0.02 THOUSAND/ÂΜL (ref 0–0.61)
EOSINOPHIL NFR BLD AUTO: 0 % (ref 0–6)
ERYTHROCYTE [DISTWIDTH] IN BLOOD BY AUTOMATED COUNT: 14.3 % (ref 11.6–15.1)
GFR SERPL CREATININE-BSD FRML MDRD: 21 ML/MIN/1.73SQ M
GLUCOSE SERPL-MCNC: 151 MG/DL (ref 65–140)
GLUCOSE SERPL-MCNC: 158 MG/DL (ref 65–140)
GLUCOSE SERPL-MCNC: 180 MG/DL (ref 65–140)
HCT VFR BLD AUTO: 33.8 % (ref 36.5–49.3)
HGB BLD-MCNC: 10.8 G/DL (ref 12–17)
IMM GRANULOCYTES # BLD AUTO: 0.03 THOUSAND/UL (ref 0–0.2)
IMM GRANULOCYTES NFR BLD AUTO: 0 % (ref 0–2)
LYMPHOCYTES # BLD AUTO: 2.22 THOUSANDS/ÂΜL (ref 0.6–4.47)
LYMPHOCYTES NFR BLD AUTO: 25 % (ref 14–44)
MCH RBC QN AUTO: 32.6 PG (ref 26.8–34.3)
MCHC RBC AUTO-ENTMCNC: 32 G/DL (ref 31.4–37.4)
MCV RBC AUTO: 102 FL (ref 82–98)
MONOCYTES # BLD AUTO: 0.71 THOUSAND/ÂΜL (ref 0.17–1.22)
MONOCYTES NFR BLD AUTO: 8 % (ref 4–12)
NEUTROPHILS # BLD AUTO: 5.92 THOUSANDS/ÂΜL (ref 1.85–7.62)
NEUTS SEG NFR BLD AUTO: 66 % (ref 43–75)
NRBC BLD AUTO-RTO: 0 /100 WBCS
PLATELET # BLD AUTO: 167 THOUSANDS/UL (ref 149–390)
PMV BLD AUTO: 9.3 FL (ref 8.9–12.7)
POTASSIUM SERPL-SCNC: 4.6 MMOL/L (ref 3.5–5.3)
QRS AXIS: 17 DEGREES
QRSD INTERVAL: 124 MS
QT INTERVAL: 366 MS
QTC INTERVAL: 450 MS
RBC # BLD AUTO: 3.31 MILLION/UL (ref 3.88–5.62)
SODIUM SERPL-SCNC: 138 MMOL/L (ref 135–147)
T WAVE AXIS: 143 DEGREES
VENTRICULAR RATE: 91 BPM
WBC # BLD AUTO: 8.95 THOUSAND/UL (ref 4.31–10.16)

## 2025-07-19 PROCEDURE — 99285 EMERGENCY DEPT VISIT HI MDM: CPT | Performed by: EMERGENCY MEDICINE

## 2025-07-19 PROCEDURE — 97163 PT EVAL HIGH COMPLEX 45 MIN: CPT

## 2025-07-19 PROCEDURE — 36415 COLL VENOUS BLD VENIPUNCTURE: CPT | Performed by: EMERGENCY MEDICINE

## 2025-07-19 PROCEDURE — 80048 BASIC METABOLIC PNL TOTAL CA: CPT | Performed by: EMERGENCY MEDICINE

## 2025-07-19 PROCEDURE — 97167 OT EVAL HIGH COMPLEX 60 MIN: CPT

## 2025-07-19 PROCEDURE — 70450 CT HEAD/BRAIN W/O DYE: CPT

## 2025-07-19 PROCEDURE — 99222 1ST HOSP IP/OBS MODERATE 55: CPT | Performed by: STUDENT IN AN ORGANIZED HEALTH CARE EDUCATION/TRAINING PROGRAM

## 2025-07-19 PROCEDURE — 93005 ELECTROCARDIOGRAM TRACING: CPT

## 2025-07-19 PROCEDURE — 82948 REAGENT STRIP/BLOOD GLUCOSE: CPT

## 2025-07-19 PROCEDURE — 93010 ELECTROCARDIOGRAM REPORT: CPT | Performed by: INTERNAL MEDICINE

## 2025-07-19 PROCEDURE — 83880 ASSAY OF NATRIURETIC PEPTIDE: CPT | Performed by: EMERGENCY MEDICINE

## 2025-07-19 PROCEDURE — 71045 X-RAY EXAM CHEST 1 VIEW: CPT

## 2025-07-19 PROCEDURE — 99285 EMERGENCY DEPT VISIT HI MDM: CPT

## 2025-07-19 PROCEDURE — 85025 COMPLETE CBC W/AUTO DIFF WBC: CPT | Performed by: EMERGENCY MEDICINE

## 2025-07-19 RX ORDER — POTASSIUM CHLORIDE 1500 MG/1
20 TABLET, EXTENDED RELEASE ORAL DAILY
Status: DISCONTINUED | OUTPATIENT
Start: 2025-07-20 | End: 2025-07-20 | Stop reason: HOSPADM

## 2025-07-19 RX ORDER — TAMSULOSIN HYDROCHLORIDE 0.4 MG/1
0.4 CAPSULE ORAL
Status: DISCONTINUED | OUTPATIENT
Start: 2025-07-19 | End: 2025-07-20 | Stop reason: HOSPADM

## 2025-07-19 RX ORDER — LEVOTHYROXINE SODIUM 75 UG/1
150 TABLET ORAL
Status: DISCONTINUED | OUTPATIENT
Start: 2025-07-20 | End: 2025-07-20 | Stop reason: HOSPADM

## 2025-07-19 RX ORDER — ALLOPURINOL 300 MG/1
300 TABLET ORAL DAILY
Status: DISCONTINUED | OUTPATIENT
Start: 2025-07-20 | End: 2025-07-20 | Stop reason: HOSPADM

## 2025-07-19 RX ORDER — ACETAMINOPHEN 325 MG/1
975 TABLET ORAL ONCE
Status: COMPLETED | OUTPATIENT
Start: 2025-07-19 | End: 2025-07-19

## 2025-07-19 RX ORDER — INSULIN LISPRO 100 [IU]/ML
1-6 INJECTION, SOLUTION INTRAVENOUS; SUBCUTANEOUS
Status: DISCONTINUED | OUTPATIENT
Start: 2025-07-19 | End: 2025-07-20 | Stop reason: HOSPADM

## 2025-07-19 RX ORDER — INSULIN GLARGINE 100 [IU]/ML
20 INJECTION, SOLUTION SUBCUTANEOUS EVERY MORNING
Status: DISCONTINUED | OUTPATIENT
Start: 2025-07-20 | End: 2025-07-20 | Stop reason: HOSPADM

## 2025-07-19 RX ORDER — TORSEMIDE 20 MG/1
40 TABLET ORAL ONCE
Status: COMPLETED | OUTPATIENT
Start: 2025-07-19 | End: 2025-07-19

## 2025-07-19 RX ORDER — INSULIN LISPRO 100 [IU]/ML
8 INJECTION, SOLUTION INTRAVENOUS; SUBCUTANEOUS
Status: DISCONTINUED | OUTPATIENT
Start: 2025-07-19 | End: 2025-07-20 | Stop reason: HOSPADM

## 2025-07-19 RX ORDER — ATORVASTATIN CALCIUM 40 MG/1
80 TABLET, FILM COATED ORAL
Status: DISCONTINUED | OUTPATIENT
Start: 2025-07-19 | End: 2025-07-20 | Stop reason: HOSPADM

## 2025-07-19 RX ORDER — FERROUS SULFATE 325(65) MG
325 TABLET ORAL DAILY
Status: DISCONTINUED | OUTPATIENT
Start: 2025-07-20 | End: 2025-07-20 | Stop reason: HOSPADM

## 2025-07-19 RX ORDER — LATANOPROST 50 UG/ML
1 SOLUTION/ DROPS OPHTHALMIC
Status: DISCONTINUED | OUTPATIENT
Start: 2025-07-19 | End: 2025-07-20 | Stop reason: HOSPADM

## 2025-07-19 RX ORDER — PANTOPRAZOLE SODIUM 20 MG/1
20 TABLET, DELAYED RELEASE ORAL DAILY
Status: DISCONTINUED | OUTPATIENT
Start: 2025-07-20 | End: 2025-07-20 | Stop reason: HOSPADM

## 2025-07-19 RX ORDER — GABAPENTIN 300 MG/1
300 CAPSULE ORAL
Status: DISCONTINUED | OUTPATIENT
Start: 2025-07-19 | End: 2025-07-20 | Stop reason: HOSPADM

## 2025-07-19 RX ORDER — ACETAMINOPHEN 325 MG/1
650 TABLET ORAL EVERY 6 HOURS PRN
Status: DISCONTINUED | OUTPATIENT
Start: 2025-07-19 | End: 2025-07-20 | Stop reason: HOSPADM

## 2025-07-19 RX ORDER — TORSEMIDE 20 MG/1
40 TABLET ORAL DAILY
Status: DISCONTINUED | OUTPATIENT
Start: 2025-07-20 | End: 2025-07-20 | Stop reason: HOSPADM

## 2025-07-19 RX ADMIN — INSULIN LISPRO 1 UNITS: 100 INJECTION, SOLUTION INTRAVENOUS; SUBCUTANEOUS at 22:11

## 2025-07-19 RX ADMIN — APIXABAN 2.5 MG: 2.5 TABLET, FILM COATED ORAL at 18:01

## 2025-07-19 RX ADMIN — INSULIN LISPRO 8 UNITS: 100 INJECTION, SOLUTION INTRAVENOUS; SUBCUTANEOUS at 18:51

## 2025-07-19 RX ADMIN — ATORVASTATIN CALCIUM 80 MG: 40 TABLET, FILM COATED ORAL at 18:01

## 2025-07-19 RX ADMIN — TAMSULOSIN HYDROCHLORIDE 0.4 MG: 0.4 CAPSULE ORAL at 18:01

## 2025-07-19 RX ADMIN — LATANOPROST 1 DROP: 50 SOLUTION OPHTHALMIC at 20:38

## 2025-07-19 RX ADMIN — GABAPENTIN 300 MG: 300 CAPSULE ORAL at 20:38

## 2025-07-19 RX ADMIN — TORSEMIDE 40 MG: 20 TABLET ORAL at 10:52

## 2025-07-19 RX ADMIN — INSULIN LISPRO 1 UNITS: 100 INJECTION, SOLUTION INTRAVENOUS; SUBCUTANEOUS at 18:49

## 2025-07-19 RX ADMIN — ACETAMINOPHEN 975 MG: 325 TABLET ORAL at 10:50

## 2025-07-19 NOTE — ED PROVIDER NOTES
Emergency Department Trauma Note  Carlos Enrique Roth Jr. 89 y.o. male MRN: [id number]  Unit/Bed#: /-01 Encounter: [id number]      Trauma Alert: Trauma Acuity: Trauma Evaluation  Model of Arrival: Mode of Arrival: Direct from scene via    Trauma Team: Current Providers  Attending Provider: Rocco Carter DO  Attending Provider: Esha Cee MD  Registered Nurse: Vicky Madrid, RN  Registered Nurse: Luciano Ragsdale RN  Physical Therapist: Gertrudis Smith PT  Occupational Therapist: Sukhi Sharma OT  Advanced Practitioner: Shanika Mary PA-C  Registered Nurse: Camilo Montero RN  Patient Care Assistant: Nayely Beyer  Registered Nurse: Akahs Guevara RN  Patient Care Assistant: Philly Bernal  Consultants: hospitalist, pt ot case management      History of Present Illness     Chief Complaint:   Chief Complaint   Patient presents with    Fall     Ems stated that pt was in the bathroom and fell between the toilet. Pt states that he hit his head and is on a thinner. Pt is oriented to self but very confused. Pt denies LOC     HPI:  Carlos Enrique Roth Jr. is a 89 y.o. male who presents with fall.  Mechanism:Details of Incident: fall Injury Date: 07/19/25 Injury Time: 0844      History from patient and paramedics brought in by ambulance from home where he lives with his son with concern for fall several hours ago.  Patient states he slipped in the bathroom.  He is not sure if he hit his head.  He has no headache.  He was wedged between the toilet and the sink.  Patient has some skin tears to the elbows.  GCS 15 mild right side tenderness of the chest wall no deformities no pelvic tenderness      Review of Systems   Constitutional:  Negative for chills and fever.   HENT:  Negative for rhinorrhea and sore throat.    Respiratory:  Negative for shortness of breath.    Cardiovascular:  Negative for chest pain.   Gastrointestinal:  Negative for constipation, diarrhea, nausea and vomiting.   Genitourinary:  Negative for dysuria  and frequency.   Skin:  Negative for rash.   All other systems reviewed and are negative.      Historical Information     Immunizations:   Immunization History   Administered Date(s) Administered    COVID-19 PFIZER VACCINE 0.3 ML IM 02/27/2021, 03/22/2021, 09/29/2021    COVID-19 Pfizer Vac BIVALENT Cornell-sucrose 12 Yr+ IM 10/24/2022    COVID-19 Pfizer vac (Cornell-sucrose, gray cap) 12 yr+ IM 04/06/2022    INFLUENZA 11/12/2014, 09/15/2015, 09/30/2015, 09/21/2016, 11/16/2018    Influenza Split High Dose Preservative Free IM 09/15/2015, 09/30/2015, 09/21/2016, 08/21/2017, 09/10/2024    Influenza, high dose seasonal 0.7 mL 11/16/2018, 11/08/2019, 09/29/2020    Influenza, seasonal, injectable 10/15/2013, 11/01/2014    Pneumococcal Conjugate 13-Valent 01/09/2018, 01/09/2018    Pneumococcal Polysaccharide PPV23 01/01/2007    TD (adult) Preservative Free 05/23/2013    Td (adult), adsorbed 01/01/2000    Tdap 05/12/2025    Tuberculin Skin Test 09/29/2018, 06/22/2024, 06/29/2024    Zoster 11/01/2010       Past Medical History[1]  Family History[2]  Past Surgical History[3]  Social History[4]  E-Cigarette/Vaping    E-Cigarette Use Never User      E-Cigarette/Vaping Substances    Nicotine No     THC No     CBD No     Flavoring No     Other No     Unknown No        Family History: non-contributory    Meds/Allergies   Prior to Admission Medications   Prescriptions Last Dose Informant Patient Reported? Taking?   Eliquis 2.5 MG  Outside Facility (Specify) No No   Sig: TAKE ONE TABLET BY MOUTH TWICE DAILY   FeroSul 325 (65 Fe) MG tablet  Outside Facility (Specify) No No   Sig: TAKE ONE TABLET BY MOUTH DAILY   Insulin Glargine Solostar (Lantus SoloStar) 100 UNIT/ML SOPN  Outside Facility (Specify) No No   Sig: Inject 0.2 mL (20 Units total) under the skin in the morning   Insulin Pen Needle (Pen Needles) 32G X 4 MM MIS  Outside Facility (Specify) No No   Sig: Use 4 (four) times a day (before meals and at bedtime)   acetaminophen  (TYLENOL) 325 mg tablet  Outside Facility (Specify) No No   Sig: Take 2 tablets (650 mg total) by mouth every 6 (six) hours as needed for mild pain, headaches or fever   allopurinol (ZYLOPRIM) 300 mg tablet  Outside Facility (Specify) No No   Sig: TAKE ONE TABLET BY MOUTH ONCE DAILY   ascorbic acid (VITAMIN C) 500 mg tablet  Outside Facility (Specify) No No   Sig: TAKE ONE TABLET BY MOUTH DAILY   cholecalciferol (VITAMIN D3) 1,000 units tablet  Outside Facility (Specify) No No   Sig: Take 1 tablet (1,000 Units total) by mouth daily   clobetasol (TEMOVATE) 0.05 % cream  Self No No   Sig: Apply topically 2 (two) times a day for 7 days   Patient not taking: Reported on 7/15/2025   dorzolamide-timolol (COSOPT) 22.3-6.8 MG/ML ophthalmic solution  Outside Facility (Specify) Yes No   Sig: Administer 1 drop to both eyes in the morning and 1 drop in the evening.   gabapentin (NEURONTIN) 300 mg capsule  Outside Facility (Specify) No No   Sig: TAKE ONE CAPSULE BY MOUTH DAILY AT BEDTIME   insulin lispro (HumALOG/ADMELOG) 100 units/mL injection  Outside Facility (Specify) No No   Sig: Inject 8 Units under the skin 3 (three) times a day with meals   latanoprost (XALATAN) 0.005 % ophthalmic solution  Outside Facility (Specify) Yes No   Sig: Administer 1 drop into the left eye daily at bedtime   levothyroxine 150 mcg tablet  Outside Facility (Specify) No No   Sig: TAKE ONE TABLET BY MOUTH DAILY   pantoprazole (PROTONIX) 20 mg tablet  Outside Facility (Specify) No No   Sig: TAKE ONE TABLET BY MOUTH EVERY DAY   potassium chloride (Klor-Con M20) 20 mEq tablet  Outside Facility (Specify) No No   Sig: Take 1 tablet (20 mEq total) by mouth daily   pravastatin (PRAVACHOL) 80 mg tablet  Outside Facility (Specify) No No   Sig: Take 1 tablet (80 mg total) by mouth daily with dinner   Patient not taking: Reported on 7/15/2025   rosuvastatin (CRESTOR) 40 MG tablet  Outside Facility (Specify) No No   Sig: TAKE ONE TABLET BY MOUTH DAILY    tamsulosin (FLOMAX) 0.4 mg  Outside Facility (Specify) No No   Sig: TAKE ONE CAPSULE BY MOUTH DAILY WITH dinner   torsemide (DEMADEX) 20 mg tablet  Outside Facility (Specify) No No   Sig: Take 2 tablets (40 mg total) by mouth daily   Patient taking differently: Take 40 mg by mouth daily 6/25 facility states one 40 mg tablet daily      Facility-Administered Medications: None       Allergies[5]    PHYSICAL EXAM    PE limited by: nothing    Objective   Vitals:   First set: Temperature: 97.8 °F (36.6 °C) (07/19/25 0915)  Pulse: 59 (07/19/25 0915)  Respirations: 18 (07/19/25 0915)  Blood Pressure: (!) 183/79 (07/19/25 0915)  SpO2: 98 % (07/19/25 0915)    Primary Survey:   (A) Airway: patent  (B) Breathing: clear  (C) Circulation: Pulses:   normal  (D) Disabliity:  GCS Total:  15  (E) Expose:  Completed    Secondary Survey: (Click on Physical Exam tab above)  Physical Exam  Vitals and nursing note reviewed.   Constitutional:       Appearance: He is well-developed.   HENT:      Head: Normocephalic and atraumatic.      Jaw: There is normal jaw occlusion. No tenderness.      Right Ear: External ear normal.      Left Ear: External ear normal.      Nose: Nose normal.      Right Sinus: No maxillary sinus tenderness or frontal sinus tenderness.      Left Sinus: No maxillary sinus tenderness or frontal sinus tenderness.      Mouth/Throat:      Mouth: Mucous membranes are moist.      Pharynx: Oropharynx is clear.     Eyes:      Conjunctiva/sclera: Conjunctivae normal.      Pupils: Pupils are equal, round, and reactive to light.       Cardiovascular:      Rate and Rhythm: Normal rate and regular rhythm.      Heart sounds: Normal heart sounds.   Pulmonary:      Effort: Pulmonary effort is normal. No respiratory distress.      Breath sounds: Normal breath sounds. No wheezing.   Chest:        Comments: No crepitus, deformity, or ecchymosis at chest wall  Abdominal:      General: Bowel sounds are normal. There is no distension.       Palpations: Abdomen is soft.      Tenderness: There is no abdominal tenderness.     Musculoskeletal:         General: No deformity. Normal range of motion.      Cervical back: Normal range of motion and neck supple. No bony tenderness. No spinous process tenderness.      Thoracic back: No bony tenderness.      Lumbar back: No bony tenderness.      Comments: Bilateral elbow skin tears - non-tender FAROM of elbows.  Skin tears cleansed by me, steristrips applied and dry dressing.     Skin:     General: Skin is warm and dry.      Findings: No rash.     Neurological:      General: No focal deficit present.      Mental Status: He is alert and oriented to person, place, and time.      GCS: GCS eye subscore is 4. GCS verbal subscore is 5. GCS motor subscore is 6.      Sensory: No sensory deficit.     Psychiatric:         Mood and Affect: Mood normal.         Cervical spine cleared by clinical criteria? Yes     Invasive Devices       Peripheral Intravenous Line  Duration             Peripheral IV 07/19/25 Distal;Dorsal (posterior);Left Forearm <1 day                    Lab Results:   Results Reviewed       Procedure Component Value Units Date/Time    B-Type Natriuretic Peptide(BNP) [[id number]]  (Abnormal) Collected: 07/19/25 1500    Lab Status: Final result Specimen: Blood from Arm, Left Updated: 07/19/25 1535      pg/mL     Basic metabolic panel [[id number]]  (Abnormal) Collected: 07/19/25 1500    Lab Status: Final result Specimen: Blood from Arm, Left Updated: 07/19/25 1523     Sodium 138 mmol/L      Potassium 4.6 mmol/L      Chloride 102 mmol/L      CO2 27 mmol/L      ANION GAP 9 mmol/L      BUN 53 mg/dL      Creatinine 2.51 mg/dL      Glucose 180 mg/dL      Calcium 10.2 mg/dL      eGFR 21 ml/min/1.73sq m     Narrative:      National Kid[employer] Disease Foundation guidelines for Chronic Kidney Disease (CKD):     Stage 1 with normal or high GFR (GFR > 90 mL/min/1.73 square meters)    Stage 2 Mild CKD (GFR = 60-89  mL/min/1.73 square meters)    Stage 3A Moderate CKD (GFR = 45-59 mL/min/1.73 square meters)    Stage 3B Moderate CKD (GFR = 30-44 mL/min/1.73 square meters)    Stage 4 Severe CKD (GFR = 15-29 mL/min/1.73 square meters)    Stage 5 End Stage CKD (GFR <15 mL/min/1.73 square meters)  Note: GFR calculation is accurate only with a steady state creatinine    CBC and differential [[id number]]  (Abnormal) Collected: 07/19/25 1500    Lab Status: Final result Specimen: Blood from Arm, Left Updated: 07/19/25 1506     WBC 8.95 Thousand/uL      RBC 3.31 Million/uL      Hemoglobin 10.8 g/dL      Hematocrit 33.8 %       fL      MCH 32.6 pg      MCHC 32.0 g/dL      RDW 14.3 %      MPV 9.3 fL      Platelets 167 Thousands/uL      nRBC 0 /100 WBCs      Segmented % 66 %      Immature Grans % 0 %      Lymphocytes % 25 %      Monocytes % 8 %      Eosinophils Relative 0 %      Basophils Relative 1 %      Absolute Neutrophils 5.92 Thousands/µL      Absolute Immature Grans 0.03 Thousand/uL      Absolute Lymphocytes 2.22 Thousands/µL      Absolute Monocytes 0.71 Thousand/µL      Eosinophils Absolute 0.02 Thousand/µL      Basophils Absolute 0.05 Thousands/µL                    Imaging Studies:   Direct to CT: Yes  TRAUMA - CT head wo contrast   Final Result by Nohemi Boston MD (07/19 0953)      No acute intracranial abnormality. Stable chronic microangiopathic changes within the brain.      Right lateral orbital wall and right zygomatic arch fractures.      The study was marked in EPIC for immediate notification.                  Workstation performed: [id number]         XR Trauma chest portable   Final Result by Lucila Horan MD (07/19 0931)      Mild pulmonary venous congestion.      No acute displaced fractures.            Workstation performed: [id number]               Procedures  ECG 12 Lead Documentation Only    Date/Time: 7/19/2025 1:36 PM    Performed by: Rocco Carter DO  Authorized by: Rocco Carter DO     ECG reviewed by me, the ED Provider: yes    Patient location:  ED  Previous ECG:     Previous ECG:  Compared to current    Similarity:  Changes noted  Interpretation:     Interpretation: non-specific    Rate:     ECG rate:  91    ECG rate assessment: normal    Rhythm:     Rhythm: sinus rhythm    Ectopy:     Ectopy: none    QRS:     QRS axis:  Normal    QRS intervals:  Normal  Conduction:     Conduction: normal    ST segments:     ST segments:  Normal  T waves:     T waves: normal    Comments:      This EKG was interpreted by me.           ED Course           Medical Decision Making  Differential includes intracranial hemorrhage, concussion, less likely rib fractures pneumothorax elbow fractures    Chest x-ray with mild vascular congestion.  Patient did not take his diuretic today will give a dose here.  He is not hypoxic no complaints of shortness of breath.  Facial fractures on CT, patient has no pain and minimal tenderness on palpation.  Reasonable to follow-up outpatient with maxillofacial surgeon, and soft diet.  Patient has general weakness here unable to get up on his own.  Will have PT OT evaluate patient to consider returning to rehab as he just left there couple days ago.    Amount and/or Complexity of Data Reviewed  Labs: ordered.  Radiology: ordered.    Risk  OTC drugs.  Prescription drug management.  Decision regarding hospitalization.                Disposition  [facility] Transfer: No  Final diagnoses:   Fall, initial encounter   Facial fracture (HCC)   Multiple skin tears   Ambulatory dysfunction     Time reflects when diagnosis was documented in both MDM as applicable and the Disposition within this note       Time User Action Codes Description Comment    7/19/2025 10:55 AM Rocco Carter [W19.XXXA] Fall, initial encounter     7/19/2025 10:55 AM Rocco Carter [S02.92XA] Facial fracture (HCC)     7/19/2025 10:55 AM Rocco Carter [T14.8XXA] Multiple skin tears     7/19/2025  3:41 PM Raul  Rocco Vargas [R26.2] Ambulatory dysfunction           ED Disposition       ED Disposition   Admit    Condition   Stable    Date/Time   Sat 2025  3:40 PM    Comment   Case was discussed with DANICA Borja and the patient's admission status was agreed to be Admission Status: observation status to the service of Dr. Cee .               Follow-up Information    None       Current Discharge Medication List        CONTINUE these medications which have NOT CHANGED    Details   acetaminophen (TYLENOL) 325 mg tablet Take 2 tablets (650 mg total) by mouth every 6 (six) hours as needed for mild pain, headaches or fever    Associated Diagnoses: Closed nondisplaced intertrochanteric fracture of left femur with routine healing, subsequent encounter      allopurinol (ZYLOPRIM) 300 mg tablet TAKE ONE TABLET BY MOUTH ONCE DAILY  Qty: 90 tablet, Refills: 1    Comments:  please send new thank you:)  Associated Diagnoses: Chronic gout with tophus, unspecified cause, unspecified site      ascorbic acid (VITAMIN C) 500 mg tablet TAKE ONE TABLET BY MOUTH DAILY  Qty: 30 tablet, Refills: 5    Comments: This prescription was filled on 2024. Any refills authorized will be placed on file.  Associated Diagnoses: Healthcare maintenance      cholecalciferol (VITAMIN D3) 1,000 units tablet Take 1 tablet (1,000 Units total) by mouth daily  Qty: 90 tablet, Refills: 3    Associated Diagnoses: Secondary hyperparathyroidism (HCC)      clobetasol (TEMOVATE) 0.05 % cream Apply topically 2 (two) times a day for 7 days  Qty: 30 g, Refills: 1    Comments: This prescription was filled on 2024. Any refills authorized will be placed on file.  Associated Diagnoses: Psoriasis      dorzolamide-timolol (COSOPT) 22.3-6.8 MG/ML ophthalmic solution Administer 1 drop to both eyes in the morning and 1 drop in the evening.      Eliquis 2.5 MG TAKE ONE TABLET BY MOUTH TWICE DAILY  Qty: 60 tablet, Refills: 5    Comments: This prescription was  filled on 4/1/2025. Any refills authorized will be placed on file.  Associated Diagnoses: New onset atrial fibrillation (HCC); Closed nondisplaced intertrochanteric fracture of left femur with routine healing, subsequent encounter      FeroSul 325 (65 Fe) MG tablet TAKE ONE TABLET BY MOUTH DAILY  Qty: 28 tablet, Refills: 3    Associated Diagnoses: Anemia, unspecified type      gabapentin (NEURONTIN) 300 mg capsule TAKE ONE CAPSULE BY MOUTH DAILY AT BEDTIME  Qty: 90 capsule, Refills: 1    Associated Diagnoses: Neuropathy      Insulin Glargine Solostar (Lantus SoloStar) 100 UNIT/ML SOPN Inject 0.2 mL (20 Units total) under the skin in the morning    Associated Diagnoses: Type 2 diabetes mellitus with diabetic neuropathy, with long-term current use of insulin ([facility])      insulin lispro (HumALOG/ADMELOG) 100 units/mL injection Inject 8 Units under the skin 3 (three) times a day with meals    Associated Diagnoses: CHF exacerbation ([facility])      Insulin Pen Needle (Pen Needles) 32G X 4 MM MISC Use 4 (four) times a day (before meals and at bedtime)  Qty: 100 each, Refills: 11    Associated Diagnoses: Type 2 diabetes mellitus with stage 3b chronic kidney disease, without long-term current use of insulin ([facility])      latanoprost (XALATAN) 0.005 % ophthalmic solution Administer 1 drop into the left eye daily at bedtime      levothyroxine 150 mcg tablet TAKE ONE TABLET BY MOUTH DAILY  Qty: 90 tablet, Refills: 1    Associated Diagnoses: Hypothyroidism, unspecified type      pantoprazole (PROTONIX) 20 mg tablet TAKE ONE TABLET BY MOUTH EVERY DAY  Qty: 30 tablet, Refills: 5    Associated Diagnoses: S/P CABG x 4      potassium chloride (Klor-Con M20) 20 mEq tablet Take 1 tablet (20 mEq total) by mouth daily  Qty: 30 tablet, Refills: 5    Associated Diagnoses: Hypokalemia      pravastatin (PRAVACHOL) 80 mg tablet Take 1 tablet (80 mg total) by mouth daily with dinner    Associated Diagnoses: CHF exacerbation (HCC)      rosuvastatin  (CRESTOR) 40 MG tablet TAKE ONE TABLET BY MOUTH DAILY  Qty: 90 tablet, Refills: 0    Comments: This prescription was filled on 2/8/2025. Any refills authorized will be placed on file.  Associated Diagnoses: S/P CABG x 4; S/P AVR (aortic valve replacement)      tamsulosin (FLOMAX) 0.4 mg TAKE ONE CAPSULE BY MOUTH DAILY WITH dinner  Qty: 30 capsule, Refills: 5    Comments: This prescription was filled on 11/18/2024. Any refills authorized will be placed on file.  Associated Diagnoses: Benign prostatic hyperplasia with nocturia      torsemide (DEMADEX) 20 mg tablet Take 2 tablets (40 mg total) by mouth daily    Associated Diagnoses: CHF exacerbation (HCC)           No discharge procedures on file.    PDMP Review         Value Time User    PDMP Reviewed  Yes 6/17/2025 12:01 PM Gonzalez Jackson MD            ED Provider  Electronically Signed by             [1]   Past Medical History:  Diagnosis Date    Aortic stenosis     CKD (chronic kidney disease)     baseline Cr 1.3-1.5    Coronary artery disease     Cough     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HCC)     type 2, insulin dependent    GERD (gastroesophageal reflux disease)     Glaucoma     Gout     History of prostate cancer     Hypertension     Hypothyroidism     Overweight     Peripheral neuropathy, idiopathic     Pleural effusion, left     Pure hypercholesterolemia     LA...11/12/14   R....11/12/14     Visual impairment     cataract left eye    Weight gain    [2]   Family History  Problem Relation Name Age of Onset    Diabetes Mother      Pancreatic cancer Brother      Diabetes Maternal Grandmother      Colon cancer Son      Diabetes Family      Substance Abuse Neg Hx      Mental illness Neg Hx     [3]   Past Surgical History:  Procedure Laterality Date    CARDIAC CATHETERIZATION      EYE SURGERY      IR THORACENTESIS  10/23/2018    IR THORACENTESIS  11/2/2018    IR THORACENTESIS  11/9/2018    IR THORACENTESIS  11/23/2018    UT CORONARY ARTERY BYP W/VEIN & ARTERY GRAFT 3 VEIN N/A  2018    Procedure: CORONARY ARTERY BYPASS GRAFT (CABG) x 4 VESSELS with LIMA - LAD, SVG/LEFT LEG EVH - LEFT PDA, OM3, & OM2;  Surgeon: Remy Ash MD;  Location: [facility];  Service: Cardiac Surgery    ME ECHO TRANSESOPHAG MONTR CARDIAC PUMP FUNCTJ N/A 2018    Procedure: TRANSESOPHAGEAL ECHOCARDIOGRAM (VERONICA);  Surgeon: Remy Ash MD;  Location: [facility];  Service: Cardiac Surgery    ME OPTX FEM SHFT FX W/INSJ IMED IMPLT W/WO SCREW Left 2024    Procedure: INSERTION NAIL IM FEMUR ANTEGRADE (TROCHANTERIC);  Surgeon: Sukh Reece DO;  Location: [facility];  Service: Orthopedics    ME RPLCMT AORTIC VALVE OPN W/STENTLESS TISSUE VALVE N/A 2018    Procedure: REPLACEMENT VALVE AORTIC (AVR)- 23mm tissue Intuity Valve;  Surgeon: Remy Ash MD;  Location: [facility];  Service: Cardiac Surgery    THORACOSCOPY VIDEO ASSISTED SURGERY (VATS) Left 2018    Procedure: THORACOSCOPY VIDEO ASSISTED SURGERY (VATS), talc pleurodesis,;  Surgeon: Ciara Green MD;  Location: [facility];  Service: Thoracic    THYROID SURGERY     [4]   Social History  Tobacco Use    Smoking status: Former     Current packs/day: 0.00     Average packs/day: 0.3 packs/day for 1 year (0.3 ttl pk-yrs)     Types: Cigarettes     Start date:      Quit date:      Years since quittin.5    Tobacco comments:     Only in the service    Vaping Use    Vaping status: Never Used   Substance Use Topics    Alcohol use: Never    Drug use: No   [5]   Allergies  Allergen Reactions    Amlodipine Nausea Only    Atorvastatin Myalgia        Rocco Carter DO  25 [phone]

## 2025-07-19 NOTE — PLAN OF CARE
Problem: OCCUPATIONAL THERAPY ADULT  Goal: Performs self-care activities at highest level of function for planned discharge setting.  See evaluation for individualized goals.  Description: Treatment Interventions: ADL retraining, Functional transfer training, UE strengthening/ROM, Endurance training, Patient/family training, Equipment evaluation/education, Compensatory technique education, Continued evaluation, Energy conservation, Activityengagement          See flowsheet documentation for full assessment, interventions and recommendations.   Note: Limitation: Decreased ADL status, Decreased UE strength, Decreased Safe judgement during ADL, Decreased cognition, Decreased endurance, Decreased self-care trans, Decreased high-level ADLs  Prognosis: Good  Assessment: Pt is a 89 y.o. male seen for OT evaluation at [facility]. Pt admit s/p fall, +headstrike, nondisplaced fracture of the right lateral orbital wall as well as in angulated right zygomatic arch fracture. PMH CAD, anemia, stage 4 CKD, T2DM, A-fib, generalized weakness, gout with tophus, hypercholesterolemia, renal cyst, s/p CABG x4, s/p AVR, aortic stenosis, ambulatory dysfunction, cataract, hypokalemia, failure to thrive, glaucoma. Pt resides at home with son in 1SH 0STE with 2 steps to living room, uses SPC and RW at home. Pt IND ADL? DEP IADL. Son works throughout the day. OT completed extensive review of pt's medical and social history. Personal factors affecting pt at time of IE include: limited home support, difficulty performing ADLS, difficulty performing IADLS , limited insight into deficits, and decreased initiation and engagement . Upon evaluation: Pt presents maxA LB ADLs, Jazmin UB ADLs, Jazmin sit<>stand with RW, Jazmin fxl mobility with RW, very unsteady and cues for safe walker use. Pt confused throughout, repetitive with questions and slow processing and problem solving 2* the following deficits impacting occupational performance:  weakness, decreased ROM, decreased strength, decreased balance, decreased tolerance, impaired attention, impaired sequencing, and impaired problem solving. Full objective findings from OT assessment regarding body systems outlined above. Pt to benefit from continued skilled OT tx while in the hospital to address deficits as defined above and maximize level of functional independence w/ ADL's and functional mobility. Occupational Performance areas to address include: grooming, bathing/shower, toilet hygiene, dressing, health maintenance, functional mobility, and clothing management. Based on findings, pt is of high complexity. The patient's raw score on the -PAC Daily Activity inpatient short form is 15 , standardized score is 34.69 , less than 39.4. Patients at this level are likely to benefit from DC to post-acute rehabilitation services. However, please refer to therapist recommendation for discharge planning given other factors that may influence destination. At this time, OT recommendations at time of discharge are DC with Level II - Moderate Rehab Resource Intensity resources.     Rehab Resource Intensity Level, OT: II (Moderate Resource Intensity)

## 2025-07-19 NOTE — ASSESSMENT & PLAN NOTE
Patient noted to drop from 167/72 to 145/65 on orthostatic vital signs with PT in the ER  Symptomatic  Could be component of autonomic dysfunction, do not feel any element of dehydration contributing  Patient was discharged from [facility] on 6/17 with adjusted home diuretic regimen  Will trial compression stockings and repeat VS  Would not hold diuretic regimen or administer IVF given his inclination for volume overload, he does not examine dry

## 2025-07-19 NOTE — PLAN OF CARE
Problem: PHYSICAL THERAPY ADULT  Goal: Performs mobility at highest level of function for planned discharge setting.  See evaluation for individualized goals.  Description: Treatment/Interventions: Functional transfer training, LE strengthening/ROM, Therapeutic exercise, Endurance training, Cognitive reorientation, Patient/family training, Equipment eval/education, Bed mobility, Gait training          See flowsheet documentation for full assessment, interventions and recommendations.  Note: Prognosis: Fair  Problem List: Decreased strength, Decreased endurance, Impaired balance, Decreased mobility, Decreased cognition, Impaired judgement, Decreased safety awareness (BLE edema)  Assessment: Carlos Enrique Roth Jr. is a 89 y.o. Female who presents to [facility] ED on 7/19/2025 from home w/ c/o fall off toilet and diagnosis of fall. Orders for PT eval and treat received. Per Dr. Carter, pt is medically cleared. Pt presents w/ comorbidities of GERD, CAD, glaucoma, DMII, BPH, Afib. At baseline, pt mobilizes modified I w/ RW, and reports + falls in the last 6 months. Upon evaluation, pt presents w/ the following deficits: weakness, impaired skin integrity, edema of extremities, impaired balance, impaired vision, decreased endurance, and impaired cognition and safety awareness . Upon eval, pt requires mod A x 2 for bed mobility, min A x 1 for transfers, and min A x 1 for gait. Based on this PT evaluation today, patient's discharge recommendation is for Level II - Moderate Rehab Resource Intensity. During this admission, pt would benefit from continued skilled inpatient PT in the acute care setting in order to address the abovementioned deficits to maximize function and mobility before DC from acute care.        Rehab Resource Intensity Level, PT: II (Moderate Resource Intensity)    See flowsheet documentation for full assessment.

## 2025-07-19 NOTE — ASSESSMENT & PLAN NOTE
Wt Readings from Last 3 Encounters:   07/19/25 77.3 kg (170 lb 6.7 oz)   07/15/25 83.9 kg (185 lb)   06/25/25 84 kg (185 lb 3.2 oz)   Admitted recently in June for CHF exacerbation, was discharged on torsemide 40 mg daily  Echo June 2025: LVEF of 50%, systolic function mildly reduced with severe diastolic dysfunction.  Multiple segments are hypokinetic.  Aortic bioprosthetic valve present with elevated RV systolic pressure, aortic root exhibiting severe fibrocalcification   on arrival (566 on prior admission for CHF exac)  CXR with mild pulmonary venous congestion  Satting well on RA  Examines euvolemic  Continue home regimen for now  Monitor daily weight  I/os  Cardiac diet

## 2025-07-19 NOTE — TRAUMA DOCUMENTATION
Patient requires minimal assistance to get to standing position. Upon standing, patient can ambulate independently with walker.

## 2025-07-19 NOTE — ASSESSMENT & PLAN NOTE
CAD status post CABG x 4 in 2018  Continue formulary equivalent for home statin  Denies chest pain

## 2025-07-19 NOTE — ASSESSMENT & PLAN NOTE
Patient recently hospitalized on 6/17 for CHF exacerbation and discharged to rehab through 7/17, Discharged home and fell shortly after, presented to the ED with generalized weakness  CTH with R lateral orbital wall and R zygomatic arch fractures. Remainder of trauma survey negative  Mild PVC on chest x-ray however overall examines euvolemic and has no respiratory complaints  Positive orthostatic vitals in the ED  PT/OT in the ED : Recommend level 2, can return to [facility] once they reaccept him  Trial compression stockings and repeat orthostatics, would not hold diuretic given tendency toward volume overload   Counseled on slow positional changes   Continue home diuretic regimen and monitor volume status

## 2025-07-19 NOTE — PHYSICAL THERAPY NOTE
PHYSICAL THERAPY EVALUATION NOTE      Patient Name: Carlos Enrique Roth Jr.  Today's Date: 2025    AGE:   89 y.o.  Mrn:   [id number]  ADMIT DX:  Fall [W19.XXXA]    Past Medical History[1]  Length Of Stay: 0  PHYSICAL THERAPY EVALUATION :   Patient's identity confirmed via 2 patient identifiers (full name and ) at start of session       25 1118   PT Last Visit   PT Visit Date 25   Note Type   Note type Evaluation   Pain Assessment   Pain Assessment Tool 0-10   Pain Score 6   Pain Location/Orientation Orientation: Left;Location: Leg   Restrictions/Precautions   Weight Bearing Precautions Per Order No   Other Precautions Cognitive;Telemetry;Fall Risk;Pain;Visual impairment   Home Living   Type of Home House   Home Layout One level;Stairs to enter without rails  (1 RENE)   Home Equipment Walker;Cane   Additional Comments Carlos Enrique uses RW at baseline   Prior Function   Level of [place] Independent with ADLs;Independent with functional mobility;Needs assistance with IADLS   Lives With Son  (Works during the day)   Receives Help From Family;Home health  (supposed to have HHPT/OT/SN start)   IADLs Family/Friend/Other provides transportation;Family/Friend/Other provides meals;Family/Friend/Other provides medication management   Falls in the last 6 months 5 to 10   Vocational Retired   General   Additional Pertinent History Carlos Enrique recently DC'd from [facility] for STR on 2025. PLanned to have home care start soon   Family/Caregiver Present No   Cognition   Overall Cognitive Status Impaired   Arousal/Participation Alert   Orientation Level Oriented to person;Oriented to place;Oriented to situation   Memory Decreased recall of precautions;Decreased recall of recent events;Decreased short term memory  (details, timeline)   Following Commands Follows one step commands with increased time or repetition   Comments Carlos Enrique reports not knowing  if he hit his head, but CT of head (+) for R facial fractures   RLE Assessment   RLE Assessment WFL   Strength RLE   RLE Overall Strength 3+/5  (LE edema 3+)   LLE Assessment   LLE Assessment WFL   Strength LLE   LLE Overall Strength 3+/5  (LE edema 3+)   Vision-Basic Assessment   Visual History Glaucoma   Coordination   Movements are Fluid and Coordinated 0   Coordination and Movement Description BUE tremors, reports are baseline   Bed Mobility   Supine to Sit 3  Moderate assistance   Additional items Assist x 2;Increased time required;Verbal cues   Sit to Supine 3  Moderate assistance   Additional items Assist x 2;Increased time required;Verbal cues   Additional Comments Able to sit EOB x 2 min w/ min A x 1 --> S   Transfers   Sit to Stand 4  Minimal assistance   Additional items Assist x 1;Increased time required;Verbal cues  (elevated height of gurney)   Stand to Sit 4  Minimal assistance   Additional items Assist x 1;Increased time required;Verbal cues   Obtained Orthostatics: Notified Dr. Carter that patient was positive from sitting to standing w/ c/o (+) Dizziness.     Vitals:    07/19/25 1127 07/19/25 1129 07/19/25 1131 07/19/25 1134   BP: 170/93 147/73 145/65 150/71   Pulse:       Patient Position - Orthostatic VS: Sitting - Orthostatic VS Standing - Orthostatic VS Standing for 3 minutes - Orthostatic VS Lying      Ambulation/Elevation   Gait pattern Decreased foot clearance;Short stride;Excessively slow   Gait Assistance 4  Minimal assist   Additional items Assist x 1;Verbal cues;Tactile cues   Assistive Device Rolling walker   Distance 30 ft   Balance   Static Sitting Fair -   Static Standing Poor +   Ambulatory Poor +   Assessment   Prognosis Fair   Problem List Decreased strength;Decreased endurance;Impaired balance;Decreased mobility;Decreased cognition;Impaired judgement;Decreased safety awareness  (BLE edema)   Assessment Carlos Enrique Shinemckenna Burkett is a 89 y.o. Female who presents to [facility] ED on 7/19/2025 from  home w/ c/o fall off toilet and diagnosis of fall. Orders for PT eval and treat received. Per Dr. Carter, pt is medically cleared. Pt presents w/ comorbidities of GERD, CAD, glaucoma, DMII, BPH, Afib. At baseline, pt mobilizes modified I w/ RW, and reports + falls in the last 6 months. Upon evaluation, pt presents w/ the following deficits: weakness, impaired skin integrity, edema of extremities, impaired balance, impaired vision, decreased endurance, and impaired cognition and safety awareness . Upon eval, pt requires mod A x 2 for bed mobility, min A x 1 for transfers, and min A x 1 for gait. Based on this PT evaluation today, patient's discharge recommendation is for Level II - Moderate Rehab Resource Intensity. During this admission, pt would benefit from continued skilled inpatient PT in the acute care setting in order to address the abovementioned deficits to maximize function and mobility before DC from acute care.   Goals   Patient Goals to pee   STG Expiration Date 07/29/25   Short Term Goal #1 Patient will: Perform all bed mobility tasks w/ supervision to improve pt's independence w/ repositioning for decrease risk of skin breakdown, Perform all transfers w/ supervision consistently from various height surfaces in order to improve I w/ engagement w/ real-world environments/situations, and Ambulate at least 100 ft. with roller walker w/ supervision w/o LOB to facilitate return and engagement w/ previous living environment   PT Treatment Day 0   Plan   Treatment/Interventions Functional transfer training;LE strengthening/ROM;Therapeutic exercise;Endurance training;Cognitive reorientation;Patient/family training;Equipment eval/education;Bed mobility;Gait training   PT Frequency 3-5x/wk   Discharge Recommendation   Rehab Resource Intensity Level, PT II (Moderate Resource Intensity)   Additional Comments Carlos Enrique would benefit from establishing w/ OP Geriatrics   AM-PAC Basic Mobility Inpatient   Turning in Flat  Bed Without Bedrails 2   Lying on Back to Sitting on Edge of Flat Bed Without Bedrails 2   Moving Bed to Chair 3   Standing Up From Chair Using Arms 3   Walk in Room 3   Climb 3-5 Stairs With Railing 1   Basic Mobility Inpatient Raw Score 14   Basic Mobility Standardized Score 35.55   [facility] Highest Level Of Mobility   -[facility] Goal 4: Move to chair/commode   -HLM Achieved 7: Walk 25 feet or more   End of Consult   Patient Position at End of Consult Supine;All needs within reach  (on ED gurney)         The patient's AM-PAC Basic Mobility Inpatient Short Form Raw Score is 14, Standardized Score is 35.55. A standardized score less than 38.32 (raw score of 16) suggests the patient may benefit from discharge to post-acute rehabilitation services which may not coincide with above PT recommendations. However please refer to therapist recommendation for discharge planning given other factors that may influence destination.    Pt would benefit from skilled inpatient PT during this admission in order to facilitate progress towards goals to maximize functional independence    Gertrudis Smith PT, DPT            [1]   Past Medical History:  Diagnosis Date    Aortic stenosis     CKD (chronic kidney disease)     baseline Cr 1.3-1.5    Coronary artery disease     Cough     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HCC)     type 2, insulin dependent    GERD (gastroesophageal reflux disease)     Glaucoma     Gout     History of prostate cancer     Hypertension     Hypothyroidism     Overweight     Peripheral neuropathy, idiopathic     Pleural effusion, left     Pure hypercholesterolemia     LA...11/12/14   R....11/12/14     Visual impairment     cataract left eye    Weight gain

## 2025-07-19 NOTE — CASE MANAGEMENT
Case Management Progress Note    Patient name Carlos Enrique Roth Jr.  Location ED 07/ED 07 MRN [id number]  : 1935 Date 2025       LOS (days): 0  Geometric Mean LOS (GMLOS) (days):   Days to GMLOS:        OBJECTIVE:        Current admission status: Emergency  Preferred Pharmacy:   [facility] - [place] - [address].  [address]  [place] [address]  Phone: [phone] Fax: [phone]    [facility] [place] - [address]  [address]  [address]  [place] [address]  Phone: [phone] Fax: [phone]    Primary Care Provider: Juliette Cid DO    Primary Insurance: MEDICARE  Secondary Insurance:     PROGRESS NOTE: Cm still waiting to hear back from [facility]. Cm called and spoke with supervisor there regarding reaching out to someone who can get the liaison to respond as referral was sent over an hour ago .

## 2025-07-19 NOTE — OCCUPATIONAL THERAPY NOTE
"    Occupational Therapy Evaluation     Patient Name: Carlos Enrique Roth Jr.  Today's Date: 7/19/2025  Problem List  Active Problems:  There are no active Hospital Problems.    Past Medical History  Past Medical History[1]  Past Surgical History  Past Surgical History[2]      07/19/25 1117   OT Last Visit   OT Visit Date 07/19/25   Note Type   Note type Evaluation   Pain Assessment   Pain Assessment Tool 0-10   Pain Score 6   Pain Location/Orientation Orientation: Left;Location: Leg   Restrictions/Precautions   Weight Bearing Precautions Per Order No   Other Precautions Cognitive;Telemetry;Multiple lines;Fall Risk   Home Living   Type of Home House   Home Layout One level;Performs ADLs on one level  (1STE)   Home Equipment Walker;Cane   Prior Function   Level of [place] Independent with ADLs;Independent with functional mobility;Needs assistance with IADLS   Lives With Son   Receives Help From Family   IADLs Family/Friend/Other provides transportation;Family/Friend/Other provides meals;Family/Friend/Other provides medication management   Falls in the last 6 months 5 to 10   General   Additional General Comments Pt admit s/p fall, +headstrike, nondisplaced fracture of the right lateral orbital wall as well as in angulated right zygomatic arch fracture. PMH CAD, anemia, stage 4 CKD, T2DM, A-fib, generalized weakness, gout with tophus, hypercholesterolemia, renal cyst, s/p CABG x4, s/p AVR, aortic stenosis, ambulatory dysfunction, cataract, hypokalemia, failure to thrive, glaucoma.   Subjective   Subjective \"I'm a little dizzy\"   ADL   Eating Assistance 7  Independent   Grooming Assistance 5  Supervision/Setup   Grooming Deficit Increased time to complete;Verbal cueing;Supervision/safety   UB Bathing Assistance 4  Minimal Assistance   UB Bathing Deficit Verbal cueing;Supervision/safety;Increased time to complete   LB Bathing Assistance 2  Maximal Assistance   LB Bathing Deficit Supervision/safety;Verbal cueing;Increased " time to complete   UB Dressing Assistance 3  Moderate Assistance   UB Dressing Deficit Verbal cueing;Supervision/safety;Increased time to complete   LB Dressing Assistance 2  Maximal Assistance   LB Dressing Deficit Impulsive;Requires assistive device for steadying;Steadying;Supervision/safety;Verbal cueing;Increased time to complete   Toileting Assistance  2  Maximal Assistance   Toileting Deficit Verbal cueing;Supervison/safety;Increased time to complete;Steadying   Bed Mobility   Supine to Sit 3  Moderate assistance   Additional items Assist x 2;Bedrails;HOB elevated   Sit to Supine 3  Moderate assistance   Additional items Assist x 2;Bedrails;HOB elevated;Increased time required;LE management   Transfers   Sit to Stand 4  Minimal assistance   Additional items Increased time required;Verbal cues;Assist x 1  (RW)   Stand to Sit 4  Minimal assistance   Additional items Increased time required;Verbal cues;Other;Assist x 1  (RW)   Balance   Static Sitting Fair -   Static Standing Poor +   Ambulatory Poor +   Activity Tolerance   Activity Tolerance Patient tolerated treatment well;Patient limited by fatigue   Medical Staff Made Aware PT Gertrudis   RUE Assessment   RUE Assessment WFL   LUE Assessment   LUE Assessment WFL   Hand Function   Gross Motor Coordination Functional   Fine Motor Coordination Functional   Sensation   Light Touch No apparent deficits   Psychosocial   Psychosocial (WDL) WDL   Cognition   Overall Cognitive Status Impaired   Orientation Level Oriented to person;Oriented to place;Oriented to situation   Memory Decreased recall of precautions;Decreased recall of recent events;Decreased short term memory   Comments reports not knowing if he hit his head, but CT of head (+) for R facial fractures   Assessment   Limitation Decreased ADL status;Decreased UE strength;Decreased Safe judgement during ADL;Decreased cognition;Decreased endurance;Decreased self-care trans;Decreased high-level ADLs   Prognosis  Good   Assessment Pt is a 89 y.o. male seen for OT evaluation at [facility]. Pt admit s/p fall, +headstrike, nondisplaced fracture of the right lateral orbital wall as well as in angulated right zygomatic arch fracture. PMH CAD, anemia, stage 4 CKD, T2DM, A-fib, generalized weakness, gout with tophus, hypercholesterolemia, renal cyst, s/p CABG x4, s/p AVR, aortic stenosis, ambulatory dysfunction, cataract, hypokalemia, failure to thrive, glaucoma. Pt resides at home with son in 1SH 0STE with 2 steps to living room, uses SPC and RW at home. Pt IND ADL? DEP IADL. Son works throughout the day. OT completed extensive review of pt's medical and social history. Personal factors affecting pt at time of IE include: limited home support, difficulty performing ADLS, difficulty performing IADLS , limited insight into deficits, and decreased initiation and engagement . Upon evaluation: Pt presents maxA LB ADLs, Jazmin UB ADLs, Jazmin sit<>stand with RW, Jazmin fxl mobility with RW, very unsteady and cues for safe walker use. Pt confused throughout, repetitive with questions and slow processing and problem solving 2* the following deficits impacting occupational performance: weakness, decreased ROM, decreased strength, decreased balance, decreased tolerance, impaired attention, impaired sequencing, and impaired problem solving. Full objective findings from OT assessment regarding body systems outlined above. Pt to benefit from continued skilled OT tx while in the hospital to address deficits as defined above and maximize level of functional independence w/ ADL's and functional mobility. Occupational Performance areas to address include: grooming, bathing/shower, toilet hygiene, dressing, health maintenance, functional mobility, and clothing management. Based on findings, pt is of high complexity. The patient's raw score on the AM-PAC Daily Activity inpatient short form is 15 , standardized score is 34.69 , less than 39.4.  Patients at this level are likely to benefit from DC to post-acute rehabilitation services. However, please refer to therapist recommendation for discharge planning given other factors that may influence destination. At this time, OT recommendations at time of discharge are DC with Level II - Moderate Rehab Resource Intensity resources.   Plan   Treatment Interventions ADL retraining;Functional transfer training;UE strengthening/ROM;Endurance training;Patient/family training;Equipment evaluation/education;Compensatory technique education;Continued evaluation;Energy conservation;Activityengagement   Goal Expiration Date 07/29/25   OT Frequency 3-5x/wk   Discharge Recommendation   Rehab Resource Intensity Level, OT II (Moderate Resource Intensity)   AM-PAC Daily Activity Inpatient   Lower Body Dressing 2   Bathing 2   Toileting 2   Upper Body Dressing 2   Grooming 3   Eating 4   Daily Activity Raw Score 15   Daily Activity Standardized Score (Calc for Raw Score >=11) 34.69   AM-PAC Applied Cognition Inpatient   Following a Speech/Presentation 3   Understanding Ordinary Conversation 3   Taking Medications 2   Remembering Where Things Are Placed or Put Away 2   Remembering List of 4-5 Errands 2   Taking Care of Complicated Tasks 1   Applied Cognition Raw Score 13   Applied Cognition Standardized Score 30.46   End of Consult   Education Provided Yes   Patient Position at End of Consult All needs within reach;Supine   Nurse Communication Nurse aware of consult     Pt will achieve the following goals within 10 days.    *Pt will demonstrate IND/[place] for all ADLs with use of LRAD to promote safe functional independence with daily routine.     *Pt will participate in UE therapeutic exercise in order to maximize strength for ADL transfers.    *Pt will demonstrate improved BUE strength to 5 /5 MMT grade to enhance ADLS and functional transfers.    *Pt will complete bed mobility independently, with bed flat and no side rail to  prep for purposeful tasks.    *Pt will sit on EOB for 15 minutes with SUP for increased safety with seated activity tolerance during ADL tasks.    *Pt will perform functional transfers with on/off all surfaces with mod I using DME as needed w/ G balance/safety.    *Pt will improve functional mobility during ADL/IADL/leisure tasks to mod I using DME as needed w/ G balance/safety.    *Pt will complete item retrieval and light home management with mod I while demonstrating good safety.    *Pt will demonstrate increased activity tolerance to > 10 min of sustained functional tasks to increase participation in basic self-care and decrease assistance level.     *Pt will verbalize and demonstrate use of energy conservation/ deep breathing technique and work simplification skills during functional activity with no verbal cues.    *Assess DME needs    Sukhi Sharma OT 07/19/25 12:30 PM         [1]   Past Medical History:  Diagnosis Date    Aortic stenosis     CKD (chronic kidney disease)     baseline Cr 1.3-1.5    Coronary artery disease     Cough     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HCC)     type 2, insulin dependent    GERD (gastroesophageal reflux disease)     Glaucoma     Gout     History of prostate cancer     Hypertension     Hypothyroidism     Overweight     Peripheral neuropathy, idiopathic     Pleural effusion, left     Pure hypercholesterolemia     LA...11/12/14   R....11/12/14     Visual impairment     cataract left eye    Weight gain    [2]   Past Surgical History:  Procedure Laterality Date    CARDIAC CATHETERIZATION      EYE SURGERY      IR THORACENTESIS  10/23/2018    IR THORACENTESIS  11/2/2018    IR THORACENTESIS  11/9/2018    IR THORACENTESIS  11/23/2018    TN CORONARY ARTERY BYP W/VEIN & ARTERY GRAFT 3 VEIN N/A 9/17/2018    Procedure: CORONARY ARTERY BYPASS GRAFT (CABG) x 4 VESSELS with LIMA - LAD, SVG/LEFT LEG EVH - LEFT PDA, OM3, & OM2;  Surgeon: Remy Ash MD;  Location[facility];  Service: Cardiac Surgery     ND ECHO TRANSESOPHAG MONTR CARDIAC PUMP FUNCTJ N/A 9/17/2018    Procedure: TRANSESOPHAGEAL ECHOCARDIOGRAM (VERONICA);  Surgeon: Remy Ash MD;  [facility];  Service: Cardiac Surgery    ND OPTX FEM SHFT FX W/INSJ IMED IMPLT W/WO SCREW Left 6/20/2024    Procedure: INSERTION NAIL IM FEMUR ANTEGRADE (TROCHANTERIC);  Surgeon: Sukh Reece DO;  Location: [facility];  Service: Orthopedics    ND RPLCMT AORTIC VALVE OPN W/STENTLESS TISSUE VALVE N/A 9/17/2018    Procedure: REPLACEMENT VALVE AORTIC (AVR)- 23mm tissue Intuity Valve;  Surgeon: Remy Ash MD;  [facility];  Service: Cardiac Surgery    THORACOSCOPY VIDEO ASSISTED SURGERY (VATS) Left 11/27/2018    Procedure: THORACOSCOPY VIDEO ASSISTED SURGERY (VATS), talc pleurodesis,;  Surgeon: Ciara Green MD;  [facility];  Service: Thoracic    THYROID SURGERY

## 2025-07-19 NOTE — CASE MANAGEMENT
Case Management Progress Note    Patient name Carlos Enrique Rtoh Jr.  Location ED 07/ED 07 MRN [id number]  : 1935 Date 2025       LOS (days): 0  Geometric Mean LOS (GMLOS) (days):   Days to GMLOS:        OBJECTIVE:        Current admission status: Emergency  Preferred Pharmacy:   [facility] - [place] - [address]  [address]  [place] [address]  Phone: [phone] Fax: [phone]    [facility] [place] [place] - [address]  [address]  [address]  [place] [address]  Phone: [phone] Fax: [phone]    Primary Care Provider: Juliette Cid DO    Primary Insurance: MEDICARE  Secondary Insurance:     PROGRESS NOTE: Cm notified that pt is cleared for dc . Pt came in from home after fall and unable to gt up on his own. PT/OT consulted. Pt with recent stay at [facility] for rehab from -. Cm sent referral to [facility] to see if they are able to offer a bed again.  left for pts son Ciro.

## 2025-07-19 NOTE — H&P
H&P - Hospitalist   Name: Carlos Enrique Roth JrMega 89 y.o. male I MRN: [id number]  Unit/Bed#: [facility] I Date of Admission: 7/19/2025   Date of Service: 7/19/2025 I Hospital Day: 0     Assessment & Plan  Generalized weakness  Patient recently hospitalized on 6/17 for CHF exacerbation and discharged to rehab through 7/17, Discharged home and fell shortly after, presented to the ED with generalized weakness  CTH with R lateral orbital wall and R zygomatic arch fractures. Remainder of trauma survey negative  Mild PVC on chest x-ray however overall examines euvolemic and has no respiratory complaints  Positive orthostatic vitals in the ED  PT/OT in the ED : Recommend level 2, can return to [facility] once they reaccept him  Trial compression stockings and repeat orthostatics, would not hold diuretic given tendency toward volume overload   Counseled on slow positional changes   Continue home diuretic regimen and monitor volume status  Postoperative hypothyroidism  Continue home Synthroid  CAD (coronary artery disease)  CAD status post CABG x 4 in 2018  Continue formulary equivalent for home statin  Denies chest pain  Anemia due to stage 4 chronic kidney disease  (HCC)  Hemoglobin upon last admission average 7-8, hemoglobin 10.8 today  Monitor CBC  Chronic diastolic congestive heart failure (HCC)  Wt Readings from Last 3 Encounters:   07/19/25 77.3 kg (170 lb 6.7 oz)   07/15/25 83.9 kg (185 lb)   06/25/25 84 kg (185 lb 3.2 oz)   Admitted recently in June for CHF exacerbation, was discharged on torsemide 40 mg daily  Echo June 2025: LVEF of 50%, systolic function mildly reduced with severe diastolic dysfunction.  Multiple segments are hypokinetic.  Aortic bioprosthetic valve present with elevated RV systolic pressure, aortic root exhibiting severe fibrocalcification   on arrival (566 on prior admission for CHF exac)  CXR with mild pulmonary venous congestion  Satting well on RA  Examines euvolemic  Continue home  "regimen for now  Monitor daily weight  I/os  Cardiac diet          Type 2 diabetes mellitus with diabetic neuropathy (HCC)  Lab Results   Component Value Date    HGBA1C 6.6 (H) 06/12/2025       No results for input(s): \"POCGLU\" in the last 72 hours.    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 on [facility] with adjusted home diuretic regimen  Will trial compression stockings and repeat VS  Would not hold diuretic regimen or administer IVF given his inclination for volume overload, he does not examine dry       VTE Pharmacologic Prophylaxis: VTE Score: 4 Moderate Risk (Score 3-4) - Pharmacological DVT Prophylaxis Ordered: apixaban (Eliquis).  Code Status:Full  Discussion with family: Updated  (son) via phone.    Anticipated Length of Stay: Patient will be admitted on an observation basis with an anticipated length of stay of less than 2 midnights secondary to generalized weakness pending placement.    History of Present Illness   Chief Complaint: cristi Roth Jr. is a 89 y.o. male with a PMH of CKD, CAD, CHF, diabetes, hypothyroidism who presents with lightheadedness with subsequent fall.  Patient has been urinating more frequently at night after his new diuretic regimen started a month ago.  He got up to urinate last night and feels he " became slightly lightheaded and fell between the toilet and the wall.  He says the lightheadedness is now resolved and denies any chest pain or shortness of breath.  He does not feel his lower extremities are any more swollen than usual.  He has had no cough or dyspnea on exertion.  Denies fever or chills.  Denies headache.  He does report he has had some intermittent episodes of lightheadedness over the past month but nothing significant.  Denies alcohol, tobacco or drug use.  Denies any pain elsewhere after the fall.  Spoke with son over the phone who confirms this account although he did not personally witness the fall.    Review of Systems   Constitutional: Negative.    HENT: Negative.     Eyes: Negative.    Respiratory: Negative.     Cardiovascular: Negative.    Gastrointestinal: Negative.    Endocrine: Negative.    Genitourinary: Negative.    Musculoskeletal: Negative.    Skin: Negative.    Neurological:  Positive for light-headedness.   Hematological: Negative.    Psychiatric/Behavioral: Negative.         Historical Information   Past Medical History[1]  Past Surgical History[2]  Social History[3]  E-Cigarette/Vaping    E-Cigarette Use Never User      E-Cigarette/Vaping Substances    Nicotine No     THC No     CBD No     Flavoring No     Other No     Unknown No      Family history non-contributory  Social History:  Marital Status:    Occupation: not discussed  Patient Pre-hospital Living Situation: Home  Patient Pre-hospital Level of Mobility: unable to be assessed at time of evaluation  Patient Pre-hospital Diet Restrictions: none    Meds/Allergies   I have reviewed home medications using recent Epic encounter.  Prior to Admission medications    Medication Sig Start Date End Date Taking? Authorizing Provider   acetaminophen (TYLENOL) 325 mg tablet Take 2 tablets (650 mg total) by mouth every 6 (six) hours as needed for mild pain, headaches or fever 6/22/24   Zoey Gray PA-C   allopurinol  (ZYLOPRIM) 300 mg tablet TAKE ONE TABLET BY MOUTH ONCE DAILY 12/18/24   Juliette Fly, DO   ascorbic acid (VITAMIN C) 500 mg tablet TAKE ONE TABLET BY MOUTH DAILY 11/19/24   Juliette Fly, DO   cholecalciferol (VITAMIN D3) 1,000 units tablet Take 1 tablet (1,000 Units total) by mouth daily 3/3/22   Ángel Bass, DO   clobetasol (TEMOVATE) 0.05 % cream Apply topically 2 (two) times a day for 7 days  Patient not taking: Reported on 7/15/2025 12/12/24 3/10/25  Juliette Fly, DO   dorzolamide-timolol (COSOPT) 22.3-6.8 MG/ML ophthalmic solution Administer 1 drop to both eyes in the morning and 1 drop in the evening. 5/24/23   Historical Provider, MD   Eliquis 2.5 MG TAKE ONE TABLET BY MOUTH TWICE DAILY 4/2/25   Juliette Fly, DO   FeroSul 325 (65 Fe) MG tablet TAKE ONE TABLET BY MOUTH DAILY 9/13/22   Vivian Roberts,    gabapentin (NEURONTIN) 300 mg capsule TAKE ONE CAPSULE BY MOUTH DAILY AT BEDTIME 2/4/25   Juliette Fly, DO   Insulin Glargine Solostar (Lantus SoloStar) 100 UNIT/ML SOPN Inject 0.2 mL (20 Units total) under the skin in the morning 6/17/25   Gonzalez Jackson MD   insulin lispro (HumALOG/ADMELOG) 100 units/mL injection Inject 8 Units under the skin 3 (three) times a day with meals 6/17/25   Gonzalez Jackson MD   Insulin Pen Needle (Pen Needles) 32G X 4 MM MISC Use 4 (four) times a day (before meals and at bedtime) 12/14/22   Juliette Fly, DO   latanoprost (XALATAN) 0.005 % ophthalmic solution Administer 1 drop into the left eye daily at bedtime 1/2/25   Historical Provider, MD   levothyroxine 150 mcg tablet TAKE ONE TABLET BY MOUTH DAILY 2/7/25   Juliette Fly, DO   pantoprazole (PROTONIX) 20 mg tablet TAKE ONE TABLET BY MOUTH EVERY DAY 11/14/24   Juliette Fly, DO   potassium chloride (Klor-Con M20) 20 mEq tablet Take 1 tablet (20 mEq total) by mouth daily 11/13/24   Juliette Cid DO   pravastatin (PRAVACHOL) 80 mg tablet Take 1 tablet (80 mg total) by mouth daily with dinner  Patient not taking: Reported on 7/15/2025 6/17/25   Gonzalez MISHRA  MD Manuel   rosuvastatin (CRESTOR) 40 MG tablet TAKE ONE TABLET BY MOUTH DAILY 2/10/25   Juliette Fly, DO   tamsulosin (FLOMAX) 0.4 mg TAKE ONE CAPSULE BY MOUTH DAILY WITH dinner 11/19/24   Juliette Fly, DO   torsemide (DEMADEX) 20 mg tablet Take 2 tablets (40 mg total) by mouth daily  Patient taking differently: Take 40 mg by mouth daily 6/25 facility states one 40 mg tablet daily 6/18/25   Gonzalez Jackson MD     Allergies   Allergen Reactions    Amlodipine Nausea Only    Atorvastatin Myalgia       Objective :  Temp:  [97.3 °F (36.3 °C)-97.8 °F (36.6 °C)] 97.3 °F (36.3 °C)  HR:  [59-96] 66  BP: (124-187)/(56-93) 144/74  Resp:  [16-23] 17  SpO2:  [91 %-100 %] 99 %  O2 Device: None (Room air)    Physical Exam  Vitals and nursing note reviewed.   Constitutional:       General: He is not in acute distress.     Appearance: Normal appearance. He is normal weight. He is not ill-appearing or toxic-appearing.   HENT:      Head: Normocephalic and atraumatic.      Right Ear: External ear normal.      Left Ear: External ear normal.      Nose: Nose normal.      Mouth/Throat:      Mouth: Mucous membranes are moist.      Pharynx: Oropharynx is clear.     Eyes:      Conjunctiva/sclera: Conjunctivae normal.       Cardiovascular:      Rate and Rhythm: Normal rate. Rhythm irregular.      Pulses: Normal pulses.      Heart sounds: Normal heart sounds.   Pulmonary:      Effort: Pulmonary effort is normal. No respiratory distress.      Breath sounds: Normal breath sounds. No stridor. No wheezing, rhonchi or rales.      Comments: Breathing comfortably on RA  Abdominal:      General: Abdomen is flat. There is no distension.      Palpations: Abdomen is soft. There is no mass.      Tenderness: There is no abdominal tenderness. There is no guarding or rebound.      Hernia: No hernia is present.   Genitourinary:     Comments: Condom cath in place draining clear yellow urine    Musculoskeletal:      Cervical back: Normal range of motion.      Right  lower leg: Edema present.      Left lower leg: Edema present.      Comments: 1+ pitting edema bilaterally     Skin:     General: Skin is warm and dry.     Neurological:      General: No focal deficit present.      Mental Status: He is alert and oriented to person, place, and time. Mental status is at baseline.     Psychiatric:         Mood and Affect: Mood normal.          Lines/Drains:            Lab Results: I have reviewed the following results:  Results from last 7 days   Lab Units 07/19/25  1500   WBC Thousand/uL 8.95   HEMOGLOBIN g/dL 10.8*   HEMATOCRIT % 33.8*   PLATELETS Thousands/uL 167   SEGS PCT % 66   LYMPHO PCT % 25   MONO PCT % 8   EOS PCT % 0     Results from last 7 days   Lab Units 07/19/25  1500   SODIUM mmol/L 138   POTASSIUM mmol/L 4.6   CHLORIDE mmol/L 102   CO2 mmol/L 27   BUN mg/dL 53*   CREATININE mg/dL 2.51*   ANION GAP mmol/L 9   CALCIUM mg/dL 10.2   GLUCOSE RANDOM mg/dL 180*             Lab Results   Component Value Date    HGBA1C 6.6 (H) 06/12/2025    HGBA1C 7.2 (A) 03/04/2025    HGBA1C 7.4 (A) 12/12/2024           Imaging Results Review: I reviewed radiology reports from this admission including: chest xray.  Other Study Results Review: EKG was reviewed.     Administrative Statements   I have spent a total time of 45 minutes in caring for this patient on the day of the visit/encounter including Diagnostic results, Prognosis, Risks and benefits of tx options, Patient and family education, Impressions, Counseling / Coordination of care, Documenting in the medical record, Reviewing/placing orders in the medical record (including tests, medications, and/or procedures), Obtaining or reviewing history  , and Communicating with other healthcare professionals .    ** Please Note: This note has been constructed using a voice recognition system. **         [1]   Past Medical History:  Diagnosis Date    Aortic stenosis     CKD (chronic kidney disease)     baseline Cr 1.3-1.5    Coronary artery  disease     Cough     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HCC)     type 2, insulin dependent    GERD (gastroesophageal reflux disease)     Glaucoma     Gout     History of prostate cancer     Hypertension     Hypothyroidism     Overweight     Peripheral neuropathy, idiopathic     Pleural effusion, left     Pure hypercholesterolemia     LA...11/12/14   R....11/12/14     Visual impairment     cataract left eye    Weight gain    [2]   Past Surgical History:  Procedure Laterality Date    CARDIAC CATHETERIZATION      EYE SURGERY      IR THORACENTESIS  10/23/2018    IR THORACENTESIS  11/2/2018    IR THORACENTESIS  11/9/2018    IR THORACENTESIS  11/23/2018    VT CORONARY ARTERY BYP W/VEIN & ARTERY GRAFT 3 VEIN N/A 9/17/2018    Procedure: CORONARY ARTERY BYPASS GRAFT (CABG) x 4 VESSELS with LIMA - LAD, SVG/LEFT LEG EVH - LEFT PDA, OM3, & OM2;  Surgeon: Remy Ash MD;  Location: [facility];  Service: Cardiac Surgery    VT ECHO TRANSESOPHAG MONTR CARDIAC PUMP FUNCTJ N/A 9/17/2018    Procedure: TRANSESOPHAGEAL ECHOCARDIOGRAM (VERONICA);  Surgeon: Remy Ash MD;  Location: [facility];  Service: Cardiac Surgery    VT OPTX FEM SHFT FX W/INSJ IMED IMPLT W/WO SCREW Left 6/20/2024    Procedure: INSERTION NAIL IM FEMUR ANTEGRADE (TROCHANTERIC);  Surgeon: Sukh Reece DO;  Location: [facility];  Service: Orthopedics    VT RPLCMT AORTIC VALVE OPN W/STENTLESS TISSUE VALVE N/A 9/17/2018    Procedure: REPLACEMENT VALVE AORTIC (AVR)- 23mm tissue Intuity Valve;  Surgeon: Remy Ash MD;  Location: [facility];  Service: Cardiac Surgery    THORACOSCOPY VIDEO ASSISTED SURGERY (VATS) Left 11/27/2018    Procedure: THORACOSCOPY VIDEO ASSISTED SURGERY (VATS), talc pleurodesis,;  Surgeon: Ciara Green MD;  Location: [facility];  Service: Thoracic    THYROID SURGERY     [3]   Social History  Tobacco Use    Smoking status: Former     Current packs/day: 0.00     Average packs/day: 0.3 packs/day for 1 year (0.3 ttl pk-yrs)     Types: Cigarettes      Start date:      Quit date:      Years since quittin.5    Tobacco comments:     Only in the service    Vaping Use    Vaping status: Never Used   Substance and Sexual Activity    Alcohol use: Never    Drug use: No    Sexual activity: Not Currently

## 2025-07-19 NOTE — ASSESSMENT & PLAN NOTE
"Lab Results   Component Value Date    HGBA1C 6.6 (H) 06/12/2025       No results for input(s): \"POCGLU\" in the last 72 hours.    Blood Sugar Average: Last 72 hrs:  Current regimen: Lantus 20 units daily and NovoLog 8 units 3 times daily with meals  Sliding scale insulin while inpatient  Monitor fingersticks    "

## 2025-07-19 NOTE — PLAN OF CARE
Problem: Potential for Falls  Goal: Patient will remain free of falls  Description: INTERVENTIONS:  - Educate patient/family on patient safety including physical limitations  - Instruct patient to call for assistance with activity   - Consider consulting OT/PT to assist with strengthening/mobility based on AM PAC & JH-HLM score  - Consult OT/PT to assist with strengthening/mobility   - Keep Call bell within reach  - Keep bed low and locked with side rails adjusted as appropriate  - Keep care items and personal belongings within reach  - Initiate and maintain comfort rounds  - Make Fall Risk Sign visible to staff  - Offer Toileting every 2 Hours, in advance of need  - Initiate/Maintain 2 alarm  - Obtain necessary fall risk management equipment: 2  - Apply yellow socks and bracelet for high fall risk patients  - Consider moving patient to room near nurses station  Outcome: Progressing

## 2025-07-20 VITALS
HEART RATE: 65 BPM | HEIGHT: 68 IN | DIASTOLIC BLOOD PRESSURE: 51 MMHG | OXYGEN SATURATION: 96 % | WEIGHT: 170.42 LBS | SYSTOLIC BLOOD PRESSURE: 109 MMHG | RESPIRATION RATE: 18 BRPM | TEMPERATURE: 98.1 F | BODY MASS INDEX: 25.83 KG/M2

## 2025-07-20 LAB
ANION GAP SERPL CALCULATED.3IONS-SCNC: 11 MMOL/L (ref 4–13)
BUN SERPL-MCNC: 54 MG/DL (ref 5–25)
CALCIUM SERPL-MCNC: 9.6 MG/DL (ref 8.4–10.2)
CHLORIDE SERPL-SCNC: 103 MMOL/L (ref 96–108)
CO2 SERPL-SCNC: 26 MMOL/L (ref 21–32)
CREAT SERPL-MCNC: 2.59 MG/DL (ref 0.6–1.3)
GFR SERPL CREATININE-BSD FRML MDRD: 20 ML/MIN/1.73SQ M
GLUCOSE SERPL-MCNC: 147 MG/DL (ref 65–140)
GLUCOSE SERPL-MCNC: 154 MG/DL (ref 65–140)
GLUCOSE SERPL-MCNC: 247 MG/DL (ref 65–140)
GLUCOSE SERPL-MCNC: 85 MG/DL (ref 65–140)
POTASSIUM SERPL-SCNC: 4.1 MMOL/L (ref 3.5–5.3)
SODIUM SERPL-SCNC: 140 MMOL/L (ref 135–147)

## 2025-07-20 PROCEDURE — 80048 BASIC METABOLIC PNL TOTAL CA: CPT | Performed by: PHYSICIAN ASSISTANT

## 2025-07-20 PROCEDURE — 99238 HOSP IP/OBS DSCHRG MGMT 30/<: CPT | Performed by: PHYSICIAN ASSISTANT

## 2025-07-20 PROCEDURE — 82948 REAGENT STRIP/BLOOD GLUCOSE: CPT

## 2025-07-20 RX ADMIN — INSULIN GLARGINE 20 UNITS: 100 INJECTION, SOLUTION SUBCUTANEOUS at 10:01

## 2025-07-20 RX ADMIN — INSULIN LISPRO 8 UNITS: 100 INJECTION, SOLUTION INTRAVENOUS; SUBCUTANEOUS at 10:02

## 2025-07-20 RX ADMIN — INSULIN LISPRO 3 UNITS: 100 INJECTION, SOLUTION INTRAVENOUS; SUBCUTANEOUS at 12:29

## 2025-07-20 RX ADMIN — APIXABAN 2.5 MG: 2.5 TABLET, FILM COATED ORAL at 17:19

## 2025-07-20 RX ADMIN — ALLOPURINOL 300 MG: 300 TABLET ORAL at 10:05

## 2025-07-20 RX ADMIN — FERROUS SULFATE TAB 325 MG (65 MG ELEMENTAL FE) 325 MG: 325 (65 FE) TAB at 10:05

## 2025-07-20 RX ADMIN — POTASSIUM CHLORIDE 20 MEQ: 1500 TABLET, EXTENDED RELEASE ORAL at 10:05

## 2025-07-20 RX ADMIN — LEVOTHYROXINE SODIUM 150 MCG: 0.07 TABLET ORAL at 06:18

## 2025-07-20 RX ADMIN — PANTOPRAZOLE SODIUM 20 MG: 20 TABLET, DELAYED RELEASE ORAL at 10:05

## 2025-07-20 RX ADMIN — INSULIN LISPRO 8 UNITS: 100 INJECTION, SOLUTION INTRAVENOUS; SUBCUTANEOUS at 12:31

## 2025-07-20 RX ADMIN — TAMSULOSIN HYDROCHLORIDE 0.4 MG: 0.4 CAPSULE ORAL at 17:19

## 2025-07-20 RX ADMIN — APIXABAN 2.5 MG: 2.5 TABLET, FILM COATED ORAL at 10:05

## 2025-07-20 RX ADMIN — TORSEMIDE 40 MG: 20 TABLET ORAL at 10:05

## 2025-07-20 RX ADMIN — ATORVASTATIN CALCIUM 80 MG: 40 TABLET, FILM COATED ORAL at 17:19

## 2025-07-20 NOTE — PLAN OF CARE
Problem: Potential for Falls  Goal: Patient will remain free of falls  Description: INTERVENTIONS:  - Educate patient/family on patient safety including physical limitations  - Instruct patient to call for assistance with activity   - Consider consulting OT/PT to assist with strengthening/mobility based on AM PAC & JH-HLM score  - Consult OT/PT to assist with strengthening/mobility   - Keep Call bell within reach  - Keep bed low and locked with side rails adjusted as appropriate  - Keep care items and personal belongings within reach  - Initiate and maintain comfort rounds  - Make Fall Risk Sign visible to staff  - Offer Toileting every 2 Hours, in advance of need  - Initiate/Maintain 2 alarms  - Obtain necessary fall risk management equipment: yellow bracelet and socks. Bed alarm on and bed in lowest position. Call bell is within reach.  - Apply yellow socks and bracelet for high fall risk patients  - Consider moving patient to room near nurses station  Outcome: Progressing     Problem: PAIN - ADULT  Goal: Verbalizes/displays adequate comfort level or baseline comfort level  Description: Interventions:  - Encourage patient to monitor pain and request assistance  - Assess pain using appropriate pain scale  - Administer analgesics as ordered based on type and severity of pain and evaluate response  - Implement non-pharmacological measures as appropriate and evaluate response  - Consider cultural and social influences on pain and pain management  - Notify physician/advanced practitioner if interventions unsuccessful or patient reports new pain  - Educate patient/family on pain management process including their role and importance of  reporting pain   - Provide non-pharmacologic/complimentary pain relief interventions  Outcome: Progressing     Problem: SAFETY ADULT  Goal: Patient will remain free of falls  Description: INTERVENTIONS:  - Educate patient/family on patient safety including physical limitations  -  Instruct patient to call for assistance with activity   - Consider consulting OT/PT to assist with strengthening/mobility based on AM PAC & JH-HLM score  - Consult OT/PT to assist with strengthening/mobility   - Keep Call bell within reach  - Keep bed low and locked with side rails adjusted as appropriate  - Keep care items and personal belongings within reach  - Initiate and maintain comfort rounds  - Make Fall Risk Sign visible to staff  - Offer Toileting every 2 Hours, in advance of need  - Initiate/Maintain 2 alarms   - Obtain necessary fall risk management equipment: yellow bracelet and socks  - Apply yellow socks and bracelet for high fall risk patients  - Consider moving patient to room near nurses station  Outcome: Progressing  Goal: Maintain or return to baseline ADL function  Description: INTERVENTIONS:  -  Assess patient's ability to carry out ADLs; assess patient's baseline for ADL function and identify physical deficits which impact ability to perform ADLs (bathing, care of mouth/teeth, toileting, grooming, dressing, etc.)  - Assess/evaluate cause of self-care deficits   - Assess range of motion  - Assess patient's mobility; develop plan if impaired  - Assess patient's need for assistive devices and provide as appropriate  - Encourage maximum independence but intervene and supervise when necessary  - Involve family in performance of ADLs  - Assess for home care needs following discharge   - Consider OT consult to assist with ADL evaluation and planning for discharge  - Provide patient education as appropriate  - Monitor functional capacity and physical performance, use of AM PAC & JH-HLM   - Monitor gait, balance and fatigue with ambulation    Outcome: Progressing  Goal: Maintains/Returns to pre admission functional level  Description: INTERVENTIONS:  - Perform AM-PAC 6 Click Basic Mobility/ Daily Activity assessment daily.  - Set and communicate daily mobility goal to care team and  patient/family/caregiver.   - Collaborate with rehabilitation services on mobility goals if consulted  - Perform Range of Motion 2 times a day.  - Reposition patient every 2 hours.  - Dangle patient 2 times a day  - Stand patient 2 times a day  - Ambulate patient 2 times a day  - Out of bed to chair 2 times a day   - Out of bed for meals 2 times a day  - Out of bed for toileting  - Record patient progress and toleration of activity level   Outcome: Progressing

## 2025-07-20 NOTE — DISCHARGE INSTR - AVS FIRST PAGE
Continue all home medications as you are taking prior to hospitalization.  Would recommend continued use of compression stockings.  Remember to make any changes in position slowly when going from lying to sitting and then sitting to standing.  Wait a couple minutes before attempting to walk upon standing.

## 2025-07-20 NOTE — DISCHARGE SUMMARY
Discharge Summary - Hospitalist   Name: Carlos Enrique Roth Jr. 89 y.o. male I MRN: [id number]  Unit/Bed#: [facility] I Date of Admission: 7/19/2025   Date of Service: 7/20/2025 I Hospital Day: 0     Assessment & Plan  Generalized weakness  Patient recently hospitalized on 6/17 for CHF exacerbation and discharged to rehab through 7/17, Discharged home and fell shortly after, presented to the ED with generalized weakness  CTH with R lateral orbital wall and R zygomatic arch fractures. Remainder of trauma survey negative  Mild PVC on chest x-ray however overall examines euvolemic and has no respiratory complaints  Positive orthostatic vitals in the ED  PT/OT in the ED : Recommend level 2, can return to [facility] once they reaccept him  Trial compression stockings with resolution of orthostatics and lightheadedness   Counseled on slow positional changes   Continue home diuretic regimen and monitor volume status  Postoperative hypothyroidism  Continue home Synthroid  CAD (coronary artery disease)  CAD status post CABG x 4 in 2018  Continue formulary equivalent for home statin  Denies chest pain  Anemia due to stage 4 chronic kidney disease  (HCC)  Hemoglobin upon last hospitalization average 7-8, hemoglobin stable at 10.8 on admission  Monitor CBC  Chronic diastolic congestive heart failure (HCC)  Wt Readings from Last 3 Encounters:   07/19/25 77.3 kg (170 lb 6.7 oz)   07/15/25 83.9 kg (185 lb)   06/25/25 84 kg (185 lb 3.2 oz)   Admitted recently in June for CHF exacerbation, was discharged on torsemide 40 mg daily  Echo June 2025: LVEF of 50%, systolic function mildly reduced with severe diastolic dysfunction.  Multiple segments are hypokinetic.  Aortic bioprosthetic valve present with elevated RV systolic pressure, aortic root exhibiting severe fibrocalcification   on arrival (566 on prior admission for CHF exac)  CXR with mild pulmonary venous congestion  Remains stable on RA  Examines euvolemic  Continue home  diuretic regimen  Monitor daily weight  I/os  Cardiac diet          Type 2 diabetes mellitus with diabetic neuropathy (HCC)  Lab Results   Component Value Date    HGBA1C 6.6 (H) 06/12/2025       Recent Labs     07/19/25  1824 07/19/25  2112 07/20/25  0803 07/20/25  1121   POCGLU 151* 158* 154* 247*       Blood Sugar Average: Last 72 hrs:  (P) 177.5Current regimen: Lantus 20 units daily and NovoLog 8 units 3 times daily with meals  Sliding scale insulin while inpatient  Monitor fingersticks    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)  Baseline creatinine 2.2-2.6  Creatinine within baseline  Monitor BMP  (P) 177.5    Persistent atrial fibrillation (HCC)  Not on AV giuseppe blockers due to baseline bradycardia  Continue Eliquis 2.5 mg twice daily  Orthostatic hypotension  Resolved on repeat orthostatics today with use of compressions stockings  Patient noted to drop from 167/72 to 145/65 on orthostatic vital signs with PT in the ER, was symptomatic   No lightheadedness today     Medical Problems       Resolved Problems  Date Reviewed: 7/20/2025   None       Discharging Physician / Practitioner: Shanika Mary PA-C  PCP: Juliette Cid DO  Admission Date:   Admission Orders (From admission, onward)       Ordered        07/19/25 1541  Place in Observation  Once                          Discharge Date: 07/20/25    Next Steps for Physician/AP Assuming Care:  Monitor volume status.    Test Results Pending at Discharge (will require follow up):  None    Medication Changes for Discharge & Rationale:   None, use compression stockings  See after visit summary for reconciled discharge medications provided to patient and/or family.     Consultations During Hospital Stay:  None    Procedures Performed:   none    Significant Findings / Test Results:   Orthostatic hypotension  XR Trauma chest portable  Status: Final result     PACS Images - GE     Show images for XR Trauma chest portable  PACS Images - Sectra     Show images for  XR Trauma chest portable  Study Result    Narrative & Impression   XR CHEST PORTABLE     INDICATION: TRAUMA.     COMPARISON: CXR 6/11/2025, 4/22/2025, chest CT 4/22/2025.     FINDINGS:     Mild pulmonary venous congestion. Chronic blunting of the costophrenic sulci with left pleurodesis on CT. No pneumothorax or pleural effusion.     Normal heart size. CABG. AVR.     Skeleton normal for age.  No acute displaced fractures.     Normal upper abdomen.     IMPRESSION:     Mild pulmonary venous congestion.     No acute displaced fractures.   TRAUMA - CT head wo contrast  Status: Final result     PACS Images - GE     Show images for TRAUMA - CT head wo contrast  PACS Images - Sectra     Show images for TRAUMA - CT head wo contrast  Study Result    Narrative & Impression   CT BRAIN - WITHOUT CONTRAST     INDICATION:   TRAUMA. Fall in bathroom head injury. Patient on thinners.     COMPARISON: April 20 to 25     TECHNIQUE:  CT examination of the brain was performed.  Multiplanar 2D reformatted images were created from the source data.     Radiation dose length product (DLP) for this visit:  888.52 mGy-cm. .  This examination, like all CT scans performed in the [facility], was performed utilizing techniques to minimize radiation dose exposure, including the use of   iterative reconstruction and automated exposure control.     IMAGE QUALITY:  Diagnostic.     FINDINGS:     PARENCHYMA: Decreased attenuation is noted in periventricular and subcortical white matter demonstrating an appearance that is statistically most likely to represent moderate microangiopathic change; this appearance is similar when compared to most   recent prior examination.     No CT signs of acute infarction.  No intracranial mass, mass effect or midline shift.  No acute parenchymal hemorrhage.     VENTRICLES AND EXTRA-AXIAL SPACES:  Normal for the patient's age.     VISUALIZED ORBITS:  Orbits appear symmetric. No intraorbital hematoma.      PARANASAL SINUSES:  Normal visualized paranasal sinuses.     CALVARIUM AND EXTRACRANIAL SOFT TISSUES:  Mild right parietal scalp swelling.     There is a nondisplaced fracture of the right lateral orbital wall as well as in angulated right zygomatic arch fracture. Remaining visible facial bones appear otherwise intact.     IMPRESSION:     No acute intracranial abnormality. Stable chronic microangiopathic changes within the brain.     Right lateral orbital wall and right zygomatic arch fractures.     The study was marked in EPIC for immediate notification.     Incidental Findings:   none   Hospital Course:   Carlos Enrique Roth Jr. is a 89 y.o. male patient with past medical history of CAD, CHF, anemia, CKD 4, hypothyroidism who originally presented to the hospital on 7/19/2025 due to fall at home and generalized weakness.  Patient had just been discharged from [facility] just 48 hours prior to recurrent fall.  His workup in the ED was notable for some facial fractures and positive orthostatic hypotension with PT.  Patient did recently have his diuretic regimen adjusted but overall he examines euvolemic.  He was admitted for overnight observation.  Compression stockings were utilized and orthostatic hypotension resolved by hospital day 1 as did any accompanying lightheadedness.  PT evaluated patient and recommended return to rehab.  He was medically stable for discharge back to [facility] on 7/20/2025.    Please see above list of diagnoses and related plan for additional information.     Discharge Day Visit / Exam:   Subjective: Patient feels well today and says his lightheadedness has completely resolved.  He has no complaints.  Denies shortness of breath or chest pain.  Denies lower extremity edema.  Reinforced slow changes in position.  He is happy to be discharged today.  Vitals: Blood Pressure: 109/51 (07/20/25 1158)  Pulse: 65 (07/20/25 1158)  Temperature: 98.1 °F (36.7 °C) (07/20/25 0805)  Temp  "Source: Oral (07/19/25 1958)  Respirations: 18 (07/20/25 0900)  Height: 5' 8\" (172.7 cm) (07/19/25 1627)  Weight - Scale: 77.3 kg (170 lb 6.7 oz) (07/19/25 1627)  SpO2: 96 % (07/20/25 1158)  Physical Exam  Vitals and nursing note reviewed.   Constitutional:       General: He is not in acute distress.     Appearance: Normal appearance. He is normal weight. He is not ill-appearing or toxic-appearing.   HENT:      Head: Normocephalic and atraumatic.      Right Ear: External ear normal.      Left Ear: External ear normal.      Nose: Nose normal.      Mouth/Throat:      Mouth: Mucous membranes are moist.      Pharynx: Oropharynx is clear.     Eyes:      Conjunctiva/sclera: Conjunctivae normal.       Cardiovascular:      Rate and Rhythm: Normal rate and regular rhythm.      Pulses: Normal pulses.      Heart sounds: Normal heart sounds. No murmur heard.     No gallop.   Pulmonary:      Effort: Pulmonary effort is normal. No respiratory distress.      Breath sounds: Normal breath sounds. No stridor. No rhonchi or rales.      Comments: Reading comfortably on room air  Abdominal:      General: Abdomen is flat. There is no distension.      Palpations: Abdomen is soft. There is no mass.      Tenderness: There is no abdominal tenderness. There is no guarding or rebound.      Hernia: No hernia is present.     Musculoskeletal:      Cervical back: Normal range of motion.      Comments: Compression stockings in place     Skin:     General: Skin is warm and dry.     Neurological:      Mental Status: He is alert. Mental status is at baseline.     Psychiatric:         Mood and Affect: Mood normal.          Discussion with Family: Attempted to update  (son) via phone. Left voicemail.     Discharge instructions/Information to patient and family:   See after visit summary for information provided to patient and family.      Provisions for Follow-Up Care:  See after visit summary for information related to follow-up care and " any pertinent home health orders.      Mobility at time of Discharge:   Basic Mobility Inpatient Raw Score: 14  JH-HLM Goal: 4: Move to chair/commode  JH-HLM Achieved: 5: Stand (1 or more minutes)  HLM Goal achieved. Continue to encourage appropriate mobility.     Disposition:   Acute Rehab at [facility]    Planned Readmission: no    Administrative Statements   Discharge Statement:  I have spent a total time of 40 minutes in caring for this patient on the day of the visit/encounter. >30 minutes of time was spent on: Diagnostic results, Prognosis, Impressions, Counseling / Coordination of care, Documenting in the medical record, Reviewing / ordering tests, medicine, procedures  , and Communicating with other healthcare professionals .    **Please Note: This note may have been constructed using a voice recognition system**

## 2025-07-20 NOTE — ASSESSMENT & PLAN NOTE
Resolved on repeat orthostatics today with use of compressions stockings  Patient noted to drop from 167/72 to 145/65 on orthostatic vital signs with PT in the ER, was symptomatic   No lightheadedness today

## 2025-07-20 NOTE — PLAN OF CARE
Problem: Potential for Falls  Goal: Patient will remain free of falls  Description: INTERVENTIONS:  - Educate patient/family on patient safety including physical limitations  - Instruct patient to call for assistance with activity   - Consider consulting OT/PT to assist with strengthening/mobility based on AM PAC & JH-HLM score  - Consult OT/PT to assist with strengthening/mobility   - Keep Call bell within reach  - Keep bed low and locked with side rails adjusted as appropriate  - Keep care items and personal belongings within reach  - Initiate and maintain comfort rounds  - Make Fall Risk Sign visible to staff  - Offer Toileting every 2 Hours, in advance of need  - Initiate/Maintain 2 alarm  - Obtain necessary fall risk management equipment: 2  - Apply yellow socks and bracelet for high fall risk patients  - Consider moving patient to room near nurses station  Outcome: Progressing     Problem: PAIN - ADULT  Goal: Verbalizes/displays adequate comfort level or baseline comfort level  Description: Interventions:  - Encourage patient to monitor pain and request assistance  - Assess pain using appropriate pain scale  - Administer analgesics as ordered based on type and severity of pain and evaluate response  - Implement non-pharmacological measures as appropriate and evaluate response  - Consider cultural and social influences on pain and pain management  - Notify physician/advanced practitioner if interventions unsuccessful or patient reports new pain  - Educate patient/family on pain management process including their role and importance of  reporting pain   - Provide non-pharmacologic/complimentary pain relief interventions  Outcome: Progressing     Problem: Knowledge Deficit  Goal: Patient/family/caregiver demonstrates understanding of disease process, treatment plan, medications, and discharge instructions  Description: Complete learning assessment and assess knowledge base.  Interventions:  - Provide teaching  at level of understanding  - Provide teaching via preferred learning methods  Outcome: Progressing     Problem: DISCHARGE PLANNING  Goal: Discharge to home or other facility with appropriate resources  Description: INTERVENTIONS:  - Identify barriers to discharge w/patient and caregiver  - Arrange for needed discharge resources and transportation as appropriate  - Identify discharge learning needs (meds, wound care, etc.)  - Arrange for interpretive services to assist at discharge as needed  - Refer to Case Management Department for coordinating discharge planning if the patient needs post-hospital services based on physician/advanced practitioner order or complex needs related to functional status, cognitive ability, or social support system  Outcome: Progressing

## 2025-07-20 NOTE — NURSING NOTE
The IV and Masimo removed before the patient was discharged. I called the [facility] for handoff at 1630. The son picked up the patient and drove the patient to the [facility]. All supplies were gathered and given to the patient. The patient and son had no questions for me.

## 2025-07-20 NOTE — ASSESSMENT & PLAN NOTE
Patient recently hospitalized on 6/17 for CHF exacerbation and discharged to rehab through 7/17, Discharged home and fell shortly after, presented to the ED with generalized weakness  CTH with R lateral orbital wall and R zygomatic arch fractures. Remainder of trauma survey negative  Mild PVC on chest x-ray however overall examines euvolemic and has no respiratory complaints  Positive orthostatic vitals in the ED  PT/OT in the ED : Recommend level 2, can return to [facility] once they reaccept him  Trial compression stockings with resolution of orthostatics and lightheadedness   Counseled on slow positional changes   Continue home diuretic regimen and monitor volume status

## 2025-07-20 NOTE — ASSESSMENT & PLAN NOTE
Wt Readings from Last 3 Encounters:   07/19/25 77.3 kg (170 lb 6.7 oz)   07/15/25 83.9 kg (185 lb)   06/25/25 84 kg (185 lb 3.2 oz)   Admitted recently in June for CHF exacerbation, was discharged on torsemide 40 mg daily  Echo June 2025: LVEF of 50%, systolic function mildly reduced with severe diastolic dysfunction.  Multiple segments are hypokinetic.  Aortic bioprosthetic valve present with elevated RV systolic pressure, aortic root exhibiting severe fibrocalcification   on arrival (566 on prior admission for CHF exac)  CXR with mild pulmonary venous congestion  Remains stable on RA  Examines euvolemic  Continue home diuretic regimen  Monitor daily weight  I/os  Cardiac diet

## 2025-07-20 NOTE — ASSESSMENT & PLAN NOTE
Hemoglobin upon last hospitalization average 7-8, hemoglobin stable at 10.8 on admission  Monitor CBC

## 2025-07-20 NOTE — ASSESSMENT & PLAN NOTE
Lab Results   Component Value Date    HGBA1C 6.6 (H) 06/12/2025       Recent Labs     07/19/25  1824 07/19/25  2112 07/20/25  0803 07/20/25  1121   POCGLU 151* 158* 154* 247*       Blood Sugar Average: Last 72 hrs:  (P) 177.5Current regimen: Lantus 20 units daily and NovoLog 8 units 3 times daily with meals  Sliding scale insulin while inpatient  Monitor fingersticks

## 2025-07-21 ENCOUNTER — PATIENT OUTREACH (OUTPATIENT)
Dept: CASE MANAGEMENT | Facility: OTHER | Age: OVER 89
End: 2025-07-21

## 2025-07-21 NOTE — PROGRESS NOTES
Called patient to introduce him to OPCM program. Patient was discharged to acute Rehab at [facility] 6/17. He was discharged from rehab to home 7/17. He presented back to ED 7/19 with orbital and zygomatic arch fx. Patient has been discharged back to rehab. No answer, VM left with assigned OPCM call back information and next attempt scheduled for 1 week.

## 2025-07-22 ENCOUNTER — PATIENT OUTREACH (OUTPATIENT)
Dept: CASE MANAGEMENT | Facility: OTHER | Age: OVER 89
End: 2025-07-22

## 2025-07-22 NOTE — PROGRESS NOTES
Outpatient Care Management Note:  In basket message received from covering RN CM.  Mr. Roth presented to ED after sustaining a fall at home.  Discharged to [facility] for further STR.      Chart review message sent.

## 2025-07-28 ENCOUNTER — PATIENT OUTREACH (OUTPATIENT)
Dept: CASE MANAGEMENT | Facility: OTHER | Age: OVER 89
End: 2025-07-28

## 2025-08-05 ENCOUNTER — PATIENT OUTREACH (OUTPATIENT)
Dept: CASE MANAGEMENT | Facility: OTHER | Age: OVER 89
End: 2025-08-05

## 2025-08-08 ENCOUNTER — TELEPHONE (OUTPATIENT)
Dept: DIABETES SERVICES | Facility: CLINIC | Age: OVER 89
End: 2025-08-08

## 2025-08-12 ENCOUNTER — TELEPHONE (OUTPATIENT)
Age: OVER 89
End: 2025-08-12

## 2025-08-12 ENCOUNTER — TRANSITIONAL CARE MANAGEMENT (OUTPATIENT)
Dept: FAMILY MEDICINE CLINIC | Facility: HOSPITAL | Age: OVER 89
End: 2025-08-12

## 2025-08-13 ENCOUNTER — PATIENT OUTREACH (OUTPATIENT)
Dept: CASE MANAGEMENT | Facility: OTHER | Age: OVER 89
End: 2025-08-13

## 2025-08-15 ENCOUNTER — APPOINTMENT (EMERGENCY)
Dept: RADIOLOGY | Facility: HOSPITAL | Age: OVER 89
End: 2025-08-15
Payer: MEDICARE

## 2025-08-15 ENCOUNTER — APPOINTMENT (EMERGENCY)
Dept: CT IMAGING | Facility: HOSPITAL | Age: OVER 89
End: 2025-08-15
Payer: MEDICARE

## 2025-08-15 ENCOUNTER — PATIENT OUTREACH (OUTPATIENT)
Dept: CASE MANAGEMENT | Facility: OTHER | Age: OVER 89
End: 2025-08-15

## 2025-08-15 ENCOUNTER — HOSPITAL ENCOUNTER (EMERGENCY)
Facility: HOSPITAL | Age: OVER 89
Discharge: HOME | End: 2025-08-16
Attending: EMERGENCY MEDICINE | Admitting: EMERGENCY MEDICINE
Payer: MEDICARE

## 2025-08-15 DIAGNOSIS — N18.9 CHRONIC RENAL INSUFFICIENCY: ICD-10-CM

## 2025-08-15 DIAGNOSIS — S09.90XA HEAD INJURY: Primary | ICD-10-CM

## 2025-08-15 LAB
ABO GROUP BLD: NORMAL
ANION GAP SERPL CALCULATED.3IONS-SCNC: 10 MMOL/L (ref 4–13)
BASOPHILS # BLD AUTO: 0.04 THOUSANDS/ÂΜL (ref 0–0.1)
BASOPHILS NFR BLD AUTO: 1 % (ref 0–1)
BILIRUB UR QL STRIP: NEGATIVE
BLD GP AB SCN SERPL QL: NEGATIVE
BUN SERPL-MCNC: 60 MG/DL (ref 5–25)
CALCIUM SERPL-MCNC: 9.9 MG/DL (ref 8.4–10.2)
CHLORIDE SERPL-SCNC: 103 MMOL/L (ref 96–108)
CLARITY UR: CLEAR
CO2 SERPL-SCNC: 26 MMOL/L (ref 21–32)
COLOR UR: YELLOW
CREAT SERPL-MCNC: 2.94 MG/DL (ref 0.6–1.3)
EOSINOPHIL # BLD AUTO: 0.18 THOUSAND/ÂΜL (ref 0–0.61)
EOSINOPHIL NFR BLD AUTO: 3 % (ref 0–6)
ERYTHROCYTE [DISTWIDTH] IN BLOOD BY AUTOMATED COUNT: 13.6 % (ref 11.6–15.1)
GFR SERPL CREATININE-BSD FRML MDRD: 17 ML/MIN/1.73SQ M
GLUCOSE SERPL-MCNC: 272 MG/DL (ref 65–140)
GLUCOSE UR STRIP-MCNC: NEGATIVE MG/DL
HCT VFR BLD AUTO: 36.1 % (ref 36.5–49.3)
HGB BLD-MCNC: 11.8 G/DL (ref 12–17)
HGB UR QL STRIP.AUTO: NEGATIVE
IMM GRANULOCYTES # BLD AUTO: 0.03 THOUSAND/UL (ref 0–0.2)
IMM GRANULOCYTES NFR BLD AUTO: 1 % (ref 0–2)
KETONES UR STRIP-MCNC: NEGATIVE MG/DL
LEUKOCYTE ESTERASE UR QL STRIP: NEGATIVE
LYMPHOCYTES # BLD AUTO: 1.84 THOUSANDS/ÂΜL (ref 0.6–4.47)
LYMPHOCYTES NFR BLD AUTO: 28 % (ref 14–44)
MCH RBC QN AUTO: 32.2 PG (ref 26.8–34.3)
MCHC RBC AUTO-ENTMCNC: 32.7 G/DL (ref 31.4–37.4)
MCV RBC AUTO: 98 FL (ref 82–98)
MONOCYTES # BLD AUTO: 0.48 THOUSAND/ÂΜL (ref 0.17–1.22)
MONOCYTES NFR BLD AUTO: 7 % (ref 4–12)
NEUTROPHILS # BLD AUTO: 4.02 THOUSANDS/ÂΜL (ref 1.85–7.62)
NEUTS SEG NFR BLD AUTO: 60 % (ref 43–75)
NITRITE UR QL STRIP: NEGATIVE
NRBC BLD AUTO-RTO: 0 /100 WBCS
PH UR STRIP.AUTO: 6.5 [PH]
PLATELET # BLD AUTO: 156 THOUSANDS/UL (ref 149–390)
PMV BLD AUTO: 9.8 FL (ref 8.9–12.7)
POTASSIUM SERPL-SCNC: 3.3 MMOL/L (ref 3.5–5.3)
PROT UR STRIP-MCNC: NEGATIVE MG/DL
RBC # BLD AUTO: 3.67 MILLION/UL (ref 3.88–5.62)
RH BLD: POSITIVE
SODIUM SERPL-SCNC: 139 MMOL/L (ref 135–147)
SP GR UR STRIP.AUTO: >=1.03 (ref 1–1.03)
SPECIMEN EXPIRATION DATE: NORMAL
UROBILINOGEN UR STRIP-ACNC: <2 MG/DL
WBC # BLD AUTO: 6.59 THOUSAND/UL (ref 4.31–10.16)

## 2025-08-15 PROCEDURE — 99284 EMERGENCY DEPT VISIT MOD MDM: CPT | Performed by: EMERGENCY MEDICINE

## 2025-08-15 PROCEDURE — 36415 COLL VENOUS BLD VENIPUNCTURE: CPT | Performed by: EMERGENCY MEDICINE

## 2025-08-15 PROCEDURE — 71045 X-RAY EXAM CHEST 1 VIEW: CPT

## 2025-08-15 PROCEDURE — 72125 CT NECK SPINE W/O DYE: CPT

## 2025-08-15 PROCEDURE — 70450 CT HEAD/BRAIN W/O DYE: CPT

## 2025-08-15 PROCEDURE — 85025 COMPLETE CBC W/AUTO DIFF WBC: CPT | Performed by: EMERGENCY MEDICINE

## 2025-08-15 PROCEDURE — 93005 ELECTROCARDIOGRAM TRACING: CPT

## 2025-08-15 PROCEDURE — 86850 RBC ANTIBODY SCREEN: CPT | Performed by: EMERGENCY MEDICINE

## 2025-08-15 PROCEDURE — 99285 EMERGENCY DEPT VISIT HI MDM: CPT

## 2025-08-15 PROCEDURE — 80048 BASIC METABOLIC PNL TOTAL CA: CPT | Performed by: EMERGENCY MEDICINE

## 2025-08-15 PROCEDURE — 86900 BLOOD TYPING SEROLOGIC ABO: CPT | Performed by: EMERGENCY MEDICINE

## 2025-08-15 PROCEDURE — 81003 URINALYSIS AUTO W/O SCOPE: CPT | Performed by: EMERGENCY MEDICINE

## 2025-08-15 PROCEDURE — 86901 BLOOD TYPING SEROLOGIC RH(D): CPT | Performed by: EMERGENCY MEDICINE

## 2025-08-15 PROCEDURE — 72170 X-RAY EXAM OF PELVIS: CPT

## 2025-08-15 RX ORDER — GINSENG 100 MG
1 CAPSULE ORAL ONCE
Status: COMPLETED | OUTPATIENT
Start: 2025-08-15 | End: 2025-08-15

## 2025-08-15 RX ADMIN — BACITRACIN 1 LARGE APPLICATION: 500 OINTMENT TOPICAL at 21:22

## 2025-08-16 VITALS
HEIGHT: 68 IN | DIASTOLIC BLOOD PRESSURE: 81 MMHG | SYSTOLIC BLOOD PRESSURE: 124 MMHG | HEART RATE: 58 BPM | TEMPERATURE: 98 F | RESPIRATION RATE: 16 BRPM | WEIGHT: 175.04 LBS | BODY MASS INDEX: 26.53 KG/M2 | OXYGEN SATURATION: 97 %

## 2025-08-16 LAB
ATRIAL RATE: 73 BPM
P AXIS: 78 DEGREES
PR INTERVAL: 236 MS
QRS AXIS: 4 DEGREES
QRSD INTERVAL: 152 MS
QT INTERVAL: 412 MS
QTC INTERVAL: 453 MS
T WAVE AXIS: 135 DEGREES
VENTRICULAR RATE: 73 BPM

## 2025-08-16 PROCEDURE — 93010 ELECTROCARDIOGRAM REPORT: CPT | Performed by: INTERNAL MEDICINE

## 2025-08-16 RX ORDER — ALLOPURINOL 300 MG/1
300 TABLET ORAL DAILY
Status: DISCONTINUED | OUTPATIENT
Start: 2025-08-16 | End: 2025-08-16 | Stop reason: HOSPADM

## 2025-08-16 RX ORDER — INSULIN LISPRO 100 [IU]/ML
8 INJECTION, SOLUTION INTRAVENOUS; SUBCUTANEOUS
Status: DISCONTINUED | OUTPATIENT
Start: 2025-08-16 | End: 2025-08-16 | Stop reason: HOSPADM

## 2025-08-16 RX ORDER — POTASSIUM CHLORIDE 1500 MG/1
20 TABLET, EXTENDED RELEASE ORAL DAILY
Status: DISCONTINUED | OUTPATIENT
Start: 2025-08-16 | End: 2025-08-16 | Stop reason: HOSPADM

## 2025-08-16 RX ORDER — INSULIN GLARGINE 100 [IU]/ML
20 INJECTION, SOLUTION SUBCUTANEOUS EVERY MORNING
Status: DISCONTINUED | OUTPATIENT
Start: 2025-08-16 | End: 2025-08-16 | Stop reason: HOSPADM

## 2025-08-16 RX ORDER — LEVOTHYROXINE SODIUM 25 UG/1
75 TABLET ORAL
Status: DISCONTINUED | OUTPATIENT
Start: 2025-08-16 | End: 2025-08-16 | Stop reason: HOSPADM

## 2025-08-16 RX ORDER — TORSEMIDE 20 MG/1
40 TABLET ORAL DAILY
Status: DISCONTINUED | OUTPATIENT
Start: 2025-08-16 | End: 2025-08-16 | Stop reason: HOSPADM

## 2025-08-18 ENCOUNTER — TELEPHONE (OUTPATIENT)
Age: OVER 89
End: 2025-08-18

## 2025-08-18 ENCOUNTER — PATIENT OUTREACH (OUTPATIENT)
Dept: CASE MANAGEMENT | Facility: OTHER | Age: OVER 89
End: 2025-08-18

## 2025-08-18 ENCOUNTER — TELEPHONE (OUTPATIENT)
Dept: FAMILY MEDICINE CLINIC | Facility: HOSPITAL | Age: OVER 89
End: 2025-08-18

## (undated) DEVICE — SILVER-COATED ANTIBACTERIAL BARRIER DRESSING: Brand: ACTICOAT SURGIC 10X25CM 5PK US

## (undated) DEVICE — SUT VICRYL 0 CT-1 27 IN J260H

## (undated) DEVICE — GLOVE SRG BIOGEL ECLIPSE 6

## (undated) DEVICE — INTENDED FOR TISSUE SEPARATION, AND OTHER PROCEDURES THAT REQUIRE A SHARP SURGICAL BLADE TO PUNCTURE OR CUT.: Brand: BARD-PARKER SAFETY BLADES SIZE 10, STERILE

## (undated) DEVICE — CATH STRAIGHT RED RUBBER 20 FR

## (undated) DEVICE — BULB SYRINGE,IRRIGATION WITH PROTECTIVE CAP: Brand: DOVER

## (undated) DEVICE — PLUMEPEN PRO 10FT

## (undated) DEVICE — 6617 IOBAN II PATIENT ISOLATION DRAPE 5/BX,4BX/CS: Brand: STERI-DRAPE™ IOBAN™ 2

## (undated) DEVICE — 2000CC GUARDIAN II: Brand: GUARDIAN

## (undated) DEVICE — GLOVE SRG BIOGEL 7

## (undated) DEVICE — 32 FR STRAIGHT – SOFT PVC CATHETER: Brand: PVC THORACIC CATHETERS

## (undated) DEVICE — TACHOSIL 9.5 X 4.8 CM

## (undated) DEVICE — BONE WAX WHITE: Brand: BONE WAX WHITE

## (undated) DEVICE — 3000CC GUARDIAN II: Brand: GUARDIAN

## (undated) DEVICE — UMBILICAL TAPE: Brand: DEROYAL

## (undated) DEVICE — MEDI-VAC YANKAUER SUCTION HANDLE W/BULBOUS AND CONTROL VENT: Brand: CARDINAL HEALTH

## (undated) DEVICE — INTENDED FOR TISSUE SEPARATION, AND OTHER PROCEDURES THAT REQUIRE A SHARP SURGICAL BLADE TO PUNCTURE OR CUT.: Brand: BARD-PARKER ® CARBON RIB-BACK BLADES

## (undated) DEVICE — STERILE THORACIC PACK: Brand: CARDINAL HEALTH

## (undated) DEVICE — BLANKET HYPOTHERMIA ADULT GAYMAR

## (undated) DEVICE — STERNAL WIRE

## (undated) DEVICE — ADHESIVE SKN CLSR HISTOACRYL FLEX 0.5ML LF

## (undated) DEVICE — DRAPE FLUID WARMER (BIRD BATH)

## (undated) DEVICE — GAUZE SPONGES,16 PLY: Brand: CURITY

## (undated) DEVICE — LIGHT HANDLE COVER SLEEVE DISP BLUE STELLAR

## (undated) DEVICE — INTENDED FOR TISSUE SEPARATION, AND OTHER PROCEDURES THAT REQUIRE A SHARP SURGICAL BLADE TO PUNCTURE OR CUT.: Brand: BARD-PARKER SAFETY BLADES SIZE 15, STERILE

## (undated) DEVICE — CHLORAPREP HI-LITE 26ML ORANGE

## (undated) DEVICE — SUT PROLENE 6-0 C-1/C-1 30 IN 8307H

## (undated) DEVICE — SURGIFOAM 8.5 X 12.5

## (undated) DEVICE — OASIS DRAIN, SINGLE, INLINE & ATS COMPATIBLE: Brand: OASIS

## (undated) DEVICE — BOWL: 16OZ PEELPOUCH 75/CS 16/PLT: Brand: MEDEGEN MEDICAL PRODUCTS, LLC

## (undated) DEVICE — SUTURE GUIDE

## (undated) DEVICE — WOUND RETRACTOR AND PROTECTOR: Brand: ALEXIS WOUND PROTECTOR-RETRACTOR

## (undated) DEVICE — ARTHROSCOPY FLOOR MAT

## (undated) DEVICE — VASOVIEW HEMOPRO 2: Brand: VASOVIEW HEMOPRO 2

## (undated) DEVICE — AORTIC PUNCH 5.2 MM DISP

## (undated) DEVICE — ALCON OPHTHALMIC KNIFE 15 °: Brand: ALCON

## (undated) DEVICE — ANTI-FOG SOLUTION WITH FOAM PAD: Brand: DEVON

## (undated) DEVICE — OASIS DRAIN, DUAL, IN-LINE, ATS COMPATIBLE: Brand: OASIS

## (undated) DEVICE — NEEDLE HYPO 22G X 1-1/2 IN

## (undated) DEVICE — SUT SILK 2 60 IN SA8H

## (undated) DEVICE — RECIP.STERNUM SAW BLADE 34/7.5/0.7MM: Brand: AESCULAP

## (undated) DEVICE — 32 FR RIGHT ANGLE – SOFT PVC CATHETER: Brand: PVC THORACIC CATHETERS

## (undated) DEVICE — PROXIMATE PLUS MD MULTI-DIRECTIONAL RELEASE SKIN STAPLERS CONTAINS 35 STAINLESS STEEL STAPLES APPROXIMATE CLOSED DIMENSIONS: 6.9MM X 3.9MM WIDE: Brand: PROXIMATE

## (undated) DEVICE — TUBING SUCTION 5MM X 12 FT

## (undated) DEVICE — SUCTION CATH 18 FR

## (undated) DEVICE — DRESSING GUAZE ADH BORDER 4 X 4 IN

## (undated) DEVICE — DRESSING ALLEVYN LIFE SACRAL 6.75 X 6.5 IN

## (undated) DEVICE — SPONGE SCRUB 4 PCT CHLORHEXIDINE

## (undated) DEVICE — PVC URETHRAL CATHETER: Brand: DOVER

## (undated) DEVICE — 28 FR STRAIGHT – SOFT PVC CATHETER: Brand: PVC THORACIC CATHETERS

## (undated) DEVICE — ACE WRAP 6 IN UNSTERILE

## (undated) DEVICE — PACK CABG PBDS

## (undated) DEVICE — DRESSING ALLEVYN LIFE HEEL 25 X 25.2CM

## (undated) DEVICE — HEMOCLIP CARTRIDGE LRG

## (undated) DEVICE — GLOVE INDICATOR PI UNDERGLOVE SZ 8.5 BLUE

## (undated) DEVICE — SUT MONOCRYL 4-0 PS-2 18 IN Y496G

## (undated) DEVICE — THERMOFLECT BLANKET, L, 25EA                               TS THERMOFLECT BLANKET, 48" X 84", SILVER, 5/BG, 5 BG/CS NW: Brand: THERMOFLECT

## (undated) DEVICE — SORIN VEIN DISTENTION KIT

## (undated) DEVICE — 1/2 FORCE SURGICAL SPRING CLIP: Brand: STEALTH® SPRING CLIP

## (undated) DEVICE — SURGICEL 4 X 8

## (undated) DEVICE — ELECTRODE BLADE MOD E-Z CLEAN 2.5IN 6.4CM -0012M

## (undated) DEVICE — GLOVE SRG BIOGEL 8.5

## (undated) DEVICE — SUT PDS PLUS 1 CTB 36 IN PDPB359T

## (undated) DEVICE — PLEDGET CARDIO PTFE 9.5 X 4.8 SOFT LF (6EA/PK)

## (undated) DEVICE — PAD GROUNDING ADULT

## (undated) DEVICE — GLOVE INDICATOR PI UNDERGLOVE SZ 6.5 BLUE

## (undated) DEVICE — SUT PROLENE 4-0 RB-1/RB-1 36 IN 8557H

## (undated) DEVICE — SURGICAL GOWN, XL SMARTSLEEVE: Brand: CONVERTORS

## (undated) DEVICE — 3M™ DURAPORE™ SURGICAL TAPE 2 INCHES X 10YARDS (5.0CM X 9.1M) 6ROLLS/CARTON 10CARTONS/CASE 1538-2: Brand: 3M™ DURAPORE™

## (undated) DEVICE — ANTIBACTERIAL UNDYED BRAIDED (POLYGLACTIN 910), SYNTHETIC ABSORBABLE SUTURE: Brand: COATED VICRYL

## (undated) DEVICE — SUT MONOCRYL PLUS 3-0 PS-2 27 IN MCP427H

## (undated) DEVICE — TUBING INSUFFLATION SET ISO CONNECTOR

## (undated) DEVICE — SILVER-COATED ANTIBACTERIAL BARRIER DRESSING: Brand: ACTICOAT SURGIC 10X12CM 5PK US

## (undated) DEVICE — GLOVE SRG BIOGEL ECLIPSE 7.5

## (undated) DEVICE — 3.2MM GUIDE WIRE 400MM

## (undated) DEVICE — 4.2MM THREE-FLUTED DRILL BIT QC/330MM/100MM CALIBRATION

## (undated) DEVICE — BUTTON SWITCH PENCIL HOLSTER: Brand: VALLEYLAB

## (undated) DEVICE — SUT PROLENE 0 CT-1 30 IN 8424H

## (undated) DEVICE — LIGACLIP MCA MULTIPLE CLIP APPLIERS, 20 SMALL CLIPS: Brand: LIGACLIP

## (undated) DEVICE — EVERGRIP INSERT SET 61MM: Brand: FOGARTY EVERGRIP

## (undated) DEVICE — FILTER SMOKE EVAC VIROSAFE

## (undated) DEVICE — BETHLEHEM UNIV MAJ EXT ,KIT: Brand: CARDINAL HEALTH

## (undated) DEVICE — NEEDLE SPINAL 20G X 3.5 LF

## (undated) DEVICE — SUT PROLENE 7-0 BV175-8/BV175-8 24 IN EPM8747

## (undated) DEVICE — SUT SILK 3-0 SH CR/8 18 IN C013D

## (undated) DEVICE — TOWEL SET X-RAY

## (undated) DEVICE — SUT ETHIBOND 2-0 SH-1/SH-1 30 IN X763H

## (undated) DEVICE — FOGARTY SPRING CLIPS 6MM: Brand: FOGARTY SOFTJAW

## (undated) DEVICE — TRAY FOLEY 16FR SURESTEP TEMP SENS URIMETER STAT LOK

## (undated) DEVICE — HEART SUPPORT

## (undated) DEVICE — SUT VICRYL 2-0 SH 27 IN UNDYED J417H

## (undated) DEVICE — BLADE BEAVER MINI SZ 69

## (undated) DEVICE — SUT VICRYL 0 UR-6 27 IN J603H

## (undated) DEVICE — GLOVE INDICATOR PI UNDERGLOVE SZ 8 BLUE

## (undated) DEVICE — JP PERF DRN SIL FLT 10MM FULL: Brand: CARDINAL HEALTH

## (undated) DEVICE — PAD PERINEAL

## (undated) DEVICE — GLOVE INDICATOR PI UNDERGLOVE SZ 7.5 BLUE

## (undated) DEVICE — ELECTRODE BLADE MOD E-Z CLEAN  2.75IN 7CM -0012AM

## (undated) DEVICE — CAST PADDING 4 IN UNSTERILE

## (undated) DEVICE — SUT SILK 0 CT-1 30 IN 424H